# Patient Record
Sex: MALE | Race: WHITE | NOT HISPANIC OR LATINO | Employment: OTHER | URBAN - METROPOLITAN AREA
[De-identification: names, ages, dates, MRNs, and addresses within clinical notes are randomized per-mention and may not be internally consistent; named-entity substitution may affect disease eponyms.]

---

## 2022-05-31 ENCOUNTER — HOSPITAL ENCOUNTER (EMERGENCY)
Facility: HOSPITAL | Age: 67
Discharge: HOME | End: 2022-05-31
Attending: SPECIALIST
Payer: MEDICARE

## 2022-05-31 VITALS
HEART RATE: 78 BPM | OXYGEN SATURATION: 99 % | BODY MASS INDEX: 23.1 KG/M2 | TEMPERATURE: 99 F | RESPIRATION RATE: 20 BRPM | DIASTOLIC BLOOD PRESSURE: 101 MMHG | WEIGHT: 180 LBS | HEIGHT: 74 IN | SYSTOLIC BLOOD PRESSURE: 194 MMHG

## 2022-05-31 DIAGNOSIS — R03.0 ELEVATED BLOOD PRESSURE READING: ICD-10-CM

## 2022-05-31 DIAGNOSIS — B34.9 VIRAL ILLNESS: Primary | ICD-10-CM

## 2022-05-31 LAB
ALBUMIN SERPL-MCNC: 4.4 GM/DL (ref 3.4–4.8)
ALBUMIN/GLOB SERPL: 1.4 RATIO (ref 1.1–2)
ALP SERPL-CCNC: 66 UNIT/L (ref 40–150)
ALT SERPL-CCNC: 48 UNIT/L (ref 0–55)
APPEARANCE UR: CLEAR
AST SERPL-CCNC: 50 UNIT/L (ref 5–34)
BACTERIA #/AREA URNS AUTO: ABNORMAL /HPF
BASOPHILS # BLD AUTO: 0.01 X10(3)/MCL (ref 0–0.2)
BASOPHILS NFR BLD AUTO: 0.2 %
BILIRUB UR QL STRIP.AUTO: NEGATIVE MG/DL
BILIRUBIN DIRECT+TOT PNL SERPL-MCNC: 2.3 MG/DL
BUN SERPL-MCNC: 12.4 MG/DL (ref 8.4–25.7)
CALCIUM SERPL-MCNC: 10.1 MG/DL (ref 8.8–10)
CHLORIDE SERPL-SCNC: 92 MMOL/L (ref 98–107)
CO2 SERPL-SCNC: 27 MMOL/L (ref 23–31)
COLOR UR AUTO: YELLOW
CREAT SERPL-MCNC: 0.92 MG/DL (ref 0.73–1.18)
EOSINOPHIL # BLD AUTO: 0.04 X10(3)/MCL (ref 0–0.9)
EOSINOPHIL NFR BLD AUTO: 0.9 %
ERYTHROCYTE [DISTWIDTH] IN BLOOD BY AUTOMATED COUNT: 14.1 % (ref 11.5–17)
GLOBULIN SER-MCNC: 3.2 GM/DL (ref 2.4–3.5)
GLUCOSE SERPL-MCNC: 118 MG/DL (ref 82–115)
GLUCOSE UR QL STRIP.AUTO: NEGATIVE MG/DL
GRAN CASTS URNS QL MICRO: ABNORMAL /HPF
HCT VFR BLD AUTO: 42.9 % (ref 42–52)
HGB BLD-MCNC: 14.2 GM/DL (ref 14–18)
IMM GRANULOCYTES # BLD AUTO: 0.01 X10(3)/MCL (ref 0–0.02)
IMM GRANULOCYTES NFR BLD AUTO: 0.2 % (ref 0–0.43)
KETONES UR QL STRIP.AUTO: NEGATIVE MG/DL
LEUKOCYTE ESTERASE UR QL STRIP.AUTO: NEGATIVE UNIT/L
LIPASE SERPL-CCNC: 79 U/L
LYMPHOCYTES # BLD AUTO: 0.73 X10(3)/MCL (ref 0.6–4.6)
LYMPHOCYTES NFR BLD AUTO: 16.1 %
MCH RBC QN AUTO: 29.6 PG (ref 27–31)
MCHC RBC AUTO-ENTMCNC: 33.1 MG/DL (ref 33–36)
MCV RBC AUTO: 89.6 FL (ref 80–94)
MONOCYTES # BLD AUTO: 0.47 X10(3)/MCL (ref 0.1–1.3)
MONOCYTES NFR BLD AUTO: 10.4 %
NEUTROPHILS # BLD AUTO: 3.3 X10(3)/MCL (ref 2.1–9.2)
NEUTROPHILS NFR BLD AUTO: 72.2 %
NITRITE UR QL STRIP.AUTO: NEGATIVE
PH UR STRIP.AUTO: 7 [PH]
PLATELET # BLD AUTO: 73 X10(3)/MCL (ref 130–400)
PMV BLD AUTO: 10.2 FL (ref 9.4–12.4)
POTASSIUM SERPL-SCNC: 4.4 MMOL/L (ref 3.5–5.1)
PROT SERPL-MCNC: 7.6 GM/DL (ref 5.8–7.6)
PROT UR QL STRIP.AUTO: 30 MG/DL
RBC # BLD AUTO: 4.79 X10(6)/MCL (ref 4.7–6.1)
RBC #/AREA URNS AUTO: ABNORMAL /HPF
RBC UR QL AUTO: ABNORMAL UNIT/L
SODIUM SERPL-SCNC: 132 MMOL/L (ref 136–145)
SP GR UR STRIP.AUTO: 1.02
SQUAMOUS #/AREA URNS AUTO: ABNORMAL /LPF
TSH SERPL-ACNC: 1.12 UIU/ML (ref 0.35–4.94)
UROBILINOGEN UR STRIP-ACNC: 1 MG/DL
WBC # SPEC AUTO: 4.5 X10(3)/MCL (ref 4.5–11.5)
WBC #/AREA URNS AUTO: ABNORMAL /HPF

## 2022-05-31 PROCEDURE — 63600175 PHARM REV CODE 636 W HCPCS: Performed by: SPECIALIST

## 2022-05-31 PROCEDURE — 83690 ASSAY OF LIPASE: CPT | Performed by: SPECIALIST

## 2022-05-31 PROCEDURE — 99284 EMERGENCY DEPT VISIT MOD MDM: CPT | Mod: 25

## 2022-05-31 PROCEDURE — 96361 HYDRATE IV INFUSION ADD-ON: CPT

## 2022-05-31 PROCEDURE — 85025 COMPLETE CBC W/AUTO DIFF WBC: CPT | Performed by: SPECIALIST

## 2022-05-31 PROCEDURE — 81001 URINALYSIS AUTO W/SCOPE: CPT | Performed by: SPECIALIST

## 2022-05-31 PROCEDURE — 25000003 PHARM REV CODE 250: Performed by: SPECIALIST

## 2022-05-31 PROCEDURE — 84443 ASSAY THYROID STIM HORMONE: CPT | Performed by: SPECIALIST

## 2022-05-31 PROCEDURE — 36415 COLL VENOUS BLD VENIPUNCTURE: CPT | Performed by: SPECIALIST

## 2022-05-31 PROCEDURE — 96374 THER/PROPH/DIAG INJ IV PUSH: CPT

## 2022-05-31 PROCEDURE — 80053 COMPREHEN METABOLIC PANEL: CPT | Performed by: SPECIALIST

## 2022-05-31 RX ORDER — CLONIDINE HYDROCHLORIDE 0.1 MG/1
0.1 TABLET ORAL 2 TIMES DAILY
Qty: 60 TABLET | Refills: 0 | Status: ON HOLD | OUTPATIENT
Start: 2022-05-31 | End: 2022-12-30 | Stop reason: HOSPADM

## 2022-05-31 RX ORDER — ONDANSETRON 2 MG/ML
4 INJECTION INTRAMUSCULAR; INTRAVENOUS
Status: COMPLETED | OUTPATIENT
Start: 2022-05-31 | End: 2022-05-31

## 2022-05-31 RX ORDER — ONDANSETRON 4 MG/1
4 TABLET, FILM COATED ORAL EVERY 6 HOURS
Qty: 12 TABLET | Refills: 0 | Status: ON HOLD | OUTPATIENT
Start: 2022-05-31 | End: 2022-12-30 | Stop reason: HOSPADM

## 2022-05-31 RX ORDER — CLONIDINE HYDROCHLORIDE 0.2 MG/1
0.2 TABLET ORAL
Status: COMPLETED | OUTPATIENT
Start: 2022-05-31 | End: 2022-05-31

## 2022-05-31 RX ADMIN — ONDANSETRON 4 MG: 2 INJECTION INTRAMUSCULAR; INTRAVENOUS at 07:05

## 2022-05-31 RX ADMIN — CLONIDINE HYDROCHLORIDE 0.2 MG: 0.2 TABLET ORAL at 08:05

## 2022-05-31 RX ADMIN — SODIUM CHLORIDE 1000 ML: 9 INJECTION, SOLUTION INTRAVENOUS at 07:05

## 2022-06-01 NOTE — ED PROVIDER NOTES
Encounter Date: 5/31/2022       History     Chief Complaint   Patient presents with    Abdominal Pain     Reports having abd pain with associated weakness since yesterday.reports nausea and one episode of vomiting.     Pt reports he does not see any doctors and does not take any daily meds.     Weakness     Patient reports nausea and an episode of vomiting yesterday, continued nausea today with abdominal cramping and weakness; patient denies any medical problems and has not seen a doctor in many years    The history is provided by the patient.     Review of patient's allergies indicates:  No Known Allergies  History reviewed. No pertinent past medical history.  History reviewed. No pertinent surgical history.  History reviewed. No pertinent family history.     Review of Systems   Constitutional: Negative for fever.   HENT: Negative for sore throat.    Respiratory: Negative for shortness of breath.    Cardiovascular: Negative for chest pain.   Gastrointestinal: Negative for nausea.   Genitourinary: Negative for dysuria.   Musculoskeletal: Negative for back pain.   Skin: Negative for rash.   Neurological: Negative for weakness.   Hematological: Does not bruise/bleed easily.       Physical Exam     Initial Vitals [05/31/22 1927]   BP Pulse Resp Temp SpO2   (!) 216/118 85 20 98.5 °F (36.9 °C) 100 %      MAP       --         Physical Exam    Nursing note and vitals reviewed.  Constitutional: He appears well-developed and well-nourished.   HENT:   Head: Normocephalic and atraumatic.   Eyes: EOM are normal. Pupils are equal, round, and reactive to light.   Neck: Neck supple.   Normal range of motion.  Cardiovascular: Normal rate and regular rhythm.   Murmur (2+ systolic ejection murmur over the left sternal border) heard.  Pulmonary/Chest: Breath sounds normal. No respiratory distress. He has no wheezes.   Abdominal: Abdomen is soft. Bowel sounds are normal. He exhibits no distension. There is abdominal tenderness (  mild, generalized). There is no rebound and no guarding.   Musculoskeletal:         General: Normal range of motion.      Cervical back: Normal range of motion and neck supple.     Neurological: He is alert and oriented to person, place, and time. No cranial nerve deficit or sensory deficit.   Skin: Skin is warm and dry.         ED Course   Procedures  Labs Reviewed   COMPREHENSIVE METABOLIC PANEL - Abnormal; Notable for the following components:       Result Value    Sodium Level 132 (*)     Chloride 92 (*)     Glucose Level 118 (*)     Calcium Level Total 10.1 (*)     Bilirubin Total 2.3 (*)     Aspartate Aminotransferase 50 (*)     All other components within normal limits   LIPASE - Abnormal; Notable for the following components:    Lipase Level 79 (*)     All other components within normal limits   URINALYSIS, REFLEX TO URINE CULTURE - Abnormal; Notable for the following components:    Protein, UA 30  (*)     Blood, UA Trace-Intact (*)     All other components within normal limits   CBC WITH DIFFERENTIAL - Abnormal; Notable for the following components:    Platelet 73 (*)     All other components within normal limits   URINALYSIS, MICROSCOPIC - Abnormal; Notable for the following components:    Granular Casts, UA Few (*)     Squamous Epithelial Cells, UA Few (*)     All other components within normal limits   TSH - Normal   CBC W/ AUTO DIFFERENTIAL    Narrative:     The following orders were created for panel order CBC auto differential.  Procedure                               Abnormality         Status                     ---------                               -----------         ------                     CBC with Differential[[id number]]        Abnormal            Final result                 Please view results for these tests on the individual orders.          Imaging Results    None          Medications   sodium chloride 0.9% bolus 1,000 mL (0 mLs Intravenous Stopped 5/31/22 2055)   ondansetron injection  "4 mg (4 mg Intravenous Given 5/31/22 1952)   cloNIDine tablet 0.2 mg (0.2 mg Oral Given 5/31/22 2041)     Medical Decision Making:   Differential Diagnosis:   Gastroenteritis; Bowel obstruction; Electrolyte abnormality; Dehydration; Gastritis; GERD; PUD; Viral illness        Patient Vitals for the past 24 hrs:   BP Temp Temp src Pulse Resp SpO2 Height Weight   05/31/22 2114 (!) 194/101 98.5 °F (36.9 °C) Oral 78 20 99 % -- --   05/31/22 2041 (!) 221/118 -- -- -- -- -- -- --   05/31/22 2030 (!) 221/118 -- -- -- -- -- -- --   05/31/22 2003 (!) 135/92 98.5 °F (36.9 °C) -- 87 18 98 % -- --   05/31/22 1931 -- -- -- -- -- 100 % -- --   05/31/22 1927 (!) 216/118 98.5 °F (36.9 °C) Oral 85 20 100 % 6' 2" (1.88 m) 81.6 kg (180 lb)     Much improved                 Clinical Impression:   Final diagnoses:  [B34.9] Viral illness (Primary)  [R03.0] Elevated blood pressure reading          ED Disposition Condition    Discharge Stable        ED Prescriptions     Medication Sig Dispense Start Date End Date Auth. Provider    ondansetron (ZOFRAN) 4 MG tablet Take 1 tablet (4 mg total) by mouth every 6 (six) hours. 12 tablet 5/31/2022  Alberto Sampson MD    cloNIDine (CATAPRES) 0.1 MG tablet Take 1 tablet (0.1 mg total) by mouth 2 (two) times daily. 60 tablet 5/31/2022 6/30/2022 Alberto Sampson MD        Follow-up Information     Follow up With Specialties Details Why Contact Info    Primary MD  In 1 week      LOREN Petersen Nurse Practitioner In 1 week  [address] [facility] [id number]  [phone]             Alberto Sampson MD  06/01/22 0059    "

## 2022-12-08 ENCOUNTER — HOSPITAL ENCOUNTER (EMERGENCY)
Facility: HOSPITAL | Age: 67
Discharge: HOME | End: 2022-12-08
Attending: STUDENT IN AN ORGANIZED HEALTH CARE EDUCATION/TRAINING PROGRAM
Payer: MEDICARE

## 2022-12-08 VITALS
TEMPERATURE: 97 F | RESPIRATION RATE: 19 BRPM | BODY MASS INDEX: 23.1 KG/M2 | OXYGEN SATURATION: 99 % | DIASTOLIC BLOOD PRESSURE: 89 MMHG | WEIGHT: 180 LBS | HEART RATE: 83 BPM | HEIGHT: 74 IN | SYSTOLIC BLOOD PRESSURE: 150 MMHG

## 2022-12-08 DIAGNOSIS — R06.02 SHORTNESS OF BREATH: ICD-10-CM

## 2022-12-08 DIAGNOSIS — R06.00 DYSPNEA, UNSPECIFIED TYPE: Primary | ICD-10-CM

## 2022-12-08 DIAGNOSIS — I10 HYPERTENSION, UNSPECIFIED TYPE: ICD-10-CM

## 2022-12-08 LAB
ABS NEUT CALC (OHS): 2.32 X10(3)/MCL (ref 2.1–9.2)
ALBUMIN SERPL-MCNC: 3.8 GM/DL (ref 3.4–4.8)
ALBUMIN/GLOB SERPL: 1.2 RATIO (ref 1.1–2)
ALP SERPL-CCNC: 67 UNIT/L (ref 40–150)
ALT SERPL-CCNC: 35 UNIT/L (ref 0–55)
AMPHET UR QL SCN: NEGATIVE
APPEARANCE UR: CLEAR
AST SERPL-CCNC: 47 UNIT/L (ref 5–34)
BACTERIA #/AREA URNS AUTO: NORMAL /HPF
BARBITURATE SCN PRESENT UR: NEGATIVE
BASOPHILS NFR BLD MANUAL: 0 % (ref 0–2)
BASOPHILS NFR BLD MANUAL: 0 X10(3)/MCL (ref 0–0.2)
BENZODIAZ UR QL SCN: NEGATIVE
BILIRUB UR QL STRIP.AUTO: NEGATIVE MG/DL
BILIRUBIN DIRECT+TOT PNL SERPL-MCNC: 1.8 MG/DL
BNP BLD-MCNC: 822.6 PG/ML
BUN SERPL-MCNC: 15.6 MG/DL (ref 8.4–25.7)
CALCIUM SERPL-MCNC: 9.7 MG/DL (ref 8.8–10)
CANNABINOIDS UR QL SCN: NEGATIVE
CHLORIDE SERPL-SCNC: 97 MMOL/L (ref 98–107)
CO2 SERPL-SCNC: 23 MMOL/L (ref 23–31)
COCAINE UR QL SCN: NEGATIVE
COLOR UR AUTO: YELLOW
CREAT SERPL-MCNC: 0.93 MG/DL (ref 0.73–1.18)
D DIMER PPP IA.FEU-MCNC: 1.66 UG/ML FEU (ref 0–0.5)
EOSINOPHIL NFR BLD MANUAL: 0 % (ref 0–8)
EOSINOPHIL NFR BLD MANUAL: 0 X10(3)/MCL (ref 0–0.9)
ERYTHROCYTE [DISTWIDTH] IN BLOOD BY AUTOMATED COUNT: 13.6 % (ref 11.5–17)
ETHANOL SERPL-MCNC: <10 MG/DL
FLUAV AG UPPER RESP QL IA.RAPID: NOT DETECTED
FLUBV AG UPPER RESP QL IA.RAPID: NOT DETECTED
GFR SERPLBLD CREATININE-BSD FMLA CKD-EPI: >60 MLS/MIN/1.73/M2
GLOBULIN SER-MCNC: 3.1 GM/DL (ref 2.4–3.5)
GLUCOSE SERPL-MCNC: 111 MG/DL (ref 82–115)
GLUCOSE UR QL STRIP.AUTO: NEGATIVE MG/DL
HCT VFR BLD AUTO: 39.8 % (ref 42–52)
HEMATOLOGIST REVIEW: NORMAL
HGB BLD-MCNC: 13.2 GM/DL (ref 14–18)
IMM GRANULOCYTES # BLD AUTO: 0.01 X10(3)/MCL (ref 0–0.04)
IMM GRANULOCYTES NFR BLD AUTO: 0.4 %
INR BLD: 0.96 (ref 0–1.3)
KETONES UR QL STRIP.AUTO: NEGATIVE MG/DL
LEUKOCYTE ESTERASE UR QL STRIP.AUTO: NEGATIVE UNIT/L
LYMPHOCYTES NFR BLD MANUAL: 0.27 X10(3)/MCL
LYMPHOCYTES NFR BLD MANUAL: 10 % (ref 13–40)
MCH RBC QN AUTO: 29.3 PG (ref 27–31)
MCHC RBC AUTO-ENTMCNC: 33.2 MG/DL (ref 33–36)
MCV RBC AUTO: 88.4 FL (ref 80–94)
MONOCYTES NFR BLD MANUAL: 0.11 X10(3)/MCL (ref 0.1–1.3)
MONOCYTES NFR BLD MANUAL: 4 % (ref 2–11)
NEUTROPHILS NFR BLD MANUAL: 83 % (ref 47–80)
NEUTS BAND NFR BLD MANUAL: 3 % (ref 0–11)
NITRITE UR QL STRIP.AUTO: NEGATIVE
OPIATES UR QL SCN: POSITIVE
PCP UR QL: NEGATIVE
PH UR STRIP.AUTO: 5.5 [PH]
PH UR: 5.5 [PH] (ref 3–11)
PLATELET # BLD AUTO: 76 X10(3)/MCL (ref 130–400)
PLATELET # BLD EST: ABNORMAL 10*3/UL
PMV BLD AUTO: 9.7 FL (ref 7.4–10.4)
POC CARDIAC TROPONIN I: 0.02 NG/ML (ref 0–0.08)
POTASSIUM SERPL-SCNC: 4.1 MMOL/L (ref 3.5–5.1)
PROT SERPL-MCNC: 6.9 GM/DL (ref 5.8–7.6)
PROT UR QL STRIP.AUTO: NEGATIVE MG/DL
PROTHROMBIN TIME: <10 SECONDS (ref 12.5–14.5)
RBC # BLD AUTO: 4.5 X10(6)/MCL (ref 4.7–6.1)
RBC #/AREA URNS AUTO: NORMAL /HPF
RBC UR QL AUTO: NEGATIVE UNIT/L
SAMPLE: NORMAL
SARS-COV-2 RNA RESP QL NAA+PROBE: NOT DETECTED
SODIUM SERPL-SCNC: 135 MMOL/L (ref 136–145)
SP GR UR STRIP.AUTO: <=1.005
SPECIFIC GRAVITY, URINE AUTO (.000) (OHS): <=1.005 (ref 1–1.03)
SQUAMOUS #/AREA URNS AUTO: NORMAL /HPF
UROBILINOGEN UR STRIP-ACNC: 1 MG/DL
WBC # SPEC AUTO: 2.7 X10(3)/MCL (ref 4.5–11.5)
WBC #/AREA URNS AUTO: NORMAL /HPF

## 2022-12-08 PROCEDURE — 93005 ELECTROCARDIOGRAM TRACING: CPT

## 2022-12-08 PROCEDURE — 83880 ASSAY OF NATRIURETIC PEPTIDE: CPT | Performed by: STUDENT IN AN ORGANIZED HEALTH CARE EDUCATION/TRAINING PROGRAM

## 2022-12-08 PROCEDURE — 82077 ASSAY SPEC XCP UR&BREATH IA: CPT | Performed by: STUDENT IN AN ORGANIZED HEALTH CARE EDUCATION/TRAINING PROGRAM

## 2022-12-08 PROCEDURE — 25000003 PHARM REV CODE 250: Performed by: STUDENT IN AN ORGANIZED HEALTH CARE EDUCATION/TRAINING PROGRAM

## 2022-12-08 PROCEDURE — 81003 URINALYSIS AUTO W/O SCOPE: CPT | Performed by: STUDENT IN AN ORGANIZED HEALTH CARE EDUCATION/TRAINING PROGRAM

## 2022-12-08 PROCEDURE — 25500020 PHARM REV CODE 255: Performed by: STUDENT IN AN ORGANIZED HEALTH CARE EDUCATION/TRAINING PROGRAM

## 2022-12-08 PROCEDURE — 0240U COVID/FLU A&B PCR: CPT | Performed by: STUDENT IN AN ORGANIZED HEALTH CARE EDUCATION/TRAINING PROGRAM

## 2022-12-08 PROCEDURE — 99285 EMERGENCY DEPT VISIT HI MDM: CPT | Mod: 25

## 2022-12-08 PROCEDURE — 93010 EKG 12-LEAD: ICD-10-PCS | Mod: ,,, | Performed by: INTERNAL MEDICINE

## 2022-12-08 PROCEDURE — 85610 PROTHROMBIN TIME: CPT | Performed by: STUDENT IN AN ORGANIZED HEALTH CARE EDUCATION/TRAINING PROGRAM

## 2022-12-08 PROCEDURE — 80053 COMPREHEN METABOLIC PANEL: CPT | Performed by: STUDENT IN AN ORGANIZED HEALTH CARE EDUCATION/TRAINING PROGRAM

## 2022-12-08 PROCEDURE — 85060 BLOOD SMEAR INTERPRETATION: CPT | Performed by: STUDENT IN AN ORGANIZED HEALTH CARE EDUCATION/TRAINING PROGRAM

## 2022-12-08 PROCEDURE — 80307 DRUG TEST PRSMV CHEM ANLYZR: CPT | Performed by: STUDENT IN AN ORGANIZED HEALTH CARE EDUCATION/TRAINING PROGRAM

## 2022-12-08 PROCEDURE — 85379 FIBRIN DEGRADATION QUANT: CPT | Performed by: STUDENT IN AN ORGANIZED HEALTH CARE EDUCATION/TRAINING PROGRAM

## 2022-12-08 PROCEDURE — 85007 BL SMEAR W/DIFF WBC COUNT: CPT | Performed by: STUDENT IN AN ORGANIZED HEALTH CARE EDUCATION/TRAINING PROGRAM

## 2022-12-08 PROCEDURE — 93010 ELECTROCARDIOGRAM REPORT: CPT | Mod: ,,, | Performed by: INTERNAL MEDICINE

## 2022-12-08 PROCEDURE — 81001 URINALYSIS AUTO W/SCOPE: CPT | Performed by: STUDENT IN AN ORGANIZED HEALTH CARE EDUCATION/TRAINING PROGRAM

## 2022-12-08 PROCEDURE — 85025 COMPLETE CBC W/AUTO DIFF WBC: CPT | Performed by: STUDENT IN AN ORGANIZED HEALTH CARE EDUCATION/TRAINING PROGRAM

## 2022-12-08 RX ORDER — CLONIDINE HYDROCHLORIDE 0.1 MG/1
0.1 TABLET ORAL 2 TIMES DAILY
Qty: 60 TABLET | Refills: 0 | Status: SHIPPED | OUTPATIENT
Start: 2022-12-08 | End: 2023-01-07

## 2022-12-08 RX ORDER — CLONIDINE HYDROCHLORIDE 0.2 MG/1
0.2 TABLET ORAL
Status: COMPLETED | OUTPATIENT
Start: 2022-12-08 | End: 2022-12-08

## 2022-12-08 RX ADMIN — CLONIDINE HYDROCHLORIDE 0.2 MG: 0.2 TABLET ORAL at 07:12

## 2022-12-08 RX ADMIN — IOHEXOL 100 ML: 350 INJECTION, SOLUTION INTRAVENOUS at 08:12

## 2022-12-08 NOTE — ED PROVIDER NOTES
Encounter Date: 12/8/2022       History     Chief Complaint   Patient presents with    Shortness of Breath     Sob for  2day/ off bp med for 2 weeks     67 m presents to Ed with SOB x 2 days worsened this am, out of BP meds x 2 weeks. Denies COPD/asthma, no cp, n/v/diaphoresis/HA or any other symptoms. Denies tobacco, drugs. Chronic etoh user, drinks 2 six packs /day, last drink was last pm.  no cough or other sick symptoms    Review of patient's allergies indicates:  No Known Allergies  No past medical history on file.  No past surgical history on file.  No family history on file.  Social History     Tobacco Use    Smoking status: Never    Smokeless tobacco: Never   Substance Use Topics    Alcohol use: Yes     Alcohol/week: 85.0 standard drinks     Types: 85 Cans of beer per week    Drug use: Yes     Types: Hydrocodone     Review of Systems   Respiratory:  Positive for shortness of breath.    All other systems reviewed and are negative.    Physical Exam     Initial Vitals   BP Pulse Resp Temp SpO2   12/08/22 0712 12/08/22 0711 12/08/22 0712 12/08/22 0712 12/08/22 0712   (!) 193/109 68 20 97.3 °F (36.3 °C) 96 %      MAP       --                Physical Exam    Constitutional: He appears well-developed and well-nourished.   HENT:   Head: Normocephalic.   Eyes: EOM are normal. Pupils are equal, round, and reactive to light.   Neck:   Normal range of motion.  Cardiovascular:  Normal rate, regular rhythm, normal heart sounds, intact distal pulses and normal pulses.           Pulmonary/Chest: Breath sounds normal. No respiratory distress. He has no wheezes. He exhibits no tenderness.   Abdominal: Abdomen is soft. Bowel sounds are normal. There is no abdominal tenderness.   Musculoskeletal:         General: Normal range of motion.      Cervical back: Normal range of motion.     Neurological: He is alert. GCS score is 15. GCS eye subscore is 4. GCS verbal subscore is 5. GCS motor subscore is 6.   Skin: Skin is warm.  Capillary refill takes less than 2 seconds.   Psychiatric: He has a normal mood and affect.       ED Course   Procedures  Labs Reviewed   B-TYPE NATRIURETIC PEPTIDE - Abnormal; Notable for the following components:       Result Value    Natriuretic Peptide 822.6 (*)     All other components within normal limits   COMPREHENSIVE METABOLIC PANEL - Abnormal; Notable for the following components:    Sodium Level 135 (*)     Chloride 97 (*)     Bilirubin Total 1.8 (*)     Aspartate Aminotransferase 47 (*)     All other components within normal limits   DRUG SCREEN, URINE (BEAKER) - Abnormal; Notable for the following components:    Opiates, Urine Positive (*)     All other components within normal limits    Narrative:     Cut off concentrations:    Amphetamines - 1000 ng/ml  Barbiturates - 200 ng/ml  Benzodiazepine - 200 ng/ml  Cannabinoids (THC) - 50 ng/ml  Cocaine - 300 ng/ml  Fentanyl - 1.0 ng/ml  MDMA - 500 ng/ml  Opiates - 300 ng/ml   Phencyclidine (PCP) - 25 ng/ml    Specimen submitted for drug analysis and tested for pH and specific gravity in order to evaluate sample integrity. Suspect tampering if specific gravity is <1.003 and/or pH is not within the range of 4.5 - 8.0  False negatives may result form substances such as bleach added to urine.  False positives may result for the presence of a substance with similar chemical structure to the drug or its metabolite.    This test provides only a PRELIMINARY analytical test result. A more specific alternate chemical method must be used in order to obtain a confirmed analytical result. Gas chromatography/mass spectrometry (GC/MS) is the preferred confirmatory method. Other chemical confirmation methods are available. Clinical consideration and professional judgement should be applied to any drug of abuse test result, particularly when preliminary positive results are used.    Positive results will be confirmed only at the physicians request. Unconfirmed screening  results are to be used only for medical purposes (treatment).        PROTIME-INR - Abnormal; Notable for the following components:    PT <10.0 (*)     All other components within normal limits   D DIMER, QUANTITATIVE - Abnormal; Notable for the following components:    D-Dimer 1.66 (*)     All other components within normal limits   CBC WITH DIFFERENTIAL - Abnormal; Notable for the following components:    WBC 2.7 (*)     RBC 4.50 (*)     Hgb 13.2 (*)     Hct 39.8 (*)     Platelet 76 (*)     All other components within normal limits   MANUAL DIFFERENTIAL - Abnormal; Notable for the following components:    Neut Man 83 (*)     Lymph Man 10 (*)     Abs Lymp 0.27 (*)     Platelet Est Decreased (*)     All other components within normal limits   COVID/FLU A&B PCR - Normal    Narrative:     The Xpert Xpress SARS-CoV-2/FLU/RSV plus is a rapid, multiplexed real-time PCR test intended for the simultaneous qualitative detection and differentiation of SARS-CoV-2, Influenza A, Influenza B, and respiratory syncytial virus (RSV) viral RNA in either nasopharyngeal swab or nasal swab specimens.         ALCOHOL,MEDICAL (ETHANOL) - Normal   URINALYSIS, MICROSCOPIC - Normal   CBC W/ AUTO DIFFERENTIAL    Narrative:     The following orders were created for panel order CBC Auto Differential.  Procedure                               Abnormality         Status                     ---------                               -----------         ------                     CBC with Differential[[id number]]        Abnormal            Final result               Manual Differential[[id number]]          Abnormal            Final result                 Please view results for these tests on the individual orders.   URINALYSIS, REFLEX TO URINE CULTURE   TROPONIN ISTAT   PATH REVIEW OF BLOOD SMEAR     EKG Readings: (Independently Interpreted)   Initial Reading: No STEMI. Heart Rate: 88.   Sinus rhythm w ocasional PVCs  LAE  LVH  Prolonged qt  Nonspecific  st changes   ECG Results              EKG 12-lead (In process)  Result time 12/09/22 06:35:28      In process by Interface, Lab In [facility] (12/09/22 06:35:28)                   Narrative:    Test Reason : R06.02,    Vent. Rate : 088 BPM     Atrial Rate : 088 BPM     P-R Int : 144 ms          QRS Dur : 096 ms      QT Int : 404 ms       P-R-T Axes : 067 076 062 degrees     QTc Int : 488 ms    Sinus rhythm with occasional Premature ventricular complexes  Possible Left atrial enlargement  LVH  Nonspecific ST abnormality  Prolonged QT  Abnormal ECG  No previous ECGs available    Referred By: [email or web address]   SELF           Confirmed By:                                   Imaging Results              CTA Chest Non-Coronary (PE Studies) (Final result)  Result time 12/08/22 09:18:30      Final result by Wyatt Wellington MD (12/08/22 09:18:30)                   Impression:      1. No evidence of pulmonary embolism through the level of the subsegmental pulmonary arteries.  2. Ground-glass nodular opacity in the superior segment of the left lower lobe measuring 17 mm.  While this may represent an infectious or inflammatory process, recommend repeat CT of the chest in 3 months to ensure resolution.  3. Diffuse hepatic steatosis and splenomegaly.      Electronically signed by: Wyatt Wellington MD  Date:    12/08/2022  Time:    09:18               Narrative:    EXAMINATION:  CTA CHEST NON CORONARY (PE STUDIES)    CLINICAL HISTORY:  Pulmonary embolism (PE) suspected, positive D-dimer;    TECHNIQUE:  Axial images of the chest were obtained with IV contrast administration.  Coronal and sagittal reconstructions were provided.  Three dimensional and MIP images were obtained and evaluated.  Total DLP was 646.06 mGy-cm. Dose lowering technique and automated exposure control were utilized for this exam.    COMPARISON:  Chest radiograph 12/08/2022.    FINDINGS:  There is no filling defect within the pulmonary arteries through the level of the  subsegmental pulmonary arteries.  There is no CT evidence of right heart strain.    The airway is widely patent.  There is no mediastinal or hilar lymphadenopathy.  The heart is not enlarged.    There is bilateral lower lobe atelectasis.  There are trace bilateral pleural effusions.  There is a ground-glass opacity in the superior segment of the left lower lobe measuring 17 mm.    The upper abdomen demonstrates diffuse hepatic steatosis and splenomegaly without acute abnormality.  There is no lytic or blastic osseous lesion.                                       X-Ray Chest 1 View (Final result)  Result time 12/08/22 07:14:16      Final result by Yoandy Hernandez MD (12/08/22 07:14:16)                   Impression:      No acute pulmonary process identified.      Electronically signed by: Yoandy Hernandez  Date:    12/08/2022  Time:    07:14               Narrative:    EXAMINATION:  XR CHEST 1 VIEW    CLINICAL HISTORY:  Shortness of breath    TECHNIQUE:  Frontal view(s) of the chest.    COMPARISON:  No relevant comparison studies available at the time of dictation.    FINDINGS:  Normal cardiac silhouette.  The lungs are well-inflated.  No consolidation identified.  No significant pleural effusion or discernible pneumothorax.                                       Medications   cloNIDine tablet 0.2 mg (0.2 mg Oral Given 12/8/22 0741)   iohexoL (OMNIPAQUE 350) injection 100 mL (100 mLs Intravenous Given 12/8/22 0857)                 ED Course as of 12/09/22 0646   Thu Dec 08, 2022   0645 Sob resolved after bp improved  Reinforced need for FU with pcp and medication compliance [MG]   0935 Plt 76 on CBC  Plt 73 on chart marked for merge 5/31/22    CTPE-neg for PE, chronic appearing ground glass opacity in L LL [MG]      ED Course User Index  [MG] Marjorie Couch MD                 Clinical Impression:   Final diagnoses:  [R06.02] Shortness of breath  [R06.00] Dyspnea, unspecified type (Primary)  [I10] Hypertension,  unspecified type        ED Disposition Condition    Discharge Stable          ED Prescriptions       Medication Sig Dispense Start Date End Date Auth. Provider    cloNIDine (CATAPRES) 0.1 MG tablet Take 1 tablet (0.1 mg total) by mouth 2 (two) times daily. 60 tablet 12/8/2022 1/7/2023 Marjorie Couch MD          Follow-up Information       Follow up With Specialties Details Why Contact Info    PCP    call Dr Urbina to establish care (patient states he is familiar with this doctor and would like to see him) also given referral info for local pcp             Marjorie Couch MD  12/09/22 6580

## 2022-12-28 ENCOUNTER — HOSPITAL ENCOUNTER (EMERGENCY)
Facility: HOSPITAL | Age: 67
Discharge: LEFT BEFORE DISCHARGE | End: 2022-12-28
Attending: STUDENT IN AN ORGANIZED HEALTH CARE EDUCATION/TRAINING PROGRAM
Payer: MEDICARE

## 2022-12-28 VITALS
WEIGHT: 180 LBS | HEIGHT: 74 IN | BODY MASS INDEX: 23.1 KG/M2 | DIASTOLIC BLOOD PRESSURE: 91 MMHG | HEART RATE: 81 BPM | OXYGEN SATURATION: 96 % | RESPIRATION RATE: 22 BRPM | TEMPERATURE: 98 F | SYSTOLIC BLOOD PRESSURE: 148 MMHG

## 2022-12-28 DIAGNOSIS — E87.1 HYPONATREMIA: ICD-10-CM

## 2022-12-28 DIAGNOSIS — R06.02 SHORTNESS OF BREATH: ICD-10-CM

## 2022-12-28 DIAGNOSIS — I50.9 CONGESTIVE HEART FAILURE, UNSPECIFIED HF CHRONICITY, UNSPECIFIED HEART FAILURE TYPE: Primary | ICD-10-CM

## 2022-12-28 LAB
ALBUMIN SERPL-MCNC: 3.7 G/DL (ref 3.4–4.8)
ALBUMIN/GLOB SERPL: 1.3 RATIO (ref 1.1–2)
ALP SERPL-CCNC: 66 UNIT/L (ref 40–150)
ALT SERPL-CCNC: 30 UNIT/L (ref 0–55)
APPEARANCE UR: CLEAR
AST SERPL-CCNC: 45 UNIT/L (ref 5–34)
BACTERIA #/AREA URNS AUTO: NORMAL /HPF
BASOPHILS # BLD AUTO: 0.01 X10(3)/MCL (ref 0–0.2)
BASOPHILS NFR BLD AUTO: 0.3 %
BILIRUB UR QL STRIP.AUTO: NEGATIVE MG/DL
BILIRUBIN DIRECT+TOT PNL SERPL-MCNC: 1.4 MG/DL
BNP BLD-MCNC: 821.1 PG/ML
BUN SERPL-MCNC: 11.2 MG/DL (ref 8.4–25.7)
CALCIUM SERPL-MCNC: 8.9 MG/DL (ref 8.8–10)
CHLORIDE SERPL-SCNC: 90 MMOL/L (ref 98–107)
CO2 SERPL-SCNC: 23 MMOL/L (ref 23–31)
COLOR UR AUTO: YELLOW
CREAT SERPL-MCNC: 0.73 MG/DL (ref 0.73–1.18)
EOSINOPHIL # BLD AUTO: 0.1 X10(3)/MCL (ref 0–0.9)
EOSINOPHIL NFR BLD AUTO: 3.2 %
ERYTHROCYTE [DISTWIDTH] IN BLOOD BY AUTOMATED COUNT: 13.7 % (ref 11.6–14.4)
FLUAV AG UPPER RESP QL IA.RAPID: NOT DETECTED
FLUBV AG UPPER RESP QL IA.RAPID: NOT DETECTED
GFR SERPLBLD CREATININE-BSD FMLA CKD-EPI: >60 MLS/MIN/1.73/M2
GLOBULIN SER-MCNC: 2.9 GM/DL (ref 2.4–3.5)
GLUCOSE SERPL-MCNC: 95 MG/DL (ref 82–115)
GLUCOSE UR QL STRIP.AUTO: NEGATIVE MG/DL
HCT VFR BLD AUTO: 36.4 % (ref 42–52)
HGB BLD-MCNC: 12.1 GM/DL (ref 14–18)
IMM GRANULOCYTES # BLD AUTO: 0.01 X10(3)/MCL (ref 0–0.04)
IMM GRANULOCYTES NFR BLD AUTO: 0.3 %
KETONES UR QL STRIP.AUTO: NEGATIVE MG/DL
LEUKOCYTE ESTERASE UR QL STRIP.AUTO: NEGATIVE UNIT/L
LYMPHOCYTES # BLD AUTO: 0.84 X10(3)/MCL (ref 0.6–4.6)
LYMPHOCYTES NFR BLD AUTO: 27.1 %
MCH RBC QN AUTO: 29.3 PG
MCHC RBC AUTO-ENTMCNC: 33.2 MG/DL (ref 33–36)
MCV RBC AUTO: 88.1 FL (ref 80–94)
MONOCYTES # BLD AUTO: 0.29 X10(3)/MCL (ref 0.1–1.3)
MONOCYTES NFR BLD AUTO: 9.4 %
NEUTROPHILS # BLD AUTO: 1.85 X10(3)/MCL (ref 2.1–9.2)
NEUTROPHILS NFR BLD AUTO: 59.7 %
NITRITE UR QL STRIP.AUTO: NEGATIVE
PH UR STRIP.AUTO: 5.5 [PH]
PLATELET # BLD AUTO: 77 X10(3)/MCL (ref 140–371)
PMV BLD AUTO: 10 FL (ref 9.4–12.4)
POC CARDIAC TROPONIN I: 0 NG/ML (ref 0–0.08)
POTASSIUM SERPL-SCNC: 4.3 MMOL/L (ref 3.5–5.1)
PROT SERPL-MCNC: 6.6 GM/DL (ref 5.8–7.6)
PROT UR QL STRIP.AUTO: NEGATIVE MG/DL
RBC # BLD AUTO: 4.13 X10(6)/MCL (ref 4.7–6.1)
RBC #/AREA URNS AUTO: NORMAL /HPF
RBC UR QL AUTO: NEGATIVE UNIT/L
SAMPLE: NORMAL
SARS-COV-2 RNA RESP QL NAA+PROBE: NOT DETECTED
SODIUM SERPL-SCNC: 122 MMOL/L (ref 136–145)
SP GR UR STRIP.AUTO: <=1.005
SQUAMOUS #/AREA URNS AUTO: NORMAL /HPF
UROBILINOGEN UR STRIP-ACNC: 1 MG/DL
WBC # SPEC AUTO: 3.1 X10(3)/MCL (ref 4.5–11.5)
WBC #/AREA URNS AUTO: NORMAL /HPF

## 2022-12-28 PROCEDURE — 83880 ASSAY OF NATRIURETIC PEPTIDE: CPT | Performed by: STUDENT IN AN ORGANIZED HEALTH CARE EDUCATION/TRAINING PROGRAM

## 2022-12-28 PROCEDURE — 93005 ELECTROCARDIOGRAM TRACING: CPT

## 2022-12-28 PROCEDURE — 93010 EKG 12-LEAD: ICD-10-PCS | Mod: ,,, | Performed by: INTERNAL MEDICINE

## 2022-12-28 PROCEDURE — 25000242 PHARM REV CODE 250 ALT 637 W/ HCPCS: Performed by: STUDENT IN AN ORGANIZED HEALTH CARE EDUCATION/TRAINING PROGRAM

## 2022-12-28 PROCEDURE — 80053 COMPREHEN METABOLIC PANEL: CPT | Performed by: STUDENT IN AN ORGANIZED HEALTH CARE EDUCATION/TRAINING PROGRAM

## 2022-12-28 PROCEDURE — 84484 ASSAY OF TROPONIN QUANT: CPT

## 2022-12-28 PROCEDURE — 0240U COVID/FLU A&B PCR: CPT | Performed by: STUDENT IN AN ORGANIZED HEALTH CARE EDUCATION/TRAINING PROGRAM

## 2022-12-28 PROCEDURE — 85025 COMPLETE CBC W/AUTO DIFF WBC: CPT | Performed by: STUDENT IN AN ORGANIZED HEALTH CARE EDUCATION/TRAINING PROGRAM

## 2022-12-28 PROCEDURE — 93010 ELECTROCARDIOGRAM REPORT: CPT | Mod: ,,, | Performed by: INTERNAL MEDICINE

## 2022-12-28 PROCEDURE — 81001 URINALYSIS AUTO W/SCOPE: CPT | Performed by: STUDENT IN AN ORGANIZED HEALTH CARE EDUCATION/TRAINING PROGRAM

## 2022-12-28 PROCEDURE — 99285 EMERGENCY DEPT VISIT HI MDM: CPT | Mod: 25

## 2022-12-28 RX ORDER — NITROGLYCERIN 0.4 MG/1
0.4 TABLET SUBLINGUAL
Status: COMPLETED | OUTPATIENT
Start: 2022-12-28 | End: 2022-12-28

## 2022-12-28 RX ADMIN — NITROGLYCERIN 0.4 MG: 0.4 TABLET SUBLINGUAL at 09:12

## 2022-12-28 NOTE — LETTER
Patient: Perfecto Urbina  YOB: 1955  Date: 12/28/2022 Time: 11:30 PM  Location: [facility]    Leaving the Hospital Against Medical Advice    Chart #:[id number]    This will certify that I, the undersigned,    ______________________________________________________________________    A patient in the above named medical center, having requested discharge and removal from the medical [place] against the advice of my attending physician(s), hereby release [facility], its physicians, officers and employees, severally and individually, from any and all liability of any nature whatsoever for any injury or harm or complication of any kind that may result directly or indirectly, by reason of my terminating my stay as a patient at [facility] and my departure from [facility], and hereby waive any and all rights of action I may now have or later acquire as a result of my voluntary departure from [facility] and the termination of my stay as a patient therein.    This release is made with the full knowledge of the danger that may result from the action which I am taking.      Date:_______________________                         ___________________________                                                                                    Patient/Legal Representative    Witness:        ____________________________                          ___________________________  Nurse                                                                        Physician

## 2022-12-29 ENCOUNTER — HOSPITAL ENCOUNTER (OUTPATIENT)
Facility: HOSPITAL | Age: 67
Discharge: HOME | End: 2022-12-30
Attending: STUDENT IN AN ORGANIZED HEALTH CARE EDUCATION/TRAINING PROGRAM | Admitting: STUDENT IN AN ORGANIZED HEALTH CARE EDUCATION/TRAINING PROGRAM
Payer: MEDICARE

## 2022-12-29 DIAGNOSIS — R07.9 CHEST PAIN: ICD-10-CM

## 2022-12-29 DIAGNOSIS — E87.1 HYPONATREMIA: ICD-10-CM

## 2022-12-29 DIAGNOSIS — I50.9 ACUTE ON CHRONIC CONGESTIVE HEART FAILURE, UNSPECIFIED HEART FAILURE TYPE: Primary | ICD-10-CM

## 2022-12-29 DIAGNOSIS — I50.9 CHF (CONGESTIVE HEART FAILURE): ICD-10-CM

## 2022-12-29 DIAGNOSIS — R06.02 SHORTNESS OF BREATH: ICD-10-CM

## 2022-12-29 PROBLEM — F10.10 ETOH ABUSE: Status: ACTIVE | Noted: 2022-12-29

## 2022-12-29 PROBLEM — I16.0 HYPERTENSIVE URGENCY: Status: ACTIVE | Noted: 2022-12-29

## 2022-12-29 PROBLEM — D69.6 THROMBOCYTOPENIA: Status: ACTIVE | Noted: 2022-12-29

## 2022-12-29 PROBLEM — I48.92 ATRIAL FLUTTER: Status: ACTIVE | Noted: 2022-12-29

## 2022-12-29 LAB
AMPHET UR QL SCN: NEGATIVE
ANION GAP SERPL CALC-SCNC: 14 MEQ/L
BARBITURATE SCN PRESENT UR: NEGATIVE
BASOPHILS # BLD AUTO: 0.01 X10(3)/MCL (ref 0–0.2)
BASOPHILS NFR BLD AUTO: 0.4 %
BENZODIAZ UR QL SCN: NEGATIVE
BUN SERPL-MCNC: 12.3 MG/DL (ref 8.4–25.7)
CALCIUM SERPL-MCNC: 9.1 MG/DL (ref 8.8–10)
CANNABINOIDS UR QL SCN: NEGATIVE
CHLORIDE SERPL-SCNC: 95 MMOL/L (ref 98–107)
CHOLEST SERPL-MCNC: 183 MG/DL
CHOLEST/HDLC SERPL: 2 {RATIO} (ref 0–5)
CO2 SERPL-SCNC: 23 MMOL/L (ref 23–31)
COCAINE UR QL SCN: NEGATIVE
CREAT SERPL-MCNC: 0.79 MG/DL (ref 0.73–1.18)
CREAT/UREA NIT SERPL: 16
EOSINOPHIL # BLD AUTO: 0.08 X10(3)/MCL (ref 0–0.9)
EOSINOPHIL NFR BLD AUTO: 3.1 %
ERYTHROCYTE [DISTWIDTH] IN BLOOD BY AUTOMATED COUNT: 13.9 % (ref 11.6–14.4)
ETHANOL SERPL-MCNC: <10 MG/DL
FOLATE SERPL-MCNC: 8.3 NG/ML (ref 7–31.4)
GFR SERPLBLD CREATININE-BSD FMLA CKD-EPI: >60 MLS/MIN/1.73/M2
GLUCOSE SERPL-MCNC: 92 MG/DL (ref 82–115)
HCT VFR BLD AUTO: 37.4 % (ref 42–52)
HDLC SERPL-MCNC: 102 MG/DL (ref 35–60)
HGB BLD-MCNC: 12.7 GM/DL (ref 14–18)
IMM GRANULOCYTES # BLD AUTO: 0.01 X10(3)/MCL (ref 0–0.04)
IMM GRANULOCYTES NFR BLD AUTO: 0.4 %
LDLC SERPL CALC-MCNC: 70 MG/DL (ref 50–140)
LYMPHOCYTES # BLD AUTO: 0.53 X10(3)/MCL (ref 0.6–4.6)
LYMPHOCYTES NFR BLD AUTO: 20.5 %
MAGNESIUM SERPL-MCNC: 1.6 MG/DL (ref 1.6–2.6)
MCH RBC QN AUTO: 29.8 PG
MCHC RBC AUTO-ENTMCNC: 34 MG/DL (ref 33–36)
MCV RBC AUTO: 87.8 FL (ref 80–94)
MONOCYTES # BLD AUTO: 0.23 X10(3)/MCL (ref 0.1–1.3)
MONOCYTES NFR BLD AUTO: 8.9 %
NEUTROPHILS # BLD AUTO: 1.73 X10(3)/MCL (ref 2.1–9.2)
NEUTROPHILS NFR BLD AUTO: 66.7 %
OPIATES UR QL SCN: POSITIVE
OSMOLALITY SERPL: 281 MOSM/KG (ref 280–300)
OSMOLALITY UR: 152 MOSM/KG (ref 300–1300)
PCP UR QL: NEGATIVE
PH UR: 6 [PH] (ref 3–11)
PLATELET # BLD AUTO: 79 X10(3)/MCL (ref 140–371)
PLATELET # BLD AUTO: 83 X10(3)/MCL (ref 140–371)
PMV BLD AUTO: 10.4 FL (ref 9.4–12.4)
POTASSIUM SERPL-SCNC: 4.1 MMOL/L (ref 3.5–5.1)
RBC # BLD AUTO: 4.26 X10(6)/MCL (ref 4.7–6.1)
SODIUM SERPL-SCNC: 132 MMOL/L (ref 136–145)
SODIUM UR-SCNC: <20 MMOL/L
TRIGL SERPL-MCNC: 55 MG/DL (ref 34–140)
TROPONIN I SERPL-MCNC: 0.03 NG/ML (ref 0–0.04)
TROPONIN I SERPL-MCNC: 0.24 NG/ML (ref 0–0.04)
VIT B12 SERPL-MCNC: 392 PG/ML (ref 213–816)
VLDLC SERPL CALC-MCNC: 11 MG/DL
WBC # SPEC AUTO: 2.6 X10(3)/MCL (ref 4.5–11.5)

## 2022-12-29 PROCEDURE — 82746 ASSAY OF FOLIC ACID SERUM: CPT | Performed by: STUDENT IN AN ORGANIZED HEALTH CARE EDUCATION/TRAINING PROGRAM

## 2022-12-29 PROCEDURE — 63600175 PHARM REV CODE 636 W HCPCS: Performed by: STUDENT IN AN ORGANIZED HEALTH CARE EDUCATION/TRAINING PROGRAM

## 2022-12-29 PROCEDURE — 84300 ASSAY OF URINE SODIUM: CPT | Performed by: STUDENT IN AN ORGANIZED HEALTH CARE EDUCATION/TRAINING PROGRAM

## 2022-12-29 PROCEDURE — 85049 AUTOMATED PLATELET COUNT: CPT | Mod: 91 | Performed by: STUDENT IN AN ORGANIZED HEALTH CARE EDUCATION/TRAINING PROGRAM

## 2022-12-29 PROCEDURE — 96374 THER/PROPH/DIAG INJ IV PUSH: CPT

## 2022-12-29 PROCEDURE — 85025 COMPLETE CBC W/AUTO DIFF WBC: CPT | Performed by: STUDENT IN AN ORGANIZED HEALTH CARE EDUCATION/TRAINING PROGRAM

## 2022-12-29 PROCEDURE — 84484 ASSAY OF TROPONIN QUANT: CPT | Performed by: STUDENT IN AN ORGANIZED HEALTH CARE EDUCATION/TRAINING PROGRAM

## 2022-12-29 PROCEDURE — 25000003 PHARM REV CODE 250: Performed by: STUDENT IN AN ORGANIZED HEALTH CARE EDUCATION/TRAINING PROGRAM

## 2022-12-29 PROCEDURE — 96372 THER/PROPH/DIAG INJ SC/IM: CPT | Mod: 59 | Performed by: STUDENT IN AN ORGANIZED HEALTH CARE EDUCATION/TRAINING PROGRAM

## 2022-12-29 PROCEDURE — 83930 ASSAY OF BLOOD OSMOLALITY: CPT | Performed by: STUDENT IN AN ORGANIZED HEALTH CARE EDUCATION/TRAINING PROGRAM

## 2022-12-29 PROCEDURE — G0378 HOSPITAL OBSERVATION PER HR: HCPCS

## 2022-12-29 PROCEDURE — 36415 COLL VENOUS BLD VENIPUNCTURE: CPT | Performed by: NURSE PRACTITIONER

## 2022-12-29 PROCEDURE — 97161 PT EVAL LOW COMPLEX 20 MIN: CPT

## 2022-12-29 PROCEDURE — 97116 GAIT TRAINING THERAPY: CPT

## 2022-12-29 PROCEDURE — 25000242 PHARM REV CODE 250 ALT 637 W/ HCPCS: Performed by: STUDENT IN AN ORGANIZED HEALTH CARE EDUCATION/TRAINING PROGRAM

## 2022-12-29 PROCEDURE — 96376 TX/PRO/DX INJ SAME DRUG ADON: CPT | Mod: 59

## 2022-12-29 PROCEDURE — 83935 ASSAY OF URINE OSMOLALITY: CPT | Performed by: STUDENT IN AN ORGANIZED HEALTH CARE EDUCATION/TRAINING PROGRAM

## 2022-12-29 PROCEDURE — 36415 COLL VENOUS BLD VENIPUNCTURE: CPT | Performed by: STUDENT IN AN ORGANIZED HEALTH CARE EDUCATION/TRAINING PROGRAM

## 2022-12-29 PROCEDURE — 82607 VITAMIN B-12: CPT | Performed by: STUDENT IN AN ORGANIZED HEALTH CARE EDUCATION/TRAINING PROGRAM

## 2022-12-29 PROCEDURE — 94760 N-INVAS EAR/PLS OXIMETRY 1: CPT

## 2022-12-29 PROCEDURE — 25000003 PHARM REV CODE 250: Performed by: NURSE PRACTITIONER

## 2022-12-29 PROCEDURE — 80048 BASIC METABOLIC PNL TOTAL CA: CPT | Performed by: STUDENT IN AN ORGANIZED HEALTH CARE EDUCATION/TRAINING PROGRAM

## 2022-12-29 PROCEDURE — 99285 EMERGENCY DEPT VISIT HI MDM: CPT | Mod: 25

## 2022-12-29 PROCEDURE — 83735 ASSAY OF MAGNESIUM: CPT | Performed by: NURSE PRACTITIONER

## 2022-12-29 PROCEDURE — 82077 ASSAY SPEC XCP UR&BREATH IA: CPT | Performed by: STUDENT IN AN ORGANIZED HEALTH CARE EDUCATION/TRAINING PROGRAM

## 2022-12-29 PROCEDURE — 80061 LIPID PANEL: CPT | Performed by: NURSE PRACTITIONER

## 2022-12-29 PROCEDURE — 80307 DRUG TEST PRSMV CHEM ANLYZR: CPT | Performed by: NURSE PRACTITIONER

## 2022-12-29 PROCEDURE — 63600175 PHARM REV CODE 636 W HCPCS: Performed by: NURSE PRACTITIONER

## 2022-12-29 PROCEDURE — 96372 THER/PROPH/DIAG INJ SC/IM: CPT | Mod: 59 | Performed by: NURSE PRACTITIONER

## 2022-12-29 RX ORDER — ACETAMINOPHEN 325 MG/1
650 TABLET ORAL EVERY 4 HOURS PRN
Status: DISCONTINUED | OUTPATIENT
Start: 2022-12-29 | End: 2022-12-30 | Stop reason: HOSPADM

## 2022-12-29 RX ORDER — FUROSEMIDE 10 MG/ML
20 INJECTION INTRAMUSCULAR; INTRAVENOUS DAILY
Status: DISCONTINUED | OUTPATIENT
Start: 2022-12-29 | End: 2022-12-29

## 2022-12-29 RX ORDER — INSULIN ASPART 100 [IU]/ML
0-5 INJECTION, SOLUTION INTRAVENOUS; SUBCUTANEOUS
Status: DISCONTINUED | OUTPATIENT
Start: 2022-12-29 | End: 2022-12-29

## 2022-12-29 RX ORDER — POLYETHYLENE GLYCOL 3350 17 G/17G
17 POWDER, FOR SOLUTION ORAL 3 TIMES DAILY PRN
Status: DISCONTINUED | OUTPATIENT
Start: 2022-12-29 | End: 2022-12-30 | Stop reason: HOSPADM

## 2022-12-29 RX ORDER — IBUPROFEN 200 MG
16 TABLET ORAL
Status: DISCONTINUED | OUTPATIENT
Start: 2022-12-29 | End: 2022-12-30 | Stop reason: HOSPADM

## 2022-12-29 RX ORDER — ALPRAZOLAM 0.25 MG/1
0.25 TABLET ORAL 3 TIMES DAILY
Status: DISCONTINUED | OUTPATIENT
Start: 2022-12-29 | End: 2022-12-30 | Stop reason: HOSPADM

## 2022-12-29 RX ORDER — CLONIDINE HYDROCHLORIDE 0.1 MG/1
0.1 TABLET ORAL 2 TIMES DAILY
Status: DISCONTINUED | OUTPATIENT
Start: 2022-12-29 | End: 2022-12-29

## 2022-12-29 RX ORDER — SIMETHICONE 80 MG
1 TABLET,CHEWABLE ORAL 4 TIMES DAILY PRN
Status: DISCONTINUED | OUTPATIENT
Start: 2022-12-29 | End: 2022-12-30 | Stop reason: HOSPADM

## 2022-12-29 RX ORDER — LORAZEPAM 2 MG/ML
2 INJECTION INTRAMUSCULAR EVERY 10 MIN PRN
Status: DISCONTINUED | OUTPATIENT
Start: 2022-12-29 | End: 2022-12-30 | Stop reason: HOSPADM

## 2022-12-29 RX ORDER — ENOXAPARIN SODIUM 100 MG/ML
40 INJECTION SUBCUTANEOUS EVERY 24 HOURS
Status: DISCONTINUED | OUTPATIENT
Start: 2022-12-29 | End: 2022-12-29

## 2022-12-29 RX ORDER — ONDANSETRON 4 MG/1
8 TABLET, ORALLY DISINTEGRATING ORAL EVERY 8 HOURS PRN
Status: DISCONTINUED | OUTPATIENT
Start: 2022-12-29 | End: 2022-12-30 | Stop reason: HOSPADM

## 2022-12-29 RX ORDER — FUROSEMIDE 10 MG/ML
20 INJECTION INTRAMUSCULAR; INTRAVENOUS DAILY
Status: DISCONTINUED | OUTPATIENT
Start: 2022-12-30 | End: 2022-12-29

## 2022-12-29 RX ORDER — MORPHINE SULFATE 4 MG/ML
2 INJECTION, SOLUTION INTRAMUSCULAR; INTRAVENOUS EVERY 6 HOURS PRN
Status: DISCONTINUED | OUTPATIENT
Start: 2022-12-29 | End: 2022-12-30 | Stop reason: HOSPADM

## 2022-12-29 RX ORDER — FUROSEMIDE 10 MG/ML
20 INJECTION INTRAMUSCULAR; INTRAVENOUS
Status: DISCONTINUED | OUTPATIENT
Start: 2022-12-30 | End: 2022-12-30

## 2022-12-29 RX ORDER — HYDRALAZINE HYDROCHLORIDE 20 MG/ML
10 INJECTION INTRAMUSCULAR; INTRAVENOUS EVERY 4 HOURS PRN
Status: DISCONTINUED | OUTPATIENT
Start: 2022-12-29 | End: 2022-12-30 | Stop reason: HOSPADM

## 2022-12-29 RX ORDER — IBUPROFEN 200 MG
24 TABLET ORAL
Status: DISCONTINUED | OUTPATIENT
Start: 2022-12-29 | End: 2022-12-30 | Stop reason: HOSPADM

## 2022-12-29 RX ORDER — METOPROLOL TARTRATE 50 MG/1
50 TABLET ORAL 2 TIMES DAILY
Status: DISCONTINUED | OUTPATIENT
Start: 2022-12-29 | End: 2022-12-30

## 2022-12-29 RX ORDER — VALSARTAN 40 MG/1
40 TABLET ORAL 2 TIMES DAILY
Status: DISCONTINUED | OUTPATIENT
Start: 2022-12-29 | End: 2022-12-30 | Stop reason: HOSPADM

## 2022-12-29 RX ORDER — FUROSEMIDE 10 MG/ML
20 INJECTION INTRAMUSCULAR; INTRAVENOUS
Status: DISCONTINUED | OUTPATIENT
Start: 2022-12-29 | End: 2022-12-29

## 2022-12-29 RX ORDER — FUROSEMIDE 10 MG/ML
20 INJECTION INTRAMUSCULAR; INTRAVENOUS
Status: COMPLETED | OUTPATIENT
Start: 2022-12-29 | End: 2022-12-29

## 2022-12-29 RX ORDER — SODIUM CHLORIDE 0.9 % (FLUSH) 0.9 %
10 SYRINGE (ML) INJECTION
Status: DISCONTINUED | OUTPATIENT
Start: 2022-12-29 | End: 2022-12-30 | Stop reason: HOSPADM

## 2022-12-29 RX ORDER — MAG HYDROX/ALUMINUM HYD/SIMETH 200-200-20
30 SUSPENSION, ORAL (FINAL DOSE FORM) ORAL 4 TIMES DAILY PRN
Status: DISCONTINUED | OUTPATIENT
Start: 2022-12-29 | End: 2022-12-30 | Stop reason: HOSPADM

## 2022-12-29 RX ORDER — ALPRAZOLAM 0.25 MG/1
0.25 TABLET ORAL 3 TIMES DAILY PRN
Status: DISCONTINUED | OUTPATIENT
Start: 2022-12-29 | End: 2022-12-29

## 2022-12-29 RX ORDER — ENOXAPARIN SODIUM 100 MG/ML
80 INJECTION SUBCUTANEOUS
Status: DISCONTINUED | OUTPATIENT
Start: 2022-12-29 | End: 2022-12-30

## 2022-12-29 RX ORDER — FUROSEMIDE 10 MG/ML
20 INJECTION INTRAMUSCULAR; INTRAVENOUS ONCE
Status: COMPLETED | OUTPATIENT
Start: 2022-12-29 | End: 2022-12-29

## 2022-12-29 RX ORDER — GLUCAGON 1 MG
1 KIT INJECTION
Status: DISCONTINUED | OUTPATIENT
Start: 2022-12-29 | End: 2022-12-30 | Stop reason: HOSPADM

## 2022-12-29 RX ORDER — NALOXONE HCL 0.4 MG/ML
0.02 VIAL (ML) INJECTION
Status: DISCONTINUED | OUTPATIENT
Start: 2022-12-29 | End: 2022-12-30 | Stop reason: HOSPADM

## 2022-12-29 RX ORDER — NIFEDIPINE 30 MG/1
30 TABLET, EXTENDED RELEASE ORAL DAILY
Status: DISCONTINUED | OUTPATIENT
Start: 2022-12-29 | End: 2022-12-29

## 2022-12-29 RX ORDER — THIAMINE HCL 100 MG
100 TABLET ORAL DAILY
Status: DISCONTINUED | OUTPATIENT
Start: 2022-12-29 | End: 2022-12-30 | Stop reason: HOSPADM

## 2022-12-29 RX ORDER — SODIUM CHLORIDE 0.9 % (FLUSH) 0.9 %
10 SYRINGE (ML) INJECTION
Status: DISCONTINUED | OUTPATIENT
Start: 2022-12-29 | End: 2022-12-29

## 2022-12-29 RX ORDER — HYDROCODONE BITARTRATE AND ACETAMINOPHEN 5; 325 MG/1; MG/1
1 TABLET ORAL EVERY 6 HOURS PRN
Status: DISCONTINUED | OUTPATIENT
Start: 2022-12-29 | End: 2022-12-29

## 2022-12-29 RX ORDER — ASPIRIN 81 MG/1
81 TABLET ORAL DAILY
Status: DISCONTINUED | OUTPATIENT
Start: 2022-12-29 | End: 2022-12-29

## 2022-12-29 RX ORDER — LEVALBUTEROL INHALATION SOLUTION 1.25 MG/3ML
1.25 SOLUTION RESPIRATORY (INHALATION) EVERY 6 HOURS PRN
Status: DISCONTINUED | OUTPATIENT
Start: 2022-12-29 | End: 2022-12-30 | Stop reason: HOSPADM

## 2022-12-29 RX ORDER — ONDANSETRON 2 MG/ML
4 INJECTION INTRAMUSCULAR; INTRAVENOUS EVERY 8 HOURS PRN
Status: DISCONTINUED | OUTPATIENT
Start: 2022-12-29 | End: 2022-12-30 | Stop reason: HOSPADM

## 2022-12-29 RX ORDER — BISACODYL 10 MG
10 SUPPOSITORY, RECTAL RECTAL DAILY PRN
Status: DISCONTINUED | OUTPATIENT
Start: 2022-12-29 | End: 2022-12-30 | Stop reason: HOSPADM

## 2022-12-29 RX ORDER — NITROGLYCERIN 0.4 MG/1
0.4 TABLET SUBLINGUAL
Status: COMPLETED | OUTPATIENT
Start: 2022-12-29 | End: 2022-12-29

## 2022-12-29 RX ORDER — LABETALOL HCL 20 MG/4 ML
10 SYRINGE (ML) INTRAVENOUS 4 TIMES DAILY PRN
Status: DISCONTINUED | OUTPATIENT
Start: 2022-12-29 | End: 2022-12-30 | Stop reason: HOSPADM

## 2022-12-29 RX ORDER — TALC
9 POWDER (GRAM) TOPICAL NIGHTLY PRN
Status: DISCONTINUED | OUTPATIENT
Start: 2022-12-29 | End: 2022-12-30 | Stop reason: HOSPADM

## 2022-12-29 RX ORDER — METOPROLOL TARTRATE 1 MG/ML
5 INJECTION, SOLUTION INTRAVENOUS EVERY 4 HOURS PRN
Status: DISCONTINUED | OUTPATIENT
Start: 2022-12-29 | End: 2022-12-30 | Stop reason: HOSPADM

## 2022-12-29 RX ORDER — IPRATROPIUM BROMIDE AND ALBUTEROL SULFATE 2.5; .5 MG/3ML; MG/3ML
3 SOLUTION RESPIRATORY (INHALATION) EVERY 6 HOURS PRN
Status: DISCONTINUED | OUTPATIENT
Start: 2022-12-29 | End: 2022-12-29

## 2022-12-29 RX ADMIN — ALPRAZOLAM 0.25 MG: 0.25 TABLET ORAL at 08:12

## 2022-12-29 RX ADMIN — METOPROLOL TARTRATE 50 MG: 50 TABLET, FILM COATED ORAL at 08:12

## 2022-12-29 RX ADMIN — NIFEDIPINE 30 MG: 30 TABLET, FILM COATED, EXTENDED RELEASE ORAL at 09:12

## 2022-12-29 RX ADMIN — IPRATROPIUM BROMIDE AND ALBUTEROL SULFATE 3 ML: .5; 3 SOLUTION RESPIRATORY (INHALATION) at 08:12

## 2022-12-29 RX ADMIN — NITROGLYCERIN 0.4 MG: 0.4 TABLET SUBLINGUAL at 01:12

## 2022-12-29 RX ADMIN — MELATONIN TAB 3 MG 9 MG: 3 TAB at 11:12

## 2022-12-29 RX ADMIN — FUROSEMIDE 20 MG: 10 INJECTION, SOLUTION INTRAMUSCULAR; INTRAVENOUS at 02:12

## 2022-12-29 RX ADMIN — FUROSEMIDE 20 MG: 10 INJECTION, SOLUTION INTRAMUSCULAR; INTRAVENOUS at 01:12

## 2022-12-29 RX ADMIN — ENOXAPARIN SODIUM 80 MG: 80 INJECTION SUBCUTANEOUS at 09:12

## 2022-12-29 RX ADMIN — THIAMINE HCL TAB 100 MG 100 MG: 100 TAB at 09:12

## 2022-12-29 RX ADMIN — LEVALBUTEROL HYDROCHLORIDE 1.25 MG: 1.25 SOLUTION RESPIRATORY (INHALATION) at 02:12

## 2022-12-29 RX ADMIN — ALPRAZOLAM 0.25 MG: 0.25 TABLET ORAL at 02:12

## 2022-12-29 RX ADMIN — VALSARTAN 40 MG: 40 TABLET, FILM COATED ORAL at 08:12

## 2022-12-29 RX ADMIN — ENOXAPARIN SODIUM 80 MG: 80 INJECTION SUBCUTANEOUS at 10:12

## 2022-12-29 RX ADMIN — METOPROLOL TARTRATE 50 MG: 50 TABLET, FILM COATED ORAL at 10:12

## 2022-12-29 RX ADMIN — FUROSEMIDE 20 MG: 10 INJECTION, SOLUTION INTRAMUSCULAR; INTRAVENOUS at 09:12

## 2022-12-29 NOTE — PT/OT/SLP PROGRESS
Physical Therapy Treatment Note           Name: Perfecto Urbina    : 1955 (67 y.o.)  MRN: [id number]           TREATMENT SUMMARY AND RECOMMENDATIONS:    PT Received On: 22  PT Start Time: 1505     PT Stop Time: 1530  PT Total Time (min): 25 min     Subjective Assessment:  x No complaints  Lethargic   x Awake, alert, cooperative  Uncooperative    Agitated  c/o pain    Appropriate  c/o fatigue    Confused  Treated at bedside     Emotionally labile  Treated in gym/dept.    Impulsive  Other:    Flat affect       Therapy Precautions:    Cognitive deficits  Spinal precautions    Collar - hard  Sternal precautions    Collar - soft   TLSO   x Fall risk  LSO    Hip precautions - posterior  Knee immobilizer    Hip precautions - anterior  WBAT    Impaired communication  Partial weightbearing    Oxygen  TTWB    PEG tube  NWB    Visual deficits  Other:    Hearing deficits          Treatment Objectives:     Mobility Training:   Assist level Comments    Bed mobility MOD I    Transfer CGA Stand pivot   Gait CGA 5 x 325 feet including outside   Sit to stand transitions CGA SBA    Sitting balance     Standing balance      Wheelchair mobility     Car transfer     Other:          Therapeutic Exercise:   Exercise Sets Reps Comments                               Additional Comments:  Pt tolerated very well, no SOB demonstrated. Losses of balance still noted. HR was between 100-150 during tx and O2 between 88%-93%. Pt was very excited to get out of his room.     Assessment: Patient tolerated session well.    PT Plan: Continue per POC  Revisions made to plan of care: No    GOALS:   Multidisciplinary Problems       Physical Therapy Goals          Problem: Physical Therapy    Goal Priority Disciplines Outcome Goal Variances Interventions   Physical Therapy Goal     PT, PT/OT Ongoing, Progressing     Description: oals to be met by: Dishcarge     Patient will increase functional independence with mobility by performin.  Gait  x 325 feet with Supervision using No Assistive Device.  Vs RW vs SC                         Skilled PT Minutes Provided: 23   Communication with Treatment Team:     Equipment recommendations:       At end of treatment, patient remained:  x Up in chair     Up in wheelchair in room    In bed    With alarm activated    Bed rails up   x Call bell in reach     Family/friends present    Restraints secured properly    In bathroom with CNA/RN notified    Nurse aware    In gym with therapist/tech    Other:

## 2022-12-29 NOTE — ASSESSMENT & PLAN NOTE
-secondary to 3rd spacing, has resolved with gentle diuresis  -likely contribution of tea and toast diet secondary to ETOH dependence

## 2022-12-29 NOTE — PT/OT/SLP EVAL
Physical Therapy Acute Care Evaluation    Patient Name:  Perfecto Urbina   MRN:  [id number]    History:   Pt is a 68 y/o M who reports with SOB  Past Medical History:   Diagnosis Date    Hypertension        History reviewed. No pertinent surgical history.      Subjective     Chief Complaint: SOB  Patient/Family Comments/goals: Improve function for DC  Pain/Comfort:  Pain Rating 1: 0/10  Pain Rating Post-Intervention 1: 0/10      Living Environment:  Lives with: Friend  Home Environment: SSH, 2 steps to get in.   Previous level of function: I, no AD used.   Equipment used at home: none.  DME owned (not currently used): none.        Objective:     Communicated with Nursing prior to session.  Patient found HOB elevated with telemetry, pulse ox (continuous), bed alarm  upon PT entry to room.    General Precautions: Standard,     Orthopedic Precautions:    Braces:    Respiratory Status: Room air     Vitals Value    Room air  93%    Oxygen (L)     Blood pressure     Heart rate  171       Exams:  Cognitive Exam:  Patient is oriented to Person, Place, Time, and Situation  RLE ROM: WNL  RLE Strength: WNL  LLE ROM: WNL  LLE Strength: WNL    Functional Mobility:  Bed Mobility:     Rolling Left:  modified independence  Rolling Right: modified independence  Supine to Sit: modified independence  Sit to Supine: modified independence  Transfers:     Sit to Stand:  contact guard assistance with no AD  Toilet Transfer: contact guard assistance with  no AD  using  Stand Pivot  Gait: Room ambulation x 20 feet at CGA/MIN A, one loss in balance, very SOB, most impacted by endurance. HR was 171 and O2 sats were 93% on room air.   Balance: Fair, UE support or CGA needed for standing balance.       Additional information: Nursing wants bed alarm on, pt okd to sit EOB for urinal use, pt also educated ok to walk to bathroom with staff, just to call first. Pt understand. MD coming in room at tx end.     Patient left HOB elevated with call button in  [place] and MD present.      Assessment:     Perfecto Urbina is a 67 y.o. male admitted with a medical diagnosis of Atrial flutter.  He presents with the following impairments/functional limitations:  impaired endurance, gait instability .    Rehab Prognosis: Good; patient would benefit from acute skilled PT services to address these deficits and reach maximum level of function.    Recent Surgery: * No surgery found *        Recommendations:     Discharge Recommendations:  home, home health PT (RW vs SC)   Discharge Equipment Recommendations: other (see comments) (RW vs SC)       Plan:     During this hospitalization, patient to be seen 6 x/week to address the identified rehab impairments via gait training, therapeutic activities, therapeutic exercises and progress toward the following goals:    GOALS:   Multidisciplinary Problems       Physical Therapy Goals          Problem: Physical Therapy    Goal Priority Disciplines Outcome Goal Variances Interventions   Physical Therapy Goal     PT, PT/OT Ongoing, Progressing     Description: oals to be met by: Dishcarge     Patient will increase functional independence with mobility by performin. Gait  x 325 feet with Supervision using No Assistive Device.  Vs RW vs SC                         Time Tracking:       PT Start Time: 0900     PT Stop Time: 0920  PT Total Time (min): 20 min     Billable Minutes: Evaluation Low complexity       2022

## 2022-12-29 NOTE — ASSESSMENT & PLAN NOTE
-on admission, patient only with clonidine outpatient  -improved control so far with valsartan and metoprolol

## 2022-12-29 NOTE — ASSESSMENT & PLAN NOTE
-repeat in an EDTA free tube   -likely secondary to patient's ETOH dependence   -pending B12, folate  -patient requiring anticoagulation for atrial flutter, will need to monitor thrombocytopenia

## 2022-12-29 NOTE — H&P
[facility]  [facility] Medicine  History & Physical    Patient Name: Perfecto Urbina  MRN: [id number]  Patient Class: OP- Observation  Admission Date: 12/29/2022  Attending Physician: Thairy G Reyes, DO   Primary Care Provider: Primary Doctor No         Patient information was obtained from patient and ER records.     Subjective:     Principal Problem:Atrial flutter    Chief Complaint:   Chief Complaint   Patient presents with    Shortness of Breath     Patient signed AMA 1hr ago. While in lobby looking for a ride patient stated his SOB came back.         HPI: 67-year-old male with a past medical history of ETOH dependence (two 6 packs of beer daily), poor healthcare follow up, homelessness, elicit Lortab use, essential hypertension who presents with complaint of dyspnea for approximately 1 day.  Patient initially presented to the ER and left AMA during workup.  Returned secondary to continued dyspnea.  Patient EKG shown to have atrial flutter with with heart rate as high as 170.  Patient also volume overloaded in the ED and responded well to 20 Lasix daily.      At baseline patient reports he occasionally lives with a friend otherwise he is homeless.  Reports he is fully independent.      Past Medical History:   Diagnosis Date    Hypertension        History reviewed. No pertinent surgical history.    Review of patient's allergies indicates:  No Known Allergies    Current Facility-Administered Medications on File Prior to Encounter   Medication    [COMPLETED] nitroGLYCERIN SL tablet 0.4 mg     Current Outpatient Medications on File Prior to Encounter   Medication Sig    cloNIDine (CATAPRES) 0.1 MG tablet Take 1 tablet (0.1 mg total) by mouth 2 (two) times daily.    ondansetron (ZOFRAN) 4 MG tablet Take 1 tablet (4 mg total) by mouth every 6 (six) hours.     Family History    None       Tobacco Use    Smoking status: Never     Passive exposure: Never    Smokeless tobacco: Never    Substance and Sexual Activity    Alcohol use: Yes     Alcohol/week: 84.0 standard drinks     Types: 84 Cans of beer per week    Drug use: Yes     Frequency: 3.0 times per week     Types: Hydrocodone    Sexual activity: Not Currently     Review of Systems   Constitutional:  Negative for fatigue and fever.   HENT:  Negative for congestion, ear pain, postnasal drip, sinus pain and sore throat.    Eyes:  Negative for pain, redness and visual disturbance.   Respiratory:  Positive for shortness of breath. Negative for cough and wheezing.    Cardiovascular:  Negative for chest pain, palpitations and leg swelling.   Gastrointestinal:  Negative for abdominal pain, diarrhea, nausea and vomiting.   Endocrine: Negative for cold intolerance and heat intolerance.   Musculoskeletal:  Negative for arthralgias, joint swelling and myalgias.   Skin:  Negative for rash and wound.   Neurological:  Negative for dizziness, weakness, numbness and headaches.   Objective:     Vital Signs (Most Recent):  Temp: 97.8 °F (36.6 °C) (12/29/22 0332)  Pulse: (!) 139 (12/29/22 1059)  Resp: (!) 25 (12/29/22 0819)  BP: (!) 155/96 (12/29/22 1059)  SpO2: 98 % (12/29/22 0819)   Vital Signs (24h Range):  Temp:  [97.4 °F (36.3 °C)-97.8 °F (36.6 °C)] 97.8 °F (36.6 °C)  Pulse:  [] 139  Resp:  [18-25] 25  SpO2:  [94 %-99 %] 98 %  BP: (146-187)/(88-99) 155/96     Weight: 81.2 kg (179 lb 0.2 oz)  Body mass index is 22.98 kg/m².    Physical Exam  Constitutional:       General: He is not in acute distress.     Appearance: Normal appearance. He is underweight.      Comments: Poor personal hygiene   HENT:      Mouth/Throat:      Mouth: Mucous membranes are moist.      Pharynx: Oropharynx is clear. No oropharyngeal exudate or posterior oropharyngeal erythema.   Eyes:      Extraocular Movements: Extraocular movements intact.      Conjunctiva/sclera: Conjunctivae normal.      Pupils: Pupils are equal, round, and reactive to light.   Neck:      Thyroid: No  thyromegaly.   Cardiovascular:      Rate and Rhythm: Tachycardia present. Rhythm irregular.      Heart sounds: Murmur heard.     No friction rub. No gallop.   Pulmonary:      Breath sounds: Normal breath sounds.   Abdominal:      General: Bowel sounds are normal. There is no distension.      Palpations: Abdomen is soft.      Tenderness: There is no abdominal tenderness.   Musculoskeletal:      Right lower leg: Edema present.      Left lower leg: Edema present.   Lymphadenopathy:      Cervical: No cervical adenopathy.   Skin:     General: Skin is warm.      Findings: No rash.   Neurological:      General: No focal deficit present.      Mental Status: He is oriented to person, place, and time.      Cranial Nerves: No cranial nerve deficit.   Psychiatric:         Mood and Affect: Mood is not anxious or depressed. Affect is not inappropriate.         Speech: Speech is rapid and pressured.         Behavior: Behavior is agitated and hyperactive.         CRANIAL NERVES     CN III, IV, VI   Pupils are equal, round, and reactive to light.     Significant Labs: All pertinent labs within the past 24 hours have been reviewed.    Significant Imaging: I have reviewed all pertinent imaging results/findings within the past 24 hours.    Assessment/Plan:     * Atrial flutter  -new diagnosis  -chads Vasc = 2  -rate control with metoprolol  -full-dose Lovenox, in the setting of thrombocytopenia, monitor H&H  -pending echocardiogram    Acute on chronic congestive heart failure  -patient with no prior diagnosis of heart failure   -pending echocardiogram   -responding well to diuretics  -BNP elevated at 800    Thrombocytopenia  -repeat in an EDTA free tube   -likely secondary to patient's ETOH dependence   -pending B12, folate  -patient requiring anticoagulation for atrial flutter, will need to monitor thrombocytopenia    Hypertensive urgency  -on admission, patient only with clonidine outpatient  -improved control so far with valsartan  and metoprolol    ETOH abuse  -thiamine   -pending B12 folate   -patient would benefit from cessation      Hyponatremia  -secondary to 3rd spacing, has resolved with gentle diuresis  -likely contribution of tea and toast diet secondary to ETOH dependence      Admit to observation status under my care  Critical care time 30 minutes  VTE Risk Mitigation (From admission, onward)         Ordered     enoxaparin injection 80 mg  Every 12 hours (non-standard times)         12/29/22 0936     IP VTE HIGH RISK PATIENT  Once         12/29/22 0729     Place sequential compression device  Until discontinued         12/29/22 0136                   Thairy G Reyes, DO  Department of Hospital Medicine   [facility]

## 2022-12-29 NOTE — ED NOTES
Left AMA within last hour. Unable to find a ride home and while waiting felt shortness of breathing worsened. Agrees for re-eval .

## 2022-12-29 NOTE — ED PROVIDER NOTES
Encounter Date: 12/28/2022       History     Chief Complaint   Patient presents with    Shortness of Breath     SOB that began this morning. 99% RA. Arrived via [facility]. Denies COPD.       Patient is a 67-year-old white male past medical history of hypertension who presented to the ER today due to shortness of breath.  Patient states this has been off and on for several years.  Patient states he was seen in the emergency room for some time back was prescribed some antihypertensives.  Patient states that this flare-up started about 1 day ago.  Patient states he became short of breath in the absence of chest pain, nausea, vomiting, diaphoresis.  Patient denies any recent fevers, chills, cough, congestion, sore throat, abdominal pain, diarrhea, constipation.  Patient states his shortness of breath seems to be improving.  Patient states been compliant with his antihypertensives but he is unsure what he was prescribed and currently taking.    Review of patient's allergies indicates:  No Known Allergies  No past medical history on file.  No past surgical history on file.  No family history on file.     Review of Systems   Constitutional:  Negative for chills, fatigue and fever.   HENT:  Negative for congestion, sore throat and trouble swallowing.    Eyes:  Negative for pain and visual disturbance.   Respiratory:  Positive for shortness of breath. Negative for cough, choking, chest tightness, wheezing and stridor.    Cardiovascular:  Negative for chest pain and palpitations.   Gastrointestinal:  Negative for abdominal pain, blood in stool, constipation, diarrhea, nausea and vomiting.   Genitourinary:  Negative for dysuria and hematuria.   Musculoskeletal:  Negative for back pain and myalgias.   Skin:  Negative for rash and wound.   Neurological:  Negative for seizures, syncope and headaches.   Psychiatric/Behavioral:  Negative for confusion. The patient is not nervous/anxious.      Physical Exam     Initial Vitals [12/28/22  2043]   BP Pulse Resp Temp SpO2   (!) 155/93 87 (!) 22 97.7 °F (36.5 °C) 99 %      MAP       --         Physical Exam    Nursing note and vitals reviewed.  Constitutional: He appears well-developed and well-nourished. No distress.   HENT:   Head: Normocephalic and atraumatic.   Eyes: Conjunctivae and EOM are normal. Right eye exhibits no discharge. Left eye exhibits no discharge. No scleral icterus.   Neck: No tracheal deviation present.   Normal range of motion.  Cardiovascular:  Normal rate, regular rhythm and intact distal pulses.     Exam reveals no gallop and no friction rub.       Murmur heard.  There is a diffusely heard grade 3/6 systolic murmur heard best at the left 4th intercostal space midclavicular line in right 2nd IC space parasternal border.   Pulmonary/Chest: Breath sounds normal. No respiratory distress. He has no wheezes. He has no rhonchi. He has no rales.   Abdominal: Abdomen is soft. He exhibits no distension. There is no abdominal tenderness. There is no guarding.   Musculoskeletal:         General: Edema present. No tenderness. Normal range of motion.      Cervical back: Normal range of motion.      Comments: Plus three pitting edema lower extremities equal and bilateral up to the mid tibia.     Neurological: He is alert. He has normal strength. No cranial nerve deficit.   Skin: Skin is warm and dry. No rash and no abscess noted. No erythema. No pallor.   Psychiatric: His behavior is normal. Judgment normal.       ED Course   Procedures  Labs Reviewed   COMPREHENSIVE METABOLIC PANEL - Abnormal; Notable for the following components:       Result Value    Sodium Level 122 (*)     Chloride 90 (*)     Aspartate Aminotransferase 45 (*)     All other components within normal limits   B-TYPE NATRIURETIC PEPTIDE - Abnormal; Notable for the following components:    Natriuretic Peptide 821.1 (*)     All other components within normal limits   CBC WITH DIFFERENTIAL - Abnormal; Notable for the following  components:    WBC 3.1 (*)     RBC 4.13 (*)     Hgb 12.1 (*)     Hct 36.4 (*)     Platelet 77 (*)     Neut # 1.85 (*)     All other components within normal limits   COVID/FLU A&B PCR - Normal    Narrative:     The Xpert Xpress SARS-CoV-2/FLU/RSV plus is a rapid, multiplexed real-time PCR test intended for the simultaneous qualitative detection and differentiation of SARS-CoV-2, Influenza A, Influenza B, and respiratory syncytial virus (RSV) viral RNA in either nasopharyngeal swab or nasal swab specimens.         URINALYSIS, MICROSCOPIC - Normal   URINALYSIS, REFLEX TO URINE CULTURE   CBC W/ AUTO DIFFERENTIAL    Narrative:     The following orders were created for panel order CBC Auto Differential.  Procedure                               Abnormality         Status                     ---------                               -----------         ------                     CBC with Differential[[id number]]        Abnormal            Final result                 Please view results for these tests on the individual orders.   TROPONIN ISTAT   POCT TROPONIN     EKG Readings: (Independently Interpreted)   Initial Reading: No STEMI. Rhythm: Normal Sinus Rhythm. Heart Rate: 85. Ectopy: PVCs. Conduction: Normal. ST Segments: Normal ST Segments. T Waves Elevated: V6, V5, V4, V3, V2, V1, II and I. T Waves Flipped: III.    No previous to compare to     Imaging Results              X-Ray Chest 1 View (Final result)  Result time 12/28/22 21:58:28      Final result by Tho Olivares MD (12/28/22 21:58:28)                   Impression:      Increased density on the right cannot rule out early infiltrate      Electronically signed by: Tho Olivares MD  Date:    12/28/2022  Time:    21:58               Narrative:    EXAMINATION:  XR CHEST 1 VIEW    CLINICAL HISTORY:  sob;    TECHNIQUE:  Single frontal view of the chest was performed.    COMPARISON:  None    FINDINGS:  There faint increased markings in the right middle lobe and I  cannot rule out early infiltrate.  There is elevation the right hemidiaphragm.  Heart size within normal limits.  There is vascular calcification noted.                                       Medications   nitroGLYCERIN SL tablet 0.4 mg (0.4 mg Sublingual Given 12/28/22 2150)     Medical Decision Making:   Initial Assessment:    No acute distress 67-year-old male  Differential Diagnosis:    CHF, PE, ACS, pneumonia, pneumothorax, COVID, flu  Clinical Tests:   Lab Tests: Reviewed and Ordered  The following lab test(s) were unremarkable: CBC, CMP and Troponin  Radiological Study: Ordered and Reviewed  ED Management:   Vital signs stable on arrival hypertensive  Nitroglycerin given   Denies chest pain   Troponin negative   EKG showed some T-wave inversions unable to compared to previous  Lung sounds clear to auscultation bilaterally   Lower extremity edema concerning for CHF   BNP is elevated 800 and there is a murmur that was not previously documented by the previous MD.    Hyponatremia on labs patient states he is an alcoholic which I suspect either cause   I did not give Lasix as I did not want to cause worsening of his hyponatremia and I was planning on water restriction   Advised patient that we admit for further workup with an echocardiogram and further monitoring of his electrolytes   The patient has signed out AMA. Pt was advised of the risks of leaving the hospital before work-up and treatment were completed, including death. The patient verbalized understanding. The patient was advised to return to the ED for any worsening of symptoms and given alternative treatment options prior to leaving. The patient has capacity to make medical decisions.                             Clinical Impression:   Final diagnoses:  [R06.02] Shortness of breath  [I50.9] Congestive heart failure, unspecified HF chronicity, unspecified heart failure type (Primary)  [E87.1] Hyponatremia        ED Disposition Condition    AMA Stable                 Vikash Francois MD  12/28/22 3064

## 2022-12-29 NOTE — SUBJECTIVE & OBJECTIVE
Past Medical History:   Diagnosis Date    Hypertension        History reviewed. No pertinent surgical history.    Review of patient's allergies indicates:  No Known Allergies    Current Facility-Administered Medications on File Prior to Encounter   Medication    [COMPLETED] nitroGLYCERIN SL tablet 0.4 mg     Current Outpatient Medications on File Prior to Encounter   Medication Sig    cloNIDine (CATAPRES) 0.1 MG tablet Take 1 tablet (0.1 mg total) by mouth 2 (two) times daily.    ondansetron (ZOFRAN) 4 MG tablet Take 1 tablet (4 mg total) by mouth every 6 (six) hours.     Family History    None       Tobacco Use    Smoking status: Never     Passive exposure: Never    Smokeless tobacco: Never   Substance and Sexual Activity    Alcohol use: Yes     Alcohol/week: 84.0 standard drinks     Types: 84 Cans of beer per week    Drug use: Yes     Frequency: 3.0 times per week     Types: Hydrocodone    Sexual activity: Not Currently     Review of Systems   Constitutional:  Negative for fatigue and fever.   HENT:  Negative for congestion, ear pain, postnasal drip, sinus pain and sore throat.    Eyes:  Negative for pain, redness and visual disturbance.   Respiratory:  Positive for shortness of breath. Negative for cough and wheezing.    Cardiovascular:  Negative for chest pain, palpitations and leg swelling.   Gastrointestinal:  Negative for abdominal pain, diarrhea, nausea and vomiting.   Endocrine: Negative for cold intolerance and heat intolerance.   Musculoskeletal:  Negative for arthralgias, joint swelling and myalgias.   Skin:  Negative for rash and wound.   Neurological:  Negative for dizziness, weakness, numbness and headaches.   Objective:     Vital Signs (Most Recent):  Temp: 97.8 °F (36.6 °C) (12/29/22 0332)  Pulse: (!) 139 (12/29/22 1059)  Resp: (!) 25 (12/29/22 0819)  BP: (!) 155/96 (12/29/22 1059)  SpO2: 98 % (12/29/22 0819)   Vital Signs (24h Range):  Temp:  [97.4 °F (36.3 °C)-97.8 °F (36.6 °C)] 97.8 °F (36.6  °C)  Pulse:  [] 139  Resp:  [18-25] 25  SpO2:  [94 %-99 %] 98 %  BP: (146-187)/(88-99) 155/96     Weight: 81.2 kg (179 lb 0.2 oz)  Body mass index is 22.98 kg/m².    Physical Exam  Constitutional:       General: He is not in acute distress.     Appearance: Normal appearance. He is underweight.      Comments: Poor personal hygiene   HENT:      Mouth/Throat:      Mouth: Mucous membranes are moist.      Pharynx: Oropharynx is clear. No oropharyngeal exudate or posterior oropharyngeal erythema.   Eyes:      Extraocular Movements: Extraocular movements intact.      Conjunctiva/sclera: Conjunctivae normal.      Pupils: Pupils are equal, round, and reactive to light.   Neck:      Thyroid: No thyromegaly.   Cardiovascular:      Rate and Rhythm: Tachycardia present. Rhythm irregular.      Heart sounds: Murmur heard.     No friction rub. No gallop.   Pulmonary:      Breath sounds: Normal breath sounds.   Abdominal:      General: Bowel sounds are normal. There is no distension.      Palpations: Abdomen is soft.      Tenderness: There is no abdominal tenderness.   Musculoskeletal:      Right lower leg: Edema present.      Left lower leg: Edema present.   Lymphadenopathy:      Cervical: No cervical adenopathy.   Skin:     General: Skin is warm.      Findings: No rash.   Neurological:      General: No focal deficit present.      Mental Status: He is oriented to person, place, and time.      Cranial Nerves: No cranial nerve deficit.   Psychiatric:         Mood and Affect: Mood is not anxious or depressed. Affect is not inappropriate.         Speech: Speech is rapid and pressured.         Behavior: Behavior is agitated and hyperactive.         CRANIAL NERVES     CN III, IV, VI   Pupils are equal, round, and reactive to light.     Significant Labs: All pertinent labs within the past 24 hours have been reviewed.    Significant Imaging: I have reviewed all pertinent imaging results/findings within the past 24 hours.

## 2022-12-29 NOTE — ASSESSMENT & PLAN NOTE
-patient with no prior diagnosis of heart failure   -pending echocardiogram   -responding well to diuretics  -BNP elevated at 800

## 2022-12-29 NOTE — HPI
67-year-old male with a past medical history of ETOH dependence (two 6 packs of beer daily), poor healthcare follow up, homelessness, elicit Lortab use, essential hypertension who presents with complaint of dyspnea for approximately 1 day.  Patient initially presented to the ER and left AMA during workup.  Returned secondary to continued dyspnea.  Patient EKG shown to have atrial flutter with with heart rate as high as 170.  Patient also volume overloaded in the ED and responded well to 20 Lasix daily.      At baseline patient reports he occasionally lives with a friend otherwise he is homeless.  Reports he is fully independent.

## 2022-12-29 NOTE — PROGRESS NOTES
Inpatient Nutrition Evaluation    Admit Date: 12/29/2022   Total duration of encounter: 1 day    Nutrition Recommendation/Prescription     Continue low sodium 1500 mL fluid restriction. 2. NPO after mindnight (12/29/22)    Nutrition Assessment     Chart Review    Reason Seen: continuous nutrition monitoring and length of stay    Malnutrition Screening Tool Results   Have you recently lost weight without trying?: No  Have you been eating poorly because of a decreased appetite?: No   MST Score: 0     Diagnosis:  HTN, Hyponatremia    Relevant Medical History: HTN    Nutrition-Related Medications: furosemide,    Nutrition-Related Labs:   Latest Reference Range & Units 12/29/22 10:29   Cholesterol <=200 mg/dL 183   HDL 35 - 60 mg/dL 102 (H)   LDL Cholesterol External 50.00 - 140.00 mg/dL 70.00   Total Cholesterol/HDL Ratio 0 - 5  2   Triglycerides 34 - 140 mg/dL 55   Very Low Density Lipoprotein  11   (H): Data is abnormally high     Latest Reference Range & Units 12/29/22 06:35   Sodium 136 - 145 mmol/L 132 (L)   Potassium 3.5 - 5.1 mmol/L 4.1   Chloride 98 - 107 mmol/L 95 (L)   CO2 23 - 31 mmol/L 23   Anion Gap mEq/L 14.0   BUN 8.4 - 25.7 mg/dL 12.3   Creatinine 0.73 - 1.18 mg/dL 0.79   BUN/CREAT RATIO  16   eGFR mls/min/1.73/m2 >60   Glucose 82 - 115 mg/dL 92   Calcium 8.8 - 10.0 mg/dL 9.1   (L): Data is abnormally low     Latest Reference Range & Units 12/29/22 06:36   WBC 4.5 - 11.5 x10(3)/mcL 2.6 (L)   RBC 4.70 - 6.10 x10(6)/mcL 4.26 (L)   Hemoglobin 14.0 - 18.0 gm/dL 12.7 (L)   Hematocrit 42.0 - 52.0 % 37.4 (L)   (L): Data is abnormally low    Diet Order: Diet Low Sodium, 2gm Fluid - 1500mL  Diet NPO  Oral Supplement Order: none  Appetite/Oral Intake: poor/0-25% of meals  Factors Affecting Nutritional Intake: decreased appetite and socio-economic  Food/Holiness/Cultural Preferences: vegetable soup  Food Allergies: none reported       Wound(s):       Comments  HPI: 67-year-old male with a past medical history of  "ETOH dependence (two 6 packs of beer daily), poor healthcare follow up, homelessness, elicit Lortab use, essential hypertension who presents with complaint of dyspnea for approximately 1 day.  Patient initially presented to the ER and left AMA during workup.  Returned secondary to continued dyspnea.  Patient EKG shown to have atrial flutter with with heart rate as high as 170.  Patient also volume overloaded in the ED and responded well to 20 Lasix daily.       At baseline patient reports he occasionally lives with a friend otherwise he is homeless.  Reports he is fully independent.  Made rounds today. Pt breakfast untouched. Offered a different selection. Decline at this time. Offered nutrition supplement. Decline at this time. Pt report only consuming 1 meal per day. Agreed to try vegetable soup tonight.   Anthropometrics    Height: 6' 2" (188 cm)    Last Weight: 81.2 kg (179 lb 0.2 oz) (12/29/22 0159) Weight Method: Bed Scale  BMI (Calculated): 23  BMI Classification: normal (BMI 18.5-24.9)        Ideal Body Weight (IBW), Male: 190 lb     % Ideal Body Weight, Male (lb): 94.22 %                          Usual Weight Provided By: unable to obtain usual weight    Wt Readings from Last 3 Encounters:   12/29/22 0159 81.2 kg (179 lb 0.2 oz)   12/28/22 2043 81.6 kg (180 lb)   05/31/22 1927 81.6 kg (180 lb)      Weight Change(s) Since Admission:  Admit Weight: 81.2 kg (179 lb 0.2 oz) (12/29/22 0159)      Patient Education    Not applicable.    Monitoring & Evaluation     Dietitian will monitor food and beverage intake, weight, and weight change.  Nutrition Risk/Follow-Up: low (follow-up in 5-7 days)  Patients assigned 'low nutrition risk' status do not qualify for a full nutritional assessment but will be monitored and re-evaluated in a 5-7 day time period. Please consult if re-evaluation needed sooner.  "

## 2022-12-29 NOTE — ASSESSMENT & PLAN NOTE
-new diagnosis  -chads Vasc = 2  -rate control with metoprolol  -full-dose Lovenox, in the setting of thrombocytopenia, monitor H&H  -pending echocardiogram

## 2022-12-29 NOTE — ED PROVIDER NOTES
Encounter Date: 12/29/2022       History     Chief Complaint   Patient presents with    Shortness of Breath     Patient signed AMA 1hr ago. While in lobby looking for a ride patient stated his SOB came back.      Patient is a 67-year-old white male past medical history of hypertension who presented to the ER today due to shortness of breath.  Pt was seen here earlier and was recd to be admitted for further treatment.  He signed out AMA.  Pt returned stating he changed his mind.  Patient states this has been off and on for several years.  Patient states he was seen in the emergency room for some time back was prescribed some antihypertensives.  Patient states that this flare-up started about 1 day ago.  Patient states he became short of breath in the absence of chest pain, nausea, vomiting, diaphoresis.  Patient denies any recent fevers, chills, cough, congestion, sore throat, abdominal pain, diarrhea, constipation.  Patient states his shortness of breath seems to be improving.  Patient states been compliant with his antihypertensives but he is unsure what he was prescribed and currently taking.    Review of patient's allergies indicates:  No Known Allergies  No past medical history on file.  No past surgical history on file.  No family history on file.     Review of Systems   Constitutional:  Negative for chills, fatigue and fever.   HENT:  Negative for congestion, sore throat and trouble swallowing.    Eyes:  Negative for pain and visual disturbance.   Respiratory:  Positive for shortness of breath. Negative for cough and wheezing.    Cardiovascular:  Negative for chest pain and palpitations.   Gastrointestinal:  Negative for abdominal pain, blood in stool, constipation, diarrhea, nausea and vomiting.   Genitourinary:  Negative for dysuria and hematuria.   Musculoskeletal:  Negative for back pain and myalgias.   Skin:  Negative for rash and wound.   Neurological:  Negative for seizures, syncope and headaches.    Psychiatric/Behavioral:  Negative for confusion. The patient is not nervous/anxious.      Physical Exam     Initial Vitals [12/29/22 0032]   BP Pulse Resp Temp SpO2   (!) 187/99 83 18 -- 99 %      MAP       --         Physical Exam    Nursing note and vitals reviewed.  Constitutional: He appears well-developed and well-nourished. No distress.   HENT:   Head: Normocephalic and atraumatic.   Eyes: Conjunctivae and EOM are normal. Right eye exhibits no discharge. Left eye exhibits no discharge. No scleral icterus.   Neck: No tracheal deviation present.   Normal range of motion.  Cardiovascular:  Normal rate and regular rhythm.     Exam reveals no gallop and no friction rub.       Murmur heard.    Grade 3/6 systolic murmur heard diffusely   Pulmonary/Chest: Breath sounds normal. No respiratory distress. He has no wheezes. He has no rhonchi. He has no rales.   Musculoskeletal:         General: Edema present. No tenderness. Normal range of motion.      Cervical back: Normal range of motion.      Comments: +3 pitting edema BL to mid tibia     Neurological: He is alert.   Skin: Skin is warm and dry. No rash and no abscess noted. No erythema. No pallor.   Psychiatric: His behavior is normal. Judgment normal.       ED Course   Procedures  Labs Reviewed   SODIUM, URINE, RANDOM   OSMOLALITY, SERUM   OSMOLALITY, URINE RANDOM          Imaging Results    None          Medications   furosemide injection 20 mg (has no administration in time range)   nitroGLYCERIN SL tablet 0.4 mg (0.4 mg Sublingual Given 12/29/22 0100)     Medical Decision Making:   Initial Assessment:   Mild resp distress on arrival 68 yo male  Differential Diagnosis:   Hyponatremia, CHF exacerbation  Clinical Tests:   Lab Tests: Ordered and Reviewed  Radiological Study: Ordered and Reviewed  ED Management:   Patient was seen here earlier diagnosed with new onset CHF with a new onset murmur as it was not previously documented in the past  He was also diagnosed  with hyponatremia was  which was determined to either be due to alcohol abuse or CHF exacerbation   He signed out against medical advice but returned  Urine lytes obtained with serum all symptoms to further differentiate the cause of the hyponatremia   Twenty IV Lasix was given   Patient is already urinated 2 L prior to Lasix being giving seems to be diuresing well   Discussed the case with Dr. Reyes  who accepted patient for further workup and treatment                        Clinical Impression:   Final diagnoses:  [I50.9] Acute on chronic congestive heart failure, unspecified heart failure type (Primary)  [E87.1] Hyponatremia        ED Disposition Condition    Observation [facility]                Vikash Francois MD  12/29/22 0102

## 2022-12-29 NOTE — PLAN OF CARE
Problem: Adult Inpatient Plan of Care  Goal: Plan of Care Review  Outcome: Ongoing, Progressing  Flowsheets (Taken 12/29/2022 1537)  Plan of Care Reviewed With: patient  Goal: Patient-Specific Goal (Individualized)  Outcome: Ongoing, Progressing  Goal: Absence of Hospital-Acquired Illness or Injury  Outcome: Ongoing, Progressing  Intervention: Identify and Manage Fall Risk  Flowsheets (Taken 12/29/2022 1537)  Safety Promotion/Fall Prevention:   assistive device/personal item within reach   Fall Risk reviewed with patient/family   bed alarm set   gait belt with ambulation   instructed to call staff for mobility  Intervention: Prevent and Manage VTE (Venous Thromboembolism) Risk  Flowsheets (Taken 12/29/2022 1537)  Activity Management: Ambulated -L4  VTE Prevention/Management:   dorsiflexion/plantar flexion performed   ambulation promoted  Goal: Optimal Comfort and Wellbeing  Outcome: Ongoing, Progressing  Intervention: Provide Person-Centered Care  Flowsheets (Taken 12/29/2022 1537)  Trust Relationship/Rapport:   care explained   emotional support provided   empathic listening provided   thoughts/feelings acknowledged   questions encouraged   questions answered  Goal: Readiness for Transition of Care  Outcome: Ongoing, Progressing     Problem: Fall Injury Risk  Goal: Absence of Fall and Fall-Related Injury  Outcome: Ongoing, Progressing  Intervention: Promote Injury-Free Environment  Flowsheets (Taken 12/29/2022 1537)  Safety Promotion/Fall Prevention:   assistive device/personal item within reach   Fall Risk reviewed with patient/family   bed alarm set   gait belt with ambulation   instructed to call staff for mobility     Problem: Infection  Goal: Absence of Infection Signs and Symptoms  Outcome: Ongoing, Progressing     Problem: Alcohol Withdrawal  Goal: Alcohol Withdrawal Symptom Control  Outcome: Ongoing, Progressing  Intervention: Minimize or Manage Alcohol Withdrawal Symptoms  Flowsheets (Taken 12/29/2022  1537)  Aspiration Precautions:   awake/alert before oral intake   upright posture maintained  Seizure Precautions:   clutter-free environment maintained   emergency equipment at bedside  Sensory Stimulation Regulation:   care clustered   lighting decreased   television on   quiet environment promoted   visual stimulation minimized     Problem: Acute Neurologic Deterioration (Alcohol Withdrawal)  Goal: Optimal Neurologic Function  Outcome: Ongoing, Progressing     Problem: Substance Misuse (Alcohol Withdrawal)  Goal: Readiness for Change Identified  Outcome: Ongoing, Progressing  Intervention: Promote Psychosocial Wellbeing  Flowsheets (Taken 12/29/2022 1534)  Family/Support System Care:   caregiver stress acknowledged   support provided   involvement promoted

## 2022-12-29 NOTE — PLAN OF CARE
[facility] - Medical Surgical Unit  Initial Discharge Assessment       Primary Care Provider: Primary Doctor No    Admission Diagnosis: Hyponatremia [E87.1]  Acute on chronic congestive heart failure, unspecified heart failure type [I50.9]    Admission Date: 12/29/2022  Expected Discharge Date:     Discharge Barriers Identified: None    Payor: MEDICARE / Plan: MEDICARE PART A & B / Product Type: Government /     Extended Emergency Contact Information  Primary Emergency Contact: HUGO OROZCO  Mobile Phone: [phone]  Relation: Friend  Preferred language: English   needed? No    Discharge Plan A: Home with family, Home Health         [facility] [place] - [address] [facility] [facility]  [address] [facility]  [address] [facility]  [place] [address]  Phone: [phone] Fax: [phone]      Initial Assessment (most recent)       Adult Discharge Assessment - 12/29/22 1646          Discharge Assessment    Assessment Type Discharge Planning Assessment     Confirmed/corrected address, phone number and insurance Yes     Confirmed Demographics Correct on Facesheet     Source of Information patient     If unable to respond/provide information was family/caregiver contacted? Yes     Contact Name/Number Hugo Cordobat friend      Does patient/caregiver understand observation status Yes     Communicated MODESTO with patient/caregiver Date not available/Unable to determine     Reason For Admission CHF     People in Home alone     Facility Arrived From: home     Do you expect to return to your current living situation? Yes     Do you have help at home or someone to help you manage your care at home? Yes     Who are your caregiver(s) and their phone number(s)? Hugo Jhaveri friend      Prior to hospitilization cognitive status: Alert/Oriented     Current cognitive status: Alert/Oriented     Walking or Climbing Stairs ambulation difficulty, requires equipment     Mobility  Management walker     Home Accessibility wheelchair accessible     Home Layout Able to live on 1st floor     Equipment Currently Used at Home none     Readmission within 30 days? No     Patient currently being followed by outpatient case management? No     Do you currently have service(s) that help you manage your care at home? No     Do you take prescription medications? Yes     Do you have prescription coverage? Yes     Do you have any problems affording any of your prescribed medications? TBD     Is the patient taking medications as prescribed? yes     Who is going to help you get home at discharge? Wyatt Jhaveri friend      How do you get to doctors appointments? family or friend will provide     Are you on dialysis? No     Do you take coumadin? No     Discharge Plan A Home with family;Home Health     DME Needed Upon Discharge  walker, rolling     Discharge Plan discussed with: Friend     Name(s) and Number(s) Wyatt Jhaveri friend      Discharge Barriers Identified None        Physical Activity    On average, how many days per week do you engage in moderate to strenuous exercise (like a brisk walk)? 0 days     On average, how many minutes do you engage in exercise at this level? 0 min        Financial Resource Strain    How hard is it for you to pay for the very basics like food, housing, medical care, and heating? Not hard at all        Housing Stability    In the last 12 months, was there a time when you were not able to pay the mortgage or rent on time? No     In the last 12 months, how many places have you lived? 1     In the last 12 months, was there a time when you did not have a steady place to sleep or slept in a shelter (including now)? No        Transportation Needs    In the past 12 months, has lack of transportation kept you from medical appointments or from getting medications? No     In the past 12 months, has lack of transportation kept you from meetings, work, or from getting  things needed for daily living? No        Food Insecurity    Within the past 12 months, you worried that your food would run out before you got the money to buy more. Never true     Within the past 12 months, the food you bought just didn't last and you didn't have money to get more. Never true        Stress    Do you feel stress - tense, restless, nervous, or anxious, or unable to sleep at night because your mind is troubled all the time - these days? Only a little        Social Connections    In a typical week, how many times do you talk on the phone with family, friends, or neighbors? More than three times a week     How often do you get together with friends or relatives? More than three times a week     How often do you attend Shinto or Jewish services? 1 to 4 times per year     Do you belong to any clubs or organizations such as Shinto groups, unions, fraternal or athletic groups, or school groups? No     How often do you attend meetings of the clubs or organizations you belong to? Never     Are you , , , , never , or living with a partner? Never         Alcohol Use    Q1: How often do you have a drink containing alcohol? 4 or more times a week     Q2: How many drinks containing alcohol do you have on a typical day when you are drinking? 10 or more     Q3: How often do you have six or more drinks on one occasion? Daily or almost daily

## 2022-12-29 NOTE — CONSULTS
Inpatient consult to Cardiology  Consult performed by: Thairy G Reyes, DO  Consult ordered by: Thairy G Reyes, DO  Reason for consult: Increased heart rate                                     [facility]  Cardiology  Consult Note    Patient Name: Perfecto Urbina  MRN: [id number]  Admission Date: 12/29/2022  Hospital Length of Stay: 0 days  Code Status: Full Code   Attending Provider: Thairy G Reyes, DO   Consulting Provider: Ebony Mccallum RN  Primary Care Physician: Primary Doctor No  Principal Problem:Hyponatremia    Patient information was obtained from patient, past medical records, ER records, and primary team.     Subjective:     Chief Complaint:  SOB    HPI: 67 y.o. male unknown to [facility], presented to ER with complaints of SOB for apporoximately one day. Past medical history includes Hypertension.  He does not see a cardiologist.  He receives his BP medication when he comes to the ER. Patient initially left AMA, but return stating he wanted treatment for his SOB.  He denies chest pain, nausea, vomiting or diaphoresis per ER report.  Also denies fever, chills, cough, congestion, sore throat, abdominal pain, diarrhea or constipation.  He reports compliance with antihypertensive medications, however he is unable to name his antihypertensive medication at this time.  It appears that patient is homeless. ER evaluation showed hyponatremia, grade 3/6 systolic murmur, 3+ pitting edema to lower extremities bilaterally. Patient was given Lasix 20 mg IV in ER with 2L of urine output.  ; Na 122.    PMH: Hypertension, Alcoholism  PSH: No Pertinent Surgical History Noted  FH: Negative  SH: Tobacco- Negative, Alcohol- 12 Beers Daily, Substance Abuse- Hydrocodone Use      Previous Cardiac Diagnostics: None    Review of patient's allergies indicates:  No Known Allergies    Current Facility-Administered Medications on File Prior to Encounter   Medication    [COMPLETED] nitroGLYCERIN SL tablet  0.4 mg     Current Outpatient Medications on File Prior to Encounter   Medication Sig    cloNIDine (CATAPRES) 0.1 MG tablet Take 1 tablet (0.1 mg total) by mouth 2 (two) times daily.    ondansetron (ZOFRAN) 4 MG tablet Take 1 tablet (4 mg total) by mouth every 6 (six) hours.       Review of Systems   Constitutional:  Positive for fatigue.   Respiratory:  Positive for shortness of breath. Negative for chest tightness.    Cardiovascular:  Positive for leg swelling. Negative for chest pain.     Objective:     Vital Signs (Most Recent):  Temp: 97.8 °F (36.6 °C) (12/29/22 0332)  Pulse: 84 (12/29/22 0332)  Resp: 20 (12/29/22 0332)  BP: (!) 181/92 (12/29/22 0332)  SpO2: (!) 94 % (12/29/22 0332)   Vital Signs (24h Range):  Temp:  [97.4 °F (36.3 °C)-97.8 °F (36.6 °C)] 97.8 °F (36.6 °C)  Pulse:  [78-87] 84  Resp:  [18-22] 20  SpO2:  [94 %-99 %] 94 %  BP: (146-187)/(88-99) 181/92     Weight: 81.2 kg (179 lb 0.2 oz)  Body mass index is 22.98 kg/m².    SpO2: (!) 94 %         Intake/Output Summary (Last 24 hours) at 12/29/2022 0758  Last data filed at 12/29/2022 0625  Gross per 24 hour   Intake --   Output 3650 ml   Net -3650 ml       Lines/Drains/Airways       Peripheral Intravenous Line  Duration                  Peripheral IV - Single Lumen 12/29/22 0110 18 G Anterior;Left Forearm <1 day                    Significant Labs:  Recent Results (from the past 72 hour(s))   Urinalysis, Reflex to Urine Culture Urine, Clean Catch    Collection Time: 12/28/22  8:56 PM    Specimen: Urine   Result Value Ref Range    Color, UA Yellow Yellow, Light-Yellow, Dark Yellow, Lauren, Straw    Appearance, UA Clear Clear    Specific Gravity, UA <=1.005     pH, UA 5.5 5.0 - 8.5    Protein, UA Negative Negative mg/dL    Glucose, UA Negative Negative, Normal mg/dL    Ketones, UA Negative Negative mg/dL    Blood, UA Negative Negative unit/L    Bilirubin, UA Negative Negative mg/dL    Urobilinogen, UA 1.0 0.2, 1.0, Normal mg/dL    Nitrites, UA Negative  Negative    Leukocyte Esterase, UA Negative Negative unit/L   COVID/FLU A&B PCR    Collection Time: 12/28/22  8:56 PM   Result Value Ref Range    Influenza A PCR Not Detected Not Detected    Influenza B PCR Not Detected Not Detected    SARS-CoV-2 PCR Not Detected Not Detected   Urinalysis, Microscopic    Collection Time: 12/28/22  8:56 PM   Result Value Ref Range    Bacteria, UA None Seen None Seen, Rare, Occasional /HPF    RBC, UA None Seen None Seen, 0-2, 3-5, 0-5 /HPF    WBC, UA None Seen None Seen, 0-2, 3-5, 0-5 /HPF    Squamous Epithelial Cells, UA None Seen None Seen, Rare, Occasional, Occ /HPF   Comprehensive Metabolic Panel    Collection Time: 12/28/22  9:50 PM   Result Value Ref Range    Sodium Level 122 (L) 136 - 145 mmol/L    Potassium Level 4.3 3.5 - 5.1 mmol/L    Chloride 90 (L) 98 - 107 mmol/L    Carbon Dioxide 23 23 - 31 mmol/L    Glucose Level 95 82 - 115 mg/dL    Blood Urea Nitrogen 11.2 8.4 - 25.7 mg/dL    Creatinine 0.73 0.73 - 1.18 mg/dL    Calcium Level Total 8.9 8.8 - 10.0 mg/dL    Protein Total 6.6 5.8 - 7.6 gm/dL    Albumin Level 3.7 3.4 - 4.8 g/dL    Globulin 2.9 2.4 - 3.5 gm/dL    Albumin/Globulin Ratio 1.3 1.1 - 2.0 ratio    Bilirubin Total 1.4 <=1.5 mg/dL    Alkaline Phosphatase 66 40 - 150 unit/L    Alanine Aminotransferase 30 0 - 55 unit/L    Aspartate Aminotransferase 45 (H) 5 - 34 unit/L    eGFR >60 mls/min/1.73/m2   Brain natriuretic peptide    Collection Time: 12/28/22  9:50 PM   Result Value Ref Range    Natriuretic Peptide 821.1 (H) <=100.0 pg/mL   CBC with Differential    Collection Time: 12/28/22  9:50 PM   Result Value Ref Range    WBC 3.1 (L) 4.5 - 11.5 x10(3)/mcL    RBC 4.13 (L) 4.70 - 6.10 x10(6)/mcL    Hgb 12.1 (L) 14.0 - 18.0 gm/dL    Hct 36.4 (L) 42.0 - 52.0 %    MCV 88.1 80.0 - 94.0 fL    MCH 29.3 pg    MCHC 33.2 33.0 - 36.0 mg/dL    RDW 13.7 11.6 - 14.4 %    Platelet 77 (L) 140 - 371 x10(3)/mcL    MPV 10.0 9.4 - 12.4 fL    Neut % 59.7 %    Lymph % 27.1 %    Mono % 9.4 %     Eos % 3.2 %    Basophil % 0.3 %    Lymph # 0.84 0.6 - 4.6 x10(3)/mcL    Neut # 1.85 (L) 2.1 - 9.2 x10(3)/mcL    Mono # 0.29 0.1 - 1.3 x10(3)/mcL    Eos # 0.10 0 - 0.9 x10(3)/mcL    Baso # 0.01 0 - 0.2 x10(3)/mcL    IG# 0.01 0 - 0.04 x10(3)/mcL    IG% 0.3 %   Troponin ISTAT    Collection Time: 12/28/22  9:51 PM   Result Value Ref Range    POC Cardiac Troponin I 0.00 0.00 - 0.08 ng/mL    Sample unknown    Basic Metabolic Panel    Collection Time: 12/29/22  6:35 AM   Result Value Ref Range    Sodium Level 132 (L) 136 - 145 mmol/L    Potassium Level 4.1 3.5 - 5.1 mmol/L    Chloride 95 (L) 98 - 107 mmol/L    Carbon Dioxide 23 23 - 31 mmol/L    Glucose Level 92 82 - 115 mg/dL    Blood Urea Nitrogen 12.3 8.4 - 25.7 mg/dL    Creatinine 0.79 0.73 - 1.18 mg/dL    BUN/Creatinine Ratio 16     Calcium Level Total 9.1 8.8 - 10.0 mg/dL    Anion Gap 14.0 mEq/L    eGFR >60 mls/min/1.73/m2   CBC with Differential    Collection Time: 12/29/22  6:36 AM   Result Value Ref Range    WBC 2.6 (L) 4.5 - 11.5 x10(3)/mcL    RBC 4.26 (L) 4.70 - 6.10 x10(6)/mcL    Hgb 12.7 (L) 14.0 - 18.0 gm/dL    Hct 37.4 (L) 42.0 - 52.0 %    MCV 87.8 80.0 - 94.0 fL    MCH 29.8 pg    MCHC 34.0 33.0 - 36.0 mg/dL    RDW 13.9 11.6 - 14.4 %    Platelet 79 (L) 140 - 371 x10(3)/mcL    MPV 10.4 9.4 - 12.4 fL    Neut % 66.7 %    Lymph % 20.5 %    Mono % 8.9 %    Eos % 3.1 %    Basophil % 0.4 %    Lymph # 0.53 (L) 0.6 - 4.6 x10(3)/mcL    Neut # 1.73 (L) 2.1 - 9.2 x10(3)/mcL    Mono # 0.23 0.1 - 1.3 x10(3)/mcL    Eos # 0.08 0 - 0.9 x10(3)/mcL    Baso # 0.01 0 - 0.2 x10(3)/mcL    IG# 0.01 0 - 0.04 x10(3)/mcL    IG% 0.4 %       Significant Imaging:  Imaging Results    None       Telemetry: AFL RVR with PVC(S)    EKG:        Physical Exam  Vitals reviewed.   Constitutional:       General: He is not in acute distress.     Appearance: Normal appearance.   HENT:      Head: Normocephalic and atraumatic.      Mouth/Throat:      Mouth: Mucous membranes are moist.      Pharynx:  Oropharynx is clear.   Eyes:      Conjunctiva/sclera: Conjunctivae normal.      Pupils: Pupils are equal, round, and reactive to light.   Cardiovascular:      Rate and Rhythm: Tachycardia present. Rhythm irregular.      Heart sounds: Murmur heard.   Systolic murmur is present with a grade of 3/6.      Comments: AFL RVR with PVC(S)  Pulmonary:      Effort: Tachypnea present.      Breath sounds: Examination of the right-upper field reveals rales. Examination of the left-upper field reveals rales. Examination of the right-lower field reveals rales. Examination of the left-lower field reveals rales. Rales present.      Comments: 100% O2 on Room Air  Abdominal:      General: There is no distension.      Palpations: Abdomen is soft.      Tenderness: There is no abdominal tenderness.   Musculoskeletal:         General: Normal range of motion.      Cervical back: Neck supple.      Right lower leg: 3+ Pitting Edema present.      Left lower leg: 3+ Pitting Edema present.      Comments: Legs are Warm   Skin:     General: Skin is warm and dry.   Neurological:      General: No focal deficit present.      Mental Status: He is alert and oriented to person, place, and time. Mental status is at baseline.   Psychiatric:         Behavior: Behavior normal.       Home Medications:   Current Facility-Administered Medications on File Prior to Encounter   Medication Dose Route Frequency Provider Last Rate Last Admin    [COMPLETED] nitroGLYCERIN SL tablet 0.4 mg  0.4 mg Sublingual ED 1 Time Vikash Francois MD   0.4 mg at 12/28/22 2150     Current Outpatient Medications on File Prior to Encounter   Medication Sig Dispense Refill    cloNIDine (CATAPRES) 0.1 MG tablet Take 1 tablet (0.1 mg total) by mouth 2 (two) times daily. 60 tablet 0    ondansetron (ZOFRAN) 4 MG tablet Take 1 tablet (4 mg total) by mouth every 6 (six) hours. 12 tablet 0       Current Inpatient Medications:    Current Facility-Administered Medications:     acetaminophen  tablet 650 mg, 650 mg, Oral, Q4H PRN, Thairy G Reyes, DO    albuterol-ipratropium 2.5 mg-0.5 mg/3 mL nebulizer solution 3 mL, 3 mL, Nebulization, Q6H PRN, Thairy G Reyes, DO    ALPRAZolam tablet 0.25 mg, 0.25 mg, Oral, TID PRN, Thairy G Reyes, DO    aluminum-magnesium hydroxide-simethicone 200-200-20 mg/5 mL suspension 30 mL, 30 mL, Oral, QID PRN, Thairy G Reyes, DO    bisacodyL suppository 10 mg, 10 mg, Rectal, Daily PRN, Thairy G Reyes, DO    dextrose 50% injection 12.5 g, 12.5 g, Intravenous, PRN, Thairy G Reyes, DO    dextrose 50% injection 25 g, 25 g, Intravenous, PRN, Thairy G Reyes, DO    enoxaparin injection 40 mg, 40 mg, Subcutaneous, Daily, Thairy G Reyes, DO    furosemide injection 20 mg, 20 mg, Intravenous, Daily, Thairy G Reyes, DO    glucagon (human recombinant) injection 1 mg, 1 mg, Intramuscular, PRN, Thairy G Reyes, DO    glucose chewable tablet 16 g, 16 g, Oral, PRN, Thairy G Reyes, DO    glucose chewable tablet 24 g, 24 g, Oral, PRN, Thairy G Reyes, DO    HYDROcodone-acetaminophen 5-325 mg per tablet 1 tablet, 1 tablet, Oral, Q6H PRN, Thairy G Reyes, DO    labetalol 20 mg/4 mL (5 mg/mL) IV syring, 10 mg, Intravenous, QID PRN, Thairy G Reyes, DO    melatonin tablet 9 mg, 9 mg, Oral, Nightly PRN, Thairy G Reyes, DO    morphine injection 2 mg, 2 mg, Intravenous, Q6H PRN, Thairy G Reyes, DO    naloxone 0.4 mg/mL injection 0.02 mg, 0.02 mg, Intravenous, PRN, Thairy G Reyes, DO    NIFEdipine 24 hr tablet 30 mg, 30 mg, Oral, Daily, Thairy G Reyes, DO    ondansetron disintegrating tablet 8 mg, 8 mg, Oral, Q8H PRN, Thairy G Reyes, DO    ondansetron injection 4 mg, 4 mg, Intravenous, Q8H PRN, Thairy G Reyes, DO    polyethylene glycol packet 17 g, 17 g, Oral, TID PRN, Thairy G Reyes, DO    simethicone chewable tablet 80 mg, 1 tablet, Oral, QID PRN, Thairy G Reyes, DO    sodium chloride 0.9% flush 10 mL, 10 mL, Intravenous, PRN, Thairy G Reyes, DO    thiamine tablet 100 mg, 100 mg, Oral, Daily, Alejandra AKINS  Reyes, DO         VTE Risk Mitigation (From admission, onward)           Ordered     enoxaparin injection 40 mg  Daily         12/29/22 0729     IP VTE HIGH RISK PATIENT  Once         12/29/22 0729     Place sequential compression device  Until discontinued         12/29/22 0136                  Assessment:   Acute exacerbation of congestive heart failure (Unspecified with Echo Pending)  --Diuresing well (Net -3,650)  --No outpatient treatment in past  Atrial Flutter with RVR     - [id number]: 3 (HTN/HF/Age)    - On FD Lovenox for Stroke Risk Reduction  HTN (Above Goal)  Hyponatremia  --Improving  Thrombocytopenia    - No Active Bleeding  Alcoholism    Plan:   Start Lasix 20 Mg IVP q12h; Ensure Accurate I/O & Daily Weights.  Initiate Metoprolol Tartrate 50 Mg PO BID for HR Control; May Utilize IV Lopressor PRN.  Start Valsartan 40 Mg PO BID  Check Echocardiogram Re: HF/AFL  Trend Cardiac Enzymes  Initiate FD Lovenox for Stroke Risk Reduction in the Setting of AFL (Monitor Platelet Count Closely)  Check UDS  Recommend Transition from DuoNebs to Xopenex given Tachycardia  Labs in AM (CBC/CMP/Mag Level/Lipid Panel)  Will follow up tomorrow    Ebony Mccallum RN  Cardiology  [facility]  12/29/2022 7:58 AM

## 2022-12-29 NOTE — PLAN OF CARE
Goals to be met by: Dishcarge     Patient will increase functional independence with mobility by performin. Gait  x 325 feet with Supervision using No Assistive Device.  Vs RW vs SC

## 2022-12-30 VITALS
OXYGEN SATURATION: 95 % | TEMPERATURE: 98 F | BODY MASS INDEX: 21.34 KG/M2 | HEART RATE: 86 BPM | SYSTOLIC BLOOD PRESSURE: 154 MMHG | DIASTOLIC BLOOD PRESSURE: 86 MMHG | RESPIRATION RATE: 18 BRPM | WEIGHT: 166.25 LBS | HEIGHT: 74 IN

## 2022-12-30 PROBLEM — E87.8 ELECTROLYTE ABNORMALITY: Status: ACTIVE | Noted: 2022-12-30

## 2022-12-30 PROBLEM — I21.4 NSTEMI (NON-ST ELEVATED MYOCARDIAL INFARCTION): Status: ACTIVE | Noted: 2022-12-30

## 2022-12-30 LAB
ALBUMIN SERPL-MCNC: 3.5 G/DL (ref 3.4–4.8)
ALBUMIN/GLOB SERPL: 1.3 RATIO (ref 1.1–2)
ALP SERPL-CCNC: 55 UNIT/L (ref 40–150)
ALT SERPL-CCNC: 24 UNIT/L (ref 0–55)
AST SERPL-CCNC: 26 UNIT/L (ref 5–34)
BASOPHILS # BLD AUTO: 0.02 X10(3)/MCL (ref 0–0.2)
BASOPHILS NFR BLD AUTO: 0.5 %
BILIRUBIN DIRECT+TOT PNL SERPL-MCNC: 2.4 MG/DL
BSA FOR ECHO PROCEDURE: 2.06 M2
BUN SERPL-MCNC: 17.1 MG/DL (ref 8.4–25.7)
CALCIUM SERPL-MCNC: 8.9 MG/DL (ref 8.8–10)
CHLORIDE SERPL-SCNC: 97 MMOL/L (ref 98–107)
CO2 SERPL-SCNC: 27 MMOL/L (ref 23–31)
CREAT SERPL-MCNC: 1.1 MG/DL (ref 0.73–1.18)
EJECTION FRACTION: 55 %
EOSINOPHIL # BLD AUTO: 0.06 X10(3)/MCL (ref 0–0.9)
EOSINOPHIL NFR BLD AUTO: 1.5 %
ERYTHROCYTE [DISTWIDTH] IN BLOOD BY AUTOMATED COUNT: 14.3 % (ref 11.6–14.4)
GFR SERPLBLD CREATININE-BSD FMLA CKD-EPI: >60 MLS/MIN/1.73/M2
GLOBULIN SER-MCNC: 2.7 GM/DL (ref 2.4–3.5)
GLUCOSE SERPL-MCNC: 117 MG/DL (ref 82–115)
HCT VFR BLD AUTO: 39.2 % (ref 42–52)
HGB BLD-MCNC: 13 GM/DL (ref 14–18)
IMM GRANULOCYTES # BLD AUTO: 0.01 X10(3)/MCL (ref 0–0.04)
IMM GRANULOCYTES NFR BLD AUTO: 0.2 %
LYMPHOCYTES # BLD AUTO: 0.67 X10(3)/MCL (ref 0.6–4.6)
LYMPHOCYTES NFR BLD AUTO: 16.3 %
MAGNESIUM SERPL-MCNC: 1.9 MG/DL (ref 1.6–2.6)
MCH RBC QN AUTO: 29.7 PG
MCHC RBC AUTO-ENTMCNC: 33.2 MG/DL (ref 33–36)
MCV RBC AUTO: 89.7 FL (ref 80–94)
MONOCYTES # BLD AUTO: 0.48 X10(3)/MCL (ref 0.1–1.3)
MONOCYTES NFR BLD AUTO: 11.7 %
NEUTROPHILS # BLD AUTO: 2.88 X10(3)/MCL (ref 2.1–9.2)
NEUTROPHILS NFR BLD AUTO: 69.8 %
PLATELET # BLD AUTO: 77 X10(3)/MCL (ref 140–371)
PMV BLD AUTO: 10.3 FL (ref 9.4–12.4)
POTASSIUM SERPL-SCNC: 3.3 MMOL/L (ref 3.5–5.1)
PROT SERPL-MCNC: 6.2 GM/DL (ref 5.8–7.6)
RA PRESSURE: 3 MMHG
RBC # BLD AUTO: 4.37 X10(6)/MCL (ref 4.7–6.1)
SODIUM SERPL-SCNC: 138 MMOL/L (ref 136–145)
TROPONIN I SERPL-MCNC: 0.1 NG/ML (ref 0–0.04)
WBC # SPEC AUTO: 4.1 X10(3)/MCL (ref 4.5–11.5)

## 2022-12-30 PROCEDURE — 96372 THER/PROPH/DIAG INJ SC/IM: CPT | Performed by: STUDENT IN AN ORGANIZED HEALTH CARE EDUCATION/TRAINING PROGRAM

## 2022-12-30 PROCEDURE — 84484 ASSAY OF TROPONIN QUANT: CPT | Performed by: STUDENT IN AN ORGANIZED HEALTH CARE EDUCATION/TRAINING PROGRAM

## 2022-12-30 PROCEDURE — 25000003 PHARM REV CODE 250: Performed by: STUDENT IN AN ORGANIZED HEALTH CARE EDUCATION/TRAINING PROGRAM

## 2022-12-30 PROCEDURE — 85025 COMPLETE CBC W/AUTO DIFF WBC: CPT | Performed by: STUDENT IN AN ORGANIZED HEALTH CARE EDUCATION/TRAINING PROGRAM

## 2022-12-30 PROCEDURE — 83735 ASSAY OF MAGNESIUM: CPT | Performed by: STUDENT IN AN ORGANIZED HEALTH CARE EDUCATION/TRAINING PROGRAM

## 2022-12-30 PROCEDURE — 96376 TX/PRO/DX INJ SAME DRUG ADON: CPT

## 2022-12-30 PROCEDURE — G0378 HOSPITAL OBSERVATION PER HR: HCPCS

## 2022-12-30 PROCEDURE — 63600175 PHARM REV CODE 636 W HCPCS: Performed by: STUDENT IN AN ORGANIZED HEALTH CARE EDUCATION/TRAINING PROGRAM

## 2022-12-30 PROCEDURE — 25000003 PHARM REV CODE 250: Performed by: NURSE PRACTITIONER

## 2022-12-30 PROCEDURE — 36415 COLL VENOUS BLD VENIPUNCTURE: CPT | Performed by: STUDENT IN AN ORGANIZED HEALTH CARE EDUCATION/TRAINING PROGRAM

## 2022-12-30 PROCEDURE — 80053 COMPREHEN METABOLIC PANEL: CPT | Performed by: STUDENT IN AN ORGANIZED HEALTH CARE EDUCATION/TRAINING PROGRAM

## 2022-12-30 RX ORDER — METOPROLOL SUCCINATE 50 MG/1
100 TABLET, EXTENDED RELEASE ORAL DAILY
Status: DISCONTINUED | OUTPATIENT
Start: 2022-12-30 | End: 2022-12-30 | Stop reason: HOSPADM

## 2022-12-30 RX ORDER — NIFEDIPINE 30 MG/1
30 TABLET, EXTENDED RELEASE ORAL DAILY
Status: DISCONTINUED | OUTPATIENT
Start: 2022-12-30 | End: 2022-12-30 | Stop reason: HOSPADM

## 2022-12-30 RX ORDER — FUROSEMIDE 20 MG/1
20 TABLET ORAL DAILY
Status: DISCONTINUED | OUTPATIENT
Start: 2022-12-30 | End: 2022-12-30 | Stop reason: HOSPADM

## 2022-12-30 RX ORDER — LANOLIN ALCOHOL/MO/W.PET/CERES
100 CREAM (GRAM) TOPICAL DAILY
Qty: 30 TABLET | Refills: 0 | Status: SHIPPED | OUTPATIENT
Start: 2022-12-31 | End: 2023-01-30

## 2022-12-30 RX ORDER — METOPROLOL SUCCINATE 100 MG/1
100 TABLET, EXTENDED RELEASE ORAL DAILY
Qty: 30 TABLET | Refills: 0 | Status: ON HOLD | OUTPATIENT
Start: 2022-12-31 | End: 2023-04-17 | Stop reason: HOSPADM

## 2022-12-30 RX ORDER — POTASSIUM CHLORIDE 20 MEQ/1
40 TABLET, EXTENDED RELEASE ORAL 2 TIMES DAILY
Status: DISCONTINUED | OUTPATIENT
Start: 2022-12-30 | End: 2022-12-30 | Stop reason: HOSPADM

## 2022-12-30 RX ORDER — ASPIRIN 81 MG/1
81 TABLET ORAL DAILY
Status: DISCONTINUED | OUTPATIENT
Start: 2022-12-30 | End: 2022-12-30 | Stop reason: HOSPADM

## 2022-12-30 RX ORDER — VALSARTAN 40 MG/1
40 TABLET ORAL 2 TIMES DAILY
Qty: 60 TABLET | Refills: 0 | Status: ON HOLD | OUTPATIENT
Start: 2022-12-30 | End: 2023-04-17 | Stop reason: HOSPADM

## 2022-12-30 RX ORDER — MAGNESIUM SULFATE 1 G/100ML
1 INJECTION INTRAVENOUS ONCE
Status: DISCONTINUED | OUTPATIENT
Start: 2022-12-30 | End: 2022-12-30 | Stop reason: HOSPADM

## 2022-12-30 RX ORDER — ASPIRIN 81 MG/1
81 TABLET ORAL DAILY
Qty: 30 TABLET | Refills: 0 | Status: ON HOLD | OUTPATIENT
Start: 2022-12-30 | End: 2023-12-26

## 2022-12-30 RX ORDER — NIFEDIPINE 30 MG/1
30 TABLET, EXTENDED RELEASE ORAL DAILY
Qty: 30 TABLET | Refills: 0 | Status: ON HOLD | OUTPATIENT
Start: 2022-12-31 | End: 2023-04-17 | Stop reason: HOSPADM

## 2022-12-30 RX ORDER — FUROSEMIDE 20 MG/1
20 TABLET ORAL DAILY
Qty: 30 TABLET | Refills: 0 | Status: SHIPPED | OUTPATIENT
Start: 2022-12-30 | End: 2023-01-29

## 2022-12-30 RX ADMIN — POTASSIUM CHLORIDE 40 MEQ: 1500 TABLET, EXTENDED RELEASE ORAL at 09:12

## 2022-12-30 RX ADMIN — ENOXAPARIN SODIUM 80 MG: 80 INJECTION SUBCUTANEOUS at 10:12

## 2022-12-30 RX ADMIN — FUROSEMIDE 20 MG: 10 INJECTION, SOLUTION INTRAMUSCULAR; INTRAVENOUS at 09:12

## 2022-12-30 RX ADMIN — METOPROLOL SUCCINATE 100 MG: 50 TABLET, FILM COATED, EXTENDED RELEASE ORAL at 09:12

## 2022-12-30 RX ADMIN — VALSARTAN 40 MG: 40 TABLET, FILM COATED ORAL at 09:12

## 2022-12-30 RX ADMIN — ALPRAZOLAM 0.25 MG: 0.25 TABLET ORAL at 09:12

## 2022-12-30 RX ADMIN — NIFEDIPINE 30 MG: 30 TABLET, FILM COATED, EXTENDED RELEASE ORAL at 09:12

## 2022-12-30 RX ADMIN — THIAMINE HCL TAB 100 MG 100 MG: 100 TAB at 09:12

## 2022-12-30 NOTE — HOSPITAL COURSE
Patient admitted for acute dyspnea. Found to have atrial flutter on admission and hypertensive urgency. Patient responded well to rate control with metoprolol. Valsartan and nifedipine were also added for improved control of hypertensive urgency.  Patient volume overloaded on exam, has had negative fluid balance during his admission therefore will be discharged on Lasix 20 mg daily for maintenance.  Overall has lost approximately 13 lb since admission from diuresis. Patient also with NSTEMI with troponin peaking at 0.23, now down trending.  This is secondary to hypertensive urgency and atrial flutter with rapid ventricular rate.  Patient is asymptomatic EKG on 12/29 showing a flutter at a rate of 127 beats per minute.  Echocardiogram was performed which showed preserved ejection fraction and concentric hypertrophy of the left ventricle.  Patient also with incidental finding of thrombocytopenia secondary to suspected myelosuppression from ETOH abuse thus cardiology recommends discharge on only aspirin to mitigate bleeding risk. Can be re-assessed outpatient if pt no longer EtOH dependent and thrombocytopenia is improved.

## 2022-12-30 NOTE — ASSESSMENT & PLAN NOTE
-on admission, patient only with clonidine outpatient  -improved control so far with valsartan and metoprolol  -added nifedipine for improved control

## 2022-12-30 NOTE — ASSESSMENT & PLAN NOTE
-new diagnosis  -chads Vasc = 2  -rate control with metoprolol  -full-dose Lovenox, in the setting of thrombocytopenia--> DC with aspirin

## 2022-12-30 NOTE — PLAN OF CARE
[facility]  Discharge Final Note    Primary Care Provider: Primary Doctor No    Expected Discharge Date: 12/30/2022    Final Discharge Note (most recent)       Final Note - 12/30/22 1328          Final Note    Assessment Type Final Discharge Note     Anticipated Discharge Disposition Home or Self Care     What phone number can be called within the next 1-3 days to see how you are doing after discharge? [phone]     Hospital Resources/Appts/Education Provided Provided patient/caregiver with written discharge plan information        Post-Acute Status    Patient choice form signed by patient/caregiver List with quality metrics by geographic area provided     Discharge Delays None known at this time                     Important Message from Medicare             Contact Info       LOREN Petersen   Specialty: Nurse Practitioner    [place] [id number] [facility] [address]   Phone: [phone]       Next Steps: Go on 1/10/2023    Instructions: Follow up appointment with Dori PIMENTEL on Tuesday, January 10, 2023 @ 8:00 am.  Spoke to Karen.

## 2022-12-30 NOTE — ASSESSMENT & PLAN NOTE
-HFpEF  -patient with no prior diagnosis of heart failure   -echocardiogram showed preserved EF and LV concentric hypertrophy  -Discharge with lasix 20mg qd  -BNP elevated at 800, weight decreased by 13lbs from diuresis

## 2022-12-30 NOTE — DISCHARGE SUMMARY
[facility]  Hospital Medicine  Discharge Summary      Patient Name: Perfecto Urbina  MRN: [id number]  MANNY: [id number]  Patient Class: OP- Observation  Admission Date: 12/29/2022  Hospital Length of Stay: 0 days  Discharge Date and Time:  12/30/2022 11:04 AM  Attending Physician: Thairy G Reyes, DO   Discharging Provider: Thairy G Reyes, DO  Primary Care Provider: Primary Doctor No    Primary Care Team: Networked reference to record PCT     HPI:   67-year-old male with a past medical history of ETOH dependence (two 6 packs of beer daily), poor healthcare follow up, homelessness, elicit Lortab use, essential hypertension who presents with complaint of dyspnea for approximately 1 day.  Patient initially presented to the ER and left AMA during workup.  Returned secondary to continued dyspnea.  Patient EKG shown to have atrial flutter with with heart rate as high as 170.  Patient also volume overloaded in the ED and responded well to 20 Lasix daily.      At baseline patient reports he occasionally lives with a friend otherwise he is homeless.  Reports he is fully independent.      * No surgery found *      Hospital Course:   Patient admitted for acute dyspnea. Found to have atrial flutter on admission and hypertensive urgency. Patient responded well to rate control with metoprolol. Valsartan and nifedipine were also added for improved control of hypertensive urgency.  Patient volume overloaded on exam, has had negative fluid balance during his admission therefore will be discharged on Lasix 20 mg daily for maintenance.  Overall has lost approximately 13 lb since admission from diuresis. Patient also with NSTEMI with troponin peaking at 0.23, now down trending.  This is secondary to hypertensive urgency and atrial flutter with rapid ventricular rate.  Patient is asymptomatic EKG on 12/29 showing a flutter at a rate of 127 beats per minute.  Echocardiogram was performed which showed preserved  ejection fraction and concentric hypertrophy of the left ventricle.  Patient also with incidental finding of thrombocytopenia secondary to suspected myelosuppression from ETOH abuse thus cardiology recommends discharge on only aspirin to mitigate bleeding risk. Can be re-assessed outpatient if pt no longer EtOH dependent and thrombocytopenia is improved.        Goals of Care Treatment Preferences:  Code Status: Full Code      Consults:   Consults (From admission, onward)        Status Ordering Provider     Inpatient consult to Cardiology  Once        Provider:  Washington Huerta, HONORIO    Completed REYES, THAIRY G          * Atrial flutter  -new diagnosis  -chads Vasc = 2  -rate control with metoprolol  -full-dose Lovenox, in the setting of thrombocytopenia--> DC with aspirin       NSTEMI (non-ST elevated myocardial infarction)  -troponin is at 0.23   -patient has remained asymptomatic with no signs of acute ischemia on EKG   -discharge on aspirin  -Will need follow-up with Cardiology outpatient for Juliane PET    Acute on chronic congestive heart failure  -HFpEF  -patient with no prior diagnosis of heart failure   -echocardiogram showed preserved EF and LV concentric hypertrophy  -Discharge with lasix 20mg qd  -BNP elevated at 800, weight decreased by 13lbs from diuresis     Thrombocytopenia  -repeat in an EDTA free tube showed slight improvement   -likely secondary to patient's ETOH dependence   -B12, folate wnl  -patient requiring anticoagulation for atrial flutter while inpatient but will DC on asa given bleeding risk in setting of EtOH dependence     Hypertensive urgency  -on admission, patient only with clonidine outpatient  -improved control so far with valsartan and metoprolol  -added nifedipine for improved control    ETOH abuse  -thiamine   -patient would benefit from cessation      Hyponatremia  -secondary to 3rd spacing, has resolved with gentle diuresis  -likely contribution of tea and toast diet secondary to  ETOH dependence      Electrolyte abnormality  -hypomagnesemia, hypokalemia   -replete prior to discharge        Final Active Diagnoses:    Diagnosis Date Noted POA    PRINCIPAL PROBLEM:  Atrial flutter [I48.92] 12/29/2022 Yes    NSTEMI (non-ST elevated myocardial infarction) [I21.4] 12/30/2022 Yes    Acute on chronic congestive heart failure [I50.9] 12/29/2022 Yes    Thrombocytopenia [D69.6] 12/29/2022 Yes    Hypertensive urgency [I16.0] 12/29/2022 Yes    ETOH abuse [F10.10] 12/29/2022 Yes    Hyponatremia [E87.1] 12/29/2022 Yes    Electrolyte abnormality [E87.8] 12/30/2022 Yes      Problems Resolved During this Admission:       Discharged Condition: stable    Disposition: Home or Self Care    Follow Up:   Follow-up Information     LOREN Petersen. Go on 1/10/2023.    Specialty: Nurse Practitioner  Why: Follow up appointment with Dori PIMENTEL on Tuesday, January 10, 2023 @ 8:00 am.  Spoke to Karen.  Contact information:  [address] [facility] [address]  [phone]                       Patient Instructions:   No discharge procedures on file.    Significant Diagnostic Studies: Labs:   BMP:   Recent Labs   Lab 12/28/22  2150 12/29/22  0635 12/29/22  1029 12/30/22  0516   * 132*  --  138   K 4.3 4.1  --  3.3*   CO2 23 23  --  27   BUN 11.2 12.3  --  17.1   CREATININE 0.73 0.79  --  1.10   CALCIUM 8.9 9.1  --  8.9   MG  --   --  1.60 1.90       Pending Diagnostic Studies:     None         Medications:  Reconciled Home Medications:      Medication List      START taking these medications    aspirin 81 MG EC tablet  Commonly known as: ECOTRIN  Take 1 tablet (81 mg total) by mouth once daily.     furosemide 20 MG tablet  Commonly known as: LASIX  Take 1 tablet (20 mg total) by mouth once daily.     metoprolol succinate 100 MG 24 hr tablet  Commonly known as: TOPROL-XL  Take 1 tablet (100 mg total) by mouth once daily.  Start taking on: December 31, 2022     NIFEdipine 30  MG (OSM) 24 hr tablet  Commonly known as: PROCARDIA-XL  Take 1 tablet (30 mg total) by mouth once daily.  Start taking on: December 31, 2022     thiamine 100 MG tablet  Take 1 tablet (100 mg total) by mouth once daily.  Start taking on: December 31, 2022     valsartan 40 MG tablet  Commonly known as: DIOVAN  Take 1 tablet (40 mg total) by mouth 2 (two) times daily.        STOP taking these medications    cloNIDine 0.1 MG tablet  Commonly known as: CATAPRES     ondansetron 4 MG tablet  Commonly known as: ZOFRAN            Indwelling Lines/Drains at time of discharge:   Lines/Drains/Airways     None                 Time spent on the discharge of patient: 35 minutes         Thairy G Reyes, DO  Department of Hospital Medicine  [facility]

## 2022-12-30 NOTE — ASSESSMENT & PLAN NOTE
-troponin is at 0.23   -patient has remained asymptomatic with no signs of acute ischemia on EKG   -discharge on aspirin  -Will need follow-up with Cardiology outpatient for Juliane PET

## 2022-12-30 NOTE — PLAN OF CARE
Problem: Fall Injury Risk  Goal: Absence of Fall and Fall-Related Injury  Intervention: Identify and Manage Contributors  Flowsheets (Taken 12/29/2022 2000)  Self-Care Promotion: BADL personal objects within reach  Medication Review/Management: medications reviewed  Intervention: Promote Injury-Free Environment  Flowsheets (Taken 12/29/2022 2015)  Safety Promotion/Fall Prevention:   assistive device/personal item within reach   bed alarm set   side rails raised x 2   nonskid shoes/socks when out of bed   instructed to call staff for mobility

## 2022-12-30 NOTE — PLAN OF CARE
Problem: Adult Inpatient Plan of Care  Goal: Plan of Care Review  Outcome: Met  Goal: Patient-Specific Goal (Individualized)  Outcome: Met  Goal: Absence of Hospital-Acquired Illness or Injury  Outcome: Met  Intervention: Identify and Manage Fall Risk  Flowsheets (Taken 12/30/2022 1138)  Safety Promotion/Fall Prevention:   assistive device/personal item within reach   commode/urinal/bedpan at bedside   bed alarm set   Fall Risk reviewed with patient/family   gait belt with ambulation   nonskid shoes/socks when out of bed   instructed to call staff for mobility  Intervention: Prevent and Manage VTE (Venous Thromboembolism) Risk  Flowsheets (Taken 12/30/2022 1138)  Activity Management:   Ambulated in ferreira - L4   Walk with assistive devise and /or staff member - L3  VTE Prevention/Management: bleeding risk assessed  Goal: Optimal Comfort and Wellbeing  Outcome: Met  Intervention: Provide Person-Centered Care  Flowsheets (Taken 12/30/2022 1138)  Trust Relationship/Rapport:   care explained   emotional support provided   thoughts/feelings acknowledged   reassurance provided     Problem: Fall Injury Risk  Goal: Absence of Fall and Fall-Related Injury  Outcome: Met  Intervention: Identify and Manage Contributors  Flowsheets (Taken 12/30/2022 1138)  Self-Care Promotion:   meal set-up provided   independence encouraged  Intervention: Promote Injury-Free Environment  Flowsheets (Taken 12/30/2022 1138)  Safety Promotion/Fall Prevention:   assistive device/personal item within reach   commode/urinal/bedpan at bedside   bed alarm set   Fall Risk reviewed with patient/family   gait belt with ambulation   nonskid shoes/socks when out of bed   instructed to call staff for mobility     Problem: Alcohol Withdrawal  Goal: Alcohol Withdrawal Symptom Control  Outcome: Met  Intervention: Minimize or Manage Alcohol Withdrawal Symptoms  Flowsheets (Taken 12/30/2022 1138)  Aspiration Precautions:   awake/alert before oral intake    Ostomy Nurse Visit (60 minutes)    Reason for visit:    Evaluation      Assessment:     Effluent: liquid stool      Appliance evaluation: No evidence of undermining of stool however Hollihesive was degrading from 7-10 from weeping skin     Peristomal skin: weeping and denuded 7-10 o'clock     Stoma: Red, viable, oval, irregular shaped. Os empties at skin level at 8 o'clock      Mucocutaneous junction: Intact        Interventions:     Removed old pouch, washed peristomal skin with warm water. Crusted denuded area with stomahesive powder/skin prep x 3. Applied small fluffed pieces of alginate over denuded area. Applied stoma paste to depressed area at 8 o'clock. Cut to fit Hollihesive around stoma, off-centering so that the majority of the product covered the irritated skin. Stoma paste also applied to exposed skin around stoma as stoma is irregularly shaped.  Convex ring cut in half horizontally and then shortened the ring so that it fits size of stoma, applied to wafer. Pouch connected to wafer and placed on patient. Warm compress applied to support adherence. Belt secured. Area of denuded skin has decreased in size but still weeping and irritated.         Recommendations:     Ostomy Products:   Stomahesive powder  Skin Prep  Adapt Cera ring-#[id number]  Aaron skin barrier- #[id number]  Aaron pouch- #[id number]  Ostomy Belt #[id number]  Hollihesive #[id number]   Frequency of change: every other day and prn for leakage.   distractions minimized during oral intake   oral hygiene care promoted  Seizure Precautions:   activity supervised   clutter-free environment maintained   emergency equipment at bedside  Sensory Stimulation Regulation: care clustered

## 2022-12-30 NOTE — PROGRESS NOTES
[facility]  Cardiology  Progress Note    Patient Name: Perfecto Urbina  MRN: [id number]  Admission Date: 12/29/2022  Hospital Length of Stay: 0 days  Code Status: Full Code   Attending Physician: Thairy G Reyes, DO   Primary Care Physician: Primary Doctor No  Expected Discharge Date:   Principal Problem:Atrial flutter    Subjective:   Chief Complaint:  SOB     HPI: 67 y.o. male unknown to [facility], presented to ER with complaints of SOB for apporoximately one day. Past medical history includes Hypertension.  He does not see a cardiologist.  He receives his BP medication when he comes to the ER. Patient initially left AMA, but return stating he wanted treatment for his SOB.  He denies chest pain, nausea, vomiting or diaphoresis per ER report.  Also denies fever, chills, cough, congestion, sore throat, abdominal pain, diarrhea or constipation.  He reports compliance with antihypertensive medications, however he is unable to name his antihypertensive medication at this time.  It appears that patient is homeless. ER evaluation showed hyponatremia, grade 3/6 systolic murmur, 3+ pitting edema to lower extremities bilaterally. Patient was given Lasix 20 mg IV in ER with 2L of urine output.  ; Na 122.    Hospital Course:  12.30.22: NAD Noted. On RA. Lying flat. SR on Tele. BP Above Goal.     PMH: Hypertension, Alcoholism  PSH: No Pertinent Surgical History Noted  FH: Negative  SH: Tobacco- Negative, Alcohol- 12 Beers Daily, Substance Abuse- Hydrocodone Use     Previous Cardiac Diagnostics:  Echocardiogram (12.30.22):  The left ventricle is normal in size with mild concentric hypertrophy and normal systolic function.  Grade I left ventricular diastolic dysfunction.  The estimated ejection fraction is 55%.  Normal right ventricular size with normal right ventricular systolic function.  There is mild aortic valve stenosis.  There is mild mitral stenosis.  Normal central venous pressure (3 mmHg).      Review of Systems   Cardiovascular:  Negative for chest pain and orthopnea.   Respiratory:  Negative for shortness of breath.    All other systems reviewed and are negative.    Objective:     Vital Signs (Most Recent):  Temp: 97.8 °F (36.6 °C) (12/30/22 0405)  Pulse: 84 (12/30/22 0500)  Resp: 18 (12/29/22 2247)  BP: (!) 160/91 (12/30/22 0500)  SpO2: 97 % (12/30/22 0410)   Vital Signs (24h Range):  Temp:  [97.6 °F (36.4 °C)-97.8 °F (36.6 °C)] 97.8 °F (36.6 °C)  Pulse:  [] 84  Resp:  [16-25] 18  SpO2:  [96 %-99 %] 97 %  BP: (148-174)/(78-99) 160/91     Weight: 75.4 kg (166 lb 3.6 oz)  Body mass index is 21.34 kg/m².    SpO2: 97 %         Intake/Output Summary (Last 24 hours) at 12/30/2022 0757  Last data filed at 12/30/2022 0300  Gross per 24 hour   Intake 240 ml   Output 1200 ml   Net -960 ml       Lines/Drains/Airways       Peripheral Intravenous Line  Duration                  Peripheral IV - Single Lumen 12/29/22 0110 18 G Anterior;Left Forearm 1 day                  Significant Labs:   Recent Results (from the past 72 hour(s))   Urinalysis, Reflex to Urine Culture Urine, Clean Catch    Collection Time: 12/28/22  8:56 PM    Specimen: Urine   Result Value Ref Range    Color, UA Yellow Yellow, Light-Yellow, Dark Yellow, Lauren, Straw    Appearance, UA Clear Clear    Specific Gravity, UA <=1.005     pH, UA 5.5 5.0 - 8.5    Protein, UA Negative Negative mg/dL    Glucose, UA Negative Negative, Normal mg/dL    Ketones, UA Negative Negative mg/dL    Blood, UA Negative Negative unit/L    Bilirubin, UA Negative Negative mg/dL    Urobilinogen, UA 1.0 0.2, 1.0, Normal mg/dL    Nitrites, UA Negative Negative    Leukocyte Esterase, UA Negative Negative unit/L   COVID/FLU A&B PCR    Collection Time: 12/28/22  8:56 PM   Result Value Ref Range    Influenza A PCR Not Detected Not Detected    Influenza B PCR Not Detected Not Detected    SARS-CoV-2 PCR Not Detected Not Detected   Urinalysis, Microscopic    Collection Time:  12/28/22  8:56 PM   Result Value Ref Range    Bacteria, UA None Seen None Seen, Rare, Occasional /HPF    RBC, UA None Seen None Seen, 0-2, 3-5, 0-5 /HPF    WBC, UA None Seen None Seen, 0-2, 3-5, 0-5 /HPF    Squamous Epithelial Cells, UA None Seen None Seen, Rare, Occasional, Occ /HPF   Comprehensive Metabolic Panel    Collection Time: 12/28/22  9:50 PM   Result Value Ref Range    Sodium Level 122 (L) 136 - 145 mmol/L    Potassium Level 4.3 3.5 - 5.1 mmol/L    Chloride 90 (L) 98 - 107 mmol/L    Carbon Dioxide 23 23 - 31 mmol/L    Glucose Level 95 82 - 115 mg/dL    Blood Urea Nitrogen 11.2 8.4 - 25.7 mg/dL    Creatinine 0.73 0.73 - 1.18 mg/dL    Calcium Level Total 8.9 8.8 - 10.0 mg/dL    Protein Total 6.6 5.8 - 7.6 gm/dL    Albumin Level 3.7 3.4 - 4.8 g/dL    Globulin 2.9 2.4 - 3.5 gm/dL    Albumin/Globulin Ratio 1.3 1.1 - 2.0 ratio    Bilirubin Total 1.4 <=1.5 mg/dL    Alkaline Phosphatase 66 40 - 150 unit/L    Alanine Aminotransferase 30 0 - 55 unit/L    Aspartate Aminotransferase 45 (H) 5 - 34 unit/L    eGFR >60 mls/min/1.73/m2   Brain natriuretic peptide    Collection Time: 12/28/22  9:50 PM   Result Value Ref Range    Natriuretic Peptide 821.1 (H) <=100.0 pg/mL   CBC with Differential    Collection Time: 12/28/22  9:50 PM   Result Value Ref Range    WBC 3.1 (L) 4.5 - 11.5 x10(3)/mcL    RBC 4.13 (L) 4.70 - 6.10 x10(6)/mcL    Hgb 12.1 (L) 14.0 - 18.0 gm/dL    Hct 36.4 (L) 42.0 - 52.0 %    MCV 88.1 80.0 - 94.0 fL    MCH 29.3 pg    MCHC 33.2 33.0 - 36.0 mg/dL    RDW 13.7 11.6 - 14.4 %    Platelet 77 (L) 140 - 371 x10(3)/mcL    MPV 10.0 9.4 - 12.4 fL    Neut % 59.7 %    Lymph % 27.1 %    Mono % 9.4 %    Eos % 3.2 %    Basophil % 0.3 %    Lymph # 0.84 0.6 - 4.6 x10(3)/mcL    Neut # 1.85 (L) 2.1 - 9.2 x10(3)/mcL    Mono # 0.29 0.1 - 1.3 x10(3)/mcL    Eos # 0.10 0 - 0.9 x10(3)/mcL    Baso # 0.01 0 - 0.2 x10(3)/mcL    IG# 0.01 0 - 0.04 x10(3)/mcL    IG% 0.3 %   Troponin ISTAT    Collection Time: 12/28/22  9:51 PM    Result Value Ref Range    POC Cardiac Troponin I 0.00 0.00 - 0.08 ng/mL    Sample unknown    Osmolality, Serum    Collection Time: 12/29/22 12:55 AM   Result Value Ref Range    Osmolality 281 280 - 300 mOsm/kg   Sodium, Random Urine    Collection Time: 12/29/22  1:03 AM   Result Value Ref Range    Urine Sodium <20.0 mmol/L   Osmolality, Urine    Collection Time: 12/29/22  1:03 AM   Result Value Ref Range    Urine Osmolality 152 (L) 300 - 1,300 mOsm/kg   Drug Screen, Urine    Collection Time: 12/29/22  1:03 AM   Result Value Ref Range    Amphetamines, Urine Negative Negative    Barbituates, Urine Negative Negative    Benzodiazepine, Urine Negative Negative    Cannabinoids, Urine Negative Negative    Cocaine, Urine Negative Negative    Opiates, Urine Positive (A) Negative    Phencyclidine, Urine Negative Negative    pH, Urine 6.0 3.0 - 11.0   Basic Metabolic Panel    Collection Time: 12/29/22  6:35 AM   Result Value Ref Range    Sodium Level 132 (L) 136 - 145 mmol/L    Potassium Level 4.1 3.5 - 5.1 mmol/L    Chloride 95 (L) 98 - 107 mmol/L    Carbon Dioxide 23 23 - 31 mmol/L    Glucose Level 92 82 - 115 mg/dL    Blood Urea Nitrogen 12.3 8.4 - 25.7 mg/dL    Creatinine 0.79 0.73 - 1.18 mg/dL    BUN/Creatinine Ratio 16     Calcium Level Total 9.1 8.8 - 10.0 mg/dL    Anion Gap 14.0 mEq/L    eGFR >60 mls/min/1.73/m2   CBC with Differential    Collection Time: 12/29/22  6:36 AM   Result Value Ref Range    WBC 2.6 (L) 4.5 - 11.5 x10(3)/mcL    RBC 4.26 (L) 4.70 - 6.10 x10(6)/mcL    Hgb 12.7 (L) 14.0 - 18.0 gm/dL    Hct 37.4 (L) 42.0 - 52.0 %    MCV 87.8 80.0 - 94.0 fL    MCH 29.8 pg    MCHC 34.0 33.0 - 36.0 mg/dL    RDW 13.9 11.6 - 14.4 %    Platelet 79 (L) 140 - 371 x10(3)/mcL    MPV 10.4 9.4 - 12.4 fL    Neut % 66.7 %    Lymph % 20.5 %    Mono % 8.9 %    Eos % 3.1 %    Basophil % 0.4 %    Lymph # 0.53 (L) 0.6 - 4.6 x10(3)/mcL    Neut # 1.73 (L) 2.1 - 9.2 x10(3)/mcL    Mono # 0.23 0.1 - 1.3 x10(3)/mcL    Eos # 0.08 0 - 0.9  x10(3)/mcL    Baso # 0.01 0 - 0.2 x10(3)/mcL    IG# 0.01 0 - 0.04 x10(3)/mcL    IG% 0.4 %   Ethanol    Collection Time: 12/29/22 10:29 AM   Result Value Ref Range    Ethanol Level <10.0 <=10.0 mg/dL   Troponin I    Collection Time: 12/29/22 10:29 AM   Result Value Ref Range    Troponin-I 0.030 0.000 - 0.045 ng/mL   Lipid Panel    Collection Time: 12/29/22 10:29 AM   Result Value Ref Range    Cholesterol Total 183 <=200 mg/dL    HDL Cholesterol 102 (H) 35 - 60 mg/dL    Triglyceride 55 34 - 140 mg/dL    Cholesterol/HDL Ratio 2 0 - 5    Very Low Density Lipoprotein 11     LDL Cholesterol 70.00 50.00 - 140.00 mg/dL   Magnesium    Collection Time: 12/29/22 10:29 AM   Result Value Ref Range    Magnesium Level 1.60 1.60 - 2.60 mg/dL   Vitamin B12    Collection Time: 12/29/22 10:29 AM   Result Value Ref Range    Vitamin B12 Level 392 213 - 816 pg/mL   Platelet Count    Collection Time: 12/29/22 10:29 AM   Result Value Ref Range    Platelet 83 (L) 140 - 371 x10(3)/mcL   Folate    Collection Time: 12/29/22 12:14 PM   Result Value Ref Range    Folate Level 8.3 7.0 - 31.4 ng/mL   Troponin I    Collection Time: 12/29/22  5:44 PM   Result Value Ref Range    Troponin-I 0.237 (H) 0.000 - 0.045 ng/mL   Echo    Collection Time: 12/29/22  6:28 PM   Result Value Ref Range    BSA 2.06 m2    Right Atrial Pressure (from IVC) 3 mmHg    EF 55 %   Comprehensive Metabolic Panel    Collection Time: 12/30/22  5:16 AM   Result Value Ref Range    Sodium Level 138 136 - 145 mmol/L    Potassium Level 3.3 (L) 3.5 - 5.1 mmol/L    Chloride 97 (L) 98 - 107 mmol/L    Carbon Dioxide 27 23 - 31 mmol/L    Glucose Level 117 (H) 82 - 115 mg/dL    Blood Urea Nitrogen 17.1 8.4 - 25.7 mg/dL    Creatinine 1.10 0.73 - 1.18 mg/dL    Calcium Level Total 8.9 8.8 - 10.0 mg/dL    Protein Total 6.2 5.8 - 7.6 gm/dL    Albumin Level 3.5 3.4 - 4.8 g/dL    Globulin 2.7 2.4 - 3.5 gm/dL    Albumin/Globulin Ratio 1.3 1.1 - 2.0 ratio    Bilirubin Total 2.4 (H) <=1.5 mg/dL     Alkaline Phosphatase 55 40 - 150 unit/L    Alanine Aminotransferase 24 0 - 55 unit/L    Aspartate Aminotransferase 26 5 - 34 unit/L    eGFR >60 mls/min/1.73/m2   Magnesium    Collection Time: 12/30/22  5:16 AM   Result Value Ref Range    Magnesium Level 1.90 1.60 - 2.60 mg/dL   CBC with Differential    Collection Time: 12/30/22  5:16 AM   Result Value Ref Range    WBC 4.1 (L) 4.5 - 11.5 x10(3)/mcL    RBC 4.37 (L) 4.70 - 6.10 x10(6)/mcL    Hgb 13.0 (L) 14.0 - 18.0 gm/dL    Hct 39.2 (L) 42.0 - 52.0 %    MCV 89.7 80.0 - 94.0 fL    MCH 29.7 pg    MCHC 33.2 33.0 - 36.0 mg/dL    RDW 14.3 11.6 - 14.4 %    Platelet 77 (L) 140 - 371 x10(3)/mcL    MPV 10.3 9.4 - 12.4 fL    Neut % 69.8 %    Lymph % 16.3 %    Mono % 11.7 %    Eos % 1.5 %    Basophil % 0.5 %    Lymph # 0.67 0.6 - 4.6 x10(3)/mcL    Neut # 2.88 2.1 - 9.2 x10(3)/mcL    Mono # 0.48 0.1 - 1.3 x10(3)/mcL    Eos # 0.06 0 - 0.9 x10(3)/mcL    Baso # 0.02 0 - 0.2 x10(3)/mcL    IG# 0.01 0 - 0.04 x10(3)/mcL    IG% 0.2 %     Telemetry:  Sinus Rhythm    Physical Exam  Vitals reviewed.   Constitutional:       Appearance: Normal appearance.   HENT:      Head: Normocephalic.      Mouth/Throat:      Mouth: Mucous membranes are moist.      Pharynx: Oropharynx is clear.   Cardiovascular:      Rate and Rhythm: Normal rate and regular rhythm.      Heart sounds: Murmur heard.      Comments: Sinus Rhythm  Pulmonary:      Effort: Pulmonary effort is normal. No respiratory distress.      Breath sounds: Normal breath sounds.      Comments: On Room Air  Abdominal:      General: There is no distension.      Palpations: Abdomen is soft.      Tenderness: There is no abdominal tenderness.   Musculoskeletal:         General: Normal range of motion.      Cervical back: Neck supple.      Right lower leg: No edema.      Left lower leg: No edema.      Comments: Legs are Warm   Skin:     General: Skin is warm and dry.   Neurological:      General: No focal deficit present.      Mental Status: He is  alert and oriented to person, place, and time. Mental status is at baseline.   Psychiatric:         Mood and Affect: Mood normal.         Behavior: Behavior normal.     Current Inpatient Medications:    Current Facility-Administered Medications:     acetaminophen tablet 650 mg, 650 mg, Oral, Q4H PRN, Thairy G Reyes, DO    ALPRAZolam tablet 0.25 mg, 0.25 mg, Oral, TID, Eliory G Reyes, DO, 0.25 mg at 12/29/22 2026    aluminum-magnesium hydroxide-simethicone 200-200-20 mg/5 mL suspension 30 mL, 30 mL, Oral, QID PRN, Thairy G Reyes, DO    bisacodyL suppository 10 mg, 10 mg, Rectal, Daily PRN, Thairy G Reyes, DO    dextrose 50% injection 12.5 g, 12.5 g, Intravenous, PRN, Thairy G Reyes, DO    dextrose 50% injection 25 g, 25 g, Intravenous, PRN, Eliory G Reyes, DO    enoxaparin injection 80 mg, 80 mg, Subcutaneous, Q12H, Thairy G Reyes, DO, 80 mg at 12/29/22 2122    furosemide injection 20 mg, 20 mg, Intravenous, Q12H, Thairy G Reyes, DO    glucagon (human recombinant) injection 1 mg, 1 mg, Intramuscular, PRN, Thairy G Reyes, DO    glucose chewable tablet 16 g, 16 g, Oral, PRN, Thairy G Reyes, DO    glucose chewable tablet 24 g, 24 g, Oral, PRN, Thairy G Reyes, DO    hydrALAZINE injection 10 mg, 10 mg, Intravenous, Q4H PRN, Thairy G Reyes, DO    labetalol 20 mg/4 mL (5 mg/mL) IV syring, 10 mg, Intravenous, QID PRN, Thairy G Reyes, DO    levalbuterol nebulizer solution 1.25 mg, 1.25 mg, Nebulization, Q6H PRN, Thairy G Reyes, DO, 1.25 mg at 12/29/22 1413    LORazepam injection 2 mg, 2 mg, Intravenous, Q10 Min PRN, Thairy G Reyes, DO    melatonin tablet 9 mg, 9 mg, Oral, Nightly PRN, Thairy G Reyes, DO, 9 mg at 12/29/22 2345    metoprolol injection 5 mg, 5 mg, Intravenous, Q4H PRN, Thairy G Reyes, DO    metoprolol tartrate (LOPRESSOR) tablet 50 mg, 50 mg, Oral, BID, Thairy G Reyes, DO, 50 mg at 12/29/22 2025    morphine injection 2 mg, 2 mg, Intravenous, Q6H PRN, Thairy G Reyes, DO    naloxone 0.4 mg/mL injection 0.02 mg,  0.02 mg, Intravenous, PRN, Thairy G Reyes, DO    NIFEdipine 24 hr tablet 30 mg, 30 mg, Oral, Daily, Thairy G Reyes, DO    ondansetron disintegrating tablet 8 mg, 8 mg, Oral, Q8H PRN, Thairy G Reyes, DO    ondansetron injection 4 mg, 4 mg, Intravenous, Q8H PRN, Thairy G Reyes, DO    polyethylene glycol packet 17 g, 17 g, Oral, TID PRN, Thairy G Reyes, DO    potassium chloride SA CR tablet 40 mEq, 40 mEq, Oral, BID, Thairy G Reyes, DO    simethicone chewable tablet 80 mg, 1 tablet, Oral, QID PRN, Thairy G Reyes, DO    sodium chloride 0.9% flush 10 mL, 10 mL, Intravenous, PRN, Thairy G Reyes, DO    thiamine tablet 100 mg, 100 mg, Oral, Daily, Thairy G Reyes, DO, 100 mg at 12/29/22 0902    valsartan tablet 40 mg, 40 mg, Oral, BID, Thairy G Reyes, DO, 40 mg at 12/29/22 2026    VTE Risk Mitigation (From admission, onward)           Ordered     enoxaparin injection 80 mg  Every 12 hours (non-standard times)         12/29/22 0936     IP VTE HIGH RISK PATIENT  Once         12/29/22 0729     Place sequential compression device  Until discontinued         12/29/22 0136                  Assessment:   Acute Diastolic Heart Failure    - EF 55% with Grade I Diastolic Dysfunction  Atrial Flutter with RVR (New Diagnosis)- Now Sinus Rhythm     - [id number]: 4 (HTN/HF/Age/NSTEMI)    - HAS-BLED 4 (Age/HTN/Alcoholism/Thrombocytopenia)    - On FD Lovenox for Stroke Risk Reduction  Valvular Heart Disease    - AS (Mild), MS (Mild)  HTN (Above Goal)  Thrombocytopenia    - No Active Bleeding  Alcoholism    Plan:   Transition to Toprol  Mg Daily  Continue FD Lovenox while in hospital if PLT Count Remains > 50K; Recommend Discharging on Aspirin 81 Mg Daily. Will defer Oral Anticoagulation (for Stroke Risk Reduction Related to AFL) given elevated bleed/elevated fall risk (Alcoholism/Thrombocytopenia/Concern for Noncompliance).  Optimize Antihypertensives for Goal SBP < 140. Will defer to Primary Team.  Transition to Lasix 20 Mg PO Daily  Tomorrow (Would DC on Maintenance Dose)  Plan outpatient follow up with [facility] in [place]. Needs Juliane PET on outpatient basis Re: Abnormal Troponin/CAD Risk Factors. Will arrange.    LOREN Pruitt  Cardiology  [facility] - [facility]  12/30/2022

## 2022-12-30 NOTE — ASSESSMENT & PLAN NOTE
-repeat in an EDTA free tube showed slight improvement   -likely secondary to patient's ETOH dependence   -B12, folate wnl  -patient requiring anticoagulation for atrial flutter while inpatient but will DC on asa given bleeding risk in setting of EtOH dependence

## 2023-01-03 NOTE — PROGRESS NOTES
SUBJECTIVE:     History of Present Illness      Chief Complaint: Establish Care (Hospital follow up for SOB, atrial flutter, CHF.  SOB has resolved.  No complaints at this time.  Had stress test Friday with Dr Faith.  Saw cardiologist off [facility] caff.)    HPI:  Patient is a 67 y.o. year old white male who presents to clinic establish care as a new patient/hospital follow-up.  Patient was recently seen in emergency room approximately 2 weeks ago Was shown to have Afib.    past medical history ETOH dependence ,poor healthcare follow-up, elicit Lortab use.     Patient reports  last time he has been seen by  Over 40 years ago    Patient states he recently saw a heart doctor/ stress test last Friday and has a follow up with Cardio in 2 days .    Patient denies chest pain, shortness for breath, dizziness or palpitations     Review of Systems:  General: Denies fever, chills, fatigue, myalgias, and change in appetite   Eyes: Denies change in vision, eye redness, eye drainage, eye pain  ENT: Denies ear pain or pressure, rhinorrhea, nasal congestion, sore throat, and trouble swallowing  Resp: Denies wheezing, and shortness of breath   Cardio: Denies chest pain, palpitations, pleuritic pain, and edema   GI: Denies nausea, vomiting, diarrhea, and abdominal pain   : Denies dysuria, hematuria, and discharge   MSK: Denies trauma, joint pain, and trouble ambulating   Neuro: Denies LOC, dizziness, seizure like activity, and focal deficits   Skin: Denies rashes, open lesions and ulcers      Previous History      Review of patient's allergies indicates:  No Known Allergies    Past Medical History:   Diagnosis Date    Atrial flutter     CHF (congestive heart failure)     Hypertension      Current Outpatient Medications   Medication Instructions    aspirin (ECOTRIN) 81 mg, Oral, Daily    furosemide (LASIX) 20 mg, Oral, Daily    hydrOXYzine HCL (ATARAX) 25 mg, Oral, Nightly    metoprolol succinate (TOPROL-XL) 100 mg, Oral, Daily     "NIFEdipine (PROCARDIA-XL) 30 mg, Oral, Daily    thiamine 100 mg, Oral, Daily    valsartan (DIOVAN) 40 mg, Oral, 2 times daily     History reviewed. No pertinent surgical history.  Family History   Problem Relation Age of Onset    Heart attack Mother        Social History     Tobacco Use    Smoking status: Never     Passive exposure: Never    Smokeless tobacco: Never   Substance Use Topics    Alcohol use: Yes     Alcohol/week: 84.0 standard drinks     Types: 84 Cans of beer per week     Comment: alcoholic, daily, beer    Drug use: Yes     Frequency: 3.0 times per week     Types: Hydrocodone        Health Maintenance      Health Maintenance   Topic Date Due    Hepatitis C Screening  Never done    TETANUS VACCINE  Never done    High Dose Statin  Never done    Lipid Panel  12/29/2027       OBJECTIVE:     Physical Exam      Vital Signs Reviewed   BP (!) 152/89   Pulse 88   Resp 18   Ht 6' 2" (1.88 m)   Wt 76.2 kg (168 lb 1.6 oz)   SpO2 99%   BMI 21.58 kg/m²     Physical Exam:  General: Alert,  no acute distress,malodorous   Eyes: Anicteric sclera, without conjunctival injection, normal lids, no purulent drainage, EOMs grossly intact.   Ears: No tragal tenderness. Tympanic membranes intact, pearly grey, without effusion or erythema and with a positive light reflex.   Mouth: Posterior pharynx without erythema. No exudate, ulcerations, or lesion. No tonsillar swelling.   Neck: Supple, full ROM, no rigidity, no cervical adenopathy.   Cardio: Normal rate and rhythm    Resp: Respirations even and unlabored, clear to auscultation bilaterally.   Abd: No ecchymosis or distension. Normal bowel sounds in all 4 quadrants. No tenderness to palpation. No rebound tenderness or guarding. No CVA tenderness.   Skin: No rashes or open lesions noted.   MSK: No swelling. No abrasions or signs of trauma. Ambulating without assistance.   Neuro: Alert,oriented No focal deficits noted. Facial expressions even.   Psych: Cooperative, Normal " affect      Procedures    Procedures     Labs     Results for orders placed or performed during the hospital encounter of 12/29/22   Sodium, Random Urine   Result Value Ref Range    Urine Sodium <20.0 mmol/L   Osmolality, Serum   Result Value Ref Range    Osmolality 281 280 - 300 mOsm/kg   Osmolality, Urine   Result Value Ref Range    Urine Osmolality 152 (L) 300 - 1,300 mOsm/kg   Basic Metabolic Panel   Result Value Ref Range    Sodium Level 132 (L) 136 - 145 mmol/L    Potassium Level 4.1 3.5 - 5.1 mmol/L    Chloride 95 (L) 98 - 107 mmol/L    Carbon Dioxide 23 23 - 31 mmol/L    Glucose Level 92 82 - 115 mg/dL    Blood Urea Nitrogen 12.3 8.4 - 25.7 mg/dL    Creatinine 0.79 0.73 - 1.18 mg/dL    BUN/Creatinine Ratio 16     Calcium Level Total 9.1 8.8 - 10.0 mg/dL    Anion Gap 14.0 mEq/L    eGFR >60 mls/min/1.73/m2   CBC with Differential   Result Value Ref Range    WBC 2.6 (L) 4.5 - 11.5 x10(3)/mcL    RBC 4.26 (L) 4.70 - 6.10 x10(6)/mcL    Hgb 12.7 (L) 14.0 - 18.0 gm/dL    Hct 37.4 (L) 42.0 - 52.0 %    MCV 87.8 80.0 - 94.0 fL    MCH 29.8 pg    MCHC 34.0 33.0 - 36.0 mg/dL    RDW 13.9 11.6 - 14.4 %    Platelet 79 (L) 140 - 371 x10(3)/mcL    MPV 10.4 9.4 - 12.4 fL    Neut % 66.7 %    Lymph % 20.5 %    Mono % 8.9 %    Eos % 3.1 %    Basophil % 0.4 %    Lymph # 0.53 (L) 0.6 - 4.6 x10(3)/mcL    Neut # 1.73 (L) 2.1 - 9.2 x10(3)/mcL    Mono # 0.23 0.1 - 1.3 x10(3)/mcL    Eos # 0.08 0 - 0.9 x10(3)/mcL    Baso # 0.01 0 - 0.2 x10(3)/mcL    IG# 0.01 0 - 0.04 x10(3)/mcL    IG% 0.4 %   Ethanol   Result Value Ref Range    Ethanol Level <10.0 <=10.0 mg/dL   Troponin I   Result Value Ref Range    Troponin-I 0.030 0.000 - 0.045 ng/mL   Lipid Panel   Result Value Ref Range    Cholesterol Total 183 <=200 mg/dL    HDL Cholesterol 102 (H) 35 - 60 mg/dL    Triglyceride 55 34 - 140 mg/dL    Cholesterol/HDL Ratio 2 0 - 5    Very Low Density Lipoprotein 11     LDL Cholesterol 70.00 50.00 - 140.00 mg/dL   Magnesium   Result Value Ref Range     Magnesium Level 1.60 1.60 - 2.60 mg/dL   Drug Screen, Urine   Result Value Ref Range    Amphetamines, Urine Negative Negative    Barbituates, Urine Negative Negative    Benzodiazepine, Urine Negative Negative    Cannabinoids, Urine Negative Negative    Cocaine, Urine Negative Negative    Opiates, Urine Positive (A) Negative    Phencyclidine, Urine Negative Negative    pH, Urine 6.0 3.0 - 11.0   Vitamin B12   Result Value Ref Range    Vitamin B12 Level 392 213 - 816 pg/mL   Folate   Result Value Ref Range    Folate Level 8.3 7.0 - 31.4 ng/mL   Platelet Count   Result Value Ref Range    Platelet 83 (L) 140 - 371 x10(3)/mcL   Troponin I   Result Value Ref Range    Troponin-I 0.237 (H) 0.000 - 0.045 ng/mL   Comprehensive Metabolic Panel   Result Value Ref Range    Sodium Level 138 136 - 145 mmol/L    Potassium Level 3.3 (L) 3.5 - 5.1 mmol/L    Chloride 97 (L) 98 - 107 mmol/L    Carbon Dioxide 27 23 - 31 mmol/L    Glucose Level 117 (H) 82 - 115 mg/dL    Blood Urea Nitrogen 17.1 8.4 - 25.7 mg/dL    Creatinine 1.10 0.73 - 1.18 mg/dL    Calcium Level Total 8.9 8.8 - 10.0 mg/dL    Protein Total 6.2 5.8 - 7.6 gm/dL    Albumin Level 3.5 3.4 - 4.8 g/dL    Globulin 2.7 2.4 - 3.5 gm/dL    Albumin/Globulin Ratio 1.3 1.1 - 2.0 ratio    Bilirubin Total 2.4 (H) <=1.5 mg/dL    Alkaline Phosphatase 55 40 - 150 unit/L    Alanine Aminotransferase 24 0 - 55 unit/L    Aspartate Aminotransferase 26 5 - 34 unit/L    eGFR >60 mls/min/1.73/m2   Magnesium   Result Value Ref Range    Magnesium Level 1.90 1.60 - 2.60 mg/dL   CBC with Differential   Result Value Ref Range    WBC 4.1 (L) 4.5 - 11.5 x10(3)/mcL    RBC 4.37 (L) 4.70 - 6.10 x10(6)/mcL    Hgb 13.0 (L) 14.0 - 18.0 gm/dL    Hct 39.2 (L) 42.0 - 52.0 %    MCV 89.7 80.0 - 94.0 fL    MCH 29.7 pg    MCHC 33.2 33.0 - 36.0 mg/dL    RDW 14.3 11.6 - 14.4 %    Platelet 77 (L) 140 - 371 x10(3)/mcL    MPV 10.3 9.4 - 12.4 fL    Neut % 69.8 %    Lymph % 16.3 %    Mono % 11.7 %    Eos % 1.5 %     Basophil % 0.5 %    Lymph # 0.67 0.6 - 4.6 x10(3)/mcL    Neut # 2.88 2.1 - 9.2 x10(3)/mcL    Mono # 0.48 0.1 - 1.3 x10(3)/mcL    Eos # 0.06 0 - 0.9 x10(3)/mcL    Baso # 0.02 0 - 0.2 x10(3)/mcL    IG# 0.01 0 - 0.04 x10(3)/mcL    IG% 0.2 %   Troponin I   Result Value Ref Range    Troponin-I 0.100 (H) 0.000 - 0.045 ng/mL   Echo   Result Value Ref Range    BSA 2.06 m2    Right Atrial Pressure (from IVC) 3 mmHg    EF 55 %        Assessment       1. Encounter to establish care    2. Atypical atrial flutter    3. Primary insomnia    4. ETOH abuse    5. Acute on chronic congestive heart failure, unspecified heart failure type           Plan     1. Encounter to establish care    2. Atypical atrial flutter  Rate control   Metoprolol   Follow up with cardiolo    3. Primary insomnia  Start hydoxyzine PRN at HS   4. ETOH abuse    5. Acute on chronic congestive heart failure, unspecified heart failure type   Lasix Procardia Diovan     Orders Placed This Encounter    hydrOXYzine HCL (ATARAX) 25 MG tablet      Medication List with Changes/Refills   New Medications    HYDROXYZINE HCL (ATARAX) 25 MG TABLET    Take 1 tablet (25 mg total) by mouth every evening.   Current Medications    ASPIRIN (ECOTRIN) 81 MG EC TABLET    Take 1 tablet (81 mg total) by mouth once daily.    FUROSEMIDE (LASIX) 20 MG TABLET    Take 1 tablet (20 mg total) by mouth once daily.    METOPROLOL SUCCINATE (TOPROL-XL) 100 MG 24 HR TABLET    Take 1 tablet (100 mg total) by mouth once daily.    NIFEDIPINE (PROCARDIA-XL) 30 MG (OSM) 24 HR TABLET    Take 1 tablet (30 mg total) by mouth once daily.    THIAMINE 100 MG TABLET    Take 1 tablet (100 mg total) by mouth once daily.    VALSARTAN (DIOVAN) 40 MG TABLET    Take 1 tablet (40 mg total) by mouth 2 (two) times daily.           Future Appointments   Date Time Provider Department Center   2/13/2023  9:00 AM LOREN Hatfield [facility]     RTC in 1 month

## 2023-01-06 NOTE — PT/OT/SLP DISCHARGE
Physical Therapy Discharge Summary    Name: Perfecto Urbina  MRN: [id number]   Principal Problem: Atrial flutter     Patient Discharged from acute Physical Therapy on 22.  Please refer to prior PT noted date on 22 for functional status.     Assessment:     Pt Dc'd from hospital to home    Objective:     GOALS:   Multidisciplinary Problems       Physical Therapy Goals          Problem: Physical Therapy    Goal Priority Disciplines Outcome Goal Variances Interventions   Physical Therapy Goal     PT, PT/OT Adequate for Care Transition     Description: oals to be met by: Dishcarge     Patient will increase functional independence with mobility by performin. Gait  x 325 feet with Supervision using No Assistive Device.  Vs RW vs SC                         Reasons for Discontinuation of Therapy Services  Satisfactory goal achievement. DC 'd home from hospital    Plan:     Patient Discharged to: Home no PT services needed.      2023

## 2023-01-10 ENCOUNTER — OFFICE VISIT (OUTPATIENT)
Dept: FAMILY MEDICINE | Facility: CLINIC | Age: 68
End: 2023-01-10
Payer: MEDICARE

## 2023-01-10 VITALS
SYSTOLIC BLOOD PRESSURE: 152 MMHG | RESPIRATION RATE: 18 BRPM | OXYGEN SATURATION: 99 % | HEIGHT: 74 IN | WEIGHT: 168.13 LBS | DIASTOLIC BLOOD PRESSURE: 89 MMHG | BODY MASS INDEX: 21.58 KG/M2 | HEART RATE: 88 BPM

## 2023-01-10 DIAGNOSIS — Z76.89 ENCOUNTER TO ESTABLISH CARE: Primary | ICD-10-CM

## 2023-01-10 DIAGNOSIS — I48.4 ATYPICAL ATRIAL FLUTTER: ICD-10-CM

## 2023-01-10 DIAGNOSIS — I50.9 ACUTE ON CHRONIC CONGESTIVE HEART FAILURE, UNSPECIFIED HEART FAILURE TYPE: ICD-10-CM

## 2023-01-10 DIAGNOSIS — F10.10 ETOH ABUSE: ICD-10-CM

## 2023-01-10 DIAGNOSIS — F51.01 PRIMARY INSOMNIA: ICD-10-CM

## 2023-01-10 PROCEDURE — 99203 PR OFFICE/OUTPT VISIT, NEW, LEVL III, 30-44 MIN: ICD-10-PCS | Mod: ,,, | Performed by: NURSE PRACTITIONER

## 2023-01-10 PROCEDURE — 99203 OFFICE O/P NEW LOW 30 MIN: CPT | Mod: ,,, | Performed by: NURSE PRACTITIONER

## 2023-01-10 RX ORDER — HYDROXYZINE HYDROCHLORIDE 25 MG/1
25 TABLET, FILM COATED ORAL NIGHTLY
Qty: 30 TABLET | Refills: 1 | Status: SHIPPED | OUTPATIENT
Start: 2023-01-10 | End: 2023-03-15

## 2023-01-17 DIAGNOSIS — Z00.00 WELL ADULT EXAM: Primary | ICD-10-CM

## 2023-01-18 ENCOUNTER — LAB VISIT (OUTPATIENT)
Dept: LAB | Facility: HOSPITAL | Age: 68
End: 2023-01-18
Attending: NURSE PRACTITIONER
Payer: MEDICARE

## 2023-01-18 DIAGNOSIS — Z00.00 WELL ADULT EXAM: ICD-10-CM

## 2023-01-18 LAB
ALBUMIN SERPL-MCNC: 4.2 G/DL (ref 3.4–4.8)
ALBUMIN/GLOB SERPL: 1.3 RATIO (ref 1.1–2)
ALP SERPL-CCNC: 66 UNIT/L (ref 40–150)
ALT SERPL-CCNC: 19 UNIT/L (ref 0–55)
AST SERPL-CCNC: 19 UNIT/L (ref 5–34)
BASOPHILS # BLD AUTO: 0.02 X10(3)/MCL (ref 0–0.2)
BASOPHILS NFR BLD AUTO: 0.5 %
BILIRUBIN DIRECT+TOT PNL SERPL-MCNC: 1.7 MG/DL
BUN SERPL-MCNC: 27.9 MG/DL (ref 8.4–25.7)
CALCIUM SERPL-MCNC: 9.6 MG/DL (ref 8.8–10)
CHLORIDE SERPL-SCNC: 94 MMOL/L (ref 98–107)
CHOLEST SERPL-MCNC: 189 MG/DL
CHOLEST/HDLC SERPL: 2 {RATIO} (ref 0–5)
CO2 SERPL-SCNC: 29 MMOL/L (ref 23–31)
CREAT SERPL-MCNC: 1.09 MG/DL (ref 0.73–1.18)
CREAT UR-MCNC: 18.2 MG/DL (ref 63–166)
DEPRECATED CALCIDIOL+CALCIFEROL SERPL-MC: 19.2 NG/ML (ref 30–80)
EOSINOPHIL # BLD AUTO: 0.08 X10(3)/MCL (ref 0–0.9)
EOSINOPHIL NFR BLD AUTO: 1.9 %
ERYTHROCYTE [DISTWIDTH] IN BLOOD BY AUTOMATED COUNT: 14 % (ref 11.5–17)
EST. AVERAGE GLUCOSE BLD GHB EST-MCNC: 105.4 MG/DL
GFR SERPLBLD CREATININE-BSD FMLA CKD-EPI: >60 MLS/MIN/1.73/M2
GLOBULIN SER-MCNC: 3.2 GM/DL (ref 2.4–3.5)
GLUCOSE SERPL-MCNC: 126 MG/DL (ref 82–115)
HBA1C MFR BLD: 5.3 %
HCT VFR BLD AUTO: 45.7 % (ref 42–52)
HDLC SERPL-MCNC: 84 MG/DL (ref 35–60)
HGB BLD-MCNC: 14.2 GM/DL (ref 14–18)
IMM GRANULOCYTES # BLD AUTO: 0 X10(3)/MCL (ref 0–0.04)
IMM GRANULOCYTES NFR BLD AUTO: 0 %
LDLC SERPL CALC-MCNC: 95 MG/DL (ref 50–140)
LYMPHOCYTES # BLD AUTO: 1.37 X10(3)/MCL (ref 0.6–4.6)
LYMPHOCYTES NFR BLD AUTO: 32.2 %
MCH RBC QN AUTO: 27.8 PG
MCHC RBC AUTO-ENTMCNC: 31.1 MG/DL (ref 33–36)
MCV RBC AUTO: 89.6 FL (ref 80–94)
MICROALBUMIN UR-MCNC: <5 UG/ML
MICROALBUMIN/CREAT RATIO PNL UR: <27.5 MG/GM CR (ref 0–30)
MONOCYTES # BLD AUTO: 0.39 X10(3)/MCL (ref 0.1–1.3)
MONOCYTES NFR BLD AUTO: 9.2 %
NEUTROPHILS # BLD AUTO: 2.39 X10(3)/MCL (ref 2.1–9.2)
NEUTROPHILS NFR BLD AUTO: 56.2 %
PLATELET # BLD AUTO: 117 X10(3)/MCL (ref 130–400)
PMV BLD AUTO: 10.6 FL (ref 7.4–10.4)
POTASSIUM SERPL-SCNC: 5.5 MMOL/L (ref 3.5–5.1)
PROT SERPL-MCNC: 7.4 GM/DL (ref 5.8–7.6)
PSA SERPL-MCNC: 1.25 NG/ML
RBC # BLD AUTO: 5.1 X10(6)/MCL (ref 4.7–6.1)
SODIUM SERPL-SCNC: 131 MMOL/L (ref 136–145)
TRIGL SERPL-MCNC: 49 MG/DL (ref 34–140)
TSH SERPL-ACNC: 1.18 UIU/ML (ref 0.35–4.94)
VLDLC SERPL CALC-MCNC: 10 MG/DL
WBC # SPEC AUTO: 4.3 X10(3)/MCL (ref 4.5–11.5)

## 2023-01-18 PROCEDURE — 80053 COMPREHEN METABOLIC PANEL: CPT

## 2023-01-18 PROCEDURE — 83036 HEMOGLOBIN GLYCOSYLATED A1C: CPT

## 2023-01-18 PROCEDURE — 82043 UR ALBUMIN QUANTITATIVE: CPT

## 2023-01-18 PROCEDURE — 36415 COLL VENOUS BLD VENIPUNCTURE: CPT

## 2023-01-18 PROCEDURE — 84443 ASSAY THYROID STIM HORMONE: CPT

## 2023-01-18 PROCEDURE — 84153 ASSAY OF PSA TOTAL: CPT

## 2023-01-18 PROCEDURE — 82306 VITAMIN D 25 HYDROXY: CPT

## 2023-01-18 PROCEDURE — 80061 LIPID PANEL: CPT

## 2023-01-18 PROCEDURE — 85025 COMPLETE CBC W/AUTO DIFF WBC: CPT

## 2023-01-26 ENCOUNTER — LAB VISIT (OUTPATIENT)
Dept: LAB | Facility: HOSPITAL | Age: 68
End: 2023-01-26
Attending: NURSE PRACTITIONER
Payer: MEDICARE

## 2023-01-26 DIAGNOSIS — I10 ESSENTIAL HYPERTENSION, MALIGNANT: Primary | ICD-10-CM

## 2023-01-26 LAB
ALBUMIN SERPL-MCNC: 3.9 G/DL (ref 3.4–4.8)
ALBUMIN/GLOB SERPL: 1.2 RATIO (ref 1.1–2)
ALP SERPL-CCNC: 79 UNIT/L (ref 40–150)
ALT SERPL-CCNC: 24 UNIT/L (ref 0–55)
AST SERPL-CCNC: 22 UNIT/L (ref 5–34)
BILIRUBIN DIRECT+TOT PNL SERPL-MCNC: 1.3 MG/DL
BUN SERPL-MCNC: 25.3 MG/DL (ref 8.4–25.7)
CALCIUM SERPL-MCNC: 9.4 MG/DL (ref 8.8–10)
CHLORIDE SERPL-SCNC: 93 MMOL/L (ref 98–107)
CO2 SERPL-SCNC: 29 MMOL/L (ref 23–31)
CREAT SERPL-MCNC: 0.78 MG/DL (ref 0.73–1.18)
GFR SERPLBLD CREATININE-BSD FMLA CKD-EPI: >60 MLS/MIN/1.73/M2
GLOBULIN SER-MCNC: 3.2 GM/DL (ref 2.4–3.5)
GLUCOSE SERPL-MCNC: 125 MG/DL (ref 82–115)
MAGNESIUM SERPL-MCNC: 1.9 MG/DL (ref 1.6–2.6)
POTASSIUM SERPL-SCNC: 5.4 MMOL/L (ref 3.5–5.1)
PROT SERPL-MCNC: 7.1 GM/DL (ref 5.8–7.6)
SODIUM SERPL-SCNC: 130 MMOL/L (ref 136–145)

## 2023-01-26 PROCEDURE — 83735 ASSAY OF MAGNESIUM: CPT

## 2023-01-26 PROCEDURE — 36415 COLL VENOUS BLD VENIPUNCTURE: CPT

## 2023-01-26 PROCEDURE — 80053 COMPREHEN METABOLIC PANEL: CPT

## 2023-02-07 ENCOUNTER — LAB VISIT (OUTPATIENT)
Dept: LAB | Facility: HOSPITAL | Age: 68
End: 2023-02-07
Attending: INTERNAL MEDICINE
Payer: MEDICARE

## 2023-02-07 DIAGNOSIS — E87.5 HYPERPOTASSEMIA: Primary | ICD-10-CM

## 2023-02-07 LAB — POTASSIUM SERPL-SCNC: 5.1 MMOL/L (ref 3.5–5.1)

## 2023-02-07 PROCEDURE — 36415 COLL VENOUS BLD VENIPUNCTURE: CPT

## 2023-02-07 PROCEDURE — 84132 ASSAY OF SERUM POTASSIUM: CPT

## 2023-03-13 ENCOUNTER — LAB VISIT (OUTPATIENT)
Dept: LAB | Facility: HOSPITAL | Age: 68
End: 2023-03-13
Attending: NURSE PRACTITIONER
Payer: MEDICARE

## 2023-03-13 DIAGNOSIS — R94.39 ABNORMAL BALLISTOCARDIOGRAM: Primary | ICD-10-CM

## 2023-03-13 LAB
ANION GAP SERPL CALC-SCNC: 12 MEQ/L
BUN SERPL-MCNC: 20 MG/DL (ref 8.4–25.7)
CALCIUM SERPL-MCNC: 10.1 MG/DL (ref 8.8–10)
CHLORIDE SERPL-SCNC: 94 MMOL/L (ref 98–107)
CO2 SERPL-SCNC: 29 MMOL/L (ref 23–31)
CREAT SERPL-MCNC: 0.9 MG/DL (ref 0.73–1.18)
CREAT/UREA NIT SERPL: 22
ERYTHROCYTE [DISTWIDTH] IN BLOOD BY AUTOMATED COUNT: 14.1 % (ref 11.5–17)
GFR SERPLBLD CREATININE-BSD FMLA CKD-EPI: >60 MLS/MIN/1.73/M2
GLUCOSE SERPL-MCNC: 127 MG/DL (ref 82–115)
HCT VFR BLD AUTO: 40.3 % (ref 42–52)
HGB BLD-MCNC: 13.2 G/DL (ref 14–18)
MCH RBC QN AUTO: 28.3 PG
MCHC RBC AUTO-ENTMCNC: 32.8 G/DL (ref 33–36)
MCV RBC AUTO: 86.5 FL (ref 80–94)
PLATELET # BLD AUTO: 93 X10(3)/MCL (ref 130–400)
PMV BLD AUTO: 9.9 FL (ref 7.4–10.4)
POTASSIUM SERPL-SCNC: 4.5 MMOL/L (ref 3.5–5.1)
RBC # BLD AUTO: 4.66 X10(6)/MCL (ref 4.7–6.1)
SODIUM SERPL-SCNC: 135 MMOL/L (ref 136–145)
WBC # SPEC AUTO: 3.4 X10(3)/MCL (ref 4.5–11.5)

## 2023-03-13 PROCEDURE — 80048 BASIC METABOLIC PNL TOTAL CA: CPT

## 2023-03-13 PROCEDURE — 85027 COMPLETE CBC AUTOMATED: CPT

## 2023-03-13 PROCEDURE — 36415 COLL VENOUS BLD VENIPUNCTURE: CPT

## 2023-03-15 ENCOUNTER — HOSPITAL ENCOUNTER (OUTPATIENT)
Facility: HOSPITAL | Age: 68
Discharge: HOME | End: 2023-03-15
Attending: INTERNAL MEDICINE | Admitting: INTERNAL MEDICINE
Payer: MEDICARE

## 2023-03-15 VITALS
SYSTOLIC BLOOD PRESSURE: 144 MMHG | WEIGHT: 173.31 LBS | DIASTOLIC BLOOD PRESSURE: 75 MMHG | OXYGEN SATURATION: 96 % | RESPIRATION RATE: 15 BRPM | HEART RATE: 74 BPM | TEMPERATURE: 98 F | BODY MASS INDEX: 22.24 KG/M2 | HEIGHT: 74 IN

## 2023-03-15 DIAGNOSIS — I48.92 ATRIAL FLUTTER: ICD-10-CM

## 2023-03-15 DIAGNOSIS — R94.8 ABNORMAL POSITRON EMISSION TOMOGRAPHY (PET) SCAN: ICD-10-CM

## 2023-03-15 PROCEDURE — 99900031 HC PATIENT EDUCATION (STAT)

## 2023-03-15 PROCEDURE — 93041 RHYTHM ECG TRACING: CPT

## 2023-03-15 PROCEDURE — 93010 ELECTROCARDIOGRAM REPORT: CPT | Mod: ,,, | Performed by: INTERNAL MEDICINE

## 2023-03-15 PROCEDURE — 25500020 PHARM REV CODE 255: Performed by: INTERNAL MEDICINE

## 2023-03-15 PROCEDURE — 63600175 PHARM REV CODE 636 W HCPCS: Performed by: INTERNAL MEDICINE

## 2023-03-15 PROCEDURE — 99152 MOD SED SAME PHYS/QHP 5/>YRS: CPT | Performed by: INTERNAL MEDICINE

## 2023-03-15 PROCEDURE — 93010 EKG 12-LEAD: ICD-10-PCS | Mod: ,,, | Performed by: INTERNAL MEDICINE

## 2023-03-15 PROCEDURE — C1769 GUIDE WIRE: HCPCS | Performed by: INTERNAL MEDICINE

## 2023-03-15 PROCEDURE — C1887 CATHETER, GUIDING: HCPCS | Performed by: INTERNAL MEDICINE

## 2023-03-15 PROCEDURE — 93005 ELECTROCARDIOGRAM TRACING: CPT

## 2023-03-15 PROCEDURE — 25000003 PHARM REV CODE 250: Performed by: INTERNAL MEDICINE

## 2023-03-15 PROCEDURE — C1894 INTRO/SHEATH, NON-LASER: HCPCS | Performed by: INTERNAL MEDICINE

## 2023-03-15 PROCEDURE — 93458 L HRT ARTERY/VENTRICLE ANGIO: CPT | Performed by: INTERNAL MEDICINE

## 2023-03-15 PROCEDURE — 27201423 OPTIME MED/SURG SUP & DEVICES STERILE SUPPLY: Performed by: INTERNAL MEDICINE

## 2023-03-15 RX ORDER — VERAPAMIL HYDROCHLORIDE 2.5 MG/ML
INJECTION, SOLUTION INTRAVENOUS
Status: DISCONTINUED | OUTPATIENT
Start: 2023-03-15 | End: 2023-03-15 | Stop reason: HOSPADM

## 2023-03-15 RX ORDER — DIAZEPAM 5 MG/1
10 TABLET ORAL
Status: DISCONTINUED | OUTPATIENT
Start: 2023-03-15 | End: 2023-03-15 | Stop reason: HOSPADM

## 2023-03-15 RX ORDER — SODIUM CHLORIDE 0.9 % (FLUSH) 0.9 %
10 SYRINGE (ML) INJECTION
Status: DISCONTINUED | OUTPATIENT
Start: 2023-03-15 | End: 2023-03-15 | Stop reason: HOSPADM

## 2023-03-15 RX ORDER — SODIUM CHLORIDE 9 MG/ML
INJECTION, SOLUTION INTRAVENOUS ONCE
Status: COMPLETED | OUTPATIENT
Start: 2023-03-15 | End: 2023-03-15

## 2023-03-15 RX ORDER — FENTANYL CITRATE 50 UG/ML
INJECTION, SOLUTION INTRAMUSCULAR; INTRAVENOUS
Status: DISCONTINUED | OUTPATIENT
Start: 2023-03-15 | End: 2023-03-15 | Stop reason: HOSPADM

## 2023-03-15 RX ORDER — NITROGLYCERIN 20 MG/100ML
INJECTION INTRAVENOUS
Status: DISCONTINUED | OUTPATIENT
Start: 2023-03-15 | End: 2023-03-15 | Stop reason: HOSPADM

## 2023-03-15 RX ORDER — FUROSEMIDE 40 MG/1
TABLET ORAL
Status: ON HOLD | COMMUNITY
Start: 2023-02-10 | End: 2023-05-05 | Stop reason: HOSPADM

## 2023-03-15 RX ORDER — LIDOCAINE HYDROCHLORIDE 10 MG/ML
INJECTION, SOLUTION EPIDURAL; INFILTRATION; INTRACAUDAL; PERINEURAL
Status: DISCONTINUED | OUTPATIENT
Start: 2023-03-15 | End: 2023-03-15 | Stop reason: HOSPADM

## 2023-03-15 RX ORDER — DIPHENHYDRAMINE HCL 50 MG
50 CAPSULE ORAL
Status: DISCONTINUED | OUTPATIENT
Start: 2023-03-15 | End: 2023-03-15 | Stop reason: HOSPADM

## 2023-03-15 RX ORDER — HEPARIN SODIUM 1000 [USP'U]/ML
INJECTION, SOLUTION INTRAVENOUS; SUBCUTANEOUS
Status: DISCONTINUED | OUTPATIENT
Start: 2023-03-15 | End: 2023-03-15 | Stop reason: HOSPADM

## 2023-03-15 RX ORDER — MIDAZOLAM HYDROCHLORIDE 1 MG/ML
INJECTION INTRAMUSCULAR; INTRAVENOUS
Status: DISCONTINUED | OUTPATIENT
Start: 2023-03-15 | End: 2023-03-15 | Stop reason: HOSPADM

## 2023-03-15 RX ADMIN — DIAZEPAM 10 MG: 5 TABLET ORAL at 08:03

## 2023-03-15 RX ADMIN — SODIUM CHLORIDE: 9 INJECTION, SOLUTION INTRAVENOUS at 08:03

## 2023-03-15 RX ADMIN — DIPHENHYDRAMINE HCL 50 MG: 25 CAPSULE ORAL at 08:03

## 2023-03-15 NOTE — DISCHARGE INSTRUCTIONS
Remove dressing and arm board in 24 hours.  Do not submerge incision site.  Take shower only after removal.  For bleeding, hold hard pressure for 10 minutes, only check after 10 minutes are up.  If bleeding continues, apply pressure again and call 911.

## 2023-03-15 NOTE — Clinical Note
The catheter was repositioned into the ostium   left main. Hemodynamics were performed.  An angiography was performed of the left coronary arteries. Multiple views were taken. The angiography was performed via power injection.  Catheter removed

## 2023-03-15 NOTE — Clinical Note
The catheter was inserted into the and was inserted over the wire into the left ventricle. Hemodynamics were performed.  An angiography was performed of the LV. The angiography was performed via power injection.  EF 60%

## 2023-03-15 NOTE — DISCHARGE SUMMARY
[facility] - Cath Lab  Discharge Note  Short Stay    Procedure(s) (LRB):  CATHETERIZATION, HEART, LEFT (N/A)      OUTCOME: Patient tolerated treatment/procedure well without complication and is now ready for discharge.    DISPOSITION: Home or Self Care    FINAL DIAGNOSIS:  <principal problem not specified>    FOLLOWUP: In clinic    DISCHARGE INSTRUCTIONS:  No discharge procedures on file.     TIME SPENT ON DISCHARGE: 31 minutes

## 2023-03-15 NOTE — INTERVAL H&P NOTE
Patient name: Mike Urbina Jr.  MRN: [id number]  : 1955  Cath Lab Procedure H&P Update    Pre-Procedure Assessment:    I saw and examined the patient face to face. The patient has been re-evaluated and his condition is unchanged. The reason for admission, procedure and care is still present.  Based on the patients H&P, pre-procedure physical exam, relevant diagnostic studies, NPO status and information obtained from the patient, I determine the patient is an appropriate candidate for the proposed procedure and anesthesia planned. I further certify the anesthesia risks, benefits and options have been explained to the patient to which he agrees as documented on the procedural consent.

## 2023-03-15 NOTE — Clinical Note
The catheter was inserted into the and was inserted over the wire into the ostium   right coronary artery. Hemodynamics were performed.  An angiography was performed of the right coronary arteries. The angiography was performed via power injection.

## 2023-03-22 DIAGNOSIS — I25.10 CORONARY ARTERY DISEASE: Primary | ICD-10-CM

## 2023-03-28 ENCOUNTER — OFFICE VISIT (OUTPATIENT)
Dept: CARDIAC SURGERY | Facility: CLINIC | Age: 68
End: 2023-03-28
Payer: MEDICARE

## 2023-03-28 ENCOUNTER — HOSPITAL ENCOUNTER (OUTPATIENT)
Dept: RADIOLOGY | Facility: HOSPITAL | Age: 68
Discharge: HOME | End: 2023-03-28
Attending: SPECIALIST
Payer: MEDICARE

## 2023-03-28 VITALS
HEIGHT: 74 IN | WEIGHT: 172 LBS | DIASTOLIC BLOOD PRESSURE: 93 MMHG | SYSTOLIC BLOOD PRESSURE: 155 MMHG | BODY MASS INDEX: 22.07 KG/M2 | HEART RATE: 81 BPM

## 2023-03-28 DIAGNOSIS — I25.118 CORONARY ARTERY DISEASE OF NATIVE ARTERY OF NATIVE HEART WITH STABLE ANGINA PECTORIS: Primary | ICD-10-CM

## 2023-03-28 DIAGNOSIS — Z79.01 ANTICOAGULATED: ICD-10-CM

## 2023-03-28 DIAGNOSIS — I25.118 CORONARY ARTERY DISEASE OF NATIVE ARTERY OF NATIVE HEART WITH STABLE ANGINA PECTORIS: ICD-10-CM

## 2023-03-28 PROCEDURE — 71046 X-RAY EXAM CHEST 2 VIEWS: CPT | Mod: TC

## 2023-03-28 PROCEDURE — 99203 OFFICE O/P NEW LOW 30 MIN: CPT | Mod: ,,, | Performed by: SPECIALIST

## 2023-03-28 PROCEDURE — 99203 PR OFFICE/OUTPT VISIT, NEW, LEVL III, 30-44 MIN: ICD-10-PCS | Mod: ,,, | Performed by: SPECIALIST

## 2023-03-28 RX ORDER — ATORVASTATIN CALCIUM 10 MG/1
40 TABLET, FILM COATED ORAL DAILY
COMMUNITY
End: 2023-05-30

## 2023-03-28 RX ORDER — HYDROCODONE BITARTRATE AND ACETAMINOPHEN 500; 5 MG/1; MG/1
TABLET ORAL
Status: CANCELLED | OUTPATIENT
Start: 2023-03-28

## 2023-03-28 RX ORDER — MUPIROCIN 20 MG/G
OINTMENT TOPICAL
Status: CANCELLED | OUTPATIENT
Start: 2023-03-28 | End: 2023-03-28

## 2023-03-28 NOTE — PROGRESS NOTES
[facility]  History & Physical  Cardiothoracic Surgery    SUBJECTIVE:     Chief Complaint/Reason for Visit:   Chief Complaint   Patient presents with    Pre-op Exam     Referral Dr Herrera RE:CABG.  [facility] on 3/15/23:LM 50%; LAD proximal to mid 60-70%; circumflex mid 80- 90%; RCA with proximal .  C/o ocassional SOB, denies CP.        History of Present Illness:  Patient is a 67 y.o. male presents referred by Dr. Herrera for CABG.  The patient has little shortness of breath when he wakes up in the morning.  He subsequently has had an echo and a left heart catheterization.  There is only minimal aortic stenosis on echo and left heart cath reveals a 50-60% left main stenosis and an occluded right and significant disease of the circ system.  He also has a history of atrial flutter previously.  He will clearly benefit from coronary bypass grafting to the LAD, OM, PDA and ligation of his left atrial appendage.  I have discussed the risks, benefits, and alternatives in great detail with the patient and his family.  They would like to proceed.    Review of patient's allergies indicates:  No Known Allergies    Past Medical History:   Diagnosis Date    Atrial flutter     CHF (congestive heart failure)     Hypertension      Past Surgical History:   Procedure Laterality Date    LEFT HEART CATHETERIZATION N/A 3/15/2023    Procedure: CATHETERIZATION, HEART, LEFT;  Surgeon: Sreedhar Herrera MD;  Location: [facility] CATH LAB;  Service: Cardiology;  Laterality: N/A;  [facility] via A     Family History   Problem Relation Age of Onset    Heart attack Mother      Social History     Tobacco Use    Smoking status: Never     Passive exposure: Never    Smokeless tobacco: Never   Substance Use Topics    Alcohol use: Yes     Alcohol/week: 84.0 standard drinks     Types: 84 Cans of beer per week     Comment: alcoholic, daily, beer    Drug use: Yes     Frequency: 3.0 times per week     Types: Hydrocodone        Review of  Systems:  Review of Systems   Constitutional: Negative.   HENT: Negative.     Eyes: Negative.    Cardiovascular: Negative.    Respiratory: Negative.     Endocrine: Negative.    Hematologic/Lymphatic: Negative.    Skin: Negative.    Musculoskeletal: Negative.    Gastrointestinal: Negative.    Genitourinary: Negative.    Neurological: Negative.    Psychiatric/Behavioral: Negative.     Allergic/Immunologic: Negative.      OBJECTIVE:     Vital Signs (Most Recent):  Pulse: 81 (03/28/23 0931)  BP: (!) 155/93 (03/28/23 0931)    Admission: Weight: 78 kg (172 lb) (03/28/23 0931)   Most Recent: Weight: 78 kg (172 lb) (03/28/23 0931)    Physical Exam:  Physical Exam  Vitals reviewed.   Constitutional:       Appearance: Normal appearance.   HENT:      Head: Normocephalic and atraumatic.   Eyes:      Pupils: Pupils are equal, round, and reactive to light.   Cardiovascular:      Rate and Rhythm: Normal rate and regular rhythm.      Pulses: Normal pulses.      Heart sounds: Normal heart sounds.   Pulmonary:      Effort: Pulmonary effort is normal.      Breath sounds: Normal breath sounds.   Abdominal:      Palpations: Abdomen is soft.   Musculoskeletal:         General: Normal range of motion.      Cervical back: Normal range of motion.   Skin:     General: Skin is warm.   Neurological:      General: No focal deficit present.      Mental Status: He is alert and oriented to person, place, and time.   Psychiatric:         Mood and Affect: Mood normal.         Behavior: Behavior normal.       Diagnostic Results:  Echo: Reviewed  Cardiac Cath: Reviewed      ASSESSMENT/PLAN:     1. Coronary artery disease    Left main and multivessel coronary artery disease   Good left ventricular function  Hypertension   History of atrial flutter   Planning CABG to LAD, OM, PDA, ligation left atrial appendage

## 2023-03-28 NOTE — H&P (VIEW-ONLY)
Heart and Vascular Center [facility]  History & Physical  Cardiothoracic Surgery    SUBJECTIVE:     Chief Complaint/Reason for Visit:   Chief Complaint   Patient presents with    Pre-op Exam     Referral Dr Herrera RE:CABG.  [facility] on 3/15/23:LM 50%; LAD proximal to mid 60-70%; circumflex mid 80- 90%; RCA with proximal .  C/o ocassional SOB, denies CP.        History of Present Illness:  Patient is a 67 y.o. male presents referred by Dr. Herrera for CABG.  The patient has little shortness of breath when he wakes up in the morning.  He subsequently has had an echo and a left heart catheterization.  There is only minimal aortic stenosis on echo and left heart cath reveals a 50-60% left main stenosis and an occluded right and significant disease of the circ system.  He also has a history of atrial flutter previously.  He will clearly benefit from coronary bypass grafting to the LAD, OM, PDA and ligation of his left atrial appendage.  I have discussed the risks, benefits, and alternatives in great detail with the patient and his family.  They would like to proceed.    Review of patient's allergies indicates:  No Known Allergies    Past Medical History:   Diagnosis Date    Atrial flutter     CHF (congestive heart failure)     Hypertension      Past Surgical History:   Procedure Laterality Date    LEFT HEART CATHETERIZATION N/A 3/15/2023    Procedure: CATHETERIZATION, HEART, LEFT;  Surgeon: Sreedhar Herrera MD;  Location: [facility] CATH LAB;  Service: Cardiology;  Laterality: N/A;  [facility] via A     Family History   Problem Relation Age of Onset    Heart attack Mother      Social History     Tobacco Use    Smoking status: Never     Passive exposure: Never    Smokeless tobacco: Never   Substance Use Topics    Alcohol use: Yes     Alcohol/week: 84.0 standard drinks     Types: 84 Cans of beer per week     Comment: alcoholic, daily, beer    Drug use: Yes     Frequency: 3.0 times per week     Types: Hydrocodone        Review of  Systems:  Review of Systems   Constitutional: Negative.   HENT: Negative.     Eyes: Negative.    Cardiovascular: Negative.    Respiratory: Negative.     Endocrine: Negative.    Hematologic/Lymphatic: Negative.    Skin: Negative.    Musculoskeletal: Negative.    Gastrointestinal: Negative.    Genitourinary: Negative.    Neurological: Negative.    Psychiatric/Behavioral: Negative.     Allergic/Immunologic: Negative.      OBJECTIVE:     Vital Signs (Most Recent):  Pulse: 81 (03/28/23 0931)  BP: (!) 155/93 (03/28/23 0931)    Admission: Weight: 78 kg (172 lb) (03/28/23 0931)   Most Recent: Weight: 78 kg (172 lb) (03/28/23 0931)    Physical Exam:  Physical Exam  Vitals reviewed.   Constitutional:       Appearance: Normal appearance.   HENT:      Head: Normocephalic and atraumatic.   Eyes:      Pupils: Pupils are equal, round, and reactive to light.   Cardiovascular:      Rate and Rhythm: Normal rate and regular rhythm.      Pulses: Normal pulses.      Heart sounds: Normal heart sounds.   Pulmonary:      Effort: Pulmonary effort is normal.      Breath sounds: Normal breath sounds.   Abdominal:      Palpations: Abdomen is soft.   Musculoskeletal:         General: Normal range of motion.      Cervical back: Normal range of motion.   Skin:     General: Skin is warm.   Neurological:      General: No focal deficit present.      Mental Status: He is alert and oriented to person, place, and time.   Psychiatric:         Mood and Affect: Mood normal.         Behavior: Behavior normal.       Diagnostic Results:  Echo: Reviewed  Cardiac Cath: Reviewed      ASSESSMENT/PLAN:     1. Coronary artery disease    Left main and multivessel coronary artery disease   Good left ventricular function  Hypertension   History of atrial flutter   Planning CABG to LAD, OM, PDA, ligation left atrial appendage

## 2023-03-30 ENCOUNTER — ANESTHESIA EVENT (OUTPATIENT)
Dept: SURGERY | Facility: HOSPITAL | Age: 68
DRG: 236 | End: 2023-03-30
Payer: MEDICARE

## 2023-04-03 ENCOUNTER — HOSPITAL ENCOUNTER (INPATIENT)
Facility: HOSPITAL | Age: 68
LOS: 14 days | Discharge: NURSING, REHAB OR LONG-TERM CARE | DRG: 236 | End: 2023-04-17
Attending: SPECIALIST | Admitting: SPECIALIST
Payer: MEDICARE

## 2023-04-03 ENCOUNTER — ANESTHESIA (OUTPATIENT)
Dept: SURGERY | Facility: HOSPITAL | Age: 68
DRG: 236 | End: 2023-04-03
Payer: MEDICARE

## 2023-04-03 DIAGNOSIS — I25.118 CORONARY ARTERY DISEASE OF NATIVE ARTERY OF NATIVE HEART WITH STABLE ANGINA PECTORIS: ICD-10-CM

## 2023-04-03 DIAGNOSIS — Z79.01 ANTICOAGULATED: ICD-10-CM

## 2023-04-03 DIAGNOSIS — I25.10 CAD (CORONARY ARTERY DISEASE): ICD-10-CM

## 2023-04-03 DIAGNOSIS — I48.92 ATRIAL FLUTTER: ICD-10-CM

## 2023-04-03 DIAGNOSIS — I48.91 A-FIB: ICD-10-CM

## 2023-04-03 DIAGNOSIS — I48.91 ATRIAL FIBRILLATION WITH RAPID VENTRICULAR RESPONSE: ICD-10-CM

## 2023-04-03 DIAGNOSIS — Z00.00 ROUTINE CHECK-UP: ICD-10-CM

## 2023-04-03 DIAGNOSIS — I25.10 CORONARY ARTERY DISEASE: ICD-10-CM

## 2023-04-03 PROBLEM — I21.4 NSTEMI (NON-ST ELEVATED MYOCARDIAL INFARCTION): Status: RESOLVED | Noted: 2022-12-30 | Resolved: 2023-04-03

## 2023-04-03 LAB
ANION GAP SERPL CALC-SCNC: 4 MEQ/L
ANION GAP SERPL CALC-SCNC: 7 MEQ/L
APTT PPP: 38.2 SECONDS (ref 23.2–33.7)
BASOPHILS # BLD AUTO: 0.01 X10(3)/MCL (ref 0–0.2)
BASOPHILS NFR BLD AUTO: 0.3 %
BSA FOR ECHO PROCEDURE: 2.03 M2
BUN SERPL-MCNC: 20.5 MG/DL (ref 8.4–25.7)
BUN SERPL-MCNC: 20.9 MG/DL (ref 8.4–25.7)
CALCIUM SERPL-MCNC: 8.3 MG/DL (ref 8.8–10)
CALCIUM SERPL-MCNC: 9.6 MG/DL (ref 8.8–10)
CHLORIDE SERPL-SCNC: 101 MMOL/L (ref 98–107)
CHLORIDE SERPL-SCNC: 99 MMOL/L (ref 98–107)
CO2 SERPL-SCNC: 21 MMOL/L (ref 23–31)
CO2 SERPL-SCNC: 24 MMOL/L (ref 23–31)
CORRECTED TEMPERATURE (PCO2): 43 MMHG (ref 35–45)
CORRECTED TEMPERATURE (PH): 7.39 (ref 7.35–7.45)
CORRECTED TEMPERATURE (PO2): 59 MMHG (ref 80–100)
CREAT SERPL-MCNC: 0.74 MG/DL (ref 0.73–1.18)
CREAT SERPL-MCNC: 0.77 MG/DL (ref 0.73–1.18)
CREAT/UREA NIT SERPL: 27
CREAT/UREA NIT SERPL: 28
EOSINOPHIL # BLD AUTO: 0.15 X10(3)/MCL (ref 0–0.9)
EOSINOPHIL NFR BLD AUTO: 4 %
ERYTHROCYTE [DISTWIDTH] IN BLOOD BY AUTOMATED COUNT: 14.3 % (ref 11.5–17)
GFR SERPLBLD CREATININE-BSD FMLA CKD-EPI: >60 MLS/MIN/1.73/M2
GFR SERPLBLD CREATININE-BSD FMLA CKD-EPI: >60 MLS/MIN/1.73/M2
GLUCOSE SERPL-MCNC: 106 MG/DL (ref 82–115)
GLUCOSE SERPL-MCNC: 161 MG/DL (ref 82–115)
GROUP & RH: NORMAL
HCO3 UR-SCNC: 26 MMOL/L (ref 22–26)
HCT VFR BLD AUTO: 27 % (ref 42–52)
HCT VFR BLD AUTO: 27.6 % (ref 42–52)
HGB BLD-MCNC: 11.4 G/DL (ref 12–16)
HGB BLD-MCNC: 9.2 G/DL (ref 14–18)
HGB BLD-MCNC: 9.3 G/DL (ref 14–18)
IMM GRANULOCYTES # BLD AUTO: 0.03 X10(3)/MCL (ref 0–0.04)
IMM GRANULOCYTES NFR BLD AUTO: 0.8 %
INDIRECT COOMBS GEL: NORMAL
INR BLD: 1.48 (ref 0–1.3)
LYMPHOCYTES # BLD AUTO: 0.98 X10(3)/MCL (ref 0.6–4.6)
LYMPHOCYTES NFR BLD AUTO: 26 %
MCH RBC QN AUTO: 29.7 PG (ref 27–31)
MCHC RBC AUTO-ENTMCNC: 33.7 G/DL (ref 33–36)
MCV RBC AUTO: 88.2 FL (ref 80–94)
MONOCYTES # BLD AUTO: 0.18 X10(3)/MCL (ref 0.1–1.3)
MONOCYTES NFR BLD AUTO: 4.8 %
NEUTROPHILS # BLD AUTO: 2.42 X10(3)/MCL (ref 2.1–9.2)
NEUTROPHILS NFR BLD AUTO: 64.1 %
NRBC BLD AUTO-RTO: 0 %
PCO2 BLDA: 43 MMHG
PCO2 BLDA: 43 MMHG (ref 35–45)
PH SMN: 7.32 [PH] (ref 7.35–7.45)
PH SMN: 7.39 [PH] (ref 7.35–7.45)
PLATELET # BLD AUTO: 68 X10(3)/MCL (ref 130–400)
PMV BLD AUTO: 10.1 FL (ref 7.4–10.4)
PO2 BLDA: 59 MMHG (ref 80–100)
PO2 BLDA: 74 MMHG
POC BASE DEFICIT: -3.8 MMOL/L
POC BASE DEFICIT: 0.8 MMOL/L (ref -2–2)
POC COHB: 0.5 %
POC HCO3: 22.2 MMOL/L
POC IONIZED CALCIUM: 1.12 MMOL/L (ref 1.12–1.23)
POC IONIZED CALCIUM: 1.26 MMOL/L (ref 1.12–1.23)
POC METHB: 0.4 % (ref 0.4–1.5)
POC O2HB: 87.9 % (ref 94–97)
POC SATURATED O2: 89.9 %
POC SATURATED O2: 93 %
POC TEMPERATURE: 37 C
POC TEMPERATURE: 37 °C
POCT GLUCOSE: 105 MG/DL (ref 70–110)
POCT GLUCOSE: 109 MG/DL (ref 70–110)
POCT GLUCOSE: 117 MG/DL (ref 70–110)
POCT GLUCOSE: 121 MG/DL (ref 70–110)
POCT GLUCOSE: 131 MG/DL (ref 70–110)
POCT GLUCOSE: 136 MG/DL (ref 70–110)
POCT GLUCOSE: 141 MG/DL (ref 70–110)
POCT GLUCOSE: 145 MG/DL (ref 70–110)
POCT GLUCOSE: 147 MG/DL (ref 70–110)
POCT GLUCOSE: 148 MG/DL (ref 70–110)
POCT GLUCOSE: 159 MG/DL (ref 70–110)
POCT GLUCOSE: 172 MG/DL (ref 70–110)
POCT GLUCOSE: 190 MG/DL (ref 70–110)
POCT GLUCOSE: 85 MG/DL (ref 70–110)
POCT GLUCOSE: 95 MG/DL (ref 70–110)
POTASSIUM BLD-SCNC: 4.1 MMOL/L
POTASSIUM BLD-SCNC: 4.3 MMOL/L (ref 3.5–5)
POTASSIUM SERPL-SCNC: 4.2 MMOL/L (ref 3.5–5.1)
POTASSIUM SERPL-SCNC: 4.3 MMOL/L (ref 3.5–5.1)
POTASSIUM SERPL-SCNC: 4.7 MMOL/L (ref 3.5–5.1)
PROTHROMBIN TIME: 17.7 SECONDS (ref 12.5–14.5)
RBC # BLD AUTO: 3.13 X10(6)/MCL (ref 4.7–6.1)
SODIUM BLD-SCNC: 123 MMOL/L (ref 137–145)
SODIUM BLD-SCNC: 127 MMOL/L (ref 137–145)
SODIUM SERPL-SCNC: 127 MMOL/L (ref 136–145)
SODIUM SERPL-SCNC: 129 MMOL/L (ref 136–145)
SPECIMEN OUTDATE: NORMAL
SPECIMEN SOURCE: ABNORMAL
SPECIMEN SOURCE: ABNORMAL
WBC # SPEC AUTO: 3.8 X10(3)/MCL (ref 4.5–11.5)

## 2023-04-03 PROCEDURE — 93312 ECHO TRANSESOPHAGEAL: CPT | Mod: 26,59,, | Performed by: ANESTHESIOLOGY

## 2023-04-03 PROCEDURE — 85610 PROTHROMBIN TIME: CPT | Performed by: SPECIALIST

## 2023-04-03 PROCEDURE — 86900 BLOOD TYPING SEROLOGIC ABO: CPT | Performed by: SPECIALIST

## 2023-04-03 PROCEDURE — 93320 PR DOPPLER ECHO HEART,COMPLETE: ICD-10-PCS | Mod: 26,,, | Performed by: ANESTHESIOLOGY

## 2023-04-03 PROCEDURE — 33533 CABG ARTERIAL SINGLE: CPT | Mod: ,,, | Performed by: SPECIALIST

## 2023-04-03 PROCEDURE — 36556 INSERT NON-TUNNEL CV CATH: CPT | Mod: 59,,, | Performed by: ANESTHESIOLOGY

## 2023-04-03 PROCEDURE — C9248 INJ, CLEVIDIPINE BUTYRATE: HCPCS | Mod: JZ,JG | Performed by: SPECIALIST

## 2023-04-03 PROCEDURE — 85014 HEMATOCRIT: CPT | Performed by: PHYSICIAN ASSISTANT

## 2023-04-03 PROCEDURE — 27200966 HC CLOSED SUCTION SYSTEM

## 2023-04-03 PROCEDURE — 99900035 HC TECH TIME PER 15 MIN (STAT)

## 2023-04-03 PROCEDURE — 33508 ENDOSCOPIC VEIN HARVEST: CPT | Mod: 59,,, | Performed by: SPECIALIST

## 2023-04-03 PROCEDURE — 27201423 OPTIME MED/SURG SUP & DEVICES STERILE SUPPLY: Performed by: SPECIALIST

## 2023-04-03 PROCEDURE — 36620 INSERTION CATHETER ARTERY: CPT

## 2023-04-03 PROCEDURE — 86923 COMPATIBILITY TEST ELECTRIC: CPT | Performed by: SPECIALIST

## 2023-04-03 PROCEDURE — 94002 VENT MGMT INPAT INIT DAY: CPT

## 2023-04-03 PROCEDURE — C1887 CATHETER, GUIDING: HCPCS | Performed by: SPECIALIST

## 2023-04-03 PROCEDURE — 63600175 PHARM REV CODE 636 W HCPCS: Performed by: PHYSICIAN ASSISTANT

## 2023-04-03 PROCEDURE — 93325 PR DOPPLER COLOR FLOW VELOCITY MAP: ICD-10-PCS | Mod: 26,,, | Performed by: ANESTHESIOLOGY

## 2023-04-03 PROCEDURE — 76937 US GUIDE VASCULAR ACCESS: CPT | Mod: 26,,, | Performed by: ANESTHESIOLOGY

## 2023-04-03 PROCEDURE — 93320 DOPPLER ECHO COMPLETE: CPT | Mod: 26,,, | Performed by: ANESTHESIOLOGY

## 2023-04-03 PROCEDURE — 33518 PR CABG, ARTERY-VEIN, TWO: ICD-10-PCS | Mod: AS,,, | Performed by: PHYSICIAN ASSISTANT

## 2023-04-03 PROCEDURE — 94761 N-INVAS EAR/PLS OXIMETRY MLT: CPT

## 2023-04-03 PROCEDURE — 36620 INSERTION CATHETER ARTERY: CPT | Mod: 59,,, | Performed by: ANESTHESIOLOGY

## 2023-04-03 PROCEDURE — 82803 BLOOD GASES ANY COMBINATION: CPT

## 2023-04-03 PROCEDURE — 33518 PR CABG, ARTERY-VEIN, TWO: ICD-10-PCS | Mod: ,,, | Performed by: SPECIALIST

## 2023-04-03 PROCEDURE — 85025 COMPLETE CBC W/AUTO DIFF WBC: CPT | Performed by: SPECIALIST

## 2023-04-03 PROCEDURE — 33533 CABG ARTERIAL SINGLE: CPT | Mod: AS,,, | Performed by: PHYSICIAN ASSISTANT

## 2023-04-03 PROCEDURE — 33518 CABG ARTERY-VEIN TWO: CPT | Mod: ,,, | Performed by: SPECIALIST

## 2023-04-03 PROCEDURE — 63600175 PHARM REV CODE 636 W HCPCS: Performed by: INTERNAL MEDICINE

## 2023-04-03 PROCEDURE — 80048 BASIC METABOLIC PNL TOTAL CA: CPT | Performed by: INTERNAL MEDICINE

## 2023-04-03 PROCEDURE — 25000003 PHARM REV CODE 250

## 2023-04-03 PROCEDURE — 93312 TEE: ICD-10-PCS | Mod: 26,59,, | Performed by: ANESTHESIOLOGY

## 2023-04-03 PROCEDURE — C1894 INTRO/SHEATH, NON-LASER: HCPCS | Performed by: SPECIALIST

## 2023-04-03 PROCEDURE — 80048 BASIC METABOLIC PNL TOTAL CA: CPT | Performed by: SPECIALIST

## 2023-04-03 PROCEDURE — C1751 CATH, INF, PER/CENT/MIDLINE: HCPCS | Performed by: SPECIALIST

## 2023-04-03 PROCEDURE — 27000221 HC OXYGEN, UP TO 24 HOURS

## 2023-04-03 PROCEDURE — 63600175 PHARM REV CODE 636 W HCPCS

## 2023-04-03 PROCEDURE — 33518 CABG ARTERY-VEIN TWO: CPT | Mod: AS,,, | Performed by: PHYSICIAN ASSISTANT

## 2023-04-03 PROCEDURE — 76937 PR  US GUIDE, VASCULAR ACCESS: ICD-10-PCS | Mod: 26,,, | Performed by: ANESTHESIOLOGY

## 2023-04-03 PROCEDURE — 99900017 HC EXTUBATION W/PARAMETERS (STAT)

## 2023-04-03 PROCEDURE — 33533 PR CABG, ARTERIAL, SINGLE: ICD-10-PCS | Mod: ,,, | Performed by: SPECIALIST

## 2023-04-03 PROCEDURE — 85730 THROMBOPLASTIN TIME PARTIAL: CPT | Performed by: SPECIALIST

## 2023-04-03 PROCEDURE — 36000713 HC OR TIME LEV V EA ADD 15 MIN: Performed by: SPECIALIST

## 2023-04-03 PROCEDURE — 86923 COMPATIBILITY TEST ELECTRIC: CPT | Mod: 91 | Performed by: INTERNAL MEDICINE

## 2023-04-03 PROCEDURE — P9045 ALBUMIN (HUMAN), 5%, 250 ML: HCPCS | Mod: JZ,JG | Performed by: PHYSICIAN ASSISTANT

## 2023-04-03 PROCEDURE — 37000008 HC ANESTHESIA 1ST 15 MINUTES: Performed by: SPECIALIST

## 2023-04-03 PROCEDURE — 25000003 PHARM REV CODE 250: Performed by: SPECIALIST

## 2023-04-03 PROCEDURE — 84132 ASSAY OF SERUM POTASSIUM: CPT | Performed by: SPECIALIST

## 2023-04-03 PROCEDURE — 82962 GLUCOSE BLOOD TEST: CPT | Performed by: SPECIALIST

## 2023-04-03 PROCEDURE — 36000712 HC OR TIME LEV V 1ST 15 MIN: Performed by: SPECIALIST

## 2023-04-03 PROCEDURE — 20000000 HC ICU ROOM

## 2023-04-03 PROCEDURE — 36556 CENTRAL LINE: ICD-10-PCS | Mod: 59,,, | Performed by: ANESTHESIOLOGY

## 2023-04-03 PROCEDURE — 88305 TISSUE EXAM BY PATHOLOGIST: CPT | Performed by: SPECIALIST

## 2023-04-03 PROCEDURE — 36620 ARTERIAL: ICD-10-PCS | Mod: 59,,, | Performed by: ANESTHESIOLOGY

## 2023-04-03 PROCEDURE — 37799 UNLISTED PX VASCULAR SURGERY: CPT

## 2023-04-03 PROCEDURE — 37000009 HC ANESTHESIA EA ADD 15 MINS: Performed by: SPECIALIST

## 2023-04-03 PROCEDURE — 33533 PR CABG, ARTERIAL, SINGLE: ICD-10-PCS | Mod: AS,,, | Performed by: PHYSICIAN ASSISTANT

## 2023-04-03 PROCEDURE — 25000003 PHARM REV CODE 250: Performed by: PHYSICIAN ASSISTANT

## 2023-04-03 PROCEDURE — D9220A PRA ANESTHESIA: Mod: CRNA,,,

## 2023-04-03 PROCEDURE — 33508 PR ENDOSCOPY W/VIDEO-ASST VEIN HARVEST,CABG: ICD-10-PCS | Mod: 59,,, | Performed by: SPECIALIST

## 2023-04-03 PROCEDURE — D9220A PRA ANESTHESIA: Mod: ANES,,, | Performed by: ANESTHESIOLOGY

## 2023-04-03 PROCEDURE — 93325 DOPPLER ECHO COLOR FLOW MAPG: CPT | Mod: 26,,, | Performed by: ANESTHESIOLOGY

## 2023-04-03 PROCEDURE — 25000003 PHARM REV CODE 250: Performed by: INTERNAL MEDICINE

## 2023-04-03 PROCEDURE — D9220A PRA ANESTHESIA: ICD-10-PCS | Mod: CRNA,,,

## 2023-04-03 PROCEDURE — D9220A PRA ANESTHESIA: ICD-10-PCS | Mod: ANES,,, | Performed by: ANESTHESIOLOGY

## 2023-04-03 PROCEDURE — 63600175 PHARM REV CODE 636 W HCPCS: Performed by: SPECIALIST

## 2023-04-03 PROCEDURE — S5010 5% DEXTROSE AND 0.45% SALINE: HCPCS | Performed by: PHYSICIAN ASSISTANT

## 2023-04-03 RX ORDER — VASOPRESSIN 20 [USP'U]/ML
INJECTION, SOLUTION INTRAMUSCULAR; SUBCUTANEOUS
Status: DISCONTINUED | OUTPATIENT
Start: 2023-04-03 | End: 2023-04-03

## 2023-04-03 RX ORDER — MAGNESIUM SULFATE HEPTAHYDRATE 40 MG/ML
4 INJECTION, SOLUTION INTRAVENOUS
Status: DISCONTINUED | OUTPATIENT
Start: 2023-04-03 | End: 2023-04-17 | Stop reason: HOSPADM

## 2023-04-03 RX ORDER — 3% SODIUM CHLORIDE 3 G/100ML
INJECTION, SOLUTION INTRAVENOUS
Status: COMPLETED | OUTPATIENT
Start: 2023-04-03 | End: 2023-04-03

## 2023-04-03 RX ORDER — METOCLOPRAMIDE HYDROCHLORIDE 5 MG/ML
5 INJECTION INTRAMUSCULAR; INTRAVENOUS EVERY 6 HOURS PRN
Status: CANCELLED | OUTPATIENT
Start: 2023-04-03

## 2023-04-03 RX ORDER — DOCUSATE SODIUM 100 MG/1
100 CAPSULE, LIQUID FILLED ORAL 2 TIMES DAILY
Status: DISCONTINUED | OUTPATIENT
Start: 2023-04-04 | End: 2023-04-17 | Stop reason: HOSPADM

## 2023-04-03 RX ORDER — ALBUMIN HUMAN 250 G/1000ML
SOLUTION INTRAVENOUS CONTINUOUS PRN
Status: DISCONTINUED | OUTPATIENT
Start: 2023-04-03 | End: 2023-04-03

## 2023-04-03 RX ORDER — PAPAVERINE HYDROCHLORIDE 30 MG/ML
INJECTION INTRAMUSCULAR; INTRAVENOUS
Status: DISCONTINUED | OUTPATIENT
Start: 2023-04-03 | End: 2023-04-03 | Stop reason: HOSPADM

## 2023-04-03 RX ORDER — MEPERIDINE HYDROCHLORIDE 25 MG/ML
25 INJECTION INTRAMUSCULAR; INTRAVENOUS; SUBCUTANEOUS ONCE
Status: DISCONTINUED | OUTPATIENT
Start: 2023-04-03 | End: 2023-04-03

## 2023-04-03 RX ORDER — ALBUMIN HUMAN 50 G/1000ML
12.5 SOLUTION INTRAVENOUS
Status: DISCONTINUED | OUTPATIENT
Start: 2023-04-03 | End: 2023-04-17 | Stop reason: HOSPADM

## 2023-04-03 RX ORDER — HYDROCODONE BITARTRATE AND ACETAMINOPHEN 500; 5 MG/1; MG/1
TABLET ORAL
Status: DISCONTINUED | OUTPATIENT
Start: 2023-04-03 | End: 2023-04-03 | Stop reason: HOSPADM

## 2023-04-03 RX ORDER — MUPIROCIN 20 MG/G
OINTMENT TOPICAL 2 TIMES DAILY
Status: DISPENSED | OUTPATIENT
Start: 2023-04-03 | End: 2023-04-08

## 2023-04-03 RX ORDER — METOPROLOL TARTRATE 25 MG/1
12.5 TABLET ORAL 2 TIMES DAILY
Status: CANCELLED | OUTPATIENT
Start: 2023-04-04

## 2023-04-03 RX ORDER — CALCIUM GLUCONATE 20 MG/ML
1 INJECTION, SOLUTION INTRAVENOUS
Status: DISCONTINUED | OUTPATIENT
Start: 2023-04-03 | End: 2023-04-17 | Stop reason: HOSPADM

## 2023-04-03 RX ORDER — VANCOMYCIN HYDROCHLORIDE 1 G/20ML
INJECTION, POWDER, LYOPHILIZED, FOR SOLUTION INTRAVENOUS
Status: DISCONTINUED | OUTPATIENT
Start: 2023-04-03 | End: 2023-04-03 | Stop reason: HOSPADM

## 2023-04-03 RX ORDER — FOLIC ACID 1 MG/1
1 TABLET ORAL DAILY
Status: CANCELLED | OUTPATIENT
Start: 2023-04-04

## 2023-04-03 RX ORDER — MIDAZOLAM HYDROCHLORIDE 1 MG/ML
INJECTION INTRAMUSCULAR; INTRAVENOUS
Status: DISCONTINUED | OUTPATIENT
Start: 2023-04-03 | End: 2023-04-03

## 2023-04-03 RX ORDER — SODIUM CHLORIDE 9 MG/ML
INJECTION, SOLUTION INTRAVENOUS CONTINUOUS
Status: ACTIVE | OUTPATIENT
Start: 2023-04-03 | End: 2023-04-04

## 2023-04-03 RX ORDER — FAMOTIDINE 10 MG/ML
20 INJECTION INTRAVENOUS 2 TIMES DAILY
Status: DISCONTINUED | OUTPATIENT
Start: 2023-04-03 | End: 2023-04-05

## 2023-04-03 RX ORDER — SUCRALFATE 1 G/1
1 TABLET ORAL
Status: DISCONTINUED | OUTPATIENT
Start: 2023-04-04 | End: 2023-04-17 | Stop reason: HOSPADM

## 2023-04-03 RX ORDER — PHENYLEPHRINE HYDROCHLORIDE 10 MG/ML
INJECTION INTRAVENOUS
Status: DISCONTINUED | OUTPATIENT
Start: 2023-04-03 | End: 2023-04-03

## 2023-04-03 RX ORDER — OXYCODONE HYDROCHLORIDE 10 MG/1
10 TABLET ORAL EVERY 4 HOURS PRN
Status: DISCONTINUED | OUTPATIENT
Start: 2023-04-03 | End: 2023-04-08

## 2023-04-03 RX ORDER — PROPOFOL 10 MG/ML
VIAL (ML) INTRAVENOUS
Status: DISCONTINUED | OUTPATIENT
Start: 2023-04-03 | End: 2023-04-03

## 2023-04-03 RX ORDER — ONDANSETRON 2 MG/ML
4 INJECTION INTRAMUSCULAR; INTRAVENOUS EVERY 4 HOURS PRN
Status: DISCONTINUED | OUTPATIENT
Start: 2023-04-03 | End: 2023-04-17 | Stop reason: HOSPADM

## 2023-04-03 RX ORDER — ALBUMIN HUMAN 50 G/1000ML
SOLUTION INTRAVENOUS CONTINUOUS PRN
Status: DISCONTINUED | OUTPATIENT
Start: 2023-04-03 | End: 2023-04-03

## 2023-04-03 RX ORDER — LIDOCAINE HYDROCHLORIDE 10 MG/ML
1 INJECTION, SOLUTION EPIDURAL; INFILTRATION; INTRACAUDAL; PERINEURAL ONCE
Status: CANCELLED | OUTPATIENT
Start: 2023-04-03 | End: 2023-04-03

## 2023-04-03 RX ORDER — ACETAMINOPHEN 650 MG/20.3ML
650 LIQUID ORAL EVERY 6 HOURS PRN
Status: DISCONTINUED | OUTPATIENT
Start: 2023-04-03 | End: 2023-04-17 | Stop reason: HOSPADM

## 2023-04-03 RX ORDER — ASPIRIN 81 MG/1
81 TABLET ORAL DAILY
Status: DISCONTINUED | OUTPATIENT
Start: 2023-04-04 | End: 2023-04-17 | Stop reason: HOSPADM

## 2023-04-03 RX ORDER — POTASSIUM CHLORIDE 14.9 MG/ML
20 INJECTION INTRAVENOUS
Status: DISCONTINUED | OUTPATIENT
Start: 2023-04-03 | End: 2023-04-17 | Stop reason: HOSPADM

## 2023-04-03 RX ORDER — MAGNESIUM SULFATE HEPTAHYDRATE 40 MG/ML
2 INJECTION, SOLUTION INTRAVENOUS
Status: DISCONTINUED | OUTPATIENT
Start: 2023-04-03 | End: 2023-04-17 | Stop reason: HOSPADM

## 2023-04-03 RX ORDER — MUPIROCIN 20 MG/G
OINTMENT TOPICAL
Status: COMPLETED | OUTPATIENT
Start: 2023-04-03 | End: 2023-04-03

## 2023-04-03 RX ORDER — CALCIUM CHLORIDE INJECTION 100 MG/ML
INJECTION, SOLUTION INTRAVENOUS
Status: DISCONTINUED | OUTPATIENT
Start: 2023-04-03 | End: 2023-04-03 | Stop reason: HOSPADM

## 2023-04-03 RX ORDER — ENOXAPARIN SODIUM 100 MG/ML
40 INJECTION SUBCUTANEOUS EVERY 24 HOURS
Status: DISCONTINUED | OUTPATIENT
Start: 2023-04-04 | End: 2023-04-04

## 2023-04-03 RX ORDER — ROCURONIUM BROMIDE 10 MG/ML
INJECTION, SOLUTION INTRAVENOUS
Status: DISCONTINUED | OUTPATIENT
Start: 2023-04-03 | End: 2023-04-03

## 2023-04-03 RX ORDER — LACTULOSE 10 G/15ML
20 SOLUTION ORAL EVERY 6 HOURS PRN
Status: CANCELLED | OUTPATIENT
Start: 2023-04-03

## 2023-04-03 RX ORDER — DEXMEDETOMIDINE HYDROCHLORIDE 4 UG/ML
0-1.4 INJECTION, SOLUTION INTRAVENOUS CONTINUOUS
Status: DISCONTINUED | OUTPATIENT
Start: 2023-04-03 | End: 2023-04-04

## 2023-04-03 RX ORDER — MORPHINE SULFATE 10 MG/ML
4 INJECTION INTRAMUSCULAR; INTRAVENOUS; SUBCUTANEOUS EVERY 4 HOURS PRN
Status: DISCONTINUED | OUTPATIENT
Start: 2023-04-03 | End: 2023-04-04

## 2023-04-03 RX ORDER — PROTAMINE SULFATE 10 MG/ML
INJECTION, SOLUTION INTRAVENOUS
Status: DISCONTINUED | OUTPATIENT
Start: 2023-04-03 | End: 2023-04-03

## 2023-04-03 RX ORDER — HEPARIN SODIUM 1000 [USP'U]/ML
INJECTION, SOLUTION INTRAVENOUS; SUBCUTANEOUS
Status: DISCONTINUED | OUTPATIENT
Start: 2023-04-03 | End: 2023-04-03 | Stop reason: HOSPADM

## 2023-04-03 RX ORDER — CALCIUM GLUCONATE 20 MG/ML
2 INJECTION, SOLUTION INTRAVENOUS
Status: DISCONTINUED | OUTPATIENT
Start: 2023-04-03 | End: 2023-04-17 | Stop reason: HOSPADM

## 2023-04-03 RX ORDER — CEFAZOLIN SODIUM 2 G/50ML
2 SOLUTION INTRAVENOUS
Status: CANCELLED | OUTPATIENT
Start: 2023-04-03 | End: 2023-04-04

## 2023-04-03 RX ORDER — TRANEXAMIC ACID 100 MG/ML
INJECTION, SOLUTION INTRAVENOUS
Status: DISCONTINUED | OUTPATIENT
Start: 2023-04-03 | End: 2023-04-03

## 2023-04-03 RX ORDER — HYDROCODONE BITARTRATE AND ACETAMINOPHEN 5; 325 MG/1; MG/1
1 TABLET ORAL EVERY 4 HOURS PRN
Status: DISCONTINUED | OUTPATIENT
Start: 2023-04-03 | End: 2023-04-17 | Stop reason: HOSPADM

## 2023-04-03 RX ORDER — CALCIUM GLUCONATE 20 MG/ML
3 INJECTION, SOLUTION INTRAVENOUS
Status: DISCONTINUED | OUTPATIENT
Start: 2023-04-03 | End: 2023-04-17 | Stop reason: HOSPADM

## 2023-04-03 RX ORDER — LOPERAMIDE HYDROCHLORIDE 2 MG/1
2 CAPSULE ORAL CONTINUOUS PRN
Status: CANCELLED | OUTPATIENT
Start: 2023-04-03

## 2023-04-03 RX ORDER — HEPARIN SODIUM 1000 [USP'U]/ML
1000 INJECTION, SOLUTION INTRAVENOUS; SUBCUTANEOUS ONCE
Status: DISCONTINUED | OUTPATIENT
Start: 2023-04-03 | End: 2023-04-03

## 2023-04-03 RX ORDER — POTASSIUM CHLORIDE 29.8 MG/ML
40 INJECTION INTRAVENOUS
Status: DISCONTINUED | OUTPATIENT
Start: 2023-04-03 | End: 2023-04-17 | Stop reason: HOSPADM

## 2023-04-03 RX ORDER — KETOROLAC TROMETHAMINE 30 MG/ML
30 INJECTION, SOLUTION INTRAMUSCULAR; INTRAVENOUS EVERY 6 HOURS PRN
Status: DISPENSED | OUTPATIENT
Start: 2023-04-03 | End: 2023-04-04

## 2023-04-03 RX ORDER — HEPARIN SODIUM 1000 [USP'U]/ML
INJECTION, SOLUTION INTRAVENOUS; SUBCUTANEOUS
Status: DISCONTINUED | OUTPATIENT
Start: 2023-04-03 | End: 2023-04-03

## 2023-04-03 RX ORDER — ACETAMINOPHEN 10 MG/ML
1000 INJECTION, SOLUTION INTRAVENOUS ONCE
Status: COMPLETED | OUTPATIENT
Start: 2023-04-03 | End: 2023-04-03

## 2023-04-03 RX ORDER — FAMOTIDINE 10 MG/ML
20 INJECTION INTRAVENOUS ONCE
Status: CANCELLED | OUTPATIENT
Start: 2023-04-03

## 2023-04-03 RX ORDER — DEXTROSE MONOHYDRATE AND SODIUM CHLORIDE 5; .45 G/100ML; G/100ML
INJECTION, SOLUTION INTRAVENOUS CONTINUOUS
Status: DISCONTINUED | OUTPATIENT
Start: 2023-04-03 | End: 2023-04-04

## 2023-04-03 RX ORDER — FENTANYL CITRATE 50 UG/ML
INJECTION, SOLUTION INTRAMUSCULAR; INTRAVENOUS
Status: DISCONTINUED | OUTPATIENT
Start: 2023-04-03 | End: 2023-04-03

## 2023-04-03 RX ADMIN — MIDAZOLAM HYDROCHLORIDE 2 MG: 1 INJECTION, SOLUTION INTRAMUSCULAR; INTRAVENOUS at 10:04

## 2023-04-03 RX ADMIN — TRANEXAMIC ACID 800 MG: 100 INJECTION, SOLUTION INTRAVENOUS at 07:04

## 2023-04-03 RX ADMIN — DEXMEDETOMIDINE HYDROCHLORIDE 0.7 MCG/KG/HR: 400 INJECTION INTRAVENOUS at 09:04

## 2023-04-03 RX ADMIN — PHENYLEPHRINE HYDROCHLORIDE 100 MCG: 10 INJECTION INTRAVENOUS at 10:04

## 2023-04-03 RX ADMIN — PHENYLEPHRINE HYDROCHLORIDE 100 MCG: 10 INJECTION INTRAVENOUS at 07:04

## 2023-04-03 RX ADMIN — DEXTROSE AND SODIUM CHLORIDE: 5; 450 INJECTION, SOLUTION INTRAVENOUS at 11:04

## 2023-04-03 RX ADMIN — MUPIROCIN: 20 OINTMENT TOPICAL at 05:04

## 2023-04-03 RX ADMIN — SUGAMMADEX 200 MG: 100 INJECTION, SOLUTION INTRAVENOUS at 10:04

## 2023-04-03 RX ADMIN — DEXTROSE AND SODIUM CHLORIDE: 5; 450 INJECTION, SOLUTION INTRAVENOUS at 08:04

## 2023-04-03 RX ADMIN — KETOROLAC TROMETHAMINE 30 MG: 30 INJECTION, SOLUTION INTRAMUSCULAR; INTRAVENOUS at 11:04

## 2023-04-03 RX ADMIN — FAMOTIDINE 20 MG: 10 INJECTION INTRAVENOUS at 08:04

## 2023-04-03 RX ADMIN — SODIUM CHLORIDE: 9 INJECTION, SOLUTION INTRAVENOUS at 12:04

## 2023-04-03 RX ADMIN — ALBUMIN (HUMAN) 12.5 G: 12.5 SOLUTION INTRAVENOUS at 08:04

## 2023-04-03 RX ADMIN — PHENYLEPHRINE HYDROCHLORIDE 50 MCG: 10 INJECTION INTRAVENOUS at 07:04

## 2023-04-03 RX ADMIN — DEXMEDETOMIDINE HYDROCHLORIDE 0.6 MCG/KG/HR: 400 INJECTION INTRAVENOUS at 10:04

## 2023-04-03 RX ADMIN — ROCURONIUM BROMIDE 100 MG: 10 SOLUTION INTRAVENOUS at 07:04

## 2023-04-03 RX ADMIN — SODIUM CHLORIDE: 9 INJECTION, SOLUTION INTRAVENOUS at 06:04

## 2023-04-03 RX ADMIN — CLEVIPIDINE 3 MG/HR: 0.5 EMULSION INTRAVENOUS at 11:04

## 2023-04-03 RX ADMIN — CALCIUM CHLORIDE INJECTION 0.5 G: 100 INJECTION, SOLUTION INTRAVENOUS at 09:04

## 2023-04-03 RX ADMIN — HEPARIN SODIUM 25000 UNITS: 1000 INJECTION, SOLUTION INTRAVENOUS; SUBCUTANEOUS at 08:04

## 2023-04-03 RX ADMIN — MIDAZOLAM HYDROCHLORIDE 2 MG: 1 INJECTION, SOLUTION INTRAMUSCULAR; INTRAVENOUS at 06:04

## 2023-04-03 RX ADMIN — PROPOFOL 150 MG: 10 INJECTION, EMULSION INTRAVENOUS at 07:04

## 2023-04-03 RX ADMIN — PROTAMINE SULFATE 350 MG: 10 INJECTION, SOLUTION INTRAVENOUS at 09:04

## 2023-04-03 RX ADMIN — MIDAZOLAM HYDROCHLORIDE 1 MG: 1 INJECTION, SOLUTION INTRAMUSCULAR; INTRAVENOUS at 07:04

## 2023-04-03 RX ADMIN — MIDAZOLAM HYDROCHLORIDE 1 MG: 1 INJECTION, SOLUTION INTRAMUSCULAR; INTRAVENOUS at 08:04

## 2023-04-03 RX ADMIN — FENTANYL CITRATE 100 MCG: 50 INJECTION, SOLUTION INTRAMUSCULAR; INTRAVENOUS at 10:04

## 2023-04-03 RX ADMIN — PHENYLEPHRINE HYDROCHLORIDE 100 MCG: 10 INJECTION INTRAVENOUS at 08:04

## 2023-04-03 RX ADMIN — EPINEPHRINE 0.02 MCG/KG/MIN: 1 INJECTION INTRAMUSCULAR; INTRAVENOUS; SUBCUTANEOUS at 09:04

## 2023-04-03 RX ADMIN — FENTANYL CITRATE 250 MCG: 50 INJECTION, SOLUTION INTRAMUSCULAR; INTRAVENOUS at 07:04

## 2023-04-03 RX ADMIN — MORPHINE SULFATE 4 MG: 10 INJECTION INTRAVENOUS at 11:04

## 2023-04-03 RX ADMIN — FOLIC ACID: 5 INJECTION, SOLUTION INTRAMUSCULAR; INTRAVENOUS; SUBCUTANEOUS at 08:04

## 2023-04-03 RX ADMIN — SODIUM CHLORIDE 2 UNITS/HR: 9 INJECTION, SOLUTION INTRAVENOUS at 08:04

## 2023-04-03 RX ADMIN — KETOROLAC TROMETHAMINE 30 MG: 30 INJECTION, SOLUTION INTRAMUSCULAR; INTRAVENOUS at 05:04

## 2023-04-03 RX ADMIN — ACETAMINOPHEN 1000 MG: 10 INJECTION, SOLUTION INTRAVENOUS at 11:04

## 2023-04-03 RX ADMIN — VASOPRESSIN 1 UNITS: 20 INJECTION INTRAVENOUS at 08:04

## 2023-04-03 RX ADMIN — TRANEXAMIC ACID 800 MG: 100 INJECTION, SOLUTION INTRAVENOUS at 09:04

## 2023-04-03 RX ADMIN — OXYCODONE HYDROCHLORIDE 10 MG: 10 TABLET ORAL at 08:04

## 2023-04-03 RX ADMIN — FENTANYL CITRATE 100 MCG: 50 INJECTION, SOLUTION INTRAMUSCULAR; INTRAVENOUS at 07:04

## 2023-04-03 RX ADMIN — ACETAMINOPHEN 650 MG: 650 SOLUTION ORAL at 08:04

## 2023-04-03 RX ADMIN — MUPIROCIN: 20 OINTMENT TOPICAL at 08:04

## 2023-04-03 RX ADMIN — ALBUMIN HUMAN: 50 SOLUTION INTRAVENOUS at 07:04

## 2023-04-03 RX ADMIN — DEXTROSE MONOHYDRATE 1.5 G: 50 INJECTION, SOLUTION INTRAVENOUS at 07:04

## 2023-04-03 RX ADMIN — ROCURONIUM BROMIDE 25 MG: 10 SOLUTION INTRAVENOUS at 09:04

## 2023-04-03 RX ADMIN — MIDAZOLAM HYDROCHLORIDE 1 MG: 1 INJECTION, SOLUTION INTRAMUSCULAR; INTRAVENOUS at 09:04

## 2023-04-03 RX ADMIN — ROCURONIUM BROMIDE 25 MG: 10 SOLUTION INTRAVENOUS at 08:04

## 2023-04-03 RX ADMIN — HYDROCODONE BITARTRATE AND ACETAMINOPHEN 1 TABLET: 5; 325 TABLET ORAL at 05:04

## 2023-04-03 RX ADMIN — FENTANYL CITRATE 150 MCG: 50 INJECTION, SOLUTION INTRAMUSCULAR; INTRAVENOUS at 07:04

## 2023-04-03 NOTE — ANESTHESIA PROCEDURE NOTES
Intubation    Date/Time: 4/3/2023 7:10 AM  Performed by: Alberto Shabazz CRNA  Authorized by: Dawit Ni DO     Intubation:     Induction:  Intravenous    Intubated:  Postinduction    Mask Ventilation:  Easy with oral airway    Attempts:  1    Attempted By:  CRNA    Method of Intubation:  Video laryngoscopy    Blade:  Rosas 4    Laryngeal View Grade: Grade I - full view of cords      Difficult Airway Encountered?: No      Complications:  None    Airway Device:  Oral endotracheal tube    Airway Device Size:  8.0    Style/Cuff Inflation:  Cuffed (inflated to minimal occlusive pressure)    Tube secured:  24    Secured at:  The lips    Placement Verified By:  Capnometry    Complicating Factors:  None    Findings Post-Intubation:  Atraumatic/condition of teeth unchanged and BS equal bilateral

## 2023-04-03 NOTE — H&P
[facility] - [facility]  Pulmonary Critical Care Note    Patient Name: Mike Urbina Jr.  MRN: [id number]  Admission Date: 4/3/2023  Hospital Length of Stay: 0 days  Code Status: Full Code  Attending Provider: Graham Aiken MD  Primary Care Provider: LOREN Jerry     Subjective:     HPI: 67 year old male with pmh of HTN, Aflutter, thrombocytopenia presented to ICU after performing CABG for multivessel disease.  Coronary angiogram done previously revealed a 50-60% left main stenosis and an occluded right and significant disease of the circ system. Patient underwent CABG to the LAD, OM, PDA and ligation of his left atrial appendage on 4/3/23 by CT surgery and was transferred to ICU for further care and management.     Hospital Course/Significant events: CABG x 3 on 4/3/23    24 Hour Interval History: N/A      Past Medical History:   Diagnosis Date    Atrial flutter     CHF (congestive heart failure)     Coronary artery disease     Hypertension     SOB (shortness of breath)     at times       Past Surgical History:   Procedure Laterality Date    CATARACT EXTRACTION Bilateral     LEFT HEART CATHETERIZATION N/A 03/15/2023    Procedure: CATHETERIZATION, HEART, LEFT;  Surgeon: Sreedhar Herrera MD;  Location: [facility];  Service: Cardiology;  Laterality: N/A;  LHC via [facility]       Social History     Socioeconomic History    Marital status: Single   Tobacco Use    Smoking status: Never     Passive exposure: Never    Smokeless tobacco: Never   Substance and Sexual Activity    Alcohol use: Yes     Alcohol/week: 84.0 standard drinks     Types: 84 Cans of beer per week     Comment: alcoholic, daily, beer    Drug use: Yes     Frequency: 3.0 times per week     Types: Hydrocodone    Sexual activity: Not Currently   Social History Narrative    ** Merged History Encounter **          Social Determinants of Health     Financial Resource Strain: Low Risk     Difficulty of Paying Living Expenses: Not  hard at all   Food Insecurity: No Food Insecurity    Worried About Running Out of Food in the Last Year: Never true    Ran Out of Food in the Last Year: Never true   Transportation Needs: No Transportation Needs    Lack of Transportation (Medical): No    Lack of Transportation (Non-Medical): No   Physical Activity: Inactive    Days of Exercise per Week: 0 days    Minutes of Exercise per Session: 0 min   Stress: No Stress Concern Present    Feeling of Stress : Only a little   Social Connections: Moderately Isolated    Frequency of Communication with Friends and Family: More than three times a week    Frequency of Social Gatherings with Friends and Family: More than three times a week    Attends Temple Services: 1 to 4 times per year    Active Member of Clubs or Organizations: No    Attends Club or Organization Meetings: Never    Marital Status: Never    Housing Stability: Low Risk     Unable to Pay for Housing in the Last Year: No    Number of Places Lived in the Last Year: 1    Unstable Housing in the Last Year: No           Current Outpatient Medications   Medication Instructions    aspirin (ECOTRIN) 81 mg, Oral, Daily    atorvastatin (LIPITOR) 40 mg, Oral, Daily    furosemide (LASIX) 40 MG tablet TAKE ONE TABLET BY MOUTH DAILY DOSE INCREASED    hydrOXYzine HCL (ATARAX) 25 MG tablet TAKE 1 TABLET BY MOUTH EVERY EVENING    metoprolol succinate (TOPROL-XL) 100 mg, Oral, Daily    NIFEdipine (PROCARDIA-XL) 30 mg, Oral, Daily    valsartan (DIOVAN) 40 mg, Oral, 2 times daily       Current Inpatient Medications   acetaminophen  1,000 mg Intravenous Once    [START ON 4/4/2023] aspirin  81 mg Oral Daily    [START ON 4/4/2023] docusate sodium  100 mg Oral BID    [START ON 4/4/2023] enoxaparin  40 mg Subcutaneous Daily    famotidine (PF)  20 mg Intravenous BID    mupirocin   Nasal BID    [START ON 4/4/2023] sucralfate  1 g Oral QID (AC & HS)       Current Intravenous Infusions   dexmedeTOMIDine (Precedex) infusion  (titrating)      dextrose 5 % and 0.45 % NaCl      EPINEPHrine      insulin regular 1 units/mL infusion orderable (CTS POST-OP)           ROS       Objective:       Intake/Output Summary (Last 24 hours) at 4/3/2023 1113  Last data filed at 4/3/2023 0828  Gross per 24 hour   Intake 0.27 ml   Output 400 ml   Net -399.73 ml         Vital Signs (Most Recent):  Temp: 96.6 °F (35.9 °C) (04/03/23 1100)  Pulse: 79 (04/03/23 1100)  Resp: 15 (04/03/23 1100)  BP: (!) 150/88 (04/03/23 1100)  SpO2: 100 % (04/03/23 1100)  Body mass index is 22.33 kg/m².  Weight: 78.9 kg (173 lb 15.1 oz) Vital Signs (24h Range):  Temp:  [96.6 °F (35.9 °C)-98.6 °F (37 °C)] 96.6 °F (35.9 °C)  Pulse:  [74-84] 79  Resp:  [12-21] 15  SpO2:  [95 %-100 %] 100 %  BP: (112-150)/(64-88) 150/88     Physical Exam    Physical Examination:  General: Intubated   HENT: Normocephalic, atraumatic, moist mucous membranes  Neck: Supple, nontender, no JVD, Right IJ+  Respiratory: Coarse breath sounds present bl , no wheezes, rales, or rhonchi. Normal work of breathing  Cardiovascular: Regular rate and rhythm, no murmur present, rubs, or gallops. No peripheral edema. 2+ radial pulses, Mid chest scar from CABG  Gastrointestinal: Soft, nontender, nondistended abdomen   Musculoskeletal: Moves all extremities purposefully. No gross deformities. No calf tenderness  Integumentary: Warm, dry, intact. No rashes  Neurologic: Alert, not oriented to person and place, not verbalizing commands at this time.  Psychiatric: Appropriate mood and affect             Lines/Drains/Airways       Central Venous Catheter Line  Duration              Introducer Single Lumen 04/03/23 0721 <1 day     Introducer with Double Lumen 04/03/23 0721 <1 day    Introducer 04/03/23 0721 <1 day              Drain  Duration                  Chest Tube 04/03/23 0935 Tube - 1 Left Midaxillary 19 Fr. <1 day         Chest Tube 04/03/23 0935 Tube - 2 Mediastinal 24 Fr. <1 day         Urethral Catheter 04/03/23  0710 Silicone 16 Fr. <1 day              Airway  Duration                  Airway - Non-Surgical 04/03/23 0710 <1 day              Arterial Line  Duration             Arterial Line 04/03/23 0655 Radial <1 day              Peripheral Intravenous Line  Duration                  Peripheral IV - Single Lumen 04/03/23 0540 18 G Anterior;Right Forearm <1 day                    Significant Labs:    Lab Results   Component Value Date    WBC 3.8 (L) 04/03/2023    HGB 9.3 (L) 04/03/2023    HCT 27.6 (L) 04/03/2023    MCV 88.2 04/03/2023    PLT 68 (L) 04/03/2023         BMP  Lab Results   Component Value Date     (L) 04/03/2023    K 4.3 04/03/2023    CHLORIDE 99 04/03/2023    CO2 24 04/03/2023    BUN 20.5 04/03/2023    CREATININE 0.74 04/03/2023    CALCIUM 9.6 04/03/2023    AGAP 4.0 04/03/2023    EGFRNONAA >60 05/31/2022       ABG  Recent Labs   Lab 04/03/23  0710   PH 7.39   PO2 59*   PCO2 43   HCO3 26.0       Mechanical Ventilation Support:  Vent Mode: SIMV (04/03/23 1027)  Ventilator Initiated: Yes (04/03/23 1027)  Set Rate: 20 BPM (04/03/23 1027)  Vt Set: 500 mL (04/03/23 1027)  Pressure Support: 10 cmH20 (04/03/23 1027)  PEEP/CPAP: 5 cmH20 (04/03/23 1027)  Oxygen Concentration (%): 50 (04/03/23 1027)  Peak Airway Pressure: 14 cmH20 (04/03/23 1027)  Total Ve: 8.4 L/m (04/03/23 1027)  F/VT Ratio<105 (RSBI): (!) 51.34 (04/03/23 1027)    Significant Imaging:  I have reviewed the pertinent imaging within the past 24 hours.        Assessment/Plan:     Assessment  Left main and multivessel coronary artery disease s/p CABGx3 to LAD,OM,PDA and ligation left atrial appendage   Hypertension  Hx of Aflutter  Hx of thrombocytopenia   Hyponatremia   Hyperkalemia     Plan  Continue CABG protocol per CT surgery   Continue mechanical ventilator while in ICU; wean as tolerated   Continue chest tube management per CT surgery   Continue Lovenox 40 daily for DVT prophylaxis   Continue Aspirin 81 daily   Cardiology following; Appreciate  their assistance  Monitor platelets given hx of thrombocytopenia   Monitor potassium level and will consider shifting k and  getting another EKG if K level elevates       DVT Prophylaxis:Lovenox 40  GI Prophylaxis: Famotidine,sucralfate  Consults: Cards, CV Surgery  Lines: Art line, Right IJ Cardis  Sedation: None  Diet: NPO  Fluids: None          32 minutes of critical care was time spent personally by me on the following activities: development of treatment plan with patient or surrogate and bedside caregivers, discussions with consultants, evaluation of patient's response to treatment, examination of patient, ordering and performing treatments and interventions, ordering and review of laboratory studies, ordering and review of radiographic studies, pulse oximetry, re-evaluation of patient's condition.  This critical care time did not overlap with that of any other provider or involve time for any procedures.     Traci Saldaña,   Pulmonary Critical Care Medicine  [facility] - [facility]

## 2023-04-03 NOTE — ANESTHESIA PROCEDURE NOTES
Arterial    Diagnosis: coronary artery disease    Patient location during procedure: holding area  Procedure start time: 4/3/2023 6:55 AM  Timeout: 4/3/2023 6:54 AM  Procedure end time: 4/3/2023 6:59 AM    Staffing  Authorizing Provider: Dawit Ni DO  Performing Provider: Alberto Shabazz CRNA    Anesthesiologist was present at the time of the procedure.    Preanesthetic Checklist  Completed: patient identified, IV checked, site marked, risks and benefits discussed, surgical consent, monitors and equipment checked, pre-op evaluation, timeout performed and anesthesia consent givenArterial  Skin Prep: chlorhexidine gluconate  Local Infiltration: lidocaine  Location: radial    Catheter Size: 20 G  Catheter placement by Anatomical landmarks. Heme positive aspiration all ports. Insertion Attempts: 1  Assessment  Dressing: secured with tape and tegaderm  Patient: Tolerated well

## 2023-04-03 NOTE — ANESTHESIA PROCEDURE NOTES
CARO    Diagnosis: coronary artery ddisease  Patient location during procedure: OR  Procedure start time: 4/3/2023 7:20 AM  Timeout: 4/3/2023 7:20 AM  Procedure end time: 4/3/2023 7:30 AM  Exam type: Baseline    Staffing  Performed: anesthesiologist     Anesthesiologist: Dawit Ni DO        Anesthesiologist Present  Yes      Setup & Induction  Probe Insertion: easy  Exam: completeDoppler Echo: 2D, continuous wave Doppler and color flow mapping.  Exam     Left Heart      Left Ventricle: cm, normal (men 4.2-5.9; women 3.8-5.2)  LV Wall Thickness (posterior wall):cm, normal (men 0.6-1.0 cm; women 0.6-0.9 cm)    LVAD  Estimated Ejection Fraction: 45-54% mild  Regional Wall Abnormalities: RWMA present    Regional Wall Abnormalities    Four Chamber ME   Two Chamber ME   Longitudinal ME  TG Transversal MP  Inferior: 2  TG Transversal Basal  Inferior: 2        Right Heart    Intra Atrial Septum  PFO: no shunt by color flow doppler  no IAS aneurysm  no lipomatous hypertrophy  no Atrial Septal Defect (Asd)    Right Ventricle    Aortic Valve:  Stenosis: none.  Morphology: trileaflet    Regurgitation: no aortic valve regurgitation      Mitral Valve:   Morphology:normal  Prolapse: none  Flail: no flail  Jet Description: mild and centrally-directedStenosis: mild (Mean gradient 4mmHg).        Pulmonic Valve:  Morphology:normal  Regurgitation(color flow): mild    Great Vessels  Ascending Aorta Atherosclerosis: 2=mild dz (<3mm)  Aortic Arch Atherosclerosis: 2=mild dz (<3mm)  IABP: no  Descending Aorta Atherosclerosis: 2=mild dz (<3mm)  Aorta    Descending aorta IABP: no    Effusions none    SummaryFindings discussed with surgeon.    Other Findings

## 2023-04-03 NOTE — ANESTHESIA PREPROCEDURE EVALUATION
04/03/2023  Mike Urbina Jr. is a 67 y.o., male.      Pre-op Assessment    I have reviewed the Patient Summary Reports.     I have reviewed the Nursing Notes. I have reviewed the NPO Status.   I have reviewed the Medications.     Review of Systems  Anesthesia Hx:  No problems with previous Anesthesia    Social:  Non-Smoker    Hematology/Oncology:        Hematology Comments: Hx thrombocytopenia    H/H 14.3/41.5 plt 106   Cardiovascular:   Hypertension Denies MI. CAD   Dysrhythmias (a-flutter)   Denies Angina. CHF hyperlipidemia Echo 12/29/22    EF 55%    Mild AS    Mild MS    Cath 3/15/23    EF 60%    LMT 50%    LAD 60-70    CIRC 80-90%       Pulmonary:   Denies COPD.  Denies Asthma. Shortness of breath    Renal/:   Denies Chronic Renal Disease. no renal calculi  Cr 0.98   Hepatic/GI:   Denies GERD. Denies Liver Disease.  Denies Hepatitis.    Neurological:   Denies CVA. Denies Seizures. ETOH use - 6 pack qd  Prior Hx withdrawal   Endocrine:   Denies Diabetes. Denies Hypothyroidism. Denies Hyperthyroidism. K+ 4.7 Denies Obesity / BMI > 30      Physical Exam  General: Well nourished, Cooperative, Alert, Oriented and Anxious    Airway:  Mallampati: I   Mouth Opening: Normal  TM Distance: Normal  Neck ROM: Normal ROM    Dental:        Anesthesia Plan  Type of Anesthesia, risks & benefits discussed:    Anesthesia Type: Gen ETT  Intra-op Monitoring Plan: Standard ASA Monitors, Art Line, Central Line and CARO  Induction:  IV  Airway Plan: Video and Direct  Informed Consent: Informed consent signed with the Patient and all parties understand the risks and agree with anesthesia plan.  All questions answered. Patient consented to blood products? Yes  ASA Score: 4  Day of Surgery Review of History & Physical: H&P Update referred to the surgeon/provider.  Anesthesia Plan Notes: Consider versed for ETOH withdrawal  precautions    Ready For Surgery From Anesthesia Perspective.     .

## 2023-04-03 NOTE — TRANSFER OF CARE
"Anesthesia Transfer of Care Note    Patient: Mike Urbina Jr.    Procedure(s) Performed: Procedure(s) (LRB):  CORONARY ARTERY BYPASS GRAFT (CABG) (N/A)    Patient location: ICU    Anesthesia Type: general    Transport from OR: Transported from OR intubated on 100% O2 by AMBU with adequate controlled ventilation. Upon arrival to PACU/ICU, patient attached to ventilator and auscultated to confirm bilateral breath sounds and adequate TV. Continuous ECG monitoring in transport. Continuous SpO2 monitoring in transport. Continuos invasive BP monitoring in transport    Post pain: adequate analgesia    Post assessment: no apparent anesthetic complications    Post vital signs: stable    Level of consciousness: sedated    Nausea/Vomiting: no nausea/vomiting    Complications: none    Transfer of care protocol was followed      Last vitals:   Visit Vitals  BP (!) 142/83   Pulse 84   Temp 37 °C (98.6 °F) (Oral)   Resp 18   Ht 6' 2" (1.88 m)   Wt 78.9 kg (173 lb 15.1 oz)   SpO2 95%   BMI 22.33 kg/m²     "

## 2023-04-03 NOTE — ANESTHESIA POSTPROCEDURE EVALUATION
Anesthesia Post Evaluation    Patient: Mike Urbina Jr.    Procedure(s) Performed: Procedure(s) (LRB):  CORONARY ARTERY BYPASS GRAFT (CABG) (N/A)    Final Anesthesia Type: general      Patient location during evaluation: ICU  Patient participation: No - Unable to Participate, Intubation  Level of consciousness: sedated  Post-procedure vital signs: reviewed and stable  Pain management: adequate  Airway patency: patent    PONV status at discharge: No PONV  Anesthetic complications: no      Cardiovascular status: blood pressure returned to baseline  Respiratory status: intubated and ventilator  Hydration status: euvolemic  Follow-up needed           Vitals Value Taken Time   /69 04/03/23 1702   Temp 38 °C (100.4 °F) 04/03/23 1629   Pulse 90 04/03/23 1753   Resp 16 04/03/23 1753   SpO2 95 % 04/03/23 1753   Vitals shown include unvalidated device data.      No case tracking events are documented in the log.      Pain/Wanda Score: Pain Rating Prior to Med Admin: 6 (4/3/2023  5:30 PM)  Pain Rating Post Med Admin: 0 (4/3/2023 12:29 PM)

## 2023-04-03 NOTE — NURSING
Nurses Note -- 4 Eyes      4/3/2023   2:11 PM      Skin assessed during: Admit      [x] No Pressure Injuries Present    [x]Prevention Measures Documented      [] Yes- Altered Skin Integrity Present or Discovered   [] LDA Added if Not in Epic (Describe Wound)   [] New Altered Skin Integrity was Present on Admit and Documented in LDA   [] Wound Image Taken    Wound Care Consulted? No    Attending Nurse:  Isa Palmer RN     Second RN/Staff Member:  Taisha Rahman RN

## 2023-04-03 NOTE — OP NOTE
Date of service 04/03/2023   Preop diagnosis:  Coronary artery disease , atrial flutter  Postop diagnosis:  Same  Procedure:  CABG x3; left internal mammary artery to left anterior descending; reverse saphenous vein to the 1st obtuse marginal; reverse saphenous vein to the posterior descending artery; ligation of left atrial appendage with Endo stapler; endoscopic vein harvest of the left greater saphenous vein  Surgeon:  Tushar  Assistant:  Siddhartha  Anesthesia:  General endotracheal with cardiopulmonary bypass  Drains:  Chest tube x2  Procedure in detail  Patient was taken to the operating room consent placed on table in supine position.  General endotracheal anesthesia was induced by the anesthesia department.  Was prepped and draped in usual sterile fashion from the chin to the toes.  Incision made the left knee left greater saphenous vein exposed and harvested from the thigh with an endoscopic vein harvester.  It was gently dilated and noted to be of adequate caliber for bypass and all branches ligated.  These wounds closed with 2-0 Vicryl the skin with a 3-0 subcuticular stitch.  Incision made in the chest from the sternal notch to the xiphoid and carried down sharply to the sternum.  The sternum transected and the left internal mammary artery harvested along with the surrounding soft tissue pedicle.  The pericardium incised and the patient fully heparinized.  Aortic and venous cannulae were placed in the patient placed on bypass and cooled to 32° systemic.  The aorta was crossclamped and cold blood cardioplegia infused in an antegrade through the aortic root.  Left atrial appendage was ligated and excised with an Endo stapler.  The posterior descending artery was dissected and entered.  This was a 1.75 mm vessel.  Reversed saphenous vein was anastomosed in an end-to-side fashion with a running 7 0 Prolene suture.  There was excellent flow through this vessel after completion anastomosis and the vein measured  to reach the aorta and transected.  The 1st obtuse marginal was dissected and entered.  This was a vessel.  A reverse saphenous vein was anastomosed in an end-to-side fashion with a running 7 0 Prolene suture.  There was excellent flow through this vessel after completion of the anastomosis and the vein measured to reach the aorta and transected.  The left anterior descending was dissected and entered and this was a 2 mm vessel.  The left internal mammary artery was anastomosed in an end-to-side fashion with a running 7 0 Prolene suture.  There was brisk flow through the distal distribution of this vessel after completion of the anastomosis and the pedicle sutured to the epicardium with an interrupted 5 0 Prolene suture.  There was crossclamp was removed and the aortic exclusion clamp placed.  Two aortotomies made and enlarged with a 4 mm punch.  Each proximal venous anastomosis performed in an end-to-side fashion with a running 5 0 Prolene suture.  Vein marker placed on the ostium of each vein graft.  The patient spontaneously returned to sinus rhythm and subsequently was easily weaned from cardiopulmonary bypass.  Protamine given to reverse the heparin and the arterial and venous cannulae removed and their pursestring sutures secured.  The wound copiously irrigated with a 3% saline antibiotic solution.  A left pleural tube and a mediastinal tube were placed and brought out through separate stab wounds and secured to the skin.  Platelet concentrated aggregated growth factor was infused throughout the wound and sternum.  The sternum was closed with sternal wires.  The linea alba and sternal fascia closed with a running 1. Vicryl and the subcutaneous tissue with a 2-0 Vicryl and the skin with a 3-0 subcuticular stitch.  Transesophageal echo revealed excellent left ventricular function the patient tolerated this procedure well and left the operating room in stable condition.

## 2023-04-03 NOTE — ANESTHESIA PROCEDURE NOTES
Central Line    Diagnosis: coronary artery disease  Patient location during procedure: done in OR  Timeout: 4/3/2023 7:12 AM  Procedure end time: 4/3/2023 7:19 AM    Staffing  Authorizing Provider: Dawit Ni DO  Performing Provider: Alberto Shabazz CRNA    Staffing  Performed: anesthesiologist   Anesthesiologist: Dawit Ni DO  Resident/CRNA: Alberto Shabazz CRNA  Anesthesiologist was present at the time of the procedure.  Preanesthetic Checklist  Completed: patient identified, IV checked, site marked, risks and benefits discussed, surgical consent, monitors and equipment checked, pre-op evaluation, timeout performed and anesthesia consent given  Indication   Indication: hemodynamic monitoring, vascular access, med administration     Anesthesia   general anesthesia    Central Line   Skin Prep: skin prepped with ChloraPrep, skin prep agent completely dried prior to procedure  Sterile Barriers Followed: Yes    All five maximal barriers used- gloves, gown, cap, mask, and large sterile sheet    hand hygiene performed prior to central venous catheter insertion  Location: right internal jugular.   Catheter type: introducer  Catheter Size: 9 Fr  Inserted Catheter Length: 10 cm  Ultrasound: vascular probe with ultrasound   Vessel Caliber: large, patent, compressibility normal  Needle advanced into vessel with real time Ultrasound guidance.  Guidewire confirmed in vessel.  Image recorded and saved.  sterile gel and probe cover used in ultrasound-guided central venous catheter insertion  Manometry: none  Insertion Attempts: 1   Securement:line sutured, chlorhexidine patch, sterile dressing applied and blood return through all ports    Post-Procedure    Adverse Events:none      Guidewire Guidewire removed intact. Guidewire removed intact, verified with nurse.  Additional Notes  9 Fr + 12ga Double Lumen MAC Cath

## 2023-04-04 LAB
ABO + RH BLD: NORMAL
ABS NEUT (OLG): 1.67 X10(3)/MCL (ref 2.1–9.2)
ANION GAP SERPL CALC-SCNC: 5 MEQ/L
ANISOCYTOSIS BLD QL SMEAR: ABNORMAL
BASOPHILS # BLD AUTO: 0.01 X10(3)/MCL (ref 0–0.2)
BASOPHILS NFR BLD AUTO: 0.2 %
BLD PROD TYP BPU: NORMAL
BLOOD UNIT EXPIRATION DATE: NORMAL
BLOOD UNIT TYPE CODE: 5100
BUN SERPL-MCNC: 22.8 MG/DL (ref 8.4–25.7)
CALCIUM SERPL-MCNC: 7.6 MG/DL (ref 8.8–10)
CHLORIDE SERPL-SCNC: 103 MMOL/L (ref 98–107)
CO2 SERPL-SCNC: 22 MMOL/L (ref 23–31)
CREAT SERPL-MCNC: 0.98 MG/DL (ref 0.73–1.18)
CREAT/UREA NIT SERPL: 23
CROSSMATCH INTERPRETATION: NORMAL
DISPENSE STATUS: NORMAL
EOSINOPHIL # BLD AUTO: 0.01 X10(3)/MCL (ref 0–0.9)
EOSINOPHIL NFR BLD AUTO: 0.2 %
ERYTHROCYTE [DISTWIDTH] IN BLOOD BY AUTOMATED COUNT: 14.8 % (ref 11.5–17)
ERYTHROCYTE [DISTWIDTH] IN BLOOD BY AUTOMATED COUNT: 15.7 % (ref 11.5–17)
GFR SERPLBLD CREATININE-BSD FMLA CKD-EPI: >60 MLS/MIN/1.73/M2
GLUCOSE SERPL-MCNC: 106 MG/DL (ref 82–115)
HCT VFR BLD AUTO: 22.3 % (ref 42–52)
HCT VFR BLD AUTO: 29 % (ref 42–52)
HGB BLD-MCNC: 10 G/DL (ref 14–18)
HGB BLD-MCNC: 7.4 G/DL (ref 14–18)
IMM GRANULOCYTES # BLD AUTO: 0.03 X10(3)/MCL (ref 0–0.04)
IMM GRANULOCYTES NFR BLD AUTO: 0.7 %
INSTRUMENT WBC (OLG): 1.9 X10(3)/MCL
LYMPHOCYTES # BLD AUTO: 0.38 X10(3)/MCL (ref 0.6–4.6)
LYMPHOCYTES NFR BLD AUTO: 8.8 %
LYMPHOCYTES NFR BLD MANUAL: 0.23 X10(3)/MCL
LYMPHOCYTES NFR BLD MANUAL: 12 %
MCH RBC QN AUTO: 29.1 PG (ref 27–31)
MCH RBC QN AUTO: 30.8 PG (ref 27–31)
MCHC RBC AUTO-ENTMCNC: 33.2 G/DL (ref 33–36)
MCHC RBC AUTO-ENTMCNC: 34.5 G/DL (ref 33–36)
MCV RBC AUTO: 87.8 FL (ref 80–94)
MCV RBC AUTO: 89.2 FL (ref 80–94)
MICROCYTES BLD QL SMEAR: ABNORMAL
MONOCYTES # BLD AUTO: 0.37 X10(3)/MCL (ref 0.1–1.3)
MONOCYTES NFR BLD AUTO: 8.5 %
NEUTROPHILS # BLD AUTO: 3.53 X10(3)/MCL (ref 2.1–9.2)
NEUTROPHILS NFR BLD AUTO: 81.6 %
NEUTROPHILS NFR BLD MANUAL: 88 %
NRBC BLD AUTO-RTO: 0 %
NRBC BLD AUTO-RTO: 0 %
PLATELET # BLD AUTO: 39 X10(3)/MCL (ref 130–400)
PLATELET # BLD AUTO: 74 X10(3)/MCL (ref 130–400)
PLATELET # BLD EST: ABNORMAL 10*3/UL
PMV BLD AUTO: 10.5 FL (ref 7.4–10.4)
PMV BLD AUTO: 10.6 FL (ref 7.4–10.4)
POCT GLUCOSE: 102 MG/DL (ref 70–110)
POCT GLUCOSE: 111 MG/DL (ref 70–110)
POCT GLUCOSE: 114 MG/DL (ref 70–110)
POCT GLUCOSE: 129 MG/DL (ref 70–110)
POCT GLUCOSE: 130 MG/DL (ref 70–110)
POCT GLUCOSE: 136 MG/DL (ref 70–110)
POCT GLUCOSE: 143 MG/DL (ref 70–110)
POCT GLUCOSE: 146 MG/DL (ref 70–110)
POCT GLUCOSE: 152 MG/DL (ref 70–110)
POIKILOCYTOSIS BLD QL SMEAR: ABNORMAL
POTASSIUM SERPL-SCNC: 4.6 MMOL/L (ref 3.5–5.1)
PSYCHE PATHOLOGY RESULT: NORMAL
RBC # BLD AUTO: 2.54 X10(6)/MCL (ref 4.7–6.1)
RBC # BLD AUTO: 3.25 X10(6)/MCL (ref 4.7–6.1)
RBC MORPH BLD: ABNORMAL
SODIUM SERPL-SCNC: 130 MMOL/L (ref 136–145)
STOMATOCYTES (OLG): ABNORMAL
TARGETS BLD QL SMEAR: ABNORMAL
UNIT NUMBER: NORMAL
WBC # SPEC AUTO: 1.9 X10(3)/MCL (ref 4.5–11.5)
WBC # SPEC AUTO: 4.3 X10(3)/MCL (ref 4.5–11.5)

## 2023-04-04 PROCEDURE — 63600175 PHARM REV CODE 636 W HCPCS: Performed by: PHYSICIAN ASSISTANT

## 2023-04-04 PROCEDURE — 85027 COMPLETE CBC AUTOMATED: CPT | Performed by: SPECIALIST

## 2023-04-04 PROCEDURE — 99900031 HC PATIENT EDUCATION (STAT)

## 2023-04-04 PROCEDURE — P9016 RBC LEUKOCYTES REDUCED: HCPCS | Performed by: SPECIALIST

## 2023-04-04 PROCEDURE — 25000003 PHARM REV CODE 250: Performed by: PHYSICIAN ASSISTANT

## 2023-04-04 PROCEDURE — 85027 COMPLETE CBC AUTOMATED: CPT | Performed by: PHYSICIAN ASSISTANT

## 2023-04-04 PROCEDURE — 21400001 HC TELEMETRY ROOM

## 2023-04-04 PROCEDURE — 36430 TRANSFUSION BLD/BLD COMPNT: CPT

## 2023-04-04 PROCEDURE — 11000001 HC ACUTE MED/SURG PRIVATE ROOM

## 2023-04-04 PROCEDURE — 63600175 PHARM REV CODE 636 W HCPCS: Performed by: SPECIALIST

## 2023-04-04 PROCEDURE — P9035 PLATELET PHERES LEUKOREDUCED: HCPCS | Performed by: INTERNAL MEDICINE

## 2023-04-04 PROCEDURE — 94761 N-INVAS EAR/PLS OXIMETRY MLT: CPT

## 2023-04-04 PROCEDURE — 99024 POSTOP FOLLOW-UP VISIT: CPT | Mod: POP,,, | Performed by: PHYSICIAN ASSISTANT

## 2023-04-04 PROCEDURE — 25000003 PHARM REV CODE 250: Performed by: NURSE PRACTITIONER

## 2023-04-04 PROCEDURE — 94799 UNLISTED PULMONARY SVC/PX: CPT

## 2023-04-04 PROCEDURE — 80048 BASIC METABOLIC PNL TOTAL CA: CPT | Performed by: PHYSICIAN ASSISTANT

## 2023-04-04 PROCEDURE — P9040 RBC LEUKOREDUCED IRRADIATED: HCPCS | Performed by: INTERNAL MEDICINE

## 2023-04-04 PROCEDURE — S5010 5% DEXTROSE AND 0.45% SALINE: HCPCS | Performed by: PHYSICIAN ASSISTANT

## 2023-04-04 PROCEDURE — 99024 PR POST-OP FOLLOW-UP VISIT: ICD-10-PCS | Mod: POP,,, | Performed by: PHYSICIAN ASSISTANT

## 2023-04-04 RX ORDER — HYDROCODONE BITARTRATE AND ACETAMINOPHEN 500; 5 MG/1; MG/1
TABLET ORAL
Status: DISCONTINUED | OUTPATIENT
Start: 2023-04-04 | End: 2023-04-17 | Stop reason: HOSPADM

## 2023-04-04 RX ORDER — LABETALOL HYDROCHLORIDE 5 MG/ML
20 INJECTION, SOLUTION INTRAVENOUS EVERY 4 HOURS PRN
Status: DISCONTINUED | OUTPATIENT
Start: 2023-04-05 | End: 2023-04-17 | Stop reason: HOSPADM

## 2023-04-04 RX ORDER — IBUPROFEN 200 MG
16 TABLET ORAL
Status: DISCONTINUED | OUTPATIENT
Start: 2023-04-04 | End: 2023-04-14

## 2023-04-04 RX ORDER — KETOROLAC TROMETHAMINE 30 MG/ML
30 INJECTION, SOLUTION INTRAMUSCULAR; INTRAVENOUS EVERY 6 HOURS PRN
Status: DISPENSED | OUTPATIENT
Start: 2023-04-04 | End: 2023-04-07

## 2023-04-04 RX ORDER — LORAZEPAM 1 MG/1
1 TABLET ORAL EVERY 6 HOURS
Status: DISCONTINUED | OUTPATIENT
Start: 2023-04-05 | End: 2023-04-04

## 2023-04-04 RX ORDER — ATORVASTATIN CALCIUM 40 MG/1
40 TABLET, FILM COATED ORAL DAILY
Status: DISCONTINUED | OUTPATIENT
Start: 2023-04-04 | End: 2023-04-17 | Stop reason: HOSPADM

## 2023-04-04 RX ORDER — INSULIN ASPART 100 [IU]/ML
0-5 INJECTION, SOLUTION INTRAVENOUS; SUBCUTANEOUS
Status: DISCONTINUED | OUTPATIENT
Start: 2023-04-04 | End: 2023-04-14

## 2023-04-04 RX ORDER — LORAZEPAM 1 MG/1
1 TABLET ORAL EVERY 6 HOURS
Status: DISPENSED | OUTPATIENT
Start: 2023-04-04 | End: 2023-04-06

## 2023-04-04 RX ORDER — HYDRALAZINE HYDROCHLORIDE 20 MG/ML
20 INJECTION INTRAMUSCULAR; INTRAVENOUS EVERY 6 HOURS PRN
Status: DISCONTINUED | OUTPATIENT
Start: 2023-04-05 | End: 2023-04-17 | Stop reason: HOSPADM

## 2023-04-04 RX ORDER — GLUCAGON 1 MG
1 KIT INJECTION
Status: DISCONTINUED | OUTPATIENT
Start: 2023-04-04 | End: 2023-04-14

## 2023-04-04 RX ORDER — METOPROLOL SUCCINATE 50 MG/1
50 TABLET, EXTENDED RELEASE ORAL DAILY
Status: DISCONTINUED | OUTPATIENT
Start: 2023-04-05 | End: 2023-04-07

## 2023-04-04 RX ORDER — IBUPROFEN 200 MG
24 TABLET ORAL
Status: DISCONTINUED | OUTPATIENT
Start: 2023-04-04 | End: 2023-04-14

## 2023-04-04 RX ADMIN — FAMOTIDINE 20 MG: 10 INJECTION INTRAVENOUS at 08:04

## 2023-04-04 RX ADMIN — OXYCODONE HYDROCHLORIDE 10 MG: 10 TABLET ORAL at 02:04

## 2023-04-04 RX ADMIN — DOCUSATE SODIUM 100 MG: 100 CAPSULE, LIQUID FILLED ORAL at 08:04

## 2023-04-04 RX ADMIN — LORAZEPAM 1 MG: 1 TABLET ORAL at 08:04

## 2023-04-04 RX ADMIN — OXYCODONE HYDROCHLORIDE 10 MG: 10 TABLET ORAL at 12:04

## 2023-04-04 RX ADMIN — SUCRALFATE 1 G: 1 TABLET ORAL at 08:04

## 2023-04-04 RX ADMIN — MUPIROCIN: 20 OINTMENT TOPICAL at 08:04

## 2023-04-04 RX ADMIN — ACETAMINOPHEN 650 MG: 650 SOLUTION ORAL at 02:04

## 2023-04-04 RX ADMIN — SUCRALFATE 1 G: 1 TABLET ORAL at 11:04

## 2023-04-04 RX ADMIN — SUCRALFATE 1 G: 1 TABLET ORAL at 04:04

## 2023-04-04 RX ADMIN — KETOROLAC TROMETHAMINE 30 MG: 30 INJECTION, SOLUTION INTRAMUSCULAR; INTRAVENOUS at 08:04

## 2023-04-04 RX ADMIN — DEXTROSE AND SODIUM CHLORIDE: 5; 450 INJECTION, SOLUTION INTRAVENOUS at 06:04

## 2023-04-04 RX ADMIN — OXYCODONE HYDROCHLORIDE 10 MG: 10 TABLET ORAL at 06:04

## 2023-04-04 RX ADMIN — OXYCODONE HYDROCHLORIDE 10 MG: 10 TABLET ORAL at 07:04

## 2023-04-04 RX ADMIN — MORPHINE SULFATE 4 MG: 10 INJECTION INTRAVENOUS at 09:04

## 2023-04-04 RX ADMIN — OXYCODONE HYDROCHLORIDE 10 MG: 10 TABLET ORAL at 04:04

## 2023-04-04 RX ADMIN — ATORVASTATIN CALCIUM 40 MG: 40 TABLET, FILM COATED ORAL at 10:04

## 2023-04-04 RX ADMIN — ASPIRIN 81 MG: 81 TABLET, COATED ORAL at 08:04

## 2023-04-04 RX ADMIN — SUCRALFATE 1 G: 1 TABLET ORAL at 06:04

## 2023-04-04 RX ADMIN — KETOROLAC TROMETHAMINE 30 MG: 30 INJECTION, SOLUTION INTRAMUSCULAR; INTRAVENOUS at 10:04

## 2023-04-04 RX ADMIN — HYDROCODONE BITARTRATE AND ACETAMINOPHEN 1 TABLET: 5; 325 TABLET ORAL at 09:04

## 2023-04-04 NOTE — PLAN OF CARE
04/04/23 1300   Discharge Assessment   Assessment Type Discharge Planning Assessment   Confirmed/corrected address, phone number and insurance Yes   Confirmed Demographics Correct on Facesheet   Source of Information patient   When was your last doctors appointment?   (Dr. Darlene Willams)   Communicated [place] with patient/caregiver Date not available/Unable to determine   Reason For Admission CABG   People in Home friend(s)   Do you expect to return to your current living situation? Yes   Do you have help at home or someone to help you manage your care at home? No   Prior to hospitilization cognitive status: Alert/Oriented;No Deficits   Current cognitive status: Alert/Oriented;No Deficits   Home Layout Able to live on 1st floor   Equipment Currently Used at Home none   Readmission within 30 days? No   Patient currently being followed by outpatient case management? No   Do you currently have service(s) that help you manage your care at home? No   Do you take prescription medications? Yes   Do you have prescription coverage? Yes   Do you have any problems affording any of your prescribed medications? No   Is the patient taking medications as prescribed? yes   How do you get to doctors appointments? car, drives self;family or friend will provide   Are you on dialysis? No   Do you take coumadin? No   Discharge Plan A Home Health   Discharge Plan B Rehab   Discharge Plan discussed with: Patient   Alcohol Use   Q1: How often do you have a drink containing alcohol? 4 or more ti   Q2: How many drinks containing alcohol do you have on a typical day when you are drinking? 10 or more   Q3: How often do you have six or more drinks on one occasion? Daily

## 2023-04-04 NOTE — CONSULTS
Inpatient consult to Cardiology  Consult performed by: HONORIO Villalpando  Consult ordered by: GISEL Bingham      [facility] - [facility]  Cardiology  Consult Note    Patient Name: Mike Urbina Jr.  MRN: [id number]  Admission Date: 4/3/2023  Hospital Length of Stay: 1 days  Code Status: Full Code   Attending Provider: Graham Aiken MD   Consulting Provider: HONORIO Villalpando  Primary Care Physician: LOREN Jerry  Principal Problem:<principal problem not specified>    Patient information was obtained from patient, past medical records, and ER records.     Subjective:     Chief Complaint: Reason for Consult: Post CABG Medical Management    HPI: Mr. Urbina is a 68 y/o male who is known to [facility], Dr. Herrera. The patient presented to [facility] on 4.3.23 for a Planned CABG. The patient was recently worked up worked up for a NSTEMI and found to have an Abnormal PET Scan. The PT underwent a LHC with Noted MVCAD. He was referred to [facility] and deemed a candidate for CABG and on 4.3.23 he underwent a Sternotomy with Results of: LIMA to LAD, rSVG to OM1, rSVG to PDA and Ligation of SILVANO with Endostapler. The patient tolerated the procedure well and was stabilized in the ER. [facility] has been consulted for Medical Management of the PT.    PMH: ETOH Abuse, VHD (AS), Thrombocytopenia, NSTEMI, HTN, PAF/Flutter  PSH: Angiogram, CABG, Cataract Extraction   Family History: Mother, L, MI  Social History: + ETOH Use (6pk/Beer per Day for > 30 Years); Denies Tobacco and Illicit Drug Use    Previous Cardiac Diagnostics:   C 3.15.23:  Surgical coronary anatomy with distal left main 50%; LAD proximal to mid 60-70%; circumflex mid 80- 90%; RCA with proximal .  The ejection fraction was 60% with EDP of 10 mmHg.  Right radial access.  The estimated blood loss was less than 10 cc.  CT surgical consult for CABG evaluation.    PET 1.6.23:  This is an abnormal perfusion study. Study is consistent with ischemia.    This scan is suggestive of moderate risk for future cardiovascular events.   Small reversible perfusion abnormality of moderate intensity in the apical segment. Medium fixed perfusion abnormality of severe intensity in the inferior region.   The left ventricular cavity is noted to be normal on the stress studies. The stress left ventricular ejection fraction was calculated to be 35% and left ventricular global function is moderately reduced. The rest left ventricular cavity is noted to be normal. The rest left ventricular ejection fraction was calculated to be 40% and rest left ventricular global function is mildly reduced.   When compared to the resting ejection fraction (40%), the stress ejection fraction (35%) has decreased.   The study quality is good.   There was a rise in myocardial blood flow between rest and stress.  Global myocardial blood flow reserve was 1.47.  Myocardial blood flow reserve is globally abnormal, placing the patient at a higher coronary event risk.    ECHO 12.9.22:  The left ventricle is normal in size with mild concentric hypertrophy and normal systolic function.  Grade I left ventricular diastolic dysfunction.  The estimated ejection fraction is 55%.  Normal right ventricular size with normal right ventricular systolic function.  There is mild aortic valve stenosis.  There is mild mitral stenosis.  Normal central venous pressure (3 mmHg).    Review of patient's allergies indicates:  No Known Allergies    No current facility-administered medications on file prior to encounter.     Current Outpatient Medications on File Prior to Encounter   Medication Sig    aspirin (ECOTRIN) 81 MG EC tablet Take 1 tablet (81 mg total) by mouth once daily.    atorvastatin (LIPITOR) 10 MG tablet Take 40 mg by mouth once daily.    furosemide (LASIX) 40 MG tablet TAKE ONE TABLET BY MOUTH DAILY DOSE INCREASED    hydrOXYzine HCL (ATARAX) 25 MG tablet TAKE 1 TABLET BY MOUTH EVERY EVENING    metoprolol  succinate (TOPROL-XL) 100 MG 24 hr tablet Take 1 tablet (100 mg total) by mouth once daily.    NIFEdipine (PROCARDIA-XL) 30 MG (OSM) 24 hr tablet Take 1 tablet (30 mg total) by mouth once daily. (Patient taking differently: Take 60 mg by mouth once daily.)    valsartan (DIOVAN) 40 MG tablet Take 1 tablet (40 mg total) by mouth 2 (two) times daily.     Review of Systems   Constitutional:  Positive for fatigue. Negative for fever.   Respiratory:  Negative for chest tightness and shortness of breath.    Cardiovascular:  Positive for chest pain. Negative for palpitations and leg swelling.        Incisional   All other systems reviewed and are negative.    Objective:     Vital Signs (Most Recent):  Temp: 98 °F (36.7 °C) (04/04/23 0400)  Pulse: 83 (04/04/23 0630)  Resp: 18 (04/04/23 0915)  BP: 122/74 (04/04/23 0630)  SpO2: (!) 94 % (04/04/23 0800)   Vital Signs (24h Range):  Temp:  [96.6 °F (35.9 °C)-100.2 °F (37.9 °C)] 98 °F (36.7 °C)  Pulse:  [] 83  Resp:  [7-25] 18  SpO2:  [88 %-100 %] 94 %  BP: (104-150)/(59-88) 122/74     Weight: 83.3 kg (183 lb 10.3 oz)  Body mass index is 23.58 kg/m².    SpO2: (!) 94 %         Intake/Output Summary (Last 24 hours) at 4/4/2023 0924  Last data filed at 4/4/2023 0600  Gross per 24 hour   Intake 5258.95 ml   Output 2583 ml   Net 2675.95 ml       Lines/Drains/Airways       Central Venous Catheter Line  Duration              Introducer with Double Lumen 04/03/23 0721 1 day              Drain  Duration                  Urethral Catheter 04/03/23 0710 Silicone 16 Fr. 1 day         Chest Tube 04/03/23 0935 Tube - 1 Left Midaxillary 19 Fr. <1 day         Chest Tube 04/03/23 0935 Tube - 2 Mediastinal 24 Fr. <1 day              Arterial Line  Duration             Arterial Line 04/03/23 0655 Radial 1 day              Peripheral Intravenous Line  Duration                  Peripheral IV - Single Lumen 04/03/23 0540 18 G Anterior;Right Forearm 1 day                  Significant Labs:  Recent  Results (from the past 72 hour(s))   Prepare RBC 4 Units; Surgery - stay ahead 2 units    Collection Time: 04/03/23  5:30 AM   Result Value Ref Range    UNIT NUMBER [id number]     UNIT ABO/RH O NEG     DISPENSE STATUS Released     Unit Expiration [id number]     Product Code W3466V71     Unit Blood Type Code 9500     CROSSMATCH INTERPRETATION Compatible     UNIT NUMBER [id number]     UNIT ABO/RH O NEG     DISPENSE STATUS Released     Unit Expiration [id number]     Product Code H8159Q06     Unit Blood Type Code 9500     CROSSMATCH INTERPRETATION Compatible     UNIT NUMBER [id number]     UNIT ABO/RH O NEG     DISPENSE STATUS Selected     Unit Expiration [id number]     Product Code E7859K51     Unit Blood Type Code 9500     CROSSMATCH INTERPRETATION Compatible     UNIT NUMBER [id number]     UNIT ABO/RH O NEG     DISPENSE STATUS Selected     Unit Expiration [id number]     Product Code G5433S94     Unit Blood Type Code 9500     CROSSMATCH INTERPRETATION Compatible    Type & Screen    Collection Time: 04/03/23  5:30 AM   Result Value Ref Range    Group & Rh O NEG     Indirect Rox GEL NEG     Specimen Outdate 04/06/2023 23:59    Potassium    Collection Time: 04/03/23  5:30 AM   Result Value Ref Range    Potassium Level 4.7 3.5 - 5.1 mmol/L   Prepare RBC 2 Units; Physician ordered    Collection Time: 04/03/23  5:30 AM   Result Value Ref Range    UNIT NUMBER [id number]     UNIT ABO/RH O NEG     DISPENSE STATUS Selected     Unit Expiration [id number]     Product Code T4901H43     Unit Blood Type Code 9500     CROSSMATCH INTERPRETATION Compatible     UNIT NUMBER [id number]     UNIT ABO/RH O NEG     DISPENSE STATUS Selected     Unit Expiration [id number]     Product Code Y7846C09     Unit Blood Type Code 9500     CROSSMATCH INTERPRETATION Compatible    Prepare Platelets 1 Dose    Collection Time: 04/03/23  5:30 AM   Result Value Ref Range    UNIT NUMBER [id number]     UNIT ABO/RH O POS      DISPENSE STATUS Selected     Unit Expiration [id number]     Product Code P7899G17     Unit Blood Type Code 5100     CROSSMATCH INTERPRETATION Not required    POCT glucose    Collection Time: 04/03/23  5:49 AM   Result Value Ref Range    POCT Glucose 95 70 - 110 mg/dL   POCT ARTERIAL BLOOD GAS    Collection Time: 04/03/23  7:10 AM   Result Value Ref Range    POC PH 7.39 7.35 - 7.45    POC PCO2 43 35 - 45 mmHg    POC PO2 59 (A) 80 - 100 mmHg    POC HEMOGLOBIN 11.4 (A) 12.0 - 16.0 g/dL    POC SATURATED O2 89.9 %    POC O2Hb 87.9 (A) 94.0 - 97.0 %    POC COHb 0.50 %    POC MetHb 0.40 0.40 - 1.5 %    POC Potassium 4.3 3.5 - 5.0 mmol/l    POC Sodium 123 (A) 137 - 145 mmol/l    POC Ionized Calcium 1.12 1.12 - 1.23 mmol/l    Correct Temperature (PH) 7.39 7.35 - 7.45    Corrected Temperature (pCO2) 43 35 - 45 mmHg    Corrected Temperature (pO2) 59 (A) 80 - 100 mmHg    POC HCO3 26.0 22.0 - 26.0 mmol/l    Base Deficit 0.80 -2.0 - 2.0 mmol/l    POC Temp 37.0 °C    Specimen source Arterial sample    Basic Metabolic Panel    Collection Time: 04/03/23  9:02 AM   Result Value Ref Range    Sodium Level 127 (L) 136 - 145 mmol/L    Potassium Level 4.3 3.5 - 5.1 mmol/L    Chloride 99 98 - 107 mmol/L    Carbon Dioxide 24 23 - 31 mmol/L    Glucose Level 106 82 - 115 mg/dL    Blood Urea Nitrogen 20.5 8.4 - 25.7 mg/dL    Creatinine 0.74 0.73 - 1.18 mg/dL    BUN/Creatinine Ratio 28     Calcium Level Total 9.6 8.8 - 10.0 mg/dL    Anion Gap 4.0 mEq/L    eGFR >60 mls/min/1.73/m2   Protime-INR    Collection Time: 04/03/23  9:02 AM   Result Value Ref Range    PT 17.7 (H) 12.5 - 14.5 seconds    INR 1.48 (H) 0.00 - 1.30   APTT    Collection Time: 04/03/23  9:02 AM   Result Value Ref Range    PTT 38.2 (H) 23.2 - 33.7 seconds   CBC with Differential    Collection Time: 04/03/23  9:02 AM   Result Value Ref Range    WBC 3.8 (L) 4.5 - 11.5 x10(3)/mcL    RBC 3.13 (L) 4.70 - 6.10 x10(6)/mcL    Hgb 9.3 (L) 14.0 - 18.0 g/dL    Hct 27.6 (L) 42.0 - 52.0 %     MCV 88.2 80.0 - 94.0 fL    MCH 29.7 27.0 - 31.0 pg    MCHC 33.7 33.0 - 36.0 g/dL    RDW 14.3 11.5 - 17.0 %    Platelet 68 (L) 130 - 400 x10(3)/mcL    MPV 10.1 7.4 - 10.4 fL    Neut % 64.1 %    Lymph % 26.0 %    Mono % 4.8 %    Eos % 4.0 %    Basophil % 0.3 %    Lymph # 0.98 0.6 - 4.6 x10(3)/mcL    Neut # 2.42 2.1 - 9.2 x10(3)/mcL    Mono # 0.18 0.1 - 1.3 x10(3)/mcL    Eos # 0.15 0 - 0.9 x10(3)/mcL    Baso # 0.01 0 - 0.2 x10(3)/mcL    IG# 0.03 0 - 0.04 x10(3)/mcL    IG% 0.8 %    NRBC% 0.0 %   POCT glucose    Collection Time: 04/03/23 10:36 AM   Result Value Ref Range    POCT Glucose 85 70 - 110 mg/dL   Transesophageal echo (CARO)    Collection Time: 04/03/23 10:52 AM   Result Value Ref Range    BSA 2.03 m2   POCT glucose    Collection Time: 04/03/23 11:05 AM   Result Value Ref Range    POCT Glucose 117 (H) 70 - 110 mg/dL   POCT ARTERIAL BLOOD GAS    Collection Time: 04/03/23 11:36 AM   Result Value Ref Range    POC PH 7.32 (A) 7.35 - 7.45    POC PCO2 43 mmHg    POC PO2 74 (A) mmHg    POC SATURATED O2 93 %    POC Potassium 4.1 mmol/l    POC Sodium 127 (A) 137 - 145 mmol/l    POC Ionized Calcium 1.26 (A) 1.12 - 1.23 mmol/l    POC HCO3 22.2 mmol/l    Base Deficit -3.8 mmol/l    POC Temp 37.0 C    Specimen source Arterial sample    POCT glucose    Collection Time: 04/03/23 12:02 PM   Result Value Ref Range    POCT Glucose 190 (H) 70 - 110 mg/dL   POCT glucose    Collection Time: 04/03/23  1:03 PM   Result Value Ref Range    POCT Glucose 159 (H) 70 - 110 mg/dL   Hemoglobin and Hematocrit    Collection Time: 04/03/23  1:29 PM   Result Value Ref Range    Hgb 9.2 (L) 14.0 - 18.0 g/dL    Hct 27.0 (L) 42.0 - 52.0 %   Basic Metabolic Panel    Collection Time: 04/03/23  1:29 PM   Result Value Ref Range    Sodium Level 129 (L) 136 - 145 mmol/L    Potassium Level 4.2 3.5 - 5.1 mmol/L    Chloride 101 98 - 107 mmol/L    Carbon Dioxide 21 (L) 23 - 31 mmol/L    Glucose Level 161 (H) 82 - 115 mg/dL    Blood Urea Nitrogen 20.9 8.4 -  25.7 mg/dL    Creatinine 0.77 0.73 - 1.18 mg/dL    BUN/Creatinine Ratio 27     Calcium Level Total 8.3 (L) 8.8 - 10.0 mg/dL    Anion Gap 7.0 mEq/L    eGFR >60 mls/min/1.73/m2   POCT glucose    Collection Time: 04/03/23  2:01 PM   Result Value Ref Range    POCT Glucose 152 (H) 70 - 110 mg/dL   POCT glucose    Collection Time: 04/03/23  2:59 PM   Result Value Ref Range    POCT Glucose 147 (H) 70 - 110 mg/dL   POCT glucose    Collection Time: 04/03/23  3:58 PM   Result Value Ref Range    POCT Glucose 131 (H) 70 - 110 mg/dL   POCT glucose    Collection Time: 04/03/23  4:57 PM   Result Value Ref Range    POCT Glucose 121 (H) 70 - 110 mg/dL   POCT glucose    Collection Time: 04/03/23  6:01 PM   Result Value Ref Range    POCT Glucose 105 70 - 110 mg/dL   POCT glucose    Collection Time: 04/03/23  6:55 PM   Result Value Ref Range    POCT Glucose 109 70 - 110 mg/dL   POCT glucose    Collection Time: 04/03/23  8:15 PM   Result Value Ref Range    POCT Glucose 136 (H) 70 - 110 mg/dL   POCT glucose    Collection Time: 04/03/23  9:04 PM   Result Value Ref Range    POCT Glucose 145 (H) 70 - 110 mg/dL   POCT glucose    Collection Time: 04/03/23 10:08 PM   Result Value Ref Range    POCT Glucose 172 (H) 70 - 110 mg/dL   POCT glucose    Collection Time: 04/03/23 11:00 PM   Result Value Ref Range    POCT Glucose 148 (H) 70 - 110 mg/dL   POCT glucose    Collection Time: 04/03/23 11:44 PM   Result Value Ref Range    POCT Glucose 141 (H) 70 - 110 mg/dL   Basic metabolic panel    Collection Time: 04/04/23  1:41 AM   Result Value Ref Range    Sodium Level 130 (L) 136 - 145 mmol/L    Potassium Level 4.6 3.5 - 5.1 mmol/L    Chloride 103 98 - 107 mmol/L    Carbon Dioxide 22 (L) 23 - 31 mmol/L    Glucose Level 106 82 - 115 mg/dL    Blood Urea Nitrogen 22.8 8.4 - 25.7 mg/dL    Creatinine 0.98 0.73 - 1.18 mg/dL    BUN/Creatinine Ratio 23     Calcium Level Total 7.6 (L) 8.8 - 10.0 mg/dL    Anion Gap 5.0 mEq/L    eGFR >60 mls/min/1.73/m2   CBC with  Differential    Collection Time: 04/04/23  1:41 AM   Result Value Ref Range    WBC 1.9 (LL) 4.5 - 11.5 x10(3)/mcL    RBC 2.54 (L) 4.70 - 6.10 x10(6)/mcL    Hgb 7.4 (L) 14.0 - 18.0 g/dL    Hct 22.3 (L) 42.0 - 52.0 %    MCV 87.8 80.0 - 94.0 fL    MCH 29.1 27.0 - 31.0 pg    MCHC 33.2 33.0 - 36.0 g/dL    RDW 14.8 11.5 - 17.0 %    Platelet 39 (LL) 130 - 400 x10(3)/mcL    MPV 10.6 (H) 7.4 - 10.4 fL    NRBC% 0.0 %   Manual Differential    Collection Time: 04/04/23  1:41 AM   Result Value Ref Range    Neut Man 88 %    Lymph Man 12 %    Instr WBC 1.9 x10(3)/mcL    Abs Lymp 0.228 (L) 0.6 - 4.6 x10(3)/mcL    Abs Neut 1.672 (L) 2.1 - 9.2 x10(3)/mcL    RBC Morph Abnormal (A) Normal    Anisocyte 1+ (A) (none)    Poik 1+ (A) (none)    Microcyte 1+ (A) (none)    Target Cell 1+ (A) (none)    Stomatocytes 1+ (A) (none)    Platelet Est Decreased (A) Normal, Adequate   POCT glucose    Collection Time: 04/04/23  1:55 AM   Result Value Ref Range    POCT Glucose 111 (H) 70 - 110 mg/dL   POCT glucose    Collection Time: 04/04/23  4:00 AM   Result Value Ref Range    POCT Glucose 130 (H) 70 - 110 mg/dL   POCT glucose    Collection Time: 04/04/23  6:12 AM   Result Value Ref Range    POCT Glucose 129 (H) 70 - 110 mg/dL   POCT glucose    Collection Time: 04/04/23  7:21 AM   Result Value Ref Range    POCT Glucose 146 (H) 70 - 110 mg/dL   POCT glucose    Collection Time: 04/04/23  8:16 AM   Result Value Ref Range    POCT Glucose 143 (H) 70 - 110 mg/dL     Telemetry: SR    Physical Exam  Constitutional:       Appearance: Normal appearance.   HENT:      Head: Normocephalic.      Mouth/Throat:      Mouth: Mucous membranes are moist.   Eyes:      Extraocular Movements: Extraocular movements intact.   Cardiovascular:      Rate and Rhythm: Normal rate and regular rhythm.      Pulses: Normal pulses.      Heart sounds: Normal heart sounds.   Pulmonary:      Effort: Pulmonary effort is normal.      Breath sounds: Normal breath sounds.   Abdominal:       Palpations: Abdomen is soft.   Skin:     General: Skin is warm and dry.      Comments: Midline Sternotomy Dressing C/D/I. + CTs    Neurological:      General: No focal deficit present.      Mental Status: He is alert and oriented to person, place, and time. Mental status is at baseline.   Psychiatric:         Mood and Affect: Mood normal.         Behavior: Behavior normal.     Home Medications:   No current facility-administered medications on file prior to encounter.     Current Outpatient Medications on File Prior to Encounter   Medication Sig Dispense Refill    aspirin (ECOTRIN) 81 MG EC tablet Take 1 tablet (81 mg total) by mouth once daily. 30 tablet 0    atorvastatin (LIPITOR) 10 MG tablet Take 40 mg by mouth once daily.      furosemide (LASIX) 40 MG tablet TAKE ONE TABLET BY MOUTH DAILY DOSE INCREASED      hydrOXYzine HCL (ATARAX) 25 MG tablet TAKE 1 TABLET BY MOUTH EVERY EVENING 30 tablet 1    metoprolol succinate (TOPROL-XL) 100 MG 24 hr tablet Take 1 tablet (100 mg total) by mouth once daily. 30 tablet 0    NIFEdipine (PROCARDIA-XL) 30 MG (OSM) 24 hr tablet Take 1 tablet (30 mg total) by mouth once daily. (Patient taking differently: Take 60 mg by mouth once daily.) 30 tablet 0    valsartan (DIOVAN) 40 MG tablet Take 1 tablet (40 mg total) by mouth 2 (two) times daily. 60 tablet 0     Current Inpatient Medications:    Current Facility-Administered Medications:     0.9%  NaCl infusion (for blood administration), , Intravenous, Q24H PRN, Graham Aiken MD    0.9%  NaCl infusion (for blood administration), , Intravenous, Q24H PRN, Graham Aiken MD    acetaminophen oral solution 650 mg, 650 mg, Per OG tube, Q6H PRN, GISEL Bingham, 650 mg at 04/04/23 0204    albumin human 5% bottle 12.5 g, 12.5 g, Intravenous, PRN, GISEL Bingham, Stopped at 04/03/23 2100    aspirin EC tablet 81 mg, 81 mg, Oral, Daily, GISEL Bingham, 81 mg at 04/04/23 0810    calcium gluconate 1 g in NS IVPB  (premixed), 1 g, Intravenous, PRN, Nicholas P Jeffrey, PA    calcium gluconate 1 g in NS IVPB (premixed), 2 g, Intravenous, PRN, Nicholas P Langlinais, PA    calcium gluconate 1 g in NS IVPB (premixed), 3 g, Intravenous, PRN, Nicholas P Evelins, PA    dextrose 10% bolus 125 mL 125 mL, 12.5 g, Intravenous, PRN, Nicholas P Langgabyais, PA    dextrose 10% bolus 250 mL 250 mL, 25 g, Intravenous, PRN, Nicholas P Karlaais, PA    dextrose 5 % and 0.45 % NaCl infusion, , Intravenous, Continuous, Nicholas P Evelins, PA, Last Rate: 100 mL/hr at 04/04/23 0605, New Bag at 04/04/23 0605    docusate sodium capsule 100 mg, 100 mg, Oral, BID, Nicholas P Jeffrey, PA, 100 mg at 04/04/23 0810    famotidine (PF) injection 20 mg, 20 mg, Intravenous, BID, Nicholas P GISEL Murphy, 20 mg at 04/04/23 0810    HYDROcodone-acetaminophen 5-325 mg per tablet 1 tablet, 1 tablet, Oral, Q4H PRN, GISEL Bingham, 1 tablet at 04/04/23 0915    insulin regular in 0.9 % NaCl 100 unit/100 mL (1 unit/mL) infusion, 0-52 Units/hr, Intravenous, Continuous, Nicholas Murphy PA, Last Rate: 1 mL/hr at 04/03/23 1800, 1 Units/hr at 04/03/23 1800    ketorolac injection 30 mg, 30 mg, Intravenous, Q6H PRN, Deuce Finley IV, MD, 30 mg at 04/03/23 1730    magnesium sulfate 2g in water 50mL IVPB (premix), 2 g, Intravenous, PRN, Nicholas P Evelins, PA    magnesium sulfate 2g in water 50mL IVPB (premix), 4 g, Intravenous, PRN, Nicholas P Evelins, PA    morphine injection 4 mg, 4 mg, Intravenous, Q4H PRN, GISEL Bingham, 4 mg at 04/03/23 1116    mupirocin 2 % ointment, , Nasal, BID, GISEL Bingham, Given at 04/04/23 0810    ondansetron injection 4 mg, 4 mg, Intravenous, Q4H PRN, GISEL Bingham    oxyCODONE immediate release tablet 10 mg, 10 mg, Oral, Q4H PRN, GISEL Bingham, 10 mg at 04/04/23 0605    potassium chloride 20 mEq in 100 mL IVPB (FOR CENTRAL LINE ADMINISTRATION ONLY), 20 mEq, Intravenous, PRN, GISEL Bingham    potassium chloride 20  mEq in 100 mL IVPB (FOR CENTRAL LINE ADMINISTRATION ONLY), 20 mEq, Intravenous, PRN, Nicholas P Langlinais, PA    potassium chloride 40 mEq in 100 mL IVPB (FOR CENTRAL LINE ADMINISTRATION ONLY), 40 mEq, Intravenous, PRN, Nicholas P Langlinais, PA    sodium phosphate 15 mmol in dextrose 5 % (D5W) 250 mL IVPB, 15 mmol, Intravenous, PRN, Nicholas P Langlinais, PA    sodium phosphate 20.01 mmol in dextrose 5 % (D5W) 250 mL IVPB, 20.01 mmol, Intravenous, PRN, [place] P Langlinais, PA    sodium phosphate 30 mmol in dextrose 5 % (D5W) 250 mL IVPB, 30 mmol, Intravenous, PRN, Nicholas P Langlinais, PA    sucralfate tablet 1 g, 1 g, Oral, QID (AC & HS), Nicholas P Langlinais, PA, 1 g at 04/04/23 0605    VTE Risk Mitigation (From admission, onward)           Ordered     Place sequential compression device  Until discontinued         04/04/23 0324     IP VTE HIGH RISK PATIENT  Once         04/03/23 0951                  Assessment:   MVCAD    - s/p CABG (4.3.23) - LIMA to LAD, rSVG to OM1, rSVG to PDA  PAF/Flutter - Now SR    - CHADsVASc - 3 Points - 3.2% Stroke Risk per Year     - s/p (4.3.23) - Ligation of SILVANO with Endostapler  HTN/HHD without Hx of HF or CKD  Anemia - Acute Blood Loss  Thrombocytopenia - Chronic   VHD (Mild AS, Mild MS)  Hx of NSTEMI  ETOH Abuse  No Hx of GIB    Plan:   See below MDM formulated by me (Phuc Jimenes MD):     Agree with PLT/PRBCs Transfusion   Continue ASA  Start Atorvastatin 40mg PO QDay  Aggressive Mobilization and Q1HR IS  Labs and EKG in AM: CBC, CMP and Mg    Thank you for your consult.     HONORIO Villalpando and Phuc Jimenes MD  Cardiology  [facility] - [facility] ICU  04/04/2023 9:24 AM

## 2023-04-04 NOTE — NURSING
Nurses Note -- 4 Eyes      4/4/2023   6:25 PM      Skin assessed during: Transfer      [x] No Pressure Injuries Present    []Prevention Measures Documented      [] Yes- Altered Skin Integrity Present or Discovered   [] LDA Added if Not in Epic (Describe Wound)   [] New Altered Skin Integrity was Present on Admit and Documented in LDA   [] Wound Image Taken    Wound Care Consulted? No    Attending Nurse:  Ariana Nicole LPN     Second RN/Staff Member:  Amrita Kumari RN

## 2023-04-04 NOTE — PROGRESS NOTES
[facility] - [facility] ICU  Pulmonary Critical Care Note    Patient Name: Mike Urbina Jr.  MRN: [id number]  Admission Date: 4/3/2023  Hospital Length of Stay: 1 days  Code Status: Full Code  Attending Provider: Graham Aiken MD  Primary Care Provider: LOREN Jerry     Subjective:     HPI:   67 year old male with pmh of HTN, Aflutter, thrombocytopenia presented to ICU after performing CABG for multivessel disease.  Coronary angiogram done previously revealed a 50-60% left main stenosis and an occluded right and significant disease of the circ system. Patient underwent CABG to the LAD, OM, PDA and ligation of his left atrial appendage on 4/3/23 by CT surgery and was transferred to ICU for further care and management.     Hospital Course/Significant events:  4/3/2023 - Admitted to ICU s/p CABG    24 Hour Interval History:  No acute events overnight. Patient extubated yesterday and currently tolerating room air. He was also weaned off of most drips, only insulin drip remaining. He states having pain of 4 out of 10 in the center of his chest, it is intermittent and aggravated by cough/deep breaths. Labs this AM: WBC 1.9k, H/H 7.4/22.3, PLT 39k, Na+ 130. I/O: 2.3 L output past 24 hours, net positive 744 mL.     Past Medical History:   Diagnosis Date    Atrial flutter     CHF (congestive heart failure)     Coronary artery disease     Hypertension     SOB (shortness of breath)     at times       Past Surgical History:   Procedure Laterality Date    CATARACT EXTRACTION Bilateral     LEFT HEART CATHETERIZATION N/A 03/15/2023    Procedure: CATHETERIZATION, HEART, LEFT;  Surgeon: Sreedhar Herrera MD;  Location: [facility] CATH LAB;  Service: Cardiology;  Laterality: N/A;  LHC via A       Social History     Socioeconomic History    Marital status: Single   Tobacco Use    Smoking status: Never     Passive exposure: Never    Smokeless tobacco: Never   Substance and Sexual Activity    Alcohol use: Yes      Alcohol/week: 84.0 standard drinks     Types: 84 Cans of beer per week     Comment: alcoholic, daily, beer    Drug use: Yes     Frequency: 3.0 times per week     Types: Hydrocodone    Sexual activity: Not Currently   Social History Narrative    ** Merged History Encounter **          Social Determinants of Health     Financial Resource Strain: Low Risk     Difficulty of Paying Living Expenses: Not hard at all   Food Insecurity: No Food Insecurity    Worried About Running Out of Food in the Last Year: Never true    Ran Out of Food in the Last Year: Never true   Transportation Needs: No Transportation Needs    Lack of Transportation (Medical): No    Lack of Transportation (Non-Medical): No   Physical Activity: Inactive    Days of Exercise per Week: 0 days    Minutes of Exercise per Session: 0 min   Stress: No Stress Concern Present    Feeling of Stress : Only a little   Social Connections: Moderately Isolated    Frequency of Communication with Friends and Family: More than three times a week    Frequency of Social Gatherings with Friends and Family: More than three times a week    Attends Hoahaoism Services: 1 to 4 times per year    Active Member of Clubs or Organizations: No    Attends Club or Organization Meetings: Never    Marital Status: Never    Housing Stability: Low Risk     Unable to Pay for Housing in the Last Year: No    Number of Places Lived in the Last Year: 1    Unstable Housing in the Last Year: No       Current Outpatient Medications   Medication Instructions    aspirin (ECOTRIN) 81 mg, Oral, Daily    atorvastatin (LIPITOR) 40 mg, Oral, Daily    furosemide (LASIX) 40 MG tablet TAKE ONE TABLET BY MOUTH DAILY DOSE INCREASED    hydrOXYzine HCL (ATARAX) 25 MG tablet TAKE 1 TABLET BY MOUTH EVERY EVENING    metoprolol succinate (TOPROL-XL) 100 mg, Oral, Daily    NIFEdipine (PROCARDIA-XL) 30 mg, Oral, Daily    valsartan (DIOVAN) 40 mg, Oral, 2 times daily     Current Inpatient Medications   aspirin   81 mg Oral Daily    docusate sodium  100 mg Oral BID    enoxaparin  40 mg Subcutaneous Daily    famotidine (PF)  20 mg Intravenous BID    mupirocin   Nasal BID    sucralfate  1 g Oral QID (AC & HS)     Current Intravenous Infusions   sodium chloride 0.9% 20 mL/hr at 04/03/23 1200    clevidipine Stopped (04/03/23 1154)    dexmedeTOMIDine (Precedex) infusion (titrating) Stopped (04/03/23 1130)    dextrose 5 % and 0.45 % NaCl 100 mL/hr at 04/03/23 2055    EPINEPHrine Stopped (04/03/23 2030)    insulin regular 1 units/mL infusion orderable (CTS POST-OP) 1 Units/hr (04/03/23 1800)     Review of Systems   Constitutional:  Negative for chills and fever.   Respiratory:  Negative for shortness of breath.    Cardiovascular:  Positive for chest pain. Negative for palpitations.   Neurological:  Positive for weakness.      Objective:     Intake/Output Summary (Last 24 hours) at 4/4/2023 0317  Last data filed at 4/3/2023 2000  Gross per 24 hour   Intake 3092.27 ml   Output 2348 ml   Net 744.27 ml     Vital Signs (Most Recent):  Temp: 99.1 °F (37.3 °C) (04/04/23 0000)  Pulse: 83 (04/04/23 0230)  Resp: 16 (04/04/23 0230)  BP: 115/68 (04/04/23 0230)  SpO2: (!) 93 % (04/04/23 0230)  Body mass index is 22.33 kg/m².  Weight: 78.9 kg (173 lb 15.1 oz) Vital Signs (24h Range):  Temp:  [96.6 °F (35.9 °C)-100.2 °F (37.9 °C)] 99.1 °F (37.3 °C)  Pulse:  [] 83  Resp:  [7-25] 16  SpO2:  [88 %-100 %] 93 %  BP: (104-150)/(59-88) 115/68     Physical Exam  General: Well nourished w/ mild distress  HEENT: NC/AT; PERRL; nasal and oral mucosa moist and clear  Pulm: CTA bilaterally, normal work of breathing on room air  CV: S1, S2 w/o murmurs or gallops; no edema noted  GI: Soft with normal bowel sounds in all quadrants, no masses on palpation  MSK: No obvious deformities  Neuro: AAOx3; motor/sensory function intact  Psych: Cooperative; appropriate mood and affect    Lines/Drains/Airways       Central Venous Catheter Line  Duration               Introducer with Double Lumen 04/03/23 0721 <1 day              Drain  Duration                  Chest Tube 04/03/23 0935 Tube - 1 Left Midaxillary 19 Fr. <1 day         Chest Tube 04/03/23 0935 Tube - 2 Mediastinal 24 Fr. <1 day         Urethral Catheter 04/03/23 0710 Silicone 16 Fr. <1 day              Arterial Line  Duration             Arterial Line 04/03/23 0655 Radial <1 day              Peripheral Intravenous Line  Duration                  Peripheral IV - Single Lumen 04/03/23 0540 18 G Anterior;Right Forearm <1 day                  Significant Labs:  Lab Results   Component Value Date    WBC 1.9 (LL) 04/04/2023    HGB 7.4 (L) 04/04/2023    HCT 22.3 (L) 04/04/2023    MCV 87.8 04/04/2023    PLT 39 (LL) 04/04/2023       BMP  Lab Results   Component Value Date     (L) 04/04/2023    K 4.6 04/04/2023    CHLORIDE 103 04/04/2023    CO2 22 (L) 04/04/2023    BUN 22.8 04/04/2023    CREATININE 0.98 04/04/2023    CALCIUM 7.6 (L) 04/04/2023    AGAP 5.0 04/04/2023    EGFRNONAA >60 05/31/2022     ABG  Recent Labs   Lab 04/03/23  1136   PH 7.32*   PO2 74*   PCO2 43   HCO3 22.2     Mechanical Ventilation Support:  Vent Mode: CPAP / PSV (04/03/23 1130)  Ventilator Initiated: Yes (04/03/23 1027)  Set Rate: 20 BPM (04/03/23 1027)  Vt Set: 500 mL (04/03/23 1027)  Pressure Support: 10 cmH20 (04/03/23 1130)  PEEP/CPAP: 5 cmH20 (04/03/23 1130)  Oxygen Concentration (%): 40 (04/03/23 1130)  Peak Airway Pressure: 17 cmH20 (04/03/23 1045)  Total Ve: 12.9 L/m (04/03/23 1045)  F/VT Ratio<105 (RSBI): (!) 51.23 (04/03/23 1045)    Significant Imaging:  I have reviewed the pertinent imaging within the past 24 hours.    Assessment/Plan:     Assessment  CAD s/p CABG  Thrombocytopenia  Hyponatremia  HX of HTN, A-flutter    Plan  -Continue ICU level of care for post-CABG monitoring; will plan to downgrade patient if CT surgery team agrees  -CT surgery team following, will provide care per their recommendations. Will defer to CT surgery  team in terms of continuiing ASA 81mg daily in the setting of worsening thrombocytopenia  -D/C LMWH d/t worsening thrombocytopenia    DVT Prophylaxis: SCD  GI Prophylaxis: Famotidine, sucralfate     32 minutes of critical care was time spent personally by me on the following activities: development of treatment plan with patient or surrogate and bedside caregivers, discussions with consultants, evaluation of patient's response to treatment, examination of patient, ordering and performing treatments and interventions, ordering and review of laboratory studies, ordering and review of radiographic studies, pulse oximetry, re-evaluation of patient's condition.  This critical care time did not overlap with that of any other provider or involve time for any procedures.     Jewell Bai DO, PGY-II  Pulmonary Critical Care Medicine  [facility] - [address] [place] [address]

## 2023-04-05 LAB
ALBUMIN SERPL-MCNC: 3 G/DL (ref 3.4–4.8)
ALBUMIN/GLOB SERPL: 1.4 RATIO (ref 1.1–2)
ALP SERPL-CCNC: 59 UNIT/L (ref 40–150)
ALT SERPL-CCNC: 17 UNIT/L (ref 0–55)
AST SERPL-CCNC: 27 UNIT/L (ref 5–34)
BASOPHILS # BLD AUTO: 0.01 X10(3)/MCL (ref 0–0.2)
BASOPHILS NFR BLD AUTO: 0.3 %
BILIRUBIN DIRECT+TOT PNL SERPL-MCNC: 1.8 MG/DL
BUN SERPL-MCNC: 28.8 MG/DL (ref 8.4–25.7)
CALCIUM SERPL-MCNC: 8.2 MG/DL (ref 8.8–10)
CHLORIDE SERPL-SCNC: 102 MMOL/L (ref 98–107)
CO2 SERPL-SCNC: 21 MMOL/L (ref 23–31)
CREAT SERPL-MCNC: 1.29 MG/DL (ref 0.73–1.18)
EOSINOPHIL # BLD AUTO: 0.01 X10(3)/MCL (ref 0–0.9)
EOSINOPHIL NFR BLD AUTO: 0.3 %
ERYTHROCYTE [DISTWIDTH] IN BLOOD BY AUTOMATED COUNT: 16.4 % (ref 11.5–17)
GFR SERPLBLD CREATININE-BSD FMLA CKD-EPI: 60 MLS/MIN/1.73/M2
GLOBULIN SER-MCNC: 2.2 GM/DL (ref 2.4–3.5)
GLUCOSE SERPL-MCNC: 136 MG/DL (ref 82–115)
HCT VFR BLD AUTO: 27.9 % (ref 42–52)
HGB BLD-MCNC: 9.5 G/DL (ref 14–18)
IMM GRANULOCYTES # BLD AUTO: 0.04 X10(3)/MCL (ref 0–0.04)
IMM GRANULOCYTES NFR BLD AUTO: 1.1 %
LYMPHOCYTES # BLD AUTO: 0.37 X10(3)/MCL (ref 0.6–4.6)
LYMPHOCYTES NFR BLD AUTO: 10.5 %
MAGNESIUM SERPL-MCNC: 2.1 MG/DL (ref 1.6–2.6)
MCH RBC QN AUTO: 30.4 PG (ref 27–31)
MCHC RBC AUTO-ENTMCNC: 34.1 G/DL (ref 33–36)
MCV RBC AUTO: 89.1 FL (ref 80–94)
MONOCYTES # BLD AUTO: 0.37 X10(3)/MCL (ref 0.1–1.3)
MONOCYTES NFR BLD AUTO: 10.5 %
NEUTROPHILS # BLD AUTO: 2.71 X10(3)/MCL (ref 2.1–9.2)
NEUTROPHILS NFR BLD AUTO: 77.3 %
NRBC BLD AUTO-RTO: 0 %
PLATELET # BLD AUTO: 59 X10(3)/MCL (ref 130–400)
PMV BLD AUTO: 10.9 FL (ref 7.4–10.4)
POTASSIUM SERPL-SCNC: 4.7 MMOL/L (ref 3.5–5.1)
PROT SERPL-MCNC: 5.2 GM/DL (ref 5.8–7.6)
RBC # BLD AUTO: 3.13 X10(6)/MCL (ref 4.7–6.1)
SODIUM SERPL-SCNC: 131 MMOL/L (ref 136–145)
WBC # SPEC AUTO: 3.5 X10(3)/MCL (ref 4.5–11.5)

## 2023-04-05 PROCEDURE — 27000221 HC OXYGEN, UP TO 24 HOURS

## 2023-04-05 PROCEDURE — 94760 N-INVAS EAR/PLS OXIMETRY 1: CPT

## 2023-04-05 PROCEDURE — 80053 COMPREHEN METABOLIC PANEL: CPT | Performed by: NURSE PRACTITIONER

## 2023-04-05 PROCEDURE — 63600175 PHARM REV CODE 636 W HCPCS: Performed by: PHYSICIAN ASSISTANT

## 2023-04-05 PROCEDURE — 25000003 PHARM REV CODE 250: Performed by: NURSE PRACTITIONER

## 2023-04-05 PROCEDURE — 93010 ELECTROCARDIOGRAM REPORT: CPT | Mod: ,,, | Performed by: INTERNAL MEDICINE

## 2023-04-05 PROCEDURE — 97166 OT EVAL MOD COMPLEX 45 MIN: CPT

## 2023-04-05 PROCEDURE — 97110 THERAPEUTIC EXERCISES: CPT

## 2023-04-05 PROCEDURE — 25000003 PHARM REV CODE 250: Performed by: PHYSICIAN ASSISTANT

## 2023-04-05 PROCEDURE — 21400001 HC TELEMETRY ROOM

## 2023-04-05 PROCEDURE — 83735 ASSAY OF MAGNESIUM: CPT | Performed by: NURSE PRACTITIONER

## 2023-04-05 PROCEDURE — 97162 PT EVAL MOD COMPLEX 30 MIN: CPT

## 2023-04-05 PROCEDURE — 93010 EKG 12-LEAD: ICD-10-PCS | Mod: ,,, | Performed by: INTERNAL MEDICINE

## 2023-04-05 PROCEDURE — 93005 ELECTROCARDIOGRAM TRACING: CPT

## 2023-04-05 PROCEDURE — 85025 COMPLETE CBC W/AUTO DIFF WBC: CPT | Performed by: PHYSICIAN ASSISTANT

## 2023-04-05 PROCEDURE — 25000003 PHARM REV CODE 250: Performed by: SPECIALIST

## 2023-04-05 PROCEDURE — 63600175 PHARM REV CODE 636 W HCPCS: Performed by: NURSE PRACTITIONER

## 2023-04-05 RX ORDER — FAMOTIDINE 20 MG/1
20 TABLET, FILM COATED ORAL 2 TIMES DAILY
Status: DISCONTINUED | OUTPATIENT
Start: 2023-04-05 | End: 2023-04-17 | Stop reason: HOSPADM

## 2023-04-05 RX ORDER — VALSARTAN 80 MG/1
160 TABLET ORAL 2 TIMES DAILY
Status: DISCONTINUED | OUTPATIENT
Start: 2023-04-05 | End: 2023-04-17 | Stop reason: HOSPADM

## 2023-04-05 RX ADMIN — VALSARTAN 160 MG: 80 TABLET, FILM COATED ORAL at 09:04

## 2023-04-05 RX ADMIN — SUCRALFATE 1 G: 1 TABLET ORAL at 12:04

## 2023-04-05 RX ADMIN — LORAZEPAM 1 MG: 1 TABLET ORAL at 11:04

## 2023-04-05 RX ADMIN — DOCUSATE SODIUM 100 MG: 100 CAPSULE, LIQUID FILLED ORAL at 08:04

## 2023-04-05 RX ADMIN — SUCRALFATE 1 G: 1 TABLET ORAL at 08:04

## 2023-04-05 RX ADMIN — LORAZEPAM 1 MG: 1 TABLET ORAL at 05:04

## 2023-04-05 RX ADMIN — ATORVASTATIN CALCIUM 40 MG: 40 TABLET, FILM COATED ORAL at 08:04

## 2023-04-05 RX ADMIN — VALSARTAN 160 MG: 80 TABLET, FILM COATED ORAL at 08:04

## 2023-04-05 RX ADMIN — FAMOTIDINE 20 MG: 20 TABLET, FILM COATED ORAL at 08:04

## 2023-04-05 RX ADMIN — HYDRALAZINE HYDROCHLORIDE 20 MG: 20 INJECTION INTRAMUSCULAR; INTRAVENOUS at 02:04

## 2023-04-05 RX ADMIN — OXYCODONE HYDROCHLORIDE 10 MG: 10 TABLET ORAL at 01:04

## 2023-04-05 RX ADMIN — LABETALOL HYDROCHLORIDE 20 MG: 5 INJECTION, SOLUTION INTRAVENOUS at 02:04

## 2023-04-05 RX ADMIN — SUCRALFATE 1 G: 1 TABLET ORAL at 05:04

## 2023-04-05 RX ADMIN — LORAZEPAM 1 MG: 1 TABLET ORAL at 12:04

## 2023-04-05 RX ADMIN — MUPIROCIN: 20 OINTMENT TOPICAL at 09:04

## 2023-04-05 RX ADMIN — ACETAMINOPHEN 650 MG: 650 SOLUTION ORAL at 08:04

## 2023-04-05 RX ADMIN — KETOROLAC TROMETHAMINE 30 MG: 30 INJECTION, SOLUTION INTRAMUSCULAR; INTRAVENOUS at 02:04

## 2023-04-05 RX ADMIN — METOPROLOL SUCCINATE 50 MG: 50 TABLET, EXTENDED RELEASE ORAL at 08:04

## 2023-04-05 RX ADMIN — METOPROLOL SUCCINATE 50 MG: 50 TABLET, EXTENDED RELEASE ORAL at 12:04

## 2023-04-05 RX ADMIN — ASPIRIN 81 MG: 81 TABLET, COATED ORAL at 08:04

## 2023-04-05 RX ADMIN — MUPIROCIN: 20 OINTMENT TOPICAL at 08:04

## 2023-04-05 RX ADMIN — HYDRALAZINE HYDROCHLORIDE 20 MG: 20 INJECTION INTRAMUSCULAR; INTRAVENOUS at 01:04

## 2023-04-05 NOTE — PLAN OF CARE
Problem: Occupational Therapy  Goal: Occupational Therapy Goal  Description: Goals to be met by: 4/19/23     Patient will increase functional independence with ADLs by performing:    UE Dressing with Modified [place].  LE Dressing with Modified [place] and Assistive Devices as needed.  Grooming while standing at sink with Modified [place].  Toileting from toilet with Modified [place] for hygiene and clothing management.   Toilet transfer to toilet with Modified [place].    Outcome: Ongoing, Progressing

## 2023-04-05 NOTE — PT/OT/SLP EVAL
Occupational Therapy   Evaluation    Name: Mike Urbina Jr.  MRN: [id number]  Admitting Diagnosis: Left main and multivessel coronary artery disease s/p CABGx3 to LAD,OM,PDA and ligation left atrial appendage, Hypertension, Hx of Aflutter, Hx of thrombocytopenia, Hyponatremia, Hyperkalemia   Recent Surgery: Procedure(s) (LRB):  CORONARY ARTERY BYPASS GRAFT (CABG) (N/A) 2 Days Post-Op    Recommendations:     Discharge Recommendations: rehabilitation facility  Discharge Equipment Recommendations:  walker, rolling, TTB, dressing AEDs (TBD, pending progress)  Barriers to discharge: Medical dx, decreased caregiver support    Assessment:     Mike Urbina Jr. is a 67 y.o. male with a medical diagnosis of Left main and multivessel coronary artery disease s/p CABGx3 to LAD,OM,PDA and ligation left atrial appendage, Hypertension, Hx of Aflutter, Hx of thrombocytopenia, Hyponatremia, Hyperkalemia. He presents with HTN. Performance deficits affecting function: weakness, impaired endurance, impaired self care skills, impaired functional mobility, gait instability, impaired balance, decreased safety awareness.      Rehab Prognosis: Good; patient would benefit from acute skilled OT services to address these deficits and reach maximum level of function.       Plan:     Patient to be seen 6 x/week to address the above listed problems via self-care/home management, therapeutic activities, therapeutic exercises  Plan of Care Expires: 04/19/23  Plan of Care Reviewed with: patient    Subjective     Chief Complaint: Pt denied any pain.  Patient/Family Comments/goals: Return to PLOF.    Occupational Profile:  Living Environment: Pt lives with friend in [facility] with NO JOAO. Owns tub/shower. However, pt reported that he has been taking baths 2/2 broken shower.  Previous level of function: Independent with ADLs, provided assist with cooking and laundry tasks, and pt was driving.  Equipment Used at Home: None. Pt reported that he  owns a shower chair, but that he wasn't utilizing it at home. Pt also stated that he may own a BSC from family.  Assistance upon Discharge: Pt's friend.     Pain/Comfort:  Pain Rating 1: 0/10    Patients cultural, spiritual, Oriental orthodox conflicts given the current situation: no    Objective:     Communicated with: RN prior to session.  Patient found HOB elevated with telemetry, pulse ox (continuous), oxygen, chest tubes x2 upon OT entry to room.    General Precautions: Standard, fall, sternal pxns  Respiratory Status: Nasal cannula, flow 2 L/min  Vitals:  BP: 171/95 and 172/108. RN was notified, and administered pt's BP medication during eval. Pt's RN also cleared pt to participate in therapy.  DE: 86-88  O2: O2 desaturation to 83% and 80% during eval. However, pt's O2 able to recover to WNL within 1 minute with rest and use of proper breathing technique.     Occupational Performance:    Bed Mobility:    Pt t/f from lying with HOB elevated to seated EOB with mod A provided, requiring assist to lift trunk in order to adhere to pxns.    Functional Mobility/Transfers:  Patient completed Sit <> Stand Transfer with minimum assistance  with  BUEs positioned across chest during sit <> stand and use of RW for balance while standing.   Patient completed Bed > Chair Transfer using Step Transfer technique with contact guard assistance with use of RW for balance    Activities of Daily Living:  Grooming: Pt combed hair with SBA provided while seated EOB.  Lower Body Dressing: Pt doffed/donned B socks with rest breaks and mod A provided while seated EOB. Pt required assist to don B socks during task.    Cognitive/Visual Perceptual:  Cognitive/Psychosocial Skills:     -       Oriented to: Person, Time, and Situation. Pt required education on city.  -       Follows Commands/attention:Follows one-step commands and Follows two-step commands  -       Safety awareness/insight to disability: impaired     Physical Exam:  Upper Extremity  "Range of Motion and Strength:     -       BUEs: WFL within sternal pxns    Treatment & Education:  OT provided education on sternal pxns ("move in the tube") and proper breathing technique to increase pt's safety and independence with ADLs.     Note: RN administered pt's BP medication, and cardiac rehab donned pt's B philipp hose during eval.    Patient left up in chair with all lines intact and call button in reach    GOALS:   Multidisciplinary Problems       Occupational Therapy Goals          Problem: Occupational Therapy    Goal Priority Disciplines Outcome Interventions   Occupational Therapy Goal     OT, PT/OT Ongoing, Progressing    Description: Goals to be met by: 4/19/23     Patient will increase functional independence with ADLs by performing:    UE Dressing with Modified [place].  LE Dressing with Modified [place] and Assistive Devices as needed.  Grooming while standing at sink with Modified [place].  Toileting from toilet with Modified [place] for hygiene and clothing management.   Toilet transfer to toilet with Modified [place].                         History:     Past Medical History:   Diagnosis Date    Atrial flutter     CHF (congestive heart failure)     Coronary artery disease     Hypertension     SOB (shortness of breath)     at times         Past Surgical History:   Procedure Laterality Date    CATARACT EXTRACTION Bilateral     CORONARY ARTERY BYPASS GRAFT (CABG) N/A 4/3/2023    Procedure: CORONARY ARTERY BYPASS GRAFT (CABG);  Surgeon: Deuce Finley IV, MD;  Location: [facility];  Service: Cardiovascular;  Laterality: N/A;  WITH LLAA //  ECHO NOTIFIED    LEFT HEART CATHETERIZATION N/A 03/15/2023    Procedure: CATHETERIZATION, HEART, LEFT;  Surgeon: Sreedhar Herrera MD;  Location: [facility] CATH LAB;  Service: Cardiology;  Laterality: N/A;  LHC via RRA       Time Tracking:     OT Date of Treatment: 4/5/23  OT Start Time: 0939  OT Stop Time: 1022  OT Total Time (min): 43 " min    Billable Minutes:Evaluation Mod Complexity 43 mins    4/5/2023

## 2023-04-05 NOTE — PROGRESS NOTES
"Mike Urbina Jr. is a 67 y.o. male patient.   1. Anticoagulated    2. Coronary artery disease of native artery of native heart with stable angina pectoris    3. Coronary artery disease    4. CAD (coronary artery disease)      Past Medical History:   Diagnosis Date    Atrial flutter     CHF (congestive heart failure)     Coronary artery disease     Hypertension     SOB (shortness of breath)     at times     No past surgical history pertinent negatives on file.  Scheduled Meds:   aspirin  81 mg Oral Daily    atorvastatin  40 mg Oral Daily    docusate sodium  100 mg Oral BID    famotidine  20 mg Oral BID    LORazepam  1 mg Oral Q6H    metoprolol succinate  50 mg Oral Daily    mupirocin   Nasal BID    sucralfate  1 g Oral QID (AC & HS)    valsartan  160 mg Oral BID     Continuous Infusions:  PRN Meds:sodium chloride, sodium chloride, acetaminophen, albumin human 5%, calcium gluconate IVPB, calcium gluconate IVPB, calcium gluconate IVPB, dextrose 10%, dextrose 10%, dextrose 10%, dextrose 10%, glucagon (human recombinant), glucose, glucose, hydrALAZINE, HYDROcodone-acetaminophen, insulin aspart U-100, ketorolac, labetalol, magnesium sulfate IVPB, magnesium sulfate IVPB, ondansetron, oxyCODONE, potassium chloride in water, potassium chloride in water, potassium chloride in water, sodium phosphate IVPB, sodium phosphate IVPB, sodium phosphate IVPB    Review of patient's allergies indicates:  No Known Allergies  There are no hospital problems to display for this patient.    Blood pressure (!) 153/75, pulse 93, temperature 97.4 °F (36.3 °C), temperature source Oral, resp. rate 20, height 6' 2" (1.88 m), weight 83.5 kg (184 lb 1.4 oz), SpO2 99 %.    Subjective:    POD #2  Awake. Alert  Voices no complaints  On ativan q 6 for withdrawal symptoms    Objective:   AFVSS  Heart: RRR  Lungs: respirations nonlabored, clear  Incision: c/d/I  H/h: 9/27  Cr: 1.29  MS CT: 230cc/24hrs  L CT: 270cc/24hrs  Cxr: " stable      Assesment/Plan:    S/p CAB, Ligation SILVANO  - PT/ambulate  - IS  - continue CTs            GISEL Lewis  4/5/2023

## 2023-04-05 NOTE — PLAN OF CARE
FOC obtained for [facility] and referral submitted via [facility].

## 2023-04-05 NOTE — PROGRESS NOTES
Inpatient Nutrition Assessment    Admit Date: 4/3/2023   Total duration of encounter: 2 days     Nutrition Recommendation/Prescription     Continue current diet as tolerated.   Monitor appetite/PO intake and weight.    Communication of Recommendations: reviewed with patient    Nutrition Assessment     Malnutrition Assessment/Nutrition-Focused Physical Exam    Malnutrition in the context of acute illness or injury  Degree of Malnutrition: does not meet criteria  Energy Intake: < 75% of estimated energy requirement for > 7 days  Interpretation of Weight Loss: does not meet criteria  Body Fat:does not meet criteria  Area of Body Fat Loss: does not meet criteria  Muscle Mass Loss: does not meet criteria  Area of Muscle Mass Loss: does not meet criteria  Fluid Accumulation: does not meet criteria  Edema: does not meet criteria   Reduced  Strength: unable to obtain  A minimum of two characteristics is recommended for diagnosis of either severe or non-severe malnutrition.    Chart Review    Reason Seen: malnutrition screening tool (MST)    Malnutrition Screening Tool Results   Have you recently lost weight without trying?: Unsure  Have you been eating poorly because of a decreased appetite?: No   MST Score: 2     Diagnosis:  MVCAD    - s/p CABG (4.3.23) - LIMA to LAD, rSVG to OM1, rSVG to PDA  PAF/Flutter - Now SR    - CHADsVASc - 3 Points - 3.2% Stroke Risk per Year     - s/p (4.3.23) - Ligation of SILVANO with Endostapler  HTN/HHD without Hx of HF or CKD  Anemia - Acute Blood Loss - Improved with Transfusion  Thrombocytopenia - Chronic   VHD (Mild AS, Mild MS)  Hx of NSTEMI  ETOH Abuse  No Hx of GIB    Relevant Medical History:    Atrial flutter      CHF (congestive heart failure)      Coronary artery disease      Hypertension      SOB (shortness of breath)       at times       Nutrition-Related Medications: Aspirin, Atorvastatin, docusate sodium, sucralfate  Calorie Containing IV Medications: no significant kcals from  "medications at this time    Nutrition-Related Labs:  2023: WBC 3.5, H/H 9.5/27.9, Na 131, BUN 28.8, Crea 1.29, Ca 8.2,Alb 3.0, Total Bili 1.8, [id number]    Diet/PN Order: Diet heart healthy  Oral Supplement Order: none  Tube Feeding Order: none  Appetite/Oral Intake: poor/25-50% of meals  Factors Affecting Nutritional Intake: decreased appetite  Food/[facility]/Cultural Preferences: none reported  Food Allergies: none reported    Skin Integrity: drain/device(s), incision  Wound(s):       Comments    2023: Pt reports fair PO intake prior to admit, reports poor appetite/PO intake since admit. Pt denies N/V/D/C and chewing/swallowing difficulties. Pt wears dentures. Pt denies unplanned wt loss. Pt declined ONS. Encouraged adequate PO intake. Will monitor.    Anthropometrics    Height: 6' 2" (188 cm) Height Method: Stated  Last Weight: 83.5 kg (184 lb 1.4 oz) (23 0625) Weight Method: Standard Scale  BMI (Calculated): 23.6  BMI Classification: normal (BMI 18.5-24.9)        Ideal Body Weight (IBW), Male: 190 lb     % Ideal Body Weight, Male (lb): 91.55 %                          Usual Weight Provided By: patient denies unintentional weight loss    Wt Readings from Last 5 Encounters:   23 83.5 kg (184 lb 1.4 oz)   23 78 kg (172 lb)   03/15/23 78.6 kg (173 lb 4.5 oz)   01/10/23 76.2 kg (168 lb 1.6 oz)   22 75.4 kg (166 lb 3.6 oz)     Weight Change(s) Since Admission:  Admit Weight: 78 kg (172 lb) (23 0813)  2023: 83.5 kg    Estimated Needs    Weight Used For Calorie Calculations: 83.5 kg (184 lb 1.4 oz)  Energy Calorie Requirements (kcal): 9080-2864 (25-30 kcal/kg)  Energy Need Method: Kcal/kg  Weight Used For Protein Calculations: 83.5 kg (184 lb 1.4 oz)  Protein Requirements:  (1.0-1.2 g/kg)  Fluid Requirements (mL): 2088 (1 mL/kcal)  Temp (24hrs), Av.2 °F (36.8 °C), Min:97.4 °F (36.3 °C), Max:98.5 °F (36.9 °C)         Enteral Nutrition    Patient not receiving enteral " nutrition at this time.    Parenteral Nutrition    Patient not receiving parenteral nutrition support at this time.    Evaluation of Received Nutrient Intake    Calories: not meeting estimated needs  Protein: not meeting estimated needs    Patient Education    Not applicable.    Nutrition Diagnosis     PES: Inadequate oral intake related to acute illness as evidenced by decreased PO intake since admit. (new)    Interventions/Goals     Intervention(s): general/healthful diet and commercial beverage  Goal: Consume % of meals/snacks by follow-up. (new)    Monitoring & Evaluation     Dietitian will monitor food and beverage intake, weight, electrolyte/renal panel, and glucose/endocrine profile.  Nutrition Risk/Follow-Up: moderate (follow-up in 3-5 days)   Please consult if re-assessment needed sooner.

## 2023-04-05 NOTE — PROGRESS NOTES
"  [facility] - [facility]  Cardiology  Consult Note    Patient Name: Mike Urbina Jr.  MRN: [id number]  Admission Date: 4/3/2023  Hospital Length of Stay: 2 days  Code Status: Full Code   Attending Provider: Deuce Finley IV, MD   Consulting Provider: HONORIO Villalpando  Primary Care Physician: LOREN Jerry  Principal Problem:<principal problem not specified>    Patient information was obtained from patient, past medical records, and ER records.     Subjective:     Chief Complaint: Reason for Consult: Post CABG Medical Management    HPI: Mr. Urbina is a 66 y/o male who is known to [facility], Dr. Herrera. The patient presented to [facility] on 4.3.23 for a Planned CABG. The patient was recently worked up worked up for a NSTEMI and found to have an Abnormal PET Scan. The PT underwent a LHC with Noted MVCAD. He was referred to [facility] and deemed a candidate for CABG and on 4.3.23 he underwent a Sternotomy with Results of: LIMA to LAD, rSVG to OM1, rSVG to PDA and Ligation of SILVANO with Endostapler. The patient tolerated the procedure well and was stabilized in the ER. [facility] has been consulted for Medical Management of the PT.    4.5.23: NAD. Sitting in Bed. Denies SOB and Palps. + CP/Incisional. "I am doing great."     PMH: ETOH Abuse, VHD (AS), Thrombocytopenia, NSTEMI, HTN, PAF/Flutter  PSH: Angiogram, CABG, Cataract Extraction   Family History: Mother, L, MI  Social History: + ETOH Use (6pk/Beer per Day for > 30 Years); Denies Tobacco and Illicit Drug Use    Previous Cardiac Diagnostics:   LHC 3.15.23:  Surgical coronary anatomy with distal left main 50%; LAD proximal to mid 60-70%; circumflex mid 80- 90%; RCA with proximal .  The ejection fraction was 60% with EDP of 10 mmHg.  Right radial access.  The estimated blood loss was less than 10 cc.  CT surgical consult for CABG evaluation.    PET 1.6.23:  This is an abnormal perfusion study. Study is consistent with ischemia.   This scan is suggestive of " moderate risk for future cardiovascular events.   Small reversible perfusion abnormality of moderate intensity in the apical segment. Medium fixed perfusion abnormality of severe intensity in the inferior region.   The left ventricular cavity is noted to be normal on the stress studies. The stress left ventricular ejection fraction was calculated to be 35% and left ventricular global function is moderately reduced. The rest left ventricular cavity is noted to be normal. The rest left ventricular ejection fraction was calculated to be 40% and rest left ventricular global function is mildly reduced.   When compared to the resting ejection fraction (40%), the stress ejection fraction (35%) has decreased.   The study quality is good.   There was a rise in myocardial blood flow between rest and stress.  Global myocardial blood flow reserve was 1.47.  Myocardial blood flow reserve is globally abnormal, placing the patient at a higher coronary event risk.    ECHO 12.9.22:  The left ventricle is normal in size with mild concentric hypertrophy and normal systolic function.  Grade I left ventricular diastolic dysfunction.  The estimated ejection fraction is 55%.  Normal right ventricular size with normal right ventricular systolic function.  There is mild aortic valve stenosis.  There is mild mitral stenosis.  Normal central venous pressure (3 mmHg).    Review of patient's allergies indicates:  No Known Allergies    No current facility-administered medications on file prior to encounter.     Current Outpatient Medications on File Prior to Encounter   Medication Sig    aspirin (ECOTRIN) 81 MG EC tablet Take 1 tablet (81 mg total) by mouth once daily.    atorvastatin (LIPITOR) 10 MG tablet Take 40 mg by mouth once daily.    furosemide (LASIX) 40 MG tablet TAKE ONE TABLET BY MOUTH DAILY DOSE INCREASED    hydrOXYzine HCL (ATARAX) 25 MG tablet TAKE 1 TABLET BY MOUTH EVERY EVENING    metoprolol succinate (TOPROL-XL) 100 MG 24 hr  tablet Take 1 tablet (100 mg total) by mouth once daily.    NIFEdipine (PROCARDIA-XL) 30 MG (OSM) 24 hr tablet Take 1 tablet (30 mg total) by mouth once daily. (Patient taking differently: Take 60 mg by mouth once daily.)    valsartan (DIOVAN) 40 MG tablet Take 1 tablet (40 mg total) by mouth 2 (two) times daily.     Review of Systems   Constitutional:  Positive for fatigue. Negative for fever.   Respiratory:  Negative for chest tightness and shortness of breath.    Cardiovascular:  Positive for chest pain. Negative for palpitations and leg swelling.        Incisional   Gastrointestinal:  Negative for abdominal distention.   All other systems reviewed and are negative.    Objective:     Vital Signs (Most Recent):  Temp: 98 °F (36.7 °C) (04/05/23 0756)  Pulse: 88 (04/05/23 0756)  Resp: (!) 22 (04/05/23 0756)  BP: (!) 179/97 (04/05/23 0756)  SpO2: 98 % (04/05/23 0756)   Vital Signs (24h Range):  Temp:  [97.4 °F (36.3 °C)-99.9 °F (37.7 °C)] 98 °F (36.7 °C)  Pulse:  [3-111] 88  Resp:  [14-27] 22  SpO2:  [92 %-99 %] 98 %  BP: (105-191)/() 179/97     Weight: 83.5 kg (184 lb 1.4 oz)  Body mass index is 23.64 kg/m².    SpO2: 98 %         Intake/Output Summary (Last 24 hours) at 4/5/2023 0921  Last data filed at 4/5/2023 0625  Gross per 24 hour   Intake 1514.83 ml   Output 1045 ml   Net 469.83 ml         Lines/Drains/Airways       Drain  Duration                  Chest Tube 04/03/23 0935 Tube - 1 Left Midaxillary 19 Fr. 1 day         Chest Tube 04/03/23 0935 Tube - 2 Mediastinal 24 Fr. 1 day              Peripheral Intravenous Line  Duration                  Peripheral IV - Single Lumen 04/03/23 0540 18 G Anterior;Right Forearm 2 days                  Significant Labs:  Recent Results (from the past 72 hour(s))   Prepare RBC 4 Units; Surgery - stay ahead 2 units    Collection Time: 04/03/23  5:30 AM   Result Value Ref Range    UNIT NUMBER [id number]     UNIT ABO/RH O NEG     DISPENSE STATUS Released     Unit  Expiration [id number]     Product Code I2327V17     Unit Blood Type Code 9500     CROSSMATCH INTERPRETATION Compatible     UNIT NUMBER [id number]     UNIT ABO/RH O NEG     DISPENSE STATUS Released     Unit Expiration [id number]     Product Code M7760Y94     Unit Blood Type Code 9500     CROSSMATCH INTERPRETATION Compatible     UNIT NUMBER [id number]     UNIT ABO/RH O NEG     DISPENSE STATUS Selected     Unit Expiration [id number]     Product Code O0274U52     Unit Blood Type Code 9500     CROSSMATCH INTERPRETATION Compatible     UNIT NUMBER [id number]     UNIT ABO/RH O NEG     DISPENSE STATUS Transfused     Unit Expiration [id number]     Product Code H6674U46     Unit Blood Type Code 9500     CROSSMATCH INTERPRETATION Compatible    Type & Screen    Collection Time: 04/03/23  5:30 AM   Result Value Ref Range    Group & Rh O NEG     Indirect Rox GEL NEG     Specimen Outdate 04/06/2023 23:59    Potassium    Collection Time: 04/03/23  5:30 AM   Result Value Ref Range    Potassium Level 4.7 3.5 - 5.1 mmol/L   Prepare RBC 2 Units; Physician ordered    Collection Time: 04/03/23  5:30 AM   Result Value Ref Range    UNIT NUMBER [id number]     UNIT ABO/RH O NEG     DISPENSE STATUS Selected     Unit Expiration [id number]     Product Code D5412Z59     Unit Blood Type Code 9500     CROSSMATCH INTERPRETATION Compatible     UNIT NUMBER [id number]     UNIT ABO/RH O NEG     DISPENSE STATUS Transfused     Unit Expiration [id number]     Product Code T3650W62     Unit Blood Type Code 9500     CROSSMATCH INTERPRETATION Compatible    Prepare Platelets 1 Dose    Collection Time: 04/03/23  5:30 AM   Result Value Ref Range    UNIT NUMBER [id number]     UNIT ABO/RH O POS     DISPENSE STATUS Transfused     Unit Expiration [id number]     Product Code O6489Y10     Unit Blood Type Code 5100     CROSSMATCH INTERPRETATION Not required    POCT glucose    Collection Time: 04/03/23  5:49 AM   Result Value Ref Range     POCT Glucose 95 70 - 110 mg/dL   POCT ARTERIAL BLOOD GAS    Collection Time: 04/03/23  7:10 AM   Result Value Ref Range    POC PH 7.39 7.35 - 7.45    POC PCO2 43 35 - 45 mmHg    POC PO2 59 (A) 80 - 100 mmHg    POC HEMOGLOBIN 11.4 (A) 12.0 - 16.0 g/dL    POC SATURATED O2 89.9 %    POC O2Hb 87.9 (A) 94.0 - 97.0 %    POC COHb 0.50 %    POC MetHb 0.40 0.40 - 1.5 %    POC Potassium 4.3 3.5 - 5.0 mmol/l    POC Sodium 123 (A) 137 - 145 mmol/l    POC Ionized Calcium 1.12 1.12 - 1.23 mmol/l    Correct Temperature (PH) 7.39 7.35 - 7.45    Corrected Temperature (pCO2) 43 35 - 45 mmHg    Corrected Temperature (pO2) 59 (A) 80 - 100 mmHg    POC HCO3 26.0 22.0 - 26.0 mmol/l    Base Deficit 0.80 -2.0 - 2.0 mmol/l    POC Temp 37.0 °C    Specimen source Arterial sample    Specimen to Pathology    Collection Time: 04/03/23  8:46 AM   Result Value Ref Range    Pathology Result     Basic Metabolic Panel    Collection Time: 04/03/23  9:02 AM   Result Value Ref Range    Sodium Level 127 (L) 136 - 145 mmol/L    Potassium Level 4.3 3.5 - 5.1 mmol/L    Chloride 99 98 - 107 mmol/L    Carbon Dioxide 24 23 - 31 mmol/L    Glucose Level 106 82 - 115 mg/dL    Blood Urea Nitrogen 20.5 8.4 - 25.7 mg/dL    Creatinine 0.74 0.73 - 1.18 mg/dL    BUN/Creatinine Ratio 28     Calcium Level Total 9.6 8.8 - 10.0 mg/dL    Anion Gap 4.0 mEq/L    eGFR >60 mls/min/1.73/m2   Protime-INR    Collection Time: 04/03/23  9:02 AM   Result Value Ref Range    PT 17.7 (H) 12.5 - 14.5 seconds    INR 1.48 (H) 0.00 - 1.30   APTT    Collection Time: 04/03/23  9:02 AM   Result Value Ref Range    PTT 38.2 (H) 23.2 - 33.7 seconds   CBC with Differential    Collection Time: 04/03/23  9:02 AM   Result Value Ref Range    WBC 3.8 (L) 4.5 - 11.5 x10(3)/mcL    RBC 3.13 (L) 4.70 - 6.10 x10(6)/mcL    Hgb 9.3 (L) 14.0 - 18.0 g/dL    Hct 27.6 (L) 42.0 - 52.0 %    MCV 88.2 80.0 - 94.0 fL    MCH 29.7 27.0 - 31.0 pg    MCHC 33.7 33.0 - 36.0 g/dL    RDW 14.3 11.5 - 17.0 %    Platelet 68  (L) 130 - 400 x10(3)/mcL    MPV 10.1 7.4 - 10.4 fL    Neut % 64.1 %    Lymph % 26.0 %    Mono % 4.8 %    Eos % 4.0 %    Basophil % 0.3 %    Lymph # 0.98 0.6 - 4.6 x10(3)/mcL    Neut # 2.42 2.1 - 9.2 x10(3)/mcL    Mono # 0.18 0.1 - 1.3 x10(3)/mcL    Eos # 0.15 0 - 0.9 x10(3)/mcL    Baso # 0.01 0 - 0.2 x10(3)/mcL    IG# 0.03 0 - 0.04 x10(3)/mcL    IG% 0.8 %    NRBC% 0.0 %   POCT glucose    Collection Time: 04/03/23 10:36 AM   Result Value Ref Range    POCT Glucose 85 70 - 110 mg/dL   Transesophageal echo (CARO)    Collection Time: 04/03/23 10:52 AM   Result Value Ref Range    BSA 2.03 m2   POCT glucose    Collection Time: 04/03/23 11:05 AM   Result Value Ref Range    POCT Glucose 117 (H) 70 - 110 mg/dL   POCT ARTERIAL BLOOD GAS    Collection Time: 04/03/23 11:36 AM   Result Value Ref Range    POC PH 7.32 (A) 7.35 - 7.45    POC PCO2 43 mmHg    POC PO2 74 (A) mmHg    POC SATURATED O2 93 %    POC Potassium 4.1 mmol/l    POC Sodium 127 (A) 137 - 145 mmol/l    POC Ionized Calcium 1.26 (A) 1.12 - 1.23 mmol/l    POC HCO3 22.2 mmol/l    Base Deficit -3.8 mmol/l    POC Temp 37.0 C    Specimen source Arterial sample    POCT glucose    Collection Time: 04/03/23 12:02 PM   Result Value Ref Range    POCT Glucose 190 (H) 70 - 110 mg/dL   POCT glucose    Collection Time: 04/03/23  1:03 PM   Result Value Ref Range    POCT Glucose 159 (H) 70 - 110 mg/dL   Hemoglobin and Hematocrit    Collection Time: 04/03/23  1:29 PM   Result Value Ref Range    Hgb 9.2 (L) 14.0 - 18.0 g/dL    Hct 27.0 (L) 42.0 - 52.0 %   Basic Metabolic Panel    Collection Time: 04/03/23  1:29 PM   Result Value Ref Range    Sodium Level 129 (L) 136 - 145 mmol/L    Potassium Level 4.2 3.5 - 5.1 mmol/L    Chloride 101 98 - 107 mmol/L    Carbon Dioxide 21 (L) 23 - 31 mmol/L    Glucose Level 161 (H) 82 - 115 mg/dL    Blood Urea Nitrogen 20.9 8.4 - 25.7 mg/dL    Creatinine 0.77 0.73 - 1.18 mg/dL    BUN/Creatinine Ratio 27     Calcium Level Total 8.3 (L) 8.8 - 10.0 mg/dL     Anion Gap 7.0 mEq/L    eGFR >60 mls/min/1.73/m2   POCT glucose    Collection Time: 04/03/23  2:01 PM   Result Value Ref Range    POCT Glucose 152 (H) 70 - 110 mg/dL   POCT glucose    Collection Time: 04/03/23  2:59 PM   Result Value Ref Range    POCT Glucose 147 (H) 70 - 110 mg/dL   POCT glucose    Collection Time: 04/03/23  3:58 PM   Result Value Ref Range    POCT Glucose 131 (H) 70 - 110 mg/dL   POCT glucose    Collection Time: 04/03/23  4:57 PM   Result Value Ref Range    POCT Glucose 121 (H) 70 - 110 mg/dL   POCT glucose    Collection Time: 04/03/23  6:01 PM   Result Value Ref Range    POCT Glucose 105 70 - 110 mg/dL   POCT glucose    Collection Time: 04/03/23  6:55 PM   Result Value Ref Range    POCT Glucose 109 70 - 110 mg/dL   POCT glucose    Collection Time: 04/03/23  8:15 PM   Result Value Ref Range    POCT Glucose 136 (H) 70 - 110 mg/dL   POCT glucose    Collection Time: 04/03/23  9:04 PM   Result Value Ref Range    POCT Glucose 145 (H) 70 - 110 mg/dL   POCT glucose    Collection Time: 04/03/23 10:08 PM   Result Value Ref Range    POCT Glucose 172 (H) 70 - 110 mg/dL   POCT glucose    Collection Time: 04/03/23 11:00 PM   Result Value Ref Range    POCT Glucose 148 (H) 70 - 110 mg/dL   POCT glucose    Collection Time: 04/03/23 11:44 PM   Result Value Ref Range    POCT Glucose 141 (H) 70 - 110 mg/dL   Basic metabolic panel    Collection Time: 04/04/23  1:41 AM   Result Value Ref Range    Sodium Level 130 (L) 136 - 145 mmol/L    Potassium Level 4.6 3.5 - 5.1 mmol/L    Chloride 103 98 - 107 mmol/L    Carbon Dioxide 22 (L) 23 - 31 mmol/L    Glucose Level 106 82 - 115 mg/dL    Blood Urea Nitrogen 22.8 8.4 - 25.7 mg/dL    Creatinine 0.98 0.73 - 1.18 mg/dL    BUN/Creatinine Ratio 23     Calcium Level Total 7.6 (L) 8.8 - 10.0 mg/dL    Anion Gap 5.0 mEq/L    eGFR >60 mls/min/1.73/m2   CBC with Differential    Collection Time: 04/04/23  1:41 AM   Result Value Ref Range    WBC 1.9 (LL) 4.5 - 11.5 x10(3)/mcL    RBC 2.54  (L) 4.70 - 6.10 x10(6)/mcL    Hgb 7.4 (L) 14.0 - 18.0 g/dL    Hct 22.3 (L) 42.0 - 52.0 %    MCV 87.8 80.0 - 94.0 fL    MCH 29.1 27.0 - 31.0 pg    MCHC 33.2 33.0 - 36.0 g/dL    RDW 14.8 11.5 - 17.0 %    Platelet 39 (LL) 130 - 400 x10(3)/mcL    MPV 10.6 (H) 7.4 - 10.4 fL    NRBC% 0.0 %   Manual Differential    Collection Time: 04/04/23  1:41 AM   Result Value Ref Range    Neut Man 88 %    Lymph Man 12 %    Instr WBC 1.9 x10(3)/mcL    Abs Lymp 0.228 (L) 0.6 - 4.6 x10(3)/mcL    Abs Neut 1.672 (L) 2.1 - 9.2 x10(3)/mcL    RBC Morph Abnormal (A) Normal    Anisocyte 1+ (A) (none)    Poik 1+ (A) (none)    Microcyte 1+ (A) (none)    Target Cell 1+ (A) (none)    Stomatocytes 1+ (A) (none)    Platelet Est Decreased (A) Normal, Adequate   POCT glucose    Collection Time: 04/04/23  1:55 AM   Result Value Ref Range    POCT Glucose 111 (H) 70 - 110 mg/dL   POCT glucose    Collection Time: 04/04/23  4:00 AM   Result Value Ref Range    POCT Glucose 130 (H) 70 - 110 mg/dL   POCT glucose    Collection Time: 04/04/23  6:12 AM   Result Value Ref Range    POCT Glucose 129 (H) 70 - 110 mg/dL   POCT glucose    Collection Time: 04/04/23  7:21 AM   Result Value Ref Range    POCT Glucose 146 (H) 70 - 110 mg/dL   POCT glucose    Collection Time: 04/04/23  8:16 AM   Result Value Ref Range    POCT Glucose 143 (H) 70 - 110 mg/dL   POCT glucose    Collection Time: 04/04/23  9:22 AM   Result Value Ref Range    POCT Glucose 136 (H) 70 - 110 mg/dL   POCT glucose    Collection Time: 04/04/23 11:54 AM   Result Value Ref Range    POCT Glucose 102 70 - 110 mg/dL   POCT glucose    Collection Time: 04/04/23  3:34 PM   Result Value Ref Range    POCT Glucose 114 (H) 70 - 110 mg/dL   CBC with Differential    Collection Time: 04/04/23  5:23 PM   Result Value Ref Range    WBC 4.3 (L) 4.5 - 11.5 x10(3)/mcL    RBC 3.25 (L) 4.70 - 6.10 x10(6)/mcL    Hgb 10.0 (L) 14.0 - 18.0 g/dL    Hct 29.0 (L) 42.0 - 52.0 %    MCV 89.2 80.0 - 94.0 fL    MCH 30.8 27.0 - 31.0 pg     MCHC 34.5 33.0 - 36.0 g/dL    RDW 15.7 11.5 - 17.0 %    Platelet 74 (L) 130 - 400 x10(3)/mcL    MPV 10.5 (H) 7.4 - 10.4 fL    Neut % 81.6 %    Lymph % 8.8 %    Mono % 8.5 %    Eos % 0.2 %    Basophil % 0.2 %    Lymph # 0.38 (L) 0.6 - 4.6 x10(3)/mcL    Neut # 3.53 2.1 - 9.2 x10(3)/mcL    Mono # 0.37 0.1 - 1.3 x10(3)/mcL    Eos # 0.01 0 - 0.9 x10(3)/mcL    Baso # 0.01 0 - 0.2 x10(3)/mcL    IG# 0.03 0 - 0.04 x10(3)/mcL    IG% 0.7 %    NRBC% 0.0 %   Comprehensive Metabolic Panel    Collection Time: 04/05/23  5:05 AM   Result Value Ref Range    Sodium Level 131 (L) 136 - 145 mmol/L    Potassium Level 4.7 3.5 - 5.1 mmol/L    Chloride 102 98 - 107 mmol/L    Carbon Dioxide 21 (L) 23 - 31 mmol/L    Glucose Level 136 (H) 82 - 115 mg/dL    Blood Urea Nitrogen 28.8 (H) 8.4 - 25.7 mg/dL    Creatinine 1.29 (H) 0.73 - 1.18 mg/dL    Calcium Level Total 8.2 (L) 8.8 - 10.0 mg/dL    Protein Total 5.2 (L) 5.8 - 7.6 gm/dL    Albumin Level 3.0 (L) 3.4 - 4.8 g/dL    Globulin 2.2 (L) 2.4 - 3.5 gm/dL    Albumin/Globulin Ratio 1.4 1.1 - 2.0 ratio    Bilirubin Total 1.8 (H) <=1.5 mg/dL    Alkaline Phosphatase 59 40 - 150 unit/L    Alanine Aminotransferase 17 0 - 55 unit/L    Aspartate Aminotransferase 27 5 - 34 unit/L    eGFR 60 mls/min/1.73/m2   Magnesium    Collection Time: 04/05/23  5:05 AM   Result Value Ref Range    Magnesium Level 2.10 1.60 - 2.60 mg/dL   CBC with Differential    Collection Time: 04/05/23  5:05 AM   Result Value Ref Range    WBC 3.5 (L) 4.5 - 11.5 x10(3)/mcL    RBC 3.13 (L) 4.70 - 6.10 x10(6)/mcL    Hgb 9.5 (L) 14.0 - 18.0 g/dL    Hct 27.9 (L) 42.0 - 52.0 %    MCV 89.1 80.0 - 94.0 fL    MCH 30.4 27.0 - 31.0 pg    MCHC 34.1 33.0 - 36.0 g/dL    RDW 16.4 11.5 - 17.0 %    Platelet 59 (L) 130 - 400 x10(3)/mcL    MPV 10.9 (H) 7.4 - 10.4 fL    Neut % 77.3 %    Lymph % 10.5 %    Mono % 10.5 %    Eos % 0.3 %    Basophil % 0.3 %    Lymph # 0.37 (L) 0.6 - 4.6 x10(3)/mcL    Neut # 2.71 2.1 - 9.2 x10(3)/mcL    Mono # 0.37 0.1 - 1.3  x10(3)/mcL    Eos # 0.01 0 - 0.9 x10(3)/mcL    Baso # 0.01 0 - 0.2 x10(3)/mcL    IG# 0.04 0 - 0.04 x10(3)/mcL    IG% 1.1 %    NRBC% 0.0 %     Telemetry: SR    Physical Exam  Constitutional:       Appearance: Normal appearance.   HENT:      Head: Normocephalic.      Mouth/Throat:      Mouth: Mucous membranes are moist.   Eyes:      Extraocular Movements: Extraocular movements intact.   Cardiovascular:      Rate and Rhythm: Normal rate and regular rhythm.      Pulses: Normal pulses.      Heart sounds: Normal heart sounds. No murmur heard.  Pulmonary:      Effort: Pulmonary effort is normal.      Breath sounds: Normal breath sounds.   Abdominal:      Palpations: Abdomen is soft.   Skin:     General: Skin is warm and dry.      Comments: Midline Sternotomy Dressing C/D/I. + CTs    Neurological:      General: No focal deficit present.      Mental Status: He is alert and oriented to person, place, and time. Mental status is at baseline.   Psychiatric:         Mood and Affect: Mood normal.         Behavior: Behavior normal.     Home Medications:   No current facility-administered medications on file prior to encounter.     Current Outpatient Medications on File Prior to Encounter   Medication Sig Dispense Refill    aspirin (ECOTRIN) 81 MG EC tablet Take 1 tablet (81 mg total) by mouth once daily. 30 tablet 0    atorvastatin (LIPITOR) 10 MG tablet Take 40 mg by mouth once daily.      furosemide (LASIX) 40 MG tablet TAKE ONE TABLET BY MOUTH DAILY DOSE INCREASED      hydrOXYzine HCL (ATARAX) 25 MG tablet TAKE 1 TABLET BY MOUTH EVERY EVENING 30 tablet 1    metoprolol succinate (TOPROL-XL) 100 MG 24 hr tablet Take 1 tablet (100 mg total) by mouth once daily. 30 tablet 0    NIFEdipine (PROCARDIA-XL) 30 MG (OSM) 24 hr tablet Take 1 tablet (30 mg total) by mouth once daily. (Patient taking differently: Take 60 mg by mouth once daily.) 30 tablet 0    valsartan (DIOVAN) 40 MG tablet Take 1 tablet (40 mg total) by mouth 2 (two) times  daily. 60 tablet 0     Current Inpatient Medications:    Current Facility-Administered Medications:     0.9%  NaCl infusion (for blood administration), , Intravenous, Q24H PRN, Graham Aiken MD    0.9%  NaCl infusion (for blood administration), , Intravenous, Q24H PRN, Graham Aiken MD    acetaminophen oral solution 650 mg, 650 mg, Per OG tube, Q6H PRN, GISEL Bingham, 650 mg at 04/04/23 0204    albumin human 5% bottle 12.5 g, 12.5 g, Intravenous, PRN, GISEL Bingham, Stopped at 04/03/23 2100    aspirin EC tablet 81 mg, 81 mg, Oral, Daily, GISEL Bingham, 81 mg at 04/05/23 0826    atorvastatin tablet 40 mg, 40 mg, Oral, Daily, HONORIO Villalpando, 40 mg at 04/05/23 0826    calcium gluconate 1 g in NS IVPB (premixed), 1 g, Intravenous, PRN, Nicholas Murphy, PA    calcium gluconate 1 g in NS IVPB (premixed), 2 g, Intravenous, PRN, Nicholas Murphy, PA    calcium gluconate 1 g in NS IVPB (premixed), 3 g, Intravenous, PRN, Nicholas Murphy, PA    dextrose 10% bolus 125 mL 125 mL, 12.5 g, Intravenous, PRN, Nicholas Murphy, PA    dextrose 10% bolus 125 mL 125 mL, 12.5 g, Intravenous, PRN, Graham Aiken MD    dextrose 10% bolus 250 mL 250 mL, 25 g, Intravenous, PRN, Nicholas Murphy, PA    dextrose 10% bolus 250 mL 250 mL, 25 g, Intravenous, PRN, Graham Aiken MD    docusate sodium capsule 100 mg, 100 mg, Oral, BID, GISEL Bingham, 100 mg at 04/05/23 0826    famotidine tablet 20 mg, 20 mg, Oral, BID, Deuce Finley IV, MD, 20 mg at 04/05/23 0826    glucagon (human recombinant) injection 1 mg, 1 mg, Intramuscular, PRN, Graham Aiken MD    glucose chewable tablet 16 g, 16 g, Oral, PRN, Graham Aiken MD    glucose chewable tablet 24 g, 24 g, Oral, PRN, Graham Aiken MD    hydrALAZINE injection 20 mg, 20 mg, Intravenous, Q6H PRN, Elizabeth Bates NP, 20 mg at 04/05/23 0119    HYDROcodone-acetaminophen 5-325 mg per tablet 1 tablet, 1 tablet, Oral, Q4H PRN,  GISEL Bingham, 1 tablet at 04/04/23 0915    insulin aspart U-100 injection 0-5 Units, 0-5 Units, Subcutaneous, QID (AC + HS) PRN, Graham Aiken MD    ketorolac injection 30 mg, 30 mg, Intravenous, Q6H PRN, GISEL Lazo, 30 mg at 04/05/23 0222    labetaloL injection 20 mg, 20 mg, Intravenous, Q4H PRN, Elizabeth Bates NP, 20 mg at 04/05/23 0225    LORazepam tablet 1 mg, 1 mg, Oral, Q6H, GISEL Lazo, 1 mg at 04/05/23 0519    magnesium sulfate 2g in water 50mL IVPB (premix), 2 g, Intravenous, PRN, GISEL Bingham    magnesium sulfate 2g in water 50mL IVPB (premix), 4 g, Intravenous, PRN, GISEL Bingham    metoprolol succinate (TOPROL-XL) 24 hr tablet 50 mg, 50 mg, Oral, Daily, Elizabeth Bates NP, 50 mg at 04/05/23 0826    mupirocin 2 % ointment, , Nasal, BID, GISEL Bingham, Given at 04/04/23 2028    ondansetron injection 4 mg, 4 mg, Intravenous, Q4H PRN, GISEL Bingham    oxyCODONE immediate release tablet Tab 10 mg, 10 mg, Oral, Q4H PRN, GISEL Bingham, 10 mg at 04/05/23 0119    potassium chloride 20 mEq in 100 mL IVPB (FOR CENTRAL LINE ADMINISTRATION ONLY), 20 mEq, Intravenous, PRN, GISEL Bingham    potassium chloride 20 mEq in 100 mL IVPB (FOR CENTRAL LINE ADMINISTRATION ONLY), 20 mEq, Intravenous, PRN, GISEL Bingham    potassium chloride 40 mEq in 100 mL IVPB (FOR CENTRAL LINE ADMINISTRATION ONLY), 40 mEq, Intravenous, PRN, GISEL Bingham    sodium phosphate 15 mmol in dextrose 5 % (D5W) 250 mL IVPB, 15 mmol, Intravenous, PRN, GISEL Bingham    sodium phosphate 20.01 mmol in dextrose 5 % (D5W) 250 mL IVPB, 20.01 mmol, Intravenous, PRN, GISEL Bingham    sodium phosphate 30 mmol in dextrose 5 % (D5W) 250 mL IVPB, 30 mmol, Intravenous, PRN, GISEL Bingham    sucralfate tablet 1 g, 1 g, Oral, QID (AC & HS), GISEL Bingham, 1 g at 04/05/23 0519    VTE Risk Mitigation (From admission,  onward)           Ordered     Place sequential compression device  Until discontinued         04/04/23 0324     IP VTE HIGH RISK PATIENT  Once         04/03/23 0951                  Assessment:   MVCAD    - s/p CABG (4.3.23) - LIMA to LAD, rSVG to OM1, rSVG to PDA  PAF/Flutter - Now SR    - CHADsVASc - 3 Points - 3.2% Stroke Risk per Year     - s/p (4.3.23) - Ligation of SILVANO with Endostapler  HTN/HHD without Hx of HF or CKD  Anemia - Acute Blood Loss - Improved with Transfusion  Thrombocytopenia - Chronic   VHD (Mild AS, Mild MS)  Hx of NSTEMI  ETOH Abuse  No Hx of GIB    Plan:   See below MDM formulated by me (Phuc Jimenes MD):     Monitor H&H   Continue ASA, BB and Statin  Start Valsartan 160mg PO BID   Aggressive Mobilization and Q1HR IS  Labs and EKG in AM: CBC, CMP and Mg    Washington Huerta, ANP and Phuc Jimenes MD  Cardiology  [facility] - [address] [place] [address] ICU  04/05/2023

## 2023-04-05 NOTE — PT/OT/SLP EVAL
Physical Therapy Evaluation    Patient Name:  Mike Urbina Jr.   MRN:  [id number]    Recommendations:     Discharge Recommendations:  (rehab vs HH pending progress)   Discharge Equipment Recommendations:     Barriers to discharge:  medical status    Assessment:     Mike Urbina Jr. is a 67 y.o. male admitted with a medical diagnosis of s/p CABG. Pt alert, oriented, poor safety awareness and awareness of lines/tubes. Max verbal cueing needed during mobility for safety. Pt may benefit from a rehab stay; will continue assessing.  He presents with the following impairments/functional limitations: weakness, impaired endurance, impaired functional mobility, gait instability, impaired balance.    Rehab Prognosis: Good; patient would benefit from acute skilled PT services to address these deficits and reach maximum level of function.    Recent Surgery: Procedure(s) (LRB):  CORONARY ARTERY BYPASS GRAFT (CABG) (N/A) 2 Days Post-Op    Plan:     During this hospitalization, patient to be seen 5 x/week to address the identified rehab impairments via gait training, therapeutic activities, therapeutic exercises and progress toward the following goals:    Plan of Care Expires:  05/30/23    Subjective     Chief Complaint: none  Patient/Family Comments/goals: return to PLOF  Pain/Comfort:       Patients cultural, spiritual, Spiritism conflicts given the current situation: no    Living Environment:  Pt lives w/ someone. Independent prior to admit.   Upon discharge, patient will have assistance from unsure.    Objective:     Communicated with RN prior to session.  Patient found up in chair  upon PT entry to room.    General Precautions: Standard, sternal, chest tube x2  Respiratory Status: Nasal cannula, flow 2 L/min    Exams:  RLE ROM: WFL  RLE Strength: WFL  LLE ROM: WFL  LLE Strength: WFL    Functional Mobility:  Transfers:     Sit to Stand:  minimum assistance with sternal precautions maintained; RW used once  standing for balance  Gait: Pt ambulated 20' to toilet and 20' back to chair w/ RW, min A for safety; MAX A for verbal cueing. Pt had a few bouts of unsteadiness and leaning RW sideways      Patient left up in chair with all lines intact.    GOALS:   Multidisciplinary Problems       Physical Therapy Goals          Problem: Physical Therapy    Goal Priority Disciplines Outcome Goal Variances Interventions   Physical Therapy Goal     PT, PT/OT Ongoing, Progressing     Description: Goals to be met by: 23     Patient will increase functional independence with mobility by performin. Supine to sit with Stand-by Assistance  2. Sit to supine with Stand-by Assistance  3. Sit to stand transfer with Stand-by Assistance  4. Gait  x 200 feet with Stand-by Assistance using Rolling Walker.                          History:     Past Medical History:   Diagnosis Date    Atrial flutter     CHF (congestive heart failure)     Coronary artery disease     Hypertension     SOB (shortness of breath)     at times       Past Surgical History:   Procedure Laterality Date    CATARACT EXTRACTION Bilateral     CORONARY ARTERY BYPASS GRAFT (CABG) N/A 4/3/2023    Procedure: CORONARY ARTERY BYPASS GRAFT (CABG);  Surgeon: Deuce Finley IV, MD;  Location: [facility];  Service: Cardiovascular;  Laterality: N/A;  WITH LLAA //  ECHO NOTIFIED    LEFT HEART CATHETERIZATION N/A 03/15/2023    Procedure: CATHETERIZATION, HEART, LEFT;  Surgeon: Sreedhar Herrera MD;  Location: [facility] CATH LAB;  Service: Cardiology;  Laterality: N/A;  [facility] via [facility]       Time Tracking:     PT Received On: 23  PT Start Time: 1110     PT Stop Time: 1130  PT Total Time (min): 20 min     Billable Minutes: Evaluation 2023

## 2023-04-05 NOTE — PHYSICIAN QUERY
PT Name: Mike Urbina Jr.  MR #: [id number]     DOCUMENTATION CLARIFICATION     CDS/: Margo Lauren RN             Contact information: [email or web address]    QUERY WITHDRAWN: DOCUMENTED ON CONSULT NOTE DATED 6/4/2023  This form is a permanent document in the medical record.     Query Date: April 5, 2023    By submitting this query, we are merely seeking further clarification of documentation.  Please utilize your independent clinical judgment when addressing the question(s) below.    The Medical Record contains the following   Indicators Supporting Clinical Findings   Location in Medical Record   x Heart Failure documented Past Medical History:  CHF (congestive heart failure)    HTN/HHD without Hx of HF or CKD H&P 4/3       HM    Consult 4/4       Cardiology    BNP     x EF/Echo Cath 3/15/23    EF 60%    LMT 50%    LAD 60-70    CIRC 80-90%   Anesthesia Preprocedure Evaluation        4/3   x Radiology findings 1.Postoperative changes of the chest with lines and tubes as described.  2.Trace left apical pneumothorax with chest tube in place.   CXR 4/3   x Subjective/Objective Respiratory Conditions     The patient has little shortness of breath when he wakes up in the morning.  H&P Interval 4/3    Recent/Current MI      Heart Transplant, LVAD      Edema, JVD  no JVD     Ascites     x Diuretics/Meds Current Outpatient Medications :    furosemide (LASIX) 40 MG tablet TAKE ONE TABLET BY MOUTH DAILY     metoprolol succinate (TOPROL-XL)   100 mg, Oral, Daily    valsartan (DIOVAN)40 mg, Oral, 2 times daily H&P 4/3       HM    Other Treatment      Other       Heart failure is a clinical diagnosis which includes symptomatic fluid retention, elevated intracardiac pressures, and/or the inability of the heart to deliver adequate blood flow.    Heart Failure with reduced Ejection Fraction (HFrEF) or Systolic Heart Failure (loses ability to contract normally, EF is <40%)    Heart Failure with preserved Ejection  Fraction (HFpEF) or Diastolic Heart Failure (stiff ventricles, does not relax properly, EF is >50%)     Heart Failure with Combined Systolic and Diastolic Failure (stiff ventricles, does not relax properly and EF is <50%)    Mid-range or mildly reduced ejection fraction (HFmrEF) is classified as systolic heart failure.  Congestive heart failure with a recovered EF is classified as Diastolic Heart Failure.  Common clues to acute exacerbation:  Rapidly progressive symptoms (w/in 2 weeks of presentation), using IV diuretics, using supplemental O2, pulmonary edema on Xray, new or worsening pleural effusion, +JVD or other signs of volume overload, MI w/in 4 weeks, and/or BNP >500  The clinical guidelines noted are only system guidelines, and do not replace the providers clinical judgment.    Provider, please clarify/specify the Heart Failure diagnosis associated with the above clinical findings.    [   ]  Chronic Diastolic Heart Failure (HFpEF) - preexisting and stable   [   ]  Heart Failure Ruled Out   [   ]  Other (please specify): ___________________________________       Please document in your progress notes daily for the duration of treatment until resolved and include in your discharge summary.    References:  American Heart Association editorial staff. (2017, May). Ejection Fraction Heart Failure Measurement. American Heart Association. https://www.heart.org/en/health-topics/heart-failure/diagnosing-heart-failure/ejection-fraction-heart-failure-measurement#:~:text=Ejection%20fraction%20(EF)%20is%20a,pushed%20out%20with%20each%20heartbeat  REDD Chavez (2020, December 15). Heart failure with preserved ejection fraction: Clinical manifestations and diagnosis. [employer]. https://www.Exinda.com/contents/heart-failure-with-preserved-ejection-fraction-clinical-manifestations-and-diagnosis.  ICD-10-CM/PCS Coding Clinic Third Quarter ICD-10, Effective with discharges: September 8, 2020 [facility] §  Heart failure with mid-range or mildly reduced ejection fraction (2020).  ICD-10-CM/PCS Coding Clinic Third Quarter ICD-10, Effective with discharges: September 8, 2020 [facility] § Heart failure with recovered ejection fraction (2020).  Form No. [id number]

## 2023-04-05 NOTE — PROGRESS NOTES
04/05/23 1415   Pre Exercise Vitals   BP (!) 175/98   Pulse 84   Supplemental O2? No   SpO2 100 %   During Exercise Vitals   Pulse 94   Supplemental O2? No   SpO2 99 %   Distance Walked 250 feet   Post Exercise Vitals   BP (!) 185/96   Pulse 91   Supplemental O2? No   SpO2 98 %   Modality   Modality Walker     Communicated with nurse prior to session; minimal assist to stand; reminders needed to maintain sternal precautions; ambulated 250 feet on room air, O2 sat @99%

## 2023-04-05 NOTE — PLAN OF CARE
Problem: Physical Therapy  Goal: Physical Therapy Goal  Description: Goals to be met by: 23     Patient will increase functional independence with mobility by performin. Supine to sit with Stand-by Assistance  2. Sit to supine with Stand-by Assistance  3. Sit to stand transfer with Stand-by Assistance  4. Gait  x 200 feet with Stand-by Assistance using Rolling Walker.     Outcome: Ongoing, Progressing

## 2023-04-06 LAB
ALBUMIN SERPL-MCNC: 3 G/DL (ref 3.4–4.8)
ALBUMIN/GLOB SERPL: 1.3 RATIO (ref 1.1–2)
ALP SERPL-CCNC: 91 UNIT/L (ref 40–150)
ALT SERPL-CCNC: 23 UNIT/L (ref 0–55)
AST SERPL-CCNC: 32 UNIT/L (ref 5–34)
BASOPHILS # BLD AUTO: 0.02 X10(3)/MCL (ref 0–0.2)
BASOPHILS NFR BLD AUTO: 0.5 %
BILIRUBIN DIRECT+TOT PNL SERPL-MCNC: 2 MG/DL
BUN SERPL-MCNC: 29.4 MG/DL (ref 8.4–25.7)
CALCIUM SERPL-MCNC: 8.2 MG/DL (ref 8.8–10)
CHLORIDE SERPL-SCNC: 104 MMOL/L (ref 98–107)
CO2 SERPL-SCNC: 17 MMOL/L (ref 23–31)
CREAT SERPL-MCNC: 0.99 MG/DL (ref 0.73–1.18)
EOSINOPHIL # BLD AUTO: 0.06 X10(3)/MCL (ref 0–0.9)
EOSINOPHIL NFR BLD AUTO: 1.4 %
ERYTHROCYTE [DISTWIDTH] IN BLOOD BY AUTOMATED COUNT: 15.7 % (ref 11.5–17)
GFR SERPLBLD CREATININE-BSD FMLA CKD-EPI: >60 MLS/MIN/1.73/M2
GLOBULIN SER-MCNC: 2.3 GM/DL (ref 2.4–3.5)
GLUCOSE SERPL-MCNC: 107 MG/DL (ref 82–115)
HCT VFR BLD AUTO: 29.2 % (ref 42–52)
HGB BLD-MCNC: 10 G/DL (ref 14–18)
IMM GRANULOCYTES # BLD AUTO: 0.03 X10(3)/MCL (ref 0–0.04)
IMM GRANULOCYTES NFR BLD AUTO: 0.7 %
LYMPHOCYTES # BLD AUTO: 0.53 X10(3)/MCL (ref 0.6–4.6)
LYMPHOCYTES NFR BLD AUTO: 11.9 %
MAGNESIUM SERPL-MCNC: 2 MG/DL (ref 1.6–2.6)
MCH RBC QN AUTO: 30 PG (ref 27–31)
MCHC RBC AUTO-ENTMCNC: 34.2 G/DL (ref 33–36)
MCV RBC AUTO: 87.7 FL (ref 80–94)
MONOCYTES # BLD AUTO: 0.49 X10(3)/MCL (ref 0.1–1.3)
MONOCYTES NFR BLD AUTO: 11 %
NEUTROPHILS # BLD AUTO: 3.31 X10(3)/MCL (ref 2.1–9.2)
NEUTROPHILS NFR BLD AUTO: 74.5 %
NRBC BLD AUTO-RTO: 0 %
PLATELET # BLD AUTO: 81 X10(3)/MCL (ref 130–400)
PMV BLD AUTO: 10.5 FL (ref 7.4–10.4)
POCT GLUCOSE: 105 MG/DL (ref 70–110)
POTASSIUM SERPL-SCNC: 4.1 MMOL/L (ref 3.5–5.1)
PROT SERPL-MCNC: 5.3 GM/DL (ref 5.8–7.6)
RBC # BLD AUTO: 3.33 X10(6)/MCL (ref 4.7–6.1)
SODIUM SERPL-SCNC: 132 MMOL/L (ref 136–145)
WBC # SPEC AUTO: 4.4 X10(3)/MCL (ref 4.5–11.5)

## 2023-04-06 PROCEDURE — 25000003 PHARM REV CODE 250: Performed by: NURSE PRACTITIONER

## 2023-04-06 PROCEDURE — 93010 EKG 12-LEAD: ICD-10-PCS | Mod: ,,, | Performed by: INTERNAL MEDICINE

## 2023-04-06 PROCEDURE — 63600175 PHARM REV CODE 636 W HCPCS: Performed by: NURSE PRACTITIONER

## 2023-04-06 PROCEDURE — 93010 ELECTROCARDIOGRAM REPORT: CPT | Mod: ,,, | Performed by: INTERNAL MEDICINE

## 2023-04-06 PROCEDURE — 83735 ASSAY OF MAGNESIUM: CPT | Performed by: NURSE PRACTITIONER

## 2023-04-06 PROCEDURE — 21400001 HC TELEMETRY ROOM

## 2023-04-06 PROCEDURE — 93005 ELECTROCARDIOGRAM TRACING: CPT

## 2023-04-06 PROCEDURE — 25000003 PHARM REV CODE 250: Performed by: PHYSICIAN ASSISTANT

## 2023-04-06 PROCEDURE — 80053 COMPREHEN METABOLIC PANEL: CPT | Performed by: NURSE PRACTITIONER

## 2023-04-06 PROCEDURE — 25000003 PHARM REV CODE 250: Performed by: SPECIALIST

## 2023-04-06 PROCEDURE — 85025 COMPLETE CBC W/AUTO DIFF WBC: CPT | Performed by: PHYSICIAN ASSISTANT

## 2023-04-06 PROCEDURE — 63600175 PHARM REV CODE 636 W HCPCS: Performed by: SPECIALIST

## 2023-04-06 PROCEDURE — 97535 SELF CARE MNGMENT TRAINING: CPT | Mod: CO

## 2023-04-06 RX ORDER — CHLORDIAZEPOXIDE HYDROCHLORIDE 25 MG/1
25 CAPSULE, GELATIN COATED ORAL
Status: COMPLETED | OUTPATIENT
Start: 2023-04-10 | End: 2023-04-11

## 2023-04-06 RX ORDER — FOLIC ACID 1 MG/1
1 TABLET ORAL DAILY
Status: DISCONTINUED | OUTPATIENT
Start: 2023-04-07 | End: 2023-04-17 | Stop reason: HOSPADM

## 2023-04-06 RX ORDER — AMLODIPINE BESYLATE 5 MG/1
10 TABLET ORAL DAILY
Status: DISCONTINUED | OUTPATIENT
Start: 2023-04-06 | End: 2023-04-17 | Stop reason: HOSPADM

## 2023-04-06 RX ORDER — HALOPERIDOL 5 MG/ML
2 INJECTION INTRAMUSCULAR EVERY 6 HOURS PRN
Status: DISCONTINUED | OUTPATIENT
Start: 2023-04-06 | End: 2023-04-17 | Stop reason: HOSPADM

## 2023-04-06 RX ORDER — HALOPERIDOL 5 MG/ML
5 INJECTION INTRAMUSCULAR ONCE
Status: DISCONTINUED | OUTPATIENT
Start: 2023-04-06 | End: 2023-04-06

## 2023-04-06 RX ORDER — CHLORDIAZEPOXIDE HYDROCHLORIDE 25 MG/1
50 CAPSULE, GELATIN COATED ORAL
Status: COMPLETED | OUTPATIENT
Start: 2023-04-07 | End: 2023-04-08

## 2023-04-06 RX ORDER — CHLORDIAZEPOXIDE HYDROCHLORIDE 25 MG/1
25 CAPSULE, GELATIN COATED ORAL
Status: DISPENSED | OUTPATIENT
Start: 2023-04-09 | End: 2023-04-10

## 2023-04-06 RX ORDER — CHLORDIAZEPOXIDE HYDROCHLORIDE 25 MG/1
50 CAPSULE, GELATIN COATED ORAL
Status: COMPLETED | OUTPATIENT
Start: 2023-04-06 | End: 2023-04-07

## 2023-04-06 RX ORDER — HALOPERIDOL 5 MG/ML
5 INJECTION INTRAMUSCULAR ONCE
Status: COMPLETED | OUTPATIENT
Start: 2023-04-06 | End: 2023-04-06

## 2023-04-06 RX ORDER — THIAMINE HCL 100 MG
200 TABLET ORAL DAILY
Status: DISCONTINUED | OUTPATIENT
Start: 2023-04-07 | End: 2023-04-17 | Stop reason: HOSPADM

## 2023-04-06 RX ORDER — SODIUM BICARBONATE 650 MG/1
650 TABLET ORAL 3 TIMES DAILY
Status: COMPLETED | OUTPATIENT
Start: 2023-04-07 | End: 2023-04-08

## 2023-04-06 RX ORDER — CHLORDIAZEPOXIDE HYDROCHLORIDE 25 MG/1
25 CAPSULE, GELATIN COATED ORAL
Status: COMPLETED | OUTPATIENT
Start: 2023-04-11 | End: 2023-04-11

## 2023-04-06 RX ORDER — CHLORDIAZEPOXIDE HYDROCHLORIDE 25 MG/1
100 CAPSULE, GELATIN COATED ORAL ONCE
Status: COMPLETED | OUTPATIENT
Start: 2023-04-06 | End: 2023-04-06

## 2023-04-06 RX ORDER — CHLORDIAZEPOXIDE HYDROCHLORIDE 25 MG/1
25 CAPSULE, GELATIN COATED ORAL
Status: COMPLETED | OUTPATIENT
Start: 2023-04-08 | End: 2023-04-09

## 2023-04-06 RX ADMIN — AMLODIPINE BESYLATE 10 MG: 5 TABLET ORAL at 05:04

## 2023-04-06 RX ADMIN — LABETALOL HYDROCHLORIDE 20 MG: 5 INJECTION, SOLUTION INTRAVENOUS at 12:04

## 2023-04-06 RX ADMIN — CHLORDIAZEPOXIDE HYDROCHLORIDE 50 MG: 25 CAPSULE ORAL at 08:04

## 2023-04-06 RX ADMIN — FOLIC ACID: 5 INJECTION, SOLUTION INTRAMUSCULAR; INTRAVENOUS; SUBCUTANEOUS at 01:04

## 2023-04-06 RX ADMIN — FAMOTIDINE 20 MG: 20 TABLET, FILM COATED ORAL at 08:04

## 2023-04-06 RX ADMIN — FAMOTIDINE 20 MG: 20 TABLET, FILM COATED ORAL at 09:04

## 2023-04-06 RX ADMIN — DOCUSATE SODIUM 100 MG: 100 CAPSULE, LIQUID FILLED ORAL at 08:04

## 2023-04-06 RX ADMIN — LORAZEPAM 1 MG: 1 TABLET ORAL at 05:04

## 2023-04-06 RX ADMIN — MUPIROCIN: 20 OINTMENT TOPICAL at 09:04

## 2023-04-06 RX ADMIN — HYDRALAZINE HYDROCHLORIDE 20 MG: 20 INJECTION INTRAMUSCULAR; INTRAVENOUS at 04:04

## 2023-04-06 RX ADMIN — LORAZEPAM 1 MG: 1 TABLET ORAL at 11:04

## 2023-04-06 RX ADMIN — AMIODARONE HYDROCHLORIDE 0.5 MG/MIN: 1.8 INJECTION, SOLUTION INTRAVENOUS at 09:04

## 2023-04-06 RX ADMIN — AMIODARONE HYDROCHLORIDE 150 MG: 1.5 INJECTION, SOLUTION INTRAVENOUS at 05:04

## 2023-04-06 RX ADMIN — MUPIROCIN: 20 OINTMENT TOPICAL at 08:04

## 2023-04-06 RX ADMIN — SUCRALFATE 1 G: 1 TABLET ORAL at 08:04

## 2023-04-06 RX ADMIN — AMIODARONE HYDROCHLORIDE 1 MG/MIN: 1.8 INJECTION, SOLUTION INTRAVENOUS at 05:04

## 2023-04-06 RX ADMIN — OXYCODONE HYDROCHLORIDE 10 MG: 10 TABLET ORAL at 10:04

## 2023-04-06 RX ADMIN — METOPROLOL SUCCINATE 50 MG: 50 TABLET, EXTENDED RELEASE ORAL at 09:04

## 2023-04-06 RX ADMIN — VALSARTAN 160 MG: 80 TABLET, FILM COATED ORAL at 09:04

## 2023-04-06 RX ADMIN — VALSARTAN 160 MG: 80 TABLET, FILM COATED ORAL at 08:04

## 2023-04-06 RX ADMIN — ASPIRIN 81 MG: 81 TABLET, COATED ORAL at 09:04

## 2023-04-06 RX ADMIN — HALOPERIDOL LACTATE 5 MG: 5 INJECTION, SOLUTION INTRAMUSCULAR at 01:04

## 2023-04-06 RX ADMIN — SUCRALFATE 1 G: 1 TABLET ORAL at 05:04

## 2023-04-06 RX ADMIN — CHLORDIAZEPOXIDE HYDROCHLORIDE 100 MG: 25 CAPSULE ORAL at 01:04

## 2023-04-06 RX ADMIN — ATORVASTATIN CALCIUM 40 MG: 40 TABLET, FILM COATED ORAL at 09:04

## 2023-04-06 RX ADMIN — DOCUSATE SODIUM 100 MG: 100 CAPSULE, LIQUID FILLED ORAL at 09:04

## 2023-04-06 RX ADMIN — HYDRALAZINE HYDROCHLORIDE 20 MG: 20 INJECTION INTRAMUSCULAR; INTRAVENOUS at 03:04

## 2023-04-06 NOTE — PT/OT/SLP PROGRESS
Occupational Therapy   Treatment    Name: Mike Urbina Jr.  MRN: [id number]  Admitting Diagnosis:  <principal problem not specified>  3 Days Post-Op    Recommendations:     Discharge Recommendations: rehabilitation facility  Discharge Equipment Recommendations:  walker, rolling  Barriers to discharge:       Assessment:     Mike Urbina Jr. is a 67 y.o. male with a medical diagnosis of <principal problem not specified>.   Performance deficits affecting function are weakness, impaired endurance, impaired self care skills, impaired functional mobility, gait instability, impaired balance, decreased safety awareness.     Rehab Prognosis:  Fair; patient would benefit from acute skilled OT services to address these deficits and reach maximum level of function.       Plan:     Patient to be seen 6 x/week to address the above listed problems via self-care/home management, therapeutic activities, therapeutic exercises  Plan of Care Expires: 04/19/23  Plan of Care Reviewed with: patient    Subjective     Pain/Comfort:       Objective:     Communicated with: RN prior to session.  Patient found HOB elevated with 1:1 in room upon OT entry to room.    General Precautions: Standard, fall, sternal    Orthopedic Precautions:   Braces:       Occupational Performance:     Bed Mobility:    Patient completed Supine to Sit with minimum assistance     Functional Mobility/Transfers:  Patient completed Sit <> Stand Transfer with stand by assistance  with  rolling walker   Functional Mobility: ambulating around room in preparation of ADL task    Activities of Daily Living:  Grooming: minimum assistance standing at sink washing hands      Patient left up in chair with all lines intact, call button in reach, and chair alarm on    GOALS:   Multidisciplinary Problems       Occupational Therapy Goals          Problem: Occupational Therapy    Goal Priority Disciplines Outcome Interventions   Occupational Therapy Goal     OT, PT/OT  Ongoing, Progressing    Description: Goals to be met by: 4/19/23     Patient will increase functional independence with ADLs by performing:    UE Dressing with Modified [place].  LE Dressing with Modified [place] and Assistive Devices as needed.  Grooming while standing at sink with Modified [place].  Toileting from toilet with Modified [place] for hygiene and clothing management.   Toilet transfer to toilet with Modified [place].                         Time Tracking:     OT Date of Treatment: 04/06/23  OT Start Time: 1141  OT Stop Time: 1205  OT Total Time (min): 24 min    Billable Minutes:Self Care/Home Management 2    OT/ROCCO: ROCCO     Number of ROCCO visits since last OT visit: 1    4/6/2023

## 2023-04-06 NOTE — CONSULTS
[facility] Hospital Medicine Consultation Note        Patient Name: Mike Urbina Jr.  MRN: [id number]  Patient Class: IP- Inpatient   Admission Date: 4/3/2023  5:08 AM  Length of Stay: 3  Attending Physician: [unfilled]   Primary Care Provider: LOREN Jerry  Face-to-Face encounter date: 04/06/2023   Consulting Physician: [facility] Medicine - Dr. Metcalf  Reason for Consult: Alcohol withdrawal   Chief Complaint: No chief complaint on file.        Patient information was obtained from patient, patient's family, past medical records.        MD Addendum -  Pt with h/o alcohol abuse and dependency admitted for elective CABG which pt underwent on 4/3/23. Post operatively on 4/5/23 pt developed acute confusional state and agitation suggestive of alcohol withdrawal. Pt reports his last drink was on 4/2/23 day before his surgery. Additionally , noted pt has chronic  thrombocytopenia , leukopenia as well as anemia, coagulopathy with INR 1.48, T.bili 2.0 all suggestive alcoholic liver disease and cirrhosis . U/S liver done today showed cirrhotic changes with alcoholic steatosis. CT head without contrast today showed no acute intra cranial abnormality.     P/E- NAD,  Pt is somnolent on exam  but arouses easily with verbal stimulation and appears oriented to self, place and person. Chest - healing midline sternotomy incision , lungs - decreased breath sounds left lung base with CT in place , Heart- S1/S2, RRR, Abd- soft, BS+, Ext- No E/C/C, Neuro- CN 2-12 intact    A/P-  Acute Alcohol withdrawal with Delirium   Alcoholic liver disease / alcoholic steatosis and cirrhosis   Peripheral cytopenia   Alcohol abuse and dependency   H/O Multivessel CAD, s/p CABG x 3 vessels on 4/3/23  H/O Paroxysmal A.fib/ A flutter with RVR, s/p Ligation of SILVANO with Endostapler during CABG  Chronic diastolic CHF, EF 55%  Essential HTN  Valvular Heart disease - mild AS, mild MS  Mild non AG metabolic acidosis  "  Hyperbilirubinemia       Plan-   Banana bag x 1 dose today  Initiate Librium protocol for DT prophylaxis   CIWA  AR scale   Thiamine , MVI and Folic acid replacement   Check ammonia level  Ativan IV prn seizure, non redirectable agitation and anxiety.   Pt in A.fib with RVR today. Cardiology initiated Amiodarone bolus and infusion . Continue oral BB  Amlodipine added per Cards to   Optimize BP control   Continue GDMT - ASA, Statin , BB, ARB        HISTORY OF PRESENT ILLNESS:   Mike Urbina Jr. is a 67 y.o. male with PMHx of ETOH abuse, VHD-aortic stenosis, thrombocytopenia, CAD status post NSTEMI, HTN, PAFlutter who presented to [facility] on 04/03/2023 for a scheduled CABG.  He previously underwent cardiac workup revealing multivessel CAD.  He underwent CABG x3 on 04/03/2023 by Dr. Finley.  He was admitted to ICU postoperatively.  CIS was consulted on 04/04/2023.  On the evening of 04/05/2023 patient had a mental status changed, he became acutely confused and agitated. Hospital Medicine team was consulted for alcohol withdrawal.  Patient reports he drinks two 12 packs of beer daily for "a long time." He reports that he did try to quit drinking many years ago and experienced DTs.  Last drink was on for 04/02/2023, patient reports he had a six-pack of beer.    Labs on 04/06/2023 notable for WBC 4.4, hemoglobin 10, hematocrit 29.2, platelets 81, sodium 132, CO2 17, BUN 29.4, calcium 8.2, total bili 2.    PAST MEDICAL HISTORY:   ETOH abuse, VHD-aortic stenosis, thrombocytopenia, CAD status post NSTEMI, HTN, PAF    PAST SURGICAL HISTORY:     Past Surgical History:   Procedure Laterality Date    CATARACT EXTRACTION Bilateral     CORONARY ARTERY BYPASS GRAFT (CABG) N/A 4/3/2023    Procedure: CORONARY ARTERY BYPASS GRAFT (CABG);  Surgeon: Deuce Finley IV, MD;  Location: [facility];  Service: Cardiovascular;  Laterality: N/A;  WITH LLAA //  ECHO NOTIFIED    LEFT HEART CATHETERIZATION N/A 03/15/2023    Procedure: " CATHETERIZATION, HEART, LEFT;  Surgeon: Sreedhar Herrera MD;  Location: [facility] CATH LAB;  Service: Cardiology;  Laterality: N/A;  LHC via RRA       ALLERGIES:   Patient has no known allergies.    FAMILY HISTORY:   Mother:  CAD/MI    SOCIAL HISTORY:   Drinks a 6 pack of beer per day for the past 30 years.  Denied tobacco or illicit drug use.    HOME MEDICATIONS:     Prior to Admission medications    Medication Sig Start Date End Date Taking? Authorizing Provider   aspirin (ECOTRIN) 81 MG EC tablet Take 1 tablet (81 mg total) by mouth once daily. 12/30/22 4/3/23 Yes Thairy G Reyes, DO   atorvastatin (LIPITOR) 10 MG tablet Take 40 mg by mouth once daily.   Yes Historical Provider   furosemide (LASIX) 40 MG tablet TAKE ONE TABLET BY MOUTH DAILY DOSE INCREASED 2/10/23  Yes Historical Provider   hydrOXYzine HCL (ATARAX) 25 MG tablet TAKE 1 TABLET BY MOUTH EVERY EVENING 3/15/23  Yes LOREN Hatfield   metoprolol succinate (TOPROL-XL) 100 MG 24 hr tablet Take 1 tablet (100 mg total) by mouth once daily. 12/31/22 4/3/23 Yes Thairy G Reyes, DO   NIFEdipine (PROCARDIA-XL) 30 MG (OSM) 24 hr tablet Take 1 tablet (30 mg total) by mouth once daily.  Patient taking differently: Take 60 mg by mouth once daily. 12/31/22 4/3/23 Yes Thairy G Reyes, DO   valsartan (DIOVAN) 40 MG tablet Take 1 tablet (40 mg total) by mouth 2 (two) times daily. 12/30/22 3/15/23  Thairy G Reyes, DO       INPATIENT LIST OF MEDICATIONS:     Current Facility-Administered Medications:     0.9%  NaCl infusion (for blood administration), , Intravenous, Q24H PRN, Graham Aiken MD    0.9%  NaCl infusion (for blood administration), , Intravenous, Q24H PRN, Graham Aiken MD    acetaminophen oral solution 650 mg, 650 mg, Per OG tube, Q6H PRN, GISEL Bingham, 650 mg at 04/05/23 2014    albumin human 5% bottle 12.5 g, 12.5 g, Intravenous, PRN, GISEL Bingham, Stopped at 04/03/23 2100    aspirin EC tablet 81 mg, 81 mg, Oral, Daily, Nicholas ARENAS  GISEL Murphy, 81 mg at 04/06/23 0930    atorvastatin tablet 40 mg, 40 mg, Oral, Daily, Washington Huerta, HONORIO, 40 mg at 04/06/23 0931    calcium gluconate 1 g in NS IVPB (premixed), 1 g, Intravenous, PRN, Nicholas Murphy, PA    calcium gluconate 1 g in NS IVPB (premixed), 2 g, Intravenous, PRN, Nicholas Murphy, PA    calcium gluconate 1 g in NS IVPB (premixed), 3 g, Intravenous, PRN, Nicholas Murphy, PA    dextrose 10% bolus 125 mL 125 mL, 12.5 g, Intravenous, PRN, GISEL Bingham    dextrose 10% bolus 125 mL 125 mL, 12.5 g, Intravenous, PRN, Graham Aiken MD    dextrose 10% bolus 250 mL 250 mL, 25 g, Intravenous, PRN, Nicholas Murphy, PA    dextrose 10% bolus 250 mL 250 mL, 25 g, Intravenous, PRN, Graham Aiken MD    docusate sodium capsule 100 mg, 100 mg, Oral, BID, GISEL Bingham, 100 mg at 04/06/23 0930    famotidine tablet 20 mg, 20 mg, Oral, BID, Deuce Finley IV, MD, 20 mg at 04/06/23 0931    glucagon (human recombinant) injection 1 mg, 1 mg, Intramuscular, PRN, Graham Aiken MD    glucose chewable tablet 16 g, 16 g, Oral, PRN, Graham Aiken MD    glucose chewable tablet 24 g, 24 g, Oral, PRN, Graham Aiken MD    hydrALAZINE injection 20 mg, 20 mg, Intravenous, Q6H PRN, Elizabeth Bates NP, 20 mg at 04/06/23 0323    HYDROcodone-acetaminophen 5-325 mg per tablet 1 tablet, 1 tablet, Oral, Q4H PRN, GISEL Bingham, 1 tablet at 04/04/23 0915    insulin aspart U-100 injection 0-5 Units, 0-5 Units, Subcutaneous, QID (AC + HS) PRN, Graham Aiken MD    ketorolac injection 30 mg, 30 mg, Intravenous, Q6H PRN, GISEL Lazo, 30 mg at 04/05/23 0222    labetaloL injection 20 mg, 20 mg, Intravenous, Q4H PRN, Elizabeth Bates NP, 20 mg at 04/06/23 0002    LORazepam tablet 1 mg, 1 mg, Oral, Q6H, GISEL Lazo, 1 mg at 04/06/23 1123    magnesium sulfate 2g in water 50mL IVPB (premix), 2 g, Intravenous, PRN, GISEL Bingham    magnesium  sulfate 2g in water 50mL IVPB (premix), 4 g, Intravenous, PRN, GISEL Bingham    metoprolol succinate (TOPROL-XL) 24 hr tablet 50 mg, 50 mg, Oral, Daily, Elizabeth Bates NP, 50 mg at 04/06/23 0930    mupirocin 2 % ointment, , Nasal, BID, GISEL Bingham, Given at 04/06/23 0933    ondansetron injection 4 mg, 4 mg, Intravenous, Q4H PRN, GISEL Bingham    oxyCODONE immediate release tablet Tab 10 mg, 10 mg, Oral, Q4H PRN, GISEL Bingham, 10 mg at 04/05/23 0119    potassium chloride 20 mEq in 100 mL IVPB (FOR CENTRAL LINE ADMINISTRATION ONLY), 20 mEq, Intravenous, PRN, Nicholas P Jeffrey, PA    potassium chloride 20 mEq in 100 mL IVPB (FOR CENTRAL LINE ADMINISTRATION ONLY), 20 mEq, Intravenous, PRN, Nicholas P Jeffrey, PA    potassium chloride 40 mEq in 100 mL IVPB (FOR CENTRAL LINE ADMINISTRATION ONLY), 40 mEq, Intravenous, PRN, Nicholas Murphy, PA    sodium chloride 0.9% 1,000 mL with mvi, (ADULT) no.4 with vit K 3,300 unit- 150 mcg/10 mL 10 mL, thiamine 100 mg, folic acid 1 mg infusion, , Intravenous, Once, Simran Meza, AGACNP-BC    sodium phosphate 15 mmol in dextrose 5 % (D5W) 250 mL IVPB, 15 mmol, Intravenous, PRN, Nicholas Murphy PA    sodium phosphate 20.01 mmol in dextrose 5 % (D5W) 250 mL IVPB, 20.01 mmol, Intravenous, PRN, Nicholas P Jeffrey, PA    sodium phosphate 30 mmol in dextrose 5 % (D5W) 250 mL IVPB, 30 mmol, Intravenous, PRN, Nicholas Murphy PA    sucralfate tablet 1 g, 1 g, Oral, QID (AC & HS), GISEL Bingham, 1 g at 04/05/23 2002    valsartan tablet 160 mg, 160 mg, Oral, BID, Washington Huerta, ANP, 160 mg at 04/06/23 0930      Scheduled Meds:   aspirin  81 mg Oral Daily    atorvastatin  40 mg Oral Daily    docusate sodium  100 mg Oral BID    famotidine  20 mg Oral BID    LORazepam  1 mg Oral Q6H    metoprolol succinate  50 mg Oral Daily    mupirocin   Nasal BID    Banana bag   Intravenous Once    sucralfate  1 g Oral QID (AC & HS)    valsartan  160 mg  Oral BID     Continuous Infusions:  PRN Meds:.sodium chloride, sodium chloride, acetaminophen, albumin human 5%, calcium gluconate IVPB, calcium gluconate IVPB, calcium gluconate IVPB, dextrose 10%, dextrose 10%, dextrose 10%, dextrose 10%, glucagon (human recombinant), glucose, glucose, hydrALAZINE, HYDROcodone-acetaminophen, insulin aspart U-100, ketorolac, labetalol, magnesium sulfate IVPB, magnesium sulfate IVPB, ondansetron, oxyCODONE, potassium chloride in water, potassium chloride in water, potassium chloride in water, sodium phosphate IVPB, sodium phosphate IVPB, sodium phosphate IVPB    REVIEW OF SYSTEMS:   Except as documented, all other systems reviewed and negative     PHYSICAL EXAM:     VITAL SIGNS: 24 HRS MIN & MAX LAST   Temp  Min: 97.4 °F (36.3 °C)  Max: 98.8 °F (37.1 °C) 98 °F (36.7 °C)   BP  Min: 153/85  Max: 197/107 (!) 168/84     Pulse  Min: 87  Max: 99  87   Resp  Min: 20  Max: 20 20   SpO2  Min: 94 %  Max: 99 % 95 %       General appearance:  Disheveled  male in no apparent distress.  HENT: Atraumatic head. Moist mucous membranes of oral cavity.  Eyes: Normal extraocular movements.   Neck: Supple.   Lungs: Clear to auscultation bilaterally.   Heart: Regular rate and rhythm. S1 and S2 present. No pedal edema.  Abdomen: Soft, non-distended, non-tender.  Extremities: No cyanosis, clubbing, or edema.  Skin:  Median sternotomy with chest tube to atrium drain  Neuro: Motor and sensory exams grossly intact.  Tremulous  Psych/mental status: Appropriate mood and affect. Responds appropriately to some  questions.     LABS AND IMAGING:     Recent Labs   Lab 04/04/23  1723 04/05/23  0505 04/06/23  0613   WBC 4.3* 3.5* 4.4*   RBC 3.25* 3.13* 3.33*   HGB 10.0* 9.5* 10.0*   HCT 29.0* 27.9* 29.2*   MCV 89.2 89.1 87.7   MCH 30.8 30.4 30.0   MCHC 34.5 34.1 34.2   RDW 15.7 16.4 15.7   PLT 74* 59* 81*   MPV 10.5* 10.9* 10.5*       Recent Labs   Lab 04/03/23  0710 04/03/23  0902 04/03/23  1136  04/03/23  1329 04/04/23  0141 04/05/23  0505 04/06/23  0613   NA  --    < >  --    < > 130* 131* 132*   K  --    < >  --    < > 4.6 4.7 4.1   CO2  --    < >  --    < > 22* 21* 17*   BUN  --    < >  --    < > 22.8 28.8* 29.4*   CREATININE  --    < >  --    < > 0.98 1.29* 0.99   CALCIUM  --    < >  --    < > 7.6* 8.2* 8.2*   PH 7.39  --  7.32*  --   --   --   --    MG  --   --   --   --   --  2.10 2.00   ALBUMIN  --   --   --   --   --  3.0* 3.0*   ALKPHOS  --   --   --   --   --  59 91   ALT  --   --   --   --   --  17 23   AST  --   --   --   --   --  27 32   BILITOT  --   --   --   --   --  1.8* 2.0*    < > = values in this interval not displayed.        Microbiology Results (last 7 days)       ** No results found for the last 168 hours. **             RADIOLOGY                                                                                                    X-Ray Chest AP Portable  Narrative: EXAMINATION:  Single view chest radiograph.    CLINICAL HISTORY:  Post-op;    TECHNIQUE:  Single view of the chest.    COMPARISON:  Chest radiograph 04/05/2023.    FINDINGS:  Left-sided chest tube and mediastinal drain are unchanged.  There is minimal left lower lobe subsegmental atelectasis.  Aeration in the left lung base has improved.  There is no pneumothorax.  The cardiac silhouette remains enlarged.  Impression: Improving aeration in the left lung base with persistent minimal subsegmental atelectasis.    Electronically signed by: Wyatt Wellington MD  Date:    04/06/2023  Time:    06:51        ASSESSMENT & PLAN:     Acute alcohol withdrawal   ETOH abuse   CAD status post CABG x3 on 04/03/2023  Essential hypertension   Paroxysmal atrial flutter   Valvular heart disease-aortic stenosis  Chronic thrombocytopenia  Normocytic anemia    Plan:   CT head without contrast now   CIWA protocol q.4 hours   IV banana bag x1 now   PRN Haldol for agitation  Librium protocol for alcohol withdrawal    Thank you for this consult. We will  continue to manage this patients care along side you.     VTE Prophylaxis: Already on SCDs      I, Simran Meza, NP have reviewed and discussed the case with Dr. Metcalf.  Please see the following addendum for further assessment and plan from the attending MD.    Simran Meza, [facility] Medicine Team  04/06/2023     ______________________________________________________________________________    MD Addendum:    I, Dr. Frankie Metcalf, assumed care of this patient today.   For the patient encounter, I performed the substantive portion of the visit, I reviewed the NPPA documentation, treatment plan, and medical decision making.  I had face to face time with this patient         Thank you for the consult, will be happy to follow alongside you      All diagnosis and differential diagnosis have been reviewed; assessment and plan has been documented; I have personally reviewed the labs and test results that are presently available; I have reviewed the patients medication list; I have reviewed the consulting providers response and recommendations. I have reviewed or attempted to review medical records based upon their availability.    All of the patient and family questions have been addressed and answered. Patient's is agreeable to the above stated plan. I will continue to monitor closely and make adjustments to medical management as needed.

## 2023-04-06 NOTE — PROGRESS NOTES
Mike Urbina Jr. is a 67 y.o. male patient.   1. Anticoagulated    2. Coronary artery disease of native artery of native heart with stable angina pectoris    3. Coronary artery disease    4. CAD (coronary artery disease)      Past Medical History:   Diagnosis Date    Atrial flutter     CHF (congestive heart failure)     Coronary artery disease     Hypertension     SOB (shortness of breath)     at times     No past surgical history pertinent negatives on file.  Scheduled Meds:   aspirin  81 mg Oral Daily    atorvastatin  40 mg Oral Daily    chlordiazepoxide  100 mg Oral Once    Followed by    chlordiazepoxide  50 mg Oral Q6H    Followed by    [START ON 4/7/2023] chlordiazepoxide  50 mg Oral Q8H    Followed by    [START ON 4/8/2023] chlordiazepoxide  25 mg Oral Q6H    Followed by    [START ON 4/9/2023] chlordiazepoxide  25 mg Oral Q8H    Followed by    [START ON 4/10/2023] chlordiazepoxide  25 mg Oral Q12H    Followed by    [START ON 4/11/2023] chlordiazepoxide  25 mg Oral Q24H    docusate sodium  100 mg Oral BID    famotidine  20 mg Oral BID    LORazepam  1 mg Oral Q6H    metoprolol succinate  50 mg Oral Daily    mupirocin   Nasal BID    sucralfate  1 g Oral QID (AC & HS)    valsartan  160 mg Oral BID     Continuous Infusions:  PRN Meds:sodium chloride, sodium chloride, acetaminophen, albumin human 5%, calcium gluconate IVPB, calcium gluconate IVPB, calcium gluconate IVPB, dextrose 10%, dextrose 10%, dextrose 10%, dextrose 10%, glucagon (human recombinant), glucose, glucose, haloperidol lactate, hydrALAZINE, HYDROcodone-acetaminophen, insulin aspart U-100, ketorolac, labetalol, magnesium sulfate IVPB, magnesium sulfate IVPB, ondansetron, oxyCODONE, potassium chloride in water, potassium chloride in water, potassium chloride in water, sodium phosphate IVPB, sodium phosphate IVPB, sodium phosphate IVPB    Review of patient's allergies indicates:  No Known Allergies  There are no hospital problems to display for  "this patient.    Blood pressure (!) 168/84, pulse 87, temperature 98 °F (36.7 °C), temperature source Oral, resp. rate 20, height 6' 2" (1.88 m), weight 83.5 kg (184 lb 1.4 oz), SpO2 95 %.    Subjective:    POD #3  Awake. Alert  Events noted overnight  Hospitalist consulted for alcohol withdrawal management     Objective:   AFVSS. 95% on RA  Heart: RRR  Lungs: respirations nonlabored, clear  Incision: c/d/I  H/h: 10/29  Cr: 0.99  MS CT: 10cc/24hrs  L CT: 350cc/24hrs  Cxr: stable, Improving aeration in the left lung base with persistent minimal subsegmental atelectasis.      Assesment/Plan:    S/p CAB, Ligation SILVANO  - PT/ambulate  - IS  - d/c MS CT  - continue L CT, watersealed               GISEL Lewis  4/6/2023    "

## 2023-04-06 NOTE — PT/OT/SLP PROGRESS
Physical Therapy      Patient Name:  Mike Urbina Jr.   MRN:  [id number]    Patient not seen today secondary to Testing/imaging (xray/CT/MRI), out of room getting a CT performed on head. Will follow-up 04/07, unless time permits later on today.

## 2023-04-06 NOTE — PROGRESS NOTES
"  [facility] - [facility] ICU  Cardiology  Consult Note    Patient Name: Mike Urbina Jr.  MRN: [id number]  Admission Date: 4/3/2023  Hospital Length of Stay: 3 days  Code Status: Full Code   Attending Provider: Deuce Finley IV, MD   Consulting Provider: HONORIO Villalpando  Primary Care Physician: LOREN Jerry  Principal Problem:<principal problem not specified>    Patient information was obtained from patient, past medical records, and ER records.     Subjective:     Chief Complaint: Reason for Consult: Post CABG Medical Management    HPI: Mr. Urbina is a 68 y/o male who is known to [facility], Dr. Herrera. The patient presented to [facility] on 4.3.23 for a Planned CABG. The patient was recently worked up worked up for a NSTEMI and found to have an Abnormal PET Scan. The PT underwent a LHC with Noted MVCAD. He was referred to [facility] and deemed a candidate for CABG and on 4.3.23 he underwent a Sternotomy with Results of: LIMA to LAD, rSVG to OM1, rSVG to PDA and Ligation of SILVANO with Endostapler. The patient tolerated the procedure well and was stabilized in the ER. [facility] has been consulted for Medical Management of the PT.    4.5.23: NAD. Sitting in Bed. Denies SOB and Palps. + CP/Incisional. "I am doing great."   4.6.23: NAD. PT is a 1:1 - AMS Last PM. Denies SOB and Palps. + CP/Incisional. "I am feeling well."     PMH: ETOH Abuse, VHD (AS), Thrombocytopenia, NSTEMI, HTN, PAF/Flutter  PSH: Angiogram, CABG, Cataract Extraction   Family History: Mother, L, MI  Social History: + ETOH Use (6pk/Beer per Day for > 30 Years); Denies Tobacco and Illicit Drug Use    Previous Cardiac Diagnostics:   LHC 3.15.23:  Surgical coronary anatomy with distal left main 50%; LAD proximal to mid 60-70%; circumflex mid 80- 90%; RCA with proximal .  The ejection fraction was 60% with EDP of 10 mmHg.  Right radial access.  The estimated blood loss was less than 10 cc.  CT surgical consult for CABG evaluation.    PET " 1.6.23:  This is an abnormal perfusion study. Study is consistent with ischemia.   This scan is suggestive of moderate risk for future cardiovascular events.   Small reversible perfusion abnormality of moderate intensity in the apical segment. Medium fixed perfusion abnormality of severe intensity in the inferior region.   The left ventricular cavity is noted to be normal on the stress studies. The stress left ventricular ejection fraction was calculated to be 35% and left ventricular global function is moderately reduced. The rest left ventricular cavity is noted to be normal. The rest left ventricular ejection fraction was calculated to be 40% and rest left ventricular global function is mildly reduced.   When compared to the resting ejection fraction (40%), the stress ejection fraction (35%) has decreased.   The study quality is good.   There was a rise in myocardial blood flow between rest and stress.  Global myocardial blood flow reserve was 1.47.  Myocardial blood flow reserve is globally abnormal, placing the patient at a higher coronary event risk.    ECHO 12.9.22:  The left ventricle is normal in size with mild concentric hypertrophy and normal systolic function.  Grade I left ventricular diastolic dysfunction.  The estimated ejection fraction is 55%.  Normal right ventricular size with normal right ventricular systolic function.  There is mild aortic valve stenosis.  There is mild mitral stenosis.  Normal central venous pressure (3 mmHg).    Review of patient's allergies indicates:  No Known Allergies    No current facility-administered medications on file prior to encounter.     Current Outpatient Medications on File Prior to Encounter   Medication Sig    aspirin (ECOTRIN) 81 MG EC tablet Take 1 tablet (81 mg total) by mouth once daily.    atorvastatin (LIPITOR) 10 MG tablet Take 40 mg by mouth once daily.    furosemide (LASIX) 40 MG tablet TAKE ONE TABLET BY MOUTH DAILY DOSE INCREASED    hydrOXYzine HCL  (ATARAX) 25 MG tablet TAKE 1 TABLET BY MOUTH EVERY EVENING    metoprolol succinate (TOPROL-XL) 100 MG 24 hr tablet Take 1 tablet (100 mg total) by mouth once daily.    NIFEdipine (PROCARDIA-XL) 30 MG (OSM) 24 hr tablet Take 1 tablet (30 mg total) by mouth once daily. (Patient taking differently: Take 60 mg by mouth once daily.)    valsartan (DIOVAN) 40 MG tablet Take 1 tablet (40 mg total) by mouth 2 (two) times daily.     Review of Systems   Constitutional:  Positive for fatigue. Negative for fever.   Respiratory:  Negative for cough, chest tightness and shortness of breath.    Cardiovascular:  Positive for chest pain. Negative for palpitations and leg swelling.        Incisional   Gastrointestinal:  Negative for abdominal distention.   All other systems reviewed and are negative.    Objective:     Vital Signs (Most Recent):  Temp: 98 °F (36.7 °C) (04/06/23 1536)  Pulse: 86 (04/06/23 1536)  Resp: 20 (04/06/23 0800)  BP: (!) 172/92 (04/06/23 1536)  SpO2: (!) 94 % (04/06/23 1536)   Vital Signs (24h Range):  Temp:  [97.4 °F (36.3 °C)-98.8 °F (37.1 °C)] 98 °F (36.7 °C)  Pulse:  [86-99] 86  Resp:  [20] 20  SpO2:  [94 %-99 %] 94 %  BP: (153-197)/() 172/92     Weight: 83.5 kg (184 lb 1.4 oz)  Body mass index is 23.64 kg/m².    SpO2: (!) 94 %         Intake/Output Summary (Last 24 hours) at 4/6/2023 1549  Last data filed at 4/6/2023 1400  Gross per 24 hour   Intake 480 ml   Output 1385 ml   Net -905 ml         Lines/Drains/Airways       Drain  Duration                  Chest Tube 04/03/23 0935 Tube - 1 Left Midaxillary 19 Fr. 3 days              Peripheral Intravenous Line  Duration                  Peripheral IV - Single Lumen 04/03/23 0540 18 G Anterior;Right Forearm 3 days                  Significant Labs:  Recent Results (from the past 72 hour(s))   POCT glucose    Collection Time: 04/03/23  3:58 PM   Result Value Ref Range    POCT Glucose 131 (H) 70 - 110 mg/dL   POCT glucose    Collection Time: 04/03/23  4:57  PM   Result Value Ref Range    POCT Glucose 121 (H) 70 - 110 mg/dL   POCT glucose    Collection Time: 04/03/23  6:01 PM   Result Value Ref Range    POCT Glucose 105 70 - 110 mg/dL   POCT glucose    Collection Time: 04/03/23  6:55 PM   Result Value Ref Range    POCT Glucose 109 70 - 110 mg/dL   POCT glucose    Collection Time: 04/03/23  8:15 PM   Result Value Ref Range    POCT Glucose 136 (H) 70 - 110 mg/dL   POCT glucose    Collection Time: 04/03/23  9:04 PM   Result Value Ref Range    POCT Glucose 145 (H) 70 - 110 mg/dL   POCT glucose    Collection Time: 04/03/23 10:08 PM   Result Value Ref Range    POCT Glucose 172 (H) 70 - 110 mg/dL   POCT glucose    Collection Time: 04/03/23 11:00 PM   Result Value Ref Range    POCT Glucose 148 (H) 70 - 110 mg/dL   POCT glucose    Collection Time: 04/03/23 11:44 PM   Result Value Ref Range    POCT Glucose 141 (H) 70 - 110 mg/dL   Basic metabolic panel    Collection Time: 04/04/23  1:41 AM   Result Value Ref Range    Sodium Level 130 (L) 136 - 145 mmol/L    Potassium Level 4.6 3.5 - 5.1 mmol/L    Chloride 103 98 - 107 mmol/L    Carbon Dioxide 22 (L) 23 - 31 mmol/L    Glucose Level 106 82 - 115 mg/dL    Blood Urea Nitrogen 22.8 8.4 - 25.7 mg/dL    Creatinine 0.98 0.73 - 1.18 mg/dL    BUN/Creatinine Ratio 23     Calcium Level Total 7.6 (L) 8.8 - 10.0 mg/dL    Anion Gap 5.0 mEq/L    eGFR >60 mls/min/1.73/m2   CBC with Differential    Collection Time: 04/04/23  1:41 AM   Result Value Ref Range    WBC 1.9 (LL) 4.5 - 11.5 x10(3)/mcL    RBC 2.54 (L) 4.70 - 6.10 x10(6)/mcL    Hgb 7.4 (L) 14.0 - 18.0 g/dL    Hct 22.3 (L) 42.0 - 52.0 %    MCV 87.8 80.0 - 94.0 fL    MCH 29.1 27.0 - 31.0 pg    MCHC 33.2 33.0 - 36.0 g/dL    RDW 14.8 11.5 - 17.0 %    Platelet 39 (LL) 130 - 400 x10(3)/mcL    MPV 10.6 (H) 7.4 - 10.4 fL    NRBC% 0.0 %   Manual Differential    Collection Time: 04/04/23  1:41 AM   Result Value Ref Range    Neut Man 88 %    Lymph Man 12 %    Instr WBC 1.9 x10(3)/mcL    Abs Lymp  0.228 (L) 0.6 - 4.6 x10(3)/mcL    Abs Neut 1.672 (L) 2.1 - 9.2 x10(3)/mcL    RBC Morph Abnormal (A) Normal    Anisocyte 1+ (A) (none)    Poik 1+ (A) (none)    Microcyte 1+ (A) (none)    Target Cell 1+ (A) (none)    Stomatocytes 1+ (A) (none)    Platelet Est Decreased (A) Normal, Adequate   POCT glucose    Collection Time: 04/04/23  1:55 AM   Result Value Ref Range    POCT Glucose 111 (H) 70 - 110 mg/dL   POCT glucose    Collection Time: 04/04/23  4:00 AM   Result Value Ref Range    POCT Glucose 130 (H) 70 - 110 mg/dL   POCT glucose    Collection Time: 04/04/23  6:12 AM   Result Value Ref Range    POCT Glucose 129 (H) 70 - 110 mg/dL   POCT glucose    Collection Time: 04/04/23  7:21 AM   Result Value Ref Range    POCT Glucose 146 (H) 70 - 110 mg/dL   POCT glucose    Collection Time: 04/04/23  8:16 AM   Result Value Ref Range    POCT Glucose 143 (H) 70 - 110 mg/dL   POCT glucose    Collection Time: 04/04/23  9:22 AM   Result Value Ref Range    POCT Glucose 136 (H) 70 - 110 mg/dL   POCT glucose    Collection Time: 04/04/23 11:54 AM   Result Value Ref Range    POCT Glucose 102 70 - 110 mg/dL   POCT glucose    Collection Time: 04/04/23  3:34 PM   Result Value Ref Range    POCT Glucose 114 (H) 70 - 110 mg/dL   CBC with Differential    Collection Time: 04/04/23  5:23 PM   Result Value Ref Range    WBC 4.3 (L) 4.5 - 11.5 x10(3)/mcL    RBC 3.25 (L) 4.70 - 6.10 x10(6)/mcL    Hgb 10.0 (L) 14.0 - 18.0 g/dL    Hct 29.0 (L) 42.0 - 52.0 %    MCV 89.2 80.0 - 94.0 fL    MCH 30.8 27.0 - 31.0 pg    MCHC 34.5 33.0 - 36.0 g/dL    RDW 15.7 11.5 - 17.0 %    Platelet 74 (L) 130 - 400 x10(3)/mcL    MPV 10.5 (H) 7.4 - 10.4 fL    Neut % 81.6 %    Lymph % 8.8 %    Mono % 8.5 %    Eos % 0.2 %    Basophil % 0.2 %    Lymph # 0.38 (L) 0.6 - 4.6 x10(3)/mcL    Neut # 3.53 2.1 - 9.2 x10(3)/mcL    Mono # 0.37 0.1 - 1.3 x10(3)/mcL    Eos # 0.01 0 - 0.9 x10(3)/mcL    Baso # 0.01 0 - 0.2 x10(3)/mcL    IG# 0.03 0 - 0.04 x10(3)/mcL    IG% 0.7 %    NRBC% 0.0  %   Comprehensive Metabolic Panel    Collection Time: 04/05/23  5:05 AM   Result Value Ref Range    Sodium Level 131 (L) 136 - 145 mmol/L    Potassium Level 4.7 3.5 - 5.1 mmol/L    Chloride 102 98 - 107 mmol/L    Carbon Dioxide 21 (L) 23 - 31 mmol/L    Glucose Level 136 (H) 82 - 115 mg/dL    Blood Urea Nitrogen 28.8 (H) 8.4 - 25.7 mg/dL    Creatinine 1.29 (H) 0.73 - 1.18 mg/dL    Calcium Level Total 8.2 (L) 8.8 - 10.0 mg/dL    Protein Total 5.2 (L) 5.8 - 7.6 gm/dL    Albumin Level 3.0 (L) 3.4 - 4.8 g/dL    Globulin 2.2 (L) 2.4 - 3.5 gm/dL    Albumin/Globulin Ratio 1.4 1.1 - 2.0 ratio    Bilirubin Total 1.8 (H) <=1.5 mg/dL    Alkaline Phosphatase 59 40 - 150 unit/L    Alanine Aminotransferase 17 0 - 55 unit/L    Aspartate Aminotransferase 27 5 - 34 unit/L    eGFR 60 mls/min/1.73/m2   Magnesium    Collection Time: 04/05/23  5:05 AM   Result Value Ref Range    Magnesium Level 2.10 1.60 - 2.60 mg/dL   CBC with Differential    Collection Time: 04/05/23  5:05 AM   Result Value Ref Range    WBC 3.5 (L) 4.5 - 11.5 x10(3)/mcL    RBC 3.13 (L) 4.70 - 6.10 x10(6)/mcL    Hgb 9.5 (L) 14.0 - 18.0 g/dL    Hct 27.9 (L) 42.0 - 52.0 %    MCV 89.1 80.0 - 94.0 fL    MCH 30.4 27.0 - 31.0 pg    MCHC 34.1 33.0 - 36.0 g/dL    RDW 16.4 11.5 - 17.0 %    Platelet 59 (L) 130 - 400 x10(3)/mcL    MPV 10.9 (H) 7.4 - 10.4 fL    Neut % 77.3 %    Lymph % 10.5 %    Mono % 10.5 %    Eos % 0.3 %    Basophil % 0.3 %    Lymph # 0.37 (L) 0.6 - 4.6 x10(3)/mcL    Neut # 2.71 2.1 - 9.2 x10(3)/mcL    Mono # 0.37 0.1 - 1.3 x10(3)/mcL    Eos # 0.01 0 - 0.9 x10(3)/mcL    Baso # 0.01 0 - 0.2 x10(3)/mcL    IG# 0.04 0 - 0.04 x10(3)/mcL    IG% 1.1 %    NRBC% 0.0 %   Comprehensive Metabolic Panel    Collection Time: 04/06/23  6:13 AM   Result Value Ref Range    Sodium Level 132 (L) 136 - 145 mmol/L    Potassium Level 4.1 3.5 - 5.1 mmol/L    Chloride 104 98 - 107 mmol/L    Carbon Dioxide 17 (L) 23 - 31 mmol/L    Glucose Level 107 82 - 115 mg/dL    Blood Urea Nitrogen  29.4 (H) 8.4 - 25.7 mg/dL    Creatinine 0.99 0.73 - 1.18 mg/dL    Calcium Level Total 8.2 (L) 8.8 - 10.0 mg/dL    Protein Total 5.3 (L) 5.8 - 7.6 gm/dL    Albumin Level 3.0 (L) 3.4 - 4.8 g/dL    Globulin 2.3 (L) 2.4 - 3.5 gm/dL    Albumin/Globulin Ratio 1.3 1.1 - 2.0 ratio    Bilirubin Total 2.0 (H) <=1.5 mg/dL    Alkaline Phosphatase 91 40 - 150 unit/L    Alanine Aminotransferase 23 0 - 55 unit/L    Aspartate Aminotransferase 32 5 - 34 unit/L    eGFR >60 mls/min/1.73/m2   Magnesium    Collection Time: 04/06/23  6:13 AM   Result Value Ref Range    Magnesium Level 2.00 1.60 - 2.60 mg/dL   CBC with Differential    Collection Time: 04/06/23  6:13 AM   Result Value Ref Range    WBC 4.4 (L) 4.5 - 11.5 x10(3)/mcL    RBC 3.33 (L) 4.70 - 6.10 x10(6)/mcL    Hgb 10.0 (L) 14.0 - 18.0 g/dL    Hct 29.2 (L) 42.0 - 52.0 %    MCV 87.7 80.0 - 94.0 fL    MCH 30.0 27.0 - 31.0 pg    MCHC 34.2 33.0 - 36.0 g/dL    RDW 15.7 11.5 - 17.0 %    Platelet 81 (L) 130 - 400 x10(3)/mcL    MPV 10.5 (H) 7.4 - 10.4 fL    Neut % 74.5 %    Lymph % 11.9 %    Mono % 11.0 %    Eos % 1.4 %    Basophil % 0.5 %    Lymph # 0.53 (L) 0.6 - 4.6 x10(3)/mcL    Neut # 3.31 2.1 - 9.2 x10(3)/mcL    Mono # 0.49 0.1 - 1.3 x10(3)/mcL    Eos # 0.06 0 - 0.9 x10(3)/mcL    Baso # 0.02 0 - 0.2 x10(3)/mcL    IG# 0.03 0 - 0.04 x10(3)/mcL    IG% 0.7 %    NRBC% 0.0 %     Telemetry: SR    Physical Exam  Constitutional:       Appearance: Normal appearance.      Comments: Confused Conversation at Times; Oriented Currently   HENT:      Head: Normocephalic.      Mouth/Throat:      Mouth: Mucous membranes are moist.   Eyes:      Extraocular Movements: Extraocular movements intact.   Cardiovascular:      Rate and Rhythm: Normal rate and regular rhythm.      Pulses: Normal pulses.      Heart sounds: Normal heart sounds. No murmur heard.  Pulmonary:      Effort: Pulmonary effort is normal.      Breath sounds: Normal breath sounds.   Abdominal:      Palpations: Abdomen is soft.   Skin:      General: Skin is warm and dry.      Comments: Midline Sternotomy Dressing C/D/I. + CTs    Neurological:      General: No focal deficit present.      Mental Status: He is alert and oriented to person, place, and time.     Home Medications:   No current facility-administered medications on file prior to encounter.     Current Outpatient Medications on File Prior to Encounter   Medication Sig Dispense Refill    aspirin (ECOTRIN) 81 MG EC tablet Take 1 tablet (81 mg total) by mouth once daily. 30 tablet 0    atorvastatin (LIPITOR) 10 MG tablet Take 40 mg by mouth once daily.      furosemide (LASIX) 40 MG tablet TAKE ONE TABLET BY MOUTH DAILY DOSE INCREASED      hydrOXYzine HCL (ATARAX) 25 MG tablet TAKE 1 TABLET BY MOUTH EVERY EVENING 30 tablet 1    metoprolol succinate (TOPROL-XL) 100 MG 24 hr tablet Take 1 tablet (100 mg total) by mouth once daily. 30 tablet 0    NIFEdipine (PROCARDIA-XL) 30 MG (OSM) 24 hr tablet Take 1 tablet (30 mg total) by mouth once daily. (Patient taking differently: Take 60 mg by mouth once daily.) 30 tablet 0    valsartan (DIOVAN) 40 MG tablet Take 1 tablet (40 mg total) by mouth 2 (two) times daily. 60 tablet 0     Current Inpatient Medications:    Current Facility-Administered Medications:     0.9%  NaCl infusion (for blood administration), , Intravenous, Q24H PRN, Graham Aiken MD    0.9%  NaCl infusion (for blood administration), , Intravenous, Q24H PRN, Graham Aiken MD    acetaminophen oral solution 650 mg, 650 mg, Per OG tube, Q6H PRN, GISEL Bingham, 650 mg at 04/05/23 2014    albumin human 5% bottle 12.5 g, 12.5 g, Intravenous, PRN, GISEL Bingham, Stopped at 04/03/23 2100    amiodarone 360 mg/200 mL (1.8 mg/mL) infusion, 1 mg/min, Intravenous, Continuous, HONORIO Villalpando    amiodarone 360 mg/200 mL (1.8 mg/mL) infusion, 0.5 mg/min, Intravenous, Continuous, HONORIO Villalpando    amiodarone in dextrose 150 mg/100 mL (1.5 mg/mL) loading dose 150 mg, 150 mg,  Intravenous, Once, HONORIO Villalpando    aspirin EC tablet 81 mg, 81 mg, Oral, Daily, GISEL Bingham, 81 mg at 04/06/23 0930    atorvastatin tablet 40 mg, 40 mg, Oral, Daily, HONORIO Villalpando, 40 mg at 04/06/23 0931    calcium gluconate 1 g in NS IVPB (premixed), 1 g, Intravenous, PRN, GISEL Bingham    calcium gluconate 1 g in NS IVPB (premixed), 2 g, Intravenous, PRN, GISEL Bingham    calcium gluconate 1 g in NS IVPB (premixed), 3 g, Intravenous, PRN, GISEL Bingham    chlordiazepoxide capsule 100 mg, 100 mg, Oral, Once, 100 mg at 04/06/23 1353 **FOLLOWED BY** chlordiazepoxide capsule 50 mg, 50 mg, Oral, Q6H **FOLLOWED BY** [START ON 4/7/2023] chlordiazepoxide capsule 50 mg, 50 mg, Oral, Q8H **FOLLOWED BY** [START ON 4/8/2023] chlordiazepoxide capsule 25 mg, 25 mg, Oral, Q6H **FOLLOWED BY** [START ON 4/9/2023] chlordiazepoxide capsule 25 mg, 25 mg, Oral, Q8H **FOLLOWED BY** [START ON 4/10/2023] chlordiazepoxide capsule 25 mg, 25 mg, Oral, Q12H **FOLLOWED BY** [START ON 4/11/2023] chlordiazepoxide capsule 25 mg, 25 mg, Oral, Q24H, Simran Meza, AGACNP-BC    dextrose 10% bolus 125 mL 125 mL, 12.5 g, Intravenous, PRN, GISEL Bingham    dextrose 10% bolus 125 mL 125 mL, 12.5 g, Intravenous, PRN, Graham Aiken MD    dextrose 10% bolus 250 mL 250 mL, 25 g, Intravenous, PRN, GISEL Bingham    dextrose 10% bolus 250 mL 250 mL, 25 g, Intravenous, PRN, Graham Aiken MD    docusate sodium capsule 100 mg, 100 mg, Oral, BID, GISEL Bingham, 100 mg at 04/06/23 0930    famotidine tablet 20 mg, 20 mg, Oral, BID, Deuce Finley IV, MD, 20 mg at 04/06/23 0931    glucagon (human recombinant) injection 1 mg, 1 mg, Intramuscular, PRN, Graham Aiken MD    glucose chewable tablet 16 g, 16 g, Oral, PRN, Graham Aiken MD    glucose chewable tablet 24 g, 24 g, Oral, PRN, Graham Aiken MD    haloperidol lactate injection 2 mg, 2 mg, Intravenous, Q6H PRN, Simran READ  Susana, AGACNP-BC    hydrALAZINE injection 20 mg, 20 mg, Intravenous, Q6H PRN, Elizabeth Bates NP, 20 mg at 04/06/23 0323    HYDROcodone-acetaminophen 5-325 mg per tablet 1 tablet, 1 tablet, Oral, Q4H PRN, GISEL Bingham, 1 tablet at 04/04/23 0915    insulin aspart U-100 injection 0-5 Units, 0-5 Units, Subcutaneous, QID (AC + HS) PRN, Graham Aiken MD    ketorolac injection 30 mg, 30 mg, Intravenous, Q6H PRN, GISEL Lazo, 30 mg at 04/05/23 0222    labetaloL injection 20 mg, 20 mg, Intravenous, Q4H PRN, Elizabeth Bates NP, 20 mg at 04/06/23 0002    LORazepam tablet 1 mg, 1 mg, Oral, Q6H, GISEL Lazo, 1 mg at 04/06/23 1123    magnesium sulfate 2g in water 50mL IVPB (premix), 2 g, Intravenous, PRN, GISEL Bingham    magnesium sulfate 2g in water 50mL IVPB (premix), 4 g, Intravenous, PRN, GISEL Bingham    metoprolol succinate (TOPROL-XL) 24 hr tablet 50 mg, 50 mg, Oral, Daily, Elizabeth Bates NP, 50 mg at 04/06/23 0930    mupirocin 2 % ointment, , Nasal, BID, GISEL Bingham, Given at 04/06/23 0933    ondansetron injection 4 mg, 4 mg, Intravenous, Q4H PRN, GISEL Bingham    oxyCODONE immediate release tablet Tab 10 mg, 10 mg, Oral, Q4H PRN, GISEL Bingham, 10 mg at 04/05/23 0119    potassium chloride 20 mEq in 100 mL IVPB (FOR CENTRAL LINE ADMINISTRATION ONLY), 20 mEq, Intravenous, PRN, GISEL Bingham    potassium chloride 20 mEq in 100 mL IVPB (FOR CENTRAL LINE ADMINISTRATION ONLY), 20 mEq, Intravenous, PRN, GISEL Bingham    potassium chloride 40 mEq in 100 mL IVPB (FOR CENTRAL LINE ADMINISTRATION ONLY), 40 mEq, Intravenous, PRN, GISEL Bingham    sodium phosphate 15 mmol in dextrose 5 % (D5W) 250 mL IVPB, 15 mmol, Intravenous, PRN, GISEL Bingham    sodium phosphate 20.01 mmol in dextrose 5 % (D5W) 250 mL IVPB, 20.01 mmol, Intravenous, PRN, GISEL Bingham    sodium phosphate 30 mmol in  dextrose 5 % (D5W) 250 mL IVPB, 30 mmol, Intravenous, PRN, GISEL Bingham    sucralfate tablet 1 g, 1 g, Oral, QID (AC & HS), GISEL Bingham, 1 g at 04/05/23 2002    valsartan tablet 160 mg, 160 mg, Oral, BID, HONORIO Villalpando, 160 mg at 04/06/23 0930    VTE Risk Mitigation (From admission, onward)           Ordered     Place sequential compression device  Until discontinued         04/04/23 0324     IP VTE HIGH RISK PATIENT  Once         04/03/23 0951                  Assessment:   MVCAD    - s/p CABG (4.3.23) - LIMA to LAD, rSVG to OM1, rSVG to PDA  PAF/Flutter with RVR    - CHADsVASc - 3 Points - 3.2% Stroke Risk per Year     - s/p (4.3.23) - Ligation of SILVANO with Endostapler  HTN/HHD without Hx of HF or CKD  Encephalopathy - Likely Related to ETOH DTs  Anemia - Acute Blood Loss - Improved with Transfusion  Thrombocytopenia - Chronic   VHD (Mild AS, Mild MS)  Hx of NSTEMI  ETOH Abuse  No Hx of GIB    Plan:  See below MDM formulated by me (Phuc Jimenes MD):      Amiodarone Bolus and Drip per Protocol  Monitor H&H   Continue ASA, BB, ARB and Statin  Start Norvasc 10mg PO Qday  Aggressive Mobilization and Q1HR IS  Consider Hospital Medicine Consult for Management of DTs/ETOH   Labs in AM: CBC, CMP and Mg    HONORIO Villalpando and Phuc Jimenes MD  Cardiology  [facility] - [facility] ICU  04/06/2023

## 2023-04-07 LAB
ABO + RH BLD: NORMAL
ABS NEUT (OLG): 1.82 X10(3)/MCL (ref 2.1–9.2)
ALBUMIN SERPL-MCNC: 2.7 G/DL (ref 3.4–4.8)
ALBUMIN/GLOB SERPL: 1.4 RATIO (ref 1.1–2)
ALP SERPL-CCNC: 79 UNIT/L (ref 40–150)
ALT SERPL-CCNC: 19 UNIT/L (ref 0–55)
AMMONIA PLAS-MSCNC: 35.2 UMOL/L (ref 18–72)
AST SERPL-CCNC: 24 UNIT/L (ref 5–34)
BILIRUBIN DIRECT+TOT PNL SERPL-MCNC: 1.4 MG/DL
BLD PROD TYP BPU: NORMAL
BLOOD UNIT EXPIRATION DATE: NORMAL
BLOOD UNIT TYPE CODE: 9500
BUN SERPL-MCNC: 24.8 MG/DL (ref 8.4–25.7)
CALCIUM SERPL-MCNC: 8.1 MG/DL (ref 8.8–10)
CHLORIDE SERPL-SCNC: 107 MMOL/L (ref 98–107)
CO2 SERPL-SCNC: 18 MMOL/L (ref 23–31)
CREAT SERPL-MCNC: 0.86 MG/DL (ref 0.73–1.18)
CROSSMATCH INTERPRETATION: NORMAL
DISPENSE STATUS: NORMAL
EOSINOPHIL NFR BLD MANUAL: 0.13 X10(3)/MCL (ref 0–0.9)
EOSINOPHIL NFR BLD MANUAL: 5 %
ERYTHROCYTE [DISTWIDTH] IN BLOOD BY AUTOMATED COUNT: 15.4 % (ref 11.5–17)
GFR SERPLBLD CREATININE-BSD FMLA CKD-EPI: >60 MLS/MIN/1.73/M2
GIANT PLATELETS: ABNORMAL
GLOBULIN SER-MCNC: 2 GM/DL (ref 2.4–3.5)
GLUCOSE SERPL-MCNC: 99 MG/DL (ref 82–115)
HCT VFR BLD AUTO: 27.9 % (ref 42–52)
HGB BLD-MCNC: 9.3 G/DL (ref 14–18)
INR BLD: 1.15 (ref 0–1.3)
INSTRUMENT WBC (OLG): 2.6 X10(3)/MCL
LYMPHOCYTES NFR BLD MANUAL: 0.29 X10(3)/MCL
LYMPHOCYTES NFR BLD MANUAL: 11 %
MAGNESIUM SERPL-MCNC: 2 MG/DL (ref 1.6–2.6)
MCH RBC QN AUTO: 29.4 PG (ref 27–31)
MCHC RBC AUTO-ENTMCNC: 33.3 G/DL (ref 33–36)
MCV RBC AUTO: 88.3 FL (ref 80–94)
MONOCYTES NFR BLD MANUAL: 0.36 X10(3)/MCL (ref 0.1–1.3)
MONOCYTES NFR BLD MANUAL: 14 %
NEUTROPHILS NFR BLD MANUAL: 70 %
NRBC BLD AUTO-RTO: 0 %
PHOSPHATE SERPL-MCNC: 4 MG/DL (ref 2.3–4.7)
PLATELET # BLD AUTO: 76 X10(3)/MCL (ref 130–400)
PLATELET # BLD EST: ABNORMAL 10*3/UL
PMV BLD AUTO: 10.3 FL (ref 7.4–10.4)
POTASSIUM SERPL-SCNC: 3.9 MMOL/L (ref 3.5–5.1)
PROT SERPL-MCNC: 4.7 GM/DL (ref 5.8–7.6)
PROTHROMBIN TIME: 14.6 SECONDS (ref 12.5–14.5)
RBC # BLD AUTO: 3.16 X10(6)/MCL (ref 4.7–6.1)
RBC MORPH BLD: ABNORMAL
SODIUM SERPL-SCNC: 136 MMOL/L (ref 136–145)
UNIT NUMBER: NORMAL
WBC # SPEC AUTO: 2.6 X10(3)/MCL (ref 4.5–11.5)

## 2023-04-07 PROCEDURE — 84100 ASSAY OF PHOSPHORUS: CPT | Performed by: INTERNAL MEDICINE

## 2023-04-07 PROCEDURE — 25000003 PHARM REV CODE 250: Performed by: SPECIALIST

## 2023-04-07 PROCEDURE — 25000003 PHARM REV CODE 250: Performed by: NURSE PRACTITIONER

## 2023-04-07 PROCEDURE — 80053 COMPREHEN METABOLIC PANEL: CPT | Performed by: NURSE PRACTITIONER

## 2023-04-07 PROCEDURE — 21400001 HC TELEMETRY ROOM

## 2023-04-07 PROCEDURE — 83735 ASSAY OF MAGNESIUM: CPT | Performed by: NURSE PRACTITIONER

## 2023-04-07 PROCEDURE — 82140 ASSAY OF AMMONIA: CPT | Performed by: INTERNAL MEDICINE

## 2023-04-07 PROCEDURE — 97535 SELF CARE MNGMENT TRAINING: CPT | Mod: CO

## 2023-04-07 PROCEDURE — 97116 GAIT TRAINING THERAPY: CPT | Mod: CQ

## 2023-04-07 PROCEDURE — 94761 N-INVAS EAR/PLS OXIMETRY MLT: CPT

## 2023-04-07 PROCEDURE — 25000003 PHARM REV CODE 250: Performed by: INTERNAL MEDICINE

## 2023-04-07 PROCEDURE — 25000003 PHARM REV CODE 250

## 2023-04-07 PROCEDURE — 85027 COMPLETE CBC AUTOMATED: CPT | Performed by: NURSE PRACTITIONER

## 2023-04-07 PROCEDURE — 63600175 PHARM REV CODE 636 W HCPCS: Performed by: PHYSICIAN ASSISTANT

## 2023-04-07 PROCEDURE — 63600175 PHARM REV CODE 636 W HCPCS: Performed by: INTERNAL MEDICINE

## 2023-04-07 PROCEDURE — 63600175 PHARM REV CODE 636 W HCPCS: Performed by: NURSE PRACTITIONER

## 2023-04-07 PROCEDURE — 85610 PROTHROMBIN TIME: CPT | Performed by: INTERNAL MEDICINE

## 2023-04-07 PROCEDURE — 25000003 PHARM REV CODE 250: Performed by: PHYSICIAN ASSISTANT

## 2023-04-07 PROCEDURE — 85025 COMPLETE CBC W/AUTO DIFF WBC: CPT | Performed by: NURSE PRACTITIONER

## 2023-04-07 RX ORDER — METOPROLOL SUCCINATE 50 MG/1
100 TABLET, EXTENDED RELEASE ORAL DAILY
Status: DISCONTINUED | OUTPATIENT
Start: 2023-04-08 | End: 2023-04-08

## 2023-04-07 RX ORDER — METOPROLOL SUCCINATE 50 MG/1
50 TABLET, EXTENDED RELEASE ORAL ONCE
Status: COMPLETED | OUTPATIENT
Start: 2023-04-07 | End: 2023-04-07

## 2023-04-07 RX ADMIN — OXYCODONE HYDROCHLORIDE 10 MG: 10 TABLET ORAL at 01:04

## 2023-04-07 RX ADMIN — FOLIC ACID 1 MG: 1 TABLET ORAL at 08:04

## 2023-04-07 RX ADMIN — SUCRALFATE 1 G: 1 TABLET ORAL at 08:04

## 2023-04-07 RX ADMIN — METOPROLOL SUCCINATE 50 MG: 50 TABLET, EXTENDED RELEASE ORAL at 08:04

## 2023-04-07 RX ADMIN — VALSARTAN 160 MG: 80 TABLET, FILM COATED ORAL at 08:04

## 2023-04-07 RX ADMIN — CHLORDIAZEPOXIDE HYDROCHLORIDE 50 MG: 25 CAPSULE ORAL at 02:04

## 2023-04-07 RX ADMIN — AMLODIPINE BESYLATE 10 MG: 5 TABLET ORAL at 08:04

## 2023-04-07 RX ADMIN — CHLORDIAZEPOXIDE HYDROCHLORIDE 50 MG: 25 CAPSULE ORAL at 08:04

## 2023-04-07 RX ADMIN — KETOROLAC TROMETHAMINE 30 MG: 30 INJECTION, SOLUTION INTRAMUSCULAR; INTRAVENOUS at 04:04

## 2023-04-07 RX ADMIN — FAMOTIDINE 20 MG: 20 TABLET, FILM COATED ORAL at 08:04

## 2023-04-07 RX ADMIN — OXYCODONE HYDROCHLORIDE 10 MG: 10 TABLET ORAL at 08:04

## 2023-04-07 RX ADMIN — SODIUM BICARBONATE 650 MG TABLET 650 MG: at 03:04

## 2023-04-07 RX ADMIN — CHLORDIAZEPOXIDE HYDROCHLORIDE 50 MG: 25 CAPSULE ORAL at 03:04

## 2023-04-07 RX ADMIN — METOPROLOL SUCCINATE 50 MG: 50 TABLET, EXTENDED RELEASE ORAL at 10:04

## 2023-04-07 RX ADMIN — ASPIRIN 81 MG: 81 TABLET, COATED ORAL at 09:04

## 2023-04-07 RX ADMIN — SUCRALFATE 1 G: 1 TABLET ORAL at 10:04

## 2023-04-07 RX ADMIN — SODIUM BICARBONATE 650 MG TABLET 650 MG: at 08:04

## 2023-04-07 RX ADMIN — LABETALOL HYDROCHLORIDE 20 MG: 5 INJECTION, SOLUTION INTRAVENOUS at 05:04

## 2023-04-07 RX ADMIN — AMIODARONE HYDROCHLORIDE 0.5 MG/MIN: 1.8 INJECTION, SOLUTION INTRAVENOUS at 04:04

## 2023-04-07 RX ADMIN — MUPIROCIN: 20 OINTMENT TOPICAL at 08:04

## 2023-04-07 RX ADMIN — THERA TABS 1 TABLET: TAB at 08:04

## 2023-04-07 RX ADMIN — ATORVASTATIN CALCIUM 40 MG: 40 TABLET, FILM COATED ORAL at 08:04

## 2023-04-07 RX ADMIN — THIAMINE HCL TAB 100 MG 200 MG: 100 TAB at 08:04

## 2023-04-07 RX ADMIN — DOCUSATE SODIUM 100 MG: 100 CAPSULE, LIQUID FILLED ORAL at 08:04

## 2023-04-07 RX ADMIN — LORAZEPAM 2 MG: 2 INJECTION INTRAMUSCULAR; INTRAVENOUS at 12:04

## 2023-04-07 RX ADMIN — SUCRALFATE 1 G: 1 TABLET ORAL at 05:04

## 2023-04-07 RX ADMIN — AMIODARONE HYDROCHLORIDE 0.5 MG/MIN: 1.8 INJECTION, SOLUTION INTRAVENOUS at 03:04

## 2023-04-07 NOTE — PT/OT/SLP PROGRESS
Physical Therapy Treatment    Patient Name:  Mike Urbina Jr.   MRN:  [id number]    Recommendations:     Discharge Recommendations: rehabilitation facility  Discharge Equipment Recommendations: walker, rolling  Barriers to discharge:  medical status; decreased functional independence     Assessment:     Mike Ubrina Jr. is a 67 y.o. male admitted with a medical diagnosis of <principal problem not specified>.  He presents with the following impairments/functional limitations: weakness, impaired endurance, impaired functional mobility, gait instability, impaired balance .    Rehab Prognosis: Good; patient would benefit from acute skilled PT services to address these deficits and reach maximum level of function.    Recent Surgery: Procedure(s) (LRB):  CORONARY ARTERY BYPASS GRAFT (CABG) (N/A) 4 Days Post-Op    Plan:     During this hospitalization, patient to be seen 5 x/week to address the identified rehab impairments via gait training, therapeutic activities, therapeutic exercises and progress toward the following goals:    Plan of Care Expires:  05/30/23    Subjective     Chief Complaint: no complaints  Patient/Family Comments/goals:   Pain/Comfort:  Pain Rating 1: 0/10      Objective:     Communicated with RN prior to session and present during session to administer medication. 1:1 present upon PT arrival.  Patient found HOB elevated with peripheral IV, telemetry, pulse ox (continuous) upon PT entry to room.     General Precautions: Standard, sternal  Orthopedic Precautions:    Braces:    Respiratory Status: Room air  Blood Pressure: 135/88; cleared by RN  Skin Integrity: Visible skin intact      Functional Mobility:  Bed Mobility:     Supine to Sit: minimum assistance  Transfers:     Sit to Stand:  minimum assistance with rolling walker  Gait: Pt amb ~100ft Min A using RW; Lateral leaning throughout gait requiring verbal/tactile cues to maintain midline posture and proper proximity to RW. Demo'd  unsteadiness 2/2 leaning. Slightly impulsive however able to follow cues when repeated.     Education:  Patient and 1:1  provided with verbal education regarding POC/sternal precxns.  Understanding was verbalized.     Patient left up in chair with all lines intact, call button in reach, chair alarm on, and 1:1 present..    GOALS:   Multidisciplinary Problems       Physical Therapy Goals          Problem: Physical Therapy    Goal Priority Disciplines Outcome Goal Variances Interventions   Physical Therapy Goal     PT, PT/OT Ongoing, Progressing     Description: Goals to be met by: 23     Patient will increase functional independence with mobility by performin. Supine to sit with Stand-by Assistance  2. Sit to supine with Stand-by Assistance  3. Sit to stand transfer with Stand-by Assistance  4. Gait  x 200 feet with Stand-by Assistance using Rolling Walker.                          Time Tracking:     PT Received On:    PT Start Time: 1042     PT Stop Time: 1104  PT Total Time (min): 22 min     Billable Minutes: Gait Training 22    Treatment Type: Treatment  PT/PTA: PTA     Number of PTA visits since last PT visit: 2023

## 2023-04-07 NOTE — PROGRESS NOTES
"  [facility]   Cardiology  Progress Note    Patient Name: Mike Urbina Jr.  MRN: [id number]  Admission Date: 4/3/2023  Hospital Length of Stay: 4 days  Code Status: Full Code   Attending Provider: Deuce Finley IV, MD   Consulting Provider: LOREN Chen  Primary Care Physician: LOREN Jerry  Principal Problem:<principal problem not specified>    Patient information was obtained from patient, past medical records, and ER records.     Subjective:     Chief Complaint: Reason for Consult: Post CABG Medical Management    HPI: Mr. Urbina is a 66 y/o male who is known to [facility], Dr. Herrera. The patient presented to [facility] on 4.3.23 for a Planned CABG. The patient was recently worked up worked up for a NSTEMI and found to have an Abnormal PET Scan. The PT underwent a LHC with Noted MVCAD. He was referred to [facility] and deemed a candidate for CABG and on 4.3.23 he underwent a Sternotomy with Results of: LIMA to LAD, rSVG to OM1, rSVG to PDA and Ligation of SILVANO with Endostapler. The patient tolerated the procedure well and was stabilized in the ER. [facility] has been consulted for Medical Management of the PT.    4.5.23: NAD. Sitting in Bed. Denies SOB and Palps. + CP/Incisional. "I am doing great."   4.6.23: NAD. PT is a 1:1 - AMS Last PM. Denies SOB and Palps. + CP/Incisional. "I am feeling well."   4.7.23: NAD. Remains 1:1. Denies SOB or palps. + Incisional CP. SR on tele w/ intermittent Afib RVR. Resting.     PMH: ETOH Abuse, VHD (AS), Thrombocytopenia, NSTEMI, HTN, PAF/Flutter  PSH: Angiogram, CABG, Cataract Extraction   Family History: Mother, L, MI  Social History: + ETOH Use (6pk/Beer per Day for > 30 Years); Denies Tobacco and Illicit Drug Use    Previous Cardiac Diagnostics:   C 3.15.23:  Surgical coronary anatomy with distal left main 50%; LAD proximal to mid 60-70%; circumflex mid 80- 90%; RCA with proximal .  The ejection fraction was 60% with EDP of 10 mmHg.  Right radial " access.  The estimated blood loss was less than 10 cc.  CT surgical consult for CABG evaluation.    PET 1.6.23:  This is an abnormal perfusion study. Study is consistent with ischemia.   This scan is suggestive of moderate risk for future cardiovascular events.   Small reversible perfusion abnormality of moderate intensity in the apical segment. Medium fixed perfusion abnormality of severe intensity in the inferior region.   The left ventricular cavity is noted to be normal on the stress studies. The stress left ventricular ejection fraction was calculated to be 35% and left ventricular global function is moderately reduced. The rest left ventricular cavity is noted to be normal. The rest left ventricular ejection fraction was calculated to be 40% and rest left ventricular global function is mildly reduced.   When compared to the resting ejection fraction (40%), the stress ejection fraction (35%) has decreased.   The study quality is good.   There was a rise in myocardial blood flow between rest and stress.  Global myocardial blood flow reserve was 1.47.  Myocardial blood flow reserve is globally abnormal, placing the patient at a higher coronary event risk.    ECHO 12.9.22:  The left ventricle is normal in size with mild concentric hypertrophy and normal systolic function.  Grade I left ventricular diastolic dysfunction.  The estimated ejection fraction is 55%.  Normal right ventricular size with normal right ventricular systolic function.  There is mild aortic valve stenosis.  There is mild mitral stenosis.  Normal central venous pressure (3 mmHg).    Review of patient's allergies indicates:  No Known Allergies    No current facility-administered medications on file prior to encounter.     Current Outpatient Medications on File Prior to Encounter   Medication Sig    aspirin (ECOTRIN) 81 MG EC tablet Take 1 tablet (81 mg total) by mouth once daily.    atorvastatin (LIPITOR) 10 MG tablet Take 40 mg by mouth once  daily.    furosemide (LASIX) 40 MG tablet TAKE ONE TABLET BY MOUTH DAILY DOSE INCREASED    hydrOXYzine HCL (ATARAX) 25 MG tablet TAKE 1 TABLET BY MOUTH EVERY EVENING    metoprolol succinate (TOPROL-XL) 100 MG 24 hr tablet Take 1 tablet (100 mg total) by mouth once daily.    NIFEdipine (PROCARDIA-XL) 30 MG (OSM) 24 hr tablet Take 1 tablet (30 mg total) by mouth once daily. (Patient taking differently: Take 60 mg by mouth once daily.)    valsartan (DIOVAN) 40 MG tablet Take 1 tablet (40 mg total) by mouth 2 (two) times daily.     Review of Systems   Constitutional:  Positive for fatigue. Negative for fever.   Respiratory:  Negative for cough, chest tightness and shortness of breath.    Cardiovascular:  Positive for chest pain. Negative for palpitations and leg swelling.        Incisional   Gastrointestinal:  Negative for abdominal distention.   All other systems reviewed and are negative.    Objective:     Vital Signs (Most Recent):  Temp: 97.3 °F (36.3 °C) (04/07/23 1100)  Pulse: 99 (04/07/23 1100)  Resp: 20 (04/07/23 1100)  BP: 132/82 (04/07/23 1100)  SpO2: 95 % (04/07/23 0700)   Vital Signs (24h Range):  Temp:  [97.3 °F (36.3 °C)-98.5 °F (36.9 °C)] 97.3 °F (36.3 °C)  Pulse:  [] 99  Resp:  [17-25] 20  SpO2:  [94 %-99 %] 95 %  BP: (110-172)/(64-92) 132/82     Weight: 83.5 kg (184 lb 1.4 oz)  Body mass index is 23.64 kg/m².    SpO2: 95 %         Intake/Output Summary (Last 24 hours) at 4/7/2023 1154  Last data filed at 4/7/2023 0740  Gross per 24 hour   Intake 1097.43 ml   Output 735 ml   Net 362.43 ml         Lines/Drains/Airways       Drain  Duration                  Chest Tube 04/03/23 0935 Tube - 1 Left Midaxillary 19 Fr. 4 days              Peripheral Intravenous Line  Duration                  Peripheral IV - Single Lumen 04/03/23 0540 18 G Anterior;Right Forearm 4 days         Peripheral IV - Single Lumen 04/06/23 1714 20 G Left;Posterior Forearm <1 day                  Significant Labs:  Recent Results  (from the past 72 hour(s))   POCT glucose    Collection Time: 04/04/23 11:54 AM   Result Value Ref Range    POCT Glucose 102 70 - 110 mg/dL   POCT glucose    Collection Time: 04/04/23  3:34 PM   Result Value Ref Range    POCT Glucose 114 (H) 70 - 110 mg/dL   CBC with Differential    Collection Time: 04/04/23  5:23 PM   Result Value Ref Range    WBC 4.3 (L) 4.5 - 11.5 x10(3)/mcL    RBC 3.25 (L) 4.70 - 6.10 x10(6)/mcL    Hgb 10.0 (L) 14.0 - 18.0 g/dL    Hct 29.0 (L) 42.0 - 52.0 %    MCV 89.2 80.0 - 94.0 fL    MCH 30.8 27.0 - 31.0 pg    MCHC 34.5 33.0 - 36.0 g/dL    RDW 15.7 11.5 - 17.0 %    Platelet 74 (L) 130 - 400 x10(3)/mcL    MPV 10.5 (H) 7.4 - 10.4 fL    Neut % 81.6 %    Lymph % 8.8 %    Mono % 8.5 %    Eos % 0.2 %    Basophil % 0.2 %    Lymph # 0.38 (L) 0.6 - 4.6 x10(3)/mcL    Neut # 3.53 2.1 - 9.2 x10(3)/mcL    Mono # 0.37 0.1 - 1.3 x10(3)/mcL    Eos # 0.01 0 - 0.9 x10(3)/mcL    Baso # 0.01 0 - 0.2 x10(3)/mcL    IG# 0.03 0 - 0.04 x10(3)/mcL    IG% 0.7 %    NRBC% 0.0 %   Comprehensive Metabolic Panel    Collection Time: 04/05/23  5:05 AM   Result Value Ref Range    Sodium Level 131 (L) 136 - 145 mmol/L    Potassium Level 4.7 3.5 - 5.1 mmol/L    Chloride 102 98 - 107 mmol/L    Carbon Dioxide 21 (L) 23 - 31 mmol/L    Glucose Level 136 (H) 82 - 115 mg/dL    Blood Urea Nitrogen 28.8 (H) 8.4 - 25.7 mg/dL    Creatinine 1.29 (H) 0.73 - 1.18 mg/dL    Calcium Level Total 8.2 (L) 8.8 - 10.0 mg/dL    Protein Total 5.2 (L) 5.8 - 7.6 gm/dL    Albumin Level 3.0 (L) 3.4 - 4.8 g/dL    Globulin 2.2 (L) 2.4 - 3.5 gm/dL    Albumin/Globulin Ratio 1.4 1.1 - 2.0 ratio    Bilirubin Total 1.8 (H) <=1.5 mg/dL    Alkaline Phosphatase 59 40 - 150 unit/L    Alanine Aminotransferase 17 0 - 55 unit/L    Aspartate Aminotransferase 27 5 - 34 unit/L    eGFR 60 mls/min/1.73/m2   Magnesium    Collection Time: 04/05/23  5:05 AM   Result Value Ref Range    Magnesium Level 2.10 1.60 - 2.60 mg/dL   CBC with Differential    Collection Time: 04/05/23   5:05 AM   Result Value Ref Range    WBC 3.5 (L) 4.5 - 11.5 x10(3)/mcL    RBC 3.13 (L) 4.70 - 6.10 x10(6)/mcL    Hgb 9.5 (L) 14.0 - 18.0 g/dL    Hct 27.9 (L) 42.0 - 52.0 %    MCV 89.1 80.0 - 94.0 fL    MCH 30.4 27.0 - 31.0 pg    MCHC 34.1 33.0 - 36.0 g/dL    RDW 16.4 11.5 - 17.0 %    Platelet 59 (L) 130 - 400 x10(3)/mcL    MPV 10.9 (H) 7.4 - 10.4 fL    Neut % 77.3 %    Lymph % 10.5 %    Mono % 10.5 %    Eos % 0.3 %    Basophil % 0.3 %    Lymph # 0.37 (L) 0.6 - 4.6 x10(3)/mcL    Neut # 2.71 2.1 - 9.2 x10(3)/mcL    Mono # 0.37 0.1 - 1.3 x10(3)/mcL    Eos # 0.01 0 - 0.9 x10(3)/mcL    Baso # 0.01 0 - 0.2 x10(3)/mcL    IG# 0.04 0 - 0.04 x10(3)/mcL    IG% 1.1 %    NRBC% 0.0 %   Comprehensive Metabolic Panel    Collection Time: 04/06/23  6:13 AM   Result Value Ref Range    Sodium Level 132 (L) 136 - 145 mmol/L    Potassium Level 4.1 3.5 - 5.1 mmol/L    Chloride 104 98 - 107 mmol/L    Carbon Dioxide 17 (L) 23 - 31 mmol/L    Glucose Level 107 82 - 115 mg/dL    Blood Urea Nitrogen 29.4 (H) 8.4 - 25.7 mg/dL    Creatinine 0.99 0.73 - 1.18 mg/dL    Calcium Level Total 8.2 (L) 8.8 - 10.0 mg/dL    Protein Total 5.3 (L) 5.8 - 7.6 gm/dL    Albumin Level 3.0 (L) 3.4 - 4.8 g/dL    Globulin 2.3 (L) 2.4 - 3.5 gm/dL    Albumin/Globulin Ratio 1.3 1.1 - 2.0 ratio    Bilirubin Total 2.0 (H) <=1.5 mg/dL    Alkaline Phosphatase 91 40 - 150 unit/L    Alanine Aminotransferase 23 0 - 55 unit/L    Aspartate Aminotransferase 32 5 - 34 unit/L    eGFR >60 mls/min/1.73/m2   Magnesium    Collection Time: 04/06/23  6:13 AM   Result Value Ref Range    Magnesium Level 2.00 1.60 - 2.60 mg/dL   CBC with Differential    Collection Time: 04/06/23  6:13 AM   Result Value Ref Range    WBC 4.4 (L) 4.5 - 11.5 x10(3)/mcL    RBC 3.33 (L) 4.70 - 6.10 x10(6)/mcL    Hgb 10.0 (L) 14.0 - 18.0 g/dL    Hct 29.2 (L) 42.0 - 52.0 %    MCV 87.7 80.0 - 94.0 fL    MCH 30.0 27.0 - 31.0 pg    MCHC 34.2 33.0 - 36.0 g/dL    RDW 15.7 11.5 - 17.0 %    Platelet 81 (L) 130 - 400  x10(3)/mcL    MPV 10.5 (H) 7.4 - 10.4 fL    Neut % 74.5 %    Lymph % 11.9 %    Mono % 11.0 %    Eos % 1.4 %    Basophil % 0.5 %    Lymph # 0.53 (L) 0.6 - 4.6 x10(3)/mcL    Neut # 3.31 2.1 - 9.2 x10(3)/mcL    Mono # 0.49 0.1 - 1.3 x10(3)/mcL    Eos # 0.06 0 - 0.9 x10(3)/mcL    Baso # 0.02 0 - 0.2 x10(3)/mcL    IG# 0.03 0 - 0.04 x10(3)/mcL    IG% 0.7 %    NRBC% 0.0 %   POCT glucose    Collection Time: 04/06/23  8:42 PM   Result Value Ref Range    POCT Glucose 105 70 - 110 mg/dL   Comprehensive Metabolic Panel    Collection Time: 04/07/23  5:02 AM   Result Value Ref Range    Sodium Level 136 136 - 145 mmol/L    Potassium Level 3.9 3.5 - 5.1 mmol/L    Chloride 107 98 - 107 mmol/L    Carbon Dioxide 18 (L) 23 - 31 mmol/L    Glucose Level 99 82 - 115 mg/dL    Blood Urea Nitrogen 24.8 8.4 - 25.7 mg/dL    Creatinine 0.86 0.73 - 1.18 mg/dL    Calcium Level Total 8.1 (L) 8.8 - 10.0 mg/dL    Protein Total 4.7 (L) 5.8 - 7.6 gm/dL    Albumin Level 2.7 (L) 3.4 - 4.8 g/dL    Globulin 2.0 (L) 2.4 - 3.5 gm/dL    Albumin/Globulin Ratio 1.4 1.1 - 2.0 ratio    Bilirubin Total 1.4 <=1.5 mg/dL    Alkaline Phosphatase 79 40 - 150 unit/L    Alanine Aminotransferase 19 0 - 55 unit/L    Aspartate Aminotransferase 24 5 - 34 unit/L    eGFR >60 mls/min/1.73/m2   Magnesium    Collection Time: 04/07/23  5:02 AM   Result Value Ref Range    Magnesium Level 2.00 1.60 - 2.60 mg/dL   Ammonia    Collection Time: 04/07/23  5:02 AM   Result Value Ref Range    Ammonia Level 35.2 18.0 - 72.0 umol/L   Protime-INR    Collection Time: 04/07/23  5:02 AM   Result Value Ref Range    PT 14.6 (H) 12.5 - 14.5 seconds    INR 1.15 0.00 - 1.30   Phosphorus    Collection Time: 04/07/23  5:02 AM   Result Value Ref Range    Phosphorus Level 4.0 2.3 - 4.7 mg/dL   CBC with Differential    Collection Time: 04/07/23  5:02 AM   Result Value Ref Range    WBC 2.6 (L) 4.5 - 11.5 x10(3)/mcL    RBC 3.16 (L) 4.70 - 6.10 x10(6)/mcL    Hgb 9.3 (L) 14.0 - 18.0 g/dL    Hct 27.9 (L) 42.0  - 52.0 %    MCV 88.3 80.0 - 94.0 fL    MCH 29.4 27.0 - 31.0 pg    MCHC 33.3 33.0 - 36.0 g/dL    RDW 15.4 11.5 - 17.0 %    Platelet 76 (L) 130 - 400 x10(3)/mcL    MPV 10.3 7.4 - 10.4 fL    NRBC% 0.0 %   Manual Differential    Collection Time: 04/07/23  5:02 AM   Result Value Ref Range    Neut Man 70 %    Lymph Man 11 %    Monocyte Man 14 %    Eos Man 5 %    Instr WBC 2.6 x10(3)/mcL    Abs Mono 0.364 0.1 - 1.3 x10(3)/mcL    Abs Eos  0.13 0 - 0.9 x10(3)/mcL    Abs Lymp 0.286 (L) 0.6 - 4.6 x10(3)/mcL    Abs Neut 1.82 (L) 2.1 - 9.2 x10(3)/mcL    RBC Morph Abnormal (A) Normal    Platelet Est Decreased (A) Normal, Adequate    Giant Platelets 1+      Telemetry: SR    Physical Exam  Constitutional:       Appearance: Normal appearance.      Comments: Confused Conversation at Times; Oriented Currently   HENT:      Head: Normocephalic.      Mouth/Throat:      Mouth: Mucous membranes are moist.   Eyes:      Extraocular Movements: Extraocular movements intact.   Cardiovascular:      Rate and Rhythm: Normal rate and regular rhythm.      Pulses: Normal pulses.      Heart sounds: Normal heart sounds. No murmur heard.  Pulmonary:      Effort: Pulmonary effort is normal.      Breath sounds: Normal breath sounds.   Abdominal:      Palpations: Abdomen is soft.   Skin:     General: Skin is warm and dry.      Comments: Midline Sternotomy Dressing C/D/I.    Neurological:      General: No focal deficit present.      Mental Status: He is alert and oriented to person, place, and time.     Home Medications:   No current facility-administered medications on file prior to encounter.     Current Outpatient Medications on File Prior to Encounter   Medication Sig Dispense Refill    aspirin (ECOTRIN) 81 MG EC tablet Take 1 tablet (81 mg total) by mouth once daily. 30 tablet 0    atorvastatin (LIPITOR) 10 MG tablet Take 40 mg by mouth once daily.      furosemide (LASIX) 40 MG tablet TAKE ONE TABLET BY MOUTH DAILY DOSE INCREASED      hydrOXYzine HCL  (ATARAX) 25 MG tablet TAKE 1 TABLET BY MOUTH EVERY EVENING 30 tablet 1    metoprolol succinate (TOPROL-XL) 100 MG 24 hr tablet Take 1 tablet (100 mg total) by mouth once daily. 30 tablet 0    NIFEdipine (PROCARDIA-XL) 30 MG (OSM) 24 hr tablet Take 1 tablet (30 mg total) by mouth once daily. (Patient taking differently: Take 60 mg by mouth once daily.) 30 tablet 0    valsartan (DIOVAN) 40 MG tablet Take 1 tablet (40 mg total) by mouth 2 (two) times daily. 60 tablet 0     Current Inpatient Medications:    Current Facility-Administered Medications:     0.9%  NaCl infusion (for blood administration), , Intravenous, Q24H PRN, Graham Aiken MD    0.9%  NaCl infusion (for blood administration), , Intravenous, Q24H PRN, Graham Aiken MD    acetaminophen oral solution 650 mg, 650 mg, Per OG tube, Q6H PRN, GISEL Bingham, 650 mg at 04/05/23 2014    albumin human 5% bottle 12.5 g, 12.5 g, Intravenous, PRN, GISEL Bingham, Stopped at 04/03/23 2100    amiodarone 360 mg/200 mL (1.8 mg/mL) infusion, 0.5 mg/min, Intravenous, Continuous, HONORIO Villalpando, Last Rate: 16.7 mL/hr at 04/07/23 0417, 0.5 mg/min at 04/07/23 0417    amLODIPine tablet 10 mg, 10 mg, Oral, Daily, HONORIO Villalpando, 10 mg at 04/07/23 0846    aspirin EC tablet 81 mg, 81 mg, Oral, Daily, GISEL Bingham, 81 mg at 04/07/23 0900    atorvastatin tablet 40 mg, 40 mg, Oral, Daily, HONORIO Villalpando, 40 mg at 04/07/23 0846    calcium gluconate 1 g in NS IVPB (premixed), 1 g, Intravenous, PRN, GISEL Bingham    calcium gluconate 1 g in NS IVPB (premixed), 2 g, Intravenous, PRN, GISEL Bingham    calcium gluconate 1 g in NS IVPB (premixed), 3 g, Intravenous, PRN, GISEL Bingham    [COMPLETED] chlordiazepoxide capsule 100 mg, 100 mg, Oral, Once, 100 mg at 04/06/23 1353 **FOLLOWED BY** chlordiazepoxide capsule 50 mg, 50 mg, Oral, Q6H, 50 mg at 04/07/23 0845 **FOLLOWED BY** chlordiazepoxide capsule 50 mg, 50 mg, Oral, Q8H  **FOLLOWED BY** [START ON 4/8/2023] chlordiazepoxide capsule 25 mg, 25 mg, Oral, Q6H **FOLLOWED BY** [START ON 4/9/2023] chlordiazepoxide capsule 25 mg, 25 mg, Oral, Q8H **FOLLOWED BY** [START ON 4/10/2023] chlordiazepoxide capsule 25 mg, 25 mg, Oral, Q12H **FOLLOWED BY** [START ON 4/11/2023] chlordiazepoxide capsule 25 mg, 25 mg, Oral, Q24H, LEXX PlummerCNP-BC    dextrose 10% bolus 125 mL 125 mL, 12.5 g, Intravenous, PRN, GISEL Bingham    dextrose 10% bolus 125 mL 125 mL, 12.5 g, Intravenous, PRN, Graham Aiken MD    dextrose 10% bolus 250 mL 250 mL, 25 g, Intravenous, PRN, GISEL Bingham    dextrose 10% bolus 250 mL 250 mL, 25 g, Intravenous, PRN, Graham Aiken MD    docusate sodium capsule 100 mg, 100 mg, Oral, BID, GISEL Bingham, 100 mg at 04/07/23 0846    famotidine tablet 20 mg, 20 mg, Oral, BID, Deuce Finley IV, MD, 20 mg at 04/07/23 0845    folic acid tablet 1 mg, 1 mg, Oral, Daily, Frankie Metcalf MD, 1 mg at 04/07/23 0853    glucagon (human recombinant) injection 1 mg, 1 mg, Intramuscular, PRN, Graham Aiken MD    glucose chewable tablet 16 g, 16 g, Oral, PRN, Graham Aiken MD    glucose chewable tablet 24 g, 24 g, Oral, PRN, Graham Aiken MD    haloperidol lactate injection 2 mg, 2 mg, Intravenous, Q6H PRN, Simran Meza [facility]    hydrALAZINE injection 20 mg, 20 mg, Intravenous, Q6H PRN, Elizabeth Bates NP, 20 mg at 04/06/23 1601    HYDROcodone-acetaminophen 5-325 mg per tablet 1 tablet, 1 tablet, Oral, Q4H PRN, GISEL Bingham, 1 tablet at 04/04/23 0915    insulin aspart U-100 injection 0-5 Units, 0-5 Units, Subcutaneous, QID (AC + HS) PRN, Graham Aiken MD    ketorolac injection 30 mg, 30 mg, Intravenous, Q6H PRN, GISEL Lazo, 30 mg at 04/07/23 0418    labetaloL injection 20 mg, 20 mg, Intravenous, Q4H PRN, Elizabeth Bates NP, 20 mg at 04/07/23 0558    LORazepam (ATIVAN) injection 2 mg, 2 mg, Intravenous,  Q8H PRN, Frankie Metcalf MD, 2 mg at 04/07/23 0033    magnesium sulfate 2g in water 50mL IVPB (premix), 2 g, Intravenous, PRN, GISEL Bingham    magnesium sulfate 2g in water 50mL IVPB (premix), 4 g, Intravenous, PRN, GISEL Bingham    [START ON 4/8/2023] metoprolol succinate (TOPROL-XL) 24 hr tablet 100 mg, 100 mg, Oral, Daily, Phyllis Kevin, LOREN    multivitamin tablet, 1 tablet, Oral, Daily, Frankie Metcalf MD, 1 tablet at 04/07/23 0853    mupirocin 2 % ointment, , Nasal, BID, GISEL Bingham, Given at 04/07/23 0853    ondansetron injection 4 mg, 4 mg, Intravenous, Q4H PRN, GISEL Bingham    oxyCODONE immediate release tablet Tab 10 mg, 10 mg, Oral, Q4H PRN, GISEL Bingham, 10 mg at 04/06/23 2258    potassium chloride 20 mEq in 100 mL IVPB (FOR CENTRAL LINE ADMINISTRATION ONLY), 20 mEq, Intravenous, PRN, GISEL Bingham    potassium chloride 20 mEq in 100 mL IVPB (FOR CENTRAL LINE ADMINISTRATION ONLY), 20 mEq, Intravenous, PRN, GISEL Bingham    potassium chloride 40 mEq in 100 mL IVPB (FOR CENTRAL LINE ADMINISTRATION ONLY), 40 mEq, Intravenous, PRN, GISEL Bingham    sodium bicarbonate tablet 650 mg, 650 mg, Oral, TID, Frankie Metcalf MD, 650 mg at 04/07/23 0854    sodium phosphate 15 mmol in dextrose 5 % (D5W) 250 mL IVPB, 15 mmol, Intravenous, PRN, GISEL Bingham    sodium phosphate 20.01 mmol in dextrose 5 % (D5W) 250 mL IVPB, 20.01 mmol, Intravenous, PRN, GISEL Bingham    sodium phosphate 30 mmol in dextrose 5 % (D5W) 250 mL IVPB, 30 mmol, Intravenous, PRN, GISEL Bingham    sucralfate tablet 1 g, 1 g, Oral, QID (AC & HS), GISEL Bingham, 1 g at 04/07/23 1046    thiamine tablet 200 mg, 200 mg, Oral, Daily, Frankie Metcalf MD, 200 mg at 04/07/23 0853    valsartan tablet 160 mg, 160 mg, Oral, BID, HONORIO Villalpando, 160 mg at 04/07/23 0845    VTE Risk Mitigation (From admission, onward)           Ordered     Place sequential  compression device  Until discontinued         04/04/23 0324     IP VTE HIGH RISK PATIENT  Once         04/03/23 0951                  Assessment:   MVCAD    - s/p CABG (4.3.23) - LIMA to LAD, rSVG to OM1, rSVG to PDA  PAF/Flutter with RVR    - CHADsVASc - 3 Points - 3.2% Stroke Risk per Year     - s/p (4.3.23) - Ligation of SILVANO with Endostapler  HTN/HHD without Hx of HF or CKD  Encephalopathy - Likely Related to ETOH DTs  Anemia - Acute Blood Loss - Improved with Transfusion  Thrombocytopenia - Chronic   VHD (Mild AS, Mild MS)  Hx of NSTEMI  ETOH Abuse  No Hx of GIB    Plan:     **Case discussed with Dr. Jimenes.   Continue amiodarone gtt for now. Increase to metoprolol XL to 100 mg daily.   Monitor H&H   Continue ASA, ARB and Statin  Continue Norvasc 10mg PO Qday  Aggressive Mobilization and Q1HR IS  Labs in AM: CBC, CMP and Mg    LOREN Chen   Cardiology  [facility]   04/07/2023

## 2023-04-07 NOTE — SUBJECTIVE & OBJECTIVE
Interval History:  Slow progress postoperative day 4 from coronary artery bypass grafting.  Doing well without complaints.  Increase activity.  Remove chest tubes.    Review of Systems   Constitutional: Negative. Negative for chills, diaphoresis, fever and night sweats.   HENT:  Negative for hoarse voice, sore throat and stridor.    Eyes: Negative.  Negative for blurred vision and double vision.   Cardiovascular:  Negative for chest pain, claudication, cyanosis, dyspnea on exertion, irregular heartbeat, leg swelling, orthopnea, palpitations, paroxysmal nocturnal dyspnea and syncope.   Respiratory:  Negative for cough, hemoptysis, shortness of breath, sputum production and wheezing.    Endocrine: Negative for polydipsia and polyuria.   Hematologic/Lymphatic: Negative for adenopathy and bleeding problem.   Gastrointestinal:  Negative for abdominal pain, diarrhea, heartburn, hematemesis, hematochezia, nausea and vomiting.   Neurological:  Negative for brief paralysis, disturbances in coordination, dizziness, focal weakness, headaches, numbness and paresthesias.   Medications:  Continuous Infusions:   amiodarone in dextrose 5% 0.5 mg/min (04/07/23 0417)     Scheduled Meds:   amLODIPine  10 mg Oral Daily    aspirin  81 mg Oral Daily    atorvastatin  40 mg Oral Daily    chlordiazepoxide  50 mg Oral Q6H    Followed by    chlordiazepoxide  50 mg Oral Q8H    Followed by    [START ON 4/8/2023] chlordiazepoxide  25 mg Oral Q6H    Followed by    [START ON 4/9/2023] chlordiazepoxide  25 mg Oral Q8H    Followed by    [START ON 4/10/2023] chlordiazepoxide  25 mg Oral Q12H    Followed by    [START ON 4/11/2023] chlordiazepoxide  25 mg Oral Q24H    docusate sodium  100 mg Oral BID    famotidine  20 mg Oral BID    folic acid  1 mg Oral Daily    metoprolol succinate  50 mg Oral Daily    multivitamin  1 tablet Oral Daily    mupirocin   Nasal BID    sodium bicarbonate  650 mg Oral TID    sucralfate  1 g Oral QID (AC & HS)    thiamine   200 mg Oral Daily    valsartan  160 mg Oral BID     PRN Meds:sodium chloride, sodium chloride, acetaminophen, albumin human 5%, calcium gluconate IVPB, calcium gluconate IVPB, calcium gluconate IVPB, dextrose 10%, dextrose 10%, dextrose 10%, dextrose 10%, glucagon (human recombinant), glucose, glucose, haloperidol lactate, hydrALAZINE, HYDROcodone-acetaminophen, insulin aspart U-100, ketorolac, labetalol, lorazepam, magnesium sulfate IVPB, magnesium sulfate IVPB, ondansetron, oxyCODONE, potassium chloride in water, potassium chloride in water, potassium chloride in water, sodium phosphate IVPB, sodium phosphate IVPB, sodium phosphate IVPB     Objective:     Vital Signs (Most Recent):  Temp: 97.7 °F (36.5 °C) (04/07/23 0700)  Pulse: (!) 113 (04/07/23 0700)  Resp: 20 (04/07/23 0700)  BP: 134/85 (04/07/23 0700)  SpO2: 95 % (04/07/23 0700)   Vital Signs (24h Range):  Temp:  [97.4 °F (36.3 °C)-98.5 °F (36.9 °C)] 97.7 °F (36.5 °C)  Pulse:  [] 113  Resp:  [17-25] 20  SpO2:  [94 %-99 %] 95 %  BP: (110-172)/(64-92) 134/85     Weight: 83.5 kg (184 lb 1.4 oz)  Body mass index is 23.64 kg/m².    SpO2: 95 %       Intake/Output - Last 3 Shifts         04/05 0700 04/06 0659 04/06 0700 04/07 0659 04/07 0700 04/08 0659    P.O. 480  120    I.V. (mL/kg)  977.4 (11.7)     Blood       Total Intake(mL/kg) 480 (5.7) 977.4 (11.7) 120 (1.4)    Urine (mL/kg/hr) 700 (0.3) 575 (0.3)     Stool 0      Chest Tube 360 160     Total Output 1060 735     Net -580 +242.4 +120           Stool Occurrence 0 x              Lines/Drains/Airways       Drain  Duration                  Chest Tube 04/03/23 0935 Tube - 1 Left Midaxillary 19 Fr. 3 days              Peripheral Intravenous Line  Duration                  Peripheral IV - Single Lumen 04/03/23 0540 18 G Anterior;Right Forearm 4 days         Peripheral IV - Single Lumen 04/06/23 1714 20 G Left;Posterior Forearm <1 day                    Physical Exam  Vitals and nursing note reviewed.    Constitutional:       General: He is not in acute distress.     Appearance: Normal appearance.   HENT:      Head: Normocephalic and atraumatic.      Nose: Nose normal. No congestion.      Mouth/Throat:      Mouth: Mucous membranes are moist.   Eyes:      General: No scleral icterus.     Extraocular Movements: Extraocular movements intact.      Conjunctiva/sclera: Conjunctivae normal.      Pupils: Pupils are equal, round, and reactive to light.   Neck:      Thyroid: No thyroid mass or thyromegaly.      Vascular: No carotid bruit or JVD.   Cardiovascular:      Rate and Rhythm: Normal rate and regular rhythm.      Pulses: Normal pulses.           Carotid pulses are 2+ on the right side and 2+ on the left side.       Radial pulses are 2+ on the right side and 2+ on the left side.        Femoral pulses are 2+ on the right side and 2+ on the left side.       Dorsalis pedis pulses are 2+ on the right side and 2+ on the left side.        Posterior tibial pulses are 2+ on the right side and 2+ on the left side.      Heart sounds: No murmur heard.    No friction rub. No gallop.   Pulmonary:      Effort: Pulmonary effort is normal. No respiratory distress.      Breath sounds: Normal breath sounds. No stridor. No wheezing, rhonchi or rales.   Chest:      Chest wall: No tenderness.   Abdominal:      General: Abdomen is flat. Bowel sounds are normal. There is no distension.      Palpations: Abdomen is soft. There is no hepatomegaly, splenomegaly, mass or pulsatile mass.      Tenderness: There is no abdominal tenderness. There is no rebound.   Musculoskeletal:         General: No swelling. Normal range of motion.      Cervical back: Normal range of motion. No rigidity or tenderness.      Right lower leg: No edema.      Left lower leg: No edema.   Lymphadenopathy:      Cervical: No cervical adenopathy.      Upper Body:      Right upper body: No supraclavicular or axillary adenopathy.      Left upper body: No supraclavicular or  axillary adenopathy.   Skin:     General: Skin is warm and dry.      Comments: Wounds CDI  Sternum stable   Neurological:      General: No focal deficit present.      Mental Status: He is alert and oriented to person, place, and time. Mental status is at baseline.      Cranial Nerves: No cranial nerve deficit.      Sensory: No sensory deficit.      Motor: No weakness.      Gait: Gait normal.   Psychiatric:         Mood and Affect: Mood normal.       Significant Labs:  BMP:   Recent Labs   Lab 04/07/23  0502      K 3.9   CO2 18*   BUN 24.8   CREATININE 0.86   CALCIUM 8.1*   MG 2.00     CBC:   Recent Labs   Lab 04/07/23  0502   WBC 2.6*   RBC 3.16*   HGB 9.3*   HCT 27.9*   PLT 76*   MCV 88.3   MCH 29.4   MCHC 33.3     All pertinent labs from the last 24 hours have been reviewed.    Significant Diagnostics:  CXR: I have reviewed all pertinent results/findings within the past 24 hours  I have reviewed and interpreted all pertinent imaging results/findings within the past 24 hours.

## 2023-04-07 NOTE — PROGRESS NOTES
[facility] - 6th Floor Medical Telemetry  Cardiothoracic Surgery  Progress Note    Patient Name: Mike Urbina Jr.  MRN: [id number]  Admission Date: 4/3/2023  Hospital Length of Stay: 4 days  Code Status: Full Code   Attending Physician: Deuce Finley IV, MD   Referring Provider: Deuce Finley IV, MD  Principal Problem:<principal problem not specified>            Subjective:     Post-Op Info:  Procedure(s) (LRB):  CORONARY ARTERY BYPASS GRAFT (CABG) (N/A)   4 Days Post-Op     Interval History:  Slow progress postoperative day 4 from coronary artery bypass grafting.  Doing well without complaints.  Increase activity.  Remove chest tubes.    Review of Systems   Constitutional: Negative. Negative for chills, diaphoresis, fever and night sweats.   HENT:  Negative for hoarse voice, sore throat and stridor.    Eyes: Negative.  Negative for blurred vision and double vision.   Cardiovascular:  Negative for chest pain, claudication, cyanosis, dyspnea on exertion, irregular heartbeat, leg swelling, orthopnea, palpitations, paroxysmal nocturnal dyspnea and syncope.   Respiratory:  Negative for cough, hemoptysis, shortness of breath, sputum production and wheezing.    Endocrine: Negative for polydipsia and polyuria.   Hematologic/Lymphatic: Negative for adenopathy and bleeding problem.   Gastrointestinal:  Negative for abdominal pain, diarrhea, heartburn, hematemesis, hematochezia, nausea and vomiting.   Neurological:  Negative for brief paralysis, disturbances in coordination, dizziness, focal weakness, headaches, numbness and paresthesias.   Medications:  Continuous Infusions:   amiodarone in dextrose 5% 0.5 mg/min (04/07/23 0417)     Scheduled Meds:   amLODIPine  10 mg Oral Daily    aspirin  81 mg Oral Daily    atorvastatin  40 mg Oral Daily    chlordiazepoxide  50 mg Oral Q6H    Followed by    chlordiazepoxide  50 mg Oral Q8H    Followed by    [START ON 4/8/2023] chlordiazepoxide  25 mg  Oral Q6H    Followed by    [START ON 4/9/2023] chlordiazepoxide  25 mg Oral Q8H    Followed by    [START ON 4/10/2023] chlordiazepoxide  25 mg Oral Q12H    Followed by    [START ON 4/11/2023] chlordiazepoxide  25 mg Oral Q24H    docusate sodium  100 mg Oral BID    famotidine  20 mg Oral BID    folic acid  1 mg Oral Daily    metoprolol succinate  50 mg Oral Daily    multivitamin  1 tablet Oral Daily    mupirocin   Nasal BID    sodium bicarbonate  650 mg Oral TID    sucralfate  1 g Oral QID (AC & HS)    thiamine  200 mg Oral Daily    valsartan  160 mg Oral BID     PRN Meds:sodium chloride, sodium chloride, acetaminophen, albumin human 5%, calcium gluconate IVPB, calcium gluconate IVPB, calcium gluconate IVPB, dextrose 10%, dextrose 10%, dextrose 10%, dextrose 10%, glucagon (human recombinant), glucose, glucose, haloperidol lactate, hydrALAZINE, HYDROcodone-acetaminophen, insulin aspart U-100, ketorolac, labetalol, lorazepam, magnesium sulfate IVPB, magnesium sulfate IVPB, ondansetron, oxyCODONE, potassium chloride in water, potassium chloride in water, potassium chloride in water, sodium phosphate IVPB, sodium phosphate IVPB, sodium phosphate IVPB     Objective:     Vital Signs (Most Recent):  Temp: 97.7 °F (36.5 °C) (04/07/23 0700)  Pulse: (!) 113 (04/07/23 0700)  Resp: 20 (04/07/23 0700)  BP: 134/85 (04/07/23 0700)  SpO2: 95 % (04/07/23 0700)   Vital Signs (24h Range):  Temp:  [97.4 °F (36.3 °C)-98.5 °F (36.9 °C)] 97.7 °F (36.5 °C)  Pulse:  [] 113  Resp:  [17-25] 20  SpO2:  [94 %-99 %] 95 %  BP: (110-172)/(64-92) 134/85     Weight: 83.5 kg (184 lb 1.4 oz)  Body mass index is 23.64 kg/m².    SpO2: 95 %       Intake/Output - Last 3 Shifts         04/05 0700 04/06 0659 04/06 0700 04/07 0659 04/07 0700  04/08 0659    P.O. 480  120    I.V. (mL/kg)  977.4 (11.7)     Blood       Total Intake(mL/kg) 480 (5.7) 977.4 (11.7) 120 (1.4)    Urine (mL/kg/hr) 700 (0.3) 575 (0.3)     Stool 0      Chest Tube 360  160     Total Output 1060 735     Net -580 +242.4 +120           Stool Occurrence 0 x              Lines/Drains/Airways       Drain  Duration                  Chest Tube 04/03/23 0935 Tube - 1 Left Midaxillary 19 Fr. 3 days              Peripheral Intravenous Line  Duration                  Peripheral IV - Single Lumen 04/03/23 0540 18 G Anterior;Right Forearm 4 days         Peripheral IV - Single Lumen 04/06/23 1714 20 G Left;Posterior Forearm <1 day                    Physical Exam  Vitals and nursing note reviewed.   Constitutional:       General: He is not in acute distress.     Appearance: Normal appearance.   HENT:      Head: Normocephalic and atraumatic.      Nose: Nose normal. No congestion.      Mouth/Throat:      Mouth: Mucous membranes are moist.   Eyes:      General: No scleral icterus.     Extraocular Movements: Extraocular movements intact.      Conjunctiva/sclera: Conjunctivae normal.      Pupils: Pupils are equal, round, and reactive to light.   Neck:      Thyroid: No thyroid mass or thyromegaly.      Vascular: No carotid bruit or JVD.   Cardiovascular:      Rate and Rhythm: Normal rate and regular rhythm.      Pulses: Normal pulses.           Carotid pulses are 2+ on the right side and 2+ on the left side.       Radial pulses are 2+ on the right side and 2+ on the left side.        Femoral pulses are 2+ on the right side and 2+ on the left side.       Dorsalis pedis pulses are 2+ on the right side and 2+ on the left side.        Posterior tibial pulses are 2+ on the right side and 2+ on the left side.      Heart sounds: No murmur heard.    No friction rub. No gallop.   Pulmonary:      Effort: Pulmonary effort is normal. No respiratory distress.      Breath sounds: Normal breath sounds. No stridor. No wheezing, rhonchi or rales.   Chest:      Chest wall: No tenderness.   Abdominal:      General: Abdomen is flat. Bowel sounds are normal. There is no distension.      Palpations: Abdomen is soft. There  is no hepatomegaly, splenomegaly, mass or pulsatile mass.      Tenderness: There is no abdominal tenderness. There is no rebound.   Musculoskeletal:         General: No swelling. Normal range of motion.      Cervical back: Normal range of motion. No rigidity or tenderness.      Right lower leg: No edema.      Left lower leg: No edema.   Lymphadenopathy:      Cervical: No cervical adenopathy.      Upper Body:      Right upper body: No supraclavicular or axillary adenopathy.      Left upper body: No supraclavicular or axillary adenopathy.   Skin:     General: Skin is warm and dry.      Comments: Wounds CDI  Sternum stable   Neurological:      General: No focal deficit present.      Mental Status: He is alert and oriented to person, place, and time. Mental status is at baseline.      Cranial Nerves: No cranial nerve deficit.      Sensory: No sensory deficit.      Motor: No weakness.      Gait: Gait normal.   Psychiatric:         Mood and Affect: Mood normal.       Significant Labs:  BMP:   Recent Labs   Lab 04/07/23  0502      K 3.9   CO2 18*   BUN 24.8   CREATININE 0.86   CALCIUM 8.1*   MG 2.00     CBC:   Recent Labs   Lab 04/07/23  0502   WBC 2.6*   RBC 3.16*   HGB 9.3*   HCT 27.9*   PLT 76*   MCV 88.3   MCH 29.4   MCHC 33.3     All pertinent labs from the last 24 hours have been reviewed.    Significant Diagnostics:  CXR: I have reviewed all pertinent results/findings within the past 24 hours  I have reviewed and interpreted all pertinent imaging results/findings within the past 24 hours.    Assessment/Plan:     No notes have been filed under this hospital service.  Service: Cardiovascular Surgery      Cecil Diaz MD  Cardiothoracic Surgery  [facility] - 6th Floor Medical Telemetry

## 2023-04-07 NOTE — PROGRESS NOTES
"[facility]  Hospital Medicine Progress Note        Chief Complaint: Inpatient Follow-up for CABG    HPI: Mike Urbina Jr. is a 67 y.o. male with PMHx of ETOH abuse, VHD-aortic stenosis, thrombocytopenia, CAD status post NSTEMI, HTN, PAFlutter who presented to [facility] on 04/03/2023 for a scheduled CABG.  He previously underwent cardiac workup revealing multivessel CAD.  He underwent CABG x3 on 04/03/2023 by Dr. Finley.  He was admitted to ICU postoperatively.  CIS was consulted on 04/04/2023.  On the evening of 04/05/2023 patient had a mental status changed, he became acutely confused and agitated. Hospital Medicine team was consulted for alcohol withdrawal.  Patient reports he drinks two 12 packs of beer daily for "a long time." He reports that he did try to quit drinking many years ago and experienced DTs.  Last drink was on for 04/02/2023, patient reports he had a six-pack of beer.     Labs on 04/06/2023 notable for WBC 4.4, hemoglobin 10, hematocrit 29.2, platelets 81, sodium 132, CO2 17, BUN 29.4, calcium 8.2, total bili 2.    Interval Hx:   Patient was seen and examined at bedside, alert and oriented x2-3, very pleasant, not confused, only complaint being tremors, Nursing staff reports he has been scoring on CIWA    Chart was reviewed, HDS, Pancytopenic on labs, with some compensated metabolic acidosis      Objective/physical exam:  General: In no acute distress, afebrile  Chest: Clear to auscultation bilaterally  Heart: RRR, +S1, S2, no appreciable murmur  Abdomen: Soft, nontender, BS +  MSK: Warm, no lower extremity edema, no clubbing or cyanosis  Neurologic: Alert and oriented x4, Cranial nerve II-XII intact, Strength 5/5 in all 4 extremities        VITAL SIGNS: 24 HRS MIN & MAX LAST   Temp  Min: 97.3 °F (36.3 °C)  Max: 98.5 °F (36.9 °C) 97.3 °F (36.3 °C)   BP  Min: 110/65  Max: 172/92 132/82   Pulse  Min: 81  Max: 128  99   Resp  Min: 17  Max: 25 18   SpO2  Min: 94 %  " Max: 99 % 95 %       Recent Labs   Lab 04/05/23  0505 04/06/23  0613 04/07/23  0502   WBC 3.5* 4.4* 2.6*   RBC 3.13* 3.33* 3.16*   HGB 9.5* 10.0* 9.3*   HCT 27.9* 29.2* 27.9*   MCV 89.1 87.7 88.3   MCH 30.4 30.0 29.4   MCHC 34.1 34.2 33.3   RDW 16.4 15.7 15.4   PLT 59* 81* 76*   MPV 10.9* 10.5* 10.3       Recent Labs   Lab 04/03/23  0710 04/03/23  0902 04/03/23  1136 04/03/23  1329 04/05/23  0505 04/06/23  0613 04/07/23  0502   NA  --    < >  --    < > 131* 132* 136   K  --    < >  --    < > 4.7 4.1 3.9   CO2  --    < >  --    < > 21* 17* 18*   BUN  --    < >  --    < > 28.8* 29.4* 24.8   CREATININE  --    < >  --    < > 1.29* 0.99 0.86   CALCIUM  --    < >  --    < > 8.2* 8.2* 8.1*   PH 7.39  --  7.32*  --   --   --   --    MG  --   --   --   --  2.10 2.00 2.00   ALBUMIN  --   --   --   --  3.0* 3.0* 2.7*   ALKPHOS  --   --   --   --  59 91 79   ALT  --   --   --   --  17 23 19   AST  --   --   --   --  27 32 24   BILITOT  --   --   --   --  1.8* 2.0* 1.4    < > = values in this interval not displayed.          Microbiology Results (last 7 days)       ** No results found for the last 168 hours. **             See below for Radiology    Scheduled Med:   amLODIPine  10 mg Oral Daily    aspirin  81 mg Oral Daily    atorvastatin  40 mg Oral Daily    chlordiazepoxide  50 mg Oral Q6H    Followed by    chlordiazepoxide  50 mg Oral Q8H    Followed by    [START ON 4/8/2023] chlordiazepoxide  25 mg Oral Q6H    Followed by    [START ON 4/9/2023] chlordiazepoxide  25 mg Oral Q8H    Followed by    [START ON 4/10/2023] chlordiazepoxide  25 mg Oral Q12H    Followed by    [START ON 4/11/2023] chlordiazepoxide  25 mg Oral Q24H    docusate sodium  100 mg Oral BID    famotidine  20 mg Oral BID    folic acid  1 mg Oral Daily    [START ON 4/8/2023] metoprolol succinate  100 mg Oral Daily    multivitamin  1 tablet Oral Daily    mupirocin   Nasal BID    sodium bicarbonate  650 mg Oral TID    sucralfate  1 g Oral QID (AC & HS)    thiamine   200 mg Oral Daily    valsartan  160 mg Oral BID        Continuous Infusions:   amiodarone in dextrose 5% 0.5 mg/min (04/07/23 0417)        PRN Meds:  sodium chloride, sodium chloride, acetaminophen, albumin human 5%, calcium gluconate IVPB, calcium gluconate IVPB, calcium gluconate IVPB, dextrose 10%, dextrose 10%, dextrose 10%, dextrose 10%, glucagon (human recombinant), glucose, glucose, haloperidol lactate, hydrALAZINE, HYDROcodone-acetaminophen, insulin aspart U-100, ketorolac, labetalol, lorazepam, magnesium sulfate IVPB, magnesium sulfate IVPB, ondansetron, oxyCODONE, potassium chloride in water, potassium chloride in water, potassium chloride in water, sodium phosphate IVPB, sodium phosphate IVPB, sodium phosphate IVPB       Assessment/Plan:  Acute alcohol withdrawal ,Hx of ETOH Use  CAD status post CABG x3 on 04/03/2023  Pancytopenia  Paroxysmal atrial flutter   Valvular heart disease-aortic stenosis  Chronic thrombocytopenia  Normocytic anemia  Essential hypertension        Plan:   CT head without contrast -no acute abnormalities  Banana Bag x1  MVI,Thiamine,Folate  CIWA to continue  Librium for alcohol withdrawal  PRN Haldol for agitation  Monitor Electrolytes  Continue to monitor on Telemetry  Fall Precautions  Seizure precautions  Rest of the management per primary team.  Monitor CBC fir Pancytopenia -may benefit from Hematology consult if the patient continue to worsen       Patient condition:    Fair    Anticipated discharge and Disposition:         All diagnosis and differential diagnosis have been reviewed; assessment and plan has been documented; I have personally reviewed the labs and test results that are presently available; I have reviewed the patients medication list; I have reviewed the consulting providers response and recommendations. I have reviewed or attempted to review medical records based upon their availability    All of the patient's questions have been  addressed and answered.  "Patient's is agreeable to the above stated plan. I will continue to monitor closely and make adjustments to medical management as needed.  _____________________________________________________________________    Nutrition Status:    Radiology:  US Abdomen Limited_Liver  EXAMINATION  US ABDOMEN LIMITED_LIVER    CLINICAL HISTORY  suspected liver cirrhosis;    TECHNIQUE  Multiplanar grayscale sonographic evaluation of the right upper quadrant was performed, with focused Doppler assessment of the main portal vein.    COMPARISON  None available at the time of initial interpretation.    FINDINGS  Exam quality: Limited secondary to regional bowel gas and associated "dirty shadowing" artifact.    Pancreas: Inadequately visualized.    Liver: Size is within normal limits.  Liver surface demonstrates somewhat irregular, nodular contour suggesting cirrhotic changes.  No discrete mass-like abnormality.  Diffusely increased echogenicity of the background liver parenchyma is suggestive of steatosis.  Normal hepatopetal flow is seen within the portal vein.    Bile ducts: No intra-/extrahepatic biliary dilatation suggestive of obstructing pathology.    Gallbladder: Gallbladder wall is somewhat edematous in appearance, measuring up to 6 mm.  Trace pericholecystic fluid is also evident.  There is layering echogenic nonshadowing material suggestive of sludge.  No definite shadowing stone is identified.    Other findings: No suspicious right kidney lesion, shadowing stone, or hydronephrosis.  The visualized aorta and IVC are unremarkable.  No free fluid identified.    IMPRESSION  1. Somewhat limited evaluation secondary to technical factors discussed above, resulting in inadequate visualization of the pancreas.  2. Findings suggestive of early liver cirrhotic and steatosis changes.  3. Gallbladder sludge with nonspecific wall thickening that could be secondary to element of acute or chronic cholecystitis, as well as immediately adjacent " liver or more systemic pathology.    Electronically signed by: Toby Reyes  Date:    04/06/2023  Time:    18:05  CT Head Without Contrast  Narrative: EXAMINATION:  CT HEAD WITHOUT CONTRAST    CLINICAL HISTORY:  Mental status change, unknown cause;    TECHNIQUE:  Axial scans were obtained from skull base to the vertex.    Coronal and sagittal reconstructions obtained from the axial data.    Automatic exposure control was utilized to limit radiation dose.    Contrast: None    Radiation Dose:    Total DLP: 2339 mGy*cm    COMPARISON:  None    FINDINGS:  There is no acute intracranial hemorrhage or edema. The gray-white matter differentiation is preserved.  Scattered hypodensities in the subcortical and periventricular white matter likely represent chronic microvascular ischemic changes.    There is no mass effect or midline shift.  There is diffuse parenchymal volume loss.  The basal cisterns are patent. There is no abnormal extra-axial fluid collection.  Carotid and vertebral artery calcifications are noted.    The calvarium and skull base are intact.  There is trace fluid layering in the right maxillary sinus.  Impression: 1. No acute intracranial abnormality.  2. Chronic microvascular ischemic changes.    Electronically signed by: Lyric Deras  Date:    04/06/2023  Time:    14:38  X-Ray Chest AP Portable  Narrative: EXAMINATION:  Single view chest radiograph.    CLINICAL HISTORY:  Post-op;    TECHNIQUE:  Single view of the chest.    COMPARISON:  Chest radiograph 04/05/2023.    FINDINGS:  Left-sided chest tube and mediastinal drain are unchanged.  There is minimal left lower lobe subsegmental atelectasis.  Aeration in the left lung base has improved.  There is no pneumothorax.  The cardiac silhouette remains enlarged.  Impression: Improving aeration in the left lung base with persistent minimal subsegmental atelectasis.    Electronically signed by: Wyatt Wellington MD  Date:    04/06/2023  Time:    06:51      Jossy  MD Marissa   04/07/2023

## 2023-04-07 NOTE — PT/OT/SLP PROGRESS
Occupational Therapy   Treatment    Name: Mike Urbina Jr.  MRN: [id number]  Admitting Diagnosis:  <principal problem not specified>  4 Days Post-Op    Recommendations:     Discharge Recommendations: rehabilitation facility  Discharge Equipment Recommendations:  walker, rolling  Barriers to discharge:       Assessment:     Mike Urbina Jr. is a 67 y.o. male with a medical diagnosis of <principal problem not specified>.  He presents with confusion this date and unable to maintain sternal pxns requiring max VC given. Performance deficits affecting function are weakness, impaired endurance, impaired self care skills, impaired functional mobility, gait instability, impaired balance, decreased safety awareness.     Rehab Prognosis:  Fair; patient would benefit from acute skilled OT services to address these deficits and reach maximum level of function.       Plan:     Patient to be seen 6 x/week to address the above listed problems via self-care/home management, therapeutic activities, therapeutic exercises  Plan of Care Expires: 04/19/23  Plan of Care Reviewed with: patient    Subjective     Pain/Comfort:  N/A    Objective:     Communicated with: RN prior to session.  Patient found HOB elevated  upon OT entry to room.    General Precautions: Standard, fall, sternal    Orthopedic Precautions:   Braces:    Respiratory Status: Room air       Occupational Performance:     Bed Mobility:    Patient completed Supine to Sit with moderate assistance  Patient completed Sit to Supine with moderate assistance     Functional Mobility/Transfers:  Patient completed Sit <> Stand Transfer with contact guard assistance  with  rolling walker   Patient completed Toilet Transfer Step Transfer technique with moderate assistance with  rolling walker  Functional Mobility: patient performing functional mobility from EOB to BR commode using RW for UE support with balance requiring assistance to maneuver DME throughout environment  as patient would run into walls and doors.      Activities of Daily Living:  Grooming: contact guard assistance standing at sink washing B hands  Toileting: minimum assistance performing pericare sitting on commode following BM    Therapeutic Positioning  Skin integrity: Visible skin intact     The following interventions were performed in an effort to prevent and/or reduce acquired pressure ulcers: skin assessment was performed and all visible skin was assessed during the course of treatment      AMPA 6 Click ADL:      Patient Education:  Patient provided with verbal education regarding sternal pxns.  Understanding was verbalized, however additional teaching warranted.      Patient left HOB elevated with all lines intact, call button in reach, and 1:1 present    GOALS:   Multidisciplinary Problems       Occupational Therapy Goals          Problem: Occupational Therapy    Goal Priority Disciplines Outcome Interventions   Occupational Therapy Goal     OT, PT/OT Ongoing, Progressing    Description: Goals to be met by: 4/19/23     Patient will increase functional independence with ADLs by performing:    UE Dressing with Modified Cole.  LE Dressing with Modified Cole and Assistive Devices as needed.  Grooming while standing at sink with Modified Cole.  Toileting from toilet with Modified Cole for hygiene and clothing management.   Toilet transfer to toilet with Modified Cole.                         Time Tracking:     OT Date of Treatment: 04/07/23  OT Start Time: 1251  OT Stop Time: 1314  OT Total Time (min): 23 min    Billable Minutes:Self Care/Home Management 2    OT/ROCCO: ROCCO     Number of ROCCO visits since last OT visit: 2    4/7/2023

## 2023-04-08 LAB
ANION GAP SERPL CALC-SCNC: 11 MEQ/L
BASOPHILS # BLD AUTO: 0.01 X10(3)/MCL (ref 0–0.2)
BASOPHILS NFR BLD AUTO: 0.3 %
BUN SERPL-MCNC: 25.6 MG/DL (ref 8.4–25.7)
CALCIUM SERPL-MCNC: 8.2 MG/DL (ref 8.8–10)
CHLORIDE SERPL-SCNC: 106 MMOL/L (ref 98–107)
CO2 SERPL-SCNC: 18 MMOL/L (ref 23–31)
CREAT SERPL-MCNC: 1.01 MG/DL (ref 0.73–1.18)
CREAT/UREA NIT SERPL: 25
EOSINOPHIL # BLD AUTO: 0.08 X10(3)/MCL (ref 0–0.9)
EOSINOPHIL NFR BLD AUTO: 2.1 %
ERYTHROCYTE [DISTWIDTH] IN BLOOD BY AUTOMATED COUNT: 15.6 % (ref 11.5–17)
FERRITIN SERPL-MCNC: 366.51 NG/ML (ref 21.81–274.66)
FOLATE SERPL-MCNC: 16.2 NG/ML (ref 7–31.4)
GFR SERPLBLD CREATININE-BSD FMLA CKD-EPI: >60 MLS/MIN/1.73/M2
GLUCOSE SERPL-MCNC: 94 MG/DL (ref 82–115)
HCT VFR BLD AUTO: 29.6 % (ref 42–52)
HGB BLD-MCNC: 9.7 G/DL (ref 14–18)
IMM GRANULOCYTES # BLD AUTO: 0.01 X10(3)/MCL (ref 0–0.04)
IMM GRANULOCYTES NFR BLD AUTO: 0.3 %
IRON SATN MFR SERPL: 13 % (ref 20–50)
IRON SERPL-MCNC: 34 UG/DL (ref 65–175)
LYMPHOCYTES # BLD AUTO: 0.59 X10(3)/MCL (ref 0.6–4.6)
LYMPHOCYTES NFR BLD AUTO: 15.2 %
MAGNESIUM SERPL-MCNC: 2 MG/DL (ref 1.6–2.6)
MCH RBC QN AUTO: 29.7 PG (ref 27–31)
MCHC RBC AUTO-ENTMCNC: 32.8 G/DL (ref 33–36)
MCV RBC AUTO: 90.5 FL (ref 80–94)
MONOCYTES # BLD AUTO: 0.64 X10(3)/MCL (ref 0.1–1.3)
MONOCYTES NFR BLD AUTO: 16.5 %
NEUTROPHILS # BLD AUTO: 2.55 X10(3)/MCL (ref 2.1–9.2)
NEUTROPHILS NFR BLD AUTO: 65.6 %
NRBC BLD AUTO-RTO: 0 %
PHOSPHATE SERPL-MCNC: 4.6 MG/DL (ref 2.3–4.7)
PLATELET # BLD AUTO: 89 X10(3)/MCL (ref 130–400)
PMV BLD AUTO: 10.1 FL (ref 7.4–10.4)
POCT GLUCOSE: 100 MG/DL (ref 70–110)
POCT GLUCOSE: 116 MG/DL (ref 70–110)
POCT GLUCOSE: 141 MG/DL (ref 70–110)
POTASSIUM SERPL-SCNC: 3.7 MMOL/L (ref 3.5–5.1)
RBC # BLD AUTO: 3.27 X10(6)/MCL (ref 4.7–6.1)
SODIUM SERPL-SCNC: 135 MMOL/L (ref 136–145)
TIBC SERPL-MCNC: 220 UG/DL (ref 69–240)
TIBC SERPL-MCNC: 254 UG/DL (ref 250–450)
TRANSFERRIN SERPL-MCNC: 221 MG/DL (ref 163–344)
VIT B12 SERPL-MCNC: 760 PG/ML (ref 213–816)
WBC # SPEC AUTO: 3.9 X10(3)/MCL (ref 4.5–11.5)

## 2023-04-08 PROCEDURE — 84466 ASSAY OF TRANSFERRIN: CPT | Performed by: INTERNAL MEDICINE

## 2023-04-08 PROCEDURE — 25000003 PHARM REV CODE 250: Performed by: PHYSICIAN ASSISTANT

## 2023-04-08 PROCEDURE — 97110 THERAPEUTIC EXERCISES: CPT

## 2023-04-08 PROCEDURE — 25000003 PHARM REV CODE 250: Performed by: INTERNAL MEDICINE

## 2023-04-08 PROCEDURE — 87077 CULTURE AEROBIC IDENTIFY: CPT | Performed by: INTERNAL MEDICINE

## 2023-04-08 PROCEDURE — 94760 N-INVAS EAR/PLS OXIMETRY 1: CPT

## 2023-04-08 PROCEDURE — 25000003 PHARM REV CODE 250: Performed by: NURSE PRACTITIONER

## 2023-04-08 PROCEDURE — 82746 ASSAY OF FOLIC ACID SERUM: CPT | Performed by: INTERNAL MEDICINE

## 2023-04-08 PROCEDURE — 82607 VITAMIN B-12: CPT | Performed by: INTERNAL MEDICINE

## 2023-04-08 PROCEDURE — 84100 ASSAY OF PHOSPHORUS: CPT | Performed by: INTERNAL MEDICINE

## 2023-04-08 PROCEDURE — 82728 ASSAY OF FERRITIN: CPT | Performed by: INTERNAL MEDICINE

## 2023-04-08 PROCEDURE — 83735 ASSAY OF MAGNESIUM: CPT | Performed by: INTERNAL MEDICINE

## 2023-04-08 PROCEDURE — 99024 POSTOP FOLLOW-UP VISIT: CPT | Mod: POP,,, | Performed by: PHYSICIAN ASSISTANT

## 2023-04-08 PROCEDURE — 25000003 PHARM REV CODE 250: Performed by: SPECIALIST

## 2023-04-08 PROCEDURE — 63600175 PHARM REV CODE 636 W HCPCS: Performed by: NURSE PRACTITIONER

## 2023-04-08 PROCEDURE — 99024 PR POST-OP FOLLOW-UP VISIT: ICD-10-PCS | Mod: POP,,, | Performed by: PHYSICIAN ASSISTANT

## 2023-04-08 PROCEDURE — 85025 COMPLETE CBC W/AUTO DIFF WBC: CPT | Performed by: INTERNAL MEDICINE

## 2023-04-08 PROCEDURE — 25000003 PHARM REV CODE 250

## 2023-04-08 PROCEDURE — 83550 IRON BINDING TEST: CPT | Performed by: INTERNAL MEDICINE

## 2023-04-08 PROCEDURE — 21400001 HC TELEMETRY ROOM

## 2023-04-08 PROCEDURE — 80048 BASIC METABOLIC PNL TOTAL CA: CPT | Performed by: INTERNAL MEDICINE

## 2023-04-08 RX ORDER — METOPROLOL SUCCINATE 50 MG/1
100 TABLET, EXTENDED RELEASE ORAL 2 TIMES DAILY
Status: DISCONTINUED | OUTPATIENT
Start: 2023-04-08 | End: 2023-04-08

## 2023-04-08 RX ORDER — FUROSEMIDE 20 MG/1
20 TABLET ORAL 2 TIMES DAILY
Status: DISCONTINUED | OUTPATIENT
Start: 2023-04-08 | End: 2023-04-17 | Stop reason: HOSPADM

## 2023-04-08 RX ORDER — METOPROLOL TARTRATE 50 MG/1
100 TABLET ORAL 2 TIMES DAILY
Status: DISCONTINUED | OUTPATIENT
Start: 2023-04-08 | End: 2023-04-17 | Stop reason: HOSPADM

## 2023-04-08 RX ORDER — POTASSIUM CHLORIDE 20 MEQ/1
20 TABLET, EXTENDED RELEASE ORAL DAILY
Status: DISCONTINUED | OUTPATIENT
Start: 2023-04-08 | End: 2023-04-17 | Stop reason: HOSPADM

## 2023-04-08 RX ADMIN — ATORVASTATIN CALCIUM 40 MG: 40 TABLET, FILM COATED ORAL at 10:04

## 2023-04-08 RX ADMIN — CHLORDIAZEPOXIDE HYDROCHLORIDE 25 MG: 25 CAPSULE ORAL at 08:04

## 2023-04-08 RX ADMIN — CHLORDIAZEPOXIDE HYDROCHLORIDE 50 MG: 25 CAPSULE ORAL at 01:04

## 2023-04-08 RX ADMIN — ASPIRIN 81 MG: 81 TABLET, COATED ORAL at 10:04

## 2023-04-08 RX ADMIN — THERA TABS 1 TABLET: TAB at 10:04

## 2023-04-08 RX ADMIN — AMLODIPINE BESYLATE 10 MG: 5 TABLET ORAL at 10:04

## 2023-04-08 RX ADMIN — FAMOTIDINE 20 MG: 20 TABLET, FILM COATED ORAL at 10:04

## 2023-04-08 RX ADMIN — SUCRALFATE 1 G: 1 TABLET ORAL at 06:04

## 2023-04-08 RX ADMIN — FUROSEMIDE 20 MG: 20 TABLET ORAL at 06:04

## 2023-04-08 RX ADMIN — DOCUSATE SODIUM 100 MG: 100 CAPSULE, LIQUID FILLED ORAL at 10:04

## 2023-04-08 RX ADMIN — AMIODARONE HYDROCHLORIDE 0.5 MG/MIN: 1.8 INJECTION, SOLUTION INTRAVENOUS at 03:04

## 2023-04-08 RX ADMIN — SODIUM BICARBONATE 650 MG TABLET 650 MG: at 10:04

## 2023-04-08 RX ADMIN — FAMOTIDINE 20 MG: 20 TABLET, FILM COATED ORAL at 08:04

## 2023-04-08 RX ADMIN — THIAMINE HCL TAB 100 MG 200 MG: 100 TAB at 10:04

## 2023-04-08 RX ADMIN — CHLORDIAZEPOXIDE HYDROCHLORIDE 50 MG: 25 CAPSULE ORAL at 06:04

## 2023-04-08 RX ADMIN — POTASSIUM CHLORIDE 20 MEQ: 1500 TABLET, EXTENDED RELEASE ORAL at 10:04

## 2023-04-08 RX ADMIN — DOCUSATE SODIUM 100 MG: 100 CAPSULE, LIQUID FILLED ORAL at 08:04

## 2023-04-08 RX ADMIN — SODIUM BICARBONATE 650 MG TABLET 650 MG: at 06:04

## 2023-04-08 RX ADMIN — VALSARTAN 160 MG: 80 TABLET, FILM COATED ORAL at 08:04

## 2023-04-08 RX ADMIN — SODIUM BICARBONATE 650 MG TABLET 650 MG: at 08:04

## 2023-04-08 RX ADMIN — METOPROLOL TARTRATE 100 MG: 50 TABLET, FILM COATED ORAL at 08:04

## 2023-04-08 RX ADMIN — AMIODARONE HYDROCHLORIDE 0.5 MG/MIN: 1.8 INJECTION, SOLUTION INTRAVENOUS at 06:04

## 2023-04-08 RX ADMIN — FOLIC ACID 1 MG: 1 TABLET ORAL at 10:04

## 2023-04-08 RX ADMIN — METOPROLOL SUCCINATE 100 MG: 50 TABLET, EXTENDED RELEASE ORAL at 10:04

## 2023-04-08 RX ADMIN — VALSARTAN 160 MG: 80 TABLET, FILM COATED ORAL at 10:04

## 2023-04-08 RX ADMIN — SUCRALFATE 1 G: 1 TABLET ORAL at 10:04

## 2023-04-08 NOTE — PROGRESS NOTES
"[facility]  Hospital Medicine Progress Note        Chief Complaint: Inpatient Follow-up for CABG    HPI: Mike Urbina Jr. is a 67 y.o. male with PMHx of ETOH abuse, VHD-aortic stenosis, thrombocytopenia, CAD status post NSTEMI, HTN, PAFlutter who presented to [facility] on 04/03/2023 for a scheduled CABG.  He previously underwent cardiac workup revealing multivessel CAD.  He underwent CABG x3 on 04/03/2023 by Dr. Finley.  He was admitted to ICU postoperatively.  CIS was consulted on 04/04/2023.  On the evening of 04/05/2023 patient had a mental status changed, he became acutely confused and agitated. Hospital Medicine team was consulted for alcohol withdrawal.  Patient reports he drinks two 12 packs of beer daily for "a long time." He reports that he did try to quit drinking many years ago and experienced DTs.  Last drink was on for 04/02/2023, patient reports he had a six-pack of beer.       Interval Hx:   Patient was seen and examined at bedside, alert and oriented x2-3, very pleasant, not confused, only complaint being tremors, family at bedside,Nursing staff reports he has been scoring on CIWA and getting Librium    Chart was reviewed, Tachycardic, Pancytopenic on labs, with some  metabolic acidosis      Objective/physical exam:  General: In no acute distress, afebrile  Chest: Clear to auscultation bilaterally  Heart: RRR, +S1, S2, no appreciable murmur  Abdomen: Soft, nontender, BS +  MSK: Warm, no lower extremity edema, no clubbing or cyanosis,tremors+ve  Neurologic: Alert and oriented x4, Cranial nerve II-XII intact, Strength 5/5 in all 4 extremities        VITAL SIGNS: 24 HRS MIN & MAX LAST   Temp  Min: 98.2 °F (36.8 °C)  Max: 99.2 °F (37.3 °C) 98.2 °F (36.8 °C)   BP  Min: 150/80  Max: 168/98 (!) 151/100   Pulse  Min: 80  Max: 125  (!) 120   Resp  Min: 18  Max: 20 20   SpO2  Min: 84 %  Max: 98 % 96 %       Recent Labs   Lab 04/06/23  0613 04/07/23  0502 04/08/23  0419   WBC " 4.4* 2.6* 3.9*   RBC 3.33* 3.16* 3.27*   HGB 10.0* 9.3* 9.7*   HCT 29.2* 27.9* 29.6*   MCV 87.7 88.3 90.5   MCH 30.0 29.4 29.7   MCHC 34.2 33.3 32.8*   RDW 15.7 15.4 15.6   PLT 81* 76* 89*   MPV 10.5* 10.3 10.1       Recent Labs   Lab 04/03/23  0710 04/03/23  0902 04/03/23  1136 04/03/23  1329 04/05/23  0505 04/06/23  0613 04/07/23  0502 04/08/23  0419   NA  --    < >  --    < > 131* 132* 136 135*   K  --    < >  --    < > 4.7 4.1 3.9 3.7   CO2  --    < >  --    < > 21* 17* 18* 18*   BUN  --    < >  --    < > 28.8* 29.4* 24.8 25.6   CREATININE  --    < >  --    < > 1.29* 0.99 0.86 1.01   CALCIUM  --    < >  --    < > 8.2* 8.2* 8.1* 8.2*   PH 7.39  --  7.32*  --   --   --   --   --    MG  --   --   --    < > 2.10 2.00 2.00 2.00   ALBUMIN  --   --   --   --  3.0* 3.0* 2.7*  --    ALKPHOS  --   --   --   --  59 91 79  --    ALT  --   --   --   --  17 23 19  --    AST  --   --   --   --  27 32 24  --    BILITOT  --   --   --   --  1.8* 2.0* 1.4  --     < > = values in this interval not displayed.          Microbiology Results (last 7 days)       ** No results found for the last 168 hours. **             See below for Radiology    Scheduled Med:   amLODIPine  10 mg Oral Daily    aspirin  81 mg Oral Daily    atorvastatin  40 mg Oral Daily    chlordiazepoxide  25 mg Oral Q6H    Followed by    [START ON 4/9/2023] chlordiazepoxide  25 mg Oral Q8H    Followed by    [START ON 4/10/2023] chlordiazepoxide  25 mg Oral Q12H    Followed by    [START ON 4/11/2023] chlordiazepoxide  25 mg Oral Q24H    docusate sodium  100 mg Oral BID    famotidine  20 mg Oral BID    folic acid  1 mg Oral Daily    furosemide  20 mg Oral BID    metoprolol tartrate  100 mg Oral BID    multivitamin  1 tablet Oral Daily    potassium chloride  20 mEq Oral Daily    sodium bicarbonate  650 mg Oral TID    sucralfate  1 g Oral QID (AC & HS)    thiamine  200 mg Oral Daily    valsartan  160 mg Oral BID        Continuous Infusions:   amiodarone in dextrose 5% 0.5  mg/min (04/08/23 0342)        PRN Meds:  sodium chloride, sodium chloride, acetaminophen, albumin human 5%, calcium gluconate IVPB, calcium gluconate IVPB, calcium gluconate IVPB, dextrose 10%, dextrose 10%, dextrose 10%, dextrose 10%, glucagon (human recombinant), glucose, glucose, haloperidol lactate, hydrALAZINE, HYDROcodone-acetaminophen, insulin aspart U-100, labetalol, lorazepam, magnesium sulfate IVPB, magnesium sulfate IVPB, ondansetron, potassium chloride in water, potassium chloride in water, potassium chloride in water, sodium phosphate IVPB, sodium phosphate IVPB, sodium phosphate IVPB       Assessment/Plan:  Encephalopathy  Acute alcohol withdrawal ,Hx of ETOH Use  CAD status post CABG x3 on 04/03/2023  Pancytopenia  Metabolic Acidosis  Paroxysmal atrial flutter   Valvular heart disease-aortic stenosis  Chronic thrombocytopenia  Normocytic anemia  Essential hypertension        Plan:   CT head without contrast -no acute abnormalities.Ammonia-normal,F/u Blood Cultures  Banana Bag x1  MVI,Thiamine,Folate  CIWA to continue  Librium for alcohol withdrawal  PRN Haldol for agitation  Monitor Electrolytes  Continue to monitor on Telemetry  Fall Precautions  Seizure precautions  Rest of the management per primary team.  --Monitor CBC for Pancytopenia -may benefit from Hematology consult if the patient continue to worsen , f/u Anemia w/u  --Monitor BMP,started on Sodium Bicarb tabs      Patient condition:    Fair    Anticipated discharge and Disposition:         All diagnosis and differential diagnosis have been reviewed; assessment and plan has been documented; I have personally reviewed the labs and test results that are presently available; I have reviewed the patients medication list; I have reviewed the consulting providers response and recommendations. I have reviewed or attempted to review medical records based upon their availability    All of the patient's questions have been  addressed and answered.  "Patient's is agreeable to the above stated plan. I will continue to monitor closely and make adjustments to medical management as needed.  _____________________________________________________________________    Nutrition Status:    Radiology:  US Abdomen Limited_Liver  EXAMINATION  US ABDOMEN LIMITED_LIVER    CLINICAL HISTORY  suspected liver cirrhosis;    TECHNIQUE  Multiplanar grayscale sonographic evaluation of the right upper quadrant was performed, with focused Doppler assessment of the main portal vein.    COMPARISON  None available at the time of initial interpretation.    FINDINGS  Exam quality: Limited secondary to regional bowel gas and associated "dirty shadowing" artifact.    Pancreas: Inadequately visualized.    Liver: Size is within normal limits.  Liver surface demonstrates somewhat irregular, nodular contour suggesting cirrhotic changes.  No discrete mass-like abnormality.  Diffusely increased echogenicity of the background liver parenchyma is suggestive of steatosis.  Normal hepatopetal flow is seen within the portal vein.    Bile ducts: No intra-/extrahepatic biliary dilatation suggestive of obstructing pathology.    Gallbladder: Gallbladder wall is somewhat edematous in appearance, measuring up to 6 mm.  Trace pericholecystic fluid is also evident.  There is layering echogenic nonshadowing material suggestive of sludge.  No definite shadowing stone is identified.    Other findings: No suspicious right kidney lesion, shadowing stone, or hydronephrosis.  The visualized aorta and IVC are unremarkable.  No free fluid identified.    IMPRESSION  1. Somewhat limited evaluation secondary to technical factors discussed above, resulting in inadequate visualization of the pancreas.  2. Findings suggestive of early liver cirrhotic and steatosis changes.  3. Gallbladder sludge with nonspecific wall thickening that could be secondary to element of acute or chronic cholecystitis, as well as immediately adjacent " liver or more systemic pathology.    Electronically signed by: Toby Reyes  Date:    04/06/2023  Time:    18:05  CT Head Without Contrast  Narrative: EXAMINATION:  CT HEAD WITHOUT CONTRAST    CLINICAL HISTORY:  Mental status change, unknown cause;    TECHNIQUE:  Axial scans were obtained from skull base to the vertex.    Coronal and sagittal reconstructions obtained from the axial data.    Automatic exposure control was utilized to limit radiation dose.    Contrast: None    Radiation Dose:    Total DLP: 2339 mGy*cm    COMPARISON:  None    FINDINGS:  There is no acute intracranial hemorrhage or edema. The gray-white matter differentiation is preserved.  Scattered hypodensities in the subcortical and periventricular white matter likely represent chronic microvascular ischemic changes.    There is no mass effect or midline shift.  There is diffuse parenchymal volume loss.  The basal cisterns are patent. There is no abnormal extra-axial fluid collection.  Carotid and vertebral artery calcifications are noted.    The calvarium and skull base are intact.  There is trace fluid layering in the right maxillary sinus.  Impression: 1. No acute intracranial abnormality.  2. Chronic microvascular ischemic changes.    Electronically signed by: Lyric Deras  Date:    04/06/2023  Time:    14:38  X-Ray Chest AP Portable  Narrative: EXAMINATION:  Single view chest radiograph.    CLINICAL HISTORY:  Post-op;    TECHNIQUE:  Single view of the chest.    COMPARISON:  Chest radiograph 04/05/2023.    FINDINGS:  Left-sided chest tube and mediastinal drain are unchanged.  There is minimal left lower lobe subsegmental atelectasis.  Aeration in the left lung base has improved.  There is no pneumothorax.  The cardiac silhouette remains enlarged.  Impression: Improving aeration in the left lung base with persistent minimal subsegmental atelectasis.    Electronically signed by: Wyatt Wellington MD  Date:    04/06/2023  Time:    06:51      Jossy  MD Marissa   04/08/2023

## 2023-04-08 NOTE — PROGRESS NOTES
04/08/23 0850   Pre Exercise Vitals   Pulse 113   Supplemental O2? No   SpO2 95 %   During Exercise Vitals   Pulse 138   Supplemental O2? No   SpO2 94 %   Distance Walked 100 feet   Post Exercise Vitals   /88   Pulse 115   Supplemental O2? No   SpO2 97 %   Modality   Modality   (rollator)     Communicated with nurse prior to activity.   1:1 cont.  Pleasant, willing to participate in therapy.  Assisted CNA to change gown/linens (pt spilled coffee).  Min assist from bed to standing, maintaining sternal precautions.  Gait steady with min assist.  Assist to chair post ambulation.  Chair alarm on and CNA continues to be present for 1:1.  Encouraged increased activity and use of IS.

## 2023-04-08 NOTE — PROGRESS NOTES
CT SURGERY PROGRESS NOTE  Mike Urbina   67 y.o.  1955    Patients Procedure: Procedure(s) (LRB):  CORONARY ARTERY BYPASS GRAFT (CABG) (N/A)    Subjective  Interval History: POD 5    ROS    Medication List  Infusions   amiodarone in dextrose 5% 0.5 mg/min (04/08/23 0342)     Scheduled   amLODIPine  10 mg Oral Daily    aspirin  81 mg Oral Daily    atorvastatin  40 mg Oral Daily    chlordiazepoxide  50 mg Oral Q8H    Followed by    chlordiazepoxide  25 mg Oral Q6H    Followed by    [START ON 4/9/2023] chlordiazepoxide  25 mg Oral Q8H    Followed by    [START ON 4/10/2023] chlordiazepoxide  25 mg Oral Q12H    Followed by    [START ON 4/11/2023] chlordiazepoxide  25 mg Oral Q24H    docusate sodium  100 mg Oral BID    famotidine  20 mg Oral BID    folic acid  1 mg Oral Daily    metoprolol succinate  100 mg Oral Daily    multivitamin  1 tablet Oral Daily    mupirocin   Nasal BID    sodium bicarbonate  650 mg Oral TID    sucralfate  1 g Oral QID (AC & HS)    thiamine  200 mg Oral Daily    valsartan  160 mg Oral BID       Objective:  Recent Vitals:  Temp:  [97.3 °F (36.3 °C)-99.2 °F (37.3 °C)] 99.2 °F (37.3 °C)  Pulse:  [] 121  Resp:  [18-20] 19  SpO2:  [84 %-98 %] 97 %  BP: (132-166)/(80-97) 166/97    Physical Exam     I/O last 24 hrs:  Intake/Output - Last 3 Shifts         04/06 0700  04/07 0659 04/07 0700 04/08 0659 04/08 0700 04/09 0659    P.O.  240     I.V. (mL/kg) 977.4 (11.7)      Total Intake(mL/kg) 977.4 (11.7) 240 (2.9)     Urine (mL/kg/hr) 575 (0.3) 600 (0.3)     Stool  0     Chest Tube 160      Total Output 735 600     Net +242.4 -360                    Labs  BMP:   Recent Labs   Lab 04/08/23  0419   *   K 3.7   CO2 18*   BUN 25.6   CREATININE 1.01   CALCIUM 8.2*   MG 2.00     CBC:   Recent Labs   Lab 04/08/23  0419   WBC 3.9*   RBC 3.27*   HGB 9.7*   HCT 29.6*   PLT 89*   MCV 90.5   MCH 29.7   MCHC 32.8*     CMP:   Recent Labs   Lab 04/07/23  0502 04/08/23 0419   CALCIUM 8.1* 8.2*    ALBUMIN 2.7*  --     135*   K 3.9 3.7   CO2 18* 18*   BUN 24.8 25.6   CREATININE 0.86 1.01   ALKPHOS 79  --    ALT 19  --    AST 24  --    BILITOT 1.4  --          Imaging:   CXR: No results found in the last 24 hours.        ASSESSMENT/PLAN:    POD 5   Afib - IV amio  Still confused  DC planning per case mgt    Case and plan of care discussed with MD Wyatt Carl PA-C

## 2023-04-09 LAB
ANION GAP SERPL CALC-SCNC: 10 MEQ/L
BASOPHILS # BLD AUTO: 0.01 X10(3)/MCL (ref 0–0.2)
BASOPHILS NFR BLD AUTO: 0.2 %
BUN SERPL-MCNC: 19.8 MG/DL (ref 8.4–25.7)
CALCIUM SERPL-MCNC: 8.4 MG/DL (ref 8.8–10)
CHLORIDE SERPL-SCNC: 105 MMOL/L (ref 98–107)
CO2 SERPL-SCNC: 19 MMOL/L (ref 23–31)
CREAT SERPL-MCNC: 0.93 MG/DL (ref 0.73–1.18)
CREAT/UREA NIT SERPL: 21
EOSINOPHIL # BLD AUTO: 0.11 X10(3)/MCL (ref 0–0.9)
EOSINOPHIL NFR BLD AUTO: 1.8 %
ERYTHROCYTE [DISTWIDTH] IN BLOOD BY AUTOMATED COUNT: 15.8 % (ref 11.5–17)
GFR SERPLBLD CREATININE-BSD FMLA CKD-EPI: >60 MLS/MIN/1.73/M2
GLUCOSE SERPL-MCNC: 108 MG/DL (ref 82–115)
HCT VFR BLD AUTO: 36.2 % (ref 42–52)
HGB BLD-MCNC: 12 G/DL (ref 14–18)
IMM GRANULOCYTES # BLD AUTO: 0.05 X10(3)/MCL (ref 0–0.04)
IMM GRANULOCYTES NFR BLD AUTO: 0.8 %
LYMPHOCYTES # BLD AUTO: 0.87 X10(3)/MCL (ref 0.6–4.6)
LYMPHOCYTES NFR BLD AUTO: 14 %
MAGNESIUM SERPL-MCNC: 1.9 MG/DL (ref 1.6–2.6)
MCH RBC QN AUTO: 29.7 PG (ref 27–31)
MCHC RBC AUTO-ENTMCNC: 33.1 G/DL (ref 33–36)
MCV RBC AUTO: 89.6 FL (ref 80–94)
MONOCYTES # BLD AUTO: 1.07 X10(3)/MCL (ref 0.1–1.3)
MONOCYTES NFR BLD AUTO: 17.3 %
NEUTROPHILS # BLD AUTO: 4.09 X10(3)/MCL (ref 2.1–9.2)
NEUTROPHILS NFR BLD AUTO: 65.9 %
NRBC BLD AUTO-RTO: 0 %
PHOSPHATE SERPL-MCNC: 4.1 MG/DL (ref 2.3–4.7)
PLATELET # BLD AUTO: 106 X10(3)/MCL (ref 130–400)
PMV BLD AUTO: 10.4 FL (ref 7.4–10.4)
POTASSIUM SERPL-SCNC: 4.3 MMOL/L (ref 3.5–5.1)
RBC # BLD AUTO: 4.04 X10(6)/MCL (ref 4.7–6.1)
SODIUM SERPL-SCNC: 134 MMOL/L (ref 136–145)
WBC # SPEC AUTO: 6.2 X10(3)/MCL (ref 4.5–11.5)

## 2023-04-09 PROCEDURE — 63600175 PHARM REV CODE 636 W HCPCS: Performed by: SPECIALIST

## 2023-04-09 PROCEDURE — 25000003 PHARM REV CODE 250: Performed by: PHYSICIAN ASSISTANT

## 2023-04-09 PROCEDURE — 93010 ELECTROCARDIOGRAM REPORT: CPT | Mod: ,,, | Performed by: INTERNAL MEDICINE

## 2023-04-09 PROCEDURE — 25000003 PHARM REV CODE 250: Performed by: NURSE PRACTITIONER

## 2023-04-09 PROCEDURE — 93010 EKG 12-LEAD: ICD-10-PCS | Mod: ,,, | Performed by: INTERNAL MEDICINE

## 2023-04-09 PROCEDURE — 21400001 HC TELEMETRY ROOM

## 2023-04-09 PROCEDURE — 80048 BASIC METABOLIC PNL TOTAL CA: CPT | Performed by: INTERNAL MEDICINE

## 2023-04-09 PROCEDURE — 63600175 PHARM REV CODE 636 W HCPCS: Performed by: NURSE PRACTITIONER

## 2023-04-09 PROCEDURE — 63600175 PHARM REV CODE 636 W HCPCS: Performed by: INTERNAL MEDICINE

## 2023-04-09 PROCEDURE — 25000003 PHARM REV CODE 250

## 2023-04-09 PROCEDURE — 25000003 PHARM REV CODE 250: Performed by: INTERNAL MEDICINE

## 2023-04-09 PROCEDURE — 25000003 PHARM REV CODE 250: Performed by: SPECIALIST

## 2023-04-09 PROCEDURE — 84100 ASSAY OF PHOSPHORUS: CPT | Performed by: INTERNAL MEDICINE

## 2023-04-09 PROCEDURE — 83735 ASSAY OF MAGNESIUM: CPT | Performed by: INTERNAL MEDICINE

## 2023-04-09 PROCEDURE — 93005 ELECTROCARDIOGRAM TRACING: CPT

## 2023-04-09 PROCEDURE — 85025 COMPLETE CBC W/AUTO DIFF WBC: CPT | Performed by: INTERNAL MEDICINE

## 2023-04-09 RX ORDER — AMIODARONE HYDROCHLORIDE 200 MG/1
400 TABLET ORAL 2 TIMES DAILY
Status: COMPLETED | OUTPATIENT
Start: 2023-04-09 | End: 2023-04-11

## 2023-04-09 RX ORDER — LANOLIN ALCOHOL/MO/W.PET/CERES
1 CREAM (GRAM) TOPICAL DAILY
Status: DISCONTINUED | OUTPATIENT
Start: 2023-04-10 | End: 2023-04-17 | Stop reason: HOSPADM

## 2023-04-09 RX ORDER — AMIODARONE HYDROCHLORIDE 200 MG/1
200 TABLET ORAL DAILY
Status: DISCONTINUED | OUTPATIENT
Start: 2023-04-15 | End: 2023-04-13

## 2023-04-09 RX ORDER — SODIUM BICARBONATE 650 MG/1
650 TABLET ORAL 2 TIMES DAILY
Status: COMPLETED | OUTPATIENT
Start: 2023-04-09 | End: 2023-04-11

## 2023-04-09 RX ORDER — MAGNESIUM SULFATE 1 G/100ML
1 INJECTION INTRAVENOUS ONCE
Status: COMPLETED | OUTPATIENT
Start: 2023-04-09 | End: 2023-04-09

## 2023-04-09 RX ORDER — AMIODARONE HYDROCHLORIDE 200 MG/1
200 TABLET ORAL 2 TIMES DAILY
Status: DISCONTINUED | OUTPATIENT
Start: 2023-04-12 | End: 2023-04-13

## 2023-04-09 RX ADMIN — AMIODARONE HYDROCHLORIDE 400 MG: 200 TABLET ORAL at 10:04

## 2023-04-09 RX ADMIN — AMIODARONE HYDROCHLORIDE 150 MG: 1.5 INJECTION, SOLUTION INTRAVENOUS at 01:04

## 2023-04-09 RX ADMIN — AMIODARONE HYDROCHLORIDE 400 MG: 200 TABLET ORAL at 08:04

## 2023-04-09 RX ADMIN — CHLORDIAZEPOXIDE HYDROCHLORIDE 25 MG: 25 CAPSULE ORAL at 07:04

## 2023-04-09 RX ADMIN — CHLORDIAZEPOXIDE HYDROCHLORIDE 25 MG: 25 CAPSULE ORAL at 08:04

## 2023-04-09 RX ADMIN — DILTIAZEM HYDROCHLORIDE 10 MG/HR: 5 INJECTION INTRAVENOUS at 11:04

## 2023-04-09 RX ADMIN — FUROSEMIDE 20 MG: 20 TABLET ORAL at 05:04

## 2023-04-09 RX ADMIN — SUCRALFATE 1 G: 1 TABLET ORAL at 05:04

## 2023-04-09 RX ADMIN — AMLODIPINE BESYLATE 10 MG: 5 TABLET ORAL at 09:04

## 2023-04-09 RX ADMIN — VANCOMYCIN HYDROCHLORIDE 2000 MG: 500 INJECTION, POWDER, LYOPHILIZED, FOR SOLUTION INTRAVENOUS at 08:04

## 2023-04-09 RX ADMIN — MAGNESIUM SULFATE IN DEXTROSE 1 G: 10 INJECTION, SOLUTION INTRAVENOUS at 05:04

## 2023-04-09 RX ADMIN — FUROSEMIDE 20 MG: 20 TABLET ORAL at 09:04

## 2023-04-09 RX ADMIN — CHLORDIAZEPOXIDE HYDROCHLORIDE 25 MG: 25 CAPSULE ORAL at 03:04

## 2023-04-09 RX ADMIN — VALSARTAN 160 MG: 80 TABLET, FILM COATED ORAL at 09:04

## 2023-04-09 RX ADMIN — SUCRALFATE 1 G: 1 TABLET ORAL at 08:04

## 2023-04-09 RX ADMIN — HYDROCODONE BITARTRATE AND ACETAMINOPHEN 1 TABLET: 5; 325 TABLET ORAL at 11:04

## 2023-04-09 RX ADMIN — THERA TABS 1 TABLET: TAB at 09:04

## 2023-04-09 RX ADMIN — AMIODARONE HYDROCHLORIDE 0.5 MG/MIN: 1.8 INJECTION, SOLUTION INTRAVENOUS at 05:04

## 2023-04-09 RX ADMIN — THIAMINE HCL TAB 100 MG 200 MG: 100 TAB at 09:04

## 2023-04-09 RX ADMIN — DOCUSATE SODIUM 100 MG: 100 CAPSULE, LIQUID FILLED ORAL at 08:04

## 2023-04-09 RX ADMIN — METOPROLOL TARTRATE 100 MG: 50 TABLET, FILM COATED ORAL at 08:04

## 2023-04-09 RX ADMIN — ASPIRIN 81 MG: 81 TABLET, COATED ORAL at 09:04

## 2023-04-09 RX ADMIN — POTASSIUM CHLORIDE 20 MEQ: 1500 TABLET, EXTENDED RELEASE ORAL at 09:04

## 2023-04-09 RX ADMIN — ATORVASTATIN CALCIUM 40 MG: 40 TABLET, FILM COATED ORAL at 09:04

## 2023-04-09 RX ADMIN — METOPROLOL TARTRATE 100 MG: 50 TABLET, FILM COATED ORAL at 07:04

## 2023-04-09 RX ADMIN — DILTIAZEM HYDROCHLORIDE 5 MG/HR: 5 INJECTION INTRAVENOUS at 08:04

## 2023-04-09 RX ADMIN — FAMOTIDINE 20 MG: 20 TABLET, FILM COATED ORAL at 08:04

## 2023-04-09 RX ADMIN — VALSARTAN 160 MG: 80 TABLET, FILM COATED ORAL at 08:04

## 2023-04-09 RX ADMIN — CHLORDIAZEPOXIDE HYDROCHLORIDE 25 MG: 25 CAPSULE ORAL at 01:04

## 2023-04-09 RX ADMIN — SUCRALFATE 1 G: 1 TABLET ORAL at 10:04

## 2023-04-09 RX ADMIN — SODIUM BICARBONATE 650 MG TABLET 650 MG: at 08:04

## 2023-04-09 RX ADMIN — FAMOTIDINE 20 MG: 20 TABLET, FILM COATED ORAL at 09:04

## 2023-04-09 RX ADMIN — FOLIC ACID 1 MG: 1 TABLET ORAL at 09:04

## 2023-04-09 NOTE — PROGRESS NOTES
"[facility]  Hospital Medicine Progress Note        Chief Complaint: Inpatient Follow-up for CABG    HPI: Mike Urbina Jr. is a 67 y.o. male with PMHx of ETOH abuse, VHD-aortic stenosis, thrombocytopenia, CAD status post NSTEMI, HTN, PAFlutter who presented to [facility] on 04/03/2023 for a scheduled CABG.  He previously underwent cardiac workup revealing multivessel CAD.  He underwent CABG x3 on 04/03/2023 by Dr. Finley.  He was admitted to ICU postoperatively.  CIS was consulted on 04/04/2023.  On the evening of 04/05/2023 patient had a mental status changed, he became acutely confused and agitated. Hospital Medicine team was consulted for alcohol withdrawal.  Patient reports he drinks two 12 packs of beer daily for "a long time." He reports that he did try to quit drinking many years ago and experienced DTs.  Last drink was on for 04/02/2023, patient reports he had a six-pack of beer.       Interval Hx:   Patient was seen and examined at bedside, alert and oriented x2-3, very pleasant, not confused, only complaint being tremors which improved, on Librium protocol    Chart was reviewed, Tachycardic, Cardiology adjusting medications, started on cardizem gtt      Objective/physical exam:  General: In no acute distress, afebrile  Chest: Clear to auscultation bilaterally  Heart: RRR, +S1, S2, no appreciable murmur  Abdomen: Soft, nontender, BS +  MSK: Warm, no lower extremity edema, no clubbing or cyanosis,tremors+ve  Neurologic: Alert and oriented x4, Cranial nerve II-XII intact, Strength 5/5 in all 4 extremities        VITAL SIGNS: 24 HRS MIN & MAX LAST   Temp  Min: 97.6 °F (36.4 °C)  Max: 99.1 °F (37.3 °C) 97.6 °F (36.4 °C)   BP  Min: 121/83  Max: 157/97 121/83   Pulse  Min: 109  Max: 127  (!) 125   Resp  Min: 16  Max: 22 20   SpO2  Min: 95 %  Max: 99 % 95 %       Recent Labs   Lab 04/07/23  0502 04/08/23  0419 04/09/23  0459   WBC 2.6* 3.9* 6.2   RBC 3.16* 3.27* 4.04*   HGB 9.3* " 9.7* 12.0*   HCT 27.9* 29.6* 36.2*   MCV 88.3 90.5 89.6   MCH 29.4 29.7 29.7   MCHC 33.3 32.8* 33.1   RDW 15.4 15.6 15.8   PLT 76* 89* 106*   MPV 10.3 10.1 10.4       Recent Labs   Lab 04/03/23  0710 04/03/23  0902 04/03/23  1136 04/03/23  1329 04/05/23  0505 04/06/23  0613 04/07/23  0502 04/08/23  0419 04/09/23  0459   NA  --    < >  --    < > 131* 132* 136 135* 134*   K  --    < >  --    < > 4.7 4.1 3.9 3.7 4.3   CO2  --    < >  --    < > 21* 17* 18* 18* 19*   BUN  --    < >  --    < > 28.8* 29.4* 24.8 25.6 19.8   CREATININE  --    < >  --    < > 1.29* 0.99 0.86 1.01 0.93   CALCIUM  --    < >  --    < > 8.2* 8.2* 8.1* 8.2* 8.4*   PH 7.39  --  7.32*  --   --   --   --   --   --    MG  --   --   --    < > 2.10 2.00 2.00 2.00 1.90   ALBUMIN  --   --   --   --  3.0* 3.0* 2.7*  --   --    ALKPHOS  --   --   --   --  59 91 79  --   --    ALT  --   --   --   --  17 23 19  --   --    AST  --   --   --   --  27 32 24  --   --    BILITOT  --   --   --   --  1.8* 2.0* 1.4  --   --     < > = values in this interval not displayed.          Microbiology Results (last 7 days)       Procedure Component Value Units Date/Time    Blood Culture [[id number]] Collected: 04/08/23 1816    Order Status: Resulted Specimen: Blood from Arm, Left Updated: 04/08/23 1858    Blood Culture [[id number]] Collected: 04/08/23 1819    Order Status: Resulted Specimen: Blood from Arm, Right Updated: 04/08/23 6403             See below for Radiology    Scheduled Med:   [START ON 4/12/2023] amiodarone  200 mg Oral BID    [START ON 4/15/2023] amiodarone  200 mg Oral Daily    amiodarone  400 mg Oral BID    amLODIPine  10 mg Oral Daily    aspirin  81 mg Oral Daily    atorvastatin  40 mg Oral Daily    chlordiazepoxide  25 mg Oral Q8H    Followed by    [START ON 4/10/2023] chlordiazepoxide  25 mg Oral Q12H    Followed by    [START ON 4/11/2023] chlordiazepoxide  25 mg Oral Q24H    docusate sodium  100 mg Oral BID    famotidine  20 mg Oral BID    folic acid  1 mg  Oral Daily    furosemide  20 mg Oral BID    metoprolol tartrate  100 mg Oral BID    multivitamin  1 tablet Oral Daily    potassium chloride  20 mEq Oral Daily    sucralfate  1 g Oral QID (AC & HS)    thiamine  200 mg Oral Daily    valsartan  160 mg Oral BID        Continuous Infusions:   dilTIAZem 10 mg/hr (04/09/23 1100)        PRN Meds:  sodium chloride, sodium chloride, acetaminophen, albumin human 5%, calcium gluconate IVPB, calcium gluconate IVPB, calcium gluconate IVPB, dextrose 10%, dextrose 10%, dextrose 10%, dextrose 10%, glucagon (human recombinant), glucose, glucose, haloperidol lactate, hydrALAZINE, HYDROcodone-acetaminophen, insulin aspart U-100, labetalol, lorazepam, magnesium sulfate IVPB, magnesium sulfate IVPB, ondansetron, potassium chloride in water, potassium chloride in water, potassium chloride in water, sodium phosphate IVPB, sodium phosphate IVPB, sodium phosphate IVPB       Assessment/Plan:  Encephalopathy  Acute alcohol withdrawal ,Hx of ETOH Use  CAD status post CABG x3 on 04/03/2023  Pancytopenia  Metabolic Acidosis  Paroxysmal atrial flutter   Valvular heart disease-aortic stenosis  Chronic thrombocytopenia  Normocytic anemia  Essential hypertension        Plan:   CT head without contrast -no acute abnormalities.Ammonia-normal,Blood Cultures neg  Banana Bag x1  MVI,Thiamine,Folate  CIWA to continue  Librium for alcohol withdrawal  PRN Haldol for agitation  Monitor BMP, Mg, Phos  Continue to monitor on Telemetry  Fall Precautions  Seizure precautions  Sodium bicarb tabs for two days, Monitor BMP  Cardiology adjusting Medications for Aflutter,started on Cardizem gtt,If he continues to be elevated ,Cardiology to plan on CARO cardioversion in morning.   Rest of the management per primary team.  --Monitor CBC for Pancytopenia -may benefit from Hematology consult if the patient continue to worsen , f/u Anemia w/u, started Iron        Patient condition:    Fair    Anticipated discharge and  "Disposition:     Awaiting placement    All diagnosis and differential diagnosis have been reviewed; assessment and plan has been documented; I have personally reviewed the labs and test results that are presently available; I have reviewed the patients medication list; I have reviewed the consulting providers response and recommendations. I have reviewed or attempted to review medical records based upon their availability    All of the patient's questions have been  addressed and answered. Patient's is agreeable to the above stated plan. I will continue to monitor closely and make adjustments to medical management as needed.  _____________________________________________________________________    Nutrition Status:    Radiology:  US Abdomen Limited_Liver  EXAMINATION  US ABDOMEN LIMITED_LIVER    CLINICAL HISTORY  suspected liver cirrhosis;    TECHNIQUE  Multiplanar grayscale sonographic evaluation of the right upper quadrant was performed, with focused Doppler assessment of the main portal vein.    COMPARISON  None available at the time of initial interpretation.    FINDINGS  Exam quality: Limited secondary to regional bowel gas and associated "dirty shadowing" artifact.    Pancreas: Inadequately visualized.    Liver: Size is within normal limits.  Liver surface demonstrates somewhat irregular, nodular contour suggesting cirrhotic changes.  No discrete mass-like abnormality.  Diffusely increased echogenicity of the background liver parenchyma is suggestive of steatosis.  Normal hepatopetal flow is seen within the portal vein.    Bile ducts: No intra-/extrahepatic biliary dilatation suggestive of obstructing pathology.    Gallbladder: Gallbladder wall is somewhat edematous in appearance, measuring up to 6 mm.  Trace pericholecystic fluid is also evident.  There is layering echogenic nonshadowing material suggestive of sludge.  No definite shadowing stone is identified.    Other findings: No suspicious right kidney " lesion, shadowing stone, or hydronephrosis.  The visualized aorta and IVC are unremarkable.  No free fluid identified.    IMPRESSION  1. Somewhat limited evaluation secondary to technical factors discussed above, resulting in inadequate visualization of the pancreas.  2. Findings suggestive of early liver cirrhotic and steatosis changes.  3. Gallbladder sludge with nonspecific wall thickening that could be secondary to element of acute or chronic cholecystitis, as well as immediately adjacent liver or more systemic pathology.    Electronically signed by: Toby Reyes  Date:    04/06/2023  Time:    18:05  CT Head Without Contrast  Narrative: EXAMINATION:  CT HEAD WITHOUT CONTRAST    CLINICAL HISTORY:  Mental status change, unknown cause;    TECHNIQUE:  Axial scans were obtained from skull base to the vertex.    Coronal and sagittal reconstructions obtained from the axial data.    Automatic exposure control was utilized to limit radiation dose.    Contrast: None    Radiation Dose:    Total DLP: 2339 mGy*cm    COMPARISON:  None    FINDINGS:  There is no acute intracranial hemorrhage or edema. The gray-white matter differentiation is preserved.  Scattered hypodensities in the subcortical and periventricular white matter likely represent chronic microvascular ischemic changes.    There is no mass effect or midline shift.  There is diffuse parenchymal volume loss.  The basal cisterns are patent. There is no abnormal extra-axial fluid collection.  Carotid and vertebral artery calcifications are noted.    The calvarium and skull base are intact.  There is trace fluid layering in the right maxillary sinus.  Impression: 1. No acute intracranial abnormality.  2. Chronic microvascular ischemic changes.    Electronically signed by: Lyric Deras  Date:    04/06/2023  Time:    14:38  X-Ray Chest AP Portable  Narrative: EXAMINATION:  Single view chest radiograph.    CLINICAL HISTORY:  Post-op;    TECHNIQUE:  Single view of the  chest.    COMPARISON:  Chest radiograph 04/05/2023.    FINDINGS:  Left-sided chest tube and mediastinal drain are unchanged.  There is minimal left lower lobe subsegmental atelectasis.  Aeration in the left lung base has improved.  There is no pneumothorax.  The cardiac silhouette remains enlarged.  Impression: Improving aeration in the left lung base with persistent minimal subsegmental atelectasis.    Electronically signed by: Wyatt Wellington MD  Date:    04/06/2023  Time:    06:51      Jossy Ann MD   04/09/2023

## 2023-04-09 NOTE — SUBJECTIVE & OBJECTIVE
Interval History:  Postop day 6 from coronary artery bypass grafting.  Slow progress.  Awaiting placement.    Review of Systems   Constitutional: Negative. Negative for chills, diaphoresis, fever and night sweats.   HENT:  Negative for hoarse voice, sore throat and stridor.    Eyes: Negative.  Negative for blurred vision and double vision.   Cardiovascular:  Negative for chest pain, claudication, cyanosis, dyspnea on exertion, irregular heartbeat, leg swelling, orthopnea, palpitations, paroxysmal nocturnal dyspnea and syncope.   Respiratory:  Negative for cough, hemoptysis, shortness of breath, sputum production and wheezing.    Endocrine: Negative for polydipsia and polyuria.   Hematologic/Lymphatic: Negative for adenopathy and bleeding problem.   Gastrointestinal:  Negative for abdominal pain, diarrhea, heartburn, hematemesis, hematochezia, nausea and vomiting.   Neurological:  Negative for brief paralysis, disturbances in coordination, dizziness, focal weakness, headaches, numbness and paresthesias.   Medications:  Continuous Infusions:   amiodarone in dextrose 5% 0.5 mg/min (04/09/23 0526)     Scheduled Meds:   amLODIPine  10 mg Oral Daily    aspirin  81 mg Oral Daily    atorvastatin  40 mg Oral Daily    chlordiazepoxide  25 mg Oral Q6H    Followed by    chlordiazepoxide  25 mg Oral Q8H    Followed by    [START ON 4/10/2023] chlordiazepoxide  25 mg Oral Q12H    Followed by    [START ON 4/11/2023] chlordiazepoxide  25 mg Oral Q24H    docusate sodium  100 mg Oral BID    famotidine  20 mg Oral BID    folic acid  1 mg Oral Daily    furosemide  20 mg Oral BID    metoprolol tartrate  100 mg Oral BID    multivitamin  1 tablet Oral Daily    potassium chloride  20 mEq Oral Daily    sucralfate  1 g Oral QID (AC & HS)    thiamine  200 mg Oral Daily    valsartan  160 mg Oral BID     PRN Meds:sodium chloride, sodium chloride, acetaminophen, albumin human 5%, calcium gluconate IVPB, calcium gluconate IVPB, calcium gluconate  IVPB, dextrose 10%, dextrose 10%, dextrose 10%, dextrose 10%, glucagon (human recombinant), glucose, glucose, haloperidol lactate, hydrALAZINE, HYDROcodone-acetaminophen, insulin aspart U-100, labetalol, lorazepam, magnesium sulfate IVPB, magnesium sulfate IVPB, ondansetron, potassium chloride in water, potassium chloride in water, potassium chloride in water, sodium phosphate IVPB, sodium phosphate IVPB, sodium phosphate IVPB     Objective:     Vital Signs (Most Recent):  Temp: 98.4 °F (36.9 °C) (04/09/23 0754)  Pulse: (!) 125 (04/09/23 0754)  Resp: (!) 22 (04/09/23 0754)  BP: (!) 157/97 (04/09/23 0754)  SpO2: 99 % (04/09/23 0754)   Vital Signs (24h Range):  Temp:  [98 °F (36.7 °C)-98.4 °F (36.9 °C)] 98.4 °F (36.9 °C)  Pulse:  [109-125] 125  Resp:  [16-22] 22  SpO2:  [96 %-99 %] 99 %  BP: (133-168)/() 157/97     Weight: 83.5 kg (184 lb 1.4 oz)  Body mass index is 23.64 kg/m².    SpO2: 99 %       Intake/Output - Last 3 Shifts         04/07 0700  04/08 0659 04/08 0700  04/09 0659 04/09 0700  04/10 0659    P.O. 240 480     I.V. (mL/kg)       Total Intake(mL/kg) 240 (2.9) 480 (5.7)     Urine (mL/kg/hr) 600 (0.3) 1650 (0.8)     Stool 0 0     Chest Tube       Total Output 600 1650     Net -360 -1170            Urine Occurrence  3 x     Stool Occurrence  2 x             Lines/Drains/Airways       Peripheral Intravenous Line  Duration                  Peripheral IV - Single Lumen 04/03/23 0540 18 G Anterior;Right Forearm 6 days                    Physical Exam  Vitals and nursing note reviewed.   Constitutional:       General: He is not in acute distress.     Appearance: Normal appearance.   HENT:      Head: Normocephalic and atraumatic.      Nose: Nose normal. No congestion.      Mouth/Throat:      Mouth: Mucous membranes are moist.   Eyes:      General: No scleral icterus.     Extraocular Movements: Extraocular movements intact.      Conjunctiva/sclera: Conjunctivae normal.      Pupils: Pupils are equal, round, and  reactive to light.   Neck:      Thyroid: No thyroid mass or thyromegaly.      Vascular: No carotid bruit or JVD.   Cardiovascular:      Rate and Rhythm: Normal rate and regular rhythm.      Pulses: Normal pulses.           Carotid pulses are 2+ on the right side and 2+ on the left side.       Radial pulses are 2+ on the right side and 2+ on the left side.        Femoral pulses are 2+ on the right side and 2+ on the left side.       Dorsalis pedis pulses are 2+ on the right side and 2+ on the left side.        Posterior tibial pulses are 2+ on the right side and 2+ on the left side.      Heart sounds: No murmur heard.    No friction rub. No gallop.   Pulmonary:      Effort: Pulmonary effort is normal. No respiratory distress.      Breath sounds: Normal breath sounds. No stridor. No wheezing, rhonchi or rales.   Chest:      Chest wall: No tenderness.   Abdominal:      General: Abdomen is flat. Bowel sounds are normal. There is no distension.      Palpations: Abdomen is soft. There is no hepatomegaly, splenomegaly, mass or pulsatile mass.      Tenderness: There is no abdominal tenderness. There is no rebound.   Musculoskeletal:         General: No swelling. Normal range of motion.      Cervical back: Normal range of motion. No rigidity or tenderness.      Right lower leg: No edema.      Left lower leg: No edema.   Lymphadenopathy:      Cervical: No cervical adenopathy.      Upper Body:      Right upper body: No supraclavicular or axillary adenopathy.      Left upper body: No supraclavicular or axillary adenopathy.   Skin:     General: Skin is warm and dry.   Neurological:      General: No focal deficit present.      Mental Status: He is alert and oriented to person, place, and time. Mental status is at baseline.      Cranial Nerves: No cranial nerve deficit.      Sensory: No sensory deficit.      Motor: No weakness.      Gait: Gait normal.   Psychiatric:         Mood and Affect: Mood normal.       Significant  Labs:  BMP:   Recent Labs   Lab 04/09/23  0459   *   K 4.3   CO2 19*   BUN 19.8   CREATININE 0.93   CALCIUM 8.4*   MG 1.90     CBC:   Recent Labs   Lab 04/09/23  0459   WBC 6.2   RBC 4.04*   HGB 12.0*   HCT 36.2*   *   MCV 89.6   MCH 29.7   MCHC 33.1     All pertinent labs from the last 24 hours have been reviewed.    Significant Diagnostics:  CXR: I have reviewed all pertinent results/findings within the past 24 hours  I have reviewed and interpreted all pertinent imaging results/findings within the past 24 hours.

## 2023-04-09 NOTE — PROGRESS NOTES
[facility] - 6th Floor Medical Telemetry  Cardiothoracic Surgery  Progress Note    Patient Name: Mike Urbina Jr.  MRN: [id number]  Admission Date: 4/3/2023  Hospital Length of Stay: 6 days  Code Status: Full Code   Attending Physician: Deuce Finley IV, MD   Referring Provider: Deuce Finley IV, MD  Principal Problem:<principal problem not specified>            Subjective:     Post-Op Info:  Procedure(s) (LRB):  CORONARY ARTERY BYPASS GRAFT (CABG) (N/A)   6 Days Post-Op     Interval History:  Postop day 6 from coronary artery bypass grafting.  Slow progress.  Awaiting placement.    Review of Systems   Constitutional: Negative. Negative for chills, diaphoresis, fever and night sweats.   HENT:  Negative for hoarse voice, sore throat and stridor.    Eyes: Negative.  Negative for blurred vision and double vision.   Cardiovascular:  Negative for chest pain, claudication, cyanosis, dyspnea on exertion, irregular heartbeat, leg swelling, orthopnea, palpitations, paroxysmal nocturnal dyspnea and syncope.   Respiratory:  Negative for cough, hemoptysis, shortness of breath, sputum production and wheezing.    Endocrine: Negative for polydipsia and polyuria.   Hematologic/Lymphatic: Negative for adenopathy and bleeding problem.   Gastrointestinal:  Negative for abdominal pain, diarrhea, heartburn, hematemesis, hematochezia, nausea and vomiting.   Neurological:  Negative for brief paralysis, disturbances in coordination, dizziness, focal weakness, headaches, numbness and paresthesias.   Medications:  Continuous Infusions:   amiodarone in dextrose 5% 0.5 mg/min (04/09/23 0526)     Scheduled Meds:   amLODIPine  10 mg Oral Daily    aspirin  81 mg Oral Daily    atorvastatin  40 mg Oral Daily    chlordiazepoxide  25 mg Oral Q6H    Followed by    chlordiazepoxide  25 mg Oral Q8H    Followed by    [START ON 4/10/2023] chlordiazepoxide  25 mg Oral Q12H    Followed by    [START ON 4/11/2023]  chlordiazepoxide  25 mg Oral Q24H    docusate sodium  100 mg Oral BID    famotidine  20 mg Oral BID    folic acid  1 mg Oral Daily    furosemide  20 mg Oral BID    metoprolol tartrate  100 mg Oral BID    multivitamin  1 tablet Oral Daily    potassium chloride  20 mEq Oral Daily    sucralfate  1 g Oral QID (AC & HS)    thiamine  200 mg Oral Daily    valsartan  160 mg Oral BID     PRN Meds:sodium chloride, sodium chloride, acetaminophen, albumin human 5%, calcium gluconate IVPB, calcium gluconate IVPB, calcium gluconate IVPB, dextrose 10%, dextrose 10%, dextrose 10%, dextrose 10%, glucagon (human recombinant), glucose, glucose, haloperidol lactate, hydrALAZINE, HYDROcodone-acetaminophen, insulin aspart U-100, labetalol, lorazepam, magnesium sulfate IVPB, magnesium sulfate IVPB, ondansetron, potassium chloride in water, potassium chloride in water, potassium chloride in water, sodium phosphate IVPB, sodium phosphate IVPB, sodium phosphate IVPB     Objective:     Vital Signs (Most Recent):  Temp: 98.4 °F (36.9 °C) (04/09/23 0754)  Pulse: (!) 125 (04/09/23 0754)  Resp: (!) 22 (04/09/23 0754)  BP: (!) 157/97 (04/09/23 0754)  SpO2: 99 % (04/09/23 0754)   Vital Signs (24h Range):  Temp:  [98 °F (36.7 °C)-98.4 °F (36.9 °C)] 98.4 °F (36.9 °C)  Pulse:  [109-125] 125  Resp:  [16-22] 22  SpO2:  [96 %-99 %] 99 %  BP: (133-168)/() 157/97     Weight: 83.5 kg (184 lb 1.4 oz)  Body mass index is 23.64 kg/m².    SpO2: 99 %       Intake/Output - Last 3 Shifts         04/07 0700 04/08 0659 04/08 0700 04/09 0659 04/09 0700  04/10 0659    P.O. 240 480     I.V. (mL/kg)       Total Intake(mL/kg) 240 (2.9) 480 (5.7)     Urine (mL/kg/hr) 600 (0.3) 1650 (0.8)     Stool 0 0     Chest Tube       Total Output 600 1650     Net -360 -1170            Urine Occurrence  3 x     Stool Occurrence  2 x             Lines/Drains/Airways       Peripheral Intravenous Line  Duration                  Peripheral IV - Single Lumen 04/03/23 0540  18 G Anterior;Right Forearm 6 days                    Physical Exam  Vitals and nursing note reviewed.   Constitutional:       General: He is not in acute distress.     Appearance: Normal appearance.   HENT:      Head: Normocephalic and atraumatic.      Nose: Nose normal. No congestion.      Mouth/Throat:      Mouth: Mucous membranes are moist.   Eyes:      General: No scleral icterus.     Extraocular Movements: Extraocular movements intact.      Conjunctiva/sclera: Conjunctivae normal.      Pupils: Pupils are equal, round, and reactive to light.   Neck:      Thyroid: No thyroid mass or thyromegaly.      Vascular: No carotid bruit or JVD.   Cardiovascular:      Rate and Rhythm: Normal rate and regular rhythm.      Pulses: Normal pulses.           Carotid pulses are 2+ on the right side and 2+ on the left side.       Radial pulses are 2+ on the right side and 2+ on the left side.        Femoral pulses are 2+ on the right side and 2+ on the left side.       Dorsalis pedis pulses are 2+ on the right side and 2+ on the left side.        Posterior tibial pulses are 2+ on the right side and 2+ on the left side.      Heart sounds: No murmur heard.    No friction rub. No gallop.   Pulmonary:      Effort: Pulmonary effort is normal. No respiratory distress.      Breath sounds: Normal breath sounds. No stridor. No wheezing, rhonchi or rales.   Chest:      Chest wall: No tenderness.   Abdominal:      General: Abdomen is flat. Bowel sounds are normal. There is no distension.      Palpations: Abdomen is soft. There is no hepatomegaly, splenomegaly, mass or pulsatile mass.      Tenderness: There is no abdominal tenderness. There is no rebound.   Musculoskeletal:         General: No swelling. Normal range of motion.      Cervical back: Normal range of motion. No rigidity or tenderness.      Right lower leg: No edema.      Left lower leg: No edema.   Lymphadenopathy:      Cervical: No cervical adenopathy.      Upper Body:       Right upper body: No supraclavicular or axillary adenopathy.      Left upper body: No supraclavicular or axillary adenopathy.   Skin:     General: Skin is warm and dry.   Neurological:      General: No focal deficit present.      Mental Status: He is alert and oriented to person, place, and time. Mental status is at baseline.      Cranial Nerves: No cranial nerve deficit.      Sensory: No sensory deficit.      Motor: No weakness.      Gait: Gait normal.   Psychiatric:         Mood and Affect: Mood normal.       Significant Labs:  BMP:   Recent Labs   Lab 04/09/23 0459   *   K 4.3   CO2 19*   BUN 19.8   CREATININE 0.93   CALCIUM 8.4*   MG 1.90     CBC:   Recent Labs   Lab 04/09/23 0459   WBC 6.2   RBC 4.04*   HGB 12.0*   HCT 36.2*   *   MCV 89.6   MCH 29.7   MCHC 33.1     All pertinent labs from the last 24 hours have been reviewed.    Significant Diagnostics:  CXR: I have reviewed all pertinent results/findings within the past 24 hours  I have reviewed and interpreted all pertinent imaging results/findings within the past 24 hours.    Assessment/Plan:     No notes have been filed under this hospital service.  Service: Cardiovascular Surgery      Cecil Diaz MD  Cardiothoracic Surgery  [facility] - 6th Floor Medical Telemetry

## 2023-04-09 NOTE — PROGRESS NOTES
"[facility]   Cardiology  Progress Note    Patient Name: Mike Uribna Jr.  MRN: [id number]  Admission Date: 4/3/2023  Hospital Length of Stay: 6 days  Code Status: Full Code   Attending Provider: Deuce Finley IV, MD   Consulting Provider: LOREN Chen  Primary Care Physician: LOREN Jerry  Principal Problem:<principal problem not specified>    Patient information was obtained from patient, past medical records, and ER records.     Subjective:     Chief Complaint: Reason for Consult: Post CABG Medical Management    HPI: Mr. Urbina is a 66 y/o male who is known to [facility], Dr. Herrera. The patient presented to [facility] on 4.3.23 for a Planned CABG. The patient was recently worked up worked up for a NSTEMI and found to have an Abnormal PET Scan. The PT underwent a LHC with Noted MVCAD. He was referred to [facility] and deemed a candidate for CABG and on 4.3.23 he underwent a Sternotomy with Results of: LIMA to LAD, rSVG to OM1, rSVG to PDA and Ligation of SILVANO with Endostapler. The patient tolerated the procedure well and was stabilized in the ER. [facility] has been consulted for Medical Management of the PT.    4.5.23: NAD. Sitting in Bed. Denies SOB and Palps. + CP/Incisional. "I am doing great."   4.6.23: NAD. PT is a 1:1 - AMS Last PM. Denies SOB and Palps. + CP/Incisional. "I am feeling well."   4.7.23: NAD. Remains 1:1. Denies SOB or palps. + Incisional CP. SR on tele w/ intermittent Afib RVR. Resting.   4.8.23: NAD. Sitting up in chair with family at bedside. "I am feeling good." Afib RVR on tele. Remains on amio infusion. Denies CP, SOB, or palps.   4.9.23: NAD. Looks aflutter RVR on tele w/ frequent PVC's. On Amio. Denies CP, SOB, or palps.     PMH: ETOH Abuse, VHD (AS), Thrombocytopenia, NSTEMI, HTN, PAF/Flutter  PSH: Angiogram, CABG, Cataract Extraction   Family History: Mother, L, MI  Social History: + ETOH Use (6pk/Beer per Day for > 30 Years); Denies Tobacco and Illicit Drug " Use    Previous Cardiac Diagnostics:   [facility] 3.15.23:  Surgical coronary anatomy with distal left main 50%; LAD proximal to mid 60-70%; circumflex mid 80- 90%; RCA with proximal .  The ejection fraction was 60% with EDP of 10 mmHg.  Right radial access.  The estimated blood loss was less than 10 cc.  CT surgical consult for CABG evaluation.    PET 1.6.23:  This is an abnormal perfusion study. Study is consistent with ischemia.   This scan is suggestive of moderate risk for future cardiovascular events.   Small reversible perfusion abnormality of moderate intensity in the apical segment. Medium fixed perfusion abnormality of severe intensity in the inferior region.   The left ventricular cavity is noted to be normal on the stress studies. The stress left ventricular ejection fraction was calculated to be 35% and left ventricular global function is moderately reduced. The rest left ventricular cavity is noted to be normal. The rest left ventricular ejection fraction was calculated to be 40% and rest left ventricular global function is mildly reduced.   When compared to the resting ejection fraction (40%), the stress ejection fraction (35%) has decreased.   The study quality is good.   There was a rise in myocardial blood flow between rest and stress.  Global myocardial blood flow reserve was 1.47.  Myocardial blood flow reserve is globally abnormal, placing the patient at a higher coronary event risk.    ECHO 12.9.22:  The left ventricle is normal in size with mild concentric hypertrophy and normal systolic function.  Grade I left ventricular diastolic dysfunction.  The estimated ejection fraction is 55%.  Normal right ventricular size with normal right ventricular systolic function.  There is mild aortic valve stenosis.  There is mild mitral stenosis.  Normal central venous pressure (3 mmHg).    Review of patient's allergies indicates:  No Known Allergies    No current facility-administered medications on file prior  to encounter.     Current Outpatient Medications on File Prior to Encounter   Medication Sig    aspirin (ECOTRIN) 81 MG EC tablet Take 1 tablet (81 mg total) by mouth once daily.    atorvastatin (LIPITOR) 10 MG tablet Take 40 mg by mouth once daily.    furosemide (LASIX) 40 MG tablet TAKE ONE TABLET BY MOUTH DAILY DOSE INCREASED    hydrOXYzine HCL (ATARAX) 25 MG tablet TAKE 1 TABLET BY MOUTH EVERY EVENING    metoprolol succinate (TOPROL-XL) 100 MG 24 hr tablet Take 1 tablet (100 mg total) by mouth once daily.    NIFEdipine (PROCARDIA-XL) 30 MG (OSM) 24 hr tablet Take 1 tablet (30 mg total) by mouth once daily. (Patient taking differently: Take 60 mg by mouth once daily.)    valsartan (DIOVAN) 40 MG tablet Take 1 tablet (40 mg total) by mouth 2 (two) times daily.     Review of Systems   Constitutional:  Negative for fatigue and fever.   Respiratory:  Negative for cough, chest tightness and shortness of breath.    Cardiovascular:  Negative for chest pain, palpitations and leg swelling.   Gastrointestinal:  Negative for abdominal distention.   All other systems reviewed and are negative.    Objective:     Vital Signs (Most Recent):  Temp: 98.4 °F (36.9 °C) (04/09/23 0754)  Pulse: (!) 125 (04/09/23 0754)  Resp: (!) 22 (04/09/23 0754)  BP: (!) 157/97 (04/09/23 0754)  SpO2: 99 % (04/09/23 0754)   Vital Signs (24h Range):  Temp:  [98 °F (36.7 °C)-98.4 °F (36.9 °C)] 98.4 °F (36.9 °C)  Pulse:  [109-125] 125  Resp:  [16-22] 22  SpO2:  [96 %-99 %] 99 %  BP: (133-168)/() 157/97     Weight: 83.5 kg (184 lb 1.4 oz)  Body mass index is 23.64 kg/m².    SpO2: 99 %         Intake/Output Summary (Last 24 hours) at 4/9/2023 0931  Last data filed at 4/9/2023 0667  Gross per 24 hour   Intake 480 ml   Output 1650 ml   Net -1170 ml         Lines/Drains/Airways       Peripheral Intravenous Line  Duration                  Peripheral IV - Single Lumen 04/03/23 0540 18 G Anterior;Right Forearm 6 days                  Significant  Labs:  Recent Results (from the past 72 hour(s))   POCT glucose    Collection Time: 04/06/23  8:42 PM   Result Value Ref Range    POCT Glucose 105 70 - 110 mg/dL   Comprehensive Metabolic Panel    Collection Time: 04/07/23  5:02 AM   Result Value Ref Range    Sodium Level 136 136 - 145 mmol/L    Potassium Level 3.9 3.5 - 5.1 mmol/L    Chloride 107 98 - 107 mmol/L    Carbon Dioxide 18 (L) 23 - 31 mmol/L    Glucose Level 99 82 - 115 mg/dL    Blood Urea Nitrogen 24.8 8.4 - 25.7 mg/dL    Creatinine 0.86 0.73 - 1.18 mg/dL    Calcium Level Total 8.1 (L) 8.8 - 10.0 mg/dL    Protein Total 4.7 (L) 5.8 - 7.6 gm/dL    Albumin Level 2.7 (L) 3.4 - 4.8 g/dL    Globulin 2.0 (L) 2.4 - 3.5 gm/dL    Albumin/Globulin Ratio 1.4 1.1 - 2.0 ratio    Bilirubin Total 1.4 <=1.5 mg/dL    Alkaline Phosphatase 79 40 - 150 unit/L    Alanine Aminotransferase 19 0 - 55 unit/L    Aspartate Aminotransferase 24 5 - 34 unit/L    eGFR >60 mls/min/1.73/m2   Magnesium    Collection Time: 04/07/23  5:02 AM   Result Value Ref Range    Magnesium Level 2.00 1.60 - 2.60 mg/dL   Ammonia    Collection Time: 04/07/23  5:02 AM   Result Value Ref Range    Ammonia Level 35.2 18.0 - 72.0 umol/L   Protime-INR    Collection Time: 04/07/23  5:02 AM   Result Value Ref Range    PT 14.6 (H) 12.5 - 14.5 seconds    INR 1.15 0.00 - 1.30   Phosphorus    Collection Time: 04/07/23  5:02 AM   Result Value Ref Range    Phosphorus Level 4.0 2.3 - 4.7 mg/dL   CBC with Differential    Collection Time: 04/07/23  5:02 AM   Result Value Ref Range    WBC 2.6 (L) 4.5 - 11.5 x10(3)/mcL    RBC 3.16 (L) 4.70 - 6.10 x10(6)/mcL    Hgb 9.3 (L) 14.0 - 18.0 g/dL    Hct 27.9 (L) 42.0 - 52.0 %    MCV 88.3 80.0 - 94.0 fL    MCH 29.4 27.0 - 31.0 pg    MCHC 33.3 33.0 - 36.0 g/dL    RDW 15.4 11.5 - 17.0 %    Platelet 76 (L) 130 - 400 x10(3)/mcL    MPV 10.3 7.4 - 10.4 fL    NRBC% 0.0 %   Manual Differential    Collection Time: 04/07/23  5:02 AM   Result Value Ref Range    Neut Man 70 %    Lymph Man 11  %    Monocyte Man 14 %    Eos Man 5 %    Instr WBC 2.6 x10(3)/mcL    Abs Mono 0.364 0.1 - 1.3 x10(3)/mcL    Abs Eos  0.13 0 - 0.9 x10(3)/mcL    Abs Lymp 0.286 (L) 0.6 - 4.6 x10(3)/mcL    Abs Neut 1.82 (L) 2.1 - 9.2 x10(3)/mcL    RBC Morph Abnormal (A) Normal    Platelet Est Decreased (A) Normal, Adequate    Giant Platelets 1+    POCT glucose    Collection Time: 04/07/23  8:24 PM   Result Value Ref Range    POCT Glucose 116 (H) 70 - 110 mg/dL   Basic Metabolic Panel    Collection Time: 04/08/23  4:19 AM   Result Value Ref Range    Sodium Level 135 (L) 136 - 145 mmol/L    Potassium Level 3.7 3.5 - 5.1 mmol/L    Chloride 106 98 - 107 mmol/L    Carbon Dioxide 18 (L) 23 - 31 mmol/L    Glucose Level 94 82 - 115 mg/dL    Blood Urea Nitrogen 25.6 8.4 - 25.7 mg/dL    Creatinine 1.01 0.73 - 1.18 mg/dL    BUN/Creatinine Ratio 25     Calcium Level Total 8.2 (L) 8.8 - 10.0 mg/dL    Anion Gap 11.0 mEq/L    eGFR >60 mls/min/1.73/m2   Magnesium    Collection Time: 04/08/23  4:19 AM   Result Value Ref Range    Magnesium Level 2.00 1.60 - 2.60 mg/dL   Phosphorus    Collection Time: 04/08/23  4:19 AM   Result Value Ref Range    Phosphorus Level 4.6 2.3 - 4.7 mg/dL   CBC with Differential    Collection Time: 04/08/23  4:19 AM   Result Value Ref Range    WBC 3.9 (L) 4.5 - 11.5 x10(3)/mcL    RBC 3.27 (L) 4.70 - 6.10 x10(6)/mcL    Hgb 9.7 (L) 14.0 - 18.0 g/dL    Hct 29.6 (L) 42.0 - 52.0 %    MCV 90.5 80.0 - 94.0 fL    MCH 29.7 27.0 - 31.0 pg    MCHC 32.8 (L) 33.0 - 36.0 g/dL    RDW 15.6 11.5 - 17.0 %    Platelet 89 (L) 130 - 400 x10(3)/mcL    MPV 10.1 7.4 - 10.4 fL    Neut % 65.6 %    Lymph % 15.2 %    Mono % 16.5 %    Eos % 2.1 %    Basophil % 0.3 %    Lymph # 0.59 (L) 0.6 - 4.6 x10(3)/mcL    Neut # 2.55 2.1 - 9.2 x10(3)/mcL    Mono # 0.64 0.1 - 1.3 x10(3)/mcL    Eos # 0.08 0 - 0.9 x10(3)/mcL    Baso # 0.01 0 - 0.2 x10(3)/mcL    IG# 0.01 0 - 0.04 x10(3)/mcL    IG% 0.3 %    NRBC% 0.0 %   POCT glucose    Collection Time: 04/08/23  6:38 AM    Result Value Ref Range    POCT Glucose 100 70 - 110 mg/dL   Iron and TIBC    Collection Time: 04/08/23  6:16 PM   Result Value Ref Range    Iron Binding Capacity Unsaturated 220 69 - 240 ug/dL    Iron Level 34 (L) 65 - 175 ug/dL    Iron Binding Capacity Total 254 250 - 450 ug/dL    Iron Saturation 13 (L) 20 - 50 %   Ferritin    Collection Time: 04/08/23  6:16 PM   Result Value Ref Range    Ferritin Level 366.51 (H) 21.81 - 274.66 ng/mL   Transferrin    Collection Time: 04/08/23  6:16 PM   Result Value Ref Range    Transferrin 221 163 - 344 mg/dL   Vitamin B12    Collection Time: 04/08/23  6:16 PM   Result Value Ref Range    Vitamin B12 Level 760 213 - 816 pg/mL   Folate    Collection Time: 04/08/23  6:16 PM   Result Value Ref Range    Folate Level 16.2 7.0 - 31.4 ng/mL   POCT glucose    Collection Time: 04/08/23  8:51 PM   Result Value Ref Range    POCT Glucose 141 (H) 70 - 110 mg/dL   Magnesium    Collection Time: 04/09/23  4:59 AM   Result Value Ref Range    Magnesium Level 1.90 1.60 - 2.60 mg/dL   Phosphorus    Collection Time: 04/09/23  4:59 AM   Result Value Ref Range    Phosphorus Level 4.1 2.3 - 4.7 mg/dL   Basic Metabolic Panel    Collection Time: 04/09/23  4:59 AM   Result Value Ref Range    Sodium Level 134 (L) 136 - 145 mmol/L    Potassium Level 4.3 3.5 - 5.1 mmol/L    Chloride 105 98 - 107 mmol/L    Carbon Dioxide 19 (L) 23 - 31 mmol/L    Glucose Level 108 82 - 115 mg/dL    Blood Urea Nitrogen 19.8 8.4 - 25.7 mg/dL    Creatinine 0.93 0.73 - 1.18 mg/dL    BUN/Creatinine Ratio 21     Calcium Level Total 8.4 (L) 8.8 - 10.0 mg/dL    Anion Gap 10.0 mEq/L    eGFR >60 mls/min/1.73/m2   CBC with Differential    Collection Time: 04/09/23  4:59 AM   Result Value Ref Range    WBC 6.2 4.5 - 11.5 x10(3)/mcL    RBC 4.04 (L) 4.70 - 6.10 x10(6)/mcL    Hgb 12.0 (L) 14.0 - 18.0 g/dL    Hct 36.2 (L) 42.0 - 52.0 %    MCV 89.6 80.0 - 94.0 fL    MCH 29.7 27.0 - 31.0 pg    MCHC 33.1 33.0 - 36.0 g/dL    RDW 15.8 11.5 - 17.0 %     Platelet 106 (L) 130 - 400 x10(3)/mcL    MPV 10.4 7.4 - 10.4 fL    Neut % 65.9 %    Lymph % 14.0 %    Mono % 17.3 %    Eos % 1.8 %    Basophil % 0.2 %    Lymph # 0.87 0.6 - 4.6 x10(3)/mcL    Neut # 4.09 2.1 - 9.2 x10(3)/mcL    Mono # 1.07 0.1 - 1.3 x10(3)/mcL    Eos # 0.11 0 - 0.9 x10(3)/mcL    Baso # 0.01 0 - 0.2 x10(3)/mcL    IG# 0.05 (H) 0 - 0.04 x10(3)/mcL    IG% 0.8 %    NRBC% 0.0 %     Telemetry: SR    Physical Exam  Constitutional:       Appearance: Normal appearance.   HENT:      Head: Normocephalic.      Nose: Nose normal.      Mouth/Throat:      Mouth: Mucous membranes are moist.   Eyes:      Extraocular Movements: Extraocular movements intact.   Cardiovascular:      Rate and Rhythm: Normal rate and regular rhythm.      Pulses: Normal pulses.      Heart sounds: Normal heart sounds. No murmur heard.  Pulmonary:      Effort: Pulmonary effort is normal.      Breath sounds: Normal breath sounds.   Abdominal:      Palpations: Abdomen is soft.   Skin:     General: Skin is warm and dry.      Comments: Midline Sternotomy Dressing C/D/I.    Neurological:      General: No focal deficit present.      Mental Status: He is alert and oriented to person, place, and time.   Psychiatric:         Mood and Affect: Mood normal.         Behavior: Behavior normal.     Home Medications:   No current facility-administered medications on file prior to encounter.     Current Outpatient Medications on File Prior to Encounter   Medication Sig Dispense Refill    aspirin (ECOTRIN) 81 MG EC tablet Take 1 tablet (81 mg total) by mouth once daily. 30 tablet 0    atorvastatin (LIPITOR) 10 MG tablet Take 40 mg by mouth once daily.      furosemide (LASIX) 40 MG tablet TAKE ONE TABLET BY MOUTH DAILY DOSE INCREASED      hydrOXYzine HCL (ATARAX) 25 MG tablet TAKE 1 TABLET BY MOUTH EVERY EVENING 30 tablet 1    metoprolol succinate (TOPROL-XL) 100 MG 24 hr tablet Take 1 tablet (100 mg total) by mouth once daily. 30 tablet 0    NIFEdipine  (PROCARDIA-XL) 30 MG (OSM) 24 hr tablet Take 1 tablet (30 mg total) by mouth once daily. (Patient taking differently: Take 60 mg by mouth once daily.) 30 tablet 0    valsartan (DIOVAN) 40 MG tablet Take 1 tablet (40 mg total) by mouth 2 (two) times daily. 60 tablet 0     Current Inpatient Medications:    Current Facility-Administered Medications:     0.9%  NaCl infusion (for blood administration), , Intravenous, Q24H PRN, Graham Aiekn MD    0.9%  NaCl infusion (for blood administration), , Intravenous, Q24H PRN, Graham Aiken MD    acetaminophen oral solution 650 mg, 650 mg, Per OG tube, Q6H PRN, GISEL Bingham, 650 mg at 04/05/23 2014    albumin human 5% bottle 12.5 g, 12.5 g, Intravenous, PRN, GISEL Bingham, Stopped at 04/03/23 2100    amiodarone 360 mg/200 mL (1.8 mg/mL) infusion, 0.5 mg/min, Intravenous, Continuous, HONORIO Villalpando, Last Rate: 16.7 mL/hr at 04/09/23 0526, 0.5 mg/min at 04/09/23 0526    amLODIPine tablet 10 mg, 10 mg, Oral, Daily, HONORIO Villalpando, 10 mg at 04/09/23 0910    aspirin EC tablet 81 mg, 81 mg, Oral, Daily, GISEL Bingham, 81 mg at 04/09/23 0910    atorvastatin tablet 40 mg, 40 mg, Oral, Daily, HONORIO Villalpando, 40 mg at 04/09/23 0909    calcium gluconate 1 g in NS IVPB (premixed), 1 g, Intravenous, PRN, GISEL Bingham    calcium gluconate 1 g in NS IVPB (premixed), 2 g, Intravenous, PRN, GISEL Bingham    calcium gluconate 1 g in NS IVPB (premixed), 3 g, Intravenous, PRN, GISEL Bingham    [COMPLETED] chlordiazepoxide capsule 100 mg, 100 mg, Oral, Once, 100 mg at 04/06/23 1353 **FOLLOWED BY** [COMPLETED] chlordiazepoxide capsule 50 mg, 50 mg, Oral, Q6H, 50 mg at 04/07/23 1519 **FOLLOWED BY** [COMPLETED] chlordiazepoxide capsule 50 mg, 50 mg, Oral, Q8H, 50 mg at 04/08/23 1358 **FOLLOWED BY** chlordiazepoxide capsule 25 mg, 25 mg, Oral, Q6H, 25 mg at 04/09/23 0747 **FOLLOWED BY** chlordiazepoxide capsule 25 mg, 25 mg, Oral, Q8H  **FOLLOWED BY** [START ON 4/10/2023] chlordiazepoxide capsule 25 mg, 25 mg, Oral, Q12H **FOLLOWED BY** [START ON 4/11/2023] chlordiazepoxide capsule 25 mg, 25 mg, Oral, Q24H, RAY Plummer    dextrose 10% bolus 125 mL 125 mL, 12.5 g, Intravenous, PRN, GISEL Bingham    dextrose 10% bolus 125 mL 125 mL, 12.5 g, Intravenous, PRN, Graham Aiken MD    dextrose 10% bolus 250 mL 250 mL, 25 g, Intravenous, PRN, GISEL Bingham    dextrose 10% bolus 250 mL 250 mL, 25 g, Intravenous, PRN, Graham Aiken MD    diltiaZEM 125 mg in dextrose 5 % 125 mL IVPB (ready to mix) (titrating), 0-15 mg/hr, Intravenous, Continuous, LOREN Chen    docusate sodium capsule 100 mg, 100 mg, Oral, BID, GISEL Bingham, 100 mg at 04/08/23 2052    famotidine tablet 20 mg, 20 mg, Oral, BID, Deuce Finley IV, MD, 20 mg at 04/09/23 0910    folic acid tablet 1 mg, 1 mg, Oral, Daily, Frankie Metcalf MD, 1 mg at 04/09/23 0910    furosemide tablet 20 mg, 20 mg, Oral, BID, Wyatt Carl PA-C, 20 mg at 04/09/23 0910    glucagon (human recombinant) injection 1 mg, 1 mg, Intramuscular, PRN, Graham Aiken MD    glucose chewable tablet 16 g, 16 g, Oral, PRN, Graham Aiken MD    glucose chewable tablet 24 g, 24 g, Oral, PRN, Graham Aiken MD    haloperidol lactate injection 2 mg, 2 mg, Intravenous, Q6H PRN, RAY Plummer    hydrALAZINE injection 20 mg, 20 mg, Intravenous, Q6H PRN, Elizabeth Bates NP, 20 mg at 04/06/23 1601    HYDROcodone-acetaminophen 5-325 mg per tablet 1 tablet, 1 tablet, Oral, Q4H PRN, GISEL Bingham, 1 tablet at 04/04/23 0915    insulin aspart U-100 injection 0-5 Units, 0-5 Units, Subcutaneous, QID (AC + HS) PRN, Graham Aiken MD    labetaloL injection 20 mg, 20 mg, Intravenous, Q4H PRN, Elizabeth Bates NP, 20 mg at 04/07/23 0558    LORazepam (ATIVAN) injection 2 mg, 2 mg, Intravenous, Q8H PRN, Frankie Metcalf MD, 2 mg at 04/07/23  0033    magnesium sulfate 2g in water 50mL IVPB (premix), 2 g, Intravenous, PRN, Nicholas Murphy, PA    magnesium sulfate 2g in water 50mL IVPB (premix), 4 g, Intravenous, PRN, Nicholas Murphy, PA    metoprolol tartrate (LOPRESSOR) tablet 100 mg, 100 mg, Oral, BID, Phyllis Kevin, FNP, 100 mg at 04/09/23 0747    multivitamin tablet, 1 tablet, Oral, Daily, Frankie Metcalf MD, 1 tablet at 04/09/23 0910    ondansetron injection 4 mg, 4 mg, Intravenous, Q4H PRN, GISEL Bingham    potassium chloride 20 mEq in 100 mL IVPB (FOR CENTRAL LINE ADMINISTRATION ONLY), 20 mEq, Intravenous, PRN, Nicholas P Jeffrey, PA    potassium chloride 20 mEq in 100 mL IVPB (FOR CENTRAL LINE ADMINISTRATION ONLY), 20 mEq, Intravenous, PRN, Nicholas P Jeffrey, PA    potassium chloride 40 mEq in 100 mL IVPB (FOR CENTRAL LINE ADMINISTRATION ONLY), 40 mEq, Intravenous, PRN, Nicholas P Jeffrey, PA    potassium chloride SA CR tablet 20 mEq, 20 mEq, Oral, Daily, Wyatt Carl PA-C, 20 mEq at 04/09/23 0910    sodium phosphate 15 mmol in dextrose 5 % (D5W) 250 mL IVPB, 15 mmol, Intravenous, PRN, Nicholas Murphy, PA    sodium phosphate 20.01 mmol in dextrose 5 % (D5W) 250 mL IVPB, 20.01 mmol, Intravenous, PRN, Nicholas P Jeffrey, PA    sodium phosphate 30 mmol in dextrose 5 % (D5W) 250 mL IVPB, 30 mmol, Intravenous, PRN, Nicholas Waterss, PA    sucralfate tablet 1 g, 1 g, Oral, QID (AC & HS), GISEL Bingham, 1 g at 04/09/23 0511    thiamine tablet 200 mg, 200 mg, Oral, Daily, Frankie Metcalf MD, 200 mg at 04/09/23 0909    valsartan tablet 160 mg, 160 mg, Oral, BID, HONORIO Villalpando, 160 mg at 04/09/23 0910    VTE Risk Mitigation (From admission, onward)           Ordered     Place sequential compression device  Until discontinued         04/04/23 0324     IP VTE HIGH RISK PATIENT  Once         04/03/23 0951                  Assessment:   See below MDM formulated by me (Phuc Jimenes MD):     MVCAD    - s/p CABG (4.3.23) - LIMA to  LAD, rSVG to OM1, rSVG to PDA  PAF/Flutter with RVR    - CHADsVASc - 3 Points - 3.2% Stroke Risk per Year     - s/p (4.3.23) - Ligation of SILVANO with Endostapler  HTN/HHD without Hx of HF or CKD  Encephalopathy - Likely Related to ETOH DTs  Anemia - Acute Blood Loss - Improved with Transfusion  Thrombocytopenia - Chronic   VHD (Mild AS, Mild MS)  Hx of NSTEMI  ETOH Abuse  No Hx of GIB    Plan:     Pt appears aflutter RVR. Obtain EKG.  Pt with Ligation of SILVANO w/ Endostapler. No plans for OAC. Defer to outpatient.   Transition to oral amio load. Continue metoprolol tartrate 100 mg BID. Start Cardizem gtt and titrate for HR < 100.   We will give  amiodarone bolus today and continue oral amiodarone.  His rate is likely elevated in the setting of alcohol withdrawal.   If he continues to be elevated we will do CARO cardioversion in morning.   Continue ASA, ARB and Statin  Continue Norvasc 10mg PO Qday  Aggressive Mobilization and Q1HR IS  Labs in AM: CBC, CMP and Mg  Will repeat echocardiogram in morning.    LOREN Chen and Phuc Jimenes MD  Cardiology  [facility]   04/09/2023

## 2023-04-09 NOTE — PROGRESS NOTES
Pharmacokinetic Initial Assessment: IV Vancomycin    Assessment/Plan:    Initiate intravenous vancomycin with loading dose of 2000 mg once followed by a maintenance dose of vancomycin 1250mg IV every 12 hours  Desired empiric serum trough concentration is 15 to 20 mcg/mL  Draw vancomycin trough level 60 min prior to fourth dose on 04/11 at approximately 0630  Pharmacy will continue to follow and monitor vancomycin.      Please contact pharmacy at extension [phone] with any questions regarding this assessment.     Thank you for the consult,   Andrade Wen       Patient brief summary:  Mike Urbina Jr. is a 67 y.o. male initiated on antimicrobial therapy with IV Vancomycin for treatment of suspected bacteremia    Drug Allergies:   Review of patient's allergies indicates:  No Known Allergies    Actual Body Weight:   83.5kg    Renal Function:   Estimated Creatinine Clearance: 87.2 mL/min (based on SCr of 0.93 mg/dL).,     Dialysis Method (if applicable):  N/A    CBC (last 72 hours):  Recent Labs   Lab Result Units 04/07/23  0502 04/08/23 0419 04/09/23 0459   WBC x10(3)/mcL 2.6* 3.9* 6.2   Hgb g/dL 9.3* 9.7* 12.0*   Hct % 27.9* 29.6* 36.2*   Platelet x10(3)/mcL 76* 89* 106*   Mono % %  --  16.5 17.3   Monocyte Man % 14  --   --    Eos % %  --  2.1 1.8   Eos Man % 5  --   --    Basophil % %  --  0.3 0.2       Metabolic Panel (last 72 hours):  Recent Labs   Lab Result Units 04/07/23  0502 04/08/23 0419 04/09/23  0459   Sodium Level mmol/L 136 135* 134*   Potassium Level mmol/L 3.9 3.7 4.3   Chloride mmol/L 107 106 105   Carbon Dioxide mmol/L 18* 18* 19*   Glucose Level mg/dL 99 94 108   Blood Urea Nitrogen mg/dL 24.8 25.6 19.8   Creatinine mg/dL 0.86 1.01 0.93   Albumin Level g/dL 2.7*  --   --    Bilirubin Total mg/dL 1.4  --   --    Alkaline Phosphatase unit/L 79  --   --    Aspartate Aminotransferase unit/L 24  --   --    Alanine Aminotransferase unit/L 19  --   --    Magnesium Level mg/dL 2.00 2.00 1.90    Phosphorus Level mg/dL 4.0 4.6 4.1       Drug levels (last 3 results):  No results for input(s): VANCOMYCINRA, VANCORANDOM, VANCOMYCINPE, VANCOPEAK, VANCOMYCINTR, VANCOTROUGH in the last 72 hours.    Microbiologic Results:  Microbiology Results (last 7 days)       Procedure Component Value Units Date/Time    Blood Culture [[id number]]  (Abnormal) Collected: 04/08/23 1816    Order Status: Completed Specimen: Blood from Arm, Left Updated: 04/09/23 174     GRAM STAIN Gram Positive Cocci, probable Staphylococcus      Seen in gram stain of broth only      1 of 2 Anaerobic bottles positive    Blood Culture [[id number]] Collected: 04/08/23 1819    Order Status: Resulted Specimen: Blood from Arm, Right Updated: 04/08/23 3637

## 2023-04-10 LAB
ANION GAP SERPL CALC-SCNC: 11 MEQ/L
BASOPHILS # BLD AUTO: 0.01 X10(3)/MCL (ref 0–0.2)
BASOPHILS NFR BLD AUTO: 0.2 %
BUN SERPL-MCNC: 22.5 MG/DL (ref 8.4–25.7)
CALCIUM SERPL-MCNC: 8.2 MG/DL (ref 8.8–10)
CHLORIDE SERPL-SCNC: 104 MMOL/L (ref 98–107)
CO2 SERPL-SCNC: 21 MMOL/L (ref 23–31)
CREAT SERPL-MCNC: 1.09 MG/DL (ref 0.73–1.18)
CREAT/UREA NIT SERPL: 21
EOSINOPHIL # BLD AUTO: 0.1 X10(3)/MCL (ref 0–0.9)
EOSINOPHIL NFR BLD AUTO: 1.7 %
ERYTHROCYTE [DISTWIDTH] IN BLOOD BY AUTOMATED COUNT: 15.6 % (ref 11.5–17)
GFR SERPLBLD CREATININE-BSD FMLA CKD-EPI: >60 MLS/MIN/1.73/M2
GLUCOSE SERPL-MCNC: 114 MG/DL (ref 82–115)
HCT VFR BLD AUTO: 31.8 % (ref 42–52)
HGB BLD-MCNC: 10.3 G/DL (ref 14–18)
IMM GRANULOCYTES # BLD AUTO: 0.04 X10(3)/MCL (ref 0–0.04)
IMM GRANULOCYTES NFR BLD AUTO: 0.7 %
LYMPHOCYTES # BLD AUTO: 1.13 X10(3)/MCL (ref 0.6–4.6)
LYMPHOCYTES NFR BLD AUTO: 18.7 %
MAGNESIUM SERPL-MCNC: 1.9 MG/DL (ref 1.6–2.6)
MCH RBC QN AUTO: 29 PG (ref 27–31)
MCHC RBC AUTO-ENTMCNC: 32.4 G/DL (ref 33–36)
MCV RBC AUTO: 89.6 FL (ref 80–94)
MONOCYTES # BLD AUTO: 0.94 X10(3)/MCL (ref 0.1–1.3)
MONOCYTES NFR BLD AUTO: 15.6 %
NEUTROPHILS # BLD AUTO: 3.81 X10(3)/MCL (ref 2.1–9.2)
NEUTROPHILS NFR BLD AUTO: 63.1 %
NRBC BLD AUTO-RTO: 0 %
PHOSPHATE SERPL-MCNC: 4.3 MG/DL (ref 2.3–4.7)
PLATELET # BLD AUTO: 143 X10(3)/MCL (ref 130–400)
PMV BLD AUTO: 10.3 FL (ref 7.4–10.4)
POTASSIUM SERPL-SCNC: 3.8 MMOL/L (ref 3.5–5.1)
RBC # BLD AUTO: 3.55 X10(6)/MCL (ref 4.7–6.1)
SODIUM SERPL-SCNC: 136 MMOL/L (ref 136–145)
WBC # SPEC AUTO: 6 X10(3)/MCL (ref 4.5–11.5)

## 2023-04-10 PROCEDURE — 83735 ASSAY OF MAGNESIUM: CPT | Performed by: INTERNAL MEDICINE

## 2023-04-10 PROCEDURE — 97116 GAIT TRAINING THERAPY: CPT | Mod: CQ

## 2023-04-10 PROCEDURE — 25000003 PHARM REV CODE 250: Performed by: PHYSICIAN ASSISTANT

## 2023-04-10 PROCEDURE — 85025 COMPLETE CBC W/AUTO DIFF WBC: CPT | Performed by: INTERNAL MEDICINE

## 2023-04-10 PROCEDURE — 97535 SELF CARE MNGMENT TRAINING: CPT

## 2023-04-10 PROCEDURE — 21400001 HC TELEMETRY ROOM

## 2023-04-10 PROCEDURE — 80048 BASIC METABOLIC PNL TOTAL CA: CPT | Performed by: INTERNAL MEDICINE

## 2023-04-10 PROCEDURE — 25000003 PHARM REV CODE 250: Performed by: INTERNAL MEDICINE

## 2023-04-10 PROCEDURE — 99024 POSTOP FOLLOW-UP VISIT: CPT | Mod: POP,,, | Performed by: PHYSICIAN ASSISTANT

## 2023-04-10 PROCEDURE — 84100 ASSAY OF PHOSPHORUS: CPT | Performed by: INTERNAL MEDICINE

## 2023-04-10 PROCEDURE — 63600175 PHARM REV CODE 636 W HCPCS: Performed by: INTERNAL MEDICINE

## 2023-04-10 PROCEDURE — 97530 THERAPEUTIC ACTIVITIES: CPT | Mod: CQ

## 2023-04-10 PROCEDURE — 92610 EVALUATE SWALLOWING FUNCTION: CPT

## 2023-04-10 PROCEDURE — 25000003 PHARM REV CODE 250: Performed by: NURSE PRACTITIONER

## 2023-04-10 PROCEDURE — 87040 BLOOD CULTURE FOR BACTERIA: CPT | Performed by: INTERNAL MEDICINE

## 2023-04-10 PROCEDURE — 25000003 PHARM REV CODE 250

## 2023-04-10 PROCEDURE — 25000003 PHARM REV CODE 250: Performed by: SPECIALIST

## 2023-04-10 PROCEDURE — 99024 PR POST-OP FOLLOW-UP VISIT: ICD-10-PCS | Mod: POP,,, | Performed by: PHYSICIAN ASSISTANT

## 2023-04-10 PROCEDURE — 63600175 PHARM REV CODE 636 W HCPCS: Performed by: SPECIALIST

## 2023-04-10 RX ORDER — MAGNESIUM SULFATE 1 G/100ML
1 INJECTION INTRAVENOUS ONCE
Status: COMPLETED | OUTPATIENT
Start: 2023-04-10 | End: 2023-04-10

## 2023-04-10 RX ADMIN — THERA TABS 1 TABLET: TAB at 08:04

## 2023-04-10 RX ADMIN — ATORVASTATIN CALCIUM 40 MG: 40 TABLET, FILM COATED ORAL at 08:04

## 2023-04-10 RX ADMIN — ASPIRIN 81 MG: 81 TABLET, COATED ORAL at 08:04

## 2023-04-10 RX ADMIN — AMIODARONE HYDROCHLORIDE 400 MG: 200 TABLET ORAL at 08:04

## 2023-04-10 RX ADMIN — SODIUM BICARBONATE 650 MG TABLET 650 MG: at 08:04

## 2023-04-10 RX ADMIN — VANCOMYCIN HYDROCHLORIDE 1250 MG: 1.25 INJECTION, POWDER, LYOPHILIZED, FOR SOLUTION INTRAVENOUS at 08:04

## 2023-04-10 RX ADMIN — FAMOTIDINE 20 MG: 20 TABLET, FILM COATED ORAL at 08:04

## 2023-04-10 RX ADMIN — VANCOMYCIN HYDROCHLORIDE 1250 MG: 1.25 INJECTION, POWDER, LYOPHILIZED, FOR SOLUTION INTRAVENOUS at 06:04

## 2023-04-10 RX ADMIN — MAGNESIUM SULFATE IN DEXTROSE 1 G: 10 INJECTION, SOLUTION INTRAVENOUS at 03:04

## 2023-04-10 RX ADMIN — FOLIC ACID 1 MG: 1 TABLET ORAL at 08:04

## 2023-04-10 RX ADMIN — CHLORDIAZEPOXIDE HYDROCHLORIDE 25 MG: 25 CAPSULE ORAL at 05:04

## 2023-04-10 RX ADMIN — DILTIAZEM HYDROCHLORIDE 10 MG/HR: 5 INJECTION INTRAVENOUS at 11:04

## 2023-04-10 RX ADMIN — DOCUSATE SODIUM 100 MG: 100 CAPSULE, LIQUID FILLED ORAL at 08:04

## 2023-04-10 RX ADMIN — FUROSEMIDE 20 MG: 20 TABLET ORAL at 08:04

## 2023-04-10 RX ADMIN — SUCRALFATE 1 G: 1 TABLET ORAL at 08:04

## 2023-04-10 RX ADMIN — METOPROLOL TARTRATE 100 MG: 50 TABLET, FILM COATED ORAL at 08:04

## 2023-04-10 RX ADMIN — VALSARTAN 160 MG: 80 TABLET, FILM COATED ORAL at 08:04

## 2023-04-10 RX ADMIN — CHLORDIAZEPOXIDE HYDROCHLORIDE 25 MG: 25 CAPSULE ORAL at 08:04

## 2023-04-10 RX ADMIN — THIAMINE HCL TAB 100 MG 200 MG: 100 TAB at 08:04

## 2023-04-10 RX ADMIN — SUCRALFATE 1 G: 1 TABLET ORAL at 05:04

## 2023-04-10 NOTE — PT/OT/SLP EVAL
Speech Language Pathology Department  Clinical Swallow Evaluation    Patient Name:  Mike Urbina Jr.   MRN:  [id number]    Recommendations:     General recommendations:  Modified Barium Swallow Study  Diet recommendations:  Puree Diet - IDDSI Level 4, Liquid Diet Level: Mildly thick liquids - IDDSI Level 2   Swallow strategies/precautions: small bites/sips, slow rate, and medications crushed in puree  Precautions: Standard, aspiration    History:     Mike Urbina Jr. is a/n 67 y.o. male admitted after undergoing a CABG.    Past Medical History:   Diagnosis Date    Atrial flutter     CHF (congestive heart failure)     Coronary artery disease     Hypertension     SOB (shortness of breath)     at times     Past Surgical History:   Procedure Laterality Date    CATARACT EXTRACTION Bilateral     CORONARY ARTERY BYPASS GRAFT (CABG) N/A 4/3/2023    Procedure: CORONARY ARTERY BYPASS GRAFT (CABG);  Surgeon: Deuce Finley IV, MD;  Location: [facility];  Service: Cardiovascular;  Laterality: N/A;  WITH LLAA //  ECHO NOTIFIED    LEFT HEART CATHETERIZATION N/A 03/15/2023    Procedure: CATHETERIZATION, HEART, LEFT;  Surgeon: Sreedhar Herrera MD;  Location: [facility] CATH LAB;  Service: Cardiology;  Laterality: N/A;  LHC via RRA     Home Diet: Regular and thin liquids  Current Method of Nutrition: PO diet (Easy to Chew)    Patient complaint: nursing reports poor tolerance of PO meds and difficulty with easy to chew solids    Subjective     Patient awake, alert, and cooperative.    Patient goals: to eat/drink     Spiritual/Cultural/Hoahaoism Beliefs/Practices that affect care: no  Pain/Comfort: Pain Rating 1: 0/10    Respiratory Status: room air    Objective:     Oral Musculature Evaluation  Oral Musculature: WFL  Dentition: scattered dentition  Secretion Management: adequate  Mucosal Quality: good  Oral Labial Strength and Mobility: impaired seal  Lingual Strength and Mobility: impaired strength    Consistency Fed By  Oral Symptoms Pharyngeal Symptoms   Thin liquid by cup Self None Wet vocal quality after swallow   Thin liquid by straw SLP None Wet vocal quality after swallow   Puree SLP Slowed oral transit time None   Chewable solid Self Prolonged bolus formation/mastication  Tongue pumping  Decrease rotary chew None   Mildly thick liquid by straw Self None None     Assessment:     Pt presents with signs/sx oropharyngeal dysphagia warranting comprehensive assessment of swallow function to determine safety of PO intake.    Patient Education:     Patient provided with verbal education regarding SLP POC and diet modification.  Understanding was verbalized, however additional teaching warranted.    Plan:     SLP Follow-Up:  Yes   Plan of Care reviewed with:  patient      Time Tracking:     SLP Treatment Date:   04/10/23  Speech Start Time:  1450  Speech Stop Time:  1505     Speech Total Time (min):  15 min    Billable minutes:  Swallow and Oral Function Evaluation, 15 minutes      04/10/2023

## 2023-04-10 NOTE — PROGRESS NOTES
"[facility]   Cardiology  Progress Note    Patient Name: Mike Urbina Jr.  MRN: [id number]  Admission Date: 4/3/2023  Hospital Length of Stay: 7 days  Code Status: Full Code   Attending Provider: Deuce Finley IV, MD   Consulting Provider: LOREN Chen  Primary Care Physician: LOREN Jerry  Principal Problem:<principal problem not specified>    Patient information was obtained from patient, past medical records, and ER records.     Subjective:     Chief Complaint: Reason for Consult: Post CABG Medical Management    HPI: Mr. Urbina is a 68 y/o male who is known to [facility], Dr. Herrera. The patient presented to [facility] on 4.3.23 for a Planned CABG. The patient was recently worked up worked up for a NSTEMI and found to have an Abnormal PET Scan. The PT underwent a LHC with Noted MVCAD. He was referred to [facility] and deemed a candidate for CABG and on 4.3.23 he underwent a Sternotomy with Results of: LIMA to LAD, rSVG to OM1, rSVG to PDA and Ligation of SILVANO with Endostapler. The patient tolerated the procedure well and was stabilized in the ER. [facility] has been consulted for Medical Management of the PT.    4.5.23: NAD. Sitting in Bed. Denies SOB and Palps. + CP/Incisional. "I am doing great."   4.6.23: NAD. PT is a 1:1 - AMS Last PM. Denies SOB and Palps. + CP/Incisional. "I am feeling well."   4.7.23: NAD. Remains 1:1. Denies SOB or palps. + Incisional CP. SR on tele w/ intermittent Afib RVR. Resting.   4.8.23: NAD. Sitting up in chair with family at bedside. "I am feeling good." Afib RVR on tele. Remains on amio infusion. Denies CP, SOB, or palps.   4.9.23: NAD. Looks aflutter RVR on tele w/ frequent PVC's. On Amio. Denies CP, SOB, or palps.   4.10.23: NAD. Remains in aflutter RVR with intermittent bradycardia. Denies CP, SOB, or palps. On 1:1.     PMH: ETOH Abuse, VHD (AS), Thrombocytopenia, NSTEMI, HTN, PAF/Flutter  PSH: Angiogram, CABG, Cataract Extraction   Family History: Mother, L, " MI  Social History: + ETOH Use (6pk/Beer per Day for > 30 Years); Denies Tobacco and Illicit Drug Use    Previous Cardiac Diagnostics:   [facility] 3.15.23:  Surgical coronary anatomy with distal left main 50%; LAD proximal to mid 60-70%; circumflex mid 80- 90%; RCA with proximal .  The ejection fraction was 60% with EDP of 10 mmHg.  Right radial access.  The estimated blood loss was less than 10 cc.  CT surgical consult for CABG evaluation.    PET 1.6.23:  This is an abnormal perfusion study. Study is consistent with ischemia.   This scan is suggestive of moderate risk for future cardiovascular events.   Small reversible perfusion abnormality of moderate intensity in the apical segment. Medium fixed perfusion abnormality of severe intensity in the inferior region.   The left ventricular cavity is noted to be normal on the stress studies. The stress left ventricular ejection fraction was calculated to be 35% and left ventricular global function is moderately reduced. The rest left ventricular cavity is noted to be normal. The rest left ventricular ejection fraction was calculated to be 40% and rest left ventricular global function is mildly reduced.   When compared to the resting ejection fraction (40%), the stress ejection fraction (35%) has decreased.   The study quality is good.   There was a rise in myocardial blood flow between rest and stress.  Global myocardial blood flow reserve was 1.47.  Myocardial blood flow reserve is globally abnormal, placing the patient at a higher coronary event risk.    ECHO 12.9.22:  The left ventricle is normal in size with mild concentric hypertrophy and normal systolic function.  Grade I left ventricular diastolic dysfunction.  The estimated ejection fraction is 55%.  Normal right ventricular size with normal right ventricular systolic function.  There is mild aortic valve stenosis.  There is mild mitral stenosis.  Normal central venous pressure (3 mmHg).    Review of patient's  allergies indicates:  No Known Allergies    No current facility-administered medications on file prior to encounter.     Current Outpatient Medications on File Prior to Encounter   Medication Sig    aspirin (ECOTRIN) 81 MG EC tablet Take 1 tablet (81 mg total) by mouth once daily.    atorvastatin (LIPITOR) 10 MG tablet Take 40 mg by mouth once daily.    furosemide (LASIX) 40 MG tablet TAKE ONE TABLET BY MOUTH DAILY DOSE INCREASED    hydrOXYzine HCL (ATARAX) 25 MG tablet TAKE 1 TABLET BY MOUTH EVERY EVENING    metoprolol succinate (TOPROL-XL) 100 MG 24 hr tablet Take 1 tablet (100 mg total) by mouth once daily.    NIFEdipine (PROCARDIA-XL) 30 MG (OSM) 24 hr tablet Take 1 tablet (30 mg total) by mouth once daily. (Patient taking differently: Take 60 mg by mouth once daily.)    valsartan (DIOVAN) 40 MG tablet Take 1 tablet (40 mg total) by mouth 2 (two) times daily.     Review of Systems   Constitutional:  Negative for fatigue and fever.   Respiratory:  Negative for cough, chest tightness and shortness of breath.    Cardiovascular:  Negative for chest pain, palpitations and leg swelling.   Gastrointestinal:  Negative for abdominal distention.   All other systems reviewed and are negative.    Objective:     Vital Signs (Most Recent):  Temp: 98.3 °F (36.8 °C) (04/10/23 1105)  Pulse: (!) 120 (04/10/23 1105)  Resp: 18 (04/10/23 1105)  BP: 118/80 (04/10/23 1105)  SpO2: 97 % (04/10/23 1105)   Vital Signs (24h Range):  Temp:  [97.6 °F (36.4 °C)-99.1 °F (37.3 °C)] 98.3 °F (36.8 °C)  Pulse:  [] 120  Resp:  [16-20] 18  SpO2:  [95 %-97 %] 97 %  BP: (100-126)/(66-83) 118/80     Weight: 83.5 kg (184 lb 1.4 oz)  Body mass index is 23.64 kg/m².    SpO2: 97 %         Intake/Output Summary (Last 24 hours) at 4/10/2023 1135  Last data filed at 4/10/2023 0929  Gross per 24 hour   Intake 1140 ml   Output 2125 ml   Net -985 ml         Lines/Drains/Airways       Peripheral Intravenous Line  Duration                  Peripheral IV -  Single Lumen 04/03/23 0540 18 G Anterior;Right Forearm 7 days         Peripheral IV - Single Lumen 04/09/23 1307 20 G Anterior;Proximal;Right Forearm <1 day                  Significant Labs:  Recent Results (from the past 72 hour(s))   POCT glucose    Collection Time: 04/07/23  8:24 PM   Result Value Ref Range    POCT Glucose 116 (H) 70 - 110 mg/dL   Basic Metabolic Panel    Collection Time: 04/08/23  4:19 AM   Result Value Ref Range    Sodium Level 135 (L) 136 - 145 mmol/L    Potassium Level 3.7 3.5 - 5.1 mmol/L    Chloride 106 98 - 107 mmol/L    Carbon Dioxide 18 (L) 23 - 31 mmol/L    Glucose Level 94 82 - 115 mg/dL    Blood Urea Nitrogen 25.6 8.4 - 25.7 mg/dL    Creatinine 1.01 0.73 - 1.18 mg/dL    BUN/Creatinine Ratio 25     Calcium Level Total 8.2 (L) 8.8 - 10.0 mg/dL    Anion Gap 11.0 mEq/L    eGFR >60 mls/min/1.73/m2   Magnesium    Collection Time: 04/08/23  4:19 AM   Result Value Ref Range    Magnesium Level 2.00 1.60 - 2.60 mg/dL   Phosphorus    Collection Time: 04/08/23  4:19 AM   Result Value Ref Range    Phosphorus Level 4.6 2.3 - 4.7 mg/dL   CBC with Differential    Collection Time: 04/08/23  4:19 AM   Result Value Ref Range    WBC 3.9 (L) 4.5 - 11.5 x10(3)/mcL    RBC 3.27 (L) 4.70 - 6.10 x10(6)/mcL    Hgb 9.7 (L) 14.0 - 18.0 g/dL    Hct 29.6 (L) 42.0 - 52.0 %    MCV 90.5 80.0 - 94.0 fL    MCH 29.7 27.0 - 31.0 pg    MCHC 32.8 (L) 33.0 - 36.0 g/dL    RDW 15.6 11.5 - 17.0 %    Platelet 89 (L) 130 - 400 x10(3)/mcL    MPV 10.1 7.4 - 10.4 fL    Neut % 65.6 %    Lymph % 15.2 %    Mono % 16.5 %    Eos % 2.1 %    Basophil % 0.3 %    Lymph # 0.59 (L) 0.6 - 4.6 x10(3)/mcL    Neut # 2.55 2.1 - 9.2 x10(3)/mcL    Mono # 0.64 0.1 - 1.3 x10(3)/mcL    Eos # 0.08 0 - 0.9 x10(3)/mcL    Baso # 0.01 0 - 0.2 x10(3)/mcL    IG# 0.01 0 - 0.04 x10(3)/mcL    IG% 0.3 %    NRBC% 0.0 %   POCT glucose    Collection Time: 04/08/23  6:38 AM   Result Value Ref Range    POCT Glucose 100 70 - 110 mg/dL   Blood Culture    Collection  Time: 04/08/23  6:16 PM    Specimen: Arm, Left; Blood   Result Value Ref Range    CULTURE, BLOOD ([facility]) Gram-positive coccus probable staph (A)     GRAM STAIN Gram Positive Cocci, probable Staphylococcus (AA)     GRAM STAIN Seen in gram stain of broth only (AA)     GRAM STAIN 1 of 2 Anaerobic bottles positive (AA)    Iron and TIBC    Collection Time: 04/08/23  6:16 PM   Result Value Ref Range    Iron Binding Capacity Unsaturated 220 69 - 240 ug/dL    Iron Level 34 (L) 65 - 175 ug/dL    Iron Binding Capacity Total 254 250 - 450 ug/dL    Iron Saturation 13 (L) 20 - 50 %   Ferritin    Collection Time: 04/08/23  6:16 PM   Result Value Ref Range    Ferritin Level 366.51 (H) 21.81 - 274.66 ng/mL   Transferrin    Collection Time: 04/08/23  6:16 PM   Result Value Ref Range    Transferrin 221 163 - 344 mg/dL   Vitamin B12    Collection Time: 04/08/23  6:16 PM   Result Value Ref Range    Vitamin B12 Level 760 213 - 816 pg/mL   Folate    Collection Time: 04/08/23  6:16 PM   Result Value Ref Range    Folate Level 16.2 7.0 - 31.4 ng/mL   Blood Culture    Collection Time: 04/08/23  6:19 PM    Specimen: Arm, Right; Blood   Result Value Ref Range    CULTURE, BLOOD ([facility]) No Growth At 24 Hours    POCT glucose    Collection Time: 04/08/23  8:51 PM   Result Value Ref Range    POCT Glucose 141 (H) 70 - 110 mg/dL   Magnesium    Collection Time: 04/09/23  4:59 AM   Result Value Ref Range    Magnesium Level 1.90 1.60 - 2.60 mg/dL   Phosphorus    Collection Time: 04/09/23  4:59 AM   Result Value Ref Range    Phosphorus Level 4.1 2.3 - 4.7 mg/dL   Basic Metabolic Panel    Collection Time: 04/09/23  4:59 AM   Result Value Ref Range    Sodium Level 134 (L) 136 - 145 mmol/L    Potassium Level 4.3 3.5 - 5.1 mmol/L    Chloride 105 98 - 107 mmol/L    Carbon Dioxide 19 (L) 23 - 31 mmol/L    Glucose Level 108 82 - 115 mg/dL    Blood Urea Nitrogen 19.8 8.4 - 25.7 mg/dL    Creatinine 0.93 0.73 - 1.18 mg/dL    BUN/Creatinine Ratio 21     Calcium Level  Total 8.4 (L) 8.8 - 10.0 mg/dL    Anion Gap 10.0 mEq/L    eGFR >60 mls/min/1.73/m2   CBC with Differential    Collection Time: 04/09/23  4:59 AM   Result Value Ref Range    WBC 6.2 4.5 - 11.5 x10(3)/mcL    RBC 4.04 (L) 4.70 - 6.10 x10(6)/mcL    Hgb 12.0 (L) 14.0 - 18.0 g/dL    Hct 36.2 (L) 42.0 - 52.0 %    MCV 89.6 80.0 - 94.0 fL    MCH 29.7 27.0 - 31.0 pg    MCHC 33.1 33.0 - 36.0 g/dL    RDW 15.8 11.5 - 17.0 %    Platelet 106 (L) 130 - 400 x10(3)/mcL    MPV 10.4 7.4 - 10.4 fL    Neut % 65.9 %    Lymph % 14.0 %    Mono % 17.3 %    Eos % 1.8 %    Basophil % 0.2 %    Lymph # 0.87 0.6 - 4.6 x10(3)/mcL    Neut # 4.09 2.1 - 9.2 x10(3)/mcL    Mono # 1.07 0.1 - 1.3 x10(3)/mcL    Eos # 0.11 0 - 0.9 x10(3)/mcL    Baso # 0.01 0 - 0.2 x10(3)/mcL    IG# 0.05 (H) 0 - 0.04 x10(3)/mcL    IG% 0.8 %    NRBC% 0.0 %   Magnesium    Collection Time: 04/10/23  4:54 AM   Result Value Ref Range    Magnesium Level 1.90 1.60 - 2.60 mg/dL   Phosphorus    Collection Time: 04/10/23  4:54 AM   Result Value Ref Range    Phosphorus Level 4.3 2.3 - 4.7 mg/dL   Basic Metabolic Panel    Collection Time: 04/10/23  4:54 AM   Result Value Ref Range    Sodium Level 136 136 - 145 mmol/L    Potassium Level 3.8 3.5 - 5.1 mmol/L    Chloride 104 98 - 107 mmol/L    Carbon Dioxide 21 (L) 23 - 31 mmol/L    Glucose Level 114 82 - 115 mg/dL    Blood Urea Nitrogen 22.5 8.4 - 25.7 mg/dL    Creatinine 1.09 0.73 - 1.18 mg/dL    BUN/Creatinine Ratio 21     Calcium Level Total 8.2 (L) 8.8 - 10.0 mg/dL    Anion Gap 11.0 mEq/L    eGFR >60 mls/min/1.73/m2   CBC with Differential    Collection Time: 04/10/23  4:54 AM   Result Value Ref Range    WBC 6.0 4.5 - 11.5 x10(3)/mcL    RBC 3.55 (L) 4.70 - 6.10 x10(6)/mcL    Hgb 10.3 (L) 14.0 - 18.0 g/dL    Hct 31.8 (L) 42.0 - 52.0 %    MCV 89.6 80.0 - 94.0 fL    MCH 29.0 27.0 - 31.0 pg    MCHC 32.4 (L) 33.0 - 36.0 g/dL    RDW 15.6 11.5 - 17.0 %    Platelet 143 130 - 400 x10(3)/mcL    MPV 10.3 7.4 - 10.4 fL    Neut % 63.1 %    Lymph  % 18.7 %    Mono % 15.6 %    Eos % 1.7 %    Basophil % 0.2 %    Lymph # 1.13 0.6 - 4.6 x10(3)/mcL    Neut # 3.81 2.1 - 9.2 x10(3)/mcL    Mono # 0.94 0.1 - 1.3 x10(3)/mcL    Eos # 0.10 0 - 0.9 x10(3)/mcL    Baso # 0.01 0 - 0.2 x10(3)/mcL    IG# 0.04 0 - 0.04 x10(3)/mcL    IG% 0.7 %    NRBC% 0.0 %     Telemetry: SR    Physical Exam  Constitutional:       Appearance: Normal appearance.   HENT:      Head: Normocephalic.      Nose: Nose normal.      Mouth/Throat:      Mouth: Mucous membranes are moist.   Eyes:      Extraocular Movements: Extraocular movements intact.   Cardiovascular:      Rate and Rhythm: Tachycardia present. Rhythm irregular.      Pulses: Normal pulses.      Heart sounds: Normal heart sounds. No murmur heard.  Pulmonary:      Effort: Pulmonary effort is normal.      Breath sounds: Normal breath sounds.   Abdominal:      Palpations: Abdomen is soft.   Skin:     General: Skin is warm and dry.      Comments: Midline Sternotomy Dressing C/D/I.    Neurological:      General: No focal deficit present.      Mental Status: He is alert and oriented to person, place, and time.   Psychiatric:         Mood and Affect: Mood normal.         Behavior: Behavior normal.     Home Medications:   No current facility-administered medications on file prior to encounter.     Current Outpatient Medications on File Prior to Encounter   Medication Sig Dispense Refill    aspirin (ECOTRIN) 81 MG EC tablet Take 1 tablet (81 mg total) by mouth once daily. 30 tablet 0    atorvastatin (LIPITOR) 10 MG tablet Take 40 mg by mouth once daily.      furosemide (LASIX) 40 MG tablet TAKE ONE TABLET BY MOUTH DAILY DOSE INCREASED      hydrOXYzine HCL (ATARAX) 25 MG tablet TAKE 1 TABLET BY MOUTH EVERY EVENING 30 tablet 1    metoprolol succinate (TOPROL-XL) 100 MG 24 hr tablet Take 1 tablet (100 mg total) by mouth once daily. 30 tablet 0    NIFEdipine (PROCARDIA-XL) 30 MG (OSM) 24 hr tablet Take 1 tablet (30 mg total) by mouth once daily.  (Patient taking differently: Take 60 mg by mouth once daily.) 30 tablet 0    valsartan (DIOVAN) 40 MG tablet Take 1 tablet (40 mg total) by mouth 2 (two) times daily. 60 tablet 0     Current Inpatient Medications:    Current Facility-Administered Medications:     0.9%  NaCl infusion (for blood administration), , Intravenous, Q24H PRN, Graham Aiken MD    0.9%  NaCl infusion (for blood administration), , Intravenous, Q24H PRN, Graham Aiken MD    acetaminophen oral solution 650 mg, 650 mg, Per OG tube, Q6H PRN, GISEL Bingham, 650 mg at 04/05/23 2014    albumin human 5% bottle 12.5 g, 12.5 g, Intravenous, PRN, GISEL Bingham, Stopped at 04/03/23 2100    [START ON 4/12/2023] amiodarone tablet 200 mg, 200 mg, Oral, BID, LOREN Chen    [START ON 4/15/2023] amiodarone tablet 200 mg, 200 mg, Oral, Daily, LOREN Chen    amiodarone tablet 400 mg, 400 mg, Oral, BID, LOREN Chen, 400 mg at 04/10/23 0845    amLODIPine tablet 10 mg, 10 mg, Oral, Daily, HONORIO Villalpando, 10 mg at 04/09/23 0910    aspirin EC tablet 81 mg, 81 mg, Oral, Daily, GISEL Bingham, 81 mg at 04/10/23 0845    atorvastatin tablet 40 mg, 40 mg, Oral, Daily, HONORIO Villalpando, 40 mg at 04/10/23 0845    calcium gluconate 1 g in NS IVPB (premixed), 1 g, Intravenous, PRN, GISEL Bingham    calcium gluconate 1 g in NS IVPB (premixed), 2 g, Intravenous, PRN, GISEL Bingham    calcium gluconate 1 g in NS IVPB (premixed), 3 g, Intravenous, PRN, GISEL Bingham    [COMPLETED] chlordiazepoxide capsule 100 mg, 100 mg, Oral, Once, 100 mg at 04/06/23 1353 **FOLLOWED BY** [COMPLETED] chlordiazepoxide capsule 50 mg, 50 mg, Oral, Q6H, 50 mg at 04/07/23 1519 **FOLLOWED BY** [COMPLETED] chlordiazepoxide capsule 50 mg, 50 mg, Oral, Q8H, 50 mg at 04/08/23 1358 **FOLLOWED BY** [COMPLETED] chlordiazepoxide capsule 25 mg, 25 mg, Oral, Q6H, 25 mg at 04/09/23 1547 **FOLLOWED BY** chlordiazepoxide  capsule 25 mg, 25 mg, Oral, Q8H, 25 mg at 04/10/23 0501 **FOLLOWED BY** chlordiazepoxide capsule 25 mg, 25 mg, Oral, Q12H **FOLLOWED BY** [START ON 4/11/2023] chlordiazepoxide capsule 25 mg, 25 mg, Oral, Q24H, Smiran Meza, AGACNP-BC    dextrose 10% bolus 125 mL 125 mL, 12.5 g, Intravenous, PRN, GISEL Bingham    dextrose 10% bolus 125 mL 125 mL, 12.5 g, Intravenous, PRN, Graham Aiken MD    dextrose 10% bolus 250 mL 250 mL, 25 g, Intravenous, PRN, GISEL Bingham    dextrose 10% bolus 250 mL 250 mL, 25 g, Intravenous, PRN, Graham Aiken MD    diltiaZEM 125 mg in dextrose 5 % 125 mL IVPB (ready to mix) (titrating), 0-15 mg/hr, Intravenous, Continuous, LOREN Chen, Last Rate: 10 mL/hr at 04/10/23 1117, 10 mg/hr at 04/10/23 1117    docusate sodium capsule 100 mg, 100 mg, Oral, BID, GISEL Bingham, 100 mg at 04/09/23 2025    famotidine tablet 20 mg, 20 mg, Oral, BID, Deuce Finley IV, MD, 20 mg at 04/10/23 0845    ferrous sulfate tablet 1 each, 1 tablet, Oral, Daily, Jossy Ann MD    folic acid tablet 1 mg, 1 mg, Oral, Daily, Frankie Metcalf MD, 1 mg at 04/10/23 0845    furosemide tablet 20 mg, 20 mg, Oral, BID, Wyatt Carl PA-C, 20 mg at 04/10/23 0847    glucagon (human recombinant) injection 1 mg, 1 mg, Intramuscular, PRN, Graham Aiken MD    glucose chewable tablet 16 g, 16 g, Oral, PRN, Graham Aiken MD    glucose chewable tablet 24 g, 24 g, Oral, PRN, Graham Aiken MD    haloperidol lactate injection 2 mg, 2 mg, Intravenous, Q6H PRN, Simran Meza, ARMAND-BC    hydrALAZINE injection 20 mg, 20 mg, Intravenous, Q6H PRN, Elizabeth Bates NP, 20 mg at 04/06/23 1601    HYDROcodone-acetaminophen 5-325 mg per tablet 1 tablet, 1 tablet, Oral, Q4H PRN, GISEL Bingham, 1 tablet at 04/09/23 9826    insulin aspart U-100 injection 0-5 Units, 0-5 Units, Subcutaneous, QID (AC + HS) PRN, Graham Aiken MD    labetaloL injection 20 mg, 20 mg,  Intravenous, Q4H PRN, Elizabeth Bates NP, 20 mg at 04/07/23 0558    LORazepam (ATIVAN) injection 2 mg, 2 mg, Intravenous, Q8H PRN, Frankie Metcalf MD, 2 mg at 04/07/23 0033    magnesium sulfate 2g in water 50mL IVPB (premix), 2 g, Intravenous, PRN, Nicholas Murphy, PA    magnesium sulfate 2g in water 50mL IVPB (premix), 4 g, Intravenous, PRN, Nicholas Murphy PA    metoprolol tartrate (LOPRESSOR) tablet 100 mg, 100 mg, Oral, BID, LOREN Chen, 100 mg at 04/10/23 0845    multivitamin tablet, 1 tablet, Oral, Daily, Frankie Metcalf MD, 1 tablet at 04/10/23 0845    ondansetron injection 4 mg, 4 mg, Intravenous, Q4H PRN, GISEL Bingham    potassium chloride 20 mEq in 100 mL IVPB (FOR CENTRAL LINE ADMINISTRATION ONLY), 20 mEq, Intravenous, PRN, Nicholas Murphy, PA    potassium chloride 20 mEq in 100 mL IVPB (FOR CENTRAL LINE ADMINISTRATION ONLY), 20 mEq, Intravenous, PRN, Nicholas P Jeffrey, PA    potassium chloride 40 mEq in 100 mL IVPB (FOR CENTRAL LINE ADMINISTRATION ONLY), 40 mEq, Intravenous, PRN, Nicholas Murphy, PA    potassium chloride SA CR tablet 20 mEq, 20 mEq, Oral, Daily, Wyatt Carl PA-C, 20 mEq at 04/09/23 0910    sodium bicarbonate tablet 650 mg, 650 mg, Oral, BID, Jossy Ann MD, 650 mg at 04/10/23 0845    sodium phosphate 15 mmol in dextrose 5 % (D5W) 250 mL IVPB, 15 mmol, Intravenous, PRN, Nicholas Murphy, PA    sodium phosphate 20.01 mmol in dextrose 5 % (D5W) 250 mL IVPB, 20.01 mmol, Intravenous, PRN, Nicholas Murphy, PA    sodium phosphate 30 mmol in dextrose 5 % (D5W) 250 mL IVPB, 30 mmol, Intravenous, PRN, Nicholas Murphy, PA    sucralfate tablet 1 g, 1 g, Oral, QID (AC & HS), GISEL Bingham, 1 g at 04/10/23 0501    thiamine tablet 200 mg, 200 mg, Oral, Daily, Frankie Metcalf MD, 200 mg at 04/10/23 0845    valsartan tablet 160 mg, 160 mg, Oral, BID, HONORIO Villalpando, 160 mg at 04/09/23 2024    vancomycin - pharmacy to dose, , Intravenous,  pharmacy to manage frequency, Deuce iFnley IV, MD    vancomycin 1.25 g in dextrose 5% 250 mL IVPB (ready to mix), 1,250 mg, Intravenous, Q12H, Deuce Finley IV, MD, Stopped at 04/10/23 0809    VTE Risk Mitigation (From admission, onward)           Ordered     Place sequential compression device  Until discontinued         04/04/23 0324     IP VTE HIGH RISK PATIENT  Once         04/03/23 0951                  Assessment:   MVCAD    - s/p CABG (4.3.23) - LIMA to LAD, rSVG to OM1, rSVG to PDA  PAF/Flutter with RVR    - CHADsVASc - 3 Points - 3.2% Stroke Risk per Year     - s/p (4.3.23) - Ligation of SILVANO with Endostapler  HTN/HHD without Hx of HF or CKD  Encephalopathy - Likely Related to ETOH DTs  Anemia - Acute Blood Loss - Improved with Transfusion  Thrombocytopenia - Chronic   VHD (Mild AS, Mild MS)  Hx of NSTEMI  ETOH Abuse  No Hx of GIB    Plan:     Will plan for CARO/DCCV tomorrow pending the decision of the patient's family. Keep NPO after midnight.   Pt with Ligation of SILVANO w/ Endostapler. No plans for OAC. Defer to outpatient.   Continue oral amio load. Continue metoprolol tartrate 100 mg BID. Continue Cardizem gtt and titrate for HR < 100.   Continue ASA, ARB and Statin  Continue Norvasc 10mg PO Qday  Aggressive Mobilization and Q1HR IS  Labs in AM: CBC, CMP and Mg    LOREN Chen   Cardiology  [facility]   04/10/2023

## 2023-04-10 NOTE — PT/OT/SLP PROGRESS
Physical Therapy Treatment    Patient Name:  Mike Urbina Jr.   MRN:  [id number]    Recommendations:     Discharge Recommendations: rehabilitation facility  Discharge Equipment Recommendations: walker, rolling  Barriers to discharge:  medical status; impaired functional mobility    Assessment:     Mike Urbina Jr. is a 67 y.o. male admitted with a medical diagnosis of <principal problem not specified>.  He presents with the following impairments/functional limitations: weakness, impaired endurance, impaired functional mobility, gait instability, impaired balance.    Rehab Prognosis: Good; patient would benefit from acute skilled PT services to address these deficits and reach maximum level of function.    Recent Surgery: Procedure(s) (LRB):  CORONARY ARTERY BYPASS GRAFT (CABG) (N/A) 7 Days Post-Op    Plan:     During this hospitalization, patient to be seen 5 x/week to address the identified rehab impairments via gait training, therapeutic activities, therapeutic exercises and progress toward the following goals:    Plan of Care Expires:  05/30/23    Subjective     Chief Complaint: c/o swollen L arm 2/2 IV  Patient/Family Comments/goals:   Pain/Comfort:  Pain Rating 1: 0/10      Objective:     Communicated with RN prior to session.  Patient found HOB elevated with peripheral IV, telemetry, pulse ox (continuous), Other (comments) (1:1) and hot pack on L arm upon PT entry to room.     General Precautions: Standard, sternal  Orthopedic Precautions:    Braces:    Respiratory Status: Room air  Blood Pressure: 112/78, 120 HR  Skin Integrity: Bruising of inner thigh on L leg      Functional Mobility:  Bed Mobility:     Supine to Sit: moderate assistance and of 2 persons  Transfers:     Sit to Stand:  moderate assistance with no AD  Gait: Pt amb ~100ft with RW, CGA/Min A at times. Demo'd shortened step length with minor LOB during R turn. Required 3 seated r/b.  throughout.         Education:  Patient  provided with verbal education regarding POC and sternal precautions.  Understanding was verbalized.     Patient left up in chair with all lines intact, call button in reach, chair alarm on, and 1:1 present..    GOALS:   Multidisciplinary Problems       Physical Therapy Goals          Problem: Physical Therapy    Goal Priority Disciplines Outcome Goal Variances Interventions   Physical Therapy Goal     PT, PT/OT Ongoing, Progressing     Description: Goals to be met by: 23     Patient will increase functional independence with mobility by performin. Supine to sit with Stand-by Assistance  2. Sit to supine with Stand-by Assistance  3. Sit to stand transfer with Stand-by Assistance  4. Gait  x 200 feet with Stand-by Assistance using Rolling Walker.                          Time Tracking:     PT Received On:    PT Start Time: 1120     PT Stop Time: 1152  PT Total Time (min): 32 min     Billable Minutes: Gait Training 18 and Therapeutic Activity 14    Treatment Type: Treatment  PT/PTA: PTA     Number of PTA visits since last PT visit: 2     04/10/2023

## 2023-04-10 NOTE — PROGRESS NOTES
Inpatient Nutrition Assessment    Admit Date: 4/3/2023   Total duration of encounter: 7 days     Nutrition Recommendation/Prescription     Readvance diet as tolerated.   Monitor appetite/PO intake, weight, and labs.    Communication of Recommendations: reviewed with patient    Nutrition Assessment     Malnutrition Assessment/Nutrition-Focused Physical Exam    Malnutrition in the context of acute illness or injury  Degree of Malnutrition: does not meet criteria  Energy Intake: < 75% of estimated energy requirement for > 7 days  Interpretation of Weight Loss: does not meet criteria  Body Fat:does not meet criteria  Area of Body Fat Loss: does not meet criteria  Muscle Mass Loss: does not meet criteria  Area of Muscle Mass Loss: does not meet criteria  Fluid Accumulation: does not meet criteria  Edema: does not meet criteria   Reduced  Strength: unable to obtain  A minimum of two characteristics is recommended for diagnosis of either severe or non-severe malnutrition.    Chart Review    Reason Seen: malnutrition screening tool (MST) and follow-up    Malnutrition Screening Tool Results   Have you recently lost weight without trying?: Unsure  Have you been eating poorly because of a decreased appetite?: No   MST Score: 2     Diagnosis:  MVCAD    - s/p CABG (4.3.23) - LIMA to LAD, rSVG to OM1, rSVG to PDA  PAF/Flutter - Now SR    - CHADsVASc - 3 Points - 3.2% Stroke Risk per Year     - s/p (4.3.23) - Ligation of SILVANO with Endostapler  HTN/HHD without Hx of HF or CKD  Anemia - Acute Blood Loss - Improved with Transfusion  Thrombocytopenia - Chronic   VHD (Mild AS, Mild MS)  Hx of NSTEMI  ETOH Abuse  No Hx of GIB    Relevant Medical History:    Atrial flutter      CHF (congestive heart failure)      Coronary artery disease      Hypertension      SOB (shortness of breath)       at times       Nutrition-Related Medications: Aspirin, Atorvastatin, docusate sodium, ferrous sulfate, folic acid, furosemide, multivitamin,  "potassium chloride, sodium bicarbonate, sucralfate, thiamine  Calorie Containing IV Medications: no significant kcals from medications at this time    Nutrition-Related Labs:  4/5/2023: WBC 3.5, H/H 9.5/27.9, Na 131, BUN 28.8, Crea 1.29, Ca 8.2,Alb 3.0, Total Bili 1.8, [id number]  4/10/2023: H/H 10.3/31.8, Ca 8.2, Gluc 114    Diet/PN Order: Diet NPO  Oral Supplement Order: none  Tube Feeding Order: none  Appetite/Oral Intake: NPO/NPO  Factors Affecting Nutritional Intake: decreased appetite  Food/Worship/Cultural Preferences: none reported  Food Allergies: none reported    Skin Integrity: incision  Wound(s):       Comments    4/5/2023: Pt reports fair PO intake prior to admit, reports poor appetite/PO intake since admit. Pt denies N/V/D/C and chewing/swallowing difficulties. Pt wears dentures. Pt denies unplanned wt loss. Pt declined ONS. Encouraged adequate PO intake. Will monitor.    4/10/2023: Pt NPO; nurse reports pt needs ST evaluation. The pt denies N/V. Last BM documented as 4/8/2023. Will monitor.    Anthropometrics    Height: 6' 2" (188 cm) Height Method: Stated  Last Weight: 83.5 kg (184 lb 1.4 oz) (04/05/23 0625) Weight Method: Standard Scale  BMI (Calculated): 23.6  BMI Classification: normal (BMI 18.5-24.9)        Ideal Body Weight (IBW), Male: 190 lb     % Ideal Body Weight, Male (lb): 91.55 %                          Usual Weight Provided By: patient denies unintentional weight loss    Wt Readings from Last 5 Encounters:   04/05/23 83.5 kg (184 lb 1.4 oz)   03/28/23 78 kg (172 lb)   03/15/23 78.6 kg (173 lb 4.5 oz)   01/10/23 76.2 kg (168 lb 1.6 oz)   12/30/22 75.4 kg (166 lb 3.6 oz)     Weight Change(s) Since Admission:  Admit Weight: 78 kg (172 lb) (03/30/23 0813)  4/5/2023: 83.5 kg    Estimated Needs    Weight Used For Calorie Calculations: 83.5 kg (184 lb 1.4 oz)  Energy Calorie Requirements (kcal): 9283-2073 (25-30 kcal/kg)  Energy Need Method: Kcal/kg  Weight Used For Protein Calculations: 83.5 " kg (184 lb 1.4 oz)  Protein Requirements:  (1.0-1.2 g/kg)  Fluid Requirements (mL): 2088 (1 mL/kcal)  Temp (24hrs), Av.2 °F (36.8 °C), Min:97.6 °F (36.4 °C), Max:99.1 °F (37.3 °C)         Enteral Nutrition    Patient not receiving enteral nutrition at this time.    Parenteral Nutrition    Patient not receiving parenteral nutrition support at this time.    Evaluation of Received Nutrient Intake    Calories: not meeting estimated needs  Protein: not meeting estimated needs    Patient Education    Not applicable.    Nutrition Diagnosis     PES: Inadequate oral intake related to acute illness as evidenced by decreased PO intake since admit. (continues)    Interventions/Goals     Intervention(s): general/healthful diet and commercial beverage  Goal: Consume % of meals/snacks by follow-up. (goal progressing)    Monitoring & Evaluation     Dietitian will monitor food and beverage intake, weight, electrolyte/renal panel, and glucose/endocrine profile.  Nutrition Risk/Follow-Up: moderate (follow-up in 3-5 days)   Please consult if re-assessment needed sooner.

## 2023-04-10 NOTE — PROGRESS NOTES
"[facility]  Hospital Medicine Progress Note        Chief Complaint: Inpatient Follow-up for CABG    HPI: Mike Urbina Jr. is a 67 y.o. male with PMHx of ETOH abuse, VHD-aortic stenosis, thrombocytopenia, CAD status post NSTEMI, HTN, PAFlutter who presented to [facility] on 04/03/2023 for a scheduled CABG.  He previously underwent cardiac workup revealing multivessel CAD.  He underwent CABG x3 on 04/03/2023 by Dr. Finley.  He was admitted to ICU postoperatively.  CIS was consulted on 04/04/2023.  On the evening of 04/05/2023 patient had a mental status changed, he became acutely confused and agitated. Hospital Medicine team was consulted for alcohol withdrawal.  Patient reports he drinks two 12 packs of beer daily for "a long time." He reports that he did try to quit drinking many years ago and experienced DTs.  Last drink was on for 04/02/2023, patient reports he had a six-pack of beer.       Interval Hx:   Patient was seen and examined at bedside, alert and oriented x2-3, very pleasant, not confused, only complaint being tremors which improved, on Librium protocol    Chart was reviewed, Tachycardic, Cardiology adjusting medications, started on cardizem gtt,  plan for CARO/DCCV tomorrow ,Labs are not concerning    Was started on Vancomycin for Bacteremia yesterday, with a plan to DC if contaminant,ID was consulted     Objective/physical exam:  General: In no acute distress, afebrile  Chest: Clear to auscultation bilaterally  Heart: RRR, +S1, S2, no appreciable murmur  Abdomen: Soft, nontender, BS +  MSK: Warm, no lower extremity edema, no clubbing or cyanosis,tremors+ve  Neurologic: Alert and oriented x4, Cranial nerve II-XII intact, Strength 5/5 in all 4 extremities        VITAL SIGNS: 24 HRS MIN & MAX LAST   Temp  Min: 97.6 °F (36.4 °C)  Max: 98.5 °F (36.9 °C) 98.3 °F (36.8 °C)   BP  Min: 100/66  Max: 121/83 118/80   Pulse  Min: 58  Max: 125  (!) 120   Resp  Min: 16  Max: 20 18 "   SpO2  Min: 95 %  Max: 97 % 97 %       Recent Labs   Lab 04/08/23  0419 04/09/23  0459 04/10/23  0454   WBC 3.9* 6.2 6.0   RBC 3.27* 4.04* 3.55*   HGB 9.7* 12.0* 10.3*   HCT 29.6* 36.2* 31.8*   MCV 90.5 89.6 89.6   MCH 29.7 29.7 29.0   MCHC 32.8* 33.1 32.4*   RDW 15.6 15.8 15.6   PLT 89* 106* 143   MPV 10.1 10.4 10.3       Recent Labs   Lab 04/05/23  0505 04/06/23  0613 04/07/23  0502 04/08/23  0419 04/09/23  0459 04/10/23  0454   * 132* 136 135* 134* 136   K 4.7 4.1 3.9 3.7 4.3 3.8   CO2 21* 17* 18* 18* 19* 21*   BUN 28.8* 29.4* 24.8 25.6 19.8 22.5   CREATININE 1.29* 0.99 0.86 1.01 0.93 1.09   CALCIUM 8.2* 8.2* 8.1* 8.2* 8.4* 8.2*   MG 2.10 2.00 2.00 2.00 1.90 1.90   ALBUMIN 3.0* 3.0* 2.7*  --   --   --    ALKPHOS 59 91 79  --   --   --    ALT 17 23 19  --   --   --    AST 27 32 24  --   --   --    BILITOT 1.8* 2.0* 1.4  --   --   --           Microbiology Results (last 7 days)       Procedure Component Value Units Date/Time    BCID2 Panel [[id number]] Collected: 04/08/23 1816    Order Status: Canceled Specimen: Blood from Arm, Left Updated: 04/10/23 1151    Blood Culture [[id number]] Collected: 04/10/23 1006    Order Status: Resulted Specimen: Blood from Arm, Left Updated: 04/10/23 1010    Blood Culture [[id number]] Collected: 04/10/23 0921    Order Status: Resulted Specimen: Blood from Arm, Left Updated: 04/10/23 1010    Blood Culture [[id number]]  (Abnormal) Collected: 04/08/23 1816    Order Status: Completed Specimen: Blood from Arm, Left Updated: 04/10/23 0711     CULTURE, BLOOD ([facility]) Gram-positive coccus probable staph     GRAM STAIN Gram Positive Cocci, probable Staphylococcus      Seen in gram stain of broth only      1 of 2 Anaerobic bottles positive    Blood Culture [[id number]]  (Normal) Collected: 04/08/23 1819    Order Status: Completed Specimen: Blood from Arm, Right Updated: 04/09/23 1901     CULTURE, BLOOD ([facility]) No Growth At 24 Hours             See below for Radiology    Scheduled Med:   [START  ON 4/12/2023] amiodarone  200 mg Oral BID    [START ON 4/15/2023] amiodarone  200 mg Oral Daily    amiodarone  400 mg Oral BID    amLODIPine  10 mg Oral Daily    aspirin  81 mg Oral Daily    atorvastatin  40 mg Oral Daily    chlordiazepoxide  25 mg Oral Q8H    Followed by    chlordiazepoxide  25 mg Oral Q12H    Followed by    [START ON 4/11/2023] chlordiazepoxide  25 mg Oral Q24H    docusate sodium  100 mg Oral BID    famotidine  20 mg Oral BID    ferrous sulfate  1 tablet Oral Daily    folic acid  1 mg Oral Daily    furosemide  20 mg Oral BID    metoprolol tartrate  100 mg Oral BID    multivitamin  1 tablet Oral Daily    potassium chloride  20 mEq Oral Daily    sodium bicarbonate  650 mg Oral BID    sucralfate  1 g Oral QID (AC & HS)    thiamine  200 mg Oral Daily    valsartan  160 mg Oral BID    vancomycin (VANCOCIN) IVPB  1,250 mg Intravenous Q12H        Continuous Infusions:   dilTIAZem 10 mg/hr (04/10/23 1117)        PRN Meds:  sodium chloride, sodium chloride, acetaminophen, albumin human 5%, calcium gluconate IVPB, calcium gluconate IVPB, calcium gluconate IVPB, dextrose 10%, dextrose 10%, dextrose 10%, dextrose 10%, glucagon (human recombinant), glucose, glucose, haloperidol lactate, hydrALAZINE, HYDROcodone-acetaminophen, insulin aspart U-100, labetalol, lorazepam, magnesium sulfate IVPB, magnesium sulfate IVPB, ondansetron, potassium chloride in water, potassium chloride in water, potassium chloride in water, sodium phosphate IVPB, sodium phosphate IVPB, sodium phosphate IVPB, vancomycin - pharmacy to dose       Assessment/Plan:  Encephalopathy  Acute alcohol withdrawal ,Hx of ETOH Use  CAD status post CABG x3 on 04/03/2023  Pancytopenia  Metabolic Acidosis  GM +ve bacteremia  Paroxysmal atrial flutter RVR  Valvular heart disease-aortic stenosis  Chronic thrombocytopenia  Normocytic anemia  Essential hypertension        Plan:   CT head without contrast -no acute abnormalities.Ammonia-normal,Blood Cultures  "neg  Banana Bag x1  MVI,Thiamine,Folate  CIWA to continue  Librium for alcohol withdrawal  PRN Haldol for agitation  Monitor BMP, Mg, Phos  Continue to monitor on Telemetry  Fall Precautions  Seizure precautions  -Monitor CBC, was pancytopenic earlier this admission, improving now, started on oral Iron  -Sodium bicarb tabs , Monitor BMP  -f/u Blood cultures,DC Vancomycin if contaminant  Cardiology adjusting Medications for Aflutter,started on Cardizem gtt,If he continues to be elevated ,Cardiology to plan on CARO/DCCV  Rest of the management per primary team.PT following        Patient condition:    Fair    Anticipated discharge and Disposition:     Awaiting placement    All diagnosis and differential diagnosis have been reviewed; assessment and plan has been documented; I have personally reviewed the labs and test results that are presently available; I have reviewed the patients medication list; I have reviewed the consulting providers response and recommendations. I have reviewed or attempted to review medical records based upon their availability    All of the patient's questions have been  addressed and answered. Patient's is agreeable to the above stated plan. I will continue to monitor closely and make adjustments to medical management as needed.  _____________________________________________________________________    Nutrition Status:    Radiology:  US Abdomen Limited_Liver  EXAMINATION  US ABDOMEN LIMITED_LIVER    CLINICAL HISTORY  suspected liver cirrhosis;    TECHNIQUE  Multiplanar grayscale sonographic evaluation of the right upper quadrant was performed, with focused Doppler assessment of the main portal vein.    COMPARISON  None available at the time of initial interpretation.    FINDINGS  Exam quality: Limited secondary to regional bowel gas and associated "dirty shadowing" artifact.    Pancreas: Inadequately visualized.    Liver: Size is within normal limits.  Liver surface demonstrates somewhat " irregular, nodular contour suggesting cirrhotic changes.  No discrete mass-like abnormality.  Diffusely increased echogenicity of the background liver parenchyma is suggestive of steatosis.  Normal hepatopetal flow is seen within the portal vein.    Bile ducts: No intra-/extrahepatic biliary dilatation suggestive of obstructing pathology.    Gallbladder: Gallbladder wall is somewhat edematous in appearance, measuring up to 6 mm.  Trace pericholecystic fluid is also evident.  There is layering echogenic nonshadowing material suggestive of sludge.  No definite shadowing stone is identified.    Other findings: No suspicious right kidney lesion, shadowing stone, or hydronephrosis.  The visualized aorta and IVC are unremarkable.  No free fluid identified.    IMPRESSION  1. Somewhat limited evaluation secondary to technical factors discussed above, resulting in inadequate visualization of the pancreas.  2. Findings suggestive of early liver cirrhotic and steatosis changes.  3. Gallbladder sludge with nonspecific wall thickening that could be secondary to element of acute or chronic cholecystitis, as well as immediately adjacent liver or more systemic pathology.    Electronically signed by: Toby Reyes  Date:    04/06/2023  Time:    18:05  CT Head Without Contrast  Narrative: EXAMINATION:  CT HEAD WITHOUT CONTRAST    CLINICAL HISTORY:  Mental status change, unknown cause;    TECHNIQUE:  Axial scans were obtained from skull base to the vertex.    Coronal and sagittal reconstructions obtained from the axial data.    Automatic exposure control was utilized to limit radiation dose.    Contrast: None    Radiation Dose:    Total DLP: 2339 mGy*cm    COMPARISON:  None    FINDINGS:  There is no acute intracranial hemorrhage or edema. The gray-white matter differentiation is preserved.  Scattered hypodensities in the subcortical and periventricular white matter likely represent chronic microvascular ischemic changes.    There is no  mass effect or midline shift.  There is diffuse parenchymal volume loss.  The basal cisterns are patent. There is no abnormal extra-axial fluid collection.  Carotid and vertebral artery calcifications are noted.    The calvarium and skull base are intact.  There is trace fluid layering in the right maxillary sinus.  Impression: 1. No acute intracranial abnormality.  2. Chronic microvascular ischemic changes.    Electronically signed by: Lyric Deras  Date:    04/06/2023  Time:    14:38  X-Ray Chest AP Portable  Narrative: EXAMINATION:  Single view chest radiograph.    CLINICAL HISTORY:  Post-op;    TECHNIQUE:  Single view of the chest.    COMPARISON:  Chest radiograph 04/05/2023.    FINDINGS:  Left-sided chest tube and mediastinal drain are unchanged.  There is minimal left lower lobe subsegmental atelectasis.  Aeration in the left lung base has improved.  There is no pneumothorax.  The cardiac silhouette remains enlarged.  Impression: Improving aeration in the left lung base with persistent minimal subsegmental atelectasis.    Electronically signed by: Wyatt Wellington MD  Date:    04/06/2023  Time:    06:51      Jossy Ann MD   04/10/2023

## 2023-04-10 NOTE — PT/OT/SLP PROGRESS
Occupational Therapy   Treatment    Name: Mike Urbina Jr.  MRN: [id number]  Admitting Diagnosis: Left main and multivessel coronary artery disease s/p CABGx3 to LAD,OM,PDA and ligation left atrial appendage, Hypertension, Hx of Aflutter, Hx of thrombocytopenia, Hyponatremia, Hyperkalemia   Recent Surgery: Procedure(s) (LRB):  CORONARY ARTERY BYPASS GRAFT (CABG) (N/A) 7 Days Post-Op    Recommendations:     Discharge Recommendations: rehabilitation facility  Discharge Equipment Recommendations: walker, rolling, TTB, dressing AEDs (TBD, pending progress)  Barriers to discharge: Medical dx, decreased caregiver support    Assessment:     Mike Urbina Jr. is a 67 y.o. male with a medical diagnosis of Left main and multivessel coronary artery disease s/p CABGx3 to LAD,OM,PDA and ligation left atrial appendage, Hypertension, Hx of Aflutter, Hx of thrombocytopenia, Hyponatremia, Hyperkalemia. He presents with confusion and c/o pain at IV site. Performance deficits affecting function are weakness, impaired endurance, impaired self care skills, impaired functional mobility, gait instability, impaired balance, decreased safety awareness.     Rehab Prognosis: Good; patient would benefit from acute skilled OT services to address these deficits and reach maximum level of function.       Plan:     Patient to be seen 6 x/week to address the above listed problems via self-care/home management, therapeutic activities, therapeutic exercises  Plan of Care Expires: 04/19/23  Plan of Care Reviewed with: patient    Subjective     Pain/Comfort:  Pt c/o pain at IV site.     Objective:     Communicated with: RN prior to session. Patient found up in chair with telemetry, blood pressure cuff, peripheral IV, chair alarm, 1:1 present upon OT entry to room.    General Precautions: Standard, fall, sternal pxns   Respiratory Status: Room air  Vital Signs:   Blood Pressure: 110/82  HR: 119-123. RN notified.  Sp02: 97-98%    "  Occupational Performance:     Functional Mobility/Transfers:  Patient completed Sit > Stand Transfers from chair (x3 trials) with min A x2 person assist provided (on first trial) and mod A x1 person assist provided (on 2nd and 3rd trials) with BUEs positioned across chest (holding cardiac bear).  Functional Mobility: Pt ambulated <> sink in bathroom with CGA-min A provided, using RW for balance.    Activities of Daily Living:  Grooming: Pt performed oral hygiene task with CGA and verbal and visual cues provided while standing at sink. Pt demonstrated slight R trunk lean while standing at sink, requiring vc to assume midline. Pt then rinsed face with setup A provided and brushed hair with assist provided to brush posterior aspect while seated in chair.    Therapeutic Positioning  Skin integrity: Visible skin intact     Patient Education:  Patient provided with verbal re-education regarding sternal pxns ("move in the tube").  Understanding was verbalized, however additional teaching warranted.    Patient left up in chair with chair alarm, all lines intact, call button in reach, RN notified, and 1:1 present.    GOALS:   Multidisciplinary Problems       Occupational Therapy Goals          Problem: Occupational Therapy    Goal Priority Disciplines Outcome Interventions   Occupational Therapy Goal     OT, PT/OT Ongoing, Progressing    Description: Goals to be met by: 4/19/23     Patient will increase functional independence with ADLs by performing:    UE Dressing with Modified [place].  LE Dressing with Modified [place] and Assistive Devices as needed.  Grooming while standing at sink with Modified [place].  Toileting from toilet with Modified [place] for hygiene and clothing management.   Toilet transfer to toilet with Modified [place].                         Time Tracking:     OT Date of Treatment: 4/10/23  OT Start Time: 1209  OT Stop Time: 1240  OT Total Time (min): 31 min    Billable " Minutes: Self Care/Home Management 31 mins    OT/ROCCO: OT     Number of ROCCO visits since last OT visit: 3    4/10/2023

## 2023-04-11 ENCOUNTER — ANESTHESIA EVENT (OUTPATIENT)
Dept: CARDIOLOGY | Facility: HOSPITAL | Age: 68
DRG: 236 | End: 2023-04-11
Payer: MEDICARE

## 2023-04-11 ENCOUNTER — ANESTHESIA (OUTPATIENT)
Dept: CARDIOLOGY | Facility: HOSPITAL | Age: 68
DRG: 236 | End: 2023-04-11
Payer: MEDICARE

## 2023-04-11 LAB
ANION GAP SERPL CALC-SCNC: 10 MEQ/L
AV MEAN GRADIENT: 24 MMHG
AV PEAK GRADIENT: 43 MMHG
BASOPHILS # BLD AUTO: 0.03 X10(3)/MCL (ref 0–0.2)
BASOPHILS NFR BLD AUTO: 0.5 %
BUN SERPL-MCNC: 24.1 MG/DL (ref 8.4–25.7)
CALCIUM SERPL-MCNC: 8.1 MG/DL (ref 8.8–10)
CHLORIDE SERPL-SCNC: 104 MMOL/L (ref 98–107)
CO2 SERPL-SCNC: 21 MMOL/L (ref 23–31)
CREAT SERPL-MCNC: 1.18 MG/DL (ref 0.73–1.18)
CREAT/UREA NIT SERPL: 20
DOP CALC AO PEAK VEL: 3.28 M/S
DOP CALC AO VTI: 69.3 CM
EOSINOPHIL # BLD AUTO: 0.1 X10(3)/MCL (ref 0–0.9)
EOSINOPHIL NFR BLD AUTO: 1.8 %
ERYTHROCYTE [DISTWIDTH] IN BLOOD BY AUTOMATED COUNT: 15.7 % (ref 11.5–17)
GFR SERPLBLD CREATININE-BSD FMLA CKD-EPI: >60 MLS/MIN/1.73/M2
GLUCOSE SERPL-MCNC: 112 MG/DL (ref 82–115)
HCT VFR BLD AUTO: 31.2 % (ref 42–52)
HGB BLD-MCNC: 10.5 G/DL (ref 14–18)
IMM GRANULOCYTES # BLD AUTO: 0.05 X10(3)/MCL (ref 0–0.04)
IMM GRANULOCYTES NFR BLD AUTO: 0.9 %
LYMPHOCYTES # BLD AUTO: 0.92 X10(3)/MCL (ref 0.6–4.6)
LYMPHOCYTES NFR BLD AUTO: 16.3 %
MAGNESIUM SERPL-MCNC: 1.9 MG/DL (ref 1.6–2.6)
MCH RBC QN AUTO: 30 PG (ref 27–31)
MCHC RBC AUTO-ENTMCNC: 33.7 G/DL (ref 33–36)
MCV RBC AUTO: 89.1 FL (ref 80–94)
MONOCYTES # BLD AUTO: 0.78 X10(3)/MCL (ref 0.1–1.3)
MONOCYTES NFR BLD AUTO: 13.8 %
NEUTROPHILS # BLD AUTO: 3.77 X10(3)/MCL (ref 2.1–9.2)
NEUTROPHILS NFR BLD AUTO: 66.7 %
NRBC BLD AUTO-RTO: 0 %
PHOSPHATE SERPL-MCNC: 3.8 MG/DL (ref 2.3–4.7)
PISA TR MAX VEL: 2.54 M/S
PLATELET # BLD AUTO: 168 X10(3)/MCL (ref 130–400)
PMV BLD AUTO: 10.6 FL (ref 7.4–10.4)
POCT GLUCOSE: 112 MG/DL (ref 70–110)
POTASSIUM SERPL-SCNC: 3.5 MMOL/L (ref 3.5–5.1)
RBC # BLD AUTO: 3.5 X10(6)/MCL (ref 4.7–6.1)
SODIUM SERPL-SCNC: 135 MMOL/L (ref 136–145)
TR MAX PG: 26 MMHG
VANCOMYCIN TROUGH SERPL-MCNC: 15.4 UG/ML (ref 15–20)
WBC # SPEC AUTO: 5.7 X10(3)/MCL (ref 4.5–11.5)

## 2023-04-11 PROCEDURE — 63600175 PHARM REV CODE 636 W HCPCS: Performed by: SPECIALIST

## 2023-04-11 PROCEDURE — A9698 NON-RAD CONTRAST MATERIALNOC: HCPCS | Performed by: INTERNAL MEDICINE

## 2023-04-11 PROCEDURE — 80202 ASSAY OF VANCOMYCIN: CPT | Performed by: SPECIALIST

## 2023-04-11 PROCEDURE — 97530 THERAPEUTIC ACTIVITIES: CPT | Mod: CQ

## 2023-04-11 PROCEDURE — 25000003 PHARM REV CODE 250

## 2023-04-11 PROCEDURE — 97110 THERAPEUTIC EXERCISES: CPT

## 2023-04-11 PROCEDURE — 80048 BASIC METABOLIC PNL TOTAL CA: CPT | Performed by: INTERNAL MEDICINE

## 2023-04-11 PROCEDURE — 25000003 PHARM REV CODE 250: Performed by: PHYSICIAN ASSISTANT

## 2023-04-11 PROCEDURE — 94760 N-INVAS EAR/PLS OXIMETRY 1: CPT

## 2023-04-11 PROCEDURE — 25000003 PHARM REV CODE 250: Performed by: NURSE PRACTITIONER

## 2023-04-11 PROCEDURE — D9220A PRA ANESTHESIA: ICD-10-PCS | Mod: CRNA,,,

## 2023-04-11 PROCEDURE — D9220A PRA ANESTHESIA: ICD-10-PCS | Mod: ANES,,, | Performed by: ANESTHESIOLOGY

## 2023-04-11 PROCEDURE — 25500020 PHARM REV CODE 255: Performed by: INTERNAL MEDICINE

## 2023-04-11 PROCEDURE — 92611 MOTION FLUOROSCOPY/SWALLOW: CPT

## 2023-04-11 PROCEDURE — D9220A PRA ANESTHESIA: Mod: CRNA,,,

## 2023-04-11 PROCEDURE — 63600175 PHARM REV CODE 636 W HCPCS

## 2023-04-11 PROCEDURE — D9220A PRA ANESTHESIA: Mod: ANES,,, | Performed by: ANESTHESIOLOGY

## 2023-04-11 PROCEDURE — 97116 GAIT TRAINING THERAPY: CPT | Mod: CQ

## 2023-04-11 PROCEDURE — 21400001 HC TELEMETRY ROOM

## 2023-04-11 PROCEDURE — 84100 ASSAY OF PHOSPHORUS: CPT | Performed by: INTERNAL MEDICINE

## 2023-04-11 PROCEDURE — 83735 ASSAY OF MAGNESIUM: CPT | Performed by: INTERNAL MEDICINE

## 2023-04-11 PROCEDURE — 99024 PR POST-OP FOLLOW-UP VISIT: ICD-10-PCS | Mod: POP,,, | Performed by: PHYSICIAN ASSISTANT

## 2023-04-11 PROCEDURE — 25000003 PHARM REV CODE 250: Performed by: INTERNAL MEDICINE

## 2023-04-11 PROCEDURE — 25000003 PHARM REV CODE 250: Performed by: SPECIALIST

## 2023-04-11 PROCEDURE — 85025 COMPLETE CBC W/AUTO DIFF WBC: CPT | Performed by: INTERNAL MEDICINE

## 2023-04-11 PROCEDURE — 99024 POSTOP FOLLOW-UP VISIT: CPT | Mod: POP,,, | Performed by: PHYSICIAN ASSISTANT

## 2023-04-11 RX ORDER — MIDAZOLAM HYDROCHLORIDE 1 MG/ML
INJECTION INTRAMUSCULAR; INTRAVENOUS
Status: DISCONTINUED | OUTPATIENT
Start: 2023-04-11 | End: 2023-04-11

## 2023-04-11 RX ORDER — LIDOCAINE HYDROCHLORIDE 10 MG/ML
1 INJECTION, SOLUTION EPIDURAL; INFILTRATION; INTRACAUDAL; PERINEURAL ONCE
Status: CANCELLED | OUTPATIENT
Start: 2023-04-11 | End: 2023-04-11

## 2023-04-11 RX ORDER — PHENYLEPHRINE HCL IN 0.9% NACL 1 MG/10 ML
SYRINGE (ML) INTRAVENOUS
Status: DISCONTINUED | OUTPATIENT
Start: 2023-04-11 | End: 2023-04-11

## 2023-04-11 RX ORDER — PROPOFOL 10 MG/ML
VIAL (ML) INTRAVENOUS
Status: DISCONTINUED | OUTPATIENT
Start: 2023-04-11 | End: 2023-04-11

## 2023-04-11 RX ORDER — LIDOCAINE HYDROCHLORIDE 20 MG/ML
INJECTION, SOLUTION EPIDURAL; INFILTRATION; INTRACAUDAL; PERINEURAL
Status: DISCONTINUED | OUTPATIENT
Start: 2023-04-11 | End: 2023-04-11

## 2023-04-11 RX ADMIN — AMIODARONE HYDROCHLORIDE 400 MG: 200 TABLET ORAL at 11:04

## 2023-04-11 RX ADMIN — HYDROCODONE BITARTRATE AND ACETAMINOPHEN 1 TABLET: 5; 325 TABLET ORAL at 02:04

## 2023-04-11 RX ADMIN — PROPOFOL 80 MG: 10 INJECTION, EMULSION INTRAVENOUS at 09:04

## 2023-04-11 RX ADMIN — FUROSEMIDE 20 MG: 20 TABLET ORAL at 05:04

## 2023-04-11 RX ADMIN — PROPOFOL 20 MG: 10 INJECTION, EMULSION INTRAVENOUS at 10:04

## 2023-04-11 RX ADMIN — SODIUM BICARBONATE 650 MG TABLET 650 MG: at 12:04

## 2023-04-11 RX ADMIN — SUCRALFATE 1 G: 1 TABLET ORAL at 11:04

## 2023-04-11 RX ADMIN — DILTIAZEM HYDROCHLORIDE 5 MG/HR: 5 INJECTION INTRAVENOUS at 02:04

## 2023-04-11 RX ADMIN — VANCOMYCIN HYDROCHLORIDE 1500 MG: 1.5 INJECTION, POWDER, LYOPHILIZED, FOR SOLUTION INTRAVENOUS at 05:04

## 2023-04-11 RX ADMIN — FAMOTIDINE 20 MG: 20 TABLET, FILM COATED ORAL at 11:04

## 2023-04-11 RX ADMIN — SODIUM CHLORIDE, SODIUM GLUCONATE, SODIUM ACETATE, POTASSIUM CHLORIDE AND MAGNESIUM CHLORIDE: 526; 502; 368; 37; 30 INJECTION, SOLUTION INTRAVENOUS at 09:04

## 2023-04-11 RX ADMIN — CHLORDIAZEPOXIDE HYDROCHLORIDE 25 MG: 25 CAPSULE ORAL at 09:04

## 2023-04-11 RX ADMIN — BARIUM SULFATE 10 ML: 0.81 POWDER, FOR SUSPENSION ORAL at 02:04

## 2023-04-11 RX ADMIN — FOLIC ACID 1 MG: 1 TABLET ORAL at 11:04

## 2023-04-11 RX ADMIN — FERROUS SULFATE TAB 325 MG (65 MG ELEMENTAL FE) 1 EACH: 325 (65 FE) TAB at 12:04

## 2023-04-11 RX ADMIN — THIAMINE HCL TAB 100 MG 200 MG: 100 TAB at 11:04

## 2023-04-11 RX ADMIN — VALSARTAN 160 MG: 80 TABLET, FILM COATED ORAL at 09:04

## 2023-04-11 RX ADMIN — FUROSEMIDE 20 MG: 20 TABLET ORAL at 11:04

## 2023-04-11 RX ADMIN — ATORVASTATIN CALCIUM 40 MG: 40 TABLET, FILM COATED ORAL at 11:04

## 2023-04-11 RX ADMIN — VANCOMYCIN HYDROCHLORIDE 1250 MG: 1.25 INJECTION, POWDER, LYOPHILIZED, FOR SOLUTION INTRAVENOUS at 06:04

## 2023-04-11 RX ADMIN — METOPROLOL TARTRATE 100 MG: 50 TABLET, FILM COATED ORAL at 11:04

## 2023-04-11 RX ADMIN — HYDROCODONE BITARTRATE AND ACETAMINOPHEN 1 TABLET: 5; 325 TABLET ORAL at 11:04

## 2023-04-11 RX ADMIN — AMIODARONE HYDROCHLORIDE 400 MG: 200 TABLET ORAL at 09:04

## 2023-04-11 RX ADMIN — MIDAZOLAM 0.5 MG: 1 INJECTION INTRAMUSCULAR; INTRAVENOUS at 09:04

## 2023-04-11 RX ADMIN — ASPIRIN 81 MG: 81 TABLET, COATED ORAL at 11:04

## 2023-04-11 RX ADMIN — CHLORDIAZEPOXIDE HYDROCHLORIDE 25 MG: 25 CAPSULE ORAL at 11:04

## 2023-04-11 RX ADMIN — SUCRALFATE 1 G: 1 TABLET ORAL at 05:04

## 2023-04-11 RX ADMIN — THERA TABS 1 TABLET: TAB at 11:04

## 2023-04-11 RX ADMIN — DOCUSATE SODIUM 100 MG: 100 CAPSULE, LIQUID FILLED ORAL at 09:04

## 2023-04-11 RX ADMIN — LIDOCAINE HYDROCHLORIDE 80 MG: 20 INJECTION, SOLUTION EPIDURAL; INFILTRATION; INTRACAUDAL; PERINEURAL at 09:04

## 2023-04-11 RX ADMIN — POTASSIUM CHLORIDE 20 MEQ: 1500 TABLET, EXTENDED RELEASE ORAL at 11:04

## 2023-04-11 RX ADMIN — Medication 100 MCG: at 10:04

## 2023-04-11 RX ADMIN — SUCRALFATE 1 G: 1 TABLET ORAL at 09:04

## 2023-04-11 RX ADMIN — FAMOTIDINE 20 MG: 20 TABLET, FILM COATED ORAL at 09:04

## 2023-04-11 RX ADMIN — METOPROLOL TARTRATE 100 MG: 50 TABLET, FILM COATED ORAL at 09:04

## 2023-04-11 NOTE — PROGRESS NOTES
"[facility]   Cardiology  Progress Note    Patient Name: Mike Urbina Jr.  MRN: [id number]  Admission Date: 4/3/2023  Hospital Length of Stay: 8 days  Code Status: Full Code   Attending Provider: Deuce Finley IV, MD   Consulting Provider: Bruno Brock [facility]  Primary Care Physician: LOREN Jerry  Principal Problem:<principal problem not specified>    Patient information was obtained from patient, past medical records, and ER records.     Subjective:     Chief Complaint: Reason for Consult: Post CABG Medical Management    HPI: Mr. Urbina is a 66 y/o male who is known to [facility], Dr. Herrera. The patient presented to [facility] on 4.3.23 for a Planned CABG. The patient was recently worked up worked up for a NSTEMI and found to have an Abnormal PET Scan. The PT underwent a LHC with Noted MVCAD. [facility] was referred [facility] and deemed a candidate for CABG and on 4.3.23 he underwent a Sternotomy with Results of: LIMA to LAD, rSVG to OM1, rSVG to PDA and Ligation of SILVANO with Endostapler. The patient tolerated the procedure well and was stabilized in the ER. [facility] has been consulted for Medical Management of the PT.    4.5.23: NAD. Sitting in Bed. Denies SOB and Palps. + CP/Incisional. "I am doing great."   4.6.23: NAD. PT is a 1:1 - AMS Last PM. Denies SOB and Palps. + CP/Incisional. "I am feeling well."   4.7.23: NAD. Remains 1:1. Denies SOB or palps. + Incisional CP. SR on tele w/ intermittent Afib RVR. Resting.   4.8.23: NAD. Sitting up in chair with family at bedside. "I am feeling good." Afib RVR on tele. Remains on amio infusion. Denies CP, SOB, or palps.   4.9.23: NAD. Looks aflutter RVR on tele w/ frequent PVC's. On Amio. Denies CP, SOB, or palps.   4.10.23: NAD. Remains in aflutter RVR with intermittent bradycardia. Denies CP, SOB, or palps. On 1:1.   4.11.23: Aflutter with RVR on tele. Denies CP/SOB/Palps. NPO for CARO/DCCV today.    PMH: ETOH Abuse, VHD (AS), Thrombocytopenia, NSTEMI, HTN, " PAF/Flutter  PSH: Angiogram, CABG, Cataract Extraction   Family History: Mother, L, MI  Social History: + ETOH Use (6pk/Beer per Day for > 30 Years); Denies Tobacco and Illicit Drug Use    Previous Cardiac Diagnostics:   [facility] 3.15.23:  Surgical coronary anatomy with distal left main 50%; LAD proximal to mid 60-70%; circumflex mid 80- 90%; RCA with proximal .  The ejection fraction was 60% with EDP of 10 mmHg.  Right radial access.  The estimated blood loss was less than 10 cc.  CT surgical consult for CABG evaluation.    PET 1.6.23:  This is an abnormal perfusion study. Study is consistent with ischemia.   This scan is suggestive of moderate risk for future cardiovascular events.   Small reversible perfusion abnormality of moderate intensity in the apical segment. Medium fixed perfusion abnormality of severe intensity in the inferior region.   The left ventricular cavity is noted to be normal on the stress studies. The stress left ventricular ejection fraction was calculated to be 35% and left ventricular global function is moderately reduced. The rest left ventricular cavity is noted to be normal. The rest left ventricular ejection fraction was calculated to be 40% and rest left ventricular global function is mildly reduced.   When compared to the resting ejection fraction (40%), the stress ejection fraction (35%) has decreased.   The study quality is good.   There was a rise in myocardial blood flow between rest and stress.  Global myocardial blood flow reserve was 1.47.  Myocardial blood flow reserve is globally abnormal, placing the patient at a higher coronary event risk.    ECHO 12.9.22:  The left ventricle is normal in size with mild concentric hypertrophy and normal systolic function.  Grade I left ventricular diastolic dysfunction.  The estimated ejection fraction is 55%.  Normal right ventricular size with normal right ventricular systolic function.  There is mild aortic valve stenosis.  There is mild  mitral stenosis.  Normal central venous pressure (3 mmHg).    Review of patient's allergies indicates:  No Known Allergies    No current facility-administered medications on file prior to encounter.     Current Outpatient Medications on File Prior to Encounter   Medication Sig    aspirin (ECOTRIN) 81 MG EC tablet Take 1 tablet (81 mg total) by mouth once daily.    atorvastatin (LIPITOR) 10 MG tablet Take 40 mg by mouth once daily.    furosemide (LASIX) 40 MG tablet TAKE ONE TABLET BY MOUTH DAILY DOSE INCREASED    hydrOXYzine HCL (ATARAX) 25 MG tablet TAKE 1 TABLET BY MOUTH EVERY EVENING    metoprolol succinate (TOPROL-XL) 100 MG 24 hr tablet Take 1 tablet (100 mg total) by mouth once daily.    NIFEdipine (PROCARDIA-XL) 30 MG (OSM) 24 hr tablet Take 1 tablet (30 mg total) by mouth once daily. (Patient taking differently: Take 60 mg by mouth once daily.)    valsartan (DIOVAN) 40 MG tablet Take 1 tablet (40 mg total) by mouth 2 (two) times daily.     Review of Systems   Constitutional:  Negative for fatigue and fever.   Respiratory:  Negative for cough, chest tightness and shortness of breath.    Cardiovascular:  Negative for chest pain, palpitations and leg swelling.   Gastrointestinal:  Negative for abdominal distention.   All other systems reviewed and are negative.    Objective:     Vital Signs (Most Recent):  Temp: 97.8 °F (36.6 °C) (04/11/23 0712)  Pulse: (!) 116 (04/11/23 0712)  Resp: 17 (04/11/23 0400)  BP: 114/75 (04/11/23 0712)  SpO2: (!) 94 % (04/11/23 0712)   Vital Signs (24h Range):  Temp:  [97.5 °F (36.4 °C)-98.6 °F (37 °C)] 97.8 °F (36.6 °C)  Pulse:  [] 116  Resp:  [16-20] 17  SpO2:  [94 %-97 %] 94 %  BP: (106-125)/(62-80) 114/75     Weight: 79.9 kg (176 lb 2.4 oz)  Body mass index is 22.62 kg/m².    SpO2: (!) 94 %         Intake/Output Summary (Last 24 hours) at 4/11/2023 0915  Last data filed at 4/11/2023 0906  Gross per 24 hour   Intake 840 ml   Output 1625 ml   Net -785 ml          Lines/Drains/Airways       Peripheral Intravenous Line  Duration                  Peripheral IV - Single Lumen 04/03/23 0540 18 G Anterior;Right Forearm 8 days         Peripheral IV - Single Lumen 04/09/23 1307 20 G Anterior;Proximal;Right Forearm 1 day                  Significant Labs:  Recent Results (from the past 72 hour(s))   Blood Culture    Collection Time: 04/08/23  6:16 PM    Specimen: Arm, Left; Blood   Result Value Ref Range    CULTURE, BLOOD ([facility]) Identification and Susceptibility To Follow     CULTURE, BLOOD ([facility]) COAGULASE NEGATIVE STAPHYLOCOCCI (A)     GRAM STAIN Gram Positive Cocci, probable Staphylococcus (AA)     GRAM STAIN Seen in gram stain of broth only (AA)     GRAM STAIN 1 of 2 Anaerobic bottles positive (AA)    Iron and TIBC    Collection Time: 04/08/23  6:16 PM   Result Value Ref Range    Iron Binding Capacity Unsaturated 220 69 - 240 ug/dL    Iron Level 34 (L) 65 - 175 ug/dL    Iron Binding Capacity Total 254 250 - 450 ug/dL    Iron Saturation 13 (L) 20 - 50 %   Ferritin    Collection Time: 04/08/23  6:16 PM   Result Value Ref Range    Ferritin Level 366.51 (H) 21.81 - 274.66 ng/mL   Transferrin    Collection Time: 04/08/23  6:16 PM   Result Value Ref Range    Transferrin 221 163 - 344 mg/dL   Vitamin B12    Collection Time: 04/08/23  6:16 PM   Result Value Ref Range    Vitamin B12 Level 760 213 - 816 pg/mL   Folate    Collection Time: 04/08/23  6:16 PM   Result Value Ref Range    Folate Level 16.2 7.0 - 31.4 ng/mL   Blood Culture    Collection Time: 04/08/23  6:19 PM    Specimen: Arm, Right; Blood   Result Value Ref Range    CULTURE, BLOOD ([facility]) No Growth At 48 Hours    POCT glucose    Collection Time: 04/08/23  8:51 PM   Result Value Ref Range    POCT Glucose 141 (H) 70 - 110 mg/dL   Magnesium    Collection Time: 04/09/23  4:59 AM   Result Value Ref Range    Magnesium Level 1.90 1.60 - 2.60 mg/dL   Phosphorus    Collection Time: 04/09/23  4:59 AM   Result Value Ref Range     Phosphorus Level 4.1 2.3 - 4.7 mg/dL   Basic Metabolic Panel    Collection Time: 04/09/23  4:59 AM   Result Value Ref Range    Sodium Level 134 (L) 136 - 145 mmol/L    Potassium Level 4.3 3.5 - 5.1 mmol/L    Chloride 105 98 - 107 mmol/L    Carbon Dioxide 19 (L) 23 - 31 mmol/L    Glucose Level 108 82 - 115 mg/dL    Blood Urea Nitrogen 19.8 8.4 - 25.7 mg/dL    Creatinine 0.93 0.73 - 1.18 mg/dL    BUN/Creatinine Ratio 21     Calcium Level Total 8.4 (L) 8.8 - 10.0 mg/dL    Anion Gap 10.0 mEq/L    eGFR >60 mls/min/1.73/m2   CBC with Differential    Collection Time: 04/09/23  4:59 AM   Result Value Ref Range    WBC 6.2 4.5 - 11.5 x10(3)/mcL    RBC 4.04 (L) 4.70 - 6.10 x10(6)/mcL    Hgb 12.0 (L) 14.0 - 18.0 g/dL    Hct 36.2 (L) 42.0 - 52.0 %    MCV 89.6 80.0 - 94.0 fL    MCH 29.7 27.0 - 31.0 pg    MCHC 33.1 33.0 - 36.0 g/dL    RDW 15.8 11.5 - 17.0 %    Platelet 106 (L) 130 - 400 x10(3)/mcL    MPV 10.4 7.4 - 10.4 fL    Neut % 65.9 %    Lymph % 14.0 %    Mono % 17.3 %    Eos % 1.8 %    Basophil % 0.2 %    Lymph # 0.87 0.6 - 4.6 x10(3)/mcL    Neut # 4.09 2.1 - 9.2 x10(3)/mcL    Mono # 1.07 0.1 - 1.3 x10(3)/mcL    Eos # 0.11 0 - 0.9 x10(3)/mcL    Baso # 0.01 0 - 0.2 x10(3)/mcL    IG# 0.05 (H) 0 - 0.04 x10(3)/mcL    IG% 0.8 %    NRBC% 0.0 %   Magnesium    Collection Time: 04/10/23  4:54 AM   Result Value Ref Range    Magnesium Level 1.90 1.60 - 2.60 mg/dL   Phosphorus    Collection Time: 04/10/23  4:54 AM   Result Value Ref Range    Phosphorus Level 4.3 2.3 - 4.7 mg/dL   Basic Metabolic Panel    Collection Time: 04/10/23  4:54 AM   Result Value Ref Range    Sodium Level 136 136 - 145 mmol/L    Potassium Level 3.8 3.5 - 5.1 mmol/L    Chloride 104 98 - 107 mmol/L    Carbon Dioxide 21 (L) 23 - 31 mmol/L    Glucose Level 114 82 - 115 mg/dL    Blood Urea Nitrogen 22.5 8.4 - 25.7 mg/dL    Creatinine 1.09 0.73 - 1.18 mg/dL    BUN/Creatinine Ratio 21     Calcium Level Total 8.2 (L) 8.8 - 10.0 mg/dL    Anion Gap 11.0 mEq/L    eGFR >60  mls/min/1.73/m2   CBC with Differential    Collection Time: 04/10/23  4:54 AM   Result Value Ref Range    WBC 6.0 4.5 - 11.5 x10(3)/mcL    RBC 3.55 (L) 4.70 - 6.10 x10(6)/mcL    Hgb 10.3 (L) 14.0 - 18.0 g/dL    Hct 31.8 (L) 42.0 - 52.0 %    MCV 89.6 80.0 - 94.0 fL    MCH 29.0 27.0 - 31.0 pg    MCHC 32.4 (L) 33.0 - 36.0 g/dL    RDW 15.6 11.5 - 17.0 %    Platelet 143 130 - 400 x10(3)/mcL    MPV 10.3 7.4 - 10.4 fL    Neut % 63.1 %    Lymph % 18.7 %    Mono % 15.6 %    Eos % 1.7 %    Basophil % 0.2 %    Lymph # 1.13 0.6 - 4.6 x10(3)/mcL    Neut # 3.81 2.1 - 9.2 x10(3)/mcL    Mono # 0.94 0.1 - 1.3 x10(3)/mcL    Eos # 0.10 0 - 0.9 x10(3)/mcL    Baso # 0.01 0 - 0.2 x10(3)/mcL    IG# 0.04 0 - 0.04 x10(3)/mcL    IG% 0.7 %    NRBC% 0.0 %   Magnesium    Collection Time: 04/11/23  4:44 AM   Result Value Ref Range    Magnesium Level 1.90 1.60 - 2.60 mg/dL   Phosphorus    Collection Time: 04/11/23  4:44 AM   Result Value Ref Range    Phosphorus Level 3.8 2.3 - 4.7 mg/dL   Basic Metabolic Panel    Collection Time: 04/11/23  4:44 AM   Result Value Ref Range    Sodium Level 135 (L) 136 - 145 mmol/L    Potassium Level 3.5 3.5 - 5.1 mmol/L    Chloride 104 98 - 107 mmol/L    Carbon Dioxide 21 (L) 23 - 31 mmol/L    Glucose Level 112 82 - 115 mg/dL    Blood Urea Nitrogen 24.1 8.4 - 25.7 mg/dL    Creatinine 1.18 0.73 - 1.18 mg/dL    BUN/Creatinine Ratio 20     Calcium Level Total 8.1 (L) 8.8 - 10.0 mg/dL    Anion Gap 10.0 mEq/L    eGFR >60 mls/min/1.73/m2   VANCOMYCIN, TROUGH    Collection Time: 04/11/23  4:44 AM   Result Value Ref Range    Vancomycin Trough 15.4 15.0 - 20.0 ug/ml   CBC with Differential    Collection Time: 04/11/23  4:44 AM   Result Value Ref Range    WBC 5.7 4.5 - 11.5 x10(3)/mcL    RBC 3.50 (L) 4.70 - 6.10 x10(6)/mcL    Hgb 10.5 (L) 14.0 - 18.0 g/dL    Hct 31.2 (L) 42.0 - 52.0 %    MCV 89.1 80.0 - 94.0 fL    MCH 30.0 27.0 - 31.0 pg    MCHC 33.7 33.0 - 36.0 g/dL    RDW 15.7 11.5 - 17.0 %    Platelet 168 130 - 400  x10(3)/mcL    MPV 10.6 (H) 7.4 - 10.4 fL    Neut % 66.7 %    Lymph % 16.3 %    Mono % 13.8 %    Eos % 1.8 %    Basophil % 0.5 %    Lymph # 0.92 0.6 - 4.6 x10(3)/mcL    Neut # 3.77 2.1 - 9.2 x10(3)/mcL    Mono # 0.78 0.1 - 1.3 x10(3)/mcL    Eos # 0.10 0 - 0.9 x10(3)/mcL    Baso # 0.03 0 - 0.2 x10(3)/mcL    IG# 0.05 (H) 0 - 0.04 x10(3)/mcL    IG% 0.9 %    NRBC% 0.0 %     Telemetry: SR    Physical Exam  Constitutional:       Appearance: Normal appearance.   HENT:      Head: Normocephalic.      Nose: Nose normal.      Mouth/Throat:      Mouth: Mucous membranes are moist.   Eyes:      Extraocular Movements: Extraocular movements intact.   Cardiovascular:      Rate and Rhythm: Tachycardia present. Rhythm irregular.      Pulses: Normal pulses.      Heart sounds: Normal heart sounds. No murmur heard.  Pulmonary:      Effort: Pulmonary effort is normal.      Breath sounds: Normal breath sounds.   Abdominal:      Palpations: Abdomen is soft.   Skin:     General: Skin is warm and dry.      Comments: Midline Sternotomy Dressing C/D/I.    Neurological:      General: No focal deficit present.      Mental Status: He is alert and oriented to person, place, and time.   Psychiatric:         Mood and Affect: Mood normal.         Behavior: Behavior normal.     Home Medications:   No current facility-administered medications on file prior to encounter.     Current Outpatient Medications on File Prior to Encounter   Medication Sig Dispense Refill    aspirin (ECOTRIN) 81 MG EC tablet Take 1 tablet (81 mg total) by mouth once daily. 30 tablet 0    atorvastatin (LIPITOR) 10 MG tablet Take 40 mg by mouth once daily.      furosemide (LASIX) 40 MG tablet TAKE ONE TABLET BY MOUTH DAILY DOSE INCREASED      hydrOXYzine HCL (ATARAX) 25 MG tablet TAKE 1 TABLET BY MOUTH EVERY EVENING 30 tablet 1    metoprolol succinate (TOPROL-XL) 100 MG 24 hr tablet Take 1 tablet (100 mg total) by mouth once daily. 30 tablet 0    NIFEdipine (PROCARDIA-XL) 30 MG  (OSM) 24 hr tablet Take 1 tablet (30 mg total) by mouth once daily. (Patient taking differently: Take 60 mg by mouth once daily.) 30 tablet 0    valsartan (DIOVAN) 40 MG tablet Take 1 tablet (40 mg total) by mouth 2 (two) times daily. 60 tablet 0     Current Inpatient Medications:    Current Facility-Administered Medications:     0.9%  NaCl infusion (for blood administration), , Intravenous, Q24H PRN, Graham Aiken MD    0.9%  NaCl infusion (for blood administration), , Intravenous, Q24H PRN, Graham Aiken MD    acetaminophen oral solution 650 mg, 650 mg, Per OG tube, Q6H PRN, GISEL Bingham, 650 mg at 04/05/23 2014    albumin human 5% bottle 12.5 g, 12.5 g, Intravenous, PRN, GISEL Bingham, Stopped at 04/03/23 2100    [START ON 4/12/2023] amiodarone tablet 200 mg, 200 mg, Oral, BID, LOREN Chen    [START ON 4/15/2023] amiodarone tablet 200 mg, 200 mg, Oral, Daily, LOREN Chen    amiodarone tablet 400 mg, 400 mg, Oral, BID, LOREN Chen, 400 mg at 04/10/23 2049    amLODIPine tablet 10 mg, 10 mg, Oral, Daily, HONORIO Villalpando, 10 mg at 04/09/23 0910    aspirin EC tablet 81 mg, 81 mg, Oral, Daily, GISEL Bingham, 81 mg at 04/10/23 0845    atorvastatin tablet 40 mg, 40 mg, Oral, Daily, HONORIO Villalpando, 40 mg at 04/10/23 0845    calcium gluconate 1 g in NS IVPB (premixed), 1 g, Intravenous, PRN, GISEL Bingham    calcium gluconate 1 g in NS IVPB (premixed), 2 g, Intravenous, PRN, GISEL Bingham    calcium gluconate 1 g in NS IVPB (premixed), 3 g, Intravenous, PRN, GISEL Bingham    [COMPLETED] chlordiazepoxide capsule 100 mg, 100 mg, Oral, Once, 100 mg at 04/06/23 1353 **FOLLOWED BY** [COMPLETED] chlordiazepoxide capsule 50 mg, 50 mg, Oral, Q6H, 50 mg at 04/07/23 1519 **FOLLOWED BY** [COMPLETED] chlordiazepoxide capsule 50 mg, 50 mg, Oral, Q8H, 50 mg at 04/08/23 1358 **FOLLOWED BY** [COMPLETED] chlordiazepoxide capsule 25 mg, 25 mg,  Oral, Q6H, 25 mg at 23 1547 **FOLLOWED BY** [] chlordiazepoxide capsule 25 mg, 25 mg, Oral, Q8H, 25 mg at 04/10/23 0501 **FOLLOWED BY** chlordiazepoxide capsule 25 mg, 25 mg, Oral, Q12H, 25 mg at 04/10/23 205 **FOLLOWED BY** chlordiazepoxide capsule 25 mg, 25 mg, Oral, Q24H, Simran Meza, [facility]    dextrose 10% bolus 125 mL 125 mL, 12.5 g, Intravenous, PRN, GISEL Bingham    dextrose 10% bolus 125 mL 125 mL, 12.5 g, Intravenous, PRN, Graham Aiken MD    dextrose 10% bolus 250 mL 250 mL, 25 g, Intravenous, PRN, GISEL Bingham    dextrose 10% bolus 250 mL 250 mL, 25 g, Intravenous, PRN, Graham Aiken MD    diltiaZEM 125 mg in dextrose 5 % 125 mL IVPB (ready to mix) (titrating), 0-15 mg/hr, Intravenous, Continuous, Phyllis Kevin, FNP, Last Rate: 5 mL/hr at 23 0208, 5 mg/hr at 23 020    docusate sodium capsule 100 mg, 100 mg, Oral, BID, GISEL Bingham, 100 mg at 04/10/23 2049    famotidine tablet 20 mg, 20 mg, Oral, BID, Deuce Finley IV, MD, 20 mg at 04/10/23 2049    ferrous sulfate tablet 1 each, 1 tablet, Oral, Daily, Jossy Ann MD    folic acid tablet 1 mg, 1 mg, Oral, Daily, Frankie Metcalf MD, 1 mg at 04/10/23 0845    furosemide tablet 20 mg, 20 mg, Oral, BID, Wyatt Carl PA-C, 20 mg at 04/10/23 2049    glucagon (human recombinant) injection 1 mg, 1 mg, Intramuscular, PRN, Graham Aiken MD    glucose chewable tablet 16 g, 16 g, Oral, PRN, Graham Aiken MD    glucose chewable tablet 24 g, 24 g, Oral, PRN, Graham Aiken MD    haloperidol lactate injection 2 mg, 2 mg, Intravenous, Q6H PRN, Simran Meza, [facility]    hydrALAZINE injection 20 mg, 20 mg, Intravenous, Q6H PRN, Elizabeth Bates NP, 20 mg at 23 1601    HYDROcodone-acetaminophen 5-325 mg per tablet 1 tablet, 1 tablet, Oral, Q4H PRN, GISEL Bingham, 1 tablet at 23 0208    insulin aspart U-100 injection 0-5 Units, 0-5 Units, Subcutaneous,  QID (AC + HS) PRN, Graham Aiken MD    labetaloL injection 20 mg, 20 mg, Intravenous, Q4H PRN, Elizabeth Bates NP, 20 mg at 04/07/23 0558    LORazepam (ATIVAN) injection 2 mg, 2 mg, Intravenous, Q8H PRN, Frankie Metcalf MD, 2 mg at 04/07/23 0033    magnesium sulfate 2g in water 50mL IVPB (premix), 2 g, Intravenous, PRN, Nicholas Murphy, PA    magnesium sulfate 2g in water 50mL IVPB (premix), 4 g, Intravenous, PRN, Nicholas Murphy, PA    metoprolol tartrate (LOPRESSOR) tablet 100 mg, 100 mg, Oral, BID, LOREN Chen, 100 mg at 04/10/23 0845    multivitamin tablet, 1 tablet, Oral, Daily, Frankie Metcalf MD, 1 tablet at 04/10/23 0845    ondansetron injection 4 mg, 4 mg, Intravenous, Q4H PRN, Nicholas Murphy, PA    potassium chloride 20 mEq in 100 mL IVPB (FOR CENTRAL LINE ADMINISTRATION ONLY), 20 mEq, Intravenous, PRN, Nicholas P Jeffrey, PA    potassium chloride 20 mEq in 100 mL IVPB (FOR CENTRAL LINE ADMINISTRATION ONLY), 20 mEq, Intravenous, PRN, Nicholas Murphy, PA    potassium chloride 40 mEq in 100 mL IVPB (FOR CENTRAL LINE ADMINISTRATION ONLY), 40 mEq, Intravenous, PRN, Nicholas Murphy, PA    potassium chloride SA CR tablet 20 mEq, 20 mEq, Oral, Daily, Wyatt Carl PA-C, 20 mEq at 04/09/23 0910    sodium bicarbonate tablet 650 mg, 650 mg, Oral, BID, Jossy Ann MD, 650 mg at 04/10/23 2048    sodium phosphate 15 mmol in dextrose 5 % (D5W) 250 mL IVPB, 15 mmol, Intravenous, PRN, Nicholas Murphy, PA    sodium phosphate 20.01 mmol in dextrose 5 % (D5W) 250 mL IVPB, 20.01 mmol, Intravenous, PRN, Nicholas Murphy, PA    sodium phosphate 30 mmol in dextrose 5 % (D5W) 250 mL IVPB, 30 mmol, Intravenous, PRN, GISEL Bingham    sucralfate tablet 1 g, 1 g, Oral, QID (AC & HS), GISEL Bingham, 1 g at 04/10/23 2048    thiamine tablet 200 mg, 200 mg, Oral, Daily, Frankie Metcalf MD, 200 mg at 04/10/23 0845    valsartan tablet 160 mg, 160 mg, Oral, BID, HONORIO Villalpando,  160 mg at 04/10/23 2049    vancomycin - pharmacy to dose, , Intravenous, pharmacy to manage frequency, Deuce Finley IV, MD    vancomycin 1.5 g in dextrose 5 % 250 mL IVPB (ready to mix), 1,500 mg, Intravenous, Q12H, Deuce Finley IV, MD    VTE Risk Mitigation (From admission, onward)           Ordered     Place sequential compression device  Until discontinued         04/04/23 0324     IP VTE HIGH RISK PATIENT  Once         04/03/23 0951                  Assessment:   MVCAD    - s/p CABG (4.3.23) - LIMA to LAD, rSVG to OM1, rSVG to PDA  PAF/Flutter with RVR    - CHADsVASc - 3 Points - 3.2% Stroke Risk per Year     - s/p (4.3.23) - Ligation of SILVANO with Endostapler  HTN/HHD without Hx of HF or CKD  Encephalopathy - Likely Related to ETOH DTs  Anemia - Acute Blood Loss - Improved with Transfusion  Thrombocytopenia - Chronic   VHD (Mild AS, Mild MS)  Hx of NSTEMI  ETOH Abuse  No Hx of GIB    Plan:     Will plan for CARO/DCCV today   Keep NPO  Pt with Ligation of SILVANO w/ Endostapler. No plans for OAC. Defer to outpatient.   Continue oral amio load. Continue metoprolol tartrate 100 mg BID. Continue Cardizem gtt and titrate for HR < 100.   Continue ASA, ARB and Statin  Continue Norvasc 10mg PO Qday  Aggressive Mobilization and Q1HR IS  Labs in AM: CBC, CMP and Mg    ARMAND Harris-BC   Cardiology  [facility]   04/11/2023     I have seen the patient, reviewed the Nurse Practitioner's note, assessment and plan. I have personally interviewed and examined the patient at bedside and agree with the findings. Medical decision making listed above were done under my guidance.    CARO Guided DCCV completed and was successful  SILVANO surgically ligated, no need for long term OAC.  Continue ASA, Amiodarone and Metoprolol.  CARO showed severe AS. Will need opt referral for TAVR.

## 2023-04-11 NOTE — PROGRESS NOTES
"[facility]  Hospital Medicine Progress Note        Chief Complaint: Inpatient Follow-up for CABG    HPI: Mike Urbina Jr. is a 67 y.o. male with PMHx of ETOH abuse, VHD-aortic stenosis, thrombocytopenia, CAD status post NSTEMI, HTN, PAFlutter who presented to [facility] on 04/03/2023 for a scheduled CABG.  He previously underwent cardiac workup revealing multivessel CAD.  He underwent CABG x3 on 04/03/2023 by Dr. Finley.  He was admitted to ICU postoperatively.  CIS was consulted on 04/04/2023.  On the evening of 04/05/2023 patient had a mental status changed, he became acutely confused and agitated. Hospital Medicine team was consulted for alcohol withdrawal.  Patient reports he drinks two 12 packs of beer daily for "a long time." He reports that he did try to quit drinking many years ago and experienced DTs.  Last drink was on for 04/02/2023, patient reports he had a six-pack of beer.         Interval Hx:   Patient was seen working with PT,family at bedside,alert and oriented x2-3, very pleasant, not confused, only complaint being tremors which improved, on Librium protocol    Chart was reviewed, Cardiology adjusting medications, started on cardizem gtt,  plan for CARO/DCCV today ,Labs are not concerning    Started on Vancomycin for Bacteremia yesterday, with a plan to DC if contaminant on repeat cultures,ID was consulted       Objective/physical exam:  General: In no acute distress, afebrile  Chest: Clear to auscultation bilaterally  Heart: RRR, +S1, S2, no appreciable murmur  Abdomen: Soft, nontender, BS +  MSK: Warm, no lower extremity edema, no clubbing or cyanosis,tremors+ve  Neurologic: Alert and oriented x4, Cranial nerve II-XII intact, Strength 5/5 in all 4 extremities        VITAL SIGNS: 24 HRS MIN & MAX LAST   Temp  Min: 97.2 °F (36.2 °C)  Max: 98.6 °F (37 °C) 97.2 °F (36.2 °C)   BP  Min: 102/64  Max: 132/100 130/71   Pulse  Min: 56  Max: 191  63   Resp  Min: 16  Max: 20 " 17   SpO2  Min: 69 %  Max: 99 % 98 %       Recent Labs   Lab 04/09/23  0459 04/10/23  0454 04/11/23  0444   WBC 6.2 6.0 5.7   RBC 4.04* 3.55* 3.50*   HGB 12.0* 10.3* 10.5*   HCT 36.2* 31.8* 31.2*   MCV 89.6 89.6 89.1   MCH 29.7 29.0 30.0   MCHC 33.1 32.4* 33.7   RDW 15.8 15.6 15.7   * 143 168   MPV 10.4 10.3 10.6*       Recent Labs   Lab 04/05/23  0505 04/06/23  0613 04/07/23  0502 04/08/23  0419 04/09/23  0459 04/10/23  0454 04/11/23  0444   * 132* 136   < > 134* 136 135*   K 4.7 4.1 3.9   < > 4.3 3.8 3.5   CO2 21* 17* 18*   < > 19* 21* 21*   BUN 28.8* 29.4* 24.8   < > 19.8 22.5 24.1   CREATININE 1.29* 0.99 0.86   < > 0.93 1.09 1.18   CALCIUM 8.2* 8.2* 8.1*   < > 8.4* 8.2* 8.1*   MG 2.10 2.00 2.00   < > 1.90 1.90 1.90   ALBUMIN 3.0* 3.0* 2.7*  --   --   --   --    ALKPHOS 59 91 79  --   --   --   --    ALT 17 23 19  --   --   --   --    AST 27 32 24  --   --   --   --    BILITOT 1.8* 2.0* 1.4  --   --   --   --     < > = values in this interval not displayed.          Microbiology Results (last 7 days)       Procedure Component Value Units Date/Time    Blood Culture [[id number]]  (Abnormal) Collected: 04/08/23 1816    Order Status: Completed Specimen: Blood from Arm, Left Updated: 04/11/23 1228     CULTURE, BLOOD ([facility]) Susceptibility To Follow      Staphylococcus epidermidis     GRAM STAIN Gram Positive Cocci, probable Staphylococcus      Seen in gram stain of broth only      1 of 2 Anaerobic bottles positive    Blood Culture [[id number]]  (Normal) Collected: 04/10/23 1006    Order Status: Completed Specimen: Blood from Arm, Left Updated: 04/11/23 1200     CULTURE, BLOOD ([facility]) No Growth At 24 Hours    Blood Culture [[id number]]  (Normal) Collected: 04/10/23 0921    Order Status: Completed Specimen: Blood from Arm, Left Updated: 04/11/23 1200     CULTURE, BLOOD ([facility]) No Growth At 24 Hours    Blood Culture [[id number]]  (Normal) Collected: 04/08/23 1819    Order Status: Completed Specimen: Blood from Arm,  Right Updated: 04/10/23 1901     CULTURE, BLOOD ([facility]) No Growth At 48 Hours    BCID2 Panel [[id number]] Collected: 04/08/23 1816    Order Status: Canceled Specimen: Blood from Arm, Left Updated: 04/10/23 1151             See below for Radiology    Scheduled Med:   [START ON 4/12/2023] amiodarone  200 mg Oral BID    [START ON 4/15/2023] amiodarone  200 mg Oral Daily    amiodarone  400 mg Oral BID    amLODIPine  10 mg Oral Daily    aspirin  81 mg Oral Daily    atorvastatin  40 mg Oral Daily    chlordiazepoxide  25 mg Oral Q24H    docusate sodium  100 mg Oral BID    famotidine  20 mg Oral BID    ferrous sulfate  1 tablet Oral Daily    folic acid  1 mg Oral Daily    furosemide  20 mg Oral BID    metoprolol tartrate  100 mg Oral BID    multivitamin  1 tablet Oral Daily    potassium chloride  20 mEq Oral Daily    sucralfate  1 g Oral QID (AC & HS)    thiamine  200 mg Oral Daily    valsartan  160 mg Oral BID    vancomycin (VANCOCIN) IVPB  1,500 mg Intravenous Q12H        Continuous Infusions:   dilTIAZem 5 mg/hr (04/11/23 0208)        PRN Meds:  sodium chloride, sodium chloride, acetaminophen, albumin human 5%, calcium gluconate IVPB, calcium gluconate IVPB, calcium gluconate IVPB, dextrose 10%, dextrose 10%, dextrose 10%, dextrose 10%, glucagon (human recombinant), glucose, glucose, haloperidol lactate, hydrALAZINE, HYDROcodone-acetaminophen, insulin aspart U-100, labetalol, lorazepam, magnesium sulfate IVPB, magnesium sulfate IVPB, ondansetron, potassium chloride in water, potassium chloride in water, potassium chloride in water, sodium phosphate IVPB, sodium phosphate IVPB, sodium phosphate IVPB, vancomycin - pharmacy to dose       Assessment/Plan:  Encephalopathy  Acute alcohol withdrawal ,Hx of ETOH Use  CAD status post CABG x3 on 04/03/2023  Pancytopenia  Metabolic Acidosis  GM +ve bacteremia, Coag Neg Staph  Paroxysmal atrial flutter RVR  Valvular heart disease-aortic stenosis  Chronic thrombocytopenia  Normocytic  anemia  Essential hypertension        Plan:   CT head without contrast -no acute abnormalities.Ammonia-normal,Blood Cultures neg  Banana Bag x1  MVI,Thiamine,Folate  CIWA to continue  Librium for alcohol withdrawal  PRN Haldol for agitation  Monitor BMP, Mg, Phos  Continue to monitor on Telemetry  Fall Precautions  Seizure precautions  -Monitor CBC, was pancytopenic earlier this admission, improving now, started on oral Iron  -Sodium bicarb tabs , Monitor BMP  -f/u Blood cultures,DC Vancomycin if contaminant  Cardiology adjusting Medications for Aflutter,started on Cardizem gtt,Cardiology to plan on CARO/DCCV today per report  Rest of the management per primary team.PT following        Patient condition:    Fair    Anticipated discharge and Disposition:     Awaiting placement    All diagnosis and differential diagnosis have been reviewed; assessment and plan has been documented; I have personally reviewed the labs and test results that are presently available; I have reviewed the patients medication list; I have reviewed the consulting providers response and recommendations. I have reviewed or attempted to review medical records based upon their availability    All of the patient's questions have been  addressed and answered. Patient's is agreeable to the above stated plan. I will continue to monitor closely and make adjustments to medical management as needed.  _____________________________________________________________________    Nutrition Status:    Radiology:  Fl Modified Barium Swallow Speech  See procedure notes from Speech Pathologist.    This procedure was auto-finalized.  Transesophageal echo (CARO)with possible cardioversion  Procedure: CARO guided Cardioversion           Final impression:     LV systolic function 50-55%.  left ventricular hypertrophy is present.  AV is probably trileaflet with marked AV slcerocalcification resulting in   severe/critical AS, SHARAD 0.5 cm2, Peak AV 3.5 m/sec.  Mild Mitral  stenosis.  Moderate TR.  No intracardiac thrombus is present.  SILVANO is not seen and is probably absent due to prior surgical SILVANO ligation.  Successful cardioversion x 1        Recommendations:     Probably refer the patient to our structural heart program for TAVR.  Continue Amiodarone and Metoprolol and ASA.  No need for OAC as the patient has a surgically ligated SILVANO without any   residual tissue noted on CARO.        Indication: post CABG AFL- symptomatic     Informed consent: obtained, signed copy placed in the chart.     Description of the procedure: After sedation was achieved (please see   anesthesia report for details), the esophagus intubated without   difficulties. Multiple orthogonal views obtained. Patient tolerated   procedure without immediate complications noted.     Findings:  Left atrium (LA): Mildly enlarged left atrium.  Left atrial appendage (SILVANO):SILVANO is not seen and is probably absent due to   prior surgical SILVANO ligation.  Interatrial septum:  NO ASD or PFO noted on 2D or Color Doppler images.   Right atrium (RA): Mildly enlarged right atrium.  Left ventricle (LV): LVEF 50-55%.   Normal LV size.  left ventricular   hypertrophy is present. No Regional wall motion abnormalities noted.  Right ventricle (RV): Partially visualized in this study.  Aortic valve: AV is probably trileaflet with marked AV slcerocalcification   resulting in severe/critical AS, SHARAD 0.5 cm2, Peak AV 3.5 m/sec. No   Vegetation is present.  Mitral valve: Mild thickening of the mitral valve. Mild Mitral stenosis.    No Mitral valve prolapse. Mild Mitral regurgitation. No Vegetation is   present.  Tricuspid valve: Partially visualized, No Tricuspid stenosis. Moderate   Tricuspid regurgitation. No Vegetation is present.  Pulmonic valve: Not well visualized, no hemodynamically significant   pulmonic stenosis, no pulmonic regurgitation noted in this study. No   Vegetation is present.  Thoracic aorta: Scattered atherosclerotic changes  of the descending   thoracic aorta.  Normal Caliber aortic root.   Normal Caliber Proximal   portion of the ascending aorta.   Pericardium: No significant pericardial effusion is present.        Description of the DCCV procedure:   After completion of CARO, no intracardiac thrombus was visualized, so I   decided to proceed with Cardioversion  200 J synchronized cardioversion was completed.  Number of attempts: 1               Jossy Ann MD   04/11/2023

## 2023-04-11 NOTE — PROGRESS NOTES
04/11/23 1430   Pre Exercise Vitals   /73   Pulse 72  (NSR)   Supplemental O2? No   SpO2 96 %   During Exercise Vitals   Pulse 82   Supplemental O2? No   SpO2 98 %   Distance Walked 180 ft   Post Exercise Vitals   /90   Pulse 82  (NSR)   Supplemental O2? No   SpO2 97 %   Modality   Modality   (rollator)     Communicated with nurse prior and post activity.  Min assist sit to stand, maintains sternal precautions. Encouraged increased activity and incentive spirometry

## 2023-04-11 NOTE — ANESTHESIA POSTPROCEDURE EVALUATION
Anesthesia Post Evaluation    Patient: Mike Urbina Jr.    Procedure(s) Performed: * No procedures listed *    Final Anesthesia Type: general      Patient location during evaluation: PACU  Patient participation: Yes- Able to Participate  Level of consciousness: awake and alert  Post-procedure vital signs: reviewed and stable  Pain management: adequate  Airway patency: patent    PONV status at discharge: No PONV  Anesthetic complications: no      Cardiovascular status: blood pressure returned to baseline  Respiratory status: spontaneous ventilation and unassisted  Hydration status: euvolemic  Follow-up needed           Vitals Value Taken Time   /72 04/11/23 1512   Temp 36.3 °C (97.4 °F) 04/11/23 1512   Pulse 70 04/11/23 1512   Resp 18 04/11/23 1512   SpO2 97 % 04/11/23 1512         No case tracking events are documented in the log.      Pain/Wanda Score: Pain Rating Prior to Med Admin: 3 (4/11/2023  2:08 AM)  Pain Rating Post Med Admin: 0 (4/11/2023  3:08 AM)

## 2023-04-11 NOTE — PT/OT/SLP PROGRESS
Physical Therapy Treatment    Patient Name:  Mike Urbina Jr.   MRN:  [id number]    Recommendations:     Discharge Recommendations: rehabilitation facility  Discharge Equipment Recommendations: walker, rolling  Barriers to discharge:  Acuity of Illness    Assessment:     Mike Urbina Jr. is a 67 y.o. male admitted with a medical diagnosis of Encephalopathy, Acute alcohol withdrawal ,Hx of ETOH Use  CAD status post CABG x3 on 04/03/2023.  He presents with the following impairments/functional limitations: weakness, impaired endurance, impaired functional mobility, gait instability, impaired balance.    Rehab Prognosis: Good; patient would benefit from acute skilled PT services to address these deficits and reach maximum level of function.    Recent Surgery: Procedure(s) (LRB):  CORONARY ARTERY BYPASS GRAFT (CABG) (N/A) 8 Days Post-Op    Plan:     During this hospitalization, patient to be seen 5 x/week to address the identified rehab impairments via gait training, therapeutic activities, therapeutic exercises and progress toward the following goals:    Plan of Care Expires:  05/30/23    Subjective     Chief Complaint: None  Patient/Family Comments/goals: None  Pain/Comfort:  0/10      Objective:     Pt found in bed upon entry.     General Precautions: Standard, sternal  Orthopedic Precautions:    Braces:    Respiratory Status: Room air    Skin Integrity: Visible skin intact      Functional Mobility:  Bed Mobility:     Supine to Sit: minimum assistance  Gait: Min A with RW  Pt amb 200ft with step through gait pattern Pt presents with decreased posture and JON step length. VC's for increased posture and increased step lengths.   Transfer Training: Min A with RW  Pt performed stand step t/f from bedside chair to bed.     Therapeutic Activities/Exercises:  BLE: hip flexion, LAQ, hip abd/add x 10 reps.   Pt performed sit-to-stand X 10 reps from bedside chair.     Patient left HOB elevated with call button in  reach and 11 present..    GOALS:   Multidisciplinary Problems       Physical Therapy Goals          Problem: Physical Therapy    Goal Priority Disciplines Outcome Goal Variances Interventions   Physical Therapy Goal     PT, PT/OT Ongoing, Progressing     Description: Goals to be met by: 23     Patient will increase functional independence with mobility by performin. Supine to sit with Stand-by Assistance  2. Sit to supine with Stand-by Assistance  3. Sit to stand transfer with Stand-by Assistance  4. Gait  x 200 feet with Stand-by Assistance using Rolling Walker.                          Time Tracking:     PT Received On: 23  PT Start Time: 833     PT Stop Time: 859  PT Total Time (min): 26 min     Billable Minutes: Gait Training 10 and Therapeutic Activity 16    Treatment Type: Treatment  PT/PTA: PTA     Number of PTA visits since last PT visit: 3     2023

## 2023-04-11 NOTE — PT/OT/SLP PROGRESS
Occupational Therapy      Patient Name:  Mike Urbina    MRN:  [id number]    Patient not seen today secondary to Off the floor for procedure/surgery. Will follow-up as schedule permits.    4/11/2023

## 2023-04-11 NOTE — ANESTHESIA PREPROCEDURE EVALUATION
04/11/2023  Mike Urbina Jr. is a 67 y.o., male.      Pre-op Assessment    I have reviewed the Patient Summary Reports.     I have reviewed the Nursing Notes. I have reviewed the NPO Status.   I have reviewed the Medications.     Review of Systems  Anesthesia Hx:  No problems with previous Anesthesia    Hematology/Oncology:        Hematology Comments: Hx thrombocytopenia     Cardiovascular:   Hypertension CAD  CABG/stent (CABG 4/3/23) Dysrhythmias (a-flutter)  Angina CHF hyperlipidemia    Pulmonary:   Denies COPD.  Denies Asthma. Shortness of breath    Renal/:   Denies Chronic Renal Disease. no renal calculi     Hepatic/GI:   Denies GERD. Denies Liver Disease.  Denies Hepatitis.    Neurological:   Denies CVA. Denies Seizures. Has had ETOH withdrawal in the past    Drinks 12 beers qd  Last drink was 8 days ago   Endocrine:   Denies Diabetes. Denies Hypothyroidism. Denies Hyperthyroidism. Hx low NA+  4/11 Na+ 135   Denies Obesity / BMI > 30      Physical Exam  General: Well nourished, Cooperative, Alert and Oriented    Airway:  Mallampati: I   Mouth Opening: Normal  TM Distance: Normal  Tongue: Normal  Neck ROM: Normal ROM    Dental:  Periodontal disease        Anesthesia Plan  Type of Anesthesia, risks & benefits discussed:    Anesthesia Type: Gen Natural Airway  Intra-op Monitoring Plan: Standard ASA Monitors  Induction:  IV  Informed Consent: Informed consent signed with the Patient and all parties understand the risks and agree with anesthesia plan.  All questions answered.   ASA Score: 4  Day of Surgery Review of History & Physical: H&P Update referred to the surgeon/provider.    Ready For Surgery From Anesthesia Perspective.     .

## 2023-04-11 NOTE — PROGRESS NOTES
Pharmacokinetic Assessment Follow Up: IV Vancomycin    Vancomycin serum concentration assessment(s):    The trough level was drawn correctly and can be used to guide therapy at this time. The measurement is below the desired definitive target range of 15 to 20 mcg/mL.  ( Level was drawn 4 hours too early and the real trough will be lower than 15.4.)  Vancomycin Regimen Plan:    Change regimen to Vancomycin 1500 mg IV every 12 hours with next serum trough concentration measured at 0400 prior to 4th dose on 04/13    Drug levels (last 3 results):  Recent Labs   Lab Result Units 04/11/23  0444   Vancomycin Trough ug/ml 15.4       Pharmacy will continue to follow and monitor vancomycin.    Please contact pharmacy at extension [phone] for questions regarding this assessment.    Thank you for the consult,   Andrade Wen       Patient brief summary:  Mike Urbina Jr. is a 67 y.o. male initiated on antimicrobial therapy with IV Vancomycin for treatment of sepsis    The patient's current regimen is vancomycin 1250mg q12h    Drug Allergies:   Review of patient's allergies indicates:  No Known Allergies    Actual Body Weight:   79.9kg    Renal Function:   Estimated Creatinine Clearance: 66.8 mL/min (based on SCr of 1.18 mg/dL).,     Dialysis Method (if applicable):  N/A    CBC (last 72 hours):  Recent Labs   Lab Result Units 04/09/23  0459 04/10/23  0454 04/11/23  0444   WBC x10(3)/mcL 6.2 6.0 5.7   Hgb g/dL 12.0* 10.3* 10.5*   Hct % 36.2* 31.8* 31.2*   Platelet x10(3)/mcL 106* 143 168   Mono % % 17.3 15.6 13.8   Eos % % 1.8 1.7 1.8   Basophil % % 0.2 0.2 0.5       Metabolic Panel (last 72 hours):  Recent Labs   Lab Result Units 04/09/23  0459 04/10/23  0454 04/11/23 0444   Sodium Level mmol/L 134* 136 135*   Potassium Level mmol/L 4.3 3.8 3.5   Chloride mmol/L 105 104 104   Carbon Dioxide mmol/L 19* 21* 21*   Glucose Level mg/dL 108 114 112   Blood Urea Nitrogen mg/dL 19.8 22.5 24.1   Creatinine mg/dL 0.93 1.09 1.18    Magnesium Level mg/dL 1.90 1.90 1.90   Phosphorus Level mg/dL 4.1 4.3 3.8       Vancomycin Administrations:  vancomycin given in the last 96 hours                     vancomycin 1.25 g in dextrose 5% 250 mL IVPB (ready to mix) (mg) 1,250 mg New Bag 04/11/23 0643     1,250 mg New Bag 04/10/23 2047     1,250 mg New Bag  0639    vancomycin (VANCOCIN) 2,000 mg in dextrose 5 % (D5W) 500 mL IVPB (mg) 2,000 mg New Bag 04/09/23 2024                    Microbiologic Results:  Microbiology Results (last 7 days)       Procedure Component Value Units Date/Time    Blood Culture [[id number]]  (Abnormal) Collected: 04/08/23 1816    Order Status: Completed Specimen: Blood from Arm, Left Updated: 04/11/23 0715     CULTURE, BLOOD ([facility]) Identification and Susceptibility To Follow      COAGULASE NEGATIVE STAPHYLOCOCCI     GRAM STAIN Gram Positive Cocci, probable Staphylococcus      Seen in gram stain of broth only      1 of 2 Anaerobic bottles positive    Blood Culture [[id number]]  (Normal) Collected: 04/08/23 1819    Order Status: Completed Specimen: Blood from Arm, Right Updated: 04/10/23 1901     CULTURE, BLOOD ([facility]) No Growth At 48 Hours    BCID2 Panel [[id number]] Collected: 04/08/23 1816    Order Status: Canceled Specimen: Blood from Arm, Left Updated: 04/10/23 1151    Blood Culture [[id number]] Collected: 04/10/23 1006    Order Status: Resulted Specimen: Blood from Arm, Left Updated: 04/10/23 1010    Blood Culture [[id number]] Collected: 04/10/23 0921    Order Status: Resulted Specimen: Blood from Arm, Left Updated: 04/10/23 1010

## 2023-04-11 NOTE — PROCEDURES
Procedure: CARO guided Cardioversion        Final impression:    LV systolic function 50-55%.  left ventricular hypertrophy is present.  AV is probably trileaflet with marked AV slcerocalcification resulting in severe/critical AS, SHARAD 0.5 cm2, Peak AV 3.5 m/sec.  Mild Mitral stenosis.  Moderate TR.  No intracardiac thrombus is present.  SILVANO is not seen and is probably absent due to prior surgical SILVANO ligation.  Successful cardioversion x 1      Recommendations:    Probably refer the patient to our structural heart program for TAVR.  Continue Amiodarone and Metoprolol and ASA.  No need for OAC as the patient has a surgically ligated SILVANO without any residual tissue noted on CARO.      Indication: post CABG AFL- symptomatic    Informed consent: obtained, signed copy placed in the chart.    Description of the procedure: After sedation was achieved (please see anesthesia report for details), the esophagus intubated without difficulties. Multiple orthogonal views obtained. Patient tolerated procedure without immediate complications noted.    Findings:  Left atrium (LA): Mildly enlarged left atrium.  Left atrial appendage (SILVANO):SILVANO is not seen and is probably absent due to prior surgical SILVANO ligation.  Interatrial septum:  NO ASD or PFO noted on 2D or Color Doppler images.   Right atrium (RA): Mildly enlarged right atrium.  Left ventricle (LV): LVEF 50-55%.   Normal LV size.  left ventricular hypertrophy is present. No Regional wall motion abnormalities noted.  Right ventricle (RV): Partially visualized in this study.  Aortic valve: AV is probably trileaflet with marked AV slcerocalcification resulting in severe/critical AS, SHARAD 0.5 cm2, Peak AV 3.5 m/sec. No Vegetation is present.  Mitral valve: Mild thickening of the mitral valve. Mild Mitral stenosis.  No Mitral valve prolapse. Mild Mitral regurgitation. No Vegetation is present.  Tricuspid valve: Partially visualized, No Tricuspid stenosis. Moderate Tricuspid  regurgitation. No Vegetation is present.  Pulmonic valve: Not well visualized, no hemodynamically significant pulmonic stenosis, no pulmonic regurgitation noted in this study. No Vegetation is present.  Thoracic aorta: Scattered atherosclerotic changes of the descending thoracic aorta.  Normal Caliber aortic root.   Normal Caliber Proximal portion of the ascending aorta.   Pericardium: No significant pericardial effusion is present.      Description of the DCCV procedure:   After completion of CARO, no intracardiac thrombus was visualized, so I decided to proceed with Cardioversion  200 J synchronized cardioversion was completed.  Number of attempts: 1

## 2023-04-11 NOTE — TRANSFER OF CARE
"Anesthesia Transfer of Care Note    Patient: Mike Urbina Jr.    Procedure(s) Performed: * No procedures listed *    Patient location: Cath Lab    Anesthesia Type: general and MAC    Transport from OR: Transported from OR on 2-3 L/min O2 by NC with adequate spontaneous ventilation    Post pain: adequate analgesia    Post assessment: no apparent anesthetic complications    Post vital signs: stable    Level of consciousness: responds to stimulation    Nausea/Vomiting: no nausea/vomiting    Complications: none    Transfer of care protocol was followedComments: Detailed report with handoff to licensed provider complete      Last vitals:   Visit Vitals  BP (!) 132/100   Pulse (!) 116   Temp 36.6 °C (97.8 °F) (Oral)   Resp 17   Ht 6' 2" (1.88 m)   Wt 79.9 kg (176 lb 2.4 oz)   SpO2 (!) 94%   BMI 22.62 kg/m²     "

## 2023-04-12 LAB
BACTERIA BLD CULT: ABNORMAL
BACTERIA BLD CULT: ABNORMAL
GRAM STN SPEC: ABNORMAL
MAGNESIUM SERPL-MCNC: 1.9 MG/DL (ref 1.6–2.6)
PHOSPHATE SERPL-MCNC: 3.5 MG/DL (ref 2.3–4.7)

## 2023-04-12 PROCEDURE — 25000003 PHARM REV CODE 250: Performed by: INTERNAL MEDICINE

## 2023-04-12 PROCEDURE — 63600175 PHARM REV CODE 636 W HCPCS: Performed by: INTERNAL MEDICINE

## 2023-04-12 PROCEDURE — 25000003 PHARM REV CODE 250: Performed by: SPECIALIST

## 2023-04-12 PROCEDURE — 25000003 PHARM REV CODE 250: Performed by: NURSE PRACTITIONER

## 2023-04-12 PROCEDURE — 97535 SELF CARE MNGMENT TRAINING: CPT | Mod: CO

## 2023-04-12 PROCEDURE — 21400001 HC TELEMETRY ROOM

## 2023-04-12 PROCEDURE — 25000003 PHARM REV CODE 250: Performed by: PHYSICIAN ASSISTANT

## 2023-04-12 PROCEDURE — 83735 ASSAY OF MAGNESIUM: CPT | Performed by: INTERNAL MEDICINE

## 2023-04-12 PROCEDURE — 63600175 PHARM REV CODE 636 W HCPCS: Performed by: SPECIALIST

## 2023-04-12 PROCEDURE — 94760 N-INVAS EAR/PLS OXIMETRY 1: CPT

## 2023-04-12 PROCEDURE — 25000003 PHARM REV CODE 250

## 2023-04-12 PROCEDURE — 97110 THERAPEUTIC EXERCISES: CPT

## 2023-04-12 PROCEDURE — 84100 ASSAY OF PHOSPHORUS: CPT | Performed by: INTERNAL MEDICINE

## 2023-04-12 RX ORDER — LORAZEPAM 2 MG/ML
INJECTION INTRAMUSCULAR
Status: DISPENSED
Start: 2023-04-12 | End: 2023-04-13

## 2023-04-12 RX ADMIN — DOCUSATE SODIUM 100 MG: 100 CAPSULE, LIQUID FILLED ORAL at 08:04

## 2023-04-12 RX ADMIN — AMLODIPINE BESYLATE 10 MG: 5 TABLET ORAL at 08:04

## 2023-04-12 RX ADMIN — FAMOTIDINE 20 MG: 20 TABLET, FILM COATED ORAL at 08:04

## 2023-04-12 RX ADMIN — METOPROLOL TARTRATE 100 MG: 50 TABLET, FILM COATED ORAL at 08:04

## 2023-04-12 RX ADMIN — SUCRALFATE 1 G: 1 TABLET ORAL at 05:04

## 2023-04-12 RX ADMIN — SUCRALFATE 1 G: 1 TABLET ORAL at 11:04

## 2023-04-12 RX ADMIN — THIAMINE HCL TAB 100 MG 200 MG: 100 TAB at 08:04

## 2023-04-12 RX ADMIN — FUROSEMIDE 20 MG: 20 TABLET ORAL at 05:04

## 2023-04-12 RX ADMIN — VALSARTAN 160 MG: 80 TABLET, FILM COATED ORAL at 08:04

## 2023-04-12 RX ADMIN — SUCRALFATE 1 G: 1 TABLET ORAL at 08:04

## 2023-04-12 RX ADMIN — FOLIC ACID 1 MG: 1 TABLET ORAL at 08:04

## 2023-04-12 RX ADMIN — FUROSEMIDE 20 MG: 20 TABLET ORAL at 08:04

## 2023-04-12 RX ADMIN — POTASSIUM CHLORIDE 20 MEQ: 1500 TABLET, EXTENDED RELEASE ORAL at 08:04

## 2023-04-12 RX ADMIN — FERROUS SULFATE TAB 325 MG (65 MG ELEMENTAL FE) 1 EACH: 325 (65 FE) TAB at 08:04

## 2023-04-12 RX ADMIN — ATORVASTATIN CALCIUM 40 MG: 40 TABLET, FILM COATED ORAL at 08:04

## 2023-04-12 RX ADMIN — AMIODARONE HYDROCHLORIDE 200 MG: 200 TABLET ORAL at 08:04

## 2023-04-12 RX ADMIN — VANCOMYCIN HYDROCHLORIDE 1500 MG: 1.5 INJECTION, POWDER, LYOPHILIZED, FOR SOLUTION INTRAVENOUS at 05:04

## 2023-04-12 RX ADMIN — ASPIRIN 81 MG: 81 TABLET, COATED ORAL at 08:04

## 2023-04-12 RX ADMIN — LORAZEPAM 2 MG: 2 INJECTION INTRAMUSCULAR; INTRAVENOUS at 11:04

## 2023-04-12 RX ADMIN — THERA TABS 1 TABLET: TAB at 08:04

## 2023-04-12 NOTE — PLAN OF CARE
Problem: Adult Inpatient Plan of Care  Goal: Plan of Care Review  4/12/2023 1039 by Rosalba Antony RN  Outcome: Ongoing, Not Progressing  4/12/2023 1038 by Rosalba Antony RN  Outcome: Ongoing, Progressing  Goal: Patient-Specific Goal (Individualized)  4/12/2023 1039 by Rosalba Antony RN  Outcome: Ongoing, Not Progressing  4/12/2023 1038 by Rosalba Antony RN  Outcome: Ongoing, Progressing  Goal: Absence of Hospital-Acquired Illness or Injury  4/12/2023 1039 by Rosalba Antony RN  Outcome: Ongoing, Not Progressing  4/12/2023 1038 by Rosalba Antony RN  Outcome: Ongoing, Progressing  Goal: Optimal Comfort and Wellbeing  4/12/2023 1039 by Rosalba Antony RN  Outcome: Ongoing, Not Progressing  4/12/2023 1038 by Rosalba Antony RN  Outcome: Ongoing, Progressing  Goal: Readiness for Transition of Care  4/12/2023 1039 by Rosalba Antony RN  Outcome: Ongoing, Not Progressing  4/12/2023 1038 by Rosalba Antony RN  Outcome: Ongoing, Progressing     Problem: Infection  Goal: Absence of Infection Signs and Symptoms  4/12/2023 1039 by Rosalba Antony RN  Outcome: Ongoing, Not Progressing  4/12/2023 1038 by Rosalba Antony RN  Outcome: Ongoing, Progressing     Problem: Skin Injury Risk Increased  Goal: Skin Health and Integrity  4/12/2023 1039 by Rosalba Antony RN  Outcome: Ongoing, Not Progressing  4/12/2023 1038 by Rosalba Antony RN  Outcome: Ongoing, Progressing     Problem: Fall Injury Risk  Goal: Absence of Fall and Fall-Related Injury  4/12/2023 1039 by Rosalba Antony RN  Outcome: Ongoing, Not Progressing  4/12/2023 1038 by Rosalba Antony RN  Outcome: Ongoing, Progressing

## 2023-04-12 NOTE — PROGRESS NOTES
04/12/23 1100   Pre Exercise Vitals   /71   Pulse 68  (SR)   Supplemental O2? No   SpO2 96 %   During Exercise Vitals   Supplemental O2? No   Distance Walked 180 feet   Post Exercise Vitals   /80   Pulse 72   Supplemental O2? No   SpO2 96 %   Modality   Modality   (rollator)     0830 CNA currently assisting pt from bedside commode to chair, pt reports feeling very weak this am.  Helped assist to chair, BP checked 144/75, heart rate 72.  Ambulation defer for now.  1:1 monitoring cont, chair alarm on.      1100 sitting up in chair, ready for ambulation.  Min assist. Gait slow, forward leaning posture using rollator.  Assist back to chair post ambulation.  Chair alarm on, 1:1 cont.  IS done 3000ml.

## 2023-04-12 NOTE — PROGRESS NOTES
"[facility]  Hospital Medicine Progress Note        Chief Complaint: Inpatient Follow-up for CABG    HPI: Mike Urbina Jr. is a 67 y.o. male with PMHx of ETOH abuse, VHD-aortic stenosis, thrombocytopenia, CAD status post NSTEMI, HTN, PAFlutter who presented to [facility] on 04/03/2023 for a scheduled CABG.  He previously underwent cardiac workup revealing multivessel CAD.  He underwent CABG x3 on 04/03/2023 by Dr. Finley.  He was admitted to ICU postoperatively.  CIS was consulted on 04/04/2023.  On the evening of 04/05/2023 patient had a mental status changed, he became acutely confused and agitated. Hospital Medicine team was consulted for alcohol withdrawal.  Patient reports he drinks two 12 packs of beer daily for "a long time." He reports that he did try to quit drinking many years ago and experienced DTs.  Last drink was on for 04/02/2023, patient reports he had a six-pack of beer.     Started on librium protocol.Started on Vancomycin for Bacteremia yesterday, Dced later as it was probably a contaminant.    Interval Hx:   Patient was seen ,comfortably sitting ,family at bedside,alert and oriented x2-3, very pleasant, not confused, only complaint being tremors which improved, not withdrawing per report today    Chart was reviewed, Cardiology adjusting medications,Now SR after DCCV yesterday.         Objective/physical exam:  General: In no acute distress, afebrile  Chest: Clear to auscultation bilaterally  Heart: RRR, +S1, S2, no appreciable murmur  Abdomen: Soft, nontender, BS +  MSK: Warm, no lower extremity edema, no clubbing or cyanosis,tremors+ve  Neurologic: Alert and oriented x4, Cranial nerve II-XII intact, Strength 5/5 in all 4 extremities        VITAL SIGNS: 24 HRS MIN & MAX LAST   Temp  Min: 97.4 °F (36.3 °C)  Max: 97.9 °F (36.6 °C) 97.9 °F (36.6 °C)   BP  Min: 119/67  Max: 138/73 135/77   Pulse  Min: 60  Max: 70  68   Resp  Min: 18  Max: 20 18   SpO2  Min: 96 %  Max: " 98 % 96 %       Recent Labs   Lab 04/09/23  0459 04/10/23  0454 04/11/23  0444   WBC 6.2 6.0 5.7   RBC 4.04* 3.55* 3.50*   HGB 12.0* 10.3* 10.5*   HCT 36.2* 31.8* 31.2*   MCV 89.6 89.6 89.1   MCH 29.7 29.0 30.0   MCHC 33.1 32.4* 33.7   RDW 15.8 15.6 15.7   * 143 168   MPV 10.4 10.3 10.6*       Recent Labs   Lab 04/06/23  0613 04/07/23  0502 04/08/23  0419 04/09/23  0459 04/10/23  0454 04/11/23  0444 04/12/23  0356   * 136   < > 134* 136 135*  --    K 4.1 3.9   < > 4.3 3.8 3.5  --    CO2 17* 18*   < > 19* 21* 21*  --    BUN 29.4* 24.8   < > 19.8 22.5 24.1  --    CREATININE 0.99 0.86   < > 0.93 1.09 1.18  --    CALCIUM 8.2* 8.1*   < > 8.4* 8.2* 8.1*  --    MG 2.00 2.00   < > 1.90 1.90 1.90 1.90   ALBUMIN 3.0* 2.7*  --   --   --   --   --    ALKPHOS 91 79  --   --   --   --   --    ALT 23 19  --   --   --   --   --    AST 32 24  --   --   --   --   --    BILITOT 2.0* 1.4  --   --   --   --   --     < > = values in this interval not displayed.          Microbiology Results (last 7 days)       Procedure Component Value Units Date/Time    Blood Culture [[id number]]  (Normal) Collected: 04/10/23 1006    Order Status: Completed Specimen: Blood from Arm, Left Updated: 04/12/23 1200     CULTURE, BLOOD ([facility]) No Growth At 48 Hours    Blood Culture [[id number]]  (Normal) Collected: 04/10/23 0921    Order Status: Completed Specimen: Blood from Arm, Left Updated: 04/12/23 1200     CULTURE, BLOOD ([facility]) No Growth At 48 Hours    Blood Culture [[id number]]  (Abnormal)  (Susceptibility) Collected: 04/08/23 1816    Order Status: Completed Specimen: Blood from Arm, Left Updated: 04/12/23 0748     CULTURE, BLOOD ([facility]) Susceptibility To Follow      Staphylococcus epidermidis     GRAM STAIN Gram Positive Cocci, probable Staphylococcus      Seen in gram stain of broth only      1 of 2 Anaerobic bottles positive    Blood Culture [[id number]]  (Normal) Collected: 04/08/23 1819    Order Status: Completed Specimen: Blood from Arm, Right  Updated: 04/11/23 1901     CULTURE, BLOOD ([facility]) No Growth At 72 Hours    BCID2 Panel [[id number]] Collected: 04/08/23 1816    Order Status: Canceled Specimen: Blood from Arm, Left Updated: 04/10/23 1151             See below for Radiology    Scheduled Med:   amiodarone  200 mg Oral BID    [START ON 4/15/2023] amiodarone  200 mg Oral Daily    amLODIPine  10 mg Oral Daily    aspirin  81 mg Oral Daily    atorvastatin  40 mg Oral Daily    docusate sodium  100 mg Oral BID    famotidine  20 mg Oral BID    ferrous sulfate  1 tablet Oral Daily    folic acid  1 mg Oral Daily    furosemide  20 mg Oral BID    metoprolol tartrate  100 mg Oral BID    multivitamin  1 tablet Oral Daily    potassium chloride  20 mEq Oral Daily    sucralfate  1 g Oral QID (AC & HS)    thiamine  200 mg Oral Daily    valsartan  160 mg Oral BID        Continuous Infusions:   dilTIAZem 5 mg/hr (04/11/23 0208)        PRN Meds:  sodium chloride, sodium chloride, acetaminophen, albumin human 5%, calcium gluconate IVPB, calcium gluconate IVPB, calcium gluconate IVPB, dextrose 10%, dextrose 10%, dextrose 10%, dextrose 10%, glucagon (human recombinant), glucose, glucose, haloperidol lactate, hydrALAZINE, HYDROcodone-acetaminophen, insulin aspart U-100, labetalol, lorazepam, magnesium sulfate IVPB, magnesium sulfate IVPB, ondansetron, potassium chloride in water, potassium chloride in water, potassium chloride in water, sodium phosphate IVPB, sodium phosphate IVPB, sodium phosphate IVPB       Assessment/Plan:  Encephalopathy  Acute alcohol withdrawal ,Hx of ETOH Use  CAD status post CABG x3 on 04/03/2023  Pancytopenia  Metabolic Acidosis  GM +ve bacteremia, Coag Neg Staph  Paroxysmal atrial flutter RVR  Valvular heart disease-aortic stenosis  Chronic thrombocytopenia  Normocytic anemia  Essential hypertension        Plan:   CT head without contrast -no acute abnormalities.Ammonia-normal,Blood Cultures contamination  Banana Bag x1  given  MVI,Thiamine,Folate  CIWA to continue, not withdrawing per report  Librium for alcohol withdrawal-completed, PRN Haldol for agitation,Ativan PRN  for anxiety  Continue to monitor on Telemetry  Fall Precautions  Seizure precautions  DC Vancomycin as contaminant in blood, no fever/white count elevation noted  Cardiology adjusting Medications.Rest of the management per primary team.        Patient condition:    Fair    Anticipated discharge and Disposition:     Awaiting placement    All diagnosis and differential diagnosis have been reviewed; assessment and plan has been documented; I have personally reviewed the labs and test results that are presently available; I have reviewed the patients medication list; I have reviewed the consulting providers response and recommendations. I have reviewed or attempted to review medical records based upon their availability    All of the patient's questions have been  addressed and answered. Patient's is agreeable to the above stated plan. I will continue to monitor closely and make adjustments to medical management as needed.  _____________________________________________________________________    Nutrition Status:    Radiology:  Fl Modified Barium Swallow Speech  See procedure notes from Speech Pathologist.    This procedure was auto-finalized.  Transesophageal echo (CRAO)with possible cardioversion  Procedure: CARO guided Cardioversion           Final impression:     LV systolic function 50-55%.  left ventricular hypertrophy is present.  AV is probably trileaflet with marked AV slcerocalcification resulting in   severe/critical AS, SHARAD 0.5 cm2, Peak AV 3.5 m/sec.  Mild Mitral stenosis.  Moderate TR.  No intracardiac thrombus is present.  SILVANO is not seen and is probably absent due to prior surgical SILVANO ligation.  Successful cardioversion x 1        Recommendations:     Probably refer the patient to our structural heart program for TAVR.  Continue Amiodarone and Metoprolol  and ASA.  No need for OAC as the patient has a surgically ligated SILVANO without any   residual tissue noted on CARO.        Indication: post CABG AFL- symptomatic     Informed consent: obtained, signed copy placed in the chart.     Description of the procedure: After sedation was achieved (please see   anesthesia report for details), the esophagus intubated without   difficulties. Multiple orthogonal views obtained. Patient tolerated   procedure without immediate complications noted.     Findings:  Left atrium (LA): Mildly enlarged left atrium.  Left atrial appendage (SILVANO):SILVANO is not seen and is probably absent due to   prior surgical SILVANO ligation.  Interatrial septum:  NO ASD or PFO noted on 2D or Color Doppler images.   Right atrium (RA): Mildly enlarged right atrium.  Left ventricle (LV): LVEF 50-55%.   Normal LV size.  left ventricular   hypertrophy is present. No Regional wall motion abnormalities noted.  Right ventricle (RV): Partially visualized in this study.  Aortic valve: AV is probably trileaflet with marked AV slcerocalcification   resulting in severe/critical AS, SHARAD 0.5 cm2, Peak AV 3.5 m/sec. No   Vegetation is present.  Mitral valve: Mild thickening of the mitral valve. Mild Mitral stenosis.    No Mitral valve prolapse. Mild Mitral regurgitation. No Vegetation is   present.  Tricuspid valve: Partially visualized, No Tricuspid stenosis. Moderate   Tricuspid regurgitation. No Vegetation is present.  Pulmonic valve: Not well visualized, no hemodynamically significant   pulmonic stenosis, no pulmonic regurgitation noted in this study. No   Vegetation is present.  Thoracic aorta: Scattered atherosclerotic changes of the descending   thoracic aorta.  Normal Caliber aortic root.   Normal Caliber Proximal   portion of the ascending aorta.   Pericardium: No significant pericardial effusion is present.        Description of the DCCV procedure:   After completion of CARO, no intracardiac thrombus was visualized, so  I   decided to proceed with Cardioversion  200 J synchronized cardioversion was completed.  Number of attempts: 1               Jossy Ann MD   04/12/2023

## 2023-04-12 NOTE — PT/OT/SLP PROGRESS
Occupational Therapy   Treatment    Name: Mike Urbina Jr.  MRN: [id number]  Admitting Diagnosis:  <principal problem not specified>  9 Days Post-Op    Recommendations:     Discharge Recommendations: rehabilitation facility  Discharge Equipment Recommendations:  walker, rolling  Barriers to discharge:       Assessment:     Mike Urbina Jr. is a 67 y.o. male with a medical diagnosis of <principal problem not specified>.  Performance deficits affecting function are weakness, impaired endurance, impaired self care skills, impaired functional mobility, gait instability, impaired balance, decreased safety awareness.     Rehab Prognosis:  Fair; patient would benefit from acute skilled OT services to address these deficits and reach maximum level of function.       Plan:     Patient to be seen 6 x/week to address the above listed problems via self-care/home management, therapeutic activities, therapeutic exercises  Plan of Care Expires: 04/19/23  Plan of Care Reviewed with: patient    Subjective     Pain/Comfort:       Objective:     Communicated with: RN prior to session.  Patient found up in chair with   upon OT entry to room.    General Precautions: Standard, fall, sternal    Orthopedic Precautions:   Braces:    Respiratory Status: Room air     Occupational Performance:     Functional Mobility/Transfers:  Patient completed Sit <> Stand Transfer with minimum assistance  with  no assistive device   Patient completed Toilet Transfer Step Transfer technique with stand by assistance with  rolling walker  Functional Mobility: patient performing mobility with Min A running in to obstacles requiring redirection    Activities of Daily Living:  Grooming: independence sitting in chair brushing hair  Lower Body Dressing: independence sitting in chair donning/doffing socks in forward flex position    Therapeutic Positioning    The following interventions were performed in an effort to prevent and/or reduce acquired  pressure ulcers: geomat was provided for sacral protection while UIC      Patient left up in chair with all lines intact and call button in reach    GOALS:   Multidisciplinary Problems       Occupational Therapy Goals          Problem: Occupational Therapy    Goal Priority Disciplines Outcome Interventions   Occupational Therapy Goal     OT, PT/OT Ongoing, Progressing    Description: Goals to be met by: 4/19/23     Patient will increase functional independence with ADLs by performing:    UE Dressing with Modified Todd.  LE Dressing with Modified Todd and Assistive Devices as needed.  Grooming while standing at sink with Modified Todd.  Toileting from toilet with Modified Todd for hygiene and clothing management.   Toilet transfer to toilet with Modified Todd.                         Time Tracking:     OT Date of Treatment: 04/12/23  OT Start Time: 1004  OT Stop Time: 1027  OT Total Time (min): 23 min    Billable Minutes:Self Care/Home Management 2    OT/ROCCO: ROCCO     Number of ROCCO visits since last OT visit: 4    4/12/2023

## 2023-04-12 NOTE — PROCEDURES
Speech Language Pathology Department  Modified Barium Swallow Study    Patient Name:  Mike Urbina Jr.   MRN:  [id number]    Recommendations:     General recommendations:  SLP follow up with nursing x1 regarding diet toleratce  Repeat MBS study: n/a  Diet recommendations:  Minced & Moist Diet - IDDSI Level 5, Liquid Diet Level: Thin   Swallow strategies/precautions: small bites/sips, slow rate, additional swallow per bite/sip, NO straws, supervision with meals, and medications crushed in puree  General precautions: Standard, aspiration    History/Reason for Referral:     Mike Urbina Jr. is a/n 67 y.o. male admitted after undergoing a CABG.    Past Medical History:   Diagnosis Date    Atrial flutter     CHF (congestive heart failure)     Coronary artery disease     Hypertension     SOB (shortness of breath)     at times     Past Surgical History:   Procedure Laterality Date    CATARACT EXTRACTION Bilateral     CORONARY ARTERY BYPASS GRAFT (CABG) N/A 4/3/2023    Procedure: CORONARY ARTERY BYPASS GRAFT (CABG);  Surgeon: Deuce Finley IV, MD;  Location: [facility] OR;  Service: Cardiovascular;  Laterality: N/A;  WITH LLAA //  ECHO NOTIFIED    LEFT HEART CATHETERIZATION N/A 03/15/2023    Procedure: CATHETERIZATION, HEART, LEFT;  Surgeon: Sreedhar Herrera MD;  Location: [facility] CATH LAB;  Service: Cardiology;  Laterality: N/A;  LHC via RRA     A Modified Barium Swallow Study was completed to assess the efficiency of his/her swallow function, rule out aspiration and make recommendations regarding safe dietary consistencies, effective compensatory strategies, and safe eating environment.     Intubation hx:    Home Diet: Regular and thin liquids  Current Method of Nutrition: NPO    Subjective:     Patient awake, alert, and cooperative.    Spiritual/Cultural/Lutheran Beliefs/Practices that affect care: no  Pain/Comfort: none     Respiratory Status: room air    Fluoroscopic Results:     Oral Musculature  Evaluation:  Oral Musculature: WFL  Dentition: scattered dentition  Secretion Management: adequate  Mucosal Quality: good  Oral Labial Strength and Mobility: impaired seal  Lingual Strength and Mobility: impaired strength    Visualization  Patient was seen in the lateral view    Oral Phase:   Adequate lip closure  Prolonged/disorganized bolus formation  Adequate anterior-posterior transport  Decreased rotary chew  Prolonged mastication  Poor bolus cohesion  Reduced bolus control with liquids    Pharyngeal Phase:   Swallow delay with spill to the valleculae  Reduced base of tongue retraction  Reduced epiglottic deflection  Reduced hyolaryngeal excursion  Reduced airway protection  Moderate vallecular residue with all consistencies  Consistency Laryngeal Penetration Aspiration   Mildly thick liquid by cup None None   Thin liquid by cup None None   Puree None None   Chewable solid None None   Thin liquid by straw During the swallow  Did NOT clear None     Cervical Esophageal Phase:   UES appeared to accommodate all bolus types without stasis or retrograde movement visualized    Compensatory Strategies:  Max cues required for pt to initiate additional swallow to clear pharyngeal residue    Assessment:     Pt exhibited moderate oral (secondary to inadequate dentition) and mild pharyngeal dysphagia resulting in reduced airway protection with laryngeal penetration of thin liquids by straw.  No aspiration visualized during this study.    Oral Phase: Lip closure was adequate with no anterior spillage.  Bolus preparation and mastication were prolonged with decreased rotary chew and poor bolus cohesion.  Bolus transport was timely and efficient.  There was no significant oral residue.  Bolus control was reduced.    Pharyngeal Phase: The swallow was initiated when the bolus entered the valleculae.  The soft palate elevated for adequate closure of the velopharyngeal port.  Tongue base retraction was reduced.  Epiglottic  deflection appeared to be complete.  Hyolaryngeal excursion was reduced.  Airway protection reduced.    Patient Education:     Patient provided with verbal education regarding diet modification and safe swallow strategies.  Understanding was verbalized, however additional teaching warranted.    Plan:     SLP Follow-Up:  Yes    Plan of Care reviewed with:  patient     Time Tracking:     SLP Treatment Date:   04/11/23  Speech Start Time:  1355  Speech Stop Time:  1415     Speech Total Time (min):  20 min    Billable minutes:   Motion Fluoroscopic Evaluation, Video Recording, 20 minutes      04/12/2023

## 2023-04-12 NOTE — PROGRESS NOTES
Inpatient Nutrition Assessment    Admit Date: 4/3/2023   Total duration of encounter: 9 days     Nutrition Recommendation/Prescription     Readvance diet as tolerated.   Add Boost Plus daily (provides 360 kcal and 14 g per container)  Monitor appetite/PO intake, weight, and labs.    Communication of Recommendations: reviewed with patient    Nutrition Assessment     Malnutrition Assessment/Nutrition-Focused Physical Exam    Malnutrition in the context of acute illness or injury  Degree of Malnutrition: does not meet criteria  Energy Intake: < 75% of estimated energy requirement for > 7 days  Interpretation of Weight Loss: does not meet criteria  Body Fat:does not meet criteria  Area of Body Fat Loss: does not meet criteria  Muscle Mass Loss: does not meet criteria  Area of Muscle Mass Loss: does not meet criteria  Fluid Accumulation: does not meet criteria  Edema: does not meet criteria   Reduced  Strength: unable to obtain  A minimum of two characteristics is recommended for diagnosis of either severe or non-severe malnutrition.    Chart Review    Reason Seen: malnutrition screening tool (MST) and follow-up    Malnutrition Screening Tool Results   Have you recently lost weight without trying?: Unsure  Have you been eating poorly because of a decreased appetite?: No   MST Score: 2     Diagnosis:  MVCAD    - s/p CABG (4.3.23) - LIMA to LAD, rSVG to OM1, rSVG to PDA  PAF/Flutter - Now SR    - CHADsVASc - 3 Points - 3.2% Stroke Risk per Year     - s/p (4.3.23) - Ligation of SILVANO with Endostapler  HTN/HHD without Hx of HF or CKD  Anemia - Acute Blood Loss - Improved with Transfusion  Thrombocytopenia - Chronic   VHD (Mild AS, Mild MS)  Hx of NSTEMI  ETOH Abuse  No Hx of GIB    Relevant Medical History:    Atrial flutter      CHF (congestive heart failure)      Coronary artery disease      Hypertension      SOB (shortness of breath)       at times       Nutrition-Related Medications: Aspirin, Atorvastatin, docusate  "sodium, ferrous sulfate, folic acid, furosemide, multivitamin, potassium chloride, sucralfate, thiamine  Calorie Containing IV Medications: no significant kcals from medications at this time    Nutrition-Related Labs:  4/5/2023: WBC 3.5, H/H 9.5/27.9, Na 131, BUN 28.8, Crea 1.29, Ca 8.2,Alb 3.0, Total Bili 1.8, [id number]  4/10/2023: H/H 10.3/31.8, Ca 8.2, Gluc 114  4/11/2023: Gluc 112    Diet/PN Order: Diet Minced & Moist No Straws  Oral Supplement Order: none  Tube Feeding Order: none  Appetite/Oral Intake: fair/50-75% of meals  Factors Affecting Nutritional Intake: decreased appetite  Food/Sabianism/Cultural Preferences: none reported  Food Allergies: none reported    Skin Integrity: incision  Wound(s):       Comments    4/5/2023: Pt reports fair PO intake prior to admit, reports poor appetite/PO intake since admit. Pt denies N/V/D/C and chewing/swallowing difficulties. Pt wears dentures. Pt denies unplanned wt loss. Pt declined ONS. Encouraged adequate PO intake. Will monitor.    4/10/2023: Pt NPO; nurse reports pt needs ST evaluation. The pt denies N/V. Last BM documented as 4/8/2023. Will monitor.    4/12/2023: Pt reports appetite improvement with increased PO intake. Pt denies N/V/D/C. Pt reports last BM this morning. Pt agreeable to ONS. Encouraged increased PO intake. Will monitor.    Anthropometrics    Height: 6' 2" (188 cm) Height Method: Stated  Last Weight: 79.9 kg (176 lb 2.4 oz) (04/11/23 0519) Weight Method: Standard Scale  BMI (Calculated): 22.6  BMI Classification: normal (BMI 18.5-24.9)        Ideal Body Weight (IBW), Male: 190 lb     % Ideal Body Weight, Male (lb): 91.55 %                          Usual Weight Provided By: patient denies unintentional weight loss    Wt Readings from Last 5 Encounters:   04/11/23 79.9 kg (176 lb 2.4 oz)   03/28/23 78 kg (172 lb)   03/15/23 78.6 kg (173 lb 4.5 oz)   01/10/23 76.2 kg (168 lb 1.6 oz)   12/30/22 75.4 kg (166 lb 3.6 oz)     Weight Change(s) Since " Admission:  Admit Weight: 78 kg (172 lb) (23 0813)  2023: 83.5 kg  2023: 79.9 kg    Estimated Needs    Weight Used For Calorie Calculations: 83.5 kg (184 lb 1.4 oz)  Energy Calorie Requirements (kcal): 6617-2113 (25-30 kcal/kg)  Energy Need Method: Kcal/kg  Weight Used For Protein Calculations: 83.5 kg (184 lb 1.4 oz)  Protein Requirements:  (1.0-1.2 g/kg)  Fluid Requirements (mL): 2088 (1 mL/kcal)  Temp (24hrs), Av.6 °F (36.4 °C), Min:97.4 °F (36.3 °C), Max:97.9 °F (36.6 °C)         Enteral Nutrition    Patient not receiving enteral nutrition at this time.    Parenteral Nutrition    Patient not receiving parenteral nutrition support at this time.    Evaluation of Received Nutrient Intake    Calories: not meeting estimated needs  Protein: not meeting estimated needs    Patient Education    Not applicable.    Nutrition Diagnosis     PES: Inadequate oral intake related to acute illness as evidenced by decreased PO intake since admit. (continues)    Interventions/Goals     Intervention(s): general/healthful diet and commercial beverage  Goal: Consume % of meals/snacks by follow-up. (goal progressing)    Monitoring & Evaluation     Dietitian will monitor food and beverage intake, weight, electrolyte/renal panel, and glucose/endocrine profile.  Nutrition Risk/Follow-Up: moderate (follow-up in 3-5 days)   Please consult if re-assessment needed sooner.

## 2023-04-12 NOTE — PT/OT/SLP PROGRESS
Physical Therapy      Patient Name:  Mike Urbina Jr.   MRN:  [id number]    Pt asleep upon entry. 1:1 stated that pt had been up and was finally able to fall asleep. Will check back with pt tomorrow.

## 2023-04-12 NOTE — PROGRESS NOTES
"[facility]   Cardiology  Progress Note    Patient Name: Mike Urbina Jr.  MRN: [id number]  Admission Date: 4/3/2023  Hospital Length of Stay: 9 days  Code Status: Full Code   Attending Provider: Deuce Finley IV, MD   Consulting Provider: Bruno Brock [facility]  Primary Care Physician: LOREN Jerry  Principal Problem:<principal problem not specified>    Patient information was obtained from patient, past medical records, and ER records.     Subjective:     Chief Complaint: Reason for Consult: Post CABG Medical Management    HPI: Mr. Urbina is a 68 y/o male who is known to [facility], Dr. Herrera. The patient presented to [facility] on 4.3.23 for a Planned CABG. The patient was recently worked up worked up for a NSTEMI and found to have an Abnormal PET Scan. The PT underwent a LHC with Noted MVCAD. He was referred [facility] and deemed a candidate for CABG and on 4.3.23 he underwent a Sternotomy with Results of: LIMA to LAD, rSVG to OM1, rSVG to PDA and Ligation of SILVANO with Endostapler. The patient tolerated the procedure well and was stabilized in the ER. [facility] has been consulted for Medical Management of the PT.    4.5.23: NAD. Sitting in Bed. Denies SOB and Palps. + CP/Incisional. "I am doing great."   4.6.23: NAD. PT is a 1:1 - AMS Last PM. Denies SOB and Palps. + CP/Incisional. "I am feeling well."   4.7.23: NAD. Remains 1:1. Denies SOB or palps. + Incisional CP. SR on tele w/ intermittent Afib RVR. Resting.   4.8.23: NAD. Sitting up in chair with family at bedside. "I am feeling good." Afib RVR on tele. Remains on amio infusion. Denies CP, SOB, or palps.   4.9.23: NAD. Looks aflutter RVR on tele w/ frequent PVC's. On Amio. Denies CP, SOB, or palps.   4.10.23: NAD. Remains in aflutter RVR with intermittent bradycardia. Denies CP, SOB, or palps. On 1:1.   4.11.23: Aflutter with RVR on tele. Denies CP/SOB/Palps. NPO for CARO/DCCV today.  4.12.23: Now SR after DCCV. Denies CP/SOB/palps. Awaiting " placement. VSS    PMH: ETOH Abuse, VHD (AS), Thrombocytopenia, NSTEMI, HTN, PAF/Flutter  PSH: Angiogram, CABG, Cataract Extraction   Family History: Mother, L, MI  Social History: + ETOH Use (6pk/Beer per Day for > 30 Years); Denies Tobacco and Illicit Drug Use    Previous Cardiac Diagnostics:   CARO 4.11.23  LV systolic function 50-55%.    LVH is present.    AV is probably trileaflet with marked AV scleral calcification resulting in severe/critical AS, SHARAD 0.5 cm2, peak AV 3.5 m/sec.    Mild mitral stenosis.    Moderate TR   No intracardiac thrombus is present.    SILVANO a is not seen in his probably absent due to prior surgical SILVANO ligation.    Successful cardioversion x1    [facility] 3.15.23:  Surgical coronary anatomy with distal left main 50%; LAD proximal to mid 60-70%; circumflex mid 80- 90%; RCA with proximal .  The ejection fraction was 60% with EDP of 10 mmHg.  Right radial access.  The estimated blood loss was less than 10 cc.  CT surgical consult for CABG evaluation.    PET 1.6.23:  This is an abnormal perfusion study. Study is consistent with ischemia.   This scan is suggestive of moderate risk for future cardiovascular events.   Small reversible perfusion abnormality of moderate intensity in the apical segment. Medium fixed perfusion abnormality of severe intensity in the inferior region.   The left ventricular cavity is noted to be normal on the stress studies. The stress left ventricular ejection fraction was calculated to be 35% and left ventricular global function is moderately reduced. The rest left ventricular cavity is noted to be normal. The rest left ventricular ejection fraction was calculated to be 40% and rest left ventricular global function is mildly reduced.   When compared to the resting ejection fraction (40%), the stress ejection fraction (35%) has decreased.   The study quality is good.   There was a rise in myocardial blood flow between rest and stress.  Global myocardial blood flow  reserve was 1.47.  Myocardial blood flow reserve is globally abnormal, placing the patient at a higher coronary event risk.    ECHO 12.9.22:  The left ventricle is normal in size with mild concentric hypertrophy and normal systolic function.  Grade I left ventricular diastolic dysfunction.  The estimated ejection fraction is 55%.  Normal right ventricular size with normal right ventricular systolic function.  There is mild aortic valve stenosis.  There is mild mitral stenosis.  Normal central venous pressure (3 mmHg).    Review of patient's allergies indicates:  No Known Allergies    No current facility-administered medications on file prior to encounter.     Current Outpatient Medications on File Prior to Encounter   Medication Sig    aspirin (ECOTRIN) 81 MG EC tablet Take 1 tablet (81 mg total) by mouth once daily.    atorvastatin (LIPITOR) 10 MG tablet Take 40 mg by mouth once daily.    furosemide (LASIX) 40 MG tablet TAKE ONE TABLET BY MOUTH DAILY DOSE INCREASED    hydrOXYzine HCL (ATARAX) 25 MG tablet TAKE 1 TABLET BY MOUTH EVERY EVENING    metoprolol succinate (TOPROL-XL) 100 MG 24 hr tablet Take 1 tablet (100 mg total) by mouth once daily.    NIFEdipine (PROCARDIA-XL) 30 MG (OSM) 24 hr tablet Take 1 tablet (30 mg total) by mouth once daily. (Patient taking differently: Take 60 mg by mouth once daily.)    valsartan (DIOVAN) 40 MG tablet Take 1 tablet (40 mg total) by mouth 2 (two) times daily.     Review of Systems   Constitutional:  Negative for fatigue and fever.   Respiratory:  Negative for cough, chest tightness and shortness of breath.    Cardiovascular:  Negative for chest pain, palpitations and leg swelling.   Gastrointestinal:  Negative for abdominal distention.   All other systems reviewed and are negative.    Objective:     Vital Signs (Most Recent):  Temp: 97.5 °F (36.4 °C) (04/12/23 0352)  Pulse: 61 (04/12/23 0352)  Resp: 20 (04/12/23 0004)  BP: 138/73 (04/12/23 0352)  SpO2: 98 % (04/12/23 0352)    Vital Signs (24h Range):  Temp:  [97.2 °F (36.2 °C)-97.7 °F (36.5 °C)] 97.5 °F (36.4 °C)  Pulse:  [] 61  Resp:  [18-20] 20  SpO2:  [69 %-98 %] 98 %  BP: (118-138)/(62-73) 138/73     Weight: 79.9 kg (176 lb 2.4 oz)  Body mass index is 22.62 kg/m².    SpO2: 98 %         Intake/Output Summary (Last 24 hours) at 4/12/2023 1037  Last data filed at 4/12/2023 0842  Gross per 24 hour   Intake 920 ml   Output 1100 ml   Net -180 ml         Lines/Drains/Airways       Peripheral Intravenous Line  Duration                  Peripheral IV - Single Lumen 04/03/23 0540 18 G Anterior;Right Forearm 9 days                  Significant Labs:  Recent Results (from the past 72 hour(s))   Magnesium    Collection Time: 04/10/23  4:54 AM   Result Value Ref Range    Magnesium Level 1.90 1.60 - 2.60 mg/dL   Phosphorus    Collection Time: 04/10/23  4:54 AM   Result Value Ref Range    Phosphorus Level 4.3 2.3 - 4.7 mg/dL   Basic Metabolic Panel    Collection Time: 04/10/23  4:54 AM   Result Value Ref Range    Sodium Level 136 136 - 145 mmol/L    Potassium Level 3.8 3.5 - 5.1 mmol/L    Chloride 104 98 - 107 mmol/L    Carbon Dioxide 21 (L) 23 - 31 mmol/L    Glucose Level 114 82 - 115 mg/dL    Blood Urea Nitrogen 22.5 8.4 - 25.7 mg/dL    Creatinine 1.09 0.73 - 1.18 mg/dL    BUN/Creatinine Ratio 21     Calcium Level Total 8.2 (L) 8.8 - 10.0 mg/dL    Anion Gap 11.0 mEq/L    eGFR >60 mls/min/1.73/m2   CBC with Differential    Collection Time: 04/10/23  4:54 AM   Result Value Ref Range    WBC 6.0 4.5 - 11.5 x10(3)/mcL    RBC 3.55 (L) 4.70 - 6.10 x10(6)/mcL    Hgb 10.3 (L) 14.0 - 18.0 g/dL    Hct 31.8 (L) 42.0 - 52.0 %    MCV 89.6 80.0 - 94.0 fL    MCH 29.0 27.0 - 31.0 pg    MCHC 32.4 (L) 33.0 - 36.0 g/dL    RDW 15.6 11.5 - 17.0 %    Platelet 143 130 - 400 x10(3)/mcL    MPV 10.3 7.4 - 10.4 fL    Neut % 63.1 %    Lymph % 18.7 %    Mono % 15.6 %    Eos % 1.7 %    Basophil % 0.2 %    Lymph # 1.13 0.6 - 4.6 x10(3)/mcL    Neut # 3.81 2.1 - 9.2  x10(3)/mcL    Mono # 0.94 0.1 - 1.3 x10(3)/mcL    Eos # 0.10 0 - 0.9 x10(3)/mcL    Baso # 0.01 0 - 0.2 x10(3)/mcL    IG# 0.04 0 - 0.04 x10(3)/mcL    IG% 0.7 %    NRBC% 0.0 %   Blood Culture    Collection Time: 04/10/23  9:21 AM    Specimen: Arm, Left; Blood   Result Value Ref Range    CULTURE, BLOOD ([facility]) No Growth At 24 Hours    Blood Culture    Collection Time: 04/10/23 10:06 AM    Specimen: Arm, Left; Blood   Result Value Ref Range    CULTURE, BLOOD ([facility]) No Growth At 24 Hours    Magnesium    Collection Time: 04/11/23  4:44 AM   Result Value Ref Range    Magnesium Level 1.90 1.60 - 2.60 mg/dL   Phosphorus    Collection Time: 04/11/23  4:44 AM   Result Value Ref Range    Phosphorus Level 3.8 2.3 - 4.7 mg/dL   Basic Metabolic Panel    Collection Time: 04/11/23  4:44 AM   Result Value Ref Range    Sodium Level 135 (L) 136 - 145 mmol/L    Potassium Level 3.5 3.5 - 5.1 mmol/L    Chloride 104 98 - 107 mmol/L    Carbon Dioxide 21 (L) 23 - 31 mmol/L    Glucose Level 112 82 - 115 mg/dL    Blood Urea Nitrogen 24.1 8.4 - 25.7 mg/dL    Creatinine 1.18 0.73 - 1.18 mg/dL    BUN/Creatinine Ratio 20     Calcium Level Total 8.1 (L) 8.8 - 10.0 mg/dL    Anion Gap 10.0 mEq/L    eGFR >60 mls/min/1.73/m2   VANCOMYCIN, TROUGH    Collection Time: 04/11/23  4:44 AM   Result Value Ref Range    Vancomycin Trough 15.4 15.0 - 20.0 ug/ml   CBC with Differential    Collection Time: 04/11/23  4:44 AM   Result Value Ref Range    WBC 5.7 4.5 - 11.5 x10(3)/mcL    RBC 3.50 (L) 4.70 - 6.10 x10(6)/mcL    Hgb 10.5 (L) 14.0 - 18.0 g/dL    Hct 31.2 (L) 42.0 - 52.0 %    MCV 89.1 80.0 - 94.0 fL    MCH 30.0 27.0 - 31.0 pg    MCHC 33.7 33.0 - 36.0 g/dL    RDW 15.7 11.5 - 17.0 %    Platelet 168 130 - 400 x10(3)/mcL    MPV 10.6 (H) 7.4 - 10.4 fL    Neut % 66.7 %    Lymph % 16.3 %    Mono % 13.8 %    Eos % 1.8 %    Basophil % 0.5 %    Lymph # 0.92 0.6 - 4.6 x10(3)/mcL    Neut # 3.77 2.1 - 9.2 x10(3)/mcL    Mono # 0.78 0.1 - 1.3 x10(3)/mcL    Eos # 0.10 0 - 0.9  x10(3)/mcL    Baso # 0.03 0 - 0.2 x10(3)/mcL    IG# 0.05 (H) 0 - 0.04 x10(3)/mcL    IG% 0.9 %    NRBC% 0.0 %   Transesophageal echo (CARO)with possible cardioversion    Collection Time: 04/11/23 10:12 AM   Result Value Ref Range    AV mean gradient 24 mmHg    Ao peak audi 3.28 m/s    Ao VTI 69.3 cm    AV peak gradient 43 mmHg    TR Max Audi 2.54 m/s    Triscuspid Valve Regurgitation Peak Gradient 26 mmHg   POCT glucose    Collection Time: 04/11/23  3:59 PM   Result Value Ref Range    POCT Glucose 112 (H) 70 - 110 mg/dL   Magnesium    Collection Time: 04/12/23  3:56 AM   Result Value Ref Range    Magnesium Level 1.90 1.60 - 2.60 mg/dL   Phosphorus    Collection Time: 04/12/23  3:56 AM   Result Value Ref Range    Phosphorus Level 3.5 2.3 - 4.7 mg/dL     Telemetry: SR    Physical Exam  Constitutional:       Appearance: Normal appearance.   HENT:      Head: Normocephalic.      Nose: Nose normal.      Mouth/Throat:      Mouth: Mucous membranes are moist.   Eyes:      Extraocular Movements: Extraocular movements intact.   Cardiovascular:      Rate and Rhythm: Tachycardia present. Rhythm irregular.      Pulses: Normal pulses.      Heart sounds: Normal heart sounds. No murmur heard.  Pulmonary:      Effort: Pulmonary effort is normal.      Breath sounds: Normal breath sounds.   Abdominal:      Palpations: Abdomen is soft.   Skin:     General: Skin is warm and dry.      Comments: Midline Sternotomy Dressing C/D/I.    Neurological:      General: No focal deficit present.      Mental Status: He is alert and oriented to person, place, and time.   Psychiatric:         Mood and Affect: Mood normal.         Behavior: Behavior normal.     Home Medications:   No current facility-administered medications on file prior to encounter.     Current Outpatient Medications on File Prior to Encounter   Medication Sig Dispense Refill    aspirin (ECOTRIN) 81 MG EC tablet Take 1 tablet (81 mg total) by mouth once daily. 30 tablet 0    atorvastatin  (LIPITOR) 10 MG tablet Take 40 mg by mouth once daily.      furosemide (LASIX) 40 MG tablet TAKE ONE TABLET BY MOUTH DAILY DOSE INCREASED      hydrOXYzine HCL (ATARAX) 25 MG tablet TAKE 1 TABLET BY MOUTH EVERY EVENING 30 tablet 1    metoprolol succinate (TOPROL-XL) 100 MG 24 hr tablet Take 1 tablet (100 mg total) by mouth once daily. 30 tablet 0    NIFEdipine (PROCARDIA-XL) 30 MG (OSM) 24 hr tablet Take 1 tablet (30 mg total) by mouth once daily. (Patient taking differently: Take 60 mg by mouth once daily.) 30 tablet 0    valsartan (DIOVAN) 40 MG tablet Take 1 tablet (40 mg total) by mouth 2 (two) times daily. 60 tablet 0     Current Inpatient Medications:    Current Facility-Administered Medications:     0.9%  NaCl infusion (for blood administration), , Intravenous, Q24H PRN, Graham Aiken MD    0.9%  NaCl infusion (for blood administration), , Intravenous, Q24H PRN, Graham Aiken MD    acetaminophen oral solution 650 mg, 650 mg, Per OG tube, Q6H PRN, GISEL Bingham, 650 mg at 04/05/23 2014    albumin human 5% bottle 12.5 g, 12.5 g, Intravenous, PRN, GISEL Bingham, Stopped at 04/03/23 2100    amiodarone tablet 200 mg, 200 mg, Oral, BID, LOREN Chen, 200 mg at 04/12/23 0851    [START ON 4/15/2023] amiodarone tablet 200 mg, 200 mg, Oral, Daily, LOREN Chen    amLODIPine tablet 10 mg, 10 mg, Oral, Daily, HONORIO Villalpando, 10 mg at 04/12/23 0851    aspirin EC tablet 81 mg, 81 mg, Oral, Daily, GISEL Bingham, 81 mg at 04/12/23 0851    atorvastatin tablet 40 mg, 40 mg, Oral, Daily, HONORIO Villalpando, 40 mg at 04/12/23 0851    calcium gluconate 1 g in NS IVPB (premixed), 1 g, Intravenous, PRN, GISEL Bingham    calcium gluconate 1 g in NS IVPB (premixed), 2 g, Intravenous, PRN, GISEL Bingham    calcium gluconate 1 g in NS IVPB (premixed), 3 g, Intravenous, PRN, GISEL Bingham    dextrose 10% bolus 125 mL 125 mL, 12.5 g, Intravenous, PRN, Nicholas ARENAS  GISEL Murphy    dextrose 10% bolus 125 mL 125 mL, 12.5 g, Intravenous, PRN, Graham Aiken MD    dextrose 10% bolus 250 mL 250 mL, 25 g, Intravenous, PRN, GISEL Bingham    dextrose 10% bolus 250 mL 250 mL, 25 g, Intravenous, PRN, Graham Aiken MD    diltiaZEM 125 mg in dextrose 5 % 125 mL IVPB (ready to mix) (titrating), 0-15 mg/hr, Intravenous, Continuous, LOREN Chen, Last Rate: 5 mL/hr at 04/11/23 0208, 5 mg/hr at 04/11/23 0208    docusate sodium capsule 100 mg, 100 mg, Oral, BID, GISEL Bingham, 100 mg at 04/12/23 0851    famotidine tablet 20 mg, 20 mg, Oral, BID, Deuce Finley IV, MD, 20 mg at 04/12/23 0851    ferrous sulfate tablet 1 each, 1 tablet, Oral, Daily, Jossy Ann MD, 1 each at 04/12/23 0850    folic acid tablet 1 mg, 1 mg, Oral, Daily, Frankie Metcalf MD, 1 mg at 04/12/23 0851    furosemide tablet 20 mg, 20 mg, Oral, BID, Wyatt Carl PA-C, 20 mg at 04/12/23 0851    glucagon (human recombinant) injection 1 mg, 1 mg, Intramuscular, PRN, Graham Aiken MD    glucose chewable tablet 16 g, 16 g, Oral, PRN, Graham Aiken MD    glucose chewable tablet 24 g, 24 g, Oral, PRN, Graham Aiken MD    haloperidol lactate injection 2 mg, 2 mg, Intravenous, Q6H PRN, Simran Meza AGACNP-BC    hydrALAZINE injection 20 mg, 20 mg, Intravenous, Q6H PRN, Elizabeth Bates NP, 20 mg at 04/06/23 1601    HYDROcodone-acetaminophen 5-325 mg per tablet 1 tablet, 1 tablet, Oral, Q4H PRN, GISEL Bingham, 1 tablet at 04/11/23 2316    insulin aspart U-100 injection 0-5 Units, 0-5 Units, Subcutaneous, QID (AC + HS) PRN, Graham Aiken MD    labetaloL injection 20 mg, 20 mg, Intravenous, Q4H PRN, Elizabeth Bates NP, 20 mg at 04/07/23 0558    LORazepam (ATIVAN) injection 2 mg, 2 mg, Intravenous, Q8H PRN, Frankie Metcalf MD, 2 mg at 04/07/23 0033    magnesium sulfate 2g in water 50mL IVPB (premix), 2 g, Intravenous, PRN, GISEL Bingham     magnesium sulfate 2g in water 50mL IVPB (premix), 4 g, Intravenous, PRN, Nicholas Murphy PA    metoprolol tartrate (LOPRESSOR) tablet 100 mg, 100 mg, Oral, BID, LOREN Chen, 100 mg at 04/12/23 0851    multivitamin tablet, 1 tablet, Oral, Daily, Frankie Metcalf MD, 1 tablet at 04/12/23 0851    ondansetron injection 4 mg, 4 mg, Intravenous, Q4H PRN, Nicholas Murphy PA    potassium chloride 20 mEq in 100 mL IVPB (FOR CENTRAL LINE ADMINISTRATION ONLY), 20 mEq, Intravenous, PRN, Nicholas P Evelins, PA    potassium chloride 20 mEq in 100 mL IVPB (FOR CENTRAL LINE ADMINISTRATION ONLY), 20 mEq, Intravenous, PRN, Nicholas P Jeffrey, PA    potassium chloride 40 mEq in 100 mL IVPB (FOR CENTRAL LINE ADMINISTRATION ONLY), 40 mEq, Intravenous, PRN, Nicholas P Jeffrey, PA    potassium chloride SA CR tablet 20 mEq, 20 mEq, Oral, Daily, Wyatt Carl PA-C, 20 mEq at 04/12/23 0851    sodium phosphate 15 mmol in dextrose 5 % (D5W) 250 mL IVPB, 15 mmol, Intravenous, PRN, Nicholas P Jeffrey, PA    sodium phosphate 20.01 mmol in dextrose 5 % (D5W) 250 mL IVPB, 20.01 mmol, Intravenous, PRN, Nicholas P Evelins, PA    sodium phosphate 30 mmol in dextrose 5 % (D5W) 250 mL IVPB, 30 mmol, Intravenous, PRN, Nicholas Waterss, PA    sucralfate tablet 1 g, 1 g, Oral, QID (AC & HS), Nicholas Murphy PA, 1 g at 04/12/23 0545    thiamine tablet 200 mg, 200 mg, Oral, Daily, Frankie Metcalf MD, 200 mg at 04/12/23 0851    valsartan tablet 160 mg, 160 mg, Oral, BID, HONORIO Villalpando, 160 mg at 04/11/23 2128    VTE Risk Mitigation (From admission, onward)           Ordered     Place sequential compression device  Until discontinued         04/04/23 0324     IP VTE HIGH RISK PATIENT  Once         04/03/23 0951                  Assessment:   MVCAD    - s/p CABG (4.3.23) - LIMA to LAD, rSVG to OM1, rSVG to PDA  PAF/Flutter with RVR s/p DCCV    - CHADsVASc - 3 Points - 3.2% Stroke Risk per Year     - s/p (4.3.23) - Ligation of SILVANO with  Endostapler  VHD/severe AS  HTN/HHD without Hx of HF or CKD  Encephalopathy - Likely Related to ETOH DTs  Anemia - Acute Blood Loss - Improved with Transfusion  Thrombocytopenia - Chronic   VHD (Mild AS, Mild MS)  Hx of NSTEMI  ETOH Abuse  No Hx of GIB    Plan:     Will refer to structural heart clinic for TAVR evaluation  Pt with Ligation of SILVANO w/ Endostapler. No plans for OAC. Defer to outpatient.   Continue oral amio load. Continue metoprolol tartrate 100 mg BID.   Continue ASA, ARB and Statin  Continue Norvasc 10mg PO Qday  Aggressive Mobilization and Q1HR IS  Labs in AM: CBC, CMP and Mg    ARMAND Harris-BC   Cardiology  [facility]   04/12/2023     I have seen the patient, reviewed the Nurse Practitioner's note, assessment and plan. I have personally interviewed and examined the patient at bedside and agree with the findings. Medical decision making listed above were done under my guidance.  Need opt evaluation for TAVR  S/p CARO guided DCCV  No need for OAC due to complete surgical SILVANO ligation

## 2023-04-12 NOTE — PT/OT/SLP DISCHARGE
Speech Language Pathology Department  Diet Tolerance Follow-up    Patient Name:  Mike Urbina Jr.   MRN:  [id number]  Admitting Diagnosis: CABG    Recommendations:     General recommendations:  SLP intervention not indicated  Diet recommendations:  Minced & Moist Diet - IDDSI Level 5, Liquid Diet Level: Thin   Swallow strategies/precautions: small bites/sips, slow rate, additional swallow per bite/sip, NO straws, supervision with meals, and medications crushed in puree  Precautions: Standard, aspiration    Diet tolerance:     Nursing reports no difficulty regarding diet tolerance.    Plan:     SLP Follow-Up:  No      04/12/2023

## 2023-04-13 LAB
BACTERIA BLD CULT: NORMAL
POCT GLUCOSE: 88 MG/DL (ref 70–110)

## 2023-04-13 PROCEDURE — 25000003 PHARM REV CODE 250: Performed by: INTERNAL MEDICINE

## 2023-04-13 PROCEDURE — 21400001 HC TELEMETRY ROOM

## 2023-04-13 PROCEDURE — 25000003 PHARM REV CODE 250: Performed by: PHYSICIAN ASSISTANT

## 2023-04-13 PROCEDURE — 63600175 PHARM REV CODE 636 W HCPCS: Performed by: STUDENT IN AN ORGANIZED HEALTH CARE EDUCATION/TRAINING PROGRAM

## 2023-04-13 PROCEDURE — 63600175 PHARM REV CODE 636 W HCPCS: Performed by: NURSE PRACTITIONER

## 2023-04-13 PROCEDURE — 25000003 PHARM REV CODE 250: Performed by: SPECIALIST

## 2023-04-13 PROCEDURE — 93010 ELECTROCARDIOGRAM REPORT: CPT | Mod: ,,, | Performed by: INTERNAL MEDICINE

## 2023-04-13 PROCEDURE — 97535 SELF CARE MNGMENT TRAINING: CPT | Mod: CO

## 2023-04-13 PROCEDURE — 93010 EKG 12-LEAD: ICD-10-PCS | Mod: ,,, | Performed by: INTERNAL MEDICINE

## 2023-04-13 PROCEDURE — 25000003 PHARM REV CODE 250: Performed by: NURSE PRACTITIONER

## 2023-04-13 PROCEDURE — 97116 GAIT TRAINING THERAPY: CPT | Mod: CQ

## 2023-04-13 PROCEDURE — 25000003 PHARM REV CODE 250

## 2023-04-13 PROCEDURE — 63600175 PHARM REV CODE 636 W HCPCS: Performed by: INTERNAL MEDICINE

## 2023-04-13 PROCEDURE — 93005 ELECTROCARDIOGRAM TRACING: CPT

## 2023-04-13 RX ORDER — LORAZEPAM 2 MG/ML
INJECTION INTRAMUSCULAR
Status: DISPENSED
Start: 2023-04-13 | End: 2023-04-14

## 2023-04-13 RX ORDER — TALC
6 POWDER (GRAM) TOPICAL NIGHTLY PRN
Status: DISCONTINUED | OUTPATIENT
Start: 2023-04-13 | End: 2023-04-17 | Stop reason: HOSPADM

## 2023-04-13 RX ORDER — DIGOXIN 0.25 MG/ML
500 INJECTION INTRAMUSCULAR; INTRAVENOUS ONCE
Status: COMPLETED | OUTPATIENT
Start: 2023-04-13 | End: 2023-04-13

## 2023-04-13 RX ORDER — DIGOXIN 0.25 MG/ML
250 INJECTION INTRAMUSCULAR; INTRAVENOUS EVERY 6 HOURS
Status: COMPLETED | OUTPATIENT
Start: 2023-04-13 | End: 2023-04-14

## 2023-04-13 RX ADMIN — FERROUS SULFATE TAB 325 MG (65 MG ELEMENTAL FE) 1 EACH: 325 (65 FE) TAB at 10:04

## 2023-04-13 RX ADMIN — VALSARTAN 160 MG: 80 TABLET, FILM COATED ORAL at 08:04

## 2023-04-13 RX ADMIN — THIAMINE HCL TAB 100 MG 200 MG: 100 TAB at 10:04

## 2023-04-13 RX ADMIN — FAMOTIDINE 20 MG: 20 TABLET, FILM COATED ORAL at 10:04

## 2023-04-13 RX ADMIN — LORAZEPAM 0.5 MG: 2 INJECTION INTRAMUSCULAR; INTRAVENOUS at 12:04

## 2023-04-13 RX ADMIN — Medication 6 MG: at 09:04

## 2023-04-13 RX ADMIN — FAMOTIDINE 20 MG: 20 TABLET, FILM COATED ORAL at 08:04

## 2023-04-13 RX ADMIN — AMIODARONE HYDROCHLORIDE 0.5 MG/MIN: 1.8 INJECTION, SOLUTION INTRAVENOUS at 03:04

## 2023-04-13 RX ADMIN — FUROSEMIDE 20 MG: 20 TABLET ORAL at 05:04

## 2023-04-13 RX ADMIN — POTASSIUM CHLORIDE 20 MEQ: 1500 TABLET, EXTENDED RELEASE ORAL at 09:04

## 2023-04-13 RX ADMIN — ATORVASTATIN CALCIUM 40 MG: 40 TABLET, FILM COATED ORAL at 10:04

## 2023-04-13 RX ADMIN — VALSARTAN 160 MG: 80 TABLET, FILM COATED ORAL at 10:04

## 2023-04-13 RX ADMIN — AMIODARONE HYDROCHLORIDE 150 MG: 1.5 INJECTION, SOLUTION INTRAVENOUS at 10:04

## 2023-04-13 RX ADMIN — FOLIC ACID 1 MG: 1 TABLET ORAL at 10:04

## 2023-04-13 RX ADMIN — DOCUSATE SODIUM 100 MG: 100 CAPSULE, LIQUID FILLED ORAL at 10:04

## 2023-04-13 RX ADMIN — METOPROLOL TARTRATE 100 MG: 50 TABLET, FILM COATED ORAL at 08:04

## 2023-04-13 RX ADMIN — SUCRALFATE 1 G: 1 TABLET ORAL at 06:04

## 2023-04-13 RX ADMIN — ASPIRIN 81 MG: 81 TABLET, COATED ORAL at 10:04

## 2023-04-13 RX ADMIN — LORAZEPAM 0.5 MG: 2 INJECTION INTRAMUSCULAR; INTRAVENOUS at 08:04

## 2023-04-13 RX ADMIN — FUROSEMIDE 20 MG: 20 TABLET ORAL at 10:04

## 2023-04-13 RX ADMIN — AMLODIPINE BESYLATE 10 MG: 5 TABLET ORAL at 10:04

## 2023-04-13 RX ADMIN — Medication 6 MG: at 02:04

## 2023-04-13 RX ADMIN — AMIODARONE HYDROCHLORIDE 1 MG/MIN: 1.8 INJECTION, SOLUTION INTRAVENOUS at 11:04

## 2023-04-13 RX ADMIN — DOCUSATE SODIUM 100 MG: 100 CAPSULE, LIQUID FILLED ORAL at 08:04

## 2023-04-13 RX ADMIN — DIGOXIN 250 MCG: 0.25 INJECTION INTRAMUSCULAR; INTRAVENOUS at 05:04

## 2023-04-13 RX ADMIN — DIGOXIN 500 MCG: 0.25 INJECTION INTRAMUSCULAR; INTRAVENOUS at 12:04

## 2023-04-13 RX ADMIN — METOPROLOL TARTRATE 100 MG: 50 TABLET, FILM COATED ORAL at 10:04

## 2023-04-13 RX ADMIN — THERA TABS 1 TABLET: TAB at 10:04

## 2023-04-13 RX ADMIN — SUCRALFATE 1 G: 1 TABLET ORAL at 08:04

## 2023-04-13 NOTE — PT/OT/SLP PROGRESS
Occupational Therapy   Treatment    Name: Mike Urbina Jr.  MRN: [id number]  Admitting Diagnosis:  <principal problem not specified>  10 Days Post-Op    Recommendations:     Discharge Recommendations: nursing facility, skilled  Discharge Equipment Recommendations:  walker, rolling  Barriers to discharge:       Assessment:     Mike Urbina Jr. is a 67 y.o. male with a medical diagnosis of <principal problem not specified>.   Performance deficits affecting function are weakness, impaired endurance, impaired self care skills, impaired functional mobility, gait instability, impaired balance, decreased safety awareness.     Rehab Prognosis:  Fair; patient would benefit from acute skilled OT services to address these deficits and reach maximum level of function.       Plan:     Patient to be seen 6 x/week to address the above listed problems via self-care/home management, therapeutic activities, therapeutic exercises  Plan of Care Expires: 04/19/23  Plan of Care Reviewed with: patient    Subjective     Pain/Comfort:       Objective:     Communicated with: RN prior to session.  Patient found HOB elevated with   upon OT entry to room.    General Precautions: Standard, fall, sternal    Orthopedic Precautions:   Braces:    Respiratory Status: Nasal cannula, flow 1 L/min  Vital Signs: HR: 75  Sp02: 95     Occupational Performance:     Bed Mobility:    Patient completed Supine to Sit with moderate assistance  Patient completed Sit to Supine with moderate assistance     Activities of Daily Living:  Upper Body Dressing: minimum assistance donning/doffing pullover shirt    Therapeutic Activities:  Patient sitting EOB for dressing activity requiring Min/Mod A for sitting balance with 3 instances of LOB posteriorly.     Therapeutic Positioning    The following interventions were performed in an effort to prevent and/or reduce acquired pressure ulcers: therapeutic positioning was provided to offload all bony prominences  at the conclusion of session      Patient left HOB elevated with all lines intact and call button in reach    GOALS:   Multidisciplinary Problems       Occupational Therapy Goals          Problem: Occupational Therapy    Goal Priority Disciplines Outcome Interventions   Occupational Therapy Goal     OT, PT/OT Ongoing, Progressing    Description: Goals to be met by: 4/19/23     Patient will increase functional independence with ADLs by performing:    UE Dressing with Modified [place].  LE Dressing with Modified [place] and Assistive Devices as needed.  Grooming while standing at sink with Modified [place].  Toileting from toilet with Modified [place] for hygiene and clothing management.   Toilet transfer to toilet with Modified [place].                         Time Tracking:     OT Date of Treatment: 04/13/23  OT Start Time: 1444  OT Stop Time: 1500  OT Total Time (min): 16 min    Billable Minutes:Self Care/Home Management 1    OT/ROCCO: ROCCO     Number of ROCCO visits since last OT visit: 5    4/13/2023

## 2023-04-13 NOTE — PT/OT/SLP PROGRESS
Physical Therapy Treatment    Patient Name:  Mike Urbina Jr.   MRN:  [id number]    Recommendations:     Discharge Recommendations: rehabilitation facility  Discharge Equipment Recommendations: to be determined by next level of care  Barriers to discharge:  impaired functional mobility     Assessment:     Mike Urbina Jr. is a 67 y.o. male admitted with a medical diagnosis of  Encephalopathy, Acute alcohol withdrawal ,Hx of ETOH Use  CAD status post CABG x3 on 04/03/2023. presents with the following impairments/functional limitations: weakness, impaired endurance, impaired functional mobility, gait instability, impaired balance .   Pt would benefit from further rehab therapy services to increase overall independence level and assist in getting pt closer to PLOF.      Pt with decreased safety awareness and remains a fall risk.    Rehab Prognosis: Good; patient would benefit from acute skilled PT services to address these deficits and reach maximum level of function.    Recent Surgery: Procedure(s) (LRB):  CORONARY ARTERY BYPASS GRAFT (CABG) (N/A) 10 Days Post-Op    Plan:     During this hospitalization, patient to be seen 5 x/week to address the identified rehab impairments via gait training, therapeutic activities, therapeutic exercises and progress toward the following goals:    Plan of Care Expires:  05/30/23    Subjective     Chief Complaint:   Patient/Family Comments/goals:   Pain/Comfort:  Pain Rating 1: 0/10      Objective:     Communicated with NSG prior to session.  Patient found HOB elevated with peripheral IV, telemetry, pulse ox (continuous), oxygen upon PT entry to room.     General Precautions: Standard, sternal  Orthopedic Precautions:    Braces:    Respiratory Status: Nasal cannula, flow 1 L/min, 99-96% throughout tx   Blood Pressure:   Skin Integrity: Visible skin intact      Functional Mobility:  Bed Mobility:     Scooting: contact guard assistance  Supine to Sit: minimum  assistance  Sit to Supine: minimum assistance  Transfers:     Sit to Stand:  minimum assistance with rolling walker  Gait: pt amb 50ft/46ft with RW Jennifer. Pt required with seated rest between trials. Pt unsteady during both trials but had no major LOB.       ~increased time required managing pt lines 2/2 pt having multiple family members in room.     Patient left HOB elevated with all lines intact, call button in reach, and bed alarm on..    GOALS:   Multidisciplinary Problems       Physical Therapy Goals          Problem: Physical Therapy    Goal Priority Disciplines Outcome Goal Variances Interventions   Physical Therapy Goal     PT, PT/OT Ongoing, Progressing     Description: Goals to be met by: 23     Patient will increase functional independence with mobility by performin. Supine to sit with Stand-by Assistance  2. Sit to supine with Stand-by Assistance  3. Sit to stand transfer with Stand-by Assistance  4. Gait  x 200 feet with Stand-by Assistance using Rolling Walker.                          Time Tracking:     PT Received On: 23  PT Start Time: 1412     PT Stop Time: 1442  PT Total Time (min): 30 min     Billable Minutes: Gait Training 30    Treatment Type: Treatment  PT/PTA: PTA     Number of PTA visits since last PT visit: 2023

## 2023-04-13 NOTE — PROGRESS NOTES
"[facility]   Cardiology  Progress Note    Patient Name: Mike Urbina Jr.  MRN: [id number]  Admission Date: 4/3/2023  Hospital Length of Stay: 10 days  Code Status: Full Code   Attending Provider: Deuce Finley IV, MD   Consulting Provider: Bruno Brock [facility]  Primary Care Physician: LOREN Jerry  Principal Problem:<principal problem not specified>    Patient information was obtained from patient, past medical records, and ER records.     Subjective:     Chief Complaint: Reason for Consult: Post CABG Medical Management    HPI: Mr. Urbina is a 66 y/o male who is known to [facility], Dr. Herrera. The patient presented to [facility] on 4.3.23 for a Planned CABG. The patient was recently worked up worked up for a NSTEMI and found to have an Abnormal PET Scan. The PT underwent a LHC with Noted MVCAD. [facility] was referred [facility] and deemed a candidate for CABG and on 4.3.23 he underwent a Sternotomy with Results of: LIMA to LAD, rSVG to OM1, rSVG to PDA and Ligation of SILVANO with Endostapler. The patient tolerated the procedure well and was stabilized in the ER. [facility] has been consulted for Medical Management of the PT.    4.5.23: NAD. Sitting in Bed. Denies SOB and Palps. + CP/Incisional. "I am doing great."   4.6.23: NAD. PT is a 1:1 - AMS Last PM. Denies SOB and Palps. + CP/Incisional. "I am feeling well."   4.7.23: NAD. Remains 1:1. Denies SOB or palps. + Incisional CP. SR on tele w/ intermittent Afib RVR. Resting.   4.8.23: NAD. Sitting up in chair with family at bedside. "I am feeling good." Afib RVR on tele. Remains on amio infusion. Denies CP, SOB, or palps.   4.9.23: NAD. Looks aflutter RVR on tele w/ frequent PVC's. On Amio. Denies CP, SOB, or palps.   4.10.23: NAD. Remains in aflutter RVR with intermittent bradycardia. Denies CP, SOB, or palps. On 1:1.   4.11.23: Aflutter with RVR on tele. Denies CP/SOB/Palps. NPO for CARO/DCCV today.  4.12.23: Now SR after DCCV. Denies CP/SOB/palps. Awaiting " placement. VSS  4.13.23: Back on Aflutter on tele. Denies CP/SOB/palps. Awaiting placement.    PMH: ETOH Abuse, VHD (AS), Thrombocytopenia, NSTEMI, HTN, PAF/Flutter  PSH: Angiogram, CABG, Cataract Extraction   Family History: Mother, L, MI  Social History: + ETOH Use (6pk/Beer per Day for > 30 Years); Denies Tobacco and Illicit Drug Use    Previous Cardiac Diagnostics:   CARO 4.11.23  LV systolic function 50-55%.    LVH is present.    AV is probably trileaflet with marked AV scleral calcification resulting in severe/critical AS, SHARAD 0.5 cm2, peak AV 3.5 m/sec.    Mild mitral stenosis.    Moderate TR   No intracardiac thrombus is present.    SILVANO a is not seen in his probably absent due to prior surgical SILVANO ligation.    Successful cardioversion x1    [facility] 3.15.23:  Surgical coronary anatomy with distal left main 50%; LAD proximal to mid 60-70%; circumflex mid 80- 90%; RCA with proximal .  The ejection fraction was 60% with EDP of 10 mmHg.  Right radial access.  The estimated blood loss was less than 10 cc.  CT surgical consult for CABG evaluation.    PET 1.6.23:  This is an abnormal perfusion study. Study is consistent with ischemia.   This scan is suggestive of moderate risk for future cardiovascular events.   Small reversible perfusion abnormality of moderate intensity in the apical segment. Medium fixed perfusion abnormality of severe intensity in the inferior region.   The left ventricular cavity is noted to be normal on the stress studies. The stress left ventricular ejection fraction was calculated to be 35% and left ventricular global function is moderately reduced. The rest left ventricular cavity is noted to be normal. The rest left ventricular ejection fraction was calculated to be 40% and rest left ventricular global function is mildly reduced.   When compared to the resting ejection fraction (40%), the stress ejection fraction (35%) has decreased.   The study quality is good.   There was a rise in  myocardial blood flow between rest and stress.  Global myocardial blood flow reserve was 1.47.  Myocardial blood flow reserve is globally abnormal, placing the patient at a higher coronary event risk.    ECHO 12.9.22:  The left ventricle is normal in size with mild concentric hypertrophy and normal systolic function.  Grade I left ventricular diastolic dysfunction.  The estimated ejection fraction is 55%.  Normal right ventricular size with normal right ventricular systolic function.  There is mild aortic valve stenosis.  There is mild mitral stenosis.  Normal central venous pressure (3 mmHg).    Review of patient's allergies indicates:  No Known Allergies    No current facility-administered medications on file prior to encounter.     Current Outpatient Medications on File Prior to Encounter   Medication Sig    aspirin (ECOTRIN) 81 MG EC tablet Take 1 tablet (81 mg total) by mouth once daily.    atorvastatin (LIPITOR) 10 MG tablet Take 40 mg by mouth once daily.    furosemide (LASIX) 40 MG tablet TAKE ONE TABLET BY MOUTH DAILY DOSE INCREASED    hydrOXYzine HCL (ATARAX) 25 MG tablet TAKE 1 TABLET BY MOUTH EVERY EVENING    metoprolol succinate (TOPROL-XL) 100 MG 24 hr tablet Take 1 tablet (100 mg total) by mouth once daily.    NIFEdipine (PROCARDIA-XL) 30 MG (OSM) 24 hr tablet Take 1 tablet (30 mg total) by mouth once daily. (Patient taking differently: Take 60 mg by mouth once daily.)    valsartan (DIOVAN) 40 MG tablet Take 1 tablet (40 mg total) by mouth 2 (two) times daily.     Review of Systems   Constitutional:  Negative for fatigue and fever.   Respiratory:  Negative for cough, chest tightness and shortness of breath.    Cardiovascular:  Negative for chest pain, palpitations and leg swelling.   Gastrointestinal:  Negative for abdominal distention.   All other systems reviewed and are negative.    Objective:     Vital Signs (Most Recent):  Temp: 97.9 °F (36.6 °C) (04/13/23 0328)  Pulse: 110 (04/13/23 0400)  Resp:  16 (04/13/23 0328)  BP: 129/80 (04/13/23 0328)  SpO2: 95 % (04/13/23 0328)   Vital Signs (24h Range):  Temp:  [97.4 °F (36.3 °C)-98.2 °F (36.8 °C)] 97.9 °F (36.6 °C)  Pulse:  [] 110  Resp:  [16-20] 16  SpO2:  [90 %-99 %] 95 %  BP: (111-135)/(66-85) 129/80     Weight: 79.6 kg (175 lb 7.8 oz)  Body mass index is 22.53 kg/m².    SpO2: 95 %         Intake/Output Summary (Last 24 hours) at 4/13/2023 0749  Last data filed at 4/12/2023 2339  Gross per 24 hour   Intake 600 ml   Output 1000 ml   Net -400 ml         Lines/Drains/Airways       Peripheral Intravenous Line  Duration                  Peripheral IV - Single Lumen 04/03/23 0540 18 G Anterior;Right Forearm 10 days                  Significant Labs:  Recent Results (from the past 72 hour(s))   Blood Culture    Collection Time: 04/10/23  9:21 AM    Specimen: Arm, Left; Blood   Result Value Ref Range    CULTURE, BLOOD ([facility]) No Growth At 48 Hours    Blood Culture    Collection Time: 04/10/23 10:06 AM    Specimen: Arm, Left; Blood   Result Value Ref Range    CULTURE, BLOOD ([facility]) No Growth At 48 Hours    Magnesium    Collection Time: 04/11/23  4:44 AM   Result Value Ref Range    Magnesium Level 1.90 1.60 - 2.60 mg/dL   Phosphorus    Collection Time: 04/11/23  4:44 AM   Result Value Ref Range    Phosphorus Level 3.8 2.3 - 4.7 mg/dL   Basic Metabolic Panel    Collection Time: 04/11/23  4:44 AM   Result Value Ref Range    Sodium Level 135 (L) 136 - 145 mmol/L    Potassium Level 3.5 3.5 - 5.1 mmol/L    Chloride 104 98 - 107 mmol/L    Carbon Dioxide 21 (L) 23 - 31 mmol/L    Glucose Level 112 82 - 115 mg/dL    Blood Urea Nitrogen 24.1 8.4 - 25.7 mg/dL    Creatinine 1.18 0.73 - 1.18 mg/dL    BUN/Creatinine Ratio 20     Calcium Level Total 8.1 (L) 8.8 - 10.0 mg/dL    Anion Gap 10.0 mEq/L    eGFR >60 mls/min/1.73/m2   VANCOMYCIN, TROUGH    Collection Time: 04/11/23  4:44 AM   Result Value Ref Range    Vancomycin Trough 15.4 15.0 - 20.0 ug/ml   CBC with Differential     Collection Time: 04/11/23  4:44 AM   Result Value Ref Range    WBC 5.7 4.5 - 11.5 x10(3)/mcL    RBC 3.50 (L) 4.70 - 6.10 x10(6)/mcL    Hgb 10.5 (L) 14.0 - 18.0 g/dL    Hct 31.2 (L) 42.0 - 52.0 %    MCV 89.1 80.0 - 94.0 fL    MCH 30.0 27.0 - 31.0 pg    MCHC 33.7 33.0 - 36.0 g/dL    RDW 15.7 11.5 - 17.0 %    Platelet 168 130 - 400 x10(3)/mcL    MPV 10.6 (H) 7.4 - 10.4 fL    Neut % 66.7 %    Lymph % 16.3 %    Mono % 13.8 %    Eos % 1.8 %    Basophil % 0.5 %    Lymph # 0.92 0.6 - 4.6 x10(3)/mcL    Neut # 3.77 2.1 - 9.2 x10(3)/mcL    Mono # 0.78 0.1 - 1.3 x10(3)/mcL    Eos # 0.10 0 - 0.9 x10(3)/mcL    Baso # 0.03 0 - 0.2 x10(3)/mcL    IG# 0.05 (H) 0 - 0.04 x10(3)/mcL    IG% 0.9 %    NRBC% 0.0 %   Transesophageal echo (CARO)with possible cardioversion    Collection Time: 04/11/23 10:12 AM   Result Value Ref Range    AV mean gradient 24 mmHg    Ao peak audi 3.28 m/s    Ao VTI 69.3 cm    AV peak gradient 43 mmHg    TR Max Audi 2.54 m/s    Triscuspid Valve Regurgitation Peak Gradient 26 mmHg   POCT glucose    Collection Time: 04/11/23  3:59 PM   Result Value Ref Range    POCT Glucose 112 (H) 70 - 110 mg/dL   Magnesium    Collection Time: 04/12/23  3:56 AM   Result Value Ref Range    Magnesium Level 1.90 1.60 - 2.60 mg/dL   Phosphorus    Collection Time: 04/12/23  3:56 AM   Result Value Ref Range    Phosphorus Level 3.5 2.3 - 4.7 mg/dL     Telemetry: SR    Physical Exam  Constitutional:       Appearance: Normal appearance.   HENT:      Head: Normocephalic.      Nose: Nose normal.      Mouth/Throat:      Mouth: Mucous membranes are moist.   Eyes:      Extraocular Movements: Extraocular movements intact.   Cardiovascular:      Rate and Rhythm: Tachycardia present. Rhythm irregular.      Pulses: Normal pulses.      Heart sounds: Normal heart sounds. No murmur heard.  Pulmonary:      Effort: Pulmonary effort is normal.      Breath sounds: Normal breath sounds.   Abdominal:      Palpations: Abdomen is soft.   Skin:     General: Skin  is warm and dry.      Comments: Midline Sternotomy Dressing C/D/I.    Neurological:      General: No focal deficit present.      Mental Status: He is alert and oriented to person, place, and time.   Psychiatric:         Mood and Affect: Mood normal.         Behavior: Behavior normal.     Home Medications:   No current facility-administered medications on file prior to encounter.     Current Outpatient Medications on File Prior to Encounter   Medication Sig Dispense Refill    aspirin (ECOTRIN) 81 MG EC tablet Take 1 tablet (81 mg total) by mouth once daily. 30 tablet 0    atorvastatin (LIPITOR) 10 MG tablet Take 40 mg by mouth once daily.      furosemide (LASIX) 40 MG tablet TAKE ONE TABLET BY MOUTH DAILY DOSE INCREASED      hydrOXYzine HCL (ATARAX) 25 MG tablet TAKE 1 TABLET BY MOUTH EVERY EVENING 30 tablet 1    metoprolol succinate (TOPROL-XL) 100 MG 24 hr tablet Take 1 tablet (100 mg total) by mouth once daily. 30 tablet 0    NIFEdipine (PROCARDIA-XL) 30 MG (OSM) 24 hr tablet Take 1 tablet (30 mg total) by mouth once daily. (Patient taking differently: Take 60 mg by mouth once daily.) 30 tablet 0    valsartan (DIOVAN) 40 MG tablet Take 1 tablet (40 mg total) by mouth 2 (two) times daily. 60 tablet 0     Current Inpatient Medications:    Current Facility-Administered Medications:     0.9%  NaCl infusion (for blood administration), , Intravenous, Q24H PRN, Graham Aiken MD    0.9%  NaCl infusion (for blood administration), , Intravenous, Q24H PRN, Graham Aiken MD    acetaminophen oral solution 650 mg, 650 mg, Per OG tube, Q6H PRN, GISEL Bingham, 650 mg at 04/05/23 2014    albumin human 5% bottle 12.5 g, 12.5 g, Intravenous, PRN, GISEL Bingham, Stopped at 04/03/23 2100    amiodarone tablet 200 mg, 200 mg, Oral, BID, LOREN Chen, 200 mg at 04/12/23 2034    [START ON 4/15/2023] amiodarone tablet 200 mg, 200 mg, Oral, Daily, LOREN Chen    amLODIPine tablet 10 mg, 10 mg,  Oral, Daily, HONORIO Villalpando, 10 mg at 04/12/23 0851    aspirin EC tablet 81 mg, 81 mg, Oral, Daily, GISEL Bingham, 81 mg at 04/12/23 0851    atorvastatin tablet 40 mg, 40 mg, Oral, Daily, HONORIO Villalpando, 40 mg at 04/12/23 0851    calcium gluconate 1 g in NS IVPB (premixed), 1 g, Intravenous, PRN, GISEL Bingham    calcium gluconate 1 g in NS IVPB (premixed), 2 g, Intravenous, PRN, GISEL Bingham    calcium gluconate 1 g in NS IVPB (premixed), 3 g, Intravenous, PRN, GISEL Bingham    dextrose 10% bolus 125 mL 125 mL, 12.5 g, Intravenous, PRN, GISEL Bingham    dextrose 10% bolus 125 mL 125 mL, 12.5 g, Intravenous, PRN, Graham Aiken MD    dextrose 10% bolus 250 mL 250 mL, 25 g, Intravenous, PRN, GISEL Bingham    dextrose 10% bolus 250 mL 250 mL, 25 g, Intravenous, PRN, Graham Aiken MD    docusate sodium capsule 100 mg, 100 mg, Oral, BID, GISEL Bingham, 100 mg at 04/12/23 2034    famotidine tablet 20 mg, 20 mg, Oral, BID, Deuce Finley IV, MD, 20 mg at 04/12/23 2034    ferrous sulfate tablet 1 each, 1 tablet, Oral, Daily, Jossy Ann MD, 1 each at 04/12/23 0850    folic acid tablet 1 mg, 1 mg, Oral, Daily, Frankie Metcalf MD, 1 mg at 04/12/23 0851    furosemide tablet 20 mg, 20 mg, Oral, BID, Wyatt Carl PA-C, 20 mg at 04/12/23 1700    glucagon (human recombinant) injection 1 mg, 1 mg, Intramuscular, PRN, Graham Aiken MD    glucose chewable tablet 16 g, 16 g, Oral, PRN, Graham Aiken MD    glucose chewable tablet 24 g, 24 g, Oral, PRN, Graham Aiken MD    haloperidol lactate injection 2 mg, 2 mg, Intravenous, Q6H PRN, Simran Meza [facility]    hydrALAZINE injection 20 mg, 20 mg, Intravenous, Q6H PRN, Elizabeth Bates NP, 20 mg at 04/06/23 1601    HYDROcodone-acetaminophen 5-325 mg per tablet 1 tablet, 1 tablet, Oral, Q4H PRN, GISEL Bingham, 1 tablet at 04/11/23 231    insulin aspart U-100 injection 0-5  Units, 0-5 Units, Subcutaneous, QID (AC + HS) PRN, Graham Aiken MD    labetaloL injection 20 mg, 20 mg, Intravenous, Q4H PRN, Elizabeth Bates NP, 20 mg at 04/07/23 0558    LORazepam (ATIVAN) 2 mg/mL injection, , , ,     LORazepam (ATIVAN) injection 2 mg, 2 mg, Intravenous, Q8H PRN, Frankie Mtecalf MD, 2 mg at 04/12/23 2327    magnesium sulfate 2g in water 50mL IVPB (premix), 2 g, Intravenous, PRN, GISEL Bingham    magnesium sulfate 2g in water 50mL IVPB (premix), 4 g, Intravenous, PRN, GISEL Bingham    melatonin tablet 6 mg, 6 mg, Oral, Nightly PRN, Kalani Whitehead MD, 6 mg at 04/13/23 0208    metoprolol tartrate (LOPRESSOR) tablet 100 mg, 100 mg, Oral, BID, LOREN Chen, 100 mg at 04/12/23 2034    multivitamin tablet, 1 tablet, Oral, Daily, Frankie Metcalf MD, 1 tablet at 04/12/23 0851    ondansetron injection 4 mg, 4 mg, Intravenous, Q4H PRN, GISEL Bingham    potassium chloride 20 mEq in 100 mL IVPB (FOR CENTRAL LINE ADMINISTRATION ONLY), 20 mEq, Intravenous, PRN, GISEL Bingham    potassium chloride 20 mEq in 100 mL IVPB (FOR CENTRAL LINE ADMINISTRATION ONLY), 20 mEq, Intravenous, PRN, GISEL Bingham    potassium chloride 40 mEq in 100 mL IVPB (FOR CENTRAL LINE ADMINISTRATION ONLY), 40 mEq, Intravenous, PRN, GISEL Bingham    potassium chloride SA CR tablet 20 mEq, 20 mEq, Oral, Daily, Wyatt Carl PA-C, 20 mEq at 04/12/23 0851    sodium phosphate 15 mmol in dextrose 5 % (D5W) 250 mL IVPB, 15 mmol, Intravenous, PRN, GISEL Bingham    sodium phosphate 20.01 mmol in dextrose 5 % (D5W) 250 mL IVPB, 20.01 mmol, Intravenous, PRN, GISEL Bingham    sodium phosphate 30 mmol in dextrose 5 % (D5W) 250 mL IVPB, 30 mmol, Intravenous, PRN, GISEL Bingham    sucralfate tablet 1 g, 1 g, Oral, QID (AC & HS), GISEL Bingham, 1 g at 04/13/23 0609    thiamine tablet 200 mg, 200 mg, Oral, Daily, Frankie Metcalf MD, 200 mg at 04/12/23  0851    valsartan tablet 160 mg, 160 mg, Oral, BID, Washington Huerta, ANP, 160 mg at 04/12/23 2034    VTE Risk Mitigation (From admission, onward)           Ordered     Place sequential compression device  Until discontinued         04/04/23 0324     IP VTE HIGH RISK PATIENT  Once         04/03/23 0951                  Assessment:   MVCAD    - s/p CABG (4.3.23) - LIMA to LAD, rSVG to OM1, rSVG to PDA  PAF/Flutter with RVR s/p DCCV, now SR/ST    - CHADsVASc - 3 Points - 3.2% Stroke Risk per Year     - s/p (4.3.23) - Ligation of SILVANO with Endostapler  VHD/severe AS  HTN/HHD without Hx of HF or CKD  Encephalopathy - Likely Related to ETOH DTs  Anemia - Acute Blood Loss - Improved with Transfusion  Thrombocytopenia - Chronic   VHD (Mild AS, Mild MS)  Hx of NSTEMI  ETOH Abuse  No Hx of GIB    Plan:     Amio Bolus and gtt per protocol  IV Dig load  NPO p MN  DCCV in AM if remains in Aflutter.  Will refer to structural heart clinic for TAVR evaluation  Pt with Ligation of SILVANO w/ Endostapler. No plans for OAC. Defer to outpatient.   Continue oral amio load. Continue metoprolol tartrate 100 mg BID.   Continue ASA, ARB and Statin  Continue Norvasc 10mg PO Qday  Aggressive Mobilization and Q1HR IS  F/U with Dr. Herrera in 7-10 days in [place]    Bruno Brock, [facility]   Cardiology  [facility]   04/13/2023     I have seen the patient, reviewed the Nurse Practitioner's note, assessment and plan. I have personally interviewed and examined the patient at bedside and agree with the findings. Medical decision making listed above were done under my guidance.  Recurrent AFL.  Start amio drip, if remains in AFL, will do DDCV only ( No need for CARO).  Patient is s/p SILVANO ligation during CABG.  He will  need outpatient referral for TAVR

## 2023-04-14 ENCOUNTER — ANESTHESIA EVENT (OUTPATIENT)
Dept: CARDIOLOGY | Facility: HOSPITAL | Age: 68
DRG: 236 | End: 2023-04-14
Payer: MEDICARE

## 2023-04-14 ENCOUNTER — ANESTHESIA (OUTPATIENT)
Dept: CARDIOLOGY | Facility: HOSPITAL | Age: 68
DRG: 236 | End: 2023-04-14
Payer: MEDICARE

## 2023-04-14 LAB
ANION GAP SERPL CALC-SCNC: 7 MEQ/L
BASOPHILS # BLD AUTO: 0.03 X10(3)/MCL (ref 0–0.2)
BASOPHILS NFR BLD AUTO: 0.4 %
BUN SERPL-MCNC: 12.8 MG/DL (ref 8.4–25.7)
CALCIUM SERPL-MCNC: 8.1 MG/DL (ref 8.8–10)
CHLORIDE SERPL-SCNC: 108 MMOL/L (ref 98–107)
CO2 SERPL-SCNC: 24 MMOL/L (ref 23–31)
CREAT SERPL-MCNC: 1.04 MG/DL (ref 0.73–1.18)
CREAT/UREA NIT SERPL: 12
EOSINOPHIL # BLD AUTO: 0.11 X10(3)/MCL (ref 0–0.9)
EOSINOPHIL NFR BLD AUTO: 1.4 %
ERYTHROCYTE [DISTWIDTH] IN BLOOD BY AUTOMATED COUNT: 15.7 % (ref 11.5–17)
GFR SERPLBLD CREATININE-BSD FMLA CKD-EPI: >60 MLS/MIN/1.73/M2
GLUCOSE SERPL-MCNC: 103 MG/DL (ref 82–115)
GLUCOSE SERPL-MCNC: 121 MG/DL (ref 70–110)
GLUCOSE SERPL-MCNC: 134 MG/DL (ref 70–110)
GLUCOSE SERPL-MCNC: 95 MG/DL (ref 70–110)
HCO3 UR-SCNC: 24.3 MMOL/L (ref 24–28)
HCO3 UR-SCNC: 24.9 MMOL/L (ref 24–28)
HCO3 UR-SCNC: 25.7 MMOL/L (ref 24–28)
HCT VFR BLD AUTO: 37 % (ref 42–52)
HCT VFR BLD CALC: 23 %PCV (ref 36–54)
HCT VFR BLD CALC: 25 %PCV (ref 36–54)
HCT VFR BLD CALC: 33 %PCV (ref 36–54)
HGB BLD-MCNC: 11 G/DL
HGB BLD-MCNC: 11.9 G/DL (ref 14–18)
HGB BLD-MCNC: 8 G/DL
HGB BLD-MCNC: 9 G/DL
IMM GRANULOCYTES # BLD AUTO: 0.05 X10(3)/MCL (ref 0–0.04)
IMM GRANULOCYTES NFR BLD AUTO: 0.7 %
LYMPHOCYTES # BLD AUTO: 0.87 X10(3)/MCL (ref 0.6–4.6)
LYMPHOCYTES NFR BLD AUTO: 11.4 %
MAGNESIUM SERPL-MCNC: 1.8 MG/DL (ref 1.6–2.6)
MCH RBC QN AUTO: 29 PG (ref 27–31)
MCHC RBC AUTO-ENTMCNC: 32.2 G/DL (ref 33–36)
MCV RBC AUTO: 90.2 FL (ref 80–94)
MONOCYTES # BLD AUTO: 0.76 X10(3)/MCL (ref 0.1–1.3)
MONOCYTES NFR BLD AUTO: 9.9 %
NEUTROPHILS # BLD AUTO: 5.82 X10(3)/MCL (ref 2.1–9.2)
NEUTROPHILS NFR BLD AUTO: 76.2 %
NRBC BLD AUTO-RTO: 0 %
PCO2 BLDA: 44.4 MMHG (ref 35–45)
PCO2 BLDA: 45.3 MMHG (ref 35–45)
PCO2 BLDA: 48.1 MMHG (ref 35–45)
PH SMN: 7.32 [PH] (ref 7.35–7.45)
PH SMN: 7.34 [PH] (ref 7.35–7.45)
PH SMN: 7.37 [PH] (ref 7.35–7.45)
PHOSPHATE SERPL-MCNC: 4.3 MG/DL (ref 2.3–4.7)
PLATELET # BLD AUTO: 282 X10(3)/MCL (ref 130–400)
PMV BLD AUTO: 10.1 FL (ref 7.4–10.4)
PO2 BLDA: 300 MMHG (ref 80–100)
PO2 BLDA: 38 MMHG (ref 40–60)
PO2 BLDA: 58 MMHG (ref 40–60)
POC BE: -1 MMOL/L
POC BE: -2 MMOL/L
POC BE: 0 MMOL/L
POC IONIZED CALCIUM: 1.05 MMOL/L (ref 1.06–1.42)
POC IONIZED CALCIUM: 1.05 MMOL/L (ref 1.06–1.42)
POC IONIZED CALCIUM: 1.21 MMOL/L (ref 1.06–1.42)
POC SATURATED O2: 100 % (ref 95–100)
POC SATURATED O2: 67 % (ref 95–100)
POC SATURATED O2: 88 % (ref 95–100)
POC TCO2: 26 MMOL/L (ref 24–29)
POC TCO2: 26 MMOL/L (ref 24–29)
POC TCO2: 27 MMOL/L (ref 23–27)
POTASSIUM BLD-SCNC: 4.1 MMOL/L (ref 3.5–5.1)
POTASSIUM BLD-SCNC: 4.6 MMOL/L (ref 3.5–5.1)
POTASSIUM BLD-SCNC: 4.9 MMOL/L (ref 3.5–5.1)
POTASSIUM SERPL-SCNC: 4.8 MMOL/L (ref 3.5–5.1)
RBC # BLD AUTO: 4.1 X10(6)/MCL (ref 4.7–6.1)
SAMPLE: ABNORMAL
SODIUM BLD-SCNC: 126 MMOL/L (ref 136–145)
SODIUM BLD-SCNC: 127 MMOL/L (ref 136–145)
SODIUM BLD-SCNC: 128 MMOL/L (ref 136–145)
SODIUM SERPL-SCNC: 139 MMOL/L (ref 136–145)
WBC # SPEC AUTO: 7.6 X10(3)/MCL (ref 4.5–11.5)

## 2023-04-14 PROCEDURE — 63600175 PHARM REV CODE 636 W HCPCS: Performed by: INTERNAL MEDICINE

## 2023-04-14 PROCEDURE — 25000003 PHARM REV CODE 250: Performed by: SPECIALIST

## 2023-04-14 PROCEDURE — D9220A PRA ANESTHESIA: ICD-10-PCS | Mod: ANES,,, | Performed by: ANESTHESIOLOGY

## 2023-04-14 PROCEDURE — 94760 N-INVAS EAR/PLS OXIMETRY 1: CPT

## 2023-04-14 PROCEDURE — 63600175 PHARM REV CODE 636 W HCPCS: Performed by: STUDENT IN AN ORGANIZED HEALTH CARE EDUCATION/TRAINING PROGRAM

## 2023-04-14 PROCEDURE — 21400001 HC TELEMETRY ROOM

## 2023-04-14 PROCEDURE — 80048 BASIC METABOLIC PNL TOTAL CA: CPT | Performed by: STUDENT IN AN ORGANIZED HEALTH CARE EDUCATION/TRAINING PROGRAM

## 2023-04-14 PROCEDURE — 93005 ELECTROCARDIOGRAM TRACING: CPT

## 2023-04-14 PROCEDURE — 84100 ASSAY OF PHOSPHORUS: CPT | Performed by: STUDENT IN AN ORGANIZED HEALTH CARE EDUCATION/TRAINING PROGRAM

## 2023-04-14 PROCEDURE — 27000221 HC OXYGEN, UP TO 24 HOURS

## 2023-04-14 PROCEDURE — 25000003 PHARM REV CODE 250: Performed by: NURSE PRACTITIONER

## 2023-04-14 PROCEDURE — 97168 OT RE-EVAL EST PLAN CARE: CPT

## 2023-04-14 PROCEDURE — 25000003 PHARM REV CODE 250: Performed by: PHYSICIAN ASSISTANT

## 2023-04-14 PROCEDURE — 97110 THERAPEUTIC EXERCISES: CPT

## 2023-04-14 PROCEDURE — 93010 EKG 12-LEAD: ICD-10-PCS | Mod: ,,, | Performed by: INTERNAL MEDICINE

## 2023-04-14 PROCEDURE — 25000003 PHARM REV CODE 250: Performed by: INTERNAL MEDICINE

## 2023-04-14 PROCEDURE — D9220A PRA ANESTHESIA: Mod: CRNA,,,

## 2023-04-14 PROCEDURE — 63600175 PHARM REV CODE 636 W HCPCS

## 2023-04-14 PROCEDURE — D9220A PRA ANESTHESIA: ICD-10-PCS | Mod: CRNA,,,

## 2023-04-14 PROCEDURE — 85025 COMPLETE CBC W/AUTO DIFF WBC: CPT | Performed by: STUDENT IN AN ORGANIZED HEALTH CARE EDUCATION/TRAINING PROGRAM

## 2023-04-14 PROCEDURE — 83735 ASSAY OF MAGNESIUM: CPT | Performed by: STUDENT IN AN ORGANIZED HEALTH CARE EDUCATION/TRAINING PROGRAM

## 2023-04-14 PROCEDURE — 93010 ELECTROCARDIOGRAM REPORT: CPT | Mod: ,,, | Performed by: INTERNAL MEDICINE

## 2023-04-14 PROCEDURE — D9220A PRA ANESTHESIA: Mod: ANES,,, | Performed by: ANESTHESIOLOGY

## 2023-04-14 PROCEDURE — 63600175 PHARM REV CODE 636 W HCPCS: Performed by: NURSE PRACTITIONER

## 2023-04-14 PROCEDURE — 25000003 PHARM REV CODE 250

## 2023-04-14 RX ORDER — PROPOFOL 10 MG/ML
VIAL (ML) INTRAVENOUS
Status: DISCONTINUED | OUTPATIENT
Start: 2023-04-14 | End: 2023-04-14

## 2023-04-14 RX ORDER — AMIODARONE HYDROCHLORIDE 200 MG/1
200 TABLET ORAL 2 TIMES DAILY
Status: DISCONTINUED | OUTPATIENT
Start: 2023-04-17 | End: 2023-04-17 | Stop reason: HOSPADM

## 2023-04-14 RX ORDER — LIDOCAINE HYDROCHLORIDE 20 MG/ML
INJECTION INTRAVENOUS
Status: DISCONTINUED | OUTPATIENT
Start: 2023-04-14 | End: 2023-04-14

## 2023-04-14 RX ORDER — AMIODARONE HYDROCHLORIDE 200 MG/1
200 TABLET ORAL DAILY
Status: DISCONTINUED | OUTPATIENT
Start: 2023-04-20 | End: 2023-04-17 | Stop reason: HOSPADM

## 2023-04-14 RX ORDER — AMIODARONE HYDROCHLORIDE 200 MG/1
400 TABLET ORAL 2 TIMES DAILY
Status: COMPLETED | OUTPATIENT
Start: 2023-04-14 | End: 2023-04-16

## 2023-04-14 RX ADMIN — VALSARTAN 160 MG: 80 TABLET, FILM COATED ORAL at 08:04

## 2023-04-14 RX ADMIN — AMIODARONE HYDROCHLORIDE 400 MG: 200 TABLET ORAL at 01:04

## 2023-04-14 RX ADMIN — THERA TABS 1 TABLET: TAB at 09:04

## 2023-04-14 RX ADMIN — FUROSEMIDE 20 MG: 20 TABLET ORAL at 09:04

## 2023-04-14 RX ADMIN — AMLODIPINE BESYLATE 10 MG: 5 TABLET ORAL at 09:04

## 2023-04-14 RX ADMIN — LIDOCAINE HYDROCHLORIDE 50 MG: 20 INJECTION, SOLUTION INTRAVENOUS at 11:04

## 2023-04-14 RX ADMIN — FAMOTIDINE 20 MG: 20 TABLET, FILM COATED ORAL at 08:04

## 2023-04-14 RX ADMIN — PROPOFOL 60 MG: 10 INJECTION, EMULSION INTRAVENOUS at 11:04

## 2023-04-14 RX ADMIN — FAMOTIDINE 20 MG: 20 TABLET, FILM COATED ORAL at 09:04

## 2023-04-14 RX ADMIN — DIGOXIN 250 MCG: 0.25 INJECTION INTRAMUSCULAR; INTRAVENOUS at 12:04

## 2023-04-14 RX ADMIN — METOPROLOL TARTRATE 100 MG: 50 TABLET, FILM COATED ORAL at 09:04

## 2023-04-14 RX ADMIN — FUROSEMIDE 20 MG: 20 TABLET ORAL at 08:04

## 2023-04-14 RX ADMIN — SUCRALFATE 1 G: 1 TABLET ORAL at 08:04

## 2023-04-14 RX ADMIN — FERROUS SULFATE TAB 325 MG (65 MG ELEMENTAL FE) 1 EACH: 325 (65 FE) TAB at 09:04

## 2023-04-14 RX ADMIN — ATORVASTATIN CALCIUM 40 MG: 40 TABLET, FILM COATED ORAL at 09:04

## 2023-04-14 RX ADMIN — FOLIC ACID 1 MG: 1 TABLET ORAL at 09:04

## 2023-04-14 RX ADMIN — AMIODARONE HYDROCHLORIDE 0.5 MG/MIN: 1.8 INJECTION, SOLUTION INTRAVENOUS at 04:04

## 2023-04-14 RX ADMIN — AMIODARONE HYDROCHLORIDE 400 MG: 200 TABLET ORAL at 08:04

## 2023-04-14 RX ADMIN — LORAZEPAM 2 MG: 2 INJECTION INTRAMUSCULAR; INTRAVENOUS at 08:04

## 2023-04-14 RX ADMIN — POTASSIUM CHLORIDE 20 MEQ: 1500 TABLET, EXTENDED RELEASE ORAL at 09:04

## 2023-04-14 RX ADMIN — LORAZEPAM 0.5 MG: 2 INJECTION INTRAMUSCULAR; INTRAVENOUS at 09:04

## 2023-04-14 RX ADMIN — THIAMINE HCL TAB 100 MG 200 MG: 100 TAB at 09:04

## 2023-04-14 RX ADMIN — HYDROCODONE BITARTRATE AND ACETAMINOPHEN 1 TABLET: 5; 325 TABLET ORAL at 10:04

## 2023-04-14 RX ADMIN — SODIUM CHLORIDE, SODIUM GLUCONATE, SODIUM ACETATE, POTASSIUM CHLORIDE AND MAGNESIUM CHLORIDE: 526; 502; 368; 37; 30 INJECTION, SOLUTION INTRAVENOUS at 11:04

## 2023-04-14 RX ADMIN — METOPROLOL TARTRATE 100 MG: 50 TABLET, FILM COATED ORAL at 08:04

## 2023-04-14 RX ADMIN — ASPIRIN 81 MG: 81 TABLET, COATED ORAL at 09:04

## 2023-04-14 RX ADMIN — Medication 6 MG: at 08:04

## 2023-04-14 RX ADMIN — DOCUSATE SODIUM 100 MG: 100 CAPSULE, LIQUID FILLED ORAL at 08:04

## 2023-04-14 NOTE — PROGRESS NOTES
"Mike Urbina Jr. is a 67 y.o. male patient.   1. Anticoagulated    2. Coronary artery disease of native artery of native heart with stable angina pectoris    3. Coronary artery disease    4. CAD (coronary artery disease)    5. Atrial fibrillation with rapid ventricular response    6. A-fib    7. Atrial flutter    8. Routine check-up      Past Medical History:   Diagnosis Date    Atrial flutter     CHF (congestive heart failure)     Coronary artery disease     Hypertension     SOB (shortness of breath)     at times     No past surgical history pertinent negatives on file.  Scheduled Meds:   amLODIPine  10 mg Oral Daily    aspirin  81 mg Oral Daily    atorvastatin  40 mg Oral Daily    docusate sodium  100 mg Oral BID    famotidine  20 mg Oral BID    ferrous sulfate  1 tablet Oral Daily    folic acid  1 mg Oral Daily    furosemide  20 mg Oral BID    lorazepam  0.5 mg Intravenous BID    metoprolol tartrate  100 mg Oral BID    multivitamin  1 tablet Oral Daily    potassium chloride  20 mEq Oral Daily    sucralfate  1 g Oral QID (AC & HS)    thiamine  200 mg Oral Daily    valsartan  160 mg Oral BID     Continuous Infusions:   amiodarone in dextrose 5% 0.5 mg/min (04/14/23 0416)     PRN Meds:sodium chloride, sodium chloride, acetaminophen, albumin human 5%, calcium gluconate IVPB, calcium gluconate IVPB, calcium gluconate IVPB, dextrose 10%, dextrose 10%, haloperidol lactate, hydrALAZINE, HYDROcodone-acetaminophen, labetalol, lorazepam, magnesium sulfate IVPB, magnesium sulfate IVPB, melatonin, ondansetron, potassium chloride in water, potassium chloride in water, potassium chloride in water, sodium phosphate IVPB, sodium phosphate IVPB, sodium phosphate IVPB    Review of patient's allergies indicates:  No Known Allergies  There are no hospital problems to display for this patient.    Blood pressure 121/76, pulse (!) 111, temperature 97.4 °F (36.3 °C), temperature source Oral, resp. rate 20, height 6' 2" (1.88 m), " weight 78.8 kg (173 lb 11.6 oz), SpO2 99 %.    Subjective:    Awake. Alert  Sitting up in chair  Brother at bedside  Back in aflutter, on amio gtt   Awaiting placement    Objective:   AFVSS  Heart: irregular  Lungs: respirations nonlabored, clear  Incision: c/d/i    Assesment/Plan:    S/p CAB 4/3/23  Aflutter per cardiology  Await placement  Plans per cardiology            GISEL Lewis  4/14/2023

## 2023-04-14 NOTE — TRANSFER OF CARE
"Anesthesia Transfer of Care Note    Patient: Mike Urbina Jr.    Procedure(s) Performed: * No procedures listed *    Patient location: Telemetry/Step Down Unit    Anesthesia Type: MAC    Transport from OR: Transported from OR on room air with adequate spontaneous ventilation    Post pain: adequate analgesia    Post assessment: no apparent anesthetic complications and tolerated procedure well    Post vital signs: stable    Level of consciousness: awake    Nausea/Vomiting: no nausea/vomiting    Complications: none    Transfer of care protocol was followed      Last vitals:   Visit Vitals  /76   Pulse (!) 111   Temp 36.3 °C (97.4 °F) (Oral)   Resp 20   Ht 6' 2" (1.88 m)   Wt 78.8 kg (173 lb 11.6 oz)   SpO2 99%   BMI 22.30 kg/m²     "

## 2023-04-14 NOTE — PT/OT/SLP PROGRESS
Physical Therapy      Patient Name:  Mike Urbina .   MRN:  [id number]    Patient just ambulated with CR and will be going for a cardioversion today as well. Will follow up if pt is appropriate.

## 2023-04-14 NOTE — PROGRESS NOTES
"[facility]  Hospital Medicine Progress Note        Chief Complaint: consult for alcohol withdrawal     HPI:   67 y.o. male with PMHx of ETOH abuse, VHD-aortic stenosis, thrombocytopenia, CAD status post NSTEMI, HTN, PAFlutter who presented to [facility] on 04/03/2023 for a scheduled CABG.  He previously underwent cardiac workup revealing multivessel CAD.  He underwent CABG x3 on 04/03/2023 by Dr. Finley.  He was admitted to ICU postoperatively.  CIS was consulted on 04/04/2023.  On the evening of 04/05/2023 patient had a mental status changed, he became acutely confused and agitated. Hospital Medicine team was consulted for alcohol withdrawal.  Patient reports he drinks two 12 packs of beer daily for "a long time." He reports that he did try to quit drinking many years ago and experienced DTs.  Last drink was on for 04/02/2023, patient reports he had a six-pack of beer.     Started on librium protocol.Started on Vancomycin for Bacteremia yesterday, Dced later as it was probably a contaminant.    Interval Hx:   No overnight events.  No new complaints.    Objective/physical exam:  General: Appears comfortable, no acute distress.  Integumentary: Warm, dry, intact.   Neuro: awake, alert, oriented.     Musculoskeletal: Purposeful movement noted.   Respiratory: No accessory muscle use. Breath sounds are equal.  Cardiovascular: Regular rate. No peripheral edema.    VITAL SIGNS: 24 HRS MIN & MAX LAST   Temp  Min: 97.4 °F (36.3 °C)  Max: 98.2 °F (36.8 °C) 97.4 °F (36.3 °C)   BP  Min: 111/74  Max: 138/88 121/76     Pulse  Min: 90  Max: 116  (!) 111   Resp  Min: 16  Max: 20 20   SpO2  Min: 97 %  Max: 99 % 99 %     Fl Modified Barium Swallow Speech  See procedure notes from Speech Pathologist.    This procedure was auto-finalized.  Transesophageal echo (CARO)with possible cardioversion  Procedure: CARO guided Cardioversion           Final impression:     LV systolic function 50-55%.  left ventricular " hypertrophy is present.  AV is probably trileaflet with marked AV slcerocalcification resulting in   severe/critical AS, SHARAD 0.5 cm2, Peak AV 3.5 m/sec.  Mild Mitral stenosis.  Moderate TR.  No intracardiac thrombus is present.  SILVANO is not seen and is probably absent due to prior surgical SILVANO ligation.  Successful cardioversion x 1        Recommendations:     Probably refer the patient to our structural heart program for TAVR.  Continue Amiodarone and Metoprolol and ASA.  No need for OAC as the patient has a surgically ligated SILVANO without any   residual tissue noted on CARO.        Indication: post CABG AFL- symptomatic     Informed consent: obtained, signed copy placed in the chart.     Description of the procedure: After sedation was achieved (please see   anesthesia report for details), the esophagus intubated without   difficulties. Multiple orthogonal views obtained. Patient tolerated   procedure without immediate complications noted.     Findings:  Left atrium (LA): Mildly enlarged left atrium.  Left atrial appendage (SILVANO):SILVANO is not seen and is probably absent due to   prior surgical SILVANO ligation.  Interatrial septum:  NO ASD or PFO noted on 2D or Color Doppler images.   Right atrium (RA): Mildly enlarged right atrium.  Left ventricle (LV): LVEF 50-55%.   Normal LV size.  left ventricular   hypertrophy is present. No Regional wall motion abnormalities noted.  Right ventricle (RV): Partially visualized in this study.  Aortic valve: AV is probably trileaflet with marked AV slcerocalcification   resulting in severe/critical AS, SHARAD 0.5 cm2, Peak AV 3.5 m/sec. No   Vegetation is present.  Mitral valve: Mild thickening of the mitral valve. Mild Mitral stenosis.    No Mitral valve prolapse. Mild Mitral regurgitation. No Vegetation is   present.  Tricuspid valve: Partially visualized, No Tricuspid stenosis. Moderate   Tricuspid regurgitation. No Vegetation is present.  Pulmonic valve: Not well visualized, no  hemodynamically significant   pulmonic stenosis, no pulmonic regurgitation noted in this study. No   Vegetation is present.  Thoracic aorta: Scattered atherosclerotic changes of the descending   thoracic aorta.  Normal Caliber aortic root.   Normal Caliber Proximal   portion of the ascending aorta.   Pericardium: No significant pericardial effusion is present.        Description of the DCCV procedure:   After completion of CARO, no intracardiac thrombus was visualized, so I   decided to proceed with Cardioversion  200 J synchronized cardioversion was completed.  Number of attempts: 1             Recent Labs   Lab 04/09/23  0459 04/10/23  0454 04/11/23  0444   WBC 6.2 6.0 5.7   RBC 4.04* 3.55* 3.50*   HGB 12.0* 10.3* 10.5*   HCT 36.2* 31.8* 31.2*   MCV 89.6 89.6 89.1   MCH 29.7 29.0 30.0   MCHC 33.1 32.4* 33.7   RDW 15.8 15.6 15.7   * 143 168   MPV 10.4 10.3 10.6*       Recent Labs   Lab 04/09/23  0459 04/10/23  0454 04/11/23  0444 04/12/23  0356   * 136 135*  --    K 4.3 3.8 3.5  --    CO2 19* 21* 21*  --    BUN 19.8 22.5 24.1  --    CREATININE 0.93 1.09 1.18  --    CALCIUM 8.4* 8.2* 8.1*  --    MG 1.90 1.90 1.90 1.90          Microbiology Results (last 7 days)       Procedure Component Value Units Date/Time    Blood Culture [[id number]]  (Normal) Collected: 04/08/23 1819    Order Status: Completed Specimen: Blood from Arm, Right Updated: 04/13/23 1901     CULTURE, BLOOD ([facility]) No Growth at 5 days    Blood Culture [[id number]]  (Normal) Collected: 04/10/23 1006    Order Status: Completed Specimen: Blood from Arm, Left Updated: 04/13/23 1200     CULTURE, BLOOD ([facility]) No Growth At 72 Hours    Blood Culture [[id number]]  (Normal) Collected: 04/10/23 0921    Order Status: Completed Specimen: Blood from Arm, Left Updated: 04/13/23 1200     CULTURE, BLOOD ([facility]) No Growth At 72 Hours    Blood Culture [[id number]]  (Abnormal)  (Susceptibility) Collected: 04/08/23 1816    Order Status: Completed Specimen: Blood from  Arm, Left Updated: 04/12/23 0748     CULTURE, BLOOD ([facility]) Susceptibility To Follow      Staphylococcus epidermidis     GRAM STAIN Gram Positive Cocci, probable Staphylococcus      Seen in gram stain of broth only      1 of 2 Anaerobic bottles positive    BCID2 Panel [[id number]] Collected: 04/08/23 1816    Order Status: Canceled Specimen: Blood from Arm, Left Updated: 04/10/23 1151             See below for Radiology    Scheduled Med:   amLODIPine  10 mg Oral Daily    aspirin  81 mg Oral Daily    atorvastatin  40 mg Oral Daily    docusate sodium  100 mg Oral BID    famotidine  20 mg Oral BID    ferrous sulfate  1 tablet Oral Daily    folic acid  1 mg Oral Daily    furosemide  20 mg Oral BID    lorazepam  0.5 mg Intravenous BID    metoprolol tartrate  100 mg Oral BID    multivitamin  1 tablet Oral Daily    potassium chloride  20 mEq Oral Daily    sucralfate  1 g Oral QID (AC & HS)    thiamine  200 mg Oral Daily    valsartan  160 mg Oral BID        Continuous Infusions:   amiodarone in dextrose 5% 0.5 mg/min (04/14/23 0416)        PRN Meds:  sodium chloride, sodium chloride, acetaminophen, albumin human 5%, calcium gluconate IVPB, calcium gluconate IVPB, calcium gluconate IVPB, dextrose 10%, dextrose 10%, haloperidol lactate, hydrALAZINE, HYDROcodone-acetaminophen, labetalol, lorazepam, magnesium sulfate IVPB, magnesium sulfate IVPB, melatonin, ondansetron, potassium chloride in water, potassium chloride in water, potassium chloride in water, sodium phosphate IVPB, sodium phosphate IVPB, sodium phosphate IVPB     Nutrition Status:      Assessment/Plan:    MVCAD--s/p CABG (4.3.23)  PAF/Flutter with RVR--resolved.   VHD/severe AS  Acute alcohol withdrawal--resolved  Pancytopenia  Metabolic Acidosis  GM +ve bacteremia, Coag Neg Staph  Paroxysmal atrial flutter RVR  Valvular heart disease-aortic stenosis  Chronic thrombocytopenia  Normocytic anemia  Essential hypertension    --------------------------------------------------------------------------  Consulted for ETOH withdrawal--resolved.   Continue thiamine and folic acid   0.5IV ativan given now.   Continue supportive care   Blood cultures remain negative thus far  Continue amiodarone for Afib at discretion of cardiologist.  Continue ASA, ARB and Statin  Continue Norvasc 10mg PO Qday  Pt/ot consulted.  We will continue to monitor and make adjustments to care as needed.    Thank you for the consultation will be available as needed.    Anticipated discharge and Disposition:  F/U with Dr. Herrera in 7-10 days in [place]      All diagnosis and differential diagnosis have been reviewed,  interpreted and communicated appropriately to care team. assessment and plan has been documented; I have personally reviewed the labs and test results that are presently available and pertinent to this hospital course; I have reviewed medical records based upon their availability.    All of the patient's questions have been  addressed and answered. Patient's is agreeable to the above stated plan.   I will continue to monitor closely and make adjustments to medical management as needed.    Gillian Bradley DO   04/14/2023        This note was created with the assistance of Dragon voice recognition software. There may be transcription errors as a result of using this technology however minimal. Effort has been made to assure accuracy of transcription but any obvious errors or omissions should be clarified with the author of the document.

## 2023-04-14 NOTE — PROGRESS NOTES
"[facility] General   Cardiology  Progress Note    Patient Name: Mike Urbina Jr.  MRN: [id number]  Admission Date: 4/3/2023  Hospital Length of Stay: 11 days  Code Status: Full Code   Attending Provider: Deuce Finley IV, MD   Consulting Provider: [facility]  Primary Care Physician: LOREN Jerry  Principal Problem:<principal problem not specified>    Patient information was obtained from patient, past medical records, and ER records.     Subjective:     Chief Complaint: Reason for Consult: Post CABG Medical Management    HPI: Mr. Urbina is a 68 y/o male who is known to [facility], Dr. Herrera. The patient presented to [facility] on 4.3.23 for a Planned CABG. The patient was recently worked up worked up for a NSTEMI and found to have an Abnormal PET Scan. The PT underwent a LHC with Noted MVCAD. [facility] was referred [facility] and deemed a candidate for CABG and on 4.3.23 he underwent a Sternotomy with Results of: LIMA to LAD, rSVG to OM1, rSVG to PDA and Ligation of SILVANO with Endostapler. The patient tolerated the procedure well and was stabilized in the ER. [facility] has been consulted for Medical Management of the PT.    4.5.23: NAD. Sitting in Bed. Denies SOB and Palps. + CP/Incisional. "I am doing great."   4.6.23: NAD. PT is a 1:1 - AMS Last PM. Denies SOB and Palps. + CP/Incisional. "I am feeling well."   4.7.23: NAD. Remains 1:1. Denies SOB or palps. + Incisional CP. SR on tele w/ intermittent Afib RVR. Resting.   4.8.23: NAD. Sitting up in chair with family at bedside. "I am feeling good." Afib RVR on tele. Remains on amio infusion. Denies CP, SOB, or palps.   4.9.23: NAD. Looks aflutter RVR on tele w/ frequent PVC's. On Amio. Denies CP, SOB, or palps.   4.10.23: NAD. Remains in aflutter RVR with intermittent bradycardia. Denies CP, SOB, or palps. On 1:1.   4.11.23: Aflutter with RVR on tele. Denies CP/SOB/Palps. NPO for CARO/DCCV today.  4.12.23: Now SR after DCCV. Denies CP/SOB/palps. Awaiting " placement. VSS  4.13.23: Back on Aflutter on tele. Denies CP/SOB/palps. Awaiting placement.  4.14.23: Remains in flutter with rates around 115. NPO for DCCV today. Denies CP/SOB/Palps.    PMH: ETOH Abuse, VHD (AS), Thrombocytopenia, NSTEMI, HTN, PAF/Flutter  PSH: Angiogram, CABG, Cataract Extraction   Family History: Mother, L, MI  Social History: + ETOH Use (6pk/Beer per Day for > 30 Years); Denies Tobacco and Illicit Drug Use    Previous Cardiac Diagnostics:   CARO 4.11.23  LV systolic function 50-55%.    LVH is present.    AV is probably trileaflet with marked AV scleral calcification resulting in severe/critical AS, SHARAD 0.5 cm2, peak AV 3.5 m/sec.    Mild mitral stenosis.    Moderate TR   No intracardiac thrombus is present.    SILVANO a is not seen in his probably absent due to prior surgical SILVANO ligation.    Successful cardioversion x1    [facility] 3.15.23:  Surgical coronary anatomy with distal left main 50%; LAD proximal to mid 60-70%; circumflex mid 80- 90%; RCA with proximal .  The ejection fraction was 60% with EDP of 10 mmHg.  Right radial access.  The estimated blood loss was less than 10 cc.  CT surgical consult for CABG evaluation.    PET 1.6.23:  This is an abnormal perfusion study. Study is consistent with ischemia.   This scan is suggestive of moderate risk for future cardiovascular events.   Small reversible perfusion abnormality of moderate intensity in the apical segment. Medium fixed perfusion abnormality of severe intensity in the inferior region.   The left ventricular cavity is noted to be normal on the stress studies. The stress left ventricular ejection fraction was calculated to be 35% and left ventricular global function is moderately reduced. The rest left ventricular cavity is noted to be normal. The rest left ventricular ejection fraction was calculated to be 40% and rest left ventricular global function is mildly reduced.   When compared to the resting ejection fraction (40%), the stress  ejection fraction (35%) has decreased.   The study quality is good.   There was a rise in myocardial blood flow between rest and stress.  Global myocardial blood flow reserve was 1.47.  Myocardial blood flow reserve is globally abnormal, placing the patient at a higher coronary event risk.    ECHO 12.9.22:  The left ventricle is normal in size with mild concentric hypertrophy and normal systolic function.  Grade I left ventricular diastolic dysfunction.  The estimated ejection fraction is 55%.  Normal right ventricular size with normal right ventricular systolic function.  There is mild aortic valve stenosis.  There is mild mitral stenosis.  Normal central venous pressure (3 mmHg).    Review of patient's allergies indicates:  No Known Allergies    No current facility-administered medications on file prior to encounter.     Current Outpatient Medications on File Prior to Encounter   Medication Sig    aspirin (ECOTRIN) 81 MG EC tablet Take 1 tablet (81 mg total) by mouth once daily.    atorvastatin (LIPITOR) 10 MG tablet Take 40 mg by mouth once daily.    furosemide (LASIX) 40 MG tablet TAKE ONE TABLET BY MOUTH DAILY DOSE INCREASED    hydrOXYzine HCL (ATARAX) 25 MG tablet TAKE 1 TABLET BY MOUTH EVERY EVENING    metoprolol succinate (TOPROL-XL) 100 MG 24 hr tablet Take 1 tablet (100 mg total) by mouth once daily.    NIFEdipine (PROCARDIA-XL) 30 MG (OSM) 24 hr tablet Take 1 tablet (30 mg total) by mouth once daily. (Patient taking differently: Take 60 mg by mouth once daily.)    valsartan (DIOVAN) 40 MG tablet Take 1 tablet (40 mg total) by mouth 2 (two) times daily.     Review of Systems   Constitutional:  Negative for fatigue and fever.   Respiratory:  Negative for cough, chest tightness and shortness of breath.    Cardiovascular:  Negative for chest pain, palpitations and leg swelling.   Gastrointestinal:  Negative for abdominal distention.   All other systems reviewed and are negative.    Objective:     Vital Signs  (Most Recent):  Temp: 97.4 °F (36.3 °C) (04/14/23 0822)  Pulse: (!) 111 (04/14/23 0936)  Resp: 20 (04/14/23 0300)  BP: 121/76 (04/14/23 0936)  SpO2: 99 % (04/14/23 0822)   Vital Signs (24h Range):  Temp:  [97.4 °F (36.3 °C)-98.2 °F (36.8 °C)] 97.4 °F (36.3 °C)  Pulse:  [] 111  Resp:  [16-20] 20  SpO2:  [97 %-99 %] 99 %  BP: (111-138)/(74-92) 121/76     Weight: 78.8 kg (173 lb 11.6 oz)  Body mass index is 22.3 kg/m².    SpO2: 99 %         Intake/Output Summary (Last 24 hours) at 4/14/2023 1124  Last data filed at 4/14/2023 0520  Gross per 24 hour   Intake 820 ml   Output 840 ml   Net -20 ml         Lines/Drains/Airways       Peripheral Intravenous Line  Duration                  Peripheral IV - Single Lumen 04/13/23 1105 20 G Anterior;Left Forearm 1 day                  Significant Labs:  Recent Results (from the past 72 hour(s))   POCT glucose    Collection Time: 04/11/23  3:59 PM   Result Value Ref Range    POCT Glucose 112 (H) 70 - 110 mg/dL   Magnesium    Collection Time: 04/12/23  3:56 AM   Result Value Ref Range    Magnesium Level 1.90 1.60 - 2.60 mg/dL   Phosphorus    Collection Time: 04/12/23  3:56 AM   Result Value Ref Range    Phosphorus Level 3.5 2.3 - 4.7 mg/dL   POCT glucose    Collection Time: 04/12/23 11:56 AM   Result Value Ref Range    POCT Glucose 88 70 - 110 mg/dL     Telemetry: SR    Physical Exam  Constitutional:       Appearance: Normal appearance.   HENT:      Head: Normocephalic.      Nose: Nose normal.      Mouth/Throat:      Mouth: Mucous membranes are moist.   Eyes:      Extraocular Movements: Extraocular movements intact.   Cardiovascular:      Rate and Rhythm: Tachycardia present. Rhythm irregular.      Pulses: Normal pulses.      Heart sounds: Normal heart sounds. No murmur heard.  Pulmonary:      Effort: Pulmonary effort is normal.      Breath sounds: Normal breath sounds.   Abdominal:      Palpations: Abdomen is soft.   Skin:     General: Skin is warm and dry.      Comments:  Midline Sternotomy Dressing C/D/I.    Neurological:      General: No focal deficit present.      Mental Status: He is alert and oriented to person, place, and time.   Psychiatric:         Mood and Affect: Mood normal.         Behavior: Behavior normal.     Home Medications:   No current facility-administered medications on file prior to encounter.     Current Outpatient Medications on File Prior to Encounter   Medication Sig Dispense Refill    aspirin (ECOTRIN) 81 MG EC tablet Take 1 tablet (81 mg total) by mouth once daily. 30 tablet 0    atorvastatin (LIPITOR) 10 MG tablet Take 40 mg by mouth once daily.      furosemide (LASIX) 40 MG tablet TAKE ONE TABLET BY MOUTH DAILY DOSE INCREASED      hydrOXYzine HCL (ATARAX) 25 MG tablet TAKE 1 TABLET BY MOUTH EVERY EVENING 30 tablet 1    metoprolol succinate (TOPROL-XL) 100 MG 24 hr tablet Take 1 tablet (100 mg total) by mouth once daily. 30 tablet 0    NIFEdipine (PROCARDIA-XL) 30 MG (OSM) 24 hr tablet Take 1 tablet (30 mg total) by mouth once daily. (Patient taking differently: Take 60 mg by mouth once daily.) 30 tablet 0    valsartan (DIOVAN) 40 MG tablet Take 1 tablet (40 mg total) by mouth 2 (two) times daily. 60 tablet 0     Current Inpatient Medications:    Current Facility-Administered Medications:     0.9%  NaCl infusion (for blood administration), , Intravenous, Q24H PRN, Graham Aiken MD    0.9%  NaCl infusion (for blood administration), , Intravenous, Q24H PRN, Graham Aiken MD    acetaminophen oral solution 650 mg, 650 mg, Per OG tube, Q6H PRN, GISEL Bingham, 650 mg at 04/05/23 2014    albumin human 5% bottle 12.5 g, 12.5 g, Intravenous, PRN, GISEL Bingham, Stopped at 04/03/23 2100    amiodarone 360 mg/200 mL (1.8 mg/mL) infusion, 0.5 mg/min, Intravenous, Continuous, Addison Barnhart MD, Last Rate: 16.7 mL/hr at 04/14/23 0416, 0.5 mg/min at 04/14/23 0416    amLODIPine tablet 10 mg, 10 mg, Oral, Daily, HONORIO Villalpando, 10 mg at  04/14/23 0935    aspirin EC tablet 81 mg, 81 mg, Oral, Daily, GISEL Bingham, 81 mg at 04/14/23 0936    atorvastatin tablet 40 mg, 40 mg, Oral, Daily, HONORIO Villalpando, 40 mg at 04/14/23 0936    calcium gluconate 1 g in NS IVPB (premixed), 1 g, Intravenous, PRN, GISEL Bingham    calcium gluconate 1 g in NS IVPB (premixed), 2 g, Intravenous, PRN, GISEL Bingham    calcium gluconate 1 g in NS IVPB (premixed), 3 g, Intravenous, PRN, GISEL Bingham    dextrose 10% bolus 125 mL 125 mL, 12.5 g, Intravenous, PRN, GISEL Bingham    dextrose 10% bolus 250 mL 250 mL, 25 g, Intravenous, PRN, GISEL Bingham    docusate sodium capsule 100 mg, 100 mg, Oral, BID, GISEL Bingham, 100 mg at 04/13/23 2018    famotidine tablet 20 mg, 20 mg, Oral, BID, Deuce Finley IV, MD, 20 mg at 04/14/23 0935    ferrous sulfate tablet 1 each, 1 tablet, Oral, Daily, Jossy Ann MD, 1 each at 04/14/23 0936    folic acid tablet 1 mg, 1 mg, Oral, Daily, Frankie Metcalf MD, 1 mg at 04/14/23 0935    furosemide tablet 20 mg, 20 mg, Oral, BID, Wyatt Carl PA-C, 20 mg at 04/14/23 0936    haloperidol lactate injection 2 mg, 2 mg, Intravenous, Q6H PRN, Simran Meza AGACNP-BC    hydrALAZINE injection 20 mg, 20 mg, Intravenous, Q6H PRN, Elizabeth Bates NP, 20 mg at 04/06/23 1601    HYDROcodone-acetaminophen 5-325 mg per tablet 1 tablet, 1 tablet, Oral, Q4H PRN, GISEL Bingham, 1 tablet at 04/11/23 2316    labetaloL injection 20 mg, 20 mg, Intravenous, Q4H PRN, Elizabeth Huao, NP, 20 mg at 04/07/23 0558    LORazepam (ATIVAN) injection 0.5 mg, 0.5 mg, Intravenous, BID, Gillian Bradley DO, 0.5 mg at 04/14/23 0936    LORazepam (ATIVAN) injection 2 mg, 2 mg, Intravenous, Q8H PRN, Frankie Metcalf MD, 2 mg at 04/12/23 2327    magnesium sulfate 2g in water 50mL IVPB (premix), 2 g, Intravenous, PRN, GISEL Bingham    magnesium sulfate 2g in water 50mL IVPB (premix), 4 g,  Intravenous, PRN, Nicholas Murphy, PA    melatonin tablet 6 mg, 6 mg, Oral, Nightly PRN, Kalani Whitehead MD, 6 mg at 04/13/23 2112    metoprolol tartrate (LOPRESSOR) tablet 100 mg, 100 mg, Oral, BID, LOREN Chen, 100 mg at 04/14/23 0936    multivitamin tablet, 1 tablet, Oral, Daily, Frankie Metcalf MD, 1 tablet at 04/14/23 0935    ondansetron injection 4 mg, 4 mg, Intravenous, Q4H PRN, Nicholas Murphy PA    potassium chloride 20 mEq in 100 mL IVPB (FOR CENTRAL LINE ADMINISTRATION ONLY), 20 mEq, Intravenous, PRN, Nicholas P Evelins, PA    potassium chloride 20 mEq in 100 mL IVPB (FOR CENTRAL LINE ADMINISTRATION ONLY), 20 mEq, Intravenous, PRN, Nicholas P Jeffrey, PA    potassium chloride 40 mEq in 100 mL IVPB (FOR CENTRAL LINE ADMINISTRATION ONLY), 40 mEq, Intravenous, PRN, Nicholas P Jeffrey, PA    potassium chloride SA CR tablet 20 mEq, 20 mEq, Oral, Daily, Wyatt Carl PA-C, 20 mEq at 04/14/23 0935    sodium phosphate 15 mmol in dextrose 5 % (D5W) 250 mL IVPB, 15 mmol, Intravenous, PRN, Nicholas P Jeffrey, PA    sodium phosphate 20.01 mmol in dextrose 5 % (D5W) 250 mL IVPB, 20.01 mmol, Intravenous, PRN, Nicholas P Evelins, PA    sodium phosphate 30 mmol in dextrose 5 % (D5W) 250 mL IVPB, 30 mmol, Intravenous, PRN, Nicholas P Evelins, PA    sucralfate tablet 1 g, 1 g, Oral, QID (AC & HS), Nicholas Murphy PA, 1 g at 04/13/23 2018    thiamine tablet 200 mg, 200 mg, Oral, Daily, Frankie Metcalf MD, 200 mg at 04/14/23 0936    valsartan tablet 160 mg, 160 mg, Oral, BID, HONORIO Villalpando, 160 mg at 04/13/23 2018    VTE Risk Mitigation (From admission, onward)           Ordered     Place sequential compression device  Until discontinued         04/04/23 0324     IP VTE HIGH RISK PATIENT  Once         04/03/23 0951                  Assessment:   MVCAD    - s/p CABG (4.3.23) - LIMA to LAD, rSVG to OM1, rSVG to PDA  PAF/Flutter with RVR s/p DCCV, now SR/ST    - CHADsVASc - 3 Points - 3.2% Stroke Risk per Year      - s/p (4.3.23) - Ligation of SILVANO with Endostapler  VHD/severe AS  HTN/HHD without Hx of HF or CKD  Encephalopathy - Likely Related to ETOH DTs  Anemia - Acute Blood Loss - Improved with Transfusion  Thrombocytopenia - Chronic   VHD (Mild AS, Mild MS)  Hx of NSTEMI  ETOH Abuse  No Hx of GIB    Plan:     Continue Amio gtt per protocol  NPO  DCCV today remains in Aflutter.  Will refer to structural heart clinic for TAVR evaluation  Pt with Ligation of SILVANO w/ Endostapler. No plans for OAC. Defer to outpatient.   Will resume oral amio load after completion of Amio gtt. Continue metoprolol tartrate 100 mg BID.   Continue ASA, ARB and Statin  Continue Norvasc 10mg PO Qday  Aggressive Mobilization and Q1HR IS  F/U with Dr. Herrera in 7-10 days in [place]    [facility]   Cardiology  [facility]   04/14/2023       I have seen the patient, reviewed the Nurse Practitioner's note, assessment and plan. I have personally interviewed and examined the patient at bedside and agree with the findings. Medical decision making listed above were done under my guidance.  Patient remains in atrial flutter with RVR, we will proceed with DC cardioversion.  No need for oral anticoagulation as the patient has complete surgical SILVANO ligation.  Will switch back to oral amiodarone after cardioversion.  Currently the patient is on amiodarone drip

## 2023-04-14 NOTE — PLAN OF CARE
04/14/23 1502   Discharge Reassessment   Assessment Type Discharge Planning Reassessment   Discharge Plan discussed with: Sibling;Patient   Discharge Plan A Skilled Nursing Facility   Discharge Plan B Rehab   Post-Acute Status   Post-Acute Authorization Placement     Discussed rehab vs SNF with patient and sister. List of providers given. FOC obtained for [facility]. Referral sent via [employer].

## 2023-04-14 NOTE — PT/OT/SLP RE-EVAL
Occupational Therapy   Re-evaluation    Name: Mike Urbina Jr.  MRN: [id number]  Admitting Diagnosis: Left main and multivessel coronary artery disease s/p CABGx3 to LAD,OM,PDA and ligation left atrial appendage, Hypertension, Hx of Aflutter, Hx of thrombocytopenia, Hyponatremia, Hyperkalemia   Recent Surgery: Procedure(s) (LRB):  CORONARY ARTERY BYPASS GRAFT (CABG) (N/A) 11 Days Post-Op    Recommendations:     Discharge Recommendations: rehabilitation facility vs nursing facility, skilled (TBD, pending further progress)  Discharge Equipment Recommendations: walker, rolling, TTB  Barriers to discharge: Medical status, decreased caregiver support, placement    Assessment:     Mike Urbina Jr. is a 67 y.o. male with a medical diagnosis of Left main and multivessel coronary artery disease s/p CABGx3 to LAD,OM,PDA and ligation left atrial appendage, Hypertension, Hx of Aflutter, Hx of thrombocytopenia, Hyponatremia, Hyperkalemia.  He presents with confusion and c/o weakness.  Performance deficits affecting function are weakness, impaired endurance, impaired self care skills, impaired functional mobility, gait instability, impaired balance, impaired cognition, decreased safety awareness.      Rehab Prognosis:  Fair; patient would benefit from acute skilled OT services to address these deficits and reach maximum level of function.       Plan:     Patient to be seen 6 x/week to address the above listed problems via self-care/home management, therapeutic activities, therapeutic exercises  Plan of Care Expires: 04/28/23  Plan of Care Reviewed with: patient    Subjective     Chief Complaint: Pt c/o weakness.  Pain/Comfort:  Pain Rating 1: 0/10    Objective:     Communicated with: RN prior to session.  Patient found HOB elevated with: telemetry, pulse ox (continuous), oxygen upon OT entry to room.    General Precautions: Standard, fall, sternal pxns  Respiratory Status: Nasal cannula, flow 1.5 L/min  Vital Signs:    Blood Pressure: 150/84. RN notified.  HR: 71 and 69  Sp02: %      Occupational Performance:    Bed Mobility:    Pt t/f from lying with HOB elevated to seated EOB with mod A and vcs provided to adhere to sternal pxns. Pt required assist to lift trunk during bed mobility task.    Functional Mobility/Transfers:  Patient completed Sit > Stand Transfer from chair with minimum assistance and vcs provided to adhere to sternal pxns. Pt utilized RW while standing for balance.   Patient completed Toilet Transfer with minimum assistance and vcs provided to adhere to sternal pxns. Pt utilized RW while standing for balance.   Functional Mobility: Pt ambulated <> bathroom with CGA and vcs provided, using RW.    Activities of Daily Living:  Toileting: Pt attempted to have BM while seated on toilet during session. However, pt was unable to have BM despite increased time provided.    Cognitive/Visual Perceptual:  Cognitive/Psychosocial Skills:     -       Oriented to: Person, Time, and Situation. Pt appeared very confused upon arrival, shouting for help 2/2 needing to have BM and requiring education on place. However, pt was able to state place later on during session.  -       Follows Commands/attention: Follows one-step commands and Follows two-step commands  -       Safety awareness/insight to disability: impaired     Physical Exam:  Sensation:    -       Intact  Upper Extremity Range of Motion and Strength:     -       BUEs: WFL within sternal pxns    Therapeutic Positioning  Skin integrity: Visible skin intact    Risk for acquired pressure injuries is decreased due to ability to get to BSC/toilet with assist.    The following interventions were performed in an effort to prevent and/or reduce acquired pressure ulcers: geomat in place sacral protection while UIC    OT recommendations for therapeutic positioning throughout hospitalization: avoid use of briefs in bed, limit of 3 layers under patient at all times, and use of  "geomat for sacral protection while UIC     Education:  Patient provided with verbal re-education regarding sternal pxns ("move in the tube").  Understanding was verbalized, however additional teaching warranted.     Patient left up in chair with geomat cushion, all lines intact, call button in reach, chair alarm on, and RN notified.    GOALS:   Multidisciplinary Problems       Occupational Therapy Goals          Problem: Occupational Therapy    Goal Priority Disciplines Outcome Interventions   Occupational Therapy Goal     OT, PT/OT Ongoing, Progressing    Description: Goals to be met by: 4/28/23     Patient will increase functional independence with ADLs by performing:    UE Dressing with Modified [place].  LE Dressing with Modified [place] and Assistive Devices as needed.  Grooming while standing at sink with Modified [place].  Toileting from toilet with Modified [place] for hygiene and clothing management.   Toilet transfer to toilet with Modified [place].                         History:     Past Medical History:   Diagnosis Date    Atrial flutter     CHF (congestive heart failure)     Coronary artery disease     Hypertension     SOB (shortness of breath)     at times         Past Surgical History:   Procedure Laterality Date    CATARACT EXTRACTION Bilateral     CORONARY ARTERY BYPASS GRAFT (CABG) N/A 4/3/2023    Procedure: CORONARY ARTERY BYPASS GRAFT (CABG);  Surgeon: Deuce Finley IV, MD;  Location: [facility] OR;  Service: Cardiovascular;  Laterality: N/A;  WITH LLAA //  ECHO NOTIFIED    LEFT HEART CATHETERIZATION N/A 03/15/2023    Procedure: CATHETERIZATION, HEART, LEFT;  Surgeon: Sreedhar Herrera MD;  Location: [facility] CATH LAB;  Service: Cardiology;  Laterality: N/A;  [facility] via [facility]RA       Time Tracking:     OT Date of Treatment: 4/14/23  OT Start Time: 1602  OT Stop Time: 1635  OT Total Time (min): 33 min    Billable Minutes: Re-eval 33 mins    4/14/2023        "

## 2023-04-14 NOTE — ANESTHESIA PREPROCEDURE EVALUATION
"                                                                                                               04/14/2023  Mike Urbina Jr. is a 67 y.o., male s/p CABG, and now with dysrhythmia for cardioversion.  He is s/p one prior cardioversion on 4/11.  Other PMH noted as listed here, including long ETOH abuse history.    "Chief Complaint: Reason for Consult: Post CABG Medical Management     HPI: Mr. Urbina is a 66 y/o male who is known to [facility], Dr. Herrera. The patient presented to [facility] on 4.3.23 for a Planned CABG. The patient was recently worked up worked up for a NSTEMI and found to have an Abnormal PET Scan. The PT underwent a LHC with Noted MVCAD. He was referred to [facility] and deemed a candidate for CABG and on 4.3.23 he underwent a Sternotomy with Results of: LIMA to LAD, rSVG to OM1, rSVG to PDA and Ligation of SILVANO with Endostapler. The patient tolerated the procedure well and was stabilized in the ER. [facility] has been consulted for Medical Management of the PT.     4.5.23: NAD. Sitting in Bed. Denies SOB and Palps. + CP/Incisional. "I am doing great."   4.6.23: NAD. PT is a 1:1 - AMS Last PM. Denies SOB and Palps. + CP/Incisional. "I am feeling well."   4.7.23: NAD. Remains 1:1. Denies SOB or palps. + Incisional CP. SR on tele w/ intermittent Afib RVR. Resting.   4.8.23: NAD. Sitting up in chair with family at bedside. "I am feeling good." Afib RVR on tele. Remains on amio infusion. Denies CP, SOB, or palps.   4.9.23: NAD. Looks aflutter RVR on tele w/ frequent PVC's. On Amio. Denies CP, SOB, or palps.   4.10.23: NAD. Remains in aflutter RVR with intermittent bradycardia. Denies CP, SOB, or palps. On 1:1.   4.11.23: Aflutter with RVR on tele. Denies CP/SOB/Palps. NPO for CARO/DCCV today.  4.12.23: Now SR after DCCV. Denies CP/SOB/palps. Awaiting placement. VSS  4.13.23: Back on Aflutter on tele. Denies CP/SOB/palps. Awaiting placement.     PMH: ETOH Abuse, VHD (AS), Thrombocytopenia, NSTEMI, HTN, " "PAF/Flutter  PSH: Angiogram, CABG, Cataract Extraction   Family History: Mother, L, [place]  Social History: + ETOH Use (6pk/Beer per Day for > 30 Years); Denies Tobacco and Illicit Drug Use     Previous Cardiac Diagnostics:   CARO 4.11.23  LV systolic function 50-55%.    LVH is present.    AV is probably trileaflet with marked AV scleral calcification resulting in severe/critical AS, SHARAD 0.5 cm2, peak AV 3.5 m/sec.    Mild mitral stenosis.    Moderate TR   No intracardiac thrombus is present.    SILVANO a is not seen in his probably absent due to prior surgical SILVANO ligation.    Successful cardioversion x1"      "Chief Complaint: consult for alcohol withdrawal      HPI:   67 y.o. male with PMHx of ETOH abuse, VHD-aortic stenosis, thrombocytopenia, CAD status post NSTEMI, HTN, PAFlutter who presented to [facility] on 04/03/2023 for a scheduled CABG.  He previously underwent cardiac workup revealing multivessel CAD.  He underwent CABG x3 on 04/03/2023 by Dr. Finley.  He was admitted to ICU postoperatively.  [facility] was consulted on 04/04/2023.  On the evening of 04/05/2023 patient had a mental status changed, he became acutely confused and agitated. Hospital Medicine team was consulted for alcohol withdrawal.  Patient reports he drinks two 12 packs of beer daily for "a long time." He reports that he did try to quit drinking many years ago and experienced DTs.  Last drink was on for 04/02/2023, patient reports he had a six-pack of beer.     Started on librium protocol.Started on Vancomycin for Bacteremia yesterday, Dced later as it was probably a contaminant.     Interval Hx:   Patient tells me he is feeling anxious like he is having difficulty breathing."        Pre-op Assessment    I have reviewed the Patient Summary Reports.     I have reviewed the Nursing Notes. I have reviewed the NPO Status.   I have reviewed the Medications.     Review of Systems  Anesthesia Hx:  No problems with previous Anesthesia    Hematology/Oncology:      "   Hematology Comments: Hx thrombocytopenia     Cardiovascular:   Hypertension CAD  CABG/stent (CABG 4/3/23) Dysrhythmias (a-flutter)  Angina CHF hyperlipidemia    Pulmonary:   Denies COPD.  Denies Asthma. Shortness of breath    Renal/:   Denies Chronic Renal Disease. no renal calculi     Hepatic/GI:   Denies GERD. Denies Liver Disease.  Denies Hepatitis.    Neurological:   Denies CVA. Denies Seizures. Has had ETOH withdrawal in the past    Drinks 12 beers qd  Last drink was 8 days ago   Endocrine:   Denies Diabetes. Denies Hypothyroidism. Denies Hyperthyroidism. Hx low NA+  4/11 Na+ 135   Denies Obesity / BMI > 30      Physical Exam  General: Well nourished, Cooperative, Alert and Oriented    Airway:  Mallampati: I   Mouth Opening: Normal  TM Distance: Normal  Tongue: Normal  Neck ROM: Normal ROM    Dental:  Periodontal disease        Anesthesia Plan  Type of Anesthesia, risks & benefits discussed:    Anesthesia Type: Gen Natural Airway  Intra-op Monitoring Plan: Standard ASA Monitors  Induction:  IV  Informed Consent: Informed consent signed with the Patient and all parties understand the risks and agree with anesthesia plan.  All questions answered.   ASA Score: 4  Day of Surgery Review of History & Physical: H&P Update referred to the surgeon/provider.    Ready For Surgery From Anesthesia Perspective.     .

## 2023-04-14 NOTE — ANESTHESIA POSTPROCEDURE EVALUATION
Anesthesia Post Evaluation    Patient: Mike Urbina JrVan    Procedure(s) Performed: * No procedures listed *    Final Anesthesia Type: general      Patient location during evaluation: PACU  Patient participation: Yes- Able to Participate  Level of consciousness: awake and alert  Post-procedure vital signs: reviewed and stable  Pain management: adequate  Airway patency: patent      Anesthetic complications: no      Cardiovascular status: hemodynamically stable  Respiratory status: unassisted  Hydration status: euvolemic  Follow-up not needed.          Vitals Value Taken Time   /56 04/14/23 1151   Temp 36.7 04/14/23 1238   Pulse 51 04/14/23 1151   Resp 14 04/14/23 1238   SpO2 99 % 04/14/23 1151         No case tracking events are documented in the log.      Pain/Wanda Score: No data recorded

## 2023-04-14 NOTE — PROGRESS NOTES
04/14/23 0900   Pre Exercise Vitals   BP (!) 139/94   Pulse 116   Supplemental O2? Yes   O2 Device nasal cannula   O2 Flow (L/min) 2   SpO2 98 %   During Exercise Vitals   Pulse 115   Supplemental O2? Yes   O2 Device nasal cannula   O2 Flow (L/min) 2   SpO2 100 %   Distance Walked 60 feet   Post Exercise Vitals   /86   Pulse 117   Supplemental O2? Yes   O2 Device nasal cannula   O2 Flow (L/min) 2   SpO2 98 %   Modality   Modality Walker     Communicted with nurse prior to session; minimal assist to stand; verbal cues for sternal precautions; ambulated 60 feet, slow/steady gait, O2 sat @98% on 2L; assisted back to recliner; chair alarm activated;  in progress

## 2023-04-15 LAB
BACTERIA BLD CULT: NORMAL
BACTERIA BLD CULT: NORMAL

## 2023-04-15 PROCEDURE — 25000003 PHARM REV CODE 250: Performed by: INTERNAL MEDICINE

## 2023-04-15 PROCEDURE — 63600175 PHARM REV CODE 636 W HCPCS: Performed by: STUDENT IN AN ORGANIZED HEALTH CARE EDUCATION/TRAINING PROGRAM

## 2023-04-15 PROCEDURE — 25000003 PHARM REV CODE 250: Performed by: PHYSICIAN ASSISTANT

## 2023-04-15 PROCEDURE — 63600175 PHARM REV CODE 636 W HCPCS: Performed by: INTERNAL MEDICINE

## 2023-04-15 PROCEDURE — 21400001 HC TELEMETRY ROOM

## 2023-04-15 PROCEDURE — 25000003 PHARM REV CODE 250: Performed by: NURSE PRACTITIONER

## 2023-04-15 PROCEDURE — 25000003 PHARM REV CODE 250: Performed by: SPECIALIST

## 2023-04-15 PROCEDURE — 92610 EVALUATE SWALLOWING FUNCTION: CPT

## 2023-04-15 PROCEDURE — 97530 THERAPEUTIC ACTIVITIES: CPT | Mod: CO

## 2023-04-15 PROCEDURE — 27000221 HC OXYGEN, UP TO 24 HOURS

## 2023-04-15 PROCEDURE — 25000003 PHARM REV CODE 250

## 2023-04-15 RX ADMIN — THERA TABS 1 TABLET: TAB at 09:04

## 2023-04-15 RX ADMIN — FUROSEMIDE 20 MG: 20 TABLET ORAL at 09:04

## 2023-04-15 RX ADMIN — FOLIC ACID 1 MG: 1 TABLET ORAL at 09:04

## 2023-04-15 RX ADMIN — ASPIRIN 81 MG: 81 TABLET, COATED ORAL at 09:04

## 2023-04-15 RX ADMIN — AMLODIPINE BESYLATE 10 MG: 5 TABLET ORAL at 09:04

## 2023-04-15 RX ADMIN — HYDROCODONE BITARTRATE AND ACETAMINOPHEN 1 TABLET: 5; 325 TABLET ORAL at 02:04

## 2023-04-15 RX ADMIN — FAMOTIDINE 20 MG: 20 TABLET, FILM COATED ORAL at 09:04

## 2023-04-15 RX ADMIN — VALSARTAN 160 MG: 80 TABLET, FILM COATED ORAL at 09:04

## 2023-04-15 RX ADMIN — FERROUS SULFATE TAB 325 MG (65 MG ELEMENTAL FE) 1 EACH: 325 (65 FE) TAB at 09:04

## 2023-04-15 RX ADMIN — POTASSIUM CHLORIDE 20 MEQ: 1500 TABLET, EXTENDED RELEASE ORAL at 09:04

## 2023-04-15 RX ADMIN — SUCRALFATE 1 G: 1 TABLET ORAL at 09:04

## 2023-04-15 RX ADMIN — AMIODARONE HYDROCHLORIDE 400 MG: 200 TABLET ORAL at 09:04

## 2023-04-15 RX ADMIN — LORAZEPAM 2 MG: 2 INJECTION INTRAMUSCULAR; INTRAVENOUS at 05:04

## 2023-04-15 RX ADMIN — LORAZEPAM 0.5 MG: 2 INJECTION INTRAMUSCULAR; INTRAVENOUS at 09:04

## 2023-04-15 RX ADMIN — FUROSEMIDE 20 MG: 20 TABLET ORAL at 05:04

## 2023-04-15 RX ADMIN — DOCUSATE SODIUM 100 MG: 100 CAPSULE, LIQUID FILLED ORAL at 09:04

## 2023-04-15 RX ADMIN — ATORVASTATIN CALCIUM 40 MG: 40 TABLET, FILM COATED ORAL at 09:04

## 2023-04-15 RX ADMIN — THIAMINE HCL TAB 100 MG 200 MG: 100 TAB at 09:04

## 2023-04-15 RX ADMIN — SUCRALFATE 1 G: 1 TABLET ORAL at 05:04

## 2023-04-15 RX ADMIN — METOPROLOL TARTRATE 100 MG: 50 TABLET, FILM COATED ORAL at 09:04

## 2023-04-15 NOTE — PROGRESS NOTES
Mike Urbina Jr. is a 67 y.o. male patient.   1. Anticoagulated    2. Coronary artery disease of native artery of native heart with stable angina pectoris    3. Coronary artery disease    4. CAD (coronary artery disease)    5. Atrial fibrillation with rapid ventricular response    6. A-fib    7. Atrial flutter    8. Routine check-up      Past Medical History:   Diagnosis Date    Atrial flutter     CHF (congestive heart failure)     Coronary artery disease     Hypertension     SOB (shortness of breath)     at times     No past surgical history pertinent negatives on file.  Scheduled Meds:   [START ON 4/17/2023] amiodarone  200 mg Oral BID    [START ON 4/20/2023] amiodarone  200 mg Oral Daily    amiodarone  400 mg Oral BID    amLODIPine  10 mg Oral Daily    aspirin  81 mg Oral Daily    atorvastatin  40 mg Oral Daily    docusate sodium  100 mg Oral BID    famotidine  20 mg Oral BID    ferrous sulfate  1 tablet Oral Daily    folic acid  1 mg Oral Daily    furosemide  20 mg Oral BID    lorazepam  0.5 mg Intravenous BID    metoprolol tartrate  100 mg Oral BID    multivitamin  1 tablet Oral Daily    potassium chloride  20 mEq Oral Daily    sucralfate  1 g Oral QID (AC & HS)    thiamine  200 mg Oral Daily    valsartan  160 mg Oral BID     Continuous Infusions:  PRN Meds:sodium chloride, sodium chloride, acetaminophen, albumin human 5%, calcium gluconate IVPB, calcium gluconate IVPB, calcium gluconate IVPB, dextrose 10%, dextrose 10%, haloperidol lactate, hydrALAZINE, HYDROcodone-acetaminophen, labetalol, lorazepam, magnesium sulfate IVPB, magnesium sulfate IVPB, melatonin, ondansetron, potassium chloride in water, potassium chloride in water, potassium chloride in water, sodium phosphate IVPB, sodium phosphate IVPB, sodium phosphate IVPB    Review of patient's allergies indicates:  No Known Allergies  There are no hospital problems to display for this patient.    Blood pressure (!) 131/59, pulse (!) 51, temperature  "98.1 °F (36.7 °C), temperature source Oral, resp. rate 17, height 6' 2" (1.88 m), weight 77.8 kg (171 lb 8.3 oz), SpO2 100 %.    Subjective:    Awake. Alert     Objective:   AFVSS  Heart: RRR  Lungs: respirations nonlabored, clear  Incision: c/d/i     Assesment/Plan:    S/p CAB 4/3/23  Aflutter per cardiology  Await placement           GISEL Lewis  4/15/2023    "

## 2023-04-15 NOTE — PT/OT/SLP PROGRESS
"Occupational Therapy  Treatment    Mike Urbina Jr.   MRN: [id number]   Admitting Diagnosis: <principal problem not specified>        OT Start Time: 0859  OT Stop Time: 0911  OT Total Time (min): 12 min        OT/ROCCO: ROCCO     Number of ROCCO visits since last OT visit: 1    General Precautions: Standard, fall, sternal  Orthopedic Precautions:  (sternal)  Braces:    Respiratory Status: Room air         Subjective:  Communicated with RN prior to session.    S: "I can't get out." Pt found confused, tangled in bedding.     O: pt assisted to free self from bedding and instructed in using BLE to assist to scoot pt to hob with bed in trendelenberg and mod A for successful task completion. Pt instructed in use of cardiac irwin and re educated on current sternal precautions. Pt offered hygiene assistance but declined. Pt reporting "I am just thirsty. Pt offered items from tray table and therapeutic positioning to enhance safe self feeding. Pt left with all needs in reach and bed alarm armed.    A: pt would benefit from continued skilled OT tx for increased I and safety in self care and adl tasks.     P: cont c current OT POC                Pain/Comfort  Pain Rating 1: 0/10    AM-PAC 6 CLICK ADL   How much help from another person does this patient currently need?   1 = Unable, Total/Dependent Assistance  2 = A lot, Maximum/Moderate Assistance  3 = A little, Minimum/Contact Guard/Supervision  4 = None, Modified [place]/Independent    Putting on and taking off regular lower body clothing? : 2  Bathing (including washing, rinsing, drying)?: 2  Toileting, which includes using toilet, bedpan, or urinal? : 2  Putting on and taking off regular upper body clothing?: 2  Taking care of personal grooming such as brushing teeth?: 3  Eating meals?: 4  Daily Activity Total Score: 15     AM-PAC Raw Score CMS "G-Code Modifier Level of Impairment Assistance   6 % Total / Unable   7 - 8 CM 80 - 100% Maximal Assist   9-13 CL 60 " - 80% Moderate Assist   14 - 19 CK 40 - 60% Moderate Assist   20 - 22 CJ 20 - 40% Minimal Assist   23 CI 1-20% SBA / CGA   24 CH 0% Independent/ Mod I       Patient left HOB elevated with all lines intact, call button in reach, and bed alarm on    ASSESSMENT:  Mike Urbina Jr. is a 67 y.o. male with a medical diagnosis of <principal problem not specified> and presents with .    Rehab identified problem list/impairments:  weakness, impaired endurance, impaired self care skills, impaired functional mobility, gait instability, impaired balance, impaired cognition, decreased safety awareness    Rehab potential is fair.    Activity tolerance: Fair    Discharge recommendations: nursing facility, skilled, rehabilitation facility   Barriers to discharge: Barriers to Discharge: Inaccessible home environment, Decreased caregiver support    Equipment recommendations: walker, rolling    GOALS:   Multidisciplinary Problems       Occupational Therapy Goals          Problem: Occupational Therapy    Goal Priority Disciplines Outcome Interventions   Occupational Therapy Goal     OT, PT/OT Ongoing, Progressing    Description: Goals to be met by: 4/28/23     Patient will increase functional independence with ADLs by performing:    UE Dressing with Modified [place].  LE Dressing with Modified [place] and Assistive Devices as needed.  Grooming while standing at sink with Modified [place].  Toileting from toilet with Modified [place] for hygiene and clothing management.   Toilet transfer to toilet with Modified [place].                         Plan:  Patient to be seen 6 x/week to address the above listed problems via self-care/home management, therapeutic activities, therapeutic exercises  Plan of Care expires: 04/28/23  Plan of Care reviewed with: patient         04/15/2023

## 2023-04-15 NOTE — PROGRESS NOTES
"[facility]   Cardiology  Progress Note    Patient Name: Mike Urbina Jr.  MRN: [id number]  Admission Date: 4/3/2023  Hospital Length of Stay: 12 days  Code Status: Full Code   Attending Provider: Deuce Finley IV, MD   Consulting Provider: LOREN Pruitt  Primary Care Physician: LOREN Jerry  Principal Problem:<principal problem not specified>    Patient information was obtained from patient, past medical records, and ER records.     Subjective:     Chief Complaint: Reason for Consult: Post CABG Medical Management    HPI: Mr. Urbina is a 68 y/o male who is known to [facility], Dr. Herrera. The patient presented to [facility] on 4.3.23 for a Planned CABG. The patient was recently worked up worked up for a NSTEMI and found to have an Abnormal PET Scan. The PT underwent a LHC with Noted MVCAD. He was referred to [facility] and deemed a candidate for CABG and on 4.3.23 he underwent a Sternotomy with Results of: LIMA to LAD, rSVG to OM1, rSVG to PDA and Ligation of SILVANO with Endostapler. The patient tolerated the procedure well and was stabilized in the ER. [facility] has been consulted for Medical Management of the PT.    4.5.23: NAD. Sitting in Bed. Denies SOB and Palps. + CP/Incisional. "I am doing great."   4.6.23: NAD. PT is a 1:1 - AMS Last PM. Denies SOB and Palps. + CP/Incisional. "I am feeling well."   4.7.23: NAD. Remains 1:1. Denies SOB or palps. + Incisional CP. SR on tele w/ intermittent Afib RVR. Resting.   4.8.23: NAD. Sitting up in chair with family at bedside. "I am feeling good." Afib RVR on tele. Remains on amio infusion. Denies CP, SOB, or palps.   4.9.23: NAD. Looks aflutter RVR on tele w/ frequent PVC's. On Amio. Denies CP, SOB, or palps.   4.10.23: NAD. Remains in aflutter RVR with intermittent bradycardia. Denies CP, SOB, or palps. On 1:1.   4.11.23: Aflutter with RVR on tele. Denies CP/SOB/Palps. NPO for CARO/DCCV today.  4.12.23: Now SR after DCCV. Denies CP/SOB/palps. Awaiting placement. " VSS  4.13.23: Back on Aflutter on tele. Denies CP/SOB/palps. Awaiting placement.  4.14.23: Remains in flutter with rates around 115. NPO for DCCV today. Denies CP/SOB/Palps.  4.15.23: NAD Noted. Vitals Stable. SR with PVCS on Tele.    PMH: ETOH Abuse, VHD (AS), Thrombocytopenia, NSTEMI, HTN, PAF/Flutter  PSH: Angiogram, CABG, Cataract Extraction   Family History: Mother, L, MI  Social History: + ETOH Use (6pk/Beer per Day for > 30 Years); Denies Tobacco and Illicit Drug Use    Previous Cardiac Diagnostics:   CARO 4.11.23  LV systolic function 50-55%.    LVH is present.    AV is probably trileaflet with marked AV scleral calcification resulting in severe/critical AS, SHARAD 0.5 cm2, peak AV 3.5 m/sec.    Mild mitral stenosis.    Moderate TR   No intracardiac thrombus is present.    SILVANO a is not seen in his probably absent due to prior surgical SILVANO ligation.    Successful cardioversion x1    [facility] 3.15.23:  Surgical coronary anatomy with distal left main 50%; LAD proximal to mid 60-70%; circumflex mid 80- 90%; RCA with proximal .  The ejection fraction was 60% with EDP of 10 mmHg.  Right radial access.  The estimated blood loss was less than 10 cc.  CT surgical consult for CABG evaluation.    PET 1.6.23:  This is an abnormal perfusion study. Study is consistent with ischemia.   This scan is suggestive of moderate risk for future cardiovascular events.   Small reversible perfusion abnormality of moderate intensity in the apical segment. Medium fixed perfusion abnormality of severe intensity in the inferior region.   The left ventricular cavity is noted to be normal on the stress studies. The stress left ventricular ejection fraction was calculated to be 35% and left ventricular global function is moderately reduced. The rest left ventricular cavity is noted to be normal. The rest left ventricular ejection fraction was calculated to be 40% and rest left ventricular global function is mildly reduced.   When compared to the  resting ejection fraction (40%), the stress ejection fraction (35%) has decreased.   The study quality is good.   There was a rise in myocardial blood flow between rest and stress.  Global myocardial blood flow reserve was 1.47.  Myocardial blood flow reserve is globally abnormal, placing the patient at a higher coronary event risk.    ECHO 12.9.22:  The left ventricle is normal in size with mild concentric hypertrophy and normal systolic function.  Grade I left ventricular diastolic dysfunction.  The estimated ejection fraction is 55%.  Normal right ventricular size with normal right ventricular systolic function.  There is mild aortic valve stenosis.  There is mild mitral stenosis.  Normal central venous pressure (3 mmHg).    Review of patient's allergies indicates:  No Known Allergies    No current facility-administered medications on file prior to encounter.     Current Outpatient Medications on File Prior to Encounter   Medication Sig    aspirin (ECOTRIN) 81 MG EC tablet Take 1 tablet (81 mg total) by mouth once daily.    atorvastatin (LIPITOR) 10 MG tablet Take 40 mg by mouth once daily.    furosemide (LASIX) 40 MG tablet TAKE ONE TABLET BY MOUTH DAILY DOSE INCREASED    hydrOXYzine HCL (ATARAX) 25 MG tablet TAKE 1 TABLET BY MOUTH EVERY EVENING    metoprolol succinate (TOPROL-XL) 100 MG 24 hr tablet Take 1 tablet (100 mg total) by mouth once daily.    NIFEdipine (PROCARDIA-XL) 30 MG (OSM) 24 hr tablet Take 1 tablet (30 mg total) by mouth once daily. (Patient taking differently: Take 60 mg by mouth once daily.)    valsartan (DIOVAN) 40 MG tablet Take 1 tablet (40 mg total) by mouth 2 (two) times daily.     Review of Systems   Respiratory:  Negative for chest tightness and shortness of breath.    Cardiovascular:  Negative for chest pain.     Objective:     Vital Signs (Most Recent):  Temp: 97.4 °F (36.3 °C) (04/15/23 1109)  Pulse: (!) 53 (04/15/23 1109)  Resp: 17 (04/15/23 1109)  BP: 138/72 (04/15/23 1109)  SpO2:  100 % (04/15/23 1109)   Vital Signs (24h Range):  Temp:  [97.4 °F (36.3 °C)-98.3 °F (36.8 °C)] 97.4 °F (36.3 °C)  Pulse:  [51-73] 53  Resp:  [16-20] 17  SpO2:  [99 %-100 %] 100 %  BP: (101-138)/(56-81) 138/72     Weight: 77.8 kg (171 lb 8.3 oz)  Body mass index is 22.02 kg/m².    SpO2: 100 %         Intake/Output Summary (Last 24 hours) at 4/15/2023 1138  Last data filed at 4/14/2023 2200  Gross per 24 hour   Intake 800 ml   Output 500 ml   Net 300 ml         Lines/Drains/Airways       Peripheral Intravenous Line  Duration                  Peripheral IV - Single Lumen 04/13/23 1105 20 G Anterior;Left Forearm 2 days                  Significant Labs:  Recent Results (from the past 72 hour(s))   POCT glucose    Collection Time: 04/12/23 11:56 AM   Result Value Ref Range    POCT Glucose 88 70 - 110 mg/dL   Basic Metabolic Panel    Collection Time: 04/14/23 12:54 PM   Result Value Ref Range    Sodium Level 139 136 - 145 mmol/L    Potassium Level 4.8 3.5 - 5.1 mmol/L    Chloride 108 (H) 98 - 107 mmol/L    Carbon Dioxide 24 23 - 31 mmol/L    Glucose Level 103 82 - 115 mg/dL    Blood Urea Nitrogen 12.8 8.4 - 25.7 mg/dL    Creatinine 1.04 0.73 - 1.18 mg/dL    BUN/Creatinine Ratio 12     Calcium Level Total 8.1 (L) 8.8 - 10.0 mg/dL    Anion Gap 7.0 mEq/L    eGFR >60 mls/min/1.73/m2   Magnesium    Collection Time: 04/14/23 12:54 PM   Result Value Ref Range    Magnesium Level 1.80 1.60 - 2.60 mg/dL   Phosphorus    Collection Time: 04/14/23 12:54 PM   Result Value Ref Range    Phosphorus Level 4.3 2.3 - 4.7 mg/dL   CBC with Differential    Collection Time: 04/14/23  3:41 PM   Result Value Ref Range    WBC 7.6 4.5 - 11.5 x10(3)/mcL    RBC 4.10 (L) 4.70 - 6.10 x10(6)/mcL    Hgb 11.9 (L) 14.0 - 18.0 g/dL    Hct 37.0 (L) 42.0 - 52.0 %    MCV 90.2 80.0 - 94.0 fL    MCH 29.0 27.0 - 31.0 pg    MCHC 32.2 (L) 33.0 - 36.0 g/dL    RDW 15.7 11.5 - 17.0 %    Platelet 282 130 - 400 x10(3)/mcL    MPV 10.1 7.4 - 10.4 fL    Neut % 76.2 %     Lymph % 11.4 %    Mono % 9.9 %    Eos % 1.4 %    Basophil % 0.4 %    Lymph # 0.87 0.6 - 4.6 x10(3)/mcL    Neut # 5.82 2.1 - 9.2 x10(3)/mcL    Mono # 0.76 0.1 - 1.3 x10(3)/mcL    Eos # 0.11 0 - 0.9 x10(3)/mcL    Baso # 0.03 0 - 0.2 x10(3)/mcL    IG# 0.05 (H) 0 - 0.04 x10(3)/mcL    IG% 0.7 %    NRBC% 0.0 %     Telemetry: SR with PVCS    Physical Exam  Constitutional:       Appearance: Normal appearance.   HENT:      Head: Normocephalic.      Nose: Nose normal.      Mouth/Throat:      Mouth: Mucous membranes are moist.   Eyes:      Extraocular Movements: Extraocular movements intact.   Cardiovascular:      Rate and Rhythm: Normal rate and regular rhythm.      Pulses: Normal pulses.      Heart sounds: Murmur heard.      Comments: SR with PVCS  Pulmonary:      Effort: Pulmonary effort is normal.      Breath sounds: Normal breath sounds.   Abdominal:      Palpations: Abdomen is soft.   Skin:     General: Skin is warm and dry.      Comments: Mid Line Incision is Stable.   Neurological:      General: No focal deficit present.      Mental Status: He is alert and oriented to person, place, and time.   Psychiatric:         Mood and Affect: Mood normal.         Behavior: Behavior normal.     Home Medications:   No current facility-administered medications on file prior to encounter.     Current Outpatient Medications on File Prior to Encounter   Medication Sig Dispense Refill    aspirin (ECOTRIN) 81 MG EC tablet Take 1 tablet (81 mg total) by mouth once daily. 30 tablet 0    atorvastatin (LIPITOR) 10 MG tablet Take 40 mg by mouth once daily.      furosemide (LASIX) 40 MG tablet TAKE ONE TABLET BY MOUTH DAILY DOSE INCREASED      hydrOXYzine HCL (ATARAX) 25 MG tablet TAKE 1 TABLET BY MOUTH EVERY EVENING 30 tablet 1    metoprolol succinate (TOPROL-XL) 100 MG 24 hr tablet Take 1 tablet (100 mg total) by mouth once daily. 30 tablet 0    NIFEdipine (PROCARDIA-XL) 30 MG (OSM) 24 hr tablet Take 1 tablet (30 mg total) by mouth once  daily. (Patient taking differently: Take 60 mg by mouth once daily.) 30 tablet 0    valsartan (DIOVAN) 40 MG tablet Take 1 tablet (40 mg total) by mouth 2 (two) times daily. 60 tablet 0     Current Inpatient Medications:    Current Facility-Administered Medications:     0.9%  NaCl infusion (for blood administration), , Intravenous, Q24H PRN, Graahm Aiken MD    0.9%  NaCl infusion (for blood administration), , Intravenous, Q24H PRN, Graham Aiken MD    acetaminophen oral solution 650 mg, 650 mg, Per OG tube, Q6H PRN, GISEL Bingham, 650 mg at 04/05/23 2014    albumin human 5% bottle 12.5 g, 12.5 g, Intravenous, PRN, GISEL Bingham, Stopped at 04/03/23 2100    [START ON 4/17/2023] amiodarone tablet 200 mg, 200 mg, Oral, BID, RAY Harris    [START ON 4/20/2023] amiodarone tablet 200 mg, 200 mg, Oral, Daily, RAY Harris    amiodarone tablet 400 mg, 400 mg, Oral, BID, RAY Harris, 400 mg at 04/15/23 0952    amLODIPine tablet 10 mg, 10 mg, Oral, Daily, HONORIO Villalpando, 10 mg at 04/15/23 0952    aspirin EC tablet 81 mg, 81 mg, Oral, Daily, GISEL Bingham, 81 mg at 04/15/23 0953    atorvastatin tablet 40 mg, 40 mg, Oral, Daily, HONORIO Villalpando, 40 mg at 04/15/23 0952    calcium gluconate 1 g in NS IVPB (premixed), 1 g, Intravenous, PRN, GISEL Bingham    calcium gluconate 1 g in NS IVPB (premixed), 2 g, Intravenous, PRN, GISEL Bingham    calcium gluconate 1 g in NS IVPB (premixed), 3 g, Intravenous, PRN, GISEL Bingham    dextrose 10% bolus 125 mL 125 mL, 12.5 g, Intravenous, PRN, GISEL Bingham    dextrose 10% bolus 250 mL 250 mL, 25 g, Intravenous, PRN, GISEL Bingham    docusate sodium capsule 100 mg, 100 mg, Oral, BID, GISEL Bingham, 100 mg at 04/14/23 2054    famotidine tablet 20 mg, 20 mg, Oral, BID, Deuce Finley IV, MD, 20 mg at 04/15/23 0953    ferrous sulfate tablet 1 each, 1 tablet,  Oral, Daily, Jossy Ann MD, 1 each at 04/15/23 0952    folic acid tablet 1 mg, 1 mg, Oral, Daily, Frankie Metcalf MD, 1 mg at 04/15/23 0953    furosemide tablet 20 mg, 20 mg, Oral, BID, GISEL Baer-ASHISH, 20 mg at 04/15/23 0953    haloperidol lactate injection 2 mg, 2 mg, Intravenous, Q6H PRN, Simran Meza [facility]    hydrALAZINE injection 20 mg, 20 mg, Intravenous, Q6H PRN, Elizabeth Bates NP, 20 mg at 04/06/23 1601    HYDROcodone-acetaminophen 5-325 mg per tablet 1 tablet, 1 tablet, Oral, Q4H PRN, GISEL Bingham, 1 tablet at 04/15/23 0252    labetaloL injection 20 mg, 20 mg, Intravenous, Q4H PRN, Elizabeth Bates NP, 20 mg at 04/07/23 0558    LORazepam (ATIVAN) injection 0.5 mg, 0.5 mg, Intravenous, BID, Gillian Bradley, DO, 0.5 mg at 04/14/23 0936    LORazepam (ATIVAN) injection 2 mg, 2 mg, Intravenous, Q8H PRN, Frankie Metcalf MD, 2 mg at 04/15/23 0514    magnesium sulfate 2g in water 50mL IVPB (premix), 2 g, Intravenous, PRN, GISEL Bingham    magnesium sulfate 2g in water 50mL IVPB (premix), 4 g, Intravenous, PRN, GISEL Bingham    melatonin tablet 6 mg, 6 mg, Oral, Nightly PRN, Kalani Whitehead MD, 6 mg at 04/14/23 2054    metoprolol tartrate (LOPRESSOR) tablet 100 mg, 100 mg, Oral, BID, LOREN Chen, 100 mg at 04/14/23 2054    multivitamin tablet, 1 tablet, Oral, Daily, Frankie Metcalf MD, 1 tablet at 04/15/23 0953    ondansetron injection 4 mg, 4 mg, Intravenous, Q4H PRN, GISEL Bingham    potassium chloride 20 mEq in 100 mL IVPB (FOR CENTRAL LINE ADMINISTRATION ONLY), 20 mEq, Intravenous, PRN, Nicholas Murphy, PA    potassium chloride 20 mEq in 100 mL IVPB (FOR CENTRAL LINE ADMINISTRATION ONLY), 20 mEq, Intravenous, PRN, Nicholas Murphy, PA    potassium chloride 40 mEq in 100 mL IVPB (FOR CENTRAL LINE ADMINISTRATION ONLY), 40 mEq, Intravenous, PRN, Nicholas Murphy, PA    potassium chloride SA CR tablet 20 mEq, 20 mEq, Oral, Daily, Wyatt  ASHISH Carl PA-C, 20 mEq at 04/15/23 0952    sodium phosphate 15 mmol in dextrose 5 % (D5W) 250 mL IVPB, 15 mmol, Intravenous, PRN, GISEL Bingham    sodium phosphate 20.01 mmol in dextrose 5 % (D5W) 250 mL IVPB, 20.01 mmol, Intravenous, PRN, GISEL Bingham    sodium phosphate 30 mmol in dextrose 5 % (D5W) 250 mL IVPB, 30 mmol, Intravenous, PRN, GISEL Bingham    sucralfate tablet 1 g, 1 g, Oral, QID (AC & HS), GISEL Bingham, 1 g at 04/15/23 0527    thiamine tablet 200 mg, 200 mg, Oral, Daily, Frankie Metcalf MD, 200 mg at 04/15/23 0952    valsartan tablet 160 mg, 160 mg, Oral, BID, HONORIO Villalpando, 160 mg at 04/14/23 2054    VTE Risk Mitigation (From admission, onward)           Ordered     Place sequential compression device  Until discontinued         04/04/23 0324     IP VTE HIGH RISK PATIENT  Once         04/03/23 0951                  Assessment:   MVCAD    - s/p CABG (4.3.23) - LIMA to LAD, rSVG to OM1, rSVG to PDA  PAF/Flutter with RVR s/p DCCV, now SR    - CHADsVASc - 3 Points - 3.2% Stroke Risk per Year     - s/p (4.3.23) - Ligation of SILVANO with Endostapler  VHD/severe AS    AV is probably trileaflet with marked AV scleral calcification resulting in severe/critical AS, SHARAD 0.5 cm2, peak AV 3.5 m/sec (4.11.23)  HTN/HHD without Hx of HF or CKD- BP Mostly Controlled   Encephalopathy - Likely Related to ETOH Dts- Some Improvement/Now Lethargic  Anemia - Acute Blood Loss - Improved with Transfusion  Thrombocytopenia - Chronic   VHD (Mild AS, Mild MS)  Hx of NSTEMI  ETOH Abuse  No Hx of GIB    Plan:     Continue Oral Tapered Amiodarone  Continue Current Cardiac Medications  Pt with Ligation of SILVANO w/ Endostapler. No plans for OAC. Defer to outpatient.   Will need TAVR Evaluation on Outpatient Basis  Advance Mobilization when safe and as Able    Rein T DIVINA GuillenP   Cardiology  [facility]   04/15/2023     I have seen the patient, reviewed the Nurse Practitioner's note,  assessment and plan. I have personally interviewed and examined the patient at bedside and agree with the findings. Medical decision making listed above were done under my guidance.  Patient was somnolent/lethargic this morning.  Telemetry showed normal sinus rhythm with intermittent PVCs  Continue Oral Tapered Amiodarone  Does not need oral anticoagulation due to prior surgical SILVANO ligation  Will need outpatient referral for TAVR Evaluation.

## 2023-04-15 NOTE — PT/OT/SLP EVAL
Speech Language Pathology Department  Clinical Swallow Evaluation    Patient Name:  Mike Urbina Jr.   MRN:  [id number]    Recommendations:     General recommendations:  dysphagia therapy  Diet recommendations:  Minced & Moist Diet - IDDSI Level 5, Liquid Diet Level: Mildly thick liquids - IDDSI Level 2   Swallow strategies/precautions: small bites/sips, slow rate, additional swallow per bite/sip, NO straws, supervision with meals, and medications crushed in puree  Precautions: Standard, aspiration    History:     Mike Urbina Jr. is a/n 67 y.o. male admitted for CABG.    Past Medical History:   Diagnosis Date    Atrial flutter     CHF (congestive heart failure)     Coronary artery disease     Hypertension     SOB (shortness of breath)     at times     Past Surgical History:   Procedure Laterality Date    CATARACT EXTRACTION Bilateral     CORONARY ARTERY BYPASS GRAFT (CABG) N/A 4/3/2023    Procedure: CORONARY ARTERY BYPASS GRAFT (CABG);  Surgeon: Deuce Finley IV, MD;  Location: [facility] OR;  Service: Cardiovascular;  Laterality: N/A;  WITH LLAA //  ECHO NOTIFIED    LEFT HEART CATHETERIZATION N/A 03/15/2023    Procedure: CATHETERIZATION, HEART, LEFT;  Surgeon: Sreedhar Herrera MD;  Location: [facility] CATH LAB;  Service: Cardiology;  Laterality: N/A;  LHC via RRA       Pt known to this department from this admission with MBS completed on 4/11/23 with diet recommendations of minced an moist solids with thin liquids.  SLP notified by both nursing and CNA that pt had multiple coughing episodes with thin liquids.    Current Method of Nutrition: NPO    Patient complaint: to eat/drink    Subjective     Patient awake and alert.    Patient goals: to eat/drink     Spiritual/Cultural/Advent Beliefs/Practices that affect care: no  Pain/Comfort:        Objective:     Oral Musculature Evaluation  Oral Musculature: WFL  Dentition: scattered dentition  Secretion Management: adequate  Mucosal Quality: good  Oral Labial  Strength and Mobility: impaired seal  Lingual Strength and Mobility: impaired strength    Consistency Fed By Oral Symptoms Pharyngeal Symptoms   Thin liquid by cup Self None Wet vocal quality after swallow  Throat clear after swallow   Puree Self None None   Mildly thick liquid by cup Self None None   Chewable solid Self Prolonged mastication negatively impacted by lack of dentition None     Assessment:     Pt with signs/sx of aspiration noted with thin liquids.  Rec: continue minced and moist solids, downgrade to mildly thick liquids.  Skilled SLP services warranted to tx dysphagia.    Goals:     Multidisciplinary Problems       SLP Goals          Problem: SLP    Goal Priority Disciplines Outcome   SLP Goal     SLP Ongoing, Progressing   Description: LTG: Pt will tolerate least restrictive PO diet with no clinical signs/sx aspiration    STGs:  1.  Pt will complete laryngeal strengthening exercises and aleksandra with minimal cues.  2.  Pt will tolerate thermal stimulation to the anterior faucial pillars with 100% effortful swallows and delay less than 2 seconds.                       Patient Education:     Patient provided with verbal education regarding recommendations.  Understanding was verbalized, however additional teaching warranted.    Plan:     SLP Follow-Up:  Yes   Patient to be seen:  5 x/week   Plan of Care expires:  05/05/23  Plan of Care reviewed with:  patient      Time Tracking:     SLP Treatment Date:   04/15/23  Speech Start Time:  0910  Speech Stop Time:0925   Speech Total Time (min):  15 min    Billable minutes:  Swallow and Oral Function Evaluation, 15 min      04/15/2023

## 2023-04-15 NOTE — PLAN OF CARE
Problem: SLP  Goal: SLP Goal  Description: LTG: Pt will tolerate least restrictive PO diet with no clinical signs/sx aspiration    STGs:  1.  Pt will complete laryngeal strengthening exercises and aleksandra with minimal cues.  2.  Pt will tolerate thermal stimulation to the anterior faucial pillars with 100% effortful swallows and delay less than 2 seconds.    Outcome: Ongoing, Progressing       POC initiated and goals created.  Clari

## 2023-04-16 LAB
ALBUMIN SERPL-MCNC: 2.5 G/DL (ref 3.4–4.8)
ALBUMIN/GLOB SERPL: 0.9 RATIO (ref 1.1–2)
ALP SERPL-CCNC: 90 UNIT/L (ref 40–150)
ALT SERPL-CCNC: 15 UNIT/L (ref 0–55)
AST SERPL-CCNC: 16 UNIT/L (ref 5–34)
BILIRUBIN DIRECT+TOT PNL SERPL-MCNC: 1.1 MG/DL
BUN SERPL-MCNC: 13.5 MG/DL (ref 8.4–25.7)
CALCIUM SERPL-MCNC: 8.3 MG/DL (ref 8.8–10)
CHLORIDE SERPL-SCNC: 105 MMOL/L (ref 98–107)
CO2 SERPL-SCNC: 23 MMOL/L (ref 23–31)
CREAT SERPL-MCNC: 0.97 MG/DL (ref 0.73–1.18)
GFR SERPLBLD CREATININE-BSD FMLA CKD-EPI: >60 MLS/MIN/1.73/M2
GLOBULIN SER-MCNC: 2.8 GM/DL (ref 2.4–3.5)
GLUCOSE SERPL-MCNC: 96 MG/DL (ref 82–115)
MAGNESIUM SERPL-MCNC: 1.8 MG/DL (ref 1.6–2.6)
POTASSIUM SERPL-SCNC: 3.5 MMOL/L (ref 3.5–5.1)
PROT SERPL-MCNC: 5.3 GM/DL (ref 5.8–7.6)
SODIUM SERPL-SCNC: 138 MMOL/L (ref 136–145)

## 2023-04-16 PROCEDURE — 25000003 PHARM REV CODE 250: Performed by: INTERNAL MEDICINE

## 2023-04-16 PROCEDURE — 97110 THERAPEUTIC EXERCISES: CPT

## 2023-04-16 PROCEDURE — 93005 ELECTROCARDIOGRAM TRACING: CPT

## 2023-04-16 PROCEDURE — 63600175 PHARM REV CODE 636 W HCPCS: Performed by: INTERNAL MEDICINE

## 2023-04-16 PROCEDURE — 63600175 PHARM REV CODE 636 W HCPCS: Performed by: STUDENT IN AN ORGANIZED HEALTH CARE EDUCATION/TRAINING PROGRAM

## 2023-04-16 PROCEDURE — 25000003 PHARM REV CODE 250: Performed by: NURSE PRACTITIONER

## 2023-04-16 PROCEDURE — 25000003 PHARM REV CODE 250

## 2023-04-16 PROCEDURE — 25000003 PHARM REV CODE 250: Performed by: PHYSICIAN ASSISTANT

## 2023-04-16 PROCEDURE — 94760 N-INVAS EAR/PLS OXIMETRY 1: CPT

## 2023-04-16 PROCEDURE — 27000221 HC OXYGEN, UP TO 24 HOURS

## 2023-04-16 PROCEDURE — 93010 EKG 12-LEAD: ICD-10-PCS | Mod: ,,, | Performed by: INTERNAL MEDICINE

## 2023-04-16 PROCEDURE — 80053 COMPREHEN METABOLIC PANEL: CPT | Performed by: NURSE PRACTITIONER

## 2023-04-16 PROCEDURE — 83735 ASSAY OF MAGNESIUM: CPT | Performed by: NURSE PRACTITIONER

## 2023-04-16 PROCEDURE — 21400001 HC TELEMETRY ROOM

## 2023-04-16 PROCEDURE — 93010 ELECTROCARDIOGRAM REPORT: CPT | Mod: ,,, | Performed by: INTERNAL MEDICINE

## 2023-04-16 PROCEDURE — 25000003 PHARM REV CODE 250: Performed by: SPECIALIST

## 2023-04-16 PROCEDURE — 63600175 PHARM REV CODE 636 W HCPCS: Performed by: NURSE PRACTITIONER

## 2023-04-16 RX ORDER — MAGNESIUM SULFATE HEPTAHYDRATE 40 MG/ML
2 INJECTION, SOLUTION INTRAVENOUS ONCE
Status: COMPLETED | OUTPATIENT
Start: 2023-04-16 | End: 2023-04-16

## 2023-04-16 RX ADMIN — LORAZEPAM 0.5 MG: 2 INJECTION INTRAMUSCULAR; INTRAVENOUS at 08:04

## 2023-04-16 RX ADMIN — MAGNESIUM SULFATE HEPTAHYDRATE 2 G: 40 INJECTION, SOLUTION INTRAVENOUS at 02:04

## 2023-04-16 RX ADMIN — VALSARTAN 160 MG: 80 TABLET, FILM COATED ORAL at 08:04

## 2023-04-16 RX ADMIN — THIAMINE HCL TAB 100 MG 200 MG: 100 TAB at 08:04

## 2023-04-16 RX ADMIN — THERA TABS 1 TABLET: TAB at 08:04

## 2023-04-16 RX ADMIN — DOCUSATE SODIUM 100 MG: 100 CAPSULE, LIQUID FILLED ORAL at 08:04

## 2023-04-16 RX ADMIN — AMIODARONE HYDROCHLORIDE 400 MG: 200 TABLET ORAL at 08:04

## 2023-04-16 RX ADMIN — LORAZEPAM 2 MG: 2 INJECTION INTRAMUSCULAR; INTRAVENOUS at 04:04

## 2023-04-16 RX ADMIN — METOPROLOL TARTRATE 100 MG: 50 TABLET, FILM COATED ORAL at 08:04

## 2023-04-16 RX ADMIN — FERROUS SULFATE TAB 325 MG (65 MG ELEMENTAL FE) 1 EACH: 325 (65 FE) TAB at 08:04

## 2023-04-16 RX ADMIN — ATORVASTATIN CALCIUM 40 MG: 40 TABLET, FILM COATED ORAL at 08:04

## 2023-04-16 RX ADMIN — SUCRALFATE 1 G: 1 TABLET ORAL at 02:04

## 2023-04-16 RX ADMIN — FUROSEMIDE 20 MG: 20 TABLET ORAL at 08:04

## 2023-04-16 RX ADMIN — ASPIRIN 81 MG: 81 TABLET, COATED ORAL at 08:04

## 2023-04-16 RX ADMIN — FOLIC ACID 1 MG: 1 TABLET ORAL at 08:04

## 2023-04-16 RX ADMIN — FAMOTIDINE 20 MG: 20 TABLET, FILM COATED ORAL at 08:04

## 2023-04-16 RX ADMIN — AMLODIPINE BESYLATE 10 MG: 5 TABLET ORAL at 08:04

## 2023-04-16 RX ADMIN — Medication 6 MG: at 08:04

## 2023-04-16 RX ADMIN — Medication 6 MG: at 03:04

## 2023-04-16 RX ADMIN — SUCRALFATE 1 G: 1 TABLET ORAL at 08:04

## 2023-04-16 RX ADMIN — POTASSIUM CHLORIDE 20 MEQ: 1500 TABLET, EXTENDED RELEASE ORAL at 08:04

## 2023-04-16 NOTE — PROGRESS NOTES
Mike Urbina Jr. is a 67 y.o. male patient.   1. Anticoagulated    2. Coronary artery disease of native artery of native heart with stable angina pectoris    3. Coronary artery disease    4. CAD (coronary artery disease)    5. Atrial fibrillation with rapid ventricular response    6. A-fib    7. Atrial flutter    8. Routine check-up      Past Medical History:   Diagnosis Date    Atrial flutter     CHF (congestive heart failure)     Coronary artery disease     Hypertension     SOB (shortness of breath)     at times     No past surgical history pertinent negatives on file.  Scheduled Meds:   [START ON 4/17/2023] amiodarone  200 mg Oral BID    [START ON 4/20/2023] amiodarone  200 mg Oral Daily    amiodarone  400 mg Oral BID    amLODIPine  10 mg Oral Daily    aspirin  81 mg Oral Daily    atorvastatin  40 mg Oral Daily    docusate sodium  100 mg Oral BID    famotidine  20 mg Oral BID    ferrous sulfate  1 tablet Oral Daily    folic acid  1 mg Oral Daily    furosemide  20 mg Oral BID    lorazepam  0.5 mg Intravenous BID    magnesium sulfate IVPB  2 g Intravenous Once    metoprolol tartrate  100 mg Oral BID    multivitamin  1 tablet Oral Daily    potassium chloride  20 mEq Oral Daily    sucralfate  1 g Oral QID (AC & HS)    thiamine  200 mg Oral Daily    valsartan  160 mg Oral BID     Continuous Infusions:  PRN Meds:sodium chloride, sodium chloride, acetaminophen, albumin human 5%, calcium gluconate IVPB, calcium gluconate IVPB, calcium gluconate IVPB, dextrose 10%, dextrose 10%, haloperidol lactate, hydrALAZINE, HYDROcodone-acetaminophen, labetalol, lorazepam, magnesium sulfate IVPB, magnesium sulfate IVPB, melatonin, ondansetron, potassium chloride in water, potassium chloride in water, potassium chloride in water, sodium phosphate IVPB, sodium phosphate IVPB, sodium phosphate IVPB    Review of patient's allergies indicates:  No Known Allergies  There are no hospital problems to display for this patient.    Blood  "pressure (!) 154/84, pulse 95, temperature 98 °F (36.7 °C), temperature source Axillary, resp. rate 17, height 6' 2" (1.88 m), weight 77.8 kg (171 lb 8.3 oz), SpO2 100 %.    Subjective:    Resting comfortably     Objective:   AFVSS  Heart: RRR  Lungs: respirations nonlabored, clear  Incision: c/d/i     Assesment/Plan:    S/p CAB 4/3/23  Aflutter per cardiology  Await placement         GISEL Lewis  4/16/2023    "

## 2023-04-16 NOTE — PLAN OF CARE
Problem: Adult Inpatient Plan of Care  Goal: Plan of Care Review  Outcome: Ongoing, Progressing  Goal: Optimal Comfort and Wellbeing  Outcome: Ongoing, Progressing  Goal: Readiness for Transition of Care  Outcome: Ongoing, Progressing     Problem: Infection  Goal: Absence of Infection Signs and Symptoms  Outcome: Ongoing, Progressing     Problem: Skin Injury Risk Increased  Goal: Skin Health and Integrity  Outcome: Ongoing, Progressing     Problem: Fall Injury Risk  Goal: Absence of Fall and Fall-Related Injury  Outcome: Ongoing, Progressing

## 2023-04-16 NOTE — PROGRESS NOTES
"[facility]   Cardiology  Progress Note    Patient Name: Mike Urbina Jr.  MRN: [id number]  Admission Date: 4/3/2023  Hospital Length of Stay: 13 days  Code Status: Full Code   Attending Provider: Deuce Finley IV, MD   Consulting Provider: LOREN Pruitt  Primary Care Physician: LOREN Jerry  Principal Problem:<principal problem not specified>    Patient information was obtained from patient, past medical records, and ER records.     Subjective:   Chief Complaint: Reason for Consult: Post CABG Medical Management    HPI: Mr. Urbina is a 68 y/o male who is known to [facility], Dr. Herrera. The patient presented to [facility] on 4.3.23 for a Planned CABG. The patient was recently worked up worked up for a NSTEMI and found to have an Abnormal PET Scan. The PT underwent a LHC with Noted MVCAD. He was referred to [facility] and deemed a candidate for CABG and on 4.3.23 he underwent a Sternotomy with Results of: LIMA to LAD, rSVG to OM1, rSVG to PDA and Ligation of SILVANO with Endostapler. The patient tolerated the procedure well and was stabilized in the ER. [facility] has been consulted for Medical Management of the PT.    Hospital Course:  4.5.23: NAD. Sitting in Bed. Denies SOB and Palps. + CP/Incisional. "I am doing great."   4.6.23: NAD. PT is a 1:1 - AMS Last PM. Denies SOB and Palps. + CP/Incisional. "I am feeling well."   4.7.23: NAD. Remains 1:1. Denies SOB or palps. + Incisional CP. SR on tele w/ intermittent Afib RVR. Resting.   4.8.23: NAD. Sitting up in chair with family at bedside. "I am feeling good." Afib RVR on tele. Remains on amio infusion. Denies CP, SOB, or palps.   4.9.23: NAD. Looks aflutter RVR on tele w/ frequent PVC's. On Amio. Denies CP, SOB, or palps.   4.10.23: NAD. Remains in aflutter RVR with intermittent bradycardia. Denies CP, SOB, or palps. On 1:1.   4.11.23: Aflutter with RVR on tele. Denies CP/SOB/Palps. NPO for CARO/DCCV today.  4.12.23: Now SR after DCCV. Denies CP/SOB/palps. " Awaiting placement. VSS  4.13.23: Back on Aflutter on tele. Denies CP/SOB/palps. Awaiting placement.  4.14.23: Remains in flutter with rates around 115. NPO for DCCV today. Denies CP/SOB/Palps.  4.15.23: NAD Noted. Vitals Stable. SR with PVCS on Tele.  4.16.23: NAD Noted. Intermittent AFL RVR.     PMH: ETOH Abuse, VHD (AS), Thrombocytopenia, NSTEMI, HTN, PAF/Flutter  PSH: Angiogram, CABG, Cataract Extraction   Family History: Mother, L, MI  Social History: + ETOH Use (6pk/Beer per Day for > 30 Years); Denies Tobacco and Illicit Drug Use    Previous Cardiac Diagnostics:   CARO 4.11.23  LV systolic function 50-55%.    LVH is present.    AV is probably trileaflet with marked AV scleral calcification resulting in severe/critical AS, SHARAD 0.5 cm2, peak AV 3.5 m/sec.    Mild mitral stenosis.    Moderate TR   No intracardiac thrombus is present.    SILVANO a is not seen in his probably absent due to prior surgical SILVANO ligation.    Successful cardioversion x1    [facility] 3.15.23:  Surgical coronary anatomy with distal left main 50%; LAD proximal to mid 60-70%; circumflex mid 80- 90%; RCA with proximal .  The ejection fraction was 60% with EDP of 10 mmHg.  Right radial access.  The estimated blood loss was less than 10 cc.  CT surgical consult for CABG evaluation.    PET 1.6.23:  This is an abnormal perfusion study. Study is consistent with ischemia.   This scan is suggestive of moderate risk for future cardiovascular events.   Small reversible perfusion abnormality of moderate intensity in the apical segment. Medium fixed perfusion abnormality of severe intensity in the inferior region.   The left ventricular cavity is noted to be normal on the stress studies. The stress left ventricular ejection fraction was calculated to be 35% and left ventricular global function is moderately reduced. The rest left ventricular cavity is noted to be normal. The rest left ventricular ejection fraction was calculated to be 40% and rest left  ventricular global function is mildly reduced.   When compared to the resting ejection fraction (40%), the stress ejection fraction (35%) has decreased.   The study quality is good.   There was a rise in myocardial blood flow between rest and stress.  Global myocardial blood flow reserve was 1.47.  Myocardial blood flow reserve is globally abnormal, placing the patient at a higher coronary event risk.    ECHO 12.9.22:  The left ventricle is normal in size with mild concentric hypertrophy and normal systolic function.  Grade I left ventricular diastolic dysfunction.  The estimated ejection fraction is 55%.  Normal right ventricular size with normal right ventricular systolic function.  There is mild aortic valve stenosis.  There is mild mitral stenosis.  Normal central venous pressure (3 mmHg).    Review of patient's allergies indicates:  No Known Allergies    No current facility-administered medications on file prior to encounter.     Current Outpatient Medications on File Prior to Encounter   Medication Sig    aspirin (ECOTRIN) 81 MG EC tablet Take 1 tablet (81 mg total) by mouth once daily.    atorvastatin (LIPITOR) 10 MG tablet Take 40 mg by mouth once daily.    furosemide (LASIX) 40 MG tablet TAKE ONE TABLET BY MOUTH DAILY DOSE INCREASED    hydrOXYzine HCL (ATARAX) 25 MG tablet TAKE 1 TABLET BY MOUTH EVERY EVENING    metoprolol succinate (TOPROL-XL) 100 MG 24 hr tablet Take 1 tablet (100 mg total) by mouth once daily.    NIFEdipine (PROCARDIA-XL) 30 MG (OSM) 24 hr tablet Take 1 tablet (30 mg total) by mouth once daily. (Patient taking differently: Take 60 mg by mouth once daily.)    valsartan (DIOVAN) 40 MG tablet Take 1 tablet (40 mg total) by mouth 2 (two) times daily.     Review of Systems   Respiratory:  Negative for chest tightness and shortness of breath.    Cardiovascular:  Negative for chest pain.     Objective:     Vital Signs (Most Recent):  Temp: 98 °F (36.7 °C) (04/16/23 0711)  Pulse: 95 (04/16/23  0711)  Resp: 17 (04/16/23 0711)  BP: (!) 154/84 (04/16/23 0711)  SpO2: 100 % (04/16/23 0711)   Vital Signs (24h Range):  Temp:  [97.4 °F (36.3 °C)-98.3 °F (36.8 °C)] 98 °F (36.7 °C)  Pulse:  [] 95  Resp:  [17-18] 17  SpO2:  [98 %-100 %] 100 %  BP: (131-155)/(54-91) 154/84     Weight:  (bed is not accurate. Nurse asked not to stand patient. MP.)  Body mass index is 22.02 kg/m².    SpO2: 100 %         Intake/Output Summary (Last 24 hours) at 4/16/2023 1106  Last data filed at 4/16/2023 0712  Gross per 24 hour   Intake 840 ml   Output 1375 ml   Net -535 ml         Lines/Drains/Airways       Peripheral Intravenous Line  Duration                  Peripheral IV - Single Lumen 04/13/23 1105 20 G Anterior;Left Forearm 3 days                  Significant Labs:  Recent Results (from the past 72 hour(s))   Basic Metabolic Panel    Collection Time: 04/14/23 12:54 PM   Result Value Ref Range    Sodium Level 139 136 - 145 mmol/L    Potassium Level 4.8 3.5 - 5.1 mmol/L    Chloride 108 (H) 98 - 107 mmol/L    Carbon Dioxide 24 23 - 31 mmol/L    Glucose Level 103 82 - 115 mg/dL    Blood Urea Nitrogen 12.8 8.4 - 25.7 mg/dL    Creatinine 1.04 0.73 - 1.18 mg/dL    BUN/Creatinine Ratio 12     Calcium Level Total 8.1 (L) 8.8 - 10.0 mg/dL    Anion Gap 7.0 mEq/L    eGFR >60 mls/min/1.73/m2   Magnesium    Collection Time: 04/14/23 12:54 PM   Result Value Ref Range    Magnesium Level 1.80 1.60 - 2.60 mg/dL   Phosphorus    Collection Time: 04/14/23 12:54 PM   Result Value Ref Range    Phosphorus Level 4.3 2.3 - 4.7 mg/dL   CBC with Differential    Collection Time: 04/14/23  3:41 PM   Result Value Ref Range    WBC 7.6 4.5 - 11.5 x10(3)/mcL    RBC 4.10 (L) 4.70 - 6.10 x10(6)/mcL    Hgb 11.9 (L) 14.0 - 18.0 g/dL    Hct 37.0 (L) 42.0 - 52.0 %    MCV 90.2 80.0 - 94.0 fL    MCH 29.0 27.0 - 31.0 pg    MCHC 32.2 (L) 33.0 - 36.0 g/dL    RDW 15.7 11.5 - 17.0 %    Platelet 282 130 - 400 x10(3)/mcL    MPV 10.1 7.4 - 10.4 fL    Neut % 76.2 %    Lymph  % 11.4 %    Mono % 9.9 %    Eos % 1.4 %    Basophil % 0.4 %    Lymph # 0.87 0.6 - 4.6 x10(3)/mcL    Neut # 5.82 2.1 - 9.2 x10(3)/mcL    Mono # 0.76 0.1 - 1.3 x10(3)/mcL    Eos # 0.11 0 - 0.9 x10(3)/mcL    Baso # 0.03 0 - 0.2 x10(3)/mcL    IG# 0.05 (H) 0 - 0.04 x10(3)/mcL    IG% 0.7 %    NRBC% 0.0 %   Comprehensive Metabolic Panel    Collection Time: 04/16/23  4:28 AM   Result Value Ref Range    Sodium Level 138 136 - 145 mmol/L    Potassium Level 3.5 3.5 - 5.1 mmol/L    Chloride 105 98 - 107 mmol/L    Carbon Dioxide 23 23 - 31 mmol/L    Glucose Level 96 82 - 115 mg/dL    Blood Urea Nitrogen 13.5 8.4 - 25.7 mg/dL    Creatinine 0.97 0.73 - 1.18 mg/dL    Calcium Level Total 8.3 (L) 8.8 - 10.0 mg/dL    Protein Total 5.3 (L) 5.8 - 7.6 gm/dL    Albumin Level 2.5 (L) 3.4 - 4.8 g/dL    Globulin 2.8 2.4 - 3.5 gm/dL    Albumin/Globulin Ratio 0.9 (L) 1.1 - 2.0 ratio    Bilirubin Total 1.1 <=1.5 mg/dL    Alkaline Phosphatase 90 40 - 150 unit/L    Alanine Aminotransferase 15 0 - 55 unit/L    Aspartate Aminotransferase 16 5 - 34 unit/L    eGFR >60 mls/min/1.73/m2   Magnesium    Collection Time: 04/16/23  4:28 AM   Result Value Ref Range    Magnesium Level 1.80 1.60 - 2.60 mg/dL     Telemetry: SR with PVCS    EKG:      Physical Exam  Constitutional:       Appearance: Normal appearance.   HENT:      Head: Normocephalic.      Nose: Nose normal.      Mouth/Throat:      Mouth: Mucous membranes are moist.   Eyes:      Extraocular Movements: Extraocular movements intact.   Cardiovascular:      Rate and Rhythm: Normal rate and regular rhythm.      Pulses: Normal pulses.      Heart sounds: Murmur heard.      Comments: SR with PVCS  Pulmonary:      Effort: Pulmonary effort is normal.      Breath sounds: Normal breath sounds.   Abdominal:      Palpations: Abdomen is soft.   Skin:     General: Skin is warm and dry.      Comments: Mid Line Incision is Stable.   Neurological:      General: No focal deficit present.      Mental Status: He is  alert and oriented to person, place, and time.   Psychiatric:         Mood and Affect: Mood normal.         Behavior: Behavior normal.     Home Medications:   No current facility-administered medications on file prior to encounter.     Current Outpatient Medications on File Prior to Encounter   Medication Sig Dispense Refill    aspirin (ECOTRIN) 81 MG EC tablet Take 1 tablet (81 mg total) by mouth once daily. 30 tablet 0    atorvastatin (LIPITOR) 10 MG tablet Take 40 mg by mouth once daily.      furosemide (LASIX) 40 MG tablet TAKE ONE TABLET BY MOUTH DAILY DOSE INCREASED      hydrOXYzine HCL (ATARAX) 25 MG tablet TAKE 1 TABLET BY MOUTH EVERY EVENING 30 tablet 1    metoprolol succinate (TOPROL-XL) 100 MG 24 hr tablet Take 1 tablet (100 mg total) by mouth once daily. 30 tablet 0    NIFEdipine (PROCARDIA-XL) 30 MG (OSM) 24 hr tablet Take 1 tablet (30 mg total) by mouth once daily. (Patient taking differently: Take 60 mg by mouth once daily.) 30 tablet 0    valsartan (DIOVAN) 40 MG tablet Take 1 tablet (40 mg total) by mouth 2 (two) times daily. 60 tablet 0     Current Inpatient Medications:    Current Facility-Administered Medications:     0.9%  NaCl infusion (for blood administration), , Intravenous, Q24H PRN, Graham Aiken MD    0.9%  NaCl infusion (for blood administration), , Intravenous, Q24H PRN, Graham Aiken MD    acetaminophen oral solution 650 mg, 650 mg, Per OG tube, Q6H PRN, GISEL Bingham, 650 mg at 04/05/23 2014    albumin human 5% bottle 12.5 g, 12.5 g, Intravenous, PRN, GISEL Bingham, Stopped at 04/03/23 2100    [START ON 4/17/2023] amiodarone tablet 200 mg, 200 mg, Oral, BID, RAY Harris    [START ON 4/20/2023] amiodarone tablet 200 mg, 200 mg, Oral, Daily, RAY Harris    amiodarone tablet 400 mg, 400 mg, Oral, BID, RAY Harris, 400 mg at 04/16/23 0847    amLODIPine tablet 10 mg, 10 mg, Oral, Daily, HONORIO Villalpando, 10 mg at  04/16/23 0848    aspirin EC tablet 81 mg, 81 mg, Oral, Daily, GISEL Bingham, 81 mg at 04/16/23 0848    atorvastatin tablet 40 mg, 40 mg, Oral, Daily, HONORIO Villalpando, 40 mg at 04/16/23 0848    calcium gluconate 1 g in NS IVPB (premixed), 1 g, Intravenous, PRN, Nicholas Murphy, PA    calcium gluconate 1 g in NS IVPB (premixed), 2 g, Intravenous, PRN, Nicholas Murphy, PA    calcium gluconate 1 g in NS IVPB (premixed), 3 g, Intravenous, PRN, GISEL Bingham    dextrose 10% bolus 125 mL 125 mL, 12.5 g, Intravenous, PRN, GISEL Bingham    dextrose 10% bolus 250 mL 250 mL, 25 g, Intravenous, PRN, Nicholas Murphy, PA    docusate sodium capsule 100 mg, 100 mg, Oral, BID, GISEL Bingham, 100 mg at 04/16/23 0847    famotidine tablet 20 mg, 20 mg, Oral, BID, Deuce Finley IV, MD, 20 mg at 04/16/23 0847    ferrous sulfate tablet 1 each, 1 tablet, Oral, Daily, Jossy Ann MD, 1 each at 04/16/23 0848    folic acid tablet 1 mg, 1 mg, Oral, Daily, Frankie Metcalf MD, 1 mg at 04/16/23 0848    furosemide tablet 20 mg, 20 mg, Oral, BID, Wyatt Carl PA-C, 20 mg at 04/16/23 0847    haloperidol lactate injection 2 mg, 2 mg, Intravenous, Q6H PRN, Simran Meza AGACNP-BC    hydrALAZINE injection 20 mg, 20 mg, Intravenous, Q6H PRN, Elizabeth Bates NP, 20 mg at 04/06/23 1601    HYDROcodone-acetaminophen 5-325 mg per tablet 1 tablet, 1 tablet, Oral, Q4H PRN, GISEL Bingham, 1 tablet at 04/15/23 0252    labetaloL injection 20 mg, 20 mg, Intravenous, Q4H PRN, Elizabeth Bates NP, 20 mg at 04/07/23 0558    LORazepam (ATIVAN) injection 0.5 mg, 0.5 mg, Intravenous, BID, Gillian Bradley DO, 0.5 mg at 04/16/23 0849    LORazepam (ATIVAN) injection 2 mg, 2 mg, Intravenous, Q8H PRN, Frankie Metcalf MD, 2 mg at 04/16/23 0452    magnesium sulfate 2g in water 50mL IVPB (premix), 2 g, Intravenous, PRN, GISEL Bingham    magnesium sulfate 2g in water 50mL IVPB (premix), 4 g,  Intravenous, PRN, Nicholas Murphy, PA    melatonin tablet 6 mg, 6 mg, Oral, Nightly PRN, Kalani Whitehead MD, 6 mg at 04/16/23 0322    metoprolol tartrate (LOPRESSOR) tablet 100 mg, 100 mg, Oral, BID, Phyllis Kevin, LOREN, 100 mg at 04/16/23 0847    multivitamin tablet, 1 tablet, Oral, Daily, Frankie Metcalf MD, 1 tablet at 04/16/23 0847    ondansetron injection 4 mg, 4 mg, Intravenous, Q4H PRN, Nicholas Murphy, PA    potassium chloride 20 mEq in 100 mL IVPB (FOR CENTRAL LINE ADMINISTRATION ONLY), 20 mEq, Intravenous, PRN, Nicholas P Evelins, PA    potassium chloride 20 mEq in 100 mL IVPB (FOR CENTRAL LINE ADMINISTRATION ONLY), 20 mEq, Intravenous, PRN, Nicholas P Jeffrey, PA    potassium chloride 40 mEq in 100 mL IVPB (FOR CENTRAL LINE ADMINISTRATION ONLY), 40 mEq, Intravenous, PRN, Nicholas P Evelins, PA    potassium chloride SA CR tablet 20 mEq, 20 mEq, Oral, Daily, Wyatt Carl PA-C, 20 mEq at 04/16/23 0848    sodium phosphate 15 mmol in dextrose 5 % (D5W) 250 mL IVPB, 15 mmol, Intravenous, PRN, Nicholas P Evelins, PA    sodium phosphate 20.01 mmol in dextrose 5 % (D5W) 250 mL IVPB, 20.01 mmol, Intravenous, PRN, Nicholas P Evelins, PA    sodium phosphate 30 mmol in dextrose 5 % (D5W) 250 mL IVPB, 30 mmol, Intravenous, PRN, Nicholas P Evelins, PA    sucralfate tablet 1 g, 1 g, Oral, QID (AC & HS), Nicholas Murphy, PA, 1 g at 04/15/23 2108    thiamine tablet 200 mg, 200 mg, Oral, Daily, Frankie Metcalf MD, 200 mg at 04/16/23 0847    valsartan tablet 160 mg, 160 mg, Oral, BID, HONORIO Villalpando, 160 mg at 04/16/23 0847    VTE Risk Mitigation (From admission, onward)           Ordered     Place sequential compression device  Until discontinued         04/04/23 0324     IP VTE HIGH RISK PATIENT  Once         04/03/23 4529                  Assessment:   MVCAD    - s/p CABG (4.3.23) - LIMA to LAD, rSVG to OM1, rSVG to PDA  PAF/Flutter with RVR s/p DCCV, intermittent AFL RVR (Now Sinus Rhythm in the 90's)    -  CHADsVASc - 3 Points - 3.2% Stroke Risk per Year     - s/p (4.3.23) - Ligation of SILVANO with Endostapler  VHD/severe AS    AV is probably trileaflet with marked AV scleral calcification resulting in severe/critical AS, SHARAD 0.5 cm2, peak AV 3.5 m/sec (4.11.23)  HTN/HHD without Hx of HF or CKD- BP Mostly Controlled   Encephalopathy - Likely Related to ETOH Dts- Some Improvement/Now Lethargic  Anemia - Acute Blood Loss - Improved with Transfusion  Thrombocytopenia - Chronic   VHD (Mild AS, Mild MS)  Hx of NSTEMI  ETOH Abuse  No Hx of GIB    Plan:     Continue Oral Tapered Amiodarone  Continue Current Cardiac Medications  Replete Mag  Pt with Ligation of SILVANO w/ Endostapler. No plans for OAC. Defer to outpatient.   Will need TAVR Evaluation on Outpatient Basis  Advance Mobilization when safe and as Able    LOREN Pruitt   Cardiology  [facility]   04/16/2023       I have seen the patient, reviewed the Nurse Practitioner's note, assessment and plan. I have personally interviewed and examined the patient at bedside and agree with the findings. Medical decision making listed above were done under my guidance.  Patient is off/on AFL, currently in NSR/sinus tachycardia  Will need outpatient TAVR  Continue oral amiodarone with tapering dose.

## 2023-04-16 NOTE — PROGRESS NOTES
04/16/23 1300   Pre Exercise Vitals   /90   Pulse 101   Supplemental O2? Yes   O2 Device nasal cannula   O2 Flow (L/min) 2   SpO2 96 %   During Exercise Vitals   Pulse 110   Supplemental O2? Yes   O2 Device nasal cannula   O2 Flow (L/min) 2   SpO2 96 %   Distance Walked 150 feet   Post Exercise Vitals   /90   Pulse 109   Supplemental O2? Yes   O2 Device nasal cannula   O2 Flow (L/min) 2   SpO2 98 %   Modality   Modality Walker;Gait Belt     Minimal assist to stand using gait belt for safety; verbal cues for sternal precautions; ambulated 150 feet, O2 sat @96% on 2L; Pt left sitting up in bedside recliner with chair alarm activated; family @bedside;  monitoring

## 2023-04-16 NOTE — PT/OT/SLP PROGRESS
F/u with MARTY Lemon; she reported pt is tolerating minced/moist diet and mildly-thick liquids with no issue.  SLP to cont POC tomorrow.

## 2023-04-17 ENCOUNTER — HOSPITAL ENCOUNTER (INPATIENT)
Facility: HOSPITAL | Age: 68
LOS: 18 days | Discharge: HOME HEALTH | DRG: 949 | End: 2023-05-05
Attending: FAMILY MEDICINE | Admitting: FAMILY MEDICINE
Payer: MEDICARE

## 2023-04-17 VITALS
HEART RATE: 116 BPM | DIASTOLIC BLOOD PRESSURE: 84 MMHG | WEIGHT: 171.5 LBS | BODY MASS INDEX: 22.01 KG/M2 | RESPIRATION RATE: 18 BRPM | SYSTOLIC BLOOD PRESSURE: 127 MMHG | OXYGEN SATURATION: 100 % | TEMPERATURE: 97 F | HEIGHT: 74 IN

## 2023-04-17 DIAGNOSIS — I25.118 CORONARY ARTERY DISEASE OF NATIVE ARTERY OF NATIVE HEART WITH STABLE ANGINA PECTORIS: Primary | ICD-10-CM

## 2023-04-17 DIAGNOSIS — I48.4 ATYPICAL ATRIAL FLUTTER: ICD-10-CM

## 2023-04-17 DIAGNOSIS — R07.9 CHEST PAIN: ICD-10-CM

## 2023-04-17 DIAGNOSIS — D69.6 THROMBOCYTOPENIA: ICD-10-CM

## 2023-04-17 DIAGNOSIS — I50.9 ACUTE ON CHRONIC CONGESTIVE HEART FAILURE, UNSPECIFIED HEART FAILURE TYPE: ICD-10-CM

## 2023-04-17 DIAGNOSIS — M17.0 PRIMARY OSTEOARTHRITIS OF BOTH KNEES: ICD-10-CM

## 2023-04-17 LAB
APPEARANCE UR: CLEAR
BACTERIA #/AREA URNS AUTO: ABNORMAL /HPF
BILIRUB UR QL STRIP.AUTO: NEGATIVE MG/DL
COLOR UR AUTO: YELLOW
GLUCOSE UR QL STRIP.AUTO: NEGATIVE MG/DL
KETONES UR QL STRIP.AUTO: NEGATIVE MG/DL
LEUKOCYTE ESTERASE UR QL STRIP.AUTO: NEGATIVE UNIT/L
NITRITE UR QL STRIP.AUTO: NEGATIVE
PH UR STRIP.AUTO: 6 [PH]
PROT UR QL STRIP.AUTO: NEGATIVE MG/DL
RBC #/AREA URNS AUTO: ABNORMAL /HPF
RBC UR QL AUTO: NEGATIVE UNIT/L
SP GR UR STRIP.AUTO: 1.02
SQUAMOUS #/AREA URNS AUTO: ABNORMAL /HPF
UROBILINOGEN UR STRIP-ACNC: 4 MG/DL
WBC #/AREA URNS AUTO: ABNORMAL /HPF
YEAST URNS QL MICRO: ABNORMAL /HPF

## 2023-04-17 PROCEDURE — 11000001 HC ACUTE MED/SURG PRIVATE ROOM

## 2023-04-17 PROCEDURE — 63600175 PHARM REV CODE 636 W HCPCS: Performed by: INTERNAL MEDICINE

## 2023-04-17 PROCEDURE — 99024 POSTOP FOLLOW-UP VISIT: CPT | Mod: POP,,, | Performed by: PHYSICIAN ASSISTANT

## 2023-04-17 PROCEDURE — 25000003 PHARM REV CODE 250: Performed by: FAMILY MEDICINE

## 2023-04-17 PROCEDURE — 81001 URINALYSIS AUTO W/SCOPE: CPT | Performed by: FAMILY MEDICINE

## 2023-04-17 PROCEDURE — 25000003 PHARM REV CODE 250: Performed by: PHYSICIAN ASSISTANT

## 2023-04-17 PROCEDURE — 97535 SELF CARE MNGMENT TRAINING: CPT

## 2023-04-17 PROCEDURE — 25000003 PHARM REV CODE 250: Performed by: SPECIALIST

## 2023-04-17 PROCEDURE — 25000003 PHARM REV CODE 250: Performed by: NURSE PRACTITIONER

## 2023-04-17 PROCEDURE — 63600175 PHARM REV CODE 636 W HCPCS: Performed by: STUDENT IN AN ORGANIZED HEALTH CARE EDUCATION/TRAINING PROGRAM

## 2023-04-17 PROCEDURE — 25000003 PHARM REV CODE 250: Performed by: INTERNAL MEDICINE

## 2023-04-17 PROCEDURE — 99024 PR POST-OP FOLLOW-UP VISIT: ICD-10-PCS | Mod: POP,,, | Performed by: PHYSICIAN ASSISTANT

## 2023-04-17 PROCEDURE — 11000004 HC SNF PRIVATE

## 2023-04-17 PROCEDURE — 63600175 PHARM REV CODE 636 W HCPCS: Performed by: FAMILY MEDICINE

## 2023-04-17 PROCEDURE — 25000003 PHARM REV CODE 250

## 2023-04-17 RX ORDER — METOPROLOL TARTRATE 100 MG/1
100 TABLET ORAL 2 TIMES DAILY
Qty: 60 TABLET | Refills: 11 | Status: ON HOLD | OUTPATIENT
Start: 2023-04-17 | End: 2023-05-05 | Stop reason: HOSPADM

## 2023-04-17 RX ORDER — LANOLIN ALCOHOL/MO/W.PET/CERES
200 CREAM (GRAM) TOPICAL DAILY
Qty: 30 TABLET | Refills: 0 | Status: SHIPPED | OUTPATIENT
Start: 2023-04-18 | End: 2023-05-15

## 2023-04-17 RX ORDER — VALSARTAN 160 MG/1
160 TABLET ORAL 2 TIMES DAILY
Qty: 180 TABLET | Refills: 3 | Status: SHIPPED | OUTPATIENT
Start: 2023-04-17 | End: 2023-05-30 | Stop reason: SDUPTHER

## 2023-04-17 RX ORDER — LANOLIN ALCOHOL/MO/W.PET/CERES
1 CREAM (GRAM) TOPICAL DAILY
Status: DISCONTINUED | OUTPATIENT
Start: 2023-04-18 | End: 2023-05-05 | Stop reason: HOSPADM

## 2023-04-17 RX ORDER — FUROSEMIDE 40 MG/1
40 TABLET ORAL DAILY
Status: DISCONTINUED | OUTPATIENT
Start: 2023-04-18 | End: 2023-05-05 | Stop reason: HOSPADM

## 2023-04-17 RX ORDER — LORAZEPAM 2 MG/ML
0.5 INJECTION INTRAMUSCULAR EVERY 12 HOURS PRN
Status: DISCONTINUED | OUTPATIENT
Start: 2023-04-17 | End: 2023-04-18

## 2023-04-17 RX ORDER — HYDROCODONE BITARTRATE AND ACETAMINOPHEN 5; 325 MG/1; MG/1
1 TABLET ORAL EVERY 6 HOURS PRN
Status: DISCONTINUED | OUTPATIENT
Start: 2023-04-17 | End: 2023-05-05 | Stop reason: HOSPADM

## 2023-04-17 RX ORDER — HYDROXYZINE HYDROCHLORIDE 25 MG/1
25 TABLET, FILM COATED ORAL NIGHTLY
Status: DISCONTINUED | OUTPATIENT
Start: 2023-04-17 | End: 2023-05-05 | Stop reason: HOSPADM

## 2023-04-17 RX ORDER — ATORVASTATIN CALCIUM 40 MG/1
40 TABLET, FILM COATED ORAL DAILY
Status: DISCONTINUED | OUTPATIENT
Start: 2023-04-18 | End: 2023-05-05 | Stop reason: HOSPADM

## 2023-04-17 RX ORDER — ASPIRIN 81 MG/1
81 TABLET ORAL DAILY
Status: DISCONTINUED | OUTPATIENT
Start: 2023-04-18 | End: 2023-05-05 | Stop reason: HOSPADM

## 2023-04-17 RX ORDER — AMIODARONE HYDROCHLORIDE 200 MG/1
200 TABLET ORAL 2 TIMES DAILY
Status: COMPLETED | OUTPATIENT
Start: 2023-04-17 | End: 2023-04-19

## 2023-04-17 RX ORDER — AMIODARONE HYDROCHLORIDE 200 MG/1
200 TABLET ORAL 2 TIMES DAILY
Qty: 60 TABLET | Refills: 11 | Status: ON HOLD | OUTPATIENT
Start: 2023-04-17 | End: 2023-05-05 | Stop reason: HOSPADM

## 2023-04-17 RX ORDER — VALSARTAN 160 MG/1
160 TABLET ORAL 2 TIMES DAILY
Status: DISCONTINUED | OUTPATIENT
Start: 2023-04-17 | End: 2023-05-05 | Stop reason: HOSPADM

## 2023-04-17 RX ORDER — AMLODIPINE BESYLATE 10 MG/1
10 TABLET ORAL DAILY
Qty: 30 TABLET | Refills: 11 | Status: SHIPPED | OUTPATIENT
Start: 2023-04-18 | End: 2023-06-12 | Stop reason: ALTCHOICE

## 2023-04-17 RX ORDER — FERROUS SULFATE 325(65) MG
325 TABLET, DELAYED RELEASE (ENTERIC COATED) ORAL DAILY
Qty: 39 TABLET | Refills: 0 | Status: SHIPPED | OUTPATIENT
Start: 2023-04-17 | End: 2023-05-15

## 2023-04-17 RX ORDER — HYDROCODONE BITARTRATE AND ACETAMINOPHEN 5; 325 MG/1; MG/1
1 TABLET ORAL EVERY 6 HOURS PRN
Qty: 28 TABLET | Refills: 0 | Status: SHIPPED | OUTPATIENT
Start: 2023-04-17 | End: 2023-05-15

## 2023-04-17 RX ORDER — AMIODARONE HYDROCHLORIDE 200 MG/1
200 TABLET ORAL DAILY
Qty: 30 TABLET | Refills: 11 | Status: SHIPPED | OUTPATIENT
Start: 2023-04-20 | End: 2023-05-30 | Stop reason: SDUPTHER

## 2023-04-17 RX ORDER — AMLODIPINE BESYLATE 5 MG/1
10 TABLET ORAL DAILY
Status: DISCONTINUED | OUTPATIENT
Start: 2023-04-19 | End: 2023-04-19

## 2023-04-17 RX ORDER — METOPROLOL TARTRATE 50 MG/1
100 TABLET ORAL 2 TIMES DAILY
Status: COMPLETED | OUTPATIENT
Start: 2023-04-17 | End: 2023-04-23

## 2023-04-17 RX ADMIN — AMLODIPINE BESYLATE 10 MG: 5 TABLET ORAL at 10:04

## 2023-04-17 RX ADMIN — THERA TABS 1 TABLET: TAB at 09:04

## 2023-04-17 RX ADMIN — SUCRALFATE 1 G: 1 TABLET ORAL at 10:04

## 2023-04-17 RX ADMIN — LORAZEPAM 0.5 MG: 2 INJECTION INTRAMUSCULAR; INTRAVENOUS at 08:04

## 2023-04-17 RX ADMIN — METOPROLOL TARTRATE 100 MG: 50 TABLET, FILM COATED ORAL at 08:04

## 2023-04-17 RX ADMIN — HYDROXYZINE HYDROCHLORIDE 25 MG: 25 TABLET ORAL at 08:04

## 2023-04-17 RX ADMIN — VALSARTAN 160 MG: 160 TABLET, FILM COATED ORAL at 08:04

## 2023-04-17 RX ADMIN — AMIODARONE HYDROCHLORIDE 200 MG: 200 TABLET ORAL at 10:04

## 2023-04-17 RX ADMIN — METOPROLOL TARTRATE 100 MG: 50 TABLET, FILM COATED ORAL at 10:04

## 2023-04-17 RX ADMIN — LORAZEPAM 2 MG: 2 INJECTION INTRAMUSCULAR; INTRAVENOUS at 12:04

## 2023-04-17 RX ADMIN — FUROSEMIDE 20 MG: 20 TABLET ORAL at 09:04

## 2023-04-17 RX ADMIN — FOLIC ACID 1 MG: 1 TABLET ORAL at 10:04

## 2023-04-17 RX ADMIN — AMIODARONE HYDROCHLORIDE 200 MG: 200 TABLET ORAL at 08:04

## 2023-04-17 RX ADMIN — ASPIRIN 81 MG: 81 TABLET, COATED ORAL at 10:04

## 2023-04-17 RX ADMIN — THIAMINE HCL TAB 100 MG 200 MG: 100 TAB at 10:04

## 2023-04-17 RX ADMIN — VALSARTAN 160 MG: 80 TABLET, FILM COATED ORAL at 10:04

## 2023-04-17 RX ADMIN — FERROUS SULFATE TAB 325 MG (65 MG ELEMENTAL FE) 1 EACH: 325 (65 FE) TAB at 10:04

## 2023-04-17 RX ADMIN — LORAZEPAM 0.5 MG: 2 INJECTION INTRAMUSCULAR; INTRAVENOUS at 10:04

## 2023-04-17 RX ADMIN — DOCUSATE SODIUM 100 MG: 100 CAPSULE, LIQUID FILLED ORAL at 10:04

## 2023-04-17 RX ADMIN — POTASSIUM CHLORIDE 20 MEQ: 1500 TABLET, EXTENDED RELEASE ORAL at 10:04

## 2023-04-17 RX ADMIN — FAMOTIDINE 20 MG: 20 TABLET, FILM COATED ORAL at 10:04

## 2023-04-17 RX ADMIN — ATORVASTATIN CALCIUM 40 MG: 40 TABLET, FILM COATED ORAL at 10:04

## 2023-04-17 NOTE — PROGRESS NOTES
"[facility] General   Cardiology  Progress Note    Patient Name: Mike Urbina Jr.  MRN: [id number]  Admission Date: 4/3/2023  Hospital Length of Stay: 14 days  Code Status: Full Code   Attending Provider: Deuce Finley IV, MD   Consulting Provider: Bruno Brock [facility]  Primary Care Physician: LOREN Jerry  Principal Problem:<principal problem not specified>    Patient information was obtained from patient, past medical records, and ER records.     Subjective:   Chief Complaint: Reason for Consult: Post CABG Medical Management    HPI: Mr. Urbina is a 68 y/o male who is known to [facility], Dr. Herrera. The patient presented to [facility] on 4.3.23 for a Planned CABG. The patient was recently worked up worked up for a NSTEMI and found to have an Abnormal PET Scan. The PT underwent a LHC with Noted MVCAD. [facility] was [facility] and deemed a candidate for CABG and on 4.3.23 he underwent a Sternotomy with Results of: LIMA to LAD, rSVG to OM1, rSVG to PDA and Ligation of SILVANO with Endostapler. The patient tolerated the procedure well and was stabilized in the ER. [facility] has been consulted for Medical Management of the PT.    Hospital Course:  4.5.23: NAD. Sitting in Bed. Denies SOB and Palps. + CP/Incisional. "I am doing great."   4.6.23: NAD. PT is a 1:1 - AMS Last PM. Denies SOB and Palps. + CP/Incisional. "I am feeling well."   4.7.23: NAD. Remains 1:1. Denies SOB or palps. + Incisional CP. SR on tele w/ intermittent Afib RVR. Resting.   4.8.23: NAD. Sitting up in chair with family at bedside. "I am feeling good." Afib RVR on tele. Remains on amio infusion. Denies CP, SOB, or palps.   4.9.23: NAD. Looks aflutter RVR on tele w/ frequent PVC's. On Amio. Denies CP, SOB, or palps.   4.10.23: NAD. Remains in aflutter RVR with intermittent bradycardia. Denies CP, SOB, or palps. On 1:1.   4.11.23: Aflutter with RVR on tele. Denies CP/SOB/Palps. NPO for CARO/DCCV today.  4.12.23: Now SR after DCCV. Denies " CP/SOB/palps. Awaiting placement. VSS  4.13.23: Back on Aflutter on tele. Denies CP/SOB/palps. Awaiting placement.  4.14.23: Remains in flutter with rates around 115. NPO for DCCV today. Denies CP/SOB/Palps.  4.15.23: NAD Noted. Vitals Stable. SR with PVCS on Tele.  4.16.23: NAD Noted. Intermittent AFL RVR.   4.17.23: Aflutter with PVCs on tele. VSS. Denies CP/SOB/Palps.    PMH: ETOH Abuse, VHD (AS), Thrombocytopenia, NSTEMI, HTN, PAF/Flutter  PSH: Angiogram, CABG, Cataract Extraction   Family History: Mother, L, MI  Social History: + ETOH Use (6pk/Beer per Day for > 30 Years); Denies Tobacco and Illicit Drug Use    Previous Cardiac Diagnostics:   CARO 4.11.23  LV systolic function 50-55%.    LVH is present.    AV is probably trileaflet with marked AV scleral calcification resulting in severe/critical AS, SHARAD 0.5 cm2, peak AV 3.5 m/sec.    Mild mitral stenosis.    Moderate TR   No intracardiac thrombus is present.    SILVANO a is not seen in his probably absent due to prior surgical SILVANO ligation.    Successful cardioversion x1    [facility] 3.15.23:  Surgical coronary anatomy with distal left main 50%; LAD proximal to mid 60-70%; circumflex mid 80- 90%; RCA with proximal .  The ejection fraction was 60% with EDP of 10 mmHg.  Right radial access.  The estimated blood loss was less than 10 cc.  CT surgical consult for CABG evaluation.    PET 1.6.23:  This is an abnormal perfusion study. Study is consistent with ischemia.   This scan is suggestive of moderate risk for future cardiovascular events.   Small reversible perfusion abnormality of moderate intensity in the apical segment. Medium fixed perfusion abnormality of severe intensity in the inferior region.   The left ventricular cavity is noted to be normal on the stress studies. The stress left ventricular ejection fraction was calculated to be 35% and left ventricular global function is moderately reduced. The rest left ventricular cavity is noted to be normal. The rest  left ventricular ejection fraction was calculated to be 40% and rest left ventricular global function is mildly reduced.   When compared to the resting ejection fraction (40%), the stress ejection fraction (35%) has decreased.   The study quality is good.   There was a rise in myocardial blood flow between rest and stress.  Global myocardial blood flow reserve was 1.47.  Myocardial blood flow reserve is globally abnormal, placing the patient at a higher coronary event risk.    ECHO 12.9.22:  The left ventricle is normal in size with mild concentric hypertrophy and normal systolic function.  Grade I left ventricular diastolic dysfunction.  The estimated ejection fraction is 55%.  Normal right ventricular size with normal right ventricular systolic function.  There is mild aortic valve stenosis.  There is mild mitral stenosis.  Normal central venous pressure (3 mmHg).    Review of patient's allergies indicates:  No Known Allergies    No current facility-administered medications on file prior to encounter.     Current Outpatient Medications on File Prior to Encounter   Medication Sig    aspirin (ECOTRIN) 81 MG EC tablet Take 1 tablet (81 mg total) by mouth once daily.    atorvastatin (LIPITOR) 10 MG tablet Take 40 mg by mouth once daily.    furosemide (LASIX) 40 MG tablet TAKE ONE TABLET BY MOUTH DAILY DOSE INCREASED    hydrOXYzine HCL (ATARAX) 25 MG tablet TAKE 1 TABLET BY MOUTH EVERY EVENING    metoprolol succinate (TOPROL-XL) 100 MG 24 hr tablet Take 1 tablet (100 mg total) by mouth once daily.    NIFEdipine (PROCARDIA-XL) 30 MG (OSM) 24 hr tablet Take 1 tablet (30 mg total) by mouth once daily. (Patient taking differently: Take 60 mg by mouth once daily.)    valsartan (DIOVAN) 40 MG tablet Take 1 tablet (40 mg total) by mouth 2 (two) times daily.     Review of Systems   Respiratory:  Negative for chest tightness and shortness of breath.    Cardiovascular:  Negative for chest pain.     Objective:     Vital Signs  (Most Recent):  Temp: 97.2 °F (36.2 °C) (04/17/23 0815)  Pulse: (!) 111 (04/17/23 0815)  Resp: 18 (04/17/23 0301)  BP: (!) 149/96 (04/17/23 0815)  SpO2: 98 % (04/17/23 0815)   Vital Signs (24h Range):  Temp:  [96.4 °F (35.8 °C)-97.8 °F (36.6 °C)] 97.2 °F (36.2 °C)  Pulse:  [105-111] 111  Resp:  [17-22] 18  SpO2:  [95 %-100 %] 98 %  BP: (112-156)/(69-96) 149/96     Weight:  (bed is not accurate. Nurse asked not to stand patient. MP.)  Body mass index is 22.02 kg/m².    SpO2: 98 %         Intake/Output Summary (Last 24 hours) at 4/17/2023 0942  Last data filed at 4/17/2023 0607  Gross per 24 hour   Intake 840 ml   Output 1625 ml   Net -785 ml         Lines/Drains/Airways       Peripheral Intravenous Line  Duration                  Peripheral IV - Single Lumen 04/13/23 1105 20 G Anterior;Left Forearm 3 days                  Significant Labs:  Recent Results (from the past 72 hour(s))   Basic Metabolic Panel    Collection Time: 04/14/23 12:54 PM   Result Value Ref Range    Sodium Level 139 136 - 145 mmol/L    Potassium Level 4.8 3.5 - 5.1 mmol/L    Chloride 108 (H) 98 - 107 mmol/L    Carbon Dioxide 24 23 - 31 mmol/L    Glucose Level 103 82 - 115 mg/dL    Blood Urea Nitrogen 12.8 8.4 - 25.7 mg/dL    Creatinine 1.04 0.73 - 1.18 mg/dL    BUN/Creatinine Ratio 12     Calcium Level Total 8.1 (L) 8.8 - 10.0 mg/dL    Anion Gap 7.0 mEq/L    eGFR >60 mls/min/1.73/m2   Magnesium    Collection Time: 04/14/23 12:54 PM   Result Value Ref Range    Magnesium Level 1.80 1.60 - 2.60 mg/dL   Phosphorus    Collection Time: 04/14/23 12:54 PM   Result Value Ref Range    Phosphorus Level 4.3 2.3 - 4.7 mg/dL   CBC with Differential    Collection Time: 04/14/23  3:41 PM   Result Value Ref Range    WBC 7.6 4.5 - 11.5 x10(3)/mcL    RBC 4.10 (L) 4.70 - 6.10 x10(6)/mcL    Hgb 11.9 (L) 14.0 - 18.0 g/dL    Hct 37.0 (L) 42.0 - 52.0 %    MCV 90.2 80.0 - 94.0 fL    MCH 29.0 27.0 - 31.0 pg    MCHC 32.2 (L) 33.0 - 36.0 g/dL    RDW 15.7 11.5 - 17.0 %     Platelet 282 130 - 400 x10(3)/mcL    MPV 10.1 7.4 - 10.4 fL    Neut % 76.2 %    Lymph % 11.4 %    Mono % 9.9 %    Eos % 1.4 %    Basophil % 0.4 %    Lymph # 0.87 0.6 - 4.6 x10(3)/mcL    Neut # 5.82 2.1 - 9.2 x10(3)/mcL    Mono # 0.76 0.1 - 1.3 x10(3)/mcL    Eos # 0.11 0 - 0.9 x10(3)/mcL    Baso # 0.03 0 - 0.2 x10(3)/mcL    IG# 0.05 (H) 0 - 0.04 x10(3)/mcL    IG% 0.7 %    NRBC% 0.0 %   Comprehensive Metabolic Panel    Collection Time: 04/16/23  4:28 AM   Result Value Ref Range    Sodium Level 138 136 - 145 mmol/L    Potassium Level 3.5 3.5 - 5.1 mmol/L    Chloride 105 98 - 107 mmol/L    Carbon Dioxide 23 23 - 31 mmol/L    Glucose Level 96 82 - 115 mg/dL    Blood Urea Nitrogen 13.5 8.4 - 25.7 mg/dL    Creatinine 0.97 0.73 - 1.18 mg/dL    Calcium Level Total 8.3 (L) 8.8 - 10.0 mg/dL    Protein Total 5.3 (L) 5.8 - 7.6 gm/dL    Albumin Level 2.5 (L) 3.4 - 4.8 g/dL    Globulin 2.8 2.4 - 3.5 gm/dL    Albumin/Globulin Ratio 0.9 (L) 1.1 - 2.0 ratio    Bilirubin Total 1.1 <=1.5 mg/dL    Alkaline Phosphatase 90 40 - 150 unit/L    Alanine Aminotransferase 15 0 - 55 unit/L    Aspartate Aminotransferase 16 5 - 34 unit/L    eGFR >60 mls/min/1.73/m2   Magnesium    Collection Time: 04/16/23  4:28 AM   Result Value Ref Range    Magnesium Level 1.80 1.60 - 2.60 mg/dL     Telemetry: SR with PVCS    EKG:      Physical Exam  Constitutional:       Appearance: Normal appearance.   HENT:      Head: Normocephalic.      Nose: Nose normal.      Mouth/Throat:      Mouth: Mucous membranes are moist.   Eyes:      Extraocular Movements: Extraocular movements intact.   Cardiovascular:      Rate and Rhythm: Normal rate and regular rhythm.      Pulses: Normal pulses.      Heart sounds: Murmur heard.      Comments: SR with PVCS  Pulmonary:      Effort: Pulmonary effort is normal.      Breath sounds: Normal breath sounds.   Abdominal:      Palpations: Abdomen is soft.   Skin:     General: Skin is warm and dry.      Comments: Mid Line Incision is  Stable.   Neurological:      General: No focal deficit present.      Mental Status: He is alert and oriented to person, place, and time.   Psychiatric:         Mood and Affect: Mood normal.         Behavior: Behavior normal.     Home Medications:   No current facility-administered medications on file prior to encounter.     Current Outpatient Medications on File Prior to Encounter   Medication Sig Dispense Refill    aspirin (ECOTRIN) 81 MG EC tablet Take 1 tablet (81 mg total) by mouth once daily. 30 tablet 0    atorvastatin (LIPITOR) 10 MG tablet Take 40 mg by mouth once daily.      furosemide (LASIX) 40 MG tablet TAKE ONE TABLET BY MOUTH DAILY DOSE INCREASED      hydrOXYzine HCL (ATARAX) 25 MG tablet TAKE 1 TABLET BY MOUTH EVERY EVENING 30 tablet 1    metoprolol succinate (TOPROL-XL) 100 MG 24 hr tablet Take 1 tablet (100 mg total) by mouth once daily. 30 tablet 0    NIFEdipine (PROCARDIA-XL) 30 MG (OSM) 24 hr tablet Take 1 tablet (30 mg total) by mouth once daily. (Patient taking differently: Take 60 mg by mouth once daily.) 30 tablet 0    valsartan (DIOVAN) 40 MG tablet Take 1 tablet (40 mg total) by mouth 2 (two) times daily. 60 tablet 0     Current Inpatient Medications:    Current Facility-Administered Medications:     0.9%  NaCl infusion (for blood administration), , Intravenous, Q24H PRN, Graham Aiken MD    0.9%  NaCl infusion (for blood administration), , Intravenous, Q24H PRN, Graham Aiken MD    acetaminophen oral solution 650 mg, 650 mg, Per OG tube, Q6H PRN, GISEL Bingham, 650 mg at 04/05/23 2014    albumin human 5% bottle 12.5 g, 12.5 g, Intravenous, PRN, GISEL Bingham, Stopped at 04/03/23 2100    amiodarone tablet 200 mg, 200 mg, Oral, BID, RAY Harris    [START ON 4/20/2023] amiodarone tablet 200 mg, 200 mg, Oral, Daily, RAY Harris    amLODIPine tablet 10 mg, 10 mg, Oral, Daily, [place] Huerta, HONORIO, 10 mg at 04/16/23 0848    aspirin EC tablet  81 mg, 81 mg, Oral, Daily, GISEL Bingham, 81 mg at 04/16/23 0848    atorvastatin tablet 40 mg, 40 mg, Oral, Daily, HONORIO Villalpando, 40 mg at 04/16/23 0848    calcium gluconate 1 g in NS IVPB (premixed), 1 g, Intravenous, PRN, Nicholas Murphy, PA    calcium gluconate 1 g in NS IVPB (premixed), 2 g, Intravenous, PRN, GISEL Bingham    calcium gluconate 1 g in NS IVPB (premixed), 3 g, Intravenous, PRN, Nicholas Murphy, PA    dextrose 10% bolus 125 mL 125 mL, 12.5 g, Intravenous, PRN, GISEL Bingham    dextrose 10% bolus 250 mL 250 mL, 25 g, Intravenous, PRN, Nicholas Murphy, PA    docusate sodium capsule 100 mg, 100 mg, Oral, BID, GISEL Bingham, 100 mg at 04/16/23 2042    famotidine tablet 20 mg, 20 mg, Oral, BID, Deuce Finley IV, MD, 20 mg at 04/16/23 2042    ferrous sulfate tablet 1 each, 1 tablet, Oral, Daily, Jossy Ann MD, 1 each at 04/16/23 0848    folic acid tablet 1 mg, 1 mg, Oral, Daily, Frankie Metcalf MD, 1 mg at 04/16/23 0848    furosemide tablet 20 mg, 20 mg, Oral, BID, Wyatt Carl PA-C, 20 mg at 04/16/23 2043    haloperidol lactate injection 2 mg, 2 mg, Intravenous, Q6H PRN, Simran Meza AGACNP-BC    hydrALAZINE injection 20 mg, 20 mg, Intravenous, Q6H PRN, Elizabeth Bates NP, 20 mg at 04/06/23 1601    HYDROcodone-acetaminophen 5-325 mg per tablet 1 tablet, 1 tablet, Oral, Q4H PRN, GISEL Bingham, 1 tablet at 04/15/23 0252    labetaloL injection 20 mg, 20 mg, Intravenous, Q4H PRN, Elizabeth Bates NP, 20 mg at 04/07/23 0558    LORazepam (ATIVAN) injection 0.5 mg, 0.5 mg, Intravenous, BID, Gillian Bradley DO, 0.5 mg at 04/16/23 0849    LORazepam (ATIVAN) injection 2 mg, 2 mg, Intravenous, Q8H PRN, Frankie Metcalf MD, 2 mg at 04/17/23 0010    magnesium sulfate 2g in water 50mL IVPB (premix), 2 g, Intravenous, PRN, GISEL Bingham    magnesium sulfate 2g in water 50mL IVPB (premix), 4 g, Intravenous, PRN, Nicholas ARENAS  GISEL Murphy    melatonin tablet 6 mg, 6 mg, Oral, Nightly PRN, Kalani Whitehead MD, 6 mg at 04/16/23 2042    metoprolol tartrate (LOPRESSOR) tablet 100 mg, 100 mg, Oral, BID, LOREN Chen, 100 mg at 04/16/23 2042    multivitamin tablet, 1 tablet, Oral, Daily, Frankie Metcalf MD, 1 tablet at 04/16/23 0847    ondansetron injection 4 mg, 4 mg, Intravenous, Q4H PRN, GISEL Bingham    potassium chloride 20 mEq in 100 mL IVPB (FOR CENTRAL LINE ADMINISTRATION ONLY), 20 mEq, Intravenous, PRN, Nicholas Murphy, PA    potassium chloride 20 mEq in 100 mL IVPB (FOR CENTRAL LINE ADMINISTRATION ONLY), 20 mEq, Intravenous, PRN, Nicholas Murphy, PA    potassium chloride 40 mEq in 100 mL IVPB (FOR CENTRAL LINE ADMINISTRATION ONLY), 40 mEq, Intravenous, PRN, Nicholas Murphy, PA    potassium chloride SA CR tablet 20 mEq, 20 mEq, Oral, Daily, Wyatt Carl PA-C, 20 mEq at 04/16/23 0848    sodium phosphate 15 mmol in dextrose 5 % (D5W) 250 mL IVPB, 15 mmol, Intravenous, PRN, Nicholas Murphy PA    sodium phosphate 20.01 mmol in dextrose 5 % (D5W) 250 mL IVPB, 20.01 mmol, Intravenous, PRN, Nicholas Murphy, PA    sodium phosphate 30 mmol in dextrose 5 % (D5W) 250 mL IVPB, 30 mmol, Intravenous, PRN, Nicholas Murphy PA    sucralfate tablet 1 g, 1 g, Oral, QID (AC & HS), GISEL Bingham, 1 g at 04/16/23 2042    thiamine tablet 200 mg, 200 mg, Oral, Daily, Frankie Metcalf MD, 200 mg at 04/16/23 0847    valsartan tablet 160 mg, 160 mg, Oral, BID, HONORIO Villalpando, 160 mg at 04/16/23 2042    VTE Risk Mitigation (From admission, onward)           Ordered     Place sequential compression device  Until discontinued         04/04/23 0324     IP VTE HIGH RISK PATIENT  Once         04/03/23 0951                  Assessment:   MVCAD    - s/p CABG (4.3.23) - LIMA to LAD, rSVG to OM1, rSVG to PDA  PAF/Flutter with RVR s/p DCCV, intermittent AFL RVR (Now Sinus Rhythm in the 90's)    - CHADsVASc - 3 Points - 3.2%  Stroke Risk per Year     - s/p (4.3.23) - Ligation of SILVANO with Endostapler  VHD/severe AS    AV is probably trileaflet with marked AV scleral calcification resulting in severe/critical AS, SHARAD 0.5 cm2, peak AV 3.5 m/sec (4.11.23)  HTN/HHD without Hx of HF or CKD- BP Mostly Controlled   Encephalopathy - Likely Related to ETOH Dts- Some Improvement/Now Lethargic  Anemia - Acute Blood Loss - Improved with Transfusion  Thrombocytopenia - Chronic   VHD (Mild AS, Mild MS)  Hx of NSTEMI  ETOH Abuse  No Hx of GIB    Plan:     Continue Oral Tapered Amiodarone  Start Digoxin PO 0.125mcg daily  Continue Current Cardiac Medications  Will consider inpatient TAVR  Pt with Ligation of SILVANO w/ Endostapler. No plans for OAC. Defer to outpatient.   Will need TAVR Evaluation on Outpatient Basis  Advance Mobilization when safe and as Able    Case discussed with Dr. Pinon (will have discussion on patient in TAVR meeting [facility])    Bruno Brock, [facility]-BC   Cardiology  [facility]   04/17/2023       I have seen the patient, reviewed the Nurse Practitioner's note, assessment and plan. I have personally interviewed and examined the patient at bedside and agree with the findings. Medical decision making listed above were done under my guidance.

## 2023-04-17 NOTE — PLAN OF CARE
Problem: Adult Inpatient Plan of Care  Goal: Plan of Care Review  Outcome: Ongoing, Progressing  Flowsheets (Taken 4/17/2023 1754)  Plan of Care Reviewed With: patient  Goal: Patient-Specific Goal (Individualized)  Outcome: Ongoing, Progressing  Flowsheets (Taken 4/17/2023 1754)  Anxieties, Fears or Concerns: Hospitalization  Individualized Care Needs: Cardiac monitoring, PT/OT/ST  Goal: Absence of Hospital-Acquired Illness or Injury  Outcome: Ongoing, Progressing  Intervention: Identify and Manage Fall Risk  Flowsheets (Taken 4/17/2023 1754)  Safety Promotion/Fall Prevention:   assistive device/personal item within reach   bed alarm set   lighting adjusted   medications reviewed   muscle strengthening facilitated   nonskid shoes/socks when out of bed   room near unit station  Intervention: Prevent Skin Injury  Flowsheets (Taken 4/17/2023 1754)  Body Position: position changed independently  Skin Protection:   adhesive use limited   incontinence pads utilized   transparent dressing maintained   skin-to-skin areas padded   tubing/devices free from skin contact  Intervention: Prevent and Manage VTE (Venous Thromboembolism) Risk  Flowsheets (Taken 4/17/2023 1754)  Activity Management: Walk with assistive devise and /or staff member - L3  VTE Prevention/Management:   bleeding risk assessed   bleeding precations maintained   ambulation promoted   fluids promoted  Range of Motion: ROM (range of motion) performed  Intervention: Prevent Infection  Flowsheets (Taken 4/17/2023 1754)  Infection Prevention:   cohorting utilized   equipment surfaces disinfected   hand hygiene promoted   rest/sleep promoted  Goal: Optimal Comfort and Wellbeing  Outcome: Ongoing, Progressing  Intervention: Monitor Pain and Promote Comfort  Flowsheets (Taken 4/17/2023 1754)  Pain Management Interventions:   diversional activity provided   pillow support provided   position adjusted   pain management plan reviewed with patient/caregiver    relaxation techniques promoted   quiet environment facilitated   prescribed exercises encouraged  Intervention: Provide Person-Centered Care  Flowsheets (Taken 4/17/2023 1754)  Trust Relationship/Rapport:   care explained   choices provided   empathic listening provided   thoughts/feelings acknowledged   questions encouraged     Problem: Fall Injury Risk  Goal: Absence of Fall and Fall-Related Injury  Outcome: Ongoing, Progressing  Intervention: Identify and Manage Contributors  Flowsheets (Taken 4/17/2023 1754)  Self-Care Promotion:   independence encouraged   BADL personal objects within reach   BADL personal routines maintained  Medication Review/Management: medications reviewed  Intervention: Promote Injury-Free Environment  Flowsheets (Taken 4/17/2023 1754)  Safety Promotion/Fall Prevention:   assistive device/personal item within reach   bed alarm set   lighting adjusted   medications reviewed   muscle strengthening facilitated   nonskid shoes/socks when out of bed   room near unit station

## 2023-04-17 NOTE — PLAN OF CARE
04/17/23 1444   Final Note   Assessment Type Final Discharge Note   Anticipated Discharge Disposition [facility] Resources/Appts/Education Provided Post-Acute resouces added to AVS   Post-Acute Status   Post-Acute Authorization Placement     Transfer to [facility] today. Nurse to call report to Joyce [phone]. [facility] will transport at 1600.

## 2023-04-17 NOTE — PROGRESS NOTES
Mike Urbina Jr. is a 67 y.o. male patient.   1. Anticoagulated    2. Coronary artery disease of native artery of native heart with stable angina pectoris    3. Coronary artery disease    4. CAD (coronary artery disease)    5. Atrial fibrillation with rapid ventricular response    6. A-fib    7. Atrial flutter    8. Routine check-up      Past Medical History:   Diagnosis Date    Atrial flutter     CHF (congestive heart failure)     Coronary artery disease     Hypertension     SOB (shortness of breath)     at times     No past surgical history pertinent negatives on file.  Scheduled Meds:   amiodarone  200 mg Oral BID    [START ON 4/20/2023] amiodarone  200 mg Oral Daily    amLODIPine  10 mg Oral Daily    aspirin  81 mg Oral Daily    atorvastatin  40 mg Oral Daily    docusate sodium  100 mg Oral BID    famotidine  20 mg Oral BID    ferrous sulfate  1 tablet Oral Daily    folic acid  1 mg Oral Daily    furosemide  20 mg Oral BID    lorazepam  0.5 mg Intravenous BID    metoprolol tartrate  100 mg Oral BID    multivitamin  1 tablet Oral Daily    potassium chloride  20 mEq Oral Daily    sucralfate  1 g Oral QID (AC & HS)    thiamine  200 mg Oral Daily    valsartan  160 mg Oral BID     Continuous Infusions:  PRN Meds:sodium chloride, sodium chloride, acetaminophen, albumin human 5%, calcium gluconate IVPB, calcium gluconate IVPB, calcium gluconate IVPB, dextrose 10%, dextrose 10%, haloperidol lactate, hydrALAZINE, HYDROcodone-acetaminophen, labetalol, lorazepam, magnesium sulfate IVPB, magnesium sulfate IVPB, melatonin, ondansetron, potassium chloride in water, potassium chloride in water, potassium chloride in water, sodium phosphate IVPB, sodium phosphate IVPB, sodium phosphate IVPB    Review of patient's allergies indicates:  No Known Allergies  There are no hospital problems to display for this patient.    Blood pressure (!) 149/96, pulse (!) 111, temperature 97.2 °F (36.2 °C), temperature source Oral, resp.  "rate 18, height 6' 2" (1.88 m), weight 77.8 kg (171 lb 8.3 oz), SpO2 98 %.    Subjective:  Awake. Alert.  Continues to work with PT/OT     Objective:   AFVSS  Heart: RRR  Lungs: respirations nonlabored, clear  Incision: c/d/i     Assesment/Plan:    S/p CAB 4/3/23  Aflutter per cardiology  Await placement    GISEL Lewis  4/17/2023    "

## 2023-04-17 NOTE — DISCHARGE SUMMARY
[facility] 6th Floor Medical Telemetry  Cardiothoracic Surgery  Discharge Summary      Patient Name: Mike Urbina Jr.  MRN: [id number]  Admission Date: 4/3/2023  Hospital Length of Stay: 14 days  Discharge Date and Time:  04/17/2023 2:12 PM  Attending Physician: Deuce Finley IV, MD   Discharging Provider: Wyatt Carl PA-C  Primary Care Provider: LOREN Jerry    HPI:   No notes on file    Procedure(s) (LRB):  CORONARY ARTERY BYPASS GRAFT (CABG) (N/A)      Indwelling Lines/Drains at time of discharge:   Lines/Drains/Airways     None               Hospital Course: No notes on file    Goals of Care Treatment Preferences:  Code Status: Full Code      Consults (From admission, onward)        Status Ordering Provider     Inpatient consult to Social Work/Case Management  Once        Provider:  (Not yet assigned)    ONEIDA Cooney     Inpatient consult to Hospital Medicine  Once        Provider:  BRANDIE Haider JENNIFER M     Inpatient consult to Cardiology  Once        Provider:  Sreedhar Herrera MD    Completed ONEIDA ZHANG          Significant Diagnostic Studies:     Pending Diagnostic Studies:     Procedure Component Value Units Date/Time    Occult Blood, Stool, Diagnostic (1-3) [[id number]]     Order Status: Sent Lab Status: No result     Specimen: Stool     Narrative:      The following orders were created for panel order Occult Blood, Stool, Diagnostic (1-3).  Procedure                               Abnormality         Status                     ---------                               -----------         ------                     Occult blood x 3, stool[[id number]]                                                       Please view results for these tests on the individual orders.    Occult blood x 3, stool [[id number]]     Order Status: Sent Lab Status: No result     Specimen: Stool           No new Assessment & Plan notes have been  filed under this hospital service since the last note was generated.  Service: Cardiothoracic Surgery    There are no hospital problems to display for this patient.     Discharged Condition: fair    Disposition: Skilled Nursing Facility    Follow Up:   Follow-up Information     [facility] Follow up.    Specialties: Home Health Services, Hospice and Palliative Medicine, Physical Therapy  Contact information:  [address]  [address]  [facility] [id number] [phone]             Deuce Finley Iv, MD Follow up.    Specialties: Cardiothoracic Surgery, Cardiology  Why: Nurse to schedule follow-up appointment when discharged from SNF  Contact information:  [address]  [address]  [facility] [address] [phone]             Sreedhar Herrera MD Follow up.    Specialties: Cardiovascular Disease, Cardiology  Why: Nurse to schedule follow-up appointment when discharged from SNF  Contact information:  [address]  [facility] [address] [phone]                       Patient Instructions:   No discharge procedures on file.  Medications:  Reconciled Home Medications:      Medication List      START taking these medications    * amiodarone 200 MG Tab  Commonly known as: PACERONE  Take 1 tablet (200 mg total) by mouth 2 (two) times daily.     * amiodarone 200 MG Tab  Commonly known as: PACERONE  Take 1 tablet (200 mg total) by mouth once daily.  Start taking on: April 20, 2023     amLODIPine 10 MG tablet  Commonly known as: NORVASC  Take 1 tablet (10 mg total) by mouth once daily.  Start taking on: April 18, 2023     ferrous sulfate 325 (65 FE) MG EC tablet  Take 1 tablet (325 mg total) by mouth once daily.     HYDROcodone-acetaminophen 5-325 mg per tablet  Commonly known as: NORCO  Take 1 tablet by mouth every 6 (six) hours as needed for Pain.     metoprolol tartrate 100 MG tablet  Commonly known as: LOPRESSOR  Take 1 tablet (100 mg total) by mouth 2 (two) times daily.     thiamine 100 MG  tablet  Take 2 tablets (200 mg total) by mouth once daily.  Start taking on: April 18, 2023         * This list has 2 medication(s) that are the same as other medications prescribed for you. Read the directions carefully, and ask your doctor or other care provider to review them with you.            CHANGE how you take these medications    valsartan 160 MG tablet  Commonly known as: DIOVAN  Take 1 tablet (160 mg total) by mouth 2 (two) times daily.  What changed:   · medication strength  · how much to take        CONTINUE taking these medications    aspirin 81 MG EC tablet  Commonly known as: ECOTRIN  Take 1 tablet (81 mg total) by mouth once daily.     atorvastatin 10 MG tablet  Commonly known as: LIPITOR  Take 40 mg by mouth once daily.     furosemide 40 MG tablet  Commonly known as: LASIX  TAKE ONE TABLET BY MOUTH DAILY DOSE INCREASED     hydrOXYzine HCL 25 MG tablet  Commonly known as: ATARAX  TAKE 1 TABLET BY MOUTH EVERY EVENING        STOP taking these medications    metoprolol succinate 100 MG 24 hr tablet  Commonly known as: TOPROL-XL     NIFEdipine 30 MG (OSM) 24 hr tablet  Commonly known as: PROCARDIA-XL          Time spent on the discharge of patient: 30 minutes    Wyatt Carl PA-C  Cardiothoracic Surgery  [facility] - 6th Floor Medical Telemetry

## 2023-04-17 NOTE — PROGRESS NOTES
Inpatient Nutrition Assessment    Admit Date: 4/3/2023   Total duration of encounter: 14 days     Nutrition Recommendation/Prescription     Readvance diet as tolerated.   Add Boost pudding BID (provides 230 kcal and 7 g per container)  Implement preferences (provide milk on each tray)  Monitor appetite/PO intake, weight, and labs.    Communication of Recommendations: reviewed with patient    Nutrition Assessment     Malnutrition Assessment/Nutrition-Focused Physical Exam    Malnutrition in the context of acute illness or injury  Degree of Malnutrition: does not meet criteria  Energy Intake: < 75% of estimated energy requirement for > 7 days  Interpretation of Weight Loss: does not meet criteria  Body Fat:does not meet criteria  Area of Body Fat Loss: does not meet criteria  Muscle Mass Loss: does not meet criteria  Area of Muscle Mass Loss: does not meet criteria  Fluid Accumulation: does not meet criteria  Edema: does not meet criteria   Reduced  Strength: unable to obtain  A minimum of two characteristics is recommended for diagnosis of either severe or non-severe malnutrition.    Chart Review    Reason Seen: malnutrition screening tool (MST) and follow-up    Malnutrition Screening Tool Results   Have you recently lost weight without trying?: Unsure  Have you been eating poorly because of a decreased appetite?: No   MST Score: 2     Diagnosis:  MVCAD    - s/p CABG (4.3.23) - LIMA to LAD, rSVG to OM1, rSVG to PDA  PAF/Flutter - Now SR    - CHADsVASc - 3 Points - 3.2% Stroke Risk per Year     - s/p (4.3.23) - Ligation of SILVANO with Endostapler  HTN/HHD without Hx of HF or CKD  Anemia - Acute Blood Loss - Improved with Transfusion  Thrombocytopenia - Chronic   VHD (Mild AS, Mild MS)  Hx of NSTEMI  ETOH Abuse  No Hx of GIB    Relevant Medical History:    Atrial flutter      CHF (congestive heart failure)      Coronary artery disease      Hypertension      SOB (shortness of breath)       at times  "      Nutrition-Related Medications: Aspirin, Atorvastatin, docusate sodium, ferrous sulfate, folic acid, furosemide, multivitamin, potassium chloride, sucralfate, thiamine  Calorie Containing IV Medications: no significant kcals from medications at this time    Nutrition-Related Labs:  4/5/2023: WBC 3.5, H/H 9.5/27.9, Na 131, BUN 28.8, Crea 1.29, Ca 8.2,Alb 3.0, Total Bili 1.8, Iacp125  4/10/2023: H/H 10.3/31.8, Ca 8.2, Gluc 114  4/11/2023: Gluc 112  4/16/2023: Ca 8.3, Alb 2.5, Gluc 96    Diet/PN Order: Diet Minced & Moist (IDDSI Level 5) No Straws; Mildly Thick Liquids (IDDSI Level 2)  Oral Supplement Order: Boost Plus  Tube Feeding Order: none  Appetite/Oral Intake: fair/50-75% of meals  Factors Affecting Nutritional Intake: decreased appetite  Food/Jain/Cultural Preferences: none reported  Food Allergies: none reported    Skin Integrity: incision  Wound(s):       Comments    4/5/2023: Pt reports fair PO intake prior to admit, reports poor appetite/PO intake since admit. Pt denies N/V/D/C and chewing/swallowing difficulties. Pt wears dentures. Pt denies unplanned wt loss. Pt declined ONS. Encouraged adequate PO intake. Will monitor.    4/10/2023: Pt NPO; nurse reports pt needs ST evaluation. The pt denies N/V. Last BM documented as 4/8/2023. Will monitor.    4/12/2023: Pt reports appetite improvement with increased PO intake. Pt denies N/V/D/C. Pt reports last BM this morning. Pt agreeable to ONS. Encouraged increased PO intake. Will monitor.    4/17/2023: Pt reports fair appetite/PO intake. Pt denies N/V. Last BM documented as 4/13/2023. Pt reports wanting to d/c Boost, agreeable to Boost Pudding. Pt requested milk on trays, will implement. Encouraged adequate PO intake. Will monitor.    Anthropometrics    Height: 6' 2" (188 cm) Height Method: Stated  Last Weight:  (bed is not accurate. Nurse asked not to stand patient. MP.) (04/16/23 9052) Weight Method: Standard Scale  BMI (Calculated): 22  BMI " Classification: normal (BMI 18.5-24.9)        Ideal Body Weight (IBW), Male: 190 lb     % Ideal Body Weight, Male (lb): 91.55 %                          Usual Weight Provided By: patient denies unintentional weight loss    Wt Readings from Last 5 Encounters:   04/15/23 77.8 kg (171 lb 8.3 oz)   23 78 kg (172 lb)   03/15/23 78.6 kg (173 lb 4.5 oz)   01/10/23 76.2 kg (168 lb 1.6 oz)   22 75.4 kg (166 lb 3.6 oz)     Weight Change(s) Since Admission:  Admit Weight: 78 kg (172 lb) (23 0813)  2023: 83.5 kg  2023: 79.9 kg  4/15/2023: 77.8 kg    Estimated Needs    Weight Used For Calorie Calculations: 83.5 kg (184 lb 1.4 oz)  Energy Calorie Requirements (kcal): 0184-8911 (25-30 kcal/kg)  Energy Need Method: Kcal/kg  Weight Used For Protein Calculations: 83.5 kg (184 lb 1.4 oz)  Protein Requirements:  (1.0-1.2 g/kg)  Fluid Requirements (mL): 2088 (1 mL/kcal)  Temp (24hrs), Av.4 °F (36.3 °C), Min:96.4 °F (35.8 °C), Max:97.8 °F (36.6 °C)         Enteral Nutrition    Patient not receiving enteral nutrition at this time.    Parenteral Nutrition    Patient not receiving parenteral nutrition support at this time.    Evaluation of Received Nutrient Intake    Calories: not meeting estimated needs  Protein: not meeting estimated needs    Patient Education    Not applicable.    Nutrition Diagnosis     PES: Inadequate oral intake related to acute illness as evidenced by decreased PO intake since admit. (continues)    Interventions/Goals     Intervention(s): general/healthful diet and commercial beverage  Goal: Consume % of meals/snacks by follow-up. (goal not met)    Monitoring & Evaluation     Dietitian will monitor food and beverage intake, weight, electrolyte/renal panel, and glucose/endocrine profile.  Nutrition Risk/Follow-Up: moderate (follow-up in 3-5 days)   Please consult if re-assessment needed sooner.

## 2023-04-17 NOTE — PT/OT/SLP PROGRESS
Occupational Therapy   Treatment    Name: Mike Urbina Jr.  MRN: [id number]  Admitting Diagnosis: Left main and multivessel coronary artery disease s/p CABGx3 to LAD,OM,PDA and ligation left atrial appendage, Hypertension, Hx of Aflutter, Hx of thrombocytopenia, Hyponatremia, Hyperkalemia   Recent Surgery: Procedure(s) (LRB):  CORONARY ARTERY BYPASS GRAFT (CABG) (N/A 14 Days Post-Op    Recommendations:     Discharge Recommendations: Transfer to [facility] Bed today per CM note  Discharge Equipment Recommendations: walker, rolling, TTB  Barriers to discharge: none    Assessment:     Mike Urbina Jr. is a 67 y.o. male with a medical diagnosis of Coronary artery disease.  He presents with c/o weakness. Performance deficits affecting function are weakness, impaired endurance, impaired self care skills, impaired functional mobility, gait instability, impaired balance, impaired cognition, decreased safety awareness.     Rehab Prognosis:  Fair; patient would benefit from acute skilled OT services to address these deficits and reach maximum level of function.       Plan:     Patient to be seen 6 x/week to address the above listed problems via self-care/home management, therapeutic activities, therapeutic exercises  Plan of Care Expires: 04/28/23  Plan of Care Reviewed with: patient    Subjective     Pain/Comfort:  Pain Rating 1: 0/10    Pt denied any pain but c/o weakness during session.    Objective:     Communicated with: RN prior to session. Patient found up in chair with geomat, chair alarm on, and NC donned but no O2 (RN notified) upon OT entry to room.    General Precautions: Standard, fall, sternal pxns  Respiratory Status: Room air  Vital Signs:   Blood Pressure: 143/91. RN notified.  HR: 112-114. RN notified.  Sp02: 95-97%     Occupational Performance:     Functional Mobility/Transfers:  Patient completed Sit <> Stand Transfers (x5 consecutive trials) from chair with min-mod A and vcs provided,  "using RW for balance while standing.  Patient completed chair > w/c transfer using step transfer technique with contact guard assistance and vcs provided, using rolling walker for balance.    Activities of Daily Living:  Grooming: Pt performed oral hygiene task with SBA and vcs provided while seated in chair.    Therapeutic Positioning  The following interventions were performed in an effort to prevent and/or reduce acquired pressure ulcers: geomat utilized in chair for sacral protection.    Patient Education:  Patient provided with verbal re-education regarding sternal pxns ("move in the tube"). Understanding was verbalized, however additional teaching warranted.     Patient left  seated in w/c  with transport staff present and RN notified.    GOALS:   Multidisciplinary Problems       Occupational Therapy Goals          Problem: Occupational Therapy    Goal Priority Disciplines Outcome Interventions   Occupational Therapy Goal     OT, PT/OT Ongoing, Progressing    Description: Goals to be met by: 4/28/23     Patient will increase functional independence with ADLs by performing:    UE Dressing with Modified [place].  LE Dressing with Modified [place] and Assistive Devices as needed.  Grooming while standing at sink with Modified [place].  Toileting from toilet with Modified [place] for hygiene and clothing management.   Toilet transfer to toilet with Modified [place].                         Time Tracking:     OT Date of Treatment: 4/17/23  OT Start Time: 1515  OT Stop Time: 1536  OT Total Time (min): 21 min    Billable Minutes: Self Care/Home Management 21 mins    OT/ROCCO: OT     Number of ROCCO visits since last OT visit: 2    4/17/2023      "

## 2023-04-18 LAB
ANION GAP SERPL CALC-SCNC: 9 MEQ/L
BASOPHILS # BLD AUTO: 0.04 X10(3)/MCL (ref 0–0.2)
BASOPHILS NFR BLD AUTO: 0.6 %
BUN SERPL-MCNC: 15.3 MG/DL (ref 8.4–25.7)
CALCIUM SERPL-MCNC: 8.7 MG/DL (ref 8.8–10)
CHLORIDE SERPL-SCNC: 103 MMOL/L (ref 98–107)
CO2 SERPL-SCNC: 26 MMOL/L (ref 23–31)
CREAT SERPL-MCNC: 1.04 MG/DL (ref 0.73–1.18)
CREAT/UREA NIT SERPL: 15
EOSINOPHIL # BLD AUTO: 0.14 X10(3)/MCL (ref 0–0.9)
EOSINOPHIL NFR BLD AUTO: 2 %
ERYTHROCYTE [DISTWIDTH] IN BLOOD BY AUTOMATED COUNT: 15.1 % (ref 11.5–17)
GFR SERPLBLD CREATININE-BSD FMLA CKD-EPI: >60 MLS/MIN/1.73/M2
GLUCOSE SERPL-MCNC: 105 MG/DL (ref 82–115)
HCT VFR BLD AUTO: 38 % (ref 42–52)
HGB BLD-MCNC: 11.9 G/DL (ref 14–18)
IMM GRANULOCYTES # BLD AUTO: 0.02 X10(3)/MCL (ref 0–0.04)
IMM GRANULOCYTES NFR BLD AUTO: 0.3 %
LYMPHOCYTES # BLD AUTO: 1.27 X10(3)/MCL (ref 0.6–4.6)
LYMPHOCYTES NFR BLD AUTO: 18.1 %
MCH RBC QN AUTO: 28.5 PG (ref 27–31)
MCHC RBC AUTO-ENTMCNC: 31.3 G/DL (ref 33–36)
MCV RBC AUTO: 91.1 FL (ref 80–94)
MONOCYTES # BLD AUTO: 0.76 X10(3)/MCL (ref 0.1–1.3)
MONOCYTES NFR BLD AUTO: 10.8 %
NEUTROPHILS # BLD AUTO: 4.78 X10(3)/MCL (ref 2.1–9.2)
NEUTROPHILS NFR BLD AUTO: 68.2 %
PLATELET # BLD AUTO: 339 X10(3)/MCL (ref 130–400)
PMV BLD AUTO: 10 FL (ref 7.4–10.4)
POTASSIUM SERPL-SCNC: 3.6 MMOL/L (ref 3.5–5.1)
RBC # BLD AUTO: 4.17 X10(6)/MCL (ref 4.7–6.1)
SODIUM SERPL-SCNC: 138 MMOL/L (ref 136–145)
WBC # SPEC AUTO: 7 X10(3)/MCL (ref 4.5–11.5)

## 2023-04-18 PROCEDURE — 63600175 PHARM REV CODE 636 W HCPCS: Performed by: STUDENT IN AN ORGANIZED HEALTH CARE EDUCATION/TRAINING PROGRAM

## 2023-04-18 PROCEDURE — 25000003 PHARM REV CODE 250: Performed by: STUDENT IN AN ORGANIZED HEALTH CARE EDUCATION/TRAINING PROGRAM

## 2023-04-18 PROCEDURE — 97530 THERAPEUTIC ACTIVITIES: CPT

## 2023-04-18 PROCEDURE — 92522 EVALUATE SPEECH PRODUCTION: CPT

## 2023-04-18 PROCEDURE — 94760 N-INVAS EAR/PLS OXIMETRY 1: CPT

## 2023-04-18 PROCEDURE — 92610 EVALUATE SWALLOWING FUNCTION: CPT

## 2023-04-18 PROCEDURE — 97110 THERAPEUTIC EXERCISES: CPT

## 2023-04-18 PROCEDURE — 25000003 PHARM REV CODE 250: Performed by: FAMILY MEDICINE

## 2023-04-18 PROCEDURE — 80048 BASIC METABOLIC PNL TOTAL CA: CPT | Performed by: STUDENT IN AN ORGANIZED HEALTH CARE EDUCATION/TRAINING PROGRAM

## 2023-04-18 PROCEDURE — 99203 OFFICE O/P NEW LOW 30 MIN: CPT

## 2023-04-18 PROCEDURE — 97166 OT EVAL MOD COMPLEX 45 MIN: CPT

## 2023-04-18 PROCEDURE — 94799 UNLISTED PULMONARY SVC/PX: CPT

## 2023-04-18 PROCEDURE — 11000004 HC SNF PRIVATE

## 2023-04-18 PROCEDURE — 85025 COMPLETE CBC W/AUTO DIFF WBC: CPT | Performed by: STUDENT IN AN ORGANIZED HEALTH CARE EDUCATION/TRAINING PROGRAM

## 2023-04-18 PROCEDURE — 97161 PT EVAL LOW COMPLEX 20 MIN: CPT

## 2023-04-18 RX ORDER — MORPHINE SULFATE 4 MG/ML
2 INJECTION, SOLUTION INTRAMUSCULAR; INTRAVENOUS EVERY 6 HOURS PRN
Status: DISCONTINUED | OUTPATIENT
Start: 2023-04-18 | End: 2023-05-05 | Stop reason: HOSPADM

## 2023-04-18 RX ORDER — POLYETHYLENE GLYCOL 3350 17 G/17G
17 POWDER, FOR SOLUTION ORAL 3 TIMES DAILY PRN
Status: DISCONTINUED | OUTPATIENT
Start: 2023-04-18 | End: 2023-05-05 | Stop reason: HOSPADM

## 2023-04-18 RX ORDER — GLUCAGON 1 MG
1 KIT INJECTION
Status: DISCONTINUED | OUTPATIENT
Start: 2023-04-18 | End: 2023-05-05 | Stop reason: HOSPADM

## 2023-04-18 RX ORDER — SIMETHICONE 80 MG
1 TABLET,CHEWABLE ORAL 4 TIMES DAILY PRN
Status: DISCONTINUED | OUTPATIENT
Start: 2023-04-18 | End: 2023-05-05 | Stop reason: HOSPADM

## 2023-04-18 RX ORDER — SODIUM CHLORIDE 0.9 % (FLUSH) 0.9 %
10 SYRINGE (ML) INJECTION
Status: DISCONTINUED | OUTPATIENT
Start: 2023-04-18 | End: 2023-05-05 | Stop reason: HOSPADM

## 2023-04-18 RX ORDER — THIAMINE HCL 100 MG
200 TABLET ORAL DAILY
Status: DISCONTINUED | OUTPATIENT
Start: 2023-04-18 | End: 2023-05-05 | Stop reason: HOSPADM

## 2023-04-18 RX ORDER — ONDANSETRON 4 MG/1
8 TABLET, ORALLY DISINTEGRATING ORAL EVERY 8 HOURS PRN
Status: DISCONTINUED | OUTPATIENT
Start: 2023-04-18 | End: 2023-05-05 | Stop reason: HOSPADM

## 2023-04-18 RX ORDER — IPRATROPIUM BROMIDE AND ALBUTEROL SULFATE 2.5; .5 MG/3ML; MG/3ML
3 SOLUTION RESPIRATORY (INHALATION) EVERY 6 HOURS PRN
Status: DISCONTINUED | OUTPATIENT
Start: 2023-04-18 | End: 2023-05-05 | Stop reason: HOSPADM

## 2023-04-18 RX ORDER — AMIODARONE HYDROCHLORIDE 200 MG/1
200 TABLET ORAL DAILY
Status: DISCONTINUED | OUTPATIENT
Start: 2023-04-20 | End: 2023-05-05 | Stop reason: HOSPADM

## 2023-04-18 RX ORDER — MUPIROCIN 20 MG/G
OINTMENT TOPICAL 2 TIMES DAILY
Status: DISCONTINUED | OUTPATIENT
Start: 2023-04-18 | End: 2023-04-21

## 2023-04-18 RX ORDER — IBUPROFEN 200 MG
24 TABLET ORAL
Status: DISCONTINUED | OUTPATIENT
Start: 2023-04-18 | End: 2023-05-05 | Stop reason: HOSPADM

## 2023-04-18 RX ORDER — NALOXONE HCL 0.4 MG/ML
0.02 VIAL (ML) INJECTION
Status: DISCONTINUED | OUTPATIENT
Start: 2023-04-18 | End: 2023-05-05 | Stop reason: HOSPADM

## 2023-04-18 RX ORDER — MAG HYDROX/ALUMINUM HYD/SIMETH 200-200-20
30 SUSPENSION, ORAL (FINAL DOSE FORM) ORAL 4 TIMES DAILY PRN
Status: DISCONTINUED | OUTPATIENT
Start: 2023-04-18 | End: 2023-05-05 | Stop reason: HOSPADM

## 2023-04-18 RX ORDER — ACETAMINOPHEN 325 MG/1
650 TABLET ORAL EVERY 4 HOURS PRN
Status: DISCONTINUED | OUTPATIENT
Start: 2023-04-18 | End: 2023-05-05 | Stop reason: HOSPADM

## 2023-04-18 RX ORDER — TALC
9 POWDER (GRAM) TOPICAL NIGHTLY PRN
Status: DISCONTINUED | OUTPATIENT
Start: 2023-04-18 | End: 2023-05-05 | Stop reason: HOSPADM

## 2023-04-18 RX ORDER — DIGOXIN 125 MCG
0.12 TABLET ORAL DAILY
Status: DISCONTINUED | OUTPATIENT
Start: 2023-04-18 | End: 2023-04-21

## 2023-04-18 RX ORDER — IBUPROFEN 200 MG
16 TABLET ORAL
Status: DISCONTINUED | OUTPATIENT
Start: 2023-04-18 | End: 2023-05-05 | Stop reason: HOSPADM

## 2023-04-18 RX ORDER — BISACODYL 10 MG
10 SUPPOSITORY, RECTAL RECTAL DAILY PRN
Status: DISCONTINUED | OUTPATIENT
Start: 2023-04-18 | End: 2023-05-05 | Stop reason: HOSPADM

## 2023-04-18 RX ORDER — ENOXAPARIN SODIUM 100 MG/ML
40 INJECTION SUBCUTANEOUS EVERY 24 HOURS
Status: DISCONTINUED | OUTPATIENT
Start: 2023-04-18 | End: 2023-04-30

## 2023-04-18 RX ORDER — ONDANSETRON 2 MG/ML
4 INJECTION INTRAMUSCULAR; INTRAVENOUS EVERY 8 HOURS PRN
Status: DISCONTINUED | OUTPATIENT
Start: 2023-04-18 | End: 2023-05-05 | Stop reason: HOSPADM

## 2023-04-18 RX ADMIN — ATORVASTATIN CALCIUM 40 MG: 40 TABLET, FILM COATED ORAL at 08:04

## 2023-04-18 RX ADMIN — FUROSEMIDE 40 MG: 40 TABLET ORAL at 08:04

## 2023-04-18 RX ADMIN — HYDROXYZINE HYDROCHLORIDE 25 MG: 25 TABLET ORAL at 09:04

## 2023-04-18 RX ADMIN — AMIODARONE HYDROCHLORIDE 200 MG: 200 TABLET ORAL at 08:04

## 2023-04-18 RX ADMIN — FERROUS SULFATE TAB 325 MG (65 MG ELEMENTAL FE) 1 EACH: 325 (65 FE) TAB at 08:04

## 2023-04-18 RX ADMIN — METOPROLOL TARTRATE 100 MG: 50 TABLET, FILM COATED ORAL at 09:04

## 2023-04-18 RX ADMIN — MUPIROCIN: 20 OINTMENT TOPICAL at 09:04

## 2023-04-18 RX ADMIN — ENOXAPARIN SODIUM 40 MG: 40 INJECTION SUBCUTANEOUS at 04:04

## 2023-04-18 RX ADMIN — VALSARTAN 160 MG: 160 TABLET, FILM COATED ORAL at 08:04

## 2023-04-18 RX ADMIN — VALSARTAN 160 MG: 160 TABLET, FILM COATED ORAL at 09:04

## 2023-04-18 RX ADMIN — ASPIRIN 81 MG: 81 TABLET, COATED ORAL at 08:04

## 2023-04-18 RX ADMIN — DIGOXIN 0.12 MG: 125 TABLET ORAL at 03:04

## 2023-04-18 RX ADMIN — MUPIROCIN: 20 OINTMENT TOPICAL at 12:04

## 2023-04-18 RX ADMIN — MELATONIN TAB 3 MG 9 MG: 3 TAB at 11:04

## 2023-04-18 RX ADMIN — THIAMINE HCL TAB 100 MG 200 MG: 100 TAB at 12:04

## 2023-04-18 RX ADMIN — AMIODARONE HYDROCHLORIDE 200 MG: 200 TABLET ORAL at 09:04

## 2023-04-18 RX ADMIN — TRAZODONE HYDROCHLORIDE 25 MG: 50 TABLET ORAL at 09:04

## 2023-04-18 RX ADMIN — METOPROLOL TARTRATE 100 MG: 50 TABLET, FILM COATED ORAL at 08:04

## 2023-04-18 NOTE — PLAN OF CARE
[facility] - Medical Surgical Unit  Initial Discharge Assessment       Primary Care Provider: LOREN Jerry    Admission Diagnosis: Acute on chronic congestive heart failure, unspecified heart failure type [I50.9]    Admission Date: 4/17/2023  Expected Discharge Date:     Discharge Barriers Identified: None    Payor: MEDICARE / Plan: MEDICARE PART A & B / Product Type: Government /     Extended Emergency Contact Information  Primary Emergency Contact: Ashleigh Bailey  Mobile Phone: [phone]  Relation: Sister   needed? No  Secondary Emergency Contact: Mya Loomis  Mobile Phone: [phone]  Relation: Friend    Discharge Plan A: Home with family, Home Health         [employer] [place] [facility] [place]  [address]  [facility]  [address] [facility]  [place] [address]  Phone: [phone] Fax: [phone]      Initial Assessment (most recent)       Adult Discharge Assessment - 04/18/23 1831          Discharge Assessment    Assessment Type Discharge Planning Assessment     Confirmed/corrected address, phone number and insurance Yes     Confirmed Demographics Correct on Facesheet     Source of Information family     If unable to respond/provide information was family/caregiver contacted? Yes     Contact Name/Number Ewelina Bailey sister      Communicated MODESTO with patient/caregiver Date not available/Unable to determine     Reason For Admission weakness     People in Home alone     Facility Arrived From: [facility]     Prior to hospitilization cognitive status: Alert/Oriented     Current cognitive status: Alert/Oriented     Walking or Climbing Stairs ambulation difficulty, requires equipment     Mobility Management walker     Home Accessibility wheelchair accessible     Home Layout Able to live on 1st floor     Equipment Currently Used at Home none     Readmission within 30 days? No     Patient currently being followed by outpatient case  management? No     Do you currently have service(s) that help you manage your care at home? No     Do you take prescription medications? Yes     Do you have prescription coverage? Yes     Do you have any problems affording any of your prescribed medications? TBD     Is the patient taking medications as prescribed? yes     Who is going to help you get home at discharge? Ewelina Bailey sister      How do you get to doctors appointments? family or friend will provide     Are you on dialysis? No     Do you take coumadin? No     Discharge Plan A Home with family;Home Health     DME Needed Upon Discharge  walker, standard     Discharge Plan discussed with: Sibling     Name(s) and Number(s) Ewelina Bailey sister      Discharge Barriers Identified None        Physical Activity    On average, how many days per week do you engage in moderate to strenuous exercise (like a brisk walk)? 0 days     On average, how many minutes do you engage in exercise at this level? 0 min        Financial Resource Strain    How hard is it for you to pay for the very basics like food, housing, medical care, and heating? Not hard at all        Housing Stability    In the last 12 months, was there a time when you were not able to pay the mortgage or rent on time? No     In the last 12 months, how many places have you lived? 1     In the last 12 months, was there a time when you did not have a steady place to sleep or slept in a shelter (including now)? No        Transportation Needs    In the past 12 months, has lack of transportation kept you from medical appointments or from getting medications? No     In the past 12 months, has lack of transportation kept you from meetings, work, or from getting things needed for daily living? No        Food Insecurity    Within the past 12 months, you worried that your food would run out before you got the money to buy more. Never true     Within the past 12 months, the food you bought  just didn't last and you didn't have money to get more. Never true        Stress    Do you feel stress - tense, restless, nervous, or anxious, or unable to sleep at night because your mind is troubled all the time - these days? Only a little        Social Connections    In a typical week, how many times do you talk on the phone with family, friends, or neighbors? More than three times a week     How often do you get together with friends or relatives? More than three times a week     How often do you attend Jain or Christian services? More than 4 times per year     Do you belong to any clubs or organizations such as Jain groups, unions, fraternal or athletic groups, or school groups? No     How often do you attend meetings of the clubs or organizations you belong to? 1 to 4 times per year     Are you , , , , never , or living with a partner? Never         Alcohol Use    Q1: How often do you have a drink containing alcohol? Never     Q2: How many drinks containing alcohol do you have on a typical day when you are drinking? Patient does not drink     Q3: How often do you have six or more drinks on one occasion? Never        OTHER    Name(s) of People in Home Ewelina Bailey sister

## 2023-04-18 NOTE — PROGRESS NOTES
[facility] - Medical Surgical Unit  Wound Care    Patient Name:  Mike Urbina Jr.   MRN:  [id number]  Date: 4/18/2023  Diagnosis: <principal problem not specified>    History:     Past Medical History:   Diagnosis Date    Atrial flutter     CHF (congestive heart failure)     Coronary artery disease     Hypertension     SOB (shortness of breath)     at times       Social History     Socioeconomic History    Marital status: Single   Tobacco Use    Smoking status: Never     Passive exposure: Never    Smokeless tobacco: Never   Substance and Sexual Activity    Alcohol use: Yes     Alcohol/week: 84.0 standard drinks     Types: 84 Cans of beer per week     Comment: alcoholic, daily, beer    Drug use: Yes     Frequency: 3.0 times per week     Types: Hydrocodone    Sexual activity: Not Currently   Social History Narrative    ** Merged History Encounter **          Social Determinants of Health     Financial Resource Strain: Low Risk     Difficulty of Paying Living Expenses: Not hard at all   Food Insecurity: No Food Insecurity    Worried About Running Out of Food in the Last Year: Never true    Ran Out of Food in the Last Year: Never true   Transportation Needs: No Transportation Needs    Lack of Transportation (Medical): No    Lack of Transportation (Non-Medical): No   Physical Activity: Inactive    Days of Exercise per Week: 0 days    Minutes of Exercise per Session: 0 min   Stress: No Stress Concern Present    Feeling of Stress : Only a little   Social Connections: Moderately Isolated    Frequency of Communication with Friends and Family: More than three times a week    Frequency of Social Gatherings with Friends and Family: More than three times a week    Attends Rastafarian Services: 1 to 4 times per year    Active Member of Clubs or Organizations: No    Attends Club or Organization Meetings: Never    Marital Status: Never    Housing Stability: Low Risk     Unable to Pay for Housing in the Last Year:  No    Number of Places Lived in the Last Year: 1    Unstable Housing in the Last Year: No       Precautions:     Allergies as of 04/17/2023    (No Known Allergies)       WO Assessment Details/Treatment     Pt is s/p CABG x3; left internal mammary artery to left anterior descending; reverse saphenous vein to the 1st obtuse marginal; reverse saphenous vein to the posterior descending artery; ligation of left atrial appendage with Endo stapler; endoscopic vein harvest of the left greater saphenous vein. Operation was 4/3/23 with Dr Finley. Consult received, assessment performed. Incision sites to midline sternum and L medial lower leg are approximated and epithelialized. Periwound of LLE with edema and ecchymosis. No drainage noted from either site. No dressings needed to sites at this time. Hypopigmentation and blanchable erythema noted to sacrum/buttocks/groin folds. No open wounds noted. Recommend applying Zguard paste to areas BID/PRN incontinence care for protection. Pt education on importance of sternal precautions and pressure prevention measures during hospitalization. Pt verbalized understanding. No further wound care needs at this time. Orders in place.      04/18/23 1050   WOCN Assessment   WOCN Total Time (mins) 40   Visit Date 04/18/23   Visit Time 1015   Consult Type New   WOCN Speciality Wound   Wound surgical   Intervention assessed;chart review;orders   Teaching on-going   Skin Interventions   Dehiscence Prevention/Management wound/incision splinting encouraged   Device Skin Pressure Protection positioning supports utilized;absorbent pad utilized/changed   Pressure Reduction Devices positioning supports utilized   Pressure Reduction Techniques frequent weight shift encouraged   Skin Protection adhesive use limited;incontinence pads utilized   Positioning   Body Position position changed independently   Head of Bed (HOB) Positioning HOB at 30-45 degrees   Pressure Injury Prevention    Check Moisture  Management Pad Done   Sacral Foam Dressing Patient incontinent, barrier cream applied        Incision/Site 04/03/23 1030 Sternal midline midline;vertical   Date First Assessed/Time First Assessed: 04/03/23 1030   Present Prior to Hospital Arrival?: No  Location: Sternal  Orientation: midline  Incision Type: midline;vertical   Wound Image     Dressing Appearance Open to air   Drainage Amount None   Appearance Closed/resurfaced   Periwound Area Dry   Wound Edges Approximated        Incision/Site 04/03/23 1030 Left Leg other (see comments)   Date First Assessed/Time First Assessed: 04/03/23 1030   Side: Left  Location: Leg  Incision Type: (c) other (see comments)   Wound Image     Dressing Appearance Open to air   Drainage Amount None   Appearance Closed/resurfaced   Periwound Area Ecchymotic;Edematous   Wound Edges Approximated           04/18/2023

## 2023-04-18 NOTE — PT/OT/SLP EVAL
Physical Therapy Swing Evaluation and Treatment    Patient Name: Mike Urbina Jr.   MRN: [id number]  Recent Surgery: CABG     Recommendations:     Discharge Recommendations: home, home with home health, home health OT, home health PT   Discharge Equipment Recommendations: walker, rolling   Barriers to discharge: Increased level of assist and Decreased caregiver support    Assessment:     Mike Urbina Jr. is a 67 y.o. male admitted with a medical diagnosis of CABG. He presents with the following impairments/functional limitations: weakness, impaired endurance, decreased safety awareness, impaired functional mobility, impaired cardiopulmonary response to activity, orthopedic precautions.     Rehab Prognosis: Good; patient would benefit from acute PT services to address these deficits and reach maximum level of function.    Plan:     During this hospitalization, patient to be seen 6 x/week to address the above listed problems via gait training, therapeutic activities, therapeutic exercises    Plan of Care Expires: 05/16/23      Subjective     Chief Complaint: Not able to move without using B UE  Patient Comments/Goals: Be able to go home at MOD I levels.   Pain/Comfort:  Pain Rating 1: 0/10  Pain Rating Post-Intervention 1: 0/10    Social History:  Living Environment: Patient lives with their roommate(s) in a single story home with number of outside stair(s): 3 with rails on the L  Prior Level of Function: Prior to admission, patient was independent  Equipment Used at Home: none   DME owned (not currently used): none  Assistance Upon Discharge: unknown    Objective:     Communicated with nursing and OT prior to session. Patient found HOB elevated with   upon PT entry to room.    General Precautions: Standard, sternal   Orthopedic Precautions:     Braces:      Respiratory Status: Room air    BP: 148/95  HR: 119-157 bpm during eval  O2 sats: 98%    Exams:  Cognition: Patient is oriented to Person, Pt thinks  it is 1965 and that he is either at a football field or snf.   RLE ROM: WNL  RLE Strength: WNL  LLE ROM: WNL  LLE Strength: WNL  Not compliant with sternal precautions.     Functional Mobility:  Gait belt applied - No  Bed Mobility  Rolling Left: contact guard assistance  Rolling Right: contact guard assistance  Supine to Sit: contact guard assistance for trunk management  Transfers  Sit to Stand: minimum assistance with rolling walker  Bed to Chair: minimum assistance with rolling walker using Stand Pivot  Gait  Patient ambulated 100 with rolling walker and contact guard assistance. Patient demonstrates occasional unsteady gait. .  Balance  Sitting: supervision  Standing: contact guard assistance    Therapeutic Activities and Exercises:   Patient educated on post-op sternal precautions, including no lifting > 5 lbs, pulling or pushing with BUEs  Patient educated about importance of OOB mobility and remaining up in chair most of day      AM-PAC 6 CLICK MOBILITY  Total Score:16    Patient left up in chair with all lines intact, call button in reach, RN notified, and chair alarm on.    GOALS:   Multidisciplinary Problems       Physical Therapy Goals          Problem: Physical Therapy    Goal Priority Disciplines Outcome Goal Variances Interventions   Physical Therapy Goal     PT, PT/OT Ongoing, Progressing     Description: Goals to be met by: Jossuecarcorby     Patient will increase functional independence with mobility by performin. Supine to sit with Modified [place] maintaining sternal precautions  2. Sit to stand transfer with Modified [place] maintaining sternal precautions  3. Gait x 325 feet with Modified [place] using Rolling Walker.   4. Ascend/descend 3 stair with left Handrails Modified [place] using No Assistive Device.                          History:     Past Medical History:   Diagnosis Date    Atrial flutter     CHF (congestive heart failure)     Coronary artery disease      Hypertension     SOB (shortness of breath)     at times       Past Surgical History:   Procedure Laterality Date    CATARACT EXTRACTION Bilateral     CORONARY ARTERY BYPASS GRAFT (CABG) N/A 4/3/2023    Procedure: CORONARY ARTERY BYPASS GRAFT (CABG);  Surgeon: Deuce Finley IV, MD;  Location: [facility];  Service: Cardiovascular;  Laterality: N/A;  WITH LLAA //  ECHO NOTIFIED    LEFT HEART CATHETERIZATION N/A 03/15/2023    Procedure: CATHETERIZATION, HEART, LEFT;  Surgeon: Sreedhar Herrera MD;  Location: [facility] CATH LAB;  Service: Cardiology;  Laterality: N/A;  [facility] via [facility]       Time Tracking:     PT Received On: 04/18/23  PT Start Time: 1107  PT Stop Time: 1147  PT Total Time (min): 40 min     Billable Minutes: Evaluation low complexity     4/18/2023

## 2023-04-18 NOTE — PT/OT/SLP PROGRESS
Occupational Therapy  Treatment    Name: Mike Urbina Jr.    : 1955 (67 y.o.)  MRN: [id number]           TREATMENT SUMMARY AND RECOMMENDATIONS:      OT Date of Treatment: 23  OT Start Time: 207  OT Stop Time: 230  OT Total Time (min): 23 min      Subjective Assessment:  X No complaints X Lethargic    Awake, alert, cooperative  Impulsive    Uncooperative   Flat affect    Agitated  c/o pain    Appropriate X c/o fatigue   X Confused  Treated at bedside     Emotionally labile X Treated in gym/dept.      Other:        Therapy Precautions:    Cognitive deficits  Spinal precautions    Collar - hard X Sternal precautions    Collar - soft   TLSO    Fall risk  LSO    Hip precautions - posterior  Knee immobilizer    Hip precautions - anterior  WBAT    Impaired communication  Partial weightbearing    Oxygen  TTWB    PEG tube  NWB    Visual deficits      Hearing deficits   Other:        Treatment Objectives:     Mobility Training:    Mobility task Assist level Comments    Bed mobility Max A Supine>EOB   Transfer     Sit to stands transitions     Functional mobility CGA Pt ambulated ~150ft c RW. Standing r/b x2 required. Cues for upright posture.   Sitting balance     Standing balance      Other:        ADL Training:    ADL Assist level Comments    Feeding     Grooming/hygiene     Bathing     Upper body dressing     Lower body dressing     Toileting     Toilet transfer     Adaptive equipment training     Other:           Therapeutic Exercise:   Exercise Sets Reps Comments   UBE 1 4' forwards                         Assessment: Patient tolerated session fair. Pt lethargic throughout session. Pt would continue to benefit from skilled OT services to improve pt's safety and independence with daily occupations.      OT Plan: Continue OT POC.  Revisions made to plan of care: No    GOALS:   Multidisciplinary Problems       Occupational Therapy Goals          Problem: Occupational Therapy    Goal Priority  Disciplines Outcome Interventions   Occupational Therapy Goal     OT, PT/OT Ongoing, Progressing    Description: Goals to be met by: 5/5/2023     Patient will increase functional independence with ADLs by performing:    UE Dressing with Modified [place].  LE Dressing with Modified [place].  Toileting from toilet with Supervision for hygiene and clothing management.   Toilet transfer to toilet with Supervision.                         Skilled OT Minutes Provided: 23  Communication with Treatment Team:     Equipment recommendations:       At end of treatment, patient remained:   Up in chair     Up in wheelchair in room    In bed    With alarm activated    Bed rails up    Call bell in reach     Family/friends present    Restraints secured properly    In bathroom with CNA/RN notified   X In gym with PT/PTA/tech    Nurse aware    Other:

## 2023-04-18 NOTE — PLAN OF CARE
Problem: Adult Inpatient Plan of Care  Goal: Plan of Care Review  Outcome: Ongoing, Progressing  Flowsheets (Taken 4/18/2023 0113)  Plan of Care Reviewed With: patient  Goal: Patient-Specific Goal (Individualized)  Outcome: Ongoing, Progressing  Flowsheets (Taken 4/18/2023 0113)  Anxieties, Fears or Concerns: Hospitalization  Individualized Care Needs: Cardiac monitoring, PT/OT/ST, fall prevention  Goal: Absence of Hospital-Acquired Illness or Injury  Outcome: Ongoing, Progressing  Goal: Optimal Comfort and Wellbeing  Outcome: Ongoing, Progressing  Goal: Readiness for Transition of Care  Outcome: Ongoing, Progressing  Intervention: Mutually Develop Transition Plan  Flowsheets (Taken 4/18/2023 0113)  Equipment Currently Used at Home: none  Communicated [place] with patient/caregiver: Date not available/Unable to determine  Do you expect to return to your current living situation?: Yes  Do you have help at home or someone to help you manage your care at home?: No  Readmission within 30 days?: No  Do you currently have service(s) that help you manage your care at home?: No     Problem: Fall Injury Risk  Goal: Absence of Fall and Fall-Related Injury  Outcome: Ongoing, Progressing  Intervention: Identify and Manage Contributors  Flowsheets (Taken 4/18/2023 0113)  Self-Care Promotion:   independence encouraged   BADL personal objects within reach   BADL personal routines maintained   safe use of adaptive equipment encouraged  Medication Review/Management:   medications reviewed   high-risk medications identified

## 2023-04-18 NOTE — PLAN OF CARE
Weekly Staffing Report      Date Admitted: 4/17/2023 :   Staffing Date: 4/18/2023     Patient Active Problem List   Diagnosis    Hypertensive urgency    Acute on chronic congestive heart failure    Hyponatremia    ETOH abuse    Atrial flutter    Thrombocytopenia    Electrolyte abnormality    Coronary artery disease          Team Members Present:       Nursing Present     Yes [x]    No []    Physical Therapy Present    Yes [x]    No []    Occupational Therapy Present     Yes [x]    No []    Speech Therapy Present    Yes []    No []    NA [x]    Dietary Present    Yes [x]    No []        Physician Present   [] Berlin Duncan    [x] Thairy Reyes    [] LUCIA Leiva    [] REDD Roy     [] CECI Du      Family Present    [] Adult Children    [] Spouse    [] POA    [] Friend/ Caregiver    [x] Other       Interdisciplinary Meeting Notes:  Sister Ewelina on phone. PT_ Min assist 100ft with RW. CGA /min assist for all other activities. OT-Min assist for most activities. ST- working on swallowing and safety. On modified diet. Will work on speech as well. Appetite- good. Medically- discussed that patient will need more care so he will be here at least another week. May discharge with his brother for at least a week or 2 when ready due to mental status. Not safe to be home alone at this time. All questions answered at this time.                 Please see Documented PT/OT/ST, Dietary, Nursing, and Physician notes for detailed treatment information.

## 2023-04-18 NOTE — PLAN OF CARE
Problem: Physical Therapy  Goal: Physical Therapy Goal  Description: Goals to be met by: Dishcarge     Patient will increase functional independence with mobility by performin. Supine to sit with Modified [place] maintaining sternal precautions  2. Sit to stand transfer with Modified [place] maintaining sternal precautions  3. Gait x 325 feet with Modified [place] using Rolling Walker.   4. Ascend/descend 3 stair with left Handrails Modified [place] using No Assistive Device.     Outcome: Ongoing, Progressing

## 2023-04-18 NOTE — PROGRESS NOTES
Inpatient Nutrition Evaluation    Admit Date: 4/17/2023   Total duration of encounter: 1 day    Nutrition Recommendation/Prescription     Continue heart healthy dysphagia minced and moist mildly thick liquids, no straws    Nutrition Assessment     Chart Review    Reason Seen: continuous nutrition monitoring and length of stay    Malnutrition Screening Tool Results   Have you recently lost weight without trying?: Yes: 14-23 lbs  Have you been eating poorly because of a decreased appetite?: No   MST Score: 2     Diagnosis:  Hypertensive urgency 12/29/2022      Acute on chronic congestive heart failure 12/29/2022      Hyponatremia 12/29/2022      ETOH abuse 12/29/2022      Atrial flutter 12/29/2022      Thrombocytopenia 12/29/2022      Electrolyte abnormality 12/30/2022      Coronary artery disease        Relevant Medical History:   Atrial flutter  Date Unknown  CHF (congestive heart failure)  Date Unknown  Coronary artery disease  Date Unknown  Hypertension  Date Unknown  SOB (shortness of breath)   Nutrition-Related Medications: atorvastatin, ferrous sulfate, furosemide, polyethylene glycol,thiamine    Nutrition-Related Labs:   Latest Reference Range & Units 04/18/23 11:27   Sodium 136 - 145 mmol/L 138   Potassium 3.5 - 5.1 mmol/L 3.6   Chloride 98 - 107 mmol/L 103   CO2 23 - 31 mmol/L 26   Anion Gap mEq/L 9.0   BUN 8.4 - 25.7 mg/dL 15.3   Creatinine 0.73 - 1.18 mg/dL 1.04   BUN/CREAT RATIO  15   eGFR mls/min/1.73/m2 >60   Glucose 82 - 115 mg/dL 105   Calcium 8.8 - 10.0 mg/dL 8.7 (L)   (L): Data is abnormally low    Diet Order: Diet heart healthy No Straws, Dysphagia Minced & Moist; Mildly Thick Liquids (IDDSI Level 2)  Oral Supplement Order: none  Appetite/Oral Intake: good/% of meals  Factors Affecting Nutritional Intake: chewing difficulty and difficulty/impaired swallowing  Food/[employer]/Cultural Preferences:  coffee  Food Allergies: none reported    Skin Integrity: incision (Sternal and left  "leg)  Wound(s):       Comments    Pt intake is good, 100%. Reviewed intake in chart. Pt sleeping during rounds. Will try again. Nursing stated patient likes coffee. Requested caffeine free due to heart. Will provide decaf. SLP recs minced and moist mildly thick liquids no straws consistency.     Anthropometrics    Height: 6' 2" (188 cm)    Last Weight: 76 kg (167 lb 8.8 oz) (04/17/23 1500) Weight Method: Bed Scale  BMI (Calculated): 21.5  BMI Classification: normal (BMI 18.5-24.9)        Ideal Body Weight (IBW), Male: 190 lb     % Ideal Body Weight, Male (lb): 88.18 %                          Usual Weight Provided By: unable to obtain usual weight    Wt Readings from Last 5 Encounters:   04/17/23 76 kg (167 lb 8.8 oz)   04/15/23 77.8 kg (171 lb 8.3 oz)   03/28/23 78 kg (172 lb)   03/15/23 78.6 kg (173 lb 4.5 oz)   01/10/23 76.2 kg (168 lb 1.6 oz)     Weight Change(s) Since Admission:  Admit Weight: 76 kg (167 lb 8.8 oz) (04/17/23 1500)      Patient Education    Not applicable.    Monitoring & Evaluation     Dietitian will monitor food and beverage intake, weight, weight change, electrolyte/renal panel, glucose/endocrine profile, and gastrointestinal profile.  Nutrition Risk/Follow-Up: low (follow-up in 5-7 days)  Patients assigned 'low nutrition risk' status do not qualify for a full nutritional assessment but will be monitored and re-evaluated in a 5-7 day time period. Please consult if re-evaluation needed sooner.   "

## 2023-04-18 NOTE — PLAN OF CARE
Problem: Adult Inpatient Plan of Care  Goal: Plan of Care Review  Outcome: Ongoing, Progressing  Flowsheets (Taken 4/18/2023 1443)  Plan of Care Reviewed With: patient  Goal: Patient-Specific Goal (Individualized)  Outcome: Ongoing, Progressing  Flowsheets (Taken 4/18/2023 1443)  Anxieties, Fears or Concerns: Hospitalization  Individualized Care Needs: Cardiac monitoring, PT/OT/ST, fall prevention  Goal: Absence of Hospital-Acquired Illness or Injury  Outcome: Ongoing, Progressing  Intervention: Identify and Manage Fall Risk  Flowsheets (Taken 4/18/2023 1443)  Safety Promotion/Fall Prevention:   assistive device/personal item within reach   bed alarm set   in recliner, wheels locked   chair alarm set   nonskid shoes/socks when out of bed   room near unit station   instructed to call staff for mobility  Intervention: Prevent Skin Injury  Flowsheets (Taken 4/18/2023 1443)  Body Position: position changed independently  Skin Protection:   adhesive use limited   incontinence pads utilized  Intervention: Prevent and Manage VTE (Venous Thromboembolism) Risk  Flowsheets (Taken 4/18/2023 1443)  Activity Management: Walk with assistive devise and /or staff member - L3  VTE Prevention/Management:   bleeding risk assessed   bleeding precations maintained   ROM (passive) performed  Range of Motion: active ROM (range of motion) encouraged  Intervention: Prevent Infection  Flowsheets (Taken 4/18/2023 1443)  Infection Prevention:   hand hygiene promoted   rest/sleep promoted   single patient room provided  Goal: Optimal Comfort and Wellbeing  Outcome: Ongoing, Progressing  Goal: Readiness for Transition of Care  Outcome: Ongoing, Progressing     Problem: Fall Injury Risk  Goal: Absence of Fall and Fall-Related Injury  Outcome: Ongoing, Progressing     Problem: Infection  Goal: Absence of Infection Signs and Symptoms  Description: Goals to be met by: 5/5/2023     Patient will increase functional independence with ADLs by  performing:    UE Dressing with Modified [place].  LE Dressing with Modified [place].  Toileting from toilet with Supervision for hygiene and clothing management.   Toilet transfer to toilet with Supervision.    Outcome: Ongoing, Progressing

## 2023-04-18 NOTE — PLAN OF CARE
Problem: SLP  Goal: SLP Goal  Description: LTG: Pt will tolerate LRD w/o overt s/s of aspiration.    STG:  Pt will complete laryngeal strengthening exercises and aleksandra with minimal cues.  Pt will consume trials of thin liquid x10 w/o overt s/s of aspiration.  Pt will consume trials of soft and bite sized w/ good oral clearance and w/o overt s/s of aspiration.  Outcome: Ongoing, Progressing

## 2023-04-18 NOTE — H&P
[facility] MEDICINE   H&P ADMISSION NOTE      Patient Name: Mike Urbina Jr.  MRN: [id number]  Patient Class: IP- Swing   Admission Date: 4/17/2023   Admitting Physician:  Service   Attending Physician: Thairy G Reyes, DO  Primary Care Provider: LOREN Jerry  Face-to-Face encounter date: 04/18/2023        CHIEF COMPLAINT   CAD status post CABG    HISTORY OF PRESENTING ILLNESS   67-year-old male with a past medical history of ETOH abuse, CAD status post CABG 04/03/2023 with Dr. Finley, thrombocytopenia, aortic stenosis, hypertension, atrial flutter/fibrillation who presented to our facility on 4/17 for rehabilitation after CABG.    Seen and evaluated at bedside today with his roommate also in the room.  Patient denies any acute complaints other than inability to sleep overnight.    PAST MEDICAL HISTORY     Past Medical History:   Diagnosis Date    Atrial flutter     CHF (congestive heart failure)     Coronary artery disease     Hypertension     SOB (shortness of breath)     at times       PAST SURGICAL HISTORY     Past Surgical History:   Procedure Laterality Date    CATARACT EXTRACTION Bilateral     CORONARY ARTERY BYPASS GRAFT (CABG) N/A 4/3/2023    Procedure: CORONARY ARTERY BYPASS GRAFT (CABG);  Surgeon: Deuce Finley IV, MD;  Location: [facility] OR;  Service: Cardiovascular;  Laterality: N/A;  WITH LLAA //  ECHO NOTIFIED    LEFT HEART CATHETERIZATION N/A 03/15/2023    Procedure: CATHETERIZATION, HEART, LEFT;  Surgeon: Sreedhar Herrera MD;  Location: [facility] CATH LAB;  Service: Cardiology;  Laterality: N/A;  [facility] via RRA       FAMILY HISTORY   Reviewed and noncontributory to this case    SOCIAL HISTORY     Social History     Socioeconomic History    Marital status: Single   Tobacco Use    Smoking status: Never     Passive exposure: Never    Smokeless tobacco: Never   Substance and Sexual Activity    Alcohol use: Yes     Alcohol/week: 84.0 standard drinks     Types: 84 Cans of beer per week     Comment:  alcoholic, daily, beer    Drug use: Yes     Frequency: 3.0 times per week     Types: Hydrocodone    Sexual activity: Not Currently   Social History Narrative    ** Merged History Encounter **          Social Determinants of Health     Financial Resource Strain: Low Risk     Difficulty of Paying Living Expenses: Not hard at all   Food Insecurity: No Food Insecurity    Worried About Running Out of Food in the Last Year: Never true    Ran Out of Food in the Last Year: Never true   Transportation Needs: No Transportation Needs    Lack of Transportation (Medical): No    Lack of Transportation (Non-Medical): No   Physical Activity: Inactive    Days of Exercise per Week: 0 days    Minutes of Exercise per Session: 0 min   Stress: No Stress Concern Present    Feeling of Stress : Only a little   Social Connections: Moderately Isolated    Frequency of Communication with Friends and Family: More than three times a week    Frequency of Social Gatherings with Friends and Family: More than three times a week    Attends Yazidism Services: 1 to 4 times per year    Active Member of Clubs or Organizations: No    Attends Club or Organization Meetings: Never    Marital Status: Never    Housing Stability: Low Risk     Unable to Pay for Housing in the Last Year: No    Number of Places Lived in the Last Year: 1    Unstable Housing in the Last Year: No       HOME MEDICATIONS     Prior to Admission medications    Medication Sig Start Date End Date Taking? Authorizing Provider   amiodarone (PACERONE) 200 MG Tab Take 1 tablet (200 mg total) by mouth 2 (two) times daily. 4/17/23 4/16/24 Yes Wyatt Carl PA-C   amLODIPine (NORVASC) 10 MG tablet Take 1 tablet (10 mg total) by mouth once daily. 4/18/23 4/17/24 Yes Wyatt Carl PA-C   atorvastatin (LIPITOR) 10 MG tablet Take 40 mg by mouth once daily.   Yes Historical Provider   ferrous sulfate 325 (65 FE) MG EC tablet Take 1 tablet (325 mg total) by mouth once daily. 4/17/23  Yes  Wyatt Carl PA-C   furosemide (LASIX) 40 MG tablet TAKE ONE TABLET BY MOUTH DAILY DOSE INCREASED 2/10/23  Yes Historical Provider   metoprolol tartrate (LOPRESSOR) 100 MG tablet Take 1 tablet (100 mg total) by mouth 2 (two) times daily. 4/17/23 4/16/24 Yes Wyatt Carl PA-C   thiamine 100 MG tablet Take 2 tablets (200 mg total) by mouth once daily. 4/18/23  Yes Wyatt Carl PA-C   valsartan (DIOVAN) 160 MG tablet Take 1 tablet (160 mg total) by mouth 2 (two) times daily. 4/17/23 4/16/24 Yes Wyatt Carl PA-C   amiodarone (PACERONE) 200 MG Tab Take 1 tablet (200 mg total) by mouth once daily. 4/20/23 4/19/24  Wyatt Carl PA-C   aspirin (ECOTRIN) 81 MG EC tablet Take 1 tablet (81 mg total) by mouth once daily. 12/30/22 4/3/23  Thairy G Reyes, DO   HYDROcodone-acetaminophen (NORCO) 5-325 mg per tablet Take 1 tablet by mouth every 6 (six) hours as needed for Pain. 4/17/23   Wyatt Carl PA-C   hydrOXYzine HCL (ATARAX) 25 MG tablet TAKE 1 TABLET BY MOUTH EVERY EVENING 3/15/23   LOREN Hatfield       ALLERGIES   Patient has no known allergies.  REVIEW OF SYSTEMS   Except as documented above, all other systems reviewed and negative    PHYSICAL EXAM     Vitals:    04/18/23 1347   BP:    Pulse: (!) 117   Resp: 18   Temp:       General:  In no acute distress, resting comfortably, poor personal hygiene  Head and neck:  Atraumatic, normocephalic, moist mucous membranes, supple neck  Chest:  Clear to auscultation bilaterally  Heart:  S1, S2, grade 2 diastolic murmur Abdomen:  Soft, nontender, BS +  MSK:  Warm, no lower extremity edema, no clubbing or cyanosis  Neuro:  Alert and oriented to person and place, not to time. moving all extremities with good strength  Integumentary:  Well-healing sternotomy scar  Psychiatry:  Anxious  ASSESSMENT AND PLAN   Multivessel CAD  - s/p CABG (4.3.23) - LIMA to LAD, rSVG to OM1, rSVG to PDA with Dr. Finley  -aspirin, atorvastatin, furosemide,  metoprolol  -PT/OT    PAF/Flutter with RVR s/p DCCV  -CHADsVASc - 3   -2 more days of amiodarone taper, then continue with 200 daily starting on the 20th   -digoxin started 4/18 patient remains with intermittent AF/flutter  - no AC s/p (4.3.23) - Ligation of SILVANO     Severe AS  -TAVR Evaluation on Outpatient Basis    HTN  -Controlled   -valsartan    Toxic/Metabolic Encephalopathy   -CT head on 04/06 showed chronic microvascular ischemic changes  -patient is alert and oriented to person and place however reports present at [place] is our current president  -continue with thiamine  -B12 unremarkable, pending folate    Thrombocytopenia   Anemia  -Chronic due to myelosuppression from ETOH abuse   -required postop blood transfusion  -recovered at this time    ETOH Abuse  -reports two 6 packs daily  -p.r.n. t.i.d. Xanax      DVT prophylaxis:  Lovenox  __________________________________________________________________  LABS/MICRO/MEDS/DIAGNOSTICS       LABS  Recent Labs     04/16/23  0428 04/18/23  1127    138   K 3.5 3.6   CHLORIDE 105 103   CO2 23 26   BUN 13.5 15.3   CREATININE 0.97 1.04   GLUCOSE 96 105   CALCIUM 8.3* 8.7*   ALKPHOS 90  --    AST 16  --    ALT 15  --    ALBUMIN 2.5*  --      Recent Labs     04/18/23  1127   WBC 7.0   RBC 4.17*   HCT 38.0*   MCV 91.1          MICROBIOLOGY  Microbiology Results (last 7 days)       ** No results found for the last 168 hours. **            MEDICATIONS   amiodarone  200 mg Oral BID    [START ON 4/20/2023] amiodarone  200 mg Oral Daily    [START ON 4/19/2023] amLODIPine  10 mg Oral Daily    aspirin  81 mg Oral Daily    atorvastatin  40 mg Oral Daily    digoxin  0.125 mg Oral Daily    enoxaparin  40 mg Subcutaneous Daily    ferrous sulfate  1 tablet Oral Daily    furosemide  40 mg Oral Daily    hydrOXYzine HCL  25 mg Oral QHS    metoprolol tartrate  100 mg Oral BID    mupirocin   Nasal BID    thiamine  200 mg Oral Daily    traZODone  25 mg Oral QHS    valsartan   160 mg Oral BID      INFUSIONS      DIAGNOSTIC TESTS  No orders to display          Patient information was obtained from patient, patient's family, past medical records and ER records.   All diagnosis and differential diagnosis have been reviewed; assessment and plan has been documented. I have personally reviewed the labs and test results that are presently available; I have reviewed the patients medication list. I have reviewed the consulting providers response and recommendations. I have reviewed or attempted to review medical records based upon their availability.  All of the patient's questions have been addressed and answered. Patient's is agreeable to the above stated plan. I will continue to monitor closely and make adjustments to medical management as needed.  This note was created using [employer] voice recognition software that occasionally misinterpreted phrases or words.  Please contact me if any questions may rise regarding documentation to clarify verbiage.        Thairy G Reyes, DO   04/18/2023   Internal Medicine

## 2023-04-18 NOTE — PLAN OF CARE
Problem: Occupational Therapy  Goal: Occupational Therapy Goal  Description: Goals to be met by: 5/5/2023     Patient will increase functional independence with ADLs by performing:    UE Dressing with Modified [place].  LE Dressing with Modified [place].  Toileting from toilet with Supervision for hygiene and clothing management.   Toilet transfer to toilet with Supervision.    4/18/2023 1251 by Gen Bell OT  Outcome: Ongoing, Progressing

## 2023-04-18 NOTE — PT/OT/SLP PROGRESS
Physical Therapy Treatment Note           Name: Mike Urbina Jr.    : 1955 (67 y.o.)  MRN: [id number]           TREATMENT SUMMARY AND RECOMMENDATIONS:    PT Received On: 23  PT Start Time: 1432     PT Stop Time: 1503  PT Total Time (min): 31 min     Subjective Assessment:  x No complaints  Lethargic   x Awake, alert, cooperative  Uncooperative    Agitated  c/o pain    Appropriate  c/o fatigue    Confused  Treated at bedside     Emotionally labile x Treated in gym/dept.    Impulsive  Other:    Flat affect       Therapy Precautions:    Cognitive deficits  Spinal precautions    Collar - hard x Sternal precautions    Collar - soft   TLSO    Fall risk  LSO    Hip precautions - posterior  Knee immobilizer    Hip precautions - anterior  WBAT    Impaired communication  Partial weightbearing    Oxygen  TTWB    PEG tube  NWB    Visual deficits  Other:    Hearing deficits          Treatment Objectives:     Mobility Training:   Assist level Comments    Bed mobility     Transfer SBA/CGA RW>bed   Gait CGA 50' on firm solid ground w/ RW behind    Sit to stand transitions CGA WC>RW; pt hugged pillow during transition    Sitting balance     Standing balance      Wheelchair mobility     Car transfer     Other:          Therapeutic Exercise:   Exercise Sets Reps Comments   BLE stationary bike 1 10' 5' forward, 5' backward   Seated BLE ex 1 10  Marches, LAQ, SL abd/add, knee flexion w/ ytb   Seated ankle pumps 2 20               Additional Comments:  Pt has sternal precautions for CABG sx    Assessment: Patient tolerated session.    PT Plan: Continue w/ POC  Revisions made to plan of care: No    GOALS:   Multidisciplinary Problems       Physical Therapy Goals          Problem: Physical Therapy    Goal Priority Disciplines Outcome Goal Variances Interventions   Physical Therapy Goal     PT, PT/OT Ongoing, Progressing     Description: Goals to be met by: [place]     Patient will increase functional  independence with mobility by performin. Supine to sit with Modified [place] maintaining sternal precautions  2. Sit to stand transfer with Modified [place] maintaining sternal precautions  3. Gait x 325 feet with Modified [place] using Rolling Walker.   4. Ascend/descend 3 stair with left Handrails Modified [place] using No Assistive Device.                          Skilled PT Minutes Provided: 31   Communication with Treatment Team:     Equipment recommendations:       At end of treatment, patient remained:   Up in chair     Up in wheelchair in room   x In bed   x With alarm activated   x Bed rails up   x Call bell in reach    x Family/friends present    Restraints secured properly    In bathroom with CNA/RN notified    Nurse aware    In gym with therapist/tech    Other:

## 2023-04-18 NOTE — PT/OT/SLP EVAL
Occupational Therapy   [facility]    Name: Mike Urbina Jr.  MRN: [id number]  Admitting Diagnosis:  <principal problem not specified>      History:   Mike Urbina Jr. is a 67 y.o. male with a medical diagnosis of <principal problem not specified>.    Past Medical History:   Diagnosis Date    Atrial flutter     CHF (congestive heart failure)     Coronary artery disease     Hypertension     SOB (shortness of breath)     at times         Past Surgical History:   Procedure Laterality Date    CATARACT EXTRACTION Bilateral     CORONARY ARTERY BYPASS GRAFT (CABG) N/A 4/3/2023    Procedure: CORONARY ARTERY BYPASS GRAFT (CABG);  Surgeon: Deuce Finley IV, MD;  Location: [facility] OR;  Service: Cardiovascular;  Laterality: N/A;  WITH LLAA //  ECHO NOTIFIED    LEFT HEART CATHETERIZATION N/A 03/15/2023    Procedure: CATHETERIZATION, HEART, LEFT;  Surgeon: Sreedhar Herrera MD;  Location: [facility] CATH LAB;  Service: Cardiology;  Laterality: N/A;  [facility] via RRA       Subjective     Chief Complaint: weakness  Patient/Family Comments/goals: return to PLOF    Occupational Profile:  Lives with: friend  Home Environment: [facility], 3 steps to enter L handrail; tub/shower combo  Previous level of function: Independent, driving  Equipment Used at Home:  none      Pain/Comfort:  Pain Rating 1: 0/10    Blood Pressure:     Objective:     Communicated with: NSG and physical therapy prior to session.  Patient found HOB elevated with bed alarm, peripheral IV, telemetry upon OT entry to room.    General Precautions: Standard, sternal   Orthopedic Precautions:    Braces:    Respiratory Status: Room air    Occupational Performance:    Mobility  Assist level Comments    Bed mobility SPV V/cs to maintain sternal pxns   Transfer minimal assist    Functional mobility contact guard With RW   Sit to stand transitions minimal assist 2/2 balance   Other:          Activities of Daily Living Assist level Comments    Feeding independent     Grooming/hygiene set up    Bathing minimal assist    Upper body dressing minimal assist    Lower body dressing SBA    Toileting minimal assist    Toilet transfer minimal assist    Adaptive equipment training     Other:       Physical Exam:  Upper Extremity Range of Motion:     -       Right Upper Extremity: WFL  -       Left Upper Extremity: WFL  Upper Extremity Strength:    -       Right Upper Extremity: NT 2/2 sternal pxns  -       Left Upper Extremity: NT 2/2 sternal pxns    Cognitive/Visual Perceptual:  Cognitive/Psychosocial Skills:     -       Oriented to: Person   -Brief Interview for Mental Status (BIMS): 0/15  -Pt reported date as August 1965 and stated he was in a football field then later stated he was in a [employer].       Treatment & Education:  Pt and pt's sister educated on role of OT, OT goals, and OT POC. Pt and pt's sister verbalized understanding.    Patient left up in chair with all lines intact, call button in reach, chair alarm on, and CNA and pt's sister present    Assessment:     Mike Urbina Jr. is a 67 y.o. male with a medical diagnosis of CABG x3. Performance deficits affecting function: impaired self care skills, impaired balance, impaired cognition, decreased safety awareness, weakness.      Rehab Prognosis: Fair; patient would benefit from acute skilled OT services to address these deficits and reach maximum level of function.       Plan:     Patient to be seen  (BID) to address the above listed problems via self-care/home management, therapeutic activities, therapeutic exercises  Plan of Care Reviewed with: patient      GOALS:   Multidisciplinary Problems       Occupational Therapy Goals          Problem: Occupational Therapy    Goal Priority Disciplines Outcome Interventions   Occupational Therapy Goal     OT, PT/OT Ongoing, Progressing    Description: Goals to be met by: 5/5/2023     Patient will increase functional independence with ADLs by performing:    UE Dressing with  Modified [place].  LE Dressing with Modified [place].  Toileting from toilet with Supervision for hygiene and clothing management.   Toilet transfer to toilet with Supervision.                           Recommendations:     Discharge Recommendations: home with home health  Discharge Equipment Recommendations:  none      Time Tracking:     OT Date of Treatment: 04/18/23  OT Start Time: 1107  OT Stop Time: 1147  OT Total Time (min): 40 min    Billable Minutes:Evaluation 40    4/18/2023

## 2023-04-18 NOTE — PT/OT/SLP EVAL
Speech Language Pathology Evaluation  Bedside Swallow and Motor Speech    Patient Name:  Mike Urbina Jr.   MRN:  [id number]  Admitting Diagnosis: <principal problem not specified>    Recommendations:                 General Recommendations:  Dysphagia therapy, Speech/language therapy, and Cognitive-linguistic evaluation  Diet recommendations:  Minced & Moist Diet - IDDSI Level 5, Mildly thick/Nectar thick liquids - IDDSI Level 2   Aspiration Precautions: 1 bite/sip at a time, Alternating bites/sips, HOB to 90 degrees, Meds crushed in puree, No straws, Small bites/sips, and Standard aspiration precautions   General Precautions: Standard, aspiration, nectar thick  Communication strategies:  go to room if call light pushed    History:     Past Medical History:   Diagnosis Date    Atrial flutter     CHF (congestive heart failure)     Coronary artery disease     Hypertension     SOB (shortness of breath)     at times       Past Surgical History:   Procedure Laterality Date    CATARACT EXTRACTION Bilateral     CORONARY ARTERY BYPASS GRAFT (CABG) N/A 4/3/2023    Procedure: CORONARY ARTERY BYPASS GRAFT (CABG);  Surgeon: Deuce Finley IV, MD;  Location[facility];  Service: Cardiovascular;  Laterality: N/A;  WITH LLAA //  ECHO NOTIFIED    LEFT HEART CATHETERIZATION N/A 03/15/2023    Procedure: CATHETERIZATION, HEART, LEFT;  Surgeon: Sreedhar Herrera MD;  Location: [facility] CATH LAB;  Service: Cardiology;  Laterality: N/A;  [facility] via R[facility]       Social History: Patient lives with friend.    Prior Intubation HX:      Modified Barium Swallow: 4/11/23 at [facility] w/ recommendation of minced and moist and thin liquids. Though staff reported coughing w/ thin liquids, therefore SLP was re-consulted and pt downgraded to mildly thick liquids.    Chest X-Rays:     Prior diet: minced and moist/mildly thick liquids.    Occupation/hobbies/homemaking:     Subjective     Pt in bed upon SLP arrival. Pt pleasant. Pt w/ dysarthric speech at  the conversation level.  Patient goals:      Pain/Comfort:       Respiratory Status: Room air    Objective:     Oral Musculature Evaluation  Dentition: other (see comments) (a few scattered teeth present on bottom. edentulous top.)  Secretion Management: adequate  Mucosal Quality: adequate  Oral Labial Strength and Mobility: impaired coordination    Bedside Swallow Eval:   Consistencies Assessed:  Thin liquids via single and sequential cup sips  Nectar thick liquids via single cup sips  Puree pudding  Solids sarah cracker      Oral Phase:   Excess chewing  Prolonged mastication  Oral residue  Slow oral transit time    Pharyngeal Phase:   throat clearing and cough after the swallow w/ sequential cup sips of thin and on 1/3 trials of single cup sips.    Compensatory Strategies  None    Motor Speech Eval:  Pt produced DDK rates /p/ /t/ and /k/. Accurate precision w/ /p/ and /t/ w/ decreased precision noted w/ rapid productions of /k/.  Pt repeated words of increasing complexity w/ 90% speech intelligibility.  Pt w/ decreased speech intelligibility at the conversation level to ~80% to unfamiliar listener.    Pt reports a noticeable change in speech s/p recent CABG sx.    Treatment: skilled SLP services are warranted to address oropharyngeal dysphagia and dysarthria.    Assessment:     Mike Urbina Jr. is a 67 y.o. male with an SLP diagnosis of Dysphagia and Dysarthria. He presents with s/s of aspiration and requires a diet modification a this time to ensure safety w/ PO. Pt also presents w/ dysarthric speech which affects overall speech intelligibility at the conversation level. Skilled SLP services are warranted to address above stated deficits.    Goals:   Multidisciplinary Problems       SLP Goals          Problem: SLP    Goal Priority Disciplines Outcome   SLP Goal     SLP Ongoing, Progressing   Description: LTG: Pt will tolerate LRD w/o overt s/s of aspiration.    STG:  Pt will complete laryngeal  strengthening exercises and aleksandra with minimal cues.  Pt will consume trials of thin liquid x10 w/o overt s/s of aspiration.  Pt will consume trials of soft and bite sized w/ good oral clearance and w/o overt s/s of aspiration.                       Plan:     Patient to be seen:  5 x/week   Plan of Care expires:  05/05/23  Plan of Care reviewed with:  patient   SLP Follow-Up:  Yes       Discharge recommendations:      Barriers to Discharge:  Level of Skilled Assistance Needed see Pt/Ot notes    Time Tracking:     SLP Treatment Date:      Speech Start Time:  1030  Speech Stop Time:  1100     Speech Total Time (min):  30 min    Billable Minutes: Eval Swallow and Oral Function 20, Motor Speech 10    Camila Pearce M.S., CCC-SLP   04/18/2023

## 2023-04-19 PROCEDURE — 63600175 PHARM REV CODE 636 W HCPCS: Performed by: STUDENT IN AN ORGANIZED HEALTH CARE EDUCATION/TRAINING PROGRAM

## 2023-04-19 PROCEDURE — 97535 SELF CARE MNGMENT TRAINING: CPT | Mod: CQ

## 2023-04-19 PROCEDURE — 97110 THERAPEUTIC EXERCISES: CPT

## 2023-04-19 PROCEDURE — 11000004 HC SNF PRIVATE

## 2023-04-19 PROCEDURE — 97116 GAIT TRAINING THERAPY: CPT

## 2023-04-19 PROCEDURE — 97535 SELF CARE MNGMENT TRAINING: CPT

## 2023-04-19 PROCEDURE — 92523 SPEECH SOUND LANG COMPREHEN: CPT

## 2023-04-19 PROCEDURE — 97530 THERAPEUTIC ACTIVITIES: CPT

## 2023-04-19 PROCEDURE — 25000003 PHARM REV CODE 250: Performed by: STUDENT IN AN ORGANIZED HEALTH CARE EDUCATION/TRAINING PROGRAM

## 2023-04-19 RX ORDER — NIFEDIPINE 30 MG/1
30 TABLET, EXTENDED RELEASE ORAL DAILY
Status: DISCONTINUED | OUTPATIENT
Start: 2023-04-19 | End: 2023-04-20

## 2023-04-19 RX ADMIN — DIGOXIN 0.12 MG: 125 TABLET ORAL at 08:04

## 2023-04-19 RX ADMIN — ALPRAZOLAM 0.25 MG: 0.25 TABLET ORAL at 12:04

## 2023-04-19 RX ADMIN — FUROSEMIDE 40 MG: 40 TABLET ORAL at 08:04

## 2023-04-19 RX ADMIN — ALPRAZOLAM 0.25 MG: 0.25 TABLET ORAL at 08:04

## 2023-04-19 RX ADMIN — MUPIROCIN: 20 OINTMENT TOPICAL at 08:04

## 2023-04-19 RX ADMIN — ATORVASTATIN CALCIUM 40 MG: 40 TABLET, FILM COATED ORAL at 08:04

## 2023-04-19 RX ADMIN — METOPROLOL TARTRATE 100 MG: 50 TABLET, FILM COATED ORAL at 08:04

## 2023-04-19 RX ADMIN — TRAZODONE HYDROCHLORIDE 25 MG: 50 TABLET ORAL at 08:04

## 2023-04-19 RX ADMIN — THIAMINE HCL TAB 100 MG 200 MG: 100 TAB at 08:04

## 2023-04-19 RX ADMIN — VALSARTAN 160 MG: 160 TABLET, FILM COATED ORAL at 08:04

## 2023-04-19 RX ADMIN — AMIODARONE HYDROCHLORIDE 200 MG: 200 TABLET ORAL at 08:04

## 2023-04-19 RX ADMIN — HYDROXYZINE HYDROCHLORIDE 25 MG: 25 TABLET ORAL at 08:04

## 2023-04-19 RX ADMIN — NIFEDIPINE 30 MG: 30 TABLET, FILM COATED, EXTENDED RELEASE ORAL at 11:04

## 2023-04-19 RX ADMIN — AMLODIPINE BESYLATE 10 MG: 5 TABLET ORAL at 08:04

## 2023-04-19 RX ADMIN — ALPRAZOLAM 0.25 MG: 0.25 TABLET ORAL at 01:04

## 2023-04-19 RX ADMIN — ASPIRIN 81 MG: 81 TABLET, COATED ORAL at 08:04

## 2023-04-19 RX ADMIN — FERROUS SULFATE TAB 325 MG (65 MG ELEMENTAL FE) 1 EACH: 325 (65 FE) TAB at 08:04

## 2023-04-19 RX ADMIN — ENOXAPARIN SODIUM 40 MG: 40 INJECTION SUBCUTANEOUS at 05:04

## 2023-04-19 NOTE — PATIENT CARE CONFERENCE
Name: Mike Urbina Jr.    : 1955 (67 y.o.)  MRN: [id number]                Interdisciplinary Team Conference     Case conference held with patient/family and care team to discuss progress, plan of care, barriers to be addressed for safe return home, equipment recommendations, and discharge planning. Communicated therapy progress with MD, RN, therapy clinicians and case management. All questions/concerns answered.

## 2023-04-19 NOTE — PLAN OF CARE
Problem: Adult Inpatient Plan of Care  Goal: Plan of Care Review  Outcome: Ongoing, Progressing  Flowsheets (Taken 4/18/2023 2342)  Plan of Care Reviewed With: patient  Goal: Patient-Specific Goal (Individualized)  Outcome: Ongoing, Progressing  Flowsheets (Taken 4/18/2023 2342)  Anxieties, Fears or Concerns: Hospitalization  Individualized Care Needs: Cardiac monitoring, PT/OT/ST, fall prevention  Goal: Absence of Hospital-Acquired Illness or Injury  Outcome: Ongoing, Progressing  Goal: Optimal Comfort and Wellbeing  Outcome: Ongoing, Progressing  Goal: Readiness for Transition of Care  Outcome: Ongoing, Progressing  Intervention: Mutually Develop Transition Plan  Flowsheets (Taken 4/18/2023 2342)  Communicated [place] with patient/caregiver: Date not available/Unable to determine  Do you expect to return to your current living situation?: Yes  Do you have help at home or someone to help you manage your care at home?: No  Readmission within 30 days?: No  Do you currently have service(s) that help you manage your care at home?: No     Problem: Fall Injury Risk  Goal: Absence of Fall and Fall-Related Injury  Outcome: Ongoing, Progressing  Intervention: Identify and Manage Contributors  Flowsheets (Taken 4/18/2023 2342)  Self-Care Promotion:   independence encouraged   BADL personal objects within reach  Medication Review/Management: medications reviewed     Problem: Infection  Goal: Absence of Infection Signs and Symptoms  Description: Goals to be met by: 5/5/2023     Patient will increase functional independence with ADLs by performing:    UE Dressing with Modified [place].  LE Dressing with Modified [place].  Toileting from toilet with Supervision for hygiene and clothing management.   Toilet transfer to toilet with Supervision.    Outcome: Ongoing, Progressing

## 2023-04-19 NOTE — PT/OT/SLP PROGRESS
Physical Therapy Treatment Note           Name: Mike Urbina Jr.    : 1955 (67 y.o.)  MRN: [id number]           TREATMENT SUMMARY AND RECOMMENDATIONS:    PT Received On: 23  PT Start Time: 1100     PT Stop Time: 1359  PT Total Time (min): 179 min     Subjective Assessment:  x No complaints  Lethargic   x Awake, alert, cooperative  Uncooperative    Agitated  c/o pain    Appropriate  c/o fatigue    Confused x Treated at bedside     Emotionally labile  Treated in gym/dept.    Impulsive  Other:    Flat affect       Therapy Precautions:    Cognitive deficits  Spinal precautions    Collar - hard x Sternal precautions    Collar - soft   TLSO    Fall risk  LSO    Hip precautions - posterior  Knee immobilizer    Hip precautions - anterior  WBAT    Impaired communication  Partial weightbearing    Oxygen  TTWB    PEG tube  NWB    Visual deficits  Other:    Hearing deficits          Treatment Objectives:     Mobility Training:   Assist level Comments    Bed mobility MIN A Supine<>sitting EOB, MOD/MAX A to scoot up in supine   Transfer MIN A Toilet transfers   Gait CGA 2 x 150ft; pt took two breaks but remained standing    Sit to stand transitions MIN A WC>RW; pt hugged pillow during transition    Sitting balance     Standing balance      Wheelchair mobility     Car transfer     Other:          Therapeutic Exercise:   Exercise Sets Reps Comments                               Additional Comments:  Pt has sternal precautions for CABG sx. PT assisted pt back to bed before lunch due to poor tolerance in chair. PT got pt back up after lunch to use the bathroom and ambulate outside. Bedding was changed due to urine accidents/difficulty with the urinal. OT to come in after for shower so PT was left in the chair. CNA in room as well.     Assessment: Patient tolerated session.VC/TC for pt to keep RW close while ambulating. VC for pt to perform sternal precautions during transfers     PT Plan: Continue w/  POC  Revisions made to plan of care: No    GOALS:   Multidisciplinary Problems       Physical Therapy Goals          Problem: Physical Therapy    Goal Priority Disciplines Outcome Goal Variances Interventions   Physical Therapy Goal     PT, PT/OT Ongoing, Progressing     Description: Goals to be met by: Dishcarge     Patient will increase functional independence with mobility by performin. Supine to sit with Modified [place] maintaining sternal precautions  2. Sit to stand transfer with Modified [place] maintaining sternal precautions  3. Gait x 325 feet with Modified [place] using Rolling Walker.   4. Ascend/descend 3 stair with left Handrails Modified [place] using No Assistive Device.                          Skilled PT Minutes Provided: x 15 min 3165-3613 AM and x 25 min 2280-9808 PM  Communication with Treatment Team: yes CNA regarding trying to encourage OOB tolerance.      Equipment recommendations:       At end of treatment, patient remained:  x Up in chair     Up in wheelchair in room    In bed   x With alarm activated    Bed rails up   x Call bell in reach     Family/friends present    Restraints secured properly    In bathroom with CNA/RN notified    Nurse aware    In gym with therapist/tech    Other:

## 2023-04-19 NOTE — PLAN OF CARE
Problem: SLP  Goal: SLP Goal  Description: LTG: Pt will tolerate LRD w/o overt s/s of aspiration.    STG:  Pt will complete laryngeal strengthening exercises and aleksandra with minimal cues.  Pt will consume trials of thin liquid x10 w/o overt s/s of aspiration.  Pt will consume trials of soft and bite sized w/ good oral clearance and w/o overt s/s of aspiration.  Pt will utilize memory strategies to recall sternal precautions following a 3 minute filled delay.  Pt will provide solutions to problems/situations w/ 90% acc Jennifer.  Outcome: Ongoing, Progressing

## 2023-04-19 NOTE — PT/OT/SLP PROGRESS
Name: Mike Urbina Jr.    : 1955 (67 y.o.)  MRN: [id number]            Interdisciplinary Team Conference     Case conference held with patient/family and care team to discuss progress, plan of care, barriers to be addressed for safe return home, equipment recommendations, and discharge planning. Communicated therapy progress with MD, RN, therapy clinicians and case management. All questions/concerns answered.

## 2023-04-19 NOTE — PT/OT/SLP PROGRESS
Occupational Therapy  Treatment    Name: Mike Urbina Jr.    : 1955 (67 y.o.)  MRN: [id number]           TREATMENT SUMMARY AND RECOMMENDATIONS:      OT Date of Treatment: 23  OT Start Time: 932  OT Stop Time: 1003  OT Total Time (min): 31 min      Subjective Assessment:   No complaints  Lethargic   X Awake, alert, cooperative  Impulsive    Uncooperative   Flat affect    Agitated  c/o pain    Appropriate  c/o fatigue    Confused  Treated at bedside     Emotionally labile X Treated in gym/dept.      Other:        Therapy Precautions:    Cognitive deficits  Spinal precautions    Collar - hard X Sternal precautions    Collar - soft   TLSO   X Fall risk  LSO    Hip precautions - posterior  Knee immobilizer    Hip precautions - anterior  WBAT    Impaired communication  Partial weightbearing    Oxygen  TTWB    PEG tube  NWB    Visual deficits      Hearing deficits   Other:        Treatment Objectives:     Mobility Training:    Mobility task Assist level Comments    Bed mobility     Transfer     Sit to stands transitions     Functional mobility CGA Pt ambulated ~400ft c RW. Min length standing r/bs x3. V/cs for upright posture and maintain appropriate distance to RW.    Sitting balance     Standing balance      Other:            Therapeutic Exercise:   Exercise Sets Reps Comments   UBE 1 5' Forwards and backwards                         Additional Comments:  Pt's brother present upon returning to room.    Assessment: Patient tolerated session well. Pt improved orientation this session. Pt still unable to recall the day of the week. Pt would continue to benefit from skilled OT services to improve pt's safety and independence with daily occupations.      OT Plan: Continue OT POC.  Revisions made to plan of care: No    GOALS:   Multidisciplinary Problems       Occupational Therapy Goals          Problem: Occupational Therapy    Goal Priority Disciplines Outcome Interventions   Occupational Therapy Goal      OT, PT/OT Ongoing, Progressing    Description: Goals to be met by: 5/5/2023     Patient will increase functional independence with ADLs by performing:    UE Dressing with Modified [place].  LE Dressing with Modified [place].  Toileting from toilet with Supervision for hygiene and clothing management.   Toilet transfer to toilet with Supervision.                         Skilled OT Minutes Provided: 31  Communication with Treatment Team:     Equipment recommendations:       At end of treatment, patient remained:  X Up in chair     Up in wheelchair in room    In bed   X With alarm activated    Bed rails up   X Call bell in reach    X Family/friends present    Restraints secured properly    In bathroom with CNA/RN notified    In gym with PT/PTA/tech    Nurse aware    Other:

## 2023-04-19 NOTE — PT/OT/SLP PROGRESS
Physical Therapy Treatment Note           Name: Mike Urbina Jr.    : 1955 (67 y.o.)  MRN: [id number]           TREATMENT SUMMARY AND RECOMMENDATIONS:    PT Received On: 23  PT Start Time: 905     PT Stop Time: 932  PT Total Time (min): 27 min     Subjective Assessment:  x No complaints  Lethargic   x Awake, alert, cooperative  Uncooperative    Agitated  c/o pain    Appropriate  c/o fatigue    Confused  Treated at bedside     Emotionally labile x Treated in gym/dept.    Impulsive  Other:    Flat affect       Therapy Precautions:    Cognitive deficits  Spinal precautions    Collar - hard x Sternal precautions    Collar - soft   TLSO    Fall risk  LSO    Hip precautions - posterior  Knee immobilizer    Hip precautions - anterior  WBAT    Impaired communication  Partial weightbearing    Oxygen  TTWB    PEG tube  NWB    Visual deficits  Other:    Hearing deficits          Treatment Objectives:     Mobility Training:   Assist level Comments    Bed mobility SBA Supine>sitting EOB   Transfer SBA/CGA RW>chair   Gait CGA X 200 ft; pt took two breaks but remained standing    Sit to stand transitions CGA WC>RW; pt hugged pillow during transition    Sitting balance     Standing balance      Wheelchair mobility     Car transfer     Other:          Therapeutic Exercise:   Exercise Sets Reps Comments   Seated BLE ex 1 10 , LAQs, ankle pumps                          Additional Comments:  Pt has sternal precautions for CABG sx    Assessment: Patient tolerated session.VC/TC for pt to keep RW close while ambulating. Pt lets RW lead him too far. VC for pt to perform sternal precautions during transfers     PT Plan: Continue w/ POC  Revisions made to plan of care: No    GOALS:   Multidisciplinary Problems       Physical Therapy Goals          Problem: Physical Therapy    Goal Priority Disciplines Outcome Goal Variances Interventions   Physical Therapy Goal     PT, PT/OT Ongoing, Progressing      Description: Goals to be met by: Dishcarge     Patient will increase functional independence with mobility by performin. Supine to sit with Modified [place] maintaining sternal precautions  2. Sit to stand transfer with Modified [place] maintaining sternal precautions  3. Gait x 325 feet with Modified [place] using Rolling Walker.   4. Ascend/descend 3 stair with left Handrails Modified [place] using No Assistive Device.                          Skilled PT Minutes Provided: 27   Communication with Treatment Team:     Equipment recommendations:       At end of treatment, patient remained:   Up in chair     Up in wheelchair in room    In bed    With alarm activated    Bed rails up    Call bell in reach     Family/friends present    Restraints secured properly    In bathroom with CNA/RN notified    Nurse aware   x In gym with therapist/tech    Other:

## 2023-04-19 NOTE — PT/OT/SLP EVAL
Speech Language Pathology Evaluation  Cognitive Communication    Patient Name:  Mike Urbina Jr.   MRN:  [id number]  Admitting Diagnosis: <principal problem not specified>    Recommendations:     Recommendations:                General Recommendations:  Dysphagia therapy and Cognitive-linguistic therapy  Diet recommendations:  Minced & Moist Diet - IDDSI Level 5, Mildly thick/Nectar thick liquids - IDDSI Level 2   Aspiration Precautions: HOB to 90 degrees, Meds crushed in puree, and Standard aspiration precautions   General Precautions: Standard, fall  Communication strategies:  go to room if call light pushed    History:     Past Medical History:   Diagnosis Date    Atrial flutter     CHF (congestive heart failure)     Coronary artery disease     Hypertension     SOB (shortness of breath)     at times       Past Surgical History:   Procedure Laterality Date    CATARACT EXTRACTION Bilateral     CORONARY ARTERY BYPASS GRAFT (CABG) N/A 4/3/2023    Procedure: CORONARY ARTERY BYPASS GRAFT (CABG);  Surgeon: Deuce Finley IV, MD;  Location: [facility];  Service: Cardiovascular;  Laterality: N/A;  WITH LLAA //  ECHO NOTIFIED    LEFT HEART CATHETERIZATION N/A 03/15/2023    Procedure: CATHETERIZATION, HEART, LEFT;  Surgeon: Sreedhar Herrera MD;  Location: [facility] CATH LAB;  Service: Cardiology;  Laterality: N/A;  LHC via RRA       Social History: Patient lives with friend.      Prior diet: minced and moist/mildly thick liquids.    Occupation/hobbies/homemaking: pt and pt's friends report pt was independent prior to recent hospitalization.      Subjective     Pt in [facility] w/ brother, Robi, present. Pt pleasant and in good spirits.  Pt had 2 friends/neighbors arrive during eval.    Patient goals:      Pain/Comfort:       Respiratory Status: Room air    Objective:   Cognitive Status:    Orientation Oriented x4  Memory Immediate Recall WFL, Delayedpt recalled 1/3 words following a 3 minute filled delay, increasing ot 2/3  given semantic cue, and long term recall 100% acc  Problem Solving Sequencing impaired, Solutions 75% acc, and Compare/contrast 100% acc  Safety awareness impaired       Receptive Language:   Comprehension:      Questions Complex yes/no 100% acc  Commands  two step basic commands 100% acc      Expressive Language:  Verbal:    WH Qs: 100% acc      Motor Speech:  Intelligibility 90% to unfamiliar listener. Pt exhibits caj[place] accent which contributes to decreased speech intelligibility.    Brief Interview for Mental Status (BIMS) administered to which pt scored 9/15.    Treatment: skilled SLP services are warranted at this time to address cognitive-linguistic deficits, as well as dysphagia.    Assessment:   Mike Urbina Jr. is a 67 y.o. male with an SLP diagnosis of Dysphagia and Cognitive-Linguistic Impairment.  He presents with impaired delayed recall, safety awareness and problem solving. Pt also present w/ dysphagia which warrants a diet modification at this time to ensure safety w/ PO.    Goals:   Multidisciplinary Problems       SLP Goals          Problem: SLP    Goal Priority Disciplines Outcome   SLP Goal     SLP Ongoing, Progressing   Description: LTG: Pt will tolerate LRD w/o overt s/s of aspiration.    STG:  Pt will complete laryngeal strengthening exercises and aleksandra with minimal cues.  Pt will consume trials of thin liquid x10 w/o overt s/s of aspiration.  Pt will consume trials of soft and bite sized w/ good oral clearance and w/o overt s/s of aspiration.  Pt will utilize memory strategies to recall sternal precautions following a 3 minute filled delay.  Pt will provide solutions to problems/situations w/ 90% acc Jennifer.                       Plan:   Patient to be seen:  5 x/week   Plan of Care expires:  04/28/23  Plan of Care reviewed with:  patient, friend, other (see comments) (brother)   SLP Follow-Up:  Yes       Discharge recommendations:      Barriers to Discharge:  Level of Skilled Assistance  Needed see Pt/Ot notes and Safety Awareness impaired    Time Tracking:   SLP Treatment Date:      Speech Start Time:  1005  Speech Stop Time:  1030     Speech Total Time (min):  25 min    Billable Minutes: Eval cog 25     Camila Pearce M.S., CCC-SLP   04/19/2023

## 2023-04-19 NOTE — PT/OT/SLP PROGRESS
Name: Mike Urbina Jr.    : 1955 (67 y.o.)  MRN: [id number]            Interdisciplinary Team Conference     Case conference held with patient/family and care team to discuss progress, plan of care, barriers to be addressed for safe return home, equipment recommendations, and discharge planning. Communicated therapy progress with MD, RN, therapy clinicians and case management. All questions/concerns answered.

## 2023-04-19 NOTE — PROGRESS NOTES
"Inpatient Nutrition Evaluation    Admit Date: 4/17/2023   Total duration of encounter: 2 days    Nutrition Recommendation/Prescription     Continue heart healthy no straws minced and moist mildly thick liquids diet.     Nutrition Assessment     Chart Review    Reason Seen: continuous nutrition monitoring, length of stay, and follow-up    Malnutrition Screening Tool Results   Have you recently lost weight without trying?: Yes: 14-23 lbs  Have you been eating poorly because of a decreased appetite?: No   MST Score: 2     Diagnosis:  Hypertensive urgency 12/29/2022      Acute on chronic congestive heart failure 12/29/2022      Hyponatremia 12/29/2022      ETOH abuse 12/29/2022      Atrial flutter 12/29/2022      Thrombocytopenia 12/29/2022      Electrolyte abnormality 12/30/2022      Coronary artery disease        Relevant Medical History: Atrial flutter  Date Unknown  CHF (congestive heart failure)  Date Unknown  Coronary artery disease  Date Unknown  Hypertension  Date Unknown  SOB (shortness of breath)     Nutrition-Related Medications: atorvastatin, digoxin, ferrous sulfate, furosemide, thiamine    Nutrition-Related Labs:      Diet Order: Diet heart healthy No Straws, Dysphagia Minced & Moist; Mildly Thick Liquids (IDDSI Level 2)  Oral Supplement Order: none  Appetite/Oral Intake: fair/50-75% of meals  Factors Affecting Nutritional Intake: chewing difficulty and difficulty/impaired swallowing  Food/Tenriism/Cultural Preferences: coffee  Food Allergies: none reported    Skin Integrity: incision (sternal and left leg)  Wound(s):       Comments    Pt intake 50-75% of meals. Pt with therapy. Will try again later. Will monitor.     Anthropometrics    Height: 6' 2" (188 cm)    Last Weight: 76 kg (167 lb 8.8 oz) (04/17/23 1500) Weight Method: Bed Scale  BMI (Calculated): 21.5  BMI Classification: normal (BMI 18.5-24.9)        Ideal Body Weight (IBW), Male: 190 lb     % Ideal Body Weight, Male (lb): 88.18 %               "            Usual Weight Provided By: unable to obtain usual weight    Wt Readings from Last 5 Encounters:   04/17/23 76 kg (167 lb 8.8 oz)   04/15/23 77.8 kg (171 lb 8.3 oz)   03/28/23 78 kg (172 lb)   03/15/23 78.6 kg (173 lb 4.5 oz)   01/10/23 76.2 kg (168 lb 1.6 oz)     Weight Change(s) Since Admission:  Admit Weight: 76 kg (167 lb 8.8 oz) (04/17/23 1500)      Patient Education    Not applicable.    Monitoring & Evaluation     Dietitian will monitor food and beverage intake, weight, weight change, electrolyte/renal panel, glucose/endocrine profile, and gastrointestinal profile.  Nutrition Risk/Follow-Up: low (follow-up in 5-7 days)  Patients assigned 'low nutrition risk' status do not qualify for a full nutritional assessment but will be monitored and re-evaluated in a 5-7 day time period. Please consult if re-evaluation needed sooner.

## 2023-04-19 NOTE — PLAN OF CARE
Problem: Adult Inpatient Plan of Care  Goal: Plan of Care Review  Outcome: Ongoing, Progressing  Flowsheets (Taken 4/19/2023 1103)  Plan of Care Reviewed With: patient  Goal: Patient-Specific Goal (Individualized)  Outcome: Ongoing, Progressing  Flowsheets (Taken 4/19/2023 1103)  Anxieties, Fears or Concerns: Hospitalization  Individualized Care Needs: Cardiac monitoring, PT/OT/ST, Fall prevention  Goal: Absence of Hospital-Acquired Illness or Injury  Outcome: Ongoing, Progressing  Intervention: Identify and Manage Fall Risk  Flowsheets (Taken 4/19/2023 1103)  Safety Promotion/Fall Prevention:   assistive device/personal item within reach   bed alarm set   chair alarm set   gait belt with ambulation   in recliner, wheels locked   nonskid shoes/socks when out of bed   instructed to call staff for mobility  Intervention: Prevent Skin Injury  Flowsheets (Taken 4/19/2023 1103)  Body Position: position maintained  Skin Protection:   tubing/devices free from skin contact   adhesive use limited  Intervention: Prevent and Manage VTE (Venous Thromboembolism) Risk  Flowsheets (Taken 4/19/2023 1103)  Activity Management: Rolling - L1  VTE Prevention/Management:   bleeding risk assessed   bleeding precations maintained  Range of Motion: active ROM (range of motion) encouraged  Intervention: Prevent Infection  Flowsheets (Taken 4/19/2023 1103)  Infection Prevention:   hand hygiene promoted   rest/sleep promoted   single patient room provided  Goal: Optimal Comfort and Wellbeing  Outcome: Ongoing, Progressing  Intervention: Monitor Pain and Promote Comfort  Flowsheets (Taken 4/19/2023 1103)  Pain Management Interventions:   quiet environment facilitated   position adjusted  Intervention: Provide Person-Centered Care  Flowsheets (Taken 4/19/2023 1103)  Trust Relationship/Rapport:   care explained   choices provided   emotional support provided   empathic listening provided   questions answered   questions encouraged    reassurance provided   thoughts/feelings acknowledged  Goal: Readiness for Transition of Care  Outcome: Ongoing, Progressing     Problem: Fall Injury Risk  Goal: Absence of Fall and Fall-Related Injury  Outcome: Ongoing, Progressing     Problem: Infection  Goal: Absence of Infection Signs and Symptoms  Description: Goals to be met by: 5/5/2023     Patient will increase functional independence with ADLs by performing:    UE Dressing with Modified [place].  LE Dressing with Modified [place].  Toileting from toilet with Supervision for hygiene and clothing management.   Toilet transfer to toilet with Supervision.    Outcome: Ongoing, Progressing

## 2023-04-19 NOTE — PROGRESS NOTES
HOSPITAL MEDICINE  PROGRESS NOTE        CHIEF COMPLAINT   Hospital follow up    HOSPITAL COURSE   67-year-old male with a past medical history of ETOH abuse, CAD status post CABG 04/03/2023 with Dr. Finley, thrombocytopenia, aortic stenosis, hypertension, atrial flutter/fibrillation who presented to our facility on 4/17 for rehabilitation after CABG.  At baseline lives with a roommate however sister reports discharge may be to her house or his brother's house, depending on level of assistance needed.      Today  Multi-disciplinary meeting was held with the patient and his sister via telephone.  Patient's cognition is concerning at this point in time.  Maybe requiring one-to-one feed and is really only oriented to self.  Physical therapy reports he is ambulating 100 ft Min assist with a rolling walker.  However has poor safety awareness of sternal precautions.  He is Min assistance for ADLs.  OBJECTIVE/PHYSICAL EXAM     VITAL SIGNS (MOST RECENT):  Temp: 97.5 °F (36.4 °C) (04/19/23 1100)  Pulse: 69 (04/19/23 1100)  Resp: 16 (04/19/23 0701)  BP: 111/77 (04/19/23 1100)  SpO2: 95 % (04/19/23 1100) VITAL SIGNS (24 HOUR RANGE):  Temp:  [96.4 °F (35.8 °C)-98.4 °F (36.9 °C)] 97.5 °F (36.4 °C)  Pulse:  [] 69  Resp:  [16-18] 16  SpO2:  [95 %-99 %] 95 %  BP: (111-157)/() 111/77   GENERAL: In no acute distress, poor personal hygiene  HEENT:  PERRLA  CHEST: Clear to auscultation bilaterally  HEART: S1, S2, no appreciable murmur  ABDOMEN: Soft, nontender, BS +  MSK: Warm, no lower extremity edema, no clubbing or cyanosis  NEUROLOGIC: Alert and oriented to person, moving all extremities with good strength, dysarthric, difficult to understand speech pattern  INTEGUMENTARY:  Warm, dry  PSYCHIATRY:  Flat affect    ASSESSMENT/PLAN   Multivessel CAD  - s/p CABG (4.3.23) - LIMA to LAD, rSVG to OM1, rSVG to PDA with Dr. Finley  -aspirin, atorvastatin, furosemide, metoprolol  -PT/OT     PAF/Flutter with RVR s/p  DCCV  -CHADsVASc - 3   -2 more days of amiodarone taper, then continue with 200 daily starting on the 20th   -digoxin started 4/18 patient remains with intermittent AF/flutter  - no AC s/p (4.3.23) - Ligation of SILVANO      Severe AS  -TAVR Evaluation on Outpatient Basis     HTN  -valsartan, nifedipine added for improved control on 04/19/2023     Toxic/Metabolic Encephalopathy   -CT head on 04/06 showed chronic microvascular ischemic changes  -patient is alert and oriented to person and place however reports present at [place] is our current president  -continue with thiamine  -B12 unremarkable, pending folate     Thrombocytopenia   Anemia  -Chronic due to myelosuppression from ETOH abuse   -required postop blood transfusion  -recovered at this time     ETOH Abuse  -reports two 6 packs daily  -p.r.n. t.i.d. Xanax        DVT prophylaxis:  Lovenox  Anticipated discharge and disposition:  Home either with his roommate or a sibling __________________________________________________________________________    LABS/MICRO/MEDS/DIAGNOSTICS       LABS  Recent Labs     04/18/23  1127      K 3.6   CHLORIDE 103   CO2 26   BUN 15.3   CREATININE 1.04   GLUCOSE 105   CALCIUM 8.7*     Recent Labs     04/18/23  1127   WBC 7.0   RBC 4.17*   HCT 38.0*   MCV 91.1          MICROBIOLOGY  Microbiology Results (last 7 days)       ** No results found for the last 168 hours. **               MEDICATIONS   amiodarone  200 mg Oral BID    [START ON 4/20/2023] amiodarone  200 mg Oral Daily    aspirin  81 mg Oral Daily    atorvastatin  40 mg Oral Daily    digoxin  0.125 mg Oral Daily    enoxaparin  40 mg Subcutaneous Daily    ferrous sulfate  1 tablet Oral Daily    furosemide  40 mg Oral Daily    hydrOXYzine HCL  25 mg Oral QHS    metoprolol tartrate  100 mg Oral BID    mupirocin   Nasal BID    NIFEdipine  30 mg Oral Daily    thiamine  200 mg Oral Daily    traZODone  25 mg Oral QHS    valsartan  160 mg Oral BID         INFUSIONS          DIAGNOSTIC TESTS  No orders to display        EF   Date Value Ref Range Status   12/29/2022 55 % Final              Case related differential diagnoses have been reviewed; assessment and plan has been documented. I have personally reviewed the labs and test results that are currently available; I have reviewed the patients medication list. I have reviewed the consulting providers recommendations. I have reviewed or attempted to review medical records based upon their availability.  All of the patient's and/or family's questions have been addressed and answered to the best of my ability.  I will continue to monitor closely and make adjustments to medical management as needed.  This document was created using [employer] Fluency Direct.  Transcription errors may have been made.  Please contact me if any questions may rise regarding documentation to clarify transcription.        Thairy G Reyes,    04/19/2023   Internal Medicine

## 2023-04-19 NOTE — PT/OT/SLP PROGRESS
Occupational Therapy  Treatment    Name: Mike Urbina Jr.    : 1955 (67 y.o.)  MRN: [id number]           TREATMENT SUMMARY AND RECOMMENDATIONS:      OT Date of Treatment: 23  OT Start Time: 1430  OT Stop Time: 1500  OT Total Time (min): 30 min      Subjective Assessment:   No complaints  Lethargic   X Awake, alert, cooperative  Impulsive    Uncooperative   Flat affect    Agitated  c/o pain    Appropriate  c/o fatigue    Confused X Treated at bedside     Emotionally labile  Treated in gym/dept.      Other:        Therapy Precautions:    Cognitive deficits  Spinal precautions    Collar - hard X Sternal precautions    Collar - soft   TLSO   X Fall risk  LSO    Hip precautions - posterior  Knee immobilizer    Hip precautions - anterior  WBAT    Impaired communication  Partial weightbearing    Oxygen  TTWB    PEG tube  NWB    Visual deficits      Hearing deficits   Other:        Treatment Objectives:    ADL Training:    ADL Assist level Comments    Feeding     Grooming/hygiene     Bathing Min A 2/2 deficits in balance, initiation, safety, and cognition. Pt required assistance for reaching posteriorly and reaching BLE.    Upper body dressing     Lower body dressing Max A To thread BLE 2/2 balance   Toileting     Toilet transfer     Adaptive equipment training     Other:Shower t/f Min A Pt ambulated short distances recliner<>shower c RW.          Assessment: Patient tolerated session well. Pt continues to demonstrate deficits in balance, initiation, cognition, and safety awareness impacting occupational performance. Pt would continue to benefit from skilled OT services to improve pt's safety and independence with daily occupations.      OT Plan: Continue OT POC.   Revisions made to plan of care: No    GOALS:   Multidisciplinary Problems       Occupational Therapy Goals          Problem: Occupational Therapy    Goal Priority Disciplines Outcome Interventions   Occupational Therapy Goal     OT, PT/OT  Ongoing, Progressing    Description: Goals to be met by: 5/5/2023     Patient will increase functional independence with ADLs by performing:    UE Dressing with Modified [place].  LE Dressing with Modified [place].  Toileting from toilet with Supervision for hygiene and clothing management.   Toilet transfer to toilet with Supervision.                         Skilled OT Minutes Provided: 30  Communication with Treatment Team:     Equipment recommendations:       At end of treatment, patient remained:  X Up in chair     Up in wheelchair in room    In bed   X With alarm activated    Bed rails up   X Call bell in reach     Family/friends present    Restraints secured properly    In bathroom with CNA/RN notified    In gym with PT/PTA/tech    Nurse aware    Other:

## 2023-04-20 LAB — POCT GLUCOSE: 105 MG/DL (ref 70–110)

## 2023-04-20 PROCEDURE — 11000004 HC SNF PRIVATE

## 2023-04-20 PROCEDURE — 97530 THERAPEUTIC ACTIVITIES: CPT

## 2023-04-20 PROCEDURE — 97116 GAIT TRAINING THERAPY: CPT

## 2023-04-20 PROCEDURE — 97110 THERAPEUTIC EXERCISES: CPT

## 2023-04-20 PROCEDURE — 97535 SELF CARE MNGMENT TRAINING: CPT

## 2023-04-20 PROCEDURE — 25000003 PHARM REV CODE 250: Performed by: STUDENT IN AN ORGANIZED HEALTH CARE EDUCATION/TRAINING PROGRAM

## 2023-04-20 PROCEDURE — 94760 N-INVAS EAR/PLS OXIMETRY 1: CPT

## 2023-04-20 PROCEDURE — 63600175 PHARM REV CODE 636 W HCPCS: Performed by: STUDENT IN AN ORGANIZED HEALTH CARE EDUCATION/TRAINING PROGRAM

## 2023-04-20 PROCEDURE — 92507 TX SP LANG VOICE COMM INDIV: CPT

## 2023-04-20 RX ORDER — NIFEDIPINE 30 MG/1
60 TABLET, EXTENDED RELEASE ORAL DAILY
Status: DISCONTINUED | OUTPATIENT
Start: 2023-04-21 | End: 2023-05-05 | Stop reason: HOSPADM

## 2023-04-20 RX ADMIN — ATORVASTATIN CALCIUM 40 MG: 40 TABLET, FILM COATED ORAL at 09:04

## 2023-04-20 RX ADMIN — FERROUS SULFATE TAB 325 MG (65 MG ELEMENTAL FE) 1 EACH: 325 (65 FE) TAB at 08:04

## 2023-04-20 RX ADMIN — BISACODYL 10 MG: 10 SUPPOSITORY RECTAL at 05:04

## 2023-04-20 RX ADMIN — MUPIROCIN: 20 OINTMENT TOPICAL at 08:04

## 2023-04-20 RX ADMIN — TRAZODONE HYDROCHLORIDE 25 MG: 50 TABLET ORAL at 08:04

## 2023-04-20 RX ADMIN — ENOXAPARIN SODIUM 40 MG: 40 INJECTION SUBCUTANEOUS at 04:04

## 2023-04-20 RX ADMIN — AMIODARONE HYDROCHLORIDE 200 MG: 200 TABLET ORAL at 08:04

## 2023-04-20 RX ADMIN — HYDROXYZINE HYDROCHLORIDE 25 MG: 25 TABLET ORAL at 08:04

## 2023-04-20 RX ADMIN — THIAMINE HCL TAB 100 MG 200 MG: 100 TAB at 08:04

## 2023-04-20 RX ADMIN — NIFEDIPINE 30 MG: 30 TABLET, FILM COATED, EXTENDED RELEASE ORAL at 08:04

## 2023-04-20 RX ADMIN — MUPIROCIN: 20 OINTMENT TOPICAL at 09:04

## 2023-04-20 RX ADMIN — VALSARTAN 160 MG: 160 TABLET, FILM COATED ORAL at 08:04

## 2023-04-20 RX ADMIN — FUROSEMIDE 40 MG: 40 TABLET ORAL at 09:04

## 2023-04-20 RX ADMIN — METOPROLOL TARTRATE 100 MG: 50 TABLET, FILM COATED ORAL at 08:04

## 2023-04-20 RX ADMIN — ALPRAZOLAM 0.25 MG: 0.25 TABLET ORAL at 08:04

## 2023-04-20 RX ADMIN — DIGOXIN 0.12 MG: 125 TABLET ORAL at 08:04

## 2023-04-20 RX ADMIN — ASPIRIN 81 MG: 81 TABLET, COATED ORAL at 08:04

## 2023-04-20 NOTE — PROGRESS NOTES
HOSPITAL MEDICINE  PROGRESS NOTE        CHIEF COMPLAINT   Hospital follow up    HOSPITAL COURSE   67-year-old male with a past medical history of ETOH abuse, CAD status post CABG 04/03/2023 with Dr. Finley, thrombocytopenia, aortic stenosis, hypertension, atrial flutter/fibrillation who presented to our facility on 4/17 for rehabilitation after CABG.  At baseline lives with a roommate however sister reports discharge may be to her house or his brother's house, depending on level of assistance needed.  Patient's cognition was concerning on 04/19/2023 and an MRI was obtained that showed no evidence of acute intracranial findings.  He does have chronic periventricular white matter changes and global intracranial atrophy.  Blood pressure remains poorly controlled, nifedipine was added and subsequently increased.    Today  Ambulating 150 ft x 2 with contact guard assistance.  He is Min assistance for transfers and max assistance for ADLs.  OBJECTIVE/PHYSICAL EXAM     VITAL SIGNS (MOST RECENT):  Temp: 97.6 °F (36.4 °C) (04/20/23 1134)  Pulse: 106 (04/20/23 0745)  Resp: 19 (04/20/23 1134)  BP: (!) 144/82 (04/20/23 1134)  SpO2: 96 % (04/20/23 1134) VITAL SIGNS (24 HOUR RANGE):  Temp:  [97.5 °F (36.4 °C)-98.4 °F (36.9 °C)] 97.6 °F (36.4 °C)  Pulse:  [] 106  Resp:  [18-20] 19  SpO2:  [93 %-96 %] 96 %  BP: (109-152)/(68-91) 144/82   GENERAL: In no acute distress, poor personal hygiene  HEENT:  PERRLA  CHEST: Clear to auscultation bilaterally  HEART: S1, S2, no appreciable murmur  ABDOMEN: Soft, nontender, BS +  MSK: Warm, no lower extremity edema, no clubbing or cyanosis  NEUROLOGIC: Alert and oriented to person, moving all extremities with good strength, dysarthric, difficult to understand speech pattern  INTEGUMENTARY:  Warm, dry, well healing sternotomy scar  PSYCHIATRY:  Flat affect    ASSESSMENT/PLAN   Multivessel CAD  - s/p CABG (4.3.23) - LIMA to LAD, rSVG to OM1, rSVG to PDA with Dr. Finley  -aspirin,  atorvastatin, furosemide, metoprolol  -PT/OT     PAF/Flutter with RVR s/p DCCV  -CHADsVASc - 3   -2 more days of amiodarone taper, then continue with 200 daily starting on the 20th   -digoxin started 4/18 patient remains with intermittent AF/flutter  - no AC s/p (4.3.23) - Ligation of SILVANO      Severe AS  -TAVR Evaluation on Outpatient Basis     HTN  -valsartan, nifedipine added for improved control on 04/19/2023     Toxic/Metabolic Encephalopathy   -CT head on 04/06 showed chronic microvascular ischemic changes  -MRI from 04/20/2023 showed no acute abnormal signal changes, chronic microvascular disease  -continue with thiamine  -B12 and folate unremarkable     Thrombocytopenia   Anemia  -Chronic due to myelosuppression from ETOH abuse   -required postop blood transfusion  -recovered at this time     ETOH Abuse  -reports two 6 packs daily  -p.r.n. t.i.d. Xanax        DVT prophylaxis:  Lovenox  Anticipated discharge and disposition:  Home either with his roommate or a sibling __________________________________________________________________________    LABS/MICRO/MEDS/DIAGNOSTICS       LABS  Recent Labs     04/18/23  1127      K 3.6   CHLORIDE 103   CO2 26   BUN 15.3   CREATININE 1.04   GLUCOSE 105   CALCIUM 8.7*     Recent Labs     04/18/23  1127   WBC 7.0   RBC 4.17*   HCT 38.0*   MCV 91.1          MICROBIOLOGY  Microbiology Results (last 7 days)       ** No results found for the last 168 hours. **               MEDICATIONS   amiodarone  200 mg Oral Daily    aspirin  81 mg Oral Daily    atorvastatin  40 mg Oral Daily    digoxin  0.125 mg Oral Daily    enoxaparin  40 mg Subcutaneous Daily    ferrous sulfate  1 tablet Oral Daily    furosemide  40 mg Oral Daily    hydrOXYzine HCL  25 mg Oral QHS    metoprolol tartrate  100 mg Oral BID    mupirocin   Nasal BID    [START ON 4/21/2023] NIFEdipine  60 mg Oral Daily    thiamine  200 mg Oral Daily    traZODone  25 mg Oral QHS    valsartan  160 mg Oral BID          INFUSIONS         DIAGNOSTIC TESTS  MRI Brain Limited Without Contrast   Final Result           EF   Date Value Ref Range Status   12/29/2022 55 % Final              Case related differential diagnoses have been reviewed; assessment and plan has been documented. I have personally reviewed the labs and test results that are currently available; I have reviewed the patients medication list. I have reviewed the consulting providers recommendations. I have reviewed or attempted to review medical records based upon their availability.  All of the patient's and/or family's questions have been addressed and answered to the best of my ability.  I will continue to monitor closely and make adjustments to medical management as needed.  This document was created using [email or web address] Fluency Direct.  Transcription errors may have been made.  Please contact me if any questions may rise regarding documentation to clarify transcription.        Thairy G Reyes, DO   04/20/2023   Internal Medicine

## 2023-04-20 NOTE — PLAN OF CARE
Problem: Adult Inpatient Plan of Care  Goal: Plan of Care Review  4/20/2023 1419 by Nisa Camejo RN  Outcome: Ongoing, Progressing  Flowsheets (Taken 4/20/2023 1419)  Plan of Care Reviewed With: patient  4/20/2023 1418 by Nisa Camejo RN  Outcome: Ongoing, Progressing  Goal: Patient-Specific Goal (Individualized)  4/20/2023 1419 by Nisa Camejo RN  Outcome: Ongoing, Progressing  Flowsheets (Taken 4/20/2023 1419)  Anxieties, Fears or Concerns: Hospitalization  Individualized Care Needs: Cardiac monitoring, PT/OT/ST, Fall prevention  4/20/2023 1418 by Nisa Camejo RN  Outcome: Ongoing, Progressing  Goal: Absence of Hospital-Acquired Illness or Injury  4/20/2023 1419 by Nisa Camejo RN  Outcome: Ongoing, Progressing  4/20/2023 1418 by Nisa Camejo RN  Outcome: Ongoing, Progressing  Intervention: Identify and Manage Fall Risk  Flowsheets (Taken 4/20/2023 1419)  Safety Promotion/Fall Prevention:   assistive device/personal item within reach   bed alarm set   chair alarm set   in recliner, wheels locked   medications reviewed   nonskid shoes/socks when out of bed   room near unit station   /camera at bedside   instructed to call staff for mobility  Intervention: Prevent Skin Injury  Flowsheets (Taken 4/20/2023 1419)  Body Position:   position changed independently   position maintained  Skin Protection:   tubing/devices free from skin contact   adhesive use limited   incontinence pads utilized  Goal: Optimal Comfort and Wellbeing  Outcome: Ongoing, Progressing  Goal: Readiness for Transition of Care  Outcome: Ongoing, Progressing     Problem: Fall Injury Risk  Goal: Absence of Fall and Fall-Related Injury  Outcome: Ongoing, Progressing     Problem: Infection  Goal: Absence of Infection Signs and Symptoms  Description: Goals to be met by: 5/5/2023     Patient will increase functional independence with ADLs by performing:    UE Dressing with Modified [place].  LE Dressing with Modified  [place].  Toileting from toilet with Supervision for hygiene and clothing management.   Toilet transfer to toilet with Supervision.    Outcome: Ongoing, Progressing     Problem: Activity Intolerance (Cardiovascular Surgery)  Goal: Improved Activity Tolerance  Outcome: Ongoing, Progressing  Intervention: Optimize Tolerance for Activity  Flowsheets (Taken 4/20/2023 1419)  Self-Care Promotion:   independence encouraged   BADL personal objects within reach  Environmental Support:   rest periods encouraged   distractions minimized   comfort object encouraged   calm environment promoted

## 2023-04-20 NOTE — PROGRESS NOTES
"Inpatient Nutrition Evaluation    Admit Date: 4/17/2023   Total duration of encounter: 3 days    Nutrition Recommendation/Prescription     Continue  heart healthy no straws mince and moist mildly thick liquids diet  Nutrition Assessment     Chart Review    Reason Seen: continuous nutrition monitoring, length of stay, and follow-up    Malnutrition Screening Tool Results   Have you recently lost weight without trying?: Yes: 14-23 lbs  Have you been eating poorly because of a decreased appetite?: No   MST Score: 2     Diagnosis:     Noted   Hypertensive urgency 12/29/2022      Acute on chronic congestive heart failure 12/29/2022      Hyponatremia 12/29/2022      ETOH abuse 12/29/2022      Atrial flutter 12/29/2022      Thrombocytopenia 12/29/2022      Electrolyte abnormality 12/30/2022      Coronary artery disease        Relevant Medical History: Atrial flutter  Date Unknown  CHF (congestive heart failure)  Date Unknown  Coronary artery disease  Date Unknown  Hypertension  Date Unknown  SOB (shortness of breath)     Nutrition-Related Medications: atorvastatin, digoxin, ferrous sulfate, furosemide,     Nutrition-Related Labs:      Diet Order: Diet heart healthy No Straws, Dysphagia Minced & Moist; Mildly Thick Liquids (IDDSI Level 2)  Oral Supplement Order: none  Appetite/Oral Intake: good/% of meals  Factors Affecting Nutritional Intake: chewing difficulty and difficulty/impaired swallowing  Food/[place]/Cultural Preferences: none reported  Food Allergies: none reported    Skin Integrity: incision (sternal and left leg)  Wound(s):       Comments    Made rounds. Pt reports intake is good. Pt states not particular with foods. Pt did requested bread and crackers. Discussed with patient on modified diet. Pt stated that is blank blank (pt cursed).Will monitor.     Anthropometrics    Height: 6' 2" (188 cm)    Last Weight: 76 kg (167 lb 8.8 oz) (04/17/23 1500) Weight Method: Bed Scale  BMI (Calculated): 21.5  BMI " Classification: normal (BMI 18.5-24.9)        Ideal Body Weight (IBW), Male: 190 lb     % Ideal Body Weight, Male (lb): 88.18 %                          Usual Weight Provided By: not applicable    Wt Readings from Last 5 Encounters:   04/17/23 76 kg (167 lb 8.8 oz)   04/15/23 77.8 kg (171 lb 8.3 oz)   03/28/23 78 kg (172 lb)   03/15/23 78.6 kg (173 lb 4.5 oz)   01/10/23 76.2 kg (168 lb 1.6 oz)     Weight Change(s) Since Admission:  Admit Weight: 76 kg (167 lb 8.8 oz) (04/17/23 1500)      Patient Education    Not applicable.    Monitoring & Evaluation     Dietitian will monitor food and beverage intake, weight, weight change, electrolyte/renal panel, glucose/endocrine profile, and gastrointestinal profile.  Nutrition Risk/Follow-Up: low (follow-up in 5-7 days)  Patients assigned 'low nutrition risk' status do not qualify for a full nutritional assessment but will be monitored and re-evaluated in a 5-7 day time period. Please consult if re-evaluation needed sooner.

## 2023-04-20 NOTE — PT/OT/SLP PROGRESS
Speech Language Pathology Treatment    Patient Name:  Mike Urbina Jr.   MRN:  [id number]  Admitting Diagnosis: <principal problem not specified>    Recommendations:                 General Recommendations:  Dysphagia therapy and Cognitive-linguistic therapy  Diet recommendations:  Minced & Moist Diet - IDDSI Level 5, Liquid Diet Level: Mildly thick/Nectar thick liquids - IDDSI Level 2   Aspiration Precautions: HOB to 90 degrees, Small bites/sips, and Standard aspiration precautions   General Precautions: Standard, fall  Communication strategies:  go to room if call light pushed    Subjective     Pt in gerichair upon SLP arrival. Pt attempting to answer phone via TV remote upon SLP arrival.    Patient goals:      Pain/Comfort:       Respiratory Status: Room air    Objective:     Has the patient been evaluated by SLP for swallowing?      Keep patient NPO?     Current Respiratory Status:        SLP reviewed sternal precautions and provided pt w/ visual aid. Then, following a 2 minute filled delay, pt recalled 3 sternal precautions maxA.  Pt oriented to place I'ly.  SLP provided household situations and pt provided solutions w/ 100% acc I'ly.  Pt able to demonstrate correct usage of call light upon completion of session to inform nursing of assistance to use the bathroom given SBA.    Overall good session.  Cont SLP POC.    Assessment:     Mike Urbina Jr. is a 67 y.o. male with an SLP diagnosis of Dysphagia and Cognitive-Linguistic Impairment.  He presents with need for a modified diet at this time to ensure safety w/ PO. Pt also w/ decreased safety awareness and delayed recall. Skilled SLP services are warranted at this time to address above stated deficits.    Goals:   Multidisciplinary Problems       SLP Goals          Problem: SLP    Goal Priority Disciplines Outcome   SLP Goal     SLP Ongoing, Progressing   Description: LTG: Pt will tolerate LRD w/o overt s/s of aspiration.    STG:  Pt will complete  laryngeal strengthening exercises and aleksandra with minimal cues.  Pt will consume trials of thin liquid x10 w/o overt s/s of aspiration.  Pt will consume trials of soft and bite sized w/ good oral clearance and w/o overt s/s of aspiration.  Pt will utilize memory strategies to recall sternal precautions following a 3 minute filled delay.  Pt will provide solutions to problems/situations w/ 90% acc Jennifer.                       Plan:     Patient to be seen:  5 x/week   Plan of Care expires:  04/28/23  Plan of Care reviewed with:  patient, friend, other (see comments) (brother)   SLP Follow-Up:  Yes       Discharge recommendations:      Barriers to Discharge:  Level of Skilled Assistance Needed see Pt/Ot notes and Safety Awareness impaired    Time Tracking:     SLP Treatment Date:  4/20/23  Speech Start Time:  10:10  Speech Stop Time:   10:40    Speech Total Time (min):  30 min    Billable Minutes: Speech Therapy Individual 30 cognition    Camila Pearce M.S., CCC-SLP   04/20/2023

## 2023-04-20 NOTE — PT/OT/SLP PROGRESS
"Name: Mike Urbina .    : 1955 (67 y.o.)  MRN: [id number]           Patient Unavailable      Patient unable to be seen at this time secondary to: Pt refused tx this session stating he is "too tired" despite OT's continued encouragement and education on benefits. OT plans to f/u schedule permitting to continue OT POC.          "

## 2023-04-20 NOTE — PT/OT/SLP PROGRESS
Occupational Therapy  Treatment    Name: Mike Urbina Jr.    : 1955 (67 y.o.)  MRN: [id number]           TREATMENT SUMMARY AND RECOMMENDATIONS:      OT Date of Treatment: 23  OT Start Time: 1410  OT Stop Time: 1440  OT Total Time (min): 30 min      Subjective Assessment:   No complaints  Lethargic    Awake, alert, cooperative  Impulsive    Uncooperative   Flat affect   X Agitated  c/o pain    Appropriate  c/o fatigue   X Confused X Treated at bedside     Emotionally labile X Treated in gym/dept.      Other:        Therapy Precautions:    Cognitive deficits  Spinal precautions    Collar - hard X Sternal precautions    Collar - soft   TLSO   X Fall risk  LSO    Hip precautions - posterior  Knee immobilizer    Hip precautions - anterior  WBAT    Impaired communication  Partial weightbearing    Oxygen  TTWB    PEG tube  NWB    Visual deficits      Hearing deficits   Other:        Treatment Objectives:       ADL Training:    ADL Assist level Comments    Feeding     Grooming/hygiene     Bathing     Upper body dressing     Lower body dressing     Toileting CGA For clothing management   Toilet transfer Min A Recliner>toilet   Adaptive equipment training     Other:         Therapeutic Exercise:   Exercise Sets Reps Comments   UB strengthening 2 10 With 3# dowel, pt perf chest press, straight arm raises, bicep curls, forward rows, backwards rows. V/cs for proper technique and positioning. Max cues for encouragement.                         Assessment: Patient tolerated session fair. Pt agitated throughout session requiring distraction an encouragement to participate in tx this session. Pt continues to demonstrate deficits in cognition, safety awareness, impulsivity, fxnl ax tolerance, fxnl endurance, balance, and fxnl mobility impacting p's ability to perform self care tasks safely and independently. Pt would continue to benefit from skilled OT services to improve pt's safety and independence with daily  occupations.      OT Plan: Continue OT POC.  Revisions made to plan of care: No    GOALS:   Multidisciplinary Problems       Occupational Therapy Goals          Problem: Occupational Therapy    Goal Priority Disciplines Outcome Interventions   Occupational Therapy Goal     OT, PT/OT Ongoing, Progressing    Description: Goals to be met by: 5/5/2023     Patient will increase functional independence with ADLs by performing:    UE Dressing with Modified [place].  LE Dressing with Modified [place].  Toileting from toilet with Supervision for hygiene and clothing management.   Toilet transfer to toilet with Supervision.                         Skilled OT Minutes Provided: 30  Communication with Treatment Team:     Equipment recommendations:       At end of treatment, patient remained:  X Up in chair     Up in wheelchair in room    In bed   X With alarm activated    Bed rails up   X Call bell in reach     Family/friends present    Restraints secured properly    In bathroom with CNA/RN notified    In gym with PT/PTA/tech   X Nurse aware    Other:

## 2023-04-20 NOTE — PT/OT/SLP PROGRESS
Physical Therapy Treatment Note           Name: Mike Urbina Jr.    : 1955 (67 y.o.)  MRN: [id number]           TREATMENT SUMMARY AND RECOMMENDATIONS:    PT Received On: 23  PT Start Time: 934     PT Stop Time: 1006  PT Total Time (min): 32 min     Subjective Assessment:  x No complaints  Lethargic   x Awake, alert, cooperative  Uncooperative    Agitated  c/o pain    Appropriate  c/o fatigue    Confused  Treated at bedside     Emotionally labile x Treated in gym/dept.    Impulsive  Other:    Flat affect       Therapy Precautions:    Cognitive deficits  Spinal precautions    Collar - hard x Sternal precautions    Collar - soft   TLSO    Fall risk  LSO    Hip precautions - posterior  Knee immobilizer    Hip precautions - anterior  WBAT    Impaired communication  Partial weightbearing    Oxygen  TTWB    PEG tube  NWB    Visual deficits  Other:    Hearing deficits          Treatment Objectives:     Mobility Training:   Assist level Comments    Bed mobility SBA Reclined bed>EOB   Transfer CGA EOB>RW>Chair   Gait CGA Pt ambulated ~400' w/ RW on firm solid ground, followed by WC; pt took 3 breaks taken equal distances apart, pt stayed standing throughout ambulation    Sit to stand transitions Min A X1 EOB>RW   Sitting balance     Standing balance      Wheelchair mobility     Car transfer     Other:          Therapeutic Exercise:   Exercise Sets Reps Comments   Seated Therex BLE  1 10 W/ 2#; marching, LAQs   Seated therex BLE 1 20 Calf raises, toe raises                    Additional Comments:  Pt has sternal precautions for CABG sx. Confusion still noted, but improving.     Assessment: Patient tolerated session.VC for pt to transfer w/ sternal precautions. Pt ambulated better today, able to control walker during ambulation w/o the need of the therapist     PT Plan: Continue w/ POC  Revisions made to plan of care: No    GOALS:   Multidisciplinary Problems       Physical Therapy Goals           Problem: Physical Therapy    Goal Priority Disciplines Outcome Goal Variances Interventions   Physical Therapy Goal     PT, PT/OT Ongoing, Progressing     Description: Goals to be met by: [place]     Patient will increase functional independence with mobility by performin. Supine to sit with Modified [place] maintaining sternal precautions  2. Sit to stand transfer with Modified [place] maintaining sternal precautions  3. Gait x 325 feet with Modified [place] using Rolling Walker.   4. Ascend/descend 3 stair with left Handrails Modified [place] using No Assistive Device.                        Skilled minutes: 32 mins    Equipment recommendations:       At end of treatment, patient remained:  x Up in chair     Up in wheelchair in room    In bed   x With alarm activated    Bed rails up   x Call bell in reach     Family/friends present    Restraints secured properly    In bathroom with CNA/RN notified    Nurse aware    In gym with therapist/tech    Other:

## 2023-04-20 NOTE — PT/OT/SLP PROGRESS
Occupational Therapy  Treatment    Name: Mike Urbina Jr.    : 1955 (67 y.o.)  MRN: [id number]           TREATMENT SUMMARY AND RECOMMENDATIONS:      OT Date of Treatment: 23  OT Start Time: 1040  OT Stop Time: 1121  OT Total Time (min): 41 min      Subjective Assessment:   No complaints  Lethargic   X Awake, alert, cooperative  Impulsive    Uncooperative   Flat affect    Agitated  c/o pain    Appropriate  c/o fatigue    Confused  Treated at bedside     Emotionally labile X Treated in gym/dept.      Other:        Therapy Precautions:    Cognitive deficits  Spinal precautions    Collar - hard X Sternal precautions    Collar - soft   TLSO   X Fall risk  LSO    Hip precautions - posterior  Knee immobilizer    Hip precautions - anterior  WBAT    Impaired communication  Partial weightbearing    Oxygen  TTWB    PEG tube  NWB    Visual deficits      Hearing deficits   Other:        Treatment Objectives:     Mobility Training:    Mobility task Assist level Comments    Bed mobility Min A EOB>supine   Transfer     Sit to stands transitions Mod A Posterior lean    Functional mobility CGA Pt ambulated ~400ft c RW. Min length standing r/b x2.   Sitting balance     Standing balance      Other:          Therapeutic Exercise:   Exercise Sets Reps Comments   UBE 1 5' Forwards                          Assessment: Patient tolerated session fair. Pt refused to put on clothing brought from home this session. Pt required Mod A for sit<>stand d/t posterior lean. Pt would continue to benefit from skilled OT services to improve pt's safety and independence with daily occupations.      OT Plan: Continue OT POC.  Revisions made to plan of care: No    GOALS:   Multidisciplinary Problems       Occupational Therapy Goals          Problem: Occupational Therapy    Goal Priority Disciplines Outcome Interventions   Occupational Therapy Goal     OT, PT/OT Ongoing, Progressing    Description: Goals to be met by: 2023     Patient  will increase functional independence with ADLs by performing:    UE Dressing with Modified [place].  LE Dressing with Modified [place].  Toileting from toilet with Supervision for hygiene and clothing management.   Toilet transfer to toilet with Supervision.                         Skilled OT Minutes Provided: 41  Communication with Treatment Team:     Equipment recommendations:       At end of treatment, patient remained:   Up in chair     Up in wheelchair in room   X In bed   X With alarm activated   X Bed rails up   X Call bell in reach     Family/friends present    Restraints secured properly    In bathroom with CNA/RN notified    In gym with PT/PTA/tech    Nurse aware    Other:

## 2023-04-20 NOTE — PLAN OF CARE
Problem: Adult Inpatient Plan of Care  Goal: Plan of Care Review  4/20/2023 0212 by Alka Shahid LPN  Outcome: Ongoing, Progressing  Flowsheets (Taken 4/20/2023 0212)  Plan of Care Reviewed With: patient  4/20/2023 0211 by Alka Shahid LPN  Outcome: Ongoing, Progressing  Goal: Patient-Specific Goal (Individualized)  4/20/2023 0212 by Alka Shahid LPN  Outcome: Ongoing, Progressing  Flowsheets (Taken 4/20/2023 0212)  Anxieties, Fears or Concerns: Hospitalization  Individualized Care Needs: Cardiac monitoring, PT/OT/ST, Fall prevention  4/20/2023 0211 by Alka Shahid LPN  Outcome: Ongoing, Progressing  Goal: Absence of Hospital-Acquired Illness or Injury  4/20/2023 0212 by Alka Shahid LPN  Outcome: Ongoing, Progressing  4/20/2023 0211 by Alka Shahid LPN  Outcome: Ongoing, Progressing  Goal: Optimal Comfort and Wellbeing  4/20/2023 0212 by Alka Shahid LPN  Outcome: Ongoing, Progressing  4/20/2023 0211 by Alka Shahid LPN  Outcome: Ongoing, Progressing  Goal: Readiness for Transition of Care  4/20/2023 0212 by Alka Shahid LPN  Outcome: Ongoing, Progressing  4/20/2023 0211 by Alka Shahid LPN  Outcome: Ongoing, Progressing  Intervention: Mutually Develop Transition Plan  Flowsheets (Taken 4/20/2023 0212)  Equipment Currently Used at Home: none  Communicated [place] with patient/caregiver: Date not available/Unable to determine  Do you expect to return to your current living situation?: Yes  Do you have help at home or someone to help you manage your care at home?: No  Readmission within 30 days?: No  Do you currently have service(s) that help you manage your care at home?: No     Problem: Fall Injury Risk  Goal: Absence of Fall and Fall-Related Injury  4/20/2023 0212 by Alka Shahid LPN  Outcome: Ongoing, Progressing  4/20/2023 0211 by Alka Shahid LPN  Outcome: Ongoing, Progressing  Intervention: Identify and Manage Contributors  Flowsheets (Taken 4/20/2023 0212)  Self-Care Promotion:   independence  encouraged   BADL personal objects within reach  Medication Review/Management: medications reviewed     Problem: Infection  Goal: Absence of Infection Signs and Symptoms  Description: Goals to be met by: 5/5/2023     Patient will increase functional independence with ADLs by performing:    UE Dressing with Modified [place].  LE Dressing with Modified [place].  Toileting from toilet with Supervision for hygiene and clothing management.   Toilet transfer to toilet with Supervision.    4/20/2023 0212 by Alka Shahid LPN  Outcome: Ongoing, Progressing  4/20/2023 0211 by Alka Shahid LPN  Outcome: Ongoing, Progressing  Intervention: Prevent or Manage Infection  Flowsheets (Taken 4/20/2023 0212)  Fever Reduction/Comfort Measures:   lightweight bedding   lightweight clothing  Infection Management: aseptic technique maintained  Isolation Precautions:   protective   precautions maintained

## 2023-04-20 NOTE — PT/OT/SLP PROGRESS
Physical Therapy Treatment Note           Name: Mike Urbina Jr.    : 1955 (67 y.o.)  MRN: [id number]           TREATMENT SUMMARY AND RECOMMENDATIONS:    PT Received On: 23  PT Start Time: 1332     PT Stop Time: 1410  PT Total Time (min): 38 min     Subjective Assessment:   No complaints  Lethargic   x Awake, alert, cooperative  Uncooperative    Agitated  c/o pain    Appropriate x c/o fatigue    Confused x Treated at bedside     Emotionally labile  Treated in gym/dept.    Impulsive x Other: pt stated that he had a general feeling of un-wellness w/ no specific symptoms     Flat affect       Therapy Precautions:    Cognitive deficits  Spinal precautions    Collar - hard x Sternal precautions    Collar - soft   TLSO    Fall risk  LSO    Hip precautions - posterior  Knee immobilizer    Hip precautions - anterior  WBAT    Impaired communication  Partial weightbearing    Oxygen  TTWB    PEG tube  NWB    Visual deficits  Other:    Hearing deficits          Treatment Objectives:     Mobility Training:   Assist level Comments    Bed mobility Mod A x1 Supine>EOB; therapist had to assist w/ upper body   Transfer CGA RW>chair, stand pivot    Gait CGA ~X110', ~X 110'   Sit to stand transitions [place] x 2 EOB>RW   Sitting balance     Standing balance      Wheelchair mobility     Car transfer     Other:          Therapeutic Exercise:   Exercise Sets Reps Comments                               Additional Comments:  Pt has sternal precautions for CABG sx. Confusion still noted, but improving. Pt stated he had a general feeling of un-wellness upon entry into his room. 2 losses of balance to the L during sitting pt EOB so PT performed multiple test (BP, SPO2, HR, -  nursing checked blood glucose), informed nurse of findings. She came check on pt and stated everything was WNL. Pt insisted on going for walk.      Assessment: Patient tolerated session.VC for pt to transfer w/ sternal precautions. VC/TC for  patient not to walk into things during ambulation. Multiple times therapist had to adjust walker so pt would not run into walls    PT Plan: Continue w/ POC  Revisions made to plan of care: No    GOALS:   Multidisciplinary Problems       Physical Therapy Goals          Problem: Physical Therapy    Goal Priority Disciplines Outcome Goal Variances Interventions   Physical Therapy Goal     PT, PT/OT Ongoing, Progressing     Description: Goals to be met by: [place]     Patient will increase functional independence with mobility by performin. Supine to sit with Modified [place] maintaining sternal precautions  2. Sit to stand transfer with Modified [place] maintaining sternal precautions  3. Gait x 325 feet with Modified [place] using Rolling Walker.   4. Ascend/descend 3 stair with left Handrails Modified [place] using No Assistive Device.                        Skilled minutes: 38 mins    Equipment recommendations:       At end of treatment, patient remained:  x Up in chair     Up in wheelchair in room    In bed   x With alarm activated    Bed rails up   x Call bell in reach     Family/friends present    Restraints secured properly    In bathroom with CNA/RN notified    Nurse aware    In gym with therapist/tech   x Other: in room w/ OT

## 2023-04-21 PROCEDURE — 25000003 PHARM REV CODE 250: Performed by: STUDENT IN AN ORGANIZED HEALTH CARE EDUCATION/TRAINING PROGRAM

## 2023-04-21 PROCEDURE — 97110 THERAPEUTIC EXERCISES: CPT

## 2023-04-21 PROCEDURE — 97530 THERAPEUTIC ACTIVITIES: CPT

## 2023-04-21 PROCEDURE — A4216 STERILE WATER/SALINE, 10 ML: HCPCS | Performed by: STUDENT IN AN ORGANIZED HEALTH CARE EDUCATION/TRAINING PROGRAM

## 2023-04-21 PROCEDURE — 94760 N-INVAS EAR/PLS OXIMETRY 1: CPT

## 2023-04-21 PROCEDURE — 63600175 PHARM REV CODE 636 W HCPCS: Performed by: STUDENT IN AN ORGANIZED HEALTH CARE EDUCATION/TRAINING PROGRAM

## 2023-04-21 PROCEDURE — 97116 GAIT TRAINING THERAPY: CPT | Mod: CQ

## 2023-04-21 PROCEDURE — 11000004 HC SNF PRIVATE

## 2023-04-21 RX ORDER — DIGOXIN 250 MCG
0.25 TABLET ORAL DAILY
Status: DISCONTINUED | OUTPATIENT
Start: 2023-04-22 | End: 2023-05-02

## 2023-04-21 RX ADMIN — NIFEDIPINE 60 MG: 30 TABLET, FILM COATED, EXTENDED RELEASE ORAL at 09:04

## 2023-04-21 RX ADMIN — DIGOXIN 0.12 MG: 125 TABLET ORAL at 09:04

## 2023-04-21 RX ADMIN — ASPIRIN 81 MG: 81 TABLET, COATED ORAL at 08:04

## 2023-04-21 RX ADMIN — TRAZODONE HYDROCHLORIDE 25 MG: 50 TABLET ORAL at 08:04

## 2023-04-21 RX ADMIN — FERROUS SULFATE TAB 325 MG (65 MG ELEMENTAL FE) 1 EACH: 325 (65 FE) TAB at 08:04

## 2023-04-21 RX ADMIN — METOPROLOL TARTRATE 100 MG: 50 TABLET, FILM COATED ORAL at 08:04

## 2023-04-21 RX ADMIN — HYDROXYZINE HYDROCHLORIDE 25 MG: 25 TABLET ORAL at 08:04

## 2023-04-21 RX ADMIN — AMIODARONE HYDROCHLORIDE 200 MG: 200 TABLET ORAL at 08:04

## 2023-04-21 RX ADMIN — VALSARTAN 160 MG: 160 TABLET, FILM COATED ORAL at 09:04

## 2023-04-21 RX ADMIN — THIAMINE HCL TAB 100 MG 200 MG: 100 TAB at 08:04

## 2023-04-21 RX ADMIN — ENOXAPARIN SODIUM 40 MG: 40 INJECTION SUBCUTANEOUS at 04:04

## 2023-04-21 RX ADMIN — ATORVASTATIN CALCIUM 40 MG: 40 TABLET, FILM COATED ORAL at 08:04

## 2023-04-21 RX ADMIN — FUROSEMIDE 40 MG: 40 TABLET ORAL at 08:04

## 2023-04-21 RX ADMIN — ALPRAZOLAM 0.25 MG: 0.25 TABLET ORAL at 04:04

## 2023-04-21 RX ADMIN — VALSARTAN 160 MG: 160 TABLET, FILM COATED ORAL at 08:04

## 2023-04-21 RX ADMIN — Medication 10 ML: at 08:04

## 2023-04-21 NOTE — PROGRESS NOTES
HOSPITAL MEDICINE  PROGRESS NOTE        CHIEF COMPLAINT   Hospital follow up    HOSPITAL COURSE   67-year-old male with a past medical history of ETOH abuse, CAD status post CABG 04/03/2023 with Dr. Finley, thrombocytopenia, aortic stenosis, hypertension, atrial flutter/fibrillation who presented to our facility on 4/17 for rehabilitation after CABG.  At baseline lives with a roommate however sister reports discharge may be to her house or his brother's house, depending on level of assistance needed.  Patient's cognition was concerning on 04/19/2023 and an MRI was obtained that showed no evidence of acute intracranial findings.  He does have chronic periventricular white matter changes and global intracranial atrophy.  Blood pressure remains poorly controlled, nifedipine was added and subsequently increased.  Patient has remained consistently tachycardic therefore his digoxin was increased on 04/22/2023.    Today  Ambulating 150 ft x 2 with contact guard assistance.  He denies any acute complaints today.    OBJECTIVE/PHYSICAL EXAM     VITAL SIGNS (MOST RECENT):  Temp: 98.3 °F (36.8 °C) (04/21/23 1154)  Pulse: 78 (04/21/23 1154)  Resp: 18 (04/21/23 0810)  BP: (!) 118/59 (04/21/23 1154)  SpO2: (!) 94 % (04/21/23 1154) VITAL SIGNS (24 HOUR RANGE):  Temp:  [97.4 °F (36.3 °C)-98.3 °F (36.8 °C)] 98.3 °F (36.8 °C)  Pulse:  [] 78  Resp:  [18] 18  SpO2:  [92 %-97 %] 94 %  BP: (113-158)/(59-96) 118/59   GENERAL: In no acute distress, poor personal hygiene  HEENT:  PERRLA  CHEST: Clear to auscultation bilaterally  HEART: S1, S2, no appreciable murmur  ABDOMEN: Soft, nontender, BS +  MSK: Warm, no lower extremity edema, no clubbing or cyanosis  NEUROLOGIC: Alert and oriented to person, moving all extremities with good strength, dysarthric, difficult to understand speech pattern  INTEGUMENTARY:  Warm, dry, well healing sternotomy scar  PSYCHIATRY:  Flat affect    ASSESSMENT/PLAN   Multivessel CAD  - s/p CABG (4.3.23) -  LIMA to LAD, rSVG to OM1, rSVG to PDA with Dr. Fniley  -aspirin, atorvastatin, furosemide, metoprolol  -PT/OT     PAF/Flutter with RVR s/p DCCV  -CHADsVASc - 3   -2 more days of amiodarone taper, then continue with 200 daily starting on the 20th   -digoxin started 4/18 patient remains with intermittent AF/flutter, dose increased on 04/22  - no AC s/p (4.3.23) - Ligation of SILVANO      Severe AS  -TAVR Evaluation on Outpatient Basis     HTN  -valsartan, nifedipine added for improved control on 04/19/2023     Toxic/Metabolic Encephalopathy   -CT head on 04/06 showed chronic microvascular ischemic changes  -MRI from 04/20/2023 showed no acute abnormal signal changes, chronic microvascular disease  -continue with thiamine  -B12 and folate unremarkable     Thrombocytopenia   Anemia  -Chronic due to myelosuppression from ETOH abuse   -required postop blood transfusion  -recovered at this time     ETOH Abuse  -reports two 6 packs daily  -p.r.n. t.i.d. Xanax        DVT prophylaxis:  Lovenox  Anticipated discharge and disposition:  Home either with his roommate or a sibling __________________________________________________________________________    LABS/MICRO/MEDS/DIAGNOSTICS       LABS  No results for input(s): NA, K, CHLORIDE, CO2, BUN, CREATININE, GLUCOSE, CALCIUM, ALKPHOS, AST, ALT, ALBUMIN in the last 72 hours.    No results for input(s): WBC, RBC, HCT, MCV, PLT in the last 72 hours.    Invalid input(s):       MICROBIOLOGY  Microbiology Results (last 7 days)       ** No results found for the last 168 hours. **               MEDICATIONS   amiodarone  200 mg Oral Daily    aspirin  81 mg Oral Daily    atorvastatin  40 mg Oral Daily    [START ON 4/22/2023] digoxin  0.25 mg Oral Daily    enoxaparin  40 mg Subcutaneous Daily    ferrous sulfate  1 tablet Oral Daily    furosemide  40 mg Oral Daily    hydrOXYzine HCL  25 mg Oral QHS    metoprolol tartrate  100 mg Oral BID    NIFEdipine  60 mg Oral Daily    thiamine  200 mg  Oral Daily    traZODone  25 mg Oral QHS    valsartan  160 mg Oral BID         INFUSIONS         DIAGNOSTIC TESTS  MRI Brain Limited Without Contrast   Final Result           EF   Date Value Ref Range Status   12/29/2022 55 % Final              Case related differential diagnoses have been reviewed; assessment and plan has been documented. I have personally reviewed the labs and test results that are currently available; I have reviewed the patients medication list. I have reviewed the consulting providers recommendations. I have reviewed or attempted to review medical records based upon their availability.  All of the patient's and/or family's questions have been addressed and answered to the best of my ability.  I will continue to monitor closely and make adjustments to medical management as needed.  This document was created using M*Modal Fluency Direct.  Transcription errors may have been made.  Please contact me if any questions may rise regarding documentation to clarify transcription.        Thairy G Reyes,    04/21/2023   Internal Medicine

## 2023-04-21 NOTE — PT/OT/SLP PROGRESS
Name: Mike Urbina     : 1955 (67 y.o.)  MRN: [id number]           Patient Unavailable      Patient unable to be seen at this time secondary to: pt asleep upon arrival- will attempt later today

## 2023-04-21 NOTE — PLAN OF CARE
Problem: Adult Inpatient Plan of Care  Goal: Plan of Care Review  Outcome: Ongoing, Progressing  Flowsheets (Taken 4/21/2023 0554)  Plan of Care Reviewed With: patient  Goal: Patient-Specific Goal (Individualized)  Outcome: Ongoing, Progressing  Flowsheets (Taken 4/21/2023 0554)  Anxieties, Fears or Concerns: not at this time  Individualized Care Needs: remind pt to use bear as needed, PT?OT?ST,  cardiac monitoring, Fall prevention  Goal: Absence of Hospital-Acquired Illness or Injury  Outcome: Ongoing, Progressing  Intervention: Prevent and Manage VTE (Venous Thromboembolism) Risk  Flowsheets (Taken 4/20/2023 2000)  VTE Prevention/Management:   bleeding precations maintained   bleeding risk assessed   dorsiflexion/plantar flexion performed   fluids promoted   ROM (active) performed  Intervention: Prevent Infection  Flowsheets (Taken 4/20/2023 2000)  Infection Prevention:   hand hygiene promoted   personal protective equipment utilized   rest/sleep promoted   single patient room provided  Goal: Optimal Comfort and Wellbeing  Outcome: Ongoing, Progressing  Intervention: Monitor Pain and Promote Comfort  Flowsheets (Taken 4/20/2023 2000)  Pain Management Interventions:   care clustered   diversional activity provided   pain management plan reviewed with patient/caregiver   pillow support provided   position adjusted   quiet environment facilitated   relaxation techniques promoted  Intervention: Provide Person-Centered Care  Flowsheets (Taken 4/20/2023 2000)  Trust Relationship/Rapport:   care explained   choices provided   emotional support provided   empathic listening provided   questions encouraged   thoughts/feelings acknowledged   questions answered     Problem: Infection  Goal: Absence of Infection Signs and Symptoms  Description: Goals to be met by: 5/5/2023     Patient will increase functional independence with ADLs by performing:    UE Dressing with Modified [place].  LE Dressing with Modified  [place].  Toileting from toilet with Supervision for hygiene and clothing management.   Toilet transfer to toilet with Supervision.    Outcome: Ongoing, Progressing  Intervention: Prevent or Manage Infection  Flowsheets (Taken 4/20/2023 2000)  Infection Management: aseptic technique maintained  Isolation Precautions: protective

## 2023-04-21 NOTE — PT/OT/SLP PROGRESS
Physical Therapy Treatment Note           Name: Mike Urbina Jr.    : 1955 (67 y.o.)  MRN: [id number]           TREATMENT SUMMARY AND RECOMMENDATIONS:    PT Received On: 23  PT Start Time: 1400     PT Stop Time: 1415  PT Total Time (min): 15 min     Subjective Assessment:   No complaints  Lethargic    Awake, alert, cooperative  Uncooperative    Agitated  c/o pain    Appropriate x c/o fatigue    Confused  Treated at bedside     Emotionally labile  Treated in gym/dept.    Impulsive  Other:    Flat affect       Therapy Precautions:    Cognitive deficits  Spinal precautions    Collar - hard  Sternal precautions    Collar - soft   TLSO   x Fall risk  LSO    Hip precautions - posterior  Knee immobilizer    Hip precautions - anterior  WBAT    Impaired communication  Partial weightbearing    Oxygen  TTWB    PEG tube  NWB    Visual deficits  Other:    Hearing deficits          Treatment Objectives:     Mobility Training:   Assist level Comments    Bed mobility     Transfer     Gait CGA Amb on firm surface with RW 25 ft    Sit to stand transitions     Sitting balance     Standing balance      Wheelchair mobility     Car transfer     Other:          Therapeutic Exercise:   Exercise Sets Reps Comments                               Additional Comments:  Limited this PM due to fatigue    Assessment: Patient tolerated session fair.    PT Plan: continue  Revisions made to plan of care: No    GOALS:   Multidisciplinary Problems       Physical Therapy Goals          Problem: Physical Therapy    Goal Priority Disciplines Outcome Goal Variances Interventions   Physical Therapy Goal     PT, PT/OT Ongoing, Progressing     Description: Goals to be met by: [place]     Patient will increase functional independence with mobility by performin. Supine to sit with Modified [place] maintaining sternal precautions  2. Sit to stand transfer with Modified [place] maintaining sternal precautions  3.  Gait x 325 feet with Modified [place] using Rolling Walker.   4. Ascend/descend 3 stair with left Handrails Modified [place] using No Assistive Device.                          Skilled PT Minutes Provided: 15   Communication with Treatment Team:     Equipment recommendations:       At end of treatment, patient remained:  x Up in chair     Up in wheelchair in room    In bed   x With alarm activated    Bed rails up   x Call bell in reach     Family/friends present    Restraints secured properly    In bathroom with CNA/RN notified    Nurse aware    In gym with therapist/tech    Other:

## 2023-04-21 NOTE — PLAN OF CARE
[facility]  Discharge Reassessment    Primary Care Provider: LOREN Jerry    Expected Discharge Date:     Reassessment (most recent)       Discharge Reassessment - 04/21/23 1522          Discharge Reassessment    Assessment Type Discharge Planning Reassessment     Did the patient's condition or plan change since previous assessment? No     Discharge Plan discussed with: Adult children     Name(s) and Number(s) Ashleigh Bailey daughter [phone]     Communicated [place] with patient/caregiver Date not available/Unable to determine     Discharge Plan A Home with family;Home Health     DME Needed Upon Discharge  walker, standard     Discharge Barriers Identified None        Post-Acute Status    Post-Acute Authorization Home Health     Home Health Status Pending medical clearance/testing     Hospital Resources/Appts/Education Provided Provided patient/caregiver with written discharge plan information     Discharge Delays None known at this time

## 2023-04-21 NOTE — PT/OT/SLP PROGRESS
Name: Mike Urbina .    : 1955 (67 y.o.)  MRN: [id number]           Patient Unavailable      Patient unable to be seen at this time secondary to: Pt sleeping at this time. OT plans to f/u at later time to continue OT POC.

## 2023-04-21 NOTE — PT/OT/SLP PROGRESS
Occupational Therapy  Treatment    Name: Mike Urbina Jr.    : 1955 (67 y.o.)  MRN: [id number]           TREATMENT SUMMARY AND RECOMMENDATIONS:      OT Date of Treatment: 23  OT Start Time: 1308  OT Stop Time: 1331  OT Total Time (min): 23 min      Subjective Assessment:   No complaints  Lethargic   X Awake, alert, cooperative  Impulsive    Uncooperative   Flat affect    Agitated  c/o pain    Appropriate  c/o fatigue    Confused X Treated at bedside     Emotionally labile X Treated in gym/dept.      Other:        Therapy Precautions:    Cognitive deficits  Spinal precautions    Collar - hard X Sternal precautions    Collar - soft   TLSO   X Fall risk  LSO    Hip precautions - posterior  Knee immobilizer    Hip precautions - anterior  WBAT    Impaired communication  Partial weightbearing    Oxygen  TTWB    PEG tube  NWB    Visual deficits      Hearing deficits   Other:        Treatment Objectives:     Mobility Training:    Mobility task Assist level Comments    Bed mobility SBA Supine>EOB   Transfer     Sit to stands transitions     Functional mobility CGA Pt ambulated c RW ~80ft before requiring seated r/b.   Sitting balance     Standing balance      Other:          Therapeutic Exercise:   Exercise Sets Reps Comments   UBE 1 3' Forward; pt self terminated ax d/t fatigue                         Additional Comments:  Pt with increased lethargy this date. Pt resistant to tx this session requiring max a for encouragement.     Assessment: Patient tolerated session fair. Pt would continue to benefit from skilled OT services to improve pt's safety and independence with daily occupations.    OT Plan: Continue OT POC  Revisions made to plan of care: No    GOALS:   Multidisciplinary Problems       Occupational Therapy Goals          Problem: Occupational Therapy    Goal Priority Disciplines Outcome Interventions   Occupational Therapy Goal     OT, PT/OT Ongoing, Progressing    Description: Goals to be met  by: 5/5/2023     Patient will increase functional independence with ADLs by performing:    UE Dressing with Modified [place].  LE Dressing with Modified [place].  Toileting from toilet with Supervision for hygiene and clothing management.   Toilet transfer to toilet with Supervision.                         Skilled OT Minutes Provided: 23  Communication with Treatment Team:     Equipment recommendations:       At end of treatment, patient remained:   Up in chair     Up in wheelchair in room    In bed    With alarm activated    Bed rails up    Call bell in reach     Family/friends present    Restraints secured properly    In bathroom with CNA/RN notified   X In gym with PT/PTA/tech    Nurse aware    Other:

## 2023-04-21 NOTE — PT/OT/SLP PROGRESS
Name: Mike Urbina Jr.    : 1955 (67 y.o.)  MRN: [id number]           Patient Unavailable      Patient unable to be seen at this time secondary to: pt asleep in bed upon SLP arrival. Pt awoke easily to verbal stimuli. Pt requesting to rest at this time.

## 2023-04-22 LAB
ANION GAP SERPL CALC-SCNC: 12 MEQ/L
BASOPHILS # BLD AUTO: 0.01 X10(3)/MCL (ref 0–0.2)
BASOPHILS NFR BLD AUTO: 0.2 %
BUN SERPL-MCNC: 16.9 MG/DL (ref 8.4–25.7)
CALCIUM SERPL-MCNC: 9.2 MG/DL (ref 8.8–10)
CHLORIDE SERPL-SCNC: 105 MMOL/L (ref 98–107)
CO2 SERPL-SCNC: 27 MMOL/L (ref 23–31)
CREAT SERPL-MCNC: 1 MG/DL (ref 0.73–1.18)
CREAT/UREA NIT SERPL: 17
EOSINOPHIL # BLD AUTO: 0.09 X10(3)/MCL (ref 0–0.9)
EOSINOPHIL NFR BLD AUTO: 1.8 %
ERYTHROCYTE [DISTWIDTH] IN BLOOD BY AUTOMATED COUNT: 14.7 % (ref 11.5–17)
FOLATE SERPL-MCNC: 13 NG/ML (ref 7–31.4)
GFR SERPLBLD CREATININE-BSD FMLA CKD-EPI: >60 MLS/MIN/1.73/M2
GLUCOSE SERPL-MCNC: 99 MG/DL (ref 82–115)
HCT VFR BLD AUTO: 37.5 % (ref 42–52)
HGB BLD-MCNC: 11.5 G/DL (ref 14–18)
IMM GRANULOCYTES # BLD AUTO: 0.01 X10(3)/MCL (ref 0–0.04)
IMM GRANULOCYTES NFR BLD AUTO: 0.2 %
LYMPHOCYTES # BLD AUTO: 1.29 X10(3)/MCL (ref 0.6–4.6)
LYMPHOCYTES NFR BLD AUTO: 25.8 %
MAGNESIUM SERPL-MCNC: 1.8 MG/DL (ref 1.6–2.6)
MCH RBC QN AUTO: 27.6 PG (ref 27–31)
MCHC RBC AUTO-ENTMCNC: 30.7 G/DL (ref 33–36)
MCV RBC AUTO: 89.9 FL (ref 80–94)
MONOCYTES # BLD AUTO: 0.51 X10(3)/MCL (ref 0.1–1.3)
MONOCYTES NFR BLD AUTO: 10.2 %
NEUTROPHILS # BLD AUTO: 3.09 X10(3)/MCL (ref 2.1–9.2)
NEUTROPHILS NFR BLD AUTO: 61.8 %
PLATELET # BLD AUTO: 256 X10(3)/MCL (ref 130–400)
PMV BLD AUTO: 10.1 FL (ref 7.4–10.4)
POTASSIUM SERPL-SCNC: 3.5 MMOL/L (ref 3.5–5.1)
RBC # BLD AUTO: 4.17 X10(6)/MCL (ref 4.7–6.1)
SODIUM SERPL-SCNC: 144 MMOL/L (ref 136–145)
WBC # SPEC AUTO: 5 X10(3)/MCL (ref 4.5–11.5)

## 2023-04-22 PROCEDURE — 83735 ASSAY OF MAGNESIUM: CPT | Performed by: STUDENT IN AN ORGANIZED HEALTH CARE EDUCATION/TRAINING PROGRAM

## 2023-04-22 PROCEDURE — 94760 N-INVAS EAR/PLS OXIMETRY 1: CPT

## 2023-04-22 PROCEDURE — 85025 COMPLETE CBC W/AUTO DIFF WBC: CPT | Performed by: STUDENT IN AN ORGANIZED HEALTH CARE EDUCATION/TRAINING PROGRAM

## 2023-04-22 PROCEDURE — 80048 BASIC METABOLIC PNL TOTAL CA: CPT | Performed by: STUDENT IN AN ORGANIZED HEALTH CARE EDUCATION/TRAINING PROGRAM

## 2023-04-22 PROCEDURE — 63600175 PHARM REV CODE 636 W HCPCS: Performed by: STUDENT IN AN ORGANIZED HEALTH CARE EDUCATION/TRAINING PROGRAM

## 2023-04-22 PROCEDURE — 25000003 PHARM REV CODE 250: Performed by: STUDENT IN AN ORGANIZED HEALTH CARE EDUCATION/TRAINING PROGRAM

## 2023-04-22 PROCEDURE — 82746 ASSAY OF FOLIC ACID SERUM: CPT | Performed by: STUDENT IN AN ORGANIZED HEALTH CARE EDUCATION/TRAINING PROGRAM

## 2023-04-22 PROCEDURE — 11000004 HC SNF PRIVATE

## 2023-04-22 RX ORDER — LANOLIN ALCOHOL/MO/W.PET/CERES
400 CREAM (GRAM) TOPICAL DAILY
Status: DISCONTINUED | OUTPATIENT
Start: 2023-04-22 | End: 2023-05-05 | Stop reason: HOSPADM

## 2023-04-22 RX ORDER — MAGNESIUM SULFATE HEPTAHYDRATE 40 MG/ML
2 INJECTION, SOLUTION INTRAVENOUS ONCE
Status: COMPLETED | OUTPATIENT
Start: 2023-04-22 | End: 2023-04-22

## 2023-04-22 RX ADMIN — ALPRAZOLAM 0.25 MG: 0.25 TABLET ORAL at 01:04

## 2023-04-22 RX ADMIN — ALPRAZOLAM 0.25 MG: 0.25 TABLET ORAL at 05:04

## 2023-04-22 RX ADMIN — TRAZODONE HYDROCHLORIDE 25 MG: 50 TABLET ORAL at 08:04

## 2023-04-22 RX ADMIN — METOPROLOL TARTRATE 100 MG: 50 TABLET, FILM COATED ORAL at 08:04

## 2023-04-22 RX ADMIN — HYDROXYZINE HYDROCHLORIDE 25 MG: 25 TABLET ORAL at 08:04

## 2023-04-22 RX ADMIN — ATORVASTATIN CALCIUM 40 MG: 40 TABLET, FILM COATED ORAL at 08:04

## 2023-04-22 RX ADMIN — MAGNESIUM SULFATE HEPTAHYDRATE 2 G: 40 INJECTION, SOLUTION INTRAVENOUS at 08:04

## 2023-04-22 RX ADMIN — AMIODARONE HYDROCHLORIDE 200 MG: 200 TABLET ORAL at 08:04

## 2023-04-22 RX ADMIN — FERROUS SULFATE TAB 325 MG (65 MG ELEMENTAL FE) 1 EACH: 325 (65 FE) TAB at 08:04

## 2023-04-22 RX ADMIN — Medication 400 MG: at 10:04

## 2023-04-22 RX ADMIN — VALSARTAN 160 MG: 160 TABLET, FILM COATED ORAL at 08:04

## 2023-04-22 RX ADMIN — THIAMINE HCL TAB 100 MG 200 MG: 100 TAB at 08:04

## 2023-04-22 RX ADMIN — FUROSEMIDE 40 MG: 40 TABLET ORAL at 08:04

## 2023-04-22 RX ADMIN — ENOXAPARIN SODIUM 40 MG: 40 INJECTION SUBCUTANEOUS at 04:04

## 2023-04-22 RX ADMIN — ALPRAZOLAM 0.25 MG: 0.25 TABLET ORAL at 11:04

## 2023-04-22 RX ADMIN — ASPIRIN 81 MG: 81 TABLET, COATED ORAL at 08:04

## 2023-04-22 RX ADMIN — DIGOXIN 0.25 MG: 250 TABLET ORAL at 08:04

## 2023-04-22 RX ADMIN — NIFEDIPINE 60 MG: 30 TABLET, FILM COATED, EXTENDED RELEASE ORAL at 08:04

## 2023-04-22 NOTE — PLAN OF CARE
Problem: Adult Inpatient Plan of Care  Goal: Plan of Care Review  Outcome: Ongoing, Not Progressing  Flowsheets (Taken 4/22/2023 1422)  Plan of Care Reviewed With:   patient   sibling  Goal: Patient-Specific Goal (Individualized)  Outcome: Ongoing, Not Progressing  Flowsheets (Taken 4/22/2023 1422)  Anxieties, Fears or Concerns: anxiety reduction, get better to go home  Individualized Care Needs: fall prevention, anxiety reduction, ambulate with assistance  Goal: Absence of Hospital-Acquired Illness or Injury  Outcome: Ongoing, Not Progressing  Intervention: Identify and Manage Fall Risk  Flowsheets (Taken 4/22/2023 1422)  Safety Promotion/Fall Prevention:   assistive device/personal item within reach   bed alarm set   Fall Risk reviewed with patient/family   Fall Risk signage in place   high risk medications identified   medications reviewed   nonskid shoes/socks when out of bed   pulse ox   side rails raised x 3   Supervised toileting - stay within arms reach   supervised activity   toileting scheduled   instructed to call staff for mobility  Intervention: Prevent Skin Injury  Flowsheets (Taken 4/22/2023 1422)  Body Position:   turned   right  Skin Protection:   incontinence pads utilized   skin sealant/moisture barrier applied  Intervention: Prevent and Manage VTE (Venous Thromboembolism) Risk  Flowsheets (Taken 4/22/2023 1422)  Activity Management: Ambulated in room - L4  VTE Prevention/Management:   ambulation promoted   bleeding precations maintained   bleeding risk assessed   dorsiflexion/plantar flexion performed   fluids promoted   ROM (active) performed   ROM (passive) performed  Range of Motion:   active ROM (range of motion) encouraged   ROM (range of motion) performed  Intervention: Prevent Infection  Flowsheets (Taken 4/22/2023 1422)  Infection Prevention:   environmental surveillance performed   hand hygiene promoted   rest/sleep promoted   equipment surfaces disinfected   single patient room  provided  Goal: Optimal Comfort and Wellbeing  Outcome: Ongoing, Not Progressing  Intervention: Monitor Pain and Promote Comfort  Flowsheets (Taken 4/22/2023 1422)  Pain Management Interventions:   breathing exercises utilized   care clustered   diversional activity provided   medication offered   pain management plan reviewed with patient/caregiver   pillow support provided   position adjusted   quiet environment facilitated   relaxation techniques promoted  Intervention: Provide Person-Centered Care  Flowsheets (Taken 4/22/2023 1422)  Trust Relationship/Rapport:   care explained   choices provided   emotional support provided   empathic listening provided   questions answered   questions encouraged   reassurance provided   thoughts/feelings acknowledged  Goal: Readiness for Transition of Care  Outcome: Ongoing, Not Progressing  Intervention: Mutually Develop Transition Plan  Flowsheets (Taken 4/22/2023 1422)  Do you expect to return to your current living situation?: Yes  Do you have help at home or someone to help you manage your care at home?: No  Readmission within 30 days?: No  Do you currently have service(s) that help you manage your care at home?: No     Problem: Fall Injury Risk  Goal: Absence of Fall and Fall-Related Injury  Outcome: Ongoing, Not Progressing  Intervention: Identify and Manage Contributors  Flowsheets (Taken 4/22/2023 1422)  Self-Care Promotion:   BADL personal objects within reach   BADL personal routines maintained   meal set-up provided   safe use of adaptive equipment encouraged  Medication Review/Management:   medications reviewed   high-risk medications identified  Intervention: Promote Injury-Free Environment  Flowsheets (Taken 4/22/2023 1422)  Safety Promotion/Fall Prevention:   assistive device/personal item within reach   bed alarm set   Fall Risk reviewed with patient/family   Fall Risk signage in place   high risk medications identified   medications reviewed   nonskid  shoes/socks when out of bed   pulse ox   side rails raised x 3   Supervised toileting - stay within arms reach   supervised activity   toileting scheduled   instructed to call staff for mobility     Problem: Infection  Goal: Absence of Infection Signs and Symptoms  Description: Goals to be met by: 5/5/2023     Patient will increase functional independence with ADLs by performing:    UE Dressing with Modified [place].  LE Dressing with Modified [place].  Toileting from toilet with Supervision for hygiene and clothing management.   Toilet transfer to toilet with Supervision.    Outcome: Ongoing, Not Progressing  Intervention: Prevent or Manage Infection  Flowsheets (Taken 4/22/2023 1422)  Fever Reduction/Comfort Measures: fluid intake increased  Infection Management: aseptic technique maintained  Isolation Precautions:   precautions maintained   protective     Problem: Activity Intolerance (Cardiovascular Surgery)  Goal: Improved Activity Tolerance  Outcome: Ongoing, Not Progressing  Intervention: Optimize Tolerance for Activity  Flowsheets (Taken 4/22/2023 1422)  Self-Care Promotion:   BADL personal objects within reach   BADL personal routines maintained   meal set-up provided   safe use of adaptive equipment encouraged  Environmental Support:   calm environment promoted   environmental consistency promoted   personal routine supported   rest periods encouraged

## 2023-04-22 NOTE — PROGRESS NOTES
HOSPITAL MEDICINE  PROGRESS NOTE        CHIEF COMPLAINT   Hospital follow up    HOSPITAL COURSE   67-year-old male with a past medical history of ETOH abuse, CAD status post CABG 04/03/2023 with Dr. Finley, thrombocytopenia, aortic stenosis, hypertension, atrial flutter/fibrillation who presented to our facility on 4/17 for rehabilitation after CABG.  At baseline lives with a roommate however sister reports discharge may be to her house or his brother's house, depending on level of assistance needed.  Patient's cognition was concerning on 04/19/2023 and an MRI was obtained that showed no evidence of acute intracranial findings.  He does have chronic periventricular white matter changes and global intracranial atrophy.  Blood pressure remains poorly controlled, nifedipine was added and subsequently increased.  Patient has remained consistently tachycardic therefore his digoxin was increased on 04/22/2023.  He required replacement of magnesium on 04/22/2023.    Today  Ambulating 150 ft x 2 with contact guard assistance.  He denies any acute complaints today.    OBJECTIVE/PHYSICAL EXAM     VITAL SIGNS (MOST RECENT):  Temp: 97.3 °F (36.3 °C) (04/22/23 0748)  Pulse: 109 (04/22/23 0748)  Resp: (!) 27 (04/22/23 0323)  BP: (!) 141/92 (04/22/23 0748)  SpO2: 97 % (04/22/23 0748) VITAL SIGNS (24 HOUR RANGE):  Temp:  [97.3 °F (36.3 °C)-98.3 °F (36.8 °C)] 97.3 °F (36.3 °C)  Pulse:  [] 109  Resp:  [18-27] 27  SpO2:  [92 %-97 %] 97 %  BP: (118-141)/(59-92) 141/92   GENERAL: In no acute distress, poor personal hygiene  HEENT:  PERRLA  CHEST: Clear to auscultation bilaterally  HEART: S1, S2, no appreciable murmur  ABDOMEN: Soft, nontender, BS +  MSK: Warm, no lower extremity edema, no clubbing or cyanosis  NEUROLOGIC: Alert and oriented to person, moving all extremities with good strength, dysarthric, difficult to understand speech pattern  INTEGUMENTARY:  Warm, dry, well healing sternotomy scar  PSYCHIATRY:  Flat  Pt called and left a voicemail stating there was a mistake on the prescription for his quick pen insulin. He said the pharmacy was only giving him one pen instead of the normal 5.    Please advise    Thanks    affect    ASSESSMENT/PLAN   Multivessel CAD  - s/p CABG (4.3.23) - LIMA to LAD, rSVG to OM1, rSVG to PDA with Dr. Finley  -aspirin, atorvastatin, furosemide, metoprolol  -PT/OT     PAF/Flutter with RVR s/p DCCV  -CHADsVASc - 3   -2 more days of amiodarone taper, then continue with 200 daily starting on the 20th   -digoxin started 4/18 patient remains with intermittent AF/flutter, dose increased on 04/22  - no AC s/p (4.3.23) - Ligation of SILVANO      Severe AS  -TAVR Evaluation on Outpatient Basis     HTN  -valsartan, nifedipine added for improved control on 04/19/2023     Toxic/Metabolic Encephalopathy   -CT head on 04/06 showed chronic microvascular ischemic changes  -MRI from 04/20/2023 showed no acute abnormal signal changes, chronic microvascular disease  -continue with thiamine  -B12 and folate unremarkable     Thrombocytopenia   Anemia  -Chronic due to myelosuppression from ETOH abuse   -required postop blood transfusion  -recovered at this time     ETOH Abuse  -reports two 6 packs daily  -p.r.n. t.i.d. Xanax        DVT prophylaxis:  Lovenox  Anticipated discharge and disposition:  Home either with his roommate or a sibling __________________________________________________________________________    LABS/MICRO/MEDS/DIAGNOSTICS       LABS  Recent Labs     04/22/23  0550      K 3.5   CHLORIDE 105   CO2 27   BUN 16.9   CREATININE 1.00   GLUCOSE 99   CALCIUM 9.2       Recent Labs     04/22/23  0550   WBC 5.0   RBC 4.17*   HCT 37.5*   MCV 89.9            MICROBIOLOGY  Microbiology Results (last 7 days)       ** No results found for the last 168 hours. **               MEDICATIONS   amiodarone  200 mg Oral Daily    aspirin  81 mg Oral Daily    atorvastatin  40 mg Oral Daily    digoxin  0.25 mg Oral Daily    enoxaparin  40 mg Subcutaneous Daily    ferrous sulfate  1 tablet Oral Daily    furosemide  40 mg Oral Daily    hydrOXYzine HCL  25 mg Oral QHS    magnesium oxide  400 mg Oral Daily    magnesium  sulfate IVPB  2 g Intravenous Once    metoprolol tartrate  100 mg Oral BID    NIFEdipine  60 mg Oral Daily    thiamine  200 mg Oral Daily    traZODone  25 mg Oral QHS    valsartan  160 mg Oral BID         INFUSIONS         DIAGNOSTIC TESTS  MRI Brain Limited Without Contrast   Final Result           EF   Date Value Ref Range Status   12/29/2022 55 % Final              Case related differential diagnoses have been reviewed; assessment and plan has been documented. I have personally reviewed the labs and test results that are currently available; I have reviewed the patients medication list. I have reviewed the consulting providers recommendations. I have reviewed or attempted to review medical records based upon their availability.  All of the patient's and/or family's questions have been addressed and answered to the best of my ability.  I will continue to monitor closely and make adjustments to medical management as needed.  This document was created using M*Modal Fluency Direct.  Transcription errors may have been made.  Please contact me if any questions may rise regarding documentation to clarify transcription.        Thairy G Reyes,    04/22/2023   Internal Medicine

## 2023-04-23 PROCEDURE — 25000003 PHARM REV CODE 250: Performed by: STUDENT IN AN ORGANIZED HEALTH CARE EDUCATION/TRAINING PROGRAM

## 2023-04-23 PROCEDURE — 94760 N-INVAS EAR/PLS OXIMETRY 1: CPT

## 2023-04-23 PROCEDURE — 63600175 PHARM REV CODE 636 W HCPCS: Performed by: STUDENT IN AN ORGANIZED HEALTH CARE EDUCATION/TRAINING PROGRAM

## 2023-04-23 PROCEDURE — 11000004 HC SNF PRIVATE

## 2023-04-23 RX ORDER — METOPROLOL SUCCINATE 50 MG/1
100 TABLET, EXTENDED RELEASE ORAL DAILY
Status: DISCONTINUED | OUTPATIENT
Start: 2023-04-24 | End: 2023-05-02

## 2023-04-23 RX ADMIN — METOPROLOL TARTRATE 100 MG: 50 TABLET, FILM COATED ORAL at 08:04

## 2023-04-23 RX ADMIN — ENOXAPARIN SODIUM 40 MG: 40 INJECTION SUBCUTANEOUS at 04:04

## 2023-04-23 RX ADMIN — VALSARTAN 160 MG: 160 TABLET, FILM COATED ORAL at 08:04

## 2023-04-23 RX ADMIN — ALPRAZOLAM 0.25 MG: 0.25 TABLET ORAL at 09:04

## 2023-04-23 RX ADMIN — AMIODARONE HYDROCHLORIDE 200 MG: 200 TABLET ORAL at 08:04

## 2023-04-23 RX ADMIN — TRAZODONE HYDROCHLORIDE 25 MG: 50 TABLET ORAL at 08:04

## 2023-04-23 RX ADMIN — NIFEDIPINE 60 MG: 30 TABLET, FILM COATED, EXTENDED RELEASE ORAL at 08:04

## 2023-04-23 RX ADMIN — ATORVASTATIN CALCIUM 40 MG: 40 TABLET, FILM COATED ORAL at 08:04

## 2023-04-23 RX ADMIN — ASPIRIN 81 MG: 81 TABLET, COATED ORAL at 08:04

## 2023-04-23 RX ADMIN — FERROUS SULFATE TAB 325 MG (65 MG ELEMENTAL FE) 1 EACH: 325 (65 FE) TAB at 08:04

## 2023-04-23 RX ADMIN — FUROSEMIDE 40 MG: 40 TABLET ORAL at 08:04

## 2023-04-23 RX ADMIN — HYDROXYZINE HYDROCHLORIDE 25 MG: 25 TABLET ORAL at 08:04

## 2023-04-23 RX ADMIN — DIGOXIN 0.25 MG: 250 TABLET ORAL at 08:04

## 2023-04-23 RX ADMIN — Medication 400 MG: at 08:04

## 2023-04-23 RX ADMIN — THIAMINE HCL TAB 100 MG 200 MG: 100 TAB at 08:04

## 2023-04-23 NOTE — PLAN OF CARE
Problem: Adult Inpatient Plan of Care  Goal: Plan of Care Review  Outcome: Ongoing, Progressing  Flowsheets (Taken 4/22/2023 2005)  Plan of Care Reviewed With: patient     Problem: Adult Inpatient Plan of Care  Goal: Patient-Specific Goal (Individualized)  Outcome: Ongoing, Progressing  Flowsheets (Taken 4/22/2023 2005)  Anxieties, Fears or Concerns: Getting better to go home  Individualized Care Needs:   Encourage resp exercises   Enhanced safety measures, prevent falling   Decrease anxiety     Problem: Adult Inpatient Plan of Care  Goal: Absence of Hospital-Acquired Illness or Injury  Intervention: Identify and Manage Fall Risk  Flowsheets (Taken 4/22/2023 2005)  Safety Promotion/Fall Prevention:   bed alarm set   assistive device/personal item within reach   Fall Risk reviewed with patient/family   Fall Risk signage in place   gait belt with ambulation   high risk medications identified   medications reviewed   nonskid shoes/socks when out of bed   room near unit station   /camera at bedside   instructed to call staff for mobility     Problem: Adult Inpatient Plan of Care  Goal: Absence of Hospital-Acquired Illness or Injury  Intervention: Prevent and Manage VTE (Venous Thromboembolism) Risk  Flowsheets (Taken 4/23/2023 0057)  Activity Management: Ambulated to bathroom - L4  VTE Prevention/Management: (On Lovenox)   bleeding risk assessed   bleeding precations maintained   ambulation promoted   fluids promoted   dorsiflexion/plantar flexion performed   ROM (active) performed     Problem: Adult Inpatient Plan of Care  Goal: Absence of Hospital-Acquired Illness or Injury  Intervention: Prevent Infection  Flowsheets (Taken 4/22/2023 2005)  Infection Prevention:   hand hygiene promoted   equipment surfaces disinfected   rest/sleep promoted     Problem: Adult Inpatient Plan of Care  Goal: Optimal Comfort and Wellbeing  Intervention: Monitor Pain and Promote Comfort  Flowsheets (Taken 4/22/2023  2005)  Pain Management Interventions:   care clustered   pain management plan reviewed with patient/caregiver   quiet environment facilitated   relaxation techniques promoted     Problem: Adult Inpatient Plan of Care  Goal: Optimal Comfort and Wellbeing  Intervention: Provide Person-Centered Care  Flowsheets (Taken 4/23/2023 0057)  Trust Relationship/Rapport:   care explained   choices provided   emotional support provided   empathic listening provided   questions answered   questions encouraged   reassurance provided   thoughts/feelings acknowledged     Problem: Infection  Goal: Absence of Infection Signs and Symptoms  Description: Goals to be met by: 5/5/2023     Patient will increase functional independence with ADLs by performing:    UE Dressing with Modified [place].  LE Dressing with Modified [place].  Toileting from toilet with Supervision for hygiene and clothing management.   Toilet transfer to toilet with Supervision.    Intervention: Prevent or Manage Infection  Flowsheets (Taken 4/22/2023 2005)  Fever Reduction/Comfort Measures:   lightweight bedding   lightweight clothing  Infection Management: aseptic technique maintained     Problem: Activity Intolerance (Cardiovascular Surgery)  Goal: Improved Activity Tolerance  Intervention: Optimize Tolerance for Activity  Flowsheets (Taken 4/22/2023 2005)  Environmental Support:   calm environment promoted   rest periods encouraged

## 2023-04-23 NOTE — PROGRESS NOTES
HOSPITAL MEDICINE  PROGRESS NOTE        CHIEF COMPLAINT   Hospital follow up    HOSPITAL COURSE   67-year-old male with a past medical history of ETOH abuse, CAD status post CABG 04/03/2023 with Dr. Finley, thrombocytopenia, aortic stenosis, hypertension, atrial flutter/fibrillation who presented to our facility on 4/17 for rehabilitation after CABG.  At baseline lives with a roommate however sister reports discharge may be to her house or his brother's house, depending on level of assistance needed.  Patient's cognition was concerning on 04/19/2023 and an MRI was obtained that showed no evidence of acute intracranial findings.  He does have chronic periventricular white matter changes and global intracranial atrophy.  Blood pressure remains poorly controlled, nifedipine was added and subsequently increased.  Patient has remained consistently tachycardic therefore his digoxin was increased on 04/22/2023.  He required replacement of magnesium on 04/22/2023.    Today  Continues to exhibit left lower extremity pain at vessel graft site however bruising and tenderness is improving.  OBJECTIVE/PHYSICAL EXAM     VITAL SIGNS (MOST RECENT):  Temp: 97.4 °F (36.3 °C) (04/23/23 0735)  Pulse: 72 (04/23/23 0735)  Resp: (!) 22 (04/23/23 0318)  BP: 138/83 (04/23/23 0735)  SpO2: 97 % (04/23/23 0735) VITAL SIGNS (24 HOUR RANGE):  Temp:  [97.4 °F (36.3 °C)-98.2 °F (36.8 °C)] 97.4 °F (36.3 °C)  Pulse:  [] 72  Resp:  [18-22] 22  SpO2:  [95 %-98 %] 97 %  BP: (122-148)/(68-83) 138/83   GENERAL: In no acute distress, poor personal hygiene  HEENT:  PERRLA  CHEST: Clear to auscultation bilaterally  HEART: S1, S2, no appreciable murmur  ABDOMEN: Soft, nontender, BS +  MSK: Warm, no lower extremity edema, no clubbing or cyanosis  NEUROLOGIC: Alert and oriented to person, moving all extremities with good strength  INTEGUMENTARY: well healing sternotomy scar, left lower extremity graft site well healing, visible bruising  PSYCHIATRY:   Flat affect    ASSESSMENT/PLAN   Multivessel CAD  - s/p CABG (4.3.23) - LIMA to LAD, rSVG to OM1, rSVG to PDA with Dr. Finley  -aspirin, atorvastatin, furosemide, metoprolol  -PT/OT     PAF/Flutter with RVR s/p DCCV  -CHADsVASc - 3   -completed amiodarone taper, continue with 200 daily starting on the 20th   -digoxin started 4/18 patient remains with intermittent AF/flutter, dose increased on 04/22  -metoprolol tartrate changed to succinate on 04/23  - no AC s/p (4.3.23) - Ligation of SILVANO      Severe AS  -TAVR Evaluation on Outpatient Basis     HTN  -valsartan, nifedipine added for improved control on 04/19/2023     Toxic/Metabolic Encephalopathy   -CT head on 04/06 showed chronic microvascular ischemic changes  -MRI from 04/20/2023 showed no acute abnormal signal changes, chronic microvascular disease  -continue with thiamine  -B12 and folate unremarkable     Thrombocytopenia   Anemia  -Chronic due to myelosuppression from ETOH abuse   -required postop blood transfusion  -recovered at this time     ETOH Abuse  -reports two 6 packs daily  -p.r.n. t.i.d. Xanax        DVT prophylaxis:  Lovenox  Anticipated discharge and disposition:  Home either with his roommate or a sibling __________________________________________________________________________    LABS/MICRO/MEDS/DIAGNOSTICS       LABS  Recent Labs     04/22/23  0550      K 3.5   CHLORIDE 105   CO2 27   BUN 16.9   CREATININE 1.00   GLUCOSE 99   CALCIUM 9.2       Recent Labs     04/22/23  0550   WBC 5.0   RBC 4.17*   HCT 37.5*   MCV 89.9            MICROBIOLOGY  Microbiology Results (last 7 days)       ** No results found for the last 168 hours. **               MEDICATIONS   amiodarone  200 mg Oral Daily    aspirin  81 mg Oral Daily    atorvastatin  40 mg Oral Daily    digoxin  0.25 mg Oral Daily    enoxaparin  40 mg Subcutaneous Daily    ferrous sulfate  1 tablet Oral Daily    furosemide  40 mg Oral Daily    hydrOXYzine HCL  25 mg Oral QHS     magnesium oxide  400 mg Oral Daily    metoprolol tartrate  100 mg Oral BID    NIFEdipine  60 mg Oral Daily    thiamine  200 mg Oral Daily    traZODone  25 mg Oral QHS    valsartan  160 mg Oral BID         INFUSIONS         DIAGNOSTIC TESTS  MRI Brain Limited Without Contrast   Final Result           EF   Date Value Ref Range Status   12/29/2022 55 % Final              Case related differential diagnoses have been reviewed; assessment and plan has been documented. I have personally reviewed the labs and test results that are currently available; I have reviewed the patients medication list. I have reviewed the consulting providers recommendations. I have reviewed or attempted to review medical records based upon their availability.  All of the patient's and/or family's questions have been addressed and answered to the best of my ability.  I will continue to monitor closely and make adjustments to medical management as needed.  This document was created using M*Modal Fluency Direct.  Transcription errors may have been made.  Please contact me if any questions may rise regarding documentation to clarify transcription.        Thairy G Reyes,    04/23/2023   Internal Medicine

## 2023-04-24 PROCEDURE — 11000004 HC SNF PRIVATE

## 2023-04-24 PROCEDURE — 97535 SELF CARE MNGMENT TRAINING: CPT

## 2023-04-24 PROCEDURE — 25000003 PHARM REV CODE 250: Performed by: STUDENT IN AN ORGANIZED HEALTH CARE EDUCATION/TRAINING PROGRAM

## 2023-04-24 PROCEDURE — 63600175 PHARM REV CODE 636 W HCPCS: Performed by: STUDENT IN AN ORGANIZED HEALTH CARE EDUCATION/TRAINING PROGRAM

## 2023-04-24 PROCEDURE — 97110 THERAPEUTIC EXERCISES: CPT

## 2023-04-24 PROCEDURE — 97110 THERAPEUTIC EXERCISES: CPT | Mod: CQ

## 2023-04-24 PROCEDURE — 97530 THERAPEUTIC ACTIVITIES: CPT

## 2023-04-24 PROCEDURE — 92526 ORAL FUNCTION THERAPY: CPT

## 2023-04-24 PROCEDURE — 97116 GAIT TRAINING THERAPY: CPT | Mod: CQ

## 2023-04-24 PROCEDURE — 94760 N-INVAS EAR/PLS OXIMETRY 1: CPT

## 2023-04-24 PROCEDURE — 97530 THERAPEUTIC ACTIVITIES: CPT | Mod: CQ

## 2023-04-24 RX ADMIN — ATORVASTATIN CALCIUM 40 MG: 40 TABLET, FILM COATED ORAL at 08:04

## 2023-04-24 RX ADMIN — MELATONIN TAB 3 MG 9 MG: 3 TAB at 08:04

## 2023-04-24 RX ADMIN — FERROUS SULFATE TAB 325 MG (65 MG ELEMENTAL FE) 1 EACH: 325 (65 FE) TAB at 08:04

## 2023-04-24 RX ADMIN — FUROSEMIDE 40 MG: 40 TABLET ORAL at 08:04

## 2023-04-24 RX ADMIN — NIFEDIPINE 60 MG: 30 TABLET, FILM COATED, EXTENDED RELEASE ORAL at 08:04

## 2023-04-24 RX ADMIN — THIAMINE HCL TAB 100 MG 200 MG: 100 TAB at 08:04

## 2023-04-24 RX ADMIN — AMIODARONE HYDROCHLORIDE 200 MG: 200 TABLET ORAL at 08:04

## 2023-04-24 RX ADMIN — VALSARTAN 160 MG: 160 TABLET, FILM COATED ORAL at 08:04

## 2023-04-24 RX ADMIN — TRAZODONE HYDROCHLORIDE 25 MG: 50 TABLET ORAL at 08:04

## 2023-04-24 RX ADMIN — ASPIRIN 81 MG: 81 TABLET, COATED ORAL at 08:04

## 2023-04-24 RX ADMIN — HYDROXYZINE HYDROCHLORIDE 25 MG: 25 TABLET ORAL at 08:04

## 2023-04-24 RX ADMIN — METOPROLOL SUCCINATE 100 MG: 50 TABLET, FILM COATED, EXTENDED RELEASE ORAL at 08:04

## 2023-04-24 RX ADMIN — ENOXAPARIN SODIUM 40 MG: 40 INJECTION SUBCUTANEOUS at 04:04

## 2023-04-24 RX ADMIN — ALPRAZOLAM 0.25 MG: 0.25 TABLET ORAL at 08:04

## 2023-04-24 RX ADMIN — Medication 400 MG: at 08:04

## 2023-04-24 RX ADMIN — DIGOXIN 0.25 MG: 250 TABLET ORAL at 08:04

## 2023-04-24 NOTE — PT/OT/SLP PROGRESS
Physical Therapy Treatment Note           Name: Mike Urbina Jr.    : 1955 (67 y.o.)  MRN: [id number]           TREATMENT SUMMARY AND RECOMMENDATIONS:    PT Received On: 23  PT Start Time: 1000     PT Stop Time: 1025  PT Total Time (min): 25 min     Subjective Assessment:  x No complaints  Lethargic    Awake, alert, cooperative  Uncooperative    Agitated  c/o pain    Appropriate  c/o fatigue    Confused  Treated at bedside     Emotionally labile  Treated in gym/dept.    Impulsive  Other:    Flat affect       Therapy Precautions:    Cognitive deficits  Spinal precautions    Collar - hard  Sternal precautions    Collar - soft   TLSO    Fall risk  LSO    Hip precautions - posterior  Knee immobilizer    Hip precautions - anterior  WBAT    Impaired communication  Partial weightbearing    Oxygen  TTWB    PEG tube  NWB    Visual deficits  Other:    Hearing deficits          Treatment Objectives:     Mobility Training:   Assist level Comments    Bed mobility     Transfer     Gait CGA Amb on firm surface with  ft with standing rest break   Sit to stand transitions CGA Sit-stand 5 reps    Sitting balance     Standing balance      Wheelchair mobility     Car transfer     Other:          Therapeutic Exercise:   Exercise Sets Reps Comments   UBE 10 min  UBE with B LE use    B LE exer sitting  20 reps  Ankle pumps, TKE, abd/add, knee lifts                    Additional Comments:  Sternal precautions     Assessment: Patient tolerated session well.    PT Plan: continue  Revisions made to plan of care: No    GOALS:   Multidisciplinary Problems       Physical Therapy Goals          Problem: Physical Therapy    Goal Priority Disciplines Outcome Goal Variances Interventions   Physical Therapy Goal     PT, PT/OT Ongoing, Progressing     Description: Goals to be met by: [place]     Patient will increase functional independence with mobility by performin. Supine to sit with Modified [place]  maintaining sternal precautions  2. Sit to stand transfer with Modified [place] maintaining sternal precautions  3. Gait x 325 feet with Modified [place] using Rolling Walker.   4. Ascend/descend 3 stair with left Handrails Modified [place] using No Assistive Device.                          Skilled PT Minutes Provided: 25   Communication with Treatment Team:     Equipment recommendations:       At end of treatment, patient remained:  x Up in chair     Up in wheelchair in room    In bed   x With alarm activated    Bed rails up   x Call bell in reach     Family/friends present    Restraints secured properly    In bathroom with CNA/RN notified    Nurse aware    In gym with therapist/tech    Other:

## 2023-04-24 NOTE — PLAN OF CARE
Problem: Adult Inpatient Plan of Care  Goal: Plan of Care Review  Outcome: Ongoing, Progressing  Flowsheets (Taken 4/23/2023 1930)  Plan of Care Reviewed With: patient     Problem: Adult Inpatient Plan of Care  Goal: Patient-Specific Goal (Individualized)  Outcome: Ongoing, Progressing  Flowsheets (Taken 4/23/2023 1930)  Anxieties, Fears or Concerns: Doing good in PT and not getting too tired  Individualized Care Needs:   Encourage and perform respiratory exercises   Enhanced safety measures   Prevent falling. Decrease anxiety.     Problem: Adult Inpatient Plan of Care  Goal: Absence of Hospital-Acquired Illness or Injury  Intervention: Identify and Manage Fall Risk  Flowsheets (Taken 4/23/2023 1930)  Safety Promotion/Fall Prevention:   bed alarm set   assistive device/personal item within reach   Fall Risk reviewed with patient/family   Fall Risk signage in place   lighting adjusted   medications reviewed   room near unit station   nonskid shoes/socks when out of bed   /camera at bedside   instructed to call staff for mobility     Problem: Adult Inpatient Plan of Care  Goal: Absence of Hospital-Acquired Illness or Injury  Intervention: Prevent Infection  Flowsheets (Taken 4/23/2023 1930)  Infection Prevention:   equipment surfaces disinfected   hand hygiene promoted   rest/sleep promoted     Problem: Adult Inpatient Plan of Care  Goal: Optimal Comfort and Wellbeing  Intervention: Monitor Pain and Promote Comfort  Flowsheets (Taken 4/23/2023 1930)  Pain Management Interventions:   care clustered   pain management plan reviewed with patient/caregiver   quiet environment facilitated   relaxation techniques promoted   medication offered     Problem: Adult Inpatient Plan of Care  Goal: Optimal Comfort and Wellbeing  Intervention: Provide Person-Centered Care  Flowsheets (Taken 4/23/2023 1930)  Trust Relationship/Rapport:   care explained   choices provided   emotional support provided   empathic  listening provided   questions answered   questions encouraged   reassurance provided   thoughts/feelings acknowledged     Problem: Infection  Goal: Absence of Infection Signs and Symptoms  Description: Goals to be met by: 5/5/2023     Patient will increase functional independence with ADLs by performing:    UE Dressing with Modified [place].  LE Dressing with Modified [place].  Toileting from toilet with Supervision for hygiene and clothing management.   Toilet transfer to toilet with Supervision.    Intervention: Prevent or Manage Infection  Flowsheets (Taken 4/23/2023 1930)  Fever Reduction/Comfort Measures:   lightweight bedding   lightweight clothing  Infection Management: aseptic technique maintained     Problem: Activity Intolerance (Cardiovascular Surgery)  Goal: Improved Activity Tolerance  Intervention: Optimize Tolerance for Activity  Flowsheets (Taken 4/23/2023 1930)  Self-Care Promotion: independence encouraged  Environmental Support:   calm environment promoted   rest periods encouraged

## 2023-04-24 NOTE — PLAN OF CARE
[facility]  Discharge Reassessment    Primary Care Provider: LOREN Jerry    Expected Discharge Date:     Reassessment (most recent)       Discharge Reassessment - 04/24/23 0741          Discharge Reassessment    Assessment Type Discharge Planning Reassessment     Did the patient's condition or plan change since previous assessment? No     Discharge Plan discussed with: Sibling     Name(s) and Number(s) Ashleigh singleton [phone]     Communicated MODESTO with patient/caregiver Date not available/Unable to determine     Discharge Plan A Home Health;Home with family     DME Needed Upon Discharge  walker, standard     Discharge Barriers Identified None        Post-Acute Status    Post-Acute Authorization Home Health     Home Health Status Pending medical clearance/testing     Hospital Resources/Appts/Education Provided Provided patient/caregiver with written discharge plan information     Discharge Delays None known at this time

## 2023-04-24 NOTE — PT/OT/SLP PROGRESS
Occupational Therapy  Treatment    Name: Mike Urbina Jr.    : 1955 (67 y.o.)  MRN: [id number]           TREATMENT SUMMARY AND RECOMMENDATIONS:      OT Date of Treatment: 23  OT Start Time: 1337  OT Stop Time: 1400  OT Total Time (min): 23 min      Subjective Assessment:   No complaints  Lethargic   X Awake, alert, cooperative  Impulsive    Uncooperative   Flat affect    Agitated  c/o pain    Appropriate  c/o fatigue    Confused X Treated at bedside     Emotionally labile X Treated in gym/dept.      Other:        Therapy Precautions:    Cognitive deficits  Spinal precautions    Collar - hard X Sternal precautions    Collar - soft   TLSO   X Fall risk  LSO    Hip precautions - posterior  Knee immobilizer    Hip precautions - anterior  WBAT    Impaired communication  Partial weightbearing    Oxygen  TTWB    PEG tube  NWB    Visual deficits      Hearing deficits   Other:        Treatment Objectives:     Mobility Training:    Mobility task Assist level Comments    Bed mobility Min A Supine>EOB 2/2 sternal pxns. Max v/cs to prevent pulling c bedrail   Transfer     Sit to stands transitions     Functional mobility CGA Pt ambulated ~400ft c RW. Standing r/b x3.    Sitting balance     Standing balance      Other:        ADL Training:    ADL Assist level Comments    Feeding     Grooming/hygiene     Bathing     Upper body dressing     Lower body dressing     Toileting Min A To manage pants up/down   Toilet transfer Min A For sit<>stand; pt ambulated short distances from bed>toilet c RW   Adaptive equipment training     Other:         Assessment: Patient tolerated session well. Pt continues to require encouragement and skilled educated related to therapy tx to increase participation. Pt would continue to benefit from skilled OT services to improve pt's safety and independence with daily occupations.    OT Plan: Continue OT POC.  Revisions made to plan of care: No    GOALS:   Multidisciplinary Problems        Occupational Therapy Goals          Problem: Occupational Therapy    Goal Priority Disciplines Outcome Interventions   Occupational Therapy Goal     OT, PT/OT Ongoing, Progressing    Description: Goals to be met by: 5/5/2023     Patient will increase functional independence with ADLs by performing:    UE Dressing with Modified [place].  LE Dressing with Modified [place].  Toileting from toilet with Supervision for hygiene and clothing management.   Toilet transfer to toilet with Supervision.                         Skilled OT Minutes Provided: 23  Communication with Treatment Team:     Equipment recommendations:       At end of treatment, patient remained:   Up in chair     Up in wheelchair in room    In bed    With alarm activated    Bed rails up    Call bell in reach     Family/friends present    Restraints secured properly    In bathroom with CNA/RN notified   X In gym with PT/PTA/tech    Nurse aware    Other:

## 2023-04-24 NOTE — PT/OT/SLP PROGRESS
Speech Language Pathology Treatment    Patient Name:  Mike Urbina Jr.   MRN:  [id number]  Admitting Diagnosis: <principal problem not specified>    Recommendations:                 General Recommendations:  Dysphagia therapy and Cognitive-linguistic therapy  Diet recommendations:  Minced & Moist Diet - IDDSI Level 5, Liquid Diet Level: Mildly thick/Nectar thick liquids - IDDSI Level 2   Aspiration Precautions: HOB to 90 degrees, Small bites/sips, and Standard aspiration precautions   General Precautions: Standard, fall  Communication strategies:  go to room if call light pushed    Subjective     Pt in bed upon SLP arrival. Pt pleasant and in good spirits. Pt c/o of buttock pain- nurse notified.    Patient goals:      Pain/Comfort:       Respiratory Status: Room air    Objective:     Has the patient been evaluated by SLP for swallowing?      Keep patient NPO?     Current Respiratory Status:        Pt consumed trials of thin liquid via single and sequential cup sips. Pt tolerated thin liquids via single cp sip x8 and sequential cup sip x2. No overt s/sx of aspiration. SLP updated pt on discontinuing mildly thick liquids and pt very excited. Communication board updated and nurse notified.   Pt also consumed trials of soft cookie. Good oral clearance and no overt s/sx of aspiration w/ soft and bite sized consistency. Plan for soft and bite sized trial tray tomorrow.    Overall good session.  Cont SLP POC.    Assessment:     Mike Urbina Jr. is a 67 y.o. male with an SLP diagnosis of Dysphagia and Cognitive-Linguistic Impairment.  He presents with need for a modified diet at this time to ensure safety w/ PO. Pt also w/ decreased safety awareness and delayed recall. Skilled SLP services are warranted at this time to address above stated deficits.    Goals:   Multidisciplinary Problems       SLP Goals          Problem: SLP    Goal Priority Disciplines Outcome   SLP Goal     SLP Ongoing, Progressing    Description: LTG: Pt will tolerate LRD w/o overt s/s of aspiration.    STG:  Pt will complete laryngeal strengthening exercises and aleksandra with minimal cues.  Pt will consume trials of thin liquid x10 w/o overt s/s of aspiration.  Pt will consume trials of soft and bite sized w/ good oral clearance and w/o overt s/s of aspiration.  Pt will utilize memory strategies to recall sternal precautions following a 3 minute filled delay.  Pt will provide solutions to problems/situations w/ 90% acc Jennifer.                       Plan:     Patient to be seen:  5 x/week   Plan of Care expires:  04/28/23  Plan of Care reviewed with:  patient, friend, other (see comments) (brother)   SLP Follow-Up:  Yes       Discharge recommendations:      Barriers to Discharge:  Level of Skilled Assistance Needed see Pt/Ot notes and Safety Awareness impaired    Time Tracking:     SLP Treatment Date:  4/24/23  Speech Start Time: 9:05  Speech Stop Time:  9:30    Speech Total Time (min):  25 min    Billable Minutes: Treatment Swallowing Dysfunction 25    Camila Pearce M.S., CCC-SLP   04/24/2023

## 2023-04-24 NOTE — PROGRESS NOTES
"Inpatient Nutrition Evaluation    Admit Date: 4/17/2023   Total duration of encounter: 7 days    Nutrition Recommendation/Prescription     Continue heart healthy no straws dysphagia mince and moist     Nutrition Assessment     Chart Review    Reason Seen: continuous nutrition monitoring, length of stay, and follow-up    Malnutrition Screening Tool Results   Have you recently lost weight without trying?: Yes: 14-23 lbs  Have you been eating poorly because of a decreased appetite?: No   MST Score: 2     Diagnosis:  Hypertensive urgency 12/29/2022      Acute on chronic congestive heart failure 12/29/2022      Hyponatremia 12/29/2022      ETOH abuse 12/29/2022      Atrial flutter 12/29/2022      Thrombocytopenia 12/29/2022      Electrolyte abnormality 12/30/2022      Coronary artery disease        Relevant Medical History: Atrial flutter  Date Unknown  CHF (congestive heart failure)  Date Unknown  Coronary artery disease  Date Unknown  Hypertension  Date Unknown  SOB (shortness of breath)     Nutrition-Related Medications: atorvastatin, digoxin, ferrous sulfate, furosemide, magnesium oxide, polyethylene glycol    Nutrition-Related Labs:      Diet Order: Diet heart healthy No Straws, Dysphagia Minced & Moist  Oral Supplement Order: none  Appetite/Oral Intake: good/% of meals  Factors Affecting Nutritional Intake: chewing difficulty and difficulty/impaired swallowing  Food/Anabaptist/Cultural Preferences: none reported  Food Allergies: none reported    Skin Integrity: bruised (ecchymotic), incision  Wound(s):       Comments    Pt intake is good. Diet upgraded to heart healthy no straw dysphagia minced and moist. Pt is not on thickened liquids anymore.     Anthropometrics    Height: 6' 2" (188 cm)    Last Weight: 74.5 kg (164 lb 3.9 oz) (04/24/23 0500) Weight Method: Bed Scale  BMI (Calculated): 21.1  BMI Classification: normal (BMI 18.5-24.9)        Ideal Body Weight (IBW), Male: 190 lb     % Ideal Body Weight, Male " (lb): 88.18 %                          Usual Weight Provided By: not applicable    Wt Readings from Last 5 Encounters:   04/24/23 74.5 kg (164 lb 3.9 oz)   04/15/23 77.8 kg (171 lb 8.3 oz)   03/28/23 78 kg (172 lb)   03/15/23 78.6 kg (173 lb 4.5 oz)   01/10/23 76.2 kg (168 lb 1.6 oz)     Weight Change(s) Since Admission:  Admit Weight: 76 kg (167 lb 8.8 oz) (04/17/23 1500)  -1.5 kg wt loss. Pt on furosemide.      Patient Education    Not applicable.    Monitoring & Evaluation     Dietitian will monitor food and beverage intake, weight, weight change, electrolyte/renal panel, glucose/endocrine profile, and gastrointestinal profile.  Nutrition Risk/Follow-Up: low (follow-up in 5-7 days)  Patients assigned 'low nutrition risk' status do not qualify for a full nutritional assessment but will be monitored and re-evaluated in a 5-7 day time period. Please consult if re-evaluation needed sooner.

## 2023-04-24 NOTE — PLAN OF CARE
Problem: SLP  Goal: SLP Goal  Description: LTG: Pt will tolerate LRD w/o overt s/s of aspiration.    STG:  Pt will complete laryngeal strengthening exercises and aleksandra with minimal cues.  Pt will consume trials of thin liquid x10 w/o overt s/s of aspiration. Goal met  Pt will consume trials of soft and bite sized w/ good oral clearance and w/o overt s/s of aspiration.  Pt will utilize memory strategies to recall sternal precautions following a 3 minute filled delay.  Pt will provide solutions to problems/situations w/ 90% acc Jennifer.  Outcome: Ongoing, Progressing

## 2023-04-24 NOTE — PLAN OF CARE
Problem: Adult Inpatient Plan of Care  Goal: Plan of Care Review  Outcome: Ongoing, Progressing  Flowsheets (Taken 4/24/2023 1321)  Plan of Care Reviewed With:   patient   sibling  Goal: Absence of Hospital-Acquired Illness or Injury  Outcome: Ongoing, Progressing  Intervention: Identify and Manage Fall Risk  Flowsheets (Taken 4/24/2023 1321)  Safety Promotion/Fall Prevention:   assistive device/personal item within reach   chair alarm set   in recliner, wheels locked   high risk medications identified   nonskid shoes/socks when out of bed   /camera at bedside   room near unit station  Intervention: Prevent Skin Injury  Flowsheets (Taken 4/24/2023 1321)  Body Position: position changed independently  Skin Protection: incontinence pads utilized  Intervention: Prevent and Manage VTE (Venous Thromboembolism) Risk  Flowsheets (Taken 4/24/2023 1321)  Activity Management: Ambulated in ferreira - L4  VTE Prevention/Management:   ambulation promoted   bleeding precations maintained   bleeding risk assessed   fluids promoted  Range of Motion: active ROM (range of motion) encouraged  Intervention: Prevent Infection  Flowsheets (Taken 4/24/2023 1321)  Infection Prevention:   cohorting utilized   personal protective equipment utilized   environmental surveillance performed   rest/sleep promoted   equipment surfaces disinfected   single patient room provided   hand hygiene promoted   visitors restricted/screened     Problem: Fall Injury Risk  Goal: Absence of Fall and Fall-Related Injury  Outcome: Ongoing, Progressing  Intervention: Identify and Manage Contributors  Flowsheets (Taken 4/24/2023 1321)  Self-Care Promotion:   independence encouraged   safe use of adaptive equipment encouraged   BADL personal objects within reach   BADL personal routines maintained   meal set-up provided  Medication Review/Management: medications reviewed  Intervention: Promote Injury-Free Environment  Flowsheets (Taken 4/24/2023  0481)  Safety Promotion/Fall Prevention:   assistive device/personal item within reach   chair alarm set   in recliner, wheels locked   high risk medications identified   nonskid shoes/socks when out of bed   /camera at bedside   room near unit station

## 2023-04-24 NOTE — PROGRESS NOTES
HOSPITAL MEDICINE  PROGRESS NOTE        CHIEF COMPLAINT   Hospital follow up    HOSPITAL COURSE   67-year-old male with a past medical history of ETOH abuse, CAD status post CABG 04/03/2023 with Dr. Finley, thrombocytopenia, aortic stenosis, hypertension, atrial flutter/fibrillation who presented to our facility on 4/17 for rehabilitation after CABG.  At baseline lives with a roommate however sister reports discharge may be to her house or his brother's house, depending on level of assistance needed.  Patient's cognition was concerning on 04/19/2023 and an MRI was obtained that showed no evidence of acute intracranial findings.  He does have chronic periventricular white matter changes and global intracranial atrophy.  Blood pressure remains poorly controlled, nifedipine was added and subsequently increased.  Patient has remained consistently tachycardic therefore his digoxin was increased on 04/22/2023.  He required replacement of magnesium on 04/22/2023.    Today  Therapy reports he remains contact guard assistance for ambulation, poor adherence to sternal precautions despite max encouragement and education.  OBJECTIVE/PHYSICAL EXAM     VITAL SIGNS (MOST RECENT):  Temp: 98.1 °F (36.7 °C) (04/24/23 0318)  Pulse: 92 (04/24/23 0717)  Resp: (!) 28 (04/24/23 0318)  BP: (!) 140/69 (04/24/23 0717)  SpO2: (!) 90 % (04/24/23 0717) VITAL SIGNS (24 HOUR RANGE):  Temp:  [97.4 °F (36.3 °C)-98.1 °F (36.7 °C)] 98.1 °F (36.7 °C)  Pulse:  [] 92  Resp:  [20-30] 28  SpO2:  [90 %-98 %] 90 %  BP: (120-152)/(65-77) 140/69   GENERAL: In no acute distress, poor personal hygiene  HEENT:  PERRLA  CHEST: Clear to auscultation bilaterally  HEART: S1, S2, no appreciable murmur  ABDOMEN: Soft, nontender, BS +  MSK: Warm, no lower extremity edema, no clubbing or cyanosis  NEUROLOGIC: Alert and oriented to person, moving all extremities with good strength  INTEGUMENTARY: well healing sternotomy scar, left lower extremity graft site well  healing, visible bruising  PSYCHIATRY:  Flat affect    ASSESSMENT/PLAN   Multivessel CAD  - s/p CABG (4.3.23) - LIMA to LAD, rSVG to OM1, rSVG to PDA with Dr. Finley  -aspirin, atorvastatin, furosemide, metoprolol  -PT/OT     PAF/Flutter with RVR s/p DCCV  -CHADsVASc - 3   -completed amiodarone taper, continue with 200 daily starting on the 20th   -digoxin started 4/18 patient remains with intermittent AF/flutter, dose increased on 04/22  -metoprolol tartrate changed to succinate on 04/23  - no AC s/p (4.3.23) - Ligation of SILVANO      Severe AS  -TAVR Evaluation on Outpatient Basis     HTN  -valsartan, nifedipine added for improved control on 04/19/2023     Toxic/Metabolic Encephalopathy   -CT head on 04/06 showed chronic microvascular ischemic changes  -MRI from 04/20/2023 showed no acute abnormal signal changes, chronic microvascular disease  -continue with thiamine  -B12 and folate unremarkable     Thrombocytopenia   Anemia  -Chronic due to myelosuppression from ETOH abuse   -required postop blood transfusion  -recovered at this time     ETOH Abuse  -reports two 6 packs daily  -p.r.n. t.i.d. Xanax        DVT prophylaxis:  Lovenox  Anticipated discharge and disposition:  Home either with his roommate or a sibling __________________________________________________________________________    LABS/MICRO/MEDS/DIAGNOSTICS       LABS  Recent Labs     04/22/23  0550      K 3.5   CHLORIDE 105   CO2 27   BUN 16.9   CREATININE 1.00   GLUCOSE 99   CALCIUM 9.2       Recent Labs     04/22/23  0550   WBC 5.0   RBC 4.17*   HCT 37.5*   MCV 89.9            MICROBIOLOGY  Microbiology Results (last 7 days)       ** No results found for the last 168 hours. **               MEDICATIONS   amiodarone  200 mg Oral Daily    aspirin  81 mg Oral Daily    atorvastatin  40 mg Oral Daily    digoxin  0.25 mg Oral Daily    enoxaparin  40 mg Subcutaneous Daily    ferrous sulfate  1 tablet Oral Daily    furosemide  40 mg Oral Daily     hydrOXYzine HCL  25 mg Oral QHS    magnesium oxide  400 mg Oral Daily    metoprolol succinate  100 mg Oral Daily    NIFEdipine  60 mg Oral Daily    thiamine  200 mg Oral Daily    traZODone  25 mg Oral QHS    valsartan  160 mg Oral BID         INFUSIONS         DIAGNOSTIC TESTS  MRI Brain Limited Without Contrast   Final Result           EF   Date Value Ref Range Status   12/29/2022 55 % Final              Case related differential diagnoses have been reviewed; assessment and plan has been documented. I have personally reviewed the labs and test results that are currently available; I have reviewed the patients medication list. I have reviewed the consulting providers recommendations. I have reviewed or attempted to review medical records based upon their availability.  All of the patient's and/or family's questions have been addressed and answered to the best of my ability.  I will continue to monitor closely and make adjustments to medical management as needed.  This document was created using M*Modal Fluency Direct.  Transcription errors may have been made.  Please contact me if any questions may rise regarding documentation to clarify transcription.        Thairy G Reyes,    04/24/2023   Internal Medicine

## 2023-04-24 NOTE — PT/OT/SLP PROGRESS
Occupational Therapy  Treatment    Name: Mike Urbina Jr.    : 1955 (67 y.o.)  MRN: [id number]           TREATMENT SUMMARY AND RECOMMENDATIONS:      OT Date of Treatment: 23  OT Start Time: 935  OT Stop Time:   OT Total Time (min): 35 min      Subjective Assessment:   No complaints  Lethargic   X Awake, alert, cooperative  Impulsive    Uncooperative   Flat affect    Agitated X c/o pain    Appropriate  c/o fatigue    Confused X Treated at bedside     Emotionally labile X Treated in gym/dept.      Other:        Therapy Precautions:    Cognitive deficits  Spinal precautions    Collar - hard X Sternal precautions    Collar - soft   TLSO   X Fall risk  LSO    Hip precautions - posterior  Knee immobilizer    Hip precautions - anterior  WBAT    Impaired communication  Partial weightbearing    Oxygen  TTWB    PEG tube  NWB    Visual deficits      Hearing deficits   Other:        Treatment Objectives:     Mobility Training:    Mobility task Assist level Comments    Bed mobility SBA V/cs to prevent pulling c UEs   Transfer     Sit to stands transitions     Functional mobility CGA Pt ambulated ~400ft c RW. Standing r/bs x3   Sitting balance     Standing balance      Other:        ADL Training:    ADL Assist level Comments    Feeding     Grooming/hygiene     Bathing     Upper body dressing Min A To don wrap around coat   Lower body dressing Min A Min A for threading BLE when donning pants; pt able to don/doff socks c Setup A   Toileting     Toilet transfer     Adaptive equipment training     Other:           Therapeutic Exercise:   Exercise Sets Reps Comments   UBE 1 5' Forward                         Additional Comments:  Pt continues to report LLE pain at graft site. SLP informing OT of diet upgrade to thin liquids.    Assessment: Patient tolerated session well. Pt with increased participation this session. Pt continues to require assistance for self care tasks 2/2 deficits in balance, cognition,  motivation, pain, and sternal precautions. Pt would continue to benefit from skilled OT services to improve pt's safety and independence with daily occupations.    OT Plan: Continue OT POC.  Revisions made to plan of care: No    GOALS:   Multidisciplinary Problems       Occupational Therapy Goals          Problem: Occupational Therapy    Goal Priority Disciplines Outcome Interventions   Occupational Therapy Goal     OT, PT/OT Ongoing, Progressing    Description: Goals to be met by: 5/5/2023     Patient will increase functional independence with ADLs by performing:    UE Dressing with Modified [place].  LE Dressing with Modified [place].  Toileting from toilet with Supervision for hygiene and clothing management.   Toilet transfer to toilet with Supervision.                         Skilled OT Minutes Provided: 35  Communication with Treatment Team:     Equipment recommendations:       At end of treatment, patient remained:   Up in chair     Up in wheelchair in room    In bed    With alarm activated    Bed rails up    Call bell in reach     Family/friends present    Restraints secured properly    In bathroom with CNA/RN notified   X In gym with PT/PTA/tech    Nurse aware    Other:

## 2023-04-25 PROCEDURE — 25000003 PHARM REV CODE 250: Performed by: STUDENT IN AN ORGANIZED HEALTH CARE EDUCATION/TRAINING PROGRAM

## 2023-04-25 PROCEDURE — 97530 THERAPEUTIC ACTIVITIES: CPT

## 2023-04-25 PROCEDURE — 92526 ORAL FUNCTION THERAPY: CPT

## 2023-04-25 PROCEDURE — 63600175 PHARM REV CODE 636 W HCPCS: Performed by: STUDENT IN AN ORGANIZED HEALTH CARE EDUCATION/TRAINING PROGRAM

## 2023-04-25 PROCEDURE — 97110 THERAPEUTIC EXERCISES: CPT

## 2023-04-25 PROCEDURE — 97535 SELF CARE MNGMENT TRAINING: CPT

## 2023-04-25 PROCEDURE — 11000004 HC SNF PRIVATE

## 2023-04-25 PROCEDURE — 97116 GAIT TRAINING THERAPY: CPT

## 2023-04-25 PROCEDURE — 94760 N-INVAS EAR/PLS OXIMETRY 1: CPT

## 2023-04-25 RX ORDER — TRAZODONE HYDROCHLORIDE 50 MG/1
50 TABLET ORAL NIGHTLY
Status: DISCONTINUED | OUTPATIENT
Start: 2023-04-25 | End: 2023-05-05 | Stop reason: HOSPADM

## 2023-04-25 RX ADMIN — VALSARTAN 160 MG: 160 TABLET, FILM COATED ORAL at 08:04

## 2023-04-25 RX ADMIN — Medication 400 MG: at 08:04

## 2023-04-25 RX ADMIN — FERROUS SULFATE TAB 325 MG (65 MG ELEMENTAL FE) 1 EACH: 325 (65 FE) TAB at 08:04

## 2023-04-25 RX ADMIN — HYDROCODONE BITARTRATE AND ACETAMINOPHEN 1 TABLET: 5; 325 TABLET ORAL at 06:04

## 2023-04-25 RX ADMIN — ASPIRIN 81 MG: 81 TABLET, COATED ORAL at 08:04

## 2023-04-25 RX ADMIN — TRAZODONE HYDROCHLORIDE 50 MG: 50 TABLET ORAL at 08:04

## 2023-04-25 RX ADMIN — DIGOXIN 0.25 MG: 250 TABLET ORAL at 08:04

## 2023-04-25 RX ADMIN — FUROSEMIDE 40 MG: 40 TABLET ORAL at 08:04

## 2023-04-25 RX ADMIN — AMIODARONE HYDROCHLORIDE 200 MG: 200 TABLET ORAL at 08:04

## 2023-04-25 RX ADMIN — THIAMINE HCL TAB 100 MG 200 MG: 100 TAB at 08:04

## 2023-04-25 RX ADMIN — ATORVASTATIN CALCIUM 40 MG: 40 TABLET, FILM COATED ORAL at 08:04

## 2023-04-25 RX ADMIN — NIFEDIPINE 60 MG: 30 TABLET, FILM COATED, EXTENDED RELEASE ORAL at 08:04

## 2023-04-25 RX ADMIN — HYDROXYZINE HYDROCHLORIDE 25 MG: 25 TABLET ORAL at 08:04

## 2023-04-25 RX ADMIN — METOPROLOL SUCCINATE 100 MG: 50 TABLET, FILM COATED, EXTENDED RELEASE ORAL at 08:04

## 2023-04-25 RX ADMIN — ENOXAPARIN SODIUM 40 MG: 40 INJECTION SUBCUTANEOUS at 05:04

## 2023-04-25 RX ADMIN — MELATONIN TAB 3 MG 9 MG: 3 TAB at 08:04

## 2023-04-25 NOTE — PLAN OF CARE
Problem: SLP  Goal: SLP Goal  Description: LTG: Pt will tolerate LRD w/o overt s/s of aspiration.    STG:  Pt will complete laryngeal strengthening exercises and aleksandra with minimal cues.  Pt will consume trials of thin liquid x10 w/o overt s/s of aspiration. Goal met  Pt will consume trials of soft and bite sized w/ good oral clearance and w/o overt s/s of aspiration. Goal met  Pt will utilize memory strategies to recall sternal precautions following a 3 minute filled delay.  Pt will provide solutions to problems/situations w/ 90% acc Jennifer.  Outcome: Ongoing, Progressing

## 2023-04-25 NOTE — PT/OT/SLP PROGRESS
Physical Therapy Treatment Note           Name: Mike Urbina Jr.    : 1955 (67 y.o.)  MRN: [id number]           TREATMENT SUMMARY AND RECOMMENDATIONS:    PT Received On: 23  PT Start Time: 925     PT Stop Time: 955  PT Total Time (min): 30 min     Subjective Assessment:   No complaints  Lethargic    Awake, alert, cooperative  Uncooperative    Agitated  c/o pain    Appropriate x c/o fatigue    Confused  Treated at bedside     Emotionally labile x Treated in gym/dept.    Impulsive  Other:    Flat affect       Therapy Precautions:    Cognitive deficits  Spinal precautions    Collar - hard x Sternal precautions    Collar - soft   TLSO    Fall risk  LSO    Hip precautions - posterior  Knee immobilizer    Hip precautions - anterior  WBAT    Impaired communication  Partial weightbearing    Oxygen  TTWB    PEG tube  NWB    Visual deficits  Other:    Hearing deficits          Treatment Objectives:     Mobility Training:   Assist level Comments    Bed mobility MIN A Supine>sit   Transfer     Gait CGA Amb on firm surface with  ft 2 standing rest breaks   Sit to stand transitions CGA Sit-stand 5 reps    Sitting balance     Standing balance      Wheelchair mobility     Car transfer     Other:          Therapeutic Exercise:   Exercise Sets Reps Comments   PROM to B LE in long sitting  30 sec hold x 2 HS and DF   AROM B LE in short sitting  10 No wt, hip flexion, knee ext, hip abd/add and heel raises.                    Additional Comments:  Pt reports fatigue and not being able to do much. Frequent rest breaks needed.  Progressed as able.     Assessment: Patient tolerated session fair.    PT Plan: Continue  Revisions made to plan of care: No    GOALS:   Multidisciplinary Problems       Physical Therapy Goals          Problem: Physical Therapy    Goal Priority Disciplines Outcome Goal Variances Interventions   Physical Therapy Goal     PT, PT/OT Ongoing, Progressing     Description: Goals to be  met by: [place]     Patient will increase functional independence with mobility by performin. Supine to sit with Modified [place] maintaining sternal precautions  2. Sit to stand transfer with Modified [place] maintaining sternal precautions  3. Gait x 325 feet with Modified [place] using Rolling Walker.   4. Ascend/descend 3 stair with left Handrails Modified [place] using No Assistive Device.                          Skilled PT Minutes Provided: 25   Communication with Treatment Team:     Equipment recommendations:       At end of treatment, patient remained:  x Up in chair     Up in wheelchair in room    In bed    With alarm activated    Bed rails up    Call bell in reach     Family/friends present    Restraints secured properly    In bathroom with CNA/RN notified    Nurse aware   x In gym with therapist/tech    Other:

## 2023-04-25 NOTE — PT/OT/SLP PROGRESS
Occupational Therapy  Treatment    Name: Mike Urbina Jr.    : 1955 (67 y.o.)  MRN: [id number]           TREATMENT SUMMARY AND RECOMMENDATIONS:      OT Date of Treatment: 23  OT Start Time: 1000  OT Stop Time: 1037  OT Total Time (min): 37 min      Subjective Assessment:   No complaints  Lethargic   X Awake, alert, cooperative  Impulsive    Uncooperative   Flat affect    Agitated  c/o pain    Appropriate  c/o fatigue    Confused  Treated at bedside     Emotionally labile X Treated in gym/dept.      Other:        Therapy Precautions:    Cognitive deficits  Spinal precautions    Collar - hard X Sternal precautions    Collar - soft   TLSO   X Fall risk  LSO    Hip precautions - posterior  Knee immobilizer    Hip precautions - anterior  WBAT    Impaired communication  Partial weightbearing    Oxygen  TTWB    PEG tube  NWB    Visual deficits      Hearing deficits   Other:        Treatment Objectives:     Mobility Training:    Mobility task Assist level Comments    Bed mobility     Transfer     Sit to stands transitions     Functional mobility CGA Pt ambulated ~400ft c RW. Mod v/cs for upright posture; however pt unable to maintain. Standing r/bs x3. Slow heather   Sitting balance     Standing balance      Other:        ADL Training:    ADL Assist level Comments    Feeding Setup Pt able to open containers and effectively pour liquids    Grooming/hygiene     Bathing     Upper body dressing     Lower body dressing     Toileting     Toilet transfer     Adaptive equipment training     Other:           Therapeutic Exercise:   Exercise Sets Reps Comments   UBE 1 5' Forwards and backwards                           Assessment: Patient tolerated session fair. Pt with increased fatigue and decreased motivation for tx this session. Pt would continue to benefit from skilled OT services to improve pt's safety and independence with daily occupations.    OT Plan: Continue OT POC.  Revisions made to plan of care:  No    GOALS:   Multidisciplinary Problems       Occupational Therapy Goals          Problem: Occupational Therapy    Goal Priority Disciplines Outcome Interventions   Occupational Therapy Goal     OT, PT/OT Ongoing, Progressing    Description: Goals to be met by: 5/5/2023     Patient will increase functional independence with ADLs by performing:    UE Dressing with Modified [place].  LE Dressing with Modified [place].  Toileting from toilet with Supervision for hygiene and clothing management.   Toilet transfer to toilet with Supervision.                         Skilled OT Minutes Provided: 37  Communication with Treatment Team:     Equipment recommendations:       At end of treatment, patient remained:  X Up in chair     Up in wheelchair in room    In bed   X With alarm activated    Bed rails up   X Call bell in reach     Family/friends present    Restraints secured properly    In bathroom with CNA/RN notified    In gym with PT/PTA/tech    Nurse aware    Other:

## 2023-04-25 NOTE — PLAN OF CARE
Problem: Adult Inpatient Plan of Care  Goal: Plan of Care Review  Outcome: Ongoing, Progressing  Flowsheets (Taken 4/24/2023 1927)  Plan of Care Reviewed With: patient  Goal: Patient-Specific Goal (Individualized)  Outcome: Ongoing, Progressing  Flowsheets (Taken 4/24/2023 1927)  Anxieties, Fears or Concerns: none stated  Individualized Care Needs: assistance with ambulation until end of shift 7a  Goal: Absence of Hospital-Acquired Illness or Injury  Outcome: Ongoing, Progressing  Intervention: Identify and Manage Fall Risk  Flowsheets (Taken 4/24/2023 1927)  Safety Promotion/Fall Prevention:   assistive device/personal item within reach   bed alarm set   Fall Risk reviewed with patient/family   side rails raised x 3  Intervention: Prevent Skin Injury  Flowsheets (Taken 4/24/2023 1927)  Body Position: position changed independently  Skin Protection: incontinence pads utilized  Intervention: Prevent and Manage VTE (Venous Thromboembolism) Risk  Flowsheets (Taken 4/24/2023 1927)  Activity Management: Ambulated in ferreira - L4  VTE Prevention/Management:   ambulation promoted   bleeding precations maintained   bleeding risk assessed  Range of Motion: active ROM (range of motion) encouraged  Intervention: Prevent Infection  Flowsheets (Taken 4/24/2023 1927)  Infection Prevention:   personal protective equipment utilized   environmental surveillance performed   rest/sleep promoted   single patient room provided   equipment surfaces disinfected   hand hygiene promoted

## 2023-04-25 NOTE — PT/OT/SLP PROGRESS
Physical Therapy Treatment Note           Name: Mike Urbina Jr.    : 1955 (67 y.o.)  MRN: [id number]           TREATMENT SUMMARY AND RECOMMENDATIONS:    PT Received On: 23  PT Start Time: 1345     PT Stop Time: 1408  PT Total Time (min): 23 min     Subjective Assessment:   No complaints  Lethargic    Awake, alert, cooperative  Uncooperative    Agitated x c/o pain    Appropriate x c/o fatigue    Confused  Treated at bedside     Emotionally labile  Treated in gym/dept.    Impulsive x Other: tx outside    Flat affect       Therapy Precautions:    Cognitive deficits  Spinal precautions    Collar - hard x Sternal precautions    Collar - soft   TLSO   x Fall risk  LSO    Hip precautions - posterior  Knee immobilizer    Hip precautions - anterior  WBAT    Impaired communication  Partial weightbearing    Oxygen  TTWB    PEG tube  NWB    Visual deficits  Other:    Hearing deficits          Treatment Objectives:     Mobility Training:   Assist level Comments    Bed mobility     Transfer MIN A Stand pivot outside, issues with RW negotiation on cement    Gait CGA 2 x 150 feet   Sit to stand transitions CGA From recliner, WC and outdoor bench   Sitting balance     Standing balance      Wheelchair mobility     Car transfer     Other:          Therapeutic Exercise:   Exercise Sets Reps Comments   Seated AROM B LE  10 March and hip abd/add                         Additional Comments:  Pt agreed to go outside for ambulation today. Still very fatigued    Assessment: Patient tolerated session fair.    PT Plan: continue per POC  Revisions made to plan of care: No    GOALS:   Multidisciplinary Problems       Physical Therapy Goals          Problem: Physical Therapy    Goal Priority Disciplines Outcome Goal Variances Interventions   Physical Therapy Goal     PT, PT/OT Ongoing, Progressing     Description: Goals to be met by: [place]     Patient will increase functional independence with mobility by  performin. Supine to sit with Modified [place] maintaining sternal precautions  2. Sit to stand transfer with Modified [place] maintaining sternal precautions  3. Gait x 325 feet with Modified [place] using Rolling Walker.   4. Ascend/descend 3 stair with left Handrails Modified [place] using No Assistive Device.                          Skilled PT Minutes Provided: 23   Communication with Treatment Team:     Equipment recommendations:       At end of treatment, patient remained:  x Up in chair     Up in wheelchair in room    In bed   x With alarm activated    Bed rails up   x Call bell in reach     Family/friends present    Restraints secured properly    In bathroom with CNA/RN notified   x Nurse aware    In gym with therapist/tech    Other:

## 2023-04-25 NOTE — PT/OT/SLP PROGRESS
"Occupational Therapy  Treatment    Name: Mike Urbina Jr.    : 1955 (67 y.o.)  MRN: [id number]           TREATMENT SUMMARY AND RECOMMENDATIONS:      OT Date of Treatment: 23  OT Start Time: 1305  OT Stop Time: 1320  OT Total Time (min): 15 min      Subjective Assessment:   No complaints  Lethargic   X Awake, alert, cooperative  Impulsive    Uncooperative   Flat affect    Agitated  c/o pain    Appropriate  c/o fatigue    Confused X Treated at bedside     Emotionally labile  Treated in gym/dept.      Other:        Therapy Precautions:    Cognitive deficits  Spinal precautions    Collar - hard X Sternal precautions    Collar - soft   TLSO   X Fall risk  LSO    Hip precautions - posterior  Knee immobilizer    Hip precautions - anterior  WBAT    Impaired communication  Partial weightbearing    Oxygen  TTWB    PEG tube  NWB    Visual deficits      Hearing deficits   Other:        Treatment Objectives:     Mobility Training:    Mobility task Assist level Comments    Bed mobility Min A Supine>EOB 2/2 sternal pxns   Transfer     Sit to stands transitions     Functional mobility     Sitting balance     Standing balance      Other:        ADL Training:    ADL Assist level Comments    Feeding Setup Pt able to open containers    Grooming/hygiene     Bathing     Upper body dressing     Lower body dressing     Toileting SBA For managing brief up/down   Toilet transfer Min A Pt ambulated short distances c RW EOB>toilet>recliner   Adaptive equipment training     Other:         Additional Comments:  Upon entry, pt requested urinal. Once OT informed pt urinal was on bed rail next to pt he stated, "I told yall something was wrong with me." Pt requesting sleeping medications at end of session. OT informing pt it was only 1:10PM. OT informed NSG at end of session.    Assessment: Patient tolerated session fair. Pt refused to go to gym this PM despite continued education; however was willing to get to toilet and " recliner. Pt would continue to benefit from skilled OT services to improve pt's safety and independence with daily occupations.    OT Plan: Continue OT POC.  Revisions made to plan of care: No    GOALS:   Multidisciplinary Problems       Occupational Therapy Goals          Problem: Occupational Therapy    Goal Priority Disciplines Outcome Interventions   Occupational Therapy Goal     OT, PT/OT Ongoing, Progressing    Description: Goals to be met by: 5/5/2023     Patient will increase functional independence with ADLs by performing:    UE Dressing with Modified [place].  LE Dressing with Modified [place].  Toileting from toilet with Supervision for hygiene and clothing management.   Toilet transfer to toilet with Supervision.                         Skilled OT Minutes Provided: 15  Communication with Treatment Team:     Equipment recommendations:       At end of treatment, patient remained:  X Up in chair     Up in wheelchair in room    In bed   X With alarm activated    Bed rails up   X Call bell in reach     Family/friends present    Restraints secured properly    In bathroom with CNA/RN notified    In gym with PT/PTA/tech   X Nurse aware    Other:

## 2023-04-25 NOTE — PROGRESS NOTES
HOSPITAL MEDICINE  PROGRESS NOTE        CHIEF COMPLAINT   Hospital follow up    HOSPITAL COURSE   67-year-old male with a past medical history of ETOH abuse, CAD status post CABG 04/03/2023 with Dr. Finley, thrombocytopenia, aortic stenosis, hypertension, atrial flutter/fibrillation who presented to our facility on 4/17 for rehabilitation after CABG.  At baseline lives with a roommate however sister reports discharge may be to her house or his brother's house, depending on level of assistance needed.  Patient's cognition was concerning on 04/19/2023 and an MRI was obtained that showed no evidence of acute intracranial findings.  He does have chronic periventricular white matter changes and global intracranial atrophy.  Blood pressure remains poorly controlled, nifedipine was added and subsequently increased.  Patient has remained consistently tachycardic therefore his digoxin was increased on 04/22/2023.  He required replacement of magnesium on 04/22/2023.    Today  Pain of left lower extremity is improving.  He is complaining of insomnia therefore trazodone dose was increased on 04/25.  OBJECTIVE/PHYSICAL EXAM     VITAL SIGNS (MOST RECENT):  Temp: 98 °F (36.7 °C) (04/25/23 0734)  Pulse: (!) 117 (04/25/23 0835)  Resp: 18 (04/25/23 0730)  BP: 122/79 (04/25/23 0835)  SpO2: 98 % (04/25/23 0734) VITAL SIGNS (24 HOUR RANGE):  Temp:  [97.4 °F (36.3 °C)-98.1 °F (36.7 °C)] 98 °F (36.7 °C)  Pulse:  [101-120] 117  Resp:  [18-28] 18  SpO2:  [94 %-99 %] 98 %  BP: (120-131)/(79-93) 122/79   GENERAL: In no acute distress, poor personal hygiene  HEENT:  PERRLA  CHEST: Clear to auscultation bilaterally  HEART: S1, S2, no appreciable murmur  ABDOMEN: Soft, nontender, BS +  MSK: Warm, no lower extremity edema, no clubbing or cyanosis  NEUROLOGIC: Alert and oriented to person, moving all extremities with good strength  INTEGUMENTARY: well healing sternotomy scar, left lower extremity graft site well healing, visible  bruising  PSYCHIATRY:  Flat affect    ASSESSMENT/PLAN   Multivessel CAD  - s/p CABG (4.3.23) - LIMA to LAD, rSVG to OM1, rSVG to PDA with Dr. Finley  -aspirin, atorvastatin, furosemide, metoprolol  -PT/OT     PAF/Flutter with RVR s/p DCC  -CHADsVASc - 3   -completed amiodarone taper, continue with 200 daily starting on the 20th   -digoxin started 4/18 patient remains with intermittent AF/flutter, dose increased on 04/22  -metoprolol tartrate changed to succinate on 04/23, titrating   - no AC s/p (4.3.23) - Ligation of SILVANO      Severe AS  -TAVR Evaluation on Outpatient Basis     HTN  -valsartan, nifedipine added for improved control on 04/19/2023     Toxic/Metabolic Encephalopathy   -CT head on 04/06 showed chronic microvascular ischemic changes  -MRI from 04/20/2023 showed no acute abnormal signal changes, chronic microvascular disease  -continue with thiamine  -B12 and folate unremarkable     Thrombocytopenia   Anemia  -Chronic due to myelosuppression from ETOH abuse   -required postop blood transfusion  -recovered at this time     ETOH Abuse  -reports two 6 packs daily  -p.r.n. t.i.d. Xanax        DVT prophylaxis:  Lovenox  Anticipated discharge and disposition:  Home either with his roommate or a sibling __________________________________________________________________________    LABS/MICRO/MEDS/DIAGNOSTICS       LABS  No results for input(s): NA, K, CHLORIDE, CO2, BUN, CREATININE, GLUCOSE, CALCIUM, ALKPHOS, AST, ALT, ALBUMIN in the last 72 hours.      No results for input(s): WBC, RBC, HCT, MCV, PLT in the last 72 hours.    Invalid input(s):         MICROBIOLOGY  Microbiology Results (last 7 days)       ** No results found for the last 168 hours. **               MEDICATIONS   amiodarone  200 mg Oral Daily    aspirin  81 mg Oral Daily    atorvastatin  40 mg Oral Daily    digoxin  0.25 mg Oral Daily    enoxaparin  40 mg Subcutaneous Daily    ferrous sulfate  1 tablet Oral Daily    furosemide  40 mg Oral  Daily    hydrOXYzine HCL  25 mg Oral QHS    magnesium oxide  400 mg Oral Daily    metoprolol succinate  100 mg Oral Daily    NIFEdipine  60 mg Oral Daily    thiamine  200 mg Oral Daily    traZODone  50 mg Oral QHS    valsartan  160 mg Oral BID         INFUSIONS         DIAGNOSTIC TESTS  MRI Brain Limited Without Contrast   Final Result           EF   Date Value Ref Range Status   12/29/2022 55 % Final              Case related differential diagnoses have been reviewed; assessment and plan has been documented. I have personally reviewed the labs and test results that are currently available; I have reviewed the patients medication list. I have reviewed the consulting providers recommendations. I have reviewed or attempted to review medical records based upon their availability.  All of the patient's and/or family's questions have been addressed and answered to the best of my ability.  I will continue to monitor closely and make adjustments to medical management as needed.  This document was created using M*Modal Fluency Direct.  Transcription errors may have been made.  Please contact me if any questions may rise regarding documentation to clarify transcription.        Thairy G Reyes,    04/25/2023   Internal Medicine

## 2023-04-25 NOTE — PT/OT/SLP PROGRESS
Speech Language Pathology Treatment    Patient Name:  Mike Urbina Jr.   MRN:  [id number]  Admitting Diagnosis: <principal problem not specified>    Recommendations:                 General Recommendations:  Dysphagia therapy and Cognitive-linguistic therapy  Diet recommendations:  Soft & Bite Sized Diet - IDDSI Level 6, Liquid Diet Level: Thin liquids - IDDSI Level 0   Aspiration Precautions: HOB to 90 degrees, Small bites/sips, and Standard aspiration precautions   General Precautions: Standard, fall, sternal  Communication strategies:  go to room if call light pushed    Subjective     Pt asleep in bed upon SLP arrival. Pt easily awoke to verbal stimuli.    Patient goals:      Pain/Comfort:       Respiratory Status: Room air    Objective:     Has the patient been evaluated by SLP for swallowing?   Yes  Keep patient NPO?     Current Respiratory Status:        Pt consumed a soft and bite sized lunch tray. Pt consumed lima beans, mashed potatoes/gravy and chopped meatloaf. Good toleration of solids and liquids via cup. Good oral clearance and no overt s/s of aspiration across meal. Pt appropriate to upgrade at this time. Communication board updated and diet order changed.    SLP to f/u and continue seeing pt for cognition.     Overall good session.  Cont SLP POC.    Assessment:     Mike Urbina Jr. is a 67 y.o. male with an SLP diagnosis of Dysphagia and Cognitive-Linguistic Impairment.  He presents with need for a modified diet at this time to ensure safety w/ PO. Pt also w/ decreased safety awareness and delayed recall. Skilled SLP services are warranted at this time to address above stated deficits.    Goals:   Multidisciplinary Problems       SLP Goals          Problem: SLP    Goal Priority Disciplines Outcome   SLP Goal     SLP Ongoing, Progressing   Description: LTG: Pt will tolerate LRD w/o overt s/s of aspiration.    STG:  Pt will complete laryngeal strengthening exercises and aleksandra with  minimal cues.  Pt will consume trials of thin liquid x10 w/o overt s/s of aspiration. Goal met  Pt will consume trials of soft and bite sized w/ good oral clearance and w/o overt s/s of aspiration. Goal met  Pt will utilize memory strategies to recall sternal precautions following a 3 minute filled delay.  Pt will provide solutions to problems/situations w/ 90% acc Jennifer.                       Plan:     Patient to be seen:  5 x/week   Plan of Care expires:  04/28/23  Plan of Care reviewed with:  patient   SLP Follow-Up:  Yes       Discharge recommendations:      Barriers to Discharge:  Level of Skilled Assistance Needed see Pt/Ot notes and Safety Awareness impaired    Time Tracking:     SLP Treatment Date:  4/25/23  Speech Start Time: 11:30  Speech Stop Time:  11:50   Speech Total Time (min):  20 min    Billable Minutes: Treatment Swallowing Dysfunction 20    Camila Pearce M.S., CCC-SLP   04/25/2023

## 2023-04-25 NOTE — PLAN OF CARE
Problem: Adult Inpatient Plan of Care  Goal: Plan of Care Review  Outcome: Ongoing, Progressing  Flowsheets (Taken 4/25/2023 1003)  Plan of Care Reviewed With: patient  Goal: Patient-Specific Goal (Individualized)  Outcome: Ongoing, Progressing  Flowsheets (Taken 4/25/2023 1003)  Anxieties, Fears or Concerns: None  Individualized Care Needs: Assistance with ambulation until end of shift 7p  Goal: Absence of Hospital-Acquired Illness or Injury  Outcome: Ongoing, Progressing  Intervention: Identify and Manage Fall Risk  Flowsheets (Taken 4/25/2023 1003)  Safety Promotion/Fall Prevention:   assistive device/personal item within reach   bed alarm set   chair alarm set   gait belt with ambulation   in recliner, wheels locked   medications reviewed   nonskid shoes/socks when out of bed   room near unit station   /camera at bedside   instructed to call staff for mobility  Intervention: Prevent Skin Injury  Flowsheets (Taken 4/25/2023 1003)  Body Position: position changed independently  Skin Protection: incontinence pads utilized  Intervention: Prevent and Manage VTE (Venous Thromboembolism) Risk  Flowsheets (Taken 4/25/2023 1003)  Activity Management:   Up in chair - L3   Walk with assistive devise and /or staff member - L3  VTE Prevention/Management:   ambulation promoted   bleeding precations maintained  Range of Motion: active ROM (range of motion) encouraged  Intervention: Prevent Infection  Flowsheets (Taken 4/25/2023 1003)  Infection Prevention:   hand hygiene promoted   rest/sleep promoted   single patient room provided   personal protective equipment utilized  Goal: Optimal Comfort and Wellbeing  Outcome: Ongoing, Progressing  Intervention: Monitor Pain and Promote Comfort  Flowsheets (Taken 4/25/2023 1003)  Pain Management Interventions:   care clustered   quiet environment facilitated   pillow support provided   position adjusted  Intervention: Provide Person-Centered Care  Flowsheets (Taken  4/25/2023 1003)  Trust Relationship/Rapport:   care explained   choices provided   emotional support provided   empathic listening provided   questions answered   questions encouraged   reassurance provided   thoughts/feelings acknowledged  Goal: Readiness for Transition of Care  Outcome: Ongoing, Progressing     Problem: Fall Injury Risk  Goal: Absence of Fall and Fall-Related Injury  Outcome: Ongoing, Progressing  Intervention: Identify and Manage Contributors  Flowsheets (Taken 4/25/2023 1003)  Self-Care Promotion:   independence encouraged   BADL personal objects within reach  Medication Review/Management: medications reviewed  Intervention: Promote Injury-Free Environment  Flowsheets (Taken 4/25/2023 1003)  Safety Promotion/Fall Prevention:   assistive device/personal item within reach   bed alarm set   chair alarm set   gait belt with ambulation   in recliner, wheels locked   medications reviewed   nonskid shoes/socks when out of bed   room near unit station   /camera at bedside   instructed to call staff for mobility     Problem: Infection  Goal: Absence of Infection Signs and Symptoms  Description: Goals to be met by: 5/5/2023     Patient will increase functional independence with ADLs by performing:    UE Dressing with Modified [place].  LE Dressing with Modified [place].  Toileting from toilet with Supervision for hygiene and clothing management.   Toilet transfer to toilet with Supervision.    Outcome: Ongoing, Progressing  Intervention: Prevent or Manage Infection  Flowsheets (Taken 4/25/2023 1003)  Fever Reduction/Comfort Measures:   lightweight bedding   lightweight clothing  Infection Management: aseptic technique maintained  Isolation Precautions:   protective   precautions maintained     Problem: Activity Intolerance (Cardiovascular Surgery)  Goal: Improved Activity Tolerance  Outcome: Ongoing, Progressing  Intervention: Optimize Tolerance for Activity  Flowsheets (Taken  4/25/2023 1003)  Self-Care Promotion:   independence encouraged   BADL personal objects within reach  Environmental Support:   calm environment promoted   comfort object encouraged   distractions minimized   environmental consistency promoted   rest periods encouraged

## 2023-04-25 NOTE — PLAN OF CARE
Weekly Staffing Report      Date Admitted: 4/17/2023 :   Staffing Date: 4/25/2023     Patient Active Problem List   Diagnosis    Hypertensive urgency    Acute on chronic congestive heart failure    Hyponatremia    ETOH abuse    Atrial flutter    Thrombocytopenia    Electrolyte abnormality    Coronary artery disease          Team Members Present:       Nursing Present     Yes [x]    No []    Physical Therapy Present    Yes [x]    No []    Occupational Therapy Present     Yes [x]    No []    Speech Therapy Present    Yes [x]    No []    NA []    Dietary Present    Yes [x]    No []        Physician Present   [] Belrin Duncan    [x] Thairy Reyes    [] LUCIA Leiva    [] REDD Roy     [] CECI Du      Family Present    [] Adult Children    [] Spouse    [] POA    [] Friend/ Caregiver    [] Other       Interdisciplinary Meeting Notes:  Unable to contact sister. PT- Doing great walking 300ft with RW. Progressing. OT- Doing well. Progressing. St- upgraded diet. Progressing well. Appetite good. Medically- no acute issues at this time. Patient lives alone and roommate cannot assist. He can still benefit for continued therapy to get more independent. Plan to discharge home with home health. All questions answered at this time.                 Please see Documented PT/OT/ST, Dietary, Nursing, and Physician notes for detailed treatment information.

## 2023-04-26 PROCEDURE — 97530 THERAPEUTIC ACTIVITIES: CPT

## 2023-04-26 PROCEDURE — 94760 N-INVAS EAR/PLS OXIMETRY 1: CPT

## 2023-04-26 PROCEDURE — 11000004 HC SNF PRIVATE

## 2023-04-26 PROCEDURE — 25000003 PHARM REV CODE 250: Performed by: STUDENT IN AN ORGANIZED HEALTH CARE EDUCATION/TRAINING PROGRAM

## 2023-04-26 PROCEDURE — 97110 THERAPEUTIC EXERCISES: CPT

## 2023-04-26 PROCEDURE — 97116 GAIT TRAINING THERAPY: CPT | Mod: CQ

## 2023-04-26 PROCEDURE — 97535 SELF CARE MNGMENT TRAINING: CPT

## 2023-04-26 PROCEDURE — 92507 TX SP LANG VOICE COMM INDIV: CPT

## 2023-04-26 PROCEDURE — 63600175 PHARM REV CODE 636 W HCPCS: Performed by: STUDENT IN AN ORGANIZED HEALTH CARE EDUCATION/TRAINING PROGRAM

## 2023-04-26 PROCEDURE — 97110 THERAPEUTIC EXERCISES: CPT | Mod: CQ

## 2023-04-26 RX ADMIN — FUROSEMIDE 40 MG: 40 TABLET ORAL at 08:04

## 2023-04-26 RX ADMIN — VALSARTAN 160 MG: 160 TABLET, FILM COATED ORAL at 08:04

## 2023-04-26 RX ADMIN — ATORVASTATIN CALCIUM 40 MG: 40 TABLET, FILM COATED ORAL at 08:04

## 2023-04-26 RX ADMIN — ASPIRIN 81 MG: 81 TABLET, COATED ORAL at 08:04

## 2023-04-26 RX ADMIN — AMIODARONE HYDROCHLORIDE 200 MG: 200 TABLET ORAL at 08:04

## 2023-04-26 RX ADMIN — THIAMINE HCL TAB 100 MG 200 MG: 100 TAB at 08:04

## 2023-04-26 RX ADMIN — METOPROLOL SUCCINATE 100 MG: 50 TABLET, FILM COATED, EXTENDED RELEASE ORAL at 08:04

## 2023-04-26 RX ADMIN — ENOXAPARIN SODIUM 40 MG: 40 INJECTION SUBCUTANEOUS at 04:04

## 2023-04-26 RX ADMIN — FERROUS SULFATE TAB 325 MG (65 MG ELEMENTAL FE) 1 EACH: 325 (65 FE) TAB at 08:04

## 2023-04-26 RX ADMIN — HYDROXYZINE HYDROCHLORIDE 25 MG: 25 TABLET ORAL at 08:04

## 2023-04-26 RX ADMIN — DIGOXIN 0.25 MG: 250 TABLET ORAL at 08:04

## 2023-04-26 RX ADMIN — NIFEDIPINE 60 MG: 30 TABLET, FILM COATED, EXTENDED RELEASE ORAL at 08:04

## 2023-04-26 RX ADMIN — TRAZODONE HYDROCHLORIDE 50 MG: 50 TABLET ORAL at 08:04

## 2023-04-26 RX ADMIN — Medication 400 MG: at 08:04

## 2023-04-26 NOTE — PLAN OF CARE
Problem: Occupational Therapy  Goal: Occupational Therapy Goal  Description: Goals to be met by: 5/5/2023     Patient will increase functional independence with ADLs by performing:    UE Dressing with Modified [place].- Min A  LE Dressing with Modified [place].-Mod A  Toileting from toilet with Supervision for hygiene and clothing management.-SBA  Toilet transfer to toilet with Supervision.-SBA    Outcome: Ongoing, Progressing

## 2023-04-26 NOTE — PT/OT/SLP PROGRESS
Speech Language Pathology Treatment    Patient Name:  Mike Urbina Jr.   MRN:  [id number]  Admitting Diagnosis: <principal problem not specified>    Recommendations:                 General Recommendations:  Dysphagia therapy and Cognitive-linguistic therapy  Diet recommendations:  Soft & Bite Sized Diet - IDDSI Level 6, Liquid Diet Level: Thin liquids - IDDSI Level 0   Aspiration Precautions: HOB to 90 degrees, Small bites/sips, and Standard aspiration precautions   General Precautions: Standard, fall, sternal  Communication strategies:  go to room if call light pushed    Subjective     Pt in gerichair upon SLP arrival. Pt pleasant and in good spirits.    Patient goals:      Pain/Comfort:       Respiratory Status: Room air    Objective:     Has the patient been evaluated by SLP for swallowing?   Yes  Keep patient NPO?     Current Respiratory Status:        Pt reports good toleration of soft and bite sized diet since upgrade on previous date. Pt denies coughign or choking.  Pt recalled OT's name given BC.  SLP presented sternal precautions verbally and w/ visual stim provided. Then following a 2 minute filled delay, pt recalled 2/3 I'ly. Sternal precautions reviewed. Then following a 5 minute filled delay, pt recalled 2/3 I'ly, increasing to 3/3 modA. Pt demonstrating improved recall, safety awareness and thought organization.      Overall good session.  Cont SLP POC.    Assessment:     Mike Urbina Jr. is a 67 y.o. male with an SLP diagnosis of Dysphagia and Cognitive-Linguistic Impairment.  He presents with need for a modified diet at this time to ensure safety w/ PO. Pt also w/ decreased safety awareness and delayed recall. Skilled SLP services are warranted at this time to address above stated deficits.    Goals:   Multidisciplinary Problems       SLP Goals          Problem: SLP    Goal Priority Disciplines Outcome   SLP Goal     SLP Ongoing, Progressing   Description: LTG: Pt will tolerate LRD w/o  overt s/s of aspiration.    STG:  Pt will complete laryngeal strengthening exercises and aleksandra with minimal cues.  Pt will consume trials of thin liquid x10 w/o overt s/s of aspiration. Goal met  Pt will consume trials of soft and bite sized w/ good oral clearance and w/o overt s/s of aspiration. Goal met  Pt will utilize memory strategies to recall sternal precautions following a 3 minute filled delay.  Pt will provide solutions to problems/situations w/ 90% acc Jennifer.                       Plan:     Patient to be seen:  5 x/week   Plan of Care expires:  04/28/23  Plan of Care reviewed with:  patient   SLP Follow-Up:  Yes       Discharge recommendations:      Barriers to Discharge:  Level of Skilled Assistance Needed see Pt/Ot notes and Safety Awareness impaired    Time Tracking:     SLP Treatment Date:  4/26/23  Speech Start Time: 10:44  Speech Stop Time:  11:04   Speech Total Time (min):  20 min    Billable Minutes: Speech Therapy Individual 20 cognition    Camila Pearce M.S., CCC-SLP   04/26/2023

## 2023-04-26 NOTE — PATIENT CARE CONFERENCE
Name: Mike Urbina Jr.    : 1955 (67 y.o.)  MRN: [id number]            Interdisciplinary Team Conference     Case conference held with patient/family and care team to discuss progress, plan of care, barriers to be addressed for safe return home, equipment recommendations, and discharge planning. Communicated therapy progress with MD, RN, therapy clinicians and case management. All questions/concerns answered.

## 2023-04-26 NOTE — PROGRESS NOTES
Name: Mike Urbina Jr.    : 1955 (67 y.o.)  MRN: [id number]            Interdisciplinary Team Conference     Case conference held with patient/family and care team to discuss progress, plan of care, barriers to be addressed for safe return home, equipment recommendations, and discharge planning. Communicated therapy progress with MD, RN, therapy clinicians and case management. All questions/concerns answered.

## 2023-04-26 NOTE — PT/OT/SLP PROGRESS
Physical Therapy Treatment Note           Name: Mike Urbina Jr.    : 1955 (67 y.o.)  MRN: [id number]           TREATMENT SUMMARY AND RECOMMENDATIONS:    PT Received On: 23  PT Start Time: 1300     PT Stop Time: 1325  PT Total Time (min): 25 min     Subjective Assessment:  x No complaints  Lethargic    Awake, alert, cooperative  Uncooperative    Agitated  c/o pain    Appropriate  c/o fatigue    Confused  Treated at bedside     Emotionally labile  Treated in gym/dept.    Impulsive  Other:    Flat affect       Therapy Precautions:    Cognitive deficits  Spinal precautions    Collar - hard  Sternal precautions    Collar - soft   TLSO    Fall risk  LSO    Hip precautions - posterior  Knee immobilizer    Hip precautions - anterior  WBAT    Impaired communication  Partial weightbearing    Oxygen  TTWB    PEG tube  NWB    Visual deficits  Other:    Hearing deficits          Treatment Objectives:     Mobility Training:   Assist level Comments    Bed mobility     Transfer     Gait SBA Amb on firm surface with  ft    Sit to stand transitions CGA Sit-stand 5 reps with no UE use   Sitting balance     Standing balance      Wheelchair mobility     Car transfer     Other:          Therapeutic Exercise:   Exercise Sets Reps Comments   B LE exer sitting  20 reps   Ankle pumps, TKE, abd/add, knee lifts                          Additional Comments:  Same over all status     Assessment: Patient tolerated session well.    PT Plan: continue  Revisions made to plan of care: No    GOALS:   Multidisciplinary Problems       Physical Therapy Goals          Problem: Physical Therapy    Goal Priority Disciplines Outcome Goal Variances Interventions   Physical Therapy Goal     PT, PT/OT Ongoing, Progressing     Description: Goals to be met by: [place]     Patient will increase functional independence with mobility by performin. Supine to sit with Modified [place] maintaining sternal  precautions  2. Sit to stand transfer with Modified [place] maintaining sternal precautions  3. Gait x 325 feet with Modified [place] using Rolling Walker.   4. Ascend/descend 3 stair with left Handrails Modified [place] using No Assistive Device.                          Skilled PT Minutes Provided: 25   Communication with Treatment Team:     Equipment recommendations:       At end of treatment, patient remained:   Up in chair     Up in wheelchair in room    In bed    With alarm activated    Bed rails up    Call bell in reach     Family/friends present    Restraints secured properly    In bathroom with CNA/RN notified    Nurse aware   x In gym with therapist/tech    Other:

## 2023-04-26 NOTE — PROGRESS NOTES
HOSPITAL MEDICINE  PROGRESS NOTE        CHIEF COMPLAINT   Hospital follow up    HOSPITAL COURSE   67-year-old male with a past medical history of ETOH abuse, CAD status post CABG 04/03/2023 with Dr. Finley, thrombocytopenia, aortic stenosis, hypertension, atrial flutter/fibrillation who presented to our facility on 4/17 for rehabilitation after CABG.  At baseline lives with a roommate however sister reports discharge may be to her house or his brother's house, depending on level of assistance needed.  Patient's cognition was concerning on 04/19/2023 and an MRI was obtained that showed no evidence of acute intracranial findings.  He does have chronic periventricular white matter changes and global intracranial atrophy.  Blood pressure remains poorly controlled, nifedipine was added and subsequently increased.  Patient has remained consistently tachycardic therefore his digoxin was increased on 04/22/2023.  He required replacement of magnesium on 04/22/2023.    Today  Therapy reports he is ambulating with CGA and is supervision/Jennifer for ADLs, would benefit from continued rehab as he lives alone.  OBJECTIVE/PHYSICAL EXAM     VITAL SIGNS (MOST RECENT):  Temp: 97.8 °F (36.6 °C) (04/26/23 1200)  Pulse: 62 (04/26/23 1200)  Resp: 18 (04/26/23 1200)  BP: (!) 106/59 (04/26/23 1200)  SpO2: 98 % (04/26/23 1200) VITAL SIGNS (24 HOUR RANGE):  Temp:  [97.5 °F (36.4 °C)-97.8 °F (36.6 °C)] 97.8 °F (36.6 °C)  Pulse:  [] 62  Resp:  [16-18] 18  SpO2:  [93 %-98 %] 98 %  BP: (106-142)/(59-88) 106/59   GENERAL: In no acute distress, poor personal hygiene  HEENT:  PERRLA  CHEST: Clear to auscultation bilaterally  HEART: S1, S2, no appreciable murmur  ABDOMEN: Soft, nontender, BS +  MSK: Warm, no lower extremity edema, no clubbing or cyanosis  NEUROLOGIC: Alert and oriented to person, moving all extremities with good strength  INTEGUMENTARY: well healing sternotomy scar, left lower extremity graft site well healing, visible  bruising  PSYCHIATRY:  Flat affect    ASSESSMENT/PLAN   Multivessel CAD  - s/p CABG (4.3.23) - LIMA to LAD, rSVG to OM1, rSVG to PDA with Dr. Finley  -aspirin, atorvastatin, furosemide, metoprolol  -PT/OT     PAF/Flutter with RVR s/p DCC  -CHADsVASc - 3   -completed amiodarone taper, continue with 200 daily starting on the 20th   -digoxin started 4/18 patient remains with intermittent AF/flutter, dose increased on 04/22  -metoprolol tartrate changed to succinate on 04/23, titrating   - no AC s/p (4.3.23) - Ligation of SILVANO      Severe AS  -TAVR Evaluation on Outpatient Basis     HTN  -valsartan, nifedipine added for improved control on 04/19/2023     Toxic/Metabolic Encephalopathy   -CT head on 04/06 showed chronic microvascular ischemic changes  -MRI from 04/20/2023 showed no acute abnormal signal changes, chronic microvascular disease  -continue with thiamine  -B12 and folate unremarkable     Thrombocytopenia   Anemia  -Chronic due to myelosuppression from ETOH abuse   -required postop blood transfusion  -recovered at this time     ETOH Abuse  -reports two 6 packs daily  -p.r.n. t.i.d. Xanax decreased to daily PRN        DVT prophylaxis:  Lovenox  Anticipated discharge and disposition:  Home either with his roommate or a sibling __________________________________________________________________________    LABS/MICRO/MEDS/DIAGNOSTICS       LABS  No results for input(s): NA, K, CHLORIDE, CO2, BUN, CREATININE, GLUCOSE, CALCIUM, ALKPHOS, AST, ALT, ALBUMIN in the last 72 hours.      No results for input(s): WBC, RBC, HCT, MCV, PLT in the last 72 hours.    Invalid input(s):         MICROBIOLOGY  Microbiology Results (last 7 days)       ** No results found for the last 168 hours. **               MEDICATIONS   amiodarone  200 mg Oral Daily    aspirin  81 mg Oral Daily    atorvastatin  40 mg Oral Daily    digoxin  0.25 mg Oral Daily    enoxaparin  40 mg Subcutaneous Daily    ferrous sulfate  1 tablet Oral Daily     furosemide  40 mg Oral Daily    hydrOXYzine HCL  25 mg Oral QHS    magnesium oxide  400 mg Oral Daily    metoprolol succinate  100 mg Oral Daily    NIFEdipine  60 mg Oral Daily    thiamine  200 mg Oral Daily    traZODone  50 mg Oral QHS    valsartan  160 mg Oral BID         INFUSIONS         DIAGNOSTIC TESTS  MRI Brain Limited Without Contrast   Final Result           EF   Date Value Ref Range Status   12/29/2022 55 % Final              Case related differential diagnoses have been reviewed; assessment and plan has been documented. I have personally reviewed the labs and test results that are currently available; I have reviewed the patients medication list. I have reviewed the consulting providers recommendations. I have reviewed or attempted to review medical records based upon their availability.  All of the patient's and/or family's questions have been addressed and answered to the best of my ability.  I will continue to monitor closely and make adjustments to medical management as needed.  This document was created using [employer]*Modal Fluency Direct.  Transcription errors may have been made.  Please contact me if any questions may rise regarding documentation to clarify transcription.        Thairy G Reyes,    04/26/2023   Internal Medicine

## 2023-04-26 NOTE — PLAN OF CARE
Problem: Adult Inpatient Plan of Care  Goal: Plan of Care Review  Outcome: Ongoing, Progressing  Flowsheets (Taken 4/26/2023 0800)  Plan of Care Reviewed With: patient  Goal: Patient-Specific Goal (Individualized)  Outcome: Ongoing, Progressing  Flowsheets (Taken 4/26/2023 0800)  Anxieties, Fears or Concerns: none  Individualized Care Needs: assitance with ambulation  Goal: Absence of Hospital-Acquired Illness or Injury  Outcome: Ongoing, Progressing  Intervention: Identify and Manage Fall Risk  Flowsheets (Taken 4/26/2023 0800)  Safety Promotion/Fall Prevention:   assistive device/personal item within reach   bed alarm set   Fall Risk reviewed with patient/family   side rails raised x 3  Intervention: Prevent Skin Injury  Flowsheets (Taken 4/26/2023 0800)  Body Position: position changed independently  Skin Protection: incontinence pads utilized  Intervention: Prevent and Manage VTE (Venous Thromboembolism) Risk  Flowsheets (Taken 4/26/2023 0800)  Activity Management: Up in chair - L3  VTE Prevention/Management: ambulation promoted  Range of Motion: active ROM (range of motion) encouraged  Intervention: Prevent Infection  Flowsheets (Taken 4/26/2023 0800)  Infection Prevention:   hand hygiene promoted   rest/sleep promoted   single patient room provided   personal protective equipment utilized  Goal: Optimal Comfort and Wellbeing  Outcome: Ongoing, Progressing  Intervention: Monitor Pain and Promote Comfort  Flowsheets (Taken 4/26/2023 0800)  Pain Management Interventions:   care clustered   quiet environment facilitated   pillow support provided   position adjusted  Intervention: Provide Person-Centered Care  Flowsheets (Taken 4/26/2023 0800)  Trust Relationship/Rapport: care explained  Goal: Readiness for Transition of Care  Outcome: Ongoing, Progressing     Problem: Fall Injury Risk  Goal: Absence of Fall and Fall-Related Injury  Outcome: Ongoing, Progressing  Intervention: Identify and Manage  Contributors  Flowsheets (Taken 4/26/2023 0800)  Self-Care Promotion:   independence encouraged   BADL personal objects within reach  Medication Review/Management: medications reviewed  Intervention: Promote Injury-Free Environment  Flowsheets (Taken 4/26/2023 0800)  Safety Promotion/Fall Prevention:   assistive device/personal item within reach   bed alarm set   Fall Risk reviewed with patient/family   side rails raised x 3     Problem: Infection  Goal: Absence of Infection Signs and Symptoms  Description: Goals to be met by: 5/5/2023     Patient will increase functional independence with ADLs by performing:    UE Dressing with Modified [place].  LE Dressing with Modified [place].  Toileting from toilet with Supervision for hygiene and clothing management.   Toilet transfer to toilet with Supervision.    Outcome: Ongoing, Progressing  Intervention: Prevent or Manage Infection  Flowsheets (Taken 4/26/2023 0800)  Fever Reduction/Comfort Measures:   lightweight bedding   lightweight clothing  Isolation Precautions:   protective   precautions maintained     Problem: Activity Intolerance (Cardiovascular Surgery)  Goal: Improved Activity Tolerance  Outcome: Ongoing, Progressing  Intervention: Optimize Tolerance for Activity  Flowsheets (Taken 4/26/2023 0800)  Self-Care Promotion:   independence encouraged   BADL personal objects within reach  Environmental Support:   calm environment promoted   comfort object encouraged   environmental consistency promoted   rest periods encouraged

## 2023-04-26 NOTE — PT/OT/SLP PROGRESS
Name: Mike Urbina Jr.    : 1955 (67 y.o.)  MRN: [id number]            Interdisciplinary Team Conference     Case conference held with patient/family and care team to discuss progress, plan of care, barriers to be addressed for safe return home, equipment recommendations, and discharge planning. Communicated therapy progress with MD, RN, therapy clinicians and case management. All questions/concerns answered.

## 2023-04-26 NOTE — PT/OT/SLP PROGRESS
Physical Therapy Treatment Note           Name: Mike Urbina Jr.    : 1955 (67 y.o.)  MRN: [id number]           TREATMENT SUMMARY AND RECOMMENDATIONS:    PT Received On: 23  PT Start Time: 1000     PT Stop Time: 1025  PT Total Time (min): 25 min     Subjective Assessment:  x No complaints  Lethargic    Awake, alert, cooperative  Uncooperative    Agitated  c/o pain    Appropriate  c/o fatigue    Confused  Treated at bedside     Emotionally labile  Treated in gym/dept.    Impulsive  Other:    Flat affect       Therapy Precautions:    Cognitive deficits  Spinal precautions    Collar - hard  Sternal precautions    Collar - soft   TLSO    Fall risk  LSO    Hip precautions - posterior  Knee immobilizer    Hip precautions - anterior  WBAT    Impaired communication  Partial weightbearing    Oxygen  TTWB    PEG tube  NWB    Visual deficits  Other:    Hearing deficits          Treatment Objectives:     Mobility Training:   Assist level Comments    Bed mobility     Transfer     Gait SBA Amb on firm surface with  ft    Sit to stand transitions CGA Sit-stand 5 reps with no UE use   Sitting balance     Standing balance      Wheelchair mobility     Car transfer     Other:          Therapeutic Exercise:   Exercise Sets Reps Comments   B LE exer standing  10 reps B UE supported Abd/add, knee lifts    B LE exer sitting  2# B ankle wts 20 reps Ankle pumps, TKE, abd/add, knee lifts    UBE  10 min  UBE with B LE use             Additional Comments:  No issues noted    Assessment: Patient tolerated session well.    PT Plan: continue  Revisions made to plan of care: No    GOALS:   Multidisciplinary Problems       Physical Therapy Goals          Problem: Physical Therapy    Goal Priority Disciplines Outcome Goal Variances Interventions   Physical Therapy Goal     PT, PT/OT Ongoing, Progressing     Description: Goals to be met by: Dishcarge     Patient will increase functional independence with mobility by  performin. Supine to sit with Modified [place] maintaining sternal precautions  2. Sit to stand transfer with Modified [place] maintaining sternal precautions  3. Gait x 325 feet with Modified [place] using Rolling Walker.   4. Ascend/descend 3 stair with left Handrails Modified [place] using No Assistive Device.                          Skilled PT Minutes Provided: 25   Communication with Treatment Team:     Equipment recommendations:       At end of treatment, patient remained:  x Up in chair     Up in wheelchair in room    In bed   x With alarm activated    Bed rails up   x Call bell in reach     Family/friends present    Restraints secured properly    In bathroom with CNA/RN notified    Nurse aware    In gym with therapist/tech    Other:

## 2023-04-26 NOTE — PT/OT/SLP PROGRESS
Occupational Therapy  Treatment    Name: Mike Urbina Jr.    : 1955 (67 y.o.)  MRN: [id number]           TREATMENT SUMMARY AND RECOMMENDATIONS:      OT Date of Treatment: 23  OT Start Time: 1330  OT Stop Time: 1353  OT Total Time (min): 23 min      Subjective Assessment:   No complaints  Lethargic   X Awake, alert, cooperative  Impulsive    Uncooperative   Flat affect    Agitated  c/o pain    Appropriate  c/o fatigue    Confused  Treated at bedside     Emotionally labile X Treated in gym/dept.      Other:        Therapy Precautions:    Cognitive deficits  Spinal precautions    Collar - hard X Sternal precautions    Collar - soft   TLSO   X Fall risk  LSO    Hip precautions - posterior  Knee immobilizer    Hip precautions - anterior  WBAT    Impaired communication  Partial weightbearing    Oxygen  TTWB    PEG tube  NWB    Visual deficits      Hearing deficits   Other:        Treatment Objectives:     Mobility Training:    Mobility task Assist level Comments    Bed mobility     Transfer     Sit to stands transitions Min A    Functional mobility SBA Pt ambulated ~400ft c RW. Standing r/bs x2.    Sitting balance     Standing balance      Other:            Therapeutic Exercise:   Exercise Sets Reps Comments   UB strengthening 2 10 With 3# dowel, pt perf chest press, straight arm raises, bicep curls, forward rows, backwards rows                           Additional Comments:  Max v/cs to recall AM session.     Assessment: Patient tolerated session well. Pt continued to require max encouragement for participation. Pt would continue to benefit from skilled OT services to improve pt's safety and independence with daily occupations.      OT Plan: Continue OT POC.  Revisions made to plan of care: Yes, updated goals    GOALS:   Multidisciplinary Problems       Occupational Therapy Goals          Problem: Occupational Therapy    Goal Priority Disciplines Outcome Interventions   Occupational Therapy Goal      OT, PT/OT Ongoing, Progressing    Description: Goals to be met by: 5/5/2023     Patient will increase functional independence with ADLs by performing:    UE Dressing with Modified [place].- Min A  LE Dressing with Modified [place].-Mod A  Toileting from toilet with Supervision for hygiene and clothing management.-SBA  Toilet transfer to toilet with Supervision.-SBA                         Skilled OT Minutes Provided: 23  Communication with Treatment Team:     Equipment recommendations:       At end of treatment, patient remained:  X Up in chair     Up in wheelchair in room    In bed   X With alarm activated    Bed rails up   X Call bell in reach     Family/friends present    Restraints secured properly    In bathroom with CNA/RN notified    In gym with PT/PTA/tech    Nurse aware    Other:

## 2023-04-27 LAB
ANION GAP SERPL CALC-SCNC: 9 MEQ/L
BASOPHILS # BLD AUTO: 0.01 X10(3)/MCL (ref 0–0.2)
BASOPHILS NFR BLD AUTO: 0.3 %
BUN SERPL-MCNC: 16 MG/DL (ref 8.4–25.7)
CALCIUM SERPL-MCNC: 8.9 MG/DL (ref 8.8–10)
CHLORIDE SERPL-SCNC: 107 MMOL/L (ref 98–107)
CO2 SERPL-SCNC: 25 MMOL/L (ref 23–31)
CREAT SERPL-MCNC: 0.99 MG/DL (ref 0.73–1.18)
CREAT/UREA NIT SERPL: 16
EOSINOPHIL # BLD AUTO: 0.08 X10(3)/MCL (ref 0–0.9)
EOSINOPHIL NFR BLD AUTO: 2.5 %
ERYTHROCYTE [DISTWIDTH] IN BLOOD BY AUTOMATED COUNT: 14.8 % (ref 11.5–17)
GFR SERPLBLD CREATININE-BSD FMLA CKD-EPI: >60 MLS/MIN/1.73/M2
GLUCOSE SERPL-MCNC: 84 MG/DL (ref 82–115)
HCT VFR BLD AUTO: 30.9 % (ref 42–52)
HGB BLD-MCNC: 9.4 G/DL (ref 14–18)
IMM GRANULOCYTES # BLD AUTO: 0 X10(3)/MCL (ref 0–0.04)
IMM GRANULOCYTES NFR BLD AUTO: 0 %
LYMPHOCYTES # BLD AUTO: 0.82 X10(3)/MCL (ref 0.6–4.6)
LYMPHOCYTES NFR BLD AUTO: 26 %
MCH RBC QN AUTO: 27.5 PG (ref 27–31)
MCHC RBC AUTO-ENTMCNC: 30.4 G/DL (ref 33–36)
MCV RBC AUTO: 90.4 FL (ref 80–94)
MONOCYTES # BLD AUTO: 0.35 X10(3)/MCL (ref 0.1–1.3)
MONOCYTES NFR BLD AUTO: 11.1 %
NEUTROPHILS # BLD AUTO: 1.89 X10(3)/MCL (ref 2.1–9.2)
NEUTROPHILS NFR BLD AUTO: 60.1 %
PLATELET # BLD AUTO: 125 X10(3)/MCL (ref 130–400)
PMV BLD AUTO: 11 FL (ref 7.4–10.4)
POTASSIUM SERPL-SCNC: 3.6 MMOL/L (ref 3.5–5.1)
RBC # BLD AUTO: 3.42 X10(6)/MCL (ref 4.7–6.1)
SODIUM SERPL-SCNC: 141 MMOL/L (ref 136–145)
WBC # SPEC AUTO: 3.2 X10(3)/MCL (ref 4.5–11.5)

## 2023-04-27 PROCEDURE — 25000003 PHARM REV CODE 250: Performed by: STUDENT IN AN ORGANIZED HEALTH CARE EDUCATION/TRAINING PROGRAM

## 2023-04-27 PROCEDURE — 85025 COMPLETE CBC W/AUTO DIFF WBC: CPT | Performed by: STUDENT IN AN ORGANIZED HEALTH CARE EDUCATION/TRAINING PROGRAM

## 2023-04-27 PROCEDURE — 94760 N-INVAS EAR/PLS OXIMETRY 1: CPT

## 2023-04-27 PROCEDURE — 97535 SELF CARE MNGMENT TRAINING: CPT

## 2023-04-27 PROCEDURE — 11000004 HC SNF PRIVATE

## 2023-04-27 PROCEDURE — 80048 BASIC METABOLIC PNL TOTAL CA: CPT | Performed by: STUDENT IN AN ORGANIZED HEALTH CARE EDUCATION/TRAINING PROGRAM

## 2023-04-27 PROCEDURE — 63600175 PHARM REV CODE 636 W HCPCS: Performed by: STUDENT IN AN ORGANIZED HEALTH CARE EDUCATION/TRAINING PROGRAM

## 2023-04-27 PROCEDURE — 92526 ORAL FUNCTION THERAPY: CPT

## 2023-04-27 PROCEDURE — 97530 THERAPEUTIC ACTIVITIES: CPT

## 2023-04-27 PROCEDURE — 97129 THER IVNTJ 1ST 15 MIN: CPT

## 2023-04-27 PROCEDURE — 97116 GAIT TRAINING THERAPY: CPT | Mod: CQ

## 2023-04-27 PROCEDURE — 97110 THERAPEUTIC EXERCISES: CPT | Mod: CQ

## 2023-04-27 RX ADMIN — ATORVASTATIN CALCIUM 40 MG: 40 TABLET, FILM COATED ORAL at 09:04

## 2023-04-27 RX ADMIN — DIGOXIN 0.25 MG: 250 TABLET ORAL at 09:04

## 2023-04-27 RX ADMIN — AMIODARONE HYDROCHLORIDE 200 MG: 200 TABLET ORAL at 09:04

## 2023-04-27 RX ADMIN — FERROUS SULFATE TAB 325 MG (65 MG ELEMENTAL FE) 1 EACH: 325 (65 FE) TAB at 09:04

## 2023-04-27 RX ADMIN — HYDROXYZINE HYDROCHLORIDE 25 MG: 25 TABLET ORAL at 10:04

## 2023-04-27 RX ADMIN — FUROSEMIDE 40 MG: 40 TABLET ORAL at 09:04

## 2023-04-27 RX ADMIN — TRAZODONE HYDROCHLORIDE 50 MG: 50 TABLET ORAL at 10:04

## 2023-04-27 RX ADMIN — ASPIRIN 81 MG: 81 TABLET, COATED ORAL at 09:04

## 2023-04-27 RX ADMIN — Medication 400 MG: at 09:04

## 2023-04-27 RX ADMIN — ENOXAPARIN SODIUM 40 MG: 40 INJECTION SUBCUTANEOUS at 04:04

## 2023-04-27 RX ADMIN — HYDROCODONE BITARTRATE AND ACETAMINOPHEN 1 TABLET: 5; 325 TABLET ORAL at 09:04

## 2023-04-27 RX ADMIN — METOPROLOL SUCCINATE 100 MG: 50 TABLET, FILM COATED, EXTENDED RELEASE ORAL at 09:04

## 2023-04-27 RX ADMIN — MELATONIN TAB 3 MG 9 MG: 3 TAB at 10:04

## 2023-04-27 RX ADMIN — VALSARTAN 160 MG: 160 TABLET, FILM COATED ORAL at 09:04

## 2023-04-27 RX ADMIN — NIFEDIPINE 60 MG: 30 TABLET, FILM COATED, EXTENDED RELEASE ORAL at 09:04

## 2023-04-27 RX ADMIN — VALSARTAN 160 MG: 160 TABLET, FILM COATED ORAL at 10:04

## 2023-04-27 RX ADMIN — THIAMINE HCL TAB 100 MG 200 MG: 100 TAB at 09:04

## 2023-04-27 NOTE — PROGRESS NOTES
HOSPITAL MEDICINE  PROGRESS NOTE    CHIEF COMPLAINT   Hospital follow up    HOSPITAL COURSE   67-year-old male with a past medical history of ETOH abuse, CAD status post CABG 04/03/2023 with Dr. Finley, thrombocytopenia, aortic stenosis, hypertension, atrial flutter/fibrillation who presented to our facility on 4/17 for rehabilitation after CABG.  At baseline lives with a roommate however sister reports discharge may be to her house or his brother's house, depending on level of assistance needed.  Patient's cognition was concerning on 04/19/2023 and an MRI was obtained that showed no evidence of acute intracranial findings.  He does have chronic periventricular white matter changes and global intracranial atrophy.  Blood pressure remains poorly controlled, nifedipine was added and subsequently increased.  Patient has remained consistently tachycardic therefore his digoxin was increased on 04/22/2023.  He required replacement of magnesium on 04/22/2023.    Today  Therapy reports he is ambulating 20ft with CGA and is supervision/Jennifer for ADLs, would benefit from continued rehab as he lives alone.  OBJECTIVE/PHYSICAL EXAM     VITAL SIGNS (MOST RECENT):  Temp: 97.9 °F (36.6 °C) (04/27/23 1513)  Pulse: 62 (04/27/23 1513)  Resp: 18 (04/27/23 1000)  BP: (!) 152/77 (04/27/23 1513)  SpO2: (!) 94 % (04/27/23 1513) VITAL SIGNS (24 HOUR RANGE):  Temp:  [97.4 °F (36.3 °C)-97.9 °F (36.6 °C)] 97.9 °F (36.6 °C)  Pulse:  [60-71] 62  Resp:  [16-19] 18  SpO2:  [94 %-98 %] 94 %  BP: (106-163)/(59-82) 152/77   GENERAL: In no acute distress, poor personal hygiene  HEENT:  PERRLA  CHEST: Clear to auscultation bilaterally  HEART: S1, S2, no appreciable murmur  ABDOMEN: Soft, nontender, BS +  MSK: Warm, no lower extremity edema, no clubbing or cyanosis  NEUROLOGIC: Alert and oriented to person, moving all extremities with good strength  INTEGUMENTARY: well healing sternotomy scar, left lower extremity graft site well healing, visible  bruising  PSYCHIATRY:  Flat affect    ASSESSMENT/PLAN   Multivessel CAD  - s/p CABG (4.3.23) - LIMA to LAD, rSVG to OM1, rSVG to PDA with Dr. Finley  -aspirin, atorvastatin, furosemide, metoprolol  -PT/OT     PAF/Flutter with RVR s/p DCCV  -CHADsVASc - 3   -completed amiodarone taper, continue with 200 daily starting on the 20th   -digoxin started 4/18 patient remains with intermittent AF/flutter, dose increased on 04/22  -metoprolol tartrate changed to succinate on 04/23, titrating   - no AC s/p (4.3.23) - Ligation of SILVANO      Severe AS  -TAVR Evaluation on Outpatient Basis     HTN  -valsartan, nifedipine added for improved control on 04/19/2023     Toxic/Metabolic Encephalopathy   -CT head on 04/06 showed chronic microvascular ischemic changes  -MRI from 04/20/2023 showed no acute abnormal signal changes, chronic microvascular disease  -continue with thiamine  -B12 and folate unremarkable     Thrombocytopenia   Anemia  -Chronic due to myelosuppression from ETOH abuse   -required postop blood transfusion  -recovered at this time     ETOH Abuse  -reports two 6 packs daily  -p.r.n. t.i.d. Xanax decreased to daily PRN        DVT prophylaxis:  Lovenox  Anticipated discharge and disposition:  Home either with his roommate or a sibling __________________________________________________________________________    LABS/MICRO/MEDS/DIAGNOSTICS       LABS  Recent Labs     04/27/23  0601      K 3.6   CHLORIDE 107   CO2 25   BUN 16.0   CREATININE 0.99   GLUCOSE 84   CALCIUM 8.9         Recent Labs     04/27/23  0601   WBC 3.2*   RBC 3.42*   HCT 30.9*   MCV 90.4   *           MICROBIOLOGY  Microbiology Results (last 7 days)       ** No results found for the last 168 hours. **               MEDICATIONS   amiodarone  200 mg Oral Daily    aspirin  81 mg Oral Daily    atorvastatin  40 mg Oral Daily    digoxin  0.25 mg Oral Daily    enoxaparin  40 mg Subcutaneous Daily    ferrous sulfate  1 tablet Oral Daily     furosemide  40 mg Oral Daily    hydrOXYzine HCL  25 mg Oral QHS    magnesium oxide  400 mg Oral Daily    metoprolol succinate  100 mg Oral Daily    NIFEdipine  60 mg Oral Daily    thiamine  200 mg Oral Daily    traZODone  50 mg Oral QHS    valsartan  160 mg Oral BID         INFUSIONS         DIAGNOSTIC TESTS  MRI Brain Limited Without Contrast   Final Result           EF   Date Value Ref Range Status   12/29/2022 55 % Final              Case related differential diagnoses have been reviewed; assessment and plan has been documented. I have personally reviewed the labs and test results that are currently available; I have reviewed the patients medication list. I have reviewed the consulting providers recommendations. I have reviewed or attempted to review medical records based upon their availability.  All of the patient's and/or family's questions have been addressed and answered to the best of my ability.  I will continue to monitor closely and make adjustments to medical management as needed.  This document was created using [employer]*Modal Fluency Direct.  Transcription errors may have been made.  Please contact me if any questions may rise regarding documentation to clarify transcription.        Thairy G Reyes,    04/27/2023   Internal Medicine

## 2023-04-27 NOTE — PT/OT/SLP PROGRESS
Physical Therapy Treatment Note           Name: Mike Urbina Jr.    : 1955 (67 y.o.)  MRN: [id number]           TREATMENT SUMMARY AND RECOMMENDATIONS:    PT Received On: 23  PT Start Time: 1330     PT Stop Time: 1345  PT Total Time (min): 15 min     Subjective Assessment:   No complaints  Lethargic    Awake, alert, cooperative  Uncooperative    Agitated x c/o pain    Appropriate  c/o fatigue    Confused  Treated at bedside     Emotionally labile  Treated in gym/dept.    Impulsive  Other:    Flat affect       Therapy Precautions:    Cognitive deficits  Spinal precautions    Collar - hard  Sternal precautions    Collar - soft   TLSO    Fall risk  LSO    Hip precautions - posterior  Knee immobilizer    Hip precautions - anterior  WBAT    Impaired communication  Partial weightbearing    Oxygen  TTWB    PEG tube  NWB    Visual deficits  Other:    Hearing deficits          Treatment Objectives:     Mobility Training:   Assist level Comments    Bed mobility     Transfer     Gait CGA Amb on firm surface with  ft    Sit to stand transitions     Sitting balance     Standing balance      Wheelchair mobility     Car transfer     Other:          Therapeutic Exercise:   Exercise Sets Reps Comments                               Additional Comments:  L LE pain reported    Assessment: Patient tolerated session fair.    PT Plan: continue  Revisions made to plan of care: No    GOALS:   Multidisciplinary Problems       Physical Therapy Goals          Problem: Physical Therapy    Goal Priority Disciplines Outcome Goal Variances Interventions   Physical Therapy Goal     PT, PT/OT Ongoing, Progressing     Description: Goals to be met by: Dishcarge     Patient will increase functional independence with mobility by performin. Supine to sit with Modified [place] maintaining sternal precautions  2. Sit to stand transfer with Modified [place] maintaining sternal precautions  3. Gait x 325  feet with Modified [place] using Rolling Walker.   4. Ascend/descend 3 stair with left Handrails Modified [place] using No Assistive Device.                          Skilled PT Minutes Provided: 15   Communication with Treatment Team:     Equipment recommendations:       At end of treatment, patient remained:  x Up in chair     Up in wheelchair in room    In bed   x With alarm activated    Bed rails up   x Call bell in reach     Family/friends present    Restraints secured properly    In bathroom with CNA/RN notified    Nurse aware    In gym with therapist/tech    Other:

## 2023-04-27 NOTE — PLAN OF CARE
[facility] - Medical Surgical Unit  Discharge Reassessment    Primary Care Provider: LOREN Jerry    Expected Discharge Date:     Reassessment (most recent)       Discharge Reassessment - 04/27/23 1810          Discharge Reassessment    Assessment Type Discharge Planning Reassessment     Did the patient's condition or plan change since previous assessment? No     Discharge Plan discussed with: Sibling     Name(s) and Number(s) Ashleigh singleton      Communicated [place] with patient/caregiver Date not available/Unable to determine     Discharge Plan A Home with family;Home Health     DME Needed Upon Discharge  walker, standard     Discharge Barriers Identified None     Why the patient remains in the hospital Requires continued medical care        Post-Acute Status    Post-Acute Authorization Home Health     Home Health Status Pending medical clearance/testing     Hospital Resources/Appts/Education Provided Provided patient/caregiver with written discharge plan information     Patient choice form signed by patient/caregiver List with quality metrics by geographic area provided     Discharge Delays None known at this time

## 2023-04-27 NOTE — PT/OT/SLP PROGRESS
"Occupational Therapy  Treatment    Name: Mike Urbina Jr.    : 1955 (67 y.o.)  MRN: [id number]           TREATMENT SUMMARY AND RECOMMENDATIONS:      OT Date of Treatment: 23  OT Start Time: 900  OT Stop Time: 923  OT Total Time (min): 23 min      Subjective Assessment:   No complaints  Lethargic   X Awake, alert, cooperative  Impulsive    Uncooperative   Flat affect    Agitated  c/o pain    Appropriate  c/o fatigue    Confused X Treated at bedside     Emotionally labile X Treated in gym/dept.      Other:        Therapy Precautions:    Cognitive deficits  Spinal precautions    Collar - hard X Sternal precautions    Collar - soft   TLSO   X Fall risk  LSO    Hip precautions - posterior  Knee immobilizer    Hip precautions - anterior  WBAT    Impaired communication  Partial weightbearing    Oxygen  TTWB    PEG tube  NWB    Visual deficits      Hearing deficits   Other:        Treatment Objectives:     Mobility Training:    Mobility task Assist level Comments    Bed mobility     Transfer     Sit to stands transitions     Functional mobility SBA Pt ambulated ~400ft c RW. Min length standing r/bs x2.    Sitting balance     Standing balance      Other:        ADL Training:    ADL Assist level Comments    Feeding     Grooming/hygiene Setup To comb hair standing at sink c RW   Bathing     Upper body dressing     Lower body dressing SBA To don pants   Toileting     Toilet transfer     Adaptive equipment training     Other:           Additional Comments:  Pt stated, "I'm not doing those same exercises I did yesterday. I just want to walk."     Assessment: Patient tolerated session well. Pt demonstrated improvements in LB dressing this session requiring SBA for balance during sit<>stand to manage pants over hips. Pt continues to demonstrate deficits in fxnl cognition, balance, initiation, and safety awareness. Pt would continue to benefit from skilled OT services to improve pt's safety and independence " with daily occupations.    OT Plan: Continue OT POC.  Revisions made to plan of care: No    GOALS:   Multidisciplinary Problems       Occupational Therapy Goals          Problem: Occupational Therapy    Goal Priority Disciplines Outcome Interventions   Occupational Therapy Goal     OT, PT/OT Ongoing, Progressing    Description: Goals to be met by: 5/5/2023     Patient will increase functional independence with ADLs by performing:    UE Dressing with Modified [place].- Min A  LE Dressing with Modified [place].-Mod A  Toileting from toilet with Supervision for hygiene and clothing management.-SBA  Toilet transfer to toilet with Supervision.-SBA                         Skilled OT Minutes Provided: 23  Communication with Treatment Team:     Equipment recommendations:       At end of treatment, patient remained:   Up in chair     Up in wheelchair in room    In bed    With alarm activated    Bed rails up    Call bell in reach     Family/friends present    Restraints secured properly    In bathroom with CNA/RN notified   X In gym with PT/PTA/tech    Nurse aware    Other:

## 2023-04-27 NOTE — PT/OT/SLP PROGRESS
Speech Language Pathology Treatment    Patient Name:  Mike Urbina Jr.   MRN:  [id number]  Admitting Diagnosis: <principal problem not specified>    Recommendations:                 General Recommendations:  Dysphagia therapy and Cognitive-linguistic therapy  Diet recommendations:  Regular Diet - IDDSI Level 7, Liquid Diet Level: Thin liquids - IDDSI Level 0   Aspiration Precautions: HOB to 90 degrees, Small bites/sips, and Standard aspiration precautions   General Precautions: Standard, fall, sternal  Communication strategies:  go to room if call light pushed    Subjective     Pt in gerichair upon SLP arrival. Pt pleasant and in good spirits.    Patient goals:      Pain/Comfort:       Respiratory Status: Room air    Objective:     Has the patient been evaluated by SLP for swallowing?   Yes  Keep patient NPO?     Current Respiratory Status:        Pt consumed trials of regular consistency. Good oral clearance and no overt s/s of aspiration noted. Pt appropriate for diet upgrade to regular. SLP also informed RN to administer meds whole for next med pass.  Pt recalled OT's name given BC.   Pt I'ly recalled 3/3 sternal precautions.  Pt recalled details from OT and PT this AM given min-modA.    Overall good session.  Cont SLP POC.    Assessment:     Mike Urbina Jr. is a 67 y.o. male with an SLP diagnosis of Dysphagia and Cognitive-Linguistic Impairment.  He presents with need for a modified diet at this time to ensure safety w/ PO. Pt also w/ decreased safety awareness and delayed recall. Skilled SLP services are warranted at this time to address above stated deficits.    Goals:   Multidisciplinary Problems       SLP Goals          Problem: SLP    Goal Priority Disciplines Outcome   SLP Goal     SLP Ongoing, Progressing   Description: LTG: Pt will tolerate LRD w/o overt s/s of aspiration.  Pt will improve cognitive linguistic skills to ensure safe discharge home.    STG:  Pt will complete laryngeal  strengthening exercises and aleksandra with minimal cues. Discontinue  Pt will consume trials of thin liquid x10 w/o overt s/s of aspiration. Goal met  Pt will consume trials of soft and bite sized w/ good oral clearance and w/o overt s/s of aspiration. Goal met  Pt will utilize memory strategies to recall sternal precautions following a 3 minute filled delay.  Pt will provide solutions to problems/situations w/ 90% acc Jennifer.  Pt will complete trials of regular w/ good oral clearance and w/o overt s/s of aspiration. Goal met                       Plan:     Patient to be seen:  5 x/week   Plan of Care expires:  04/28/23  Plan of Care reviewed with:  patient   SLP Follow-Up:  Yes       Discharge recommendations:      Barriers to Discharge:  Level of Skilled Assistance Needed see Pt/Ot notes and Safety Awareness impaired    Time Tracking:     SLP Treatment Date:  4/27/23  Speech Start Time: 10:05  Speech Stop Time:  10:35   Speech Total Time (min):  30 min    Billable Minutes: Speech Therapy Individual 20 cognition/10 swallowing    CECI ChowdaryS., CCC-SLP   04/27/2023

## 2023-04-27 NOTE — PT/OT/SLP PROGRESS
Physical Therapy Treatment Note           Name: Mike Urbina Jr.    : 1955 (67 y.o.)  MRN: [id number]           TREATMENT SUMMARY AND RECOMMENDATIONS:    PT Received On: 23  PT Start Time: 930     PT Stop Time: 955  PT Total Time (min): 25 min     Subjective Assessment:  x No complaints  Lethargic    Awake, alert, cooperative  Uncooperative    Agitated  c/o pain    Appropriate  c/o fatigue    Confused  Treated at bedside     Emotionally labile x Treated in gym/dept.    Impulsive  Other:    Flat affect       Therapy Precautions:    Cognitive deficits  Spinal precautions    Collar - hard  Sternal precautions    Collar - soft   TLSO    Fall risk  LSO    Hip precautions - posterior  Knee immobilizer    Hip precautions - anterior  WBAT    Impaired communication  Partial weightbearing    Oxygen  TTWB    PEG tube  NWB    Visual deficits  Other:    Hearing deficits          Treatment Objectives:     Mobility Training:   Assist level Comments    Bed mobility     Transfer     Gait SBA Amb on firm surface with  ft    Sit to stand transitions     Sitting balance     Standing balance      Wheelchair mobility     Car transfer     Other:          Therapeutic Exercise:   Exercise Sets Reps Comments   UBE level2 10 min UBE with B LE use                         Additional Comments:  No issues     Assessment: Patient tolerated session well.    PT Plan: continue  Revisions made to plan of care: No    GOALS:   Multidisciplinary Problems       Physical Therapy Goals          Problem: Physical Therapy    Goal Priority Disciplines Outcome Goal Variances Interventions   Physical Therapy Goal     PT, PT/OT Ongoing, Progressing     Description: Goals to be met by: Dishcarge     Patient will increase functional independence with mobility by performin. Supine to sit with Modified [place] maintaining sternal precautions  2. Sit to stand transfer with Modified [place] maintaining sternal  precautions  3. Gait x 325 feet with Modified [place] using Rolling Walker.   4. Ascend/descend 3 stair with left Handrails Modified [place] using No Assistive Device.                          Skilled PT Minutes Provided: 25   Communication with Treatment Team:     Equipment recommendations:       At end of treatment, patient remained:  x Up in chair     Up in wheelchair in room    In bed   x With alarm activated    Bed rails up   x Call bell in reach     Family/friends present    Restraints secured properly    In bathroom with CNA/RN notified    Nurse aware    In gym with therapist/tech    Other:

## 2023-04-27 NOTE — PLAN OF CARE
Problem: SLP  Goal: SLP Goal  Description: LTG: Pt will tolerate LRD w/o overt s/s of aspiration.  Pt will improve cognitive linguistic skills to ensure safe discharge home.    STG:  Pt will complete laryngeal strengthening exercises and aleksandra with minimal cues. Discontinue  Pt will consume trials of thin liquid x10 w/o overt s/s of aspiration. Goal met  Pt will consume trials of soft and bite sized w/ good oral clearance and w/o overt s/s of aspiration. Goal met  Pt will utilize memory strategies to recall sternal precautions following a 3 minute filled delay.  Pt will provide solutions to problems/situations w/ 90% acc [place].  Pt will complete trials of regular w/ good oral clearance and w/o overt s/s of aspiration. Goal met  4/27/2023 1115 by Camila Pearce CCC-SLP  Outcome: Ongoing, Progressing

## 2023-04-27 NOTE — PT/OT/SLP PROGRESS
Occupational Therapy  Treatment    Name: Mike Urbina Jr.    : 1955 (67 y.o.)  MRN: [id number]           TREATMENT SUMMARY AND RECOMMENDATIONS:      OT Date of Treatment: 23  OT Start Time: 1303  OT Stop Time: 1326  OT Total Time (min): 23 min      Subjective Assessment:   No complaints  Lethargic   X Awake, alert, cooperative  Impulsive    Uncooperative   Flat affect    Agitated  c/o pain    Appropriate  c/o fatigue    Confused X Treated at bedside     Emotionally labile X Treated in gym/dept.      Other:        Therapy Precautions:    Cognitive deficits  Spinal precautions    Collar - hard X Sternal precautions    Collar - soft   TLSO   X Fall risk  LSO    Hip precautions - posterior  Knee immobilizer    Hip precautions - anterior  WBAT    Impaired communication  Partial weightbearing    Oxygen  TTWB    PEG tube  NWB    Visual deficits      Hearing deficits   Other:        Treatment Objectives:     Mobility Training:    Mobility task Assist level Comments    Bed mobility Min A Supine>EOB   Transfer     Sit to stands transitions     Functional mobility SBA Pt ambulated ~400ft c RW in indoor and outdoor environment. Seated r/b x2. Standing r/b x2.   Sitting balance     Standing balance      Other:        ADL Training:    ADL Assist level Comments    Feeding     Grooming/hygiene     Bathing     Upper body dressing     Lower body dressing SBA Pt donned and tied B shoes seated EOB. SBA for balance when reaching ground level.   Toileting     Toilet transfer     Adaptive equipment training     Other:           Additional Comments:  Pt continues to be noncompliant with sternal precautions despite OTs continued education.    Assessment: Patient tolerated session well. Pt continues to report LLE pain at incision site. Pt continues to demonstrate deficits in fxnl cognition, safety awareness, and balance impacting pt's occupational performance. Pt would continue to benefit from skilled OT services to  improve pt's safety and independence with daily occupations.      OT Plan: Continue OT POC.  Revisions made to plan of care: No    GOALS:   Multidisciplinary Problems       Occupational Therapy Goals          Problem: Occupational Therapy    Goal Priority Disciplines Outcome Interventions   Occupational Therapy Goal     OT, PT/OT Ongoing, Progressing    Description: Goals to be met by: 5/5/2023     Patient will increase functional independence with ADLs by performing:    UE Dressing with Modified [place].- Min A  LE Dressing with Modified [place].-Mod A  Toileting from toilet with Supervision for hygiene and clothing management.-SBA  Toilet transfer to toilet with Supervision.-SBA                         Skilled OT Minutes Provided: 23  Communication with Treatment Team:     Equipment recommendations:       At end of treatment, patient remained:   Up in chair     Up in wheelchair in room    In bed    With alarm activated    Bed rails up    Call bell in reach     Family/friends present    Restraints secured properly    In bathroom with CNA/RN notified    In gym with PT/PTA/tech    Nurse aware   X Other: Pt handoff to PTA

## 2023-04-27 NOTE — PLAN OF CARE
Problem: Adult Inpatient Plan of Care  Goal: Plan of Care Review  Outcome: Ongoing, Progressing  Flowsheets (Taken 4/27/2023 1403)  Plan of Care Reviewed With: patient  Goal: Patient-Specific Goal (Individualized)  Outcome: Ongoing, Progressing  Flowsheets (Taken 4/27/2023 1403)  Anxieties, Fears or Concerns: None  Individualized Care Needs: Assistance with ambulation  Goal: Absence of Hospital-Acquired Illness or Injury  Outcome: Ongoing, Progressing  Intervention: Identify and Manage Fall Risk  Flowsheets (Taken 4/27/2023 1403)  Safety Promotion/Fall Prevention:   assistive device/personal item within reach   bed alarm set   chair alarm set   in recliner, wheels locked   medications reviewed   nonskid shoes/socks when out of bed   room near unit station   /camera at bedside   instructed to call staff for mobility  Intervention: Prevent Skin Injury  Flowsheets (Taken 4/27/2023 1403)  Body Position: position changed independently  Skin Protection: incontinence pads utilized  Intervention: Prevent and Manage VTE (Venous Thromboembolism) Risk  Flowsheets (Taken 4/27/2023 1403)  Activity Management: Up in chair - L3  VTE Prevention/Management:   ambulation promoted   bleeding precations maintained   bleeding risk assessed  Range of Motion: active ROM (range of motion) encouraged  Intervention: Prevent Infection  Flowsheets (Taken 4/27/2023 1403)  Infection Prevention:   hand hygiene promoted   rest/sleep promoted   single patient room provided  Goal: Optimal Comfort and Wellbeing  Outcome: Ongoing, Progressing  Intervention: Monitor Pain and Promote Comfort  Flowsheets (Taken 4/27/2023 1403)  Pain Management Interventions:   quiet environment facilitated   relaxation techniques promoted   medication offered  Intervention: Provide Person-Centered Care  Flowsheets (Taken 4/27/2023 1403)  Trust Relationship/Rapport:   care explained   choices provided   emotional support provided   empathic listening  provided   questions answered   questions encouraged   reassurance provided   thoughts/feelings acknowledged  Goal: Readiness for Transition of Care  Outcome: Ongoing, Progressing     Problem: Fall Injury Risk  Goal: Absence of Fall and Fall-Related Injury  Outcome: Ongoing, Progressing  Intervention: Identify and Manage Contributors  Flowsheets (Taken 4/27/2023 1403)  Self-Care Promotion:   independence encouraged   BADL personal objects within reach  Medication Review/Management: medications reviewed  Intervention: Promote Injury-Free Environment  Flowsheets (Taken 4/27/2023 1403)  Safety Promotion/Fall Prevention:   assistive device/personal item within reach   bed alarm set   chair alarm set   in recliner, wheels locked   medications reviewed   nonskid shoes/socks when out of bed   room near unit station   /camera at bedside   instructed to call staff for mobility     Problem: Infection  Goal: Absence of Infection Signs and Symptoms  Description: Goals to be met by: 5/5/2023     Patient will increase functional independence with ADLs by performing:    UE Dressing with Modified [place].  LE Dressing with Modified [place].  Toileting from toilet with Supervision for hygiene and clothing management.   Toilet transfer to toilet with Supervision.    Outcome: Ongoing, Progressing  Intervention: Prevent or Manage Infection  Flowsheets (Taken 4/27/2023 1403)  Fever Reduction/Comfort Measures:   lightweight bedding   lightweight clothing  Infection Management: aseptic technique maintained  Isolation Precautions:   protective   precautions maintained     Problem: Activity Intolerance (Cardiovascular Surgery)  Goal: Improved Activity Tolerance  Outcome: Ongoing, Progressing  Intervention: Optimize Tolerance for Activity  Flowsheets (Taken 4/27/2023 1403)  Self-Care Promotion:   independence encouraged   BADL personal objects within reach  Environmental Support:   rest periods encouraged   calm  environment promoted   distractions minimized

## 2023-04-28 PROCEDURE — 97116 GAIT TRAINING THERAPY: CPT | Mod: CQ

## 2023-04-28 PROCEDURE — 25000003 PHARM REV CODE 250: Performed by: STUDENT IN AN ORGANIZED HEALTH CARE EDUCATION/TRAINING PROGRAM

## 2023-04-28 PROCEDURE — 97110 THERAPEUTIC EXERCISES: CPT | Mod: CQ

## 2023-04-28 PROCEDURE — 94760 N-INVAS EAR/PLS OXIMETRY 1: CPT

## 2023-04-28 PROCEDURE — 97130 THER IVNTJ EA ADDL 15 MIN: CPT

## 2023-04-28 PROCEDURE — 63600175 PHARM REV CODE 636 W HCPCS: Performed by: STUDENT IN AN ORGANIZED HEALTH CARE EDUCATION/TRAINING PROGRAM

## 2023-04-28 PROCEDURE — 97110 THERAPEUTIC EXERCISES: CPT

## 2023-04-28 PROCEDURE — 97129 THER IVNTJ 1ST 15 MIN: CPT

## 2023-04-28 PROCEDURE — 97530 THERAPEUTIC ACTIVITIES: CPT | Mod: CQ

## 2023-04-28 PROCEDURE — 94761 N-INVAS EAR/PLS OXIMETRY MLT: CPT

## 2023-04-28 PROCEDURE — 94799 UNLISTED PULMONARY SVC/PX: CPT

## 2023-04-28 PROCEDURE — 97530 THERAPEUTIC ACTIVITIES: CPT

## 2023-04-28 PROCEDURE — 11000004 HC SNF PRIVATE

## 2023-04-28 PROCEDURE — 99900035 HC TECH TIME PER 15 MIN (STAT)

## 2023-04-28 RX ADMIN — NIFEDIPINE 60 MG: 30 TABLET, FILM COATED, EXTENDED RELEASE ORAL at 10:04

## 2023-04-28 RX ADMIN — ATORVASTATIN CALCIUM 40 MG: 40 TABLET, FILM COATED ORAL at 10:04

## 2023-04-28 RX ADMIN — HYDROCODONE BITARTRATE AND ACETAMINOPHEN 1 TABLET: 5; 325 TABLET ORAL at 10:04

## 2023-04-28 RX ADMIN — VALSARTAN 160 MG: 160 TABLET, FILM COATED ORAL at 08:04

## 2023-04-28 RX ADMIN — HYDROCODONE BITARTRATE AND ACETAMINOPHEN 1 TABLET: 5; 325 TABLET ORAL at 04:04

## 2023-04-28 RX ADMIN — Medication 400 MG: at 10:04

## 2023-04-28 RX ADMIN — FUROSEMIDE 40 MG: 40 TABLET ORAL at 10:04

## 2023-04-28 RX ADMIN — TRAZODONE HYDROCHLORIDE 50 MG: 50 TABLET ORAL at 08:04

## 2023-04-28 RX ADMIN — HYDROXYZINE HYDROCHLORIDE 25 MG: 25 TABLET ORAL at 08:04

## 2023-04-28 RX ADMIN — ASPIRIN 81 MG: 81 TABLET, COATED ORAL at 10:04

## 2023-04-28 RX ADMIN — THIAMINE HCL TAB 100 MG 200 MG: 100 TAB at 10:04

## 2023-04-28 RX ADMIN — AMIODARONE HYDROCHLORIDE 200 MG: 200 TABLET ORAL at 10:04

## 2023-04-28 RX ADMIN — ENOXAPARIN SODIUM 40 MG: 40 INJECTION SUBCUTANEOUS at 04:04

## 2023-04-28 RX ADMIN — VALSARTAN 160 MG: 160 TABLET, FILM COATED ORAL at 10:04

## 2023-04-28 RX ADMIN — FERROUS SULFATE TAB 325 MG (65 MG ELEMENTAL FE) 1 EACH: 325 (65 FE) TAB at 10:04

## 2023-04-28 RX ADMIN — METOPROLOL SUCCINATE 100 MG: 50 TABLET, FILM COATED, EXTENDED RELEASE ORAL at 10:04

## 2023-04-28 RX ADMIN — DIGOXIN 0.25 MG: 250 TABLET ORAL at 10:04

## 2023-04-28 NOTE — PT/OT/SLP PROGRESS
Physical Therapy Treatment Note           Name: Mike Urbina Jr.    : 1955 (67 y.o.)  MRN: [id number]           TREATMENT SUMMARY AND RECOMMENDATIONS:    PT Received On:    PT Start Time: 907     PT Stop Time: 932  PT Total Time (min): 25 min     Subjective Assessment:  x No complaints  Lethargic   x Awake, alert, cooperative  Uncooperative    Agitated  c/o pain    Appropriate  c/o fatigue    Confused  Treated at bedside     Emotionally labile x Treated in gym/dept.    Impulsive  Other:    Flat affect       Therapy Precautions:    Cognitive deficits  Spinal precautions    Collar - hard x Sternal precautions    Collar - soft   TLSO    Fall risk  LSO    Hip precautions - posterior  Knee immobilizer    Hip precautions - anterior  WBAT    Impaired communication  Partial weightbearing    Oxygen  TTWB    PEG tube  NWB    Visual deficits  Other:    Hearing deficits          Treatment Objectives:     Mobility Training:   Assist level Comments    Bed mobility     Transfer     Gait CGA Amb on firm surface with RW 350ft   Sit to stand transitions     Sitting balance     Standing balance      Wheelchair mobility     Car transfer     Other:          Therapeutic Exercise:   Exercise Sets Reps Comments   LE cycling   5'F/5'B                         Additional Comments:      Assessment: Patient tolerated session well.    PT Plan: continue  Revisions made to plan of care: No    GOALS:   Multidisciplinary Problems       Physical Therapy Goals          Problem: Physical Therapy    Goal Priority Disciplines Outcome Goal Variances Interventions   Physical Therapy Goal     PT, PT/OT Ongoing, Progressing     Description: Goals to be met by: Dishcarge     Patient will increase functional independence with mobility by performin. Supine to sit with Modified [place] maintaining sternal precautions  2. Sit to stand transfer with Modified [place] maintaining sternal precautions  3. Gait x 325 feet with  Modified [place] using Rolling Walker.   4. Ascend/descend 3 stair with left Handrails Modified [place] using No Assistive Device.                          Skilled PT Minutes Provided: 25    Communication with Treatment Team:     Equipment recommendations:       At end of treatment, patient remained:   Up in chair     Up in wheelchair in room    In bed    With alarm activated    Bed rails up    Call bell in reach     Family/friends present    Restraints secured properly    In bathroom with CNA/RN notified    Nurse aware   x In gym with therapist/tech    Other:

## 2023-04-28 NOTE — PLAN OF CARE
Problem: Adult Inpatient Plan of Care  Goal: Optimal Comfort and Wellbeing  Outcome: Ongoing, Progressing  Intervention: Monitor Pain and Promote Comfort  Flowsheets (Taken 4/27/2023 2310)  Pain Management Interventions:   care clustered   quiet environment facilitated  Intervention: Provide Person-Centered Care  Flowsheets (Taken 4/27/2023 2310)  Trust Relationship/Rapport:   care explained   choices provided   thoughts/feelings acknowledged     Problem: Fall Injury Risk  Goal: Absence of Fall and Fall-Related Injury  Outcome: Ongoing, Progressing  Intervention: Identify and Manage Contributors  Flowsheets (Taken 4/27/2023 2310)  Self-Care Promotion:   independence encouraged   BADL personal objects within reach   BADL personal routines maintained  Medication Review/Management: medications reviewed  Intervention: Promote Injury-Free Environment  Flowsheets (Taken 4/27/2023 2310)  Safety Promotion/Fall Prevention:   assistive device/personal item within reach   bed alarm set   medications reviewed   nonskid shoes/socks when out of bed   /camera at bedside

## 2023-04-28 NOTE — PLAN OF CARE
Problem: Adult Inpatient Plan of Care  Goal: Plan of Care Review  Outcome: Ongoing, Progressing  Flowsheets (Taken 4/28/2023 1331)  Plan of Care Reviewed With: patient  Goal: Patient-Specific Goal (Individualized)  Outcome: Ongoing, Progressing  Flowsheets (Taken 4/28/2023 1331)  Anxieties, Fears or Concerns: None  Individualized Care Needs: Assistance with ambulation  Goal: Absence of Hospital-Acquired Illness or Injury  Outcome: Ongoing, Progressing  Intervention: Identify and Manage Fall Risk  Flowsheets (Taken 4/28/2023 1331)  Safety Promotion/Fall Prevention:   assistive device/personal item within reach   bed alarm set   chair alarm set   gait belt with ambulation   in recliner, wheels locked   medications reviewed   nonskid shoes/socks when out of bed   room near unit station   instructed to call staff for mobility  Intervention: Prevent Skin Injury  Flowsheets (Taken 4/28/2023 1331)  Body Position: position maintained  Skin Protection: incontinence pads utilized  Intervention: Prevent and Manage VTE (Venous Thromboembolism) Risk  Flowsheets (Taken 4/28/2023 1331)  Activity Management:   Ambulated to bathroom - L4   Walk with assistive devise and /or staff member - L3  VTE Prevention/Management:   bleeding risk assessed   bleeding precations maintained  Range of Motion: active ROM (range of motion) encouraged  Intervention: Prevent Infection  Flowsheets (Taken 4/28/2023 1331)  Infection Prevention:   hand hygiene promoted   rest/sleep promoted   single patient room provided  Goal: Optimal Comfort and Wellbeing  Outcome: Ongoing, Progressing  Intervention: Monitor Pain and Promote Comfort  Flowsheets (Taken 4/28/2023 1331)  Pain Management Interventions: quiet environment facilitated  Intervention: Provide Person-Centered Care  Flowsheets (Taken 4/28/2023 1331)  Trust Relationship/Rapport:   care explained   choices provided   emotional support provided   empathic listening provided   questions answered    questions encouraged   reassurance provided   thoughts/feelings acknowledged  Goal: Readiness for Transition of Care  Outcome: Ongoing, Progressing     Problem: Fall Injury Risk  Goal: Absence of Fall and Fall-Related Injury  Outcome: Ongoing, Progressing     Problem: Infection  Goal: Absence of Infection Signs and Symptoms  Description: Goals to be met by: 5/5/2023     Patient will increase functional independence with ADLs by performing:    UE Dressing with Modified [place].  LE Dressing with Modified [place].  Toileting from toilet with Supervision for hygiene and clothing management.   Toilet transfer to toilet with Supervision.    Outcome: Ongoing, Progressing     Problem: Activity Intolerance (Cardiovascular Surgery)  Goal: Improved Activity Tolerance  Outcome: Ongoing, Progressing  Intervention: Optimize Tolerance for Activity  Flowsheets (Taken 4/28/2023 1331)  Self-Care Promotion:   independence encouraged   BADL personal objects within reach  Environmental Support:   calm environment promoted   distractions minimized   rest periods encouraged   environmental consistency promoted     Problem: Skin Injury Risk Increased  Goal: Skin Health and Integrity  Outcome: Ongoing, Progressing

## 2023-04-28 NOTE — PT/OT/SLP PROGRESS
Occupational Therapy  Treatment    Name: Mike Urbina Jr.    : 1955 (67 y.o.)  MRN: [id number]           TREATMENT SUMMARY AND RECOMMENDATIONS:      OT Date of Treatment: 23  OT Start Time: 935  OT Stop Time: 1000  OT Total Time (min): 25 min      Subjective Assessment:   No complaints  Lethargic   X Awake, alert, cooperative  Impulsive    Uncooperative   Flat affect    Agitated  c/o pain    Appropriate  c/o fatigue    Confused  Treated at bedside     Emotionally labile X Treated in gym/dept.      Other:        Therapy Precautions:    Cognitive deficits  Spinal precautions    Collar - hard X Sternal precautions    Collar - soft   TLSO    Fall risk  LSO    Hip precautions - posterior  Knee immobilizer    Hip precautions - anterior  WBAT    Impaired communication  Partial weightbearing    Oxygen  TTWB    PEG tube  NWB    Visual deficits      Hearing deficits   Other:        Treatment Objectives:     Mobility Training:    Mobility task Assist level Comments    Bed mobility     Transfer     Sit to stands transitions     Functional mobility SBA  Using Rolling Walker, Patient ambulated ~475ft with no rest break needed .   Sitting balance     Standing balance      Other:          Therapeutic Exercise:   Exercise Sets Reps Comments   2# dowel  2 10 Bicep curls, straight arm raises to shoulder level, chest press, froward/backward rows.    Bilateral lower extremities strengthening  2 10 Ankle pumps, abduction, quad sets, and marches                    Additional Comments:  Patient reported wanting to completed bilateral lower extremities exercises follow bilateral upper extremities strengthening.     Assessment: Patient tolerated session well.    OT Plan: Continue with current plan of care   Revisions made to plan of care: No    GOALS:   Multidisciplinary Problems       Occupational Therapy Goals          Problem: Occupational Therapy    Goal Priority Disciplines Outcome Interventions   Occupational  Therapy Goal     OT, PT/OT Ongoing, Progressing    Description: Goals to be met by: 5/5/2023     Patient will increase functional independence with ADLs by performing:    UE Dressing with Modified [place].- Min A  LE Dressing with Modified [place].-Mod A  Toileting from toilet with Supervision for hygiene and clothing management.-SBA  Toilet transfer to toilet with Supervision.-SBA                         Skilled OT Minutes Provided: 25  Communication with Treatment Team:     Equipment recommendations:       At end of treatment, patient remained:  X Up in chair     Up in wheelchair in room    In bed   X With alarm activated    Bed rails up   X Call bell in reach     Family/friends present    Restraints secured properly    In bathroom with CNA/RN notified    In gym with PT/PTA/tech   X Nurse aware    Other:

## 2023-04-28 NOTE — NURSING
"Pt reported he woke and was SOB, no chest pain reported, vitals were as follows: /73, O2 94%, Hr 70, RR12. After vitals and talking with patient, pt reported, "I feel better"  Pt asleep, no problems reported,   0430, pt reported no issues with SOB, only the one incident. "I feel fine"  "

## 2023-04-28 NOTE — ADDENDUM NOTE
Addendum  created 04/28/23 0956 by Dawit Ni DO    Clinical Note Signed, Intraprocedure Blocks edited, SmartForm saved

## 2023-04-28 NOTE — PT/OT/SLP PROGRESS
Physical Therapy Treatment Note           Name: Mike Urbina Jr.    : 1955 (67 y.o.)  MRN: [id number]           TREATMENT SUMMARY AND RECOMMENDATIONS:    PT Received On:    PT Start Time: 1315     PT Stop Time: 1341  PT Total Time (min): 26 min     Subjective Assessment:  x No complaints  Lethargic   x Awake, alert, cooperative  Uncooperative    Agitated  c/o pain    Appropriate  c/o fatigue    Confused  Treated at bedside     Emotionally labile x Treated in gym/dept.    Impulsive  Other:    Flat affect       Therapy Precautions:    Cognitive deficits  Spinal precautions    Collar - hard x Sternal precautions    Collar - soft   TLSO    Fall risk  LSO    Hip precautions - posterior  Knee immobilizer    Hip precautions - anterior  WBAT    Impaired communication  Partial weightbearing    Oxygen  TTWB    PEG tube  NWB    Visual deficits  Other:    Hearing deficits          Treatment Objectives:     Mobility Training:   Assist level Comments    Bed mobility     Transfer     Gait CGA Amb on firm surface with RW 350ft + 125ft   Sit to stand transitions CGA From wc level   Sitting balance     Standing balance  CGA Tossing objects with forward stepping using BUE and RW near for support; BLE cone taps with cues for coordinating color and LE   Wheelchair mobility     Car transfer     Other:          Therapeutic Exercise:   Exercise Sets Reps Comments   Anterior step ups with 4 inch step 2 10 Performed in standing with RW                         Additional Comments:      Assessment: Patient tolerated session well. Focus on dynamic standing activities.     PT Plan: continue  Revisions made to plan of care: No    GOALS:   Multidisciplinary Problems       Physical Therapy Goals          Problem: Physical Therapy    Goal Priority Disciplines Outcome Goal Variances Interventions   Physical Therapy Goal     PT, PT/OT Ongoing, Progressing     Description: Goals to be met by: [place]     Patient will increase  functional independence with mobility by performin. Supine to sit with Modified [place] maintaining sternal precautions  2. Sit to stand transfer with Modified [place] maintaining sternal precautions  3. Gait x 325 feet with Modified [place] using Rolling Walker.   4. Ascend/descend 3 stair with left Handrails Modified [place] using No Assistive Device.                          Skilled PT Minutes Provided: 26    Communication with Treatment Team:     Equipment recommendations:       At end of treatment, patient remained:  x Up in chair     Up in wheelchair in room    In bed   x With alarm activated    Bed rails up   x Call bell in reach     Family/friends present    Restraints secured properly    In bathroom with CNA/RN notified    Nurse aware    In gym with therapist/tech    Other:

## 2023-04-28 NOTE — PT/OT/SLP PROGRESS
Speech Language Pathology Treatment    Patient Name:  Mike Urbina Jr.   MRN:  [id number]  Admitting Diagnosis: <principal problem not specified>    Recommendations:                 General Recommendations:  Dysphagia therapy and Cognitive-linguistic therapy  Diet recommendations:  Regular Diet - IDDSI Level 7, Liquid Diet Level: Thin liquids - IDDSI Level 0   Aspiration Precautions: HOB to 90 degrees, Small bites/sips, and Standard aspiration precautions   General Precautions: Standard, fall, sternal  Communication strategies:  go to room if call light pushed    Subjective     Pt in gerichair upon SLP arrival. Pt pleasant and in good spirits.    Patient goals:      Pain/Comfort:       Respiratory Status: Room air    Objective:     Has the patient been evaluated by SLP for swallowing?   Yes  Keep patient NPO?     Current Respiratory Status:        Pt recalled details from OT and PT this AM given Jennifer.  Pt I'ly recalled sternal precautions.  SLP presented common household situations and pt provided solutions w/ 100% acc I'ly.  Improvement noted w/ recall and problem solving. Pt report no issues since upgrading to regular diet. Pt also reports that meds whole are going well.      SLP communicated to CM regarding dc planning and training w/ pt's roommate. Pt reported that his roommate will not come for training and that his roommate is retired and is home most of the day.    Overall good session.  Cont SLP POC.    Assessment:     Mike Urbina Jr. is a 67 y.o. male with an SLP diagnosis of Cognitive-Linguistic Impairment.  He presents w/ decreased safety awareness and delayed recall. Skilled SLP services are warranted at this time to address above stated deficits.    Goals:   Multidisciplinary Problems       SLP Goals          Problem: SLP    Goal Priority Disciplines Outcome   SLP Goal     SLP Ongoing, Progressing   Description: LTG: Pt will tolerate LRD w/o overt s/s of aspiration.  Pt will improve  cognitive linguistic skills to ensure safe discharge home.    STG:  Pt will complete laryngeal strengthening exercises and aleksandra with minimal cues. Discontinue  Pt will consume trials of thin liquid x10 w/o overt s/s of aspiration. Goal met  Pt will consume trials of soft and bite sized w/ good oral clearance and w/o overt s/s of aspiration. Goal met  Pt will utilize memory strategies to recall sternal precautions following a 3 minute filled delay.  Pt will provide solutions to problems/situations w/ 90% acc Jennifer.  Pt will complete trials of regular w/ good oral clearance and w/o overt s/s of aspiration. Goal met                       Plan:     Patient to be seen:  5 x/week   Plan of Care expires:  04/28/23  Plan of Care reviewed with:  patient   SLP Follow-Up:  Yes       Discharge recommendations:      Barriers to Discharge:  Level of Skilled Assistance Needed see Pt/Ot notes and Safety Awareness impaired    Time Tracking:     SLP Treatment Date:  4/28/23  Speech Start Time: 10:20  Speech Stop Time:  10:45   Speech Total Time (min):  25 min    Billable Minutes: Speech Therapy Individual 25 cognition      Camila Pearce M.S., CCC-SLP   04/28/2023

## 2023-04-29 PROCEDURE — 94760 N-INVAS EAR/PLS OXIMETRY 1: CPT

## 2023-04-29 PROCEDURE — 97530 THERAPEUTIC ACTIVITIES: CPT

## 2023-04-29 PROCEDURE — 11000004 HC SNF PRIVATE

## 2023-04-29 PROCEDURE — 25000003 PHARM REV CODE 250: Performed by: STUDENT IN AN ORGANIZED HEALTH CARE EDUCATION/TRAINING PROGRAM

## 2023-04-29 PROCEDURE — 97535 SELF CARE MNGMENT TRAINING: CPT

## 2023-04-29 PROCEDURE — 97110 THERAPEUTIC EXERCISES: CPT

## 2023-04-29 PROCEDURE — 94761 N-INVAS EAR/PLS OXIMETRY MLT: CPT

## 2023-04-29 PROCEDURE — 63600175 PHARM REV CODE 636 W HCPCS: Performed by: STUDENT IN AN ORGANIZED HEALTH CARE EDUCATION/TRAINING PROGRAM

## 2023-04-29 PROCEDURE — 99900035 HC TECH TIME PER 15 MIN (STAT)

## 2023-04-29 RX ADMIN — METOPROLOL SUCCINATE 100 MG: 50 TABLET, FILM COATED, EXTENDED RELEASE ORAL at 09:04

## 2023-04-29 RX ADMIN — FERROUS SULFATE TAB 325 MG (65 MG ELEMENTAL FE) 1 EACH: 325 (65 FE) TAB at 09:04

## 2023-04-29 RX ADMIN — HYDROXYZINE HYDROCHLORIDE 25 MG: 25 TABLET ORAL at 08:04

## 2023-04-29 RX ADMIN — ASPIRIN 81 MG: 81 TABLET, COATED ORAL at 09:04

## 2023-04-29 RX ADMIN — ENOXAPARIN SODIUM 40 MG: 40 INJECTION SUBCUTANEOUS at 05:04

## 2023-04-29 RX ADMIN — Medication 400 MG: at 09:04

## 2023-04-29 RX ADMIN — NIFEDIPINE 60 MG: 30 TABLET, FILM COATED, EXTENDED RELEASE ORAL at 09:04

## 2023-04-29 RX ADMIN — FUROSEMIDE 40 MG: 40 TABLET ORAL at 09:04

## 2023-04-29 RX ADMIN — VALSARTAN 160 MG: 160 TABLET, FILM COATED ORAL at 09:04

## 2023-04-29 RX ADMIN — THIAMINE HCL TAB 100 MG 200 MG: 100 TAB at 09:04

## 2023-04-29 RX ADMIN — ATORVASTATIN CALCIUM 40 MG: 40 TABLET, FILM COATED ORAL at 09:04

## 2023-04-29 RX ADMIN — HYDROCODONE BITARTRATE AND ACETAMINOPHEN 1 TABLET: 5; 325 TABLET ORAL at 08:04

## 2023-04-29 RX ADMIN — HYDROCODONE BITARTRATE AND ACETAMINOPHEN 1 TABLET: 5; 325 TABLET ORAL at 09:04

## 2023-04-29 RX ADMIN — POLYETHYLENE GLYCOL 3350 17 G: 17 POWDER, FOR SOLUTION ORAL at 09:04

## 2023-04-29 RX ADMIN — VALSARTAN 160 MG: 160 TABLET, FILM COATED ORAL at 08:04

## 2023-04-29 RX ADMIN — AMIODARONE HYDROCHLORIDE 200 MG: 200 TABLET ORAL at 09:04

## 2023-04-29 RX ADMIN — TRAZODONE HYDROCHLORIDE 50 MG: 50 TABLET ORAL at 08:04

## 2023-04-29 RX ADMIN — DIGOXIN 0.25 MG: 250 TABLET ORAL at 09:04

## 2023-04-29 NOTE — NURSING
Nurses Note -- 4 Eyes      4/29/2023   12:51 PM      Skin assessed during: Daily Assessment      [] No Altered Skin Integrity Present    []Prevention Measures Documented      [x] Yes- Altered Skin Integrity Present or Discovered   [x] LDA Added if Not in Epic (Describe Wound)   [] New Altered Skin Integrity was Present on Admit and Documented in LDA   [x] Wound Image Taken    Wound Care Consulted? Yes    Attending Nurse:  Nisa Camejo RN     Second RN/Staff Member:  Moon Cabral

## 2023-04-29 NOTE — PT/OT/SLP PROGRESS
Occupational Therapy  Treatment    Name: Mike Urbina Jr.    : 1955 (67 y.o.)  MRN: [id number]           TREATMENT SUMMARY AND RECOMMENDATIONS:      OT Date of Treatment: 23  OT Start Time: 1107  OT Stop Time: 1151  OT Total Time (min): 44 min      Subjective Assessment:   No complaints  Lethargic   X Awake, alert, cooperative  Impulsive    Uncooperative   Flat affect    Agitated  c/o pain    Appropriate  c/o fatigue    Confused X Treated at bedside     Emotionally labile  Treated in gym/dept.      Other:        Therapy Precautions:    Cognitive deficits  Spinal precautions    Collar - hard X Sternal precautions    Collar - soft   TLSO   X Fall risk  LSO    Hip precautions - posterior  Knee immobilizer    Hip precautions - anterior  WBAT    Impaired communication  Partial weightbearing    Oxygen  TTWB    PEG tube  NWB    Visual deficits      Hearing deficits   Other:        Treatment Objectives:     Mobility Training:    Mobility task Assist level Comments    Bed mobility Min A Supine>EOB   Transfer     Sit to stands transitions     Functional mobility SBA Pt ambulated ~300ft c RW.    Sitting balance     Standing balance      Other:        ADL Training:    ADL Assist level Comments    Feeding     Grooming/hygiene SBA Pt performed hand hygiene and combing hair standing at sink   Bathing     Upper body dressing     Lower body dressing     Toileting SBA    Toilet transfer Min A For sit<>stand   Adaptive equipment training     Other:           Therapeutic Exercise:   Exercise Sets Reps Comments   UBE 2 5' Forwards and backwards   UB strengthening 2 10 Shoulder press, chest press, straight arm raises, bicep curls                   Assessment: Patient tolerated session well. Pt demonstrated improvements in participation this session. Pt continues to be noncompliant c sternal pxns despite Ot's continued education. Pt would continue to benefit from skilled OT services to improve pt's safety and  independence with daily occupations.      OT Plan: Continue OT POC.  Revisions made to plan of care: No    GOALS:   Multidisciplinary Problems       Occupational Therapy Goals          Problem: Occupational Therapy    Goal Priority Disciplines Outcome Interventions   Occupational Therapy Goal     OT, PT/OT Ongoing, Progressing    Description: Goals to be met by: 5/5/2023     Patient will increase functional independence with ADLs by performing:    UE Dressing with Modified [place].- Min A  LE Dressing with Modified [place].-Mod A  Toileting from toilet with Supervision for hygiene and clothing management.-SBA  Toilet transfer to toilet with Supervision.-SBA                         Skilled OT Minutes Provided: 44   Communication with Treatment Team:     Equipment recommendations:       At end of treatment, patient remained:  X Up in chair     Up in wheelchair in room    In bed   X With alarm activated    Bed rails up   X Call bell in reach     Family/friends present    Restraints secured properly    In bathroom with CNA/RN notified    In gym with PT/PTA/tech   X Nurse aware    Other:

## 2023-04-29 NOTE — PROGRESS NOTES
[facility] Medicine Progress Note        Chief Complaint: Inpatient Follow-up for CAD/CABG, weakness    HPI:   67-year-old male with a past medical history of ETOH abuse, CAD status post CABG 04/03/2023 with Dr. Finley, thrombocytopenia, aortic stenosis, hypertension, atrial flutter/fibrillation who presented to our facility on 4/17 for rehabilitation after CABG.  At baseline lives with a roommate however sister reports discharge may be to her house or his brother's house, depending on level of assistance needed.  Patient's cognition was concerning on 04/19/2023 and an MRI was obtained that showed no evidence of acute intracranial findings.  He does have chronic periventricular white matter changes and global intracranial atrophy.  Blood pressure remains poorly controlled, nifedipine was added and subsequently increased.  Patient has remained consistently tachycardic therefore his digoxin was increased on 04/22/2023.  He required replacement of magnesium on 04/22/2023.    Interval Hx:     4/28/23.  Patient doing well with PT, gets very tired but denies SOB.    Objective/physical exam:  General: In no acute distress, afebrile  Chest: Clear to auscultation bilaterally  Heart: RRR, +S1, S2, JAY 3/6 RSB.  Abdomen: Soft, nontender, BS +  MSK: Warm, no lower extremity edema, no clubbing or cyanosis  Neurologic: Alert and oriented x4, Cranial nerve II-XII intact, Strength 4/5 in all 4 extremities    VITAL SIGNS: 24 HRS MIN & MAX LAST   Temp  Min: 97.5 °F (36.4 °C)  Max: 98.1 °F (36.7 °C) 98 °F (36.7 °C)   BP  Min: 115/61  Max: 152/69 115/61   Pulse  Min: 54  Max: 61  (!) 55     Resp  Min: 16  Max: 20 18   SpO2  Min: 94 %  Max: 98 % 98 %       Recent Labs   Lab 04/22/23  0550 04/27/23  0601   WBC 5.0 3.2*   RBC 4.17* 3.42*   HGB 11.5* 9.4*   HCT 37.5* 30.9*   MCV 89.9 90.4   MCH 27.6 27.5   MCHC 30.7* 30.4*   RDW 14.7 14.8    125*   MPV 10.1 11.0*       Recent Labs   Lab  04/22/23  0550 04/27/23  0601    141   K 3.5 3.6   CO2 27 25   BUN 16.9 16.0   CREATININE 1.00 0.99   CALCIUM 9.2 8.9   MG 1.80  --           Microbiology Results (last 7 days)       ** No results found for the last 168 hours. **             See below for Radiology    Scheduled Med:   amiodarone  200 mg Oral Daily    aspirin  81 mg Oral Daily    atorvastatin  40 mg Oral Daily    digoxin  0.25 mg Oral Daily    enoxaparin  40 mg Subcutaneous Daily    ferrous sulfate  1 tablet Oral Daily    furosemide  40 mg Oral Daily    hydrOXYzine HCL  25 mg Oral QHS    magnesium oxide  400 mg Oral Daily    metoprolol succinate  100 mg Oral Daily    NIFEdipine  60 mg Oral Daily    thiamine  200 mg Oral Daily    traZODone  50 mg Oral QHS    valsartan  160 mg Oral BID        Continuous Infusions:       PRN Meds:  acetaminophen, albuterol-ipratropium, ALPRAZolam, aluminum-magnesium hydroxide-simethicone, bisacodyL, dextrose 50%, dextrose 50%, glucagon (human recombinant), glucose, glucose, HYDROcodone-acetaminophen, melatonin, morphine, naloxone, ondansetron, ondansetron, polyethylene glycol, simethicone, sodium chloride 0.9%       Assessment/Plan:  Multivessel CAD    - s/p CABG (4.3.23) - LIMA to LAD, rSVG to OM1, rSVG to PDA with Dr. Finley  -aspirin, atorvastatin, furosemide, metoprolol  -PT/OT         PAF/Flutter with RVR s/p DCC    -CHADsVASc - 3   -completed amiodarone taper, continue with 200 daily starting on the 20th   -digoxin started 4/18 patient remains with intermittent AF/flutter, dose increased on 04/22  -metoprolol tartrate changed to succinate on 04/23, titrating   - no AC s/p (4.3.23) - Ligation of SILVANO   -ASA           Severe AS    -TAVR Evaluation on Outpatient Basis     HTN    -valsartan, nifedipine added for improved control on 04/19/2023     Toxic/Metabolic Encephalopathy     -CT head on 04/06 showed chronic microvascular ischemic changes  -MRI from 04/20/2023 showed no acute abnormal signal changes,  chronic microvascular disease  -continue with thiamine  -B12, TSH  and folate unremarkable  -Underlying cognitive disorder.     Thrombocytopenia   Anemia of Ch disease , blood loss and hypersplenism from ETOH abuse   -required postop blood transfusion  -recovered at this time         ETOH Abuse    -reports two 6 packs daily  -p.r.n. t.i.d. Xanax decreased to daily PRN  -Thiamine,folate.    VTE prophylaxis: lovenox, monitor platelets    Patient condition:  Stable/  Anticipated discharge and Disposition:         All diagnosis and differential diagnosis have been reviewed; assessment and plan has been documented; I have personally reviewed the labs and test results that are presently available; I have reviewed the patients medication list; I have reviewed the consulting providers response and recommendations. I have reviewed or attempted to review medical records based upon their availability    All of the patient's questions have been  addressed and answered. Patient's is agreeable to the above stated plan. I will continue to monitor closely and make adjustments to medical management as needed.  _____________________________________________________________________    Nutrition Status:    Radiology:  MRI Brain Limited Without Contrast  EXAMINATION  MRI BRAIN LIMITED WITHOUT CONTRAST    CLINICAL HISTORY  Neuro deficit, acute, stroke suspected;WEAKNESS;    TECHNIQUE  Limited multisequence MR images were obtained without the intravenous administration of gadolinium-based contrast media.    COMPARISON  None available at the time of initial interpretation.    FINDINGS  Exam quality: Severely limited secondary to patient inability to complete the full exam protocol.    No restricted diffusion or other suggestion of acute ischemic insult.    No convincing acute or otherwise abnormal signal changes. No focal mass, mass effect, or midline shift. Differentiation of the gray-white border is grossly preserved.  Scattered  periventricular white matter FLAIR hyperintensities are typical for chronic microvascular disease.  There is proportional enlargement of the sulcal spaces and ventricles, consistent with global intracranial atrophy.    No abnormal extra-axial fluid. The basal cisterns are preserved.  No hydrocephalus.    IMPRESSION  1. Limited exam with no evidence of acute intracranial abnormality.  2. Chronic periventricular white matter changes and global intracranial atrophy.    Electronically signed by: Toby Reyes  Date:    04/20/2023  Time:    09:17      Silvia Pearce MD   04/28/2023

## 2023-04-29 NOTE — PLAN OF CARE
Problem: Adult Inpatient Plan of Care  Goal: Plan of Care Review  Outcome: Ongoing, Progressing  Flowsheets (Taken 4/29/2023 0821)  Plan of Care Reviewed With: patient  Goal: Patient-Specific Goal (Individualized)  Outcome: Ongoing, Progressing  Flowsheets (Taken 4/29/2023 0821)  Anxieties, Fears or Concerns: None  Individualized Care Needs:   Safety, prevent injuries   Respiratory exercises  Goal: Absence of Hospital-Acquired Illness or Injury  Outcome: Ongoing, Progressing  Intervention: Identify and Manage Fall Risk  Flowsheets (Taken 4/29/2023 0821)  Safety Promotion/Fall Prevention:   assistive device/personal item within reach   bed alarm set   chair alarm set   gait belt with ambulation   in recliner, wheels locked   medications reviewed   nonskid shoes/socks when out of bed   room near unit station   /camera at bedside   instructed to call staff for mobility  Intervention: Prevent Skin Injury  Flowsheets (Taken 4/29/2023 0821)  Body Position: position changed independently  Skin Protection: tubing/devices free from skin contact  Intervention: Prevent and Manage VTE (Venous Thromboembolism) Risk  Flowsheets (Taken 4/29/2023 0821)  Activity Management:   Ambulated to bathroom - L4   Walk with assistive devise and /or staff member - L3  VTE Prevention/Management:   bleeding risk assessed   bleeding precations maintained   fluids promoted  Range of Motion: active ROM (range of motion) encouraged  Intervention: Prevent Infection  Flowsheets (Taken 4/29/2023 0821)  Infection Prevention:   equipment surfaces disinfected   hand hygiene promoted   rest/sleep promoted   single patient room provided  Goal: Optimal Comfort and Wellbeing  Outcome: Ongoing, Progressing  Intervention: Monitor Pain and Promote Comfort  Flowsheets (Taken 4/29/2023 0821)  Pain Management Interventions:   pain management plan reviewed with patient/caregiver   quiet environment facilitated   care clustered  Intervention:  Provide Person-Centered Care  Flowsheets (Taken 4/29/2023 0821)  Trust Relationship/Rapport:   care explained   choices provided   emotional support provided   empathic listening provided   questions answered   questions encouraged   reassurance provided   thoughts/feelings acknowledged  Goal: Readiness for Transition of Care  Outcome: Ongoing, Progressing     Problem: Fall Injury Risk  Goal: Absence of Fall and Fall-Related Injury  Outcome: Ongoing, Progressing     Problem: Infection  Goal: Absence of Infection Signs and Symptoms  Description: Goals to be met by: 5/5/2023     Patient will increase functional independence with ADLs by performing:    UE Dressing with Modified [place].  LE Dressing with Modified [place].  Toileting from toilet with Supervision for hygiene and clothing management.   Toilet transfer to toilet with Supervision.    Outcome: Ongoing, Progressing     Problem: Activity Intolerance (Cardiovascular Surgery)  Goal: Improved Activity Tolerance  Outcome: Ongoing, Progressing  Intervention: Optimize Tolerance for Activity  Flowsheets (Taken 4/29/2023 0821)  Environmental Support:   calm environment promoted   caregiver consistency promoted   comfort object encouraged   distractions minimized   environmental consistency promoted   rest periods encouraged     Problem: Skin Injury Risk Increased  Goal: Skin Health and Integrity  Outcome: Ongoing, Progressing

## 2023-04-29 NOTE — PROGRESS NOTES
[facility] Medicine Progress Note        Chief Complaint: Inpatient Follow-up for CAD/CABG, weakness    HPI:   67-year-old male with a past medical history of ETOH abuse, CAD status post CABG 04/03/2023 with Dr. Finley, thrombocytopenia, aortic stenosis, hypertension, atrial flutter/fibrillation who presented to our facility on 4/17 for rehabilitation after CABG.  At baseline lives with a roommate however sister reports discharge may be to her house or his brother's house, depending on level of assistance needed.  Patient's cognition was concerning on 04/19/2023 and an MRI was obtained that showed no evidence of acute intracranial findings.  He does have chronic periventricular white matter changes and global intracranial atrophy.  Blood pressure remains poorly controlled, nifedipine was added and subsequently increased.  Patient has remained consistently tachycardic therefore his digoxin was increased on 04/22/2023.  He required replacement of magnesium on 04/22/2023.  Interval Hx:   4/28/23.    Patient doing well with PT, gets very tired but denies SOB.    4/29/23.  No new events,Telemetry NSR.    Objective/physical exam:  General: In no acute distress, afebrile    Chest: Clear to auscultation bilaterally    Heart: RRR, +S1, S2, JAY 3/6 RSB.    Abdomen: Soft, nontender, BS +    MSK: Warm, no lower extremity edema, no clubbing or cyanosis    Neurologic: Alert and oriented x4, Cranial nerve II-XII intact, Strength 4/5 in all 4 extremities                  VITAL SIGNS: 24 HRS MIN & MAX LAST   Temp  Min: 97.4 °F (36.3 °C)  Max: 98.7 °F (37.1 °C) 97.8 °F (36.6 °C)   BP  Min: 112/68  Max: 159/72 (!) 157/73     Pulse  Min: 53  Max: 64  64   Resp  Min: 16  Max: 19 17   SpO2  Min: 92 %  Max: 98 % 98 %       Recent Labs   Lab 04/27/23  0601   WBC 3.2*   RBC 3.42*   HGB 9.4*   HCT 30.9*   MCV 90.4   MCH 27.5   MCHC 30.4*   RDW 14.8   *   MPV 11.0*       Recent Labs   Lab  04/27/23  0601      K 3.6   CO2 25   BUN 16.0   CREATININE 0.99   CALCIUM 8.9          Microbiology Results (last 7 days)       ** No results found for the last 168 hours. **             See below for Radiology    Scheduled Med:   amiodarone  200 mg Oral Daily    aspirin  81 mg Oral Daily    atorvastatin  40 mg Oral Daily    digoxin  0.25 mg Oral Daily    enoxaparin  40 mg Subcutaneous Daily    ferrous sulfate  1 tablet Oral Daily    furosemide  40 mg Oral Daily    hydrOXYzine HCL  25 mg Oral QHS    magnesium oxide  400 mg Oral Daily    metoprolol succinate  100 mg Oral Daily    NIFEdipine  60 mg Oral Daily    thiamine  200 mg Oral Daily    traZODone  50 mg Oral QHS    valsartan  160 mg Oral BID        Continuous Infusions:       PRN Meds:  acetaminophen, albuterol-ipratropium, ALPRAZolam, aluminum-magnesium hydroxide-simethicone, bisacodyL, dextrose 50%, dextrose 50%, glucagon (human recombinant), glucose, glucose, HYDROcodone-acetaminophen, melatonin, morphine, naloxone, ondansetron, ondansetron, polyethylene glycol, simethicone, sodium chloride 0.9%       Assessment/Plan:  Multivessel CAD    - s/p CABG (4.3.23) - LIMA to LAD, rSVG to OM1, rSVG to PDA with Dr. Finley  -aspirin, atorvastatin, furosemide, metoprolol  -PT/OT       PAF/Flutter with RVR s/p DCC    -CHADsVASc - 3   -completed amiodarone taper, continue with 200 daily starting on the 20th   -digoxin started 4/18 patient remains with intermittent AF/flutter, dose increased on 04/22  -metoprolol tartrate changed to succinate on 04/23, titrating   - no AC s/p (4.3.23) - Ligation of SILVANO   -ASA       Severe AS  -TAVR Evaluation on Outpatient Basis     HTN  -valsartan, nifedipine added for improved control on 04/19/2023     Toxic/Metabolic Encephalopathy     -CT head on 04/06 showed chronic microvascular ischemic changes  -MRI from 04/20/2023 showed no acute abnormal signal changes, chronic microvascular disease  -continue with thiamine  -B12, TSH  and  folate unremarkable  -Underlying cognitive disorder.      Thrombocytopenia   Anemia of Ch disease , blood loss and hypersplenism from ETOH abuse     -required postop blood transfusion  -@ baseline.Check CBC.    ETOH Abuse    -reports two 6 packs daily    -p.r.n. t.i.d. Xanax decreased to daily PRN    -Thiamine,folate.    VTE prophylaxis:  Lovenox, monitor platelets.    Patient condition:  Stable/    Anticipated discharge and Disposition:   Home when Rehab Goals achieved.      All diagnosis and differential diagnosis have been reviewed; assessment and plan has been documented; I have personally reviewed the labs and test results that are presently available; I have reviewed the patients medication list; I have reviewed the consulting providers response and recommendations. I have reviewed or attempted to review medical records based upon their availability    All of the patient's questions have been  addressed and answered. Patient's is agreeable to the above stated plan. I will continue to monitor closely and make adjustments to medical management as needed.  _____________________________________________________________________    Nutrition Status:    Radiology:  MRI Brain Limited Without Contrast  EXAMINATION  MRI BRAIN LIMITED WITHOUT CONTRAST    CLINICAL HISTORY  Neuro deficit, acute, stroke suspected;WEAKNESS;    TECHNIQUE  Limited multisequence MR images were obtained without the intravenous administration of gadolinium-based contrast media.    COMPARISON  None available at the time of initial interpretation.    FINDINGS  Exam quality: Severely limited secondary to patient inability to complete the full exam protocol.    No restricted diffusion or other suggestion of acute ischemic insult.    No convincing acute or otherwise abnormal signal changes. No focal mass, mass effect, or midline shift. Differentiation of the gray-white border is grossly preserved.  Scattered periventricular white matter FLAIR  hyperintensities are typical for chronic microvascular disease.  There is proportional enlargement of the sulcal spaces and ventricles, consistent with global intracranial atrophy.    No abnormal extra-axial fluid. The basal cisterns are preserved.  No hydrocephalus.    IMPRESSION  1. Limited exam with no evidence of acute intracranial abnormality.  2. Chronic periventricular white matter changes and global intracranial atrophy.    Electronically signed by: Toby Reyes  Date:    04/20/2023  Time:    09:17      Silvia Pearce MD   04/29/2023

## 2023-04-29 NOTE — PLAN OF CARE
Problem: Adult Inpatient Plan of Care  Goal: Plan of Care Review  Outcome: Ongoing, Progressing  Flowsheets (Taken 4/28/2023 2040)  Plan of Care Reviewed With: patient     Problem: Adult Inpatient Plan of Care  Goal: Patient-Specific Goal (Individualized)  Outcome: Ongoing, Progressing  Flowsheets (Taken 4/28/2023 2040)  Anxieties, Fears or Concerns: None  Individualized Care Needs:   Safety, prevent injuries   Respiratory exercises.     Problem: Adult Inpatient Plan of Care  Goal: Absence of Hospital-Acquired Illness or Injury  Intervention: Identify and Manage Fall Risk  Flowsheets (Taken 4/28/2023 2040)  Safety Promotion/Fall Prevention:   bed alarm set   assistive device/personal item within reach   high risk medications identified   lighting adjusted   medications reviewed   /camera at bedside   instructed to call staff for mobility   nonskid shoes/socks when out of bed     Problem: Adult Inpatient Plan of Care  Goal: Absence of Hospital-Acquired Illness or Injury  Intervention: Prevent Infection  Flowsheets (Taken 4/28/2023 2040)  Infection Prevention:   equipment surfaces disinfected   hand hygiene promoted   rest/sleep promoted     Problem: Adult Inpatient Plan of Care  Goal: Optimal Comfort and Wellbeing  Intervention: Monitor Pain and Promote Comfort  Flowsheets (Taken 4/28/2023 2040)  Pain Management Interventions:   pain management plan reviewed with patient/caregiver   medication offered   quiet environment facilitated   relaxation techniques promoted   care clustered     Problem: Adult Inpatient Plan of Care  Goal: Optimal Comfort and Wellbeing  Intervention: Provide Person-Centered Care  Flowsheets (Taken 4/28/2023 2040)  Trust Relationship/Rapport:   care explained   choices provided   emotional support provided   empathic listening provided   questions answered   questions encouraged   reassurance provided   thoughts/feelings acknowledged     Problem: Infection  Goal: Absence of  Infection Signs and Symptoms  Description: Goals to be met by: 5/5/2023     Patient will increase functional independence with ADLs by performing:    UE Dressing with Modified [place].  LE Dressing with Modified [place].  Toileting from toilet with Supervision for hygiene and clothing management.   Toilet transfer to toilet with Supervision.    Intervention: Prevent or Manage Infection  Flowsheets (Taken 4/28/2023 2040)  Fever Reduction/Comfort Measures:   lightweight bedding   lightweight clothing  Infection Management: aseptic technique maintained

## 2023-04-30 LAB
BASOPHILS # BLD AUTO: 0.01 X10(3)/MCL (ref 0–0.2)
BASOPHILS NFR BLD AUTO: 0.3 %
EOSINOPHIL # BLD AUTO: 0.09 X10(3)/MCL (ref 0–0.9)
EOSINOPHIL NFR BLD AUTO: 3 %
ERYTHROCYTE [DISTWIDTH] IN BLOOD BY AUTOMATED COUNT: 14.8 % (ref 11.5–17)
HCT VFR BLD AUTO: 30.4 % (ref 42–52)
HGB BLD-MCNC: 9.4 G/DL (ref 14–18)
IMM GRANULOCYTES # BLD AUTO: 0.01 X10(3)/MCL (ref 0–0.04)
IMM GRANULOCYTES NFR BLD AUTO: 0.3 %
LYMPHOCYTES # BLD AUTO: 0.8 X10(3)/MCL (ref 0.6–4.6)
LYMPHOCYTES NFR BLD AUTO: 26.6 %
MCH RBC QN AUTO: 27.9 PG (ref 27–31)
MCHC RBC AUTO-ENTMCNC: 30.9 G/DL (ref 33–36)
MCV RBC AUTO: 90.2 FL (ref 80–94)
MONOCYTES # BLD AUTO: 0.36 X10(3)/MCL (ref 0.1–1.3)
MONOCYTES NFR BLD AUTO: 12 %
NEUTROPHILS # BLD AUTO: 1.74 X10(3)/MCL (ref 2.1–9.2)
NEUTROPHILS NFR BLD AUTO: 57.8 %
PLATELET # BLD AUTO: 117 X10(3)/MCL (ref 130–400)
PMV BLD AUTO: 10.8 FL (ref 7.4–10.4)
RBC # BLD AUTO: 3.37 X10(6)/MCL (ref 4.7–6.1)
WBC # SPEC AUTO: 3 X10(3)/MCL (ref 4.5–11.5)

## 2023-04-30 PROCEDURE — 86022 PLATELET ANTIBODIES: CPT | Mod: 90 | Performed by: HOSPITALIST

## 2023-04-30 PROCEDURE — 25000003 PHARM REV CODE 250: Performed by: STUDENT IN AN ORGANIZED HEALTH CARE EDUCATION/TRAINING PROGRAM

## 2023-04-30 PROCEDURE — 94761 N-INVAS EAR/PLS OXIMETRY MLT: CPT

## 2023-04-30 PROCEDURE — 94760 N-INVAS EAR/PLS OXIMETRY 1: CPT

## 2023-04-30 PROCEDURE — 99900035 HC TECH TIME PER 15 MIN (STAT)

## 2023-04-30 PROCEDURE — 85025 COMPLETE CBC W/AUTO DIFF WBC: CPT | Performed by: HOSPITALIST

## 2023-04-30 PROCEDURE — 11000004 HC SNF PRIVATE

## 2023-04-30 RX ADMIN — Medication 400 MG: at 08:04

## 2023-04-30 RX ADMIN — ASPIRIN 81 MG: 81 TABLET, COATED ORAL at 08:04

## 2023-04-30 RX ADMIN — VALSARTAN 160 MG: 160 TABLET, FILM COATED ORAL at 08:04

## 2023-04-30 RX ADMIN — MELATONIN TAB 3 MG 9 MG: 3 TAB at 11:04

## 2023-04-30 RX ADMIN — FERROUS SULFATE TAB 325 MG (65 MG ELEMENTAL FE) 1 EACH: 325 (65 FE) TAB at 08:04

## 2023-04-30 RX ADMIN — HYDROCODONE BITARTRATE AND ACETAMINOPHEN 1 TABLET: 5; 325 TABLET ORAL at 08:04

## 2023-04-30 RX ADMIN — NIFEDIPINE 60 MG: 30 TABLET, FILM COATED, EXTENDED RELEASE ORAL at 08:04

## 2023-04-30 RX ADMIN — FUROSEMIDE 40 MG: 40 TABLET ORAL at 08:04

## 2023-04-30 RX ADMIN — HYDROCODONE BITARTRATE AND ACETAMINOPHEN 1 TABLET: 5; 325 TABLET ORAL at 03:04

## 2023-04-30 RX ADMIN — ATORVASTATIN CALCIUM 40 MG: 40 TABLET, FILM COATED ORAL at 08:04

## 2023-04-30 RX ADMIN — POLYETHYLENE GLYCOL 3350 17 G: 17 POWDER, FOR SOLUTION ORAL at 08:04

## 2023-04-30 RX ADMIN — METOPROLOL SUCCINATE 100 MG: 50 TABLET, FILM COATED, EXTENDED RELEASE ORAL at 08:04

## 2023-04-30 RX ADMIN — DIGOXIN 0.25 MG: 250 TABLET ORAL at 08:04

## 2023-04-30 RX ADMIN — ACETAMINOPHEN 650 MG: 325 TABLET, FILM COATED ORAL at 11:04

## 2023-04-30 RX ADMIN — THIAMINE HCL TAB 100 MG 200 MG: 100 TAB at 08:04

## 2023-04-30 RX ADMIN — TRAZODONE HYDROCHLORIDE 50 MG: 50 TABLET ORAL at 08:04

## 2023-04-30 RX ADMIN — AMIODARONE HYDROCHLORIDE 200 MG: 200 TABLET ORAL at 08:04

## 2023-04-30 RX ADMIN — HYDROXYZINE HYDROCHLORIDE 25 MG: 25 TABLET ORAL at 08:04

## 2023-04-30 NOTE — PLAN OF CARE
Problem: Adult Inpatient Plan of Care  Goal: Plan of Care Review  Outcome: Ongoing, Progressing  Flowsheets (Taken 4/30/2023 1800)  Plan of Care Reviewed With: patient  Goal: Patient-Specific Goal (Individualized)  Outcome: Ongoing, Progressing  Flowsheets (Taken 4/30/2023 1800)  Anxieties, Fears or Concerns: None  Individualized Care Needs: Pt will be pain free by 4/30/23 at 1900  Goal: Absence of Hospital-Acquired Illness or Injury  Outcome: Ongoing, Progressing  Intervention: Identify and Manage Fall Risk  Flowsheets (Taken 4/30/2023 1800)  Safety Promotion/Fall Prevention:   assistive device/personal item within reach   chair alarm set  Intervention: Prevent Skin Injury  Flowsheets (Taken 4/30/2023 1800)  Body Position: position changed independently  Skin Protection: tubing/devices free from skin contact  Intervention: Prevent and Manage VTE (Venous Thromboembolism) Risk  Flowsheets (Taken 4/30/2023 1800)  Activity Management: Up in chair - L3  VTE Prevention/Management:   bleeding risk assessed   bleeding precations maintained   fluids promoted  Range of Motion: active ROM (range of motion) encouraged  Intervention: Prevent Infection  Flowsheets (Taken 4/30/2023 1800)  Infection Prevention:   hand hygiene promoted   rest/sleep promoted   single patient room provided  Goal: Optimal Comfort and Wellbeing  Outcome: Ongoing, Progressing  Intervention: Monitor Pain and Promote Comfort  Flowsheets (Taken 4/30/2023 1800)  Pain Management Interventions:   care clustered   medication offered   quiet environment facilitated   pillow support provided   position adjusted  Intervention: Provide Person-Centered Care  Flowsheets (Taken 4/30/2023 1800)  Trust Relationship/Rapport:   care explained   choices provided   emotional support provided   empathic listening provided   questions answered   questions encouraged   reassurance provided   thoughts/feelings acknowledged  Goal: Readiness for Transition of Care  Outcome:  Ongoing, Progressing     Problem: Fall Injury Risk  Goal: Absence of Fall and Fall-Related Injury  Outcome: Ongoing, Progressing     Problem: Infection  Goal: Absence of Infection Signs and Symptoms  Description: Goals to be met by: 5/5/2023     Patient will increase functional independence with ADLs by performing:    UE Dressing with Modified [place].  LE Dressing with Modified [place].  Toileting from toilet with Supervision for hygiene and clothing management.   Toilet transfer to toilet with Supervision.    Outcome: Ongoing, Progressing     Problem: Activity Intolerance (Cardiovascular Surgery)  Goal: Improved Activity Tolerance  Outcome: Ongoing, Progressing     Problem: Skin Injury Risk Increased  Goal: Skin Health and Integrity  Outcome: Ongoing, Progressing     Problem: Impaired Wound Healing  Goal: Optimal Wound Healing  Outcome: Ongoing, Progressing

## 2023-04-30 NOTE — PLAN OF CARE
Problem: Adult Inpatient Plan of Care  Goal: Plan of Care Review  Outcome: Ongoing, Progressing  Flowsheets (Taken 4/29/2023 2123)  Plan of Care Reviewed With: patient  Goal: Patient-Specific Goal (Individualized)  Outcome: Ongoing, Progressing  Flowsheets (Taken 4/29/2023 2123)  Individualized Care Needs: pt will be pain free by 4/30/23 at 0700     Problem: Fall Injury Risk  Goal: Absence of Fall and Fall-Related Injury  Outcome: Ongoing, Progressing  Intervention: Identify and Manage Contributors  Flowsheets (Taken 4/29/2023 2123)  Self-Care Promotion: independence encouraged  Medication Review/Management: medications reviewed     Problem: Infection  Goal: Absence of Infection Signs and Symptoms  Description: Goals to be met by: 5/5/2023     Patient will increase functional independence with ADLs by performing:    UE Dressing with Modified [place].  LE Dressing with Modified [place].  Toileting from toilet with Supervision for hygiene and clothing management.   Toilet transfer to toilet with Supervision.    Outcome: Ongoing, Progressing  Intervention: Prevent or Manage Infection  Flowsheets (Taken 4/29/2023 2123)  Infection Management: aseptic technique maintained     Problem: Impaired Wound Healing  Goal: Optimal Wound Healing  Outcome: Ongoing, Progressing  Intervention: Promote Wound Healing  Flowsheets (Taken 4/29/2023 2123)  Sleep/Rest Enhancement: awakenings minimized

## 2023-04-30 NOTE — PROGRESS NOTES
[facility] Medicine Progress Note        Chief Complaint: Inpatient Follow-up for CAD/CABG, weakness, main complaint is Left thigh pain where he had graft  retrieval, he has small echymosis on area no thrill or cellulitic changes.    HPI:   67-year-old male with a past medical history of ETOH abuse, CAD status post CABG 04/03/2023 with Dr. Finley, thrombocytopenia, aortic stenosis, hypertension, atrial flutter/fibrillation who presented to our facility on 4/17 for rehabilitation after CABG.  At baseline lives with a roommate however sister reports discharge may be to her house or his brother's house, depending on level of assistance needed.  Patient's cognition was concerning on 04/19/2023 and an MRI was obtained that showed no evidence of acute intracranial findings.  He does have chronic periventricular white matter changes and global intracranial atrophy.  Blood pressure remains poorly controlled, nifedipine was added and subsequently increased.  Patient has remained consistently tachycardic therefore his digoxin was increased on 04/22/2023.  He required replacement of magnesium on 04/22/2023.  Interval Hx:   4/28/23.  Patient doing well with PT, gets very tired but denies SOB.  4/29/23.  No new events,Telemetry NSR.    4/30/23.  Patient doing well, cognitive deficit, mentioned regarding TAVR in the future, he does not remember any provider talking to him @ it, patient will need a [facility] for future medical decisions, he is very forgetful and has signs of moderate dementia.    Objective/physical exam:    General: In no acute distress, afebrile  Chest: Clear to auscultation bilaterally  Heart: RRR, +S1, S2, JAY 3/6 RSB.  Abdomen: Soft, nontender, BS +  MSK: Warm, no lower extremity edema, no clubbing or cyanosis  Neurologic: Alert and oriented x4, Cranial nerve II-XII intact, Strength 4/5 in all 4 extremities, very forgetful.    VITAL SIGNS: 24 HRS MIN & MAX LAST   Temp  Min:  97.7 °F (36.5 °C)  Max: 98.4 °F (36.9 °C) 98.4 °F (36.9 °C)   BP  Min: 105/55  Max: 157/73 (!) 154/85     Pulse  Min: 54  Max: 64  (!) 56     Resp  Min: 16  Max: 18 16   SpO2  Min: 91 %  Max: 98 % 98 %       Recent Labs   Lab 04/27/23  0601 04/30/23  0641   WBC 3.2* 3.0*   RBC 3.42* 3.37*   HGB 9.4* 9.4*   HCT 30.9* 30.4*   MCV 90.4 90.2   MCH 27.5 27.9   MCHC 30.4* 30.9*   RDW 14.8 14.8   * 117*   MPV 11.0* 10.8*       Recent Labs   Lab 04/27/23  0601      K 3.6   CO2 25   BUN 16.0   CREATININE 0.99   CALCIUM 8.9          Microbiology Results (last 7 days)       ** No results found for the last 168 hours. **             See below for Radiology    Scheduled Med:   amiodarone  200 mg Oral Daily    aspirin  81 mg Oral Daily    atorvastatin  40 mg Oral Daily    digoxin  0.25 mg Oral Daily    enoxaparin  40 mg Subcutaneous Daily    ferrous sulfate  1 tablet Oral Daily    furosemide  40 mg Oral Daily    hydrOXYzine HCL  25 mg Oral QHS    magnesium oxide  400 mg Oral Daily    metoprolol succinate  100 mg Oral Daily    NIFEdipine  60 mg Oral Daily    thiamine  200 mg Oral Daily    traZODone  50 mg Oral QHS    valsartan  160 mg Oral BID        Continuous Infusions:       PRN Meds:  acetaminophen, albuterol-ipratropium, ALPRAZolam, aluminum-magnesium hydroxide-simethicone, bisacodyL, dextrose 50%, dextrose 50%, glucagon (human recombinant), glucose, glucose, HYDROcodone-acetaminophen, melatonin, morphine, naloxone, ondansetron, ondansetron, polyethylene glycol, simethicone, sodium chloride 0.9%       Assessment/Plan:  Multivessel CAD    - s/p CABG (4.3.23) - LIMA to LAD, rSVG to OM1, rSVG to PDA with Dr. Finley  -aspirin, atorvastatin, furosemide, metoprolol  -PT/OT     PAF/Flutter with RVR s/p DCCV     -CHADsVASc - 3   -completed amiodarone taper, continue with 200 daily starting on the 20th   -digoxin started 4/18 patient remains with intermittent AF/flutter, dose increased on 04/22  -metoprolol tartrate  changed to succinate on 04/23, titrating   - no AC s/p (4.3.23) - Ligation of SILVANO   -ASA     Severe AS    -TAVR Evaluation on Outpatient Basis      HTN  -valsartan, nifedipine added for improved control on 04/19/2023      Toxic/Metabolic Encephalopathy      -CT head on 04/06 showed chronic microvascular ischemic changes    -MRI from 04/20/2023 showed no acute abnormal signal changes, chronic microvascular disease    -continue with thiamine    -B12, TSH  and folate unremarkable    -Underlying cognitive disorder.   Alzheimer's, possible alcoholic dementia.    Thrombocytopenia   -worsened while on Lovenox  -DC [facility]  -Check HIT    Anemia of Ch disease , blood loss and hypersplenism from ETOH abuse   -required postop blood transfusion    -@ baseline.Repeat CBC stable Hb @ 9.4.     ETOH Abuse    -reports two 6 packs daily  -p.r.n. t.i.d. Xanax decreased to daily PRN  -Thiamine,folate.    VTE prophylaxis: DC Lovenox, patient platelets dropped to > 50 %, SCD in place.    Patient condition:  Stable/    Anticipated discharge and Disposition:   Home when Rehab Goals achieved.Might need LT placement d/t cognitive issues.      All diagnosis and differential diagnosis have been reviewed; assessment and plan has been documented; I have personally reviewed the labs and test results that are presently available; I have reviewed the patients medication list; I have reviewed the consulting providers response and recommendations. I have reviewed or attempted to review medical records based upon their availability    All of the patient's questions have been  addressed and answered. Patient's is agreeable to the above stated plan. I will continue to monitor closely and make adjustments to medical management as needed.  _____________________________________________________________________    Nutrition Status:    Radiology:  MRI Brain Limited Without Contrast  EXAMINATION  MRI BRAIN LIMITED WITHOUT CONTRAST    CLINICAL HISTORY  Neuro  deficit, acute, stroke suspected;WEAKNESS;    TECHNIQUE  Limited multisequence MR images were obtained without the intravenous administration of gadolinium-based contrast media.    COMPARISON  None available at the time of initial interpretation.    FINDINGS  Exam quality: Severely limited secondary to patient inability to complete the full exam protocol.    No restricted diffusion or other suggestion of acute ischemic insult.    No convincing acute or otherwise abnormal signal changes. No focal mass, mass effect, or midline shift. Differentiation of the gray-white border is grossly preserved.  Scattered periventricular white matter FLAIR hyperintensities are typical for chronic microvascular disease.  There is proportional enlargement of the sulcal spaces and ventricles, consistent with global intracranial atrophy.    No abnormal extra-axial fluid. The basal cisterns are preserved.  No hydrocephalus.    IMPRESSION  1. Limited exam with no evidence of acute intracranial abnormality.  2. Chronic periventricular white matter changes and global intracranial atrophy.    Electronically signed by: Toby Reyes  Date:    04/20/2023  Time:    09:17      Silvia Pearce MD   04/30/2023

## 2023-04-30 NOTE — PLAN OF CARE
[facility]  Discharge Reassessment    Primary Care Provider: LOREN Jerry    Expected Discharge Date:     Reassessment (most recent)       Discharge Reassessment - 04/30/23 1441          Discharge Reassessment    Assessment Type Discharge Planning Reassessment     Did the patient's condition or plan change since previous assessment? No     Discharge Plan discussed with: Sibling     Name(s) and Number(s) Ashleigh singleton      Communicated [place] with patient/caregiver Date not available/Unable to determine     Discharge Plan A Home with family;Home Health     DME Needed Upon Discharge  walker, standard     Discharge Barriers Identified None     Why the patient remains in the hospital Requires continued medical care        Post-Acute Status    Post-Acute Authorization Home Health     Home Health Status Pending medical clearance/testing     Hospital Resources/Appts/Education Provided Provided patient/caregiver with written discharge plan information     Discharge Delays None known at this time

## 2023-05-01 PROCEDURE — 97530 THERAPEUTIC ACTIVITIES: CPT | Mod: CQ

## 2023-05-01 PROCEDURE — 11000004 HC SNF PRIVATE

## 2023-05-01 PROCEDURE — 97130 THER IVNTJ EA ADDL 15 MIN: CPT

## 2023-05-01 PROCEDURE — 97110 THERAPEUTIC EXERCISES: CPT

## 2023-05-01 PROCEDURE — 25000003 PHARM REV CODE 250: Performed by: HOSPITALIST

## 2023-05-01 PROCEDURE — 94760 N-INVAS EAR/PLS OXIMETRY 1: CPT

## 2023-05-01 PROCEDURE — 97530 THERAPEUTIC ACTIVITIES: CPT

## 2023-05-01 PROCEDURE — 25000003 PHARM REV CODE 250: Performed by: STUDENT IN AN ORGANIZED HEALTH CARE EDUCATION/TRAINING PROGRAM

## 2023-05-01 PROCEDURE — 97116 GAIT TRAINING THERAPY: CPT | Mod: CQ

## 2023-05-01 PROCEDURE — 97110 THERAPEUTIC EXERCISES: CPT | Mod: CQ

## 2023-05-01 PROCEDURE — 97129 THER IVNTJ 1ST 15 MIN: CPT

## 2023-05-01 PROCEDURE — 99900035 HC TECH TIME PER 15 MIN (STAT)

## 2023-05-01 RX ORDER — LIDOCAINE 50 MG/G
1 PATCH TOPICAL
Status: DISCONTINUED | OUTPATIENT
Start: 2023-05-01 | End: 2023-05-05 | Stop reason: HOSPADM

## 2023-05-01 RX ADMIN — ALPRAZOLAM 0.25 MG: 0.25 TABLET ORAL at 09:05

## 2023-05-01 RX ADMIN — HYDROCODONE BITARTRATE AND ACETAMINOPHEN 1 TABLET: 5; 325 TABLET ORAL at 03:05

## 2023-05-01 RX ADMIN — ATORVASTATIN CALCIUM 40 MG: 40 TABLET, FILM COATED ORAL at 09:05

## 2023-05-01 RX ADMIN — METOPROLOL SUCCINATE 100 MG: 50 TABLET, FILM COATED, EXTENDED RELEASE ORAL at 09:05

## 2023-05-01 RX ADMIN — THIAMINE HCL TAB 100 MG 200 MG: 100 TAB at 09:05

## 2023-05-01 RX ADMIN — DIGOXIN 0.25 MG: 250 TABLET ORAL at 09:05

## 2023-05-01 RX ADMIN — HYDROXYZINE HYDROCHLORIDE 25 MG: 25 TABLET ORAL at 09:05

## 2023-05-01 RX ADMIN — LIDOCAINE 1 PATCH: 50 PATCH CUTANEOUS at 11:05

## 2023-05-01 RX ADMIN — TRAZODONE HYDROCHLORIDE 50 MG: 50 TABLET ORAL at 09:05

## 2023-05-01 RX ADMIN — VALSARTAN 160 MG: 160 TABLET, FILM COATED ORAL at 09:05

## 2023-05-01 RX ADMIN — AMIODARONE HYDROCHLORIDE 200 MG: 200 TABLET ORAL at 09:05

## 2023-05-01 RX ADMIN — MELATONIN TAB 3 MG 9 MG: 3 TAB at 09:05

## 2023-05-01 RX ADMIN — ASPIRIN 81 MG: 81 TABLET, COATED ORAL at 09:05

## 2023-05-01 RX ADMIN — NIFEDIPINE 60 MG: 30 TABLET, FILM COATED, EXTENDED RELEASE ORAL at 11:05

## 2023-05-01 RX ADMIN — FUROSEMIDE 40 MG: 40 TABLET ORAL at 09:05

## 2023-05-01 RX ADMIN — FERROUS SULFATE TAB 325 MG (65 MG ELEMENTAL FE) 1 EACH: 325 (65 FE) TAB at 09:05

## 2023-05-01 RX ADMIN — Medication 400 MG: at 09:05

## 2023-05-01 NOTE — PT/OT/SLP PROGRESS
Speech Language Pathology Treatment    Patient Name:  Mike Urbina Jr.   MRN:  [id number]  Admitting Diagnosis: <principal problem not specified>    Recommendations:                 General Recommendations:  Dysphagia therapy and Cognitive-linguistic therapy  Diet recommendations:  Regular Diet - IDDSI Level 7, Liquid Diet Level: Thin liquids - IDDSI Level 0   Aspiration Precautions: HOB to 90 degrees, Small bites/sips, and Standard aspiration precautions   General Precautions: Standard, fall, sternal  Communication strategies:  go to room if call light pushed    Subjective     Pt in bed upon SLP arrival. Pt pleasant and in good spirits.    Patient goals:      Pain/Comfort:       Respiratory Status: Room air    Objective:     Has the patient been evaluated by SLP for swallowing?   Yes  Keep patient NPO?     Current Respiratory Status:        Pt recalled sternal precautions Rei.  Pt participated in structured conversation regarding discharge planning. Pt able to demonstrate insight into physical deficits and the need to utilize RW at this time. Pt reports he has not spoken to his siblings about discharging to their house from this facility. SLP reviewed internal memory strategies w/ pt and encouraged pt to utilized in order to improve STM recall of functional information.       Overall good session.  Cont SLP POC.    Assessment:     Mike Urbina Jr. is a 67 y.o. male with an SLP diagnosis of Cognitive-Linguistic Impairment.  He presents w/ decreased safety awareness and delayed recall. Skilled SLP services are warranted at this time to address above stated deficits.    Goals:   Multidisciplinary Problems       SLP Goals          Problem: SLP    Goal Priority Disciplines Outcome   SLP Goal     SLP Ongoing, Progressing   Description: LTG: Pt will tolerate LRD w/o overt s/s of aspiration.  Pt will improve cognitive linguistic skills to ensure safe discharge home.    STG:  Pt will complete laryngeal  strengthening exercises and aleksandra with minimal cues. Discontinue  Pt will consume trials of thin liquid x10 w/o overt s/s of aspiration. Goal met  Pt will consume trials of soft and bite sized w/ good oral clearance and w/o overt s/s of aspiration. Goal met  Pt will utilize memory strategies to recall sternal precautions following a 3 minute filled delay.  Pt will provide solutions to problems/situations w/ 90% acc Jennifer.  Pt will complete trials of regular w/ good oral clearance and w/o overt s/s of aspiration. Goal met                       Plan:     Patient to be seen:  5 x/week   Plan of Care expires:  5/5/23  Plan of Care reviewed with:  patient   SLP Follow-Up:  Yes       Discharge recommendations:      Barriers to Discharge:  Level of Skilled Assistance Needed see Pt/Ot notes and Safety Awareness impaired    Time Tracking:     SLP Treatment Date:  5/1/23  Speech Start Time: 9:28  Speech Stop Time: 9:52   Speech Total Time (min):  24 min    Billable Minutes: Speech Therapy Individual 24 cognition      CECI ChowdarySVan, CCC-SLP   05/01/2023

## 2023-05-01 NOTE — PLAN OF CARE
Problem: Adult Inpatient Plan of Care  Goal: Plan of Care Review  Outcome: Ongoing, Progressing  Flowsheets (Taken 5/1/2023 1430)  Plan of Care Reviewed With: patient  Goal: Patient-Specific Goal (Individualized)  Outcome: Ongoing, Progressing  Flowsheets (Taken 5/1/2023 1430)  Anxieties, Fears or Concerns: none voiced  Individualized Care Needs: Continue saftey awareness measures.  Address pain in left knee during day shift  Goal: Absence of Hospital-Acquired Illness or Injury  Outcome: Ongoing, Progressing  Intervention: Identify and Manage Fall Risk  Flowsheets (Taken 5/1/2023 1430)  Safety Promotion/Fall Prevention:   assistive device/personal item within reach   chair alarm set   bed alarm set   nonskid shoes/socks when out of bed   Fall Risk reviewed with patient/family   gait belt with ambulation   instructed to call staff for mobility   supervised activity  Intervention: Prevent Skin Injury  Flowsheets (Taken 5/1/2023 1430)  Body Position: position changed independently  Skin Protection:   adhesive use limited   tubing/devices free from skin contact   pouching devices used   incontinence pads utilized   silicone foam dressing in place   skin sealant/moisture barrier applied  Intervention: Prevent and Manage VTE (Venous Thromboembolism) Risk  Flowsheets (Taken 5/1/2023 1430)  Activity Management:   Ambulated to bathroom - L4   Ambulated -L4   Walk with assistive devise and /or staff member - L3   Up in chair - L3  VTE Prevention/Management:   bleeding risk assessed   ambulation promoted  Range of Motion: active ROM (range of motion) encouraged  Intervention: Prevent Infection  Flowsheets (Taken 5/1/2023 1430)  Infection Prevention:   cohorting utilized   environmental surveillance performed   equipment surfaces disinfected   hand hygiene promoted   rest/sleep promoted  Goal: Optimal Comfort and Wellbeing  Outcome: Ongoing, Progressing  Intervention: Monitor Pain and Promote Comfort  Flowsheets (Taken  5/1/2023 1430)  Pain Management Interventions:   diversional activity provided   quiet environment facilitated   position adjusted   care clustered   breathing exercises utilized  Intervention: Provide Person-Centered Care  Flowsheets (Taken 5/1/2023 1430)  Trust Relationship/Rapport:   care explained   choices provided   emotional support provided   empathic listening provided   questions answered   questions encouraged   reassurance provided   thoughts/feelings acknowledged     Problem: Fall Injury Risk  Goal: Absence of Fall and Fall-Related Injury  Outcome: Ongoing, Progressing  Intervention: Identify and Manage Contributors  Flowsheets (Taken 5/1/2023 1430)  Self-Care Promotion:   independence encouraged   BADL personal objects within reach   safe use of adaptive equipment encouraged  Medication Review/Management: medications reviewed  Intervention: Promote Injury-Free Environment  Flowsheets (Taken 5/1/2023 1430)  Safety Promotion/Fall Prevention:   assistive device/personal item within reach   chair alarm set   bed alarm set   nonskid shoes/socks when out of bed   Fall Risk reviewed with patient/family   gait belt with ambulation   instructed to call staff for mobility   supervised activity     Problem: Activity Intolerance (Cardiovascular Surgery)  Goal: Improved Activity Tolerance  Outcome: Ongoing, Progressing  Intervention: Optimize Tolerance for Activity  Flowsheets (Taken 5/1/2023 1430)  Self-Care Promotion:   independence encouraged   BADL personal objects within reach   safe use of adaptive equipment encouraged  Environmental Support:   rest periods encouraged   calm environment promoted   personal routine supported   comfort object encouraged   environmental consistency promoted     Problem: Impaired Wound Healing  Goal: Optimal Wound Healing  Outcome: Ongoing, Progressing  Intervention: Promote Wound Healing  Flowsheets (Taken 5/1/2023 1430)  Sleep/Rest Enhancement:   regular sleep/rest pattern  promoted   noise level reduced   natural light exposure provided  Activity Management:   Ambulated to bathroom - L4   Ambulated -L4   Walk with assistive devise and /or staff member - L3   Up in chair - L3  Pain Management Interventions:   diversional activity provided   quiet environment facilitated   position adjusted   care clustered   breathing exercises utilized

## 2023-05-01 NOTE — PT/OT/SLP PROGRESS
Physical Therapy Treatment Note           Name: Mike Urbina Jr.    : 1955 (67 y.o.)  MRN: [id number]           TREATMENT SUMMARY AND RECOMMENDATIONS:    PT Received On: 23  PT Start Time: 1000     PT Stop Time: 1025  PT Total Time (min): 25 min     Subjective Assessment:  x No complaints  Lethargic    Awake, alert, cooperative  Uncooperative    Agitated  c/o pain    Appropriate  c/o fatigue    Confused  Treated at bedside     Emotionally labile  Treated in gym/dept.    Impulsive  Other:    Flat affect       Therapy Precautions:    Cognitive deficits  Spinal precautions    Collar - hard x Sternal precautions    Collar - soft   TLSO    Fall risk  LSO    Hip precautions - posterior  Knee immobilizer    Hip precautions - anterior  WBAT    Impaired communication  Partial weightbearing    Oxygen  TTWB    PEG tube  NWB    Visual deficits  Other:    Hearing deficits          Treatment Objectives:     Mobility Training:   Assist level Comments    Bed mobility     Transfer     Gait CGA Amb on firm surface with  ft    Sit to stand transitions     Sitting balance     Standing balance      Wheelchair mobility     Car transfer     Other:          Therapeutic Exercise:   Exercise Sets Reps Comments   UBE 10 min Level 2  UBE with B LE use   B LE exer sitting  20 reps  Ankle pumps, TKE, abd/add, knee lifts                    Additional Comments:  Sternal precautions     Assessment: Patient tolerated session well.    PT Plan: continue  Revisions made to plan of care: No    GOALS:   Multidisciplinary Problems       Physical Therapy Goals          Problem: Physical Therapy    Goal Priority Disciplines Outcome Goal Variances Interventions   Physical Therapy Goal     PT, PT/OT Ongoing, Progressing     Description: Goals to be met by: D[place]     Patient will increase functional independence with mobility by performin. Supine to sit with Modified [place] maintaining sternal precautions  2.  Sit to stand transfer with Modified [place] maintaining sternal precautions  3. Gait x 325 feet with Modified [place] using Rolling Walker.   4. Ascend/descend 3 stair with left Handrails Modified [place] using No Assistive Device.                          Skilled PT Minutes Provided: 25   Communication with Treatment Team:     Equipment recommendations:       At end of treatment, patient remained:   Up in chair     Up in wheelchair in room    In bed    With alarm activated    Bed rails up    Call bell in reach     Family/friends present    Restraints secured properly    In bathroom with CNA/RN notified    Nurse aware   x In gym with therapist/tech    Other:

## 2023-05-01 NOTE — PT/OT/SLP PROGRESS
Occupational Therapy  Treatment    Name: Mike Urbina Jr.    : 1955 (67 y.o.)  MRN: [id number]           TREATMENT SUMMARY AND RECOMMENDATIONS:      OT Date of Treatment: 23  OT Start Time: 1034  OT Stop Time: 1112  OT Total Time (min): 38 min      Subjective Assessment:   No complaints  Lethargic   x Awake, alert, cooperative  Impulsive    Uncooperative   Flat affect    Agitated  c/o pain    Appropriate  c/o fatigue    Confused x Treated at bedside     Emotionally labile x Treated in gym/dept.      Other:        Therapy Precautions:    Cognitive deficits  Spinal precautions    Collar - hard x Sternal precautions    Collar - soft   TLSO    Fall risk  LSO    Hip precautions - posterior  Knee immobilizer    Hip precautions - anterior  WBAT    Impaired communication  Partial weightbearing    Oxygen  TTWB    PEG tube  NWB    Visual deficits      Hearing deficits   Other:        Treatment Objectives:     Mobility Training:    Mobility task Assist level Comments    Bed mobility     Transfer SBA Stand/pivot t/f with RW to BSChair   Sit to stands transitions SBA Pt still needs cues to adhere to sternal precautions with sit to stands   Functional mobility SBA X400 feet and then short bouts to vending machine and around room with RW    Sitting balance     Standing balance  SBA With 2# weights on B wrists, pt performed reach to low stool to grasp resistive clothespins and then place onto behzad at shoulder level. Performed with BUEs; able to tolerate standing over 10 min without fatigue.    Other:        ADL Training:    ADL Assist level Comments    Feeding     Grooming/hygiene     Bathing     Upper body dressing     Lower body dressing SBA Pt able to doff/elana B socks   Toileting Mod (I)  Pt stood at toilet to urinate    Toilet transfer     Adaptive equipment training     Other: IADL ax SBA Pt used RW to ambulate to closet to retrieve money and then walked to vending machine to get a drink and chips. Pt  performed all steps incorporating money management into task.         Therapeutic Exercise:   Exercise Sets Reps Comments   3# dowel 1 20 chest press, straight arm raises, bicep curls, forward rows, backwards rows                         Additional Comments:      Assessment: Patient tolerated session well. Pt continues to required cues for pushing on chair with sit to stands however progress with activity tolerance and endurance improving. Pt able to tolerate standing over 10 min before sitting.     OT Plan: Continue POC  Revisions made to plan of care: No    GOALS:   Multidisciplinary Problems       Occupational Therapy Goals          Problem: Occupational Therapy    Goal Priority Disciplines Outcome Interventions   Occupational Therapy Goal     OT, PT/OT Ongoing, Progressing    Description: Goals to be met by: 5/5/2023     Patient will increase functional independence with ADLs by performing:    UE Dressing with Modified [place].- Min A  LE Dressing with Modified [place].-Mod A  Toileting from toilet with Supervision for hygiene and clothing management.-SBA  Toilet transfer to toilet with Supervision.-SBA                         Skilled OT Minutes Provided: 38  Communication with Treatment Team:     Equipment recommendations:       At end of treatment, patient remained:  x Up in chair     Up in wheelchair in room    In bed   x With alarm activated    Bed rails up   x Call bell in reach     Family/friends present    Restraints secured properly    In bathroom with CNA/RN notified    In gym with PT/PTA/tech    Nurse aware    Other:

## 2023-05-01 NOTE — PROGRESS NOTES
Wound care consult obtained for coccyx tear. Pink partial thickness tear noted in the gluteal cleft.   Silicone foam dressing in place.   Peeled back, assessed site.  Cleansed with NS, applied small piece of hydrocolloid over tear in cleft.  Re-secured silicone foam dressing.  Recommend applying both every 3 days and prn soilage/dislodgement.  Urinary and fecal incontinence noted.  Urinal at bedside.  Reinforced importance of moisture management for skin protection, repositioning frequently for pressure prevention measures.  Pt remains continued, will need frequent reminders.  Sternal surgical incisions remain healed.        05/01/23 0906   WOCN Assessment   WOCN Total Time (mins) 20   Visit Date 05/01/23   Consult Type New   WOCN Speciality Wound   Wound skin tear   Number of Wounds 1   Continence Type Urinary;Fecal   Intervention assessed;chart review;orders   Teaching on-going   Skin Interventions   Device Skin Pressure Protection adhesive use limited;pressure points protected;positioning supports utilized   Pressure Reduction Devices foam padding utilized;positioning supports utilized   Pressure Reduction Techniques frequent weight shift encouraged;pressure points protected   Skin Protection adhesive use limited;hydrocolloids used;silicone foam dressing in place   Positioning   Body Position side-lying   Head of Bed (HOB) Positioning HOB at 20 degrees   Positioning/Transfer Devices pillows   Pressure Injury Prevention    Check Moisture Management Pad Done   Sacral Foam Dressing Peel back sacral foam dressing, assess skin and reapply   Check Medical Devices Done        Altered Skin Integrity 04/29/23 medial Coccyx Skin Tear Partial thickness tissue loss. Shallow open ulcer with a red or pink wound bed, without slough. Intact or Open/Ruptured Serum-filled blister.   Date First Assessed: 04/29/23   Altered Skin Integrity Present on Admission - Did Patient arrive to the hospital with altered skin?: suspected  hospital acquired  Orientation: medial  Location: Coccyx  Primary Wound Type: Skin Tear  Description of Alte...   Wound Image    Description of Altered Skin Integrity Partial thickness tissue loss. Shallow open ulcer with a red or pink wound bed, without slough. Intact or Open/Ruptured Serum-filled blister.   Dressing Appearance Clean;Intact;Dry   Drainage Amount None   Appearance Pink;Not granulating   Tissue loss description Partial thickness   Red (%), Wound Tissue Color 100 %   Periwound Area Dry;Excoriated   Wound Edges Defined   Wound Length (cm) 0.7 cm   Wound Width (cm) 0.1 cm   Wound Depth (cm) 0.1 cm   Wound Volume (cm^3) 0.007 cm^3   Wound Surface Area (cm^2) 0.07 cm^2   Care Cleansed with:;Sterile normal saline   Dressing Applied;Hydrocolloid;Silicone;Foam   Dressing Change Due 05/04/23     Orders placed

## 2023-05-01 NOTE — PLAN OF CARE
Problem: SLP  Goal: SLP Goal  Description: LTG: Pt will tolerate LRD w/o overt s/s of aspiration.  Pt will improve cognitive linguistic skills to ensure safe discharge home.    STG:  Pt will complete laryngeal strengthening exercises and aleksandra with minimal cues. Discontinue  Pt will consume trials of thin liquid x10 w/o overt s/s of aspiration. Goal met  Pt will consume trials of soft and bite sized w/ good oral clearance and w/o overt s/s of aspiration. Goal met  Pt will utilize memory strategies to recall sternal precautions following a 3 minute filled delay.   Pt will provide solutions to problems/situations w/ 90% acc Jennifer. Goal met  Pt will complete trials of regular w/ good oral clearance and w/o overt s/s of aspiration. Goal met  Outcome: Ongoing, Progressing

## 2023-05-01 NOTE — PT/OT/SLP PROGRESS
Occupational Therapy  Treatment    Name: Mike Urbina Jr.    : 1955 (67 y.o.)  MRN: [id number]           TREATMENT SUMMARY AND RECOMMENDATIONS:      OT Date of Treatment: 23  OT Start Time: 1400  OT Stop Time: 1430  OT Total Time (min): 30 min      Subjective Assessment:   No complaints  Lethargic   x Awake, alert, cooperative  Impulsive    Uncooperative   Flat affect    Agitated  c/o pain    Appropriate  c/o fatigue    Confused  Treated at bedside     Emotionally labile  Treated in gym/dept.     x Other: session took place outside per Pt request       Therapy Precautions:    Cognitive deficits  Spinal precautions    Collar - hard  Sternal precautions    Collar - soft   TLSO    Fall risk  LSO    Hip precautions - posterior  Knee immobilizer    Hip precautions - anterior  WBAT    Impaired communication  Partial weightbearing    Oxygen  TTWB    PEG tube  NWB    Visual deficits      Hearing deficits   Other:        Treatment Objectives:     Mobility Training:    Mobility task Assist level Comments    Bed mobility Min A Supine>EOB; cues and assist needed to adhere to sternal precautions   Transfer SBA Stand/pivot t/f with RW   Sit to stands transitions     Functional mobility SBA X400 feet with RW   Sitting balance     Standing balance      Other:          Therapeutic Exercise:   Exercise Sets Reps Comments   2# dumbbell 2 10 Chest press, bicep curls, straight arm raises, shoulder abd/add                         Additional Comments:  OT assisted to shave face outside with clippers - no mirror for Pt to see therefore OT performed at this time.     Assessment: Patient tolerated session well.    OT Plan: Continue POC  Revisions made to plan of care: No    GOALS:   Multidisciplinary Problems       Occupational Therapy Goals          Problem: Occupational Therapy    Goal Priority Disciplines Outcome Interventions   Occupational Therapy Goal     OT, PT/OT Ongoing, Progressing    Description: Goals to be  met by: 5/5/2023     Patient will increase functional independence with ADLs by performing:    UE Dressing with Modified [place].- Min A  LE Dressing with Modified [place].-Mod A  Toileting from toilet with Supervision for hygiene and clothing management.-SBA  Toilet transfer to toilet with Supervision.-SBA                         Skilled OT Minutes Provided: 30  Communication with Treatment Team:     Equipment recommendations:       At end of treatment, patient remained:   Up in chair     Up in wheelchair in room    In bed    With alarm activated    Bed rails up    Call bell in reach     Family/friends present    Restraints secured properly    In bathroom with CNA/RN notified    In gym with PT/PTA/tech    Nurse aware   x Other: outside with PTA

## 2023-05-01 NOTE — CONSULTS
Inpatient Nutrition Evaluation    Admit Date: 4/17/2023   Total duration of encounter: 14 days    Nutrition Recommendation/Prescription     Continue heart healthy diet.     Nutrition Assessment     Chart Review    Reason Seen: continuous nutrition monitoring, length of stay, and follow-up    Malnutrition Screening Tool Results   Have you recently lost weight without trying?: Yes: 14-23 lbs  Have you been eating poorly because of a decreased appetite?: No   MST Score: 2     Diagnosis:  Hypertensive urgency 12/29/2022      Acute on chronic congestive heart failure 12/29/2022      Hyponatremia 12/29/2022      ETOH abuse 12/29/2022      Atrial flutter 12/29/2022      Thrombocytopenia 12/29/2022      Electrolyte abnormality 12/30/2022      Coronary artery disease        Relevant Medical History: Atrial flutter  Date Unknown  CHF (congestive heart failure)  Date Unknown  Coronary artery disease  Date Unknown  Hypertension  Date Unknown  SOB (shortness of breath)   ETOH abuse     Nutrition-Related Medications: atorvastatin, digoxin, ferrous sulfate, furosemide, magnesium oxide, thiamine    Nutrition-Related Labs:   Latest Reference Range & Units 04/30/23 06:41   WBC 4.5 - 11.5 x10(3)/mcL 3.0 (L)   RBC 4.70 - 6.10 x10(6)/mcL 3.37 (L)   Hemoglobin 14.0 - 18.0 g/dL 9.4 (L)   Hematocrit 42.0 - 52.0 % 30.4 (L)   MCV 80.0 - 94.0 fL 90.2   MCH 27.0 - 31.0 pg 27.9   MCHC 33.0 - 36.0 g/dL 30.9 (L)   RDW 11.5 - 17.0 % 14.8   Platelets 130 - 400 x10(3)/mcL 117 (L)   MPV 7.4 - 10.4 fL 10.8 (H)   Neut % % 57.8   LYMPH % % 26.6   Mono % % 12.0   Eosinophil % % 3.0   Basophil % % 0.3   Immature Granulocytes % 0.3   Neut # 2.1 - 9.2 x10(3)/mcL 1.74 (L)   Lymph # 0.6 - 4.6 x10(3)/mcL 0.80   Mono # 0.1 - 1.3 x10(3)/mcL 0.36   Eos # 0 - 0.9 x10(3)/mcL 0.09   Baso # 0 - 0.2 x10(3)/mcL 0.01   Immature Grans (Abs) 0 - 0.04 x10(3)/mcL 0.01   (L): Data is abnormally low  (H): Data is abnormally high      Diet Order: Diet heart healthy  Oral  "Supplement Order: none  Appetite/Oral Intake: good/%  Factors Affecting Nutritional Intake: chewing difficulty and difficulty/impaired swallowing  Food/Mandaeism/Cultural Preferences: none reported  Food Allergies: none reported    Skin Integrity: incision  Wound(s):      Altered Skin Integrity 04/29/23 medial Coccyx Skin Tear Partial thickness tissue loss. Shallow open ulcer with a red or pink wound bed, without slough. Intact or Open/Ruptured Serum-filled blister.-Tissue loss description: Partial thickness     Comments    Pt reports intake is good. Discussed food preferences. Marked menus. Pt was ready to eat lunch. Pt's diet consistency change to regular consistency. Pt on heart healthy diet. No c/o of at this time. Pt has skin tea to coccyx, wound care nurse stated. Will monitor closely. Wt stable.     Anthropometrics    Height: 6' 2" (188 cm)    Last Weight: 77.1 kg (169 lb 15.6 oz) (05/01/23 0337) Weight Method: Bed Scale  BMI (Calculated): 21.8  BMI Classification: normal (BMI 18.5-24.9)        Ideal Body Weight (IBW), Male: 190 lb     % Ideal Body Weight, Male (lb): 88.18 %                          Usual Weight Provided By: not applicable    Wt Readings from Last 5 Encounters:   05/01/23 77.1 kg (169 lb 15.6 oz)   04/15/23 77.8 kg (171 lb 8.3 oz)   03/28/23 78 kg (172 lb)   03/15/23 78.6 kg (173 lb 4.5 oz)   01/10/23 76.2 kg (168 lb 1.6 oz)     Weight Change(s) Since Admission:  Admit Weight: 76 kg (167 lb 8.8 oz) (04/17/23 1500)  Wt stable.     Patient Education    Not applicable.    Monitoring & Evaluation     Dietitian will monitor food and beverage intake, weight, weight change, electrolyte/renal panel, glucose/endocrine profile, and gastrointestinal profile.  Nutrition Risk/Follow-Up: low (follow-up in 5-7 days)  Patients assigned 'low nutrition risk' status do not qualify for a full nutritional assessment but will be monitored and re-evaluated in a 5-7 day time period. Please consult if " re-evaluation needed sooner.

## 2023-05-01 NOTE — PROGRESS NOTES
[facility] Hospital Medicine Progress Note        Chief Complaint: Inpatient Follow-up for CAD/CABG, weakness, main complaint is Left thigh pain where he had graft  retrieval, he has small echymosis on area no thrill or cellulitic changes.ain only with exercise.    HPI: 67-year-old male with a past medical history of ETOH abuse, CAD status post CABG 04/03/2023 with Dr. Finley, thrombocytopenia, aortic stenosis, hypertension, atrial flutter/fibrillation who presented to our facility on 4/17 for rehabilitation after CABG.  At baseline lives with a roommate however sister reports discharge may be to her house or his brother's house, depending on level of assistance needed.  Patient's cognition was concerning on 04/19/2023 and an MRI was obtained that showed no evidence of acute intracranial findings.  He does have chronic periventricular white matter changes and global intracranial atrophy.  Blood pressure remains poorly controlled, nifedipine was added and subsequently increased.  Patient has remained consistently tachycardic therefore his digoxin was increased on 04/22/2023.  He required replacement of magnesium on 04/22/2023    Interval Hx:   4/28/23.  Patient doing well with PT, gets very tired but denies SOB.    4/29/23.  No new events,Telemetry NSR.     4/30/23.  Patient doing well, cognitive deficit, mentioned regarding TAVR in the future, he does not remember any provider talking to him @ it, patient will need a [facility] for future medical decisions, he is very forgetful and has signs of moderate dementia.    5/1/23.  Patient doing well, forgetful but able to answer questions appropriately , pain on left inner thigh adding Lidoderm patch.    Objective/physical exam:  General: In no acute distress, afebrile    Chest: Clear to auscultation bilaterally    Heart: RRR, +S1, S2, JAY 3/6 RSB.    Abdomen: Soft, nontender, BS +    MSK: Warm, no lower extremity edema, no clubbing or  cyanosis    Neurologic: Alert and oriented x4, Cranial nerve II-XII intact, Strength 4/5 in all 4 extremities, very forgetful.      VITAL SIGNS: 24 HRS MIN & MAX LAST   Temp  Min: 97.7 °F (36.5 °C)  Max: 98.7 °F (37.1 °C) 97.7 °F (36.5 °C)   BP  Min: 118/63  Max: 140/72 (!) 140/72     Pulse  Min: 56  Max: 99  99   Resp  Min: 16  Max: 18 18   SpO2  Min: 93 %  Max: 97 % (!) 93 %         Recent Labs   Lab 04/27/23  0601 04/30/23  0641   WBC 3.2* 3.0*   RBC 3.42* 3.37*   HGB 9.4* 9.4*   HCT 30.9* 30.4*   MCV 90.4 90.2   MCH 27.5 27.9   MCHC 30.4* 30.9*   RDW 14.8 14.8   * 117*   MPV 11.0* 10.8*       Recent Labs   Lab 04/27/23  0601      K 3.6   CO2 25   BUN 16.0   CREATININE 0.99   CALCIUM 8.9          Microbiology Results (last 7 days)       ** No results found for the last 168 hours. **             See below for Radiology    Scheduled Med:   amiodarone  200 mg Oral Daily    aspirin  81 mg Oral Daily    atorvastatin  40 mg Oral Daily    digoxin  0.25 mg Oral Daily    ferrous sulfate  1 tablet Oral Daily    furosemide  40 mg Oral Daily    hydrOXYzine HCL  25 mg Oral QHS    LIDOcaine  1 patch Transdermal Q24H    magnesium oxide  400 mg Oral Daily    metoprolol succinate  100 mg Oral Daily    NIFEdipine  60 mg Oral Daily    thiamine  200 mg Oral Daily    traZODone  50 mg Oral QHS    valsartan  160 mg Oral BID        Continuous Infusions:       PRN Meds:  acetaminophen, albuterol-ipratropium, ALPRAZolam, aluminum-magnesium hydroxide-simethicone, bisacodyL, dextrose 50%, dextrose 50%, glucagon (human recombinant), glucose, glucose, HYDROcodone-acetaminophen, melatonin, morphine, naloxone, ondansetron, ondansetron, polyethylene glycol, simethicone, sodium chloride 0.9%       Assessment/Plan:  Multivessel CAD     - s/p CABG (4.3.23) - LIMA to LAD, rSVG to OM1, rSVG to PDA with Dr. Finley  -aspirin, atorvastatin, furosemide, metoprolol  -PT/OT      PAF/Flutter with RVR s/p DCCV      -CHADsVASc - 3   -completed  amiodarone taper, continue with 200 daily starting on the 20th   -digoxin started 4/18 patient remains with intermittent AF/flutter, dose increased on 04/22  -metoprolol tartrate changed to succinate on 04/23, titrating   - no AC s/p (4.3.23) - Ligation of SILVANO   -ASA      Severe AS    -TAVR Evaluation on Outpatient Basis       HTN    -valsartan, nifedipine added for improved control on 04/19/2023       Toxic/Metabolic Encephalopathy       -CT head on 04/06 showed chronic microvascular ischemic changes  -MRI from 04/20/2023 showed no acute abnormal signal changes, chronic microvascular disease  -continue with thiamine  -B12, TSH  and folate unremarkable  -Underlying cognitive disorder.   Alzheimer's, possible alcoholic dementia.     Thrombocytopenia   -worsened while on Lovenox  -DC [facility]  -Check HIT         Anemia of Ch disease , blood loss and hypersplenism from ETOH abuse     -required postop blood transfusion  -@ baseline.Repeat CBC stable Hb @ 9.4.        ETOH Abuse     -reports two 6 packs daily  -p.r.n. t.i.d. Xanax decreased to daily PRN  -Thiamine,folate.    VTE prophylaxis: SCD    Patient condition:  Stable    Anticipated discharge and Disposition:   Home when Rehab Goals achieved.Might need LT placement d/t cognitive issues.           All diagnosis and differential diagnosis have been reviewed; assessment and plan has been documented; I have personally reviewed the labs and test results that are presently available; I have reviewed the patients medication list; I have reviewed the consulting providers response and recommendations. I have reviewed or attempted to review medical records based upon their availability    All of the patient's questions have been  addressed and answered. Patient's is agreeable to the above stated plan. I will continue to monitor closely and make adjustments to medical management as needed.  _____________________________________________________________________    Nutrition  Status:    Radiology:  MRI Brain Limited Without Contrast  EXAMINATION  MRI BRAIN LIMITED WITHOUT CONTRAST    CLINICAL HISTORY  Neuro deficit, acute, stroke suspected;WEAKNESS;    TECHNIQUE  Limited multisequence MR images were obtained without the intravenous administration of gadolinium-based contrast media.    COMPARISON  None available at the time of initial interpretation.    FINDINGS  Exam quality: Severely limited secondary to patient inability to complete the full exam protocol.    No restricted diffusion or other suggestion of acute ischemic insult.    No convincing acute or otherwise abnormal signal changes. No focal mass, mass effect, or midline shift. Differentiation of the gray-white border is grossly preserved.  Scattered periventricular white matter FLAIR hyperintensities are typical for chronic microvascular disease.  There is proportional enlargement of the sulcal spaces and ventricles, consistent with global intracranial atrophy.    No abnormal extra-axial fluid. The basal cisterns are preserved.  No hydrocephalus.    IMPRESSION  1. Limited exam with no evidence of acute intracranial abnormality.  2. Chronic periventricular white matter changes and global intracranial atrophy.    Electronically signed by: Toby Reyes  Date:    04/20/2023  Time:    09:17      Silvia Pearce MD   05/01/2023

## 2023-05-01 NOTE — PLAN OF CARE
Problem: Adult Inpatient Plan of Care  Goal: Plan of Care Review  Outcome: Ongoing, Progressing  Flowsheets (Taken 4/30/2023 2005)  Plan of Care Reviewed With: patient  Goal: Patient-Specific Goal (Individualized)  Outcome: Ongoing, Progressing  Flowsheets (Taken 4/30/2023 2005)  Anxieties, Fears or Concerns: Getting a good night's rest  Individualized Care Needs:   Enhanced safety measures to keep from falling   Pain control for better mobility.  Goal: Absence of Hospital-Acquired Illness or Injury  Outcome: Ongoing, Progressing  Intervention: Identify and Manage Fall Risk  Flowsheets (Taken 4/30/2023 2005)  Safety Promotion/Fall Prevention:   bed alarm set   assistive device/personal item within reach   Fall Risk reviewed with patient/family   Fall Risk signage in place   gait belt with ambulation   high risk medications identified   lighting adjusted   medications reviewed   nonskid shoes/socks when out of bed   room near unit station   instructed to call staff for mobility   /camera at bedside  Intervention: Prevent and Manage VTE (Venous Thromboembolism) Risk  Flowsheets (Taken 4/30/2023 2005)  Activity Management:   Walk with assistive devise and /or staff member - L3   Ambulated to bathroom - L4  VTE Prevention/Management:   ambulation promoted   remove, assess skin, and reapply sequential compression device   dorsiflexion/plantar flexion performed   fluids promoted   ROM (active) performed  Range of Motion: active ROM (range of motion) encouraged  Intervention: Prevent Infection  Flowsheets (Taken 4/30/2023 2005)  Infection Prevention:   hand hygiene promoted   equipment surfaces disinfected   rest/sleep promoted  Goal: Optimal Comfort and Wellbeing  Outcome: Ongoing, Progressing  Intervention: Monitor Pain and Promote Comfort  Flowsheets (Taken 4/30/2023 2005)  Pain Management Interventions:   pain management plan reviewed with patient/caregiver   care clustered   medication offered    quiet environment facilitated   relaxation techniques promoted  Intervention: Provide Person-Centered Care  Flowsheets (Taken 4/30/2023 2005)  Trust Relationship/Rapport:   care explained   choices provided   emotional support provided   empathic listening provided   questions answered   questions encouraged   reassurance provided   thoughts/feelings acknowledged  Goal: Readiness for Transition of Care  Outcome: Ongoing, Progressing     Problem: Fall Injury Risk  Goal: Absence of Fall and Fall-Related Injury  Outcome: Ongoing, Progressing     Problem: Infection  Goal: Absence of Infection Signs and Symptoms  Description: Goals to be met by: 5/5/2023     Patient will increase functional independence with ADLs by performing:    UE Dressing with Modified [place].  LE Dressing with Modified [place].  Toileting from toilet with Supervision for hygiene and clothing management.   Toilet transfer to toilet with Supervision.    Outcome: Ongoing, Progressing  Intervention: Prevent or Manage Infection  Flowsheets (Taken 4/30/2023 2005)  Fever Reduction/Comfort Measures:   lightweight bedding   lightweight clothing  Infection Management: aseptic technique maintained     Problem: Activity Intolerance (Cardiovascular Surgery)  Goal: Improved Activity Tolerance  Outcome: Ongoing, Progressing  Intervention: Optimize Tolerance for Activity  Flowsheets (Taken 4/30/2023 2005)  Self-Care Promotion: independence encouraged  Environmental Support:   calm environment promoted   rest periods encouraged     Problem: Skin Injury Risk Increased  Goal: Skin Health and Integrity  Outcome: Ongoing, Progressing     Problem: Impaired Wound Healing  Goal: Optimal Wound Healing  Outcome: Ongoing, Progressing

## 2023-05-02 PROCEDURE — 99900031 HC PATIENT EDUCATION (STAT)

## 2023-05-02 PROCEDURE — 93010 ELECTROCARDIOGRAM REPORT: CPT | Mod: ,,, | Performed by: INTERNAL MEDICINE

## 2023-05-02 PROCEDURE — 25000003 PHARM REV CODE 250: Performed by: HOSPITALIST

## 2023-05-02 PROCEDURE — 94760 N-INVAS EAR/PLS OXIMETRY 1: CPT

## 2023-05-02 PROCEDURE — 97110 THERAPEUTIC EXERCISES: CPT

## 2023-05-02 PROCEDURE — 11000004 HC SNF PRIVATE

## 2023-05-02 PROCEDURE — 25000003 PHARM REV CODE 250: Performed by: STUDENT IN AN ORGANIZED HEALTH CARE EDUCATION/TRAINING PROGRAM

## 2023-05-02 PROCEDURE — 97130 THER IVNTJ EA ADDL 15 MIN: CPT

## 2023-05-02 PROCEDURE — 97129 THER IVNTJ 1ST 15 MIN: CPT

## 2023-05-02 PROCEDURE — 93010 EKG 12-LEAD: ICD-10-PCS | Mod: ,,, | Performed by: INTERNAL MEDICINE

## 2023-05-02 PROCEDURE — 97530 THERAPEUTIC ACTIVITIES: CPT

## 2023-05-02 PROCEDURE — 93041 RHYTHM ECG TRACING: CPT

## 2023-05-02 PROCEDURE — 97116 GAIT TRAINING THERAPY: CPT

## 2023-05-02 PROCEDURE — 93005 ELECTROCARDIOGRAM TRACING: CPT

## 2023-05-02 RX ORDER — METOPROLOL SUCCINATE 50 MG/1
50 TABLET, EXTENDED RELEASE ORAL DAILY
Status: DISCONTINUED | OUTPATIENT
Start: 2023-05-03 | End: 2023-05-05 | Stop reason: HOSPADM

## 2023-05-02 RX ADMIN — TRAZODONE HYDROCHLORIDE 50 MG: 50 TABLET ORAL at 08:05

## 2023-05-02 RX ADMIN — Medication 400 MG: at 10:05

## 2023-05-02 RX ADMIN — HYDROCODONE BITARTRATE AND ACETAMINOPHEN 1 TABLET: 5; 325 TABLET ORAL at 03:05

## 2023-05-02 RX ADMIN — LIDOCAINE 1 PATCH: 50 PATCH CUTANEOUS at 12:05

## 2023-05-02 RX ADMIN — MELATONIN TAB 3 MG 9 MG: 3 TAB at 11:05

## 2023-05-02 RX ADMIN — ACETAMINOPHEN 650 MG: 325 TABLET, FILM COATED ORAL at 01:05

## 2023-05-02 RX ADMIN — VALSARTAN 160 MG: 160 TABLET, FILM COATED ORAL at 10:05

## 2023-05-02 RX ADMIN — VALSARTAN 160 MG: 160 TABLET, FILM COATED ORAL at 08:05

## 2023-05-02 RX ADMIN — FUROSEMIDE 40 MG: 40 TABLET ORAL at 10:05

## 2023-05-02 RX ADMIN — AMIODARONE HYDROCHLORIDE 200 MG: 200 TABLET ORAL at 10:05

## 2023-05-02 RX ADMIN — FERROUS SULFATE TAB 325 MG (65 MG ELEMENTAL FE) 1 EACH: 325 (65 FE) TAB at 10:05

## 2023-05-02 RX ADMIN — ASPIRIN 81 MG: 81 TABLET, COATED ORAL at 10:05

## 2023-05-02 RX ADMIN — ATORVASTATIN CALCIUM 40 MG: 40 TABLET, FILM COATED ORAL at 10:05

## 2023-05-02 RX ADMIN — THIAMINE HCL TAB 100 MG 200 MG: 100 TAB at 10:05

## 2023-05-02 RX ADMIN — HYDROXYZINE HYDROCHLORIDE 25 MG: 25 TABLET ORAL at 08:05

## 2023-05-02 NOTE — PT/OT/SLP PROGRESS
Speech Language Pathology Treatment    Patient Name:  Mike Urbina Jr.   MRN:  [id number]  Admitting Diagnosis: <principal problem not specified>    Recommendations:                 General Recommendations:  Cognitive-linguistic therapy  Diet recommendations:  Regular Diet - IDDSI Level 7, Liquid Diet Level: Thin liquids - IDDSI Level 0   Aspiration Precautions: HOB to 90 degrees   General Precautions: Standard, fall, sternal  Communication strategies:  go to room if call light pushed    Subjective     Pt in gerichair upon SLP arrival. Pt pleasant and in good spirits.    Patient goals:      Pain/Comfort:       Respiratory Status: Room air    Objective:     Has the patient been evaluated by SLP for swallowing?   Yes  Keep patient NPO?     Current Respiratory Status:        Pt recalled 3 details from OT and PT this AM I'ly.  Mental manip word order Fo3 completed w/ 80% acc I'ly, increasing to 90% acc Jennifer.  BIMS administered to which pt scored 12/15.      Overall good session.  Cont SLP POC.    Assessment:     Mike Urbina Jr. is a 67 y.o. male with an SLP diagnosis of Cognitive-Linguistic Impairment.  He presents w/ decreased safety awareness and delayed recall. Skilled SLP services are warranted at this time to address above stated deficits.    Goals:   Multidisciplinary Problems       SLP Goals          Problem: SLP    Goal Priority Disciplines Outcome   SLP Goal     SLP Ongoing, Progressing   Description: LTG: Pt will tolerate LRD w/o overt s/s of aspiration.  Pt will improve cognitive linguistic skills to ensure safe discharge home.    STG:  Pt will complete laryngeal strengthening exercises and aleksandra with minimal cues. Discontinue  Pt will consume trials of thin liquid x10 w/o overt s/s of aspiration. Goal met  Pt will consume trials of soft and bite sized w/ good oral clearance and w/o overt s/s of aspiration. Goal met  Pt will utilize memory strategies to recall sternal precautions following a 3  minute filled delay.   Pt will provide solutions to problems/situations w/ 90% acc Jennifer. Goal met  Pt will complete trials of regular w/ good oral clearance and w/o overt s/s of aspiration. Goal met                       Plan:     Patient to be seen:  5 x/week   Plan of Care expires:  5/5/23  Plan of Care reviewed with:  patient   SLP Follow-Up:  Yes       Discharge recommendations:      Barriers to Discharge:  Level of Skilled Assistance Needed see Pt/Ot notes and Safety Awareness impaired    Time Tracking:     SLP Treatment Date:  5/2/23  Speech Start Time: 10:05  Speech Stop Time: 10:30  Speech Total Time (min):  25 min    Billable Minutes: Speech Therapy Individual 25 cognition      CECI ChowdaryS., CCC-SLP   05/02/2023

## 2023-05-02 NOTE — PT/OT/SLP PROGRESS
Physical Therapy Treatment Note           Name: Mike Urbina Jr.    : 1955 (67 y.o.)  MRN: [id number]           TREATMENT SUMMARY AND RECOMMENDATIONS:    PT Received On: 23  PT Start Time: 900     PT Stop Time: 927  PT Total Time (min): 27 min     Subjective Assessment:   No complaints  Lethargic   x Awake, alert, cooperative  Uncooperative    Agitated  c/o pain    Appropriate  c/o fatigue    Confused  Treated at bedside     Emotionally labile x Treated in gym/dept.    Impulsive  Other:    Flat affect       Therapy Precautions:    Cognitive deficits  Spinal precautions    Collar - hard x Sternal precautions    Collar - soft   TLSO    Fall risk  LSO    Hip precautions - posterior  Knee immobilizer    Hip precautions - anterior  WBAT    Impaired communication  Partial weightbearing    Oxygen  TTWB    PEG tube  NWB    Visual deficits  Other:    Hearing deficits          Treatment Objectives:     Mobility Training:   Assist level Comments    Bed mobility     Transfer SBA/SPV Stand pivot with B UE support   Gait CGA Without AD including outdoor and ramp negation.    Sit to stand transitions SPV    Sitting balance     Standing balance  CGA Various stepping patterns and directional changes without AD for improved balance reactions    Wheelchair mobility     Car transfer     Other:  Stair negotation SBA X 8 steps using B rails and step through patterns       Therapeutic Exercise:   Exercise Sets Reps Comments                               Additional Comments:  Pt with good tolerance and improved balance allowing for trials without AD. PT to continue progressing balance work. PT feels pt would be safe to return home with room mate.     Assessment: Patient tolerated session well.    PT Plan: Continue per POC  Revisions made to plan of care: No    GOALS:   Multidisciplinary Problems       Physical Therapy Goals          Problem: Physical Therapy    Goal Priority Disciplines Outcome Goal Variances  Interventions   Physical Therapy Goal     PT, PT/OT Ongoing, Progressing     Description: Goals to be met by: Dishcarge     Patient will increase functional independence with mobility by performin. Supine to sit with Modified [place] maintaining sternal precautions  2. Sit to stand transfer with Modified [place] maintaining sternal precautions  3. Gait x 325 feet with Modified [place] using Rolling Walker.   4. Ascend/descend 3 stair with left Handrails Modified [place] using No Assistive Device.                          Skilled PT Minutes Provided: 25   Communication with Treatment Team:     Equipment recommendations:       At end of treatment, patient remained:  x Up in chair     Up in wheelchair in room    In bed    With alarm activated    Bed rails up    Call bell in reach     Family/friends present    Restraints secured properly    In bathroom with CNA/RN notified    Nurse aware   x In gym with therapist/tech    Other:

## 2023-05-02 NOTE — PT/OT/SLP PROGRESS
Occupational Therapy  Treatment    Name: Mike Urbina Jr.    : 1955 (67 y.o.)  MRN: [id number]           TREATMENT SUMMARY AND RECOMMENDATIONS:      OT Date of Treatment: 23  OT Start Time: 1443  OT Stop Time: 1506  OT Total Time (min): 23 min      Subjective Assessment:   No complaints  Lethargic   X Awake, alert, cooperative  Impulsive    Uncooperative   Flat affect    Agitated  c/o pain    Appropriate  c/o fatigue    Confused X Treated at bedside     Emotionally labile X Treated in gym/dept.      Other:        Therapy Precautions:    Cognitive deficits  Spinal precautions    Collar - hard X Sternal precautions    Collar - soft   TLSO   X Fall risk  LSO    Hip precautions - posterior  Knee immobilizer    Hip precautions - anterior  WBAT    Impaired communication  Partial weightbearing    Oxygen  TTWB    PEG tube  NWB    Visual deficits      Hearing deficits   Other:        Treatment Objectives:     Mobility Training:    Mobility task Assist level Comments    Bed mobility     Transfer     Sit to stands transitions     Functional mobility SBA Pt ambulated ~400ft.    Sitting balance     Standing balance      Other:        ADL Training:    ADL Assist level Comments    Feeding     Grooming/hygiene     Bathing     Upper body dressing     Lower body dressing     Toileting SPV (+) void   Toilet transfer SPV Standing>toilet   Adaptive equipment training     Other:           Therapeutic Exercise:   Exercise Sets Reps Comments   UBE 2 5' Forwards and backwards                         Assessment: Patient tolerated session well. Pt demonstrates improvements in alertness, pain, and fxnl ax tolerance. Pt continuous deficits in fxnl ax tolerance, fxnl endurance, and balance impacting safety with self care tasks. Pt would continue to benefit from skilled OT services to improve pt's safety and independence with daily occupations.    OT Plan: Continue OT POC.  Revisions made to plan of care: No    GOALS:    Multidisciplinary Problems       Occupational Therapy Goals          Problem: Occupational Therapy    Goal Priority Disciplines Outcome Interventions   Occupational Therapy Goal     OT, PT/OT Ongoing, Progressing    Description: Goals to be met by: 5/5/2023     Patient will increase functional independence with ADLs by performing:    UE Dressing with Modified [place].- Min A  LE Dressing with Modified [place].-Mod A  Toileting from toilet with Supervision for hygiene and clothing management.-SBA  Toilet transfer to toilet with Supervision.-SBA                         Skilled OT Minutes Provided: 23  Communication with Treatment Team:     Equipment recommendations:       At end of treatment, patient remained:  X Up in chair     Up in wheelchair in room    In bed   X With alarm activated    Bed rails up   X Call bell in reach    X Family/friends present    Restraints secured properly    In bathroom with CNA/RN notified    In gym with PT/PTA/tech    Nurse aware    Other:

## 2023-05-02 NOTE — PLAN OF CARE
Problem: Adult Inpatient Plan of Care  Goal: Plan of Care Review  Outcome: Ongoing, Progressing  Goal: Patient-Specific Goal (Individualized)  Outcome: Ongoing, Progressing  Flowsheets (Taken 5/1/2023 2000)  Individualized Care Needs: Continue safety awarenes measures. Encourage sleep and relaxation using  therapeutic techniques  Goal: Absence of Hospital-Acquired Illness or Injury  Outcome: Ongoing, Progressing  Intervention: Prevent Skin Injury  Flowsheets (Taken 5/1/2023 2000)  Skin Protection:   protective footwear used   incontinence pads utilized   skin sealant/moisture barrier applied  Intervention: Prevent and Manage VTE (Venous Thromboembolism) Risk  Flowsheets (Taken 5/1/2023 2000)  VTE Prevention/Management:   bleeding risk factor(s) identified, provider notified   bleeding precations maintained  Range of Motion: ROM (range of motion) performed  Intervention: Prevent Infection  Flowsheets (Taken 5/1/2023 2000)  Infection Prevention: single patient room provided     Problem: Fall Injury Risk  Goal: Absence of Fall and Fall-Related Injury  Outcome: Ongoing, Progressing  Intervention: Identify and Manage Contributors  Flowsheets (Taken 5/1/2023 2000)  Self-Care Promotion: BADL personal objects within reach

## 2023-05-02 NOTE — PT/OT/SLP PROGRESS
Physical Therapy Treatment Note           Name: Mike Urbina Jr.    : 1955 (67 y.o.)  MRN: [id number]           TREATMENT SUMMARY AND RECOMMENDATIONS:    PT Received On: 23  PT Start Time: 1430     PT Stop Time: 1455  PT Total Time (min): 25 min     Subjective Assessment:  x No complaints  Lethargic    Awake, alert, cooperative  Uncooperative    Agitated  c/o pain    Appropriate  c/o fatigue    Confused  Treated at bedside     Emotionally labile  Treated in gym/dept.    Impulsive  Other:    Flat affect       Therapy Precautions:    Cognitive deficits  Spinal precautions    Collar - hard  Sternal precautions    Collar - soft   TLSO    Fall risk  LSO    Hip precautions - posterior  Knee immobilizer    Hip precautions - anterior  WBAT    Impaired communication  Partial weightbearing    Oxygen  TTWB    PEG tube  NWB    Visual deficits  Other:    Hearing deficits          Treatment Objectives:     Mobility Training:   Assist level Comments    Bed mobility     Transfer     Gait SBA Amb on firm surface with  ft    Sit to stand transitions CGA/Jennifer Sit-stand 5 reps x 2 with no UE use   Sitting balance     Standing balance      Wheelchair mobility     Car transfer     Other:          Therapeutic Exercise:   Exercise Sets Reps Comments   B LE exer standing  10 reps B UE supported Abd/add, knee lifts, mini squats                         Additional Comments:  Sternal precautions    Assessment: Patient tolerated session well.    PT Plan: continue  Revisions made to plan of care: No    GOALS:   Multidisciplinary Problems       Physical Therapy Goals          Problem: Physical Therapy    Goal Priority Disciplines Outcome Goal Variances Interventions   Physical Therapy Goal     PT, PT/OT Ongoing, Progressing     Description: Goals to be met by: Dishcarge     Patient will increase functional independence with mobility by performin. Supine to sit with Modified [place] maintaining sternal  precautions  2. Sit to stand transfer with Modified [place] maintaining sternal precautions  3. Gait x 325 feet with Modified [place] using Rolling Walker.   4. Ascend/descend 3 stair with left Handrails Modified [place] using No Assistive Device.                          Skilled PT Minutes Provided: 25   Communication with Treatment Team:     Equipment recommendations:       At end of treatment, patient remained:  x Up in chair     Up in wheelchair in room    In bed   x With alarm activated    Bed rails up   x Call bell in reach     Family/friends present    Restraints secured properly    In bathroom with CNA/RN notified    Nurse aware    In gym with therapist/tech    Other:

## 2023-05-02 NOTE — PT/OT/SLP PROGRESS
Occupational Therapy  Treatment    Name: Mike Urbina Jr.    : 1955 (67 y.o.)  MRN: [id number]           TREATMENT SUMMARY AND RECOMMENDATIONS:      OT Date of Treatment: 23  OT Start Time: 930  OT Stop Time: 1000  OT Total Time (min): 30 min      Subjective Assessment:   No complaints  Lethargic   X Awake, alert, cooperative  Impulsive    Uncooperative   Flat affect    Agitated  c/o pain    Appropriate  c/o fatigue    Confused  Treated at bedside     Emotionally labile X Treated in gym/dept.      Other:        Therapy Precautions:    Cognitive deficits  Spinal precautions    Collar - hard X Sternal precautions    Collar - soft   TLSO   X Fall risk  LSO    Hip precautions - posterior  Knee immobilizer    Hip precautions - anterior  WBAT    Impaired communication  Partial weightbearing    Oxygen  TTWB    PEG tube  NWB    Visual deficits      Hearing deficits   Other:        Treatment Objectives:     Mobility Training:    Mobility task Assist level Comments    Bed mobility     Transfer     Sit to stands transitions     Functional mobility SBA Pt ambulated ~400ft c RW.    Sitting balance     Standing balance      Other:        ADL Training:    ADL Assist level Comments    Feeding     Grooming/hygiene     Bathing     Upper body dressing     Lower body dressing     Toileting     Toilet transfer     Adaptive equipment training     Other:           Therapeutic Exercise:   Exercise Sets Reps Comments   UBE 2 5' Forwards and backwards   UB strengthening 2 15 With 2# dowel, pt perf shoulder press, chest press, straight arm raises, bicep curls, forward rows, backwards rows                   Assessment: Patient tolerated session well. Pt demonstrates significant improvements in participation, fxnl endurance, and fxnl ax tolerance. Pt would continue to benefit from skilled OT services to improve pt's safety and independence with daily occupations.      OT Plan: Continue OT POC.  Revisions made to plan of  care: No    GOALS:   Multidisciplinary Problems       Occupational Therapy Goals          Problem: Occupational Therapy    Goal Priority Disciplines Outcome Interventions   Occupational Therapy Goal     OT, PT/OT Ongoing, Progressing    Description: Goals to be met by: 5/5/2023     Patient will increase functional independence with ADLs by performing:    UE Dressing with Modified [place].- Min A  LE Dressing with Modified [place].-Mod A  Toileting from toilet with Supervision for hygiene and clothing management.-SBA  Toilet transfer to toilet with Supervision.-SBA                         Skilled OT Minutes Provided: 30  Communication with Treatment Team:     Equipment recommendations:       At end of treatment, patient remained:  X Up in chair     Up in wheelchair in room    In bed   X With alarm activated    Bed rails up   X Call bell in reach     Family/friends present    Restraints secured properly    In bathroom with CNA/RN notified    In gym with PT/PTA/tech   X Nurse aware    Other:

## 2023-05-02 NOTE — PROGRESS NOTES
[facility]  Hospital Medicine Progress Note        Chief Complaint: Inpatient Follow-up for CAD/CABG, weakness, main complaint is Left thigh pain where he had graft  retrieval, he has small echymosis on area , HR has been low, asymptomatic.    HPI: 67-year-old male with a past medical history of ETOH abuse, CAD status post CABG 04/03/2023 with Dr. Finley, thrombocytopenia, aortic stenosis, hypertension, atrial flutter/fibrillation who presented to our facility on 4/17 for rehabilitation after CABG.  At baseline lives with a roommate however sister reports discharge may be to her house or his brother's house, depending on level of assistance needed.  Patient's cognition was concerning on 04/19/2023 and an MRI was obtained that showed no evidence of acute intracranial findings.  He does have chronic periventricular white matter changes and global intracranial atrophy.  Blood pressure remains poorly controlled, nifedipine was added and subsequently increased.  Patient has remained consistently tachycardic therefore his digoxin was increased on 04/22/2023.  He required replacement of magnesium on 04/22/2023    Interval Hx:   4/28/23.  Patient doing well with PT, gets very tired but denies SOB.  4/29/23.  No new events,Telemetry NSR.   4/30/23.    Patient doing well, cognitive deficit, mentioned regarding TAVR in the future, he does not remember any provider talking to him @ it, patient will need a [facility] for future medical decisions, he is very forgetful and has signs of moderate dementia.  5/1/23.    Patient doing well, forgetful but able to answer questions appropriately , pain on left inner thigh adding Lidoderm patch.    5/2/23.  Doing better with PT, more cooperative with PT.HR < 60 some < 55, will stop Dig, holding parameters on BB. And decrease dose to half from 100-50 mg, cont amio, obtain EKG.      Objective/physical exam:  General: In no acute distress, afebrile  Chest: Clear to  auscultation bilaterally  Heart: RRR, +S1, S2, JAY 3/6 RSB.  Abdomen: Soft, nontender, BS +  MSK: Warm, no lower extremity edema, no clubbing or cyanosis  Neurologic: Alert and oriented x4, Cranial nerve II-XII intact, Strength 4/5 in all 4 extremities, very forgetful.                VITAL SIGNS: 24 HRS MIN & MAX LAST   Temp  Min: 97.5 °F (36.4 °C)  Max: 98.7 °F (37.1 °C) 97.6 °F (36.4 °C)   BP  Min: 106/62  Max: 140/65 136/67   Pulse  Min: 52  Max: 62  (!) 52     Resp  Min: 16  Max: 19 18   SpO2  Min: 92 %  Max: 100 % 98 %       Recent Labs   Lab 04/27/23  0601 04/30/23  0641   WBC 3.2* 3.0*   RBC 3.42* 3.37*   HGB 9.4* 9.4*   HCT 30.9* 30.4*   MCV 90.4 90.2   MCH 27.5 27.9   MCHC 30.4* 30.9*   RDW 14.8 14.8   * 117*   MPV 11.0* 10.8*       Recent Labs   Lab 04/27/23  0601      K 3.6   CO2 25   BUN 16.0   CREATININE 0.99   CALCIUM 8.9          Microbiology Results (last 7 days)       ** No results found for the last 168 hours. **             See below for Radiology    Scheduled Med:   amiodarone  200 mg Oral Daily    aspirin  81 mg Oral Daily    atorvastatin  40 mg Oral Daily    digoxin  0.25 mg Oral Daily    ferrous sulfate  1 tablet Oral Daily    furosemide  40 mg Oral Daily    hydrOXYzine HCL  25 mg Oral QHS    LIDOcaine  1 patch Transdermal Q24H    magnesium oxide  400 mg Oral Daily    metoprolol succinate  100 mg Oral Daily    NIFEdipine  60 mg Oral Daily    thiamine  200 mg Oral Daily    traZODone  50 mg Oral QHS    valsartan  160 mg Oral BID        Continuous Infusions:       PRN Meds:  acetaminophen, albuterol-ipratropium, ALPRAZolam, aluminum-magnesium hydroxide-simethicone, bisacodyL, dextrose 50%, dextrose 50%, glucagon (human recombinant), glucose, glucose, HYDROcodone-acetaminophen, melatonin, morphine, naloxone, ondansetron, ondansetron, polyethylene glycol, simethicone, sodium chloride 0.9%       Assessment/Plan:  Multivessel CAD     - s/p CABG (4.3.23) - LIMA to LAD, rSVG to OM1, rSVG  to PDA with Dr. Finley  -aspirin, atorvastatin, furosemide, metoprolol  -PT/OT       PAF/Flutter with RVR s/p DCCV       -Patient Bradycardic  -EKG  -CHADsVASc - 3   -completed amiodarone taper, continue with 200 daily starting on the 20th -DC DIgoxin  -metoprolol tartrate decrease dose to 50 mg.  -Amio 200 mg QD  - no AC s/p (4.3.23) - Ligation of SILVANO   -ASA           Severe AS    -TAVR Evaluation on Outpatient Basis        HTN  -valsartan,  -nifedipine   -Toprol XL 50 mg QD       Toxic/Metabolic Encephalopathy        -CT head on 04/06 showed chronic microvascular ischemic changes  -MRI from 04/20/2023 showed no acute abnormal signal changes, chronic microvascular disease  -continue with thiamine  -B12, TSH  and folate unremarkable  -Underlying cognitive disorder.   Alzheimer's, possible alcoholic dementia.          Thrombocytopenia   -worsened while on Lovenox  -DC [facility]  -Check HIT       Anemia of Ch disease , blood loss and hypersplenism from ETOH abuse   -required postop blood transfusion  -@ baseline.Repeat CBC stable Hb @ 9.4.        ETOH Abuse      -reports two 6 packs daily  -p.r.n. t.i.d. Xanax decreased to daily PRN  -Thiamine,folate.    VTE prophylaxis: SCD    Patient condition: /Fair/    Anticipated discharge and Disposition:   Home when rehab goals achieved.    Discussed with patient and nursing staff, adjustments discussed.    All diagnosis and differential diagnosis have been reviewed; assessment and plan has been documented; I have personally reviewed the labs and test results that are presently available; I have reviewed the patients medication list; I have reviewed the consulting providers response and recommendations. I have reviewed or attempted to review medical records based upon their availability    All of the patient's questions have been  addressed and answered. Patient's is agreeable to the above stated plan. I will continue to monitor closely and make adjustments to medical management  as needed.  _____________________________________________________________________    Nutrition Status:    Radiology:  MRI Brain Limited Without Contrast  EXAMINATION  MRI BRAIN LIMITED WITHOUT CONTRAST    CLINICAL HISTORY  Neuro deficit, acute, stroke suspected;WEAKNESS;    TECHNIQUE  Limited multisequence MR images were obtained without the intravenous administration of gadolinium-based contrast media.    COMPARISON  None available at the time of initial interpretation.    FINDINGS  Exam quality: Severely limited secondary to patient inability to complete the full exam protocol.    No restricted diffusion or other suggestion of acute ischemic insult.    No convincing acute or otherwise abnormal signal changes. No focal mass, mass effect, or midline shift. Differentiation of the gray-white border is grossly preserved.  Scattered periventricular white matter FLAIR hyperintensities are typical for chronic microvascular disease.  There is proportional enlargement of the sulcal spaces and ventricles, consistent with global intracranial atrophy.    No abnormal extra-axial fluid. The basal cisterns are preserved.  No hydrocephalus.    IMPRESSION  1. Limited exam with no evidence of acute intracranial abnormality.  2. Chronic periventricular white matter changes and global intracranial atrophy.    Electronically signed by: Toby Reyes  Date:    04/20/2023  Time:    09:17      Silvia Pearce MD   05/02/2023

## 2023-05-02 NOTE — PT/OT/SLP PROGRESS
Physical Therapy Treatment Note           Name: Mike Urbina Jr.    : 1955 (67 y.o.)  MRN: [id number]           TREATMENT SUMMARY AND RECOMMENDATIONS:    PT Received On: 23  PT Start Time: 1411     PT Stop Time: 1444  PT Total Time (min): 33 min     Subjective Assessment:   No complaints  Lethargic   x Awake, alert, cooperative  Uncooperative    Agitated  c/o pain    Appropriate  c/o fatigue    Confused  Treated at bedside     Emotionally labile x Treated in gym/dept.    Impulsive  Other:    Flat affect       Therapy Precautions:    Cognitive deficits  Spinal precautions    Collar - hard x Sternal precautions    Collar - soft   TLSO    Fall risk  LSO    Hip precautions - posterior  Knee immobilizer    Hip precautions - anterior  WBAT    Impaired communication  Partial weightbearing    Oxygen  TTWB    PEG tube  NWB    Visual deficits  Other:    Hearing deficits          Treatment Objectives:     Mobility Training:   Assist level Comments    Bed mobility SBA Supine>Sit   Transfer SBA Stand pivot without AD   Gait CGA Without AD 2 x 335 feet   Sit to stand transitions SPV From EOB   Sitting balance     Standing balance  CGA Functional reaching in standing   Wheelchair mobility     Car transfer       Therapeutic Exercise:   Exercise Sets Reps Comments   LE Bike in sitting  5'                          Additional Comments:  PT completed one walking lap when pt was stopped to have an EKG. PT waited for completion of EKG then completed session. Pt progressing well without AD.   Assessment: Patient tolerated session well.    PT Plan: Continue per POC  Revisions made to plan of care: No    GOALS:   Multidisciplinary Problems       Physical Therapy Goals          Problem: Physical Therapy    Goal Priority Disciplines Outcome Goal Variances Interventions   Physical Therapy Goal     PT, PT/OT Ongoing, Progressing     Description: Goals to be met by: Dishcarge     Patient will increase functional  independence with mobility by performin. Supine to sit with Modified [place] maintaining sternal precautions  2. Sit to stand transfer with Modified [place] maintaining sternal precautions  3. Gait x 325 feet with Modified [place] using Rolling Walker.   4. Ascend/descend 3 stair with left Handrails Modified [place] using No Assistive Device.                          Skilled PT Minutes Provided: 30   Communication with Treatment Team:     Equipment recommendations:       At end of treatment, patient remained:  x Up in chair     Up in wheelchair in room    In bed    With alarm activated    Bed rails up    Call bell in reach     Family/friends present    Restraints secured properly    In bathroom with CNA/RN notified    Nurse aware   x In gym with therapist/tech    Other:

## 2023-05-03 PROBLEM — I35.0 SEVERE AORTIC STENOSIS: Status: ACTIVE | Noted: 2023-05-03

## 2023-05-03 PROBLEM — I48.91 ATRIAL FIBRILLATION WITH RVR: Status: ACTIVE | Noted: 2023-05-03

## 2023-05-03 PROBLEM — G93.41 ENCEPHALOPATHY, METABOLIC: Status: ACTIVE | Noted: 2023-05-03

## 2023-05-03 PROBLEM — F10.11 HISTORY OF ALCOHOL ABUSE: Status: ACTIVE | Noted: 2022-12-29

## 2023-05-03 PROBLEM — E83.42 HYPOMAGNESEMIA: Status: ACTIVE | Noted: 2022-12-30

## 2023-05-03 PROBLEM — I50.32 CHRONIC DIASTOLIC CONGESTIVE HEART FAILURE: Status: ACTIVE | Noted: 2022-12-29

## 2023-05-03 PROBLEM — Z02.9 DISCHARGE PLANNING ISSUES: Status: ACTIVE | Noted: 2023-05-03

## 2023-05-03 PROBLEM — D62 ACUTE BLOOD LOSS ANEMIA: Status: ACTIVE | Noted: 2023-05-03

## 2023-05-03 PROBLEM — Z75.8 DISCHARGE PLANNING ISSUES: Status: ACTIVE | Noted: 2023-05-03

## 2023-05-03 PROBLEM — E78.5 HYPERLIPIDEMIA: Status: ACTIVE | Noted: 2023-05-03

## 2023-05-03 PROCEDURE — 97116 GAIT TRAINING THERAPY: CPT | Mod: CQ

## 2023-05-03 PROCEDURE — 94761 N-INVAS EAR/PLS OXIMETRY MLT: CPT

## 2023-05-03 PROCEDURE — 11000004 HC SNF PRIVATE

## 2023-05-03 PROCEDURE — 25000003 PHARM REV CODE 250: Performed by: STUDENT IN AN ORGANIZED HEALTH CARE EDUCATION/TRAINING PROGRAM

## 2023-05-03 PROCEDURE — 97129 THER IVNTJ 1ST 15 MIN: CPT

## 2023-05-03 PROCEDURE — 97110 THERAPEUTIC EXERCISES: CPT

## 2023-05-03 PROCEDURE — 97535 SELF CARE MNGMENT TRAINING: CPT

## 2023-05-03 PROCEDURE — 99900035 HC TECH TIME PER 15 MIN (STAT)

## 2023-05-03 PROCEDURE — 97530 THERAPEUTIC ACTIVITIES: CPT

## 2023-05-03 PROCEDURE — 97530 THERAPEUTIC ACTIVITIES: CPT | Mod: CQ

## 2023-05-03 PROCEDURE — 25000003 PHARM REV CODE 250: Performed by: HOSPITALIST

## 2023-05-03 PROCEDURE — 97110 THERAPEUTIC EXERCISES: CPT | Mod: CQ

## 2023-05-03 RX ADMIN — NIFEDIPINE 60 MG: 30 TABLET, FILM COATED, EXTENDED RELEASE ORAL at 09:05

## 2023-05-03 RX ADMIN — AMIODARONE HYDROCHLORIDE 200 MG: 200 TABLET ORAL at 09:05

## 2023-05-03 RX ADMIN — ATORVASTATIN CALCIUM 40 MG: 40 TABLET, FILM COATED ORAL at 09:05

## 2023-05-03 RX ADMIN — FERROUS SULFATE TAB 325 MG (65 MG ELEMENTAL FE) 1 EACH: 325 (65 FE) TAB at 09:05

## 2023-05-03 RX ADMIN — FUROSEMIDE 40 MG: 40 TABLET ORAL at 09:05

## 2023-05-03 RX ADMIN — HYDROCODONE BITARTRATE AND ACETAMINOPHEN 1 TABLET: 5; 325 TABLET ORAL at 03:05

## 2023-05-03 RX ADMIN — THIAMINE HCL TAB 100 MG 200 MG: 100 TAB at 09:05

## 2023-05-03 RX ADMIN — LIDOCAINE 1 PATCH: 50 PATCH CUTANEOUS at 12:05

## 2023-05-03 RX ADMIN — TRAZODONE HYDROCHLORIDE 50 MG: 50 TABLET ORAL at 08:05

## 2023-05-03 RX ADMIN — HYDROCODONE BITARTRATE AND ACETAMINOPHEN 1 TABLET: 5; 325 TABLET ORAL at 08:05

## 2023-05-03 RX ADMIN — Medication 400 MG: at 09:05

## 2023-05-03 RX ADMIN — VALSARTAN 160 MG: 160 TABLET, FILM COATED ORAL at 09:05

## 2023-05-03 RX ADMIN — HYDROXYZINE HYDROCHLORIDE 25 MG: 25 TABLET ORAL at 08:05

## 2023-05-03 RX ADMIN — METOPROLOL SUCCINATE 50 MG: 50 TABLET, FILM COATED, EXTENDED RELEASE ORAL at 09:05

## 2023-05-03 RX ADMIN — ASPIRIN 81 MG: 81 TABLET, COATED ORAL at 09:05

## 2023-05-03 NOTE — PLAN OF CARE
Problem: Adult Inpatient Plan of Care  Goal: Plan of Care Review  Outcome: Ongoing, Progressing  Flowsheets (Taken 5/3/2023 1649)  Plan of Care Reviewed With:   patient   sibling  Goal: Absence of Hospital-Acquired Illness or Injury  Outcome: Ongoing, Progressing  Intervention: Identify and Manage Fall Risk  Flowsheets (Taken 5/3/2023 1649)  Safety Promotion/Fall Prevention:   assistive device/personal item within reach   chair alarm set   in recliner, wheels locked   instructed to call staff for mobility   gait belt with ambulation  Intervention: Prevent Skin Injury  Flowsheets (Taken 5/3/2023 1649)  Body Position: position changed independently  Skin Protection:   adhesive use limited   tubing/devices free from skin contact   incontinence pads utilized  Intervention: Prevent and Manage VTE (Venous Thromboembolism) Risk  Flowsheets (Taken 5/3/2023 1649)  Activity Management:   Ambulated to bathroom - L4   Walk with assistive devise and /or staff member - L3   Up in chair - L3  VTE Prevention/Management:   ambulation promoted   bleeding risk assessed   ROM (active) performed  Range of Motion: active ROM (range of motion) encouraged  Intervention: Prevent Infection  Flowsheets (Taken 5/3/2023 1649)  Infection Prevention:   cohorting utilized   environmental surveillance performed   equipment surfaces disinfected   rest/sleep promoted   hand hygiene promoted  Goal: Optimal Comfort and Wellbeing  Outcome: Ongoing, Progressing  Intervention: Monitor Pain and Promote Comfort  Flowsheets (Taken 5/3/2023 1649)  Pain Management Interventions:   quiet environment facilitated   premedicated for activity   pillow support provided  Intervention: Provide Person-Centered Care  Flowsheets (Taken 5/3/2023 1649)  Trust Relationship/Rapport:   questions answered   care explained   choices provided   emotional support provided   empathic listening provided   thoughts/feelings acknowledged   reassurance provided   questions  encouraged  Goal: Readiness for Transition of Care  Outcome: Ongoing, Progressing     Problem: Fall Injury Risk  Goal: Absence of Fall and Fall-Related Injury  Outcome: Ongoing, Progressing  Intervention: Identify and Manage Contributors  Flowsheets (Taken 5/3/2023 1649)  Self-Care Promotion:   independence encouraged   BADL personal objects within reach  Medication Review/Management: medications reviewed  Intervention: Promote Injury-Free Environment  Flowsheets (Taken 5/3/2023 1649)  Safety Promotion/Fall Prevention:   assistive device/personal item within reach   chair alarm set   in recliner, wheels locked   instructed to call staff for mobility   gait belt with ambulation     Problem: Infection  Goal: Absence of Infection Signs and Symptoms  Description: Goals to be met by: 5/5/2023     Patient will increase functional independence with ADLs by performing:    UE Dressing with Modified [place].  LE Dressing with Modified [place].  Toileting from toilet with Supervision for hygiene and clothing management.   Toilet transfer to toilet with Supervision.    Outcome: Ongoing, Progressing     Problem: Skin Injury Risk Increased  Goal: Skin Health and Integrity  Outcome: Ongoing, Progressing     Problem: Pain Acute  Goal: Acceptable Pain Control and Functional Ability  Outcome: Ongoing, Progressing  Intervention: Develop Pain Management Plan  Flowsheets (Taken 5/3/2023 1649)  Pain Management Interventions:   quiet environment facilitated   premedicated for activity   pillow support provided  Intervention: Prevent or Manage Pain  Flowsheets (Taken 5/3/2023 1649)  Sleep/Rest Enhancement:   regular sleep/rest pattern promoted   noise level reduced  Bowel Elimination Promotion:   adequate fluid intake promoted   ambulation promoted  Medication Review/Management: medications reviewed

## 2023-05-03 NOTE — PT/OT/SLP PROGRESS
Physical Therapy Treatment Note           Name: Mike Urbina Jr.    : 1955 (67 y.o.)  MRN: [id number]           TREATMENT SUMMARY AND RECOMMENDATIONS:    PT Received On: 23  PT Start Time: 900     PT Stop Time: 925  PT Total Time (min): 25 min     Subjective Assessment:  x No complaints  Lethargic    Awake, alert, cooperative  Uncooperative    Agitated  c/o pain    Appropriate  c/o fatigue    Confused  Treated at bedside     Emotionally labile x Treated in gym/dept.    Impulsive  Other:    Flat affect       Therapy Precautions:    Cognitive deficits  Spinal precautions    Collar - hard  Sternal precautions    Collar - soft   TLSO    Fall risk  LSO    Hip precautions - posterior  Knee immobilizer    Hip precautions - anterior  WBAT    Impaired communication  Partial weightbearing    Oxygen  TTWB    PEG tube  NWB    Visual deficits  Other:    Hearing deficits          Treatment Objectives:     Mobility Training:   Assist level Comments    Bed mobility     Transfer     Gait CGA Amb on firm surface with no  ft    Sit to stand transitions SBA Sit-stand with no UE use   Sitting balance     Standing balance  CGA Side stepping L and R with no UE supported 20 ft x 2   Wheelchair mobility     Car transfer     Other:          Therapeutic Exercise:   Exercise Sets Reps Comments   B LE exer sitting  20 reps  Ankle pumps, TKE, abd/add, knee lifts                          Additional Comments:  Ready for DC    Assessment: Patient tolerated session well.    PT Plan: continue  Revisions made to plan of care: No    GOALS:   Multidisciplinary Problems       Physical Therapy Goals          Problem: Physical Therapy    Goal Priority Disciplines Outcome Goal Variances Interventions   Physical Therapy Goal     PT, PT/OT Ongoing, Progressing     Description: Goals to be met by: Dishcarge     Patient will increase functional independence with mobility by performin. Supine to sit with Modified  [place] maintaining sternal precautions  2. Sit to stand transfer with Modified [place] maintaining sternal precautions  3. Gait x 325 feet with Modified [place] using Rolling Walker.   4. Ascend/descend 3 stair with left Handrails Modified [place] using No Assistive Device.                          Skilled PT Minutes Provided: 25   Communication with Treatment Team:     Equipment recommendations:       At end of treatment, patient remained:   Up in chair     Up in wheelchair in room    In bed    With alarm activated    Bed rails up    Call bell in reach     Family/friends present    Restraints secured properly    In bathroom with CNA/RN notified    Nurse aware   x In gym with therapist/tech    Other:

## 2023-05-03 NOTE — PATIENT CARE CONFERENCE
Name: Mike Urbina Jr.    : 1955 (67 y.o.)  MRN: [id number]            Interdisciplinary Team Conference     Case conference held with patient/family and care team to discuss progress, plan of care, barriers to be addressed for safe return home, equipment recommendations, and discharge planning. Communicated therapy progress with MD, RN, therapy clinicians and case management. All questions/concerns answered.

## 2023-05-03 NOTE — PLAN OF CARE
[facility]  Discharge Reassessment    Primary Care Provider: LOREN Jerry    Expected Discharge Date:     Reassessment (most recent)       Discharge Reassessment - 05/03/23 1312          Discharge Reassessment    Assessment Type Discharge Planning Reassessment     Did the patient's condition or plan change since previous assessment? No     Discharge Plan discussed with: Sibling     Name(s) and Number(s) Ashleigh singleton [phone]     Communicated [place] with patient/caregiver Date not available/Unable to determine     Discharge Plan A Home with family;Home Health     DME Needed Upon Discharge  walker, standard     Discharge Barriers Identified None        Post-Acute Status    Post-Acute Authorization Home Health     Home Health Status Pending medical clearance/testing     Discharge Delays None known at this time

## 2023-05-03 NOTE — HPI
Mr. Urbina is a 67 year old  male with a history of CAD, chronic diastolic CHF, paroxysmal atrial fibrillation/flutter, alcohol abuse, and HTN who was originally admitted to [facility] on 4/3/2023 for elective CABG after he was noted to have 50-60% left main stenosis and an occluded right and significant disease of the circulatory system on [facility] from 3/15/2023.  He underwent CABG with LIMA to LAD, rSVG to OM1, rSVG to PDA and ligation of SILVANO with endostapler by Dr. Deuce Finley. Postoperatively he developed a worsening anemia and thrombocytopenia and he was transfused with 2 units packed RBCs and 1 unit of platelets on 4/4/2023. He had mental status changes on 4/5/2023 likely due to alcohol withdrawal and CT of the head from 4/6/2023 showed no acute intracranial abnormality. He had episodes of atrial fibrillation with RVR requiring an amiodarone infusion and he was scheduled for CARO with DCCV on 4/11/2023. He was found to have moderate oral and mild pharyngeal dysphagia with no aspiration visualized on MBS from 4/11/2023. He remained in atrial flutter with RVR and he underwent DCCV again on 4/14/2023 with restoration of normal sinus rhythm. He was transitioned to an amiodarone taper by cardiology and he was started on digoxin 0.125 mg PO daily on 4/17/2023. He was transferred to [facility] swing bed on 4/17/2023 for PT/OT and management of his multiple medical comorbidities.

## 2023-05-03 NOTE — HOSPITAL COURSE
He was admitted to [facility] swing bed on 4/17/2023 for rehab s/p hospitalization for CABG, postoperative blood loss anemia requiring blood transfusion, thrombocytopenia requiring platelet transfusion, hyponatremia, and metabolic encephalopathy. His blood pressure remained elevated and nifedipine 30 mg PO daily was added on 4/19/2023.He had changes in his cognition on 4/19/2023 and MRI of the brain from 4/20/2023 showed no acute intracranial abnormality. His nifedipine was increased to 60 mg PO daily on 4/21/2023 for better blood pressure control. His digoxin dose was increased to 0.25 mg PO daily on 4/22/2023 due to consistent tachycardia and he was started on magnesium oxide 400 mg PO daily for hypomagnesemia. He was initiated on a lidocaine patch on 5/1/2023 due to left inner thigh pain. His digoxin was discontinued on 5/2/2023 due to episodes of bradycardia and metoprolol succinate was decreased from 100 mg to 50 mg PO daily.His heart rates stabilized and remained in the upper 50s and low 60s. He had no other complications throughout his hospital course and he was discharged home with home health and a walker in stable condition.

## 2023-05-03 NOTE — PROGRESS NOTES
Name: Mike Urbina Jr.    : 1955 (67 y.o.)  MRN: [id number]        [facility] Recommendations:    Rolling walker: Patient would benefit from a rolling walker upon discharge to increase safety, decrease fall risk, and improve mobility/transfers. Patient is currently unable to independently and functionally walk for household distances of >100 feet without use of rolling walker. Patient would benefit from a rolling walker within his/her home environment to increase safety with transfers and gait and decrease risk of falls and subsequent injury. Patient is unsafe to ambulate without walker at this time.

## 2023-05-03 NOTE — PT/OT/SLP PROGRESS
Occupational Therapy  Treatment    Name: Mike Urbina Jr.    : 1955 (67 y.o.)  MRN: [id number]           TREATMENT SUMMARY AND RECOMMENDATIONS:      OT Date of Treatment: 23  OT Start Time: 942  OT Stop Time: 1005  OT Total Time (min): 23 min      Subjective Assessment:   No complaints  Lethargic   X Awake, alert, cooperative  Impulsive    Uncooperative   Flat affect    Agitated  c/o pain    Appropriate  c/o fatigue    Confused  Treated at bedside     Emotionally labile X Treated in gym/dept.      Other:        Therapy Precautions:    Cognitive deficits  Spinal precautions    Collar - hard X Sternal precautions    Collar - soft   TLSO   X Fall risk  LSO    Hip precautions - posterior  Knee immobilizer    Hip precautions - anterior  WBAT    Impaired communication  Partial weightbearing    Oxygen  TTWB    PEG tube  NWB    Visual deficits      Hearing deficits   Other:        Treatment Objectives:     Mobility Training:    Mobility task Assist level Comments    Bed mobility     Transfer     Sit to stands transitions     Functional mobility SBA Pt ambulated ~400ft while performing categorical naming task to improve divided attention. Pt required Mod-Max A for recall and thought formulation.    Sitting balance     Standing balance  GOOD Pt perf fxnl reaching to ground level on stable and dynamic surfaces. Min A for balance as challenge increased. Ax incorporating half turns, quarter turns, and full turns.    Other:          Assessment: Patient tolerated session well. Pt demonstrates significant improvements in balance this session. Pt would continue to benefit from skilled OT services to improve pt's safety and independence with daily occupations.      OT Plan: Continue OT POC.   Revisions made to plan of care: No    GOALS:   Multidisciplinary Problems       Occupational Therapy Goals          Problem: Occupational Therapy    Goal Priority Disciplines Outcome Interventions   Occupational Therapy Goal      OT, PT/OT Ongoing, Progressing    Description: Goals to be met by: 5/5/2023     Patient will increase functional independence with ADLs by performing:    UE Dressing with Modified [place].- Min A  LE Dressing with Modified [place].-Mod A  Toileting from toilet with Supervision for hygiene and clothing management.-SBA  Toilet transfer to toilet with Supervision.-SBA                         Skilled OT Minutes Provided: 23  Communication with Treatment Team:     Equipment recommendations:       At end of treatment, patient remained:  X Up in chair     Up in wheelchair in room    In bed   X With alarm activated    Bed rails up   X Call bell in reach     Family/friends present    Restraints secured properly    In bathroom with CNA/RN notified    In gym with PT/PTA/tech    Nurse aware    Other:

## 2023-05-03 NOTE — PLAN OF CARE
Weekly Staffing Report      Date Admitted: 4/17/2023 :   Staffing Date: 5/3/2023     Patient Active Problem List   Diagnosis    Hypertensive urgency    Chronic diastolic congestive heart failure    Hyponatremia    History of alcohol abuse    Atrial flutter    Thrombocytopenia    Hypomagnesemia    CAD s/p CABG     Atrial fibrillation with RVR    Postoperative blood loss anemia    Hyperlipidemia    Disposition    Toxic/metabolic encephalopathy    Severe aortic stenosis          Team Members Present:       Nursing Present     Yes [x]    No []    Physical Therapy Present    Yes [x]    No []    Occupational Therapy Present     Yes [x]    No []    Speech Therapy Present    Yes []    No []    NA []    Dietary Present    Yes [x]    No []        Physician Present   [] Berlin Duncan    [] Thairy Reyes    [] LUCIA Leiva    [x] REDD Roy     [] CECI Du      Family Present    [] Adult Children    [] Spouse    [] POA    [] Friend/ Caregiver    [x] Other       Interdisciplinary Meeting Notes:  Brother present. PT-Doing great independent for all activities. OT- independent for all activities. ST- good to discharge. Appetite good. Medically- BP stable HR -50s stopped beta blocker and adjusted medications. Plan to discharge to brother's home in [place] with home health on Friday.                 Please see Documented PT/OT/ST, Dietary, Nursing, and Physician notes for detailed treatment information.

## 2023-05-03 NOTE — ASSESSMENT & PLAN NOTE
Continue thiamine.  He was placed on the librium protocol in the ICU at [facility] for alcohol withdrawal.

## 2023-05-03 NOTE — PT/OT/SLP PROGRESS
Occupational Therapy  Treatment    Name: Mike Urbina Jr.    : 1955 (67 y.o.)  MRN: [id number]           TREATMENT SUMMARY AND RECOMMENDATIONS:      OT Date of Treatment: 23  OT Start Time: 1335  OT Stop Time: 1400  OT Total Time (min): 25 min      Subjective Assessment:   No complaints  Lethargic   X Awake, alert, cooperative  Impulsive    Uncooperative   Flat affect    Agitated  c/o pain    Appropriate  c/o fatigue    Confused  Treated at bedside     Emotionally labile X Treated in gym/dept.      Other:        Therapy Precautions:    Cognitive deficits  Spinal precautions    Collar - hard X Sternal precautions    Collar - soft   TLSO   X Fall risk  LSO    Hip precautions - posterior  Knee immobilizer    Hip precautions - anterior  WBAT    Impaired communication  Partial weightbearing    Oxygen  TTWB    PEG tube  NWB    Visual deficits      Hearing deficits   Other:        Treatment Objectives:     Mobility Training:    Mobility task Assist level Comments    Bed mobility     Transfer     Sit to stands transitions     Functional mobility SBA To improve fxnl endurance and fxnl ax tolerance. Pt ambulated ~1000ft while engaging in conversational level of distaction. Pt with no c/o pain or SOB.   Sitting balance     Standing balance      Other:        ADL Training:    ADL Assist level Comments    Feeding     Grooming/hygiene     Bathing     Upper body dressing     Lower body dressing     Toileting SPV (+) void   Toilet transfer SPV    Adaptive equipment training     Other:           Additional Comments:  Pt appropriate to be seen QD for OT.    Assessment: Patient tolerated session well. Pt demonstrates improvements in fxnl endurance, balance, and fxnl ax tolerance. Pt would continue to benefit from skilled OT services to improve pt's safety and independence with daily occupations.      OT Plan: Continue OT POC.  Revisions made to plan of care: No    GOALS:   Multidisciplinary Problems        Occupational Therapy Goals          Problem: Occupational Therapy    Goal Priority Disciplines Outcome Interventions   Occupational Therapy Goal     OT, PT/OT Ongoing, Progressing    Description: Goals to be met by: 5/5/2023     Patient will increase functional independence with ADLs by performing:    UE Dressing with Modified [place].- Min A  LE Dressing with Modified [place].-Mod A  Toileting from toilet with Supervision for hygiene and clothing management.-SBA  Toilet transfer to toilet with Supervision.-SBA                         Skilled OT Minutes Provided: 25  Communication with Treatment Team:     Equipment recommendations:       At end of treatment, patient remained:  X Up in chair     Up in wheelchair in room    In bed   X With alarm activated    Bed rails up   X Call bell in reach     Family/friends present    Restraints secured properly    In bathroom with CNA/RN notified    In gym with PT/PTA/tech    Nurse aware    Other:

## 2023-05-03 NOTE — PROGRESS NOTES
[facility] Medicine  Telehospitalist Progress Note    Patient Name: Mike Urbina Jr.  MRN: [id number]  Patient Class: IP- Swing   Admission Date: 4/17/2023  Length of Stay: 16 days  Attending Physician: Tom Roy MD  Primary Care Provider: LOREN Jerry      Subjective:     Principal Problem:S/P CABG (coronary artery bypass graft)      HPI:  Mr. Urbina is a 67 year old  male with a history of CAD, chronic diastolic CHF, paroxysmal atrial fibrillation/flutter, alcohol abuse, and HTN who was originally admitted to [facility] on 4/3/2023 for elective CABG after he was noted to have 50-60% left main stenosis and an occluded right and significant disease of the circulatory system on [facility] from 3/15/2023.  He underwent CABG with LIMA to LAD, rSVG to OM1, rSVG to PDA and ligation of SILVANO with endostapler by Dr. Deuce Finley. Postoperatively he developed a worsening anemia and thrombocytopenia and he was transfused with 2 units packed RBCs and 1 unit of platelets on 4/4/2023. He had mental status changes on 4/5/2023 likely due to alcohol withdrawal and CT of the head from 4/6/2023 showed no acute intracranial abnormality. He had episodes of atrial fibrillation with RVR requiring an amiodarone infusion and he was scheduled for CARO with DCCV on 4/11/2023. He was found to have moderate oral and mild pharyngeal dysphagia with no aspiration visualized on MBS from 4/11/2023. He remained in atrial flutter with RVR and he underwent DCCV again on 4/14/2023 with restoration of normal sinus rhythm. He was transitioned to an amiodarone taper by cardiology and he was started on digoxin 0.125 mg PO daily on 4/17/2023. He was transferred to [facility] bed on 4/17/2023 for PT/OT and management of his multiple medical comorbidities.       Overview/Hospital Course:  He was admitted to [facility] swing bed on 4/17/2023 for rehab s/p hospitalization for  CABG, postoperative blood loss anemia requiring blood transfusion, thrombocytopenia requiring platelet transfusion, hyponatremia, and metabolic encephalopathy. His blood pressure remained elevated and nifedipine 30 mg PO daily was added on 4/19/2023.He had changes in his cognition on 4/19/2023 and MRI of the brain from 4/20/2023 showed no acute intracranial abnormality. His nifedipine was increased to 60 mg PO daily on 4/21/2023 for better blood pressure control. His digoxin dose was increased to 0.25 mg PO daily on 4/22/2023 due to consistent tachycardia and he was started on magnesium oxide 400 mg PO daily for hypomagnesemia. He was initiated on a lidocaine patch on 5/1/2023 due to left inner thigh pain. His digoxin was discontinued on 5/2/2023 due to episodes of bradycardia and metoprolol succinate was decreased from 100 mg to 50 mg PO daily.      Interval History: He ambulated 335 feet x 2 with contact guard assistance yesterday and he required contact guard assistance with standing balance, supervision with sit to stand transitions, and standby assistance with bed mobility and transfers. His digoxin was stopped yesterday due to bradycardia and his metoprolol succinate was decreased from 100 mg to 50 mg PO daily.      Review of Systems   All other systems reviewed and are negative.  Objective:     Vital Signs (Most Recent):  Temp: 97.4 °F (36.3 °C) (05/03/23 0710)  Pulse: (!) 57 (05/03/23 0710)  Resp: 18 (05/03/23 0328)  BP: (!) 150/68 (05/03/23 0710)  SpO2: 98 % (05/03/23 0710)   Vital Signs (24h Range):  Temp:  [97.4 °F (36.3 °C)-98.6 °F (37 °C)] 97.4 °F (36.3 °C)  Pulse:  [52-62] 57  Resp:  [18] 18  SpO2:  [95 %-100 %] 98 %  BP: (109-157)/(53-78) 150/68     Weight: 77.1 kg (169 lb 15.6 oz)  Body mass index is 21.82 kg/m².    Intake/Output Summary (Last 24 hours) at 5/3/2023 0888  Last data filed at 5/2/2023 2322  Gross per 24 hour   Intake 1440 ml   Output 2200 ml   Net -760 ml      Physical Exam  General -  Elderly  male in no acute distress.  HEENT - NCAT. No scleral icterus. Oropharynx clear. Mucous membranes moist.  Neck - No lymphadenopathy, thyromegaly, or JVD.  CVS - Bradycardic but regular rhythm. No murmurs, rubs, or gallops.  Resp - Lungs are clear to auscultation bilaterally. No rales, wheeze, or rhonchi.   GI - Soft, nontender, nondistended, normoactive bowel sounds present.   Extremities - No clubbing, cyanosis, or edema.   Neuro - CN II through XII are grossly intact. No focal neurological deficits. Alert and oriented times four.   Psych - Normal affect.  Skin - Coccyx tear. Pink partial thickness tear noted in the gluteal cleft.    Significant Labs: All pertinent labs within the past 24 hours have been reviewed.    4/30/2023:  CBC: WBC count 3.0, hemoglobin 9.4, hematocrit 30.4, platelet count 117.    4/27/2023:  CBC: WBC count 3.2, hemoglobin 9.4, hematocrit 30.9, platelet count 125.  BMP: Sodium 141, potassium 3.6, chloride 107, CO2 25, BUN 16, creatinine 0.99, glucose 84, calcium 8.9.    Significant Imaging: I have reviewed all pertinent imaging results/findings within the past 24 hours.    MRI brain 4/20/2023:  1. Limited exam with no evidence of acute intracranial abnormality.  2. Chronic periventricular white matter changes and global intracranial atrophy.    Abdominal ultrasound 4/6/2023:  1. Somewhat limited evaluation secondary to technical factors discussed above, resulting in inadequate visualization of the pancreas.  2. Findings suggestive of early liver cirrhotic and steatosis changes.  3. Gallbladder sludge with nonspecific wall thickening that could be secondary to element of acute or chronic cholecystitis, as well as immediately adjacent liver or more systemic pathology.     CT head 4/6/2023:  1. No acute intracranial abnormality.  2. Chronic microvascular ischemic changes.    CXR 4/6/2023: Improving aeration in the left lung base with persistent minimal subsegmental  atelectasis.    CXR 4/3/2023:  1. Postoperative changes of the chest with lines and tubes as described.  2. Trace left apical pneumothorax with chest tube in place.      Assessment/Plan:      * CAD s/p CABG   Continue baby aspirin.  He is status post CABG on 4/03/2023 with LIMA to LAD, rSVG to OM1, rSVG to PDA and ligation of SILVANO with endostapler by Dr. Deuce Finley.    Atrial fibrillation with RVR  Resolved.  Continue amiodarone, beta-blocker, and calcium channel blocker.  Digoxin was discontinued on 5/02/2023 due to bradycardia.  His status post DCCV on 4/11/2023 and 4/14/2023.    Hypertensive urgency  Improved.  Continue nifedipine, valsartan, and metoprolol succinate which was decreased on 5/02/2023 due to bradycardia.    Thrombocytopenia  His platelet count has improved status post platelet transfusion of 4/04/2023.  Abdominal ultrasound from 4/06/2023 showed findings suggestive of early liver cirrhotic and steatosis changes.    Postoperative blood loss anemia  His hemoglobin and hematocrit have stabilized status post blood transfusion 2 units packed RBCs on 4/04/2023.    Toxic/metabolic encephalopathy  Improved.  MRI of the brain from 4/20/2023 showed no acute intracranial abnormality.    Hyponatremia  Improved.    Hypomagnesemia  Continue magnesium oxide.    Severe aortic stenosis  He will be worked up for TAVR as an outpatient.    History of alcohol abuse  Continue thiamine.  He was placed on the librium protocol in the ICU at [facility] for alcohol withdrawal.    Hyperlipidemia  Continue atorvastatin.    Chronic diastolic congestive heart failure  Continue furosemide.  Transthoracic echocardiogram from 12/29/2022 showed normal left ventricular systolic function with an EF of 55% and grade 1 left ventricular diastolic dysfunction.    Disposition  Anticipate discharge home with home health on 5/05/2023.      VTE Risk Mitigation (From admission, onward)         Ordered     IP VTE HIGH RISK PATIENT  Once          04/18/23 1100     Place sequential compression device  Until discontinued         04/18/23 1100                Discharge Planning   MODESTO:      Code Status: Full Code   Is the patient medically ready for discharge?:     Reason for patient still in hospital (select all that apply): PT / OT recommendations and Pending disposition  Discharge Plan A: Home with family, Home Health   Discharge Delays: None known at this time      This service was provided via telemedicine.    Type of Software: Audio/Visual.    Originating Site:  [facility].    Distance Site: [place], [place].    His exam was performed with the assistance of Moon Cabral RN.      Tom Roy MD  Department of Hospital Medicine   [facility]

## 2023-05-03 NOTE — PLAN OF CARE
Problem: Adult Inpatient Plan of Care  Goal: Plan of Care Review  Outcome: Ongoing, Progressing  Goal: Patient-Specific Goal (Individualized)  Outcome: Ongoing, Progressing  Flowsheets (Taken 5/2/2023 0326)  Anxieties, Fears or Concerns: none expressed at this time  Individualized Care Needs: Safety precautions and adequate sleep  Goal: Absence of Hospital-Acquired Illness or Injury  Outcome: Ongoing, Progressing  Goal: Optimal Comfort and Wellbeing  Outcome: Ongoing, Progressing  Goal: Readiness for Transition of Care  Outcome: Ongoing, Progressing     Problem: Fall Injury Risk  Goal: Absence of Fall and Fall-Related Injury  Outcome: Ongoing, Progressing     Problem: Infection  Goal: Absence of Infection Signs and Symptoms  Description: Goals to be met by: 5/5/2023     Patient will increase functional independence with ADLs by performing:    UE Dressing with Modified [place].  LE Dressing with Modified [place].  Toileting from toilet with Supervision for hygiene and clothing management.   Toilet transfer to toilet with Supervision.    Outcome: Ongoing, Progressing     Problem: Activity Intolerance (Cardiovascular Surgery)  Goal: Improved Activity Tolerance  Outcome: Ongoing, Progressing     Problem: Skin Injury Risk Increased  Goal: Skin Health and Integrity  Outcome: Ongoing, Progressing     Problem: Impaired Wound Healing  Goal: Optimal Wound Healing  Outcome: Ongoing, Progressing

## 2023-05-03 NOTE — ASSESSMENT & PLAN NOTE
Improved.  Continue nifedipine, valsartan, and metoprolol succinate which was decreased on 5/02/2023 due to bradycardia.

## 2023-05-03 NOTE — PT/OT/SLP PROGRESS
Speech Language Pathology Treatment    Patient Name:  Mike Urbina Jr.   MRN:  [id number]  Admitting Diagnosis: <principal problem not specified>    Recommendations:                 General Recommendations:  Cognitive-linguistic therapy  Diet recommendations:  Regular Diet - IDDSI Level 7, Liquid Diet Level: Thin liquids - IDDSI Level 0   Aspiration Precautions: HOB to 90 degrees   General Precautions: Standard, fall, sternal  Communication strategies:  go to room if call light pushed    Subjective     Pt in gerichair upon SLP arrival. Pt pleasant and in good spirits. Pt's friend present.    Patient goals:      Pain/Comfort:       Respiratory Status: Room air    Objective:     Has the patient been evaluated by SLP for swallowing?   Yes  Keep patient NPO?     Current Respiratory Status:        Pt I'ly recalled sternal precautions.  Pt I'ly recalled tentative dc date from staffing this AM.  Pt recalled and described directions to get to his house and pharmacy I'ly.  Pt's friend denies any memory deficits noted w/ functional recall tasks observed this date.    Overall good session.  Cont SLP POC.    Assessment:     Mike Urbina Jr. is a 67 y.o. male with an SLP diagnosis of Cognitive-Linguistic Impairment.  He presents w/ decreased safety awareness and delayed recall. Skilled SLP services are warranted at this time to address above stated deficits.    Goals:   Multidisciplinary Problems       SLP Goals          Problem: SLP    Goal Priority Disciplines Outcome   SLP Goal     SLP Ongoing, Progressing   Description: LTG: Pt will tolerate LRD w/o overt s/s of aspiration.  Pt will improve cognitive linguistic skills to ensure safe discharge home.    STG:  Pt will complete laryngeal strengthening exercises and aleksandra with minimal cues. Discontinue  Pt will consume trials of thin liquid x10 w/o overt s/s of aspiration. Goal met  Pt will consume trials of soft and bite sized w/ good oral clearance and w/o overt  s/s of aspiration. Goal met  Pt will utilize memory strategies to recall sternal precautions following a 3 minute filled delay.   Pt will provide solutions to problems/situations w/ 90% acc Jennifer. Goal met  Pt will complete trials of regular w/ good oral clearance and w/o overt s/s of aspiration. Goal met                       Plan:     Patient to be seen:  5 x/week   Plan of Care expires:  5/5/23  Plan of Care reviewed with:  patient   SLP Follow-Up:  Yes       Discharge recommendations:      Barriers to Discharge:  Level of Skilled Assistance Needed see Pt/Ot notes and Safety Awareness impaired    Time Tracking:     SLP Treatment Date:  5/3/23  Speech Start Time: 10:30  Speech Stop Time: 10:50  Speech Total Time (min):  20 min    Billable Minutes: Speech Therapy Individual 20 cognition      Camila Pearce M.S., CCC-SLP   05/03/2023

## 2023-05-03 NOTE — ASSESSMENT & PLAN NOTE
Resolved.  Continue amiodarone, beta-blocker, and calcium channel blocker.  Digoxin was discontinued on 5/02/2023 due to bradycardia.  His status post DCCV on 4/11/2023 and 4/14/2023.

## 2023-05-03 NOTE — PT/OT/SLP PROGRESS
Physical Therapy Treatment Note           Name: Mike Urbina Jr.    : 1955 (67 y.o.)  MRN: [id number]           TREATMENT SUMMARY AND RECOMMENDATIONS:    PT Received On: 23  PT Start Time: 1300     PT Stop Time: 1325  PT Total Time (min): 25 min     Subjective Assessment:  x No complaints  Lethargic    Awake, alert, cooperative  Uncooperative    Agitated  c/o pain    Appropriate  c/o fatigue    Confused  Treated at bedside     Emotionally labile  Treated in gym/dept.    Impulsive  Other:    Flat affect       Therapy Precautions:    Cognitive deficits  Spinal precautions    Collar - hard x Sternal precautions    Collar - soft   TLSO    Fall risk  LSO    Hip precautions - posterior  Knee immobilizer    Hip precautions - anterior  WBAT    Impaired communication  Partial weightbearing    Oxygen  TTWB    PEG tube  NWB    Visual deficits  Other:    Hearing deficits          Treatment Objectives:     Mobility Training:   Assist level Comments    Bed mobility     Transfer     Gait SBA Amb on firm surface with no  ft    Sit to stand transitions SBA Sit-stand 5 reps x 2 with no UE use   Sitting balance     Standing balance      Wheelchair mobility     Car transfer     Other:          Therapeutic Exercise:   Exercise Sets Reps Comments                               Additional Comments:  Sternal precautions     Assessment: Patient tolerated session well.    PT Plan: continue  Revisions made to plan of care: No    GOALS:   Multidisciplinary Problems       Physical Therapy Goals          Problem: Physical Therapy    Goal Priority Disciplines Outcome Goal Variances Interventions   Physical Therapy Goal     PT, PT/OT Ongoing, Progressing     Description: Goals to be met by: [place]     Patient will increase functional independence with mobility by performin. Supine to sit with Modified [place] maintaining sternal precautions  2. Sit to stand transfer with Modified [place]  maintaining sternal precautions  3. Gait x 325 feet with Modified [place] using Rolling Walker.   4. Ascend/descend 3 stair with left Handrails Modified [place] using No Assistive Device.                          Skilled PT Minutes Provided: 25   Communication with Treatment Team:     Equipment recommendations:       At end of treatment, patient remained:   Up in chair     Up in wheelchair in room    In bed    With alarm activated    Bed rails up    Call bell in reach     Family/friends present    Restraints secured properly    In bathroom with CNA/RN notified    Nurse aware   x In gym with therapist/tech    Other:

## 2023-05-03 NOTE — ASSESSMENT & PLAN NOTE
Continue furosemide.  Transthoracic echocardiogram from 12/29/2022 showed normal left ventricular systolic function with an EF of 55% and grade 1 left ventricular diastolic dysfunction.

## 2023-05-03 NOTE — ASSESSMENT & PLAN NOTE
His platelet count has improved status post platelet transfusion of 4/04/2023.  Abdominal ultrasound from 4/06/2023 showed findings suggestive of early liver cirrhotic and steatosis changes.

## 2023-05-03 NOTE — PATIENT CARE CONFERENCE
Name: Mike Urbina Jr.    : 1955 (67 y.o.)  MRN: [id number]            Interdisciplinary Team Conference     Case conference held with patient/family and care team to discuss progress, plan of care, barriers to be addressed for safe return home, equipment recommendations, and discharge planning. Communicated therapy progress with MD, RN, therapy clinicians and case management. All questions/concerns answered.

## 2023-05-03 NOTE — PATIENT CARE CONFERENCE
Name: Mike Urbina Jr.    : 1955 (67 y.o.)  MRN: [id number]            Interdisciplinary Team Conference     Case conference held with patient/family and care team to discuss progress, plan of care, barriers to be addressed for safe return home, equipment recommendations, and discharge planning. Communicated therapy progress with MD, RN, therapy clinicians and case management. All questions/concerns answered.

## 2023-05-03 NOTE — ASSESSMENT & PLAN NOTE
His hemoglobin and hematocrit have stabilized status post blood transfusion 2 units packed RBCs on 4/04/2023.

## 2023-05-03 NOTE — SUBJECTIVE & OBJECTIVE
Interval History: He ambulated 335 feet x 2 with contact guard assistance yesterday and he required contact guard assistance with standing balance, supervision with sit to stand transitions, and standby assistance with bed mobility and transfers. His digoxin was stopped yesterday due to bradycardia and his metoprolol succinate was decreased from 100 mg to 50 mg PO daily.      Review of Systems   All other systems reviewed and are negative.  Objective:     Vital Signs (Most Recent):  Temp: 97.4 °F (36.3 °C) (05/03/23 0710)  Pulse: (!) 57 (05/03/23 0710)  Resp: 18 (05/03/23 0328)  BP: (!) 150/68 (05/03/23 0710)  SpO2: 98 % (05/03/23 0710)   Vital Signs (24h Range):  Temp:  [97.4 °F (36.3 °C)-98.6 °F (37 °C)] 97.4 °F (36.3 °C)  Pulse:  [52-62] 57  Resp:  [18] 18  SpO2:  [95 %-100 %] 98 %  BP: (109-157)/(53-78) 150/68     Weight: 77.1 kg (169 lb 15.6 oz)  Body mass index is 21.82 kg/m².    Intake/Output Summary (Last 24 hours) at 5/3/2023 0845  Last data filed at 5/2/2023 2322  Gross per 24 hour   Intake 1440 ml   Output 2200 ml   Net -760 ml      Physical Exam  General - Elderly  male in no acute distress.  HEENT - NCAT. No scleral icterus. Oropharynx clear. Mucous membranes moist.  Neck - No lymphadenopathy, thyromegaly, or JVD.  CVS - Bradycardic but regular rhythm. No murmurs, rubs, or gallops.  Resp - Lungs are clear to auscultation bilaterally. No rales, wheeze, or rhonchi.   GI - Soft, nontender, nondistended, normoactive bowel sounds present.   Extremities - No clubbing, cyanosis, or edema.   Neuro - CN II through XII are grossly intact. No focal neurological deficits. Alert and oriented times four.   Psych - Normal affect.  Skin - Coccyx tear. Pink partial thickness tear noted in the gluteal cleft.    Significant Labs: All pertinent labs within the past 24 hours have been reviewed.    4/30/2023:  CBC: WBC count 3.0, hemoglobin 9.4, hematocrit 30.4, platelet count 117.    4/27/2023:  CBC: WBC count 3.2,  hemoglobin 9.4, hematocrit 30.9, platelet count 125.  BMP: Sodium 141, potassium 3.6, chloride 107, CO2 25, BUN 16, creatinine 0.99, glucose 84, calcium 8.9.    Significant Imaging: I have reviewed all pertinent imaging results/findings within the past 24 hours.    MRI brain 4/20/2023:  1. Limited exam with no evidence of acute intracranial abnormality.  2. Chronic periventricular white matter changes and global intracranial atrophy.    Abdominal ultrasound 4/6/2023:  1. Somewhat limited evaluation secondary to technical factors discussed above, resulting in inadequate visualization of the pancreas.  2. Findings suggestive of early liver cirrhotic and steatosis changes.  3. Gallbladder sludge with nonspecific wall thickening that could be secondary to element of acute or chronic cholecystitis, as well as immediately adjacent liver or more systemic pathology.     CT head 4/6/2023:  1. No acute intracranial abnormality.  2. Chronic microvascular ischemic changes.    CXR 4/6/2023: Improving aeration in the left lung base with persistent minimal subsegmental atelectasis.    CXR 4/3/2023:  1. Postoperative changes of the chest with lines and tubes as described.  2. Trace left apical pneumothorax with chest tube in place.

## 2023-05-03 NOTE — ASSESSMENT & PLAN NOTE
Continue baby aspirin.  He is status post CABG on 4/03/2023 with LIMA to LAD, rSVG to OM1, rSVG to PDA and ligation of SILVANO with endostapler by Dr. Deuce Finley.

## 2023-05-04 LAB
ABS NEUT CALC (OHS): 1.41 X10(3)/MCL (ref 2.1–9.2)
ALBUMIN SERPL-MCNC: 3.4 G/DL (ref 3.4–4.8)
ALBUMIN/GLOB SERPL: 1.4 RATIO (ref 1.1–2)
ALP SERPL-CCNC: 84 UNIT/L (ref 40–150)
ALT SERPL-CCNC: 15 UNIT/L (ref 0–55)
ANISOCYTOSIS BLD QL SMEAR: ABNORMAL
AST SERPL-CCNC: 15 UNIT/L (ref 5–34)
BILIRUBIN DIRECT+TOT PNL SERPL-MCNC: 1 MG/DL
BUN SERPL-MCNC: 15.8 MG/DL (ref 8.4–25.7)
CALCIUM SERPL-MCNC: 8.8 MG/DL (ref 8.8–10)
CHLORIDE SERPL-SCNC: 104 MMOL/L (ref 98–107)
CO2 SERPL-SCNC: 31 MMOL/L (ref 23–31)
CREAT SERPL-MCNC: 1.07 MG/DL (ref 0.73–1.18)
EOSINOPHIL NFR BLD MANUAL: 0.06 X10(3)/MCL (ref 0–0.9)
EOSINOPHIL NFR BLD MANUAL: 2 % (ref 0–8)
ERYTHROCYTE [DISTWIDTH] IN BLOOD BY AUTOMATED COUNT: 14.9 % (ref 11.5–17)
GFR SERPLBLD CREATININE-BSD FMLA CKD-EPI: >60 MLS/MIN/1.73/M2
GLOBULIN SER-MCNC: 2.5 GM/DL (ref 2.4–3.5)
GLUCOSE SERPL-MCNC: 87 MG/DL (ref 82–115)
HCT VFR BLD AUTO: 31.1 % (ref 42–52)
HGB BLD-MCNC: 9.6 G/DL (ref 14–18)
HYPOCHROMIA BLD QL SMEAR: ABNORMAL
LYMPHOCYTES NFR BLD MANUAL: 1.09 X10(3)/MCL
LYMPHOCYTES NFR BLD MANUAL: 38 % (ref 13–40)
MAGNESIUM SERPL-MCNC: 1.8 MG/DL (ref 1.6–2.6)
MCH RBC QN AUTO: 27.6 PG (ref 27–31)
MCHC RBC AUTO-ENTMCNC: 30.9 G/DL (ref 33–36)
MCV RBC AUTO: 89.4 FL (ref 80–94)
MONOCYTES NFR BLD MANUAL: 0.32 X10(3)/MCL (ref 0.1–1.3)
MONOCYTES NFR BLD MANUAL: 11 % (ref 2–11)
NEUTROPHILS NFR BLD MANUAL: 49 % (ref 47–80)
PF4 HEPARIN CMPLX IGG SER-IMP: NORMAL
PF4 HEPARIN CMPLX IGG SERPL IA: 0.14 OD
PF4 HEPARIN CMPLX IGG SERPL QL IA: NEGATIVE
PLATELET # BLD AUTO: 103 X10(3)/MCL (ref 130–400)
PLATELET # BLD EST: ABNORMAL 10*3/UL
PMV BLD AUTO: 10.5 FL (ref 7.4–10.4)
POTASSIUM SERPL-SCNC: 3.8 MMOL/L (ref 3.5–5.1)
PROT SERPL-MCNC: 5.9 GM/DL (ref 5.8–7.6)
RBC # BLD AUTO: 3.48 X10(6)/MCL (ref 4.7–6.1)
RBC MORPH BLD: ABNORMAL
SODIUM SERPL-SCNC: 143 MMOL/L (ref 136–145)
WBC # SPEC AUTO: 2.88 X10(3)/MCL (ref 4.5–11.5)

## 2023-05-04 PROCEDURE — 25000003 PHARM REV CODE 250: Performed by: STUDENT IN AN ORGANIZED HEALTH CARE EDUCATION/TRAINING PROGRAM

## 2023-05-04 PROCEDURE — 83735 ASSAY OF MAGNESIUM: CPT | Performed by: INTERNAL MEDICINE

## 2023-05-04 PROCEDURE — 99900035 HC TECH TIME PER 15 MIN (STAT)

## 2023-05-04 PROCEDURE — 97110 THERAPEUTIC EXERCISES: CPT

## 2023-05-04 PROCEDURE — 25000003 PHARM REV CODE 250: Performed by: HOSPITALIST

## 2023-05-04 PROCEDURE — 85027 COMPLETE CBC AUTOMATED: CPT | Performed by: INTERNAL MEDICINE

## 2023-05-04 PROCEDURE — 94761 N-INVAS EAR/PLS OXIMETRY MLT: CPT

## 2023-05-04 PROCEDURE — 97530 THERAPEUTIC ACTIVITIES: CPT

## 2023-05-04 PROCEDURE — 80053 COMPREHEN METABOLIC PANEL: CPT | Performed by: INTERNAL MEDICINE

## 2023-05-04 PROCEDURE — 85025 COMPLETE CBC W/AUTO DIFF WBC: CPT | Performed by: INTERNAL MEDICINE

## 2023-05-04 PROCEDURE — 94760 N-INVAS EAR/PLS OXIMETRY 1: CPT

## 2023-05-04 PROCEDURE — 11000004 HC SNF PRIVATE

## 2023-05-04 RX ADMIN — FUROSEMIDE 40 MG: 40 TABLET ORAL at 10:05

## 2023-05-04 RX ADMIN — LIDOCAINE 1 PATCH: 50 PATCH CUTANEOUS at 10:05

## 2023-05-04 RX ADMIN — THIAMINE HCL TAB 100 MG 200 MG: 100 TAB at 10:05

## 2023-05-04 RX ADMIN — MELATONIN TAB 3 MG 9 MG: 3 TAB at 12:05

## 2023-05-04 RX ADMIN — Medication 400 MG: at 10:05

## 2023-05-04 RX ADMIN — METOPROLOL SUCCINATE 50 MG: 50 TABLET, FILM COATED, EXTENDED RELEASE ORAL at 10:05

## 2023-05-04 RX ADMIN — ATORVASTATIN CALCIUM 40 MG: 40 TABLET, FILM COATED ORAL at 10:05

## 2023-05-04 RX ADMIN — MELATONIN TAB 3 MG 9 MG: 3 TAB at 09:05

## 2023-05-04 RX ADMIN — HYDROXYZINE HYDROCHLORIDE 25 MG: 25 TABLET ORAL at 09:05

## 2023-05-04 RX ADMIN — AMIODARONE HYDROCHLORIDE 200 MG: 200 TABLET ORAL at 10:05

## 2023-05-04 RX ADMIN — VALSARTAN 160 MG: 160 TABLET, FILM COATED ORAL at 10:05

## 2023-05-04 RX ADMIN — ALPRAZOLAM 0.25 MG: 0.25 TABLET ORAL at 11:05

## 2023-05-04 RX ADMIN — NIFEDIPINE 60 MG: 30 TABLET, FILM COATED, EXTENDED RELEASE ORAL at 10:05

## 2023-05-04 RX ADMIN — ASPIRIN 81 MG: 81 TABLET, COATED ORAL at 10:05

## 2023-05-04 RX ADMIN — TRAZODONE HYDROCHLORIDE 50 MG: 50 TABLET ORAL at 09:05

## 2023-05-04 RX ADMIN — FERROUS SULFATE TAB 325 MG (65 MG ELEMENTAL FE) 1 EACH: 325 (65 FE) TAB at 10:05

## 2023-05-04 RX ADMIN — VALSARTAN 160 MG: 160 TABLET, FILM COATED ORAL at 09:05

## 2023-05-04 NOTE — NURSING
Nurses Note -- 4 Eyes      5/3/2023   9:30 PM      Skin assessed during: Daily Assessment      [] No Altered Skin Integrity Present    []Prevention Measures Documented      [x] Yes- Altered Skin Integrity Present or Discovered   [] LDA Added if Not in Epic (Describe Wound)   [x] New Altered Skin Integrity was Present on Admit and Documented in LDA   [x] Wound Image Taken    Wound Care Consulted? No    Attending Nurse:  Alka Shahid LPN     Second RN/Staff Member:  BRIJESH Smith

## 2023-05-04 NOTE — PROGRESS NOTES
[facility] Medicine  Telehospitalist Progress Note    Patient Name: Mike Urbina Jr.  MRN: [id number]  Patient Class: IP- Swing   Admission Date: 4/17/2023  Length of Stay: 17 days  Attending Physician: Tom Roy MD  Primary Care Provider: LOREN Jerry      Subjective:     Principal Problem:S/P CABG (coronary artery bypass graft)      HPI:  Mr. Urbina is a 67 year old  male with a history of CAD, chronic diastolic CHF, paroxysmal atrial fibrillation/flutter, alcohol abuse, and HTN who was originally admitted to [facility] on 4/3/2023 for elective CABG after he was noted to have 50-60% left main stenosis and an occluded right and significant disease of the circulatory system on [facility] from 3/15/2023.  He underwent CABG with LIMA to LAD, rSVG to OM1, rSVG to PDA and ligation of SILVANO with endostapler by Dr. Deuce Finley. Postoperatively he developed a worsening anemia and thrombocytopenia and he was transfused with 2 units packed RBCs and 1 unit of platelets on 4/4/2023. He had mental status changes on 4/5/2023 likely due to alcohol withdrawal and CT of the head from 4/6/2023 showed no acute intracranial abnormality. He had episodes of atrial fibrillation with RVR requiring an amiodarone infusion and he was scheduled for CARO with DCCV on 4/11/2023. He was found to have moderate oral and mild pharyngeal dysphagia with no aspiration visualized on MBS from 4/11/2023. He remained in atrial flutter with RVR and he underwent DCCV again on 4/14/2023 with restoration of normal sinus rhythm. He was transitioned to an amiodarone taper by cardiology and he was started on digoxin 0.125 mg PO daily on 4/17/2023. He was transferred to [facility] bed on 4/17/2023 for PT/OT and management of his multiple medical comorbidities.       Overview/Hospital Course:  He was admitted to [facility] swing bed on 4/17/2023 for rehab s/p hospitalization for  CABG, postoperative blood loss anemia requiring blood transfusion, thrombocytopenia requiring platelet transfusion, hyponatremia, and metabolic encephalopathy. His blood pressure remained elevated and nifedipine 30 mg PO daily was added on 4/19/2023.He had changes in his cognition on 4/19/2023 and MRI of the brain from 4/20/2023 showed no acute intracranial abnormality. His nifedipine was increased to 60 mg PO daily on 4/21/2023 for better blood pressure control. His digoxin dose was increased to 0.25 mg PO daily on 4/22/2023 due to consistent tachycardia and he was started on magnesium oxide 400 mg PO daily for hypomagnesemia. He was initiated on a lidocaine patch on 5/1/2023 due to left inner thigh pain. His digoxin was discontinued on 5/2/2023 due to episodes of bradycardia and metoprolol succinate was decreased from 100 mg to 50 mg PO daily.      Interval History: His heart rates have remained in the upper 50s and low 60s overnight.  His digoxin was discontinued 2 days ago and his metoprolol succinate was decreased from 100 mg to 50 mg by mouth daily.  He receives trazodone 50 mg PO QHS and he only slept 2 hours last night. He ambulated 400 feet with standby assistance yesterday and he required standby assistance with sit to stand transitions.      Review of Systems   All other systems reviewed and are negative.  Objective:     Vital Signs (Most Recent):  Temp: 97.5 °F (36.4 °C) (05/04/23 0733)  Pulse: 61 (05/04/23 0733)  Resp: 18 (05/04/23 0533)  BP: (!) 147/69 (05/04/23 0733)  SpO2: 100 % (05/04/23 0733)   Vital Signs (24h Range):  Temp:  [97.4 °F (36.3 °C)-98.2 °F (36.8 °C)] 97.5 °F (36.4 °C)  Pulse:  [] 61  Resp:  [18] 18  SpO2:  [95 %-100 %] 100 %  BP: (115-158)/(53-80) 147/69     Weight: 77.1 kg (169 lb 15.6 oz)  Body mass index is 21.82 kg/m².    Intake/Output Summary (Last 24 hours) at 5/4/2023 0812  Last data filed at 5/3/2023 1849  Gross per 24 hour   Intake 840 ml   Output --   Net 840 ml          Physical Exam  General - Elderly  male in no acute distress.  HEENT - NCAT. No scleral icterus. Oropharynx clear. Mucous membranes moist.  Neck - No lymphadenopathy, thyromegaly, or JVD.  CVS - Bradycardic but regular rhythm. No murmurs, rubs, or gallops.  Resp - Lungs are clear to auscultation bilaterally. No rales, wheeze, or rhonchi.   GI - Soft, nontender, nondistended, normoactive bowel sounds present.   Extremities - No clubbing, cyanosis, or edema.   Neuro - CN II through XII are grossly intact. No focal neurological deficits. Alert and oriented times four.   Psych - Normal affect.  Skin - Coccyx tear. Pink partial thickness tear noted in the gluteal cleft. Anterior chest surgical site healed. Left leg surgical site healed.     Significant Labs: All pertinent labs within the past 24 hours have been reviewed.     5/4/2023:  CBC: WBC count 2.88, hemoglobin 9.6, hematocrit 31.1, platelet count 103.  CMP: Sodium 143, potassium 3.8, chloride 104, CO2 31, BUN 15.8, creatinine 1.07, glucose 87, calcium 8.8, magnesium 1.8, alkaline phosphatase 84, total protein 5.9, albumin 3.4, total bilirubin 1.0, AST 15, ALT 15.    4/30/2023:  CBC: WBC count 3.0, hemoglobin 9.4, hematocrit 30.4, platelet count 117.     4/27/2023:  CBC: WBC count 3.2, hemoglobin 9.4, hematocrit 30.9, platelet count 125.  BMP: Sodium 141, potassium 3.6, chloride 107, CO2 25, BUN 16, creatinine 0.99, glucose 84, calcium 8.9.     Significant Imaging: I have reviewed all pertinent imaging results/findings within the past 24 hours.     MRI brain 4/20/2023:  1. Limited exam with no evidence of acute intracranial abnormality.  2. Chronic periventricular white matter changes and global intracranial atrophy.     Abdominal ultrasound 4/6/2023:  1. Somewhat limited evaluation secondary to technical factors discussed above, resulting in inadequate visualization of the pancreas.  2. Findings suggestive of early liver cirrhotic and steatosis  changes.  3. Gallbladder sludge with nonspecific wall thickening that could be secondary to element of acute or chronic cholecystitis, as well as immediately adjacent liver or more systemic pathology.     CT head 4/6/2023:  1. No acute intracranial abnormality.  2. Chronic microvascular ischemic changes.     CXR 4/6/2023: Improving aeration in the left lung base with persistent minimal subsegmental atelectasis.    CXR 4/3/2023:  1. Postoperative changes of the chest with lines and tubes as described.  2. Trace left apical pneumothorax with chest tube in place.      Assessment/Plan:      * CAD s/p CABG   Continue baby aspirin.  He is status post CABG on 4/03/2023 with LIMA to LAD, rSVG to OM1, rSVG to PDA and ligation of SILVANO with endostapler by Dr. Deuce Finley.    Atrial fibrillation with RVR  Resolved.  Continue amiodarone, beta-blocker, and calcium channel blocker.  Digoxin was discontinued on 5/02/2023 due to bradycardia.  His status post DCCV on 4/11/2023 and 4/14/2023.    Hypertensive urgency  Improved.  Continue nifedipine, valsartan, and metoprolol succinate which was decreased on 5/02/2023 due to bradycardia.    Thrombocytopenia  His platelet count has improved status post platelet transfusion of 4/04/2023.  Abdominal ultrasound from 4/06/2023 showed findings suggestive of early liver cirrhotic and steatosis changes.    Postoperative blood loss anemia  His hemoglobin and hematocrit have stabilized status post blood transfusion 2 units packed RBCs on 4/04/2023.    Toxic/metabolic encephalopathy  Improved.  MRI of the brain from 4/20/2023 showed no acute intracranial abnormality.    Hyponatremia  Improved.    Hypomagnesemia  Continue magnesium oxide.    Severe aortic stenosis  He will be worked up for TAVR as an outpatient.    History of alcohol abuse  Continue thiamine.  He was placed on the librium protocol in the ICU at [facility] for alcohol withdrawal.    Hyperlipidemia  Continue atorvastatin.    Chronic  diastolic congestive heart failure  Continue furosemide.  Transthoracic echocardiogram from 12/29/2022 showed normal left ventricular systolic function with an EF of 55% and grade 1 left ventricular diastolic dysfunction.    Disposition  Anticipate discharge home with home health on 5/05/2023.        VTE Risk Mitigation (From admission, onward)         Ordered     IP VTE HIGH RISK PATIENT  Once         04/18/23 1100     Place sequential compression device  Until discontinued         04/18/23 1100                Discharge Planning   MODESTO:      Code Status: Full Code   Is the patient medically ready for discharge?:     Reason for patient still in hospital (select all that apply): PT / OT recommendations and Pending disposition  Discharge Plan A: Home with family, Home Health   Discharge Delays: None known at this time      This service was provided via telemedicine.    Type of Software: Audio/Visual.    Originating Site:  [facility].    Distance Site: [place], [place].    His exam was performed with the assistance of Nisa Camejo RN.       Tom Roy MD  Department of Hospital Medicine   [facility]

## 2023-05-04 NOTE — PT/OT/SLP PROGRESS
Name: Mike Urbina .    : 1955 (67 y.o.)  MRN: [id number]           Patient Unavailable      Patient unable to be seen at this time secondary to: Pt refused stating he did not sleep last night. OT plans to f/u this afternoon schedule permitting to continue OT POC.

## 2023-05-04 NOTE — PT/OT/SLP PROGRESS
Name: Mike Urbina Jr.    : 1955 (67 y.o.)  MRN: [id number]           Patient Unavailable      Patient unable to be seen at this time secondary to: Pt refusing therapy this AM due to stating he did not sleep last night. Plan to discharge home tomorrow w/ brother.

## 2023-05-04 NOTE — SUBJECTIVE & OBJECTIVE
Interval History: His heart rates have remained in the upper 50s and low 60s overnight.  His digoxin was discontinued 2 days ago and his metoprolol succinate was decreased from 100 mg to 50 mg by mouth daily.  He receives trazodone 50 mg PO QHS and he only slept 2 hours last night. He ambulated 400 feet with standby assistance yesterday and he required standby assistance with sit to stand transitions.      Review of Systems   All other systems reviewed and are negative.  Objective:     Vital Signs (Most Recent):  Temp: 97.5 °F (36.4 °C) (05/04/23 0733)  Pulse: 61 (05/04/23 0733)  Resp: 18 (05/04/23 0533)  BP: (!) 147/69 (05/04/23 0733)  SpO2: 100 % (05/04/23 0733)   Vital Signs (24h Range):  Temp:  [97.4 °F (36.3 °C)-98.2 °F (36.8 °C)] 97.5 °F (36.4 °C)  Pulse:  [] 61  Resp:  [18] 18  SpO2:  [95 %-100 %] 100 %  BP: (115-158)/(53-80) 147/69     Weight: 77.1 kg (169 lb 15.6 oz)  Body mass index is 21.82 kg/m².    Intake/Output Summary (Last 24 hours) at 5/4/2023 0812  Last data filed at 5/3/2023 1849  Gross per 24 hour   Intake 840 ml   Output --   Net 840 ml         Physical Exam  General - Elderly  male in no acute distress.  HEENT - NCAT. No scleral icterus. Oropharynx clear. Mucous membranes moist.  Neck - No lymphadenopathy, thyromegaly, or JVD.  CVS - Bradycardic but regular rhythm. No murmurs, rubs, or gallops.  Resp - Lungs are clear to auscultation bilaterally. No rales, wheeze, or rhonchi.   GI - Soft, nontender, nondistended, normoactive bowel sounds present.   Extremities - No clubbing, cyanosis, or edema.   Neuro - CN II through XII are grossly intact. No focal neurological deficits. Alert and oriented times four.   Psych - Normal affect.  Skin - Coccyx tear. Pink partial thickness tear noted in the gluteal cleft. Anterior chest surgical site healed. Left leg surgical site healed.     Significant Labs: All pertinent labs within the past 24 hours have been reviewed.     5/4/2023:  CBC: WBC  count 2.88, hemoglobin 9.6, hematocrit 31.1, platelet count 103.  CMP: Sodium 143, potassium 3.8, chloride 104, CO2 31, BUN 15.8, creatinine 1.07, glucose 87, calcium 8.8, magnesium 1.8, alkaline phosphatase 84, total protein 5.9, albumin 3.4, total bilirubin 1.0, AST 15, ALT 15.    4/30/2023:  CBC: WBC count 3.0, hemoglobin 9.4, hematocrit 30.4, platelet count 117.     4/27/2023:  CBC: WBC count 3.2, hemoglobin 9.4, hematocrit 30.9, platelet count 125.  BMP: Sodium 141, potassium 3.6, chloride 107, CO2 25, BUN 16, creatinine 0.99, glucose 84, calcium 8.9.     Significant Imaging: I have reviewed all pertinent imaging results/findings within the past 24 hours.     MRI brain 4/20/2023:  1. Limited exam with no evidence of acute intracranial abnormality.  2. Chronic periventricular white matter changes and global intracranial atrophy.     Abdominal ultrasound 4/6/2023:  1. Somewhat limited evaluation secondary to technical factors discussed above, resulting in inadequate visualization of the pancreas.  2. Findings suggestive of early liver cirrhotic and steatosis changes.  3. Gallbladder sludge with nonspecific wall thickening that could be secondary to element of acute or chronic cholecystitis, as well as immediately adjacent liver or more systemic pathology.     CT head 4/6/2023:  1. No acute intracranial abnormality.  2. Chronic microvascular ischemic changes.     CXR 4/6/2023: Improving aeration in the left lung base with persistent minimal subsegmental atelectasis.    CXR 4/3/2023:  1. Postoperative changes of the chest with lines and tubes as described.  2. Trace left apical pneumothorax with chest tube in place.

## 2023-05-04 NOTE — PT/OT/SLP PROGRESS
Name: Mike Brushnasir Paredes    : 1955 (67 y.o.)  MRN: [id number]           Patient Unavailable      Patient unable to be seen at this time secondary to: pt refused all day due to fatigue and not sleeping last night

## 2023-05-04 NOTE — PLAN OF CARE
Problem: Adult Inpatient Plan of Care  Goal: Plan of Care Review  Outcome: Ongoing, Progressing  Flowsheets (Taken 5/4/2023 1602)  Plan of Care Reviewed With: patient  Goal: Patient-Specific Goal (Individualized)  Outcome: Ongoing, Progressing  Flowsheets (Taken 5/4/2023 1602)  Anxieties, Fears or Concerns: Being able to fall asleep and stay asleep  Individualized Care Needs: Adequate sleep, safety precautions  Goal: Absence of Hospital-Acquired Illness or Injury  Outcome: Ongoing, Progressing  Intervention: Identify and Manage Fall Risk  Flowsheets (Taken 5/4/2023 1602)  Safety Promotion/Fall Prevention:   assistive device/personal item within reach   bed alarm set   chair alarm set   in recliner, wheels locked   nonskid shoes/socks when out of bed   room near unit station   instructed to call staff for mobility  Intervention: Prevent Skin Injury  Flowsheets (Taken 5/4/2023 1602)  Body Position: sitting up in bed  Skin Protection:   adhesive use limited   incontinence pads utilized   tubing/devices free from skin contact  Intervention: Prevent and Manage VTE (Venous Thromboembolism) Risk  Flowsheets (Taken 5/4/2023 1602)  Activity Management: Ambulated to bathroom - L4  VTE Prevention/Management:   ambulation promoted   bleeding risk assessed   bleeding precations maintained  Range of Motion: active ROM (range of motion) encouraged  Goal: Optimal Comfort and Wellbeing  Outcome: Ongoing, Progressing  Goal: Readiness for Transition of Care  Outcome: Ongoing, Progressing     Problem: Fall Injury Risk  Goal: Absence of Fall and Fall-Related Injury  Outcome: Ongoing, Progressing     Problem: Infection  Goal: Absence of Infection Signs and Symptoms  Description: Goals to be met by: 5/5/2023     Patient will increase functional independence with ADLs by performing:    UE Dressing with Modified [place].  LE Dressing with Modified [place].  Toileting from toilet with Supervision for hygiene and clothing  management.   Toilet transfer to toilet with Supervision.    Outcome: Ongoing, Progressing     Problem: Skin Injury Risk Increased  Goal: Skin Health and Integrity  Outcome: Ongoing, Progressing     Problem: Impaired Wound Healing  Goal: Optimal Wound Healing  Outcome: Ongoing, Progressing     Problem: Pain Acute  Goal: Acceptable Pain Control and Functional Ability  Outcome: Ongoing, Progressing

## 2023-05-04 NOTE — PT/OT/SLP PROGRESS
Occupational Therapy  Treatment    Name: Mike Urbina Jr.    : 1955 (67 y.o.)  MRN: [id number]           TREATMENT SUMMARY AND RECOMMENDATIONS:      OT Date of Treatment: 23  OT Start Time: 1400  OT Stop Time: 1430  OT Total Time (min): 30 min      Subjective Assessment:   No complaints  Lethargic   x Awake, alert, cooperative  Impulsive    Uncooperative   Flat affect    Agitated  c/o pain    Appropriate  c/o fatigue    Confused  Treated at bedside     Emotionally labile x Treated in gym/dept.      Other:        Therapy Precautions:    Cognitive deficits  Spinal precautions    Collar - hard  Sternal precautions    Collar - soft   TLSO    Fall risk  LSO    Hip precautions - posterior  Knee immobilizer    Hip precautions - anterior  WBAT    Impaired communication  Partial weightbearing    Oxygen  TTWB    PEG tube  NWB    Visual deficits      Hearing deficits   Other:        Treatment Objectives:     Mobility Training:    Mobility task Assist level Comments    Bed mobility SBA Supine>EOB   Transfer SBA Stand/pivot t/f without device to BSchair   Sit to stands transitions     Functional mobility SBA/CGA X600 feet without device   Sitting balance     Standing balance      Other:        ADL Training:    ADL Assist level Comments    Feeding     Grooming/hygiene Mod (I)  Standing at sink to wash hands   Bathing     Upper body dressing     Lower body dressing     Toileting Mod (I)  Standing at toilet to void    Toilet transfer     Adaptive equipment training     Other:           Therapeutic Exercise:   Exercise Sets Reps Comments   BLE bike 1 5 min Level 2   4# dowel 1 15 chest press, straight arm raises, bicep curls                   Additional Comments:      Assessment: Patient tolerated session well.    OT Plan: Plan to DC home tomorrow with    Revisions made to plan of care: No    GOALS:   Multidisciplinary Problems       Occupational Therapy Goals          Problem: Occupational Therapy    Goal  Priority Disciplines Outcome Interventions   Occupational Therapy Goal     OT, PT/OT Ongoing, Progressing    Description: Goals to be met by: 5/5/2023     Patient will increase functional independence with ADLs by performing:    UE Dressing with Modified [place].- Min A  LE Dressing with Modified [place].-Mod A  Toileting from toilet with Supervision for hygiene and clothing management.-SBA  Toilet transfer to toilet with Supervision.-SBA                         Skilled OT Minutes Provided: 30  Communication with Treatment Team:     Equipment recommendations:       At end of treatment, patient remained:  x Up in chair     Up in wheelchair in room    In bed   x With alarm activated    Bed rails up   x Call bell in reach     Family/friends present    Restraints secured properly    In bathroom with CNA/RN notified    In gym with PT/PTA/tech    Nurse aware    Other:

## 2023-05-04 NOTE — PLAN OF CARE
Problem: Adult Inpatient Plan of Care  Goal: Plan of Care Review  5/4/2023 0420 by Alka Shahid LPN  Outcome: Ongoing, Progressing  5/4/2023 0418 by Alka Shahid LPN  Outcome: Ongoing, Progressing  Goal: Patient-Specific Goal (Individualized)  5/4/2023 0420 by Alka Shahid LPN  Outcome: Ongoing, Progressing  Flowsheets (Taken 5/4/2023 0420)  Anxieties, Fears or Concerns: Being able to fall asleep and stay asleep  Individualized Care Needs: Adequate sleep, safety precautions  5/4/2023 0418 by Alka Shahid LPN  Outcome: Ongoing, Progressing  Goal: Absence of Hospital-Acquired Illness or Injury  5/4/2023 0420 by Alka Shahid LPN  Outcome: Ongoing, Progressing  5/4/2023 0418 by Alka Shahid LPN  Outcome: Ongoing, Progressing  Goal: Optimal Comfort and Wellbeing  5/4/2023 0420 by Alka Shahid LPN  Outcome: Ongoing, Progressing  5/4/2023 0418 by Alka Shahid LPN  Outcome: Ongoing, Progressing  Goal: Readiness for Transition of Care  5/4/2023 0420 by Alka Shahid LPN  Outcome: Ongoing, Progressing  5/4/2023 0418 by Alka Shahid LPN  Outcome: Ongoing, Progressing     Problem: Fall Injury Risk  Goal: Absence of Fall and Fall-Related Injury  5/4/2023 0420 by Alka Shahid LPN  Outcome: Ongoing, Progressing  5/4/2023 0418 by Alka Shahid LPN  Outcome: Ongoing, Progressing     Problem: Infection  Goal: Absence of Infection Signs and Symptoms  Description: Goals to be met by: 5/5/2023     Patient will increase functional independence with ADLs by performing:    UE Dressing with Modified [place].  LE Dressing with Modified [place].  Toileting from toilet with Supervision for hygiene and clothing management.   Toilet transfer to toilet with Supervision.    5/4/2023 0420 by Alka Shahid LPN  Outcome: Ongoing, Progressing  5/4/2023 0418 by Alka Shahid LPN  Outcome: Ongoing, Progressing     Problem: Skin Injury Risk Increased  Goal: Skin Health and Integrity  5/4/2023 0420 by Alka Shahid LPN  Outcome:  Ongoing, Progressing  5/4/2023 0418 by Alka Shahid LPN  Outcome: Ongoing, Progressing     Problem: Impaired Wound Healing  Goal: Optimal Wound Healing  5/4/2023 0420 by Alka Shahid LPN  Outcome: Ongoing, Progressing  5/4/2023 0418 by Alka Shahid LPN  Outcome: Ongoing, Progressing     Problem: Pain Acute  Goal: Acceptable Pain Control and Functional Ability  5/4/2023 0420 by Alka Shahid LPN  Outcome: Ongoing, Progressing  5/4/2023 0418 by Alka Shahid LPN  Outcome: Ongoing, Progressing

## 2023-05-04 NOTE — PT/OT/SLP PROGRESS
Name: Mike Urbina Jr.    : 1955 (67 y.o.)  MRN: [id number]           Patient Unavailable      Patient unable to be seen at this time secondary to: Pt refusing tx this PM stating he still has not slept yet. Pt plans to d/c home with brother tomorrow. OT plans to f/u schedule permitting.

## 2023-05-05 VITALS
RESPIRATION RATE: 18 BRPM | HEART RATE: 65 BPM | BODY MASS INDEX: 21.82 KG/M2 | TEMPERATURE: 98 F | HEIGHT: 74 IN | DIASTOLIC BLOOD PRESSURE: 67 MMHG | WEIGHT: 170 LBS | OXYGEN SATURATION: 97 % | SYSTOLIC BLOOD PRESSURE: 136 MMHG

## 2023-05-05 PROCEDURE — 25000003 PHARM REV CODE 250: Performed by: STUDENT IN AN ORGANIZED HEALTH CARE EDUCATION/TRAINING PROGRAM

## 2023-05-05 PROCEDURE — 25000003 PHARM REV CODE 250: Performed by: HOSPITALIST

## 2023-05-05 PROCEDURE — 94760 N-INVAS EAR/PLS OXIMETRY 1: CPT

## 2023-05-05 RX ORDER — METOPROLOL SUCCINATE 50 MG/1
50 TABLET, EXTENDED RELEASE ORAL DAILY
Qty: 30 TABLET | Refills: 0 | Status: SHIPPED | OUTPATIENT
Start: 2023-05-05 | End: 2023-05-30 | Stop reason: SDUPTHER

## 2023-05-05 RX ORDER — FUROSEMIDE 40 MG/1
40 TABLET ORAL DAILY
Qty: 30 TABLET | Refills: 0 | Status: SHIPPED | OUTPATIENT
Start: 2023-05-05 | End: 2023-05-30 | Stop reason: SDUPTHER

## 2023-05-05 RX ORDER — LANOLIN ALCOHOL/MO/W.PET/CERES
400 CREAM (GRAM) TOPICAL DAILY
Qty: 30 TABLET | Refills: 0 | Status: SHIPPED | OUTPATIENT
Start: 2023-05-05 | End: 2023-06-04

## 2023-05-05 RX ORDER — TRAZODONE HYDROCHLORIDE 50 MG/1
50 TABLET ORAL NIGHTLY
Qty: 30 TABLET | Refills: 0 | Status: ON HOLD | OUTPATIENT
Start: 2023-05-05 | End: 2023-06-13 | Stop reason: HOSPADM

## 2023-05-05 RX ADMIN — Medication 400 MG: at 09:05

## 2023-05-05 RX ADMIN — THIAMINE HCL TAB 100 MG 200 MG: 100 TAB at 09:05

## 2023-05-05 RX ADMIN — AMIODARONE HYDROCHLORIDE 200 MG: 200 TABLET ORAL at 09:05

## 2023-05-05 RX ADMIN — ATORVASTATIN CALCIUM 40 MG: 40 TABLET, FILM COATED ORAL at 09:05

## 2023-05-05 RX ADMIN — FERROUS SULFATE TAB 325 MG (65 MG ELEMENTAL FE) 1 EACH: 325 (65 FE) TAB at 09:05

## 2023-05-05 RX ADMIN — NIFEDIPINE 60 MG: 30 TABLET, FILM COATED, EXTENDED RELEASE ORAL at 09:05

## 2023-05-05 RX ADMIN — ALPRAZOLAM 0.25 MG: 0.25 TABLET ORAL at 02:05

## 2023-05-05 RX ADMIN — FUROSEMIDE 40 MG: 40 TABLET ORAL at 09:05

## 2023-05-05 RX ADMIN — ASPIRIN 81 MG: 81 TABLET, COATED ORAL at 09:05

## 2023-05-05 RX ADMIN — METOPROLOL SUCCINATE 50 MG: 50 TABLET, FILM COATED, EXTENDED RELEASE ORAL at 09:05

## 2023-05-05 RX ADMIN — VALSARTAN 160 MG: 160 TABLET, FILM COATED ORAL at 09:05

## 2023-05-05 NOTE — PLAN OF CARE
Problem: Adult Inpatient Plan of Care  Goal: Optimal Comfort and Wellbeing  Outcome: Ongoing, Progressing  Intervention: Monitor Pain and Promote Comfort  Flowsheets (Taken 5/5/2023 0009)  Pain Management Interventions:   care clustered   medication offered   quiet environment facilitated  Intervention: Provide Person-Centered Care  Flowsheets (Taken 5/5/2023 0009)  Trust Relationship/Rapport:   care explained   choices provided   empathic listening provided   questions answered   thoughts/feelings acknowledged     Problem: Fall Injury Risk  Goal: Absence of Fall and Fall-Related Injury  Outcome: Ongoing, Progressing  Intervention: Identify and Manage Contributors  Flowsheets (Taken 5/5/2023 0009)  Self-Care Promotion:   independence encouraged   BADL personal objects within reach   BADL personal routines maintained  Medication Review/Management: medications reviewed  Intervention: Promote Injury-Free Environment  Flowsheets (Taken 5/5/2023 0009)  Safety Promotion/Fall Prevention:   assistive device/personal item within reach   bed alarm set   medications reviewed   nonskid shoes/socks when out of bed

## 2023-05-05 NOTE — PLAN OF CARE
Problem: SLP  Goal: SLP Goal  Description: LTG: Pt will tolerate LRD w/o overt s/s of aspiration. Goal met  Pt will improve cognitive linguistic skills to ensure safe discharge home. Goal met    STG:  Pt will complete laryngeal strengthening exercises and aleksandra with minimal cues. Discontinue  Pt will consume trials of thin liquid x10 w/o overt s/s of aspiration. Goal met  Pt will consume trials of soft and bite sized w/ good oral clearance and w/o overt s/s of aspiration. Goal met  Pt will utilize memory strategies to recall sternal precautions following a 3 minute filled delay. Goal met  Pt will provide solutions to problems/situations w/ 90% acc Jennifer. Goal met  Pt will complete trials of regular w/ good oral clearance and w/o overt s/s of aspiration. Goal met  Outcome: Met

## 2023-05-05 NOTE — DISCHARGE INSTRUCTIONS
Please follow all discharge instructions as given. KEEP ALL FOLLOW UP APPOINTMENTS!        If you experience any worsening or NEW signs or symptoms please call your primary care provider or head to your nearest emergency department.           THANK YOU FOR CHOOSING [facility].  If you have any questions please call [phone].

## 2023-05-05 NOTE — PLAN OF CARE
Problem: Adult Inpatient Plan of Care  Goal: Plan of Care Review  5/5/2023 1259 by Nisa Camejo RN  Outcome: Ongoing, Progressing  Flowsheets (Taken 5/5/2023 1259)  Plan of Care Reviewed With: patient  5/5/2023 0800 by Nisa Camejo RN  Outcome: Ongoing, Progressing  Flowsheets (Taken 5/5/2023 0800)  Plan of Care Reviewed With: patient  Goal: Patient-Specific Goal (Individualized)  5/5/2023 1259 by Nisa Camejo RN  Outcome: Ongoing, Progressing  Flowsheets (Taken 5/5/2023 1259)  Anxieties, Fears or Concerns: Getting home  Individualized Care Needs: Adequate sleep, safety precautions  5/5/2023 0800 by Nisa Camejo RN  Outcome: Ongoing, Progressing  Flowsheets (Taken 5/5/2023 0800)  Anxieties, Fears or Concerns: Bewing able to fall asleep and stay asleep  Individualized Care Needs: Adequate sleep, safety precautions  Goal: Absence of Hospital-Acquired Illness or Injury  5/5/2023 1259 by Nisa Camejo RN  Outcome: Ongoing, Progressing  5/5/2023 0800 by Nisa Camejo RN  Outcome: Ongoing, Progressing  Intervention: Identify and Manage Fall Risk  5/5/2023 1259 by Nisa Camejo RN  Flowsheets (Taken 5/5/2023 1259)  Safety Promotion/Fall Prevention:   assistive device/personal item within reach   bed alarm set   chair alarm set   in recliner, wheels locked   nonskid shoes/socks when out of bed   instructed to call staff for mobility  5/5/2023 0800 by Nisa Camejo RN  Flowsheets (Taken 5/5/2023 0800)  Safety Promotion/Fall Prevention:   assistive device/personal item within reach   bed alarm set   chair alarm set   in recliner, wheels locked   nonskid shoes/socks when out of bed   instructed to call staff for mobility  Intervention: Prevent Skin Injury  5/5/2023 1259 by Nisa Camejo RN  Flowsheets (Taken 5/5/2023 1259)  Body Position: position changed independently  Skin Protection: incontinence pads utilized  5/5/2023 0800 by Nisa Camejo RN  Flowsheets (Taken 5/5/2023 0800)  Body Position:   position  changed independently   supine  Skin Protection: incontinence pads utilized  Intervention: Prevent and Manage VTE (Venous Thromboembolism) Risk  5/5/2023 1259 by Nisa Camejo RN  Flowsheets (Taken 5/5/2023 1259)  Activity Management: Up in chair - L3  VTE Prevention/Management: ambulation promoted  Range of Motion: active ROM (range of motion) encouraged  5/5/2023 0800 by MARTY Bartonheets (Taken 5/5/2023 0800)  Activity Management:   Up in chair - L3   Walk with assistive devise and /or staff member - L3  VTE Prevention/Management: ambulation promoted  Range of Motion: active ROM (range of motion) encouraged  Intervention: Prevent Infection  5/5/2023 1259 by MARTY Bartonheets (Taken 5/5/2023 1259)  Infection Prevention:   hand hygiene promoted   rest/sleep promoted   single patient room provided  5/5/2023 0800 by MARTY Bartonheets (Taken 5/5/2023 0800)  Infection Prevention:   cohorting utilized   hand hygiene promoted   rest/sleep promoted   single patient room provided  Goal: Optimal Comfort and Wellbeing  5/5/2023 1259 by Nisa Camejo RN  Outcome: Ongoing, Progressing  5/5/2023 0800 by Nisa Camejo RN  Outcome: Ongoing, Progressing  Intervention: Monitor Pain and Promote Comfort  5/5/2023 1259 by MARTY Barton (Taken 5/5/2023 1259)  Pain Management Interventions:   care clustered   medication offered   quiet environment facilitated  5/5/2023 0800 by MARTY Barton (Taken 5/5/2023 0800)  Pain Management Interventions:   care clustered   quiet environment facilitated  Intervention: Provide Person-Centered Care  5/5/2023 1259 by MARTY Barton (Taken 5/5/2023 1259)  Trust Relationship/Rapport:   care explained   choices provided   emotional support provided   empathic listening provided   questions answered   questions encouraged   reassurance provided   thoughts/feelings acknowledged  5/5/2023 0800 by Nisa Camejo  RN  Flowsheets (Taken 5/5/2023 0800)  Trust Relationship/Rapport:   care explained   choices provided   emotional support provided   empathic listening provided   questions answered   questions encouraged   reassurance provided   thoughts/feelings acknowledged  Goal: Readiness for Transition of Care  5/5/2023 1259 by Nisa Camejo RN  Outcome: Ongoing, Progressing  5/5/2023 0800 by Nisa Camejo RN  Outcome: Ongoing, Progressing  Intervention: Mutually Develop Transition Plan  Flowsheets (Taken 5/5/2023 0800)  Equipment Currently Used at Home: none  Transportation Anticipated: family or friend will provide  Who are your caregiver(s) and their phone number(s)?: Tuan [phone]  Communicated [place] with patient/caregiver: Yes  Do you expect to return to your current living situation?: Yes  Do you have help at home or someone to help you manage your care at home?: Yes  Readmission within 30 days?: No  Do you currently have service(s) that help you manage your care at home?: No     Problem: Fall Injury Risk  Goal: Absence of Fall and Fall-Related Injury  5/5/2023 1259 by Nisa Camejo RN  Outcome: Ongoing, Progressing  5/5/2023 0800 by Nisa Camejo RN  Outcome: Ongoing, Progressing  Intervention: Identify and Manage Contributors  Flowsheets (Taken 5/5/2023 0800)  Self-Care Promotion:   independence encouraged   BADL personal objects within reach  Medication Review/Management: medications reviewed     Problem: Infection  Goal: Absence of Infection Signs and Symptoms  Description: Goals to be met by: 5/5/2023     Patient will increase functional independence with ADLs by performing:    UE Dressing with Modified [place].  LE Dressing with Modified [place].  Toileting from toilet with Supervision for hygiene and clothing management.   Toilet transfer to toilet with Supervision.    5/5/2023 1259 by Nisa Camejo RN  Outcome: Ongoing, Progressing  5/5/2023 0800 by Nisa Camejo RN  Outcome: Ongoing,  Progressing     Problem: Skin Injury Risk Increased  Goal: Skin Health and Integrity  5/5/2023 1259 by Nisa Camejo RN  Outcome: Ongoing, Progressing  5/5/2023 0800 by Nisa Camejo RN  Outcome: Ongoing, Progressing     Problem: Impaired Wound Healing  Goal: Optimal Wound Healing  5/5/2023 1259 by Nisa Camejo RN  Outcome: Ongoing, Progressing  5/5/2023 0800 by Nisa Camejo RN  Outcome: Ongoing, Progressing     Problem: Pain Acute  Goal: Acceptable Pain Control and Functional Ability  5/5/2023 1259 by Nisa Camejo RN  Outcome: Ongoing, Progressing  5/5/2023 0800 by Nisa Camejo RN  Outcome: Ongoing, Progressing

## 2023-05-05 NOTE — PT/OT/SLP DISCHARGE
Occupational Therapy Discharge Summary    Mike Urbina Jr.  MRN: [id number]   Principal Problem: S/P CABG (coronary artery bypass graft)      Patient Discharged from acute Occupational Therapy on 5/5/2023.      Assessment:      Pt has met all established goals.    Objective:     GOALS:   Multidisciplinary Problems       Occupational Therapy Goals       Not on file              Multidisciplinary Problems (Resolved)          Problem: Occupational Therapy    Goal Priority Disciplines Outcome Interventions   Occupational Therapy Goal   (Resolved)     OT, PT/OT Met    Description: Goals to be met by: 5/5/2023     Patient will increase functional independence with ADLs by performing:    UE Dressing with Modified [place].- MET  LE Dressing with Modified [place].-MET  Toileting from toilet with Supervision for hygiene and clothing management.-MET  Toilet transfer to toilet with Supervision.-MET                         Reasons for Discontinuation of Therapy Services  Satisfactory goal achievement.      Plan:     Patient Discharged to: Home with Home Health Service    5/5/2023

## 2023-05-05 NOTE — PT/OT/SLP DISCHARGE
Speech Language Pathology Discharge Summary    Mike Urbina Jr.  MRN: [id number]   S/P CABG (coronary artery bypass graft)     Date of Last Treatment Session: 5/3/23    Past Medical History:   Diagnosis Date    Atrial flutter     CHF (congestive heart failure)     Coronary artery disease     Hypertension     SOB (shortness of breath)     at times       Status at initiation of therapy: confusion, poor safety awareness. Pt admitted on modified diet of minced and moist/mildly thick liquids.    Treatment Area(s):  Cognition and Swallow    Goals:   Multidisciplinary Problems       SLP Goals       Not on file              Multidisciplinary Problems (Resolved)          Problem: SLP    Goal Priority Disciplines Outcome   SLP Goal   (Resolved)     SLP Met   Description: LTG: Pt will tolerate LRD w/o overt s/s of aspiration. Goal met  Pt will improve cognitive linguistic skills to ensure safe discharge home. Goal met    STG:  Pt will complete laryngeal strengthening exercises and aleksandra with minimal cues. Discontinue  Pt will consume trials of thin liquid x10 w/o overt s/s of aspiration. Goal met  Pt will consume trials of soft and bite sized w/ good oral clearance and w/o overt s/s of aspiration. Goal met  Pt will utilize memory strategies to recall sternal precautions following a 3 minute filled delay. Goal met  Pt will provide solutions to problems/situations w/ 90% acc Jennifer. Goal met  Pt will complete trials of regular w/ good oral clearance and w/o overt s/s of aspiration. Goal met                       Participation in Treatment (at discharge):  Cooperative    Functional Status at time of Discharge:    Cognition: Patient functioning at SPV/Katerine level for cognition. Pt w/ some intermittent delayed recall deficits. It appears pt is functioning at baseline cognitively. PLOF pt was living w/ a roommate, though plan is to discharge w/ his brother at this time.    Communication: Patient demonstrates no communication  deficits.    Language: Patient demonstrates no language deficits.    Motor Speech: Patient demonstrates no motor speech deficits.    Swallow: Patient demonstrates no dysphagia.                     Clinical Bedside assessment was completed on 4/19/23                       Instrumental assessment was not completed at this facility as it was completed at [facility] prior to admit here.                                Swallowing Guidelines: Patient tolerating  thin liquids and regular diet                     Patient is discharged to Home w/ brother

## 2023-05-05 NOTE — PLAN OF CARE
Problem: Occupational Therapy  Goal: Occupational Therapy Goal  Description: Goals to be met by: 5/5/2023     Patient will increase functional independence with ADLs by performing:    UE Dressing with Modified [place].- MET  LE Dressing with Modified [place].-MET  Toileting from toilet with Supervision for hygiene and clothing management.-MET  Toilet transfer to toilet with Supervision.-MET    Outcome: Met

## 2023-05-05 NOTE — DISCHARGE SUMMARY
[facility] Medicine  Telehospitalist Discharge Summary      Patient Name: Mike Urbina Jr.  MRN: [id number]  [facility]: [id number]  Patient Class: IP- Swing  Admission Date: 4/17/2023  Hospital Length of Stay: 18 days  Discharge Date and Time:  05/05/2023 11:52 AM  Attending Physician: Tom Roy MD   Discharging Provider: Tom Roy MD  Primary Care Provider: LOREN Jerry    Primary Care Team: Networked reference to record PCT     HPI:   Mr. Urbina is a 67 year old  male with a history of CAD, chronic diastolic CHF, paroxysmal atrial fibrillation/flutter, alcohol abuse, and HTN who was originally admitted to [facility] on 4/3/2023 for elective CABG after he was noted to have 50-60% left main stenosis and an occluded right and significant disease of the circulatory system on [facility] from 3/15/2023.  He underwent CABG with LIMA to LAD, rSVG to OM1, rSVG to PDA and ligation of SILVANO with endostapler by Dr. Deuce Finley. Postoperatively he developed a worsening anemia and thrombocytopenia and he was transfused with 2 units packed RBCs and 1 unit of platelets on 4/4/2023. He had mental status changes on 4/5/2023 likely due to alcohol withdrawal and CT of the head from 4/6/2023 showed no acute intracranial abnormality. He had episodes of atrial fibrillation with RVR requiring an amiodarone infusion and he was scheduled for CARO with DCCV on 4/11/2023. He was found to have moderate oral and mild pharyngeal dysphagia with no aspiration visualized on MBS from 4/11/2023. He remained in atrial flutter with RVR and he underwent DCCV again on 4/14/2023 with restoration of normal sinus rhythm. He was transitioned to an amiodarone taper by cardiology and he was started on digoxin 0.125 mg PO daily on 4/17/2023. He was transferred to [facility] swing bed on 4/17/2023 for PT/OT and management of his multiple medical comorbidities.       Hospital Course:   He was  admitted to [facility] swing bed on 4/17/2023 for rehab s/p hospitalization for CABG, postoperative blood loss anemia requiring blood transfusion, thrombocytopenia requiring platelet transfusion, hyponatremia, and metabolic encephalopathy. His blood pressure remained elevated and nifedipine 30 mg PO daily was added on 4/19/2023.He had changes in his cognition on 4/19/2023 and MRI of the brain from 4/20/2023 showed no acute intracranial abnormality. His nifedipine was increased to 60 mg PO daily on 4/21/2023 for better blood pressure control. His digoxin dose was increased to 0.25 mg PO daily on 4/22/2023 due to consistent tachycardia and he was started on magnesium oxide 400 mg PO daily for hypomagnesemia. He was initiated on a lidocaine patch on 5/1/2023 due to left inner thigh pain. His digoxin was discontinued on 5/2/2023 due to episodes of bradycardia and metoprolol succinate was decreased from 100 mg to 50 mg PO daily.His heart rates stabilized and remained in the upper 50s and low 60s. He had no other complications throughout his hospital course and he was discharged home with home health and a walker in stable condition.       Goals of Care Treatment Preferences:  Code Status: Full Code      Consults:   Consults (From admission, onward)        Status Ordering Provider     Inpatient consult to Registered Dietitian/Nutritionist  Once        Provider:  (Not yet assigned)    Completed DARLENE NUNES          Final Active Diagnoses:    Diagnosis Date Noted POA    PRINCIPAL PROBLEM:  CAD s/p CABG  [Z95.1]  Not Applicable    Atrial fibrillation with RVR [I48.91] 05/03/2023 Unknown    Hypertensive urgency [I16.0] 12/29/2022 Yes    Thrombocytopenia [D69.6] 12/29/2022 Yes    Postoperative blood loss anemia [D62] 05/03/2023 Unknown    Toxic/metabolic encephalopathy [G93.41] 05/03/2023 Unknown    Hyponatremia [E87.1] 12/29/2022 Yes    Hypomagnesemia [E83.42] 12/30/2022 Yes    Severe aortic  "stenosis [I35.0] 05/03/2023 Unknown    History of alcohol abuse [F10.11] 12/29/2022 Yes    Hyperlipidemia [E78.5] 05/03/2023 Unknown    Chronic diastolic congestive heart failure [I50.32] 12/29/2022 Yes      Problems Resolved During this Admission:       Discharged Condition: good    Disposition: [facility]-Health Care [facility]    Follow Up:   Follow-up Information     LOREN Jerry. Go on 5/15/2023.    Specialty: Family Medicine  Why: Follow up appointment with LOREN Jerry on Monday, May 15, 2023 @ 10:45 am.  Spoke to Karen.  Contact information:  [address]  [facility] [address]  [phone]             [employer] Follow up.    Why: walker to be delivered before DC  Contact information:  [address]  [facility] [address]  [phone]           [facility] Follow up.    Specialty: Home Health Services  Contact information:  [address] [facility]  [address]  [facility] [address]  [phone]             [facility] Follow up.    Why: Application for meals on wheels submitted. Phone number for brother, Robi, provided. They will contact him with further directions.  Contact information:  Phone:[phone]           Sreedhar Herrera MD. Go on 5/8/2023.    Specialties: Cardiovascular Disease, Cardiology  Why: Follow up appointmenrt with Sreedhar Herrera MD on Monday, May 8, 2023 @ 3:20 pm.  Spoke to Merry.  Contact information:  [address] [facility] [address]  [phone]                       Patient Instructions:      WALKER FOR HOME USE     Order Specific Question Answer Comments   Type of Walker: Adult (5'4"-6'6")    With wheels? Yes    Height: 6' 2" (1.88 m)    Weight: 77.1 kg (169 lb 15.6 oz)    Length of need (1-99 months): 99    Does patient have medical equipment at home? none    Please check all that apply: Patient's condition impairs ambulation.    Please check " all that apply: Patient needs help to get in and out of chair.    Please check all that apply: Walker will be used for gait training.    Please check all that apply: Patient is unable to safely ambulate without equipment.    Please check all that apply: Altered sensory perception.      Ambulatory referral/consult to Home Health   Standing Status: Future   Referral Priority: Routine Referral Type: Home Health   Referral Reason: Specialty Services Required   Requested Specialty: Home Health Services   Number of Visits Requested: 1     Diet Cardiac     Notify your health care provider if you experience any of the following:  severe uncontrolled pain     Activity as tolerated       Significant Diagnostic Studies: Labs:   CMP   Recent Labs   Lab 05/04/23  0358      K 3.8   CO2 31   BUN 15.8   CREATININE 1.07   CALCIUM 8.8   ALBUMIN 3.4   BILITOT 1.0   ALKPHOS 84   AST 15   ALT 15    and CBC   Recent Labs   Lab 05/04/23 0358   WBC 2.88*   HGB 9.6*   HCT 31.1*   *       Pending Diagnostic Studies:     None         Medications:  Reconciled Home Medications:      Medication List      START taking these medications    magnesium oxide 400 mg (241.3 mg magnesium) tablet  Commonly known as: MAG-OX  Take 1 tablet (400 mg total) by mouth once daily.     metoprolol succinate 50 MG 24 hr tablet  Commonly known as: TOPROL-XL  Take 1 tablet (50 mg total) by mouth once daily.     traZODone 50 MG tablet  Commonly known as: DESYREL  Take 1 tablet (50 mg total) by mouth every evening.        CHANGE how you take these medications    amiodarone 200 MG Tab  Commonly known as: PACERONE  Take 1 tablet (200 mg total) by mouth once daily.  What changed: Another medication with the same name was removed. Continue taking this medication, and follow the directions you see here.     furosemide 40 MG tablet  Commonly known as: LASIX  Take 1 tablet (40 mg total) by mouth once daily.  What changed: See the new instructions.         CONTINUE taking these medications    amLODIPine 10 MG tablet  Commonly known as: NORVASC  Take 1 tablet (10 mg total) by mouth once daily.     aspirin 81 MG EC tablet  Commonly known as: ECOTRIN  Take 1 tablet (81 mg total) by mouth once daily.     atorvastatin 10 MG tablet  Commonly known as: LIPITOR  Take 40 mg by mouth once daily.     ferrous sulfate 325 (65 FE) MG EC tablet  Take 1 tablet (325 mg total) by mouth once daily.     HYDROcodone-acetaminophen 5-325 mg per tablet  Commonly known as: NORCO  Take 1 tablet by mouth every 6 (six) hours as needed for Pain.     hydrOXYzine HCL 25 MG tablet  Commonly known as: ATARAX  TAKE 1 TABLET BY MOUTH EVERY EVENING     thiamine 100 MG tablet  Take 2 tablets (200 mg total) by mouth once daily.     valsartan 160 MG tablet  Commonly known as: DIOVAN  Take 1 tablet (160 mg total) by mouth 2 (two) times daily.        STOP taking these medications    metoprolol tartrate 100 MG tablet  Commonly known as: LOPRESSOR            Indwelling Lines/Drains at time of discharge:   Lines/Drains/Airways     None                 Time spent on the discharge of patient: 35 minutes    This service was provided via telemedicine.    Type of Software: Audio/Visual.    Originating Site:  [facility].    Distance Site: [place], [place].    His exam was performed with the assistance of Nisa Camejo RN.    Tom Roy MD  Department of Hospital Medicine  [facility]

## 2023-05-05 NOTE — PLAN OF CARE
Problem: Adult Inpatient Plan of Care  Goal: Plan of Care Review  Outcome: Ongoing, Progressing  Flowsheets (Taken 5/5/2023 0800)  Plan of Care Reviewed With: patient  Goal: Patient-Specific Goal (Individualized)  Outcome: Ongoing, Progressing  Flowsheets (Taken 5/5/2023 0800)  Anxieties, Fears or Concerns: Bewing able to fall asleep and stay asleep  Individualized Care Needs: Adequate sleep, safety precautions  Goal: Absence of Hospital-Acquired Illness or Injury  Outcome: Ongoing, Progressing  Intervention: Identify and Manage Fall Risk  Flowsheets (Taken 5/5/2023 0800)  Safety Promotion/Fall Prevention:   assistive device/personal item within reach   bed alarm set   chair alarm set   in recliner, wheels locked   nonskid shoes/socks when out of bed   instructed to call staff for mobility  Intervention: Prevent Skin Injury  Flowsheets (Taken 5/5/2023 0800)  Body Position:   position changed independently   supine  Skin Protection: incontinence pads utilized  Intervention: Prevent and Manage VTE (Venous Thromboembolism) Risk  Flowsheets (Taken 5/5/2023 0800)  Activity Management:   Up in chair - L3   Walk with assistive devise and /or staff member - L3  VTE Prevention/Management: ambulation promoted  Range of Motion: active ROM (range of motion) encouraged  Intervention: Prevent Infection  Flowsheets (Taken 5/5/2023 0800)  Infection Prevention:   cohorting utilized   hand hygiene promoted   rest/sleep promoted   single patient room provided  Goal: Optimal Comfort and Wellbeing  Outcome: Ongoing, Progressing  Intervention: Monitor Pain and Promote Comfort  Flowsheets (Taken 5/5/2023 0800)  Pain Management Interventions:   care clustered   quiet environment facilitated  Intervention: Provide Person-Centered Care  Flowsheets (Taken 5/5/2023 0800)  Trust Relationship/Rapport:   care explained   choices provided   emotional support provided   empathic listening provided   questions answered   questions encouraged    reassurance provided   thoughts/feelings acknowledged  Goal: Readiness for Transition of Care  Outcome: Ongoing, Progressing  Intervention: Mutually Develop Transition Plan  Flowsheets (Taken 5/5/2023 0800)  Equipment Currently Used at Home: none  Transportation Anticipated: family or friend will provide  Who are your caregiver(s) and their phone number(s)?: Tuan [phone]  Communicated [place]ST[place] with patient/caregiver: Yes  Do you expect to return to your current living situation?: Yes  Do you have help at home or someone to help you manage your care at home?: Yes  Readmission within 30 days?: No  Do you currently have service(s) that help you manage your care at home?: No     Problem: Fall Injury Risk  Goal: Absence of Fall and Fall-Related Injury  Outcome: Ongoing, Progressing  Intervention: Identify and Manage Contributors  Flowsheets (Taken 5/5/2023 0800)  Self-Care Promotion:   independence encouraged   BADL personal objects within reach  Medication Review/Management: medications reviewed     Problem: Infection  Goal: Absence of Infection Signs and Symptoms  Description: Goals to be met by: 5/5/2023     Patient will increase functional independence with ADLs by performing:    UE Dressing with Modified [place].  LE Dressing with Modified [place].  Toileting from toilet with Supervision for hygiene and clothing management.   Toilet transfer to toilet with Supervision.    Outcome: Ongoing, Progressing     Problem: Skin Injury Risk Increased  Goal: Skin Health and Integrity  Outcome: Ongoing, Progressing     Problem: Impaired Wound Healing  Goal: Optimal Wound Healing  Outcome: Ongoing, Progressing     Problem: Pain Acute  Goal: Acceptable Pain Control and Functional Ability  Outcome: Ongoing, Progressing

## 2023-05-05 NOTE — NURSING
Discharge instructions given to patient including medications. Patient verbalized understanding. Patient wheeled to private vehicle by hospital staff. No needs or concerns expressed by the patient.

## 2023-05-08 ENCOUNTER — PATIENT OUTREACH (OUTPATIENT)
Dept: ADMINISTRATIVE | Facility: CLINIC | Age: 68
End: 2023-05-08
Payer: MEDICARE

## 2023-05-08 NOTE — PROGRESS NOTES
C3 nurse attempted to contact Mike Urbina Jr.  for a TCC post hospital discharge follow up call. No answer. Left voicemail with callback information. The patient has a scheduled [facility] appointment with LOREN Jerry  on 05/15/2023 @ 1045 am.   Appointment with Dr. Herrera 05/08/2023 @ 320 pm.

## 2023-05-09 NOTE — PROGRESS NOTES
C3 nurse attempted to contact Mike Urbina Jr.  for a TCC post hospital discharge follow up call. No answer. Left voicemail with callback information. The patient has a scheduled [facility] appointment with LOREN Jerry  on 05/15/2023 @ 1045 am.   Appointment with Dr. Herrera 05/08/2023 @ 320 pm.

## 2023-05-10 NOTE — PROGRESS NOTES
C3 nurse attempted to contact Mike Urbina Jr.  for a TCC post hospital discharge follow up call. No answer. Left voicemail with callback information. Called emergency contact number for sister Ashleigh. No answer. Left voicemail with call back number. The patient has a scheduled [facility] appointment with LOREN Jerry  on 05/15/2023 @ 1045 am.   Appointment with Dr. Herrera 05/08/2023 @ 320 pm.

## 2023-05-11 ENCOUNTER — PATIENT OUTREACH (OUTPATIENT)
Dept: ADMINISTRATIVE | Facility: HOSPITAL | Age: 68
End: 2023-05-11
Payer: MEDICARE

## 2023-05-11 NOTE — PROGRESS NOTES
Population Health Outreach.    No record of colorectal screening found, called patient/no answer.    Note placed in appointment note section for PCP (Appointment 05/15/23).

## 2023-05-15 ENCOUNTER — OFFICE VISIT (OUTPATIENT)
Dept: FAMILY MEDICINE | Facility: CLINIC | Age: 68
End: 2023-05-15
Payer: MEDICARE

## 2023-05-15 VITALS
HEART RATE: 60 BPM | OXYGEN SATURATION: 96 % | RESPIRATION RATE: 20 BRPM | SYSTOLIC BLOOD PRESSURE: 120 MMHG | HEIGHT: 74 IN | DIASTOLIC BLOOD PRESSURE: 60 MMHG | WEIGHT: 166.5 LBS | BODY MASS INDEX: 21.37 KG/M2

## 2023-05-15 DIAGNOSIS — I50.9 ACUTE ON CHRONIC CONGESTIVE HEART FAILURE, UNSPECIFIED HEART FAILURE TYPE: ICD-10-CM

## 2023-05-15 DIAGNOSIS — Z95.1 S/P CABG (CORONARY ARTERY BYPASS GRAFT): ICD-10-CM

## 2023-05-15 DIAGNOSIS — Z09 HOSPITAL DISCHARGE FOLLOW-UP: Primary | ICD-10-CM

## 2023-05-15 DIAGNOSIS — G47.00 INSOMNIA, UNSPECIFIED TYPE: ICD-10-CM

## 2023-05-15 DIAGNOSIS — I48.4 ATYPICAL ATRIAL FLUTTER: ICD-10-CM

## 2023-05-15 PROCEDURE — 99214 PR OFFICE/OUTPT VISIT, EST, LEVL IV, 30-39 MIN: ICD-10-PCS | Mod: ,,, | Performed by: NURSE PRACTITIONER

## 2023-05-15 PROCEDURE — 99214 OFFICE O/P EST MOD 30 MIN: CPT | Mod: ,,, | Performed by: NURSE PRACTITIONER

## 2023-05-15 RX ORDER — HYDROXYZINE HYDROCHLORIDE 10 MG/1
10 TABLET, FILM COATED ORAL NIGHTLY
COMMUNITY
Start: 2023-05-05 | End: 2023-06-12 | Stop reason: ALTCHOICE

## 2023-05-15 RX ORDER — QUETIAPINE FUMARATE 50 MG/1
25 TABLET, FILM COATED ORAL NIGHTLY
Qty: 30 TABLET | Refills: 1 | Status: SHIPPED | OUTPATIENT
Start: 2023-05-15 | End: 2023-05-26 | Stop reason: SDUPTHER

## 2023-05-15 NOTE — PROGRESS NOTES
SUBJECTIVE:     History of Present Illness      Chief Complaint: Follow-up (Hosp f/u)    HPI:  Patient is a 67 y.o. year old male who presents to clinic for hospital follow-up.  Patient has medical history AFib, CHF, hypertension, coronary disease.  He recently had open heart surgery.  Patient is a poor historian.  States that he will be living with his brother in [place] for few weeks.  Currently has home health, he is unsure the company name at this time.  Patient is currently requesting something to help him sleep at night.    Review of Systems:    Review of Systems    12 point review of systems conducted, negative except as stated in the history of present illness. See HPI for details.     Previous History      Review of patient's allergies indicates:  No Known Allergies    Past Medical History:   Diagnosis Date    Atrial flutter     CHF (congestive heart failure)     Coronary artery disease     Hypertension     SOB (shortness of breath)     at times     Current Outpatient Medications   Medication Instructions    amiodarone (PACERONE) 200 mg, Oral, Daily    amLODIPine (NORVASC) 10 mg, Oral, Daily    aspirin (ECOTRIN) 81 mg, Oral, Daily    atorvastatin (LIPITOR) 10 MG tablet Oral, Daily    furosemide (LASIX) 40 mg, Oral, Daily    hydrOXYzine HCL (ATARAX) 10 mg, Oral, Nightly    magnesium oxide (MAG-OX) 400 mg, Oral, Daily    metoprolol succinate (TOPROL-XL) 50 mg, Oral, Daily    QUEtiapine (SEROQUEL) 25 mg, Oral, Nightly    traZODone (DESYREL) 50 mg, Oral, Nightly    valsartan (DIOVAN) 160 mg, Oral, 2 times daily     Past Surgical History:   Procedure Laterality Date    CATARACT EXTRACTION Bilateral     CORONARY ARTERY BYPASS GRAFT (CABG) N/A 4/3/2023    Procedure: CORONARY ARTERY BYPASS GRAFT (CABG);  Surgeon: Deuce Finley IV, MD;  Location: [facility];  Service: Cardiovascular;  Laterality: N/A;  WITH LLAA //  ECHO NOTIFIED    LEFT HEART CATHETERIZATION N/A 03/15/2023    Procedure: CATHETERIZATION,  "HEART, LEFT;  Surgeon: Sreedhar Herrera MD;  Location: [facility] CATH LAB;  Service: Cardiology;  Laterality: N/A;  LHC via RRA     Family History   Problem Relation Age of Onset    Heart attack Mother        Social History     Tobacco Use    Smoking status: Never     Passive exposure: Never    Smokeless tobacco: Never   Substance Use Topics    Alcohol use: Yes     Alcohol/week: 84.0 standard drinks     Types: 84 Cans of beer per week     Comment: alcoholic, daily, beer    Drug use: Yes     Frequency: 3.0 times per week     Types: Hydrocodone        Health Maintenance      Health Maintenance   Topic Date Due    Hepatitis C Screening  Never done    TETANUS VACCINE  Never done    High Dose Statin  04/17/2024    Lipid Panel  01/18/2028       OBJECTIVE:     Physical Exam      Vital Signs Reviewed   Visit Vitals  /60 (BP Location: Left arm)   Pulse 60   Resp 20   Ht 6' 2" (1.88 m)   Wt 75.5 kg (166 lb 8 oz)   SpO2 96%   BMI 21.38 kg/m²       Physical Exam    Physical Exam:  General: Alert, well nourished, no acute distress, non-toxic appearing.   Eyes: Anicteric sclera, without conjunctival injection, normal lids, no purulent drainage, EOMs grossly intact.   Ears: No tragal tenderness. Tympanic membranes intact, pearly grey, without effusion or erythema and with a positive light reflex.   Mouth: Posterior pharynx without erythema. No exudate, ulcerations, or lesion. No tonsillar swelling.   Neck: Supple, full ROM, no rigidity, no cervical adenopathy.   Cardio: Normal rate and rhythm    Resp: Respirations even and unlabored, clear to auscultation bilaterally.   Abd: No ecchymosis or distension. Normal bowel sounds in all 4 quadrants. No tenderness to palpation. No rebound tenderness or guarding. No CVA tenderness.   Skin: No rashes or open lesions noted.   MSK: No swelling. No abrasions or signs of trauma. Ambulating without assistance.   Neuro: Alert,oriented No focal deficits noted. Facial expressions even.   Psych: " Cooperative, Normal affect      Procedures    Procedures     Labs     Results for orders placed or performed during the hospital encounter of 04/17/23   Urinalysis, Reflex to Urine Culture    Specimen: Urine   Result Value Ref Range    Color, UA Yellow Yellow, Light-Yellow, Dark Yellow, Lauren, Straw    Appearance, UA Clear Clear    Specific Gravity, UA 1.020     pH, UA 6.0 5.0 - 8.5    Protein, UA Negative Negative mg/dL    Glucose, UA Negative Negative, Normal mg/dL    Ketones, UA Negative Negative mg/dL    Blood, UA Negative Negative unit/L    Bilirubin, UA Negative Negative mg/dL    Urobilinogen, UA 4.0 (A) 0.2, 1.0, Normal mg/dL    Nitrites, UA Negative Negative    Leukocyte Esterase, UA Negative Negative unit/L   Urinalysis, Microscopic   Result Value Ref Range    Bacteria, UA None Seen None Seen, Rare, Occasional /HPF    Yeast, UA Occasional (A) None Seen /HPF    RBC, UA 0-2 None Seen, 0-2, 3-5, 0-5 /HPF    WBC, UA 3-5 None Seen, 0-2, 3-5, 0-5 /HPF    Squamous Epithelial Cells, UA Few (A) None Seen, Rare, Occasional, Occ /HPF   Basic Metabolic Panel   Result Value Ref Range    Sodium Level 138 136 - 145 mmol/L    Potassium Level 3.6 3.5 - 5.1 mmol/L    Chloride 103 98 - 107 mmol/L    Carbon Dioxide 26 23 - 31 mmol/L    Glucose Level 105 82 - 115 mg/dL    Blood Urea Nitrogen 15.3 8.4 - 25.7 mg/dL    Creatinine 1.04 0.73 - 1.18 mg/dL    BUN/Creatinine Ratio 15     Calcium Level Total 8.7 (L) 8.8 - 10.0 mg/dL    Anion Gap 9.0 mEq/L    eGFR >60 mls/min/1.73/m2   CBC with Differential   Result Value Ref Range    WBC 7.0 4.5 - 11.5 x10(3)/mcL    RBC 4.17 (L) 4.70 - 6.10 x10(6)/mcL    Hgb 11.9 (L) 14.0 - 18.0 g/dL    Hct 38.0 (L) 42.0 - 52.0 %    MCV 91.1 80.0 - 94.0 fL    MCH 28.5 27.0 - 31.0 pg    MCHC 31.3 (L) 33.0 - 36.0 g/dL    RDW 15.1 11.5 - 17.0 %    Platelet 339 130 - 400 x10(3)/mcL    MPV 10.0 7.4 - 10.4 fL    Neut % 68.2 %    Lymph % 18.1 %    Mono % 10.8 %    Eos % 2.0 %    Basophil % 0.6 %    Lymph #  1.27 0.6 - 4.6 x10(3)/mcL    Neut # 4.78 2.1 - 9.2 x10(3)/mcL    Mono # 0.76 0.1 - 1.3 x10(3)/mcL    Eos # 0.14 0 - 0.9 x10(3)/mcL    Baso # 0.04 0 - 0.2 x10(3)/mcL    IG# 0.02 0 - 0.04 x10(3)/mcL    IG% 0.3 %   Folate   Result Value Ref Range    Folate Level 13.0 7.0 - 31.4 ng/mL   Basic Metabolic Panel   Result Value Ref Range    Sodium Level 144 136 - 145 mmol/L    Potassium Level 3.5 3.5 - 5.1 mmol/L    Chloride 105 98 - 107 mmol/L    Carbon Dioxide 27 23 - 31 mmol/L    Glucose Level 99 82 - 115 mg/dL    Blood Urea Nitrogen 16.9 8.4 - 25.7 mg/dL    Creatinine 1.00 0.73 - 1.18 mg/dL    BUN/Creatinine Ratio 17     Calcium Level Total 9.2 8.8 - 10.0 mg/dL    Anion Gap 12.0 mEq/L    eGFR >60 mls/min/1.73/m2   Magnesium   Result Value Ref Range    Magnesium Level 1.80 1.60 - 2.60 mg/dL   CBC with Differential   Result Value Ref Range    WBC 5.0 4.5 - 11.5 x10(3)/mcL    RBC 4.17 (L) 4.70 - 6.10 x10(6)/mcL    Hgb 11.5 (L) 14.0 - 18.0 g/dL    Hct 37.5 (L) 42.0 - 52.0 %    MCV 89.9 80.0 - 94.0 fL    MCH 27.6 27.0 - 31.0 pg    MCHC 30.7 (L) 33.0 - 36.0 g/dL    RDW 14.7 11.5 - 17.0 %    Platelet 256 130 - 400 x10(3)/mcL    MPV 10.1 7.4 - 10.4 fL    Neut % 61.8 %    Lymph % 25.8 %    Mono % 10.2 %    Eos % 1.8 %    Basophil % 0.2 %    Lymph # 1.29 0.6 - 4.6 x10(3)/mcL    Neut # 3.09 2.1 - 9.2 x10(3)/mcL    Mono # 0.51 0.1 - 1.3 x10(3)/mcL    Eos # 0.09 0 - 0.9 x10(3)/mcL    Baso # 0.01 0 - 0.2 x10(3)/mcL    IG# 0.01 0 - 0.04 x10(3)/mcL    IG% 0.2 %   Basic Metabolic Panel   Result Value Ref Range    Sodium Level 141 136 - 145 mmol/L    Potassium Level 3.6 3.5 - 5.1 mmol/L    Chloride 107 98 - 107 mmol/L    Carbon Dioxide 25 23 - 31 mmol/L    Glucose Level 84 82 - 115 mg/dL    Blood Urea Nitrogen 16.0 8.4 - 25.7 mg/dL    Creatinine 0.99 0.73 - 1.18 mg/dL    BUN/Creatinine Ratio 16     Calcium Level Total 8.9 8.8 - 10.0 mg/dL    Anion Gap 9.0 mEq/L    eGFR >60 mls/min/1.73/m2   CBC with Differential   Result Value Ref Range     WBC 3.2 (L) 4.5 - 11.5 x10(3)/mcL    RBC 3.42 (L) 4.70 - 6.10 x10(6)/mcL    Hgb 9.4 (L) 14.0 - 18.0 g/dL    Hct 30.9 (L) 42.0 - 52.0 %    MCV 90.4 80.0 - 94.0 fL    MCH 27.5 27.0 - 31.0 pg    MCHC 30.4 (L) 33.0 - 36.0 g/dL    RDW 14.8 11.5 - 17.0 %    Platelet 125 (L) 130 - 400 x10(3)/mcL    MPV 11.0 (H) 7.4 - 10.4 fL    Neut % 60.1 %    Lymph % 26.0 %    Mono % 11.1 %    Eos % 2.5 %    Basophil % 0.3 %    Lymph # 0.82 0.6 - 4.6 x10(3)/mcL    Neut # 1.89 (L) 2.1 - 9.2 x10(3)/mcL    Mono # 0.35 0.1 - 1.3 x10(3)/mcL    Eos # 0.08 0 - 0.9 x10(3)/mcL    Baso # 0.01 0 - 0.2 x10(3)/mcL    IG# 0.00 0 - 0.04 x10(3)/mcL    IG% 0.0 %   CBC with Differential   Result Value Ref Range    WBC 3.0 (L) 4.5 - 11.5 x10(3)/mcL    RBC 3.37 (L) 4.70 - 6.10 x10(6)/mcL    Hgb 9.4 (L) 14.0 - 18.0 g/dL    Hct 30.4 (L) 42.0 - 52.0 %    MCV 90.2 80.0 - 94.0 fL    MCH 27.9 27.0 - 31.0 pg    MCHC 30.9 (L) 33.0 - 36.0 g/dL    RDW 14.8 11.5 - 17.0 %    Platelet 117 (L) 130 - 400 x10(3)/mcL    MPV 10.8 (H) 7.4 - 10.4 fL    Neut % 57.8 %    Lymph % 26.6 %    Mono % 12.0 %    Eos % 3.0 %    Basophil % 0.3 %    Lymph # 0.80 0.6 - 4.6 x10(3)/mcL    Neut # 1.74 (L) 2.1 - 9.2 x10(3)/mcL    Mono # 0.36 0.1 - 1.3 x10(3)/mcL    Eos # 0.09 0 - 0.9 x10(3)/mcL    Baso # 0.01 0 - 0.2 x10(3)/mcL    IG# 0.01 0 - 0.04 x10(3)/mcL    IG% 0.3 %   Heparin-PF4 IgG Antibody (HIT)   Result Value Ref Range    HIT HARRY 0.135 <0.400 OD    HIT Interpretation Negative Negative    HIT Comment SEE COMMENTS    Comprehensive Metabolic Panel   Result Value Ref Range    Sodium Level 143 136 - 145 mmol/L    Potassium Level 3.8 3.5 - 5.1 mmol/L    Chloride 104 98 - 107 mmol/L    Carbon Dioxide 31 23 - 31 mmol/L    Glucose Level 87 82 - 115 mg/dL    Blood Urea Nitrogen 15.8 8.4 - 25.7 mg/dL    Creatinine 1.07 0.73 - 1.18 mg/dL    Calcium Level Total 8.8 8.8 - 10.0 mg/dL    Protein Total 5.9 5.8 - 7.6 gm/dL    Albumin Level 3.4 3.4 - 4.8 g/dL    Globulin 2.5 2.4 - 3.5 gm/dL     Albumin/Globulin Ratio 1.4 1.1 - 2.0 ratio    Bilirubin Total 1.0 <=1.5 mg/dL    Alkaline Phosphatase 84 40 - 150 unit/L    Alanine Aminotransferase 15 0 - 55 unit/L    Aspartate Aminotransferase 15 5 - 34 unit/L    eGFR >60 mls/min/1.73/m2   Magnesium   Result Value Ref Range    Magnesium Level 1.80 1.60 - 2.60 mg/dL   CBC with Differential   Result Value Ref Range    WBC 2.88 (L) 4.50 - 11.50 x10(3)/mcL    RBC 3.48 (L) 4.70 - 6.10 x10(6)/mcL    Hgb 9.6 (L) 14.0 - 18.0 g/dL    Hct 31.1 (L) 42.0 - 52.0 %    MCV 89.4 80.0 - 94.0 fL    MCH 27.6 27.0 - 31.0 pg    MCHC 30.9 (L) 33.0 - 36.0 g/dL    RDW 14.9 11.5 - 17.0 %    Platelet 103 (L) 130 - 400 x10(3)/mcL    MPV 10.5 (H) 7.4 - 10.4 fL   Manual Differential   Result Value Ref Range    Neut Man 49 47 - 80 %    Lymph Man 38 13 - 40 %    Monocyte Man 11 2 - 11 %    Eos Man 2 0 - 8 %    Abs Neut calc 1.4112 (L) 2.1 - 9.2 x10(3)/mcL    Abs Mono 0.3168 0.1 - 1.3 x10(3)/mcL    Abs Lymp 1.0944 0.6 - 4.6 x10(3)/mcL    Abs Eos  0.0576 0 - 0.9 x10(3)/mcL    RBC Morph Abnormal (A) Normal    Anisocyte 1+ (A) (none)    Hypochrom 1+ (A) (none)    Platelet Est Decreased (A) Normal, Adequate   POCT glucose   Result Value Ref Range    POCT Glucose 105 70 - 110 mg/dL       Chemistry:  Lab Results   Component Value Date     05/04/2023    K 3.8 05/04/2023    CHLORIDE 104 05/04/2023    BUN 15.8 05/04/2023    CREATININE 1.07 05/04/2023    EGFRNORACEVR >60 05/04/2023    GLUCOSE 87 05/04/2023    CALCIUM 8.8 05/04/2023    ALKPHOS 84 05/04/2023    LABPROT 5.9 05/04/2023    ALBUMIN 3.4 05/04/2023    AST 15 05/04/2023    ALT 15 05/04/2023    MG 1.80 05/04/2023    PHOS 4.3 04/14/2023    BOMETPPO05WN 19.2 (L) 01/18/2023    TSH 1.180 01/18/2023    PSA 1.25 01/18/2023        Lab Results   Component Value Date    HGBA1C 5.3 01/18/2023        Hematology:  Lab Results   Component Value Date    WBC 2.88 (L) 05/04/2023    HGB 9.6 (L) 05/04/2023    HCT 31.1 (L) 05/04/2023     (L) 05/04/2023        Lipid Panel:  Lab Results   Component Value Date    CHOL 189 01/18/2023    HDL 84 (H) 01/18/2023    LDL 95.00 01/18/2023    TRIG 49 01/18/2023    TOTALCHOLEST 2 01/18/2023        Urine:  Lab Results   Component Value Date    COLORUA Yellow 04/17/2023    APPEARANCEUA Clear 04/17/2023    SGUA 1.020 04/17/2023    PHUA 6.0 04/17/2023    PROTEINUA Negative 04/17/2023    GLUCOSEUA Negative 04/17/2023    KETONESUA Negative 04/17/2023    BLOODUA Negative 04/17/2023    NITRITESUA Negative 04/17/2023    LEUKOCYTESUR Negative 04/17/2023    RBCUA 0-2 04/17/2023    WBCUA 3-5 04/17/2023    BACTERIA None Seen 04/17/2023    CREATRANDUR 18.2 (L) 01/18/2023         Assessment       1. Hospital discharge follow-up    2. Atypical atrial flutter    3. Acute on chronic congestive heart failure, unspecified heart failure type    4. S/P CABG (coronary artery bypass graft)    5. Insomnia, unspecified type     5. Insomnia      Plan       1. Hospital discharge follow-up    2. Atypical atrial flutter  Amiodarone rate control  3. Acute on chronic congestive heart failure, unspecified heart failure type  Per Cardiology  4. S/P CABG (coronary artery bypass graft)  Per Cardiology patient has a follow up appointment in 2 months    Orders Placed This Encounter    QUEtiapine (SEROQUEL) 50 MG tablet     5. Insomnia   Stop trazodone start Seroquel  Medication List with Changes/Refills   New Medications    QUETIAPINE (SEROQUEL) 50 MG TABLET    Take 0.5 tablets (25 mg total) by mouth nightly.   Current Medications    AMIODARONE (PACERONE) 200 MG TAB    Take 1 tablet (200 mg total) by mouth once daily.    AMLODIPINE (NORVASC) 10 MG TABLET    Take 1 tablet (10 mg total) by mouth once daily.    ASPIRIN (ECOTRIN) 81 MG EC TABLET    Take 1 tablet (81 mg total) by mouth once daily.    ATORVASTATIN (LIPITOR) 10 MG TABLET    Take by mouth once daily.    FUROSEMIDE (LASIX) 40 MG TABLET    Take 1 tablet (40 mg total) by mouth once daily.    HYDROXYZINE HCL  (ATARAX) 10 MG TAB    Take 10 mg by mouth every evening.    MAGNESIUM OXIDE (MAG-OX) 400 MG (241.3 MG MAGNESIUM) TABLET    Take 1 tablet (400 mg total) by mouth once daily.    METOPROLOL SUCCINATE (TOPROL-XL) 50 MG 24 HR TABLET    Take 1 tablet (50 mg total) by mouth once daily.    TRAZODONE (DESYREL) 50 MG TABLET    Take 1 tablet (50 mg total) by mouth every evening.    VALSARTAN (DIOVAN) 160 MG TABLET    Take 1 tablet (160 mg total) by mouth 2 (two) times daily.   Discontinued Medications    FERROUS SULFATE 325 (65 FE) MG EC TABLET    Take 1 tablet (325 mg total) by mouth once daily.    HYDROCODONE-ACETAMINOPHEN (NORCO) 5-325 MG PER TABLET    Take 1 tablet by mouth every 6 (six) hours as needed for Pain.    HYDROXYZINE HCL (ATARAX) 25 MG TABLET    TAKE 1 TABLET BY MOUTH EVERY EVENING    THIAMINE 100 MG TABLET    Take 2 tablets (200 mg total) by mouth once daily.       No follow-ups on file.     Future Appointments   Date Time Provider Department Center   8/8/2023 10:30 AM LOREN Hatfield [facility]

## 2023-05-18 ENCOUNTER — TELEPHONE (OUTPATIENT)
Dept: FAMILY MEDICINE | Facility: CLINIC | Age: 68
End: 2023-05-18
Payer: MEDICARE

## 2023-05-18 NOTE — TELEPHONE ENCOUNTER
Called pt's phone and a family member answered stating that pt was in [place] at his brothers home. Called brothers phone, no answer. Called [facility] pharmacy @[phone], spoke with pharmacy staff, writer attempting to call in refills on the meds listed below. Informed that pt does not have a profile in their system. Will f/u.       ----- Message from LOREN Hatfield sent at 5/18/2023  4:18 PM CDT -----  Regarding: RE: Needs Refills per Home Health Nurse  Please call in  both meds  to pharm- and inform home health pt can take 50mg of Seroquel   ----- Message -----  From: Karen Roman  Sent: 5/17/2023   3:59 PM CDT  To: LOREN Hatfield, Sara Fermin LPN  Subject: Needs Refills per Home Health Nurse              Efren-Home Health Nurse [phone]  Needs Refills called in to [facility] [phone] ([address])    Amlodipine (he is out of meds)  Seroquel    Note: he was advised to take Seroquel 1/2 only, but it is not scored and he cannot break it in half, so he is taking a whole pill and it is working fine but he will run out sooner...

## 2023-05-19 ENCOUNTER — TELEPHONE (OUTPATIENT)
Dept: FAMILY MEDICINE | Facility: CLINIC | Age: 68
End: 2023-05-19
Payer: MEDICARE

## 2023-05-19 NOTE — TELEPHONE ENCOUNTER
----- Message from Karen Roman sent at 5/17/2023  3:56 PM CDT -----  Regarding: Needs Refills per Home Health Nurse  Efren-Home Health Nurse [phone]  Needs Refills called in to [facility] [phone] ([address])    Amlodipine (he is out of meds)  Seroquel    Note: he was advised to take Seroquel 1/2 only, but it is not scored and he cannot break it in half, so he is taking a whole pill and it is working fine but he will run out sooner...

## 2023-05-20 NOTE — PLAN OF CARE
[facility]  Discharge Final Note    Primary Care Provider: LOREN Jerry    Expected Discharge Date: 5/5/2023    Final Discharge Note (most recent)       Final Note - 05/20/23 1844          Final Note    Assessment Type Final Discharge Note     Anticipated Discharge Disposition Home-Health Care [facility]     What phone number can be called within the next 1-3 days to see how you are doing after discharge? [phone]     Hospital Resources/Appts/Education Provided Provided patient/caregiver with written discharge plan information        Post-Acute Status    Post-Acute Authorization Home Health     Home Health Status Set-up Complete/Auth obtained     Discharge Delays None known at this time                     Important Message from Medicare             Contact Info       LOREN Jerry   Specialty: Family Medicine   Relationship: PCP - [place] [id number] [facility] [address]   Phone: [phone]       Next Steps: Go on 5/15/2023    Instructions: Follow up appointment with LOREN Jerry on Monday, May 15, 2023 @ 10:45 am.  Spoke to [facility]    [address]  [address]  [facility] [address]   Phone: [phone]       Next Steps: Follow up    Instructions: walker to be delivered before DC    [facility]   Specialty: [facility] Services    [address]  [address]  [place] [address]   Phone: [phone]       Next Steps: Follow up    [facility]    Phone:[phone]       Next Steps: Follow up    Instructions: Application for meals on wheels submitted. Phone number for brotherRobi, provided. They will contact him with further directions.    Sreedhar Herrera MD   Specialty: Cardiovascular Disease, Cardiology    [address]  [facility] [address]   Phone: [phone]       Next Steps: Go on 5/8/2023     Instructions: Follow up appointmenrt with Sreedhar Herrera MD on Monday, May 8, 2023 @ 3:20 pm.  Spoke to Merry.

## 2023-05-26 ENCOUNTER — TELEPHONE (OUTPATIENT)
Dept: FAMILY MEDICINE | Facility: CLINIC | Age: 68
End: 2023-05-26
Payer: MEDICARE

## 2023-05-26 DIAGNOSIS — G47.00 INSOMNIA, UNSPECIFIED TYPE: ICD-10-CM

## 2023-05-26 RX ORDER — QUETIAPINE FUMARATE 100 MG/1
100 TABLET, FILM COATED ORAL NIGHTLY
Qty: 30 TABLET | Refills: 1 | Status: SHIPPED | OUTPATIENT
Start: 2023-05-26 | End: 2023-05-30 | Stop reason: SDUPTHER

## 2023-05-26 NOTE — TELEPHONE ENCOUNTER
Called pt's brother Robi to notify him that PCP has increased Seroquel to 100mg at bedtime. Robi verbalized understanding and gave his thanks.

## 2023-05-26 NOTE — TELEPHONE ENCOUNTER
Medication called in for refill at pharmacy.    ----- Message from Tamika Zimmerman sent at 5/26/2023 11:13 AM CDT -----  Regarding: Refill  hydrOXYzine HCL (ATARAX) 10 MG Tab - Refill  [facility] [address] [facility] [place] [address]    
Parents

## 2023-05-26 NOTE — TELEPHONE ENCOUNTER
Called pharmacy to refill hydroxyzine. Pharmacy reports that a new order is needed due to this medication not being on file. Will f/u.     ----- Message from Tamika Zimmerman sent at 5/26/2023 11:13 AM CDT -----  Regarding: Refill  hydrOXYzine HCL (ATARAX) 10 MG Tab - Refill  [facility] [address] [facility] [facility] [place] [address]

## 2023-05-26 NOTE — TELEPHONE ENCOUNTER
Omit previous note signed by this writer.     ----- Message from Tamika Zimmerman sent at 5/26/2023 11:13 AM CDT -----  Regarding: Refill  hydrOXYzine HCL (ATARAX) 10 MG Tab - Refill  [facility] [address]

## 2023-05-26 NOTE — TELEPHONE ENCOUNTER
----- Message from Tamika Zimmerman sent at 5/26/2023 11:13 AM CDT -----  Regarding: Refill  hydrOXYzine HCL (ATARAX) 10 MG Tab - Refill  [facility] [address]

## 2023-05-29 NOTE — PT/OT/SLP DISCHARGE
Physical Therapy Discharge Summary    Name: Mike Urbina Jr.  MRN: [id number]   Principal Problem: S/P CABG (coronary artery bypass graft)     Patient Discharged from acute Physical Therapy on .  Please refer to prior PT noted date on  for functional status.     Assessment:     Patient appropriate for care in another setting.    Objective:     GOALS:   Multidisciplinary Problems       Physical Therapy Goals          Problem: Physical Therapy    Goal Priority Disciplines Outcome Goal Variances Interventions   Physical Therapy Goal     PT, PT/OT Adequate for Care Transition     Description: Goals to be met by: Dishcarge     Patient will increase functional independence with mobility by performin. Supine to sit with Modified [place] maintaining sternal precautions  2. Sit to stand transfer with Modified [place] maintaining sternal precautions  3. Gait x 325 feet with Modified [place] using Rolling Walker.   4. Ascend/descend 3 stair with left Handrails Modified [place] using No Assistive Device.                          Reasons for Discontinuation of Therapy Services  Transfer to alternate level of care.      Plan:     Patient Discharged to: Home with Home Health Service.      2023

## 2023-05-30 ENCOUNTER — OFFICE VISIT (OUTPATIENT)
Dept: CARDIAC SURGERY | Facility: CLINIC | Age: 68
End: 2023-05-30
Payer: MEDICARE

## 2023-05-30 VITALS
WEIGHT: 163.38 LBS | HEART RATE: 72 BPM | OXYGEN SATURATION: 99 % | DIASTOLIC BLOOD PRESSURE: 60 MMHG | HEIGHT: 74 IN | SYSTOLIC BLOOD PRESSURE: 107 MMHG | BODY MASS INDEX: 20.97 KG/M2

## 2023-05-30 DIAGNOSIS — G47.00 INSOMNIA, UNSPECIFIED TYPE: ICD-10-CM

## 2023-05-30 DIAGNOSIS — Z95.1 STATUS POST CORONARY ARTERY BYPASS GRAFTING: Primary | ICD-10-CM

## 2023-05-30 PROCEDURE — 99024 POSTOP FOLLOW-UP VISIT: CPT | Mod: ,,, | Performed by: SPECIALIST

## 2023-05-30 PROCEDURE — 99024 PR POST-OP FOLLOW-UP VISIT: ICD-10-PCS | Mod: ,,, | Performed by: SPECIALIST

## 2023-05-30 RX ORDER — METOPROLOL SUCCINATE 50 MG/1
50 TABLET, EXTENDED RELEASE ORAL DAILY
Qty: 30 TABLET | Refills: 0 | Status: SHIPPED | OUTPATIENT
Start: 2023-05-30 | End: 2023-06-27 | Stop reason: SDUPTHER

## 2023-05-30 RX ORDER — VALSARTAN 160 MG/1
160 TABLET ORAL 2 TIMES DAILY
Qty: 180 TABLET | Refills: 3 | Status: SHIPPED | OUTPATIENT
Start: 2023-05-30 | End: 2023-06-27 | Stop reason: SDUPTHER

## 2023-05-30 RX ORDER — ATORVASTATIN CALCIUM 10 MG/1
40 TABLET, FILM COATED ORAL DAILY
Qty: 30 TABLET | Refills: 0 | Status: SHIPPED | OUTPATIENT
Start: 2023-05-30 | End: 2023-06-27

## 2023-05-30 RX ORDER — LANOLIN ALCOHOL/MO/W.PET/CERES
100 CREAM (GRAM) TOPICAL DAILY
COMMUNITY
End: 2023-11-07 | Stop reason: SDUPTHER

## 2023-05-30 RX ORDER — QUETIAPINE FUMARATE 100 MG/1
200 TABLET, FILM COATED ORAL NIGHTLY
Qty: 30 TABLET | Refills: 1 | Status: SHIPPED | OUTPATIENT
Start: 2023-05-30 | End: 2023-06-27 | Stop reason: SDUPTHER

## 2023-05-30 RX ORDER — FUROSEMIDE 40 MG/1
40 TABLET ORAL DAILY
Qty: 30 TABLET | Refills: 0 | Status: SHIPPED | OUTPATIENT
Start: 2023-05-30 | End: 2023-06-28

## 2023-05-30 RX ORDER — AMIODARONE HYDROCHLORIDE 200 MG/1
200 TABLET ORAL DAILY
Qty: 30 TABLET | Refills: 1 | Status: SHIPPED | OUTPATIENT
Start: 2023-05-30 | End: 2023-06-27 | Stop reason: SDUPTHER

## 2023-05-30 NOTE — PROGRESS NOTES
Patient returns almost 2 months out from CABG x3 and ligation of his left atrial appendage.  Overall he is feeling great.  He no longer has the shortness of breath that he had preoperatively.    His lungs are clear   Heart has a regular rate and rhythm with an aortic flow murmur   Extremities have no swelling   His incisions have healed nicely   The sternum is stable   The patient is doing very well   Follow up long-term with cardiology  Return to clinic p.r.n.   The patient is a 72y Female complaining of hypertension.

## 2023-06-02 NOTE — PLAN OF CARE
Problem: Adult Inpatient Plan of Care  Goal: Plan of Care Review  Outcome: Ongoing, Progressing  Flowsheets (Taken 4/23/2023 1203)  Plan of Care Reviewed With: patient  Goal: Absence of Hospital-Acquired Illness or Injury  Outcome: Ongoing, Progressing  Intervention: Identify and Manage Fall Risk  Flowsheets (Taken 4/23/2023 1203)  Safety Promotion/Fall Prevention:   assistive device/personal item within reach   instructed to call staff for mobility   nonskid shoes/socks when out of bed   /camera at bedside   high risk medications identified   in recliner, wheels locked   lighting adjusted   medications reviewed   gait belt with ambulation   chair alarm set   room near unit station  Intervention: Prevent Skin Injury  Flowsheets (Taken 4/23/2023 1203)  Body Position: position changed independently  Skin Protection:   incontinence pads utilized   protective footwear used  Intervention: Prevent and Manage VTE (Venous Thromboembolism) Risk  Flowsheets (Taken 4/23/2023 1203)  Activity Management: Ambulated to bathroom - L4  VTE Prevention/Management:   bleeding precations maintained   bleeding risk assessed   ambulation promoted   fluids promoted  Range of Motion: active ROM (range of motion) encouraged  Intervention: Prevent Infection  Flowsheets (Taken 4/23/2023 1203)  Infection Prevention:   cohorting utilized   environmental surveillance performed   rest/sleep promoted   equipment surfaces disinfected   single patient room provided   hand hygiene promoted   personal protective equipment utilized     Problem: Fall Injury Risk  Goal: Absence of Fall and Fall-Related Injury  Outcome: Ongoing, Progressing  Intervention: Identify and Manage Contributors  Flowsheets (Taken 4/23/2023 1203)  Self-Care Promotion:   independence encouraged   BADL personal objects within reach   safe use of adaptive equipment encouraged   meal set-up provided   BADL personal routines maintained  Medication Review/Management:    Writer reaching out to patient to see if he has his cpap machine and if he can bring it in with him to his appointment on Tuesday. If patient did not receive cpap please call back to get rescheduled.    medications reviewed   high-risk medications identified  Intervention: Promote Injury-Free Environment  Flowsheets (Taken 4/23/2023 1203)  Safety Promotion/Fall Prevention:   assistive device/personal item within reach   instructed to call staff for mobility   nonskid shoes/socks when out of bed   /camera at bedside   high risk medications identified   in recliner, wheels locked   lighting adjusted   medications reviewed   gait belt with ambulation   chair alarm set   room near unit station

## 2023-06-12 ENCOUNTER — HOSPITAL ENCOUNTER (OUTPATIENT)
Facility: HOSPITAL | Age: 68
Discharge: HOME | End: 2023-06-13
Attending: EMERGENCY MEDICINE | Admitting: STUDENT IN AN ORGANIZED HEALTH CARE EDUCATION/TRAINING PROGRAM
Payer: MEDICARE

## 2023-06-12 DIAGNOSIS — R06.02 SOB (SHORTNESS OF BREATH): ICD-10-CM

## 2023-06-12 DIAGNOSIS — R07.9 CHEST PAIN: ICD-10-CM

## 2023-06-12 DIAGNOSIS — E87.1 HYPONATREMIA: Primary | ICD-10-CM

## 2023-06-12 LAB
ALBUMIN SERPL-MCNC: 3 G/DL (ref 3.4–4.8)
ALBUMIN/GLOB SERPL: 0.8 RATIO (ref 1.1–2)
ALP SERPL-CCNC: 122 UNIT/L (ref 40–150)
ALT SERPL-CCNC: 14 UNIT/L (ref 0–55)
ANION GAP SERPL CALC-SCNC: 12 MEQ/L
APPEARANCE UR: ABNORMAL
AST SERPL-CCNC: 18 UNIT/L (ref 5–34)
BACTERIA #/AREA URNS AUTO: ABNORMAL /HPF
BASOPHILS # BLD AUTO: 0.01 X10(3)/MCL
BASOPHILS NFR BLD AUTO: 0.2 %
BILIRUB UR QL STRIP.AUTO: NEGATIVE MG/DL
BILIRUBIN DIRECT+TOT PNL SERPL-MCNC: 0.9 MG/DL
BNP BLD-MCNC: 1072.7 PG/ML
BUN SERPL-MCNC: 36.4 MG/DL (ref 8.4–25.7)
BUN SERPL-MCNC: 37.5 MG/DL (ref 8.4–25.7)
CALCIUM SERPL-MCNC: 8.7 MG/DL (ref 8.8–10)
CALCIUM SERPL-MCNC: 8.9 MG/DL (ref 8.8–10)
CHLORIDE SERPL-SCNC: 88 MMOL/L (ref 98–107)
CHLORIDE SERPL-SCNC: 90 MMOL/L (ref 98–107)
CO2 SERPL-SCNC: 21 MMOL/L (ref 23–31)
CO2 SERPL-SCNC: 24 MMOL/L (ref 23–31)
COLOR UR: YELLOW
CREAT SERPL-MCNC: 1.5 MG/DL (ref 0.73–1.18)
CREAT SERPL-MCNC: 1.71 MG/DL (ref 0.73–1.18)
CREAT/UREA NIT SERPL: 25
EOSINOPHIL # BLD AUTO: 0.07 X10(3)/MCL (ref 0–0.9)
EOSINOPHIL NFR BLD AUTO: 1.2 %
ERYTHROCYTE [DISTWIDTH] IN BLOOD BY AUTOMATED COUNT: 15.3 % (ref 11.5–17)
ETHANOL SERPL-MCNC: <10 MG/DL
GFR SERPLBLD CREATININE-BSD FMLA CKD-EPI: 43 MLS/MIN/1.73/M2
GFR SERPLBLD CREATININE-BSD FMLA CKD-EPI: 50 MLS/MIN/1.73/M2
GLOBULIN SER-MCNC: 3.8 GM/DL (ref 2.4–3.5)
GLUCOSE SERPL-MCNC: 117 MG/DL (ref 82–115)
GLUCOSE SERPL-MCNC: 118 MG/DL (ref 82–115)
GLUCOSE UR QL STRIP.AUTO: NEGATIVE MG/DL
HCT VFR BLD AUTO: 31.4 % (ref 42–52)
HGB BLD-MCNC: 9.9 G/DL (ref 14–18)
IMM GRANULOCYTES # BLD AUTO: 0.02 X10(3)/MCL (ref 0–0.04)
IMM GRANULOCYTES NFR BLD AUTO: 0.3 %
KETONES UR QL STRIP.AUTO: ABNORMAL MG/DL
LEUKOCYTE ESTERASE UR QL STRIP.AUTO: ABNORMAL UNIT/L
LYMPHOCYTES # BLD AUTO: 0.75 X10(3)/MCL (ref 0.6–4.6)
LYMPHOCYTES NFR BLD AUTO: 12.4 %
MCH RBC QN AUTO: 27.3 PG (ref 27–31)
MCHC RBC AUTO-ENTMCNC: 31.5 G/DL (ref 33–36)
MCV RBC AUTO: 86.7 FL (ref 80–94)
MONOCYTES # BLD AUTO: 0.47 X10(3)/MCL (ref 0.1–1.3)
MONOCYTES NFR BLD AUTO: 7.8 %
NEUTROPHILS # BLD AUTO: 4.74 X10(3)/MCL (ref 2.1–9.2)
NEUTROPHILS NFR BLD AUTO: 78.1 %
NITRITE UR QL STRIP.AUTO: NEGATIVE
PH UR STRIP.AUTO: 5.5 [PH]
PLATELET # BLD AUTO: 167 X10(3)/MCL (ref 130–400)
PMV BLD AUTO: 9.3 FL (ref 7.4–10.4)
POTASSIUM SERPL-SCNC: 5.1 MMOL/L (ref 3.5–5.1)
POTASSIUM SERPL-SCNC: 5.8 MMOL/L (ref 3.5–5.1)
PROT SERPL-MCNC: 6.8 GM/DL (ref 5.8–7.6)
PROT UR QL STRIP.AUTO: 30 MG/DL
RBC # BLD AUTO: 3.62 X10(6)/MCL (ref 4.7–6.1)
RBC #/AREA URNS AUTO: ABNORMAL /HPF
RBC UR QL AUTO: ABNORMAL UNIT/L
SODIUM SERPL-SCNC: 122 MMOL/L (ref 136–145)
SODIUM SERPL-SCNC: 123 MMOL/L (ref 136–145)
SP GR UR STRIP.AUTO: 1.01
SQUAMOUS #/AREA URNS AUTO: ABNORMAL /HPF
TSH SERPL-ACNC: 3.31 UIU/ML (ref 0.35–4.94)
UROBILINOGEN UR STRIP-ACNC: 0.2 MG/DL
WBC # SPEC AUTO: 6.06 X10(3)/MCL (ref 4.5–11.5)
WBC #/AREA URNS AUTO: ABNORMAL /HPF

## 2023-06-12 PROCEDURE — G0378 HOSPITAL OBSERVATION PER HR: HCPCS

## 2023-06-12 PROCEDURE — 84443 ASSAY THYROID STIM HORMONE: CPT | Performed by: EMERGENCY MEDICINE

## 2023-06-12 PROCEDURE — 85025 COMPLETE CBC W/AUTO DIFF WBC: CPT | Performed by: EMERGENCY MEDICINE

## 2023-06-12 PROCEDURE — 63600175 PHARM REV CODE 636 W HCPCS: Performed by: STUDENT IN AN ORGANIZED HEALTH CARE EDUCATION/TRAINING PROGRAM

## 2023-06-12 PROCEDURE — 99900031 HC PATIENT EDUCATION (STAT)

## 2023-06-12 PROCEDURE — 83880 ASSAY OF NATRIURETIC PEPTIDE: CPT | Performed by: EMERGENCY MEDICINE

## 2023-06-12 PROCEDURE — 93005 ELECTROCARDIOGRAM TRACING: CPT

## 2023-06-12 PROCEDURE — 81001 URINALYSIS AUTO W/SCOPE: CPT | Mod: 59 | Performed by: STUDENT IN AN ORGANIZED HEALTH CARE EDUCATION/TRAINING PROGRAM

## 2023-06-12 PROCEDURE — 87088 URINE BACTERIA CULTURE: CPT | Performed by: STUDENT IN AN ORGANIZED HEALTH CARE EDUCATION/TRAINING PROGRAM

## 2023-06-12 PROCEDURE — 25000003 PHARM REV CODE 250: Performed by: EMERGENCY MEDICINE

## 2023-06-12 PROCEDURE — 94799 UNLISTED PULMONARY SVC/PX: CPT

## 2023-06-12 PROCEDURE — 93010 ELECTROCARDIOGRAM REPORT: CPT | Mod: ,,, | Performed by: INTERNAL MEDICINE

## 2023-06-12 PROCEDURE — 80053 COMPREHEN METABOLIC PANEL: CPT | Performed by: EMERGENCY MEDICINE

## 2023-06-12 PROCEDURE — 99900035 HC TECH TIME PER 15 MIN (STAT)

## 2023-06-12 PROCEDURE — 97166 OT EVAL MOD COMPLEX 45 MIN: CPT

## 2023-06-12 PROCEDURE — 99285 EMERGENCY DEPT VISIT HI MDM: CPT | Mod: 25

## 2023-06-12 PROCEDURE — 25000003 PHARM REV CODE 250: Performed by: STUDENT IN AN ORGANIZED HEALTH CARE EDUCATION/TRAINING PROGRAM

## 2023-06-12 PROCEDURE — 82077 ASSAY SPEC XCP UR&BREATH IA: CPT | Performed by: STUDENT IN AN ORGANIZED HEALTH CARE EDUCATION/TRAINING PROGRAM

## 2023-06-12 PROCEDURE — 97161 PT EVAL LOW COMPLEX 20 MIN: CPT

## 2023-06-12 PROCEDURE — 96372 THER/PROPH/DIAG INJ SC/IM: CPT | Performed by: STUDENT IN AN ORGANIZED HEALTH CARE EDUCATION/TRAINING PROGRAM

## 2023-06-12 PROCEDURE — 96361 HYDRATE IV INFUSION ADD-ON: CPT

## 2023-06-12 PROCEDURE — 94760 N-INVAS EAR/PLS OXIMETRY 1: CPT

## 2023-06-12 PROCEDURE — 93010 EKG 12-LEAD: ICD-10-PCS | Mod: ,,, | Performed by: INTERNAL MEDICINE

## 2023-06-12 PROCEDURE — 87077 CULTURE AEROBIC IDENTIFY: CPT | Performed by: STUDENT IN AN ORGANIZED HEALTH CARE EDUCATION/TRAINING PROGRAM

## 2023-06-12 RX ORDER — IBUPROFEN 200 MG
24 TABLET ORAL
Status: DISCONTINUED | OUTPATIENT
Start: 2023-06-12 | End: 2023-06-13 | Stop reason: HOSPADM

## 2023-06-12 RX ORDER — ACETAMINOPHEN 325 MG/1
650 TABLET ORAL EVERY 4 HOURS PRN
Status: DISCONTINUED | OUTPATIENT
Start: 2023-06-12 | End: 2023-06-13 | Stop reason: HOSPADM

## 2023-06-12 RX ORDER — THIAMINE HCL 100 MG
100 TABLET ORAL DAILY
Status: DISCONTINUED | OUTPATIENT
Start: 2023-06-12 | End: 2023-06-13 | Stop reason: HOSPADM

## 2023-06-12 RX ORDER — ENOXAPARIN SODIUM 100 MG/ML
40 INJECTION SUBCUTANEOUS EVERY 24 HOURS
Status: DISCONTINUED | OUTPATIENT
Start: 2023-06-12 | End: 2023-06-13 | Stop reason: HOSPADM

## 2023-06-12 RX ORDER — ASPIRIN 81 MG/1
81 TABLET ORAL DAILY
Status: DISCONTINUED | OUTPATIENT
Start: 2023-06-12 | End: 2023-06-13 | Stop reason: HOSPADM

## 2023-06-12 RX ORDER — NALOXONE HCL 0.4 MG/ML
0.02 VIAL (ML) INJECTION
Status: DISCONTINUED | OUTPATIENT
Start: 2023-06-12 | End: 2023-06-13 | Stop reason: HOSPADM

## 2023-06-12 RX ORDER — GLUCAGON 1 MG
1 KIT INJECTION
Status: DISCONTINUED | OUTPATIENT
Start: 2023-06-12 | End: 2023-06-13 | Stop reason: HOSPADM

## 2023-06-12 RX ORDER — BISACODYL 10 MG
10 SUPPOSITORY, RECTAL RECTAL DAILY PRN
Status: DISCONTINUED | OUTPATIENT
Start: 2023-06-12 | End: 2023-06-13 | Stop reason: HOSPADM

## 2023-06-12 RX ORDER — VALSARTAN 160 MG/1
160 TABLET ORAL 2 TIMES DAILY
Status: DISCONTINUED | OUTPATIENT
Start: 2023-06-12 | End: 2023-06-13 | Stop reason: HOSPADM

## 2023-06-12 RX ORDER — HYDROCODONE BITARTRATE AND ACETAMINOPHEN 5; 325 MG/1; MG/1
1 TABLET ORAL EVERY 6 HOURS PRN
Status: DISCONTINUED | OUTPATIENT
Start: 2023-06-12 | End: 2023-06-13 | Stop reason: HOSPADM

## 2023-06-12 RX ORDER — FUROSEMIDE 40 MG/1
40 TABLET ORAL DAILY
Status: DISCONTINUED | OUTPATIENT
Start: 2023-06-12 | End: 2023-06-12

## 2023-06-12 RX ORDER — ONDANSETRON 2 MG/ML
4 INJECTION INTRAMUSCULAR; INTRAVENOUS EVERY 8 HOURS PRN
Status: DISCONTINUED | OUTPATIENT
Start: 2023-06-12 | End: 2023-06-13 | Stop reason: HOSPADM

## 2023-06-12 RX ORDER — SODIUM CHLORIDE 9 MG/ML
INJECTION, SOLUTION INTRAVENOUS CONTINUOUS
Status: DISCONTINUED | OUTPATIENT
Start: 2023-06-12 | End: 2023-06-13 | Stop reason: HOSPADM

## 2023-06-12 RX ORDER — ONDANSETRON 4 MG/1
8 TABLET, ORALLY DISINTEGRATING ORAL EVERY 8 HOURS PRN
Status: DISCONTINUED | OUTPATIENT
Start: 2023-06-12 | End: 2023-06-13 | Stop reason: HOSPADM

## 2023-06-12 RX ORDER — SODIUM CHLORIDE 9 MG/ML
1000 INJECTION, SOLUTION INTRAVENOUS
Status: COMPLETED | OUTPATIENT
Start: 2023-06-12 | End: 2023-06-12

## 2023-06-12 RX ORDER — TRAZODONE HYDROCHLORIDE 50 MG/1
50 TABLET ORAL NIGHTLY
Status: DISCONTINUED | OUTPATIENT
Start: 2023-06-12 | End: 2023-06-13 | Stop reason: HOSPADM

## 2023-06-12 RX ORDER — METOPROLOL SUCCINATE 50 MG/1
50 TABLET, EXTENDED RELEASE ORAL DAILY
Status: DISCONTINUED | OUTPATIENT
Start: 2023-06-12 | End: 2023-06-13 | Stop reason: HOSPADM

## 2023-06-12 RX ORDER — IPRATROPIUM BROMIDE AND ALBUTEROL SULFATE 2.5; .5 MG/3ML; MG/3ML
3 SOLUTION RESPIRATORY (INHALATION) EVERY 6 HOURS PRN
Status: DISCONTINUED | OUTPATIENT
Start: 2023-06-12 | End: 2023-06-13 | Stop reason: HOSPADM

## 2023-06-12 RX ORDER — QUETIAPINE FUMARATE 100 MG/1
200 TABLET, FILM COATED ORAL NIGHTLY
Status: DISCONTINUED | OUTPATIENT
Start: 2023-06-12 | End: 2023-06-13

## 2023-06-12 RX ORDER — TALC
9 POWDER (GRAM) TOPICAL NIGHTLY PRN
Status: DISCONTINUED | OUTPATIENT
Start: 2023-06-12 | End: 2023-06-13 | Stop reason: HOSPADM

## 2023-06-12 RX ORDER — MAG HYDROX/ALUMINUM HYD/SIMETH 200-200-20
30 SUSPENSION, ORAL (FINAL DOSE FORM) ORAL 4 TIMES DAILY PRN
Status: DISCONTINUED | OUTPATIENT
Start: 2023-06-12 | End: 2023-06-13 | Stop reason: HOSPADM

## 2023-06-12 RX ORDER — SODIUM CHLORIDE 0.9 % (FLUSH) 0.9 %
10 SYRINGE (ML) INJECTION
Status: DISCONTINUED | OUTPATIENT
Start: 2023-06-12 | End: 2023-06-13 | Stop reason: HOSPADM

## 2023-06-12 RX ORDER — ATORVASTATIN CALCIUM 40 MG/1
40 TABLET, FILM COATED ORAL DAILY
Status: DISCONTINUED | OUTPATIENT
Start: 2023-06-12 | End: 2023-06-13 | Stop reason: HOSPADM

## 2023-06-12 RX ORDER — POLYETHYLENE GLYCOL 3350 17 G/17G
17 POWDER, FOR SOLUTION ORAL 3 TIMES DAILY PRN
Status: DISCONTINUED | OUTPATIENT
Start: 2023-06-12 | End: 2023-06-13 | Stop reason: HOSPADM

## 2023-06-12 RX ORDER — SIMETHICONE 80 MG
1 TABLET,CHEWABLE ORAL 4 TIMES DAILY PRN
Status: DISCONTINUED | OUTPATIENT
Start: 2023-06-12 | End: 2023-06-13 | Stop reason: HOSPADM

## 2023-06-12 RX ORDER — IBUPROFEN 200 MG
16 TABLET ORAL
Status: DISCONTINUED | OUTPATIENT
Start: 2023-06-12 | End: 2023-06-13 | Stop reason: HOSPADM

## 2023-06-12 RX ORDER — MORPHINE SULFATE 4 MG/ML
2 INJECTION, SOLUTION INTRAMUSCULAR; INTRAVENOUS EVERY 6 HOURS PRN
Status: DISCONTINUED | OUTPATIENT
Start: 2023-06-12 | End: 2023-06-13 | Stop reason: HOSPADM

## 2023-06-12 RX ORDER — AMIODARONE HYDROCHLORIDE 200 MG/1
200 TABLET ORAL DAILY
Status: DISCONTINUED | OUTPATIENT
Start: 2023-06-12 | End: 2023-06-13 | Stop reason: HOSPADM

## 2023-06-12 RX ORDER — ALPRAZOLAM 0.25 MG/1
0.25 TABLET ORAL 2 TIMES DAILY PRN
Status: DISCONTINUED | OUTPATIENT
Start: 2023-06-12 | End: 2023-06-13

## 2023-06-12 RX ADMIN — SODIUM CHLORIDE 1 G: 1 TABLET ORAL at 08:06

## 2023-06-12 RX ADMIN — ALPRAZOLAM 0.25 MG: 0.25 TABLET ORAL at 08:06

## 2023-06-12 RX ADMIN — SODIUM CHLORIDE 1000 ML: 9 INJECTION, SOLUTION INTRAVENOUS at 10:06

## 2023-06-12 RX ADMIN — ENOXAPARIN SODIUM 40 MG: 40 INJECTION SUBCUTANEOUS at 05:06

## 2023-06-12 RX ADMIN — ASPIRIN 81 MG: 81 TABLET, COATED ORAL at 01:06

## 2023-06-12 RX ADMIN — METOPROLOL SUCCINATE 50 MG: 50 TABLET, FILM COATED, EXTENDED RELEASE ORAL at 01:06

## 2023-06-12 RX ADMIN — SODIUM CHLORIDE: 9 INJECTION, SOLUTION INTRAVENOUS at 01:06

## 2023-06-12 RX ADMIN — THIAMINE HCL TAB 100 MG 100 MG: 100 TAB at 01:06

## 2023-06-12 RX ADMIN — TRAZODONE HYDROCHLORIDE 50 MG: 50 TABLET ORAL at 08:06

## 2023-06-12 RX ADMIN — AMIODARONE HYDROCHLORIDE 200 MG: 200 TABLET ORAL at 01:06

## 2023-06-12 RX ADMIN — QUETIAPINE FUMARATE 200 MG: 100 TABLET ORAL at 08:06

## 2023-06-12 RX ADMIN — VALSARTAN 160 MG: 160 TABLET, FILM COATED ORAL at 01:06

## 2023-06-12 RX ADMIN — SODIUM CHLORIDE: 9 INJECTION, SOLUTION INTRAVENOUS at 11:06

## 2023-06-12 RX ADMIN — FUROSEMIDE 40 MG: 40 TABLET ORAL at 01:06

## 2023-06-12 RX ADMIN — SODIUM ZIRCONIUM CYCLOSILICATE 10 G: 10 POWDER, FOR SUSPENSION ORAL at 05:06

## 2023-06-12 RX ADMIN — ATORVASTATIN CALCIUM 40 MG: 40 TABLET, FILM COATED ORAL at 01:06

## 2023-06-12 RX ADMIN — VALSARTAN 160 MG: 160 TABLET, FILM COATED ORAL at 08:06

## 2023-06-12 NOTE — NURSING
Nurses Note -- 4 Eyes      6/12/2023   1:54 PM      Skin assessed during: Admit      [x] No Altered Skin Integrity Present    []Prevention Measures Documented      [] Yes- Altered Skin Integrity Present or Discovered   [] LDA Added if Not in Epic (Describe Wound)   [] New Altered Skin Integrity was Present on Admit and Documented in LDA   [] Wound Image Taken    Wound Care Consulted? No    Attending Nurse:  Anuradha Aiken LPN     Second RN/Staff Member:  Amberly Dominguez RN

## 2023-06-12 NOTE — ED PROVIDER NOTES
Encounter Date: 6/12/2023       History     Chief Complaint   Patient presents with    Shortness of Breath     Sob weak no energy for couple days     This 67-year-old man presents to the emergency room with complaints of weakness and dizziness after he eats his breakfast and takes his vitamins in the morning.  This morning he had some mild shortness of breath.  He states this has been going on for approximately a week.  He remains dizzy even in bed here in the emergency room.  He has a history of coronary artery disease and had a CABG approximately 3 months ago.  Echocardiogram at the time showed moderate to severe aortic stenosis with an ejection fraction of 50-55%.     Review of patient's allergies indicates:  No Known Allergies  Past Medical History:   Diagnosis Date    Atrial flutter     CHF (congestive heart failure)     Coronary artery disease     Hypertension     SOB (shortness of breath)     at times     Past Surgical History:   Procedure Laterality Date    CATARACT EXTRACTION Bilateral     CORONARY ARTERY BYPASS GRAFT (CABG) N/A 4/3/2023    Procedure: CORONARY ARTERY BYPASS GRAFT (CABG);  Surgeon: Deuce Finley IV, MD;  Location: [facility];  Service: Cardiovascular;  Laterality: N/A;  WITH LLAA //  ECHO NOTIFIED    LEFT HEART CATHETERIZATION N/A 03/15/2023    Procedure: CATHETERIZATION, HEART, LEFT;  Surgeon: Sreedhar Herrera MD;  Location: [facility] CATH LAB;  Service: Cardiology;  Laterality: N/A;  [facility] via [facility]     Family History   Problem Relation Age of Onset    Heart attack Mother      Social History     Tobacco Use    Smoking status: Never     Passive exposure: Never    Smokeless tobacco: Never   Substance Use Topics    Alcohol use: Yes     Alcohol/week: 84.0 standard drinks     Types: 84 Cans of beer per week     Comment: alcoholic, daily, beer    Drug use: Yes     Frequency: 3.0 times per week     Types: Hydrocodone     Review of Systems   Constitutional:  Negative for fever.   HENT:  Negative for sore  throat.    Respiratory:  Positive for shortness of breath.    Cardiovascular:  Negative for chest pain.   Gastrointestinal:  Negative for nausea.   Genitourinary:  Negative for dysuria.   Musculoskeletal:  Negative for back pain.   Skin:  Negative for rash.   Neurological:  Positive for dizziness and weakness.   Hematological:  Does not bruise/bleed easily.     Physical Exam     Initial Vitals [06/12/23 0839]   BP Pulse Resp Temp SpO2   (!) 170/101 97 (!) 24 97.3 °F (36.3 °C) 96 %      MAP       --         Physical Exam    Constitutional: He appears well-developed and well-nourished.   HENT:   Head: Normocephalic and atraumatic.   Mouth/Throat: Mucous membranes are normal.   Eyes: EOM are normal. Pupils are equal, round, and reactive to light.   Neck: Neck supple.   Normal range of motion.  Cardiovascular:  Normal rate, regular rhythm and intact distal pulses.           Murmur heard.  Systolic murmur is present with a grade of 2/6.  Pulmonary/Chest: Breath sounds normal.   Abdominal: Abdomen is soft. Bowel sounds are normal.   Musculoskeletal:         General: Normal range of motion.      Cervical back: Normal range of motion and neck supple.     Neurological: He is alert and oriented to person, place, and time. He has normal strength.   Skin: Skin is warm and dry. Capillary refill takes less than 2 seconds.   Psychiatric: He has a normal mood and affect. His behavior is normal. Judgment and thought content normal.       ED Course   Procedures  Labs Reviewed   COMPREHENSIVE METABOLIC PANEL - Abnormal; Notable for the following components:       Result Value    Sodium Level 122 (*)     Chloride 88 (*)     Glucose Level 117 (*)     Blood Urea Nitrogen 36.4 (*)     Creatinine 1.71 (*)     Albumin Level 3.0 (*)     Globulin 3.8 (*)     Albumin/Globulin Ratio 0.8 (*)     All other components within normal limits   CBC WITH DIFFERENTIAL - Abnormal; Notable for the following components:    RBC 3.62 (*)     Hgb 9.9 (*)      Hct 31.4 (*)     MCHC 31.5 (*)     All other components within normal limits   B-TYPE NATRIURETIC PEPTIDE - Abnormal; Notable for the following components:    Natriuretic Peptide 1,072.7 (*)     All other components within normal limits   TSH - Normal   CBC W/ AUTO DIFFERENTIAL    Narrative:     The following orders were created for panel order CBC auto differential.  Procedure                               Abnormality         Status                     ---------                               -----------         ------                     CBC with Differential[[id number]]        Abnormal            Final result                 Please view results for these tests on the individual orders.     EKG Readings: (Independently Interpreted)   Heart Rate: 97. Axis: Normal. Other Impression: Nonspecific intraventricular conduction delay, Nonspecific ST and T wave abnormality   6/12/23 0841     Imaging Results              X-Ray Chest PA And Lateral (Final result)  Result time 06/12/23 09:57:58      Final result by Yoandy Hernandez MD (06/12/23 09:57:58)                   Impression:      Mild pleuroparenchymal opacity in the lower left lung, scarring versus atelectasis and small pleural effusion.      Electronically signed by: Yoandy Hernandez  Date:    06/12/2023  Time:    09:57               Narrative:    EXAMINATION:  XR CHEST PA AND LATERAL    CLINICAL HISTORY:  Shortness of breath    TECHNIQUE:  PA and lateral radiographs of the chest    COMPARISON:  Radiography 04/06/2023    FINDINGS:  Normal cardiac silhouette. The lungs are well-inflated. Very mild left lower lung atelectasis or scarring.  Small left pleural effusion versus pleural scarring.  No pneumothorax.                                       Medications   0.9%  NaCl infusion (has no administration in time range)                              Clinical Impression:   Final diagnoses:  [R06.02] SOB (shortness of breath)  [E87.1] Hyponatremia (Primary)        ED  Disposition Condition    Observation Stable                Errol Crandall MD  06/12/23 2266

## 2023-06-12 NOTE — PT/OT/SLP EVAL
Occupational Therapy   Evaluation    Name: Mike Urbina Jr.  MRN: [id number]  Admitting Diagnosis:  Hyponatremia      History:   Mike Urbina Jr. is a 67 y.o. male with a medical diagnosis of Hyponatremia.    Past Medical History:   Diagnosis Date    Atrial flutter     CHF (congestive heart failure)     Coronary artery disease     Hypertension     SOB (shortness of breath)     at times         Past Surgical History:   Procedure Laterality Date    CATARACT EXTRACTION Bilateral     CORONARY ARTERY BYPASS GRAFT (CABG) N/A 4/3/2023    Procedure: CORONARY ARTERY BYPASS GRAFT (CABG);  Surgeon: Deuce Finley IV, MD;  Location: [facility] OR;  Service: Cardiovascular;  Laterality: N/A;  WITH LLAA //  ECHO NOTIFIED    LEFT HEART CATHETERIZATION N/A 03/15/2023    Procedure: CATHETERIZATION, HEART, LEFT;  Surgeon: Sreedhar Herrera MD;  Location: [facility] CATH LAB;  Service: Cardiology;  Laterality: N/A;  [facility] via [facility]       Subjective     Chief Complaint: weakness  Patient/Family Comments/goals: return home    Occupational Profile:  Lives with: roommate  Home Environment: [facility], 2 steps to enter (handrail on L)  Previous level of function: Ind-Mod I  Equipment Used at Home:  cane, straight      Pain/Comfort:  Pain Rating 1: 0/10    Blood Pressure:     Objective:     Communicated with: NSG prior to session.  Patient found up in chair with peripheral IV upon OT entry to room.    General Precautions: Standard,     Orthopedic Precautions:    Braces:    Respiratory Status: Room air    Occupational Performance:    Mobility  Assist level Comments    Bed mobility Mod I    Transfer Mod I    Functional mobility Mod I    Sit to stand transitions Ind    Other:          Activities of Daily Living Assist level Comments    Feeding Ind    Grooming/hygiene Ind    Bathing Mod I    Upper body dressing Ind    Lower body dressing Ind    Toileting Ind    Toilet transfer Ind    Adaptive equipment training     Other:       Physical Exam:  Upper  Extremity Range of Motion:     -       Right Upper Extremity: WFL  -       Left Upper Extremity: WFL  Upper Extremity Strength:    -       Right Upper Extremity: WFL  -       Left Upper Extremity: WFL    Cognitive/Visual Perceptual:  Cognitive/Psychosocial Skills:     -       Oriented to: Person, Place, Time, and Situation         Patient left up in chair with all lines intact and PT present    Assessment:     Mike Urbina Jr. is a 67 y.o. male with a medical diagnosis of Hyponatremia. Pt is known to this service from previous admission. Pt is performing all self care tasks at baseline fxnl performance. OT services are not warranted at this time.      Plan:     Patient does not warrant OT services at this time due to: Pt performing all ADL tasks Ind-Mod I. Pt is performing all self care tasks at baseline fxnl performance.   Plan of Care Reviewed with: patient      Recommendations:     Discharge Recommendations: home  Discharge Equipment Recommendations:  none      Time Tracking:     OT Date of Treatment: 06/12/23  OT Start Time: 1320  OT Stop Time: 1340  OT Total Time (min): 20 min    Billable Minutes:Evaluation 20 6/12/2023

## 2023-06-12 NOTE — PLAN OF CARE
Problem: Adult Inpatient Plan of Care  Goal: Plan of Care Review  Flowsheets (Taken 6/12/2023 1621)  Plan of Care Reviewed With: patient  Goal: Absence of Hospital-Acquired Illness or Injury  Intervention: Identify and Manage Fall Risk  Flowsheets (Taken 6/12/2023 1621)  Safety Promotion/Fall Prevention:   assistive device/personal item within reach   bed alarm set   commode/urinal/bedpan at bedside   Fall Risk reviewed with patient/family   lighting adjusted   medications reviewed   muscle strengthening facilitated   nonskid shoes/socks when out of bed   side rails raised x 3   instructed to call staff for mobility   room near unit station  Intervention: Prevent Skin Injury  Flowsheets (Taken 6/12/2023 1621)  Body Position:   position changed independently   sitting up in bed   supine   log-rolled  Skin Protection:   adhesive use limited   incontinence pads utilized   transparent dressing maintained   tubing/devices free from skin contact  Intervention: Prevent and Manage VTE (Venous Thromboembolism) Risk  Flowsheets (Taken 6/12/2023 1621)  Activity Management:   Rolling - L1   Sitting at edge of bed - L2   Standing - L3   Ambulated in room - L4  VTE Prevention/Management:   ambulation promoted   fluids promoted   bleeding precations maintained   bleeding risk assessed  Range of Motion: active ROM (range of motion) encouraged  Intervention: Prevent Infection  Flowsheets (Taken 6/12/2023 1621)  Infection Prevention:   cohorting utilized   environmental surveillance performed   equipment surfaces disinfected   rest/sleep promoted   single patient room provided   hand hygiene promoted  Goal: Optimal Comfort and Wellbeing  Intervention: Monitor Pain and Promote Comfort  Flowsheets (Taken 6/12/2023 1621)  Pain Management Interventions:   care clustered   pillow support provided   position adjusted  Intervention: Provide Person-Centered Care  Flowsheets (Taken 6/12/2023 1621)  Trust Relationship/Rapport:   care  explained   questions encouraged   questions answered   choices provided     Problem: Impaired Wound Healing  Goal: Optimal Wound Healing  Intervention: Promote Wound Healing  Flowsheets (Taken 6/12/2023 1621)  Oral Nutrition Promotion:   calorie-dense foods provided   calorie-dense liquids provided   physical activity promoted   rest periods promoted  Activity Management:   Rolling - L1   Sitting at edge of bed - L2   Standing - L3   Ambulated in room - L4  Pain Management Interventions:   care clustered   pillow support provided   position adjusted     Problem: Infection  Goal: Absence of Infection Signs and Symptoms  Intervention: Prevent or Manage Infection  Flowsheets (Taken 6/12/2023 1621)  Infection Management: aseptic technique maintained     Problem: Skin Injury Risk Increased  Goal: Skin Health and Integrity  Intervention: Optimize Skin Protection  Flowsheets (Taken 6/12/2023 1621)  Pressure Reduction Techniques:   frequent weight shift encouraged   weight shift assistance provided  Skin Protection:   adhesive use limited   incontinence pads utilized   transparent dressing maintained   tubing/devices free from skin contact  Head of Bed (HOB) Positioning:   HOB at 20-30 degrees   HOB at 30-45 degrees  Intervention: Promote and Optimize Oral Intake  Flowsheets (Taken 6/12/2023 1621)  Oral Nutrition Promotion:   calorie-dense foods provided   calorie-dense liquids provided   physical activity promoted   rest periods promoted     Problem: Fluid Volume Excess  Goal: Fluid Balance  Intervention: Monitor and Manage Hypervolemia  Flowsheets (Taken 6/12/2023 1621)  Skin Protection:   adhesive use limited   incontinence pads utilized   transparent dressing maintained   tubing/devices free from skin contact  Fluid/Electrolyte Management: fluids provided     Problem: Electrolyte Imbalance  Goal: Electrolyte Balance  Intervention: Monitor and Manage Electrolyte Imbalance  Flowsheets (Taken 6/12/2023  1621)  Fluid/Electrolyte Management: fluids provided

## 2023-06-12 NOTE — ED NOTES
To bed 5 for c/o dizziness x 2 months ever since his open heart surgery, denies sob, cp. C/o right leg pain after he walks for a while. A/ox 4 nad.

## 2023-06-12 NOTE — H&P
[facility] MEDICINE   H&P ADMISSION NOTE    Patient Name: Mike Urbina Jr.  MRN: [id number]  Patient Class: OP- Observation   Admission Date: 6/12/2023   Admitting Physician: NACHO Service   Attending Physician: Thairy G Reyes, DO  Primary Care Provider: LOREN Jerry  Face-to-Face encounter date: 06/12/2023      CHIEF COMPLAINT     Chief Complaint   Patient presents with    Shortness of Breath     Sob weak no energy for couple days     HISTORY OF PRESENTING ILLNESS   67-year-old male with a past medical history of ETOH abuse, CAD status post CABG 04/03/2023 with Dr. Finley, thrombocytopenia, aortic stenosis, hypertension, atrial flutter/fibrillation who presents to our facility with complaint of weakness and drowsiness that started 1 week ago. Denies any associated illness, nausea, vomiting, diarrhea, cough. Has had recent outpatient follow up with Darlene Willams and with Dr. Finley, reportedly doing well. In the ED sodium found to be 122 and he was admitted for management of hyponatremia in the setting of EtOH abuse. Last drink last night, consumes a 6 pack daily.  PAST MEDICAL HISTORY     Past Medical History:   Diagnosis Date    Atrial flutter     CHF (congestive heart failure)     Coronary artery disease     Hypertension     SOB (shortness of breath)     at times       PAST SURGICAL HISTORY     Past Surgical History:   Procedure Laterality Date    CATARACT EXTRACTION Bilateral     CORONARY ARTERY BYPASS GRAFT (CABG) N/A 4/3/2023    Procedure: CORONARY ARTERY BYPASS GRAFT (CABG);  Surgeon: Deuce Finley IV, MD;  Location: [facility];  Service: Cardiovascular;  Laterality: N/A;  WITH LLAA //  ECHO NOTIFIED    LEFT HEART CATHETERIZATION N/A 03/15/2023    Procedure: CATHETERIZATION, HEART, LEFT;  Surgeon: Sreedhar Herrera MD;  Location: [facility] CATH LAB;  Service: Cardiology;  Laterality: N/A;  LHC via A       FAMILY HISTORY   Reviewed and noncontributory to this  case    SOCIAL HISTORY     Social History     Socioeconomic History    Marital status: Single   Tobacco Use    Smoking status: Never     Passive exposure: Never    Smokeless tobacco: Never   Substance and Sexual Activity    Alcohol use: Yes     Alcohol/week: 84.0 standard drinks     Types: 84 Cans of beer per week     Comment: alcoholic, daily, beer    Drug use: Yes     Frequency: 3.0 times per week     Types: Hydrocodone    Sexual activity: Not Currently   Social History Narrative    ** Merged History Encounter **          Social Determinants of Health     Financial Resource Strain: Low Risk     Difficulty of Paying Living Expenses: Not hard at all   Food Insecurity: No Food Insecurity    Worried About Running Out of Food in the Last Year: Never true    Ran Out of Food in the Last Year: Never true   Transportation Needs: No Transportation Needs    Lack of Transportation (Medical): No    Lack of Transportation (Non-Medical): No   Physical Activity: Inactive    Days of Exercise per Week: 0 days    Minutes of Exercise per Session: 0 min   Stress: No Stress Concern Present    Feeling of Stress : Only a little   Social Connections: Moderately Integrated    Frequency of Communication with Friends and Family: More than three times a week    Frequency of Social Gatherings with Friends and Family: More than three times a week    Attends [employer] Services: More than 4 times per year    Active Member of Clubs or Organizations: No    Attends Club or Organization Meetings: 1 to 4 times per year    Marital Status: Never    Housing Stability: Low Risk     Unable to Pay for Housing in the Last Year: No    Number of Places Lived in the Last Year: 1    Unstable Housing in the Last Year: No       HOME MEDICATIONS     Prior to Admission medications    Medication Sig Start Date End Date Taking? Authorizing Provider   amiodarone (PACERONE) 200 MG Tab Take 1 tablet (200 mg total) by mouth once daily. 5/30/23 5/29/24 Yes Cheramie  W Rami, FNP   atorvastatin (LIPITOR) 10 MG tablet Take 4 tablets (40 mg total) by mouth once daily. 5/30/23  Yes Cheramie W Rami, FNP   furosemide (LASIX) 40 MG tablet Take 1 tablet (40 mg total) by mouth once daily. 5/30/23 6/29/23 Yes Cheramie W Rami, FNP   metoprolol succinate (TOPROL-XL) 50 MG 24 hr tablet Take 1 tablet (50 mg total) by mouth once daily. 5/30/23 6/29/23 Yes Cheramie W Rami, FNP   QUEtiapine (SEROQUEL) 100 MG Tab Take 2 tablets (200 mg total) by mouth nightly. 5/30/23 5/29/24 Yes Cheramie W Rami, FNP   valsartan (DIOVAN) 160 MG tablet Take 1 tablet (160 mg total) by mouth 2 (two) times daily. 5/30/23 5/29/24 Yes Cheramie W Rami, FNP   aspirin (ECOTRIN) 81 MG EC tablet Take 1 tablet (81 mg total) by mouth once daily. 12/30/22 5/30/23  Thairy G Reyes,    thiamine 100 MG tablet Take 100 mg by mouth once daily.    Historical Provider   traZODone (DESYREL) 50 MG tablet Take 1 tablet (50 mg total) by mouth every evening.  Patient not taking: Reported on 5/30/2023 5/5/23 6/4/23  Tom Roy MD   amLODIPine (NORVASC) 10 MG tablet Take 1 tablet (10 mg total) by mouth once daily.  Patient not taking: Reported on 5/30/2023 4/18/23 6/12/23  Wyatt Carl PA-C   hydrOXYzine HCL (ATARAX) 10 MG Tab Take 10 mg by mouth every evening. 5/5/23 6/12/23  Historical Provider       ALLERGIES   Patient has no known allergies.  REVIEW OF SYSTEMS   Except as documented above, all other systems reviewed and negative    PHYSICAL EXAM     Vitals:    06/12/23 1320   BP: (!) 161/81   Pulse: 85   Resp:    Temp:       General:  In no acute distress, resting comfortably  Head and neck:  Atraumatic, normocephalic, moist mucous membranes, supple neck  Chest:  Clear to auscultation bilaterally  Heart:  S1, S2, Grade IV/IV holosystolic murmur  Abdomen:  Soft, nontender, BS +  MSK:  Warm, trace pitting edema, no clubbing or cyanosis  Neuro:  Alert and oriented x4, moving all extremities with good strength  Integumentary:   No obvious skin rash  Psychiatry:  Appropriate mood and affect    ASSESSMENT AND PLAN   Hypotonic Hyponatremia  LOVELY  -2/2 EtOH abuse  - baseline Cr approx 0.9-1.0  -Gentle IVF as pt's BNP is elevated; salt tabs  -repeat BMP this afternoon    ETOH Abuse  -reports 6 packs daily  -p.r.n. Xanax, pt within 48 hr period since last drink  -thiamine    Multivessel CAD  - s/p CABG (4.3.23) - LIMA to LAD, rSVG to OM1, rSVG to PDA with Dr. Finley  -reports recent f/u with Dr. Finley  -aspirin, atorvastatin, metoprolol  -hold furosemide for now in setting of LOVELY, appears dry   -PT/OT     PAF/Flutter with RVR s/p DCCV  -CHADsVASc - 3   -amio, metoprolol  -no AC s/p (4.3.23) - Ligation of SILVANO      Severe AS  -TAVR Evaluation on Outpatient Basis     HTN  -valsartan    Anemia  -Chronic due to myelosuppression from ETOH abuse         DVT prophylaxis:  Lovenox  Admit to observation status under my care  __________________________________________________________________  LABS/MICRO/MEDS/DIAGNOSTICS       LABS  Recent Labs     06/12/23  0928   *   K 5.1   CHLORIDE 88*   CO2 24   BUN 36.4*   CREATININE 1.71*   GLUCOSE 117*   CALCIUM 8.9   ALKPHOS 122   AST 18   ALT 14   ALBUMIN 3.0*     Recent Labs     06/12/23  0928   WBC 6.06   RBC 3.62*   HCT 31.4*   MCV 86.7          MICROBIOLOGY  Microbiology Results (last 7 days)       ** No results found for the last 168 hours. **            MEDICATIONS   amiodarone  200 mg Oral Daily    aspirin  81 mg Oral Daily    atorvastatin  40 mg Oral Daily    enoxparin  40 mg Subcutaneous Daily    furosemide  40 mg Oral Daily    metoprolol succinate  50 mg Oral Daily    QUEtiapine  200 mg Oral Nightly    sodium chloride  1 g Oral BID    thiamine  100 mg Oral Daily    traZODone  50 mg Oral QHS    valsartan  160 mg Oral BID      INFUSIONS   sodium chloride 0.9% 75 mL/hr at 06/12/23 1320       DIAGNOSTIC TESTS  X-Ray Chest PA And Lateral   Final Result      Mild pleuroparenchymal opacity in the  lower left lung, scarring versus atelectasis and small pleural effusion.         Electronically signed by: Yoandy Hernandez   Date:    06/12/2023   Time:    09:57             Patient information was obtained from patient, patient's family, past medical records and ER records.   All diagnosis and differential diagnosis have been reviewed; assessment and plan has been documented. I have personally reviewed the labs and test results that are presently available; I have reviewed the patients medication list. I have reviewed the consulting providers response and recommendations. I have reviewed or attempted to review medical records based upon their availability.  All of the patient's questions have been addressed and answered. Patient's is agreeable to the above stated plan. I will continue to monitor closely and make adjustments to medical management as needed.  This note was created using [employer] voice recognition software that occasionally misinterpreted phrases or words.  Please contact me if any questions may rise regarding documentation to clarify verbiage.        Thairy G Reyes,    Internal Medicine  Department of Hospital Medicine [facility]

## 2023-06-13 VITALS
SYSTOLIC BLOOD PRESSURE: 96 MMHG | RESPIRATION RATE: 18 BRPM | TEMPERATURE: 98 F | WEIGHT: 171.31 LBS | HEIGHT: 74 IN | BODY MASS INDEX: 21.98 KG/M2 | OXYGEN SATURATION: 96 % | DIASTOLIC BLOOD PRESSURE: 62 MMHG | HEART RATE: 75 BPM

## 2023-06-13 LAB
ANION GAP SERPL CALC-SCNC: 10 MEQ/L
ANION GAP SERPL CALC-SCNC: 11 MEQ/L
BUN SERPL-MCNC: 36.3 MG/DL (ref 8.4–25.7)
BUN SERPL-MCNC: 36.7 MG/DL (ref 8.4–25.7)
CALCIUM SERPL-MCNC: 8.5 MG/DL (ref 8.8–10)
CALCIUM SERPL-MCNC: 8.6 MG/DL (ref 8.8–10)
CHLORIDE SERPL-SCNC: 94 MMOL/L (ref 98–107)
CHLORIDE SERPL-SCNC: 96 MMOL/L (ref 98–107)
CO2 SERPL-SCNC: 22 MMOL/L (ref 23–31)
CO2 SERPL-SCNC: 24 MMOL/L (ref 23–31)
CREAT SERPL-MCNC: 1.29 MG/DL (ref 0.73–1.18)
CREAT SERPL-MCNC: 1.61 MG/DL (ref 0.73–1.18)
CREAT/UREA NIT SERPL: 23
CREAT/UREA NIT SERPL: 28
GFR SERPLBLD CREATININE-BSD FMLA CKD-EPI: 46 MLS/MIN/1.73/M2
GFR SERPLBLD CREATININE-BSD FMLA CKD-EPI: 60 MLS/MIN/1.73/M2
GLUCOSE SERPL-MCNC: 136 MG/DL (ref 82–115)
GLUCOSE SERPL-MCNC: 93 MG/DL (ref 82–115)
POTASSIUM SERPL-SCNC: 4.1 MMOL/L (ref 3.5–5.1)
POTASSIUM SERPL-SCNC: 4.9 MMOL/L (ref 3.5–5.1)
SODIUM SERPL-SCNC: 128 MMOL/L (ref 136–145)
SODIUM SERPL-SCNC: 129 MMOL/L (ref 136–145)

## 2023-06-13 PROCEDURE — 94760 N-INVAS EAR/PLS OXIMETRY 1: CPT

## 2023-06-13 PROCEDURE — 80048 BASIC METABOLIC PNL TOTAL CA: CPT | Mod: 91 | Performed by: STUDENT IN AN ORGANIZED HEALTH CARE EDUCATION/TRAINING PROGRAM

## 2023-06-13 PROCEDURE — 25000003 PHARM REV CODE 250: Performed by: EMERGENCY MEDICINE

## 2023-06-13 PROCEDURE — 97530 THERAPEUTIC ACTIVITIES: CPT

## 2023-06-13 PROCEDURE — 25000003 PHARM REV CODE 250: Performed by: STUDENT IN AN ORGANIZED HEALTH CARE EDUCATION/TRAINING PROGRAM

## 2023-06-13 PROCEDURE — 96361 HYDRATE IV INFUSION ADD-ON: CPT

## 2023-06-13 PROCEDURE — G0378 HOSPITAL OBSERVATION PER HR: HCPCS

## 2023-06-13 PROCEDURE — 63600175 PHARM REV CODE 636 W HCPCS: Performed by: STUDENT IN AN ORGANIZED HEALTH CARE EDUCATION/TRAINING PROGRAM

## 2023-06-13 PROCEDURE — 80048 BASIC METABOLIC PNL TOTAL CA: CPT | Performed by: EMERGENCY MEDICINE

## 2023-06-13 PROCEDURE — 97116 GAIT TRAINING THERAPY: CPT

## 2023-06-13 PROCEDURE — 96365 THER/PROPH/DIAG IV INF INIT: CPT

## 2023-06-13 RX ORDER — NITROFURANTOIN 25; 75 MG/1; MG/1
100 CAPSULE ORAL 2 TIMES DAILY
Qty: 14 CAPSULE | Refills: 0 | Status: SHIPPED | OUTPATIENT
Start: 2023-06-13 | End: 2023-06-13 | Stop reason: HOSPADM

## 2023-06-13 RX ORDER — CEFDINIR 300 MG/1
300 CAPSULE ORAL 2 TIMES DAILY
Qty: 10 CAPSULE | Refills: 0 | Status: SHIPPED | OUTPATIENT
Start: 2023-06-13 | End: 2023-06-18

## 2023-06-13 RX ADMIN — ASPIRIN 81 MG: 81 TABLET, COATED ORAL at 09:06

## 2023-06-13 RX ADMIN — SODIUM CHLORIDE 1 G: 1 TABLET ORAL at 09:06

## 2023-06-13 RX ADMIN — CEFTRIAXONE SODIUM 1 G: 1 INJECTION, POWDER, FOR SOLUTION INTRAMUSCULAR; INTRAVENOUS at 07:06

## 2023-06-13 RX ADMIN — ATORVASTATIN CALCIUM 40 MG: 40 TABLET, FILM COATED ORAL at 09:06

## 2023-06-13 RX ADMIN — THIAMINE HCL TAB 100 MG 100 MG: 100 TAB at 09:06

## 2023-06-13 RX ADMIN — SODIUM CHLORIDE: 9 INJECTION, SOLUTION INTRAVENOUS at 11:06

## 2023-06-13 NOTE — PT/OT/SLP PROGRESS
Physical Therapy Treatment Note           Name: Mike Urbina Jr.    : 1955 (67 y.o.)  MRN: [id number]           TREATMENT SUMMARY AND RECOMMENDATIONS:    PT Received On: 23  PT Start Time: 1128     PT Stop Time: 1155  PT Total Time (min): 27 min     Subjective Assessment:   No complaints  Lethargic   x Awake, alert, cooperative  Uncooperative    Agitated  c/o pain    Appropriate x c/o fatigue    Confused x Treated at bedside     Emotionally labile  Treated in gym/dept.    Impulsive  Other:    Flat affect       Therapy Precautions:    Cognitive deficits  Spinal precautions    Collar - hard  Sternal precautions    Collar - soft   TLSO    Fall risk  LSO    Hip precautions - posterior  Knee immobilizer    Hip precautions - anterior  WBAT    Impaired communication  Partial weightbearing    Oxygen  TTWB    PEG tube  NWB    Visual deficits  Other:    Hearing deficits          Treatment Objectives:     Mobility Training:   Assist level Comments    Bed mobility MOD I    Transfer CGA Without AD, stand pivot   Gait SBA RW x 175 feet with reports of dizziness, mild unsteadiness demonstrated.    Sit to stand transitions SBA    Sitting balance     Standing balance  CGA Without AD, standing bathroom and hand washing tasks completed.    Wheelchair mobility     Car transfer     Other:          Therapeutic Exercise:   Exercise Sets Reps Comments                               Additional Comments:  Pt needed bathroom at tx start. Pt assisted OOB to bathroom then got dressed. Pt was more unsteady today. RW needed for safety. Pt educated on use of RW at home and not cane. Pt was dizzy during ambulation, BP was 99/62. CNA reports it was 70/40 charli earlier so this was better. Pt returned back to bed due to symptoms at tx end. Family in room.     Assessment: Patient tolerated session Fair.    PT Plan: Continue per POC  Revisions made to plan of care: No    GOALS:   Multidisciplinary Problems       Physical  Therapy Goals          Problem: Physical Therapy    Goal Priority Disciplines Outcome Goal Variances Interventions   Physical Therapy Goal     PT, PT/OT Ongoing, Progressing     Description: Goals to be met by: Discharge     Patient will increase functional independence with mobility by performin. Gait  x 1000 feet with Supervision and x 50 feet at MOD I using Single-point Cane or RW.   2. Pt to complete at least 8 sit to stands in 30 seconds.                          Skilled PT Minutes Provided: 23   Communication with Treatment Team:     Equipment recommendations:       At end of treatment, patient remained:   Up in chair     Up in wheelchair in room   x In bed   x With alarm activated   x Bed rails up   x Call bell in reach    x Family/friends present    Restraints secured properly    In bathroom with CNA/RN notified   x Nurse aware    In gym with therapist/tech    Other:

## 2023-06-13 NOTE — PLAN OF CARE
[facility]  Discharge Final Note    Primary Care Provider: LOREN Jerry    Expected Discharge Date:     Final Discharge Note (most recent)       Final Note - 06/13/23 1024          Final Note    Assessment Type Final Discharge Note     Anticipated Discharge Disposition Home or Self Care     What phone number can be called within the next 1-3 days to see how you are doing after discharge? [phone]     Hospital Resources/Appts/Education Provided Provided patient/caregiver with written discharge plan information        Post-Acute Status    Discharge Delays None known at this time                     Important Message from Medicare             Contact Info       LOREN Petersen   Specialty: Family Medicine    [address] [address]   Phone: [phone]       Next Steps: Follow up on 6/27/2023    Instructions: 6/27/23 @ 8:45 a.mVan Spoke to Estrella.  Patient had an appointment with Darlene Willams.

## 2023-06-13 NOTE — DISCHARGE INSTRUCTIONS
Please follow all discharge instructions as given. KEEP ALL FOLLOW UP APPOINTMENTS!           If you experience any worsening or NEW signs or symptoms please call your primary care provider or head to your nearest emergency department.               THANK YOU FOR CHOOSING [facility].  If you have any questions please call [phone].

## 2023-06-13 NOTE — NURSING
Gave patient and Wyatt Jhaveri discharge instructions. Pt agreed to keep FU appt with Darlene Willams on 6/27. Per Gala (), wasn't able to get appt within 7 days of DC. Pt and friend understood instructions after 30 minutes. Sat down with pt in room to discuss each home medication because pt did not understood home and new medications. Taught pt to take BP with home machine before taking Valsartan and Metoprolol and to hold medication for <100/<60. Took out IV. Pt tolerated well. Pt wheeled via RE2 to secured friend vehicle. Pt now off unit.

## 2023-06-13 NOTE — PT/OT/SLP EVAL
"Physical Therapy Ob Evaluation and Treatment    Patient Name: Mike Urbina Jr.   MRN: [id number]  Recent Surgery: * No surgery found *      Recommendations:     Discharge Recommendations: home, home health PT   Discharge Equipment Recommendations: none   Barriers to discharge: Ongoing medical treatment    Assessment:     Mike Urbina Jr. is a 67 y.o. male admitted with a medical diagnosis of Hyponatremia. He presents with the following impairments/functional limitations: impaired endurance, gait instability.     Rehab Prognosis: Good; patient would benefit from acute PT services to address these deficits and reach maximum level of function.    Plan:     During this hospitalization, patient to be seen 6 x/week (QD) to address the above listed problems via gait training, therapeutic activities, therapeutic exercises    Plan of Care Expires: 06/20/23    Subjective     Chief Complaint: "insides feel off" "I have to hold onto furniture at home"  Patient Comments/Goals: Go home using a cane again  Pain/Comfort:    No pain    Social History:  Living Environment: Patient lives with their roommate(s) in a single story home with number of outside stair(s): 2 , with L rail  Prior Level of Function: Prior to admission, patient was modified independent  Equipment Used at Home: walker, rolling, cane, straight  DME owned (not currently used): none  Assistance Upon Discharge: roommate(s)    Objective:     Communicated with OT and nursing prior to session. Patient found up in chair with   upon PT entry to room.    General Precautions: Standard,     Orthopedic Precautions:     Braces:      Respiratory Status: Room air    Exams:  Cognition: Patient is oriented to Person, Place, Time, Situation  RLE ROM: WNL  RLE Strength: WNL  LLE ROM: WNL  LLE Strength: WNL      Functional Mobility:  Gait belt applied - Yes  Bed Mobility  Rolling Left: modified independence  Rolling Right: modified independence  Supine to Sit: modified " independence for trunk management  Sit to Supine: modified independence for trunk management  Transfers  Sit to Stand: supervision with rolling walker  Bed to Chair: supervision with rolling walker using Stand Pivot  Gait  Patient ambulated 175 ft with rolling walker and stand by assistance. Patient demonstrates  decreased endurance and need for UE support . . .  Balance  Sitting: Normal  Standing: SBA    *30 second to stand: pt unable to complete due to fatigue having to stop at 25 seconds - 6 reps completed.       Therapeutic Activities and Exercises:   Patient educated on role of acute care PT and PT POC, safety while in hospital including calling nurse for mobility, and call light usage  PT to see pt QD for endurance and balance work.     AM-PAC 6 CLICK MOBILITY  Total Score:     Patient left up in chair with all lines intact and call button in reach.    GOALS:   Multidisciplinary Problems       Physical Therapy Goals          Problem: Physical Therapy    Goal Priority Disciplines Outcome Goal Variances Interventions   Physical Therapy Goal     PT, PT/OT Ongoing, Progressing     Description: Goals to be met by: Discharge     Patient will increase functional independence with mobility by performin. Gait  x 1000 feet with Supervision and x 50 feet at MOD I using Single-point Cane or RW.   2. Pt to complete at least 8 sit to stands in 30 seconds.                          History:     Past Medical History:   Diagnosis Date    Atrial flutter     CHF (congestive heart failure)     Coronary artery disease     Hypertension     SOB (shortness of breath)     at times       Past Surgical History:   Procedure Laterality Date    CATARACT EXTRACTION Bilateral     CORONARY ARTERY BYPASS GRAFT (CABG) N/A 4/3/2023    Procedure: CORONARY ARTERY BYPASS GRAFT (CABG);  Surgeon: Deuce Finley IV, MD;  Location: [facility];  Service: Cardiovascular;  Laterality: N/A;  WITH LLAA //  ECHO NOTIFIED    LEFT HEART CATHETERIZATION  N/A 03/15/2023    Procedure: CATHETERIZATION, HEART, LEFT;  Surgeon: Sreedhar Herrera MD;  Location: [facility] CATH LAB;  Service: Cardiology;  Laterality: N/A;  LHC via RRA       Time Tracking:     PT Received On: 06/12/23  PT Start Time: 1530  PT Stop Time: 1550  PT Total Time (min): 20 min     Billable Minutes: Evaluation low complexity     6/13/2023

## 2023-06-13 NOTE — PLAN OF CARE
Problem: Physical Therapy  Goal: Physical Therapy Goal  Description: Goals to be met by: Discharge     Patient will increase functional independence with mobility by performin. Gait  x 1000 feet with Supervision and x 50 feet at MOD I using Single-point Cane or RW.   2. Pt to complete at least 8 sit to stands in 30 seconds.     Outcome: Ongoing, Progressing

## 2023-06-13 NOTE — PLAN OF CARE
[facility] Unit  Initial Discharge Assessment       Primary Care Provider: LOREN Jerry    Admission Diagnosis: Hyponatremia [E87.1]  SOB (shortness of breath) [R06.02]    Admission Date: 6/12/2023  Expected Discharge Date:     Transition of Care Barriers: None    Payor: MEDICARE / Plan: MEDICARE PART A & B / Product Type: Government /     Extended Emergency Contact Information  Primary Emergency Contact: Ashleigh Bailey  Mobile Phone: [phone]  Relation: Sister   needed? No  Secondary Emergency Contact: Mya Loomis  Mobile Phone: [phone]  Relation: Friend    Discharge Plan A: Home with family         [address] [facility] [facility] [address] [facility]  [address] [facility] [facility] [address]  Phone: [phone] Fax: [phone]      Initial Assessment (most recent)       Adult Discharge Assessment - 06/13/23 1022          Discharge Assessment    Assessment Type Discharge Planning Assessment     Confirmed/corrected address, phone number and insurance Yes     Confirmed Demographics Correct on Facesheet     Source of Information patient     If unable to respond/provide information was family/caregiver contacted? Yes     Contact Name/Number Ashleigh singleton [phone]     Does patient/caregiver understand observation status Yes     Communicated MODESTO with patient/caregiver Date not available/Unable to determine     Reason For Admission LOVELY     People in Home alone     Facility Arrived From: home     Do you expect to return to your current living situation? Yes     Do you have help at home or someone to help you manage your care at home? Yes     Who are your caregiver(s) and their phone number(s)? Ashleigh singleton [phone]     Prior to hospitilization cognitive status: Alert/Oriented     Current cognitive status: Alert/Oriented     Home Accessibility wheelchair accessible     Home Layout Able to live on 1st floor      Equipment Currently Used at Home none     Readmission within 30 days? No     Patient currently being followed by outpatient case management? No     Do you currently have service(s) that help you manage your care at home? No     Do you take prescription medications? Yes     Do you have prescription coverage? Yes     Do you have any problems affording any of your prescribed medications? TBD     Is the patient taking medications as prescribed? yes     Who is going to help you get home at discharge? Ashleigh Bailey sister [phone]     How do you get to doctors appointments? family or friend will provide     Are you on dialysis? No     Do you take coumadin? No     Discharge Plan A Home with family     DME Needed Upon Discharge  none     Discharge Plan discussed with: Sibling     Name(s) and Number(s) Ashleigh Bailey sister [phone]     Transition of Care Barriers None        Physical Activity    On average, how many days per week do you engage in moderate to strenuous exercise (like a brisk walk)? 0 days     On average, how many minutes do you engage in exercise at this level? 0 min        Financial Resource Strain    How hard is it for you to pay for the very basics like food, housing, medical care, and heating? Not hard at all        Housing Stability    In the last 12 months, was there a time when you were not able to pay the mortgage or rent on time? No     In the last 12 months, how many places have you lived? 1     In the last 12 months, was there a time when you did not have a steady place to sleep or slept in a shelter (including now)? No        Transportation Needs    In the past 12 months, has lack of transportation kept you from medical appointments or from getting medications? No     In the past 12 months, has lack of transportation kept you from meetings, work, or from getting things needed for daily living? No        Food Insecurity    Within the past 12 months, you worried that your food would run  out before you got the money to buy more. Never true     Within the past 12 months, the food you bought just didn't last and you didn't have money to get more. Never true        Stress    Do you feel stress - tense, restless, nervous, or anxious, or unable to sleep at night because your mind is troubled all the time - these days? Only a little        Social Connections    In a typical week, how many times do you talk on the phone with family, friends, or neighbors? More than three times a week     How often do you get together with friends or relatives? More than three times a week     How often do you attend Baptist or Faith services? More than 4 times per year     Do you belong to any clubs or organizations such as Baptist groups, unions, fraternal or athletic groups, or school groups? No     How often do you attend meetings of the clubs or organizations you belong to? 1 to 4 times per year     Are you , , , , never , or living with a partner? Never         Alcohol Use    Q1: How often do you have a drink containing alcohol? 4 or more times a week     Q2: How many drinks containing alcohol do you have on a typical day when you are drinking? 10 or more     Q3: How often do you have six or more drinks on one occasion? Daily or almost daily

## 2023-06-13 NOTE — DISCHARGE SUMMARY
[facility]  [facility] [place] MEDICINE - DISCHARGE SUMMARY    Patient Name: Mike Urbina Jr.  MRN: [id number]  Admission Date: 6/12/2023  Hospital Length of Stay: 0 days  Discharge Date and Time: 06/13/2023  Discharge Provider: Thairy G Reyes  Primary Care Provider: Darlene Willams, [facility]    HOSPITAL COURSE   67-year-old male with a past medical history of ETOH abuse, CAD status post CABG 04/03/2023 with Dr. Finley, thrombocytopenia, aortic stenosis, hypertension, atrial flutter/fibrillation who presents to our facility with complaint of weakness and drowsiness that started 1 week ago. Denies any associated illness, nausea, vomiting, diarrhea, cough. Has had recent outpatient follow up with Darlene Willams and with Dr. Finley, reportedly doing well. In the ED sodium found to be 122 and he was admitted for management of hyponatremia in the setting of EtOH abuse. Last drink last night, consumes a 6 pack daily.    Na has steadily increased as has his Cr. Pt advised of EtOH cessation but will discharge with salt tabs for 14 days and will need continued outpatient follow up for hyponatremia. Urine culture growing GNRs. Not yet finalized. Received 1 dose of ceftriaxone here. Will DC with 7 days of Cefdinir and he will need to follow with PCP by end of the week to follow culture for sensitivity. Otherwise, pt at baseline functional level per PT eval. He did have episode of hypotension today but MAP>65 the entire time. BP medications were held today. He is stable for DC.  PHYSICAL EXAM     Most Recent Vital Signs:  Temp: 97.7 °F (36.5 °C) (06/13/23 1125)  Pulse: 75 (06/13/23 1200)  Resp: 18 (06/13/23 0844)  BP: 96/62 (06/13/23 1200)  SpO2: 96 % (06/13/23 0844)   GENERAL: In no acute distress, afebrile  HEENT:PERRLA  CHEST: Clear to auscultation bilaterally  HEART: S1, S2, Grade IV murmur  ABDOMEN: Soft, nontender, BS +  MSK: Warm, Trade edema, no clubbing or cyanosis  NEUROLOGIC: Alert and oriented  x4, moving all extremities with good strength   INTEGUMENTARY: warm, dry  PSYCHIATRY:  Appropriate affect    DISCHARGE DIAGNOSIS   Hypotonic Hyponatremia  LOVELY  -2/2 EtOH abuse  - baseline Cr approx 0.9-1.0  -Gentle IVF as pt's BNP is elevated; salt tabs  -repeat BMP prior to DC     ETOH Abuse  -reports 6 packs daily  -p.r.n. Xanax, pt within 48 hr period since last drink  -thiamine     Multivessel CAD  -s/p CABG (4.3.23) - LIMA to LAD, rSVG to OM1, rSVG to PDA with Dr. Finley  -reports recent f/u with Dr. Finley  -aspirin, atorvastatin, metoprolol  -hold furosemide for now in setting of LOVELY, appears dry   -PT/OT     PAF/Flutter with RVR s/p DCCV  -CHADsVASc - 3   -amio, metoprolol  -no AC s/p (4.3.23) - Ligation of SILVANO      Severe AS  -TAVR Evaluation on Outpatient Basis     HTN  -valsartan     Anemia  -Chronic due to myelosuppression from ETOH abuse   _____________________________________________________________________________      DISCHARGE MED REC     Current Discharge Medication List        START taking these medications    Details   nitrofurantoin, macrocrystal-monohydrate, (MACROBID) 100 MG capsule Take 1 capsule (100 mg total) by mouth 2 (two) times daily. for 7 days  Qty: 14 capsule, Refills: 0      sodium chloride 1 gram tablet Take 1 tablet (1 g total) by mouth once daily. for 14 days  Qty: 14 tablet, Refills: 0           CONTINUE these medications which have NOT CHANGED    Details   amiodarone (PACERONE) 200 MG Tab Take 1 tablet (200 mg total) by mouth once daily.  Qty: 30 tablet, Refills: 1      atorvastatin (LIPITOR) 10 MG tablet Take 4 tablets (40 mg total) by mouth once daily.  Qty: 30 tablet, Refills: 0      furosemide (LASIX) 40 MG tablet Take 1 tablet (40 mg total) by mouth once daily.  Qty: 30 tablet, Refills: 0      metoprolol succinate (TOPROL-XL) 50 MG 24 hr tablet Take 1 tablet (50 mg total) by mouth once daily.  Qty: 30 tablet, Refills: 0    Comments: .      QUEtiapine (SEROQUEL) 100 MG  Tab Take 2 tablets (200 mg total) by mouth nightly.  Qty: 30 tablet, Refills: 1    Associated Diagnoses: Insomnia, unspecified type      valsartan (DIOVAN) 160 MG tablet Take 1 tablet (160 mg total) by mouth 2 (two) times daily.  Qty: 180 tablet, Refills: 3    Comments: .      aspirin (ECOTRIN) 81 MG EC tablet Take 1 tablet (81 mg total) by mouth once daily.  Qty: 30 tablet, Refills: 0      thiamine 100 MG tablet Take 100 mg by mouth once daily.           STOP taking these medications       amLODIPine (NORVASC) 10 MG tablet Comments:   Reason for Stopping:         hydrOXYzine HCL (ATARAX) 10 MG Tab Comments:   Reason for Stopping:         traZODone (DESYREL) 50 MG tablet Comments:   Reason for Stopping:              CONSULTS     FOLLOW UP      Follow-up Information       LOREN Petersen Follow up on 6/27/2023.    Specialty: Family Medicine  Why: 6/27/23 @ 8:45 a.m. Spoke to Esterlla.  Patient had an appointment with Darlene Willams.  Contact information:  [address] [facility] [address]  [phone]                           DISCHARGE INSTRUCTIONS     Explained in detail to the patient about the discharge plan, medications, and follow-up visits. Pt understands and agrees with the treatment plan.  Discharged Condition: stable  Diet as tolerated  Activities as tolerated  Discharge to: home      TIME SPENT ON DISCHARGE   35 minutes        Thairy G Reyes, MD  Internal Medicine  Department of Hospital Medicine [facility]      This document was created using electronic dictation services.  Please excuse any errors that may have been made.  Contact me if any questions regarding documentation to clarify verbiage.

## 2023-06-13 NOTE — PLAN OF CARE
Problem: Adult Inpatient Plan of Care  Goal: Plan of Care Review  Outcome: Ongoing, Progressing  Flowsheets (Taken 6/13/2023 1023)  Plan of Care Reviewed With: patient  Goal: Patient-Specific Goal (Individualized)  Outcome: Ongoing, Progressing  Flowsheets (Taken 6/13/2023 1023)  Anxieties, Fears or Concerns: None verbalized, pt drowsy this am  Individualized Care Needs: IV re-hydration, Monitor Na+, Monitor orientation and BP  Goal: Absence of Hospital-Acquired Illness or Injury  Outcome: Ongoing, Progressing  Intervention: Identify and Manage Fall Risk  Flowsheets (Taken 6/13/2023 1023)  Safety Promotion/Fall Prevention:   assistive device/personal item within reach   bed alarm set   diversional activities provided   lighting adjusted   medications reviewed   muscle strengthening facilitated   nonskid shoes/socks when out of bed   instructed to call staff for mobility  Intervention: Prevent Skin Injury  Flowsheets (Taken 6/13/2023 1023)  Body Position: 30 degrees  Skin Protection:   adhesive use limited   incontinence pads utilized   skin sealant/moisture barrier applied   skin-to-device areas padded   skin-to-skin areas padded   transparent dressing maintained  Intervention: Prevent and Manage VTE (Venous Thromboembolism) Risk  Flowsheets (Taken 6/13/2023 1023)  Activity Management: Walk with assistive devise and /or staff member - L3  VTE Prevention/Management:   bleeding risk assessed   bleeding precations maintained   ambulation promoted   fluids promoted   intravenous hydration   ROM (active) performed  Range of Motion: ROM (range of motion) performed  Intervention: Prevent Infection  Flowsheets (Taken 6/13/2023 1023)  Infection Prevention:   cohorting utilized   hand hygiene promoted   single patient room provided  Goal: Optimal Comfort and Wellbeing  Outcome: Ongoing, Progressing  Intervention: Monitor Pain and Promote Comfort  Flowsheets (Taken 6/13/2023 1023)  Pain Management Interventions:    diversional activity provided   medication offered   pillow support provided   position adjusted   relaxation techniques promoted   quiet environment facilitated  Intervention: Provide Person-Centered Care  Flowsheets (Taken 6/13/2023 1023)  Trust Relationship/Rapport:   care explained   choices provided   empathic listening provided   questions answered   thoughts/feelings acknowledged     Problem: Impaired Wound Healing  Goal: Optimal Wound Healing  Outcome: Ongoing, Progressing  Intervention: Promote Wound Healing  Flowsheets (Taken 6/13/2023 1023)  Oral Nutrition Promotion:   calorie-dense foods provided   calorie-dense liquids provided   safe use of adaptive equipment encouraged  Sleep/Rest Enhancement:   awakenings minimized   consistent schedule promoted   relaxation techniques promoted  Activity Management: Walk with assistive devise and /or staff member - L3  Pain Management Interventions:   diversional activity provided   medication offered   pillow support provided   position adjusted   relaxation techniques promoted   quiet environment facilitated     Problem: Infection  Goal: Absence of Infection Signs and Symptoms  Outcome: Ongoing, Progressing  Intervention: Prevent or Manage Infection  Flowsheets (Taken 6/13/2023 1023)  Fever Reduction/Comfort Measures:   lightweight bedding   lightweight clothing  Infection Management: aseptic technique maintained  Isolation Precautions:   protective   precautions maintained     Problem: Skin Injury Risk Increased  Goal: Skin Health and Integrity  Outcome: Ongoing, Progressing  Intervention: Optimize Skin Protection  Flowsheets (Taken 6/13/2023 1023)  Pressure Reduction Techniques:   frequent weight shift encouraged   heels elevated off bed   weight shift assistance provided  Pressure Reduction Devices: positioning supports utilized  Skin Protection:   adhesive use limited   incontinence pads utilized   skin sealant/moisture barrier applied   skin-to-device areas  padded   skin-to-skin areas padded   transparent dressing maintained  Head of Bed (HOB) Positioning: HOB at 30-45 degrees  Intervention: Promote and Optimize Oral Intake  Flowsheets (Taken 6/13/2023 1023)  Oral Nutrition Promotion:   calorie-dense foods provided   calorie-dense liquids provided   safe use of adaptive equipment encouraged     Problem: Fluid Volume Excess  Goal: Fluid Balance  Outcome: Ongoing, Progressing  Intervention: Monitor and Manage Hypervolemia  Flowsheets (Taken 6/13/2023 1023)  Skin Protection:   adhesive use limited   incontinence pads utilized   skin sealant/moisture barrier applied   skin-to-device areas padded   skin-to-skin areas padded   transparent dressing maintained  Fluid/Electrolyte Management: fluids provided     Problem: Electrolyte Imbalance  Goal: Electrolyte Balance  Outcome: Ongoing, Progressing  Intervention: Monitor and Manage Electrolyte Imbalance  Flowsheets (Taken 6/13/2023 1023)  Fluid/Electrolyte Management: fluids provided     Problem: Fall Injury Risk  Goal: Absence of Fall and Fall-Related Injury  Outcome: Ongoing, Progressing  Intervention: Identify and Manage Contributors  Flowsheets (Taken 6/13/2023 1023)  Self-Care Promotion:   independence encouraged   BADL personal objects within reach   BADL personal routines maintained   safe use of adaptive equipment encouraged  Medication Review/Management: medications reviewed  Intervention: Promote Injury-Free Environment  Flowsheets (Taken 6/13/2023 1023)  Safety Promotion/Fall Prevention:   assistive device/personal item within reach   bed alarm set   diversional activities provided   lighting adjusted   medications reviewed   muscle strengthening facilitated   nonskid shoes/socks when out of bed   instructed to call staff for mobility

## 2023-06-13 NOTE — PLAN OF CARE
Problem: Adult Inpatient Plan of Care  Goal: Plan of Care Review  Outcome: Ongoing, Progressing  Goal: Patient-Specific Goal (Individualized)  6/13/2023 0013 by Anastashia Gene, LPN  Flowsheets (Taken 6/13/2023 0013)  Anxieties, Fears or Concerns: anxious about detoxing  Individualized Care Needs: IV hydration  6/13/2023 0012 by Shonda Mills LPN  Outcome: Ongoing, Progressing  Goal: Absence of Hospital-Acquired Illness or Injury  Outcome: Ongoing, Progressing  Intervention: Identify and Manage Fall Risk  Flowsheets (Taken 6/13/2023 0013)  Safety Promotion/Fall Prevention:   assistive device/personal item within reach   bed alarm set   nonskid shoes/socks when out of bed   side rails raised x 2  Intervention: Prevent Skin Injury  Flowsheets (Taken 6/13/2023 0013)  Body Position: supine  Skin Protection:   adhesive use limited   incontinence pads utilized   tubing/devices free from skin contact  Goal: Optimal Comfort and Wellbeing  Outcome: Ongoing, Progressing  Goal: Readiness for Transition of Care  Outcome: Ongoing, Progressing     Problem: Impaired Wound Healing  Goal: Optimal Wound Healing  Outcome: Ongoing, Progressing     Problem: Infection  Goal: Absence of Infection Signs and Symptoms  Outcome: Ongoing, Progressing  Intervention: Prevent or Manage Infection  Flowsheets (Taken 6/13/2023 0012)  Infection Management: aseptic technique maintained  Isolation Precautions: protective     Problem: Skin Injury Risk Increased  Goal: Skin Health and Integrity  Outcome: Ongoing, Progressing  Intervention: Optimize Skin Protection  6/13/2023 0013 by Shonda Mills LPN  Flowsevelin (Taken 6/13/2023 0013)  Pressure Reduction Techniques: frequent weight shift encouraged  Skin Protection:   adhesive use limited   incontinence pads utilized   tubing/devices free from skin contact  Head of Bed (HOB) Positioning: HOB at 20-30 degrees  6/13/2023 0012 by Shonda Mills LPN  Flowsevelin (Taken 6/13/2023  0012)  Pressure Reduction Techniques: frequent weight shift encouraged  Intervention: Promote and Optimize Oral Intake  Flowsheets (Taken 6/13/2023 0012)  Oral Nutrition Promotion: safe use of adaptive equipment encouraged     Problem: Fluid Volume Excess  Goal: Fluid Balance  Outcome: Ongoing, Progressing  Intervention: Monitor and Manage Hypervolemia  6/13/2023 0013 by Shonda Mills LPN  Flowsheets (Taken 6/13/2023 0013)  Skin Protection:   adhesive use limited   incontinence pads utilized   tubing/devices free from skin contact  Fluid/Electrolyte Management: fluids provided  6/13/2023 0012 by Shonda Mills LPN  Flowsheets (Taken 6/13/2023 0012)  Fluid/Electrolyte Management: fluids provided     Problem: Electrolyte Imbalance  Goal: Electrolyte Balance  Outcome: Ongoing, Progressing  Intervention: Monitor and Manage Electrolyte Imbalance  6/13/2023 0013 by Shonda Mills LPN  Flowsheets (Taken 6/13/2023 0013)  Fluid/Electrolyte Management: fluids provided  6/13/2023 0012 by Shonda Mills LPN  Flowsheets (Taken 6/13/2023 0012)  Fluid/Electrolyte Management: fluids provided

## 2023-06-14 LAB — BACTERIA UR CULT: ABNORMAL

## 2023-06-27 ENCOUNTER — OFFICE VISIT (OUTPATIENT)
Dept: FAMILY MEDICINE | Facility: CLINIC | Age: 68
End: 2023-06-27
Payer: MEDICARE

## 2023-06-27 VITALS
OXYGEN SATURATION: 97 % | WEIGHT: 176.38 LBS | TEMPERATURE: 98 F | DIASTOLIC BLOOD PRESSURE: 62 MMHG | BODY MASS INDEX: 22.63 KG/M2 | HEART RATE: 61 BPM | SYSTOLIC BLOOD PRESSURE: 100 MMHG | HEIGHT: 74 IN | RESPIRATION RATE: 18 BRPM

## 2023-06-27 DIAGNOSIS — G47.00 INSOMNIA, UNSPECIFIED TYPE: ICD-10-CM

## 2023-06-27 DIAGNOSIS — I50.32 CHRONIC DIASTOLIC CONGESTIVE HEART FAILURE: Primary | ICD-10-CM

## 2023-06-27 DIAGNOSIS — E87.1 HYPONATREMIA: ICD-10-CM

## 2023-06-27 DIAGNOSIS — F10.10 ETOH ABUSE: ICD-10-CM

## 2023-06-27 DIAGNOSIS — I48.91 ATRIAL FIBRILLATION WITH RVR: ICD-10-CM

## 2023-06-27 PROCEDURE — 99214 OFFICE O/P EST MOD 30 MIN: CPT | Mod: ,,, | Performed by: NURSE PRACTITIONER

## 2023-06-27 PROCEDURE — 99214 PR OFFICE/OUTPT VISIT, EST, LEVL IV, 30-39 MIN: ICD-10-PCS | Mod: ,,, | Performed by: NURSE PRACTITIONER

## 2023-06-27 RX ORDER — AMIODARONE HYDROCHLORIDE 200 MG/1
200 TABLET ORAL DAILY
Qty: 30 TABLET | Refills: 3 | Status: SHIPPED | OUTPATIENT
Start: 2023-06-27 | End: 2023-06-27

## 2023-06-27 RX ORDER — VALSARTAN 160 MG/1
160 TABLET ORAL 2 TIMES DAILY
Qty: 180 TABLET | Refills: 3 | Status: SHIPPED | OUTPATIENT
Start: 2023-06-27 | End: 2023-06-27

## 2023-06-27 RX ORDER — ATORVASTATIN CALCIUM 40 MG/1
40 TABLET, FILM COATED ORAL
COMMUNITY
Start: 2023-06-21 | End: 2023-06-27 | Stop reason: SDUPTHER

## 2023-06-27 RX ORDER — METOPROLOL SUCCINATE 50 MG/1
50 TABLET, EXTENDED RELEASE ORAL DAILY
Qty: 30 TABLET | Refills: 3 | Status: SHIPPED | OUTPATIENT
Start: 2023-06-27 | End: 2023-11-01

## 2023-06-27 RX ORDER — QUETIAPINE FUMARATE 100 MG/1
200 TABLET, FILM COATED ORAL NIGHTLY
Qty: 60 TABLET | Refills: 3 | Status: SHIPPED | OUTPATIENT
Start: 2023-06-27 | End: 2023-10-27

## 2023-06-27 RX ORDER — AMIODARONE HYDROCHLORIDE 200 MG/1
200 TABLET ORAL DAILY
Qty: 30 TABLET | Refills: 3 | Status: ON HOLD | OUTPATIENT
Start: 2023-06-27 | End: 2023-12-26

## 2023-06-27 RX ORDER — QUETIAPINE FUMARATE 100 MG/1
200 TABLET, FILM COATED ORAL NIGHTLY
Qty: 60 TABLET | Refills: 3 | Status: SHIPPED | OUTPATIENT
Start: 2023-06-27 | End: 2023-06-27

## 2023-06-27 RX ORDER — METOPROLOL SUCCINATE 50 MG/1
50 TABLET, EXTENDED RELEASE ORAL DAILY
Qty: 30 TABLET | Refills: 3 | Status: SHIPPED | OUTPATIENT
Start: 2023-06-27 | End: 2023-06-27

## 2023-06-27 RX ORDER — VALSARTAN 160 MG/1
160 TABLET ORAL 2 TIMES DAILY
Qty: 180 TABLET | Refills: 3 | Status: ON HOLD | OUTPATIENT
Start: 2023-06-27 | End: 2023-12-26 | Stop reason: HOSPADM

## 2023-06-27 RX ORDER — ATORVASTATIN CALCIUM 40 MG/1
40 TABLET, FILM COATED ORAL NIGHTLY
Qty: 30 TABLET | Refills: 2 | Status: ON HOLD | OUTPATIENT
Start: 2023-06-27 | End: 2023-12-26

## 2023-06-27 RX ORDER — ATORVASTATIN CALCIUM 40 MG/1
40 TABLET, FILM COATED ORAL NIGHTLY
Qty: 30 TABLET | Refills: 2 | Status: SHIPPED | OUTPATIENT
Start: 2023-06-27 | End: 2023-06-27

## 2023-06-27 NOTE — PROGRESS NOTES
SUBJECTIVE:     History of Present Illness      Chief Complaint: Follow-up (Improved sleep )    HPI:  Patient is a 67 y.o. year old male who presents to clinic for  hospital FOLLOW UP .   67-year-old male with a past medical history of ETOH abuse, CAD status post CABG 04/03/2023 with Dr. Finley, thrombocytopenia, aortic stenosis, hypertension, atrial flutter/fibrillation .   PT recently seen 2 weeks ago in ED with complaint of weakness and drowsiness that started 1 week ago. In the ED sodium found to be 122 and he was admitted for management of hyponatremia in the setting of EtOH abuse. Pt consumes a 6 pack daily.    Review of Systems:    Review of Systems    12 point review of systems conducted, negative except as stated in the history of present illness. See HPI for details.     Previous History      Review of patient's allergies indicates:  No Known Allergies    Past Medical History:   Diagnosis Date    Atrial flutter     CHF (congestive heart failure)     Coronary artery disease     Hypertension     Myocardial infarction     SOB (shortness of breath)     at times     Current Outpatient Medications   Medication Instructions    amiodarone (PACERONE) 200 mg, Oral, Daily    aspirin (ECOTRIN) 81 mg, Oral, Daily    atorvastatin (LIPITOR) 40 mg, Oral, Nightly    furosemide (LASIX) 40 mg, Oral, Daily    metoprolol succinate (TOPROL-XL) 50 mg, Oral, Daily    QUEtiapine (SEROQUEL) 200 mg, Oral, Nightly    sodium chloride 1 g, Oral, Daily    thiamine 100 mg, Oral, Daily    valsartan (DIOVAN) 160 mg, Oral, 2 times daily     Past Surgical History:   Procedure Laterality Date    CATARACT EXTRACTION Bilateral     CORONARY ARTERY BYPASS GRAFT (CABG) N/A 4/3/2023    Procedure: CORONARY ARTERY BYPASS GRAFT (CABG);  Surgeon: Deuce Finley IV, MD;  Location: [facility];  Service: Cardiovascular;  Laterality: N/A;  WITH LLAA //  ECHO NOTIFIED    LEFT HEART CATHETERIZATION N/A 03/15/2023    Procedure: CATHETERIZATION, HEART, LEFT;  " Surgeon: Sreedhar Herrera MD;  Location: [facility] CATH LAB;  Service: Cardiology;  Laterality: N/A;  [facility] via [facility]     Family History   Problem Relation Age of Onset    Heart attack Mother        Social History     Tobacco Use    Smoking status: Never     Passive exposure: Never    Smokeless tobacco: Never   Substance Use Topics    Alcohol use: Yes     Alcohol/week: 84.0 standard drinks     Types: 84 Cans of beer per week     Comment: alcoholic, daily, beer    Drug use: Yes     Frequency: 3.0 times per week     Types: Hydrocodone        Health Maintenance      Health Maintenance   Topic Date Due    Hepatitis C Screening  Never done    TETANUS VACCINE  Never done    High Dose Statin  06/27/2024    Lipid Panel  01/18/2028       OBJECTIVE:     Physical Exam      Vital Signs Reviewed   Visit Vitals  /62 (BP Location: Left arm)   Pulse 61   Temp 98.1 °F (36.7 °C)   Resp 18   Ht 6' 2" (1.88 m)   Wt 80 kg (176 lb 6.4 oz)   SpO2 97%   BMI 22.65 kg/m²       Physical Exam    Physical Exam:  General: Alert, well nourished, no acute distress, non-toxic appearing.   Eyes: Anicteric sclera, without conjunctival injection, normal lids, no purulent drainage, EOMs grossly intact.   Ears: No tragal tenderness. Tympanic membranes intact, pearly grey, without effusion or erythema and with a positive light reflex.   Mouth: Posterior pharynx without erythema. No exudate, ulcerations, or lesion. No tonsillar swelling.   Neck: Supple, full ROM, no rigidity, no cervical adenopathy.   Cardio: Normal rate and rhythm    Resp: Respirations even and unlabored, clear to auscultation bilaterally.   Abd: No ecchymosis or distension. Normal bowel sounds in all 4 quadrants. No tenderness to palpation. No rebound tenderness or guarding. No CVA tenderness.   Skin: No rashes or open lesions noted.   MSK: No swelling. No abrasions or signs of trauma. Ambulating without assistance.   Neuro: Alert,oriented No focal deficits noted. Facial expressions " even.   Psych: Cooperative, Normal affect        Labs     Results for orders placed or performed during the hospital encounter of 06/12/23   Urine culture    Specimen: Urine   Result Value Ref Range    Urine Culture >/= 100,000 colonies/ml Escherichia coli (A)        Susceptibility    Escherichia coli -  (no method available)     Amox/K Clav <=2 Sensitive      Ampicillin <=2 Sensitive      Cefepime <=0.12 Sensitive      Ceftriaxone <=0.25 Sensitive      Cefuroxime 4 Sensitive      Ciprofloxacin <=0.25 Sensitive      Gentamicin <=1 Sensitive      Levofloxacin <=0.12 Sensitive      Meropenem <=0.25 Sensitive      Nitrofurantoin <=16 Sensitive      Piperacillin/Tazobactam <=4 Sensitive      Trimethoprim/Sulfamethoxazole <=20 Sensitive      Tetracycline <=1 Sensitive      Tobramycin <=1 Sensitive    Comprehensive metabolic panel   Result Value Ref Range    Sodium Level 122 (L) 136 - 145 mmol/L    Potassium Level 5.1 3.5 - 5.1 mmol/L    Chloride 88 (L) 98 - 107 mmol/L    Carbon Dioxide 24 23 - 31 mmol/L    Glucose Level 117 (H) 82 - 115 mg/dL    Blood Urea Nitrogen 36.4 (H) 8.4 - 25.7 mg/dL    Creatinine 1.71 (H) 0.73 - 1.18 mg/dL    Calcium Level Total 8.9 8.8 - 10.0 mg/dL    Protein Total 6.8 5.8 - 7.6 gm/dL    Albumin Level 3.0 (L) 3.4 - 4.8 g/dL    Globulin 3.8 (H) 2.4 - 3.5 gm/dL    Albumin/Globulin Ratio 0.8 (L) 1.1 - 2.0 ratio    Bilirubin Total 0.9 <=1.5 mg/dL    Alkaline Phosphatase 122 40 - 150 unit/L    Alanine Aminotransferase 14 0 - 55 unit/L    Aspartate Aminotransferase 18 5 - 34 unit/L    eGFR 43 mls/min/1.73/m2   TSH   Result Value Ref Range    Thyroid Stimulating Hormone 3.312 0.350 - 4.940 uIU/mL   CBC with Differential   Result Value Ref Range    WBC 6.06 4.50 - 11.50 x10(3)/mcL    RBC 3.62 (L) 4.70 - 6.10 x10(6)/mcL    Hgb 9.9 (L) 14.0 - 18.0 g/dL    Hct 31.4 (L) 42.0 - 52.0 %    MCV 86.7 80.0 - 94.0 fL    MCH 27.3 27.0 - 31.0 pg    MCHC 31.5 (L) 33.0 - 36.0 g/dL    RDW 15.3 11.5 - 17.0 %    Platelet  167 130 - 400 x10(3)/mcL    MPV 9.3 7.4 - 10.4 fL    Neut % 78.1 %    Lymph % 12.4 %    Mono % 7.8 %    Eos % 1.2 %    Basophil % 0.2 %    Lymph # 0.75 0.6 - 4.6 x10(3)/mcL    Neut # 4.74 2.1 - 9.2 x10(3)/mcL    Mono # 0.47 0.1 - 1.3 x10(3)/mcL    Eos # 0.07 0 - 0.9 x10(3)/mcL    Baso # 0.01 <=0.2 x10(3)/mcL    IG# 0.02 0 - 0.04 x10(3)/mcL    IG% 0.3 %   Brain natriuretic peptide   Result Value Ref Range    Natriuretic Peptide 1,072.7 (H) <=100.0 pg/mL   Ethanol   Result Value Ref Range    Ethanol Level <10.0 <=10.0 mg/dL   Urinalysis, Reflex to Urine Culture    Specimen: Urine   Result Value Ref Range    Color, UA Yellow Yellow, Light-Yellow, Dark Yellow, Lauren, Straw    Appearance, UA Slightly Cloudy (A) Clear    Specific Gravity, UA 1.015     pH, UA 5.5 5.0 - 8.5    Protein, UA 30 (A) Negative mg/dL    Glucose, UA Negative Negative, Normal mg/dL    Ketones, UA Trace (A) Negative mg/dL    Blood, UA Small (A) Negative unit/L    Bilirubin, UA Negative Negative mg/dL    Urobilinogen, UA 0.2 0.2, 1.0, Normal mg/dL    Nitrites, UA Negative Negative    Leukocyte Esterase, UA Large (A) Negative unit/L   Basic Metabolic Panel   Result Value Ref Range    Sodium Level 123 (L) 136 - 145 mmol/L    Potassium Level 5.8 (H) 3.5 - 5.1 mmol/L    Chloride 90 (L) 98 - 107 mmol/L    Carbon Dioxide 21 (L) 23 - 31 mmol/L    Glucose Level 118 (H) 82 - 115 mg/dL    Blood Urea Nitrogen 37.5 (H) 8.4 - 25.7 mg/dL    Creatinine 1.50 (H) 0.73 - 1.18 mg/dL    BUN/Creatinine Ratio 25     Calcium Level Total 8.7 (L) 8.8 - 10.0 mg/dL    Anion Gap 12.0 mEq/L    eGFR 50 mls/min/1.73/m2   Urinalysis, Microscopic   Result Value Ref Range    Bacteria, UA Moderate (A) None Seen, Rare, Occasional /HPF    RBC, UA None Seen None Seen, 0-2, 3-5, 0-5 /HPF    WBC, UA 11-20 (A) None Seen, 0-2, 3-5, 0-5 /HPF    Squamous Epithelial Cells, UA None Seen None Seen, Rare, Occasional, Occ /HPF   Basic metabolic panel   Result Value Ref Range    Sodium Level 128 (L)  136 - 145 mmol/L    Potassium Level 4.9 3.5 - 5.1 mmol/L    Chloride 94 (L) 98 - 107 mmol/L    Carbon Dioxide 24 23 - 31 mmol/L    Glucose Level 93 82 - 115 mg/dL    Blood Urea Nitrogen 36.3 (H) 8.4 - 25.7 mg/dL    Creatinine 1.29 (H) 0.73 - 1.18 mg/dL    BUN/Creatinine Ratio 28     Calcium Level Total 8.5 (L) 8.8 - 10.0 mg/dL    Anion Gap 10.0 mEq/L    eGFR 60 mls/min/1.73/m2   Basic Metabolic Panel   Result Value Ref Range    Sodium Level 129 (L) 136 - 145 mmol/L    Potassium Level 4.1 3.5 - 5.1 mmol/L    Chloride 96 (L) 98 - 107 mmol/L    Carbon Dioxide 22 (L) 23 - 31 mmol/L    Glucose Level 136 (H) 82 - 115 mg/dL    Blood Urea Nitrogen 36.7 (H) 8.4 - 25.7 mg/dL    Creatinine 1.61 (H) 0.73 - 1.18 mg/dL    BUN/Creatinine Ratio 23     Calcium Level Total 8.6 (L) 8.8 - 10.0 mg/dL    Anion Gap 11.0 mEq/L    eGFR 46 mls/min/1.73/m2       Chemistry:  Lab Results   Component Value Date     (L) 06/13/2023    K 4.1 06/13/2023    CHLORIDE 96 (L) 06/13/2023    BUN 36.7 (H) 06/13/2023    CREATININE 1.61 (H) 06/13/2023    EGFRNORACEVR 46 06/13/2023    GLUCOSE 136 (H) 06/13/2023    CALCIUM 8.6 (L) 06/13/2023    ALKPHOS 122 06/12/2023    LABPROT 6.8 06/12/2023    ALBUMIN 3.0 (L) 06/12/2023    AST 18 06/12/2023    ALT 14 06/12/2023    MG 1.80 05/04/2023    PHOS 4.3 04/14/2023    IUNPQVMI26JW 19.2 (L) 01/18/2023    TSH 3.312 06/12/2023    PSA 1.25 01/18/2023        Lab Results   Component Value Date    HGBA1C 5.3 01/18/2023        Hematology:  Lab Results   Component Value Date    WBC 6.06 06/12/2023    HGB 9.9 (L) 06/12/2023    HCT 31.4 (L) 06/12/2023     06/12/2023       Lipid Panel:  Lab Results   Component Value Date    CHOL 189 01/18/2023    HDL 84 (H) 01/18/2023    LDL 95.00 01/18/2023    TRIG 49 01/18/2023    TOTALCHOLEST 2 01/18/2023        Urine:  Lab Results   Component Value Date    COLORUA Yellow 06/12/2023    APPEARANCEUA Slightly Cloudy (A) 06/12/2023    SGUA 1.015 06/12/2023    PHUA 5.5 06/12/2023     PROTEINUA 30 (A) 06/12/2023    GLUCOSEUA Negative 06/12/2023    KETONESUA Trace (A) 06/12/2023    BLOODUA Small (A) 06/12/2023    NITRITESUA Negative 06/12/2023    LEUKOCYTESUR Large (A) 06/12/2023    RBCUA None Seen 06/12/2023    WBCUA 11-20 (A) 06/12/2023    BACTERIA Moderate (A) 06/12/2023    CREATRANDUR 18.2 (L) 01/18/2023         Assessment            ICD-10-CM ICD-9-CM   1. Chronic diastolic congestive heart failure  I50.32 428.32     428.0   2. Insomnia, unspecified type  G47.00 780.52   3. Atrial fibrillation with RVR  I48.91 427.31   4. ETOH abuse  F10.10 305.00   5. Hyponatremia  E87.1 276.1       Plan       1. Insomnia, unspecified type  - QUEtiapine (SEROQUEL) 100 MG Tab; Take 2 tablets (200 mg total) by mouth nightly.  Dispense: 60 tablet; Refill: 3    2. Chronic diastolic congestive heart failure  Stable patient currently on amiodarone  and Toprol keep routine appointment with Cardiology  3. Atrial fibrillation with RVR  See above   4. ETOH abuse    5. Hyponatremia  - Comprehensive Metabolic Panel; Future    Orders Placed This Encounter    Comprehensive Metabolic Panel    QUEtiapine (SEROQUEL) 100 MG Tab    amiodarone (PACERONE) 200 MG Tab    metoprolol succinate (TOPROL-XL) 50 MG 24 hr tablet    valsartan (DIOVAN) 160 MG tablet    atorvastatin (LIPITOR) 40 MG tablet      Medication List with Changes/Refills   Current Medications    ASPIRIN (ECOTRIN) 81 MG EC TABLET    Take 1 tablet (81 mg total) by mouth once daily.    FUROSEMIDE (LASIX) 40 MG TABLET    Take 1 tablet (40 mg total) by mouth once daily.    SODIUM CHLORIDE 1 GRAM TABLET    Take 1 tablet (1 g total) by mouth once daily. for 14 days    THIAMINE 100 MG TABLET    Take 100 mg by mouth once daily.   Changed and/or Refilled Medications    Modified Medication Previous Medication    AMIODARONE (PACERONE) 200 MG TAB amiodarone (PACERONE) 200 MG Tab       Take 1 tablet (200 mg total) by mouth once daily.    Take 1 tablet (200 mg total) by mouth  once daily.    ATORVASTATIN (LIPITOR) 40 MG TABLET atorvastatin (LIPITOR) 40 MG tablet       Take 1 tablet (40 mg total) by mouth every evening.    Take 40 mg by mouth.    METOPROLOL SUCCINATE (TOPROL-XL) 50 MG 24 HR TABLET metoprolol succinate (TOPROL-XL) 50 MG 24 hr tablet       Take 1 tablet (50 mg total) by mouth once daily.    Take 1 tablet (50 mg total) by mouth once daily.    QUETIAPINE (SEROQUEL) 100 MG TAB QUEtiapine (SEROQUEL) 100 MG Tab       Take 2 tablets (200 mg total) by mouth nightly.    Take 2 tablets (200 mg total) by mouth nightly.    VALSARTAN (DIOVAN) 160 MG TABLET valsartan (DIOVAN) 160 MG tablet       Take 1 tablet (160 mg total) by mouth 2 (two) times daily.    Take 1 tablet (160 mg total) by mouth 2 (two) times daily.   Discontinued Medications    ATORVASTATIN (LIPITOR) 10 MG TABLET    Take 4 tablets (40 mg total) by mouth once daily.       Follow up for Routine appointment as scheduled.   Follow up for Routine appointment as scheduled. In addition to their scheduled follow up, the patient has also been instructed to follow up on as needed basis.   Future Appointments   Date Time Provider Department Center   8/8/2023 10:30 AM [facility]

## 2023-06-28 ENCOUNTER — HOSPITAL ENCOUNTER (EMERGENCY)
Facility: HOSPITAL | Age: 68
Discharge: HOME | End: 2023-06-28
Attending: STUDENT IN AN ORGANIZED HEALTH CARE EDUCATION/TRAINING PROGRAM
Payer: MEDICARE

## 2023-06-28 VITALS
OXYGEN SATURATION: 94 % | BODY MASS INDEX: 23.1 KG/M2 | DIASTOLIC BLOOD PRESSURE: 61 MMHG | HEART RATE: 73 BPM | RESPIRATION RATE: 18 BRPM | TEMPERATURE: 99 F | SYSTOLIC BLOOD PRESSURE: 118 MMHG | WEIGHT: 180 LBS | HEIGHT: 74 IN

## 2023-06-28 DIAGNOSIS — I50.9 ACUTE ON CHRONIC CONGESTIVE HEART FAILURE, UNSPECIFIED HEART FAILURE TYPE: Primary | ICD-10-CM

## 2023-06-28 DIAGNOSIS — R06.02 SOB (SHORTNESS OF BREATH): ICD-10-CM

## 2023-06-28 LAB
ALBUMIN SERPL-MCNC: 3.7 G/DL (ref 3.4–4.8)
ALBUMIN/GLOB SERPL: 1.2 RATIO (ref 1.1–2)
ALP SERPL-CCNC: 87 UNIT/L (ref 40–150)
ALT SERPL-CCNC: 12 UNIT/L (ref 0–55)
AST SERPL-CCNC: 17 UNIT/L (ref 5–34)
BASOPHILS # BLD AUTO: 0.01 X10(3)/MCL
BASOPHILS NFR BLD AUTO: 0.1 %
BILIRUBIN DIRECT+TOT PNL SERPL-MCNC: 1.8 MG/DL
BNP BLD-MCNC: 1434.9 PG/ML
BUN SERPL-MCNC: 28.7 MG/DL (ref 8.4–25.7)
CALCIUM SERPL-MCNC: 8.9 MG/DL (ref 8.8–10)
CHLORIDE SERPL-SCNC: 97 MMOL/L (ref 98–107)
CO2 SERPL-SCNC: 26 MMOL/L (ref 23–31)
CREAT SERPL-MCNC: 1.2 MG/DL (ref 0.73–1.18)
EOSINOPHIL # BLD AUTO: 0.02 X10(3)/MCL (ref 0–0.9)
EOSINOPHIL NFR BLD AUTO: 0.3 %
ERYTHROCYTE [DISTWIDTH] IN BLOOD BY AUTOMATED COUNT: 16.8 % (ref 11.5–17)
GFR SERPLBLD CREATININE-BSD FMLA CKD-EPI: >60 MLS/MIN/1.73/M2
GLOBULIN SER-MCNC: 3 GM/DL (ref 2.4–3.5)
GLUCOSE SERPL-MCNC: 136 MG/DL (ref 82–115)
HCT VFR BLD AUTO: 32.3 % (ref 42–52)
HGB BLD-MCNC: 10.1 G/DL (ref 14–18)
IMM GRANULOCYTES # BLD AUTO: 0.01 X10(3)/MCL (ref 0–0.04)
IMM GRANULOCYTES NFR BLD AUTO: 0.1 %
LYMPHOCYTES # BLD AUTO: 0.31 X10(3)/MCL (ref 0.6–4.6)
LYMPHOCYTES NFR BLD AUTO: 4.5 %
MCH RBC QN AUTO: 27.2 PG (ref 27–31)
MCHC RBC AUTO-ENTMCNC: 31.3 G/DL (ref 33–36)
MCV RBC AUTO: 86.8 FL (ref 80–94)
MONOCYTES # BLD AUTO: 0.35 X10(3)/MCL (ref 0.1–1.3)
MONOCYTES NFR BLD AUTO: 5.1 %
NEUTROPHILS # BLD AUTO: 6.12 X10(3)/MCL (ref 2.1–9.2)
NEUTROPHILS NFR BLD AUTO: 89.9 %
PLATELET # BLD AUTO: 105 X10(3)/MCL (ref 130–400)
PMV BLD AUTO: 10.1 FL (ref 7.4–10.4)
POC CARDIAC TROPONIN I: 0.02 NG/ML (ref 0–0.08)
POTASSIUM SERPL-SCNC: 4.5 MMOL/L (ref 3.5–5.1)
PROT SERPL-MCNC: 6.7 GM/DL (ref 5.8–7.6)
RBC # BLD AUTO: 3.72 X10(6)/MCL (ref 4.7–6.1)
SAMPLE: NORMAL
SODIUM SERPL-SCNC: 133 MMOL/L (ref 136–145)
WBC # SPEC AUTO: 6.82 X10(3)/MCL (ref 4.5–11.5)

## 2023-06-28 PROCEDURE — 99285 EMERGENCY DEPT VISIT HI MDM: CPT | Mod: 25

## 2023-06-28 PROCEDURE — 63600175 PHARM REV CODE 636 W HCPCS: Performed by: STUDENT IN AN ORGANIZED HEALTH CARE EDUCATION/TRAINING PROGRAM

## 2023-06-28 PROCEDURE — 80053 COMPREHEN METABOLIC PANEL: CPT | Performed by: STUDENT IN AN ORGANIZED HEALTH CARE EDUCATION/TRAINING PROGRAM

## 2023-06-28 PROCEDURE — 85025 COMPLETE CBC W/AUTO DIFF WBC: CPT | Performed by: STUDENT IN AN ORGANIZED HEALTH CARE EDUCATION/TRAINING PROGRAM

## 2023-06-28 PROCEDURE — 96374 THER/PROPH/DIAG INJ IV PUSH: CPT

## 2023-06-28 PROCEDURE — 83880 ASSAY OF NATRIURETIC PEPTIDE: CPT | Performed by: STUDENT IN AN ORGANIZED HEALTH CARE EDUCATION/TRAINING PROGRAM

## 2023-06-28 PROCEDURE — 84484 ASSAY OF TROPONIN QUANT: CPT

## 2023-06-28 PROCEDURE — 93005 ELECTROCARDIOGRAM TRACING: CPT

## 2023-06-28 RX ORDER — FUROSEMIDE 10 MG/ML
40 INJECTION INTRAMUSCULAR; INTRAVENOUS
Status: COMPLETED | OUTPATIENT
Start: 2023-06-28 | End: 2023-06-28

## 2023-06-28 RX ORDER — FUROSEMIDE 20 MG/1
20 TABLET ORAL DAILY
Qty: 4 TABLET | Refills: 0 | Status: SHIPPED | OUTPATIENT
Start: 2023-06-28 | End: 2023-11-07 | Stop reason: SDUPTHER

## 2023-06-28 RX ADMIN — FUROSEMIDE 40 MG: 10 INJECTION, SOLUTION INTRAMUSCULAR; INTRAVENOUS at 01:06

## 2023-06-28 NOTE — ED PROVIDER NOTES
"Encounter Date: 6/28/2023       History     Chief Complaint   Patient presents with    Shortness of Breath     Patient reports " I can't breathe" been feeling weak for months feels tired when wakes up in the morning hx CABG X 3 three months ago . EKG done in triage     Patient is a 67-year-old white male past medical history of coronary artery disease status post three-vessel CABG in 03/2023, CHF, hypertension who presented to the ER today due to shortness of breath.  Patient states he has been short of breath for several years but states that after the CABG he had improvement.  Patient states his shortness of breath seems to be exertional and it was not accompanied by nausea, vomiting, diaphoresis or chest pain.  Patient states that over the last several days he has been feeling more short of breath.  Patient denies any worsening orthopnea.  Patient denies any notable lower extremity swelling although he does state he has been treated for this multiple times in the past.  Patient denies any fever, chills, cough, congestion, abdominal pain, diarrhea constipation.    Review of patient's allergies indicates:  No Known Allergies  Past Medical History:   Diagnosis Date    Atrial flutter     CHF (congestive heart failure)     Coronary artery disease     Hypertension     Myocardial infarction     SOB (shortness of breath)     at times     Past Surgical History:   Procedure Laterality Date    CATARACT EXTRACTION Bilateral     CORONARY ARTERY BYPASS GRAFT (CABG) N/A 4/3/2023    Procedure: CORONARY ARTERY BYPASS GRAFT (CABG);  Surgeon: Deuce Finley IV, MD;  Location: [facility];  Service: Cardiovascular;  Laterality: N/A;  WITH LLAA //  ECHO NOTIFIED    LEFT HEART CATHETERIZATION N/A 03/15/2023    Procedure: CATHETERIZATION, HEART, LEFT;  Surgeon: Sreedhar Hrerera MD;  Location: [facility] CATH LAB;  Service: Cardiology;  Laterality: N/A;  [facility] via [facility]     Family History   Problem Relation Age of Onset    Heart attack Mother  "     Social History     Tobacco Use    Smoking status: Never     Passive exposure: Never    Smokeless tobacco: Never   Substance Use Topics    Alcohol use: Yes     Alcohol/week: 84.0 standard drinks     Types: 84 Cans of beer per week     Comment: alcoholic, daily, beer    Drug use: Yes     Frequency: 3.0 times per week     Types: Hydrocodone     Review of Systems   Constitutional:  Negative for chills, fatigue and fever.   HENT:  Negative for congestion, sore throat and trouble swallowing.    Eyes:  Negative for pain and visual disturbance.   Respiratory:  Positive for shortness of breath. Negative for cough and wheezing.    Cardiovascular:  Negative for chest pain and palpitations.   Gastrointestinal:  Negative for abdominal pain, blood in stool, constipation, diarrhea, nausea and vomiting.   Genitourinary:  Negative for dysuria and hematuria.   Musculoskeletal:  Negative for back pain and myalgias.   Skin:  Negative for rash and wound.   Neurological:  Negative for seizures, syncope and headaches.   Psychiatric/Behavioral:  Negative for confusion. The patient is not nervous/anxious.      Physical Exam     Initial Vitals [06/28/23 1150]   BP Pulse Resp Temp SpO2   128/63 88 18 99.3 °F (37.4 °C) 95 %      MAP       --         Physical Exam    Nursing note and vitals reviewed.  Constitutional: He appears well-developed and well-nourished. No distress.   HENT:   Head: Normocephalic and atraumatic.   Eyes: Conjunctivae and EOM are normal. Right eye exhibits no discharge. Left eye exhibits no discharge. No scleral icterus.   Neck: No tracheal deviation present.   Normal range of motion.  Cardiovascular:  Normal rate and regular rhythm.     Exam reveals no gallop and no friction rub.       Murmur heard.  Grade 4/6 systolic murmur heard best at the right 2nd IC space parasternal border.   Pulmonary/Chest: Breath sounds normal. No respiratory distress. He has no wheezes. He has no rhonchi. He has no rales.   Abdominal:  Abdomen is soft. He exhibits no distension. There is no abdominal tenderness. There is no rebound and no guarding.   Musculoskeletal:         General: Edema present. Normal range of motion.      Cervical back: Normal range of motion.     Neurological: He is alert.   Skin: Skin is warm and dry. No rash and no abscess noted. No erythema. No pallor.   Psychiatric: His behavior is normal. Judgment normal.       ED Course   Procedures  Labs Reviewed   COMPREHENSIVE METABOLIC PANEL - Abnormal; Notable for the following components:       Result Value    Sodium Level 133 (*)     Chloride 97 (*)     Glucose Level 136 (*)     Blood Urea Nitrogen 28.7 (*)     Creatinine 1.20 (*)     Bilirubin Total 1.8 (*)     All other components within normal limits   CBC WITH DIFFERENTIAL - Abnormal; Notable for the following components:    RBC 3.72 (*)     Hgb 10.1 (*)     Hct 32.3 (*)     MCHC 31.3 (*)     Platelet 105 (*)     Lymph # 0.31 (*)     All other components within normal limits   B-TYPE NATRIURETIC PEPTIDE - Abnormal; Notable for the following components:    Natriuretic Peptide 1,434.9 (*)     All other components within normal limits   CBC W/ AUTO DIFFERENTIAL    Narrative:     The following orders were created for panel order CBC auto differential.  Procedure                               Abnormality         Status                     ---------                               -----------         ------                     CBC with Differential[[id number]]        Abnormal            Final result                 Please view results for these tests on the individual orders.   TROPONIN ISTAT   POCT TROPONIN     EKG Readings: (Independently Interpreted)   Initial Reading: No STEMI. Rhythm: Normal Sinus Rhythm. Heart Rate: 80. Ectopy: No Ectopy. Conduction: Normal. ST Segments: Normal ST Segments. T Waves: Normal.     Imaging Results              X-Ray Chest PA And Lateral (Final result)  Result time 06/28/23 12:14:47      Final  "result by Jose Dunham MD (06/28/23 12:14:47)                   Impression:      As above.      Electronically signed by: Jose Dunham  Date:    06/28/2023  Time:    12:14               Narrative:    EXAMINATION:  XR CHEST PA AND LATERAL    CLINICAL HISTORY:  Shortness of breath    TECHNIQUE:  Two views of the chest    COMPARISON:  06/12/2023    FINDINGS:  Postsurgical changes of median sternotomy remain with subsegmental atelectatic changes of the bilateral bases and small right greater than left pleural effusions.                                       Medications   furosemide injection 40 mg (40 mg Intravenous Given 6/28/23 5774)     Medical Decision Making:   Initial Assessment:   Overall well-appearing 67-year-old male SpO2 98% on room air no respiratory distress  Differential Diagnosis:   ACS, PE, pneumonia, pneumothorax, CHF  Clinical Tests:   Lab Tests: Ordered and Reviewed  The following lab test(s) were unremarkable: CBC, CMP, Troponin and BNP  Radiological Study: Ordered and Reviewed  Medical Tests: Ordered and Reviewed  ED Management:  Vital signs stable patient appears to be in no respiratory distress   SpO2 98% on room air   Lung sounds are largely clear to auscultation bilaterally   Denies orthopnea or any other symptoms   Mild lower extremity swelling patient unsure if it has been present in the past   Impressive murmur on exam it seems to have been documented on last admission as well   Patient states that he is compliant with medications but his medication bag does not have Lasix on it   Patient continues to drink beer daily and states that he puts "salt on the rim"   Suspect patient is likely overloaded  Forty IV Lasix he is given patient diurese greater than 1 L and states symptoms largely improved   Patient seems to respond well to Lasix   Labs largely reassuring   Chest x-ray noncontributory   BNP is elevated from his baseline   Troponin negative   Discussed with patient avoiding salt " going forward and beer   Also advised him to restrict water intake to less than 2 L per day and prescribed him a lower dose of his Lasix that was previously prescribed for the next 4 days   Return precautions discussed and follow up with PCP is recommended                        Clinical Impression:   Final diagnoses:  [R06.02] SOB (shortness of breath)  [I50.9] Acute on chronic congestive heart failure, unspecified heart failure type (Primary)        ED Disposition Condition    Discharge Stable          ED Prescriptions       Medication Sig Dispense Start Date End Date Auth. Provider    furosemide (LASIX) 20 MG tablet Take 1 tablet (20 mg total) by mouth once daily. for 4 days 4 tablet 6/28/2023 7/2/2023 Vikash Francois MD          Follow-up Information       Follow up With Specialties Details Why Contact Info    [facility] - Emergency Dept Emergency Medicine  If symptoms worsen [address] [facility] [address] [phone]    [facility] Schedule an appointment as soon as possible for a visit   [address] [facility] [id number] [phone]               Vikash Francois MD  06/28/23 8035

## 2023-07-21 ENCOUNTER — HOSPITAL ENCOUNTER (EMERGENCY)
Facility: HOSPITAL | Age: 68
Discharge: HOME | End: 2023-07-21
Attending: STUDENT IN AN ORGANIZED HEALTH CARE EDUCATION/TRAINING PROGRAM
Payer: MEDICARE

## 2023-07-21 VITALS
HEART RATE: 65 BPM | TEMPERATURE: 98 F | WEIGHT: 170 LBS | DIASTOLIC BLOOD PRESSURE: 66 MMHG | SYSTOLIC BLOOD PRESSURE: 126 MMHG | RESPIRATION RATE: 13 BRPM | HEIGHT: 74 IN | BODY MASS INDEX: 21.82 KG/M2 | OXYGEN SATURATION: 99 %

## 2023-07-21 DIAGNOSIS — R06.02 SHORTNESS OF BREATH: ICD-10-CM

## 2023-07-21 DIAGNOSIS — R06.02 SOB (SHORTNESS OF BREATH): Primary | ICD-10-CM

## 2023-07-21 DIAGNOSIS — I35.0 AORTIC VALVE STENOSIS, ETIOLOGY OF CARDIAC VALVE DISEASE UNSPECIFIED: ICD-10-CM

## 2023-07-21 LAB
ALBUMIN SERPL-MCNC: 3.6 G/DL (ref 3.4–4.8)
ALBUMIN/GLOB SERPL: 1.1 RATIO (ref 1.1–2)
ALP SERPL-CCNC: 91 UNIT/L (ref 40–150)
ALT SERPL-CCNC: 20 UNIT/L (ref 0–55)
AST SERPL-CCNC: 27 UNIT/L (ref 5–34)
BASOPHILS # BLD AUTO: 0.01 X10(3)/MCL
BASOPHILS NFR BLD AUTO: 0.2 %
BILIRUBIN DIRECT+TOT PNL SERPL-MCNC: 1.1 MG/DL
BNP BLD-MCNC: 1347.2 PG/ML
BUN SERPL-MCNC: 21 MG/DL (ref 8.4–25.7)
CALCIUM SERPL-MCNC: 9.1 MG/DL (ref 8.8–10)
CHLORIDE SERPL-SCNC: 95 MMOL/L (ref 98–107)
CO2 SERPL-SCNC: 21 MMOL/L (ref 23–31)
CREAT SERPL-MCNC: 0.86 MG/DL (ref 0.73–1.18)
EOSINOPHIL # BLD AUTO: 0.19 X10(3)/MCL (ref 0–0.9)
EOSINOPHIL NFR BLD AUTO: 3.6 %
ERYTHROCYTE [DISTWIDTH] IN BLOOD BY AUTOMATED COUNT: 17.1 % (ref 11.5–17)
FIO2: 27
FLUAV AG UPPER RESP QL IA.RAPID: NOT DETECTED
FLUBV AG UPPER RESP QL IA.RAPID: NOT DETECTED
GFR SERPLBLD CREATININE-BSD FMLA CKD-EPI: >60 MLS/MIN/1.73/M2
GLOBULIN SER-MCNC: 3.3 GM/DL (ref 2.4–3.5)
GLUCOSE SERPL-MCNC: 122 MG/DL (ref 82–115)
HCO3 UR-SCNC: 20.3 MMOL/L (ref 24–28)
HCT VFR BLD AUTO: 31.9 % (ref 42–52)
HGB BLD-MCNC: 10 G/DL (ref 14–18)
IMM GRANULOCYTES # BLD AUTO: 0.01 X10(3)/MCL (ref 0–0.04)
IMM GRANULOCYTES NFR BLD AUTO: 0.2 %
LYMPHOCYTES # BLD AUTO: 1.44 X10(3)/MCL (ref 0.6–4.6)
LYMPHOCYTES NFR BLD AUTO: 27 %
MCH RBC QN AUTO: 26.9 PG (ref 27–31)
MCHC RBC AUTO-ENTMCNC: 31.3 G/DL (ref 33–36)
MCV RBC AUTO: 85.8 FL (ref 80–94)
MONOCYTES # BLD AUTO: 0.39 X10(3)/MCL (ref 0.1–1.3)
MONOCYTES NFR BLD AUTO: 7.3 %
NEUTROPHILS # BLD AUTO: 3.3 X10(3)/MCL (ref 2.1–9.2)
NEUTROPHILS NFR BLD AUTO: 61.7 %
PCO2 BLDA: 21.9 MMHG (ref 35–45)
PH SMN: 7.57 [PH] (ref 7.35–7.45)
PLATELET # BLD AUTO: 114 X10(3)/MCL (ref 130–400)
PMV BLD AUTO: 10.9 FL (ref 7.4–10.4)
PO2 BLDA: 67 MMHG (ref 80–100)
POC BE: -2 MMOL/L
POC CARDIAC TROPONIN I: 0 NG/ML (ref 0–0.08)
POC CARDIAC TROPONIN I: 0 NG/ML (ref 0–0.08)
POC SATURATED O2: 96 % (ref 95–100)
POC TCO2: 21 MMOL/L (ref 23–27)
POTASSIUM SERPL-SCNC: 4.2 MMOL/L (ref 3.5–5.1)
PROT SERPL-MCNC: 6.9 GM/DL (ref 5.8–7.6)
RBC # BLD AUTO: 3.72 X10(6)/MCL (ref 4.7–6.1)
SAMPLE: ABNORMAL
SAMPLE: NORMAL
SAMPLE: NORMAL
SARS-COV-2 RNA RESP QL NAA+PROBE: NOT DETECTED
SODIUM SERPL-SCNC: 131 MMOL/L (ref 136–145)
WBC # SPEC AUTO: 5.34 X10(3)/MCL (ref 4.5–11.5)

## 2023-07-21 PROCEDURE — 99285 EMERGENCY DEPT VISIT HI MDM: CPT | Mod: 25

## 2023-07-21 PROCEDURE — 82803 BLOOD GASES ANY COMBINATION: CPT

## 2023-07-21 PROCEDURE — 93005 ELECTROCARDIOGRAM TRACING: CPT

## 2023-07-21 PROCEDURE — 36600 WITHDRAWAL OF ARTERIAL BLOOD: CPT

## 2023-07-21 PROCEDURE — 96374 THER/PROPH/DIAG INJ IV PUSH: CPT

## 2023-07-21 PROCEDURE — 0240U COVID/FLU A&B PCR: CPT | Performed by: STUDENT IN AN ORGANIZED HEALTH CARE EDUCATION/TRAINING PROGRAM

## 2023-07-21 PROCEDURE — 27000221 HC OXYGEN, UP TO 24 HOURS

## 2023-07-21 PROCEDURE — 63600175 PHARM REV CODE 636 W HCPCS: Performed by: STUDENT IN AN ORGANIZED HEALTH CARE EDUCATION/TRAINING PROGRAM

## 2023-07-21 PROCEDURE — 80053 COMPREHEN METABOLIC PANEL: CPT | Performed by: STUDENT IN AN ORGANIZED HEALTH CARE EDUCATION/TRAINING PROGRAM

## 2023-07-21 PROCEDURE — 85025 COMPLETE CBC W/AUTO DIFF WBC: CPT | Performed by: STUDENT IN AN ORGANIZED HEALTH CARE EDUCATION/TRAINING PROGRAM

## 2023-07-21 PROCEDURE — 83880 ASSAY OF NATRIURETIC PEPTIDE: CPT | Performed by: STUDENT IN AN ORGANIZED HEALTH CARE EDUCATION/TRAINING PROGRAM

## 2023-07-21 PROCEDURE — 93010 EKG 12-LEAD: ICD-10-PCS | Mod: ,,, | Performed by: INTERNAL MEDICINE

## 2023-07-21 PROCEDURE — 93010 ELECTROCARDIOGRAM REPORT: CPT | Mod: ,,, | Performed by: INTERNAL MEDICINE

## 2023-07-21 RX ORDER — FUROSEMIDE 10 MG/ML
40 INJECTION INTRAMUSCULAR; INTRAVENOUS
Status: COMPLETED | OUTPATIENT
Start: 2023-07-21 | End: 2023-07-21

## 2023-07-21 RX ADMIN — FUROSEMIDE 40 MG: 10 INJECTION, SOLUTION INTRAMUSCULAR; INTRAVENOUS at 03:07

## 2023-07-21 NOTE — ED PROVIDER NOTES
Encounter Date: 7/21/2023       History     Chief Complaint   Patient presents with    Shortness of Breath     SOB constant for 3 days. Denies chest pain.      Patient is a 67-year-old white gentleman with a past medical history of CAD status post three-vessel CABG in March of 2023, CHF with a preserved ejection fraction, severe aortic stenosis who presented to ER today due to a chronic history of shortness of breath.  Patient states it is not associated with nausea, vomiting, diaphoresis or chest pain.  Patient states he has had no notable lower extremity edema or worsening orthopnea.  Patient states he has not reduced fluid intake and does not avoid salt in his diet.  He denies any fevers, chills, cough, congestion, chest pain abdominal pain, diarrhea, constipation.  Patient states his shortness of breath is not associated with exertion.    Review of patient's allergies indicates:  No Known Allergies  Past Medical History:   Diagnosis Date    Atrial flutter     CHF (congestive heart failure)     Coronary artery disease     Hypertension     Myocardial infarction     SOB (shortness of breath)     at times     Past Surgical History:   Procedure Laterality Date    CATARACT EXTRACTION Bilateral     CORONARY ARTERY BYPASS GRAFT (CABG) N/A 4/3/2023    Procedure: CORONARY ARTERY BYPASS GRAFT (CABG);  Surgeon: Deuce Finley IV, MD;  Location: [facility] OR;  Service: Cardiovascular;  Laterality: N/A;  WITH LLAA //  ECHO NOTIFIED    LEFT HEART CATHETERIZATION N/A 03/15/2023    Procedure: CATHETERIZATION, HEART, LEFT;  Surgeon: Sreedhar Herrera MD;  Location: [facility] CATH LAB;  Service: Cardiology;  Laterality: N/A;  LHC via RRA     Family History   Problem Relation Age of Onset    Heart attack Mother      Social History     Tobacco Use    Smoking status: Never     Passive exposure: Never    Smokeless tobacco: Never   Substance Use Topics    Alcohol use: Yes     Alcohol/week: 84.0 standard drinks     Types: 84 Cans of beer per  week     Comment: alcoholic, daily, beer    Drug use: Yes     Frequency: 3.0 times per week     Types: Hydrocodone     Review of Systems   Constitutional:  Negative for chills, fatigue and fever.   HENT:  Negative for congestion, sore throat and trouble swallowing.    Eyes:  Negative for pain and visual disturbance.   Respiratory:  Positive for shortness of breath. Negative for cough and wheezing.    Cardiovascular:  Negative for chest pain and palpitations.   Gastrointestinal:  Negative for abdominal pain, blood in stool, constipation, diarrhea, nausea and vomiting.   Genitourinary:  Negative for dysuria and hematuria.   Musculoskeletal:  Negative for back pain and myalgias.   Skin:  Negative for rash and wound.   Neurological:  Negative for seizures, syncope and headaches.   Psychiatric/Behavioral:  Negative for confusion. The patient is not nervous/anxious.      Physical Exam     Initial Vitals [07/21/23 0041]   BP Pulse Resp Temp SpO2   (!) 142/72 70 18 97.8 °F (36.6 °C) 100 %      MAP       --         Physical Exam    Nursing note and vitals reviewed.  Constitutional: He appears well-developed and well-nourished. No distress.   HENT:   Head: Normocephalic and atraumatic.   Eyes: Conjunctivae and EOM are normal. Right eye exhibits no discharge. Left eye exhibits no discharge. No scleral icterus.   Neck: No tracheal deviation present.   Normal range of motion.  Cardiovascular:  Normal rate and regular rhythm.     Exam reveals no gallop and no friction rub.       Murmur heard.  3/6 systolic murmur heard best at the 2nd IC space right parasternal border.  No carotid bruits auscultated.   Pulmonary/Chest: Breath sounds normal. No respiratory distress. He has no wheezes. He has no rhonchi. He has no rales.   Musculoskeletal:         General: No edema. Normal range of motion.      Cervical back: Normal range of motion.     Neurological: He is alert.   Skin: Skin is warm and dry. No rash and no abscess noted. No  erythema. No pallor.   Psychiatric: His behavior is normal. Judgment normal.       ED Course   Procedures  Labs Reviewed   COMPREHENSIVE METABOLIC PANEL - Abnormal; Notable for the following components:       Result Value    Sodium Level 131 (*)     Chloride 95 (*)     Carbon Dioxide 21 (*)     Glucose Level 122 (*)     All other components within normal limits   B-TYPE NATRIURETIC PEPTIDE - Abnormal; Notable for the following components:    Natriuretic Peptide 1,347.2 (*)     All other components within normal limits   CBC WITH DIFFERENTIAL - Abnormal; Notable for the following components:    RBC 3.72 (*)     Hgb 10.0 (*)     Hct 31.9 (*)     MCH 26.9 (*)     MCHC 31.3 (*)     RDW 17.1 (*)     Platelet 114 (*)     MPV 10.9 (*)     All other components within normal limits   ISTAT PROCEDURE - Abnormal; Notable for the following components:    POC PH 7.574 (*)     POC PCO2 21.9 (*)     POC PO2 67 (*)     POC HCO3 20.3 (*)     POC TCO2 21 (*)     All other components within normal limits   COVID/FLU A&B PCR - Normal    Narrative:     The Xpert Xpress SARS-CoV-2/FLU/RSV plus is a rapid, multiplexed real-time PCR test intended for the simultaneous qualitative detection and differentiation of SARS-CoV-2, Influenza A, Influenza B, and respiratory syncytial virus (RSV) viral RNA in either nasopharyngeal swab or nasal swab specimens.         CBC W/ AUTO DIFFERENTIAL    Narrative:     The following orders were created for panel order CBC Auto Differential.  Procedure                               Abnormality         Status                     ---------                               -----------         ------                     CBC with Differential[[id number]]        Abnormal            Final result                 Please view results for these tests on the individual orders.   TROPONIN ISTAT   TROPONIN ISTAT   POCT TROPONIN   POCT TROPONIN     EKG Readings: (Independently Interpreted)   Initial Reading: No STEMI. Rhythm:  Normal Sinus Rhythm. Heart Rate: 67. Ectopy: No Ectopy. Conduction: Normal. ST Segments: Normal ST Segments. T Waves: Normal. Axis: Normal.   ECG Results              EKG 12-lead (In process)  Result time 07/21/23 02:12:26      In process by Interface, Lab In [facility] (07/21/23 02:12:26)                   Narrative:    Test Reason : R06.02,    Vent. Rate : 067 BPM     Atrial Rate : 067 BPM     P-R Int : 128 ms          QRS Dur : 090 ms      QT Int : 456 ms       P-R-T Axes : 004 058 101 degrees     QTc Int : 481 ms    Normal sinus rhythm  Possible Left atrial enlargement  Nonspecific ST and T wave abnormality  Abnormal ECG  When compared with ECG of 28-JUN-2023 12:00,  Nonspecific T wave abnormality, improved in Inferior leads    Referred By: [email or web address]   SELF           Confirmed By:                                   Imaging Results              X-Ray Chest AP Portable (In process)                      Medications   furosemide injection 40 mg (40 mg Intravenous Given 7/21/23 0303)     Medical Decision Making:   Initial Assessment:   Overall well-appearing 67-year-old male no acute distress  Differential Diagnosis:   Valve dysfunction, CHF exacerbation, volume overload, ACS, PE, pneumonia, COVID, flu, viral URI  Clinical Tests:   Lab Tests: Ordered and Reviewed  Radiological Study: Ordered and Reviewed  Medical Tests: Ordered and Reviewed  ED Management:  VSS  SPO2 100 on RA  EKG shows no ischemic changes  Troponin negative and chest x-ray unremarkable   Significant aortic stenosis seen on recent echo from 04/23 and I suspect this is likely patient's cause of symptoms   Patient takes Lasix daily now and appears to be euvolemic on exam; on additional 40 IV Lasix was given here   Basic labs unremarkable  Repeat troponin pending given patient is extremely symptomatic despite his normal vital signs  No evidence of tachycardia or tachypnea  [facility] consulted after pt is adamant that something needs to be done due to his  symptoms  [facility] consulted and recd pt to have TAVR as OP.  Appt scheduled for this week  Pt amenable to this plan  Resp alkalosis noted but after discussion with RT appears pt became extremely anxious during the draw and was noted to be tachypneic which I velieve lowered his CO2   Pt tachypnea resolved after completion   I monitored pt for addition hour and pt states to return of SOB or tachypnea.  Number to [facility] also given to patient  All questions answered in layman terms  Strong ED return precautions discussed  Follow up with cardiology on discharge.                        Clinical Impression:   Final diagnoses:  [R06.02] SOB (shortness of breath) (Primary)  [R06.02] Shortness of breath  [I35.0] Aortic valve stenosis, etiology of cardiac valve disease unspecified        ED Disposition Condition    Discharge Stable          ED Prescriptions    None       Follow-up Information       Follow up With Specialties Details Why Contact Info    [facility] - Emergency Dept Emergency Medicine  If symptoms worsen [address] [facility] [address] [phone]    Darlene Willams, [facility] Family Medicine Schedule an appointment as soon as possible for a visit   [address]  [facility], [facility] [address] [phone]               Vikash Francois MD  07/21/23 6694

## 2023-07-21 NOTE — CONSULTS
[facility] - Emergency Dept  Cardiology  Consult Note    Patient Name: Mike Urbina Jr.  MRN: [id number]  Admission Date: 7/21/2023  Hospital Length of Stay: 0 days  Code Status: Prior   Attending Provider:  Dr. Vikash Francois  Consulting Provider: Alize Deras NP  Primary Care Physician: LOREN Jerry  Principal Problem:<principal problem not specified>    Patient information was obtained from patient, past medical records, and ER records.     Inpatient consult to Telemedicine-Card  Consult performed by: Alize Deras NP  Consult ordered by: Vikash Francois MD      Subjective:     Chief Complaint:  SOB     HPI: 68 yo male known to [facility], sees Dr. Herrera with history of CAD/CABG, VHD, severe AS, PAF, HTN, thrombocytopenia, EtOH abuse who presents with complaints of shortness of breath, denies chest pain, N/V, palpitations, presyncope, or TIA type symptoms.  The patient has been having shortness of breath on and off and he denies orthopnea and PND. Work-up in the ER reveals negative troponin x2 elevated BNP but appears euvolemic on exam, EKG sinus rhythm no ischemic changes.  [facility] has been consulted by Dr. Francois for symptomatic aortic stenosis.    Past Medical History:   Diagnosis Date    Atrial flutter     CHF (congestive heart failure)     Coronary artery disease     Hypertension     Myocardial infarction     SOB (shortness of breath)     at times       Past Surgical History:   Procedure Laterality Date    CATARACT EXTRACTION Bilateral     CORONARY ARTERY BYPASS GRAFT (CABG) N/A 4/3/2023    Procedure: CORONARY ARTERY BYPASS GRAFT (CABG);  Surgeon: Deuce Finley IV, MD;  Location: [facility];  Service: Cardiovascular;  Laterality: N/A;  WITH LLAA //  ECHO NOTIFIED    LEFT HEART CATHETERIZATION N/A 03/15/2023    Procedure: CATHETERIZATION, HEART, LEFT;  Surgeon: Sreedhar Herrera MD;  Location: [facility] CATH LAB;  Service: Cardiology;  Laterality: N/A;  LHC via RRA     4/11/2023 CARO LV  systolic function 50 to 55%, AV is probably trileaflet with marked AV sclerotic calcification resulting in severe critical AS, SHARAD 0.5 cm², peak AV 3.5 m/s, mild MS, moderate TR    Review of patient's allergies indicates:  No Known Allergies    No current facility-administered medications on file prior to encounter.     Current Outpatient Medications on File Prior to Encounter   Medication Sig    amiodarone (PACERONE) 200 MG Tab Take 1 tablet (200 mg total) by mouth once daily.    aspirin (ECOTRIN) 81 MG EC tablet Take 1 tablet (81 mg total) by mouth once daily.    atorvastatin (LIPITOR) 40 MG tablet Take 1 tablet (40 mg total) by mouth every evening.    furosemide (LASIX) 20 MG tablet Take 1 tablet (20 mg total) by mouth once daily. for 4 days    metoprolol succinate (TOPROL-XL) 50 MG 24 hr tablet Take 1 tablet (50 mg total) by mouth once daily.    QUEtiapine (SEROQUEL) 100 MG Tab Take 2 tablets (200 mg total) by mouth nightly.    thiamine 100 MG tablet Take 100 mg by mouth once daily.    valsartan (DIOVAN) 160 MG tablet Take 1 tablet (160 mg total) by mouth 2 (two) times daily.     Family History       Problem Relation (Age of Onset)    Heart attack Mother          Tobacco Use    Smoking status: Never     Passive exposure: Never    Smokeless tobacco: Never   Substance and Sexual Activity    Alcohol use: Yes     Alcohol/week: 84.0 standard drinks     Types: 84 Cans of beer per week     Comment: alcoholic, daily, beer    Drug use: Yes     Frequency: 3.0 times per week     Types: Hydrocodone    Sexual activity: Not Currently     Review of Systems   Constitutional: Negative.   HENT: Negative.     Eyes: Negative.    Cardiovascular: Negative.    Respiratory:  Positive for shortness of breath.    Endocrine: Negative.    Hematologic/Lymphatic: Negative.    Skin: Negative.    Musculoskeletal: Negative.    Gastrointestinal: Negative.    Genitourinary: Negative.    Neurological: Negative.    Psychiatric/Behavioral: Negative.      Allergic/Immunologic: Negative.    Objective:     Vital Signs (Most Recent):  Temp: 97.8 °F (36.6 °C) (07/21/23 0041)  Pulse: 68 (07/21/23 0235)  Resp: (!) 21 (07/21/23 0235)  BP: (!) 165/93 (07/21/23 0235)  SpO2: 99 % (07/21/23 0328) Vital Signs (24h Range):  Temp:  [97.8 °F (36.6 °C)] 97.8 °F (36.6 °C)  Pulse:  [68-77] 68  Resp:  [18-21] 21  SpO2:  [99 %-100 %] 99 %  BP: (142-165)/(72-93) 165/93     Weight: 77.1 kg (170 lb)  Body mass index is 21.83 kg/m².    SpO2: 99 %         Intake/Output Summary (Last 24 hours) at 7/21/2023 0344  Last data filed at 7/21/2023 0314  Gross per 24 hour   Intake --   Output 450 ml   Net -450 ml       Lines/Drains/Airways       Peripheral Intravenous Line  Duration                  Peripheral IV - Single Lumen 07/21/23 0050 20 G Anterior;Distal;Right Upper Arm <1 day                        Significant Labs:   Recent Lab Results  (Last 5 results in the past 24 hours)        07/21/23  0324   07/21/23  0301   07/21/23  0053   07/21/23  0050   07/21/23  0047        Influenza A, Molecular     Not Detected           Influenza B, Molecular     Not Detected           Albumin/Globulin Ratio       1.1         Albumin       3.6         Alkaline Phosphatase       91         ALT       20         AST       27         Baso #       0.01         Basophil %       0.2         BILIRUBIN TOTAL       1.1         BNP       1,347.2         BUN       21.0         Calcium       9.1         Chloride       95         CO2       21         Creatinine       0.86         eGFR       >60         Eos #       0.19         Eosinophil %       3.6         FiO2 27               Globulin, Total       3.3         Glucose       122         Hematocrit       31.9         Hemoglobin       10.0         Immature Grans (Abs)       0.01         Immature Granulocytes       0.2         Lymph #       1.44         LYMPH %       27.0         MCH       26.9         MCHC       31.3         MCV       85.8         Mono #       0.39          Mono %       7.3         MPV       10.9         Neut #       3.30         Neut %       61.7         Platelets       114         POC BE -2               POC HCO3 20.3               POC PCO2 21.9               POC PH 7.574               POC PO2 67               POC SATURATED O2 96               POC TCO2 21               POC Cardiac Troponin I   0.00       0.00       Potassium       4.2         PROTEIN TOTAL       6.9         RBC       3.72         RDW       17.1         Sample ARTERIAL   unknown  Comment: A single negative troponin is insufficient to rule out myocardial infarction.  The use of a serial sampling protocol is recommended practice. Correlate results with reference intervals established for methodology used. Point of care and core laboratory   troponin results are not interchangeable.         unknown  Comment: A single negative troponin is insufficient to rule out myocardial infarction.  The use of a serial sampling protocol is recommended practice. Correlate results with reference intervals established for methodology used. Point of care and core laboratory   troponin results are not interchangeable.         SARS-CoV2 (COVID-19) Qualitative PCR     Not Detected           Sodium       131         WBC       5.34                                Significant Imaging: EKG: SR and X-Ray: CXR: X-Ray Chest 1 View (CXR): No results found for this visit on 07/21/23.    Physical Exam  Vitals reviewed.   Constitutional:       Appearance: Normal appearance. He is normal weight.   HENT:      Head: Normocephalic and atraumatic.      Nose: Nose normal.   Eyes:      Extraocular Movements: Extraocular movements intact.   Cardiovascular:      Rate and Rhythm: Normal rate and regular rhythm.      Heart sounds: Murmur (2-3/6 JAY heard best at RSB 2nd ICS) heard.   Pulmonary:      Effort: Pulmonary effort is normal.      Breath sounds: Normal breath sounds.   Abdominal:      Palpations: Abdomen is soft.   Neurological:       General: No focal deficit present.      Mental Status: He is alert and oriented to person, place, and time.         Current Inpatient Medications:  No current facility-administered medications for this encounter.    Current Outpatient Medications:     amiodarone (PACERONE) 200 MG Tab, Take 1 tablet (200 mg total) by mouth once daily., Disp: 30 tablet, Rfl: 3    aspirin (ECOTRIN) 81 MG EC tablet, Take 1 tablet (81 mg total) by mouth once daily., Disp: 30 tablet, Rfl: 0    atorvastatin (LIPITOR) 40 MG tablet, Take 1 tablet (40 mg total) by mouth every evening., Disp: 30 tablet, Rfl: 2    furosemide (LASIX) 20 MG tablet, Take 1 tablet (20 mg total) by mouth once daily. for 4 days, Disp: 4 tablet, Rfl: 0    metoprolol succinate (TOPROL-XL) 50 MG 24 hr tablet, Take 1 tablet (50 mg total) by mouth once daily., Disp: 30 tablet, Rfl: 3    QUEtiapine (SEROQUEL) 100 MG Tab, Take 2 tablets (200 mg total) by mouth nightly., Disp: 60 tablet, Rfl: 3    thiamine 100 MG tablet, Take 100 mg by mouth once daily., Disp: , Rfl:     valsartan (DIOVAN) 160 MG tablet, Take 1 tablet (160 mg total) by mouth 2 (two) times daily., Disp: 180 tablet, Rfl: 3      VTE Risk Mitigation (From admission, onward)      None          Assessment :   1.  Shortness of breath  2.  VHD severe AS  3.  CAD/CABG  4. PAF currently normal sinus rhythm s/p CARO/DCCV 4/11/23  5.  Hypertension  6.  Thrombocytopenia  7.  EtOH abuse    Plan:     The patient presents with intermittent episodes of shortness of breath with no orthopnea or PND and no chest pain.  MI was ruled out with 2 negative troponins.  BNP is elevated however he is euvolemic on exam.  Recent history of CARO which revealed severe AAS with an SHARAD of 0.5 cm² and plans were made for outpatient TAVR work-up.  Work-up in the emergency room is unremarkable.  We will make an appointment for patient to be seen within the next week with Dr. Herrera to proceed with outpatient TAVR work-up.  Patient is no longer  on amiodarone, continue aspirin, continue with Lipitor, continue metoprolol, and continue Diovan.Thank you for allowing me to participate in this patient's care.  Call with any questions.    Alize Deras NP  Cardiology  [facility] - Emergency Dept  07/21/2023 3:44 AM

## 2023-07-21 NOTE — ED NOTES
Pt advised that he does not know which number would be appropriate for the Cardiology office to call about setting up an appointment. Pt is advised that nurse will provide patient with number to cardiology office ([phone]) and the patient can call and give a good contact number when the patient can obtain one. Pt agrees.

## 2023-08-08 ENCOUNTER — OFFICE VISIT (OUTPATIENT)
Dept: FAMILY MEDICINE | Facility: CLINIC | Age: 68
End: 2023-08-08
Payer: MEDICARE

## 2023-08-08 VITALS
HEIGHT: 74 IN | WEIGHT: 172.31 LBS | RESPIRATION RATE: 20 BRPM | HEART RATE: 61 BPM | SYSTOLIC BLOOD PRESSURE: 122 MMHG | DIASTOLIC BLOOD PRESSURE: 60 MMHG | BODY MASS INDEX: 22.11 KG/M2 | OXYGEN SATURATION: 100 %

## 2023-08-08 DIAGNOSIS — I48.91 ATRIAL FIBRILLATION WITH RVR: Primary | ICD-10-CM

## 2023-08-08 DIAGNOSIS — I50.32 CHRONIC DIASTOLIC CONGESTIVE HEART FAILURE: ICD-10-CM

## 2023-08-08 DIAGNOSIS — Z95.1 S/P CABG (CORONARY ARTERY BYPASS GRAFT): ICD-10-CM

## 2023-08-08 PROCEDURE — 99213 PR OFFICE/OUTPT VISIT, EST, LEVL III, 20-29 MIN: ICD-10-PCS | Mod: ,,, | Performed by: NURSE PRACTITIONER

## 2023-08-08 PROCEDURE — 99213 OFFICE O/P EST LOW 20 MIN: CPT | Mod: ,,, | Performed by: NURSE PRACTITIONER

## 2023-08-08 RX ORDER — SODIUM BICARBONATE 650 MG/1
650 TABLET ORAL DAILY
Status: ON HOLD | COMMUNITY
End: 2023-12-26 | Stop reason: HOSPADM

## 2023-08-08 NOTE — PROGRESS NOTES
SUBJECTIVE:     History of Present Illness      Chief Complaint: Follow-up    HPI:  Patient is a 67 y.o. year old male who presents to clinic for routine follow-up.  Patient states that he has been seen in emergency room few times for increased shortness a breath.  Does have a follow-up with Cardiology this afternoon for recd pt to have TAVR as OP.    CAD status post three-vessel CABG in March of 2023, CHF with a preserved ejection fraction, severe aortic stenosis       Review of Systems:    Review of Systems    12 point review of systems conducted, negative except as stated in the history of present illness. See HPI for details.     Previous History      Review of patient's allergies indicates:  No Known Allergies    Past Medical History:   Diagnosis Date    Atrial flutter     CHF (congestive heart failure)     Coronary artery disease     Hypertension     Myocardial infarction     SOB (shortness of breath)     at times     Current Outpatient Medications   Medication Instructions    amiodarone (PACERONE) 200 mg, Oral, Daily    aspirin (ECOTRIN) 81 mg, Oral, Daily    atorvastatin (LIPITOR) 40 mg, Oral, Nightly    furosemide (LASIX) 20 mg, Oral, Daily    metoprolol succinate (TOPROL-XL) 50 mg, Oral, Daily    QUEtiapine (SEROQUEL) 200 mg, Oral, Nightly    sodium bicarbonate 650 mg, Oral, Daily    thiamine 100 mg, Oral, Daily    valsartan (DIOVAN) 160 mg, Oral, 2 times daily     Past Surgical History:   Procedure Laterality Date    CATARACT EXTRACTION Bilateral     CORONARY ARTERY BYPASS GRAFT (CABG) N/A 4/3/2023    Procedure: CORONARY ARTERY BYPASS GRAFT (CABG);  Surgeon: Deuce Finley IV, MD;  Location: [facility];  Service: Cardiovascular;  Laterality: N/A;  WITH LLAA //  ECHO NOTIFIED    LEFT HEART CATHETERIZATION N/A 03/15/2023    Procedure: CATHETERIZATION, HEART, LEFT;  Surgeon: Sreedhar Herrera MD;  Location: [facility] CATH LAB;  Service: Cardiology;  Laterality: N/A;  [facility] via [facility]     Family History   Problem  "Relation Age of Onset    Heart attack Mother        Social History     Tobacco Use    Smoking status: Never     Passive exposure: Never    Smokeless tobacco: Never   Substance Use Topics    Alcohol use: Yes     Alcohol/week: 84.0 standard drinks of alcohol     Types: 84 Cans of beer per week     Comment: alcoholic, daily, beer    Drug use: Yes     Frequency: 3.0 times per week     Types: Hydrocodone        Health Maintenance      Health Maintenance   Topic Date Due    Hepatitis C Screening  Never done    TETANUS VACCINE  Never done    High Dose Statin  06/27/2024    Lipid Panel  01/18/2028       OBJECTIVE:     Physical Exam      Vital Signs Reviewed   Visit Vitals  /60 (BP Location: Right arm)   Pulse 61   Resp 20   Ht 6' 2" (1.88 m)   Wt 78.2 kg (172 lb 4.8 oz)   SpO2 100%   BMI 22.12 kg/m²       Physical Exam    Physical Exam:  General: Alert, well nourished, no acute distress, non-toxic appearing.   Eyes: Anicteric sclera, without conjunctival injection, normal lids, no purulent drainage, EOMs grossly intact.   Ears: No tragal tenderness. Tympanic membranes intact, pearly grey, without effusion or erythema and with a positive light reflex.   Mouth: Posterior pharynx without erythema. No exudate, ulcerations, or lesion. No tonsillar swelling.   Neck: Supple, full ROM, no rigidity, no cervical adenopathy.   Cardio: Normal rate and rhythm    Resp: Respirations even and unlabored, clear to auscultation bilaterally.   Abd: No ecchymosis or distension. Normal bowel sounds in all 4 quadrants. No tenderness to palpation. No rebound tenderness or guarding. No CVA tenderness.   Skin: No rashes or open lesions noted.   MSK: No swelling. No abrasions or signs of trauma. Ambulating without assistance.   Neuro: Alert,oriented No focal deficits noted. Facial expressions even.   Psych: Cooperative, Normal affect      Procedures    Procedures     Labs     Results for orders placed or performed during the hospital encounter " of 07/21/23   Comprehensive Metabolic Panel   Result Value Ref Range    Sodium Level 131 (L) 136 - 145 mmol/L    Potassium Level 4.2 3.5 - 5.1 mmol/L    Chloride 95 (L) 98 - 107 mmol/L    Carbon Dioxide 21 (L) 23 - 31 mmol/L    Glucose Level 122 (H) 82 - 115 mg/dL    Blood Urea Nitrogen 21.0 8.4 - 25.7 mg/dL    Creatinine 0.86 0.73 - 1.18 mg/dL    Calcium Level Total 9.1 8.8 - 10.0 mg/dL    Protein Total 6.9 5.8 - 7.6 gm/dL    Albumin Level 3.6 3.4 - 4.8 g/dL    Globulin 3.3 2.4 - 3.5 gm/dL    Albumin/Globulin Ratio 1.1 1.1 - 2.0 ratio    Bilirubin Total 1.1 <=1.5 mg/dL    Alkaline Phosphatase 91 40 - 150 unit/L    Alanine Aminotransferase 20 0 - 55 unit/L    Aspartate Aminotransferase 27 5 - 34 unit/L    eGFR >60 mls/min/1.73/m2   COVID/FLU A&B PCR   Result Value Ref Range    Influenza A PCR Not Detected Not Detected    Influenza B PCR Not Detected Not Detected    SARS-CoV-2 PCR Not Detected Not Detected, Negative, Invalid   Brain natriuretic peptide   Result Value Ref Range    Natriuretic Peptide 1,347.2 (H) <=100.0 pg/mL   CBC with Differential   Result Value Ref Range    WBC 5.34 4.50 - 11.50 x10(3)/mcL    RBC 3.72 (L) 4.70 - 6.10 x10(6)/mcL    Hgb 10.0 (L) 14.0 - 18.0 g/dL    Hct 31.9 (L) 42.0 - 52.0 %    MCV 85.8 80.0 - 94.0 fL    MCH 26.9 (L) 27.0 - 31.0 pg    MCHC 31.3 (L) 33.0 - 36.0 g/dL    RDW 17.1 (H) 11.5 - 17.0 %    Platelet 114 (L) 130 - 400 x10(3)/mcL    MPV 10.9 (H) 7.4 - 10.4 fL    Neut % 61.7 %    Lymph % 27.0 %    Mono % 7.3 %    Eos % 3.6 %    Basophil % 0.2 %    Lymph # 1.44 0.6 - 4.6 x10(3)/mcL    Neut # 3.30 2.1 - 9.2 x10(3)/mcL    Mono # 0.39 0.1 - 1.3 x10(3)/mcL    Eos # 0.19 0 - 0.9 x10(3)/mcL    Baso # 0.01 <=0.2 x10(3)/mcL    IG# 0.01 0 - 0.04 x10(3)/mcL    IG% 0.2 %   Troponin ISTAT   Result Value Ref Range    POC Cardiac Troponin I 0.00 0.00 - 0.08 ng/mL    Sample unknown    Troponin ISTAT   Result Value Ref Range    POC Cardiac Troponin I 0.00 0.00 - 0.08 ng/mL    Sample unknown     ISTAT PROCEDURE   Result Value Ref Range    POC PH 7.574 (HH) 7.35 - 7.45    POC PCO2 21.9 (LL) 35 - 45 mmHg    POC PO2 67 (L) 80 - 100 mmHg    POC HCO3 20.3 (L) 24 - 28 mmol/L    POC BE -2 -2 to 2 mmol/L    POC SATURATED O2 96 95 - 100 %    POC TCO2 21 (L) 23 - 27 mmol/L    Sample ARTERIAL     FiO2 27        Chemistry:  Lab Results   Component Value Date     (L) 07/21/2023    K 4.2 07/21/2023    CHLORIDE 95 (L) 07/21/2023    BUN 21.0 07/21/2023    CREATININE 0.86 07/21/2023    EGFRNORACEVR >60 07/21/2023    GLUCOSE 122 (H) 07/21/2023    CALCIUM 9.1 07/21/2023    ALKPHOS 91 07/21/2023    LABPROT 6.9 07/21/2023    ALBUMIN 3.6 07/21/2023    AST 27 07/21/2023    ALT 20 07/21/2023    MG 1.80 05/04/2023    PHOS 4.3 04/14/2023    [id number] 19.2 (L) 01/18/2023    TSH 3.312 06/12/2023    PSA 1.25 01/18/2023        Lab Results   Component Value Date    HGBA1C 5.3 01/18/2023        Hematology:  Lab Results   Component Value Date    WBC 5.34 07/21/2023    HGB 10.0 (L) 07/21/2023    HCT 31.9 (L) 07/21/2023     (L) 07/21/2023       Lipid Panel:  Lab Results   Component Value Date    CHOL 189 01/18/2023    HDL 84 (H) 01/18/2023    LDL 95.00 01/18/2023    TRIG 49 01/18/2023    TOTALCHOLEST 2 01/18/2023           Assessment            ICD-10-CM ICD-9-CM   1. Atrial fibrillation with RVR  I48.91 427.31   2. Chronic diastolic congestive heart failure  I50.32 428.32     428.0   3. S/P CABG (coronary artery bypass graft)  Z95.1 V45.81       Plan       1. Atrial fibrillation with RVR  Stable rate controlled continue medication amiodarone as prescribed  2. Chronic diastolic congestive heart failure  Improved patient has a follow-up appointment with [facility] this afternoon possible TAVR to be scheduled  3. S/P CABG (coronary artery bypass graft)        Medication List with Changes/Refills   Current Medications    AMIODARONE (PACERONE) 200 MG TAB    Take 1 tablet (200 mg total) by mouth once daily.    ASPIRIN (ECOTRIN) 81 MG  EC TABLET    Take 1 tablet (81 mg total) by mouth once daily.    ATORVASTATIN (LIPITOR) 40 MG TABLET    Take 1 tablet (40 mg total) by mouth every evening.    FUROSEMIDE (LASIX) 20 MG TABLET    Take 1 tablet (20 mg total) by mouth once daily. for 4 days    METOPROLOL SUCCINATE (TOPROL-XL) 50 MG 24 HR TABLET    Take 1 tablet (50 mg total) by mouth once daily.    QUETIAPINE (SEROQUEL) 100 MG TAB    Take 2 tablets (200 mg total) by mouth nightly.    SODIUM BICARBONATE 650 MG TABLET    Take 650 mg by mouth once daily.    THIAMINE 100 MG TABLET    Take 100 mg by mouth once daily.    VALSARTAN (DIOVAN) 160 MG TABLET    Take 1 tablet (160 mg total) by mouth 2 (two) times daily.       No follow-ups on file.   No follow-ups on file. In addition to their scheduled follow up, the patient has also been instructed to follow up on as needed basis.   Future Appointments   Date Time Provider Department Center   10/10/2023  9:00 AM Darlene Willams, [facility]            details…

## 2023-08-24 ENCOUNTER — HOSPITAL ENCOUNTER (EMERGENCY)
Facility: HOSPITAL | Age: 68
Discharge: HOME | End: 2023-08-25
Attending: FAMILY MEDICINE
Payer: MEDICARE

## 2023-08-24 DIAGNOSIS — R42 DIZZY: ICD-10-CM

## 2023-08-24 DIAGNOSIS — R07.9 CHEST PAIN: ICD-10-CM

## 2023-08-24 DIAGNOSIS — E86.0 DEHYDRATION: Primary | ICD-10-CM

## 2023-08-24 LAB
ALBUMIN SERPL-MCNC: 3.9 G/DL (ref 3.4–4.8)
ALBUMIN/GLOB SERPL: 1.1 RATIO (ref 1.1–2)
ALP SERPL-CCNC: 104 UNIT/L (ref 40–150)
ALT SERPL-CCNC: 25 UNIT/L (ref 0–55)
AMPHET UR QL SCN: NEGATIVE
APPEARANCE UR: CLEAR
AST SERPL-CCNC: 41 UNIT/L (ref 5–34)
BACTERIA #/AREA URNS AUTO: NORMAL /HPF
BARBITURATE SCN PRESENT UR: NEGATIVE
BASOPHILS # BLD AUTO: 0.03 X10(3)/MCL
BASOPHILS NFR BLD AUTO: 0.7 %
BENZODIAZ UR QL SCN: NEGATIVE
BILIRUB SERPL-MCNC: 1.5 MG/DL
BILIRUB UR QL STRIP.AUTO: NEGATIVE
BNP BLD-MCNC: 1098.9 PG/ML
BUN SERPL-MCNC: 28.8 MG/DL (ref 8.4–25.7)
CALCIUM SERPL-MCNC: 9.7 MG/DL (ref 8.8–10)
CANNABINOIDS UR QL SCN: NEGATIVE
CHLORIDE SERPL-SCNC: 92 MMOL/L (ref 98–107)
CO2 SERPL-SCNC: 22 MMOL/L (ref 23–31)
COCAINE UR QL SCN: NEGATIVE
COLOR UR: YELLOW
CREAT SERPL-MCNC: 1.04 MG/DL (ref 0.73–1.18)
EOSINOPHIL # BLD AUTO: 0.13 X10(3)/MCL (ref 0–0.9)
EOSINOPHIL NFR BLD AUTO: 2.9 %
ERYTHROCYTE [DISTWIDTH] IN BLOOD BY AUTOMATED COUNT: 17 % (ref 11.5–17)
ETHANOL SERPL-MCNC: 61 MG/DL
FLUAV AG UPPER RESP QL IA.RAPID: NOT DETECTED
FLUBV AG UPPER RESP QL IA.RAPID: NOT DETECTED
GFR SERPLBLD CREATININE-BSD FMLA CKD-EPI: >60 MLS/MIN/1.73/M2
GLOBULIN SER-MCNC: 3.6 GM/DL (ref 2.4–3.5)
GLUCOSE SERPL-MCNC: 104 MG/DL (ref 82–115)
GLUCOSE UR QL STRIP.AUTO: NEGATIVE
HCT VFR BLD AUTO: 33.5 % (ref 42–52)
HGB BLD-MCNC: 10.8 G/DL (ref 14–18)
IMM GRANULOCYTES # BLD AUTO: 0.01 X10(3)/MCL (ref 0–0.04)
IMM GRANULOCYTES NFR BLD AUTO: 0.2 %
KETONES UR QL STRIP.AUTO: NEGATIVE
LEUKOCYTE ESTERASE UR QL STRIP.AUTO: NEGATIVE
LYMPHOCYTES # BLD AUTO: 0.75 X10(3)/MCL (ref 0.6–4.6)
LYMPHOCYTES NFR BLD AUTO: 16.7 %
MCH RBC QN AUTO: 27.3 PG (ref 27–31)
MCHC RBC AUTO-ENTMCNC: 32.2 G/DL (ref 33–36)
MCV RBC AUTO: 84.8 FL (ref 80–94)
MONOCYTES # BLD AUTO: 0.45 X10(3)/MCL (ref 0.1–1.3)
MONOCYTES NFR BLD AUTO: 10 %
NEUTROPHILS # BLD AUTO: 3.11 X10(3)/MCL (ref 2.1–9.2)
NEUTROPHILS NFR BLD AUTO: 69.5 %
NITRITE UR QL STRIP.AUTO: NEGATIVE
OPIATES UR QL SCN: POSITIVE
PCP UR QL: NEGATIVE
PH UR STRIP.AUTO: 7 [PH]
PH UR: 7 [PH] (ref 3–11)
PLATELET # BLD AUTO: 106 X10(3)/MCL (ref 130–400)
PMV BLD AUTO: 9.2 FL (ref 7.4–10.4)
POTASSIUM SERPL-SCNC: 4.3 MMOL/L (ref 3.5–5.1)
PROT SERPL-MCNC: 7.5 GM/DL (ref 5.8–7.6)
PROT UR QL STRIP.AUTO: NEGATIVE
RBC # BLD AUTO: 3.95 X10(6)/MCL (ref 4.7–6.1)
RBC #/AREA URNS AUTO: NORMAL /HPF
RBC UR QL AUTO: NEGATIVE
SARS-COV-2 RNA RESP QL NAA+PROBE: NOT DETECTED
SODIUM SERPL-SCNC: 128 MMOL/L (ref 136–145)
SP GR UR STRIP.AUTO: 1.01
SQUAMOUS #/AREA URNS AUTO: NORMAL /HPF
TROPONIN I SERPL-MCNC: 0.02 NG/ML (ref 0–0.04)
UROBILINOGEN UR STRIP-ACNC: 2
WBC # SPEC AUTO: 4.48 X10(3)/MCL (ref 4.5–11.5)
WBC #/AREA URNS AUTO: NORMAL /HPF

## 2023-08-24 PROCEDURE — 84484 ASSAY OF TROPONIN QUANT: CPT | Performed by: FAMILY MEDICINE

## 2023-08-24 PROCEDURE — 81001 URINALYSIS AUTO W/SCOPE: CPT | Performed by: FAMILY MEDICINE

## 2023-08-24 PROCEDURE — 25000003 PHARM REV CODE 250: Performed by: FAMILY MEDICINE

## 2023-08-24 PROCEDURE — 96360 HYDRATION IV INFUSION INIT: CPT

## 2023-08-24 PROCEDURE — 85025 COMPLETE CBC W/AUTO DIFF WBC: CPT | Performed by: FAMILY MEDICINE

## 2023-08-24 PROCEDURE — 0240U COVID/FLU A&B PCR: CPT | Performed by: FAMILY MEDICINE

## 2023-08-24 PROCEDURE — 99285 EMERGENCY DEPT VISIT HI MDM: CPT | Mod: 25

## 2023-08-24 PROCEDURE — 96361 HYDRATE IV INFUSION ADD-ON: CPT

## 2023-08-24 PROCEDURE — 80053 COMPREHEN METABOLIC PANEL: CPT | Performed by: FAMILY MEDICINE

## 2023-08-24 PROCEDURE — 80307 DRUG TEST PRSMV CHEM ANLYZR: CPT | Performed by: FAMILY MEDICINE

## 2023-08-24 PROCEDURE — 83880 ASSAY OF NATRIURETIC PEPTIDE: CPT | Performed by: FAMILY MEDICINE

## 2023-08-24 PROCEDURE — 82077 ASSAY SPEC XCP UR&BREATH IA: CPT | Performed by: FAMILY MEDICINE

## 2023-08-24 RX ADMIN — CLONIDINE HYDROCHLORIDE 0.3 MG: 0.2 TABLET ORAL at 11:08

## 2023-08-24 RX ADMIN — SODIUM CHLORIDE 500 ML: 9 INJECTION, SOLUTION INTRAVENOUS at 09:08

## 2023-08-24 RX ADMIN — SODIUM CHLORIDE 500 ML: 9 INJECTION, SOLUTION INTRAVENOUS at 11:08

## 2023-08-25 ENCOUNTER — HOSPITAL ENCOUNTER (EMERGENCY)
Facility: HOSPITAL | Age: 68
Discharge: HOME | End: 2023-08-25
Attending: EMERGENCY MEDICINE
Payer: MEDICARE

## 2023-08-25 VITALS
HEIGHT: 74 IN | TEMPERATURE: 97 F | WEIGHT: 175 LBS | DIASTOLIC BLOOD PRESSURE: 78 MMHG | OXYGEN SATURATION: 97 % | HEART RATE: 64 BPM | SYSTOLIC BLOOD PRESSURE: 169 MMHG | BODY MASS INDEX: 22.46 KG/M2 | RESPIRATION RATE: 18 BRPM

## 2023-08-25 VITALS
TEMPERATURE: 98 F | OXYGEN SATURATION: 96 % | HEART RATE: 102 BPM | BODY MASS INDEX: 22.46 KG/M2 | DIASTOLIC BLOOD PRESSURE: 90 MMHG | HEIGHT: 74 IN | WEIGHT: 175 LBS | SYSTOLIC BLOOD PRESSURE: 177 MMHG | RESPIRATION RATE: 25 BRPM

## 2023-08-25 VITALS
TEMPERATURE: 98 F | SYSTOLIC BLOOD PRESSURE: 125 MMHG | DIASTOLIC BLOOD PRESSURE: 64 MMHG | RESPIRATION RATE: 14 BRPM | HEIGHT: 74 IN | WEIGHT: 175 LBS | HEART RATE: 59 BPM | BODY MASS INDEX: 22.46 KG/M2 | OXYGEN SATURATION: 100 %

## 2023-08-25 DIAGNOSIS — E87.3 ACUTE RESPIRATORY ALKALOSIS: Primary | ICD-10-CM

## 2023-08-25 DIAGNOSIS — F41.9 ANXIETY: ICD-10-CM

## 2023-08-25 DIAGNOSIS — F10.20 ALCOHOLISM: ICD-10-CM

## 2023-08-25 DIAGNOSIS — R42 DIZZINESS: Primary | ICD-10-CM

## 2023-08-25 LAB
ALBUMIN SERPL-MCNC: 4 G/DL (ref 3.4–4.8)
ALBUMIN/GLOB SERPL: 1.1 RATIO (ref 1.1–2)
ALP SERPL-CCNC: 107 UNIT/L (ref 40–150)
ALT SERPL-CCNC: 24 UNIT/L (ref 0–55)
AST SERPL-CCNC: 37 UNIT/L (ref 5–34)
BASOPHILS # BLD AUTO: 0.02 X10(3)/MCL
BASOPHILS NFR BLD AUTO: 0.4 %
BILIRUB SERPL-MCNC: 2.4 MG/DL
BUN SERPL-MCNC: 33.3 MG/DL (ref 8.4–25.7)
CALCIUM SERPL-MCNC: 9.7 MG/DL (ref 8.8–10)
CHLORIDE SERPL-SCNC: 93 MMOL/L (ref 98–107)
CO2 SERPL-SCNC: 22 MMOL/L (ref 23–31)
CREAT SERPL-MCNC: 1.25 MG/DL (ref 0.73–1.18)
D DIMER PPP IA.FEU-MCNC: 1.42 UG/ML FEU (ref 0–0.5)
EOSINOPHIL # BLD AUTO: 0.06 X10(3)/MCL (ref 0–0.9)
EOSINOPHIL NFR BLD AUTO: 1.3 %
ERYTHROCYTE [DISTWIDTH] IN BLOOD BY AUTOMATED COUNT: 17.1 % (ref 11.5–17)
FIO2: 21
FIO2: 21
GFR SERPLBLD CREATININE-BSD FMLA CKD-EPI: >60 MLS/MIN/1.73/M2
GLOBULIN SER-MCNC: 3.8 GM/DL (ref 2.4–3.5)
GLUCOSE SERPL-MCNC: 104 MG/DL (ref 82–115)
HCO3 UR-SCNC: 16.9 MMOL/L (ref 24–28)
HCO3 UR-SCNC: 23.8 MMOL/L (ref 24–28)
HCT VFR BLD AUTO: 36.9 % (ref 42–52)
HGB BLD-MCNC: 12 G/DL (ref 14–18)
IMM GRANULOCYTES # BLD AUTO: 0 X10(3)/MCL (ref 0–0.04)
IMM GRANULOCYTES NFR BLD AUTO: 0 %
LYMPHOCYTES # BLD AUTO: 0.64 X10(3)/MCL (ref 0.6–4.6)
LYMPHOCYTES NFR BLD AUTO: 13.6 %
MCH RBC QN AUTO: 28 PG (ref 27–31)
MCHC RBC AUTO-ENTMCNC: 32.5 G/DL (ref 33–36)
MCV RBC AUTO: 86 FL (ref 80–94)
MONOCYTES # BLD AUTO: 0.56 X10(3)/MCL (ref 0.1–1.3)
MONOCYTES NFR BLD AUTO: 11.9 %
NEUTROPHILS # BLD AUTO: 3.44 X10(3)/MCL (ref 2.1–9.2)
NEUTROPHILS NFR BLD AUTO: 72.8 %
PCO2 BLDA: 12.7 MMHG (ref 35–45)
PCO2 BLDA: 35.7 MMHG (ref 35–45)
PH SMN: 7.43 [PH] (ref 7.35–7.45)
PH SMN: 7.73 [PH] (ref 7.35–7.45)
PLATELET # BLD AUTO: 106 X10(3)/MCL (ref 130–400)
PMV BLD AUTO: 9.9 FL (ref 7.4–10.4)
PO2 BLDA: 117 MMHG (ref 80–100)
PO2 BLDA: 88 MMHG (ref 80–100)
POC BE: -3 MMOL/L
POC BE: 0 MMOL/L
POC SATURATED O2: 100 % (ref 95–100)
POC SATURATED O2: 97 % (ref 95–100)
POC TCO2: 17 MMOL/L (ref 23–27)
POC TCO2: 25 MMOL/L (ref 23–27)
POTASSIUM SERPL-SCNC: 3.9 MMOL/L (ref 3.5–5.1)
PROT SERPL-MCNC: 7.8 GM/DL (ref 5.8–7.6)
RBC # BLD AUTO: 4.29 X10(6)/MCL (ref 4.7–6.1)
SAMPLE: ABNORMAL
SAMPLE: ABNORMAL
SODIUM SERPL-SCNC: 131 MMOL/L (ref 136–145)
WBC # SPEC AUTO: 4.72 X10(3)/MCL (ref 4.5–11.5)

## 2023-08-25 PROCEDURE — 99283 EMERGENCY DEPT VISIT LOW MDM: CPT | Mod: 25

## 2023-08-25 PROCEDURE — 85025 COMPLETE CBC W/AUTO DIFF WBC: CPT | Performed by: EMERGENCY MEDICINE

## 2023-08-25 PROCEDURE — 93005 ELECTROCARDIOGRAM TRACING: CPT

## 2023-08-25 PROCEDURE — 85379 FIBRIN DEGRADATION QUANT: CPT | Performed by: EMERGENCY MEDICINE

## 2023-08-25 PROCEDURE — 99284 EMERGENCY DEPT VISIT MOD MDM: CPT | Mod: 25,27

## 2023-08-25 PROCEDURE — 82803 BLOOD GASES ANY COMBINATION: CPT

## 2023-08-25 PROCEDURE — 96374 THER/PROPH/DIAG INJ IV PUSH: CPT

## 2023-08-25 PROCEDURE — 63600175 PHARM REV CODE 636 W HCPCS: Performed by: EMERGENCY MEDICINE

## 2023-08-25 PROCEDURE — 80053 COMPREHEN METABOLIC PANEL: CPT | Performed by: EMERGENCY MEDICINE

## 2023-08-25 PROCEDURE — 36600 WITHDRAWAL OF ARTERIAL BLOOD: CPT

## 2023-08-25 PROCEDURE — 25000003 PHARM REV CODE 250: Performed by: EMERGENCY MEDICINE

## 2023-08-25 RX ORDER — MECLIZINE HYDROCHLORIDE 25 MG/1
25 TABLET ORAL
Status: COMPLETED | OUTPATIENT
Start: 2023-08-25 | End: 2023-08-25

## 2023-08-25 RX ORDER — ONDANSETRON 4 MG/1
4 TABLET, ORALLY DISINTEGRATING ORAL
Status: COMPLETED | OUTPATIENT
Start: 2023-08-25 | End: 2023-08-25

## 2023-08-25 RX ORDER — HYDROXYZINE PAMOATE 50 MG/1
50 CAPSULE ORAL EVERY 8 HOURS PRN
Qty: 30 CAPSULE | Refills: 1 | Status: SHIPPED | OUTPATIENT
Start: 2023-08-25 | End: 2023-10-26 | Stop reason: SDUPTHER

## 2023-08-25 RX ORDER — LORAZEPAM 2 MG/ML
1 INJECTION INTRAMUSCULAR
Status: COMPLETED | OUTPATIENT
Start: 2023-08-25 | End: 2023-08-25

## 2023-08-25 RX ADMIN — MECLIZINE HYDROCHLORIDE 25 MG: 25 TABLET ORAL at 07:08

## 2023-08-25 RX ADMIN — LORAZEPAM 1 MG: 2 INJECTION INTRAMUSCULAR; INTRAVENOUS at 11:08

## 2023-08-25 RX ADMIN — ONDANSETRON 4 MG: 4 TABLET, ORALLY DISINTEGRATING ORAL at 07:08

## 2023-08-25 NOTE — ED NOTES
Report obtained from Sandra FERGUSON. Pt noted to be breathing heavily at times and feeling anxious. Ativan given as ordered. Comfort measures given to pt.

## 2023-08-25 NOTE — ED NOTES
Patient states he is still dizzy no other complaints Dr George notified also states he does not have a ride till the am

## 2023-08-25 NOTE — ED PROVIDER NOTES
Encounter Date: 8/24/2023       History     Chief Complaint   Patient presents with    Dizziness    Shortness of Breath     Pt c/o dizziness and SOB all day. Pt has no history of respiratory issues, anxiety, or neurological issues. Pt has 20g iv to left ac started by LYN LOERA. Cabg 2 months ago.      Shortness of breath   67-year-old male patient with recent heart surgery open heart surgery in March of 2023 comes in with shortness of breath states that it started after drinking alcohol patient has no nausea no vomiting no history of COPD but does have a history of occasional dyspnea patient has no other complaints at this time chronic history of CHF atrial flutter and paroxysmal dyspnea patient is his own history source        Review of patient's allergies indicates:  No Known Allergies  Past Medical History:   Diagnosis Date    Atrial flutter     CHF (congestive heart failure)     Coronary artery disease     Hypertension     Myocardial infarction     SOB (shortness of breath)     at times     Past Surgical History:   Procedure Laterality Date    CATARACT EXTRACTION Bilateral     CORONARY ARTERY BYPASS GRAFT (CABG) N/A 4/3/2023    Procedure: CORONARY ARTERY BYPASS GRAFT (CABG);  Surgeon: Deuce Finley IV, MD;  Location: [facility];  Service: Cardiovascular;  Laterality: N/A;  WITH LLAA //  ECHO NOTIFIED    LEFT HEART CATHETERIZATION N/A 03/15/2023    Procedure: CATHETERIZATION, HEART, LEFT;  Surgeon: Sreedhar Herrera MD;  Location: [facility] CATH LAB;  Service: Cardiology;  Laterality: N/A;  [facility] via [facility]     Family History   Problem Relation Age of Onset    Heart attack Mother      Social History     Tobacco Use    Smoking status: Never     Passive exposure: Never    Smokeless tobacco: Never   Substance Use Topics    Alcohol use: Yes     Alcohol/week: 84.0 standard drinks of alcohol     Types: 84 Cans of beer per week     Comment: alcoholic, daily, beer    Drug use: Yes     Frequency: 3.0 times per week     Types:  Hydrocodone     Review of Systems   Constitutional:  Negative for fever.   HENT:  Negative for sore throat.    Respiratory:  Positive for shortness of breath.    Cardiovascular:  Negative for chest pain.   Gastrointestinal:  Negative for nausea.   Genitourinary:  Negative for dysuria.   Musculoskeletal:  Negative for back pain.   Skin:  Negative for rash.   Neurological:  Negative for weakness.   Hematological:  Does not bruise/bleed easily.       Physical Exam     Initial Vitals [08/24/23 2059]   BP Pulse Resp Temp SpO2   (!) 175/78 65 20 97.9 °F (36.6 °C) 100 %      MAP       --         Physical Exam    Nursing note and vitals reviewed.  Constitutional: He appears well-developed and well-nourished. He is not diaphoretic. No distress.   HENT:   Head: Normocephalic and atraumatic.   Right Ear: External ear normal.   Left Ear: External ear normal.   Nose: Nose normal.   Mouth/Throat: Oropharynx is clear and moist. No oropharyngeal exudate.   Eyes: Conjunctivae and EOM are normal. Pupils are equal, round, and reactive to light. Right eye exhibits no discharge. Left eye exhibits no discharge.   Neck: Neck supple. No thyromegaly present. No tracheal deviation present. No JVD present.   Normal range of motion.  Cardiovascular:  Normal rate, regular rhythm, normal heart sounds and intact distal pulses.     Exam reveals no gallop and no friction rub.       No murmur heard.  Pulmonary/Chest: No stridor. No respiratory distress. He has no wheezes. He has no rhonchi. He has rales. He exhibits no tenderness.   Abdominal: Abdomen is soft. Bowel sounds are normal. He exhibits no distension. There is no abdominal tenderness. There is no rebound and no guarding.   Musculoskeletal:         General: No tenderness or edema. Normal range of motion.      Cervical back: Normal range of motion and neck supple.     Lymphadenopathy:     He has no cervical adenopathy.   Neurological: He is alert and oriented to person, place, and time. He  has normal strength and normal reflexes. No cranial nerve deficit or sensory deficit. GCS score is 15. GCS eye subscore is 4. GCS verbal subscore is 5. GCS motor subscore is 6.   Skin: Skin is warm and dry. No rash and no abscess noted. No erythema.   Psychiatric: He has a normal mood and affect. His behavior is normal. Judgment and thought content normal.         ED Course   Procedures  Labs Reviewed   COMPREHENSIVE METABOLIC PANEL - Abnormal; Notable for the following components:       Result Value    Sodium Level 128 (*)     Chloride 92 (*)     Carbon Dioxide 22 (*)     Blood Urea Nitrogen 28.8 (*)     Globulin 3.6 (*)     Aspartate Aminotransferase 41 (*)     All other components within normal limits   URINALYSIS, REFLEX TO URINE CULTURE - Abnormal; Notable for the following components:    Urobilinogen, UA 2.0 (*)     All other components within normal limits   B-TYPE NATRIURETIC PEPTIDE - Abnormal; Notable for the following components:    Natriuretic Peptide 1,098.9 (*)     All other components within normal limits   CBC WITH DIFFERENTIAL - Abnormal; Notable for the following components:    WBC 4.48 (*)     RBC 3.95 (*)     Hgb 10.8 (*)     Hct 33.5 (*)     MCHC 32.2 (*)     Platelet 106 (*)     All other components within normal limits   DRUG SCREEN, URINE (BEAKER) - Abnormal; Notable for the following components:    Opiates, Urine Positive (*)     All other components within normal limits    Narrative:     Cut off concentrations:    Amphetamines - 1000 ng/ml  Barbiturates - 200 ng/ml  Benzodiazepine - 200 ng/ml  Cannabinoids (THC) - 50 ng/ml  Cocaine - 300 ng/ml  Fentanyl - 1.0 ng/ml  MDMA - 500 ng/ml  Opiates - 300 ng/ml   Phencyclidine (PCP) - 25 ng/ml    Specimen submitted for drug analysis and tested for pH and specific gravity in order to evaluate sample integrity. Suspect tampering if specific gravity is <1.003 and/or pH is not within the range of 4.5 - 8.0  False negatives may result form substances  such as bleach added to urine.  False positives may result for the presence of a substance with similar chemical structure to the drug or its metabolite.    This test provides only a PRELIMINARY analytical test result. A more specific alternate chemical method must be used in order to obtain a confirmed analytical result. Gas chromatography/mass spectrometry (GC/MS) is the preferred confirmatory method. Other chemical confirmation methods are available. Clinical consideration and professional judgement should be applied to any drug of abuse test result, particularly when preliminary positive results are used.    Positive results will be confirmed only at the physicians request. Unconfirmed screening results are to be used only for medical purposes (treatment).        ALCOHOL,MEDICAL (ETHANOL) - Abnormal; Notable for the following components:    Ethanol Level 61.0 (*)     All other components within normal limits   COVID/FLU A&B PCR - Normal    Narrative:     The Xpert Xpress SARS-CoV-2/FLU/RSV plus is a rapid, multiplexed real-time PCR test intended for the simultaneous qualitative detection and differentiation of SARS-CoV-2, Influenza A, Influenza B, and respiratory syncytial virus (RSV) viral RNA in either nasopharyngeal swab or nasal swab specimens.         TROPONIN I - Normal   URINALYSIS, MICROSCOPIC - Normal   CBC W/ AUTO DIFFERENTIAL    Narrative:     The following orders were created for panel order CBC auto differential.  Procedure                               Abnormality         Status                     ---------                               -----------         ------                     CBC with Differential[[id number]]        Abnormal            Final result                 Please view results for these tests on the individual orders.          Imaging Results              X-Ray Chest AP Portable (Final result)  Result time 08/24/23 21:36:40      Final result by Wyatt Wellington MD (08/24/23 21:36:40)                    Impression:      No acute cardiopulmonary abnormality.      Electronically signed by: Wyatt Wellington MD  Date:    08/24/2023  Time:    21:36               Narrative:    EXAMINATION:  Single view chest radiograph.    CLINICAL HISTORY:  Chest Pain;    TECHNIQUE:  Single view of the chest.    COMPARISON:  Chest radiograph 07/21/2023.    FINDINGS:  The lungs are clear without consolidation or effusion.  There is no pneumothorax.  The cardiac silhouette is normal in size.  There is no acute osseous abnormality.                                       Medications   sodium chloride 0.9% bolus 500 mL 500 mL (has no administration in time range)   cloNIDine tablet 0.3 mg (has no administration in time range)   sodium chloride 0.9% bolus 500 mL 500 mL (500 mLs Intravenous New Bag 8/24/23 8430)     Medical Decision Making  COVID flu pneumonia bronchitis    Amount and/or Complexity of Data Reviewed  Labs: ordered.  Radiology: ordered.                               Clinical Impression:   Final diagnoses:  [R42] Dizzy  [R07.9] Chest pain  [E86.0] Dehydration (Primary)        ED Disposition Condition    Discharge Stable          ED Prescriptions    None       Follow-up Information       Follow up With Specialties Details Why Contact Info    Darlene Willams, FNP Family Medicine   [facility] [facility] [address]  [phone]               Shay George MD  08/24/23 1893

## 2023-08-25 NOTE — ED PROVIDER NOTES
Encounter Date: 8/25/2023       History     Chief Complaint   Patient presents with    medical clearance     Pt seen in ER last night and has been waiting in [facility] for a ride -decided to check in again and be seen --states still not feeling well     This 67-year-old man with history of CAD/CABG, VHD, severe AS, PAF, HTN, thrombocytopenia, EtOH abuse presents with complaints of dizziness which she states occurs every morning when gets up.  He arrived in the emergency room 9:00 p.m. last night.  He was seen evaluated and discharged but spent the night sitting in the lobby because he had no ride home.  He had been drinking.  He states that now he will be able to reach his friend to get a ride home.  He has not taken his morning medication.       Review of patient's allergies indicates:  No Known Allergies  Past Medical History:   Diagnosis Date    Atrial flutter     CHF (congestive heart failure)     Coronary artery disease     Hypertension     Myocardial infarction     SOB (shortness of breath)     at times     Past Surgical History:   Procedure Laterality Date    CATARACT EXTRACTION Bilateral     CORONARY ARTERY BYPASS GRAFT (CABG) N/A 4/3/2023    Procedure: CORONARY ARTERY BYPASS GRAFT (CABG);  Surgeon: Deuce Finley IV, MD;  Location: [facility] OR;  Service: Cardiovascular;  Laterality: N/A;  WITH LLAA //  ECHO NOTIFIED    LEFT HEART CATHETERIZATION N/A 03/15/2023    Procedure: CATHETERIZATION, HEART, LEFT;  Surgeon: Sreedhar Herrera MD;  Location: [facility] CATH LAB;  Service: Cardiology;  Laterality: N/A;  [facility] via [facility]     Family History   Problem Relation Age of Onset    Heart attack Mother      Social History     Tobacco Use    Smoking status: Never     Passive exposure: Never    Smokeless tobacco: Never   Substance Use Topics    Alcohol use: Yes     Alcohol/week: 84.0 standard drinks of alcohol     Types: 84 Cans of beer per week     Comment: alcoholic, daily, beer    Drug use: Yes     Frequency: 3.0 times per week      Types: Hydrocodone     Review of Systems   Constitutional:  Negative for fever.   HENT:  Negative for sore throat.    Respiratory:  Negative for shortness of breath (chronic).    Cardiovascular:  Negative for chest pain.   Gastrointestinal:  Negative for nausea.   Genitourinary:  Negative for dysuria.   Musculoskeletal:  Negative for back pain.   Skin:  Negative for rash.   Neurological:  Positive for dizziness. Negative for weakness.   Hematological:  Does not bruise/bleed easily.       Physical Exam     Initial Vitals [08/25/23 0701]   BP Pulse Resp Temp SpO2   (!) 172/82 68 18 97.1 °F (36.2 °C) 100 %      MAP       --         Physical Exam    Nursing note and vitals reviewed.  Constitutional: He appears well-developed and well-nourished.   HENT:   Head: Normocephalic and atraumatic.   Mouth/Throat: Mucous membranes are normal.   Eyes: EOM are normal. Pupils are equal, round, and reactive to light.   Neck: Neck supple.   Normal range of motion.  Cardiovascular:  Normal rate, regular rhythm, normal heart sounds and intact distal pulses.           Pulmonary/Chest: Breath sounds normal.   Abdominal: Abdomen is soft. Bowel sounds are normal.   Musculoskeletal:         General: Normal range of motion.      Cervical back: Normal range of motion and neck supple.     Neurological: He is alert and oriented to person, place, and time. He has normal strength.   Skin: Skin is warm and dry. Capillary refill takes less than 2 seconds.   Psychiatric: He has a normal mood and affect. His behavior is normal. Judgment and thought content normal.         ED Course   Procedures  Labs Reviewed - No data to display       Imaging Results    None          Medications   meclizine tablet 25 mg (25 mg Oral Given 8/25/23 1050)   ondansetron disintegrating tablet 4 mg (4 mg Oral Given 8/25/23 3295)     Medical Decision Making                             Clinical Impression:   Final diagnoses:  [R42] Dizziness (Primary)        ED  Disposition Condition    Discharge Stable          ED Prescriptions    None       Follow-up Information       Follow up With Specialties Details Why Contact Info    Darlene Willams, FNP Family Medicine Schedule an appointment as soon as possible for a visit   [address] [facility] [facility] [address] [phone]               Errol Crandall MD  08/25/23 7849

## 2023-08-25 NOTE — ED PROVIDER NOTES
Encounter Date: 8/25/2023       History     Chief Complaint   Patient presents with    Dizziness     Pt c/o dizziness and SOB --pt seen twice earlier today for same complaint --states its not better      This 67-year-old man presents to the emergency room for the 3rd time in past 16 hours with complaints of dizziness and shortness of breath.  He is clearly anxious and shaking, tremulous and hyperventilating.  He is a chronic alcoholic.  He was seen at 9:00 p.m. last evening and discharged spent the night in the waiting room signed in again at 6:00 a.m. and seen by myself and discharged and then returned via ambulance.  Blood alcohol last night was 60.       Review of patient's allergies indicates:  No Known Allergies  Past Medical History:   Diagnosis Date    Atrial flutter     CHF (congestive heart failure)     Coronary artery disease     Hypertension     Myocardial infarction     SOB (shortness of breath)     at times     Past Surgical History:   Procedure Laterality Date    CATARACT EXTRACTION Bilateral     CORONARY ARTERY BYPASS GRAFT (CABG) N/A 4/3/2023    Procedure: CORONARY ARTERY BYPASS GRAFT (CABG);  Surgeon: Deuce Finley IV, MD;  Location: [facility];  Service: Cardiovascular;  Laterality: N/A;  WITH LLAA //  ECHO NOTIFIED    LEFT HEART CATHETERIZATION N/A 03/15/2023    Procedure: CATHETERIZATION, HEART, LEFT;  Surgeon: Sreedhar Herrera MD;  Location: [facility] CATH LAB;  Service: Cardiology;  Laterality: N/A;  [facility] via [facility]     Family History   Problem Relation Age of Onset    Heart attack Mother      Social History     Tobacco Use    Smoking status: Never     Passive exposure: Never    Smokeless tobacco: Never   Substance Use Topics    Alcohol use: Yes     Alcohol/week: 84.0 standard drinks of alcohol     Types: 84 Cans of beer per week     Comment: alcoholic, daily, beer    Drug use: Yes     Frequency: 3.0 times per week     Types: Hydrocodone     Review of Systems   Constitutional:  Negative for fever.    HENT:  Negative for sore throat.    Respiratory:  Positive for shortness of breath.    Cardiovascular:  Negative for chest pain.   Gastrointestinal:  Negative for nausea.   Genitourinary:  Negative for dysuria.   Musculoskeletal:  Negative for back pain.   Skin:  Negative for rash.   Neurological:  Positive for dizziness. Negative for weakness.   Hematological:  Does not bruise/bleed easily.   Psychiatric/Behavioral:  The patient is nervous/anxious.        Physical Exam     Initial Vitals [08/25/23 1003]   BP Pulse Resp Temp SpO2   (!) 154/73 69 (!) 28 98.1 °F (36.7 °C) 100 %      MAP       --         Physical Exam    Constitutional: He appears well-developed and well-nourished.   HENT:   Head: Normocephalic and atraumatic.   Mouth/Throat: Mucous membranes are normal.   Eyes: EOM are normal. Pupils are equal, round, and reactive to light.   Neck: Neck supple.   Normal range of motion.  Cardiovascular:  Normal rate, regular rhythm, normal heart sounds and intact distal pulses.           Pulmonary/Chest: Breath sounds normal. Tachypnea noted.   Abdominal: Abdomen is soft. Bowel sounds are normal.   Musculoskeletal:         General: Normal range of motion.      Cervical back: Normal range of motion and neck supple.     Neurological: He is alert and oriented to person, place, and time. He has normal strength.   Skin: Skin is warm and dry. Capillary refill takes less than 2 seconds.   Psychiatric: His behavior is normal. Judgment and thought content normal. His mood appears anxious.         ED Course   Procedures  Labs Reviewed   COMPREHENSIVE METABOLIC PANEL - Abnormal; Notable for the following components:       Result Value    Sodium Level 131 (*)     Chloride 93 (*)     Carbon Dioxide 22 (*)     Blood Urea Nitrogen 33.3 (*)     Creatinine 1.25 (*)     Protein Total 7.8 (*)     Globulin 3.8 (*)     Bilirubin Total 2.4 (*)     Aspartate Aminotransferase 37 (*)     All other components within normal limits   D DIMER,  QUANTITATIVE - Abnormal; Notable for the following components:    D-Dimer 1.42 (*)     All other components within normal limits   CBC WITH DIFFERENTIAL - Abnormal; Notable for the following components:    RBC 4.29 (*)     Hgb 12.0 (*)     Hct 36.9 (*)     MCHC 32.5 (*)     RDW 17.1 (*)     Platelet 106 (*)     All other components within normal limits   ISTAT PROCEDURE - Abnormal; Notable for the following components:    POC PH 7.730 (*)     POC PCO2 12.7 (*)     POC PO2 117 (*)     POC HCO3 16.9 (*)     POC TCO2 17 (*)     All other components within normal limits   ISTAT PROCEDURE - Abnormal; Notable for the following components:    POC HCO3 23.8 (*)     All other components within normal limits   CBC W/ AUTO DIFFERENTIAL    Narrative:     The following orders were created for panel order CBC auto differential.  Procedure                               Abnormality         Status                     ---------                               -----------         ------                     CBC with Differential[[id number]]        Abnormal            Final result                 Please view results for these tests on the individual orders.          Imaging Results    None          Medications   LORazepam injection 1 mg (1 mg Intravenous Given 8/25/23 1104)     Medical Decision Making  The patient was clearly extremely anxious and hyperventilated himself into with fairly severe acute respiratory alkalosis.  However after 1 mg of Ativan and 2 hours of sleep his pH has returned to 7.432 his pCO2 is up to 35.7 his bicarb is up 23.8 and he is resting without distress.  Unfortunately this man has a heavy drinker and also takes opiates and I am not very comfortable giving him Ativan at home so will try to give him Vistaril.     Amount and/or Complexity of Data Reviewed  Labs: ordered.    Risk  Prescription drug management.                               Clinical Impression:   Final diagnoses:  [E87.3] Acute respiratory alkalosis  (Primary)  [F10.20] Alcoholism  [F41.9] Anxiety        ED Disposition Condition    Discharge Stable          ED Prescriptions    None       Follow-up Information       Follow up With Specialties Details Why Contact Info    Darlene Willams, FNP Family Medicine Schedule an appointment as soon as possible for a visit   [address] [facility] [address]  [phone]               Errol Crandall MD  08/25/23 3979

## 2023-10-12 ENCOUNTER — HOSPITAL ENCOUNTER (EMERGENCY)
Facility: HOSPITAL | Age: 68
Discharge: HOME | End: 2023-10-12
Attending: FAMILY MEDICINE
Payer: MEDICARE

## 2023-10-12 VITALS
TEMPERATURE: 98 F | BODY MASS INDEX: 21.82 KG/M2 | HEART RATE: 78 BPM | WEIGHT: 170 LBS | RESPIRATION RATE: 16 BRPM | HEIGHT: 74 IN | OXYGEN SATURATION: 99 % | SYSTOLIC BLOOD PRESSURE: 176 MMHG | DIASTOLIC BLOOD PRESSURE: 93 MMHG

## 2023-10-12 DIAGNOSIS — L03.90 CELLULITIS, UNSPECIFIED CELLULITIS SITE: Primary | ICD-10-CM

## 2023-10-12 PROCEDURE — 63600175 PHARM REV CODE 636 W HCPCS: Performed by: FAMILY MEDICINE

## 2023-10-12 PROCEDURE — 96372 THER/PROPH/DIAG INJ SC/IM: CPT | Performed by: FAMILY MEDICINE

## 2023-10-12 PROCEDURE — 99284 EMERGENCY DEPT VISIT MOD MDM: CPT

## 2023-10-12 RX ORDER — CEFTRIAXONE 1 G/1
1 INJECTION, POWDER, FOR SOLUTION INTRAMUSCULAR; INTRAVENOUS
Status: COMPLETED | OUTPATIENT
Start: 2023-10-12 | End: 2023-10-12

## 2023-10-12 RX ORDER — KETOROLAC TROMETHAMINE 10 MG/1
10 TABLET, FILM COATED ORAL EVERY 6 HOURS
Qty: 12 TABLET | Refills: 0 | Status: SHIPPED | OUTPATIENT
Start: 2023-10-12 | End: 2023-10-15

## 2023-10-12 RX ORDER — AMOXICILLIN AND CLAVULANATE POTASSIUM 875; 125 MG/1; MG/1
1 TABLET, FILM COATED ORAL 2 TIMES DAILY
Qty: 14 TABLET | Refills: 0 | Status: SHIPPED | OUTPATIENT
Start: 2023-10-12 | End: 2023-10-19

## 2023-10-12 RX ADMIN — CEFTRIAXONE 1 G: 1 INJECTION, POWDER, FOR SOLUTION INTRAMUSCULAR; INTRAVENOUS at 08:10

## 2023-10-12 NOTE — ED PROVIDER NOTES
Encounter Date: 10/12/2023       History     Chief Complaint   Patient presents with    Finger Injury     Pt presents with redness/swelling to left 3rd digit; pt states he was bit by crab Saturday; denies fevers/drainage/DM;      Finger infection   Third digit of the left hand with signs of soft tissue infection from bacteria after patient was bitten by a cramp patient otherwise has no other complaints no other injuries patient is source of history patient has no chronic conditions contributing to today's episode        Review of patient's allergies indicates:  No Known Allergies  Past Medical History:   Diagnosis Date    Atrial flutter     CHF (congestive heart failure)     Coronary artery disease     Hypertension     Myocardial infarction     SOB (shortness of breath)     at times     Past Surgical History:   Procedure Laterality Date    CATARACT EXTRACTION Bilateral     CORONARY ARTERY BYPASS GRAFT (CABG) N/A 4/3/2023    Procedure: CORONARY ARTERY BYPASS GRAFT (CABG);  Surgeon: Deuce Finley IV, MD;  Location: [facility] OR;  Service: Cardiovascular;  Laterality: N/A;  WITH LLAA //  ECHO NOTIFIED    LEFT HEART CATHETERIZATION N/A 03/15/2023    Procedure: CATHETERIZATION, HEART, LEFT;  Surgeon: Sreedhar Herrera MD;  Location: [facility] CATH LAB;  Service: Cardiology;  Laterality: N/A;  [facility] via [facility]     Family History   Problem Relation Age of Onset    Heart attack Mother      Social History     Tobacco Use    Smoking status: Never     Passive exposure: Never    Smokeless tobacco: Never   Substance Use Topics    Alcohol use: Yes     Alcohol/week: 84.0 standard drinks of alcohol     Types: 84 Cans of beer per week     Comment: alcoholic, daily, beer    Drug use: Yes     Frequency: 3.0 times per week     Types: Hydrocodone     Review of Systems   Constitutional:  Negative for fever.   HENT:  Negative for sore throat.    Respiratory:  Negative for shortness of breath.    Cardiovascular:  Negative for chest pain.    Gastrointestinal:  Negative for nausea.   Genitourinary:  Negative for dysuria.   Musculoskeletal:  Negative for back pain.   Skin:  Positive for pallor, rash and wound.   Neurological:  Negative for weakness.   Hematological:  Does not bruise/bleed easily.   All other systems reviewed and are negative.      Physical Exam     Initial Vitals [10/12/23 0827]   BP Pulse Resp Temp SpO2   (!) 176/93 78 16 98.4 °F (36.9 °C) 99 %      MAP       --         Physical Exam    Nursing note and vitals reviewed.  Constitutional: He appears well-developed and well-nourished. He is not diaphoretic. No distress.   HENT:   Head: Normocephalic and atraumatic.   Right Ear: External ear normal.   Left Ear: External ear normal.   Nose: Nose normal.   Mouth/Throat: Oropharynx is clear and moist. No oropharyngeal exudate.   Eyes: Conjunctivae and EOM are normal. Pupils are equal, round, and reactive to light. Right eye exhibits no discharge. Left eye exhibits no discharge.   Neck: Neck supple. No thyromegaly present. No tracheal deviation present. No JVD present.   Normal range of motion.  Cardiovascular:  Normal rate, regular rhythm, normal heart sounds and intact distal pulses.     Exam reveals no gallop and no friction rub.       No murmur heard.  Pulmonary/Chest: Breath sounds normal. No stridor. No respiratory distress. He has no wheezes. He has no rhonchi. He has no rales. He exhibits no tenderness.   Abdominal: Abdomen is soft. Bowel sounds are normal. He exhibits no distension. There is no abdominal tenderness. There is no rebound and no guarding.   Musculoskeletal:         General: No tenderness or edema. Normal range of motion.      Cervical back: Normal range of motion and neck supple.     Lymphadenopathy:     He has no cervical adenopathy.   Neurological: He is alert and oriented to person, place, and time. He has normal strength and normal reflexes. No cranial nerve deficit or sensory deficit. GCS score is 15. GCS eye  subscore is 4. GCS verbal subscore is 5. GCS motor subscore is 6.   Skin: Skin is warm and dry. No rash and no abscess noted. There is erythema.   Psychiatric: He has a normal mood and affect. His behavior is normal. Judgment and thought content normal.         ED Course   Procedures  Labs Reviewed - No data to display       Imaging Results    None          Medications   cefTRIAXone injection 1 g (has no administration in time range)     Medical Decision Making  Infection injury fracture contusion    Risk  Prescription drug management.                               Clinical Impression:   Final diagnoses:  [L03.90] Cellulitis, unspecified cellulitis site (Primary)        ED Disposition Condition    Discharge Stable          ED Prescriptions       Medication Sig Dispense Start Date End Date Auth. Provider    amoxicillin-clavulanate 875-125mg (AUGMENTIN) 875-125 mg per tablet Take 1 tablet by mouth 2 (two) times daily. for 7 days 14 tablet 10/12/2023 10/19/2023 Shay George MD    ketorolac (TORADOL) 10 mg tablet Take 1 tablet (10 mg total) by mouth every 6 (six) hours. for 3 days 12 tablet 10/12/2023 10/15/2023 Shay George MD          Follow-up Information       Follow up With Specialties Details Why Contact Info    Darlene Willams, [facility]   [address]  [facility] [address]  [phone]               Shay George MD  10/12/23 5690

## 2023-10-26 ENCOUNTER — TELEPHONE (OUTPATIENT)
Dept: FAMILY MEDICINE | Facility: CLINIC | Age: 68
End: 2023-10-26
Payer: MEDICARE

## 2023-10-27 DIAGNOSIS — G47.00 INSOMNIA, UNSPECIFIED TYPE: ICD-10-CM

## 2023-10-27 RX ORDER — HYDROXYZINE PAMOATE 50 MG/1
50 CAPSULE ORAL EVERY 8 HOURS PRN
Qty: 30 CAPSULE | Refills: 1 | Status: SHIPPED | OUTPATIENT
Start: 2023-10-27 | End: 2023-11-07 | Stop reason: SDUPTHER

## 2023-10-27 RX ORDER — QUETIAPINE FUMARATE 100 MG/1
200 TABLET, FILM COATED ORAL NIGHTLY
Qty: 60 TABLET | Refills: 3 | Status: SHIPPED | OUTPATIENT
Start: 2023-10-27 | End: 2023-11-07 | Stop reason: SDUPTHER

## 2023-10-31 DIAGNOSIS — I16.0 HYPERTENSIVE URGENCY: Primary | ICD-10-CM

## 2023-11-01 RX ORDER — METOPROLOL SUCCINATE 50 MG/1
50 TABLET, EXTENDED RELEASE ORAL
Qty: 30 TABLET | Refills: 0 | Status: ON HOLD | OUTPATIENT
Start: 2023-11-01 | End: 2023-12-26 | Stop reason: HOSPADM

## 2023-11-07 ENCOUNTER — OFFICE VISIT (OUTPATIENT)
Dept: FAMILY MEDICINE | Facility: CLINIC | Age: 68
End: 2023-11-07
Payer: MEDICARE

## 2023-11-07 VITALS
TEMPERATURE: 98 F | HEART RATE: 81 BPM | SYSTOLIC BLOOD PRESSURE: 132 MMHG | BODY MASS INDEX: 23.88 KG/M2 | DIASTOLIC BLOOD PRESSURE: 79 MMHG | OXYGEN SATURATION: 99 % | WEIGHT: 176.31 LBS | RESPIRATION RATE: 18 BRPM | HEIGHT: 72 IN

## 2023-11-07 DIAGNOSIS — I48.91 ATRIAL FIBRILLATION WITH RVR: ICD-10-CM

## 2023-11-07 DIAGNOSIS — G47.00 INSOMNIA, UNSPECIFIED TYPE: ICD-10-CM

## 2023-11-07 DIAGNOSIS — Z00.00 ENCOUNTER FOR MEDICARE ANNUAL WELLNESS EXAM: Primary | ICD-10-CM

## 2023-11-07 DIAGNOSIS — Z12.11 SCREENING FOR MALIGNANT NEOPLASM OF COLON: ICD-10-CM

## 2023-11-07 DIAGNOSIS — K59.00 CONSTIPATION, UNSPECIFIED CONSTIPATION TYPE: ICD-10-CM

## 2023-11-07 PROCEDURE — G0439 PR MEDICARE ANNUAL WELLNESS SUBSEQUENT VISIT: ICD-10-PCS | Mod: ,,, | Performed by: NURSE PRACTITIONER

## 2023-11-07 PROCEDURE — G0439 PPPS, SUBSEQ VISIT: HCPCS | Mod: ,,, | Performed by: NURSE PRACTITIONER

## 2023-11-07 RX ORDER — FUROSEMIDE 20 MG/1
20 TABLET ORAL DAILY
Qty: 4 TABLET | Refills: 0 | Status: ON HOLD | OUTPATIENT
Start: 2023-11-07 | End: 2023-12-26 | Stop reason: HOSPADM

## 2023-11-07 RX ORDER — LANOLIN ALCOHOL/MO/W.PET/CERES
100 CREAM (GRAM) TOPICAL DAILY
Status: ON HOLD | COMMUNITY
End: 2023-12-26 | Stop reason: HOSPADM

## 2023-11-07 RX ORDER — QUETIAPINE FUMARATE 100 MG/1
200 TABLET, FILM COATED ORAL NIGHTLY
Qty: 60 TABLET | Refills: 3 | Status: ON HOLD | OUTPATIENT
Start: 2023-11-07 | End: 2023-12-26

## 2023-11-07 RX ORDER — HYDROXYZINE PAMOATE 50 MG/1
50 CAPSULE ORAL EVERY 8 HOURS PRN
Qty: 30 CAPSULE | Refills: 1 | Status: ON HOLD | OUTPATIENT
Start: 2023-11-07 | End: 2023-12-26 | Stop reason: HOSPADM

## 2023-11-07 NOTE — PROGRESS NOTES
Patient ID: [id number]     Chief Complaint: Follow-up (Medication refills.  No complaints at this time.)      HPI:     Mike Urbina Jr. is a 68 y.o. male here today for a Medicare Wellness.       Opioid Screening: Patient medication list reviewed, patient is not taking prescription opioids. Patient is not using additional opioids than prescribed. Patient is at low risk of substance abuse based on this opioid use history.       ----------------------------  Atrial flutter  CHF (congestive heart failure)  Coronary artery disease  Hypertension  Myocardial infarction  SOB (shortness of breath)      Comment:  at times     Past Surgical History:   Procedure Laterality Date    CATARACT EXTRACTION Bilateral     CORONARY ARTERY BYPASS GRAFT (CABG) N/A 4/3/2023    Procedure: CORONARY ARTERY BYPASS GRAFT (CABG);  Surgeon: Deuce Finley IV, MD;  Location: [facility];  Service: Cardiovascular;  Laterality: N/A;  WITH LLAA //  ECHO NOTIFIED    LEFT HEART CATHETERIZATION N/A 03/15/2023    Procedure: CATHETERIZATION, HEART, LEFT;  Surgeon: Sreedhar Herrera MD;  Location: [facility] CATH LAB;  Service: Cardiology;  Laterality: N/A;  [facility] via [facility]       Review of patient's allergies indicates:  No Known Allergies    Outpatient Medications Marked as Taking for the 11/7/23 encounter (Office Visit) with Darlene Willams FNP   Medication Sig Dispense Refill    amiodarone (PACERONE) 200 MG Tab Take 1 tablet (200 mg total) by mouth once daily. 30 tablet 3    aspirin (ECOTRIN) 81 MG EC tablet Take 1 tablet (81 mg total) by mouth once daily. 30 tablet 0    atorvastatin (LIPITOR) 40 MG tablet Take 1 tablet (40 mg total) by mouth every evening. 30 tablet 2    metoprolol succinate (TOPROL-XL) 50 MG 24 hr tablet TAKE 1 TABLET BY MOUTH ONCE DAILY 30 tablet 0    sodium bicarbonate 650 MG tablet Take 650 mg by mouth once daily.      thiamine (VITAMIN B-1) 100 MG tablet Take 100 mg by mouth once daily.      valsartan (DIOVAN) 160 MG tablet Take 1  tablet (160 mg total) by mouth 2 (two) times daily. 180 tablet 3    [DISCONTINUED] hydrOXYzine pamoate (VISTARIL) 50 MG Cap Take 1 capsule (50 mg total) by mouth every 8 (eight) hours as needed (as needed for anxiety). 30 capsule 1    [DISCONTINUED] QUEtiapine (SEROQUEL) 100 MG Tab TAKE 2 TABLETS BY MOUTH NIGHTLY 60 tablet 3       Social History     Socioeconomic History    Marital status: Single   Tobacco Use    Smoking status: Never     Passive exposure: Never    Smokeless tobacco: Never   Substance and Sexual Activity    Alcohol use: Yes     Alcohol/week: 84.0 standard drinks of alcohol     Types: 84 Cans of beer per week     Comment: alcoholic, daily, beer    Drug use: Yes     Frequency: 3.0 times per week     Types: Hydrocodone    Sexual activity: Not Currently   Social History Narrative    ** Merged History Encounter **          Social Determinants of Health     Financial Resource Strain: Low Risk  (6/13/2023)    Overall Financial Resource Strain (CARDIA)     Difficulty of Paying Living Expenses: Not hard at all   Food Insecurity: No Food Insecurity (6/13/2023)    Hunger Vital Sign     Worried About Running Out of Food in the Last Year: Never true     Ran Out of Food in the Last Year: Never true   Transportation Needs: No Transportation Needs (6/13/2023)    PRAPARE - Transportation     Lack of Transportation (Medical): No     Lack of Transportation (Non-Medical): No   Physical Activity: Inactive (6/13/2023)    Exercise Vital Sign     Days of Exercise per Week: 0 days     Minutes of Exercise per Session: 0 min   Stress: No Stress Concern Present (6/13/2023)    [employer] [place] [employer] - Occupational Stress Questionnaire     Feeling of Stress : Only a little   Social Connections: Moderately Integrated (6/13/2023)    Social Connection and Isolation Panel [NHANES]     Frequency of Communication with Friends and Family: More than three times a week     Frequency of Social Gatherings with Friends and  Family: More than three times a week     Attends [employer]ism Services: More than 4 times per year     Active Member of Clubs or Organizations: No     Attends Club or Organization Meetings: 1 to 4 times per year     Marital Status: Never    Housing Stability: Low Risk  (6/13/2023)    Housing Stability Vital Sign     Unable to Pay for Housing in the Last Year: No     Number of Places Lived in the Last Year: 1     Unstable Housing in the Last Year: No        Family History   Problem Relation Age of Onset    Heart attack Mother         Patient Care Team:  Darlene Willams FNP as PCP - General (Family Medicine)  No, Primary Doctor  Sahil, Rosalba, LPN as Care Coordinator       Subjective:     ROS      Patient Reported Health Risk Assessment  What [age]  Are you male or female?: Male  During the past four weeks, how much have you been bothered by emotional problems such as feeling anxious, depressed, irritable, sad, or downhearted and blue?: Not at all  During the past five weeks, has your physical and/or emotional health limited your social activities with family, friends, neighbors, or groups?: Not at all  During the past four weeks, how much bodily pain have you generally had?: No pain  During the past four weeks, was someone available to help if you needed and wanted help?: Yes, quite a bit  During the past four weeks, what was the hardest physical activity you could do for at least two minutes?: Very light  Can you get to places out of walking distance without help?  (For example, can you travel alone on buses or taxis, or drive your own car?): Yes  Can you go shopping for groceries or clothes without someone's help?: Yes  Can you prepare your own meals?: Yes  Can you do your own housework without help?: Yes  Because of any health problems, do you need the help of another person with your personal care needs such as eating, bathing, dressing, or getting around the house?: No  Can you handle  your own money without help?: Yes  During the past four weeks, how would you rate your health in general?: Good  How have things been going for you during the past four weeks?: Pretty well  Are you having difficulties driving your car?: No  Do you always fasten your seat belt when you are in a car?: Yes, usually  How often in the past four weeks have you been bothered by falling or dizzy when standing up?: Never  How often in the past four weeks have you been bothered by sexual problems?: Never  How often in the past four weeks have you been bothered by trouble eating well?: Never  How often in the past four weeks have you been bothered by teeth or denture problems?: Never  How often in the past four weeks have you been bothered with problems using the telephone?: Never  How often in the past four weeks have you been bothered by tiredness or fatigue?: Never  Have you fallen two or more times in the past year?: No  Are you afraid of falling?: No  Are you a smoker?: No  During the past four weeks, how many drinks of wine, beer, or other alcoholic beverages did you have?: 6-9 drinks per week  Do you exercise for about 20 minutes three or more days a week?: No, I usually do not exercise this much  Have you been given any information to help you with hazards in your house that might hurt you?: Yes  Have you been given any information to help you with keeping track of your medications?: Yes  How often do you have trouble taking medicines the way you've been told to take them?: I always take them as prescribed  How confident are you that you can control and manage most of your health problems?: Somewhat confident  What is your race? (Check all that apply.):     Objective:     /79 (BP Location: Left arm, Patient Position: Sitting)   Pulse 81   Temp 98.1 °F (36.7 °C) (Oral)   Resp 18   Ht 6' (1.829 m)   Wt 80 kg (176 lb 4.8 oz)   SpO2 99%   BMI 23.91 kg/m²     Physical Exam  Constitutional:        Appearance: Normal appearance.   Cardiovascular:      Rate and Rhythm: Normal rate and regular rhythm.   Pulmonary:      Effort: Pulmonary effort is normal.   Abdominal:      General: Bowel sounds are normal.      Palpations: Abdomen is soft.   Skin:     General: Skin is warm and dry.   Neurological:      Mental Status: He is alert and oriented to person, place, and time.                No data to display                  11/7/2023     8:45 AM 8/8/2023    10:30 AM 6/27/2023     8:45 AM 5/30/2023    11:00 AM 5/15/2023    10:45 AM 3/28/2023     9:10 AM 1/10/2023     8:00 AM   Fall Risk Assessment - Outpatient   Mobility Status Ambulatory Ambulatory Ambulatory Ambulatory Ambulatory Ambulatory Ambulatory   Number of falls 0 0 0 0 0 0 0   Identified as fall risk False False False False False False False           Depression Screening  Over the past two weeks, has the patient felt down, depressed, or hopeless?: No  Over the past two weeks, has the patient felt little interest or pleasure in doing things?: No  Functional Ability/Safety Screening  Was the patient's timed Up & Go test unsteady or longer than 30 seconds?: No  Does the patient need help with phone, transportation, shopping, preparing meals, housework, laundry, meds, or managing money?: No  Does the patient's home have rugs in the hallway, lack grab bars in the bathroom, lack handrails on the stairs or have poor lighting?: No  Have you noticed any hearing difficulties?: No  Cognitive Function (Assessed through direct observation with due consideration of information obtained by way of patient reports and/or concerns raised by family, friends, caretakers, or others)    Does the patient repeat questions/statements in the same day?: No  Does the patient have trouble remembering the date, year, and time?: No  Does the patient have difficulty managing finances?: No  Does the patient have a decreased sense of direction?: No  Assessment/Plan:       ICD-10-CM ICD-9-CM    1. Encounter for Medicare annual wellness exam  Z00.00 V70.0   2. Screening for malignant neoplasm of colon  Z12.11 V76.51   3. Atrial fibrillation with RVR  I48.91 427.31   4. Constipation, unspecified constipation type  K59.00 564.00   5. Insomnia, unspecified type  G47.00 780.52     1. Encounter for Medicare annual wellness exam    2. Screening for malignant neoplasm of colon  -     Ambulatory referral/consult to General Surgery; Future; Expected date: 11/14/2023    3. Atrial fibrillation with RVR  Rate control   4. Constipation, unspecified constipation type  linzxess  5. Insomnia, unspecified type  -     QUEtiapine (SEROQUEL) 100 MG Tab; Take 2 tablets (200 mg total) by mouth every evening.  Dispense: 60 tablet; Refill: 3    Other orders  -     linaCLOtide (LINZESS) 72 mcg Cap capsule; Take 1 capsule (72 mcg total) by mouth before breakfast.  Dispense: 30 each; Refill: 1  -     furosemide (LASIX) 20 MG tablet; Take 1 tablet (20 mg total) by mouth once daily. for 4 days  Dispense: 4 tablet; Refill: 0  -     hydrOXYzine pamoate (VISTARIL) 50 MG Cap; Take 1 capsule (50 mg total) by mouth every 8 (eight) hours as needed (as needed for anxiety).  Dispense: 30 capsule; Refill: 1           Medicare Annual Wellness and Personalized Prevention Plan:   Fall Risk + Home Safety + Hearing Impairment + Depression Screen + Opioid and Substance Abuse Screening + Cognitive Impairment Screen + Health Risk Assessment all reviewed.     Health Maintenance Topics with due status: Not Due       Topic Last Completion Date    Lipid Panel 01/18/2023    High Dose Statin 11/07/2023      The patient's Health Maintenance was reviewed and the following appears to be due at this time:   Health Maintenance Due   Topic Date Due    Hepatitis C Screening  Never done    COVID-19 Vaccine (1) Never done    TETANUS VACCINE  Never done    Colorectal Cancer Screening  Never done    Shingles Vaccine (1 of 2) Never done    RSV Vaccine (Age 60+) (1 -  1-dose 60+ series) Never done    Pneumococcal Vaccines (Age 65+) (1 - PCV) Never done    Influenza Vaccine (1) Never done       Advance Care Planning   I attest to discussing Advance Care Planning with patient and/or family member.  Education was provided including the importance of the Health Care Power of , Advance Directives, and/or [place] documentation.  The patient expressed understanding to the importance of this information and discussion.       Future Appointments   Date Time Provider Department Center   2/7/2024 10:00 AM Darlene Willams, DEEPA [facility]       Follow up in about 3 months (around 2/7/2024) for 3 month. In addition to their scheduled follow up, the patient has also been instructed to follow up on as needed basis.

## 2023-11-08 ENCOUNTER — TELEPHONE (OUTPATIENT)
Dept: FAMILY MEDICINE | Facility: CLINIC | Age: 68
End: 2023-11-08

## 2023-11-19 ENCOUNTER — HOSPITAL ENCOUNTER (EMERGENCY)
Facility: HOSPITAL | Age: 68
Discharge: HOME | End: 2023-11-19
Attending: FAMILY MEDICINE
Payer: MEDICARE

## 2023-11-19 VITALS
HEART RATE: 77 BPM | OXYGEN SATURATION: 92 % | BODY MASS INDEX: 23.1 KG/M2 | SYSTOLIC BLOOD PRESSURE: 165 MMHG | HEIGHT: 74 IN | RESPIRATION RATE: 22 BRPM | WEIGHT: 180 LBS | DIASTOLIC BLOOD PRESSURE: 93 MMHG | TEMPERATURE: 99 F

## 2023-11-19 DIAGNOSIS — J10.1 INFLUENZA A: Primary | ICD-10-CM

## 2023-11-19 DIAGNOSIS — R06.02 SHORTNESS OF BREATH: ICD-10-CM

## 2023-11-19 DIAGNOSIS — R07.9 CHEST PAIN: ICD-10-CM

## 2023-11-19 LAB
ALBUMIN SERPL-MCNC: 3.9 G/DL (ref 3.4–4.8)
ALBUMIN/GLOB SERPL: 1.1 RATIO (ref 1.1–2)
ALP SERPL-CCNC: 59 UNIT/L (ref 40–150)
ALT SERPL-CCNC: 18 UNIT/L (ref 0–55)
APPEARANCE UR: CLEAR
AST SERPL-CCNC: 41 UNIT/L (ref 5–34)
BACTERIA #/AREA URNS AUTO: NORMAL /HPF
BASOPHILS # BLD AUTO: 0.01 X10(3)/MCL
BASOPHILS NFR BLD AUTO: 0.2 %
BILIRUB SERPL-MCNC: 1.6 MG/DL
BILIRUB UR QL STRIP.AUTO: NEGATIVE
BNP BLD-MCNC: 2241.2 PG/ML
BUN SERPL-MCNC: 19.1 MG/DL (ref 8.4–25.7)
CALCIUM SERPL-MCNC: 8.6 MG/DL (ref 8.8–10)
CHLORIDE SERPL-SCNC: 93 MMOL/L (ref 98–107)
CO2 SERPL-SCNC: 23 MMOL/L (ref 23–31)
COLOR UR AUTO: YELLOW
CREAT SERPL-MCNC: 1.16 MG/DL (ref 0.73–1.18)
EOSINOPHIL # BLD AUTO: 0 X10(3)/MCL (ref 0–0.9)
EOSINOPHIL NFR BLD AUTO: 0 %
ERYTHROCYTE [DISTWIDTH] IN BLOOD BY AUTOMATED COUNT: 17 % (ref 11.5–17)
FLUAV AG UPPER RESP QL IA.RAPID: DETECTED
FLUBV AG UPPER RESP QL IA.RAPID: NOT DETECTED
GFR SERPLBLD CREATININE-BSD FMLA CKD-EPI: >60 MLS/MIN/1.73/M2
GLOBULIN SER-MCNC: 3.7 GM/DL (ref 2.4–3.5)
GLUCOSE SERPL-MCNC: 101 MG/DL (ref 82–115)
GLUCOSE UR QL STRIP.AUTO: NEGATIVE
HCT VFR BLD AUTO: 31.6 % (ref 42–52)
HGB BLD-MCNC: 10.2 G/DL (ref 14–18)
IMM GRANULOCYTES # BLD AUTO: 0.01 X10(3)/MCL (ref 0–0.04)
IMM GRANULOCYTES NFR BLD AUTO: 0.2 %
KETONES UR QL STRIP.AUTO: NEGATIVE
LEUKOCYTE ESTERASE UR QL STRIP.AUTO: NEGATIVE
LYMPHOCYTES # BLD AUTO: 0.43 X10(3)/MCL (ref 0.6–4.6)
LYMPHOCYTES NFR BLD AUTO: 7.3 %
MCH RBC QN AUTO: 29.1 PG (ref 27–31)
MCHC RBC AUTO-ENTMCNC: 32.3 G/DL (ref 33–36)
MCV RBC AUTO: 90.3 FL (ref 80–94)
MONOCYTES # BLD AUTO: 0.39 X10(3)/MCL (ref 0.1–1.3)
MONOCYTES NFR BLD AUTO: 6.6 %
NEUTROPHILS # BLD AUTO: 5.06 X10(3)/MCL (ref 2.1–9.2)
NEUTROPHILS NFR BLD AUTO: 85.7 %
NITRITE UR QL STRIP.AUTO: NEGATIVE
PH UR STRIP.AUTO: 5.5 [PH]
PLATELET # BLD AUTO: 82 X10(3)/MCL (ref 130–400)
PMV BLD AUTO: 10.3 FL (ref 7.4–10.4)
POC CARDIAC TROPONIN I: 0.04 NG/ML (ref 0–0.08)
POTASSIUM SERPL-SCNC: 4 MMOL/L (ref 3.5–5.1)
PROT SERPL-MCNC: 7.6 GM/DL (ref 5.8–7.6)
PROT UR QL STRIP.AUTO: 30
RBC # BLD AUTO: 3.5 X10(6)/MCL (ref 4.7–6.1)
RBC #/AREA URNS AUTO: NORMAL /HPF
RBC UR QL AUTO: ABNORMAL
SAMPLE: NORMAL
SARS-COV-2 RNA RESP QL NAA+PROBE: NOT DETECTED
SODIUM SERPL-SCNC: 128 MMOL/L (ref 136–145)
SP GR UR STRIP.AUTO: 1.02 (ref 1–1.03)
SQUAMOUS #/AREA URNS AUTO: NORMAL /HPF
UROBILINOGEN UR STRIP-ACNC: 0.2
WBC # SPEC AUTO: 5.9 X10(3)/MCL (ref 4.5–11.5)
WBC #/AREA URNS AUTO: NORMAL /HPF

## 2023-11-19 PROCEDURE — 99285 EMERGENCY DEPT VISIT HI MDM: CPT | Mod: 25

## 2023-11-19 PROCEDURE — 80053 COMPREHEN METABOLIC PANEL: CPT | Performed by: FAMILY MEDICINE

## 2023-11-19 PROCEDURE — 0240U COVID/FLU A&B PCR: CPT | Performed by: FAMILY MEDICINE

## 2023-11-19 PROCEDURE — 96374 THER/PROPH/DIAG INJ IV PUSH: CPT

## 2023-11-19 PROCEDURE — 63600175 PHARM REV CODE 636 W HCPCS: Performed by: FAMILY MEDICINE

## 2023-11-19 PROCEDURE — 25000242 PHARM REV CODE 250 ALT 637 W/ HCPCS: Performed by: FAMILY MEDICINE

## 2023-11-19 PROCEDURE — 84484 ASSAY OF TROPONIN QUANT: CPT

## 2023-11-19 PROCEDURE — 85025 COMPLETE CBC W/AUTO DIFF WBC: CPT | Performed by: FAMILY MEDICINE

## 2023-11-19 PROCEDURE — 93010 ELECTROCARDIOGRAM REPORT: CPT | Mod: ,,, | Performed by: INTERNAL MEDICINE

## 2023-11-19 PROCEDURE — 81001 URINALYSIS AUTO W/SCOPE: CPT | Performed by: FAMILY MEDICINE

## 2023-11-19 PROCEDURE — 96375 TX/PRO/DX INJ NEW DRUG ADDON: CPT

## 2023-11-19 PROCEDURE — 94640 AIRWAY INHALATION TREATMENT: CPT

## 2023-11-19 PROCEDURE — 83880 ASSAY OF NATRIURETIC PEPTIDE: CPT | Performed by: FAMILY MEDICINE

## 2023-11-19 PROCEDURE — 93010 EKG 12-LEAD: ICD-10-PCS | Mod: ,,, | Performed by: INTERNAL MEDICINE

## 2023-11-19 PROCEDURE — 93005 ELECTROCARDIOGRAM TRACING: CPT

## 2023-11-19 RX ORDER — OSELTAMIVIR PHOSPHATE 75 MG/1
75 CAPSULE ORAL 2 TIMES DAILY
Qty: 10 CAPSULE | Refills: 0 | Status: SHIPPED | OUTPATIENT
Start: 2023-11-19 | End: 2023-11-24

## 2023-11-19 RX ORDER — FUROSEMIDE 10 MG/ML
40 INJECTION INTRAMUSCULAR; INTRAVENOUS
Status: COMPLETED | OUTPATIENT
Start: 2023-11-19 | End: 2023-11-19

## 2023-11-19 RX ORDER — DEXAMETHASONE SODIUM PHOSPHATE 4 MG/ML
8 INJECTION, SOLUTION INTRA-ARTICULAR; INTRALESIONAL; INTRAMUSCULAR; INTRAVENOUS; SOFT TISSUE
Status: DISCONTINUED | OUTPATIENT
Start: 2023-11-19 | End: 2023-11-19

## 2023-11-19 RX ORDER — FUROSEMIDE 10 MG/ML
80 INJECTION INTRAMUSCULAR; INTRAVENOUS
Status: DISCONTINUED | OUTPATIENT
Start: 2023-11-19 | End: 2023-11-19

## 2023-11-19 RX ORDER — IPRATROPIUM BROMIDE AND ALBUTEROL SULFATE 2.5; .5 MG/3ML; MG/3ML
3 SOLUTION RESPIRATORY (INHALATION)
Status: COMPLETED | OUTPATIENT
Start: 2023-11-19 | End: 2023-11-19

## 2023-11-19 RX ORDER — DEXAMETHASONE SODIUM PHOSPHATE 4 MG/ML
8 INJECTION, SOLUTION INTRA-ARTICULAR; INTRALESIONAL; INTRAMUSCULAR; INTRAVENOUS; SOFT TISSUE
Status: COMPLETED | OUTPATIENT
Start: 2023-11-19 | End: 2023-11-19

## 2023-11-19 RX ADMIN — IPRATROPIUM BROMIDE AND ALBUTEROL SULFATE 3 ML: .5; 3 SOLUTION RESPIRATORY (INHALATION) at 08:11

## 2023-11-19 RX ADMIN — DEXAMETHASONE SODIUM PHOSPHATE 8 MG: 4 INJECTION, SOLUTION INTRA-ARTICULAR; INTRALESIONAL; INTRAMUSCULAR; INTRAVENOUS; SOFT TISSUE at 08:11

## 2023-11-19 RX ADMIN — FUROSEMIDE 40 MG: 10 INJECTION, SOLUTION INTRAMUSCULAR; INTRAVENOUS at 08:11

## 2023-11-20 NOTE — ED PROVIDER NOTES
Encounter Date: 11/19/2023       History     Chief Complaint   Patient presents with    Cough    Shortness of Breath     Patient c/o coughing and shortness of breath for the last 2 days. He also reports that it feels like something is stuck in his throat     Cough  68-year-old with cough congestion states that he has been coughing for the last 2 days patient otherwise feels like something is stuck in his throat patient has a chronic history of congestive heart failure possibly contributing to today's episode patient is his own history source        Review of patient's allergies indicates:  No Known Allergies  Past Medical History:   Diagnosis Date    Atrial flutter     CHF (congestive heart failure)     Coronary artery disease     Hypertension     Myocardial infarction     SOB (shortness of breath)     at times     Past Surgical History:   Procedure Laterality Date    CATARACT EXTRACTION Bilateral     CORONARY ARTERY BYPASS GRAFT (CABG) N/A 4/3/2023    Procedure: CORONARY ARTERY BYPASS GRAFT (CABG);  Surgeon: Deuce Finley IV, MD;  Location: [facility];  Service: Cardiovascular;  Laterality: N/A;  WITH LLAA //  ECHO NOTIFIED    LEFT HEART CATHETERIZATION N/A 03/15/2023    Procedure: CATHETERIZATION, HEART, LEFT;  Surgeon: Sreedhar Herrera MD;  Location: [facility] CATH LAB;  Service: Cardiology;  Laterality: N/A;  [facility] via [facility]     Family History   Problem Relation Age of Onset    Heart attack Mother      Social History     Tobacco Use    Smoking status: Never     Passive exposure: Never    Smokeless tobacco: Never   Substance Use Topics    Alcohol use: Yes     Alcohol/week: 84.0 standard drinks of alcohol     Types: 84 Cans of beer per week     Comment: alcoholic, daily, beer    Drug use: Yes     Frequency: 3.0 times per week     Types: Hydrocodone     Review of Systems   Constitutional:  Negative for fever.   HENT:  Positive for sore throat.    Respiratory:  Positive for shortness of breath.    Cardiovascular:  Negative  for chest pain.   Gastrointestinal:  Negative for nausea.   Genitourinary:  Negative for dysuria.   Musculoskeletal:  Negative for back pain.   Skin:  Negative for rash.   Neurological:  Negative for weakness.   Hematological:  Does not bruise/bleed easily.   All other systems reviewed and are negative.      Physical Exam     Initial Vitals [11/19/23 2010]   BP Pulse Resp Temp SpO2   (!) 177/92 81 18 99.2 °F (37.3 °C) 95 %      MAP       --         Physical Exam    Nursing note and vitals reviewed.  Constitutional: He appears well-developed and well-nourished. He is not diaphoretic. No distress.   HENT:   Head: Normocephalic and atraumatic.   Right Ear: External ear normal.   Left Ear: External ear normal.   Nose: Nose normal.   Mouth/Throat: Oropharynx is clear and moist. No oropharyngeal exudate.   Eyes: Conjunctivae and EOM are normal. Pupils are equal, round, and reactive to light. Right eye exhibits no discharge. Left eye exhibits no discharge.   Neck: Neck supple. No thyromegaly present. No tracheal deviation present. No JVD present.   Normal range of motion.  Cardiovascular:  Normal rate, regular rhythm, normal heart sounds and intact distal pulses.     Exam reveals no gallop and no friction rub.       No murmur heard.  Pulmonary/Chest: No stridor. No respiratory distress. He has wheezes. He has no rhonchi. He has no rales. He exhibits no tenderness.   Abdominal: Abdomen is soft. Bowel sounds are normal. He exhibits no distension. There is no abdominal tenderness. There is no rebound and no guarding.   Musculoskeletal:         General: No tenderness or edema. Normal range of motion.      Cervical back: Normal range of motion and neck supple.     Lymphadenopathy:     He has no cervical adenopathy.   Neurological: He is alert and oriented to person, place, and time. He has normal strength and normal reflexes. No cranial nerve deficit or sensory deficit. GCS score is 15. GCS eye subscore is 4. GCS verbal  subscore is 5. GCS motor subscore is 6.   Skin: Skin is warm and dry. No rash and no abscess noted. No erythema.   Psychiatric: He has a normal mood and affect. His behavior is normal. Judgment and thought content normal.         ED Course   Procedures  Labs Reviewed   COVID/FLU A&B PCR - Abnormal; Notable for the following components:       Result Value    Influenza A PCR Detected (*)     All other components within normal limits    Narrative:     The Xpert Xpress SARS-CoV-2/FLU/RSV plus is a rapid, multiplexed real-time PCR test intended for the simultaneous qualitative detection and differentiation of SARS-CoV-2, Influenza A, Influenza B, and respiratory syncytial virus (RSV) viral RNA in either nasopharyngeal swab or nasal swab specimens.         COMPREHENSIVE METABOLIC PANEL - Abnormal; Notable for the following components:    Sodium Level 128 (*)     Chloride 93 (*)     Calcium Level Total 8.6 (*)     Globulin 3.7 (*)     Bilirubin Total 1.6 (*)     Aspartate Aminotransferase 41 (*)     All other components within normal limits   B-TYPE NATRIURETIC PEPTIDE - Abnormal; Notable for the following components:    Natriuretic Peptide 2,241.2 (*)     All other components within normal limits   URINALYSIS, REFLEX TO URINE CULTURE - Abnormal; Notable for the following components:    Protein, UA 30 (*)     Blood, UA Trace-Lysed (*)     All other components within normal limits   CBC WITH DIFFERENTIAL - Abnormal; Notable for the following components:    RBC 3.50 (*)     Hgb 10.2 (*)     Hct 31.6 (*)     MCHC 32.3 (*)     Platelet 82 (*)     Lymph # 0.43 (*)     All other components within normal limits   URINALYSIS, MICROSCOPIC - Normal   CBC W/ AUTO DIFFERENTIAL    Narrative:     The following orders were created for panel order CBC auto differential.  Procedure                               Abnormality         Status                     ---------                               -----------         ------                      CBC with Differential[[id number]]       Abnormal            Final result                 Please view results for these tests on the individual orders.   TROPONIN ISTAT   POCT TROPONIN          Imaging Results              CT Soft Tissue Neck WO Contrast (Preliminary result)  Result time 11/19/23 21:52:55      Preliminary result by Kane Rose MD (11/19/23 21:52:55)                   Narrative:    START OF REPORT:  Technique: CT of the soft tissues of the neck was performed without intravenous contrast.    Comparison: None.    Clinical history: Patient c/o coughing and shortness of breath for the last 2 days. He also reports that it feels like something is stuck in his throat.    Findings:  Sinuses: The visualized paranasal sinuses are clear.  Nasopharynx: Unremarkable.  Oropharynx: Unremarkable.  Larynx: Unremarkable.  Trachea: Unremarkable.  Esophagus: Unremarkable.    Bony structures:  Mineralization of the Cervical Spine Bony Structures: Normal.  Curvature: Normal cervical lordosis.  Fractures: None.    Degenerative changes: Multilevel degenerative changes are seen.    Miscellaneous:  Orthopedic Hardware: None.  Calcification(s): None.      Impression:  1. No pharyngeal or airway mass or obstruction identified. Details and other findings as described above.                                         X-Ray Chest AP Portable (In process)                      Medications   albuterol-ipratropium 2.5 mg-0.5 mg/3 mL nebulizer solution 3 mL (3 mLs Nebulization Given 11/19/23 2059)   furosemide injection 40 mg (40 mg Intravenous Given 11/19/23 2050)   dexAMETHasone injection 8 mg (8 mg Intravenous Given 11/19/23 2045)     Medical Decision Making  COVID flu bronchitis pneumonia congestive heart failure exacerbation    Amount and/or Complexity of Data Reviewed  Labs: ordered.  Radiology: ordered.    Risk  Prescription drug management.                                   Clinical Impression:  Final diagnoses:  [R06.02]  Shortness of breath  [R07.9] Chest pain  [J10.1] Influenza A (Primary)          ED Disposition Condition    Discharge Stable          ED Prescriptions       Medication Sig Dispense Start Date End Date Auth. Provider    oseltamivir (TAMIFLU) 75 MG capsule Take 1 capsule (75 mg total) by mouth 2 (two) times daily. for 5 days 10 capsule 11/19/2023 11/24/2023 Shay George MD          Follow-up Information       Follow up With Specialties Details Why Contact Info    Darlene Willams, [facility]   [address] [place] [id number]  [phone]               Shay George MD  11/19/23 [id number]

## 2023-11-22 ENCOUNTER — TELEPHONE (OUTPATIENT)
Dept: FAMILY MEDICINE | Facility: CLINIC | Age: 68
End: 2023-11-22

## 2023-11-22 NOTE — TELEPHONE ENCOUNTER
11/22/23 called patient to let him know as a reminder of apt 12/5/23 @ 9:30 with dr agustin office for colon scope

## 2023-11-24 ENCOUNTER — HOSPITAL ENCOUNTER (INPATIENT)
Facility: HOSPITAL | Age: 68
LOS: 11 days | Discharge: NURSING, REHAB OR LONG-TERM CARE | DRG: 871 | End: 2023-12-05
Attending: STUDENT IN AN ORGANIZED HEALTH CARE EDUCATION/TRAINING PROGRAM | Admitting: INTERNAL MEDICINE
Payer: MEDICARE

## 2023-11-24 ENCOUNTER — HOSPITAL ENCOUNTER (EMERGENCY)
Facility: HOSPITAL | Age: 68
Discharge: ANOTHER HOSPITAL | End: 2023-11-24
Attending: EMERGENCY MEDICINE
Payer: MEDICARE

## 2023-11-24 VITALS
DIASTOLIC BLOOD PRESSURE: 74 MMHG | OXYGEN SATURATION: 97 % | WEIGHT: 175 LBS | RESPIRATION RATE: 39 BRPM | SYSTOLIC BLOOD PRESSURE: 111 MMHG | HEART RATE: 125 BPM | BODY MASS INDEX: 22.47 KG/M2 | TEMPERATURE: 98 F

## 2023-11-24 DIAGNOSIS — R09.02 HYPOXIA: ICD-10-CM

## 2023-11-24 DIAGNOSIS — R06.02 SOB (SHORTNESS OF BREATH): ICD-10-CM

## 2023-11-24 DIAGNOSIS — R79.89 TROPONIN LEVEL ELEVATED: ICD-10-CM

## 2023-11-24 DIAGNOSIS — R00.0 TACHYCARDIA: ICD-10-CM

## 2023-11-24 DIAGNOSIS — I21.4 NSTEMI (NON-ST ELEVATED MYOCARDIAL INFARCTION): ICD-10-CM

## 2023-11-24 DIAGNOSIS — I49.9 CARDIAC RHYTHM DISORDER OR DISTURBANCE OR CHANGE: ICD-10-CM

## 2023-11-24 DIAGNOSIS — I48.91 ATRIAL FIBRILLATION WITH RVR: ICD-10-CM

## 2023-11-24 DIAGNOSIS — I50.9 HEART FAILURE: ICD-10-CM

## 2023-11-24 DIAGNOSIS — A41.9 SEPSIS, DUE TO UNSPECIFIED ORGANISM, UNSPECIFIED WHETHER ACUTE ORGAN DYSFUNCTION PRESENT: ICD-10-CM

## 2023-11-24 DIAGNOSIS — I48.91 A-FIB: ICD-10-CM

## 2023-11-24 DIAGNOSIS — J18.9 PNEUMONIA DUE TO INFECTIOUS ORGANISM, UNSPECIFIED LATERALITY, UNSPECIFIED PART OF LUNG: ICD-10-CM

## 2023-11-24 DIAGNOSIS — E87.20 LACTIC ACIDOSIS: ICD-10-CM

## 2023-11-24 DIAGNOSIS — R00.2 PALPITATIONS: ICD-10-CM

## 2023-11-24 DIAGNOSIS — R78.81 BACTEREMIA: ICD-10-CM

## 2023-11-24 DIAGNOSIS — I48.91 ATRIAL FIBRILLATION WITH RVR: Primary | ICD-10-CM

## 2023-11-24 DIAGNOSIS — N17.9 AKI (ACUTE KIDNEY INJURY): ICD-10-CM

## 2023-11-24 DIAGNOSIS — R82.71 BACTERIA IN URINE: ICD-10-CM

## 2023-11-24 DIAGNOSIS — J18.9 COMMUNITY ACQUIRED PNEUMONIA OF RIGHT UPPER LOBE OF LUNG: Primary | ICD-10-CM

## 2023-11-24 LAB
ABS NEUT (OLG): 8.47 X10(3)/MCL (ref 2.1–9.2)
ABS NEUT CALC (OHS): 11.76 X10(3)/MCL (ref 2.1–9.2)
ALBUMIN SERPL-MCNC: 2.7 G/DL (ref 3.4–4.8)
ALBUMIN SERPL-MCNC: 2.8 G/DL (ref 3.4–4.8)
ALBUMIN/GLOB SERPL: 0.7 RATIO (ref 1.1–2)
ALBUMIN/GLOB SERPL: 0.9 RATIO (ref 1.1–2)
ALP SERPL-CCNC: 48 UNIT/L (ref 40–150)
ALP SERPL-CCNC: 56 UNIT/L (ref 40–150)
ALT SERPL-CCNC: 27 UNIT/L (ref 0–55)
ALT SERPL-CCNC: 29 UNIT/L (ref 0–55)
ANISOCYTOSIS BLD QL SMEAR: ABNORMAL
ANISOCYTOSIS BLD QL SMEAR: ABNORMAL
AST SERPL-CCNC: 53 UNIT/L (ref 5–34)
AST SERPL-CCNC: 56 UNIT/L (ref 5–34)
B PERT.PT PRMT NPH QL NAA+NON-PROBE: NOT DETECTED
BILIRUB SERPL-MCNC: 2.6 MG/DL
BILIRUB SERPL-MCNC: 2.9 MG/DL
BNP BLD-MCNC: 3053.1 PG/ML
BUN SERPL-MCNC: 36.3 MG/DL (ref 8.4–25.7)
BUN SERPL-MCNC: 39.6 MG/DL (ref 8.4–25.7)
BURR CELLS (OLG): ABNORMAL
BURR CELLS (OLG): ABNORMAL
C PNEUM DNA NPH QL NAA+NON-PROBE: NOT DETECTED
CALCIUM SERPL-MCNC: 7.5 MG/DL (ref 8.8–10)
CALCIUM SERPL-MCNC: 8.3 MG/DL (ref 8.8–10)
CHLORIDE SERPL-SCNC: 102 MMOL/L (ref 98–107)
CHLORIDE SERPL-SCNC: 99 MMOL/L (ref 98–107)
CO2 SERPL-SCNC: 15 MMOL/L (ref 23–31)
CO2 SERPL-SCNC: 17 MMOL/L (ref 23–31)
CREAT SERPL-MCNC: 1.31 MG/DL (ref 0.73–1.18)
CREAT SERPL-MCNC: 1.49 MG/DL (ref 0.73–1.18)
ELLIPTOCYTOSIS (OHS): ABNORMAL
ERYTHROCYTE [DISTWIDTH] IN BLOOD BY AUTOMATED COUNT: 17 % (ref 11.5–17)
ERYTHROCYTE [DISTWIDTH] IN BLOOD BY AUTOMATED COUNT: 17.4 % (ref 11.5–17)
GFR SERPLBLD CREATININE-BSD FMLA CKD-EPI: 50 MLS/MIN/1.73/M2
GFR SERPLBLD CREATININE-BSD FMLA CKD-EPI: 59 MLS/MIN/1.73/M2
GLOBULIN SER-MCNC: 2.9 GM/DL (ref 2.4–3.5)
GLOBULIN SER-MCNC: 3.9 GM/DL (ref 2.4–3.5)
GLUCOSE SERPL-MCNC: 179 MG/DL (ref 82–115)
GLUCOSE SERPL-MCNC: 199 MG/DL (ref 82–115)
HADV DNA NPH QL NAA+NON-PROBE: NOT DETECTED
HCO3 UR-SCNC: 16.4 MMOL/L (ref 24–28)
HCOV 229E RNA NPH QL NAA+NON-PROBE: NOT DETECTED
HCOV HKU1 RNA NPH QL NAA+NON-PROBE: NOT DETECTED
HCOV NL63 RNA NPH QL NAA+NON-PROBE: NOT DETECTED
HCOV OC43 RNA NPH QL NAA+NON-PROBE: NOT DETECTED
HCT VFR BLD AUTO: 36.2 % (ref 42–52)
HCT VFR BLD AUTO: 40 % (ref 42–52)
HGB BLD-MCNC: 12.2 G/DL (ref 14–18)
HGB BLD-MCNC: 12.5 G/DL (ref 14–18)
HMPV RNA NPH QL NAA+NON-PROBE: NOT DETECTED
HPIV1 RNA NPH QL NAA+NON-PROBE: NOT DETECTED
HPIV2 RNA NPH QL NAA+NON-PROBE: NOT DETECTED
HPIV3 RNA NPH QL NAA+NON-PROBE: NOT DETECTED
HPIV4 RNA NPH QL NAA+NON-PROBE: NOT DETECTED
INSTRUMENT WBC (OLG): 9.31 X10(3)/MCL
LACTATE SERPL-SCNC: 3.3 MMOL/L (ref 0.5–2.2)
LACTATE SERPL-SCNC: 6 MMOL/L (ref 0.5–2.2)
LYMPHOCYTES NFR BLD MANUAL: 0.19 X10(3)/MCL
LYMPHOCYTES NFR BLD MANUAL: 1.09 X10(3)/MCL
LYMPHOCYTES NFR BLD MANUAL: 2 %
LYMPHOCYTES NFR BLD MANUAL: 8 % (ref 13–40)
M PNEUMO DNA NPH QL NAA+NON-PROBE: NOT DETECTED
MACROCYTES BLD QL SMEAR: ABNORMAL
MCH RBC QN AUTO: 28.3 PG (ref 27–31)
MCH RBC QN AUTO: 29.2 PG (ref 27–31)
MCHC RBC AUTO-ENTMCNC: 31.3 G/DL (ref 33–36)
MCHC RBC AUTO-ENTMCNC: 33.7 G/DL (ref 33–36)
MCV RBC AUTO: 86.6 FL (ref 80–94)
MCV RBC AUTO: 90.7 FL (ref 80–94)
METAMYELOCYTES NFR BLD MANUAL: 1 %
METAMYELOCYTES NFR BLD MANUAL: 4 %
MONOCYTES NFR BLD MANUAL: 0.37 X10(3)/MCL (ref 0.1–1.3)
MONOCYTES NFR BLD MANUAL: 0.68 X10(3)/MCL (ref 0.1–1.3)
MONOCYTES NFR BLD MANUAL: 4 %
MONOCYTES NFR BLD MANUAL: 5 % (ref 2–11)
MRSA PCR SCRN (OHS): DETECTED
NEUTROPHILS NFR BLD MANUAL: 56 % (ref 47–80)
NEUTROPHILS NFR BLD MANUAL: 91 %
NEUTS BAND NFR BLD MANUAL: 30 % (ref 0–11)
NRBC BLD AUTO-RTO: 0 %
PCO2 BLDA: 27.3 MMHG (ref 35–45)
PH SMN: 7.39 [PH] (ref 7.35–7.45)
PLATELET # BLD AUTO: 124 X10(3)/MCL (ref 130–400)
PLATELET # BLD AUTO: 154 X10(3)/MCL (ref 130–400)
PLATELET # BLD EST: ADEQUATE 10*3/UL
PLATELET # BLD EST: NORMAL 10*3/UL
PMV BLD AUTO: 11.3 FL (ref 7.4–10.4)
PMV BLD AUTO: 11.4 FL (ref 7.4–10.4)
PO2 BLDA: 71 MMHG (ref 80–100)
POC BE: -9 MMOL/L
POC CARDIAC TROPONIN I: 0.12 NG/ML (ref 0–0.08)
POC SATURATED O2: 94 % (ref 95–100)
POC TCO2: 17 MMOL/L (ref 23–27)
POIKILOCYTOSIS BLD QL SMEAR: ABNORMAL
POTASSIUM SERPL-SCNC: 3.7 MMOL/L (ref 3.5–5.1)
POTASSIUM SERPL-SCNC: 4 MMOL/L (ref 3.5–5.1)
PROT SERPL-MCNC: 5.6 GM/DL (ref 5.8–7.6)
PROT SERPL-MCNC: 6.7 GM/DL (ref 5.8–7.6)
RBC # BLD AUTO: 4.18 X10(6)/MCL (ref 4.7–6.1)
RBC # BLD AUTO: 4.41 X10(6)/MCL (ref 4.7–6.1)
RBC MORPH BLD: ABNORMAL
RBC MORPH BLD: ABNORMAL
RSV RNA NPH QL NAA+NON-PROBE: NOT DETECTED
RV+EV RNA NPH QL NAA+NON-PROBE: NOT DETECTED
SAMPLE: ABNORMAL
SAMPLE: ABNORMAL
SODIUM SERPL-SCNC: 131 MMOL/L (ref 136–145)
SODIUM SERPL-SCNC: 135 MMOL/L (ref 136–145)
WBC # SPEC AUTO: 13.68 X10(3)/MCL (ref 4.5–11.5)
WBC # SPEC AUTO: 9.47 X10(3)/MCL (ref 4.5–11.5)

## 2023-11-24 PROCEDURE — 84484 ASSAY OF TROPONIN QUANT: CPT

## 2023-11-24 PROCEDURE — 85027 COMPLETE CBC AUTOMATED: CPT | Performed by: EMERGENCY MEDICINE

## 2023-11-24 PROCEDURE — 25500020 PHARM REV CODE 255: Performed by: STUDENT IN AN ORGANIZED HEALTH CARE EDUCATION/TRAINING PROGRAM

## 2023-11-24 PROCEDURE — 84100 ASSAY OF PHOSPHORUS: CPT

## 2023-11-24 PROCEDURE — 96367 TX/PROPH/DG ADDL SEQ IV INF: CPT

## 2023-11-24 PROCEDURE — 99285 EMERGENCY DEPT VISIT HI MDM: CPT | Mod: 25

## 2023-11-24 PROCEDURE — 85027 COMPLETE CBC AUTOMATED: CPT | Performed by: STUDENT IN AN ORGANIZED HEALTH CARE EDUCATION/TRAINING PROGRAM

## 2023-11-24 PROCEDURE — 87154 CUL TYP ID BLD PTHGN 6+ TRGT: CPT | Performed by: EMERGENCY MEDICINE

## 2023-11-24 PROCEDURE — 25000003 PHARM REV CODE 250

## 2023-11-24 PROCEDURE — 63600175 PHARM REV CODE 636 W HCPCS: Performed by: EMERGENCY MEDICINE

## 2023-11-24 PROCEDURE — 93005 ELECTROCARDIOGRAM TRACING: CPT

## 2023-11-24 PROCEDURE — 27000221 HC OXYGEN, UP TO 24 HOURS

## 2023-11-24 PROCEDURE — 25000003 PHARM REV CODE 250: Performed by: EMERGENCY MEDICINE

## 2023-11-24 PROCEDURE — 83605 ASSAY OF LACTIC ACID: CPT | Performed by: STUDENT IN AN ORGANIZED HEALTH CARE EDUCATION/TRAINING PROGRAM

## 2023-11-24 PROCEDURE — 85007 BL SMEAR W/DIFF WBC COUNT: CPT | Performed by: STUDENT IN AN ORGANIZED HEALTH CARE EDUCATION/TRAINING PROGRAM

## 2023-11-24 PROCEDURE — 96374 THER/PROPH/DIAG INJ IV PUSH: CPT | Mod: 59

## 2023-11-24 PROCEDURE — 87641 MR-STAPH DNA AMP PROBE: CPT

## 2023-11-24 PROCEDURE — 83605 ASSAY OF LACTIC ACID: CPT | Performed by: EMERGENCY MEDICINE

## 2023-11-24 PROCEDURE — 83735 ASSAY OF MAGNESIUM: CPT

## 2023-11-24 PROCEDURE — 96365 THER/PROPH/DIAG IV INF INIT: CPT | Mod: 59

## 2023-11-24 PROCEDURE — 63600175 PHARM REV CODE 636 W HCPCS: Performed by: STUDENT IN AN ORGANIZED HEALTH CARE EDUCATION/TRAINING PROGRAM

## 2023-11-24 PROCEDURE — 94640 AIRWAY INHALATION TREATMENT: CPT

## 2023-11-24 PROCEDURE — 83880 ASSAY OF NATRIURETIC PEPTIDE: CPT | Performed by: EMERGENCY MEDICINE

## 2023-11-24 PROCEDURE — 87633 RESP VIRUS 12-25 TARGETS: CPT

## 2023-11-24 PROCEDURE — 93010 ELECTROCARDIOGRAM REPORT: CPT | Mod: 76,,, | Performed by: INTERNAL MEDICINE

## 2023-11-24 PROCEDURE — 36600 WITHDRAWAL OF ARTERIAL BLOOD: CPT

## 2023-11-24 PROCEDURE — 82077 ASSAY SPEC XCP UR&BREATH IA: CPT | Performed by: STUDENT IN AN ORGANIZED HEALTH CARE EDUCATION/TRAINING PROGRAM

## 2023-11-24 PROCEDURE — 80053 COMPREHEN METABOLIC PANEL: CPT | Performed by: STUDENT IN AN ORGANIZED HEALTH CARE EDUCATION/TRAINING PROGRAM

## 2023-11-24 PROCEDURE — 25000242 PHARM REV CODE 250 ALT 637 W/ HCPCS: Performed by: EMERGENCY MEDICINE

## 2023-11-24 PROCEDURE — 96365 THER/PROPH/DIAG IV INF INIT: CPT

## 2023-11-24 PROCEDURE — 87040 BLOOD CULTURE FOR BACTERIA: CPT | Performed by: EMERGENCY MEDICINE

## 2023-11-24 PROCEDURE — 93010 EKG 12-LEAD: ICD-10-PCS | Mod: 76,,, | Performed by: INTERNAL MEDICINE

## 2023-11-24 PROCEDURE — 63600175 PHARM REV CODE 636 W HCPCS

## 2023-11-24 PROCEDURE — 87070 CULTURE OTHR SPECIMN AEROBIC: CPT

## 2023-11-24 PROCEDURE — 80053 COMPREHEN METABOLIC PANEL: CPT | Performed by: EMERGENCY MEDICINE

## 2023-11-24 PROCEDURE — 96375 TX/PRO/DX INJ NEW DRUG ADDON: CPT

## 2023-11-24 PROCEDURE — 99291 CRITICAL CARE FIRST HOUR: CPT

## 2023-11-24 PROCEDURE — 99900035 HC TECH TIME PER 15 MIN (STAT)

## 2023-11-24 PROCEDURE — 25000003 PHARM REV CODE 250: Performed by: STUDENT IN AN ORGANIZED HEALTH CARE EDUCATION/TRAINING PROGRAM

## 2023-11-24 PROCEDURE — 96366 THER/PROPH/DIAG IV INF ADDON: CPT

## 2023-11-24 PROCEDURE — 20000000 HC ICU ROOM

## 2023-11-24 RX ORDER — ASPIRIN 81 MG/1
81 TABLET ORAL DAILY
Status: DISCONTINUED | OUTPATIENT
Start: 2023-11-25 | End: 2023-12-05

## 2023-11-24 RX ORDER — ENOXAPARIN SODIUM 100 MG/ML
70 INJECTION SUBCUTANEOUS
Status: DISCONTINUED | OUTPATIENT
Start: 2023-11-24 | End: 2023-12-04

## 2023-11-24 RX ORDER — LANOLIN ALCOHOL/MO/W.PET/CERES
800 CREAM (GRAM) TOPICAL
Status: DISCONTINUED | OUTPATIENT
Start: 2023-11-24 | End: 2023-12-05 | Stop reason: HOSPADM

## 2023-11-24 RX ORDER — ATORVASTATIN CALCIUM 40 MG/1
40 TABLET, FILM COATED ORAL NIGHTLY
Status: DISCONTINUED | OUTPATIENT
Start: 2023-11-24 | End: 2023-11-29

## 2023-11-24 RX ORDER — SODIUM,POTASSIUM PHOSPHATES 280-250MG
2 POWDER IN PACKET (EA) ORAL
Status: DISCONTINUED | OUTPATIENT
Start: 2023-11-24 | End: 2023-12-05 | Stop reason: HOSPADM

## 2023-11-24 RX ORDER — DOXYCYCLINE HYCLATE 100 MG
100 TABLET ORAL EVERY 12 HOURS
Status: DISCONTINUED | OUTPATIENT
Start: 2023-11-24 | End: 2023-11-27

## 2023-11-24 RX ORDER — AMIODARONE HYDROCHLORIDE 200 MG/1
200 TABLET ORAL DAILY
Status: DISCONTINUED | OUTPATIENT
Start: 2023-11-25 | End: 2023-12-05

## 2023-11-24 RX ORDER — ADENOSINE 3 MG/ML
12 INJECTION INTRAVENOUS
Status: COMPLETED | OUTPATIENT
Start: 2023-11-24 | End: 2023-11-24

## 2023-11-24 RX ORDER — SODIUM CHLORIDE 9 MG/ML
1000 INJECTION, SOLUTION INTRAVENOUS
Status: DISCONTINUED | OUTPATIENT
Start: 2023-11-24 | End: 2023-11-24 | Stop reason: HOSPADM

## 2023-11-24 RX ORDER — METOPROLOL SUCCINATE 50 MG/1
50 TABLET, EXTENDED RELEASE ORAL DAILY
Status: DISCONTINUED | OUTPATIENT
Start: 2023-11-25 | End: 2023-11-26

## 2023-11-24 RX ORDER — IPRATROPIUM BROMIDE AND ALBUTEROL SULFATE 2.5; .5 MG/3ML; MG/3ML
3 SOLUTION RESPIRATORY (INHALATION)
Status: COMPLETED | OUTPATIENT
Start: 2023-11-24 | End: 2023-11-24

## 2023-11-24 RX ORDER — ADENOSINE 3 MG/ML
INJECTION, SOLUTION INTRAVENOUS
Status: COMPLETED
Start: 2023-11-24 | End: 2023-11-24

## 2023-11-24 RX ORDER — ADENOSINE 3 MG/ML
6 INJECTION INTRAVENOUS
Status: COMPLETED | OUTPATIENT
Start: 2023-11-24 | End: 2023-11-24

## 2023-11-24 RX ORDER — DILTIAZEM HYDROCHLORIDE 5 MG/ML
20 INJECTION INTRAVENOUS
Status: COMPLETED | OUTPATIENT
Start: 2023-11-24 | End: 2023-11-24

## 2023-11-24 RX ORDER — ONDANSETRON 2 MG/ML
4 INJECTION INTRAMUSCULAR; INTRAVENOUS EVERY 8 HOURS PRN
Status: DISCONTINUED | OUTPATIENT
Start: 2023-11-24 | End: 2023-12-05 | Stop reason: HOSPADM

## 2023-11-24 RX ORDER — METOPROLOL TARTRATE 1 MG/ML
5 INJECTION, SOLUTION INTRAVENOUS
Status: COMPLETED | OUTPATIENT
Start: 2023-11-24 | End: 2023-11-24

## 2023-11-24 RX ORDER — QUETIAPINE FUMARATE 100 MG/1
200 TABLET, FILM COATED ORAL NIGHTLY
Status: DISCONTINUED | OUTPATIENT
Start: 2023-11-24 | End: 2023-12-05

## 2023-11-24 RX ORDER — SODIUM CHLORIDE 0.9 % (FLUSH) 0.9 %
10 SYRINGE (ML) INJECTION
Status: DISCONTINUED | OUTPATIENT
Start: 2023-11-24 | End: 2023-12-05 | Stop reason: HOSPADM

## 2023-11-24 RX ORDER — METOPROLOL TARTRATE 1 MG/ML
INJECTION, SOLUTION INTRAVENOUS
Status: COMPLETED
Start: 2023-11-24 | End: 2023-11-24

## 2023-11-24 RX ORDER — METOPROLOL TARTRATE 1 MG/ML
5 INJECTION, SOLUTION INTRAVENOUS EVERY 5 MIN PRN
Status: DISCONTINUED | OUTPATIENT
Start: 2023-11-25 | End: 2023-12-05 | Stop reason: HOSPADM

## 2023-11-24 RX ADMIN — METOPROLOL TARTRATE 5 MG: 1 INJECTION, SOLUTION INTRAVENOUS at 11:11

## 2023-11-24 RX ADMIN — SODIUM CHLORIDE, POTASSIUM CHLORIDE, SODIUM LACTATE AND CALCIUM CHLORIDE 1000 ML: 600; 310; 30; 20 INJECTION, SOLUTION INTRAVENOUS at 07:11

## 2023-11-24 RX ADMIN — DOXYCYCLINE HYCLATE 100 MG: 100 TABLET, COATED ORAL at 10:11

## 2023-11-24 RX ADMIN — DILTIAZEM HYDROCHLORIDE 5 MG/HR: 5 INJECTION INTRAVENOUS at 02:11

## 2023-11-24 RX ADMIN — DILTIAZEM HYDROCHLORIDE 15 MG/HR: 5 INJECTION INTRAVENOUS at 06:11

## 2023-11-24 RX ADMIN — METOPROLOL TARTRATE 5 MG: 1 INJECTION, SOLUTION INTRAVENOUS at 01:11

## 2023-11-24 RX ADMIN — IPRATROPIUM BROMIDE AND ALBUTEROL SULFATE 3 ML: .5; 3 SOLUTION RESPIRATORY (INHALATION) at 03:11

## 2023-11-24 RX ADMIN — QUETIAPINE 200 MG: 100 TABLET ORAL at 10:11

## 2023-11-24 RX ADMIN — CEFTRIAXONE SODIUM 1 G: 1 INJECTION, POWDER, FOR SOLUTION INTRAMUSCULAR; INTRAVENOUS at 03:11

## 2023-11-24 RX ADMIN — DILTIAZEM HYDROCHLORIDE 20 MG: 5 INJECTION INTRAVENOUS at 02:11

## 2023-11-24 RX ADMIN — ADENOSINE 6 MG: 3 INJECTION INTRAVENOUS at 01:11

## 2023-11-24 RX ADMIN — ENOXAPARIN SODIUM 70 MG: 80 INJECTION SUBCUTANEOUS at 10:11

## 2023-11-24 RX ADMIN — ADENOSINE 12 MG: 3 INJECTION INTRAVENOUS at 01:11

## 2023-11-24 RX ADMIN — VANCOMYCIN HYDROCHLORIDE 1500 MG: 1.5 INJECTION, POWDER, LYOPHILIZED, FOR SOLUTION INTRAVENOUS at 07:11

## 2023-11-24 RX ADMIN — ATORVASTATIN CALCIUM 40 MG: 40 TABLET, FILM COATED ORAL at 10:11

## 2023-11-24 RX ADMIN — IOPAMIDOL 100 ML: 755 INJECTION, SOLUTION INTRAVENOUS at 06:11

## 2023-11-24 RX ADMIN — PIPERACILLIN SODIUM AND TAZOBACTAM SODIUM 4.5 G: 4; .5 INJECTION, POWDER, FOR SOLUTION INTRAVENOUS at 06:11

## 2023-11-24 NOTE — Clinical Note
Diagnosis: Atrial fibrillation with RVR [[id number]]   Future Attending Provider: JOSELO DELEON JR. [[id number]]   Admitting Provider:: JOSELO DELEON JR. [[id number]]   Admit to which facility:: [facility] [[id number]]   Reason for IP Medical Treatment  (Clinical interventions that can only be accomplished in the IP setting? ) :: cardizem drip, pna and AF w/ RVR treatment   I certify that Inpatient services for greater than or equal to 2 midnights are medically necessary:: Yes   Plans for Post-Acute care--if anticipated (pick the single best option):: A. No post acute care anticipated at this time

## 2023-11-24 NOTE — ED PROVIDER NOTES
"Encounter Date: 11/24/2023       History     Chief Complaint   Patient presents with    Shortness of Breath     Pt presents per EMS with c/o SOB and weakness; dx with flu last week, started "medicine" and felt worse since; pt arrives with audible crackles, tachypnic, and HR in 200's; MD notified and at bedside; pt received albuterol and duoneb treatment, and 125mg Solumedrol by EMS      This 68-year-old man is brought in per EMS with shortness of breath and weakness.  He was diagnosed with influenza 5 days ago but got worse.  He reports that he has not been eating or drinking had has been staying in bed.  On arrival he has audible crackles and his heart rate is in the 190s.  He would already received an albuterol a DuoNeb and 125 of Solu-Medrol per EMS.       Review of patient's allergies indicates:  No Known Allergies  Past Medical History:   Diagnosis Date    Atrial flutter     CHF (congestive heart failure)     Coronary artery disease     Hypertension     Myocardial infarction     SOB (shortness of breath)     at times     Past Surgical History:   Procedure Laterality Date    CATARACT EXTRACTION Bilateral     CORONARY ARTERY BYPASS GRAFT (CABG) N/A 4/3/2023    Procedure: CORONARY ARTERY BYPASS GRAFT (CABG);  Surgeon: Deuce Finley IV, MD;  Location: [facility] OR;  Service: Cardiovascular;  Laterality: N/A;  WITH LLAA //  ECHO NOTIFIED    LEFT HEART CATHETERIZATION N/A 03/15/2023    Procedure: CATHETERIZATION, HEART, LEFT;  Surgeon: Sreedhar Herrera MD;  Location: [facility] CATH LAB;  Service: Cardiology;  Laterality: N/A;  LHC via RRA     Family History   Problem Relation Age of Onset    Heart attack Mother      Social History     Tobacco Use    Smoking status: Never     Passive exposure: Never    Smokeless tobacco: Never   Substance Use Topics    Alcohol use: Yes     Alcohol/week: 84.0 standard drinks of alcohol     Types: 84 Cans of beer per week     Comment: alcoholic, daily, beer    Drug use: Yes     Frequency: " 3.0 times per week     Types: Hydrocodone     Review of Systems   Constitutional:  Positive for appetite change. Negative for fever.   HENT:  Negative for sore throat.    Respiratory:  Positive for cough and shortness of breath.    Cardiovascular:  Negative for chest pain.   Gastrointestinal:  Negative for nausea.   Genitourinary:  Negative for dysuria.   Musculoskeletal:  Negative for back pain.   Skin:  Negative for rash.   Neurological:  Positive for weakness.   Hematological:  Does not bruise/bleed easily.       Physical Exam     Initial Vitals   BP Pulse Resp Temp SpO2   11/24/23 1352 11/24/23 1355 11/24/23 1355 11/24/23 1355 11/24/23 1355   (!) 150/93 (!) 116 (!) 38 97.7 °F (36.5 °C) (!) 92 %      MAP       --                Physical Exam    ED Course   Procedures  Labs Reviewed   COMPREHENSIVE METABOLIC PANEL - Abnormal; Notable for the following components:       Result Value    Sodium Level 135 (*)     Carbon Dioxide 17 (*)     Glucose Level 179 (*)     Blood Urea Nitrogen 36.3 (*)     Creatinine 1.49 (*)     Calcium Level Total 8.3 (*)     Albumin Level 2.8 (*)     Globulin 3.9 (*)     Albumin/Globulin Ratio 0.7 (*)     Bilirubin Total 2.9 (*)     Aspartate Aminotransferase 56 (*)     All other components within normal limits   B-TYPE NATRIURETIC PEPTIDE - Abnormal; Notable for the following components:    Natriuretic Peptide 3,053.1 (*)     All other components within normal limits   CBC WITH DIFFERENTIAL - Abnormal; Notable for the following components:    WBC 13.68 (*)     RBC 4.41 (*)     Hgb 12.5 (*)     Hct 40.0 (*)     MCHC 31.3 (*)     MPV 11.4 (*)     All other components within normal limits   MANUAL DIFFERENTIAL - Abnormal; Notable for the following components:    Bands % 30 (*)     Lymphs % 8 (*)     Neutrophils Abs Calc 11.7648 (*)     RBC Morph Abnormal (*)     Anisocytosis 2+ (*)     Channing Cells 2+ (*)     All other components within normal limits   TROPONIN ISTAT - Abnormal; Notable for the  following components:    POC Cardiac Troponin I 0.12 (*)     All other components within normal limits   LACTIC ACID, PLASMA - Abnormal; Notable for the following components:    Lactic Acid Level 6.0 (*)     All other components within normal limits   ISTAT PROCEDURE - Abnormal; Notable for the following components:    POC PCO2 27.3 (*)     POC PO2 71 (*)     POC HCO3 16.4 (*)     POC BE -9 (*)     POC TCO2 17 (*)     All other components within normal limits   BLOOD CULTURE [facility]   BLOOD CULTURE [facility]   CBC W/ AUTO DIFFERENTIAL    Narrative:     The following orders were created for panel order CBC auto differential.  Procedure                               Abnormality         Status                     ---------                               -----------         ------                     CBC with Differential[[id number]]       Abnormal            Final result               Manual Differential[[id number]]         Abnormal            Final result                 Please view results for these tests on the individual orders.   LACTIC ACID, PLASMA   POCT TROPONIN     EKG Readings: (Independently Interpreted)   Rhythm: Supraventricular Tachycardia (SVT). Heart Rate: 172. Ectopy: No Ectopy. Conduction: Normal. Axis: Normal. Other Impression: Possible inferolateral subendocardial injury   11/24/23 1342       Imaging Results              X-Ray Chest 1 View (Final result)  Result time 11/24/23 14:34:36      Final result by Lyric Deras MD (11/24/23 14:34:36)                   Impression:      Right upper lobe consolidation concerning for pneumonia.      Electronically signed by: Lyric Deras  Date:    11/24/2023  Time:    14:34               Narrative:    EXAMINATION:  XR CHEST 1 VIEW    CLINICAL HISTORY:  Shortness of breath    TECHNIQUE:  AP chest    COMPARISON:  Chest x-ray dated 11/19/2023    FINDINGS:  The heart is normal size.  There is a consolidative opacity over the right upper lobe..  There is no pleural  effusion or definite visible pneumothorax.                                       Medications   diltiaZEM (CARDIZEM) 125 mg in dextrose 5 % (D5W) 125 mL infusion (10 mg/hr Intravenous Verify Only 11/24/23 1454)   0.9%  NaCl infusion (0 mLs Intravenous Hold 11/24/23 1615)   adenosine injection 12 mg (12 mg Intravenous Given 11/24/23 1348)   adenosine injection 6 mg (6 mg Intravenous Given 11/24/23 1345)   metoprolol injection 5 mg (5 mg Intravenous Given 11/24/23 1352)   diltiaZEM injection 20 mg (20 mg Intravenous Given 11/24/23 1445)   cefTRIAXone (ROCEPHIN) 1 g in dextrose 5 % in water (D5W) 100 mL IVPB (MB+) (0 g Intravenous Stopped 11/24/23 1554)   albuterol-ipratropium 2.5 mg-0.5 mg/3 mL nebulizer solution 3 mL (3 mLs Nebulization Given 11/24/23 1515)     Medical Decision Making  This 68-year-old man with influenza presents to the emergency room tachycardic tachypneic in what appears to be supraventricular tachycardia.  Attempts to cardioverted 6 mg and then 12 mg were unsuccessful.  After 5 mg of Lopressor IV the patient is slowed and it is apparent that he is in atrial fibrillation with RVR. Rate began to increase again and so Cardizem with a drip was begun. Chest xray reveals RUL infiltrate. Patient will need transfer due to Cardizem drip.    Amount and/or Complexity of Data Reviewed  Labs: ordered. Decision-making details documented in ED Course.  Radiology: ordered. Decision-making details documented in ED Course.    Risk  Prescription drug management.  Decision regarding hospitalization.                                   Clinical Impression:  Final diagnoses:  [R06.02] SOB (shortness of breath)  [R00.2] Palpitations  [J18.9] Community acquired pneumonia of right upper lobe of lung (Primary)  [I48.91] Atrial fibrillation with RVR          ED Disposition Condition    Transfer to Another Facility Errol Caba MD  11/24/23 9280

## 2023-11-25 LAB
ABS NEUT (OLG): 9.42 X10(3)/MCL (ref 2.1–9.2)
ACINETOBACTER CALCOACETICUS-BAUMANNII COMPLEX (OHS): NOT DETECTED
ALBUMIN SERPL-MCNC: 2.3 G/DL (ref 3.4–4.8)
ALBUMIN/GLOB SERPL: 0.9 RATIO (ref 1.1–2)
ALP SERPL-CCNC: 44 UNIT/L (ref 40–150)
ALT SERPL-CCNC: 23 UNIT/L (ref 0–55)
AST SERPL-CCNC: 34 UNIT/L (ref 5–34)
AV INDEX (PROSTH): 0.29
AV MEAN GRADIENT: 35 MMHG
AV PEAK GRADIENT: 50 MMHG
AV VALVE AREA BY VELOCITY RATIO: 0.84 CM²
AV VALVE AREA: 0.9 CM²
AV VELOCITY RATIO: 0.27
BACTERIA SPEC CULT: NORMAL
BACTEROIDES FRAGILIS (OHS): NOT DETECTED
BASE EXCESS BLD CALC-SCNC: -0.4 MMOL/L
BILIRUB SERPL-MCNC: 2 MG/DL
BLOOD GAS SAMPLE TYPE (OHS): ABNORMAL
BSA FOR ECHO PROCEDURE: 1.92 M2
BUN SERPL-MCNC: 37.4 MG/DL (ref 8.4–25.7)
BURR CELLS (OLG): ABNORMAL
C AURIS DNA BLD POS QL NAA+NON-PROBE: NOT DETECTED
C GATTII+NEOFOR DNA CSF QL NAA+NON-PROBE: NOT DETECTED
CA-I BLD-SCNC: 0.99 MMOL/L (ref 1.12–1.23)
CALCIUM SERPL-MCNC: 7.2 MG/DL (ref 8.8–10)
CANDIDA ALBICANS (OHS): NOT DETECTED
CANDIDA GLABRATA (OHS): NOT DETECTED
CANDIDA KRUSEI (OHS): NOT DETECTED
CANDIDA PARAPSILOSIS (OHS): NOT DETECTED
CANDIDA TROPICALIS (OHS): NOT DETECTED
CHLORIDE SERPL-SCNC: 101 MMOL/L (ref 98–107)
CO2 BLDA-SCNC: 22.7 MMOL/L
CO2 SERPL-SCNC: 18 MMOL/L (ref 23–31)
CREAT SERPL-MCNC: 1.18 MG/DL (ref 0.73–1.18)
CTX-M (OHS): ABNORMAL
CV ECHO LV RWT: 0.59 CM
DOP CALC AO PEAK VEL: 3.52 M/S
DOP CALC AO VTI: 60.6 CM
DOP CALC LVOT AREA: 3.1 CM2
DOP CALC LVOT DIAMETER: 2 CM
DOP CALC LVOT PEAK VEL: 0.94 M/S
DOP CALC LVOT STROKE VOLUME: 54.32 CM3
DOP CALC MV VTI: 27.7 CM
DOP CALCLVOT PEAK VEL VTI: 17.3 CM
DRAWN BY BLOOD GAS (OHS): ABNORMAL
E/A RATIO: 1.03
E/E' RATIO: 24.75 M/S
ECHO LV POSTERIOR WALL: 1.2 CM (ref 0.6–1.1)
EJECTION FRACTION: 55 %
ENTEROBACTER CLOACAE COMPLEX (OHS): NOT DETECTED
ENTEROBACTERALES (OHS): NOT DETECTED
ENTEROCOCCUS FAECALIS (OHS): NOT DETECTED
ENTEROCOCCUS FAECIUM (OHS): NOT DETECTED
ERYTHROCYTE [DISTWIDTH] IN BLOOD BY AUTOMATED COUNT: 17.2 % (ref 11.5–17)
ESCHERICHIA COLI (OHS): NOT DETECTED
ETHANOL SERPL-MCNC: <10 MG/DL
FRACTIONAL SHORTENING: 29 % (ref 28–44)
GFR SERPLBLD CREATININE-BSD FMLA CKD-EPI: >60 MLS/MIN/1.73/M2
GLOBULIN SER-MCNC: 2.7 GM/DL (ref 2.4–3.5)
GLUCOSE SERPL-MCNC: 195 MG/DL (ref 82–115)
GP B STREP DNA CSF QL NAA+NON-PROBE: NOT DETECTED
GRAM STN SPEC: NORMAL
HAEM INFLU DNA CSF QL NAA+NON-PROBE: NOT DETECTED
HCO3 BLDA-SCNC: 21.8 MMOL/L (ref 22–26)
HCT VFR BLD AUTO: 35.3 % (ref 42–52)
HGB BLD-MCNC: 11.7 G/DL (ref 14–18)
IMP (OHS): ABNORMAL
INSTRUMENT WBC (OLG): 10.13 X10(3)/MCL
INTERVENTRICULAR SEPTUM: 1.3 CM (ref 0.6–1.1)
KLEBSIELLA AEROGENES (OHS): NOT DETECTED
KLEBSIELLA OXYTOCA (OHS): NOT DETECTED
KLEBSIELLA PNEUMONIAE GROUP (OHS): NOT DETECTED
KPC (OHS): ABNORMAL
L MONOCYTOG DNA CSF QL NAA+NON-PROBE: NOT DETECTED
LEFT ATRIUM SIZE: 3.5 CM
LEFT ATRIUM VOLUME INDEX MOD: 32.9 ML/M2
LEFT ATRIUM VOLUME MOD: 63.5 CM3
LEFT INTERNAL DIMENSION IN SYSTOLE: 2.9 CM (ref 2.1–4)
LEFT VENTRICLE DIASTOLIC VOLUME INDEX: 38.45 ML/M2
LEFT VENTRICLE DIASTOLIC VOLUME: 74.2 ML
LEFT VENTRICLE MASS INDEX: 95 G/M2
LEFT VENTRICLE SYSTOLIC VOLUME INDEX: 16.7 ML/M2
LEFT VENTRICLE SYSTOLIC VOLUME: 32.2 ML
LEFT VENTRICULAR INTERNAL DIMENSION IN DIASTOLE: 4.1 CM (ref 3.5–6)
LEFT VENTRICULAR MASS: 182.45 G
LPM (OHS): 5
LV LATERAL E/E' RATIO: 24.75 M/S
LV SEPTAL E/E' RATIO: 24.75 M/S
LVOT MG: 2 MMHG
LVOT MV: 0.73 CM/S
LYMPHOCYTES NFR BLD MANUAL: 0.41 X10(3)/MCL
LYMPHOCYTES NFR BLD MANUAL: 4 %
MAGNESIUM SERPL-MCNC: 1.9 MG/DL (ref 1.6–2.6)
MAGNESIUM SERPL-MCNC: 1.9 MG/DL (ref 1.6–2.6)
MCH RBC QN AUTO: 29.3 PG (ref 27–31)
MCHC RBC AUTO-ENTMCNC: 33.1 G/DL (ref 33–36)
MCR-1 (OHS): ABNORMAL
MCV RBC AUTO: 88.5 FL (ref 80–94)
MECA/C (OHS): ABNORMAL
MECA/C AND MREJ (MRSA)(OHS): DETECTED
MONOCYTES NFR BLD MANUAL: 0.41 X10(3)/MCL (ref 0.1–1.3)
MONOCYTES NFR BLD MANUAL: 4 %
MV MEAN GRADIENT: 9 MMHG
MV PEAK A VEL: 1.93 M/S
MV PEAK E VEL: 1.98 M/S
MV PEAK GRADIENT: 18 MMHG
MV VALVE AREA BY CONTINUITY EQUATION: 1.96 CM2
N MEN DNA CSF QL NAA+NON-PROBE: NOT DETECTED
NDM (OHS): ABNORMAL
NEUTROPHILS NFR BLD MANUAL: 93 %
NRBC BLD AUTO-RTO: 0 %
OHS LV EJECTION FRACTION SIMPSONS BIPLANE MOD: 59 %
OXA-48-LIKE (OHS): ABNORMAL
OXYGEN DEVICE BLOOD GAS (OHS): ABNORMAL
PCO2 BLDA: 28 MMHG (ref 35–45)
PH BLDA: 7.5 [PH] (ref 7.35–7.45)
PHOSPHATE SERPL-MCNC: 2.6 MG/DL (ref 2.3–4.7)
PHOSPHATE SERPL-MCNC: 3.5 MG/DL (ref 2.3–4.7)
PLATELET # BLD AUTO: 135 X10(3)/MCL (ref 130–400)
PLATELET # BLD EST: NORMAL 10*3/UL
PLATELETS.RETICULATED NFR BLD AUTO: 7.4 % (ref 0.9–11.2)
PMV BLD AUTO: 11.4 FL (ref 7.4–10.4)
PO2 BLDA: 67 MMHG (ref 80–100)
POIKILOCYTOSIS BLD QL SMEAR: ABNORMAL
POTASSIUM BLOOD GAS (OHS): 3.5 MMOL/L (ref 3.5–5)
POTASSIUM SERPL-SCNC: 3.8 MMOL/L (ref 3.5–5.1)
PROT SERPL-MCNC: 5 GM/DL (ref 5.8–7.6)
PROTEUS SPP. (OHS): NOT DETECTED
PSEUDOMONAS AERUGINOSA (OHS): NOT DETECTED
RA WIDTH: 3.2 CM
RBC # BLD AUTO: 3.99 X10(6)/MCL (ref 4.7–6.1)
RBC MORPH BLD: ABNORMAL
RIGHT VENTRICULAR END-DIASTOLIC DIMENSION: 3.6 CM
S ENT+BONG DNA STL QL NAA+NON-PROBE: NOT DETECTED
S PNEUM DNA CSF QL NAA+NON-PROBE: NOT DETECTED
SAMPLE SITE BLOOD GAS (OHS): ABNORMAL
SAO2 % BLDA: 95 %
SERRATIA MARCESCENS (OHS): NOT DETECTED
SODIUM BLOOD GAS (OHS): 127 MMOL/L (ref 137–145)
SODIUM SERPL-SCNC: 131 MMOL/L (ref 136–145)
STAPHYLOCOCCUS AUREUS (OHS): DETECTED
STAPHYLOCOCCUS EPIDERMIDIS (OHS): NOT DETECTED
STAPHYLOCOCCUS LUGDUNENSIS (OHS): NOT DETECTED
STAPHYLOCOCCUS SPP. (OHS): DETECTED
STENOTROPHOMONAS MALTOPHILIA (OHS): NOT DETECTED
STREPTOCOCCUS PYOGENES (GROUP A)(OHS): NOT DETECTED
STREPTOCOCCUS SPP. (OHS): NOT DETECTED
TDI LATERAL: 0.08 M/S
TDI SEPTAL: 0.08 M/S
TDI: 0.08 M/S
TRICUSPID ANNULAR PLANE SYSTOLIC EXCURSION: 1.37 CM
TROPONIN I SERPL-MCNC: 0.1 NG/ML (ref 0–0.04)
TROPONIN I SERPL-MCNC: 0.14 NG/ML (ref 0–0.04)
VANA/B (OHS): ABNORMAL
VANCOMYCIN SERPL-MCNC: 7.9 UG/ML (ref 15–20)
VIM (OHS): ABNORMAL
WBC # SPEC AUTO: 10.13 X10(3)/MCL (ref 4.5–11.5)
Z-SCORE OF LEFT VENTRICULAR DIMENSION IN END DIASTOLE: -2.87
Z-SCORE OF LEFT VENTRICULAR DIMENSION IN END SYSTOLE: -1.17

## 2023-11-25 PROCEDURE — 63600175 PHARM REV CODE 636 W HCPCS: Performed by: INTERNAL MEDICINE

## 2023-11-25 PROCEDURE — 82803 BLOOD GASES ANY COMBINATION: CPT

## 2023-11-25 PROCEDURE — 93010 ELECTROCARDIOGRAM REPORT: CPT | Mod: 76,,, | Performed by: INTERNAL MEDICINE

## 2023-11-25 PROCEDURE — 25000003 PHARM REV CODE 250

## 2023-11-25 PROCEDURE — 25000003 PHARM REV CODE 250: Performed by: STUDENT IN AN ORGANIZED HEALTH CARE EDUCATION/TRAINING PROGRAM

## 2023-11-25 PROCEDURE — 94761 N-INVAS EAR/PLS OXIMETRY MLT: CPT | Mod: XB

## 2023-11-25 PROCEDURE — 93005 ELECTROCARDIOGRAM TRACING: CPT

## 2023-11-25 PROCEDURE — 84100 ASSAY OF PHOSPHORUS: CPT

## 2023-11-25 PROCEDURE — 80053 COMPREHEN METABOLIC PANEL: CPT

## 2023-11-25 PROCEDURE — 27000207 HC ISOLATION

## 2023-11-25 PROCEDURE — 83735 ASSAY OF MAGNESIUM: CPT

## 2023-11-25 PROCEDURE — 84484 ASSAY OF TROPONIN QUANT: CPT

## 2023-11-25 PROCEDURE — 85027 COMPLETE CBC AUTOMATED: CPT

## 2023-11-25 PROCEDURE — 99900035 HC TECH TIME PER 15 MIN (STAT)

## 2023-11-25 PROCEDURE — 80202 ASSAY OF VANCOMYCIN: CPT | Performed by: INTERNAL MEDICINE

## 2023-11-25 PROCEDURE — 63600175 PHARM REV CODE 636 W HCPCS

## 2023-11-25 PROCEDURE — 93010 EKG 12-LEAD: ICD-10-PCS | Mod: 76,,, | Performed by: INTERNAL MEDICINE

## 2023-11-25 PROCEDURE — 36600 WITHDRAWAL OF ARTERIAL BLOOD: CPT

## 2023-11-25 PROCEDURE — 25000003 PHARM REV CODE 250: Performed by: INTERNAL MEDICINE

## 2023-11-25 PROCEDURE — 20000000 HC ICU ROOM

## 2023-11-25 PROCEDURE — 93010 ELECTROCARDIOGRAM REPORT: CPT | Mod: ,,, | Performed by: INTERNAL MEDICINE

## 2023-11-25 RX ORDER — OSELTAMIVIR PHOSPHATE 75 MG/1
75 CAPSULE ORAL 2 TIMES DAILY
Status: DISCONTINUED | OUTPATIENT
Start: 2023-11-25 | End: 2023-11-27

## 2023-11-25 RX ORDER — SODIUM CHLORIDE, SODIUM LACTATE, POTASSIUM CHLORIDE, CALCIUM CHLORIDE 600; 310; 30; 20 MG/100ML; MG/100ML; MG/100ML; MG/100ML
INJECTION, SOLUTION INTRAVENOUS CONTINUOUS
Status: DISCONTINUED | OUTPATIENT
Start: 2023-11-25 | End: 2023-11-28

## 2023-11-25 RX ORDER — MORPHINE SULFATE 4 MG/ML
2 INJECTION, SOLUTION INTRAMUSCULAR; INTRAVENOUS ONCE
Status: COMPLETED | OUTPATIENT
Start: 2023-11-25 | End: 2023-11-25

## 2023-11-25 RX ORDER — CALCIUM GLUCONATE 20 MG/ML
1 INJECTION, SOLUTION INTRAVENOUS
Status: COMPLETED | OUTPATIENT
Start: 2023-11-25 | End: 2023-11-25

## 2023-11-25 RX ORDER — DIGOXIN 0.25 MG/ML
500 INJECTION INTRAMUSCULAR; INTRAVENOUS ONCE
Status: COMPLETED | OUTPATIENT
Start: 2023-11-25 | End: 2023-11-25

## 2023-11-25 RX ORDER — DIGOXIN 0.25 MG/ML
INJECTION INTRAMUSCULAR; INTRAVENOUS
Status: DISPENSED
Start: 2023-11-25 | End: 2023-11-25

## 2023-11-25 RX ORDER — NOREPINEPHRINE BITARTRATE/D5W 8 MG/250ML
0-3 PLASTIC BAG, INJECTION (ML) INTRAVENOUS CONTINUOUS
Status: DISCONTINUED | OUTPATIENT
Start: 2023-11-25 | End: 2023-11-27

## 2023-11-25 RX ORDER — NOREPINEPHRINE BITARTRATE/D5W 8 MG/250ML
PLASTIC BAG, INJECTION (ML) INTRAVENOUS
Status: DISPENSED
Start: 2023-11-25 | End: 2023-11-25

## 2023-11-25 RX ADMIN — ENOXAPARIN SODIUM 70 MG: 80 INJECTION SUBCUTANEOUS at 11:11

## 2023-11-25 RX ADMIN — ATORVASTATIN CALCIUM 40 MG: 40 TABLET, FILM COATED ORAL at 08:11

## 2023-11-25 RX ADMIN — ASPIRIN 81 MG: 81 TABLET, COATED ORAL at 08:11

## 2023-11-25 RX ADMIN — NOREPINEPHRINE BITARTRATE 0.04 MCG/KG/MIN: 8 INJECTION, SOLUTION INTRAVENOUS at 11:11

## 2023-11-25 RX ADMIN — SODIUM CHLORIDE, POTASSIUM CHLORIDE, SODIUM LACTATE AND CALCIUM CHLORIDE: 600; 310; 30; 20 INJECTION, SOLUTION INTRAVENOUS at 04:11

## 2023-11-25 RX ADMIN — SODIUM CHLORIDE, POTASSIUM CHLORIDE, SODIUM LACTATE AND CALCIUM CHLORIDE 500 ML: 600; 310; 30; 20 INJECTION, SOLUTION INTRAVENOUS at 12:11

## 2023-11-25 RX ADMIN — OSELTAMIVIR PHOSPHATE 75 MG: 75 CAPSULE ORAL at 08:11

## 2023-11-25 RX ADMIN — PIPERACILLIN SODIUM AND TAZOBACTAM SODIUM 4.5 G: 4; .5 INJECTION, POWDER, FOR SOLUTION INTRAVENOUS at 05:11

## 2023-11-25 RX ADMIN — CALCIUM GLUCONATE 1 G: 20 INJECTION, SOLUTION INTRAVENOUS at 04:11

## 2023-11-25 RX ADMIN — CALCIUM GLUCONATE 1 G: 20 INJECTION, SOLUTION INTRAVENOUS at 07:11

## 2023-11-25 RX ADMIN — DILTIAZEM HYDROCHLORIDE 15 MG/HR: 5 INJECTION INTRAVENOUS at 12:11

## 2023-11-25 RX ADMIN — SODIUM CHLORIDE, POTASSIUM CHLORIDE, SODIUM LACTATE AND CALCIUM CHLORIDE: 600; 310; 30; 20 INJECTION, SOLUTION INTRAVENOUS at 07:11

## 2023-11-25 RX ADMIN — PIPERACILLIN SODIUM AND TAZOBACTAM SODIUM 4.5 G: 4; .5 INJECTION, POWDER, FOR SOLUTION INTRAVENOUS at 11:11

## 2023-11-25 RX ADMIN — LINACLOTIDE 72 MCG: 72 CAPSULE, GELATIN COATED ORAL at 05:11

## 2023-11-25 RX ADMIN — NOREPINEPHRINE BITARTRATE 0.04 MCG/KG/MIN: 8 INJECTION, SOLUTION INTRAVENOUS at 12:11

## 2023-11-25 RX ADMIN — DIGOXIN 500 MCG: 0.25 INJECTION INTRAMUSCULAR; INTRAVENOUS at 06:11

## 2023-11-25 RX ADMIN — VANCOMYCIN HYDROCHLORIDE 1250 MG: 1.25 INJECTION, POWDER, LYOPHILIZED, FOR SOLUTION INTRAVENOUS at 11:11

## 2023-11-25 RX ADMIN — MORPHINE SULFATE 2 MG: 4 INJECTION, SOLUTION INTRAMUSCULAR; INTRAVENOUS at 02:11

## 2023-11-25 RX ADMIN — DOXYCYCLINE HYCLATE 100 MG: 100 TABLET, COATED ORAL at 08:11

## 2023-11-25 RX ADMIN — QUETIAPINE 200 MG: 100 TABLET ORAL at 08:11

## 2023-11-25 RX ADMIN — SODIUM CHLORIDE, POTASSIUM CHLORIDE, SODIUM LACTATE AND CALCIUM CHLORIDE: 600; 310; 30; 20 INJECTION, SOLUTION INTRAVENOUS at 11:11

## 2023-11-25 RX ADMIN — DILTIAZEM HYDROCHLORIDE 15 MG/HR: 5 INJECTION INTRAVENOUS at 04:11

## 2023-11-25 RX ADMIN — SODIUM CHLORIDE, POTASSIUM CHLORIDE, SODIUM LACTATE AND CALCIUM CHLORIDE: 600; 310; 30; 20 INJECTION, SOLUTION INTRAVENOUS at 06:11

## 2023-11-25 RX ADMIN — CALCIUM GLUCONATE 1 G: 20 INJECTION, SOLUTION INTRAVENOUS at 02:11

## 2023-11-25 RX ADMIN — CALCIUM GLUCONATE 1 G: 20 INJECTION, SOLUTION INTRAVENOUS at 08:11

## 2023-11-25 RX ADMIN — PIPERACILLIN SODIUM AND TAZOBACTAM SODIUM 4.5 G: 4; .5 INJECTION, POWDER, FOR SOLUTION INTRAVENOUS at 02:11

## 2023-11-25 RX ADMIN — AMIODARONE HYDROCHLORIDE 200 MG: 200 TABLET ORAL at 08:11

## 2023-11-25 RX ADMIN — NOREPINEPHRINE BITARTRATE 0.2 MCG/KG/MIN: 8 INJECTION, SOLUTION INTRAVENOUS at 07:11

## 2023-11-25 RX ADMIN — DILTIAZEM HYDROCHLORIDE 15 MG/HR: 5 INJECTION INTRAVENOUS at 07:11

## 2023-11-25 NOTE — NURSING
Nurses Note -- 4 Eyes      11/25/2023   4:49 AM      Skin assessed during: Q Shift Change      [] No Altered Skin Integrity Present    []Prevention Measures Documented      [] Yes- Altered Skin Integrity Present or Discovered   [] LDA Added if Not in Epic (Describe Wound)   [] New Altered Skin Integrity was Present on Admit and Documented in LDA   [] Wound Image Taken    Wound Care Consulted? Yes    Attending Nurse:  Dwain Aiken RN      Second RN/Staff Member:   Raven Santamaria RN

## 2023-11-25 NOTE — PROGRESS NOTES
"Pharmacokinetic Initial Assessment: IV Vancomycin    Assessment/Plan:    Initiate intravenous vancomycin with loading dose of 1500 mg once with subsequent doses when random concentrations are less than 20 mcg/mL  Desired empiric serum trough concentration is 15 to 20 mcg/mL  Draw vancomycin random level on 11/25 at 2000.  Pharmacy will continue to follow and monitor vancomycin.      Please contact pharmacy at extension [phone] with any questions regarding this assessment.     Thank you for the consult,   Mary Horton       Patient brief summary:  Mike Urbina Jr. is a 68 y.o. male initiated on antimicrobial therapy with IV Vancomycin for treatment of suspected lower respiratory infection    Drug Allergies:   Review of patient's allergies indicates:  No Known Allergies    Actual Body Weight:   68 kg    Renal Function:   Estimated Creatinine Clearance: 50.5 mL/min (A) (based on SCr of 1.31 mg/dL (H)).,     Dialysis Method (if applicable):  LOVELY    CBC (last 72 hours):  Recent Labs   Lab Result Units 11/24/23  1413 11/24/23  1818   WBC x10(3)/mcL 13.68* 9.31  9.47   Hgb g/dL 12.5* 12.2*   Hct % 40.0* 36.2*   Platelet x10(3)/mcL 154 124*   Monocytes % % 5 4       Metabolic Panel (last 72 hours):  Recent Labs   Lab Result Units 11/24/23  1413 11/24/23  1818   Sodium Level mmol/L 135* 131*   Potassium Level mmol/L 3.7 4.0   Chloride mmol/L 99 102   Carbon Dioxide mmol/L 17* 15*   Glucose Level mg/dL 179* 199*   Blood Urea Nitrogen mg/dL 36.3* 39.6*   Creatinine mg/dL 1.49* 1.31*   Albumin Level g/dL 2.8* 2.7*   Bilirubin Total mg/dL 2.9* 2.6*   Alkaline Phosphatase unit/L 56 48   Aspartate Aminotransferase unit/L 56* 53*   Alanine Aminotransferase unit/L 29 27       Drug levels (last 3 results):  No results for input(s): "VANCOMYCINRA", "VANCORANDOM", "VANCOMYCINPE", "VANCOPEAK", "VANCOMYCINTR", "VANCOTROUGH" in the last 72 hours.    Microbiologic Results:  Microbiology Results (last 7 days)       Procedure Component " Value Units Date/Time    Respiratory Culture [[id number]] Collected: 11/24/23 2140    Order Status: Sent Specimen: Sputum from Mouth Updated: 11/24/23 2142

## 2023-11-25 NOTE — H&P
[facility]  Emergency Dept  Pulmonary Critical Care Note    Patient Name: Mike Urbina Jr.  MRN: [id number]  Admission Date: 11/24/2023  Hospital Length of Stay: 0 days  Code Status: Prior  Attending Provider: Vijay Canales MD  Primary Care Provider: aDrlene Willams FNP     Subjective:     HPI:   68-year-old male with a past medical history of myocardial infarction (s/p CABG 3/23), diastolic dysfunction grade 1, hypertension who presented earlier today to [facility] Emergency Department with a complaint of shortness a breath and generalized weakness for the past few days.  He was diagnosed with flu last week and was started on Tamiflu and has not been feeling well since.  Chart review reveals there he arrived with crackles was tachypneic with a heart rate in the 200s was given albuterol and DuoNeb treatments along with 125 mg of Solu-Medrol by the EMS.  At [facility] they suspected he was in SVT an attempted cardioversion with 6 mg and 12 mg of adenosine which was unsuccessful.  Then gave 5 mg Lopressor IV which slowed his heart rate revealing AFib with RVR.  Cardizem drip was done and patient was transferred to  [facility] for higher level of care.    On my interview with the patient he confirmed the above.  He has been having some chest pain that is worse with deep inspiration.  He is had poor p.o. intake the past few days.  He did endorse having some nausea and vomiting a couple of days ago, but none since.  He denies any abdominal pain, leg swelling, headache.  He denied any history of smoking although he does drink heavily 84 cans of beer a week.      Hospital Course/Significant events:      24 Hour Interval History:      Past Medical History:   Diagnosis Date    Atrial flutter     CHF (congestive heart failure)     Coronary artery disease     Hypertension     Myocardial infarction     SOB (shortness of breath)     at times       Past Surgical History:   Procedure Laterality  Date    CATARACT EXTRACTION Bilateral     CORONARY ARTERY BYPASS GRAFT (CABG) N/A 4/3/2023    Procedure: CORONARY ARTERY BYPASS GRAFT (CABG);  Surgeon: Deuce Finley IV, MD;  Location: [facility] OR;  Service: Cardiovascular;  Laterality: N/A;  WITH LLAA //  ECHO NOTIFIED    LEFT HEART CATHETERIZATION N/A 03/15/2023    Procedure: CATHETERIZATION, HEART, LEFT;  Surgeon: Sreedhar Herrera MD;  [facility]: [facility] CATH LAB;  Service: Cardiology;  Laterality: N/A;  LHC via RRA       Social History     Socioeconomic History    Marital status: Single   Tobacco Use    Smoking status: Never     Passive exposure: Never    Smokeless tobacco: Never   Substance and Sexual Activity    Alcohol use: Yes     Alcohol/week: 84.0 standard drinks of alcohol     Types: 84 Cans of beer per week     Comment: alcoholic, daily, beer    Drug use: Yes     Frequency: 3.0 times per week     Types: Hydrocodone    Sexual activity: Not Currently   Social History Narrative    ** Merged History Encounter **          Social Determinants of Health     Financial Resource Strain: Low Risk  (6/13/2023)    Overall Financial Resource Strain (CARDIA)     Difficulty of Paying Living Expenses: Not hard at all   Food Insecurity: No Food Insecurity (6/13/2023)    Hunger Vital Sign     Worried About Running Out of Food in the Last Year: Never true     Ran Out of Food in the Last Year: Never true   Transportation Needs: No Transportation Needs (6/13/2023)    PRAPARE - Transportation     Lack of Transportation (Medical): No     Lack of Transportation (Non-Medical): No   Physical Activity: Inactive (6/13/2023)    Exercise Vital Sign     Days of Exercise per Week: 0 days     Minutes of Exercise per Session: 0 min   Stress: No Stress Concern Present (6/13/2023)    [employer] - Occupational Stress Questionnaire     Feeling of Stress : Only a little   Social Connections: Moderately Integrated (6/13/2023)    Social Connection and Isolation Panel  [NHANES]     Frequency of Communication with Friends and Family: More than three times a week     Frequency of Social Gatherings with Friends and Family: More than three times a week     Attends Alevism Services: More than 4 times per year     Active Member of Clubs or Organizations: No     Attends Club or Organization Meetings: 1 to 4 times per year     Marital Status: Never    Housing Stability: Low Risk  (6/13/2023)    Housing Stability Vital Sign     Unable to Pay for Housing in the Last Year: No     Number of Places Lived in the Last Year: 1     Unstable Housing in the Last Year: No           Current Outpatient Medications   Medication Instructions    amiodarone (PACERONE) 200 mg, Oral, Daily    aspirin (ECOTRIN) 81 mg, Oral, Daily    atorvastatin (LIPITOR) 40 mg, Oral, Nightly    furosemide (LASIX) 20 mg, Oral, Daily    hydrOXYzine pamoate (VISTARIL) 50 mg, Oral, Every 8 hours PRN    linaCLOtide (LINZESS) 72 mcg, Oral, Before breakfast    metoprolol succinate (TOPROL-XL) 50 mg, Oral    oseltamivir (TAMIFLU) 75 mg, Oral, 2 times daily    QUEtiapine (SEROQUEL) 200 mg, Oral, Nightly    sodium bicarbonate 650 mg, Oral, Daily    thiamine (VITAMIN B-1) 100 mg, Oral, Daily    valsartan (DIOVAN) 160 mg, Oral, 2 times daily       Current Inpatient Medications   vancomycin (VANCOCIN) IV (PEDS and ADULTS)  1,500 mg Intravenous ED 1 Time       Current Intravenous Infusions   dilTIAZem 15 mg/hr (11/24/23 1814)         Review of Systems   Constitutional:  Positive for malaise/fatigue. Negative for chills and fever.   Respiratory:  Positive for cough, sputum production and shortness of breath. Negative for hemoptysis and wheezing.    Cardiovascular:  Positive for chest pain. Negative for palpitations and leg swelling.   Gastrointestinal:  Negative for abdominal pain, diarrhea, nausea and vomiting.   Genitourinary:  Negative for flank pain and hematuria.   Skin:  Negative for itching and rash.   Neurological:   Negative for weakness and headaches.   Endo/Heme/Allergies:  Does not bruise/bleed easily.          Objective:       Intake/Output Summary (Last 24 hours) at 11/24/2023 2056  Last data filed at 11/24/2023 1930  Gross per 24 hour   Intake 104.02 ml   Output --   Net 104.02 ml         Vital Signs (Most Recent):  Temp: 98.2 °F (36.8 °C) (11/24/23 1718)  Pulse: (!) 123 (11/24/23 2001)  Resp: (!) 28 (11/24/23 2001)  BP: 134/86 (11/24/23 2001)  SpO2: 97 % (11/24/23 2001)  Body mass index is 19.26 kg/m².  Weight: 68 kg (150 lb) Vital Signs (24h Range):  Temp:  [97.7 °F (36.5 °C)-98.2 °F (36.8 °C)] 98.2 °F (36.8 °C)  Pulse:  [116-134] 123  Resp:  [24-40] 28  SpO2:  [92 %-100 %] 97 %  BP: (104-150)/(72-93) 134/86     Physical Exam  Constitutional:       General: He is not in acute distress.     Appearance: Normal appearance. He is ill-appearing.   HENT:      Head: Normocephalic and atraumatic.   Cardiovascular:      Rate and Rhythm: Tachycardia present. Rhythm irregular.      Pulses: Normal pulses.      Heart sounds: No murmur heard.  Pulmonary:      Effort: Pulmonary effort is normal. No respiratory distress.      Breath sounds: Rhonchi present. No wheezing.   Chest:      Chest wall: Tenderness present.   Abdominal:      General: There is no distension.      Palpations: Abdomen is soft. There is no mass.      Tenderness: There is no abdominal tenderness. There is no guarding.   Feet:      Right foot:      Toenail Condition: Right toenails are abnormally thick. Fungal disease present.     Left foot:      Toenail Condition: Left toenails are abnormally thick. Fungal disease present.  Skin:     General: Skin is warm and dry.   Neurological:      Mental Status: He is alert and oriented to person, place, and time. Mental status is at baseline.   Psychiatric:         Mood and Affect: Mood normal.         Behavior: Behavior normal.           Lines/Drains/Airways       Peripheral Intravenous Line  Duration                   Peripheral IV - Single Lumen 11/24/23 1600 20 G Anterior;Left;Proximal Forearm <1 day         Peripheral IV - Single Lumen 11/24/23 1755 20 G Anterior;Distal;Right Upper Arm <1 day                    Significant Labs:    Lab Results   Component Value Date    WBC 9.47 11/24/2023    WBC 9.31 11/24/2023    HGB 12.2 (L) 11/24/2023    HCT 36.2 (L) 11/24/2023    MCV 86.6 11/24/2023     (L) 11/24/2023           BMP  Lab Results   Component Value Date     (L) 11/24/2023    K 4.0 11/24/2023    CHLORIDE 102 11/24/2023    CO2 15 (L) 11/24/2023    BUN 39.6 (H) 11/24/2023    CREATININE 1.31 (H) 11/24/2023    CALCIUM 7.5 (L) 11/24/2023    AGAP 11.0 06/13/2023    EGFRNONAA >60 05/31/2022         ABG  Recent Labs   Lab 11/24/23  1511   PH 7.388   PO2 71*   PCO2 27.3*   HCO3 16.4*       Mechanical Ventilation Support:         Significant Imaging:  I have reviewed the pertinent imaging within the past 24 hours.        Assessment/Plan:     Assessment  AFib with RVR  Hypoxemic respiratory failure  Community-acquired pneumonia of right upper lobe   recent influenza infection (11/19).  Lactic acidosis, lactate 6.   NSTEMI likely type 2  POC troponin 0.12  Troponin I elevated at 0.137  History of hypertension and diastolic dysfunction, grade 1  BNP elevated 3053.  Alcoholism      Plan  Admit to ICU for tele monitoring and further care  Continue Cardizem drip and wean his heart rate will tolerate.  Blood cultures x2 were drawn, MRSA PCR, respiratory culture and panel ordered.  Continue vanc and Zosyn, adding doxycycline for atypical coverage.  Obtain ethanol level  Restart home amiodarone, Toprol 50 mg daily  Trend lactate and continue giving fluids as needed  Trend troponins  Supplemental oxygen    DVT Prophylaxis:  Lovenox  GI Prophylaxis: n/a     34 minutes of critical care was time spent personally by me on the following activities: development of treatment plan with patient or surrogate and bedside caregivers, discussions  with consultants, evaluation of patient's response to treatment, examination of patient, ordering and performing treatments and interventions, ordering and review of laboratory studies, ordering and review of radiographic studies, pulse oximetry, re-evaluation of patient's condition.  This critical care time did not overlap with that of any other provider or involve time for any procedures.     Danielito Medina DO  Pulmonary Critical Care Medicine  [facility] - Emergency Dept  DOS: 11/24/2023

## 2023-11-25 NOTE — ED PROVIDER NOTES
Encounter Date: 11/24/2023    SCRIBE #1 NOTE: I, Emerald España, am scribing for, and in the presence of,  Vijay Canales MD. I have scribed the following portions of the note - the EKG reading. Other sections scribed: HPI, ROS, PE, MDM.       History     Chief Complaint   Patient presents with    transfer     Tx from [place] for pneumonia. Hx afib. Was in afib RVR rate 200s at [facility].  upon arrival.       Patient is a 68 year old male with a history of afib, CHF, CAD, MI, and HTN presents to ED, via EMS, from [facility] for pneumonia. He is complaining of weakness and chest pain. He was recently diagnosed with Flu A on 11/19.  Worsening sx last two days, presented with HR >200 at [place].  He was given adenosine 6mg, 12mg which slowed down rate to note he was in afib with RVR.  Given metoprolol, no rate control.  Given Cardizem no rate control.  Started on Cardizem drip.     The history is provided by the patient. No  was used.   Chest Pain  The current episode started several days ago. Chest pain occurs constantly. The chest pain is unchanged. The pain is associated with breathing. Primary symptoms include shortness of breath and cough. Pertinent negatives for primary symptoms include no fever and no abdominal pain.   Pertinent negatives for associated symptoms include no weakness.     Review of patient's allergies indicates:  No Known Allergies  Past Medical History:   Diagnosis Date    Atrial flutter     CHF (congestive heart failure)     Coronary artery disease     Hypertension     Myocardial infarction     SOB (shortness of breath)     at times     Past Surgical History:   Procedure Laterality Date    CATARACT EXTRACTION Bilateral     CORONARY ARTERY BYPASS GRAFT (CABG) N/A 4/3/2023    Procedure: CORONARY ARTERY BYPASS GRAFT (CABG);  Surgeon: Deuce Finley IV, MD;  Location: [facility] OR;  Service: Cardiovascular;  Laterality: N/A;  WITH LLAA //  ECHO NOTIFIED     LEFT HEART CATHETERIZATION N/A 03/15/2023    Procedure: CATHETERIZATION, HEART, LEFT;  Surgeon: Sreedhar Herrera MD;  Location: [facility] [place] CATH LAB;  Service: Cardiology;  Laterality: N/A;  LHC via RRA     Family History   Problem Relation Age of Onset    Heart attack Mother      Social History     Tobacco Use    Smoking status: Never     Passive exposure: Never    Smokeless tobacco: Never   Substance Use Topics    Alcohol use: Yes     Alcohol/week: 84.0 standard drinks of alcohol     Types: 84 Cans of beer per week     Comment: alcoholic, daily, beer    Drug use: Yes     Frequency: 3.0 times per week     Types: Hydrocodone     Review of Systems   Constitutional:  Negative for fever.   HENT:  Negative for sore throat.    Eyes:  Negative for visual disturbance.   Respiratory:  Positive for cough and shortness of breath.    Cardiovascular:  Positive for chest pain.   Gastrointestinal:  Negative for abdominal pain.   Genitourinary:  Negative for dysuria.   Musculoskeletal:  Negative for joint swelling.   Skin:  Negative for rash.   Neurological:  Negative for weakness.   Psychiatric/Behavioral:  Negative for confusion.    All other systems reviewed and are negative.      Physical Exam     Initial Vitals [11/24/23 1718]   BP Pulse Resp Temp SpO2   104/72 (!) 125 (!) 24 98.2 °F (36.8 °C) 97 %      MAP       --         Physical Exam    Nursing note and vitals reviewed.  Constitutional: He appears well-developed and well-nourished. He appears distressed.   Thin appearing.    HENT:   Head: Normocephalic and atraumatic.   Eyes: Conjunctivae and EOM are normal.   Neck:   Normal range of motion.  Cardiovascular:  Normal heart sounds and intact distal pulses.           No murmur heard.  Tachycardiac.  Irregular rhythm.    Pulmonary/Chest: No respiratory distress. He has wheezes. He has no rales.   Crackles on R side   Abdominal: Abdomen is soft. He exhibits no distension. There is no abdominal tenderness.   Musculoskeletal:          General: No tenderness or edema. Normal range of motion.      Cervical back: Normal range of motion.     Neurological: He is alert and oriented to person, place, and time. No cranial nerve deficit.   Skin: Skin is warm. Capillary refill takes less than 2 seconds. No rash noted. No erythema.   Psychiatric: He has a normal mood and affect.         ED Course   Critical Care    Date/Time: 11/24/2023 6:21 PM    Performed by: Vijay Canales MD  Authorized by: Vijay Canales MD  Direct patient critical care time: 25 minutes  Additional history critical care time: 8 minutes  Ordering / reviewing critical care time: 6 minutes  Documentation critical care time: 5 minutes  Consulting other physicians critical care time: 10 minutes  Consult with family critical care time: 5 minutes  Total critical care time (exclusive of procedural time) : 59 minutes  Critical care time was exclusive of separately billable procedures and treating other patients and teaching time.  Critical care was necessary to treat or prevent imminent or life-threatening deterioration of the following conditions: respiratory failure, renal failure, cardiac failure, circulatory failure, sepsis and metabolic crisis.  Critical care was time spent personally by me on the following activities: development of treatment plan with patient or surrogate, discussions with consultants, interpretation of cardiac output measurements, evaluation of patient's response to treatment, examination of patient, obtaining history from patient or surrogate, ordering and performing treatments and interventions, ordering and review of laboratory studies, ordering and review of radiographic studies, pulse oximetry, re-evaluation of patient's condition and review of old charts.        Labs Reviewed   COMPREHENSIVE METABOLIC PANEL - Abnormal; Notable for the following components:       Result Value    Sodium Level 131 (*)     Carbon Dioxide 15 (*)     Glucose Level 199 (*)      Blood Urea Nitrogen 39.6 (*)     Creatinine 1.31 (*)     Calcium Level Total 7.5 (*)     Protein Total 5.6 (*)     Albumin Level 2.7 (*)     Albumin/Globulin Ratio 0.9 (*)     Bilirubin Total 2.6 (*)     Aspartate Aminotransferase 53 (*)     All other components within normal limits   CBC WITH DIFFERENTIAL - Abnormal; Notable for the following components:    RBC 4.18 (*)     Hgb 12.2 (*)     Hct 36.2 (*)     RDW 17.4 (*)     Platelet 124 (*)     MPV 11.3 (*)     All other components within normal limits   MANUAL DIFFERENTIAL - Abnormal; Notable for the following components:    Metamyelocytes % 4 (*)     Lymphs Abs 0.1862 (*)     RBC Morph Abnormal (*)     Poikilocytosis 3+ (*)     Anisocytosis 1+ (*)     Macrocytosis 1+ (*)     [place] Cells 3+ (*)     Elliptocytosis 1+ (*)     All other components within normal limits   RESPIRATORY CULTURE (OLG)   CBC W/ AUTO DIFFERENTIAL    Narrative:     The following orders were created for panel order CBC auto differential.  Procedure                               Abnormality         Status                     ---------                               -----------         ------                     CBC with Differential[[id number]]       Abnormal            Final result               Manual Differential[[id number]]         Abnormal            Final result                 Please view results for these tests on the individual orders.     EKG Readings: (Independently Interpreted)   Initial Reading: No STEMI. Rhythm: Atrial Fibrillation. Heart Rate: 125. Ectopy: No Ectopy. Conduction: Normal.   Time: 1718       Imaging Results              CTA Chest Non-Coronary (PE Studies) (Final result)  Result time 11/24/23 18:48:28      Final result by Lyrci Deras MD (11/24/23 18:48:28)                   Impression:      1. No pulmonary embolism identified.  2. Findings concerning for multifocal pneumonia, most evident in the right upper lobe.      Electronically signed by: Lyric  Braeden  Date:    11/24/2023  Time:    18:48               Narrative:    EXAMINATION:  CTA CHEST NON CORONARY (PE STUDIES)    CLINICAL HISTORY:  Pulmonary embolism (PE) suspected, high prob;    TECHNIQUE:  Helical-acquisition CT images were obtained prior to and following the intravenous administration of iodine-based contrast media.    The post-contrast images were timed to coincide with arterial opacification for purpose of CT angiography.    Multiplanar reconstructions, to include MIP and volume-rendered (3D) images, were accomplished by the CT technologist at a separate workstation and pushed to PACS for physician review.    Total DLP (mGy-cm) 268 (value may include radiation due to concomitantly performed CT imaging)    Automated tube current modulation and/or weight-based exposure dosing is utilized, when appropriate, to reach lowest reasonably achievable exposure rate.    COMPARISON:  None    FINDINGS:  The heart is normal in size.  There is no pericardial effusion.  The RV to LV ratio is less than 1.  Evaluation is by breathing motion artifact.  There is no pulmonary embolism identified.    There are consolidative changes in the right upper lobe with additional scattered bilateral nodular airspace opacities.  There is no pleural effusion or pneumothorax.    There are no acute findings in the upper abdomen.    There is no acute bony abnormality identified.                                       Medications   diltiaZEM 125 mg in dextrose 5 % 125 mL IVPB (ready to mix) (titrating) (15 mg/hr Intravenous New Bag 11/25/23 0759)   sodium chloride 0.9% flush 10 mL (has no administration in time range)   potassium bicarbonate disintegrating tablet 50 mEq (has no administration in time range)   potassium bicarbonate disintegrating tablet 35 mEq (has no administration in time range)   potassium bicarbonate disintegrating tablet 60 mEq (has no administration in time range)   magnesium oxide tablet 800 mg (has no  administration in time range)   magnesium oxide tablet 800 mg (has no administration in time range)   potassium, sodium phosphates 280-160-250 mg packet 2 packet (has no administration in time range)   potassium, sodium phosphates 280-160-250 mg packet 2 packet (has no administration in time range)   potassium, sodium phosphates 280-160-250 mg packet 2 packet (has no administration in time range)   ondansetron injection 4 mg (has no administration in time range)   amiodarone tablet 200 mg (200 mg Oral Given 11/25/23 0800)   aspirin EC tablet 81 mg (81 mg Oral Given 11/25/23 0801)   atorvastatin tablet 40 mg (40 mg Oral Given 11/24/23 2253)   linaCLOtide capsule 72 mcg (72 mcg Oral Given 11/25/23 0559)   metoprolol succinate (TOPROL-XL) 24 hr tablet 50 mg (50 mg Oral Not Given 11/25/23 0900)   QUEtiapine tablet 200 mg (200 mg Oral Given 11/24/23 2253)   enoxaparin injection 70 mg (70 mg Subcutaneous Given 11/24/23 2254)   doxycycline tablet 100 mg (100 mg Oral Given 11/25/23 0801)   piperacillin-tazobactam (ZOSYN) 4.5 g in dextrose 5 % in water (D5W) 100 mL IVPB (MB+) (0 g Intravenous Stopped 11/25/23 0637)   vancomycin - pharmacy to dose (has no administration in time range)   metoprolol injection 5 mg (5 mg Intravenous Given 11/24/23 2351)   NORepinephrine 8 mg in dextrose 5% 250 mL infusion (0.2 mcg/kg/min × 74.2 kg Intravenous New Bag 11/25/23 0753)   lactated ringers infusion ( Intravenous New Bag 11/25/23 0728)   oseltamivir capsule 75 mg (75 mg Oral Given 11/25/23 0801)   vancomycin 1.5 g in dextrose 5 % 250 mL IVPB (ready to mix) ( Intravenous Verify Only 11/24/23 2121)   piperacillin-tazobactam (ZOSYN) 4.5 g in dextrose 5 % in water (D5W) 100 mL IVPB (MB+) (0 g Intravenous Stopped 11/24/23 1918)   lactated ringers bolus 1,000 mL ( Intravenous Stopped 11/24/23 2047)   iopamidoL (ISOVUE-370) injection 100 mL (100 mLs Intravenous Given 11/24/23 7352)   lactated ringers bolus 500 mL (0 mLs Intravenous Stopped  11/25/23 0115)   calcium gluconate 1 g in NS IVPB (premixed) (0 g Intravenous Stopped 11/25/23 0523)   morphine injection 2 mg (2 mg Intravenous Given 11/25/23 0234)   digoxin injection 500 mcg ( Intravenous Canceled Entry 11/25/23 0700)   calcium gluconate 1 g in NS IVPB (premixed) (0 g Intravenous Stopped 11/25/23 0813)   lactated ringers bolus 500 mL (500 mLs Intravenous Bolus from Bag 11/25/23 0751)     Medical Decision Making  Problems Addressed:  LOVELY (acute kidney injury): acute illness or injury that poses a threat to life or bodily functions  Atrial fibrillation with RVR: acute illness or injury that poses a threat to life or bodily functions  Hypoxia: acute illness or injury that poses a threat to life or bodily functions  Lactic acidosis: acute illness or injury that poses a threat to life or bodily functions  Pneumonia due to infectious organism, unspecified laterality, unspecified part of lung: acute illness or injury that poses a threat to life or bodily functions  Sepsis, due to unspecified organism, unspecified whether acute organ dysfunction present: acute illness or injury that poses a threat to life or bodily functions  SOB (shortness of breath): acute illness or injury that poses a threat to life or bodily functions    Amount and/or Complexity of Data Reviewed  Labs: ordered.  Radiology: ordered and independent interpretation performed.  ECG/medicine tests: ordered and independent interpretation performed.    Risk  Prescription drug management.  Parenteral controlled substances.  Drug therapy requiring intensive monitoring for toxicity.  Decision regarding hospitalization.  Diagnosis or treatment significantly limited by social determinants of health.            Scribe Attestation:   Scribe #1: I performed the above scribed service and the documentation accurately describes the services I performed. I attest to the accuracy of the note.    Attending Attestation:           Physician Attestation for  "Scribe:  Physician Attestation Statement for Scribe #1: I, Vijay Canales MD, reviewed documentation, as scribed by Emerald España in my presence, and it is both accurate and complete.                        Medical Decision Making:   History:   I obtained history from: someone other than patient and EMS provider.       <> Summary of History: Collateral from paramedics  Old Medical Records: I decided to obtain old medical records.  Old Records Summarized: records from clinic visits, records from previous admission(s) and records from another hospital.       <> Summary of Records: Reviewed old records, records from [place]  Initial Assessment:   SOB  Differential Diagnosis:   Judging by the patient's chief complaint and pertinent history, the patient has the following possible differential diagnoses, including but not limited to the following.  Some of these are deemed to be lower likelihood and some more likely based on my physical exam and history combined with possible lab work and/or imaging studies.   Please see the pertinent studies, and refer to the HPI.  Some of these diagnoses will take further evaluation to fully rule out, perhaps as an outpatient and the patient was encouraged to follow up when discharged for more comprehensive evaluation.    ACS, pneumonia, COVID/Flu, congestive heart failure, asthma, COPD, pleural effusion, pulmonary edema, acute bronchitis, PE, electrolyte abnormalities, anemia, anxiety     Independently Interpreted Test(s):   I have ordered and independently interpreted X-rays - see prior notes.  I have ordered and independently interpreted EKG Reading(s) - see prior notes  Clinical Tests:   Lab Tests: Ordered and Reviewed  Radiological Study: Ordered and Reviewed  Medical Tests: Reviewed and Ordered  Sepsis Perfusion Assessment: "I attest a sepsis perfusion exam was performed within 6 hours of sepsis, severe sepsis, or septic shock presentation, following fluid " "resuscitation."    Sepsis Perfusion Assessment Complete: 11/24/2023 6:45 PM    ED Management:    Patient is a 67 y/o male presents to the ED as a transfer for SOB/afib with RVR.  See HPI/exam.  Found to have lobar PNA after recent flu diagnosis. Kept on Cardizem infusion. Labs/imaging as noted.  CTA without PE.  Abx broadened to Vanc/Zosyn for Sepsis/MRSA suspicion.  Will require rate control, titration of drip, on 5L O2.  Will admit to ICU.  Patient remains critically ill. Discussed all results and discussed treatment plan.  Discussed need for admission for continued evaluation, management, diagnosis, and treatment.  Patient verbalized understanding and agreed to plan as discussed.     Other:   I have discussed this case with another health care provider.       <> Summary of the Discussion: [facility]   Discussed case with ICU, will admit to ICU.           Clinical Impression:  Final diagnoses:  [I48.91] Atrial fibrillation with RVR (Primary)  [R06.02] SOB (shortness of breath)  [R09.02] Hypoxia  [A41.9] Sepsis, due to unspecified organism, unspecified whether acute organ dysfunction present  [J18.9] Pneumonia due to infectious organism, unspecified laterality, unspecified part of lung  [E87.20] Lactic acidosis  [N17.9] LOVELY (acute kidney injury)          ED Disposition Condition    Admit Critical                Vijay Canales MD  11/25/23 0916    "

## 2023-11-25 NOTE — NURSING
Nurses Note -- 4 Eyes      11/25/2023   10:45 AM      Skin assessed during: Daily Assessment      [] No Altered Skin Integrity Present    [x]Prevention Measures Documented      [x] Yes- Altered Skin Integrity Present or Discovered   [x] LDA Added if Not in Epic (Describe Wound)   [x] New Altered Skin Integrity was Present on Admit and Documented in LDA   [x] Wound Image Taken    Wound Care Consulted? Yes    Attending Nurse:  Cherri Herrera RN/Staff Member:   Anitha Kendrick RN

## 2023-11-25 NOTE — PLAN OF CARE
Problem: Adult Inpatient Plan of Care  Goal: Plan of Care Review  Outcome: Ongoing, Progressing  Goal: Patient-Specific Goal (Individualized)  Outcome: Ongoing, Progressing  Goal: Absence of Hospital-Acquired Illness or Injury  Outcome: Ongoing, Progressing  Goal: Optimal Comfort and Wellbeing  Outcome: Ongoing, Progressing  Goal: Readiness for Transition of Care  Outcome: Ongoing, Progressing     Problem: Impaired Wound Healing  Goal: Optimal Wound Healing  Outcome: Ongoing, Progressing     Problem: Fluid Imbalance (Pneumonia)  Goal: Fluid Balance  Outcome: Ongoing, Progressing     Problem: Infection (Pneumonia)  Goal: Resolution of Infection Signs and Symptoms  Outcome: Ongoing, Progressing     Problem: Respiratory Compromise (Pneumonia)  Goal: Effective Oxygenation and Ventilation  Outcome: Ongoing, Progressing     Problem: Infection  Goal: Absence of Infection Signs and Symptoms  Outcome: Ongoing, Progressing     Problem: Skin Injury Risk Increased  Goal: Skin Health and Integrity  Outcome: Ongoing, Progressing

## 2023-11-26 LAB
ABS NEUT (OLG): 3.76 X10(3)/MCL (ref 2.1–9.2)
ACANTHOCYTES (OLG): ABNORMAL
ALBUMIN SERPL-MCNC: 2 G/DL (ref 3.4–4.8)
ALBUMIN/GLOB SERPL: 0.8 RATIO (ref 1.1–2)
ALP SERPL-CCNC: 41 UNIT/L (ref 40–150)
ALT SERPL-CCNC: 47 UNIT/L (ref 0–55)
ANISOCYTOSIS BLD QL SMEAR: ABNORMAL
AST SERPL-CCNC: 94 UNIT/L (ref 5–34)
BILIRUB SERPL-MCNC: 1.6 MG/DL
BUN SERPL-MCNC: 27 MG/DL (ref 8.4–25.7)
BURR CELLS (OLG): ABNORMAL
CALCIUM SERPL-MCNC: 7.4 MG/DL (ref 8.8–10)
CHLORIDE SERPL-SCNC: 105 MMOL/L (ref 98–107)
CO2 SERPL-SCNC: 20 MMOL/L (ref 23–31)
CREAT SERPL-MCNC: 0.95 MG/DL (ref 0.73–1.18)
ERYTHROCYTE [DISTWIDTH] IN BLOOD BY AUTOMATED COUNT: 17.7 % (ref 11.5–17)
GFR SERPLBLD CREATININE-BSD FMLA CKD-EPI: >60 MLS/MIN/1.73/M2
GIANT PLATELETS: ABNORMAL
GLOBULIN SER-MCNC: 2.6 GM/DL (ref 2.4–3.5)
GLUCOSE SERPL-MCNC: 102 MG/DL (ref 82–115)
HCT VFR BLD AUTO: 29.6 % (ref 42–52)
HGB BLD-MCNC: 10 G/DL (ref 14–18)
INSTRUMENT WBC (OLG): 4.27 X10(3)/MCL
LYMPHOCYTES NFR BLD MANUAL: 0.47 X10(3)/MCL
LYMPHOCYTES NFR BLD MANUAL: 11 %
MAGNESIUM SERPL-MCNC: 1.9 MG/DL (ref 1.6–2.6)
MCH RBC QN AUTO: 29 PG (ref 27–31)
MCHC RBC AUTO-ENTMCNC: 33.8 G/DL (ref 33–36)
MCV RBC AUTO: 85.8 FL (ref 80–94)
METAMYELOCYTES NFR BLD MANUAL: 1 %
NEUTROPHILS NFR BLD MANUAL: 88 %
NRBC BLD AUTO-RTO: 0 %
PHOSPHATE SERPL-MCNC: 2.2 MG/DL (ref 2.3–4.7)
PLATELET # BLD AUTO: 108 X10(3)/MCL (ref 130–400)
PLATELET # BLD EST: ABNORMAL 10*3/UL
PLATELETS.RETICULATED NFR BLD AUTO: 7.9 % (ref 0.9–11.2)
PMV BLD AUTO: 11.2 FL (ref 7.4–10.4)
POIKILOCYTOSIS BLD QL SMEAR: ABNORMAL
POTASSIUM SERPL-SCNC: 3.4 MMOL/L (ref 3.5–5.1)
PROT SERPL-MCNC: 4.6 GM/DL (ref 5.8–7.6)
RBC # BLD AUTO: 3.45 X10(6)/MCL (ref 4.7–6.1)
RBC MORPH BLD: ABNORMAL
SODIUM SERPL-SCNC: 133 MMOL/L (ref 136–145)
TOXIC GRANULES BLD QL SMEAR: ABNORMAL
VANCOMYCIN TROUGH SERPL-MCNC: 11.2 UG/ML (ref 15–20)
WBC # SPEC AUTO: 4.27 X10(3)/MCL (ref 4.5–11.5)

## 2023-11-26 PROCEDURE — 83735 ASSAY OF MAGNESIUM: CPT | Performed by: INTERNAL MEDICINE

## 2023-11-26 PROCEDURE — 85027 COMPLETE CBC AUTOMATED: CPT | Performed by: INTERNAL MEDICINE

## 2023-11-26 PROCEDURE — 94761 N-INVAS EAR/PLS OXIMETRY MLT: CPT

## 2023-11-26 PROCEDURE — 25000003 PHARM REV CODE 250

## 2023-11-26 PROCEDURE — 63600175 PHARM REV CODE 636 W HCPCS

## 2023-11-26 PROCEDURE — 25000003 PHARM REV CODE 250: Performed by: STUDENT IN AN ORGANIZED HEALTH CARE EDUCATION/TRAINING PROGRAM

## 2023-11-26 PROCEDURE — 20000000 HC ICU ROOM

## 2023-11-26 PROCEDURE — 63600175 PHARM REV CODE 636 W HCPCS: Performed by: INTERNAL MEDICINE

## 2023-11-26 PROCEDURE — 84100 ASSAY OF PHOSPHORUS: CPT | Performed by: INTERNAL MEDICINE

## 2023-11-26 PROCEDURE — 80202 ASSAY OF VANCOMYCIN: CPT | Performed by: INTERNAL MEDICINE

## 2023-11-26 PROCEDURE — 27000221 HC OXYGEN, UP TO 24 HOURS

## 2023-11-26 PROCEDURE — 25000003 PHARM REV CODE 250: Performed by: INTERNAL MEDICINE

## 2023-11-26 PROCEDURE — 27000207 HC ISOLATION

## 2023-11-26 PROCEDURE — 80053 COMPREHEN METABOLIC PANEL: CPT | Performed by: INTERNAL MEDICINE

## 2023-11-26 RX ORDER — GUAIFENESIN/DEXTROMETHORPHAN 100-10MG/5
10 SYRUP ORAL EVERY 4 HOURS PRN
Status: DISCONTINUED | OUTPATIENT
Start: 2023-11-26 | End: 2023-11-27

## 2023-11-26 RX ORDER — METOPROLOL SUCCINATE 50 MG/1
50 TABLET, EXTENDED RELEASE ORAL 2 TIMES DAILY
Status: DISCONTINUED | OUTPATIENT
Start: 2023-11-26 | End: 2023-11-26

## 2023-11-26 RX ORDER — FOLIC ACID 1 MG/1
1 TABLET ORAL DAILY
Status: DISCONTINUED | OUTPATIENT
Start: 2023-11-26 | End: 2023-12-05 | Stop reason: HOSPADM

## 2023-11-26 RX ORDER — METOPROLOL SUCCINATE 50 MG/1
50 TABLET, EXTENDED RELEASE ORAL DAILY
Status: DISCONTINUED | OUTPATIENT
Start: 2023-11-27 | End: 2023-11-27

## 2023-11-26 RX ORDER — LORAZEPAM 2 MG/ML
2 INJECTION INTRAMUSCULAR
Status: DISCONTINUED | OUTPATIENT
Start: 2023-11-26 | End: 2023-12-05 | Stop reason: HOSPADM

## 2023-11-26 RX ORDER — FOLIC ACID 1 MG/1
1 TABLET ORAL DAILY
Status: DISCONTINUED | OUTPATIENT
Start: 2023-11-26 | End: 2023-11-26

## 2023-11-26 RX ADMIN — METOPROLOL SUCCINATE 50 MG: 50 TABLET, EXTENDED RELEASE ORAL at 09:11

## 2023-11-26 RX ADMIN — ASPIRIN 81 MG: 81 TABLET, COATED ORAL at 08:11

## 2023-11-26 RX ADMIN — PIPERACILLIN SODIUM AND TAZOBACTAM SODIUM 4.5 G: 4; .5 INJECTION, POWDER, FOR SOLUTION INTRAVENOUS at 09:11

## 2023-11-26 RX ADMIN — SODIUM CHLORIDE, POTASSIUM CHLORIDE, SODIUM LACTATE AND CALCIUM CHLORIDE: 600; 310; 30; 20 INJECTION, SOLUTION INTRAVENOUS at 11:11

## 2023-11-26 RX ADMIN — SODIUM CHLORIDE, POTASSIUM CHLORIDE, SODIUM LACTATE AND CALCIUM CHLORIDE: 600; 310; 30; 20 INJECTION, SOLUTION INTRAVENOUS at 08:11

## 2023-11-26 RX ADMIN — THIAMINE HYDROCHLORIDE 250 MG: 100 INJECTION, SOLUTION INTRAMUSCULAR; INTRAVENOUS at 02:11

## 2023-11-26 RX ADMIN — ENOXAPARIN SODIUM 70 MG: 80 INJECTION SUBCUTANEOUS at 11:11

## 2023-11-26 RX ADMIN — DILTIAZEM HYDROCHLORIDE 15 MG/HR: 5 INJECTION INTRAVENOUS at 08:11

## 2023-11-26 RX ADMIN — DOXYCYCLINE HYCLATE 100 MG: 100 TABLET, COATED ORAL at 08:11

## 2023-11-26 RX ADMIN — PIPERACILLIN SODIUM AND TAZOBACTAM SODIUM 4.5 G: 4; .5 INJECTION, POWDER, FOR SOLUTION INTRAVENOUS at 05:11

## 2023-11-26 RX ADMIN — POTASSIUM BICARBONATE 60 MEQ: 391 TABLET, EFFERVESCENT ORAL at 09:11

## 2023-11-26 RX ADMIN — QUETIAPINE 200 MG: 100 TABLET ORAL at 08:11

## 2023-11-26 RX ADMIN — OSELTAMIVIR PHOSPHATE 75 MG: 75 CAPSULE ORAL at 08:11

## 2023-11-26 RX ADMIN — ENOXAPARIN SODIUM 70 MG: 80 INJECTION SUBCUTANEOUS at 12:11

## 2023-11-26 RX ADMIN — AMIODARONE HYDROCHLORIDE 200 MG: 200 TABLET ORAL at 08:11

## 2023-11-26 RX ADMIN — THIAMINE HYDROCHLORIDE 250 MG: 100 INJECTION, SOLUTION INTRAMUSCULAR; INTRAVENOUS at 08:11

## 2023-11-26 RX ADMIN — PIPERACILLIN SODIUM AND TAZOBACTAM SODIUM 4.5 G: 4; .5 INJECTION, POWDER, FOR SOLUTION INTRAVENOUS at 01:11

## 2023-11-26 RX ADMIN — FOLIC ACID 1 MG: 1 TABLET ORAL at 12:11

## 2023-11-26 RX ADMIN — ATORVASTATIN CALCIUM 40 MG: 40 TABLET, FILM COATED ORAL at 08:11

## 2023-11-26 RX ADMIN — LINACLOTIDE 72 MCG: 72 CAPSULE, GELATIN COATED ORAL at 05:11

## 2023-11-26 RX ADMIN — DILTIAZEM HYDROCHLORIDE 15 MG/HR: 5 INJECTION INTRAVENOUS at 12:11

## 2023-11-26 RX ADMIN — GUAIFENESIN AND DEXTROMETHORPHAN 10 ML: 100; 10 SYRUP ORAL at 02:11

## 2023-11-26 NOTE — PROGRESS NOTES
Pharmacokinetic Assessment Follow Up: IV Vancomycin    Vancomycin serum concentration assessment(s):    The random level was drawn correctly and can be used to guide therapy at this time. The measurement is below the desired definitive target range of 15 to 20 mcg/mL.    Vancomycin Regimen Plan:  Patient only got one dose  Will start regimen of 1250 mg IV every 18 hours  Check random level on 11/27 @1100    Drug levels (last 3 results):  Recent Labs   Lab Result Units 11/25/23 2036   Vanc Lvl Random ug/ml 7.9*       Vancomycin Administrations:  vancomycin given in the last 96 hours                     vancomycin 1.5 g in dextrose 5 % 250 mL IVPB (ready to mix) (mg) 1,500 mg New Bag 11/24/23 1933                    Pharmacy will continue to follow and monitor vancomycin.    Please contact pharmacy at extension [phone] for questions regarding this assessment.    Thank you for the consult,   Liam Manzo       Patient brief summary:  Mike Urbina Jr. is a 68 y.o. male initiated on antimicrobial therapy with IV Vancomycin for treatment of  PNA    The patient's current regimen is 1250 mg IV every 18 hours    Drug Allergies:   Review of patient's allergies indicates:  No Known Allergies    Actual Body Weight:   73.9 kg    Renal Function:   Estimated Creatinine Clearance: 60.9 mL/min (based on SCr of 1.18 mg/dL).,     Dialysis Method (if applicable):  N/A    CBC (last 72 hours):  Recent Labs   Lab Result Units 11/24/23  1413 11/24/23 1818 11/25/23  0148   WBC x10(3)/mcL 13.68* 9.31  9.47 10.13  10.13   Hgb g/dL 12.5* 12.2* 11.7*   Hct % 40.0* 36.2* 35.3*   Platelet x10(3)/mcL 154 124* 135   Monocytes % % 5 4 4       Metabolic Panel (last 72 hours):  Recent Labs   Lab Result Units 11/24/23  1413 11/24/23 1818 11/25/23  0148   Sodium Level mmol/L 135* 131* 131*   Potassium Level mmol/L 3.7 4.0 3.8   Chloride mmol/L 99 102 101   Carbon Dioxide mmol/L 17* 15* 18*   Glucose Level mg/dL 179* 199* 195*   Blood Urea  Nitrogen mg/dL 36.3* 39.6* 37.4*   Creatinine mg/dL 1.49* 1.31* 1.18   Albumin Level g/dL 2.8* 2.7* 2.3*   Bilirubin Total mg/dL 2.9* 2.6* 2.0*   Alkaline Phosphatase unit/L 56 48 44   Aspartate Aminotransferase unit/L 56* 53* 34   Alanine Aminotransferase unit/L 29 27 23   Magnesium Level mg/dL  --  1.90 1.90   Phosphorus Level mg/dL  --  3.5 2.6       Microbiologic Results:  Microbiology Results (last 7 days)       Procedure Component Value Units Date/Time    Respiratory Culture [[id number]]     Order Status: Sent Specimen: Sputum     Respiratory Culture [[id number]] Collected: 11/24/23 2140    Order Status: Completed Specimen: Sputum, Expectorated Updated: 11/25/23 1230     Respiratory Culture Gram stain demonstrates significant oropharyngeal contamination. Sputum unsatisfactory for culture     GRAM STAIN Quality -2      Many Gram positive cocci      Many Gram Negative Rods

## 2023-11-26 NOTE — PROGRESS NOTES
[facility] - [facility] ICU  Pulmonary/Critical Care  Progress Note  11/26/2023    Patient Name: Mike Urbina Jr.  MRN: [id number]  Admission Date: 11/24/2023  Code Status: Full Code      Subjective:     HPI:  The patient is a 68-year-old white male who has a previous history of coronary artery disease (post CABG 03/2023), chronic heart failure with preserved ejection fraction, and hypertension.  He was diagnosed with influenza a on 11/19/2023, and was treated with course of p.o. Tamiflu.  He presented to [facility] ED 11/24/2023 with complaints of shortness of breath.  He was noted tachycardic, received 2 doses of IV push adenosine without successful cardioversion, thin was felt to have probable atrial fibrillation with RVR, received a single dose of IV push metoprolol without rate control, was then placed on Cardizem infusion and transferred to ED at [facility].  He was noted hypoxic with bilateral pulmonary infiltrates suspicious for pneumonia, lactic acidosis, acute kidney injury, and was admitted to ICU for further care.    Hospital Course:  TTE (11/26/2023):  Normal LVSF, EF 55%.  RV appears normal.  Moderate aortic stenosis, moderate mitral stenosis, mild MR  Blood cultures 11/24/2023: Staph aureus    24hr Interval History:  He is afebrile.  Intake 7220 cc, output 1675 cc over the previous 24 hours.  Blood cultures 11/24/2023 growing Staph aureus, BCID positive for MRSA.  Respiratory cultures with many Gram-positive cocci and many Gram-negative rods, cultures pending.  Respiratory PCR panel negative.  He remains on IV Zosyn, IV vancomycin, and p.o. Tamiflu.  He remains atrial fibrillation with heart rate in the 120s.  He states that he feels much more comfortable, O2 saturations in the mid 90s on 2 L nasal cannula.  He continues with cough, swallows secretions.  Levophed has just been turned off within an hour.  He remains on Cardizem at 15 milligram/hour infusion.    Scheduled Medications:    amiodarone  200 mg Oral Daily    aspirin  81 mg Oral Daily    atorvastatin  40 mg Oral QHS    doxycycline  100 mg Oral Q12H    enoxparin  70 mg Subcutaneous Q12H    linaCLOtide  72 mcg Oral Before breakfast    metoprolol succinate  50 mg Oral BID    oseltamivir  75 mg Oral BID    piperacillin-tazobactam (Zosyn) IV (PEDS and ADULTS) (extended infusion is not appropriate)  4.5 g Intravenous Q8H    QUEtiapine  200 mg Oral QHS    vancomycin (VANCOCIN) IV (PEDS and ADULTS)  1,250 mg Intravenous Q18H     PRN Medications:  magnesium oxide, magnesium oxide, metoprolol, ondansetron, potassium bicarbonate, potassium bicarbonate, potassium bicarbonate, potassium, sodium phosphates, potassium, sodium phosphates, potassium, sodium phosphates, sodium chloride 0.9%, Pharmacy to dose Vancomycin consult **AND** vancomycin - pharmacy to dose  Continuous Infusions:   dilTIAZem 15 mg/hr (11/26/23 0520)    lactated ringers 125 mL/hr at 11/26/23 0520    NORepinephrine bitartrate-D5W 0.02 mcg/kg/min (11/26/23 0520)       Past Medical History:   Diagnosis Date    Atrial flutter     CHF (congestive heart failure)     Coronary artery disease     Hypertension     Myocardial infarction     SOB (shortness of breath)     at times       Past Surgical History:   Procedure Laterality Date    CATARACT EXTRACTION Bilateral     CORONARY ARTERY BYPASS GRAFT (CABG) N/A 4/3/2023    Procedure: CORONARY ARTERY BYPASS GRAFT (CABG);  Surgeon: Deuce Finley IV, MD;  Location: [facility];  Service: Cardiovascular;  Laterality: N/A;  WITH LLAA //  ECHO NOTIFIED    LEFT HEART CATHETERIZATION N/A 03/15/2023    Procedure: CATHETERIZATION, HEART, LEFT;  Surgeon: Sreedhar Herrera MD;  Location: [facility] CATH LAB;  Service: Cardiology;  Laterality: N/A;  C via RRA       Objective:     Input/output:    Intake/Output Summary (Last 24 hours) at 11/26/2023 0840  Last data filed at 11/26/2023 0629  Gross per 24 hour   Intake 5758.38 ml   Output 1375 ml   Net 4383.38 ml        Vital Signs (Most Recent):  Temp: 98 °F (36.7 °C) (11/26/23 0400)  Pulse: (!) 128 (11/26/23 0730)  Resp: (!) 27 (11/26/23 0730)  BP: 93/64 (11/26/23 0730)  SpO2: 95 % (11/26/23 0730)  Body mass index is 22.82 kg/m².  Weight: 73.9 kg (163 lb) Vital Signs (24h Range):  Temp:  [97.6 °F (36.4 °C)-98.6 °F (37 °C)] 98 °F (36.7 °C)  Pulse:  [] 128  Resp:  [1-47] 27  SpO2:  [89 %-97 %] 95 %  BP: ()/(53-87) 93/64     Physical Exam  Constitutional:       Appearance: Normal appearance.   HENT:      Mouth/Throat:      Mouth: Mucous membranes are moist.      Pharynx: Oropharynx is clear.   Eyes:      Conjunctiva/sclera: Conjunctivae normal.      Pupils: Pupils are equal, round, and reactive to light.   Cardiovascular:      Rate and Rhythm: Normal rate and regular rhythm.      Heart sounds: Murmur (2/6 upper sternal border systolic) heard.   Pulmonary:      Breath sounds: Rhonchi (Coarse bilateral rhonchi throughout) present. No wheezing or rales.   Abdominal:      General: Bowel sounds are normal. There is no distension.      Palpations: Abdomen is soft.   Musculoskeletal:      Right lower leg: No edema.      Left lower leg: No edema.   Skin:     General: Skin is warm and dry.   Neurological:      Mental Status: He is alert and oriented to person, place, and time.         Lines/Drains/Airways       Peripheral Intravenous Line  Duration                  Peripheral IV - Single Lumen 11/24/23 1600 20 G Anterior;Left;Proximal Forearm 1 day         Peripheral IV - Single Lumen 11/24/23 1755 20 G Anterior;Distal;Right Upper Arm 1 day                  ABGs:  Lab Results   Component Value Date    PH 7.500 (H) 11/25/2023    PO2 67.0 (L) 11/25/2023    PCO2 28.0 (L) 11/25/2023    POCSATURATED 94 11/24/2023         Significant Labs:    Lab Results   Component Value Date    WBC 4.27 (L) 11/26/2023    HGB 10.0 (L) 11/26/2023    HCT 29.6 (L) 11/26/2023    MCV 85.8 11/26/2023     (L) 11/26/2023         Recent Labs   Lab  11/26/23  0750   *   K 3.4*   CO2 20*   BUN 27.0*   CREATININE 0.95   CALCIUM 7.4*   MG 1.90   PHOS 2.2*   AST 94*   ALT 47   ALKPHOS 41   ALBUMIN 2.0*     Imaging:   Chest x-ray (11/26/2023, my reading of images):  Post median sternotomy changes noted.  There are patchy dense consolidation regions noted right upper lobe, and right lower lung field with mild patchy infiltration in the left upper lobe.    Assessment:     Influenza A diagnosed 11/19/23, received outpatient Tamiflu.  Bilateral pneumonia, post influenza.  Suspect probable staph aureus bacterial etiology at this point.  Staph aureus bacteremia  Hypotensive shock, likely combination of intravascular volume depletion and septic shock etiologies  Wide anion gap metabolic acidosis of lactic acid etiology, improving  Acute kidney injury, resolved.  He was currently nonoliguric  Chronic atrial fibrillation, rapid ventricular rate on presentation  Chronic heart failure with preserved ejection fraction, moderate AS, moderate MS, mild MR. Coronary artery disease, post CABG 03/2023.  Patient admits to daily ethanol use    Plan:     Continue IV antibiotics with Zosyn (day 3), doxycycline (day 3), and vancomycin (day 3).  Continue to closely follow cultures.  Resume home dose of metoprolol XL, continue home dose of amiodarone.  Wean off Cardizem infusion as tolerated.  Continue close ICU observation of this very tenuous patient.  Add IV thiamine and p.o. folic acid in face of daily ethanol use.       35 minutes of critical care was time spent personally by me on the following activities: development of treatment plan with patient or surrogate and bedside caregivers, discussions with consultants, evaluation of patient's response to treatment, examination of patient, ordering and performing treatments and interventions, ordering and review of laboratory studies, ordering and review of radiographic studies, pulse oximetry, re-evaluation of patient's condition.   This patient demonstrates a high probability for further clinical decompensation due to ongoing critical illness.  Critical care time did not overlap with that of any other provider or involve time for any procedures.       Neha Anderson MD, [facility]  Pulmonary/Critical Care

## 2023-11-26 NOTE — NURSING
Nurses Note -- 4 Eyes      11/26/2023   10:34 AM      Skin assessed during: Daily Assessment      [] No Altered Skin Integrity Present    [x]Prevention Measures Documented      [x] Yes- Altered Skin Integrity Present or Discovered   [x] LDA Added if Not in Epic (Describe Wound)   [x] New Altered Skin Integrity was Present on Admit and Documented in LDA   [x] Wound Image Taken    Wound Care Consulted? Yes    Attending Nurse:  Cherri Herrera RN/Staff Member:   Anitha Kendrick RN

## 2023-11-26 NOTE — NURSING
Nurses Note -- 4 Eyes      11/26/2023   2:04 AM      Skin assessed during: Q Shift Change      [] No Altered Skin Integrity Present    []Prevention Measures Documented      [] Yes- Altered Skin Integrity Present or Discovered   [] LDA Added if Not in Epic (Describe Wound)   [] New Altered Skin Integrity was Present on Admit and Documented in LDA   [] Wound Image Taken    Wound Care Consulted? {YES/NO:77941}    Attending Nurse:  Raven Herrera RN/Staff Member:

## 2023-11-27 LAB
ALBUMIN SERPL-MCNC: 2 G/DL (ref 3.4–4.8)
ALBUMIN/GLOB SERPL: 0.7 RATIO (ref 1.1–2)
ALP SERPL-CCNC: 54 UNIT/L (ref 40–150)
ALT SERPL-CCNC: 118 UNIT/L (ref 0–55)
AST SERPL-CCNC: 222 UNIT/L (ref 5–34)
BACTERIA BLD CULT: ABNORMAL
BACTERIA BLD CULT: ABNORMAL
BASOPHILS # BLD AUTO: 0.03 X10(3)/MCL
BASOPHILS NFR BLD AUTO: 0.9 %
BILIRUB SERPL-MCNC: 1.5 MG/DL
BUN SERPL-MCNC: 25 MG/DL (ref 8.4–25.7)
C TRACH DNA SPEC QL NAA+PROBE: NOT DETECTED
CALCIUM SERPL-MCNC: 7.3 MG/DL (ref 8.8–10)
CHLORIDE SERPL-SCNC: 104 MMOL/L (ref 98–107)
CO2 SERPL-SCNC: 23 MMOL/L (ref 23–31)
CREAT SERPL-MCNC: 0.89 MG/DL (ref 0.73–1.18)
EOSINOPHIL # BLD AUTO: 0.01 X10(3)/MCL (ref 0–0.9)
EOSINOPHIL NFR BLD AUTO: 0.3 %
ERYTHROCYTE [DISTWIDTH] IN BLOOD BY AUTOMATED COUNT: 18.1 % (ref 11.5–17)
GFR SERPLBLD CREATININE-BSD FMLA CKD-EPI: >60 MLS/MIN/1.73/M2
GLOBULIN SER-MCNC: 2.8 GM/DL (ref 2.4–3.5)
GLUCOSE SERPL-MCNC: 90 MG/DL (ref 82–115)
GRAM STN SPEC: ABNORMAL
HCT VFR BLD AUTO: 30.3 % (ref 42–52)
HGB BLD-MCNC: 9.9 G/DL (ref 14–18)
IMM GRANULOCYTES # BLD AUTO: 0.02 X10(3)/MCL (ref 0–0.04)
IMM GRANULOCYTES NFR BLD AUTO: 0.6 %
LYMPHOCYTES # BLD AUTO: 0.4 X10(3)/MCL (ref 0.6–4.6)
LYMPHOCYTES NFR BLD AUTO: 12.7 %
MAGNESIUM SERPL-MCNC: 1.9 MG/DL (ref 1.6–2.6)
MCH RBC QN AUTO: 28.4 PG (ref 27–31)
MCHC RBC AUTO-ENTMCNC: 32.7 G/DL (ref 33–36)
MCV RBC AUTO: 86.8 FL (ref 80–94)
MONOCYTES # BLD AUTO: 0.27 X10(3)/MCL (ref 0.1–1.3)
MONOCYTES NFR BLD AUTO: 8.5 %
N GONORRHOEA DNA SPEC QL NAA+PROBE: NOT DETECTED
NEUTROPHILS # BLD AUTO: 2.43 X10(3)/MCL (ref 2.1–9.2)
NEUTROPHILS NFR BLD AUTO: 77 %
NRBC BLD AUTO-RTO: 0 %
PHOSPHATE SERPL-MCNC: 1.6 MG/DL (ref 2.3–4.7)
PLATELET # BLD AUTO: 107 X10(3)/MCL (ref 130–400)
PLATELETS.RETICULATED NFR BLD AUTO: 6.2 % (ref 0.9–11.2)
PMV BLD AUTO: 10.8 FL (ref 7.4–10.4)
POTASSIUM SERPL-SCNC: 3.4 MMOL/L (ref 3.5–5.1)
PROT SERPL-MCNC: 4.8 GM/DL (ref 5.8–7.6)
RBC # BLD AUTO: 3.49 X10(6)/MCL (ref 4.7–6.1)
SODIUM SERPL-SCNC: 134 MMOL/L (ref 136–145)
SOURCE (OHS): NORMAL
VANCOMYCIN TROUGH SERPL-MCNC: 6.4 UG/ML (ref 15–20)
WBC # SPEC AUTO: 3.16 X10(3)/MCL (ref 4.5–11.5)

## 2023-11-27 PROCEDURE — 97162 PT EVAL MOD COMPLEX 30 MIN: CPT

## 2023-11-27 PROCEDURE — 94664 DEMO&/EVAL PT USE INHALER: CPT

## 2023-11-27 PROCEDURE — 27000221 HC OXYGEN, UP TO 24 HOURS

## 2023-11-27 PROCEDURE — 21400001 HC TELEMETRY ROOM

## 2023-11-27 PROCEDURE — 63600175 PHARM REV CODE 636 W HCPCS: Performed by: INTERNAL MEDICINE

## 2023-11-27 PROCEDURE — 84100 ASSAY OF PHOSPHORUS: CPT | Performed by: INTERNAL MEDICINE

## 2023-11-27 PROCEDURE — 97166 OT EVAL MOD COMPLEX 45 MIN: CPT

## 2023-11-27 PROCEDURE — 25000003 PHARM REV CODE 250: Performed by: NURSE PRACTITIONER

## 2023-11-27 PROCEDURE — 80202 ASSAY OF VANCOMYCIN: CPT | Performed by: INTERNAL MEDICINE

## 2023-11-27 PROCEDURE — 80053 COMPREHEN METABOLIC PANEL: CPT | Performed by: INTERNAL MEDICINE

## 2023-11-27 PROCEDURE — 27000646 HC AEROBIKA DEVICE

## 2023-11-27 PROCEDURE — 25000003 PHARM REV CODE 250: Performed by: INTERNAL MEDICINE

## 2023-11-27 PROCEDURE — 87040 BLOOD CULTURE FOR BACTERIA: CPT

## 2023-11-27 PROCEDURE — 87491 CHLMYD TRACH DNA AMP PROBE: CPT | Performed by: NURSE PRACTITIONER

## 2023-11-27 PROCEDURE — 85025 COMPLETE CBC W/AUTO DIFF WBC: CPT | Performed by: INTERNAL MEDICINE

## 2023-11-27 PROCEDURE — 99291 CRITICAL CARE FIRST HOUR: CPT | Mod: FS,,, | Performed by: GENERAL PRACTICE

## 2023-11-27 PROCEDURE — 25000003 PHARM REV CODE 250: Performed by: STUDENT IN AN ORGANIZED HEALTH CARE EDUCATION/TRAINING PROGRAM

## 2023-11-27 PROCEDURE — 27000207 HC ISOLATION

## 2023-11-27 PROCEDURE — 25000003 PHARM REV CODE 250

## 2023-11-27 PROCEDURE — 87045 FECES CULTURE AEROBIC BACT: CPT | Performed by: NURSE PRACTITIONER

## 2023-11-27 PROCEDURE — 83735 ASSAY OF MAGNESIUM: CPT | Performed by: INTERNAL MEDICINE

## 2023-11-27 PROCEDURE — 63600175 PHARM REV CODE 636 W HCPCS

## 2023-11-27 PROCEDURE — 99900035 HC TECH TIME PER 15 MIN (STAT)

## 2023-11-27 PROCEDURE — 99900031 HC PATIENT EDUCATION (STAT)

## 2023-11-27 PROCEDURE — 94761 N-INVAS EAR/PLS OXIMETRY MLT: CPT

## 2023-11-27 PROCEDURE — 99291 PR CRITICAL CARE, E/M 30-74 MINUTES: ICD-10-PCS | Mod: FS,,, | Performed by: GENERAL PRACTICE

## 2023-11-27 RX ORDER — GUAIFENESIN 100 MG/5ML
400 SOLUTION ORAL
Status: DISCONTINUED | OUTPATIENT
Start: 2023-11-27 | End: 2023-12-05

## 2023-11-27 RX ORDER — LOPERAMIDE HYDROCHLORIDE 2 MG/1
2 CAPSULE ORAL 4 TIMES DAILY PRN
Status: DISCONTINUED | OUTPATIENT
Start: 2023-11-27 | End: 2023-12-05

## 2023-11-27 RX ORDER — METOPROLOL SUCCINATE 50 MG/1
100 TABLET, EXTENDED RELEASE ORAL DAILY
Status: DISCONTINUED | OUTPATIENT
Start: 2023-11-28 | End: 2023-12-05

## 2023-11-27 RX ORDER — METOPROLOL SUCCINATE 50 MG/1
50 TABLET, EXTENDED RELEASE ORAL ONCE
Status: COMPLETED | OUTPATIENT
Start: 2023-11-27 | End: 2023-11-27

## 2023-11-27 RX ORDER — MICONAZOLE NITRATE 2 %
POWDER (GRAM) TOPICAL 2 TIMES DAILY
Status: DISCONTINUED | OUTPATIENT
Start: 2023-11-27 | End: 2023-12-05 | Stop reason: HOSPADM

## 2023-11-27 RX ORDER — SODIUM CHLORIDE 9 MG/ML
INJECTION, SOLUTION INTRAVENOUS CONTINUOUS
Status: DISCONTINUED | OUTPATIENT
Start: 2023-11-27 | End: 2023-11-28

## 2023-11-27 RX ADMIN — THIAMINE HYDROCHLORIDE 250 MG: 100 INJECTION, SOLUTION INTRAMUSCULAR; INTRAVENOUS at 09:11

## 2023-11-27 RX ADMIN — GUAIFENESIN 400 MG: 200 SOLUTION ORAL at 03:11

## 2023-11-27 RX ADMIN — AMIODARONE HYDROCHLORIDE 200 MG: 200 TABLET ORAL at 08:11

## 2023-11-27 RX ADMIN — PIPERACILLIN SODIUM AND TAZOBACTAM SODIUM 4.5 G: 4; .5 INJECTION, POWDER, FOR SOLUTION INTRAVENOUS at 02:11

## 2023-11-27 RX ADMIN — ATORVASTATIN CALCIUM 40 MG: 40 TABLET, FILM COATED ORAL at 08:11

## 2023-11-27 RX ADMIN — VANCOMYCIN HYDROCHLORIDE 1250 MG: 1.25 INJECTION, POWDER, LYOPHILIZED, FOR SOLUTION INTRAVENOUS at 11:11

## 2023-11-27 RX ADMIN — ENOXAPARIN SODIUM 70 MG: 80 INJECTION SUBCUTANEOUS at 10:11

## 2023-11-27 RX ADMIN — ASPIRIN 81 MG: 81 TABLET, COATED ORAL at 08:11

## 2023-11-27 RX ADMIN — GUAIFENESIN 400 MG: 200 SOLUTION ORAL at 08:11

## 2023-11-27 RX ADMIN — VANCOMYCIN HYDROCHLORIDE 1250 MG: 1.25 INJECTION, POWDER, LYOPHILIZED, FOR SOLUTION INTRAVENOUS at 12:11

## 2023-11-27 RX ADMIN — POTASSIUM & SODIUM PHOSPHATES POWDER PACK 280-160-250 MG 2 PACKET: 280-160-250 PACK at 04:11

## 2023-11-27 RX ADMIN — POTASSIUM BICARBONATE 35 MEQ: 391 TABLET, EFFERVESCENT ORAL at 04:11

## 2023-11-27 RX ADMIN — FOLIC ACID 1 MG: 1 TABLET ORAL at 08:11

## 2023-11-27 RX ADMIN — LINACLOTIDE 72 MCG: 72 CAPSULE, GELATIN COATED ORAL at 06:11

## 2023-11-27 RX ADMIN — GUAIFENESIN AND DEXTROMETHORPHAN 10 ML: 100; 10 SYRUP ORAL at 08:11

## 2023-11-27 RX ADMIN — THIAMINE HYDROCHLORIDE 250 MG: 100 INJECTION, SOLUTION INTRAMUSCULAR; INTRAVENOUS at 03:11

## 2023-11-27 RX ADMIN — SODIUM CHLORIDE: 9 INJECTION, SOLUTION INTRAVENOUS at 12:11

## 2023-11-27 RX ADMIN — ENOXAPARIN SODIUM 70 MG: 80 INJECTION SUBCUTANEOUS at 11:11

## 2023-11-27 RX ADMIN — THIAMINE HYDROCHLORIDE 250 MG: 100 INJECTION, SOLUTION INTRAMUSCULAR; INTRAVENOUS at 08:11

## 2023-11-27 RX ADMIN — METOPROLOL SUCCINATE 50 MG: 50 TABLET, EXTENDED RELEASE ORAL at 08:11

## 2023-11-27 RX ADMIN — MICONAZOLE NITRATE: 20 POWDER TOPICAL at 08:11

## 2023-11-27 RX ADMIN — POTASSIUM PHOSPHATE, MONOBASIC POTASSIUM PHOSPHATE, DIBASIC 30 MMOL: 224; 236 INJECTION, SOLUTION, CONCENTRATE INTRAVENOUS at 07:11

## 2023-11-27 RX ADMIN — OSELTAMIVIR PHOSPHATE 75 MG: 75 CAPSULE ORAL at 08:11

## 2023-11-27 RX ADMIN — SODIUM CHLORIDE: 9 INJECTION, SOLUTION INTRAVENOUS at 08:11

## 2023-11-27 RX ADMIN — QUETIAPINE 200 MG: 100 TABLET ORAL at 08:11

## 2023-11-27 RX ADMIN — SODIUM CHLORIDE, POTASSIUM CHLORIDE, SODIUM LACTATE AND CALCIUM CHLORIDE: 600; 310; 30; 20 INJECTION, SOLUTION INTRAVENOUS at 03:11

## 2023-11-27 RX ADMIN — METOPROLOL TARTRATE 5 MG: 1 INJECTION, SOLUTION INTRAVENOUS at 02:11

## 2023-11-27 RX ADMIN — METOPROLOL SUCCINATE 50 MG: 50 TABLET, EXTENDED RELEASE ORAL at 10:11

## 2023-11-27 NOTE — PROGRESS NOTES
[facility] ICU  Pulmonary/Critical Care  Progress Note  11/27/2023    Patient Name: Mike Urbina Jr.  MRN: [id number]  Admission Date: 11/24/2023  Code Status: Full Code      Subjective:     HPI:  The patient is a 68-year-old white male who has a previous history of coronary artery disease (post CABG 03/2023), chronic heart failure with preserved ejection fraction, and hypertension.  He was diagnosed with influenza a on 11/19/2023, and was treated with course of p.o. Tamiflu.  He presented to [facility] ED 11/24/2023 with complaints of shortness of breath.  He was noted tachycardic, received 2 doses of IV push adenosine without successful cardioversion, thin was felt to have probable atrial fibrillation with RVR, received a single dose of IV push metoprolol without rate control, was then placed on Cardizem infusion and transferred to ED at [facility].  He was noted hypoxic with bilateral pulmonary infiltrates suspicious for pneumonia, lactic acidosis, acute kidney injury, and was admitted to ICU for further care.    Hospital Course:  TTE (11/26/2023):  Normal LVSF, EF 55%.  RV appears normal.  Moderate aortic stenosis, moderate mitral stenosis, mild MR  Blood cultures 11/24/2023: Staph aureus     dilTIAZem Stopped (11/26/23 1551)    lactated ringers 75 mL/hr at 11/26/23 2351    NORepinephrine bitartrate-D5W Stopped (11/26/23 1801)           24hr Interval History:  He is afebrile.  Intake 1772 cc, output 600 cc over the previous 24 hours.  Blood cultures 11/24/2023 growing Staph aureus, BCID positive for MRSA.  Respiratory cultures with many Gram-positive cocci and many Gram-negative rods, cultures pending.  Respiratory PCR panel negative.  He remains on IV Zosyn, IV vancomycin, and p.o. Tamiflu.  He remains atrial fibrillation with heart rate somewhat better controlled many readings in the 80s and 90s intermittently up into the 120s and 130s. O2 saturations in the mid 90s on 2 L nasal  cannula.  He continues with cough, swallows secretions.  Levophed has just been turned off within an hour.  Cardizem drip was discontinued yesterday around 3:00 p.m.    Scheduled Medications:   amiodarone  200 mg Oral Daily    aspirin  81 mg Oral Daily    atorvastatin  40 mg Oral QHS    doxycycline  100 mg Oral Q12H    enoxparin  70 mg Subcutaneous Q12H    folic acid  1 mg Oral Daily    linaCLOtide  72 mcg Oral Before breakfast    metoprolol succinate  50 mg Oral Daily    oseltamivir  75 mg Oral BID    piperacillin-tazobactam (Zosyn) IV (PEDS and ADULTS) (extended infusion is not appropriate)  4.5 g Intravenous Q8H    QUEtiapine  200 mg Oral QHS    thiamine (B-1) 250 mg in dextrose 5 % (D5W) 100 mL IVPB  250 mg Intravenous TID    vancomycin (VANCOCIN) IV (PEDS and ADULTS)  1,250 mg Intravenous Q18H     PRN Medications:  dextromethorphan-guaiFENesin  mg/5 ml, lorazepam, magnesium oxide, magnesium oxide, metoprolol, ondansetron, potassium bicarbonate, potassium bicarbonate, potassium bicarbonate, potassium, sodium phosphates, potassium, sodium phosphates, potassium, sodium phosphates, sodium chloride 0.9%, Pharmacy to dose Vancomycin consult **AND** vancomycin - pharmacy to dose  Continuous Infusions:   dilTIAZem Stopped (11/26/23 1551)    lactated ringers 75 mL/hr at 11/26/23 2351    NORepinephrine bitartrate-D5W Stopped (11/26/23 1801)       Past Medical History:   Diagnosis Date    Atrial flutter     CHF (congestive heart failure)     Coronary artery disease     Hypertension     Myocardial infarction     SOB (shortness of breath)     at times       Past Surgical History:   Procedure Laterality Date    CATARACT EXTRACTION Bilateral     CORONARY ARTERY BYPASS GRAFT (CABG) N/A 4/3/2023    Procedure: CORONARY ARTERY BYPASS GRAFT (CABG);  Surgeon: Deuce Finley IV, MD;  Location: [facility];  Service: Cardiovascular;  Laterality: N/A;  WITH LLAA //  ECHO NOTIFIED    LEFT HEART CATHETERIZATION N/A 03/15/2023     Procedure: CATHETERIZATION, HEART, LEFT;  Surgeon: Sreedhar Herrera MD;  Location: [facility] CATH LAB;  Service: Cardiology;  Laterality: N/A;  LHC via RRA       Objective:     Input/output:    Intake/Output Summary (Last 24 hours) at 11/27/2023 0554  Last data filed at 11/26/2023 1954  Gross per 24 hour   Intake 1772.32 ml   Output 900 ml   Net 872.32 ml         Vital Signs (Most Recent):  Temp: 97.9 °F (36.6 °C) (11/27/23 0400)  Pulse: 91 (11/27/23 0500)  Resp: (!) 27 (11/27/23 0500)  BP: 116/88 (11/27/23 0500)  SpO2: (!) 93 % (11/27/23 0500)  Body mass index is 22.82 kg/m².  Weight: 73.9 kg (163 lb) Vital Signs (24h Range):  Temp:  [97.6 °F (36.4 °C)-98.2 °F (36.8 °C)] 97.9 °F (36.6 °C)  Pulse:  [] 91  Resp:  [17-34] 27  SpO2:  [86 %-99 %] 93 %  BP: ()/(46-98) 116/88     Physical Exam  Constitutional:       Appearance: Normal appearance.   HENT:      Mouth/Throat:      Mouth: Mucous membranes are moist.      Pharynx: Oropharynx is clear.   Eyes:      Conjunctiva/sclera: Conjunctivae normal.      Pupils: Pupils are equal, round, and reactive to light.   Cardiovascular:      Rate and Rhythm: Normal rate and regular rhythm.      Heart sounds: Murmur (2/6 upper sternal border systolic) heard.   Pulmonary:      Breath sounds: Rhonchi (Coarse bilateral rhonchi throughout) present. No wheezing or rales.   Abdominal:      General: Bowel sounds are normal. There is no distension.      Palpations: Abdomen is soft.   Musculoskeletal:      Right lower leg: No edema.      Left lower leg: No edema.   Skin:     General: Skin is warm and dry.   Neurological:      Mental Status: He is alert and oriented to person, place, and time.         Lines/Drains/Airways       Peripheral Intravenous Line  Duration                  Peripheral IV - Single Lumen 11/24/23 1600 20 G Anterior;Left;Proximal Forearm 2 days         Peripheral IV - Single Lumen 11/24/23 1755 20 G Anterior;Distal;Right Upper Arm 2 days                   ABGs:  Lab Results   Component Value Date    PH 7.500 (H) 11/25/2023    PO2 67.0 (L) 11/25/2023    PCO2 28.0 (L) 11/25/2023    POCSATURATED 94 11/24/2023         Significant Labs:    Lab Results   Component Value Date    WBC 3.16 (L) 11/27/2023    HGB 9.9 (L) 11/27/2023    HCT 30.3 (L) 11/27/2023    MCV 86.8 11/27/2023     (L) 11/27/2023         Recent Labs   Lab 11/27/23  0121   *   K 3.4*   CO2 23   BUN 25.0   CREATININE 0.89   CALCIUM 7.3*   MG 1.90   PHOS 1.6*   *   *   ALKPHOS 54   ALBUMIN 2.0*       Imaging:   Chest x-ray (11/27/2023, my reading of images):  Post median sternotomy changes noted.  There is a similar appearance perhaps slightly increased patchy dense consolidation regions noted right upper lobe, and right lower lung field with mild patchy infiltration in the left upper lobe.    Assessment:     Influenza A diagnosed 11/19/23, received outpatient Tamiflu.  Bilateral pneumonia, post influenza.  Suspect probable staph aureus bacterial etiology at this point.  Staph aureus bacteremia  Hypotensive shock, likely combination of intravascular volume depletion and septic shock etiologies  Wide anion gap metabolic acidosis of lactic acid etiology, improving  Acute kidney injury, resolved.  He was currently nonoliguric  Chronic atrial fibrillation, rapid ventricular rate on presentation  Chronic heart failure with preserved ejection fraction, moderate AS, moderate MS, mild MR. Coronary artery disease, post CABG 03/2023.  Patient admits to daily ethanol use    Plan:     Continue IV antibiotics with Zosyn (day 4), and vancomycin (day 4), Tamiflu.  Continue to closely follow cultures. Repeating cultures today.  Resume home dose of metoprolol XL, continue home dose of amiodarone.  Wean off Cardizem infusion as tolerated.  Continue close ICU observation of this very tenuous patient.  IV thiamine and p.o. folic acid in face of daily ethanol use.       35 minutes of critical care was  time spent personally by me on the following activities: development of treatment plan with patient or surrogate and bedside caregivers, discussions with consultants, evaluation of patient's response to treatment, examination of patient, ordering and performing treatments and interventions, ordering and review of laboratory studies, ordering and review of radiographic studies, pulse oximetry, re-evaluation of patient's condition.  This patient demonstrates a high probability for further clinical decompensation due to ongoing critical illness.  Critical care time did not overlap with that of any other provider or involve time for any procedures.       Danielito Medina, DO  Pulmonary/Critical Care

## 2023-11-27 NOTE — PT/OT/SLP EVAL
"Occupational Therapy  Evaluation    Name: Mike Urbina Jr.  MRN: [id number]  Admitting Diagnosis: influenza A  Recent Surgery: * No surgery found *      Recommendations:     Discharge therapy intensity: Moderate Intensity Therapy   Discharge Equipment Recommendations:  to be determined by next level of care  Barriers to discharge:       Assessment:     Mike Urbina Jr. is a 68 y.o. male with a medical diagnosis of influenza A, tachycardia s/p cardioversion, hypotensive shock, LOVELY, MRSA bacteremia, B pneumonia, LOVELY, hx of CABG and daily alcohol use.  He presents with the following performance deficits affecting function: weakness, impaired endurance, impaired self care skills, impaired functional mobility, gait instability, impaired balance, decreased safety awareness, impaired skin.     Rehab Prognosis: Good; patient would benefit from acute skilled OT services to address these deficits and reach maximum level of function.       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      Patient was found up in chair with  (vital monitoring, sacral mepilex, IV) upon OT entry to room.    General Precautions: Standard, fall  Orthopedic Precautions: " N/A  Braces: N/A    Vital Signs: Blood Pressure: 128/75 HR 97, increased to 129 with minimal exertion to ambulate to toilet    Functional Mobility/Transfers:  Patient completed Sit <> Stand Transfer with minimum assistance  with  rolling walker   Patient completed Toilet Transfer Step Transfer technique with minimum assistance with  rolling walker  Functional Mobility: Min assist ambulation to toilet, quick to fatigue, tachycardic, returned to chair.  HR appeared sustained between 127-129, RN aware    Activities of Daily Living:  Toileting: maximal assistance +diarrhea      Functional Cognition:  Follows commands, slightly impulsive    Upper Extremity Function:  Right Upper Extremity:   WFL    Left Upper Extremity:  WFL    Therapeutic Positioning  Risk for acquired pressure injuries is increased due to relative decrease in mobility d/t hospitalization , impaired mobility, and h/o skin breakdown.    OT interventions performed during the course of today's session:   Education was provided on benefits of and recommendations for therapeutic positioning    Skin assessment:  known skin issue on buttock WC on case, discolored R great toe nail      OT recommendations for therapeutic positioning throughout hospitalization:   Follow [facility] Pressure Injury Prevention Protocol        Patient Education:  Patient provided with verbal education education regarding OT role/goals/POC, fall prevention, and safety awareness.  Understanding was verbalized, however additional teaching warranted.     Patient left up in chair with all lines intact and call button in reach, discussed HR and tolerance with RN    GOALS:   Multidisciplinary Problems       Occupational Therapy Goals          Problem: Occupational Therapy    Goal Priority Disciplines Outcome Interventions   Occupational Therapy Goal     OT, PT/OT Ongoing, Progressing    Description: Goals to be met by: 12/27/23     Patient will increase functional independence with ADLs by  performing:    UE Dressing with Set-up Assistance.  LE Dressing with Set-up Assistance.  Grooming while standing at sink with Set-up Assistance.  Toileting from toilet with Set-up Assistance for hygiene and clothing management.   Toilet transfer to toilet with Supervision.                         History:     Past Medical History:   Diagnosis Date    Atrial flutter     CHF (congestive heart failure)     Coronary artery disease     Hypertension     Myocardial infarction     SOB (shortness of breath)     at times         Past Surgical History:   Procedure Laterality Date    CATARACT EXTRACTION Bilateral     CORONARY ARTERY BYPASS GRAFT (CABG) N/A 4/3/2023    Procedure: CORONARY ARTERY BYPASS GRAFT (CABG);  Surgeon: Deuce Finley IV, MD;  [facility] OR;  Service: Cardiovascular;  Laterality: N/A;  WITH LLAA //  ECHO NOTIFIED    LEFT HEART CATHETERIZATION N/A 03/15/2023    Procedure: CATHETERIZATION, HEART, LEFT;  Surgeon: Sreedhar Herrera MD;  Location: [facility] CATH LAB;  Service: Cardiology;  Laterality: N/A;  C via RRA       Time Tracking:     OT Date of Treatment:    OT Start Time: 1014  OT Stop Time: 1034  OT Total Time (min): 20 min    Billable Minutes:Evaluation MOD    11/27/2023

## 2023-11-27 NOTE — PLAN OF CARE
Problem: Occupational Therapy  Goal: Occupational Therapy Goal  Description: Goals to be met by: 12/27/23     Patient will increase functional independence with ADLs by performing:    UE Dressing with Set-up Assistance.  LE Dressing with Set-up Assistance.  Grooming while standing at sink with Set-up Assistance.  Toileting from toilet with Set-up Assistance for hygiene and clothing management.   Toilet transfer to toilet with Supervision.    Outcome: Ongoing, Progressing

## 2023-11-27 NOTE — CONSULTS
[facility]  Infectious Diseases Consult        ASSESSMENT & PLAN:   He is a 68-year-old male with a past medical history of CAD, s/p CABG in 3/2023, HFpEF, and hypertension who was diagnosed with influenza A on 11/19/2023.  He was compliant with course of Tamiflu, however presented to an outlying facility on 11/24/2023 with shortness of breath.  There was concern for atrial fibrillation with RVR and patient received 2 doses of adenosine followed by a Cardizem drip for rate control.  He was noted to be hypoxic showed right upper lobe consolidation concerning for pneumonia.  CTA of the chest was negative for PE, however did showed multifocal pneumonia, most evident in the right upper lobe.  Admit blood cultures are positive for MRSA.  Patient does have a productive cough, however denies shortness of breath or pleuritic chest pain.  He does continue to require supplemental oxygen although overall is feeling better.  He has been downgraded to the floor.  He reports some low back pain and bilateral lower extremity weakness, however reports back pain is chronic and although somewhat worse recently, denies significant issues.  Also denies any bowel or bladder issues.  He was receiving empiric Zosyn, vancomycin, and remained on Tamiflu, however is currently only on vancomycin.  Remains on Cardizem.   TTE was negative for endocarditis, however did show severe posterior mitral annular calcification with moderate stenosis and mild regurgitation as well as moderate aortic stenosis also from calcified cusps and mild TR.  Blood cultures were repeated this morning.  We have been consulted regarding MRSA bacteremia.    MRSA bacteremia  Post-influenza MRSA pneumonia  Acute hypoxemic respiratory failure  Atrial fibrillation with RVR, now rate controlled on Cardizem  CAD, s/p CABG in 3/2023  H/o HTN / HFpEF  ETOH use  Pancytopenia  Transaminitis       PLAN:  F/u repeat BCx sent this am.  TTE w/o concern for endocarditis.  No indication for  CARO unless bacteremia persists.  Maintain with PIVs only if feasible.   Continue vancomycin, pharmacy dosing for trough goal 15-20.    STI screening.   Noted transaminitis today - Zosyn / Tamiflu discontinued today, possibly related -- follow in am.   MRSA sensitive to dapto and ceftaroline.  Monitor pancytopenia in am, may be s/t vancomycin.    Mobilization / expectorants / therapies as ordered.   Discussed with patient and nursing.       HISTORY OF PRESENT ILLNESS:   He is a 68-year-old male with a past medical history of CAD, s/p CABG in 3/2023, HFpEF, and hypertension who was diagnosed with influenza A on 11/19/2023.  He was compliant with course of Tamiflu, however presented to an outlying facility on 11/24/2023 with shortness of breath.  There was concern for atrial fibrillation with RVR and patient received 2 doses of adenosine followed by a Cardizem drip for rate control.  He was noted to be hypoxic showed right upper lobe consolidation concerning for pneumonia.  CTA of the chest was negative for PE, however did showed multifocal pneumonia, most evident in the right upper lobe.  Admit blood cultures are positive for MRSA.  Patient does have a productive cough, however denies shortness of breath or pleuritic chest pain.  He does continue to require supplemental oxygen although overall is feeling better.  He has been downgraded to the floor.  He reports some low back pain and bilateral lower extremity weakness, however reports back pain is chronic and although somewhat worse recently, denies significant issues.  Also denies any bowel or bladder issues.  He was receiving empiric Zosyn, vancomycin, and remained on Tamiflu, however is currently only on vancomycin.  Remains on Cardizem.   TTE was negative for endocarditis, however did show severe posterior mitral annular calcification with moderate stenosis and mild regurgitation as well as moderate aortic stenosis also from calcified cusps and mild TR.  Blood  cultures were repeated this morning.  We have been consulted regarding MRSA bacteremia.      PAST MEDICAL HISTORY:     Past Medical History:   Diagnosis Date    Atrial flutter     CHF (congestive heart failure)     Coronary artery disease     Hypertension     Myocardial infarction     SOB (shortness of breath)     at times       PAST SURGICAL HISTORY:     Past Surgical History:   Procedure Laterality Date    CATARACT EXTRACTION Bilateral     CORONARY ARTERY BYPASS GRAFT (CABG) N/A 4/3/2023    Procedure: CORONARY ARTERY BYPASS GRAFT (CABG);  Surgeon: Deuce Finley IV, MD;  Location: [facility];  Service: Cardiovascular;  Laterality: N/A;  WITH LLAA //  ECHO NOTIFIED    LEFT HEART CATHETERIZATION N/A 03/15/2023    Procedure: CATHETERIZATION, HEART, LEFT;  Surgeon: Sreedhar Herrera MD;  Location: [facility] CATH LAB;  Service: Cardiology;  Laterality: N/A;  LHC via RRA       ALLERGIES:   Patient has no known allergies.    FAMILY HISTORY:   Reviewed and non-contributory     SOCIAL HISTORY:     Social History     Tobacco Use    Smoking status: Never     Passive exposure: Never    Smokeless tobacco: Never   Substance Use Topics    Alcohol use: Yes     Alcohol/week: 84.0 standard drinks of alcohol     Types: 84 Cans of beer per week     Comment: alcoholic, daily, beer        MEDICATIONS:   Reviewed in EMR    REVIEW OF SYSTEMS:   Except as documented, all other systems reviewed and negative     PHYSICAL EXAM:   T 97.8 °F (36.6 °C)   /84   P 93   RR (!) 32   O2 (!) 93 %  GENERAL: NAD; does not appear toxic  SKIN: no rash  HEENT: sclera non-icteric; PERRL; OP without erythema or exudates  NECK: supple; no LAD  BACK: Minimal lumbar spinal TTP  CHEST: Rhonchi; nonlabored, equal expansion   CARDIOVASCULAR: RRR, S1S2; + murmur; strong, equal peripheral pulses; no edema  ABDOMEN:  active bowel sounds; abdomen soft, nondistended, nontender to palpation  GENITOURINARY: no suprapubic tenderness   EXTREMITIES: no cyanosis or  "clubbing; strength equal bilaterally  NEURO: AAO x4; CN II-XII grossly intact; somewhat [place]  PSYCH: Mentation and affect appropriate     LABS AND IMAGING:     Recent Labs     11/26/23  0750 11/27/23  0121   WBC 4.27  4.27* 3.16*   RBC 3.45* 3.49*   HGB 10.0* 9.9*   HCT 29.6* 30.3*   MCV 85.8 86.8   MCH 29.0 28.4   MCHC 33.8 32.7*   RDW 17.7* 18.1*   * 107*     Recent Labs     11/24/23  2119   LACTIC 3.3*     No results for input(s): "INR", "APTT", "D-DIMER" in the last 72 hours.  No results for input(s): "HGBA1C", "CHOL", "TRIG", "LDL", "VLDL", "HDL" in the last 72 hours.   Recent Labs     11/26/23  0750 11/27/23  0121   * 134*   K 3.4* 3.4*   CHLORIDE 105 104   CO2 20* 23   BUN 27.0* 25.0   CREATININE 0.95 0.89   GLUCOSE 102 90   CALCIUM 7.4* 7.3*   MG 1.90 1.90   PHOS 2.2* 1.6*   ALBUMIN 2.0* 2.0*   GLOBULIN 2.6 2.8   ALKPHOS 41 54   ALT 47 118*   AST 94* 222*   BILITOT 1.6* 1.5     Recent Labs     11/24/23  1818 11/25/23  0148   TROPONINI 0.137* 0.096*          X-Ray Chest 1 View  Narrative: EXAMINATION:  XR CHEST 1 VIEW    CLINICAL HISTORY:  pneumonia;    TECHNIQUE:  AP chest    COMPARISON:  Chest x-ray dated 11/26/2023    FINDINGS:  The heart is stable in size.  There are slightly increasing patchy and consolidative bilateral airspace opacities.  There is no pleural effusion or visible pneumothorax.  Impression: Slightly increasing patchy and consolidative bilateral airspace opacities.    Electronically signed by: Lyric Deras  Date:    11/27/2023  Time:    06:05       LOREN Hinkle  Infectious Diseases     "

## 2023-11-27 NOTE — NURSING
Nurses Note -- 4 Eyes      11/27/2023   4:49 AM      Skin assessed during: Daily Assessment      [] No Altered Skin Integrity Present    [x]Prevention Measures Documented      [x] Yes- Altered Skin Integrity Present or Discovered   [] LDA Added if Not in Epic (Describe Wound)   [] New Altered Skin Integrity was Present on Admit and Documented in LDA   [] Wound Image Taken    Wound Care Consulted? Yes    Attending Nurse:  Micaela Herrera RN/Staff Member:  Fabienne Vaughan RN

## 2023-11-27 NOTE — PT/OT/SLP EVAL
"Physical Therapy Evaluation    Patient Name:  Mike Urbina Jr.   MRN:  [id number]    Recommendations:     Discharge therapy intensity: Moderate Intensity Therapy   Discharge Equipment Recommendations:  (tbd)   Barriers to discharge:  medical dx, impaired mobility, decreased tolerance to ax    Assessment:     Mike Urbina Jr. is a 68 y.o. male admitted with a medical diagnosis of Atrial fibrillation with RVR., B pneumonia, hypotensive shock, LOVELY. Pt initially dx with influenza A on 11/19 at another facility; increased SOB on 11/24, tachycardia with failed cardioversion.  He presents with the following impairments/functional limitations: weakness, gait instability, impaired endurance, impaired balance, decreased lower extremity function, impaired self care skills, impaired functional mobility. Pt with fair tolerance to ax. Pt req min-CGA for mobility and transfers with use of RW. Pt with limited tolerance to ax with complaints of feeling "weak" and "busted up". Will continue to progress as able.     Rehab Prognosis: Good; patient would benefit from acute skilled PT services to address these deficits and reach maximum level of function.    Recent Surgery: * No surgery found *      Plan:     During this hospitalization, patient to be seen 5 x/week to address the identified rehab impairments via gait training, therapeutic activities, therapeutic exercises and progress toward the following goals:    Plan of Care Expires:  12/27/23    Subjective     Chief Complaint: weakness  Patient/Family Comments/goals: to get stronger and return PLFO   Pain/Comfort:  Pain Rating 1: 0/10    Patients cultural, spiritual, Church conflicts given the current situation: no    Living Environment:  Pt lives with a friend in a sLH; 2 steps to enter with a L railing  Prior to admission, patients level of function was independent.    Equipment used at home: none.    Upon discharge, patient will have assistance from friend " ?.    Objective:     Communicated with NSG prior to session.  Patient found up in chair with blood pressure cuff, pulse ox (continuous), telemetry, peripheral IV  upon PT entry to room.    General Precautions: Standard, fall, contact, droplet   Orthopedic Precautions:N/A   Braces: N/A  Respiratory Status: Room air  Heart Rate: 93 at rest, 129 after activity and sustained this rate after as well      Exams:  Cognitive Exam:  Patient is oriented to Person, Place, Time, and Situation  RLE Strength: WFL  LLE Strength: WFL  Skin integrity: Visible skin intact      Functional Mobility:  Transfers:     Sit to/from Stand:  minimum assistance with rolling walker  Gait: pt amb approx 8 ft x2 with use of RW and min-CGA; VC for proper RW management, limited by decreased endurance and weakness associated with sustained increased HR       Treatment & Education:    Patient provided with verbal education regarding POC, mobility, safety.  Understanding was verbalized.     Patient left up in chair with all lines intact and call button in reach.    GOALS:   Multidisciplinary Problems       Physical Therapy Goals          Problem: Physical Therapy    Goal Priority Disciplines Outcome Goal Variances Interventions   Physical Therapy Goal     PT, PT/OT Ongoing, Progressing     Description: Goals to be met by: 23     Patient will increase functional independence with mobility by performin. Supine to sit with [place]  2. Sit to supine with [place]  3. Sit to stand transfer with Modified [place]  4. Gait  x 150 feet with Modified [place] using Rolling Walker vs no AD.                          History:     Past Medical History:   Diagnosis Date    Atrial flutter     CHF (congestive heart failure)     Coronary artery disease     Hypertension     Myocardial infarction     SOB (shortness of breath)     at times       Past Surgical History:   Procedure Laterality Date    CATARACT EXTRACTION Bilateral     CORONARY  ARTERY BYPASS GRAFT (CABG) N/A 4/3/2023    Procedure: CORONARY ARTERY BYPASS GRAFT (CABG);  Surgeon: Duece Finley IV, MD;  Location: [facility];  Service: Cardiovascular;  Laterality: N/A;  WITH LLAA //  ECHO NOTIFIED    LEFT HEART CATHETERIZATION N/A 03/15/2023    Procedure: CATHETERIZATION, HEART, LEFT;  Surgeon: Sreedhar Herrera MD;  Location: [facility] CATH LAB;  Service: Cardiology;  Laterality: N/A;  LHC via RRA       Time Tracking:     PT Received On: 11/27/23  PT Start Time: 1014     PT Stop Time: 1034  PT Total Time (min): 20 min     Billable Minutes: Evaluation mod      11/27/2023

## 2023-11-27 NOTE — PROGRESS NOTES
Pharmacokinetic Assessment Follow Up: IV Vancomycin    Vancomycin serum concentration assessment(s):  The trough level was drawn correctly and can be used to guide therapy at this time. The measurement is below the desired definitive target range of 15 to 20 mcg/mL.    Vancomycin Regimen Plan:  Change regimen to Vancomycin 1250 mg IV every 12 hours with next serum trough concentration measured at 1100 prior to third dose on 11/28    Scheduled Administration Times  0000  1200      Drug levels (last 3 results):  Recent Labs   Lab Result Units 11/25/23  2036 11/26/23  1802 11/27/23  1103   Vanc Lvl Random ug/ml 7.9*  --   --    Vancomycin Trough ug/ml  --  11.2* 6.4*       Vancomycin Administrations:  vancomycin given in the last 96 hours                     vancomycin 1.25 g in dextrose 5% 250 mL IVPB (ready to mix) (mg) 1,250 mg New Bag 11/25/23 2303    vancomycin 1.5 g in dextrose 5 % 250 mL IVPB (ready to mix) (mg) 1,500 mg New Bag 11/24/23 1933                    Pharmacy will continue to follow and monitor vancomycin.    Please contact pharmacy at extension [phone] for questions regarding this assessment.    Thank you for the consult,   Briana Buckley, ClaireD       Patient brief summary:  Mike Urbina Jr. is a 68 y.o. male initiated on antimicrobial therapy with IV Vancomycin for treatment of MRSA bacteremia, pneumonia    The patient's current regimen is 1250 mg IVPB Q18H    Drug Allergies:   Review of patient's allergies indicates:  No Known Allergies    Actual Body Weight:   g    Renal Function:   Estimated Creatinine Clearance: 80.7 mL/min (based on SCr of 0.89 mg/dL).,     Dialysis Method (if applicable):  N/A    CBC (last 72 hours):  Recent Labs   Lab Result Units 11/24/23  1413 11/24/23  1818 11/25/23  0148 11/26/23  0750 11/27/23  0121   WBC x10(3)/mcL 13.68* 9.31  9.47 10.13  10.13 4.27  4.27* 3.16*   Hgb g/dL 12.5* 12.2* 11.7* 10.0* 9.9*   Hct % 40.0* 36.2* 35.3* 29.6* 30.3*   Platelet x10(3)/mcL  154 124* 135 108* 107*   Mono % %  --   --   --   --  8.5   Monocytes % % 5 4 4  --   --    Eos % %  --   --   --   --  0.3   Basophil % %  --   --   --   --  0.9       Metabolic Panel (last 72 hours):  Recent Labs   Lab Result Units 11/24/23  1413 11/24/23  1818 11/25/23  0148 11/26/23  0750 11/27/23  0121   Sodium Level mmol/L 135* 131* 131* 133* 134*   Potassium Level mmol/L 3.7 4.0 3.8 3.4* 3.4*   Chloride mmol/L 99 102 101 105 104   Carbon Dioxide mmol/L 17* 15* 18* 20* 23   Glucose Level mg/dL 179* 199* 195* 102 90   Blood Urea Nitrogen mg/dL 36.3* 39.6* 37.4* 27.0* 25.0   Creatinine mg/dL 1.49* 1.31* 1.18 0.95 0.89   Albumin Level g/dL 2.8* 2.7* 2.3* 2.0* 2.0*   Bilirubin Total mg/dL 2.9* 2.6* 2.0* 1.6* 1.5   Alkaline Phosphatase unit/L 56 48 44 41 54   Aspartate Aminotransferase unit/L 56* 53* 34 94* 222*   Alanine Aminotransferase unit/L 29 27 23 47 118*   Magnesium Level mg/dL  --  1.90 1.90 1.90 1.90   Phosphorus Level mg/dL  --  3.5 2.6 2.2* 1.6*       Microbiologic Results:  Microbiology Results (last 7 days)       Procedure Component Value Units Date/Time    Blood Culture [[id number]] Collected: 11/27/23 0637    Order Status: Sent Specimen: Blood from Wrist, Right Updated: 11/27/23 0700    Blood Culture [[id number]] Collected: 11/27/23 0637    Order Status: Sent Specimen: Blood from Hand, Left Updated: 11/27/23 0700    Respiratory Culture [[id number]]     Order Status: Sent Specimen: Sputum     Respiratory Culture [[id number]] Collected: 11/24/23 2140    Order Status: Completed Specimen: Sputum, Expectorated Updated: 11/25/23 1230     Respiratory Culture Gram stain demonstrates significant oropharyngeal contamination. Sputum unsatisfactory for culture     GRAM STAIN Quality -2      Many Gram positive cocci      Many Gram Negative Rods

## 2023-11-27 NOTE — PLAN OF CARE
Problem: Physical Therapy  Goal: Physical Therapy Goal  Description: Goals to be met by: 23     Patient will increase functional independence with mobility by performin. Supine to sit with [place]  2. Sit to supine with [place]  3. Sit to stand transfer with Modified [place]  4. Gait  x 150 feet with Modified [place] using Rolling Walker vs no AD.     Outcome: Ongoing, Progressing

## 2023-11-27 NOTE — H&P
[facility] Hospital Medicine History & Physical Examination       Patient Name: Mike Urbina Jr.  MRN: [id number]  Patient Class: IP- Inpatient   Admission Date: 11/27/2023   Admitting Service: Hospital Medicine   Length of Stay: 3  Attending Physician: Dr. Patel  Primary Care Provider: Darlene Willams FNP  Face-to-Face encounter date: 11/27/2023  Code Status: Full  Chief Complaint: transfer (Tx from [place] for pneumonia. Hx afib. Was in afib RVR rate 200s at [facility].  upon arrival.  )      Patient information was obtained from patient, patient's family, past medical records and ER records.      HISTORY OF PRESENT ILLNESS:   Mike Urbina Jr. is a 68 y.o. male with a PMHx of  ETOH abuse, VHD-aortic stenosis, thrombocytopenia, CAD status post CABG x3, HTN, PAFlutter s/p SILVANO ligation, chronic HFpEF 55%, chronic anemia who presented to [facility] on 11/24/2023 with via EMS as a transfer from [facility] ED for higher level of care.  He initially presented to the [facility] ED via EMS with complaints of worsening SOB and generalized weakness after being diagnosed with influenza a x5 days prior. Reportedly prescribed Tamiflu on 11/20/2023. He also endorsed decreased oral intake, and chest pain that is worse with deep inspiration.  Patient does endorsed drinking a 6 pack of beer per day.    Labs were notable for sodium 135, CO2 17, glucose 179, BUN 36.2, creatinine 1.49, total bili 2.9, AST 56, BNP 3053, WBC 13.68, hemoglobin 12.5, hematocrit 40, troponin 0.012.  CXR demonstrated right upper lobe consolidation concerning for pneumonia.  EKG demonstrated supraventricular tachycardia with rate of 172.  He was given adenosine 6 mg followed by 12 mg without improvement.  He was then given Lopressor 5 mg IV and rate noted to be atrial fibrillation with RVR.  He was initiated on a diltiazem drip.  He was then transferred to [facility] ED.  CTA chest negative for PE, findings  concerning for multifocal pneumonia most evident in the right upper lobe.  He was started on broad-spectrum IV antibiotics.  Blood cultures x2 obtained.      He was admitted to ICU.  MRSA PCR detected. Antibiotic coverage broadened to vanc, Zosyn and doxycycline.  TTE demonstrated EF 55%, moderate aortic stenosis, moderate mitral stenosis and mild MR.  Blood cultures from 11/24/2023 growing MRSA in 2 of 2 bottles.  Respiratory PCR panel negative.  Tamiflu was also continued.  Also required Levophed for BP support. He has since been weaned off of Levophed and Cardizem.  Zosyn and Tamiflu were discontinued on 11/27/2023.  He remains on IV vancomycin.  ID was consulted on 11/27/2023. Blood cultures repeated x2 on 11/27/2023.  He was cleared for downgrade to the floor on 11/27/2023.  PT/OT consulted. Hospital medicine consulted for assumption of care and further medical management.    Labs on 11/27/2023 notable for WBC 3.16, hemoglobin 9.9, hematocrit 30.3, platelets 107, sodium 134, potassium 3.4, calcium 7.3, phosphorus 1.6, albumin 2, , .    REVIEW OF SYSTEMS:   Except as documented, all other systems reviewed and negative     PAST MEDICAL HISTORY:   Essential hypertension  CAD s/p MI/CABG x3  ETOH abuse   Aortic stenosis   PAF/flutter s/p Ligation of SILVANO  Chronic HFpEF 55%  Chronic anemia and thrombocytopenia     PAST SURGICAL HISTORY:     Past Surgical History:   Procedure Laterality Date    CATARACT EXTRACTION Bilateral     CORONARY ARTERY BYPASS GRAFT (CABG) N/A 4/3/2023    Procedure: CORONARY ARTERY BYPASS GRAFT (CABG);  Surgeon: Deuce Finley IV, MD;  Location: [facility] OR;  Service: Cardiovascular;  Laterality: N/A;  WITH LLAA //  ECHO NOTIFIED    LEFT HEART CATHETERIZATION N/A 03/15/2023    Procedure: CATHETERIZATION, HEART, LEFT;  Surgeon: Sreedhar Herrera MD;  Location: [facility] CATH LAB;  Service: Cardiology;  Laterality: N/A;  [facility] via A       FAMILY HISTORY:   Mother:  CAD/MI    SOCIAL  HISTORY:   Drinks a 6 pack of beer per day for the past 30 years.  Denied tobacco or illicit drug use.     ALLERGIES:   Patient has no known allergies.    HOME MEDICATIONS:     Prior to Admission medications    Medication Sig Start Date End Date Taking? Authorizing Provider   amiodarone (PACERONE) 200 MG Tab Take 1 tablet (200 mg total) by mouth once daily. 6/27/23   Darlene Willams FNP   aspirin (ECOTRIN) 81 MG EC tablet Take 1 tablet (81 mg total) by mouth once daily. 12/30/22 11/7/23  Reyes, Thairy G,    atorvastatin (LIPITOR) 40 MG tablet Take 1 tablet (40 mg total) by mouth every evening. 6/27/23   Darlene Willams FNP   furosemide (LASIX) 20 MG tablet Take 1 tablet (20 mg total) by mouth once daily. for 4 days 11/7/23 11/11/23  Darlene Willams FNP   hydrOXYzine pamoate (VISTARIL) 50 MG Cap Take 1 capsule (50 mg total) by mouth every 8 (eight) hours as needed (as needed for anxiety). 11/7/23   Darlene Willams FNP   linaCLOtide (LINZESS) 72 mcg Cap capsule Take 1 capsule (72 mcg total) by mouth before breakfast. 11/7/23   Darlene Willams FNP   metoprolol succinate (TOPROL-XL) 50 MG 24 hr tablet TAKE 1 TABLET BY MOUTH ONCE DAILY 11/1/23   Darlene Willams FNP   QUEtiapine (SEROQUEL) 100 MG Tab Take 2 tablets (200 mg total) by mouth every evening. 11/7/23   Darlene Willams FNP   sodium bicarbonate 650 MG tablet Take 650 mg by mouth once daily.    Provider, Historical   thiamine (VITAMIN B-1) 100 MG tablet Take 100 mg by mouth once daily.    Provider, Historical   valsartan (DIOVAN) 160 MG tablet Take 1 tablet (160 mg total) by mouth 2 (two) times daily. 6/27/23 6/26/24  Darlene Willams FNP     ________________________________________________________________________  INPATIENT LIST OF MEDICATIONS     Current Facility-Administered Medications:     0.9%  NaCl infusion, , Intravenous, Continuous, [facility] Jr., [facility], Last Rate: 10 mL/hr at 11/27/23 0846, New Bag at 11/27/23 0846    amiodarone  tablet 200 mg, 200 mg, Oral, Daily, Stroda, Danielito, DO, 200 mg at 11/27/23 0827    aspirin EC tablet 81 mg, 81 mg, Oral, Daily, Stroda, Danielito, DO, 81 mg at 11/27/23 0826    atorvastatin tablet 40 mg, 40 mg, Oral, QHS, Stroda, Danielito, DO, 40 mg at 11/26/23 2055    dextromethorphan-guaiFENesin  mg/5 ml liquid 10 mL, 10 mL, Oral, Q4H PRN, Juan Anderson Jr., MD, [facility]P, 10 mL at 11/27/23 0840    enoxaparin injection 70 mg, 70 mg, Subcutaneous, Q12H, Stroda, Danielito, DO, 70 mg at 11/26/23 2351    folic acid tablet 1 mg, 1 mg, Oral, Daily, Juan Anderson Jr., MD, [facility]P, 1 mg at 11/27/23 0827    lactated ringers infusion, , Intravenous, Continuous, [facility], [facility], Last Rate: 75 mL/hr at 11/27/23 0604, Rate Verify at 11/27/23 0604    linaCLOtide capsule 72 mcg, 72 mcg, Oral, Before breakfast, Stroda, Danielito, DO, 72 mcg at 11/27/23 0618    LORazepam injection 2 mg, 2 mg, Intravenous, Q15 Min PRN, Rico Davila MD    magnesium oxide tablet 800 mg, 800 mg, Oral, PRN, Stroda, Danielito, DO    magnesium oxide tablet 800 mg, 800 mg, Oral, PRN, Stroda, Danielito, DO    metoprolol injection 5 mg, 5 mg, Intravenous, Q5 Min PRN, Stroda, Danielito, DO, 5 mg at 11/27/23 0223    [START ON 11/28/2023] metoprolol succinate (TOPROL-XL) 24 hr tablet 100 mg, 100 mg, Oral, Daily, Juan Anderson Jr., [facility], [facility]    metoprolol succinate (TOPROL-XL) 24 hr tablet 50 mg, 50 mg, Oral, Once, Juan Anderson Jr., [facility], [facility]    ondansetron injection 4 mg, 4 mg, Intravenous, Q8H PRN, Stroda, Danielito, DO    potassium bicarbonate disintegrating tablet 35 mEq, 35 mEq, Oral, PRN, Stroda, Danielito, DO, 35 mEq at 11/27/23 0420    potassium bicarbonate disintegrating tablet 50 mEq, 50 mEq, Oral, PRN, Stroda, Danielito, DO    potassium bicarbonate disintegrating tablet 60 mEq, 60 mEq, Oral, PRN, Stroda, Danielito, DO    potassium phosphate 30 mmol in dextrose 5 % (D5W) 500 mL infusion, 30 mmol, Intravenous, Once, Stroda, Danielito, DO, Last Rate: 83.3 mL/hr at 11/27/23 0726, 30 mmol  at 11/27/23 0726    potassium, sodium phosphates 280-160-250 mg packet 2 packet, 2 packet, Oral, PRN, Stroda, Danielito, DO    potassium, sodium phosphates 280-160-250 mg packet 2 packet, 2 packet, Oral, PRN, Stroda, Danielito, DO, 2 packet at 11/27/23 0420    potassium, sodium phosphates 280-160-250 mg packet 2 packet, 2 packet, Oral, PRN, Stroda, Danielito, DO    QUEtiapine tablet 200 mg, 200 mg, Oral, QHS, Stroda, Danielito, DO, 200 mg at 11/26/23 2055    sodium chloride 0.9% flush 10 mL, 10 mL, Intravenous, PRN, Stroda, Danielito, DO    thiamine (B-1) 250 mg in dextrose 5 % (D5W) 100 mL IVPB, 250 mg, Intravenous, TID, Juan Anderson Jr., MD, [facility], Stopped at 11/27/23 0917    Pharmacy to dose Vancomycin consult, , , Once **AND** vancomycin - pharmacy to dose, , Intravenous, pharmacy to manage frequency, Juan Anderson Jr., MD, [facility]    vancomycin 1.25 g in dextrose 5% 250 mL IVPB (ready to mix), 1,250 mg, Intravenous, Q18H, Juan Anderson Jr., MD, [facility], Stopped at 11/26/23 0033    Scheduled Meds:   amiodarone  200 mg Oral Daily    aspirin  81 mg Oral Daily    atorvastatin  40 mg Oral QHS    enoxparin  70 mg Subcutaneous Q12H    folic acid  1 mg Oral Daily    linaCLOtide  72 mcg Oral Before breakfast    [START ON 11/28/2023] metoprolol succinate  100 mg Oral Daily    metoprolol succinate  50 mg Oral Once    potassium phosphate IVPB  30 mmol Intravenous Once    QUEtiapine  200 mg Oral QHS    thiamine (B-1) 250 mg in dextrose 5 % (D5W) 100 mL IVPB  250 mg Intravenous TID    vancomycin (VANCOCIN) IV (PEDS and ADULTS)  1,250 mg Intravenous Q18H     Continuous Infusions:   sodium chloride 0.9% 10 mL/hr at 11/27/23 0846    lactated ringers 75 mL/hr at 11/27/23 0604     PRN Meds:.dextromethorphan-guaiFENesin  mg/5 ml, lorazepam, magnesium oxide, magnesium oxide, metoprolol, ondansetron, potassium bicarbonate, potassium bicarbonate, potassium bicarbonate, potassium, sodium phosphates, potassium, sodium phosphates, potassium, sodium  phosphates, sodium chloride 0.9%, Pharmacy to dose Vancomycin consult **AND** vancomycin - pharmacy to dose    PHYSICAL EXAM:     VITAL SIGNS: 24 HRS MIN & MAX LAST   Temp  Min: 97.6 °F (36.4 °C)  Max: 98.2 °F (36.8 °C) 97.6 °F (36.4 °C)   BP  Min: 90/53  Max: 143/79 128/85   Pulse  Min: 63  Max: 136  (!) 128   Resp  Min: 18  Max: 34 (!) 24   SpO2  Min: 86 %  Max: 99 % (!) 93 %       General appearance: Well-developed male in no apparent distress.  HENT: Atraumatic head. Moist mucous membranes of oral cavity.  Eyes: Normal extraocular movements.   Neck: Supple.   Lungs: Coarse to auscultation bilaterally. Tachypnea  Heart: Regular rhythm. Tachycardia. Systolic Murmur. S1 and S2 present. No pedal edema.  Abdomen: Soft, non-distended, non-tender.  Extremities: No cyanosis, clubbing  Skin: No Rash.   Neuro: Motor and sensory exams grossly intact.   Psych/mental status: Appropriate mood and affect. Responds appropriately to questions.     LABS AND IMAGING:     Recent Labs   Lab 11/25/23  0148 11/26/23  0750 11/27/23  0121   WBC 10.13  10.13 4.27  4.27* 3.16*   RBC 3.99* 3.45* 3.49*   HGB 11.7* 10.0* 9.9*   HCT 35.3* 29.6* 30.3*   MCV 88.5 85.8 86.8   MCH 29.3 29.0 28.4   MCHC 33.1 33.8 32.7*   RDW 17.2* 17.7* 18.1*    108* 107*   MPV 11.4* 11.2* 10.8*       Recent Labs   Lab 11/24/23  1511 11/24/23  1818 11/25/23  0148 11/25/23  0159 11/26/23  0750 11/27/23  0121   NA  --    < > 131*  --  133* 134*   K  --    < > 3.8  --  3.4* 3.4*   CO2  --    < > 18*  --  20* 23   BUN  --    < > 37.4*  --  27.0* 25.0   CREATININE  --    < > 1.18  --  0.95 0.89   CALCIUM  --    < > 7.2*  --  7.4* 7.3*   PH 7.388  --   --  7.500*  --   --    MG  --    < > 1.90  --  1.90 1.90   ALBUMIN  --    < > 2.3*  --  2.0* 2.0*   ALKPHOS  --    < > 44  --  41 54   ALT  --    < > 23  --  47 118*   AST  --    < > 34  --  94* 222*   BILITOT  --    < > 2.0*  --  1.6* 1.5    < > = values in this interval not displayed.       Microbiology Results  (last 7 days)       Procedure Component Value Units Date/Time    Blood Culture [[id number]] Collected: 11/27/23 0637    Order Status: Sent Specimen: Blood from Wrist, Right Updated: 11/27/23 0700    Blood Culture [[id number]] Collected: 11/27/23 0637    Order Status: Sent Specimen: Blood from Hand, Left Updated: 11/27/23 0700    Respiratory Culture [[id number]]     Order Status: Sent Specimen: Sputum     Respiratory Culture [[id number]] Collected: 11/24/23 2140    Order Status: Completed Specimen: Sputum, Expectorated Updated: 11/25/23 1230     Respiratory Culture Gram stain demonstrates significant oropharyngeal contamination. Sputum unsatisfactory for culture     GRAM STAIN Quality -2      Many Gram positive cocci      Many Gram Negative Rods             X-Ray Chest 1 View  Narrative: EXAMINATION:  XR CHEST 1 VIEW    CLINICAL HISTORY:  pneumonia;    TECHNIQUE:  AP chest    COMPARISON:  Chest x-ray dated 11/26/2023    FINDINGS:  The heart is stable in size.  There are slightly increasing patchy and consolidative bilateral airspace opacities.  There is no pleural effusion or visible pneumothorax.  Impression: Slightly increasing patchy and consolidative bilateral airspace opacities.    Electronically signed by: Lyric Deras  Date:    11/27/2023  Time:    06:05        ASSESSMENT & PLAN:     Influenza A, dx on 11/19/2023  Bilateral Pneumonia 2/2 above  MRSA Positive Blood Cultures  PAF with RVR  Chronic HFpEF 55%  Essential hypertension  CAD s/p MI/CABG x3  ETOH abuse   Transaminits  Mild Hypokalemia and Hypophosphatemia   Chronic Anemia and Thrombocytopenia   Hx of VHD    Plan:   Supplemental O2 as needed   Cardiac monitoring   ID consulted prior to downgrade, appreciate recommendations  Continue IV vancomycin   Continue PT/OT  Fall precautions  CIWA AR scale q 8 hrs  LR at 75 ml/hr  Follow repeat blood cultures  Potassium and phosphate replete with K Phos 30 mmol IVPB  Labs in a.m.       VTE Prophylaxis: Lovenox      Discharge Planning and Disposition: TBD    I, Simran Meza, NP have reviewed and discussed the case with Dr. Patel.  Please see the attending MD's addendum for further assessment and plan.    Simran Meza, [facility]  Department of Hospital Medicine   [facility]   11/27/2023    _______________________________________________________________________________  MD Addendum:  I, Dr. Patel, assumed care of this patient today at --am/pm  For the patient encounter, I performed the substantive portion of the visit, I reviewed the NP/PA documentation, treatment plan, and medical decision making.  I had face to face time with this patient     A. History:  Patient is a 68-year-old male with past medical history of alcohol abuse, VHD with aortic stenosis, thrombocytopenia, CAD status post CABG presented on 11/24 from outlying facility.  Patient was diagnosed with influenza x5 days prior to arrival and was just prescribed Tamiflu however clinically worsened.  PA patient became septic for multifocal pneumonia secondary to flu and was started on broad-spectrum IV antibiotics.  Required vasopressor support and was admitted to the ICU.  Patient also developed MRSA bacteremia due to MRSA pneumonia.  We will receiving IV vancomycin along with Zosyn.  Id was consulted after patient was downgraded from ICU.    B. Physical exam:  General appearance: Well-developed male in no apparent distress.  HENT: Atraumatic head. Moist mucous membranes of oral cavity.  Eyes: Normal extraocular movements.   Neck: Supple.   Lungs: Coarse to auscultation bilaterally. Tachypnea  Heart: Regular rhythm. Tachycardia. Systolic Murmur. S1 and S2 present. No pedal edema.  Abdomen: Soft, non-distended, non-tender.  Extremities: No cyanosis, clubbing  Skin: No Rash.   Neuro: Motor and sensory exams grossly intact.   Psych/mental status: Appropriate mood and affect. Responds appropriately to questions  C. Medical decision  making:    Influenza A diagnosed 11/19/23, received outpatient Tamiflu.  Bilateral pneumonia, post influenza, likely MRSA.    MRSA bacteremia  Hypotensive shock, likely combination of intravascular volume depletion and septic shock etiologies, resolved.  Wide anion gap metabolic acidosis of lactic acid etiology, resolved.  Acute kidney injury, resolved.    Chronic atrial fibrillation, rapid ventricular rate on presentation.  He continues with intermittent atrial fibrillation alternating with sinus rhythm/sinus tachycardia.  Chronic heart failure with preserved ejection fraction, moderate AS, moderate MS, mild MR. Coronary artery disease, post CABG 03/2023.  Patient admits to daily ethanol use    ID consulted  Repeat blood cultures done this morning, follow up,   Continue vancomycin per ID,  Wean oxygen as tolerated, patient currently on 3 L  STI  AST ALT ratio greater than 2 likely secondary to alcohol use, however currently a little more elevated.  Normal bilirubin.  Likely secondary to antibiotics, we will monitor with repeat labs tomorrow morning.  Denies any abdominal pain at this time.    All diagnosis and differential diagnosis have been reviewed; assessment and plan has been documented; I have personally reviewed the labs and test results that are presently available; I have reviewed the patients medication list; I have reviewed the consulting providers response and recommendations. I have reviewed or attempted to review medical records based upon their availability.    All of the patient and family questions have been addressed and answered. Patient's is agreeable to the above stated plan. I will continue to monitor closely and make adjustments to medical management as needed.  Betty Patel DO  Department of Hospital Medicine  [facility]  11/27/2023

## 2023-11-27 NOTE — NURSING
Nurses Note -- 4 Eyes      11/27/2023   10:22 AM      Skin assessed during: Daily Assessment      [] No Altered Skin Integrity Present    [x]Prevention Measures Documented      [x] Yes- Altered Skin Integrity Present or Discovered   [] LDA Added if Not in Epic (Describe Wound)   [x] New Altered Skin Integrity was Present on Admit and Documented in LDA   [] Wound Image Taken    Wound Care Consulted? Yes - already consulted    Attending Nurse:  Janet denny RN    Second RN/Staff Member:   Anuradha Landaverde RN

## 2023-11-27 NOTE — NURSING
[facility] [address]  Wound Care    Patient Name:  Mike Urbina Jr.   MRN:  [id number]  Date: 11/27/2023  Diagnosis: Atrial fibrillation with RVR    History:     Past Medical History:   Diagnosis Date    Atrial flutter     CHF (congestive heart failure)     Coronary artery disease     Hypertension     Myocardial infarction     SOB (shortness of breath)     at times       Social History     Socioeconomic History    Marital status: Single   Tobacco Use    Smoking status: Never     Passive exposure: Never    Smokeless tobacco: Never   Substance and Sexual Activity    Alcohol use: Yes     Alcohol/week: 84.0 standard drinks of alcohol     Types: 84 Cans of beer per week     Comment: alcoholic, daily, beer    Drug use: Yes     Frequency: 3.0 times per week     Types: Hydrocodone    Sexual activity: Not Currently   Social History Narrative    ** Merged History Encounter **          Social Determinants of Health     Financial Resource Strain: Low Risk  (6/13/2023)    Overall Financial Resource Strain (CARDIA)     Difficulty of Paying Living Expenses: Not hard at all   Food Insecurity: No Food Insecurity (6/13/2023)    Hunger Vital Sign     Worried About Running Out of Food in the Last Year: Never true     Ran Out of Food in the Last Year: Never true   Transportation Needs: No Transportation Needs (6/13/2023)    PRAPARE - Transportation     Lack of Transportation (Medical): No     Lack of Transportation (Non-Medical): No   Physical Activity: Inactive (6/13/2023)    Exercise Vital Sign     Days of Exercise per Week: 0 days     Minutes of Exercise per Session: 0 min   Stress: No Stress Concern Present (6/13/2023)    [employer] - Occupational Stress Questionnaire     Feeling of Stress : Only a little   Social Connections: Moderately Integrated (6/13/2023)    Social Connection and Isolation Panel [NHANES]     Frequency of Communication with Friends and Family: More than  three times a week     Frequency of Social Gatherings with Friends and Family: More than three times a week     Attends Spiritism Services: More than 4 times per year     Active Member of Clubs or Organizations: No     Attends Club or Organization Meetings: 1 to 4 times per year     Marital Status: Never    Housing Stability: Low Risk  (6/13/2023)    Housing Stability Vital Sign     Unable to Pay for Housing in the Last Year: No     Number of Places Lived in the Last Year: 1     Unstable Housing in the Last Year: No       Precautions:     Allergies as of 11/24/2023    (No Known Allergies)       WOC Assessment Details/Treatment      11/27/23 1100        Altered Skin Integrity 11/24/23 2300 posterior Sacral spine #1 Skin Tear Intact skin with non-blanchable redness of localized area   Date First Assessed/Time First Assessed: 11/24/23 2300   Altered Skin Integrity Present on Admission - Did Patient arrive to the hospital with altered skin?: yes  Orientation: posterior  Location: Sacral spine  Wound Number: #1  Is this injury device ...   Wound Image    Description of Altered Skin Integrity   (discolored skin)   Dressing Appearance Open to air   Drainage Amount None   Appearance Pink;Tan   Tissue loss description Not applicable   Care   (orders placed for antifungal drying agent)     69 y/o male in with  a fib with RVR.   Also the flu.   Hx of CAD CHF HTN.  Awake alert.     Has some issues with discolored skin on buttock area.   Care put in place with instructions to nurse .    Will follow up next week on any changes.       11/27/2023

## 2023-11-28 LAB
ABS NEUT (OLG): ABNORMAL
ACANTHOCYTES (OLG): ABNORMAL
ALBUMIN SERPL-MCNC: 1.9 G/DL (ref 3.4–4.8)
ALBUMIN/GLOB SERPL: 0.5 RATIO (ref 1.1–2)
ALP SERPL-CCNC: 63 UNIT/L (ref 40–150)
ALT SERPL-CCNC: 92 UNIT/L (ref 0–55)
ANISOCYTOSIS BLD QL SMEAR: ABNORMAL
AST SERPL-CCNC: 103 UNIT/L (ref 5–34)
BILIRUB SERPL-MCNC: 1.2 MG/DL
BUN SERPL-MCNC: 17 MG/DL (ref 8.4–25.7)
BURR CELLS (OLG): ABNORMAL
CALCIUM SERPL-MCNC: 7.6 MG/DL (ref 8.8–10)
CHLORIDE SERPL-SCNC: 105 MMOL/L (ref 98–107)
CO2 SERPL-SCNC: 18 MMOL/L (ref 23–31)
CREAT SERPL-MCNC: 0.82 MG/DL (ref 0.73–1.18)
EOSINOPHIL NFR BLD MANUAL: 1 %
ERYTHROCYTE [DISTWIDTH] IN BLOOD BY AUTOMATED COUNT: 17.9 % (ref 11.5–17)
GFR SERPLBLD CREATININE-BSD FMLA CKD-EPI: >60 MLS/MIN/1.73/M2
GLOBULIN SER-MCNC: 3.6 GM/DL (ref 2.4–3.5)
GLUCOSE SERPL-MCNC: 108 MG/DL (ref 82–115)
HBV CORE AB SERPL QL IA: NONREACTIVE
HBV CORE IGM SERPL QL IA: NONREACTIVE
HBV SURFACE AB SER-ACNC: 0.75 MIU/ML
HBV SURFACE AB SERPL IA-ACNC: NONREACTIVE M[IU]/ML
HBV SURFACE AG SERPL QL IA: NONREACTIVE
HCT VFR BLD AUTO: 32 % (ref 42–52)
HCV AB SERPL QL IA: ABNORMAL
HGB BLD-MCNC: 10.5 G/DL (ref 14–18)
HIV 1+2 AB+HIV1 P24 AG SERPL QL IA: NONREACTIVE
LYMPHOCYTES NFR BLD MANUAL: 13 %
MACROCYTES BLD QL SMEAR: ABNORMAL
MCH RBC QN AUTO: 28.6 PG (ref 27–31)
MCHC RBC AUTO-ENTMCNC: 32.8 G/DL (ref 33–36)
MCV RBC AUTO: 87.2 FL (ref 80–94)
MONOCYTES NFR BLD MANUAL: 8 %
MYELOCYTES NFR BLD MANUAL: 1 %
NEUTROPHILS NFR BLD MANUAL: 77 %
NRBC BLD AUTO-RTO: 0 %
NRBC BLD MANUAL-RTO: 1 %
PLATELET # BLD AUTO: 99 X10(3)/MCL (ref 130–400)
PLATELETS.RETICULATED NFR BLD AUTO: 8.1 % (ref 0.9–11.2)
PMV BLD AUTO: 11.6 FL (ref 7.4–10.4)
POIKILOCYTOSIS BLD QL SMEAR: ABNORMAL
POTASSIUM SERPL-SCNC: 3.8 MMOL/L (ref 3.5–5.1)
PROT SERPL-MCNC: 5.5 GM/DL (ref 5.8–7.6)
RBC # BLD AUTO: 3.67 X10(6)/MCL (ref 4.7–6.1)
SODIUM SERPL-SCNC: 134 MMOL/L (ref 136–145)
T PALLIDUM AB SER QL: NONREACTIVE
VANCOMYCIN TROUGH SERPL-MCNC: 20.1 UG/ML (ref 15–20)
WBC # SPEC AUTO: 3.91 X10(3)/MCL (ref 4.5–11.5)

## 2023-11-28 PROCEDURE — 63600175 PHARM REV CODE 636 W HCPCS

## 2023-11-28 PROCEDURE — 21400001 HC TELEMETRY ROOM

## 2023-11-28 PROCEDURE — 25000003 PHARM REV CODE 250: Performed by: INTERNAL MEDICINE

## 2023-11-28 PROCEDURE — 99900035 HC TECH TIME PER 15 MIN (STAT)

## 2023-11-28 PROCEDURE — 99233 SBSQ HOSP IP/OBS HIGH 50: CPT | Mod: FS,,, | Performed by: GENERAL PRACTICE

## 2023-11-28 PROCEDURE — 94761 N-INVAS EAR/PLS OXIMETRY MLT: CPT

## 2023-11-28 PROCEDURE — 25000003 PHARM REV CODE 250: Performed by: NURSE PRACTITIONER

## 2023-11-28 PROCEDURE — 85027 COMPLETE CBC AUTOMATED: CPT | Performed by: NURSE PRACTITIONER

## 2023-11-28 PROCEDURE — 63600175 PHARM REV CODE 636 W HCPCS: Performed by: INTERNAL MEDICINE

## 2023-11-28 PROCEDURE — 99233 PR SUBSEQUENT HOSPITAL CARE,LEVL III: ICD-10-PCS | Mod: FS,,, | Performed by: GENERAL PRACTICE

## 2023-11-28 PROCEDURE — 94664 DEMO&/EVAL PT USE INHALER: CPT

## 2023-11-28 PROCEDURE — 80202 ASSAY OF VANCOMYCIN: CPT | Performed by: INTERNAL MEDICINE

## 2023-11-28 PROCEDURE — A4216 STERILE WATER/SALINE, 10 ML: HCPCS

## 2023-11-28 PROCEDURE — 97530 THERAPEUTIC ACTIVITIES: CPT

## 2023-11-28 PROCEDURE — 87389 HIV-1 AG W/HIV-1&-2 AB AG IA: CPT | Performed by: NURSE PRACTITIONER

## 2023-11-28 PROCEDURE — 25000003 PHARM REV CODE 250

## 2023-11-28 PROCEDURE — 27000646 HC AEROBIKA DEVICE

## 2023-11-28 PROCEDURE — 80053 COMPREHEN METABOLIC PANEL: CPT | Performed by: NURSE PRACTITIONER

## 2023-11-28 PROCEDURE — 86780 TREPONEMA PALLIDUM: CPT | Performed by: NURSE PRACTITIONER

## 2023-11-28 PROCEDURE — 86709 HEPATITIS A IGM ANTIBODY: CPT | Performed by: NURSE PRACTITIONER

## 2023-11-28 PROCEDURE — 87340 HEPATITIS B SURFACE AG IA: CPT | Performed by: NURSE PRACTITIONER

## 2023-11-28 PROCEDURE — 86704 HEP B CORE ANTIBODY TOTAL: CPT | Performed by: NURSE PRACTITIONER

## 2023-11-28 PROCEDURE — 86803 HEPATITIS C AB TEST: CPT | Performed by: NURSE PRACTITIONER

## 2023-11-28 PROCEDURE — 86706 HEP B SURFACE ANTIBODY: CPT | Performed by: NURSE PRACTITIONER

## 2023-11-28 PROCEDURE — 27000221 HC OXYGEN, UP TO 24 HOURS

## 2023-11-28 PROCEDURE — 27000207 HC ISOLATION

## 2023-11-28 RX ORDER — VANCOMYCIN HCL IN 5 % DEXTROSE 1G/250ML
1000 PLASTIC BAG, INJECTION (ML) INTRAVENOUS
Status: DISCONTINUED | OUTPATIENT
Start: 2023-11-28 | End: 2023-12-04

## 2023-11-28 RX ORDER — LEVALBUTEROL INHALATION SOLUTION 1.25 MG/3ML
1.25 SOLUTION RESPIRATORY (INHALATION) EVERY 8 HOURS
Status: DISCONTINUED | OUTPATIENT
Start: 2023-11-29 | End: 2023-12-05

## 2023-11-28 RX ADMIN — GUAIFENESIN 400 MG: 200 SOLUTION ORAL at 08:11

## 2023-11-28 RX ADMIN — GUAIFENESIN 400 MG: 200 SOLUTION ORAL at 09:11

## 2023-11-28 RX ADMIN — ENOXAPARIN SODIUM 70 MG: 80 INJECTION SUBCUTANEOUS at 08:11

## 2023-11-28 RX ADMIN — METOPROLOL SUCCINATE 100 MG: 50 TABLET, EXTENDED RELEASE ORAL at 09:11

## 2023-11-28 RX ADMIN — THIAMINE HYDROCHLORIDE 250 MG: 100 INJECTION, SOLUTION INTRAMUSCULAR; INTRAVENOUS at 10:11

## 2023-11-28 RX ADMIN — POTASSIUM BICARBONATE 50 MEQ: 977.5 TABLET, EFFERVESCENT ORAL at 04:11

## 2023-11-28 RX ADMIN — AMIODARONE HYDROCHLORIDE 200 MG: 200 TABLET ORAL at 09:11

## 2023-11-28 RX ADMIN — FOLIC ACID 1 MG: 1 TABLET ORAL at 09:11

## 2023-11-28 RX ADMIN — MICONAZOLE NITRATE: 20 POWDER TOPICAL at 08:11

## 2023-11-28 RX ADMIN — VANCOMYCIN HYDROCHLORIDE 1000 MG: 1 INJECTION, POWDER, LYOPHILIZED, FOR SOLUTION INTRAVENOUS at 04:11

## 2023-11-28 RX ADMIN — ASPIRIN 81 MG: 81 TABLET, COATED ORAL at 09:11

## 2023-11-28 RX ADMIN — THIAMINE HYDROCHLORIDE 250 MG: 100 INJECTION, SOLUTION INTRAMUSCULAR; INTRAVENOUS at 02:11

## 2023-11-28 RX ADMIN — LOPERAMIDE HYDROCHLORIDE 2 MG: 2 CAPSULE ORAL at 06:11

## 2023-11-28 RX ADMIN — MICONAZOLE NITRATE: 20 POWDER TOPICAL at 10:11

## 2023-11-28 RX ADMIN — LINACLOTIDE 72 MCG: 72 CAPSULE, GELATIN COATED ORAL at 07:11

## 2023-11-28 RX ADMIN — ENOXAPARIN SODIUM 70 MG: 80 INJECTION SUBCUTANEOUS at 10:11

## 2023-11-28 RX ADMIN — QUETIAPINE 200 MG: 100 TABLET ORAL at 08:11

## 2023-11-28 RX ADMIN — LEVALBUTEROL HYDROCHLORIDE 1.25 MG: 1.25 SOLUTION RESPIRATORY (INHALATION) at 11:11

## 2023-11-28 RX ADMIN — ATORVASTATIN CALCIUM 40 MG: 40 TABLET, FILM COATED ORAL at 08:11

## 2023-11-28 RX ADMIN — GUAIFENESIN 400 MG: 200 SOLUTION ORAL at 02:11

## 2023-11-28 RX ADMIN — Medication 10 ML: at 02:11

## 2023-11-28 NOTE — PROGRESS NOTES
[facility] Hospital Medicine Progress Note        Chief Complaint: Inpatient Follow-up for MRSA sepsis and Pneumonia     HPI:   Mike Urbina Jr. is a 68 y.o. male with a PMHx of  ETOH abuse, VHD-aortic stenosis, thrombocytopenia, CAD status post CABG x3, HTN, PAFlutter s/p SILVANO ligation, chronic HFpEF 55%, chronic anemia who presented to [facility] on 11/24/2023 with via EMS as a transfer from [facility] ED for higher level of care.  He initially presented to the [facility] ED via EMS with complaints of worsening SOB and generalized weakness after being diagnosed with influenza a x5 days prior. Reportedly prescribed Tamiflu on 11/20/2023. He also endorsed decreased oral intake, and chest pain that is worse with deep inspiration.  Patient does endorsed drinking a 6 pack of beer per day.     Labs were notable for sodium 135, CO2 17, glucose 179, BUN 36.2, creatinine 1.49, total bili 2.9, AST 56, BNP 3053, WBC 13.68, hemoglobin 12.5, hematocrit 40, troponin 0.012.  CXR demonstrated right upper lobe consolidation concerning for pneumonia.  EKG demonstrated supraventricular tachycardia with rate of 172.  He was given adenosine 6 mg followed by 12 mg without improvement.  He was then given Lopressor 5 mg IV and rate noted to be atrial fibrillation with RVR.  He was initiated on a diltiazem drip.  He was then transferred to [facility] ED.  CTA chest negative for PE, findings concerning for multifocal pneumonia most evident in the right upper lobe.  He was started on broad-spectrum IV antibiotics.  Blood cultures x2 obtained.       He was admitted to ICU.  MRSA PCR detected. Antibiotic coverage broadened to vanc, Zosyn and doxycycline.  TTE demonstrated EF 55%, moderate aortic stenosis, moderate mitral stenosis and mild MR.  Blood cultures from 11/24/2023 growing MRSA in 2 of 2 bottles.  Respiratory PCR panel negative.  Tamiflu was also continued.  Also required Levophed for BP support.  He has since been weaned off of Levophed and Cardizem.  Zosyn and Tamiflu were discontinued on 11/27/2023.  He remains on IV vancomycin.  ID was consulted on 11/27/2023. Blood cultures repeated x2 on 11/27/2023.  He was cleared for downgrade to the floor on 11/27/2023.  PT/OT consulted. Hospital medicine consulted for assumption of care and further medical management.     Labs on 11/27/2023 notable for WBC 3.16, hemoglobin 9.9, hematocrit 30.3, platelets 107, sodium 134, potassium 3.4, calcium 7.3, phosphorus 1.6, albumin 2, , .    Patient was continued on iv vancomycin. He was very weak and needed PT.     Interval Hx:   Patient today awake and comfortable. He has generalized weakness. Mild cough but no sputum or SOB. He also denies any chest pain, fever or chills.   Appetite is ok.   No family at bedside.     Case was discussed with patient's nurse and  on the floor.    Objective/physical exam:  General: In no acute distress  Chest: Ernie rhonchi   Heart: RRR, +S1, S2, no appreciable murmur  Abdomen: Soft, nontender, BS +  MSK: Warm, no lower extremity edema, no clubbing or cyanosis  Neurologic: Cranial nerve II-XII intact, Strength 4/5 in all 4 extremities    VITAL SIGNS: 24 HRS MIN & MAX LAST   Temp  Min: 97.6 °F (36.4 °C)  Max: 99 °F (37.2 °C) 98.8 °F (37.1 °C)   BP  Min: 111/46  Max: 165/80 (!) 111/46   Pulse  Min: 69  Max: 132  (!) 130   Resp  Min: 20  Max: 34 20   SpO2  Min: 86 %  Max: 100 % 96 %     I have reviewed the following labs:  Recent Labs   Lab 11/26/23  0750 11/27/23  0121 11/28/23  0208   WBC 4.27  4.27* 3.16* 3.91*   RBC 3.45* 3.49* 3.67*   HGB 10.0* 9.9* 10.5*   HCT 29.6* 30.3* 32.0*   MCV 85.8 86.8 87.2   MCH 29.0 28.4 28.6   MCHC 33.8 32.7* 32.8*   RDW 17.7* 18.1* 17.9*   * 107* 99*   MPV 11.2* 10.8* 11.6*     Recent Labs   Lab 11/24/23  1511 11/24/23  1818 11/25/23  0148 11/25/23  0159 11/26/23  0750 11/27/23  0121 11/28/23  0208   NA  --    < > 131*  --  133*  134* 134*   K  --    < > 3.8  --  3.4* 3.4* 3.8   CO2  --    < > 18*  --  20* 23 18*   BUN  --    < > 37.4*  --  27.0* 25.0 17.0   CREATININE  --    < > 1.18  --  0.95 0.89 0.82   CALCIUM  --    < > 7.2*  --  7.4* 7.3* 7.6*   PH 7.388  --   --  7.500*  --   --   --    MG  --    < > 1.90  --  1.90 1.90  --    ALBUMIN  --    < > 2.3*  --  2.0* 2.0* 1.9*   ALKPHOS  --    < > 44  --  41 54 63   ALT  --    < > 23  --  47 118* 92*   AST  --    < > 34  --  94* 222* 103*   BILITOT  --    < > 2.0*  --  1.6* 1.5 1.2    < > = values in this interval not displayed.     Microbiology Results (last 7 days)       Procedure Component Value Units Date/Time    Blood Culture [[id number]]  (Normal) Collected: 11/27/23 0637    Order Status: Completed Specimen: Blood from Hand, Left Updated: 11/28/23 1345     CULTURE, BLOOD ([facility]) No Growth At 24 Hours    Blood Culture [[id number]]  (Normal) Collected: 11/27/23 0637    Order Status: Completed Specimen: Blood from Wrist, Right Updated: 11/28/23 1342     CULTURE, BLOOD ([facility]) No Growth At 24 Hours    Stool Culture [[id number]]  (Normal) Collected: 11/27/23 1525    Order Status: Completed Specimen: Stool Updated: 11/28/23 0719     Stool Culture Negative for Salmonella, Shigella, Campylobacter, Vibrio, Aeromonas, Pleisiomonas,Yersinia, or Shiga Toxin 1 and 2.    Chlamydia/GC, PCR [[id number]] Collected: 11/27/23 1651    Order Status: Completed Specimen: Urine Updated: 11/27/23 1843     Chlamydia trachomatis PCR Not Detected     N. gonorrhea PCR Not Detected     Source Urine    Narrative:      The Xpert CT/NG test, performed on the GeneXpert system is a qualitative in vitro real-time polymerase chain reaction (PCR) test for the automated detected and differentiation for genomic DNA from Chlamydia trachomatis (CT) and/or Neisseria gonorrhoeae (NG).    Respiratory Culture [[id number]]     Order Status: Sent Specimen: Sputum     Respiratory Culture [[id number]] Collected: 11/24/23 2140    Order  Status: Completed Specimen: Sputum, Expectorated Updated: 11/25/23 1230     Respiratory Culture Gram stain demonstrates significant oropharyngeal contamination. Sputum unsatisfactory for culture     GRAM STAIN Quality -2      Many Gram positive cocci      Many Gram Negative Rods             See below for Radiology    Scheduled Med:   amiodarone  200 mg Oral Daily    aspirin  81 mg Oral Daily    atorvastatin  40 mg Oral QHS    enoxparin  70 mg Subcutaneous Q12H    folic acid  1 mg Oral Daily    guaiFENesin 100 mg/5 ml  400 mg Oral Q6H WAKE    linaCLOtide  72 mcg Oral Before breakfast    metoprolol succinate  100 mg Oral Daily    miconazole NITRATE 2 %   Topical (Top) BID    QUEtiapine  200 mg Oral QHS    thiamine (B-1) 250 mg in dextrose 5 % (D5W) 100 mL IVPB  250 mg Intravenous TID    vancomycin (VANCOCIN) IV (PEDS and ADULTS)  1,000 mg Intravenous Q12H      Continuous Infusions:   sodium chloride 0.9% 10 mL/hr at 11/27/23 1944    lactated ringers 75 mL/hr at 11/27/23 1944      PRN Meds:  loperamide, lorazepam, magnesium oxide, magnesium oxide, metoprolol, ondansetron, potassium bicarbonate, potassium bicarbonate, potassium bicarbonate, potassium, sodium phosphates, potassium, sodium phosphates, potassium, sodium phosphates, sodium chloride 0.9%, Pharmacy to dose Vancomycin consult **AND** vancomycin - pharmacy to dose     Assessment/Plan:  Influenza A, dx on 11/19/2023  Bilateral Pneumonia 2/2 MRSA and Flu A infection   MRSA sepsis   Generalized weakness   Elevated LFTs from sepsis: Improving   Hypoalbuminemia   PAF with RVR: rate controlled   Chronic HFpEF 55%  Essential hypertension  CAD s/p MI/CABG x3  ETOH abuse   Mild Hypokalemia and Hypophosphatemia   Chronic Anemia and Thrombocytopenia   Hx of VHD    Plan:  Patient making a slow clinical recovery   Will continue iv vancomycin for MRSA sepsis and Pneumonia   Blood cultures done on 11/27/23 has been negative so far   His LFTs are improving   Renal functions  stable, Crt today 0.82, Alb 1.9  Vanc level 20, cont dosing per pharmacy   Will closely monitor patients daily weight, urine out put, renal parameters and volume status    CBC reviewed, WBC 3.91, Hb 10.5, Plt 99  He is on FD lovenox for Afib. If platelets continue o decrease then will have to hold lovenox   Added scheduled bronchodilators  He is on iv fluids, will hold for now and monitor BMP   Continue PT     F/U by ID team and recommending a CARO     Need good nutrition support for better recovery     Cont supportive care     Labs in am       VTE prophylaxis: Lovenox     Patient condition:  Guarded    Anticipated discharge and Disposition:   TCU       All diagnosis and differential diagnosis have been reviewed; assessment and plan has been documented; I have personally reviewed the labs and test results that are presently available; I have reviewed the patients medication list; I have reviewed the consulting providers response and recommendations. I have reviewed or attempted to review medical records based upon their availability    All of the patient's questions have been  addressed and answered. Patient's is agreeable to the above stated plan. I will continue to monitor closely and make adjustments to medical management as needed.  _____________________________________________________________________    Nutrition Status:    Radiology:  I have personally reviewed the following imaging and agree with the radiologist.     X-Ray Chest 1 View  Narrative: EXAMINATION:  XR CHEST 1 VIEW    CLINICAL HISTORY:  pneumonia;    TECHNIQUE:  AP chest    COMPARISON:  Chest x-ray dated 11/26/2023    FINDINGS:  The heart is stable in size.  There are slightly increasing patchy and consolidative bilateral airspace opacities.  There is no pleural effusion or visible pneumothorax.  Impression: Slightly increasing patchy and consolidative bilateral airspace opacities.    Electronically signed by: Lyric  Braeden  Date:    11/27/2023  Time:    06:05      Syed Vogt MD   11/28/2023

## 2023-11-28 NOTE — PLAN OF CARE
11/28/23 0931   Discharge Assessment   Assessment Type Discharge Planning Assessment   Confirmed/corrected address, phone number and insurance Yes   Confirmed Demographics Correct on Facesheet   Source of Information patient   When was your last doctors appointment? 11/21/23   Communicated MODESTO with patient/caregiver Date not available/Unable to determine   Reason For Admission Afib, Pneumonia   People in Home friend(s)   Do you expect to return to your current living situation? Yes   Current cognitive status: Alert/Oriented   Home Accessibility wheelchair accessible   Home Layout Able to live on 1st floor   Equipment Currently Used at Home   (Patient has cane but currently not using)   Patient currently being followed by outpatient case management? No   Do you currently have service(s) that help you manage your care at home? No   Do you take prescription medications? Yes   Do you have any problems affording any of your prescribed medications? No   Is the patient taking medications as prescribed? yes   Who is going to help you get home at discharge? Friend Wyatt   How do you get to doctors appointments? car, drives self   Are you on dialysis? No   Do you take coumadin? No   Discharge Plan discussed with: Patient   Transition of Care Barriers None   Discharge Plan A Home Health   Discharge Plan B Home   OTHER   Name(s) of People in Home Friend Wyatt Parker

## 2023-11-28 NOTE — PT/OT/SLP PROGRESS
"Physical Therapy Treatment    Patient Name:  Mike Urbina Jr.   MRN:  [id number]    Recommendations:     Discharge therapy intensity: Moderate Intensity Therapy   Discharge Equipment Recommendations: to be determined by next level of care  Barriers to discharge:  medical dx, impaired mobility, decreased independence     Assessment:     Mike Urbina Jr. is a 68 y.o. male admitted with a medical diagnosis of Atrial fibrillation with RVR., B pneumonia, hypotensive shock, LOVELY. Pt initially dx with influenza A on 11/19 at another facility; increased SOB on 11/24, tachycardia with failed cardioversion. He presents with the following impairments/functional limitations: weakness, gait instability, impaired endurance, impaired balance, decreased lower extremity function, impaired self care skills, impaired functional mobility, decreased safety awareness.  Patient required encouragement for participation in therapy as well as explanation as to why participating in therapy is important.  With all ax pt c/o of being "so weak"      Rehab Prognosis: Good; patient would benefit from acute skilled PT services to address these deficits and reach maximum level of function.    Recent Surgery: * No surgery found *      Plan:     During this hospitalization, patient to be seen 5 x/week to address the identified rehab impairments via gait training, therapeutic activities, therapeutic exercises and progress toward the following goals:    Plan of Care Expires:  12/27/23    Subjective     Chief Complaint: weakness   Patient/Family Comments/goals: to get stronger   Pain/Comfort:  Pain Rating 1: 0/10      Objective:     Communicated with NSG prior to session.  Patient found HOB elevated with pulse ox (continuous), telemetry, oxygen upon PT entry to room.     General Precautions: Standard, fall  Orthopedic Precautions: N/A  Braces: N/A  Respiratory Status: Nasal cannula, flow 2 L/min  Heart Rate: 120s-130s sustained throughout " ax  SpO2: desat to low 80s with ax; pt c/o SOB but no obvious respiratory signs and symptoms     Functional Mobility:  Bed Mobility:     Supine to Sit: minimum assistance  Transfers:     Sit to/from Stand:  minimum assistance with rolling walker  X2 trials performed from EOB  On trial 1 pt able to perform static marches x10 ea LE  On trial 2 pt able to step to chair. After t/f pt attempting to prematurely sit, req cues for technique and safety   Gait: pt amb about 5 ft in order to t/f from EOB to bedside chair with use of RW and Jennifer      Education:  Patient provided with verbal education  regarding PT role/goals/POC, fall prevention, and safety awareness.  Understanding was verbalized.     Patient left up in chair with all lines intact, call button in reach, and RN notified..    GOALS:   Multidisciplinary Problems       Physical Therapy Goals          Problem: Physical Therapy    Goal Priority Disciplines Outcome Goal Variances Interventions   Physical Therapy Goal     PT, PT/OT Ongoing, Progressing     Description: Goals to be met by: 23     Patient will increase functional independence with mobility by performin. Supine to sit with [place]  2. Sit to supine with [place]  3. Sit to stand transfer with Modified [place]  4. Gait  x 150 feet with Modified [place] using Rolling Walker vs no AD.                          Time Tracking:     PT Received On: 23  PT Start Time: 1446     PT Stop Time: 1509  PT Total Time (min): 23 min     Billable Minutes: Therapeutic Activity 2    Treatment Type: Treatment  PT/PTA: PT     Number of PTA visits since last PT visit: 2023

## 2023-11-28 NOTE — PROGRESS NOTES
[facility]  Infectious Disease Progress Note            ASSESSMENT & PLAN:     He is a 68-year-old male with a past medical history of CAD, s/p CABG in 3/2023, HFpEF, and hypertension who was diagnosed with influenza A on 11/19/2023.  He was compliant with course of Tamiflu, however presented to an outlying facility on 11/24/2023 with shortness of breath.  There was concern for atrial fibrillation with RVR and patient received 2 doses of adenosine followed by a Cardizem drip for rate control.  He was noted to be hypoxic showed right upper lobe consolidation concerning for pneumonia.  CTA of the chest was negative for PE, however did showed multifocal pneumonia, most evident in the right upper lobe.  Admit blood cultures are positive for MRSA.  Patient does have a productive cough, however denies shortness of breath or pleuritic chest pain.  He does continue to require supplemental oxygen although overall is feeling better.  He has been downgraded to the floor.  He reports some low back pain and bilateral lower extremity weakness, however reports back pain is chronic and although somewhat worse recently, denies significant issues.  Also denies any bowel or bladder issues.  He was receiving empiric Zosyn, vancomycin, and remained on Tamiflu, however is currently only on vancomycin.  Remains on Cardizem.   TTE was negative for endocarditis, however did show severe posterior mitral annular calcification with moderate stenosis and mild regurgitation as well as moderate aortic stenosis also from calcified cusps and mild TR.  Blood cultures were repeated this morning.  We have been consulted regarding MRSA bacteremia.     MRSA bacteremia  Post-influenza MRSA pneumonia  Acute hypoxemic respiratory failure  Atrial fibrillation with RVR, now rate controlled on Cardizem  CAD, s/p CABG in 3/2023  H/o HTN / HFpEF  ETOH use  Pancytopenia  Transaminitis     PLAN:  BCx repeated 11/27 - NGTD.   Maintain with PIVs only if feasible.  "  Continue vancomycin, pharmacy dosing for trough goal 15-20.    Consult cardiology for CARO.   LFTs improved some, pancytopenia somewhat worse - monitor in am, may need to change vancomycin if continues.  Continue vancomycin, dosing per pharmacy for trough goal 15-20.  Discussed with patient.     SUBJECTIVE:     Downgraded to floor.  AF, VSS.  No events overnight,    MEDICATIONS:   Reviewed in EMR    REVIEW OF SYSTEMS:   Except as documented, all other systems reviewed and negative     PHYSICAL EXAM:   T 98.8 °F (37.1 °C)   BP (!) 111/46   P (!) 130   RR 20   O2 96 %  GENERAL: NAD; does not appear toxic  SKIN: no rash  HEENT: sclera non-icteric; PERRL   NECK: supple; no LAD  CHEST: Rhonchi; nonlabored, equal expansion   CARDIOVASCULAR: RRR, S1S2; + murmur; strong, equal peripheral pulses; no edema  ABDOMEN:  active bowel sounds; abdomen soft, nondistended, nontender to palpation  GENITOURINARY: no suprapubic tenderness   EXTREMITIES: no cyanosis or clubbing; strength equal bilaterally  NEURO: AAO x4; CN II-XII grossly intact; somewhat [place]  PSYCH: Mentation and affect appropriate     LABS AND IMAGING:     Recent Labs     11/27/23  0121 11/28/23  0208   WBC 3.16* 3.91*   RBC 3.49* 3.67*   HGB 9.9* 10.5*   HCT 30.3* 32.0*   MCV 86.8 87.2   MCH 28.4 28.6   MCHC 32.7* 32.8*   RDW 18.1* 17.9*   * 99*     No results for input(s): "LACTIC" in the last 72 hours.  No results for input(s): "INR", "APTT", "D-DIMER" in the last 72 hours.  No results for input(s): "HGBA1C", "CHOL", "TRIG", "LDL", "VLDL", "HDL" in the last 72 hours.   Recent Labs     11/26/23  0750 11/27/23  0121 11/28/23  0208   * 134* 134*   K 3.4* 3.4* 3.8   CHLORIDE 105 104 105   CO2 20* 23 18*   BUN 27.0* 25.0 17.0   CREATININE 0.95 0.89 0.82   GLUCOSE 102 90 108   CALCIUM 7.4* 7.3* 7.6*   MG 1.90 1.90  --    PHOS 2.2* 1.6*  --    ALBUMIN 2.0* 2.0* 1.9*   GLOBULIN 2.6 2.8 3.6*   ALKPHOS 41 54 63   ALT 47 118* 92*   AST 94* 222* 103*   BILITOT " "1.6* 1.5 1.2     No results for input(s): "BNP", "CPK", "TROPONINI" in the last 72 hours.       X-Ray Chest 1 View  Narrative: EXAMINATION:  XR CHEST 1 VIEW    CLINICAL HISTORY:  pneumonia;    TECHNIQUE:  AP chest    COMPARISON:  Chest x-ray dated 11/26/2023    FINDINGS:  The heart is stable in size.  There are slightly increasing patchy and consolidative bilateral airspace opacities.  There is no pleural effusion or visible pneumothorax.  Impression: Slightly increasing patchy and consolidative bilateral airspace opacities.    Electronically signed by: Lyric Deras  Date:    11/27/2023  Time:    06:05          LOREN Hinkle  Infectious Disease     "

## 2023-11-28 NOTE — PROGRESS NOTES
Pharmacokinetic Assessment Follow Up: IV Vancomycin    Vancomycin serum concentration assessment(s):    The trough level was drawn correctly and can be used to guide therapy at this time. The measurement is above the desired definitive target range of 15 to 20 mcg/mL.    Vancomycin Regimen Plan:    Change regimen to Vancomycin 1000 mg IV every 12 hours with next serum trough concentration measured at 1400 prior to 5th dose on 11/30.    Drug levels (last 3 results):  Recent Labs   Lab Result Units 11/25/23 2036 11/26/23  1802 11/27/23  1103 11/28/23  1101   Vanc Lvl Random ug/ml 7.9*  --   --   --    Vancomycin Trough ug/ml  --  11.2* 6.4* 20.1*       Pharmacy will continue to follow and monitor vancomycin.    Please contact pharmacy at extension . for questions regarding this assessment.    Thank you for the consult,   Andrade Wen       Patient brief summary:  Mike Urbina Jr. is a 68 y.o. male initiated on antimicrobial therapy with IV Vancomycin for treatment of bacteremia    The patient's current regimen is 1250mg q12h.    Drug Allergies:   Review of patient's allergies indicates:  No Known Allergies    Actual Body Weight:   73.9kg    Renal Function:   Estimated Creatinine Clearance: 87.6 mL/min (based on SCr of 0.82 mg/dL).,     Dialysis Method (if applicable):  N/A    CBC (last 72 hours):  Recent Labs   Lab Result Units 11/26/23  0750 11/27/23 0121 11/28/23  0208   WBC x10(3)/mcL 4.27  4.27* 3.16* 3.91*   Hgb g/dL 10.0* 9.9* 10.5*   Hct % 29.6* 30.3* 32.0*   Platelet x10(3)/mcL 108* 107* 99*   Mono % %  --  8.5  --    Monocytes % %  --   --  8   Eosinophils % %  --   --  1   Eos % %  --  0.3  --    Basophil % %  --  0.9  --        Metabolic Panel (last 72 hours):  Recent Labs   Lab Result Units 11/26/23  0750 11/27/23  0121 11/28/23  0208   Sodium Level mmol/L 133* 134* 134*   Potassium Level mmol/L 3.4* 3.4* 3.8   Chloride mmol/L 105 104 105   Carbon Dioxide mmol/L 20* 23 18*   Glucose Level mg/dL  102 90 108   Blood Urea Nitrogen mg/dL 27.0* 25.0 17.0   Creatinine mg/dL 0.95 0.89 0.82   Albumin Level g/dL 2.0* 2.0* 1.9*   Bilirubin Total mg/dL 1.6* 1.5 1.2   Alkaline Phosphatase unit/L 41 54 63   Aspartate Aminotransferase unit/L 94* 222* 103*   Alanine Aminotransferase unit/L 47 118* 92*   Magnesium Level mg/dL 1.90 1.90  --    Phosphorus Level mg/dL 2.2* 1.6*  --        Vancomycin Administrations:  vancomycin given in the last 96 hours                     vancomycin 1.25 g in dextrose 5% 250 mL IVPB (ready to mix) (mg) 1,250 mg New Bag 11/27/23 2358     1,250 mg New Bag  1251    vancomycin 1.25 g in dextrose 5% 250 mL IVPB (ready to mix) (mg) 1,250 mg New Bag 11/25/23 2303    vancomycin 1.5 g in dextrose 5 % 250 mL IVPB (ready to mix) (mg) 1,500 mg New Bag 11/24/23 1933                    Microbiologic Results:  Microbiology Results (last 7 days)       Procedure Component Value Units Date/Time    Blood Culture [[id number]]  (Normal) Collected: 11/27/23 0637    Order Status: Completed Specimen: Blood from Wrist, Right Updated: 11/28/23 0916     CULTURE, BLOOD ([facility]) No Growth At 24 Hours    Blood Culture [[id number]]  (Normal) Collected: 11/27/23 0637    Order Status: Completed Specimen: Blood from Hand, Left Updated: 11/28/23 0916     CULTURE, BLOOD ([facility]) No Growth At 24 Hours    Stool Culture [[id number]]  (Normal) Collected: 11/27/23 1525    Order Status: Completed Specimen: Stool Updated: 11/28/23 0719     Stool Culture Negative for Salmonella, Shigella, Campylobacter, Vibrio, Aeromonas, Pleisiomonas,Yersinia, or Shiga Toxin 1 and 2.    Chlamydia/GC, PCR [[id number]] Collected: 11/27/23 1651    Order Status: Completed Specimen: Urine Updated: 11/27/23 1843     Chlamydia trachomatis PCR Not Detected     N. gonorrhea PCR Not Detected     Source Urine    Narrative:      The Xpert CT/NG test, performed on the [employer] system is a qualitative in vitro real-time polymerase chain reaction (PCR) test for the  automated detected and differentiation for genomic DNA from Chlamydia trachomatis (CT) and/or Neisseria gonorrhoeae (NG).    Respiratory Culture [[id number]]     Order Status: Sent Specimen: Sputum     Respiratory Culture [[id number]] Collected: 11/24/23 2140    Order Status: Completed Specimen: Sputum, Expectorated Updated: 11/25/23 1230     Respiratory Culture Gram stain demonstrates significant oropharyngeal contamination. Sputum unsatisfactory for culture     GRAM STAIN Quality -2      Many Gram positive cocci      Many Gram Negative Rods

## 2023-11-29 LAB
ABS NEUT (OLG): 4 X10(3)/MCL (ref 2.1–9.2)
ALBUMIN SERPL-MCNC: 1.9 G/DL (ref 3.4–4.8)
ALBUMIN/GLOB SERPL: 0.7 RATIO (ref 1.1–2)
ALP SERPL-CCNC: 63 UNIT/L (ref 40–150)
ALT SERPL-CCNC: 58 UNIT/L (ref 0–55)
ANISOCYTOSIS BLD QL SMEAR: ABNORMAL
AST SERPL-CCNC: 47 UNIT/L (ref 5–34)
BACTERIA STL CULT: ABNORMAL
BACTERIA STL CULT: ABNORMAL
BILIRUB SERPL-MCNC: 1.2 MG/DL
BUN SERPL-MCNC: 17.9 MG/DL (ref 8.4–25.7)
BURR CELLS (OLG): ABNORMAL
CALCIUM SERPL-MCNC: 7.2 MG/DL (ref 8.8–10)
CHLORIDE SERPL-SCNC: 104 MMOL/L (ref 98–107)
CO2 SERPL-SCNC: 22 MMOL/L (ref 23–31)
CREAT SERPL-MCNC: 0.8 MG/DL (ref 0.73–1.18)
ERYTHROCYTE [DISTWIDTH] IN BLOOD BY AUTOMATED COUNT: 17.6 % (ref 11.5–17)
GFR SERPLBLD CREATININE-BSD FMLA CKD-EPI: >60 MLS/MIN/1.73/M2
GLOBULIN SER-MCNC: 2.9 GM/DL (ref 2.4–3.5)
GLUCOSE SERPL-MCNC: 113 MG/DL (ref 82–115)
HCT VFR BLD AUTO: 33.1 % (ref 42–52)
HGB BLD-MCNC: 10.7 G/DL (ref 14–18)
INSTRUMENT WBC (OLG): 4.44 X10(3)/MCL
LYMPHOCYTES NFR BLD MANUAL: 0.31 X10(3)/MCL
LYMPHOCYTES NFR BLD MANUAL: 7 %
MACROCYTES BLD QL SMEAR: ABNORMAL
MCH RBC QN AUTO: 28.2 PG (ref 27–31)
MCHC RBC AUTO-ENTMCNC: 32.3 G/DL (ref 33–36)
MCV RBC AUTO: 87.3 FL (ref 80–94)
MONOCYTES NFR BLD MANUAL: 0.13 X10(3)/MCL (ref 0.1–1.3)
MONOCYTES NFR BLD MANUAL: 3 %
NEUTROPHILS NFR BLD MANUAL: 90 %
NRBC BLD AUTO-RTO: 0 %
OVALOCYTES (OLG): ABNORMAL
PLATELET # BLD AUTO: 134 X10(3)/MCL (ref 130–400)
PLATELET # BLD EST: NORMAL 10*3/UL
PLATELETS.RETICULATED NFR BLD AUTO: 7.6 % (ref 0.9–11.2)
PMV BLD AUTO: 11.7 FL (ref 7.4–10.4)
POIKILOCYTOSIS BLD QL SMEAR: ABNORMAL
POLYCHROMASIA BLD QL SMEAR: ABNORMAL
POTASSIUM SERPL-SCNC: 3.4 MMOL/L (ref 3.5–5.1)
PROT SERPL-MCNC: 4.8 GM/DL (ref 5.8–7.6)
RBC # BLD AUTO: 3.79 X10(6)/MCL (ref 4.7–6.1)
RBC MORPH BLD: ABNORMAL
SODIUM SERPL-SCNC: 135 MMOL/L (ref 136–145)
WBC # SPEC AUTO: 4.44 X10(3)/MCL (ref 4.5–11.5)

## 2023-11-29 PROCEDURE — 99900031 HC PATIENT EDUCATION (STAT)

## 2023-11-29 PROCEDURE — 25000242 PHARM REV CODE 250 ALT 637 W/ HCPCS: Performed by: INTERNAL MEDICINE

## 2023-11-29 PROCEDURE — 25000003 PHARM REV CODE 250

## 2023-11-29 PROCEDURE — 94664 DEMO&/EVAL PT USE INHALER: CPT

## 2023-11-29 PROCEDURE — 97530 THERAPEUTIC ACTIVITIES: CPT

## 2023-11-29 PROCEDURE — 25000003 PHARM REV CODE 250: Performed by: INTERNAL MEDICINE

## 2023-11-29 PROCEDURE — 93005 ELECTROCARDIOGRAM TRACING: CPT

## 2023-11-29 PROCEDURE — 99233 SBSQ HOSP IP/OBS HIGH 50: CPT | Mod: FS,,, | Performed by: GENERAL PRACTICE

## 2023-11-29 PROCEDURE — 94799 UNLISTED PULMONARY SVC/PX: CPT

## 2023-11-29 PROCEDURE — 21400001 HC TELEMETRY ROOM

## 2023-11-29 PROCEDURE — 80053 COMPREHEN METABOLIC PANEL: CPT | Performed by: INTERNAL MEDICINE

## 2023-11-29 PROCEDURE — 99900035 HC TECH TIME PER 15 MIN (STAT)

## 2023-11-29 PROCEDURE — 27000207 HC ISOLATION

## 2023-11-29 PROCEDURE — 97535 SELF CARE MNGMENT TRAINING: CPT

## 2023-11-29 PROCEDURE — 27000221 HC OXYGEN, UP TO 24 HOURS

## 2023-11-29 PROCEDURE — 93010 ELECTROCARDIOGRAM REPORT: CPT | Mod: ,,, | Performed by: INTERNAL MEDICINE

## 2023-11-29 PROCEDURE — 25000003 PHARM REV CODE 250: Performed by: NURSE PRACTITIONER

## 2023-11-29 PROCEDURE — 94761 N-INVAS EAR/PLS OXIMETRY MLT: CPT

## 2023-11-29 PROCEDURE — 99233 PR SUBSEQUENT HOSPITAL CARE,LEVL III: ICD-10-PCS | Mod: FS,,, | Performed by: GENERAL PRACTICE

## 2023-11-29 PROCEDURE — 63600175 PHARM REV CODE 636 W HCPCS

## 2023-11-29 PROCEDURE — 63600175 PHARM REV CODE 636 W HCPCS: Performed by: INTERNAL MEDICINE

## 2023-11-29 PROCEDURE — 94640 AIRWAY INHALATION TREATMENT: CPT

## 2023-11-29 PROCEDURE — 93010 EKG 12-LEAD: ICD-10-PCS | Mod: ,,, | Performed by: INTERNAL MEDICINE

## 2023-11-29 PROCEDURE — 85027 COMPLETE CBC AUTOMATED: CPT | Performed by: INTERNAL MEDICINE

## 2023-11-29 RX ORDER — ATORVASTATIN CALCIUM 40 MG/1
80 TABLET, FILM COATED ORAL NIGHTLY
Status: DISCONTINUED | OUTPATIENT
Start: 2023-11-29 | End: 2023-11-29

## 2023-11-29 RX ORDER — POTASSIUM CHLORIDE 20 MEQ/1
40 TABLET, EXTENDED RELEASE ORAL ONCE
Status: COMPLETED | OUTPATIENT
Start: 2023-11-29 | End: 2023-11-29

## 2023-11-29 RX ADMIN — ENOXAPARIN SODIUM 70 MG: 80 INJECTION SUBCUTANEOUS at 10:11

## 2023-11-29 RX ADMIN — POTASSIUM CHLORIDE 40 MEQ: 1500 TABLET, EXTENDED RELEASE ORAL at 11:11

## 2023-11-29 RX ADMIN — VANCOMYCIN HYDROCHLORIDE 1000 MG: 1 INJECTION, POWDER, LYOPHILIZED, FOR SOLUTION INTRAVENOUS at 02:11

## 2023-11-29 RX ADMIN — MICONAZOLE NITRATE: 20 POWDER TOPICAL at 09:11

## 2023-11-29 RX ADMIN — AMIODARONE HYDROCHLORIDE 200 MG: 200 TABLET ORAL at 09:11

## 2023-11-29 RX ADMIN — VANCOMYCIN HYDROCHLORIDE 1000 MG: 1 INJECTION, POWDER, LYOPHILIZED, FOR SOLUTION INTRAVENOUS at 03:11

## 2023-11-29 RX ADMIN — METOPROLOL SUCCINATE 100 MG: 50 TABLET, EXTENDED RELEASE ORAL at 09:11

## 2023-11-29 RX ADMIN — METOPROLOL TARTRATE 5 MG: 1 INJECTION, SOLUTION INTRAVENOUS at 09:11

## 2023-11-29 RX ADMIN — LEVALBUTEROL HYDROCHLORIDE 1.25 MG: 1.25 SOLUTION RESPIRATORY (INHALATION) at 04:11

## 2023-11-29 RX ADMIN — QUETIAPINE 200 MG: 100 TABLET ORAL at 09:11

## 2023-11-29 RX ADMIN — GUAIFENESIN 400 MG: 200 SOLUTION ORAL at 03:11

## 2023-11-29 RX ADMIN — LEVALBUTEROL HYDROCHLORIDE 1.25 MG: 1.25 SOLUTION RESPIRATORY (INHALATION) at 08:11

## 2023-11-29 RX ADMIN — METOPROLOL TARTRATE 5 MG: 1 INJECTION, SOLUTION INTRAVENOUS at 11:11

## 2023-11-29 RX ADMIN — ENOXAPARIN SODIUM 70 MG: 80 INJECTION SUBCUTANEOUS at 11:11

## 2023-11-29 RX ADMIN — GUAIFENESIN 400 MG: 200 SOLUTION ORAL at 09:11

## 2023-11-29 RX ADMIN — DILTIAZEM HYDROCHLORIDE 10 MG/HR: 5 INJECTION INTRAVENOUS at 03:11

## 2023-11-29 RX ADMIN — FOLIC ACID 1 MG: 1 TABLET ORAL at 09:11

## 2023-11-29 RX ADMIN — METOPROLOL TARTRATE 5 MG: 1 INJECTION, SOLUTION INTRAVENOUS at 12:11

## 2023-11-29 RX ADMIN — ASPIRIN 81 MG: 81 TABLET, COATED ORAL at 09:11

## 2023-11-29 NOTE — NURSING
Nurses Note -- 4 Eyes      11/28/23   07:00AM      Skin assessed during: Admit      [] No Altered Skin Integrity Present    []Prevention Measures Documented      [x] Yes- Altered Skin Integrity Present or Discovered   [] LDA Added if Not in Epic (Describe Wound)   [x] New Altered Skin Integrity was Present on Admit and Documented in LDA   [] Wound Image Taken    Wound Care Consulted? Yes    Attending Nurse:  Tamika Garcia RN    Second RN/Staff Member:   Hanna Horner LPN

## 2023-11-29 NOTE — PT/OT/SLP PROGRESS
Physical Therapy Treatment    Patient Name:  Mike Urbina Jr.   MRN:  [id number]    Recommendations:     Discharge therapy intensity: Moderate Intensity Therapy   Discharge Equipment Recommendations: to be determined by next level of care  Barriers to discharge: Impaired mobility and Ongoing medical needs    Assessment:     Mike Urbina Jr. is a 68 y.o. male admitted with a medical diagnosis of Atrial fibrillation with RVR.  He presents with the following impairments/functional limitations: weakness, gait instability, impaired endurance, impaired balance, decreased lower extremity function, impaired self care skills, impaired functional mobility, decreased safety awareness . Unwilling to ambulate today 2/2 fatigue despite education. Noted elevated HR at rest and with activity - Afib with RVR, medical team aware.    Rehab Prognosis: Good; patient would benefit from acute skilled PT services to address these deficits and reach maximum level of function.    Recent Surgery: * No surgery found *      Plan:     During this hospitalization, patient to be seen 5 x/week to address the identified rehab impairments via gait training, therapeutic activities, therapeutic exercises and progress toward the following goals:    Plan of Care Expires:  12/27/23    Subjective     Chief Complaint: fatigue  Patient/Family Comments/goals: get stronger  Pain/Comfort:  Pain Rating 1: 0/10      Objective:     Communicated with RN prior to session.  Patient found HOB elevated with pulse ox (continuous), telemetry, oxygen upon PT entry to room.     General Precautions: Standard, fall  Orthopedic Precautions: N/A  Braces: N/A  Respiratory Status: Nasal cannula, flow 2 L/min  Blood Pressure: NT.  at rest, 128 with transfer to chair  Skin Integrity: Visible skin intact      Functional Mobility:  Bed Mobility:     Supine to Sit: minimum assistance  Transfers:     Sit to Stand:  minimum assistance with rolling walker  Gait: 2 ft  stepping over to chair with RW with Jennifer    Therapeutic Activities/Exercises:  Increased time between tasks due to dizziness and SOB. Educated on importance of max efforts and participation with PT for optimal recovery - pt verbally receptive but still refusing to continue with session.     Education:  Patient provided with verbal education education regarding PT role/goals/POC, fall prevention, and safety awareness.  Understanding was verbalized, however additional teaching warranted.     Patient left up in chair with all lines intact, call button in reach, and RN notified..    GOALS:   Multidisciplinary Problems       Physical Therapy Goals          Problem: Physical Therapy    Goal Priority Disciplines Outcome Goal Variances Interventions   Physical Therapy Goal     PT, PT/OT Ongoing, Progressing     Description: Goals to be met by: 23     Patient will increase functional independence with mobility by performin. Supine to sit with [place]  2. Sit to supine with [place]  3. Sit to stand transfer with Modified [place]  4. Gait  x 150 feet with Modified [place] using Rolling Walker vs no AD.                          Time Tracking:     PT Received On: 23  PT Start Time: 1149     PT Stop Time: 1212  PT Total Time (min): 23 min     Billable Minutes: Therapeutic Activity 23 mins    Treatment Type: Treatment  PT/PTA: PT     Number of PTA visits since last PT visit: 2     2023

## 2023-11-29 NOTE — AI DETERIORATION ALERT
Artificial Intelligence Notification  [facility]  [address]  [place] [address]  Phone: [phone]    This documentation was triggered by an Artificial Intelligence Notification:    Admit Date: 2023   LOS: 5  Code Status: Full Code  : 1955  Age: 68 y.o.  Weight:   Wt Readings from Last 1 Encounters:   23 73.9 kg (163 lb)        Sex: male  Bed: 403/403 A  MRN: [id number]  Attending Physician: Syed Vogt MD     Date of Alert: 2023  Time AI Alert Received: 0203            Vitals:    23 0053   BP: 114/79   Pulse:    Resp:    Temp:      SpO2: (!) 94 % (2 l)      Artificial Intelligence alert discussed with Provider:     Name: MARTY Kellogg   Date/Time of Provider Notification: 033      Patient Condition: Pt. Admitted with SOB after dx of influenza and treated with course of Tamiflu. Was admitted to ICU initially with noted a fib rvr on cardizem drip. Has since been downgraded and moved to the floor with hospital medicine assuming care. Blood and stool cultures remain negative. CARO negative for endocarditis. Receiving vanc for MRSA sepsis and pneumonia. On assessment, pt. Tachycardic in 120s and on 2 L NC. PRN IV lopressor given. Labs, vitals, and notes reviewed. Instructed nursing to call with any further concerns for deterioration.

## 2023-11-29 NOTE — PROGRESS NOTES
[facility]  Infectious Disease Progress Note            ASSESSMENT & PLAN:     He is a 68-year-old male with a past medical history of CAD, s/p CABG in 3/2023, HFpEF, and hypertension who was diagnosed with influenza A on 11/19/2023.  He was compliant with course of Tamiflu, however presented to an outlying facility on 11/24/2023 with shortness of breath.  There was concern for atrial fibrillation with RVR and patient received 2 doses of adenosine followed by a Cardizem drip for rate control.  He was noted to be hypoxic showed right upper lobe consolidation concerning for pneumonia.  CTA of the chest was negative for PE, however did showed multifocal pneumonia, most evident in the right upper lobe.  Admit blood cultures are positive for MRSA.  Patient does have a productive cough, however denies shortness of breath or pleuritic chest pain.  He does continue to require supplemental oxygen although overall is feeling better.  He has been downgraded to the floor.  He reports some low back pain and bilateral lower extremity weakness, however reports back pain is chronic and although somewhat worse recently, denies significant issues.  Also denies any bowel or bladder issues.  He was receiving empiric Zosyn, vancomycin, and remained on Tamiflu, however is currently only on vancomycin.  Remains on Cardizem.   TTE was negative for endocarditis, however did show severe posterior mitral annular calcification with moderate stenosis and mild regurgitation as well as moderate aortic stenosis also from calcified cusps and mild TR.  Blood cultures were repeated this morning.  We have been consulted regarding MRSA bacteremia.     MRSA bacteremia  Post-influenza MRSA pneumonia  Acute hypoxemic respiratory failure  Atrial fibrillation with RVR, now rate controlled on Cardizem  CAD, s/p CABG in 3/2023  H/o HTN / HFpEF  ETOH use  Pancytopenia  Transaminitis     PLAN:  BCx repeated 11/27 - NGTD.   Maintain with PIVs only if feasible.  "  Continue vancomycin, pharmacy dosing for trough goal 15-20.    Await cardiology input re: CARO.   LFTs, pancytopenia improving.  Discussed with patient.     SUBJECTIVE:     AF, VSS.  Some fatigue and ongoing SOB, otherwise no complaints.     MEDICATIONS:   Reviewed in EMR    REVIEW OF SYSTEMS:   Except as documented, all other systems reviewed and negative     PHYSICAL EXAM:   T 98.8 °F (37.1 °C)   /85   P 101   RR 18   O2 (!) 93 %  GENERAL: NAD; does not appear toxic  SKIN: no rash  HEENT: sclera non-icteric; PERRL   NECK: supple; no LAD  CHEST: Rhonchi; nonlabored, equal expansion   CARDIOVASCULAR: RRR, S1S2; + murmur; strong, equal peripheral pulses; no edema  ABDOMEN:  active bowel sounds; abdomen soft, nondistended, nontender to palpation  GENITOURINARY: no suprapubic tenderness   EXTREMITIES: no cyanosis or clubbing; strength equal bilaterally  NEURO: AAO x4; CN II-XII grossly intact; somewhat [facility]  PSYCH: Mentation and affect appropriate     LABS AND IMAGING:     Recent Labs     11/28/23 0208 11/29/23 0401   WBC 3.91* 4.44  4.44*   RBC 3.67* 3.79*   HGB 10.5* 10.7*   HCT 32.0* 33.1*   MCV 87.2 87.3   MCH 28.6 28.2   MCHC 32.8* 32.3*   RDW 17.9* 17.6*   PLT 99* 134       No results for input(s): "LACTIC" in the last 72 hours.  No results for input(s): "INR", "APTT", "D-DIMER" in the last 72 hours.  No results for input(s): "HGBA1C", "CHOL", "TRIG", "LDL", "VLDL", "HDL" in the last 72 hours.   Recent Labs     11/27/23  0121 11/28/23 0208 11/29/23  0401   * 134* 135*   K 3.4* 3.8 3.4*   CHLORIDE 104 105 104   CO2 23 18* 22*   BUN 25.0 17.0 17.9   CREATININE 0.89 0.82 0.80   GLUCOSE 90 108 113   CALCIUM 7.3* 7.6* 7.2*   MG 1.90  --   --    PHOS 1.6*  --   --    ALBUMIN 2.0* 1.9* 1.9*   GLOBULIN 2.8 3.6* 2.9   ALKPHOS 54 63 63   * 92* 58*   * 103* 47*   BILITOT 1.5 1.2 1.2       No results for input(s): "BNP", "CPK", "TROPONINI" in the last 72 hours.       X-Ray Chest 1 " View  Narrative: EXAMINATION:  XR CHEST 1 VIEW    CLINICAL HISTORY:  pneumonia;    TECHNIQUE:  AP chest    COMPARISON:  Chest x-ray dated 11/26/2023    FINDINGS:  The heart is stable in size.  There are slightly increasing patchy and consolidative bilateral airspace opacities.  There is no pleural effusion or visible pneumothorax.  Impression: Slightly increasing patchy and consolidative bilateral airspace opacities.    Electronically signed by: Lyric Deras  Date:    11/27/2023  Time:    06:05          LOREN Hinkle  Infectious Disease

## 2023-11-29 NOTE — CONSULTS
Inpatient consult to Cardiology  Consult performed by: Cristal Mccauley FNP  Consult ordered by: Dulce Aiken FNP  Reason for consult: CARO        [facility] - 4th Floor Medical Telemetry    Cardiology  Consult Note    Patient Name: Mike Urbina Jr.  MRN: [id number]  Admission Date: 11/24/2023  Hospital Length of Stay: 5 days  Code Status: Full Code   Attending Provider: Syed Vogt MD   Consulting Provider: LOREN De La Torre  Primary Care Physician: Darlene Willams FNP  Principal Problem:Atrial fibrillation with RVR    Patient information was obtained from patient, past medical records, ER records, and primary team.     Subjective:     Chief Complaint: Reason for Consult: CARO     HPI: Mr. Urbina is a 67 y/o male with a history of PAF/Flutter, CAD s/p CABG, VHD, Chronic Diastolic Heart Failure, HTN, HLD, who is known to [facility], Dr. Herrera. He presented to the ER as a transfer from [place] [facility] room on 11.24.23 with complaints of shortness of breath and generalized weakness. He reports being diagnosed with Flu the weak prior and placed on Tamiflu. He was suspected to have SVT, but when rate was slowed, he was found to be in AFIB RVR. Significant labs include H&H 10.7/33.1, Na 135, K 3.4, Calcium 7.2, Albumin 1.9, AST/ALT 47/58, BNP 3,053.1, Troponin 0.137. Blood Cultures positive for MRSA. CTA chest negative for PE, findings concerning for multifocal pneumonia most evident in the right upper lobe.TTE demonstrated EF 55%, moderate aortic stenosis, moderate mitral stenosis and mild MR. [facility] has been consulted for AFIB RVR and CARO.     PMH:  CAD, Diastolic Heart Failure, ETOH Abuse, VHD (AS), Thrombocytopenia, NSTEMI, HTN, PAF/Flutter  PSH: LHC, CABG, Cataract Extraction   Family History: Mother, L, MI  Social History: + ETOH Use (6pk/Beer per Day for > 30 Years); Denies Tobacco and Illicit Drug Use     Previous Cardiac Diagnostics:   ECHO (11.25.23):  Left Ventricle: Normal  wall motion. There is normal systolic function. Ejection fraction by visual approximation is 55%. Diastolic function cannot be reliably determined in the presence of mitral valve disease. Right Ventricle: Normal right ventricular cavity size. Systolic function is mildly reduced. Aortic Valve: Moderately calcified cusps. Moderately restricted motion. There is moderate stenosis. Aortic valve area by VTI is 0.90 cm². Aortic valve peak velocity is 3.52 m/s. Mean gradient is 35 mmHg. The dimensionless index is 0.29.  Mitral Valve: There is severe posterior mitral annular calcification present. There is moderate stenosis. The mean pressure gradient across the mitral valve is 9 mmHg at a heart rate of  bpm. There is mild regurgitation. Tricuspid Valve: There is mild regurgitation    ECHO (8.1.23):  The study quality is average. Global left ventricular systolic function is normal. The left ventricular ejection fraction is 55%. The left ventricle diastolic function is impaired (Grade II) with an elevated left atrial pressure. Suspect severe aortic valve stenosis is present; aortic valve is heavily calcified. The trans-aortic peak velocity is 3.5 m/s. The trans-aortic mean gradient is 34.4 mmHg. Dimensionless index ~ 0.24. SVI ~ 42 mL/m^2. Moderate mitral valve calcification with mild to moderate mitral stenosis; mean gradient is 5.1 mmHg. Mild (1+) mitral regurgitation.  Moderate (2+) tricuspid regurgitation.  Mild (1+) aortic regurgitation.  The pulmonary artery systolic pressure is 44 mmHg. Evidence of pulmonary hypertension is noted.      [facility] 3.15.23:  Surgical coronary anatomy with distal left main 50%; LAD proximal to mid 60-70%; circumflex mid 80- 90%; RCA with proximal .  The ejection fraction was 60% with EDP of 10 mmHg.  Right radial access.  The estimated blood loss was less than 10 cc.  CT surgical consult for CABG evaluation.     PET 1.6.23:  This is an abnormal perfusion study. Study is consistent with  ischemia.   This scan is suggestive of moderate risk for future cardiovascular events.   Small reversible perfusion abnormality of moderate intensity in the apical segment. Medium fixed perfusion abnormality of severe intensity in the inferior region.   The left ventricular cavity is noted to be normal on the stress studies. The stress left ventricular ejection fraction was calculated to be 35% and left ventricular global function is moderately reduced. The rest left ventricular cavity is noted to be normal. The rest left ventricular ejection fraction was calculated to be 40% and rest left ventricular global function is mildly reduced.   When compared to the resting ejection fraction (40%), the stress ejection fraction (35%) has decreased.   The study quality is good.   There was a rise in myocardial blood flow between rest and stress.  Global myocardial blood flow reserve was 1.47.  Myocardial blood flow reserve is globally abnormal, placing the patient at a higher coronary event risk.     ECHO 12.9.22:  The left ventricle is normal in size with mild concentric hypertrophy and normal systolic function.  Grade I left ventricular diastolic dysfunction.  The estimated ejection fraction is 55%.  Normal right ventricular size with normal right ventricular systolic function.  There is mild aortic valve stenosis.  There is mild mitral stenosis.  Normal central venous pressure (3 mmHg).    Review of patient's allergies indicates:  No Known Allergies    No current facility-administered medications on file prior to encounter.     Current Outpatient Medications on File Prior to Encounter   Medication Sig    amiodarone (PACERONE) 200 MG Tab Take 1 tablet (200 mg total) by mouth once daily.    aspirin (ECOTRIN) 81 MG EC tablet Take 1 tablet (81 mg total) by mouth once daily.    atorvastatin (LIPITOR) 40 MG tablet Take 1 tablet (40 mg total) by mouth every evening.    furosemide (LASIX) 20 MG tablet Take 1 tablet (20 mg total)  by mouth once daily. for 4 days    hydrOXYzine pamoate (VISTARIL) 50 MG Cap Take 1 capsule (50 mg total) by mouth every 8 (eight) hours as needed (as needed for anxiety).    linaCLOtide (LINZESS) 72 mcg Cap capsule Take 1 capsule (72 mcg total) by mouth before breakfast.    metoprolol succinate (TOPROL-XL) 50 MG 24 hr tablet TAKE 1 TABLET BY MOUTH ONCE DAILY    QUEtiapine (SEROQUEL) 100 MG Tab Take 2 tablets (200 mg total) by mouth every evening.    sodium bicarbonate 650 MG tablet Take 650 mg by mouth once daily.    thiamine (VITAMIN B-1) 100 MG tablet Take 100 mg by mouth once daily.    valsartan (DIOVAN) 160 MG tablet Take 1 tablet (160 mg total) by mouth 2 (two) times daily.     Review of Systems   Respiratory:  Negative for chest tightness and shortness of breath.    Cardiovascular:  Negative for chest pain, palpitations and leg swelling.   All other systems reviewed and are negative.      Objective:     Vital Signs (Most Recent):  Temp: 98.1 °F (36.7 °C) (11/29/23 1148)  Pulse: (!) 119 (11/29/23 1148)  Resp: 18 (11/29/23 1148)  BP: 123/77 (11/29/23 1148)  SpO2: (!) 94 % (11/29/23 1148) Vital Signs (24h Range):  Temp:  [97.5 °F (36.4 °C)-99.2 °F (37.3 °C)] 98.1 °F (36.7 °C)  Pulse:  [] 119  Resp:  [15-24] 18  SpO2:  [93 %-98 %] 94 %  BP: (111-154)/(46-92) 123/77     Weight: 73.9 kg (163 lb)  Body mass index is 22.82 kg/m².    SpO2: (!) 94 %         Intake/Output Summary (Last 24 hours) at 11/29/2023 1211  Last data filed at 11/29/2023 0500  Gross per 24 hour   Intake 1185.67 ml   Output 750 ml   Net 435.67 ml       Lines/Drains/Airways       Peripheral Intravenous Line  Duration                  Peripheral IV - Single Lumen 11/24/23 1600 20 G Anterior;Left;Proximal Forearm 4 days         Peripheral IV - Single Lumen 11/24/23 1755 20 G Anterior;Distal;Right Upper Arm 4 days                    Significant Labs:  Recent Results (from the past 72 hour(s))   VANCOMYCIN, TROUGH    Collection Time: 11/26/23   6:02 PM   Result Value Ref Range    Vancomycin Trough 11.2 (L) 15.0 - 20.0 ug/ml   Phosphorus    Collection Time: 11/27/23  1:21 AM   Result Value Ref Range    Phosphorus Level 1.6 (L) 2.3 - 4.7 mg/dL   Magnesium    Collection Time: 11/27/23  1:21 AM   Result Value Ref Range    Magnesium Level 1.90 1.60 - 2.60 mg/dL   Comprehensive Metabolic Panel    Collection Time: 11/27/23  1:21 AM   Result Value Ref Range    Sodium Level 134 (L) 136 - 145 mmol/L    Potassium Level 3.4 (L) 3.5 - 5.1 mmol/L    Chloride 104 98 - 107 mmol/L    Carbon Dioxide 23 23 - 31 mmol/L    Glucose Level 90 82 - 115 mg/dL    Blood Urea Nitrogen 25.0 8.4 - 25.7 mg/dL    Creatinine 0.89 0.73 - 1.18 mg/dL    Calcium Level Total 7.3 (L) 8.8 - 10.0 mg/dL    Protein Total 4.8 (L) 5.8 - 7.6 gm/dL    Albumin Level 2.0 (L) 3.4 - 4.8 g/dL    Globulin 2.8 2.4 - 3.5 gm/dL    Albumin/Globulin Ratio 0.7 (L) 1.1 - 2.0 ratio    Bilirubin Total 1.5 <=1.5 mg/dL    Alkaline Phosphatase 54 40 - 150 unit/L    Alanine Aminotransferase 118 (H) 0 - 55 unit/L    Aspartate Aminotransferase 222 (H) 5 - 34 unit/L    eGFR >60 mls/min/1.73/m2   CBC with Differential    Collection Time: 11/27/23  1:21 AM   Result Value Ref Range    WBC 3.16 (L) 4.50 - 11.50 x10(3)/mcL    RBC 3.49 (L) 4.70 - 6.10 x10(6)/mcL    Hgb 9.9 (L) 14.0 - 18.0 g/dL    Hct 30.3 (L) 42.0 - 52.0 %    MCV 86.8 80.0 - 94.0 fL    MCH 28.4 27.0 - 31.0 pg    MCHC 32.7 (L) 33.0 - 36.0 g/dL    RDW 18.1 (H) 11.5 - 17.0 %    Platelet 107 (L) 130 - 400 x10(3)/mcL    MPV 10.8 (H) 7.4 - 10.4 fL    Neut % 77.0 %    Lymph % 12.7 %    Mono % 8.5 %    Eos % 0.3 %    Basophil % 0.9 %    Lymph # 0.40 (L) 0.6 - 4.6 x10(3)/mcL    Neut # 2.43 2.1 - 9.2 x10(3)/mcL    Mono # 0.27 0.1 - 1.3 x10(3)/mcL    Eos # 0.01 0 - 0.9 x10(3)/mcL    Baso # 0.03 <=0.2 x10(3)/mcL    IG# 0.02 0 - 0.04 x10(3)/mcL    IG% 0.6 %    NRBC% 0.0 %    IPF 6.2 0.9 - 11.2 %   Blood Culture    Collection Time: 11/27/23  6:37 AM    Specimen: Wrist, Right;  Blood   Result Value Ref Range    CULTURE, BLOOD ([facility]) No Growth At 24 Hours    Blood Culture    Collection Time: 11/27/23  6:37 AM    Specimen: Hand, Left; Blood   Result Value Ref Range    CULTURE, BLOOD ([facility]) No Growth At 24 Hours    VANCOMYCIN, TROUGH    Collection Time: 11/27/23 11:03 AM   Result Value Ref Range    Vancomycin Trough 6.4 (L) 15.0 - 20.0 ug/ml   Stool Culture    Collection Time: 11/27/23  3:25 PM    Specimen: Stool   Result Value Ref Range    Stool Culture       Negative for Salmonella, Shigella, Campylobacter, Vibrio, Aeromonas, Pleisiomonas,Yersinia, or Shiga Toxin 1 and 2.    Stool Culture Many Yeast (A)    Chlamydia/GC, PCR    Collection Time: 11/27/23  4:51 PM    Specimen: Urine   Result Value Ref Range    Chlamydia trachomatis PCR Not Detected Not Detected    N. gonorrhea PCR Not Detected Not Detected    Source Urine    Comprehensive Metabolic Panel    Collection Time: 11/28/23  2:08 AM   Result Value Ref Range    Sodium Level 134 (L) 136 - 145 mmol/L    Potassium Level 3.8 3.5 - 5.1 mmol/L    Chloride 105 98 - 107 mmol/L    Carbon Dioxide 18 (L) 23 - 31 mmol/L    Glucose Level 108 82 - 115 mg/dL    Blood Urea Nitrogen 17.0 8.4 - 25.7 mg/dL    Creatinine 0.82 0.73 - 1.18 mg/dL    Calcium Level Total 7.6 (L) 8.8 - 10.0 mg/dL    Protein Total 5.5 (L) 5.8 - 7.6 gm/dL    Albumin Level 1.9 (L) 3.4 - 4.8 g/dL    Globulin 3.6 (H) 2.4 - 3.5 gm/dL    Albumin/Globulin Ratio 0.5 (L) 1.1 - 2.0 ratio    Bilirubin Total 1.2 <=1.5 mg/dL    Alkaline Phosphatase 63 40 - 150 unit/L    Alanine Aminotransferase 92 (H) 0 - 55 unit/L    Aspartate Aminotransferase 103 (H) 5 - 34 unit/L    eGFR >60 mls/min/1.73/m2   CBC with Differential    Collection Time: 11/28/23  2:08 AM   Result Value Ref Range    WBC 3.91 (L) 4.50 - 11.50 x10(3)/mcL    RBC 3.67 (L) 4.70 - 6.10 x10(6)/mcL    Hgb 10.5 (L) 14.0 - 18.0 g/dL    Hct 32.0 (L) 42.0 - 52.0 %    MCV 87.2 80.0 - 94.0 fL    MCH 28.6 27.0 - 31.0 pg    MCHC 32.8 (L) 33.0  - 36.0 g/dL    RDW 17.9 (H) 11.5 - 17.0 %    Platelet 99 (L) 130 - 400 x10(3)/mcL    MPV 11.6 (H) 7.4 - 10.4 fL    NRBC% 0.0 %    IPF 8.1 0.9 - 11.2 %   Manual Differential    Collection Time: 11/28/23  2:08 AM   Result Value Ref Range    Neutrophils % 77 %    Lymphs % 13 %    Monocytes % 8 %    Eosinophils % 1 %    Myelocytes % 1 (H) <=0 %    nRBC % 1 %    Neutrophils Abs      Poikilocytosis 2+ (A) (none)    Anisocytosis 1+ (A) (none)    Macrocytosis 1+ (A) (none)    Acanthocytes 1+ (A) (none)    [place] Cells 1+ (A) (none)   Hepatitis B Core Antibody, IgM    Collection Time: 11/28/23  5:08 AM   Result Value Ref Range    Hepatitis B Core IgM Nonreactive Nonreactive   Hepatitis B Core Antibody, Total    Collection Time: 11/28/23  5:08 AM   Result Value Ref Range    Hepatitis B Core Antibody Nonreactive Nonreactive   Hepatitis B Surface Ab, Qualitative    Collection Time: 11/28/23  5:08 AM   Result Value Ref Range    Hepatitis B Surface Antibody Nonreactive Nonreactive    Hepatitis B Surface Antibody Qnt 0.75 mIU/mL   Hepatitis B Surface Antigen    Collection Time: 11/28/23  5:08 AM   Result Value Ref Range    Hepatitis B Surface Antigen Nonreactive Nonreactive   Hepatitis C Antibody    Collection Time: 11/28/23  5:08 AM   Result Value Ref Range    Hep C Ab Interp [place] (A) Nonreactive   HIV 1/2 Ag/Ab (4th Gen)    Collection Time: 11/28/23  5:08 AM   Result Value Ref Range    HIV Nonreactive Nonreactive   SYPHILIS ANTIBODY (WITH REFLEX RPR)    Collection Time: 11/28/23  5:08 AM   Result Value Ref Range    Syphilis Antibody Nonreactive Nonreactive, Equivocal   VANCOMYCIN, TROUGH    Collection Time: 11/28/23 11:01 AM   Result Value Ref Range    Vancomycin Trough 20.1 (H) 15.0 - 20.0 ug/ml   Comprehensive Metabolic Panel    Collection Time: 11/29/23  4:01 AM   Result Value Ref Range    Sodium Level 135 (L) 136 - 145 mmol/L    Potassium Level 3.4 (L) 3.5 - 5.1 mmol/L    Chloride 104 98 - 107 mmol/L    Carbon Dioxide 22  (L) 23 - 31 mmol/L    Glucose Level 113 82 - 115 mg/dL    Blood Urea Nitrogen 17.9 8.4 - 25.7 mg/dL    Creatinine 0.80 0.73 - 1.18 mg/dL    Calcium Level Total 7.2 (L) 8.8 - 10.0 mg/dL    Protein Total 4.8 (L) 5.8 - 7.6 gm/dL    Albumin Level 1.9 (L) 3.4 - 4.8 g/dL    Globulin 2.9 2.4 - 3.5 gm/dL    Albumin/Globulin Ratio 0.7 (L) 1.1 - 2.0 ratio    Bilirubin Total 1.2 <=1.5 mg/dL    Alkaline Phosphatase 63 40 - 150 unit/L    Alanine Aminotransferase 58 (H) 0 - 55 unit/L    Aspartate Aminotransferase 47 (H) 5 - 34 unit/L    eGFR >60 mls/min/1.73/m2   CBC with Differential    Collection Time: 11/29/23  4:01 AM   Result Value Ref Range    WBC 4.44 (L) 4.50 - 11.50 x10(3)/mcL    RBC 3.79 (L) 4.70 - 6.10 x10(6)/mcL    Hgb 10.7 (L) 14.0 - 18.0 g/dL    Hct 33.1 (L) 42.0 - 52.0 %    MCV 87.3 80.0 - 94.0 fL    MCH 28.2 27.0 - 31.0 pg    MCHC 32.3 (L) 33.0 - 36.0 g/dL    RDW 17.6 (H) 11.5 - 17.0 %    Platelet 134 130 - 400 x10(3)/mcL    MPV 11.7 (H) 7.4 - 10.4 fL    NRBC% 0.0 %    IPF 7.6 0.9 - 11.2 %   Manual Differential    Collection Time: 11/29/23  4:01 AM   Result Value Ref Range    WBC 4.44 x10(3)/mcL    Neutrophils % 90 %    Lymphs % 7 %    Monocytes % 3 %    Neutrophils Abs 3.996 2.1 - 9.2 x10(3)/mcL    Lymphs Abs 0.3108 (L) 0.6 - 4.6 x10(3)/mcL    Monocytes Abs 0.1332 0.1 - 1.3 x10(3)/mcL    Platelets Normal Normal, Adequate    RBC Morph Abnormal (A) Normal    Polychromasia 1+ (A) (none)    Poikilocytosis 2+ (A) (none)    Anisocytosis 1+ (A) (none)    Macrocytosis 1+ (A) (none)    Divine Cells 2+ (A) (none)    Ovalocytes 1+ (A) (none)       Significant Imaging:  Imaging Results              CTA Chest Non-Coronary (PE Studies) (Final result)  Result time 11/24/23 18:48:28      Final result by Lyric Deras MD (11/24/23 18:48:28)                   Impression:      1. No pulmonary embolism identified.  2. Findings concerning for multifocal pneumonia, most evident in the right upper lobe.      Electronically signed  by: Lyric Rayy  Date:    11/24/2023  Time:    18:48               Narrative:    EXAMINATION:  CTA CHEST NON CORONARY (PE STUDIES)    CLINICAL HISTORY:  Pulmonary embolism (PE) suspected, high prob;    TECHNIQUE:  Helical-acquisition CT images were obtained prior to and following the intravenous administration of iodine-based contrast media.    The post-contrast images were timed to coincide with arterial opacification for purpose of CT angiography.    Multiplanar reconstructions, to include MIP and volume-rendered (3D) images, were accomplished by the CT technologist at a separate workstation and pushed to PACS for physician review.    Total DLP (mGy-cm) 268 (value may include radiation due to concomitantly performed CT imaging)    Automated tube current modulation and/or weight-based exposure dosing is utilized, when appropriate, to reach lowest reasonably achievable exposure rate.    COMPARISON:  None    FINDINGS:  The heart is normal in size.  There is no pericardial effusion.  The RV to LV ratio is less than 1.  Evaluation is by breathing motion artifact.  There is no pulmonary embolism identified.    There are consolidative changes in the right upper lobe with additional scattered bilateral nodular airspace opacities.  There is no pleural effusion or pneumothorax.    There are no acute findings in the upper abdomen.    There is no acute bony abnormality identified.                                      EKG:  unable to upload image        Telemetry:  AFIB    Physical Exam  Vitals and nursing note reviewed.   HENT:      Head: Normocephalic.      Nose: Nose normal.      Mouth/Throat:      Mouth: Mucous membranes are moist.   Eyes:      Pupils: Pupils are equal, round, and reactive to light.   Cardiovascular:      Rate and Rhythm: Tachycardia present. Rhythm irregular.      Pulses: Normal pulses.      Heart sounds: Murmur heard.   Pulmonary:      Effort: Pulmonary effort is normal.      Breath sounds: Normal  breath sounds.   Abdominal:      General: Bowel sounds are normal.      Palpations: Abdomen is soft.   Musculoskeletal:         General: Normal range of motion.      Cervical back: Normal range of motion.   Skin:     General: Skin is warm.   Neurological:      Mental Status: He is alert and oriented to person, place, and time.   Psychiatric:         Mood and Affect: Mood normal.         Behavior: Behavior normal.       Home Medications:   No current facility-administered medications on file prior to encounter.     Current Outpatient Medications on File Prior to Encounter   Medication Sig Dispense Refill    amiodarone (PACERONE) 200 MG Tab Take 1 tablet (200 mg total) by mouth once daily. 30 tablet 3    aspirin (ECOTRIN) 81 MG EC tablet Take 1 tablet (81 mg total) by mouth once daily. 30 tablet 0    atorvastatin (LIPITOR) 40 MG tablet Take 1 tablet (40 mg total) by mouth every evening. 30 tablet 2    furosemide (LASIX) 20 MG tablet Take 1 tablet (20 mg total) by mouth once daily. for 4 days 4 tablet 0    hydrOXYzine pamoate (VISTARIL) 50 MG Cap Take 1 capsule (50 mg total) by mouth every 8 (eight) hours as needed (as needed for anxiety). 30 capsule 1    linaCLOtide (LINZESS) 72 mcg Cap capsule Take 1 capsule (72 mcg total) by mouth before breakfast. 30 each 1    metoprolol succinate (TOPROL-XL) 50 MG 24 hr tablet TAKE 1 TABLET BY MOUTH ONCE DAILY 30 tablet 0    QUEtiapine (SEROQUEL) 100 MG Tab Take 2 tablets (200 mg total) by mouth every evening. 60 tablet 3    sodium bicarbonate 650 MG tablet Take 650 mg by mouth once daily.      thiamine (VITAMIN B-1) 100 MG tablet Take 100 mg by mouth once daily.      valsartan (DIOVAN) 160 MG tablet Take 1 tablet (160 mg total) by mouth 2 (two) times daily. 180 tablet 3       Current Inpatient Medications:    Current Facility-Administered Medications:     amiodarone tablet 200 mg, 200 mg, Oral, Daily, Danielito Medina DO, 200 mg at 11/29/23 0903    aspirin EC tablet 81 mg, 81 mg,  Oral, Daily, Stroda, Danielito, DO, 81 mg at 11/29/23 0903    atorvastatin tablet 40 mg, 40 mg, Oral, QHS, Stroda, Danielito, DO, 40 mg at 11/28/23 2054    enoxaparin injection 70 mg, 70 mg, Subcutaneous, Q12H, Stroda, Danielito, DO, 70 mg at 11/29/23 1151    folic acid tablet 1 mg, 1 mg, Oral, Daily, Juan Anderson Jr., MD, FCCP, 1 mg at 11/29/23 0903    guaiFENesin 100 mg/5 ml syrup 400 mg, 400 mg, Oral, Q6H WAKE, Dulce Aiken, FNP, 400 mg at 11/29/23 0903    levalbuterol nebulizer solution 1.25 mg, 1.25 mg, Nebulization, Q8H, Syed Vogt MD, 1.25 mg at 11/29/23 0808    linaCLOtide capsule 72 mcg, 72 mcg, Oral, Before breakfast, Stroda, Danielito, DO, 72 mcg at 11/28/23 0700    loperamide capsule 2 mg, 2 mg, Oral, QID PRN, Simran Meza, AGACNP-BC, 2 mg at 11/28/23 0649    LORazepam injection 2 mg, 2 mg, Intravenous, Q15 Min PRN, Rico Davila MD    magnesium oxide tablet 800 mg, 800 mg, Oral, PRN, Stroda, Danielito, DO    magnesium oxide tablet 800 mg, 800 mg, Oral, PRN, Stroda, Danielito, DO    metoprolol injection 5 mg, 5 mg, Intravenous, Q5 Min PRN, Stroda, Danielito, DO, 5 mg at 11/29/23 0907    metoprolol succinate (TOPROL-XL) 24 hr tablet 100 mg, 100 mg, Oral, Daily, Juan Anderson Jr., MD, FCCP, 100 mg at 11/29/23 0903    miconazole NITRATE 2 % top powder, , Topical (Top), BID, Bux, Betty, DO, Given at 11/28/23 2055    ondansetron injection 4 mg, 4 mg, Intravenous, Q8H PRN, Stroda, Danielito, DO    potassium bicarbonate disintegrating tablet 35 mEq, 35 mEq, Oral, PRN, Stroda, Danielito, DO, 35 mEq at 11/27/23 0420    potassium bicarbonate disintegrating tablet 50 mEq, 50 mEq, Oral, PRN, Stroda, Danielito, DO, 50 mEq at 11/28/23 1606    potassium bicarbonate disintegrating tablet 60 mEq, 60 mEq, Oral, PRN, Stroda, Danielito, DO    potassium, sodium phosphates 280-160-250 mg packet 2 packet, 2 packet, Oral, PRN, Stroda, Danielito, DO    potassium, sodium phosphates 280-160-250 mg packet 2 packet, 2 packet, Oral, PRN, Stroda, Danielito, DO,  2 packet at 11/27/23 0420    potassium, sodium phosphates 280-160-250 mg packet 2 packet, 2 packet, Oral, PRN, Stroda, Danielito, DO    QUEtiapine tablet 200 mg, 200 mg, Oral, QHS, Stroda, Danielito, DO, 200 mg at 11/28/23 2054    sodium chloride 0.9% flush 10 mL, 10 mL, Intravenous, PRN, Stroda, Danielito, DO, 10 mL at 11/28/23 1407    Pharmacy to dose Vancomycin consult, , , Once **AND** vancomycin - pharmacy to dose, , Intravenous, pharmacy to manage frequency, [facility]., [facility]    vancomycin in dextrose 5 % 1 gram/250 mL IVPB 1,000 mg, 1,000 mg, Intravenous, Q12H, Syed Vogt MD, Stopped at 11/29/23 0417      VTE Risk Mitigation (From admission, onward)           Ordered     enoxaparin injection 70 mg  Every 12 hours (non-standard times)         11/24/23 2133     IP VTE HIGH RISK PATIENT  Once         11/24/23 2116     Place sequential compression device  Until discontinued         11/24/23 2116                    Assessment:   PAF/Flutter  - Currently AFIB RVR    - CHADsVASc - 3 Points - 3.2% Stroke Risk per Year     - s/p (4.3.23) - Ligation of SILVANO with Endostapler  NSTEMI type II secondary to demand ischemia due to Sepsis, PNA, Flu, PAF  Sepsis with MRSA   Bilateral Community Acquired PNA  Recent Influenza   Elevated LFTs (improving)  MVCAD    - s/p CABG (4.3.23) - LIMA to LAD, rSVG to OM1, rSVG to PDA  Chronic Diastolic Heart   Hypoalbuminemia   VHD    - severe AS    - AV is probably trileaflet with marked AV scleral calcification resulting in severe/critical AS, SHARAD 0.5 cm2, peak AV 3.5 m/sec (4.11.23)  HTN/HHD  Chronic Thrombocytopenia   History of ETOH Abuse  No History of GI Bleed    Plan:   Cardizem gtt  NPO after MN  CARO with Possible DCCV tomorrow  Risk, Benefits and Alternatives Reviewed and Discussed with the PT and their Family and they wish to proceed with above Procedure.  Consents Obtained (Placed in Chart)/Procedure Scheduled  Electrolyte Replacement per Primary Team   Keep Mag > 2 and  Potassium > 4  Labs in AM: CBC, BMP, and Mag    Thank you for your consult.     LOREN De La Torre  Cardiology  [facility] - 4th Floor Medical Telemetry  11/29/2023 12:11 PM      I have seen the patient, reviewed the Nurse Practitioner's note, assessment and plan. I have personally interviewed and examined the patient at bedside and agree with the findings. Medical decision making listed above were done under my guidance.

## 2023-11-29 NOTE — PROGRESS NOTES
[facility] Hospital Medicine Progress Note        Chief Complaint: Inpatient Follow-up for MRSA sepsis and Pneumonia     HPI:   Mike Urbina Jr. is a 68 y.o. male with a PMHx of  ETOH abuse, VHD-aortic stenosis, thrombocytopenia, CAD status post CABG x3, HTN, PAFlutter s/p SILVANO ligation, chronic HFpEF 55%, chronic anemia who presented to [facility] on 11/24/2023 with via EMS as a transfer from [facility] ED for higher level of care.  He initially presented to the [facility] ED via EMS with complaints of worsening SOB and generalized weakness after being diagnosed with influenza a x5 days prior. Reportedly prescribed Tamiflu on 11/20/2023. He also endorsed decreased oral intake, and chest pain that is worse with deep inspiration.  Patient does endorsed drinking a 6 pack of beer per day.     Labs were notable for sodium 135, CO2 17, glucose 179, BUN 36.2, creatinine 1.49, total bili 2.9, AST 56, BNP 3053, WBC 13.68, hemoglobin 12.5, hematocrit 40, troponin 0.012.  CXR demonstrated right upper lobe consolidation concerning for pneumonia.  EKG demonstrated supraventricular tachycardia with rate of 172.  He was given adenosine 6 mg followed by 12 mg without improvement.  He was then given Lopressor 5 mg IV and rate noted to be atrial fibrillation with RVR.  He was initiated on a diltiazem drip.  He was then transferred to [facility] ED.  CTA chest negative for PE, findings concerning for multifocal pneumonia most evident in the right upper lobe.  He was started on broad-spectrum IV antibiotics.  Blood cultures x2 obtained.       He was admitted to ICU.  MRSA PCR detected. Antibiotic coverage broadened to vanc, Zosyn and doxycycline.  TTE demonstrated EF 55%, moderate aortic stenosis, moderate mitral stenosis and mild MR.  Blood cultures from 11/24/2023 growing MRSA in 2 of 2 bottles.  Respiratory PCR panel negative.  Tamiflu was also continued.  Also required Levophed for BP support.  He has since been weaned off of Levophed and Cardizem.  Zosyn and Tamiflu were discontinued on 11/27/2023.  He remains on IV vancomycin.  ID was consulted on 11/27/2023. Blood cultures repeated x2 on 11/27/2023.  He was cleared for downgrade to the floor on 11/27/2023.  PT/OT consulted. Hospital medicine consulted for assumption of care and further medical management.     Labs on 11/27/2023 notable for WBC 3.16, hemoglobin 9.9, hematocrit 30.3, platelets 107, sodium 134, potassium 3.4, calcium 7.3, phosphorus 1.6, albumin 2, , .    Patient was continued on iv vancomycin. He was very weak and needed PT.     Interval Hx:   Patient today awake and comfortable. Has mild SOB when he ambulates. Occ cough but no sputum. Has been afebrile. He does have generalized weakness and needing assist with ambulation.     No family at bedside.     Case was discussed with patient's nurse and  on the floor.    Objective/physical exam:  General: In no acute distress  Chest: Ernie mild rhonchi   Heart: Tachycardic +S1, S2  Abdomen: Soft, nontender, BS +  MSK: Warm, no lower extremity edema, no clubbing or cyanosis  Neurologic: Cranial nerve II-XII intact, Strength 4/5 in all 4 extremities    VITAL SIGNS: 24 HRS MIN & MAX LAST   Temp  Min: 97.5 °F (36.4 °C)  Max: 99.2 °F (37.3 °C) 98.1 °F (36.7 °C)   BP  Min: 111/46  Max: 165/80 (!) 133/92   Pulse  Min: 72  Max: 137  (!) 131   Resp  Min: 15  Max: 24 (!) 22   SpO2  Min: 93 %  Max: 98 % 98 %     I have reviewed the following labs:  Recent Labs   Lab 11/27/23  0121 11/28/23  0208 11/29/23  0401   WBC 3.16* 3.91* 4.44  4.44*   RBC 3.49* 3.67* 3.79*   HGB 9.9* 10.5* 10.7*   HCT 30.3* 32.0* 33.1*   MCV 86.8 87.2 87.3   MCH 28.4 28.6 28.2   MCHC 32.7* 32.8* 32.3*   RDW 18.1* 17.9* 17.6*   * 99* 134   MPV 10.8* 11.6* 11.7*     Recent Labs   Lab 11/24/23  1511 11/24/23  1818 11/25/23  0148 11/25/23  0159 11/26/23  0750 11/27/23  0121 11/28/23  0208 11/29/23  0401    NA  --    < > 131*  --  133* 134* 134* 135*   K  --    < > 3.8  --  3.4* 3.4* 3.8 3.4*   CO2  --    < > 18*  --  20* 23 18* 22*   BUN  --    < > 37.4*  --  27.0* 25.0 17.0 17.9   CREATININE  --    < > 1.18  --  0.95 0.89 0.82 0.80   CALCIUM  --    < > 7.2*  --  7.4* 7.3* 7.6* 7.2*   PH 7.388  --   --  7.500*  --   --   --   --    MG  --    < > 1.90  --  1.90 1.90  --   --    ALBUMIN  --    < > 2.3*  --  2.0* 2.0* 1.9* 1.9*   ALKPHOS  --    < > 44  --  41 54 63 63   ALT  --    < > 23  --  47 118* 92* 58*   AST  --    < > 34  --  94* 222* 103* 47*   BILITOT  --    < > 2.0*  --  1.6* 1.5 1.2 1.2    < > = values in this interval not displayed.     Microbiology Results (last 7 days)       Procedure Component Value Units Date/Time    Blood Culture [[id number]]  (Normal) Collected: 11/27/23 0637    Order Status: Completed Specimen: Blood from Hand, Left Updated: 11/28/23 1345     CULTURE, BLOOD ([facility]) No Growth At 24 Hours    Blood Culture [[id number]]  (Normal) Collected: 11/27/23 0637    Order Status: Completed Specimen: Blood from Wrist, Right Updated: 11/28/23 1342     CULTURE, BLOOD ([facility]) No Growth At 24 Hours    Stool Culture [[id number]]  (Normal) Collected: 11/27/23 1525    Order Status: Completed Specimen: Stool Updated: 11/28/23 0719     Stool Culture Negative for Salmonella, Shigella, Campylobacter, Vibrio, Aeromonas, Pleisiomonas,Yersinia, or Shiga Toxin 1 and 2.    Chlamydia/GC, PCR [[id number]] Collected: 11/27/23 1651    Order Status: Completed Specimen: Urine Updated: 11/27/23 1843     Chlamydia trachomatis PCR Not Detected     N. gonorrhea PCR Not Detected     Source Urine    Narrative:      The Xpert CT/NG test, performed on the GeneXpert system is a qualitative in vitro real-time polymerase chain reaction (PCR) test for the automated detected and differentiation for genomic DNA from Chlamydia trachomatis (CT) and/or Neisseria gonorrhoeae (NG).    Respiratory Culture [[id number]]     Order Status: Sent  Specimen: Sputum     Respiratory Culture [[id number]] Collected: 11/24/23 2140    Order Status: Completed Specimen: Sputum, Expectorated Updated: 11/25/23 1230     Respiratory Culture Gram stain demonstrates significant oropharyngeal contamination. Sputum unsatisfactory for culture     GRAM STAIN Quality -2      Many Gram positive cocci      Many Gram Negative Rods             See below for Radiology    Scheduled Med:   amiodarone  200 mg Oral Daily    aspirin  81 mg Oral Daily    atorvastatin  40 mg Oral QHS    enoxparin  70 mg Subcutaneous Q12H    folic acid  1 mg Oral Daily    guaiFENesin 100 mg/5 ml  400 mg Oral Q6H WAKE    levalbuterol  1.25 mg Nebulization Q8H    linaCLOtide  72 mcg Oral Before breakfast    metoprolol succinate  100 mg Oral Daily    miconazole NITRATE 2 %   Topical (Top) BID    QUEtiapine  200 mg Oral QHS    vancomycin (VANCOCIN) IV (PEDS and ADULTS)  1,000 mg Intravenous Q12H      Continuous Infusions:       PRN Meds:  loperamide, lorazepam, magnesium oxide, magnesium oxide, metoprolol, ondansetron, potassium bicarbonate, potassium bicarbonate, potassium bicarbonate, potassium, sodium phosphates, potassium, sodium phosphates, potassium, sodium phosphates, sodium chloride 0.9%, Pharmacy to dose Vancomycin consult **AND** vancomycin - pharmacy to dose     Assessment/Plan:  Influenza A, dx on 11/19/2023  Bilateral Pneumonia 2/2 MRSA and Flu A infection   MRSA sepsis   Generalized weakness   Elevated LFTs from sepsis: Improving   Hypoalbuminemia   PAF with RVR  Chronic HFpEF 55%  Essential hypertension  CAD s/p MI/CABG x3  ETOH abuse   Mild Hypokalemia and Hypophosphatemia   Chronic Anemia and Thrombocytopenia : improved   Hx of VHD    Plan:  Patient had no acute issues overnight. He does have mild SOB on exertion   Will continue iv vancomycin for MRSA sepsis and Pneumonia, F/U by ID team   Blood cultures done on 11/27/23 has been negative so far   His LFTs are improving   cont dosing per  pharmacy and monitor vanc level   Will closely monitor patients daily weight, urine out put, renal parameters and volume status    CBC reviewed, WBC 4.44, Hb 10.7, Plt 134, K 3.4, Alb 1.9  He is on FD lovenox for Afib. If platelets continue o decrease then will have to hold lovenox   HR now elevated. He is in Afib with RVR, needing iv metoprolol. Will continue amiodarone and metoprolol  Inform CIS team and he will also need a CARO    Cont tele monitoring     Cont scheduled bronchodilators    Continue PT       Need good nutrition support for better recovery, consult nutrition team     Cont supportive care     Labs in am     Critical care note:  Critical care diagnosis: Afib with RVR needing iv metoprolol   Critical care interventions: Hands-on evaluation, review of labs/radiographs/records and discussion with patient and family if present  Critical care time spent: 35 minutes     VTE prophylaxis: Lovenox     Patient condition:  Guarded    Anticipated discharge and Disposition:   TCU       All diagnosis and differential diagnosis have been reviewed; assessment and plan has been documented; I have personally reviewed the labs and test results that are presently available; I have reviewed the patients medication list; I have reviewed the consulting providers response and recommendations. I have reviewed or attempted to review medical records based upon their availability    All of the patient's questions have been  addressed and answered. Patient's is agreeable to the above stated plan. I will continue to monitor closely and make adjustments to medical management as needed.  _____________________________________________________________________    Nutrition Status:    Radiology:  I have personally reviewed the following imaging and agree with the radiologist.     X-Ray Chest 1 View  Narrative: EXAMINATION:  XR CHEST 1 VIEW    CLINICAL HISTORY:  pneumonia;    TECHNIQUE:  AP chest    COMPARISON:  Chest x-ray dated  11/26/2023    FINDINGS:  The heart is stable in size.  There are slightly increasing patchy and consolidative bilateral airspace opacities.  There is no pleural effusion or visible pneumothorax.  Impression: Slightly increasing patchy and consolidative bilateral airspace opacities.    Electronically signed by: Lyric Deras  Date:    11/27/2023  Time:    06:05      Syed Vogt MD   11/29/2023

## 2023-11-29 NOTE — PLAN OF CARE
Problem: Adult Inpatient Plan of Care  Goal: Plan of Care Review  Outcome: Met  Goal: Patient-Specific Goal (Individualized)  Outcome: Met  Goal: Absence of Hospital-Acquired Illness or Injury  Outcome: Met  Goal: Optimal Comfort and Wellbeing  Outcome: Met  Goal: Readiness for Transition of Care  11/29/2023 1601 by Tamika Garcia, RN  Outcome: Met  11/29/2023 1032 by Tamika Garcia, RN  Outcome: Ongoing, Progressing

## 2023-11-29 NOTE — AI DETERIORATION ALERT
Artificial Intelligence Notification  [facility]  [address]  [place] [address]  Phone: [phone]    This documentation was triggered by an Artificial Intelligence Notification:    Admit Date: 2023   LOS: 5  Code Status: Full Code  : 1955  Age: 68 y.o.  Weight:   Wt Readings from Last 1 Encounters:   23 73.9 kg (163 lb)        Sex: male  Bed: 403/403 A  MRN: [id number]  Attending Physician: Syed Vogt MD     Date of Alert: 2023  Time AI Alert Received: 1132            Vitals:    23 0907   BP: (!) 133/92   Pulse:    Resp:    Temp:      SpO2: 98 %      Artificial Intelligence alert discussed with Provider:     Name:    Date/Time of Provider Notification:       Patient Condition: Chart reviewed. No changes since last AI alert done at 3:32am today.

## 2023-11-29 NOTE — CARE UPDATE
[id number] Rec consult for snf, TCU. Pt lives in [place] and would like to go to [facility] swing bed. FOC obtained. Referral sent to [facility] bed thru care port

## 2023-11-29 NOTE — PT/OT/SLP PROGRESS
"Occupational Therapy   Treatment    Name: Mike Urbina Jr.  MRN: [id number]  Admitting Diagnosis:  Atrial fibrillation with RVR       Recommendations:     Recommended therapy intensity at discharge: Moderate Intensity Therapy   Discharge Equipment Recommendations:  to be determined by next level of care  Barriers to discharge:       Assessment:     Mike Urbina Jr. is a 68 y.o. male with a medical diagnosis of Atrial fibrillation with RVR.  He presents with sitting up in chair x 30 minutes prior to session. RN request patient to remain [facility] at end of session. Performance deficits affecting function are weakness, impaired endurance, impaired self care skills, impaired functional mobility.     Rehab Prognosis:  Good; patient would benefit from acute skilled OT services to address these deficits and reach maximum level of function.       Plan:     Patient to be seen 3 x/week to address the above listed problems via self-care/home management, therapeutic activities, therapeutic exercises  Plan of Care Expires: 12/27/23  Plan of Care Reviewed with: patient    Subjective     Pain/Comfort:       Objective:     Communicated with: RN prior to session.  Patient found up in chair with pulse ox (continuous), telemetry, oxygen upon OT entry to room.    General Precautions: Standard, fall    Orthopedic Precautions:N/A  Braces: N/A  Respiratory Status: Nasal cannula, flow 2 L/min    Functional Mobility/Transfers:  Patient completed Sit <> Stand Transfer with minimum assistance  with  rolling walker  x 4 trials  Functional Mobility: patient declined to progress stating he was "too weak right now"    Activities of Daily Living:  Grooming: minimum assistance    Bathing: stand by assistance to wash face with cloth  Lower Body Dressing: maximal assistance to elana and doff socks    Therapeutic Positioning    OT interventions performed during the course of today's session in an effort to prevent and/or reduce acquired " pressure injuries:   Education was provided on benefits of and recommendations for therapeutic positioning  Therapeutic positioning was provided at the conclusion of session to offload all bony prominences for the prevention and/or reduction of pressure injuries and for edema management    Patient Education:  Patient provided with verbal education education regarding fall prevention, safety awareness, and pressure ulcer prevention.  Understanding was verbalized, however additional teaching warranted.      Patient left up in chair at RN request with all lines intact, call button in reach, and RN notified    GOALS:   Multidisciplinary Problems       Occupational Therapy Goals          Problem: Occupational Therapy    Goal Priority Disciplines Outcome Interventions   Occupational Therapy Goal     OT, PT/OT Ongoing, Progressing    Description: Goals to be met by: 12/27/23     Patient will increase functional independence with ADLs by performing:    UE Dressing with Set-up Assistance.  LE Dressing with Set-up Assistance.  Grooming while standing at sink with Set-up Assistance.  Toileting from toilet with Set-up Assistance for hygiene and clothing management.   Toilet transfer to toilet with Supervision.                         Time Tracking:     OT Date of Treatment: 11/29/23  OT Start Time: 1230  OT Stop Time: 1250  OT Total Time (min): 20 min    Billable Minutes:Self Care/Home Management 10  Therapeutic Activity 10          Number of ROCCO visits since last OT visit: 1    11/29/2023

## 2023-11-30 ENCOUNTER — ANESTHESIA (OUTPATIENT)
Dept: CARDIOLOGY | Facility: HOSPITAL | Age: 68
DRG: 871 | End: 2023-11-30
Payer: MEDICARE

## 2023-11-30 ENCOUNTER — ANESTHESIA EVENT (OUTPATIENT)
Dept: CARDIOLOGY | Facility: HOSPITAL | Age: 68
DRG: 871 | End: 2023-11-30
Payer: MEDICARE

## 2023-11-30 PROBLEM — E43 SEVERE MALNUTRITION: Status: ACTIVE | Noted: 2023-11-30

## 2023-11-30 LAB
ANION GAP SERPL CALC-SCNC: 10 MEQ/L
BASOPHILS # BLD AUTO: 0.01 X10(3)/MCL
BASOPHILS NFR BLD AUTO: 0.2 %
BUN SERPL-MCNC: 18.1 MG/DL (ref 8.4–25.7)
CALCIUM SERPL-MCNC: 7.3 MG/DL (ref 8.8–10)
CHLORIDE SERPL-SCNC: 104 MMOL/L (ref 98–107)
CO2 SERPL-SCNC: 22 MMOL/L (ref 23–31)
CREAT SERPL-MCNC: 0.84 MG/DL (ref 0.73–1.18)
CREAT/UREA NIT SERPL: 22
EOSINOPHIL # BLD AUTO: 0.02 X10(3)/MCL (ref 0–0.9)
EOSINOPHIL NFR BLD AUTO: 0.3 %
ERYTHROCYTE [DISTWIDTH] IN BLOOD BY AUTOMATED COUNT: 17.6 % (ref 11.5–17)
GFR SERPLBLD CREATININE-BSD FMLA CKD-EPI: >60 MLS/MIN/1.73/M2
GLUCOSE SERPL-MCNC: 99 MG/DL (ref 82–115)
HCT VFR BLD AUTO: 31.6 % (ref 42–52)
HGB BLD-MCNC: 10.1 G/DL (ref 14–18)
IMM GRANULOCYTES # BLD AUTO: 0.09 X10(3)/MCL (ref 0–0.04)
IMM GRANULOCYTES NFR BLD AUTO: 1.4 %
LYMPHOCYTES # BLD AUTO: 0.86 X10(3)/MCL (ref 0.6–4.6)
LYMPHOCYTES NFR BLD AUTO: 13.8 %
MAGNESIUM SERPL-MCNC: 1.9 MG/DL (ref 1.6–2.6)
MCH RBC QN AUTO: 28.7 PG (ref 27–31)
MCHC RBC AUTO-ENTMCNC: 32 G/DL (ref 33–36)
MCV RBC AUTO: 89.8 FL (ref 80–94)
MONOCYTES # BLD AUTO: 0.62 X10(3)/MCL (ref 0.1–1.3)
MONOCYTES NFR BLD AUTO: 10 %
NEUTROPHILS # BLD AUTO: 4.62 X10(3)/MCL (ref 2.1–9.2)
NEUTROPHILS NFR BLD AUTO: 74.3 %
NRBC BLD AUTO-RTO: 0 %
PLATELET # BLD AUTO: 181 X10(3)/MCL (ref 130–400)
PMV BLD AUTO: 11.2 FL (ref 7.4–10.4)
POTASSIUM SERPL-SCNC: 4.2 MMOL/L (ref 3.5–5.1)
RBC # BLD AUTO: 3.52 X10(6)/MCL (ref 4.7–6.1)
SODIUM SERPL-SCNC: 136 MMOL/L (ref 136–145)
WBC # SPEC AUTO: 6.22 X10(3)/MCL (ref 4.5–11.5)

## 2023-11-30 PROCEDURE — 25000242 PHARM REV CODE 250 ALT 637 W/ HCPCS: Performed by: INTERNAL MEDICINE

## 2023-11-30 PROCEDURE — 94668 MNPJ CHEST WALL SBSQ: CPT

## 2023-11-30 PROCEDURE — 80048 BASIC METABOLIC PNL TOTAL CA: CPT | Performed by: INTERNAL MEDICINE

## 2023-11-30 PROCEDURE — 99900035 HC TECH TIME PER 15 MIN (STAT)

## 2023-11-30 PROCEDURE — 25000242 PHARM REV CODE 250 ALT 637 W/ HCPCS: Performed by: ANESTHESIOLOGY

## 2023-11-30 PROCEDURE — 93010 ELECTROCARDIOGRAM REPORT: CPT | Mod: ,,, | Performed by: INTERNAL MEDICINE

## 2023-11-30 PROCEDURE — D9220A PRA ANESTHESIA: ICD-10-PCS | Mod: ANES,,, | Performed by: ANESTHESIOLOGY

## 2023-11-30 PROCEDURE — 63600175 PHARM REV CODE 636 W HCPCS: Performed by: INTERNAL MEDICINE

## 2023-11-30 PROCEDURE — 93005 ELECTROCARDIOGRAM TRACING: CPT

## 2023-11-30 PROCEDURE — D9220A PRA ANESTHESIA: ICD-10-PCS | Mod: CRNA,,,

## 2023-11-30 PROCEDURE — 63600175 PHARM REV CODE 636 W HCPCS: Performed by: NURSE PRACTITIONER

## 2023-11-30 PROCEDURE — 27000207 HC ISOLATION

## 2023-11-30 PROCEDURE — 63600175 PHARM REV CODE 636 W HCPCS

## 2023-11-30 PROCEDURE — 21400001 HC TELEMETRY ROOM

## 2023-11-30 PROCEDURE — 25000003 PHARM REV CODE 250: Performed by: INTERNAL MEDICINE

## 2023-11-30 PROCEDURE — 25000003 PHARM REV CODE 250: Performed by: NURSE PRACTITIONER

## 2023-11-30 PROCEDURE — 97530 THERAPEUTIC ACTIVITIES: CPT | Mod: CQ

## 2023-11-30 PROCEDURE — 94664 DEMO&/EVAL PT USE INHALER: CPT

## 2023-11-30 PROCEDURE — 25000242 PHARM REV CODE 250 ALT 637 W/ HCPCS: Performed by: NURSE PRACTITIONER

## 2023-11-30 PROCEDURE — 94640 AIRWAY INHALATION TREATMENT: CPT

## 2023-11-30 PROCEDURE — D9220A PRA ANESTHESIA: Mod: CRNA,,,

## 2023-11-30 PROCEDURE — 93010 EKG 12-LEAD: ICD-10-PCS | Mod: ,,, | Performed by: INTERNAL MEDICINE

## 2023-11-30 PROCEDURE — 25000003 PHARM REV CODE 250

## 2023-11-30 PROCEDURE — 94761 N-INVAS EAR/PLS OXIMETRY MLT: CPT

## 2023-11-30 PROCEDURE — D9220A PRA ANESTHESIA: Mod: ANES,,, | Performed by: ANESTHESIOLOGY

## 2023-11-30 PROCEDURE — 85025 COMPLETE CBC W/AUTO DIFF WBC: CPT | Performed by: INTERNAL MEDICINE

## 2023-11-30 PROCEDURE — 83735 ASSAY OF MAGNESIUM: CPT

## 2023-11-30 PROCEDURE — 27000221 HC OXYGEN, UP TO 24 HOURS

## 2023-11-30 RX ORDER — PROPOFOL 10 MG/ML
VIAL (ML) INTRAVENOUS
Status: DISCONTINUED | OUTPATIENT
Start: 2023-11-30 | End: 2023-11-30

## 2023-11-30 RX ORDER — LIDOCAINE HYDROCHLORIDE 20 MG/ML
INJECTION, SOLUTION EPIDURAL; INFILTRATION; INTRACAUDAL; PERINEURAL
Status: DISCONTINUED | OUTPATIENT
Start: 2023-11-30 | End: 2023-11-30

## 2023-11-30 RX ORDER — LEVALBUTEROL INHALATION SOLUTION 1.25 MG/3ML
1.25 SOLUTION RESPIRATORY (INHALATION) ONCE
Status: COMPLETED | OUTPATIENT
Start: 2023-11-30 | End: 2023-11-30

## 2023-11-30 RX ORDER — FUROSEMIDE 10 MG/ML
40 INJECTION INTRAMUSCULAR; INTRAVENOUS ONCE
Status: COMPLETED | OUTPATIENT
Start: 2023-11-30 | End: 2023-11-30

## 2023-11-30 RX ORDER — ALBUTEROL SULFATE 0.83 MG/ML
2.5 SOLUTION RESPIRATORY (INHALATION) ONCE
Status: COMPLETED | OUTPATIENT
Start: 2023-11-30 | End: 2023-11-30

## 2023-11-30 RX ADMIN — LEVALBUTEROL HYDROCHLORIDE 1.25 MG: 1.25 SOLUTION RESPIRATORY (INHALATION) at 05:11

## 2023-11-30 RX ADMIN — FUROSEMIDE 40 MG: 10 INJECTION, SOLUTION INTRAMUSCULAR; INTRAVENOUS at 05:11

## 2023-11-30 RX ADMIN — FOLIC ACID 1 MG: 1 TABLET ORAL at 10:11

## 2023-11-30 RX ADMIN — ENOXAPARIN SODIUM 70 MG: 80 INJECTION SUBCUTANEOUS at 11:11

## 2023-11-30 RX ADMIN — ASPIRIN 81 MG: 81 TABLET, COATED ORAL at 10:11

## 2023-11-30 RX ADMIN — VANCOMYCIN HYDROCHLORIDE 1000 MG: 1 INJECTION, POWDER, LYOPHILIZED, FOR SOLUTION INTRAVENOUS at 04:11

## 2023-11-30 RX ADMIN — AMIODARONE HYDROCHLORIDE 200 MG: 200 TABLET ORAL at 10:11

## 2023-11-30 RX ADMIN — LIDOCAINE HYDROCHLORIDE 40 MG: 20 INJECTION, SOLUTION EPIDURAL; INFILTRATION; INTRACAUDAL; PERINEURAL at 02:11

## 2023-11-30 RX ADMIN — PROPOFOL 40 MG: 10 INJECTION, EMULSION INTRAVENOUS at 02:11

## 2023-11-30 RX ADMIN — MICONAZOLE NITRATE: 20 POWDER TOPICAL at 11:11

## 2023-11-30 RX ADMIN — LEVALBUTEROL HYDROCHLORIDE 1.25 MG: 1.25 SOLUTION RESPIRATORY (INHALATION) at 04:11

## 2023-11-30 RX ADMIN — MICONAZOLE NITRATE: 20 POWDER TOPICAL at 10:11

## 2023-11-30 RX ADMIN — LEVALBUTEROL HYDROCHLORIDE 1.25 MG: 1.25 SOLUTION RESPIRATORY (INHALATION) at 08:11

## 2023-11-30 RX ADMIN — PROPOFOL 10 MG: 10 INJECTION, EMULSION INTRAVENOUS at 02:11

## 2023-11-30 RX ADMIN — QUETIAPINE 200 MG: 100 TABLET ORAL at 11:11

## 2023-11-30 RX ADMIN — ALBUTEROL SULFATE 2.5 MG: 2.5 SOLUTION RESPIRATORY (INHALATION) at 01:11

## 2023-11-30 RX ADMIN — PROPOFOL 20 MG: 10 INJECTION, EMULSION INTRAVENOUS at 02:11

## 2023-11-30 RX ADMIN — LEVALBUTEROL HYDROCHLORIDE 1.25 MG: 1.25 SOLUTION RESPIRATORY (INHALATION) at 12:11

## 2023-11-30 RX ADMIN — GUAIFENESIN 400 MG: 200 SOLUTION ORAL at 10:11

## 2023-11-30 RX ADMIN — DILTIAZEM HYDROCHLORIDE 10 MG/HR: 5 INJECTION INTRAVENOUS at 06:11

## 2023-11-30 RX ADMIN — METOPROLOL SUCCINATE 100 MG: 50 TABLET, EXTENDED RELEASE ORAL at 10:11

## 2023-11-30 RX ADMIN — ENOXAPARIN SODIUM 70 MG: 80 INJECTION SUBCUTANEOUS at 10:11

## 2023-11-30 NOTE — TRANSFER OF CARE
"Anesthesia Transfer of Care Note    Patient: Mike Urbina Jr.    Procedure(s) Performed: * No procedures listed *    Patient location: Other: cvss    Anesthesia Type: general    Transport from OR: Transported from OR on 2-3 L/min O2 by NC with adequate spontaneous ventilation    Post pain: adequate analgesia    Post assessment: no apparent anesthetic complications    Post vital signs: stable    Level of consciousness: responds to stimulation    Nausea/Vomiting: no nausea/vomiting    Complications: none    Transfer of care protocol was followedComments: Detailed report with handoff to licensed provider complete      Last vitals: Visit Vitals  BP (!) 150/90   Pulse 89   Temp 36.8 °C (98.2 °F) (Oral)   Resp (!) 34   Ht 5' 10.87" (1.8 m)   Wt 73.9 kg (163 lb)   SpO2 (!) 93%   BMI 22.82 kg/m²     "

## 2023-11-30 NOTE — PROGRESS NOTES
"[facility] - 4th Floor Medical Telemetry    Cardiology  Progress Note    Patient Name: Mike Urbina Jr.  MRN: [id number]  Admission Date: 11/24/2023  Hospital Length of Stay: 6 days  Code Status: Full Code   Attending Physician: Syed Vogt MD   Primary Care Physician: Darlene Willams FNP  Expected Discharge Date:   Principal Problem:Atrial fibrillation with RVR    Subjective:     Brief HPI: Mr. Urbina is a 69 y/o male with a history of PAF/Flutter, CAD s/p CABG, VHD, Chronic Diastolic Heart Failure, HTN, HLD, who is known to [facility], Dr. Herrera. He presented to the ER as a transfer from [facility] room on 11.24.23 with complaints of shortness of breath and generalized weakness. He reports being diagnosed with Flu the weak prior and placed on Tamiflu. He was suspected to have SVT, but when rate was slowed, he was found to be in AFIB RVR. Significant labs include H&H 10.7/33.1, Na 135, K 3.4, Calcium 7.2, Albumin 1.9, AST/ALT 47/58, BNP 3,053.1, Troponin 0.137. Blood Cultures positive for MRSA. CTA chest negative for PE, findings concerning for multifocal pneumonia most evident in the right upper lobe. TTE demonstrated EF 55%, moderate aortic stenosis, moderate mitral stenosis and mild MR. [facility] has been consulted for AFIB RVR and CARO.     Hospital Course:  11.30.23: NAD. VSS. AFIB CVR on telemetry. Denies CP/SOB/Palps. "I feel okay" Awaiting CARO with Possible DCCV    PMH:  CAD, Diastolic Heart Failure, ETOH Abuse, VHD (AS), Thrombocytopenia, NSTEMI, HTN, PAF/Flutter  PSH: LHC, CABG, Cataract Extraction   Family History: Mother, L, MI  Social History: + ETOH Use (6pk/Beer per Day for > 30 Years); Denies Tobacco and Illicit Drug Use     Previous Cardiac Diagnostics:   ECHO (11.25.23):  Left Ventricle: Normal wall motion. There is normal systolic function. Ejection fraction by visual approximation is 55%. Diastolic function cannot be reliably determined in the presence of mitral " valve disease. Right Ventricle: Normal right ventricular cavity size. Systolic function is mildly reduced. Aortic Valve: Moderately calcified cusps. Moderately restricted motion. There is moderate stenosis. Aortic valve area by VTI is 0.90 cm². Aortic valve peak velocity is 3.52 m/s. Mean gradient is 35 mmHg. The dimensionless index is 0.29.  Mitral Valve: There is severe posterior mitral annular calcification present. There is moderate stenosis. The mean pressure gradient across the mitral valve is 9 mmHg at a heart rate of  bpm. There is mild regurgitation. Tricuspid Valve: There is mild regurgitation     ECHO (8.1.23):  The study quality is average. Global left ventricular systolic function is normal. The left ventricular ejection fraction is 55%. The left ventricle diastolic function is impaired (Grade II) with an elevated left atrial pressure. Suspect severe aortic valve stenosis is present; aortic valve is heavily calcified. The trans-aortic peak velocity is 3.5 m/s. The trans-aortic mean gradient is 34.4 mmHg. Dimensionless index ~ 0.24. SVI ~ 42 mL/m^2. Moderate mitral valve calcification with mild to moderate mitral stenosis; mean gradient is 5.1 mmHg. Mild (1+) mitral regurgitation.  Moderate (2+) tricuspid regurgitation.  Mild (1+) aortic regurgitation.  The pulmonary artery systolic pressure is 44 mmHg. Evidence of pulmonary hypertension is noted.      [facility] 3.15.23:  Surgical coronary anatomy with distal left main 50%; LAD proximal to mid 60-70%; circumflex mid 80- 90%; RCA with proximal .  The ejection fraction was 60% with EDP of 10 mmHg.  Right radial access.  The estimated blood loss was less than 10 cc.  CT surgical consult for CABG evaluation.     PET 1.6.23:  This is an abnormal perfusion study. Study is consistent with ischemia.   This scan is suggestive of moderate risk for future cardiovascular events.   Small reversible perfusion abnormality of moderate intensity in the apical segment.  Medium fixed perfusion abnormality of severe intensity in the inferior region.   The left ventricular cavity is noted to be normal on the stress studies. The stress left ventricular ejection fraction was calculated to be 35% and left ventricular global function is moderately reduced. The rest left ventricular cavity is noted to be normal. The rest left ventricular ejection fraction was calculated to be 40% and rest left ventricular global function is mildly reduced.   When compared to the resting ejection fraction (40%), the stress ejection fraction (35%) has decreased.   The study quality is good.   There was a rise in myocardial blood flow between rest and stress.  Global myocardial blood flow reserve was 1.47.  Myocardial blood flow reserve is globally abnormal, placing the patient at a higher coronary event risk.     ECHO 12.9.22:  The left ventricle is normal in size with mild concentric hypertrophy and normal systolic function.  Grade I left ventricular diastolic dysfunction.  The estimated ejection fraction is 55%.  Normal right ventricular size with normal right ventricular systolic function.  There is mild aortic valve stenosis.  There is mild mitral stenosis.  Normal central venous pressure (3 mmHg).       Review of Systems   Cardiovascular:  Negative for chest pain, dyspnea on exertion, leg swelling and palpitations.   Respiratory:  Negative for shortness of breath.    All other systems reviewed and are negative.      Objective:     Vital Signs (Most Recent):  Temp: 97.2 °F (36.2 °C) (11/30/23 0756)  Pulse: 85 (11/30/23 0832)  Resp: (!) 24 (11/30/23 0832)  BP: (!) 146/78 (11/30/23 0756)  SpO2: (!) 94 % (11/30/23 0832) Vital Signs (24h Range):  Temp:  [97.2 °F (36.2 °C)-98.8 °F (37.1 °C)] 97.2 °F (36.2 °C)  Pulse:  [] 85  Resp:  [16-25] 24  SpO2:  [93 %-98 %] 94 %  BP: (100-158)/(68-89) 146/78     Weight: 73.9 kg (163 lb)  Body mass index is 22.82 kg/m².    SpO2: (!) 94 %         Intake/Output  Summary (Last 24 hours) at 11/30/2023 0931  Last data filed at 11/30/2023 0756  Gross per 24 hour   Intake 440 ml   Output 1650 ml   Net -1210 ml       Lines/Drains/Airways       Peripheral Intravenous Line  Duration                  Peripheral IV - Single Lumen 11/24/23 1600 20 G Anterior;Left;Proximal Forearm 5 days         Peripheral IV - Single Lumen 11/24/23 1755 20 G Anterior;Distal;Right Upper Arm 5 days                    Significant Labs:   Recent Results (from the past 72 hour(s))   VANCOMYCIN, TROUGH    Collection Time: 11/27/23 11:03 AM   Result Value Ref Range    Vancomycin Trough 6.4 (L) 15.0 - 20.0 ug/ml   Stool Culture    Collection Time: 11/27/23  3:25 PM    Specimen: Stool   Result Value Ref Range    Stool Culture       Negative for Salmonella, Shigella, Campylobacter, Vibrio, Aeromonas, Pleisiomonas,Yersinia, or Shiga Toxin 1 and 2.    Stool Culture Many Yeast (A)    Chlamydia/GC, PCR    Collection Time: 11/27/23  4:51 PM    Specimen: Urine   Result Value Ref Range    Chlamydia trachomatis PCR Not Detected Not Detected    N. gonorrhea PCR Not Detected Not Detected    Source Urine    Comprehensive Metabolic Panel    Collection Time: 11/28/23  2:08 AM   Result Value Ref Range    Sodium Level 134 (L) 136 - 145 mmol/L    Potassium Level 3.8 3.5 - 5.1 mmol/L    Chloride 105 98 - 107 mmol/L    Carbon Dioxide 18 (L) 23 - 31 mmol/L    Glucose Level 108 82 - 115 mg/dL    Blood Urea Nitrogen 17.0 8.4 - 25.7 mg/dL    Creatinine 0.82 0.73 - 1.18 mg/dL    Calcium Level Total 7.6 (L) 8.8 - 10.0 mg/dL    Protein Total 5.5 (L) 5.8 - 7.6 gm/dL    Albumin Level 1.9 (L) 3.4 - 4.8 g/dL    Globulin 3.6 (H) 2.4 - 3.5 gm/dL    Albumin/Globulin Ratio 0.5 (L) 1.1 - 2.0 ratio    Bilirubin Total 1.2 <=1.5 mg/dL    Alkaline Phosphatase 63 40 - 150 unit/L    Alanine Aminotransferase 92 (H) 0 - 55 unit/L    Aspartate Aminotransferase 103 (H) 5 - 34 unit/L    eGFR >60 mls/min/1.73/m2   CBC with Differential    Collection Time:  11/28/23  2:08 AM   Result Value Ref Range    WBC 3.91 (L) 4.50 - 11.50 x10(3)/mcL    RBC 3.67 (L) 4.70 - 6.10 x10(6)/mcL    Hgb 10.5 (L) 14.0 - 18.0 g/dL    Hct 32.0 (L) 42.0 - 52.0 %    MCV 87.2 80.0 - 94.0 fL    MCH 28.6 27.0 - 31.0 pg    MCHC 32.8 (L) 33.0 - 36.0 g/dL    RDW 17.9 (H) 11.5 - 17.0 %    Platelet 99 (L) 130 - 400 x10(3)/mcL    MPV 11.6 (H) 7.4 - 10.4 fL    NRBC% 0.0 %    IPF 8.1 0.9 - 11.2 %   Manual Differential    Collection Time: 11/28/23  2:08 AM   Result Value Ref Range    Neutrophils % 77 %    Lymphs % 13 %    Monocytes % 8 %    Eosinophils % 1 %    Myelocytes % 1 (H) <=0 %    nRBC % 1 %    Neutrophils Abs      Poikilocytosis 2+ (A) (none)    Anisocytosis 1+ (A) (none)    Macrocytosis 1+ (A) (none)    Acanthocytes 1+ (A) (none)    Richmond Cells 1+ (A) (none)   Hepatitis B Core Antibody, IgM    Collection Time: 11/28/23  5:08 AM   Result Value Ref Range    Hepatitis B Core IgM Nonreactive Nonreactive   Hepatitis B Core Antibody, Total    Collection Time: 11/28/23  5:08 AM   Result Value Ref Range    Hepatitis B Core Antibody Nonreactive Nonreactive   Hepatitis B Surface Ab, Qualitative    Collection Time: 11/28/23  5:08 AM   Result Value Ref Range    Hepatitis B Surface Antibody Nonreactive Nonreactive    Hepatitis B Surface Antibody Qnt 0.75 mIU/mL   Hepatitis B Surface Antigen    Collection Time: 11/28/23  5:08 AM   Result Value Ref Range    Hepatitis B Surface Antigen Nonreactive Nonreactive   Hepatitis C Antibody    Collection Time: 11/28/23  5:08 AM   Result Value Ref Range    Hep C Ab Interp [place] (A) Nonreactive   HIV 1/2 Ag/Ab (4th Gen)    Collection Time: 11/28/23  5:08 AM   Result Value Ref Range    HIV Nonreactive Nonreactive   SYPHILIS ANTIBODY (WITH REFLEX RPR)    Collection Time: 11/28/23  5:08 AM   Result Value Ref Range    Syphilis Antibody Nonreactive Nonreactive, Equivocal   VANCOMYCIN, TROUGH    Collection Time: 11/28/23 11:01 AM   Result Value Ref Range    Vancomycin Trough  20.1 (H) 15.0 - 20.0 ug/ml   Comprehensive Metabolic Panel    Collection Time: 11/29/23  4:01 AM   Result Value Ref Range    Sodium Level 135 (L) 136 - 145 mmol/L    Potassium Level 3.4 (L) 3.5 - 5.1 mmol/L    Chloride 104 98 - 107 mmol/L    Carbon Dioxide 22 (L) 23 - 31 mmol/L    Glucose Level 113 82 - 115 mg/dL    Blood Urea Nitrogen 17.9 8.4 - 25.7 mg/dL    Creatinine 0.80 0.73 - 1.18 mg/dL    Calcium Level Total 7.2 (L) 8.8 - 10.0 mg/dL    Protein Total 4.8 (L) 5.8 - 7.6 gm/dL    Albumin Level 1.9 (L) 3.4 - 4.8 g/dL    Globulin 2.9 2.4 - 3.5 gm/dL    Albumin/Globulin Ratio 0.7 (L) 1.1 - 2.0 ratio    Bilirubin Total 1.2 <=1.5 mg/dL    Alkaline Phosphatase 63 40 - 150 unit/L    Alanine Aminotransferase 58 (H) 0 - 55 unit/L    Aspartate Aminotransferase 47 (H) 5 - 34 unit/L    eGFR >60 mls/min/1.73/m2   CBC with Differential    Collection Time: 11/29/23  4:01 AM   Result Value Ref Range    WBC 4.44 (L) 4.50 - 11.50 x10(3)/mcL    RBC 3.79 (L) 4.70 - 6.10 x10(6)/mcL    Hgb 10.7 (L) 14.0 - 18.0 g/dL    Hct 33.1 (L) 42.0 - 52.0 %    MCV 87.3 80.0 - 94.0 fL    MCH 28.2 27.0 - 31.0 pg    MCHC 32.3 (L) 33.0 - 36.0 g/dL    RDW 17.6 (H) 11.5 - 17.0 %    Platelet 134 130 - 400 x10(3)/mcL    MPV 11.7 (H) 7.4 - 10.4 fL    NRBC% 0.0 %    IPF 7.6 0.9 - 11.2 %   Manual Differential    Collection Time: 11/29/23  4:01 AM   Result Value Ref Range    WBC 4.44 x10(3)/mcL    Neutrophils % 90 %    Lymphs % 7 %    Monocytes % 3 %    Neutrophils Abs 3.996 2.1 - 9.2 x10(3)/mcL    Lymphs Abs 0.3108 (L) 0.6 - 4.6 x10(3)/mcL    Monocytes Abs 0.1332 0.1 - 1.3 x10(3)/mcL    Platelets Normal Normal, Adequate    RBC Morph Abnormal (A) Normal    Polychromasia 1+ (A) (none)    Poikilocytosis 2+ (A) (none)    Anisocytosis 1+ (A) (none)    Macrocytosis 1+ (A) (none)    Divine Cells 2+ (A) (none)    Ovalocytes 1+ (A) (none)   Basic Metabolic Panel    Collection Time: 11/30/23  3:42 AM   Result Value Ref Range    Sodium Level 136 136 - 145 mmol/L     Potassium Level 4.2 3.5 - 5.1 mmol/L    Chloride 104 98 - 107 mmol/L    Carbon Dioxide 22 (L) 23 - 31 mmol/L    Glucose Level 99 82 - 115 mg/dL    Blood Urea Nitrogen 18.1 8.4 - 25.7 mg/dL    Creatinine 0.84 0.73 - 1.18 mg/dL    BUN/Creatinine Ratio 22     Calcium Level Total 7.3 (L) 8.8 - 10.0 mg/dL    Anion Gap 10.0 mEq/L    eGFR >60 mls/min/1.73/m2   Magnesium    Collection Time: 11/30/23  3:42 AM   Result Value Ref Range    Magnesium Level 1.90 1.60 - 2.60 mg/dL   CBC with Differential    Collection Time: 11/30/23  3:42 AM   Result Value Ref Range    WBC 6.22 4.50 - 11.50 x10(3)/mcL    RBC 3.52 (L) 4.70 - 6.10 x10(6)/mcL    Hgb 10.1 (L) 14.0 - 18.0 g/dL    Hct 31.6 (L) 42.0 - 52.0 %    MCV 89.8 80.0 - 94.0 fL    MCH 28.7 27.0 - 31.0 pg    MCHC 32.0 (L) 33.0 - 36.0 g/dL    RDW 17.6 (H) 11.5 - 17.0 %    Platelet 181 130 - 400 x10(3)/mcL    MPV 11.2 (H) 7.4 - 10.4 fL    Neut % 74.3 %    Lymph % 13.8 %    Mono % 10.0 %    Eos % 0.3 %    Basophil % 0.2 %    Lymph # 0.86 0.6 - 4.6 x10(3)/mcL    Neut # 4.62 2.1 - 9.2 x10(3)/mcL    Mono # 0.62 0.1 - 1.3 x10(3)/mcL    Eos # 0.02 0 - 0.9 x10(3)/mcL    Baso # 0.01 <=0.2 x10(3)/mcL    IG# 0.09 (H) 0 - 0.04 x10(3)/mcL    IG% 1.4 %    NRBC% 0.0 %       Telemetry:  AFIB    Physical Exam  Vitals and nursing note reviewed.   Constitutional:       Appearance: Normal appearance.   HENT:      Head: Normocephalic.      Nose: Nose normal.      Mouth/Throat:      Mouth: Mucous membranes are moist.   Eyes:      Pupils: Pupils are equal, round, and reactive to light.   Cardiovascular:      Rate and Rhythm: Normal rate. Rhythm irregular.      Pulses: Normal pulses.      Heart sounds: Normal heart sounds.      Comments: AFIB on telemetry  Abdominal:      General: Bowel sounds are normal.      Palpations: Abdomen is soft.   Musculoskeletal:         General: Normal range of motion.      Cervical back: Normal range of motion.   Skin:     General: Skin is warm.   Neurological:      Mental  Status: He is alert and oriented to person, place, and time.   Psychiatric:         Mood and Affect: Mood normal.         Behavior: Behavior normal.       Current Inpatient Medications:  Current Facility-Administered Medications:     amiodarone tablet 200 mg, 200 mg, Oral, Daily, Stroda, Danielito, DO, 200 mg at 11/29/23 0903    aspirin EC tablet 81 mg, 81 mg, Oral, Daily, Stroda, Danielito, DO, 81 mg at 11/29/23 0903    diltiaZEM 125 mg in dextrose 5 % 125 mL IVPB (ready to mix) (titrating), 0-15 mg/hr, Intravenous, Continuous, Parisa, Cristal, FNP, Last Rate: 10 mL/hr at 11/30/23 0621, 10 mg/hr at 11/30/23 0621    enoxaparin injection 70 mg, 70 mg, Subcutaneous, Q12H, Stroda, Danielito, DO, 70 mg at 11/29/23 2227    folic acid tablet 1 mg, 1 mg, Oral, Daily, Juan Anderson Jr., MD, FCCP, 1 mg at 11/29/23 0903    guaiFENesin 100 mg/5 ml syrup 400 mg, 400 mg, Oral, Q6H WAKE, Dulce Aiken, FNP, 400 mg at 11/29/23 2144    levalbuterol nebulizer solution 1.25 mg, 1.25 mg, Nebulization, Q8H, Syed Vogt MD, 1.25 mg at 11/30/23 0832    linaCLOtide capsule 72 mcg, 72 mcg, Oral, Before breakfast, Stroda, Danielito, DO, 72 mcg at 11/28/23 0700    loperamide capsule 2 mg, 2 mg, Oral, QID PRN, Simran Meza AGACNP-BC, 2 mg at 11/28/23 0649    LORazepam injection 2 mg, 2 mg, Intravenous, Q15 Min PRN, Rico Davila MD    magnesium oxide tablet 800 mg, 800 mg, Oral, PRN, Stroda, Danielito, DO    magnesium oxide tablet 800 mg, 800 mg, Oral, PRN, Stroda, Danielito, DO    metoprolol injection 5 mg, 5 mg, Intravenous, Q5 Min PRN, Stroda, Danielito, DO, 5 mg at 11/29/23 2332    metoprolol succinate (TOPROL-XL) 24 hr tablet 100 mg, 100 mg, Oral, Daily, Juan Anderson Jr., MD, [facility], 100 mg at 11/29/23 0903    miconazole NITRATE 2 % top powder, , Topical (Top), BID, Betty Patel DO, Given at 11/29/23 2145    ondansetron injection 4 mg, 4 mg, Intravenous, Q8H PRN, Danielito Medina DO    potassium bicarbonate disintegrating tablet 35 mEq, 35 mEq,  Oral, PRN, Stroda, Danielito, DO, 35 mEq at 11/27/23 0420    potassium bicarbonate disintegrating tablet 50 mEq, 50 mEq, Oral, PRN, Stroda, Danielito, DO, 50 mEq at 11/28/23 1606    potassium bicarbonate disintegrating tablet 60 mEq, 60 mEq, Oral, PRN, Stroda, Danielito, DO    potassium, sodium phosphates 280-160-250 mg packet 2 packet, 2 packet, Oral, PRN, Stroda, Danielito, DO    potassium, sodium phosphates 280-160-250 mg packet 2 packet, 2 packet, Oral, PRN, Stroda, Danielito, DO, 2 packet at 11/27/23 0420    potassium, sodium phosphates 280-160-250 mg packet 2 packet, 2 packet, Oral, PRN, Stroda, Danielito, DO    QUEtiapine tablet 200 mg, 200 mg, Oral, QHS, Stroda, Danielito, DO, 200 mg at 11/29/23 2144    sodium chloride 0.9% flush 10 mL, 10 mL, Intravenous, PRN, Stroda, Danielito, DO, 10 mL at 11/28/23 1407    Pharmacy to dose Vancomycin consult, , , Once **AND** vancomycin - pharmacy to dose, , Intravenous, pharmacy to manage frequency, [facility]    vancomycin in dextrose 5 % 1 gram/250 mL IVPB 1,000 mg, 1,000 mg, Intravenous, Q12H, Syed Vogt MD, Stopped at 11/30/23 0551    VTE Risk Mitigation (From admission, onward)           Ordered     enoxaparin injection 70 mg  Every 12 hours (non-standard times)         11/24/23 2133     IP VTE HIGH RISK PATIENT  Once         11/24/23 2116     Place sequential compression device  Until discontinued         11/24/23 2116                    Assessment:   PAF/Flutter  - Currently AFIB RVR    - CHADsVASc - 3 Points - 3.2% Stroke Risk per Year     - s/p (4.3.23) - Ligation of ISLVANO with Endostapler  NSTEMI type II secondary to demand ischemia due to Sepsis, PNA, Flu, PAF  Sepsis with MRSA   Bilateral Community Acquired PNA  Recent Influenza   Elevated LFTs (improving)  MVCAD    - s/p CABG (4.3.23) - LIMA to LAD, rSVG to OM1, rSVG to PDA  Chronic Diastolic Heart   Hypoalbuminemia   VHD    - severe AS    - AV is probably trileaflet with marked AV scleral calcification resulting in  severe/critical AS, SHARAD 0.5 cm2, peak AV 3.5 m/sec (4.11.23)  HTN/HHD  Chronic Thrombocytopenia   History of ETOH Abuse  No History of GI Bleed    Plan:   Continue Cardizem gtt   Keep NPO  CARO with Possible DCCV Today   Consents Obtained (Placed in Chart)  Electrolyte Replacement per Primary Team   Keep Mag > 2 and Potassium > 4  Labs in AM: CBC, BMP, and Mag    LOREN De La Torre  Cardiology  [facility] - 4th Floor Medical Telemetry  11/30/2023      I have seen the patient, reviewed the Nurse Practitioner's note, assessment and plan. I have personally interviewed and examined the patient at bedside and agree with the findings. Medical decision making listed above were done under my guidance.   Pt is a well appearing 28 yo m with no focal deficits and vision changes of blurry vision x9 month, on exam pt is 20/20 in both eyes and neurovascular intact with grossly normal exam, will follow up with ophthalmology as directed, return for worsening symptoms.

## 2023-11-30 NOTE — PROGRESS NOTES
[facility] Hospital Medicine Progress Note        Chief Complaint: Inpatient Follow-up for MRSA sepsis and Pneumonia     HPI:   Mike Urbina Jr. is a 68 y.o. male with a PMHx of  ETOH abuse, VHD-aortic stenosis, thrombocytopenia, CAD status post CABG x3, HTN, PAFlutter s/p SILVANO ligation, chronic HFpEF 55%, chronic anemia who presented to [facility] on 11/24/2023 with via EMS as a transfer from [facility] ED for higher level of care.  He initially presented to the [facility] ED via EMS with complaints of worsening SOB and generalized weakness after being diagnosed with influenza a x5 days prior. Reportedly prescribed Tamiflu on 11/20/2023. He also endorsed decreased oral intake, and chest pain that is worse with deep inspiration.  Patient does endorsed drinking a 6 pack of beer per day.     Labs were notable for sodium 135, CO2 17, glucose 179, BUN 36.2, creatinine 1.49, total bili 2.9, AST 56, BNP 3053, WBC 13.68, hemoglobin 12.5, hematocrit 40, troponin 0.012.  CXR demonstrated right upper lobe consolidation concerning for pneumonia.  EKG demonstrated supraventricular tachycardia with rate of 172.  He was given adenosine 6 mg followed by 12 mg without improvement.  He was then given Lopressor 5 mg IV and rate noted to be atrial fibrillation with RVR.  He was initiated on a diltiazem drip.  He was then transferred to [facility] ED.  CTA chest negative for PE, findings concerning for multifocal pneumonia most evident in the right upper lobe.  He was started on broad-spectrum IV antibiotics.  Blood cultures x2 obtained.       He was admitted to ICU.  MRSA PCR detected. Antibiotic coverage broadened to vanc, Zosyn and doxycycline.  TTE demonstrated EF 55%, moderate aortic stenosis, moderate mitral stenosis and mild MR.  Blood cultures from 11/24/2023 growing MRSA in 2 of 2 bottles.  Respiratory PCR panel negative.  Tamiflu was also continued.  Also required Levophed for BP support.  He has since been weaned off of Levophed and Cardizem.  Zosyn and Tamiflu were discontinued on 11/27/2023.  He remains on IV vancomycin.  ID was consulted on 11/27/2023. Blood cultures repeated x2 on 11/27/2023.  He was cleared for downgrade to the floor on 11/27/2023.  PT/OT consulted. Hospital medicine consulted for assumption of care and further medical management.     Labs on 11/27/2023 notable for WBC 3.16, hemoglobin 9.9, hematocrit 30.3, platelets 107, sodium 134, potassium 3.4, calcium 7.3, phosphorus 1.6, albumin 2, , .    Patient was continued on iv vancomycin. He was very weak and needed PT. On 11/29/23 he went into Afib with RVR. He was seen by CIS team and started on iv cardizem drip and planned cardioversion. He was continued on FD anticoagulation.     Interval Hx:   Patient today awake and comfortable. Has a dry cough. No chest pain, fever, chills or SOB. He has generalized weakness and needs assist for ADLs.     No family at bedside.     Case was discussed with patient's nurse and  on the floor.    Objective/physical exam:  General: In no acute distress  Chest: Ernie mild rhonchi   Heart: Regular rate + irregular rhythm + ESM  Abdomen: Soft, nontender, BS +  MSK: Warm, no lower extremity edema, no clubbing or cyanosis  Neurologic: Cranial nerve II-XII intact, Strength 4/5 in all 4 extremities    VITAL SIGNS: 24 HRS MIN & MAX LAST   Temp  Min: 97.2 °F (36.2 °C)  Max: 98.8 °F (37.1 °C) 97.2 °F (36.2 °C)   BP  Min: 100/68  Max: 158/81 (!) 146/78   Pulse  Min: 71  Max: 132  85   Resp  Min: 16  Max: 25 (!) 24   SpO2  Min: 93 %  Max: 98 % (!) 94 %     I have reviewed the following labs:  Recent Labs   Lab 11/28/23  0208 11/29/23  0401 11/30/23  0342   WBC 3.91* 4.44  4.44* 6.22   RBC 3.67* 3.79* 3.52*   HGB 10.5* 10.7* 10.1*   HCT 32.0* 33.1* 31.6*   MCV 87.2 87.3 89.8   MCH 28.6 28.2 28.7   MCHC 32.8* 32.3* 32.0*   RDW 17.9* 17.6* 17.6*   PLT 99* 134 181   MPV 11.6* 11.7* 11.2*      Recent Labs   Lab 11/24/23  1511 11/24/23  1818 11/25/23  0159 11/26/23  0750 11/27/23  0121 11/28/23  0208 11/29/23  0401 11/30/23  0342   NA  --    < >  --  133* 134* 134* 135* 136   K  --    < >  --  3.4* 3.4* 3.8 3.4* 4.2   CO2  --    < >  --  20* 23 18* 22* 22*   BUN  --    < >  --  27.0* 25.0 17.0 17.9 18.1   CREATININE  --    < >  --  0.95 0.89 0.82 0.80 0.84   CALCIUM  --    < >  --  7.4* 7.3* 7.6* 7.2* 7.3*   PH 7.388  --  7.500*  --   --   --   --   --    MG  --    < >  --  1.90 1.90  --   --  1.90   ALBUMIN  --    < >  --  2.0* 2.0* 1.9* 1.9*  --    ALKPHOS  --    < >  --  41 54 63 63  --    ALT  --    < >  --  47 118* 92* 58*  --    AST  --    < >  --  94* 222* 103* 47*  --    BILITOT  --    < >  --  1.6* 1.5 1.2 1.2  --     < > = values in this interval not displayed.     Microbiology Results (last 7 days)       Procedure Component Value Units Date/Time    Stool Culture [[id number]]  (Abnormal) Collected: 11/27/23 1525    Order Status: Completed Specimen: Stool Updated: 11/29/23 1116     Stool Culture Negative for Salmonella, Shigella, Campylobacter, Vibrio, Aeromonas, Pleisiomonas,Yersinia, or Shiga Toxin 1 and 2.      Many Yeast    Blood Culture [[id number]]  (Normal) Collected: 11/27/23 0637    Order Status: Completed Specimen: Blood from Hand, Left Updated: 11/28/23 1345     CULTURE, BLOOD ([facility]) No Growth At 24 Hours    Blood Culture [[id number]]  (Normal) Collected: 11/27/23 0637    Order Status: Completed Specimen: Blood from Wrist, Right Updated: 11/28/23 1342     CULTURE, BLOOD ([facility]) No Growth At 24 Hours    Chlamydia/GC, PCR [[id number]] Collected: 11/27/23 1651    Order Status: Completed Specimen: Urine Updated: 11/27/23 1843     Chlamydia trachomatis PCR Not Detected     N. gonorrhea PCR Not Detected     Source Urine    Narrative:      The Xpert CT/NG test, performed on the GeneXpert system is a qualitative in vitro real-time polymerase chain reaction (PCR) test for the automated detected  and differentiation for genomic DNA from Chlamydia trachomatis (CT) and/or Neisseria gonorrhoeae (NG).    Respiratory Culture [[id number]]     Order Status: Sent Specimen: Sputum     Respiratory Culture [[id number]] Collected: 11/24/23 2140    Order Status: Completed Specimen: Sputum, Expectorated Updated: 11/25/23 1230     Respiratory Culture Gram stain demonstrates significant oropharyngeal contamination. Sputum unsatisfactory for culture     GRAM STAIN Quality -2      Many Gram positive cocci      Many Gram Negative Rods             See below for Radiology    Scheduled Med:   amiodarone  200 mg Oral Daily    aspirin  81 mg Oral Daily    enoxparin  70 mg Subcutaneous Q12H    folic acid  1 mg Oral Daily    guaiFENesin 100 mg/5 ml  400 mg Oral Q6H WAKE    levalbuterol  1.25 mg Nebulization Q8H    linaCLOtide  72 mcg Oral Before breakfast    metoprolol succinate  100 mg Oral Daily    miconazole NITRATE 2 %   Topical (Top) BID    QUEtiapine  200 mg Oral QHS    vancomycin (VANCOCIN) IV (PEDS and ADULTS)  1,000 mg Intravenous Q12H      Continuous Infusions:   dilTIAZem 10 mg/hr (11/30/23 0621)        PRN Meds:  loperamide, lorazepam, magnesium oxide, magnesium oxide, metoprolol, ondansetron, potassium bicarbonate, potassium bicarbonate, potassium bicarbonate, potassium, sodium phosphates, potassium, sodium phosphates, potassium, sodium phosphates, sodium chloride 0.9%, Pharmacy to dose Vancomycin consult **AND** vancomycin - pharmacy to dose     Assessment/Plan:  Influenza A, dx on 11/19/2023  Bilateral Pneumonia 2/2 MRSA and Flu A infection   MRSA sepsis   Generalized weakness   Elevated LFTs from sepsis: Improving   Hypoalbuminemia   PAF with RVR  Chronic HFpEF 55%  Essential hypertension  CAD s/p MI/CABG x3  ETOH abuse   Mild Hypokalemia and Hypophosphatemia   Chronic Anemia and Thrombocytopenia : improved   Hx of VHD    Plan:  Patient clinically looks the same, has mild cough. Afebrile.   His HR is now controlled  and will cont iv cardizem per CIS team   Planned cardioversion today. Continue FD lovenox   He will also need a CARO to r/o valvular vegetation     Will continue iv vancomycin for MRSA sepsis and Pneumonia, F/U by ID team   Blood cultures done on 11/27/23 has been negative so far   His LFTs are improving   cont dosing per pharmacy and monitor vanc level     Will closely monitor patients daily weight, urine out put, renal parameters and volume status    Reviewed today's labs and   WBC 6.22, Hb 10.1, Plt 181, K 4.2, Na 136    Cont tele monitoring     Cont scheduled bronchodilators    Continue PT       Need good nutrition support for better recovery, consult nutrition team     Cont supportive care     Labs in am     Critical care note:  Critical care diagnosis: Afib with RVR needing iv cardizem drip  Critical care interventions: Hands-on evaluation, review of labs/radiographs/records and discussion with patient and family if present  Critical care time spent: 35 minutes     VTE prophylaxis: Lovenox     Patient condition:  Guarded    Anticipated discharge and Disposition:   TCU       All diagnosis and differential diagnosis have been reviewed; assessment and plan has been documented; I have personally reviewed the labs and test results that are presently available; I have reviewed the patients medication list; I have reviewed the consulting providers response and recommendations. I have reviewed or attempted to review medical records based upon their availability    All of the patient's questions have been  addressed and answered. Patient's is agreeable to the above stated plan. I will continue to monitor closely and make adjustments to medical management as needed.  _____________________________________________________________________    Nutrition Status:    Radiology:  I have personally reviewed the following imaging and agree with the radiologist.     X-Ray Chest 1 View  Narrative: EXAMINATION:  XR CHEST 1  VIEW    CLINICAL HISTORY:  SOB;    COMPARISON:  11/27/2023    FINDINGS:  Single view of the chest shows right greater than left upper lobe consolidation, similar to the prior study.  No pneumothorax.  Heart size is stable.  Impression: Stable chest    Electronically signed by: Alexander Rivera MD  Date:    11/30/2023  Time:    07:19      Syed Vogt MD   11/30/2023

## 2023-11-30 NOTE — ANESTHESIA PREPROCEDURE EVALUATION
11/30/2023  Mike Urbina Jr. is a 68 y.o., male.  S/p CABG 4/23  H/o DCCV x1  Presents with rapid aflutter, resp illness.  +Flu last week  Still congested/coughing        Pre-op Assessment          Review of Systems      Physical Exam  General: Well nourished, Cooperative, Alert and Oriented    Airway:  Mallampati: II   Mouth Opening: Normal  TM Distance: Normal  Tongue: Normal  Neck ROM: Normal ROM    Dental:  Edentulous x for a lower tooth      Anesthesia Plan  Type of Anesthesia, risks & benefits discussed:    Anesthesia Type: Gen Natural Airway  Intra-op Monitoring Plan: Standard ASA Monitors  Post Op Pain Control Plan: multimodal analgesia  Induction:  IV  Informed Consent: Informed consent signed with the Patient and all parties understand the risks and agree with anesthesia plan.  All questions answered. Patient consented to blood products? Yes  ASA Score: 3  Day of Surgery Review of History & Physical: H&P Update referred to the surgeon/provider.    Ready For Surgery From Anesthesia Perspective.     .

## 2023-11-30 NOTE — PROGRESS NOTES
Inpatient Nutrition Assessment    Admit Date: 11/24/2023   Total duration of encounter: 6 days   Patient Age: 68 y.o.    Nutrition Recommendation/Prescription     Advance diet as tolerated to heart healthy diet  If appetite remains poor, consider oral supplement  Continue vitamin supplementation as medically feasible, consider adding MVI and thiamine    Communication of Recommendations: reviewed with patient    Nutrition Assessment     Malnutrition Assessment/Nutrition-Focused Physical Exam    Malnutrition Context: acute illness or injury (11/30/23 1245)  Malnutrition Level: severe (11/30/23 1245)  Energy Intake (Malnutrition): less than or equal to 50% for greater than or equal to 5 days (11/30/23 1245)  Weight Loss (Malnutrition): other (see comments) (does not meet criteria) (11/30/23 1245)              Muscle Mass (Malnutrition): moderate depletion (11/30/23 1245)  Taoism Region (Muscle Loss): moderate depletion  Clavicle Bone Region (Muscle Loss): mild depletion  Clavicle and Acromion Bone Region (Muscle Loss): moderate depletion                          A minimum of two characteristics is recommended for diagnosis of either severe or non-severe malnutrition.    Chart Review    Reason Seen: length of stay    Malnutrition Screening Tool Results   Have you recently lost weight without trying?: No  Have you been eating poorly because of a decreased appetite?: Yes   MST Score: 1   Diagnosis:  Influenza A, dx on 11/19/2023  Bilateral Pneumonia 2/2 MRSA and Flu A infection   MRSA sepsis   Generalized weakness   Elevated LFTs from sepsis: Improving   Hypoalbuminemia   PAF with RVR  Chronic HFpEF 55%  Essential hypertension  Mild Hypokalemia and Hypophosphatemia   Chronic Anemia and Thrombocytopenia : improved     Relevant Medical History:  ETOH abuse, VHD-aortic stenosis, thrombocytopenia, CAD status post CABG x3, HTN, PAFlutter s/p SILVANO ligation, chronic HFpEF 55%, chronic anemia     Nutrition-Related Medications:  "folic acid, potassium bicarbonate prn, potassium sodium phosphates prn  Calorie Containing IV Medications: no significant kcals from medications at this time    Nutrition-Related Labs: : RBC-3.52, H/H-10.1/31.6, neena-7.3      Nutrition Orders:   Diet NPO Except for: Sips with Medication      Appetite/Oral Intake: poor/25-50% of meals    Factors Affecting Nutritional Intake: decreased appetite    Food/Mandaen/Cultural Preferences: none reported    Food Allergies: no known food allergies    Wound(s): [REMOVED]      Altered Skin Integrity 23 2300 posterior Sacral spine #1 Skin Tear Intact skin with non-blanchable redness of localized area-Tissue loss description: Not applicable     Last Bowel Movement: 23    Comments    : Pt NPO today for cardioversion. Reports poor appetite ~9 days, declined ONS at this time. Denies GI complaints. UBW ~180 lb and reports unintentional weight loss. Per EMR weights, 4% weight loss in 1 month (not significant). Pt with moderate muscle wasting per NFPA.    Anthropometrics    Height: 5' 10.87" (180 cm)    Last Weight: 73.9 kg (163 lb) (23 0815) Weight Method: Bed Scale  BMI (Calculated): 22.8  BMI Classification: normal (BMI 18.5-24.9)        Ideal Body Weight (IBW), Male: 171.22 lb     % Ideal Body Weight, Male (lb): 95.2 %                 Usual Body Weight (UBW), k.1 kg  % Usual Body Weight: 96.1     Usual Weight Provided By: patient and EMR weight history    Wt Readings from Last 5 Encounters:   23 73.9 kg (163 lb)   23 79.4 kg (175 lb)   23 81.6 kg (180 lb)   23 80 kg (176 lb 4.8 oz)   10/12/23 77.1 kg (170 lb)     Weight Change(s) Since Admission:   Wt Readings from Last 1 Encounters:   23 0815 73.9 kg (163 lb)   23 0252 74.2 kg (163 lb 9.3 oz)   23 2259 74.2 kg (163 lb 9.3 oz)   23 1718 68 kg (150 lb)   Admit Weight: 68 kg (150 lb) (23 5438), Weight Method: Bed Scale    Estimated Needs    Weight " Used For Calorie Calculations: 73.9 kg (162 lb 14.7 oz)  Energy Calorie Requirements (kcal): 3914-4795 kcal (1.2-1.4 stress factor)  Energy Need Method: [place]-St Jeor  Weight Used For Protein Calculations: 73.9 kg (162 lb 14.7 oz)  Protein Requirements: 89-96 g (1.2-1.3 g/kg)  Fluid Requirements (mL): 5962-2992 ml (1 ml/kcal)  Temp (24hrs), Av °F (36.7 °C), Min:97.2 °F (36.2 °C), Max:98.8 °F (37.1 °C)       Enteral Nutrition    Patient not receiving enteral nutrition at this time.    Parenteral Nutrition    Patient not receiving parenteral nutrition support at this time.    Evaluation of Received Nutrient Intake    Calories: not meeting estimated needs  Protein: not meeting estimated needs    Patient Education    Not applicable.    Nutrition Diagnosis     PES: Malnutrition related to acute illness as evidenced by moderate muscle depletion and <50% energy intake in >5 days. (new)    Interventions/Goals     Intervention(s): general/healthful diet, commercial beverage, multivitamin/mineral supplement therapy, and collaboration with other providers    Goal: Meet greater than 75% of nutritional needs by follow-up. (new)    Monitoring & Evaluation     Dietitian will monitor food and beverage intake and weight.    Nutrition Risk/Follow-Up: moderate (follow-up in 3-5 days)   Please consult if re-assessment needed sooner.

## 2023-11-30 NOTE — AI DETERIORATION ALERT
Artificial Intelligence Notification  [facility]  [address]  [place] [address]  Phone: [phone]    This documentation was triggered by an Artificial Intelligence Notification:    Admit Date: 2023   LOS: 5  Code Status: Full Code  : 1955  Age: 68 y.o.  Weight:   Wt Readings from Last 1 Encounters:   23 73.9 kg (163 lb)        Sex: male  Bed: 403/403 A  MRN: [id number]  Attending Physician: Syed Vogt MD     Date of Alert: 2023  Time AI Alert Received:             Vitals:    23 2319   BP: 100/68   Pulse: (!) 126   Resp: (!) 24   Temp: 97.6 °F (36.4 °C)     SpO2: (!) 93 %      Artificial Intelligence alert discussed with Provider:     Name: MARTY Akhtar   Date/Time of Provider Notification:       Patient Condition: Pt HR elevated prior to AI alert, but responded appropriately to Lopressor IV push. Otherwise, pt condition unchanged from previous AI alerts.

## 2023-11-30 NOTE — PROCEDURES
Procedure: CARO guided DCCV      Final impression:    LV systolic function 55%.  Severely AV sclerocalcification with marked reduced mobility of all 3 leaflets resulting in severe AS, SHARAD 0.7cm2 by direct planimetry. Peak AV velocity 4.5 m/sec. Mild AI.  Marked MAC with restricted mobility of the MV resulting in Moderate mitral stenosis, MG 7 mmhg.  Mild-moderate MR.  Mild-moderate TR.  SILVANO is not seen and is probably absent due to prior surgical SILVANO ligation.  Successful DCCV x1      Indication: AS, MS    Informed consent: obtained, signed copy placed in the chart.    Description of the procedure: After sedation was achieved (please see anesthesia report for details), the esophagus intubated without difficulties. Multiple orthogonal views obtained. Patient tolerated procedure without immediate complications noted.    Findings:  Left atrium (LA): Mild-moderately enlarged left atrium.  Left atrial appendage (SILVANO): SILVANO is not seen and is probably absent due to prior surgical SILVANO ligation.  Interatrial septum:  NO ASD or PFO noted on 2D or Color Doppler images.   Right atrium (RA): Normal RA size.  Left ventricle (LV): LVEF 55%.   Normal LV size.  Mild-moderate left ventricular hypertrophy. No Regional wall motion abnormalities noted.  Right ventricle (RV): Partially visualized in this study.  Aortic valve: Trileaflet.  Severely AV sclerocalcification with marked reduced mobility of all 3 leaflets resulting in severe AS, SHARAD 0.7cm2 by direct planimetry. Peak AV velocity 4.5 m/sec. Mild AI. No Vegetation is present.  Mitral valve: Marked MAC with restricted mobility of the MV resulting in Moderate mitral stenosis, MG 7 mmhg. No Vegetation is present.  Mild-moderate MR.  Tricuspid valve: Partially visualized, No Tricuspid stenosis. Mild-moderate Tricuspid regurgitation. No Vegetation is present.  Pulmonic valve: Not well visualized, no hemodynamically significant pulmonic stenosis, no pulmonic regurgitation noted in this  study. No Vegetation is present.  Thoracic aorta: Scattered  Atherosclerotic changes of the descending thoracic aorta.  Normal Caliber aortic root.   Normal Caliber Proximal portion of the ascending aorta.   Pericardium: No significant pericardial effusion is present.    Description of the DCCV procedure:   After completion of CARO, no intracardiac thrombus was visualized, so I decided to proceed with Cardioversion  200 J synchronized cardioversion was completed.  Number of attempts: 1

## 2023-11-30 NOTE — PT/OT/SLP PROGRESS
Physical Therapy Treatment    Patient Name:  Mike Urbina Jr.   MRN:  [id number]    Recommendations:     Discharge therapy intensity: Moderate Intensity Therapy   Discharge Equipment Recommendations: to be determined by next level of care  Barriers to discharge:  medical dx, impaired mobility, decreased independence     Assessment:     Mike Urbina Jr. is a 68 y.o. male admitted with a medical diagnosis of Atrial fibrillation with RVR., B pneumonia, hypotensive shock, LOVELY. Pt initially dx with influenza A on 11/19 at another facility; increased SOB on 11/24, tachycardia with failed cardioversion. He presents with the following impairments/functional limitations: weakness, gait instability, impaired cardiopulmonary response to activity, impaired self care skills, impaired functional mobility.  Pt semi-supine in bed, agreeable to tx session, dizzy with mobility however BP stable. HR monitored closely. Due to have CARO with possible cardioversion.       Rehab Prognosis: Good; patient would benefit from acute skilled PT services to address these deficits and reach maximum level of function.    Recent Surgery: * No surgery found *      Plan:     During this hospitalization, patient to be seen 5 x/week to address the identified rehab impairments via gait training, therapeutic activities, therapeutic exercises and progress toward the following goals:    Plan of Care Expires:  12/27/23    Subjective     Chief Complaint: weakness   Patient/Family Comments/goals: to get stronger   Pain/Comfort:  Pain Rating 1: 0/10      Objective:     Communicated with NSG prior to session.  Patient found HOB elevated with pulse ox (continuous), telemetry, oxygen upon PT entry to room.     General Precautions: Standard, fall  Orthopedic Precautions: N/A  Braces: N/A  Respiratory Status: Nasal cannula, flow 2 L/min  Heart Rate:  with activity  SpO2: SPO2 remained >95%  Skin assessment: Noted redness and sensitivity on JON  elbows     Functional Mobility:  Bed Mobility:     Supine to Sit: minimum assistance  Dizziness upon sitting at EOB that improved mildly with seated LE therex; BP assessed  Transfers:     Sit<->stand and stand step with RW; min assist      Education:  Patient provided with verbal education  regarding PT role/goals/POC, fall prevention, and safety awareness.  Understanding was verbalized.     Patient left up in chair with all lines intact, call button in reach, and RN notified.    GOALS:   Multidisciplinary Problems       Physical Therapy Goals          Problem: Physical Therapy    Goal Priority Disciplines Outcome Goal Variances Interventions   Physical Therapy Goal     PT, PT/OT Ongoing, Progressing     Description: Goals to be met by: 23     Patient will increase functional independence with mobility by performin. Supine to sit with [place]  2. Sit to supine with [place]  3. Sit to stand transfer with Modified [place]  4. Gait  x 150 feet with Modified [place] using Rolling Walker vs no AD.                          Time Tracking:     PT Received On: 23  PT Start Time: 1101     PT Stop Time: 1129  PT Total Time (min): 28 min     Billable Minutes: Therapeutic Activity 2 units    Treatment Type: Treatment  PT/PTA: PTA     Number of PTA visits since last PT visit: 3     2023

## 2023-12-01 LAB
ANION GAP SERPL CALC-SCNC: 6 MEQ/L
BUN SERPL-MCNC: 15.5 MG/DL (ref 8.4–25.7)
CALCIUM SERPL-MCNC: 7.2 MG/DL (ref 8.8–10)
CHLORIDE SERPL-SCNC: 107 MMOL/L (ref 98–107)
CO2 SERPL-SCNC: 23 MMOL/L (ref 23–31)
CREAT SERPL-MCNC: 0.81 MG/DL (ref 0.73–1.18)
CREAT/UREA NIT SERPL: 19
GFR SERPLBLD CREATININE-BSD FMLA CKD-EPI: >60 MLS/MIN/1.73/M2
GLUCOSE SERPL-MCNC: 108 MG/DL (ref 82–115)
POTASSIUM SERPL-SCNC: 4 MMOL/L (ref 3.5–5.1)
SODIUM SERPL-SCNC: 136 MMOL/L (ref 136–145)
VANCOMYCIN TROUGH SERPL-MCNC: 19.9 UG/ML (ref 15–20)

## 2023-12-01 PROCEDURE — 94664 DEMO&/EVAL PT USE INHALER: CPT

## 2023-12-01 PROCEDURE — 80202 ASSAY OF VANCOMYCIN: CPT | Performed by: INTERNAL MEDICINE

## 2023-12-01 PROCEDURE — 80048 BASIC METABOLIC PNL TOTAL CA: CPT | Performed by: INTERNAL MEDICINE

## 2023-12-01 PROCEDURE — 99900035 HC TECH TIME PER 15 MIN (STAT)

## 2023-12-01 PROCEDURE — 99233 SBSQ HOSP IP/OBS HIGH 50: CPT | Mod: FS,,, | Performed by: GENERAL PRACTICE

## 2023-12-01 PROCEDURE — 63600175 PHARM REV CODE 636 W HCPCS

## 2023-12-01 PROCEDURE — 99233 PR SUBSEQUENT HOSPITAL CARE,LEVL III: ICD-10-PCS | Mod: FS,,, | Performed by: GENERAL PRACTICE

## 2023-12-01 PROCEDURE — 25000003 PHARM REV CODE 250: Performed by: INTERNAL MEDICINE

## 2023-12-01 PROCEDURE — 25000003 PHARM REV CODE 250: Performed by: NURSE PRACTITIONER

## 2023-12-01 PROCEDURE — 97116 GAIT TRAINING THERAPY: CPT | Mod: CQ

## 2023-12-01 PROCEDURE — 25000242 PHARM REV CODE 250 ALT 637 W/ HCPCS: Performed by: INTERNAL MEDICINE

## 2023-12-01 PROCEDURE — 94799 UNLISTED PULMONARY SVC/PX: CPT

## 2023-12-01 PROCEDURE — 94640 AIRWAY INHALATION TREATMENT: CPT

## 2023-12-01 PROCEDURE — 25000003 PHARM REV CODE 250

## 2023-12-01 PROCEDURE — 99024 POSTOP FOLLOW-UP VISIT: CPT | Mod: POP,,, | Performed by: PHYSICIAN ASSISTANT

## 2023-12-01 PROCEDURE — 27000207 HC ISOLATION

## 2023-12-01 PROCEDURE — 27000221 HC OXYGEN, UP TO 24 HOURS

## 2023-12-01 PROCEDURE — 94761 N-INVAS EAR/PLS OXIMETRY MLT: CPT

## 2023-12-01 PROCEDURE — 63600175 PHARM REV CODE 636 W HCPCS: Performed by: INTERNAL MEDICINE

## 2023-12-01 PROCEDURE — 21400001 HC TELEMETRY ROOM

## 2023-12-01 PROCEDURE — 27000646 HC AEROBIKA DEVICE

## 2023-12-01 PROCEDURE — 99024 PR POST-OP FOLLOW-UP VISIT: ICD-10-PCS | Mod: POP,,, | Performed by: PHYSICIAN ASSISTANT

## 2023-12-01 RX ORDER — LOSARTAN POTASSIUM 25 MG/1
25 TABLET ORAL DAILY
Status: DISCONTINUED | OUTPATIENT
Start: 2023-12-01 | End: 2023-12-03

## 2023-12-01 RX ADMIN — WHITE PETROLATUM: 1.75 OINTMENT TOPICAL at 09:12

## 2023-12-01 RX ADMIN — LEVALBUTEROL HYDROCHLORIDE 1.25 MG: 1.25 SOLUTION RESPIRATORY (INHALATION) at 03:12

## 2023-12-01 RX ADMIN — WHITE PETROLATUM: 1.75 OINTMENT TOPICAL at 10:12

## 2023-12-01 RX ADMIN — VANCOMYCIN HYDROCHLORIDE 1000 MG: 1 INJECTION, POWDER, LYOPHILIZED, FOR SOLUTION INTRAVENOUS at 06:12

## 2023-12-01 RX ADMIN — ENOXAPARIN SODIUM 70 MG: 80 INJECTION SUBCUTANEOUS at 11:12

## 2023-12-01 RX ADMIN — FOLIC ACID 1 MG: 1 TABLET ORAL at 10:12

## 2023-12-01 RX ADMIN — AMIODARONE HYDROCHLORIDE 200 MG: 200 TABLET ORAL at 10:12

## 2023-12-01 RX ADMIN — GUAIFENESIN 400 MG: 200 SOLUTION ORAL at 02:12

## 2023-12-01 RX ADMIN — GUAIFENESIN 400 MG: 200 SOLUTION ORAL at 09:12

## 2023-12-01 RX ADMIN — LEVALBUTEROL HYDROCHLORIDE 1.25 MG: 1.25 SOLUTION RESPIRATORY (INHALATION) at 08:12

## 2023-12-01 RX ADMIN — ENOXAPARIN SODIUM 70 MG: 80 INJECTION SUBCUTANEOUS at 09:12

## 2023-12-01 RX ADMIN — DILTIAZEM HYDROCHLORIDE 5 MG/HR: 5 INJECTION INTRAVENOUS at 02:12

## 2023-12-01 RX ADMIN — LOSARTAN POTASSIUM 25 MG: 25 TABLET, FILM COATED ORAL at 10:12

## 2023-12-01 RX ADMIN — QUETIAPINE 200 MG: 100 TABLET ORAL at 09:12

## 2023-12-01 RX ADMIN — MICONAZOLE NITRATE: 20 POWDER TOPICAL at 10:12

## 2023-12-01 RX ADMIN — VANCOMYCIN HYDROCHLORIDE 1000 MG: 1 INJECTION, POWDER, LYOPHILIZED, FOR SOLUTION INTRAVENOUS at 04:12

## 2023-12-01 RX ADMIN — METOPROLOL SUCCINATE 100 MG: 50 TABLET, EXTENDED RELEASE ORAL at 10:12

## 2023-12-01 RX ADMIN — ASPIRIN 81 MG: 81 TABLET, COATED ORAL at 10:12

## 2023-12-01 RX ADMIN — LEVALBUTEROL HYDROCHLORIDE 1.25 MG: 1.25 SOLUTION RESPIRATORY (INHALATION) at 12:12

## 2023-12-01 RX ADMIN — GUAIFENESIN 400 MG: 200 SOLUTION ORAL at 10:12

## 2023-12-01 RX ADMIN — MICONAZOLE NITRATE: 20 POWDER TOPICAL at 09:12

## 2023-12-01 RX ADMIN — LINACLOTIDE 72 MCG: 72 CAPSULE, GELATIN COATED ORAL at 06:12

## 2023-12-01 NOTE — PROGRESS NOTES
[facility] Hospital Medicine Progress Note        Chief Complaint: Inpatient Follow-up for MRSA sepsis and Pneumonia     HPI:   Mike Urbina Jr. is a 68 y.o. male with a PMHx of  ETOH abuse, VHD-aortic stenosis, thrombocytopenia, CAD status post CABG x3, HTN, PAFlutter s/p SILVANO ligation, chronic HFpEF 55%, chronic anemia who presented to [facility] on 11/24/2023 with via EMS as a transfer from [facility] ED for higher level of care.  He initially presented to the [facility] ED via EMS with complaints of worsening SOB and generalized weakness after being diagnosed with influenza a x5 days prior. Reportedly prescribed Tamiflu on 11/20/2023. He also endorsed decreased oral intake, and chest pain that is worse with deep inspiration.  Patient does endorsed drinking a 6 pack of beer per day.     Labs were notable for sodium 135, CO2 17, glucose 179, BUN 36.2, creatinine 1.49, total bili 2.9, AST 56, BNP 3053, WBC 13.68, hemoglobin 12.5, hematocrit 40, troponin 0.012.  CXR demonstrated right upper lobe consolidation concerning for pneumonia.  EKG demonstrated supraventricular tachycardia with rate of 172.  He was given adenosine 6 mg followed by 12 mg without improvement.  He was then given Lopressor 5 mg IV and rate noted to be atrial fibrillation with RVR.  He was initiated on a diltiazem drip.  He was then transferred to [facility] ED.  CTA chest negative for PE, findings concerning for multifocal pneumonia most evident in the right upper lobe.  He was started on broad-spectrum IV antibiotics.  Blood cultures x2 obtained.       He was admitted to ICU.  MRSA PCR detected. Antibiotic coverage broadened to vanc, Zosyn and doxycycline.  TTE demonstrated EF 55%, moderate aortic stenosis, moderate mitral stenosis and mild MR.  Blood cultures from 11/24/2023 growing MRSA in 2 of 2 bottles.  Respiratory PCR panel negative.  Tamiflu was also continued.  Also required Levophed for BP support.  He has since been weaned off of Levophed and Cardizem.  Zosyn and Tamiflu were discontinued on 11/27/2023.  He remains on IV vancomycin.  ID was consulted on 11/27/2023. Blood cultures repeated x2 on 11/27/2023.  He was cleared for downgrade to the floor on 11/27/2023.  PT/OT consulted. Hospital medicine consulted for assumption of care and further medical management.     Labs on 11/27/2023 notable for WBC 3.16, hemoglobin 9.9, hematocrit 30.3, platelets 107, sodium 134, potassium 3.4, calcium 7.3, phosphorus 1.6, albumin 2, , .    Patient was continued on iv vancomycin. He was very weak and needed PT. On 11/29/23 he went into Afib with RVR. He was seen by CIS team and started on iv cardizem drip and planned cardioversion. He was continued on FD anticoagulation.     Patient had a CARO done and showed severe Aortic stenosis and no clots or vegetation. Cardioversion was done.    Interval Hx:   Patient today awake and comfortable. He slept well and has been afebrile. He a mild cough. No chest pain or SOB. He was cardioverted on 11/30/23.     No family at bedside.     Case was discussed with patient's nurse and  on the floor.    Objective/physical exam:  General: In no acute distress  Chest: Ernie mild rhonchi   Heart: Regular rate + irregular rhythm + Loud ESM  Abdomen: Soft, nontender, BS +  MSK: Warm, no lower extremity edema, no clubbing or cyanosis  Neurologic: Cranial nerve II-XII intact, Strength 4/5 in all 4 extremities    VITAL SIGNS: 24 HRS MIN & MAX LAST   Temp  Min: 97.2 °F (36.2 °C)  Max: 98.8 °F (37.1 °C) 97.2 °F (36.2 °C)   BP  Min: 100/68  Max: 158/81 (!) 146/78   Pulse  Min: 71  Max: 132  85   Resp  Min: 16  Max: 25 (!) 24   SpO2  Min: 93 %  Max: 98 % (!) 94 %     I have reviewed the following labs:  Recent Labs   Lab 11/28/23  0208 11/29/23  0401 11/30/23  0342   WBC 3.91* 4.44  4.44* 6.22   RBC 3.67* 3.79* 3.52*   HGB 10.5* 10.7* 10.1*   HCT 32.0* 33.1* 31.6*   MCV 87.2 87.3  89.8   MCH 28.6 28.2 28.7   MCHC 32.8* 32.3* 32.0*   RDW 17.9* 17.6* 17.6*   PLT 99* 134 181   MPV 11.6* 11.7* 11.2*     Recent Labs   Lab 11/24/23  1511 11/24/23  1818 11/25/23  0159 11/26/23  0750 11/27/23  0121 11/28/23  0208 11/29/23  0401 11/30/23  0342   NA  --    < >  --  133* 134* 134* 135* 136   K  --    < >  --  3.4* 3.4* 3.8 3.4* 4.2   CO2  --    < >  --  20* 23 18* 22* 22*   BUN  --    < >  --  27.0* 25.0 17.0 17.9 18.1   CREATININE  --    < >  --  0.95 0.89 0.82 0.80 0.84   CALCIUM  --    < >  --  7.4* 7.3* 7.6* 7.2* 7.3*   PH 7.388  --  7.500*  --   --   --   --   --    MG  --    < >  --  1.90 1.90  --   --  1.90   ALBUMIN  --    < >  --  2.0* 2.0* 1.9* 1.9*  --    ALKPHOS  --    < >  --  41 54 63 63  --    ALT  --    < >  --  47 118* 92* 58*  --    AST  --    < >  --  94* 222* 103* 47*  --    BILITOT  --    < >  --  1.6* 1.5 1.2 1.2  --     < > = values in this interval not displayed.     Microbiology Results (last 7 days)       Procedure Component Value Units Date/Time    Stool Culture [[id number]]  (Abnormal) Collected: 11/27/23 1525    Order Status: Completed Specimen: Stool Updated: 11/29/23 1116     Stool Culture Negative for Salmonella, Shigella, Campylobacter, Vibrio, Aeromonas, Pleisiomonas,Yersinia, or Shiga Toxin 1 and 2.      Many Yeast    Blood Culture [[id number]]  (Normal) Collected: 11/27/23 0637    Order Status: Completed Specimen: Blood from Hand, Left Updated: 11/28/23 1345     CULTURE, BLOOD ([facility]) No Growth At 24 Hours    Blood Culture [[id number]]  (Normal) Collected: 11/27/23 0637    Order Status: Completed Specimen: Blood from Wrist, Right Updated: 11/28/23 1342     CULTURE, BLOOD ([facility]) No Growth At 24 Hours    Chlamydia/GC, PCR [[id number]] Collected: 11/27/23 1651    Order Status: Completed Specimen: Urine Updated: 11/27/23 1843     Chlamydia trachomatis PCR Not Detected     N. gonorrhea PCR Not Detected     Source Urine    Narrative:      The Xpert CT/NG test, performed on the  GeneXpert system is a qualitative in vitro real-time polymerase chain reaction (PCR) test for the automated detected and differentiation for genomic DNA from Chlamydia trachomatis (CT) and/or Neisseria gonorrhoeae (NG).    Respiratory Culture [[id number]]     Order Status: Sent Specimen: Sputum     Respiratory Culture [[id number]] Collected: 11/24/23 2140    Order Status: Completed Specimen: Sputum, Expectorated Updated: 11/25/23 1230     Respiratory Culture Gram stain demonstrates significant oropharyngeal contamination. Sputum unsatisfactory for culture     GRAM STAIN Quality -2      Many Gram positive cocci      Many Gram Negative Rods             See below for Radiology    Scheduled Med:   amiodarone  200 mg Oral Daily    aspirin  81 mg Oral Daily    enoxparin  70 mg Subcutaneous Q12H    folic acid  1 mg Oral Daily    guaiFENesin 100 mg/5 ml  400 mg Oral Q6H WAKE    levalbuterol  1.25 mg Nebulization Q8H    linaCLOtide  72 mcg Oral Before breakfast    metoprolol succinate  100 mg Oral Daily    miconazole NITRATE 2 %   Topical (Top) BID    QUEtiapine  200 mg Oral QHS    vancomycin (VANCOCIN) IV (PEDS and ADULTS)  1,000 mg Intravenous Q12H      Continuous Infusions:   dilTIAZem 10 mg/hr (11/30/23 0621)        PRN Meds:  loperamide, lorazepam, magnesium oxide, magnesium oxide, metoprolol, ondansetron, potassium bicarbonate, potassium bicarbonate, potassium bicarbonate, potassium, sodium phosphates, potassium, sodium phosphates, potassium, sodium phosphates, sodium chloride 0.9%, Pharmacy to dose Vancomycin consult **AND** vancomycin - pharmacy to dose     Assessment/Plan:  Influenza A, dx on 11/19/2023  Bilateral Pneumonia 2/2 MRSA and Flu A infection   MRSA sepsis   Generalized weakness   Severe aortic stenosis   Elevated LFTs from sepsis: Improving   Hypoalbuminemia   PAF with RVR s/p cardioversion   Chronic HFpEF 55%  Essential hypertension  CAD s/p MI/CABG x3  ETOH abuse   Mild Hypokalemia and Hypophosphatemia    Chronic Anemia and Thrombocytopenia : improved   Hx of VHD    Plan:  Patient had no acute issues overnight. Has been afebrile  Still has a cough and mild jonelle rhonchi. Will cont neb tx   cont iv cardizem per CIS team, HR controlled   S/P cardioversion, CARO did not show any clots or vegetation  Cont po amiodarone     Will continue iv vancomycin for MRSA sepsis and Pneumonia, F/U by ID team   Blood cultures done on 11/27/23 has been negative so far   His LFTs are improving   cont dosing per pharmacy and monitor vanc level     BP elevated, will add losartan 25 mg po daily   Cont metoprolol     Will closely monitor patients daily weight, urine out put, renal parameters and volume status      Reviewed today's labs and K 4, Na 136    Cont tele monitoring     Continue PT       Need good nutrition support for better recovery, consult nutrition team     Cont supportive care     Labs in am     Critical care note:  Critical care diagnosis: Afib with RVR needing iv cardizem drip  Critical care interventions: Hands-on evaluation, review of labs/radiographs/records and discussion with patient and family if present  Critical care time spent: 35 minutes     VTE prophylaxis: Lovenox     Patient condition:  Guarded    Anticipated discharge and Disposition:   Swing bed when accepted       All diagnosis and differential diagnosis have been reviewed; assessment and plan has been documented; I have personally reviewed the labs and test results that are presently available; I have reviewed the patients medication list; I have reviewed the consulting providers response and recommendations. I have reviewed or attempted to review medical records based upon their availability    All of the patient's questions have been  addressed and answered. Patient's is agreeable to the above stated plan. I will continue to monitor closely and make adjustments to medical management as  needed.  _____________________________________________________________________    Nutrition Status:    Radiology:  I have personally reviewed the following imaging and agree with the radiologist.     X-Ray Chest 1 View  Narrative: EXAMINATION:  XR CHEST 1 VIEW    CLINICAL HISTORY:  SOB;    COMPARISON:  11/27/2023    FINDINGS:  Single view of the chest shows right greater than left upper lobe consolidation, similar to the prior study.  No pneumothorax.  Heart size is stable.  Impression: Stable chest    Electronically signed by: Alexander Rivera MD  Date:    11/30/2023  Time:    07:19      Syed Vogt MD   11/30/2023

## 2023-12-01 NOTE — PROGRESS NOTES
[facility]  Infectious Disease Progress Note            ASSESSMENT & PLAN:     He is a 68-year-old male with a past medical history of CAD, s/p CABG in 3/2023, HFpEF, and hypertension who was diagnosed with influenza A on 11/19/2023.  He was compliant with course of Tamiflu, however presented to an outlying facility on 11/24/2023 with shortness of breath.  There was concern for atrial fibrillation with RVR and patient received 2 doses of adenosine followed by a Cardizem drip for rate control.  He was noted to be hypoxic showed right upper lobe consolidation concerning for pneumonia.  CTA of the chest was negative for PE, however did showed multifocal pneumonia, most evident in the right upper lobe.  Admit blood cultures are positive for MRSA.  Patient does have a productive cough, however denies shortness of breath or pleuritic chest pain.  He does continue to require supplemental oxygen although overall is feeling better.  He has been downgraded to the floor.  He reports some low back pain and bilateral lower extremity weakness, however reports back pain is chronic and although somewhat worse recently, denies significant issues.  Also denies any bowel or bladder issues.  He was receiving empiric Zosyn, vancomycin, and remained on Tamiflu, however is currently only on vancomycin.  Remains on Cardizem.   TTE was negative for endocarditis, however did show severe posterior mitral annular calcification with moderate stenosis and mild regurgitation as well as moderate aortic stenosis also from calcified cusps and mild TR.  Blood cultures were repeated this morning.  We have been consulted regarding MRSA bacteremia.     MRSA bacteremia  Post-influenza MRSA pneumonia  Acute hypoxemic respiratory failure  Atrial fibrillation with RVR, now rate controlled on Cardizem  CAD, s/p CABG in 3/2023  H/o HTN / HFpEF  ETOH use  Pancytopenia  Transaminitis     PLAN:  CARO negative.   Continue vancomycin, pharmacy dosing for trough goal  "15-20.  Plan a 4 week course from negative BCx. End date: 12/25.  Weekly CBC, CMP, CRP, ESR, vanc level.  Fax results to us at [phone].  F/u with us in 3-4 weeks.   Will sign off.  Please call if need arises.   Discussed with patient.     SUBJECTIVE:     AF, VSS.  No events overnight.  Some dizziness when working with therapy per report, no other complaints.     MEDICATIONS:   Reviewed in EMR    REVIEW OF SYSTEMS:   Except as documented, all other systems reviewed and negative     PHYSICAL EXAM:   T 98.4 °F (36.9 °C)   BP (!) 172/92   P 79   RR 19   O2 98 %  GENERAL: NAD; does not appear toxic  SKIN: no rash  HEENT: sclera non-icteric; PERRL   NECK: supple; no LAD  CHEST: Rhonchi; nonlabored, equal expansion   CARDIOVASCULAR: RRR, S1S2; + murmur; strong, equal peripheral pulses; no edema  ABDOMEN:  active bowel sounds; abdomen soft, nondistended, nontender to palpation  GENITOURINARY: no suprapubic tenderness   EXTREMITIES: no cyanosis or clubbing; strength equal bilaterally  NEURO: AAO x4; CN II-XII grossly intact; somewhat [place]  PSYCH: Mentation and affect appropriate     LABS AND IMAGING:     Recent Labs     11/29/23 0401 11/30/23 0342   WBC 4.44  4.44* 6.22   RBC 3.79* 3.52*   HGB 10.7* 10.1*   HCT 33.1* 31.6*   MCV 87.3 89.8   MCH 28.2 28.7   MCHC 32.3* 32.0*   RDW 17.6* 17.6*    181       No results for input(s): "LACTIC" in the last 72 hours.  No results for input(s): "INR", "APTT", "D-DIMER" in the last 72 hours.  No results for input(s): "HGBA1C", "CHOL", "TRIG", "LDL", "VLDL", "HDL" in the last 72 hours.   Recent Labs     11/29/23  0401 11/30/23 0342 12/01/23 0409   * 136 136   K 3.4* 4.2 4.0   CHLORIDE 104 104 107   CO2 22* 22* 23   BUN 17.9 18.1 15.5   CREATININE 0.80 0.84 0.81   GLUCOSE 113 99 108   CALCIUM 7.2* 7.3* 7.2*   MG  --  1.90  --    ALBUMIN 1.9*  --   --    GLOBULIN 2.9  --   --    ALKPHOS 63  --   --    ALT 58*  --   --    AST 47*  --   --    BILITOT 1.2  --   --  " "      No results for input(s): "BNP", "CPK", "TROPONINI" in the last 72 hours.       Transesophageal echo (CARO)with possible cardioversion  Addison Barnhart MD     11/30/2023  3:10 PM  Procedure: CARO guided DCCV    Final impression:    LV systolic function 55%.  Severely AV sclerocalcification with marked reduced mobility of   all 3 leaflets resulting in severe AS, SHARAD 0.7cm2 by direct   planimetry. Peak AV velocity 4.5 m/sec. Mild AI.  Marked MAC with restricted mobility of the MV resulting in   Moderate mitral stenosis, MG 7 mmhg.  Mild-moderate MR.  Mild-moderate TR.  SILVANO is not seen and is probably absent due to prior surgical SILVANO   ligation.  Successful DCCV x1    Indication: AS, MS    Informed consent: obtained, signed copy placed in the chart.    Description of the procedure: After sedation was achieved (please   see anesthesia report for details), the esophagus intubated   without difficulties. Multiple orthogonal views obtained. Patient   tolerated procedure without immediate complications noted.    Findings:  Left atrium (LA): Mild-moderately enlarged left atrium.  Left atrial appendage (SILVANO): SILVANO is not seen and is probably   absent due to prior surgical SILVANO ligation.  Interatrial septum:  NO ASD or PFO noted on 2D or Color Doppler   images.   Right atrium (RA): Normal RA size.  Left ventricle (LV): LVEF 55%.   Normal LV size.  Mild-moderate   left ventricular hypertrophy. No Regional wall motion   abnormalities noted.  Right ventricle (RV): Partially visualized in this study.  Aortic valve: Trileaflet.  Severely AV sclerocalcification with   marked reduced mobility of all 3 leaflets resulting in severe AS,   SHARAD 0.7cm2 by direct planimetry. Peak AV velocity 4.5 m/sec. Mild   AI. No Vegetation is present.  Mitral valve: Marked MAC with restricted mobility of the MV   resulting in Moderate mitral stenosis, MG 7 mmhg. No Vegetation   is present.  Mild-moderate MR.  Tricuspid valve: Partially visualized, No " Tricuspid stenosis.   Mild-moderate Tricuspid regurgitation. No Vegetation is present.  Pulmonic valve: Not well visualized, no hemodynamically   significant pulmonic stenosis, no pulmonic regurgitation noted in   this study. No Vegetation is present.  Thoracic aorta: Scattered  Atherosclerotic changes of the   descending thoracic aorta.  Normal Caliber aortic root.   Normal   Caliber Proximal portion of the ascending aorta.   Pericardium: No significant pericardial effusion is present.    Description of the DCCV procedure:   After completion of CARO, no intracardiac thrombus was visualized,   so I decided to proceed with Cardioversion  200 J synchronized cardioversion was completed.  Number of attempts: 1  X-Ray Chest 1 View  Narrative: EXAMINATION:  XR CHEST 1 VIEW    CLINICAL HISTORY:  SOB;    COMPARISON:  11/27/2023    FINDINGS:  Single view of the chest shows right greater than left upper lobe consolidation, similar to the prior study.  No pneumothorax.  Heart size is stable.  Impression: Stable chest    Electronically signed by: Alexander Rivera MD  Date:    11/30/2023  Time:    07:19          LOREN Hinkle  Infectious Disease

## 2023-12-01 NOTE — CONSULTS
[facility]   Consult Note  Cardiothoracic Surgery    SUBJECTIVE:     Chief Complaint/Reason for Admission: sob    History of Present Illness:  Patient is a 68 y.o. male presents with sob, pneumonia  Afib c rvr  AS, MS - ef 55%  Recent cab this year.      Family History of Heart Disease: no    No current facility-administered medications on file prior to encounter.     Current Outpatient Medications on File Prior to Encounter   Medication Sig Dispense Refill    amiodarone (PACERONE) 200 MG Tab Take 1 tablet (200 mg total) by mouth once daily. 30 tablet 3    aspirin (ECOTRIN) 81 MG EC tablet Take 1 tablet (81 mg total) by mouth once daily. 30 tablet 0    atorvastatin (LIPITOR) 40 MG tablet Take 1 tablet (40 mg total) by mouth every evening. 30 tablet 2    furosemide (LASIX) 20 MG tablet Take 1 tablet (20 mg total) by mouth once daily. for 4 days 4 tablet 0    hydrOXYzine pamoate (VISTARIL) 50 MG Cap Take 1 capsule (50 mg total) by mouth every 8 (eight) hours as needed (as needed for anxiety). 30 capsule 1    linaCLOtide (LINZESS) 72 mcg Cap capsule Take 1 capsule (72 mcg total) by mouth before breakfast. 30 each 1    metoprolol succinate (TOPROL-XL) 50 MG 24 hr tablet TAKE 1 TABLET BY MOUTH ONCE DAILY 30 tablet 0    QUEtiapine (SEROQUEL) 100 MG Tab Take 2 tablets (200 mg total) by mouth every evening. 60 tablet 3    sodium bicarbonate 650 MG tablet Take 650 mg by mouth once daily.      thiamine (VITAMIN B-1) 100 MG tablet Take 100 mg by mouth once daily.      valsartan (DIOVAN) 160 MG tablet Take 1 tablet (160 mg total) by mouth 2 (two) times daily. 180 tablet 3        Review of patient's allergies indicates:  No Known Allergies     Past Medical History:   Diagnosis Date    Atrial flutter     CHF (congestive heart failure)     Coronary artery disease     Hypertension     Myocardial infarction     SOB (shortness of breath)     at times        Past Surgical History:   Procedure Laterality Date    CATARACT  EXTRACTION Bilateral     CORONARY ARTERY BYPASS GRAFT (CABG) N/A 4/3/2023    Procedure: CORONARY ARTERY BYPASS GRAFT (CABG);  Surgeon: Deuce Finley IV, MD;  Location: [facility];  Service: Cardiovascular;  Laterality: N/A;  WITH LLAA //  ECHO NOTIFIED    LEFT HEART CATHETERIZATION N/A 03/15/2023    Procedure: CATHETERIZATION, HEART, LEFT;  Surgeon: Sreedhar Herrera MD;  Location: [facility] CATH LAB;  Service: Cardiology;  Laterality: N/A;  LHC via RRA           Review of Systems:  Review of Systems   Respiratory:  Positive for shortness of breath.    All other systems reviewed and are negative.       OBJECTIVE:        Physical Exam:  Physical Exam  Vitals reviewed.   HENT:      Head: Normocephalic.      Right Ear: Tympanic membrane normal.      Left Ear: Tympanic membrane normal.      Nose: Nose normal.      Mouth/Throat:      Mouth: Mucous membranes are moist.   Eyes:      Pupils: Pupils are equal, round, and reactive to light.   Cardiovascular:      Rate and Rhythm: Normal rate and regular rhythm.      Pulses: Normal pulses.      Heart sounds: Murmur heard.   Pulmonary:      Effort: Pulmonary effort is normal.      Breath sounds: Normal breath sounds.   Abdominal:      Palpations: Abdomen is soft.   Musculoskeletal:         General: Normal range of motion.      Cervical back: Normal range of motion.   Skin:     General: Skin is warm.   Neurological:      General: No focal deficit present.      Mental Status: He is alert.   Psychiatric:         Mood and Affect: Mood normal.          Laboratory:  I have reviewed all pertinent lab results within the past 24 hours.    Diagnostic Results:  Echo: Reviewed          ASSESSMENT/PLAN:     A: AS  P: since recent cab this year, will refer to TAVR team

## 2023-12-01 NOTE — ANESTHESIA POSTPROCEDURE EVALUATION
Anesthesia Post Evaluation    Patient: Mike Urbina Jr.    Procedure(s) Performed: * No procedures listed *    Final Anesthesia Type: general      Patient location during evaluation: PACU  Patient participation: Yes- Able to Participate  Level of consciousness: awake and alert  Post-procedure vital signs: reviewed and stable  Pain management: adequate  Airway patency: patent      Anesthetic complications: no      Cardiovascular status: hemodynamically stable  Respiratory status: unassisted  Hydration status: euvolemic  Follow-up not needed.              Vitals Value Taken Time   /80 12/01/23 0421   Temp 36.4 °C (97.6 °F) 12/01/23 0421   Pulse 82 12/01/23 0421   Resp 17 12/01/23 0013   SpO2 94 % 12/01/23 0421         No case tracking events are documented in the log.      Pain/Wanda Score: No data recorded

## 2023-12-01 NOTE — PT/OT/SLP PROGRESS
Physical Therapy Treatment    Patient Name:  Mike Urbina Jr.   MRN:  [id number]    Recommendations:     Discharge therapy intensity: Moderate Intensity Therapy   Discharge Equipment Recommendations: to be determined by next level of care  Barriers to discharge:  medical dx, impaired mobility, decreased independence     Assessment:     Mike Urbina Jr. is a 68 y.o. male admitted with a medical diagnosis of Atrial fibrillation with RVR., B pneumonia, hypotensive shock, LOVELY. Pt initially dx with influenza A on 11/19 at another facility; increased SOB on 11/24, tachycardia with failed cardioversion. He presents with the following impairments/functional limitations: weakness, gait instability, impaired cardiopulmonary response to activity, impaired self care skills, impaired functional mobility.      Rehab Prognosis: Good; patient would benefit from acute skilled PT services to address these deficits and reach maximum level of function.    Recent Surgery: * No surgery found *      Plan:     During this hospitalization, patient to be seen 5 x/week to address the identified rehab impairments via gait training, therapeutic activities, therapeutic exercises and progress toward the following goals:    Plan of Care Expires:  12/27/23    Subjective     Chief Complaint: weakness   Patient/Family Comments/goals: to get stronger   Pain/Comfort:  Pain Rating 1: 0/10      Objective:     Communicated with NSG prior to session.  Patient found up in chair with pulse ox (continuous), telemetry, oxygen upon PT entry to room.     General Precautions: Standard, fall  Orthopedic Precautions: N/A  Braces: N/A  Respiratory Status: Nasal cannula, flow 3 L/min  SPO2: 99%-74% with activity   Heart rate: remained in the 80-90's  Skin assessment: no new findings    Functional Mobility:  Transfers:     Sit<->stand CGA with RW  Gait with RW: Ambulated x 45', 50', 55' with CGA, chair following 2/2 decreased endurance. Noted labored  breathing and cued pt to sit to rest with fairly quick recovery from 74% to low 90's.        Education:  Patient provided with verbal education  regarding PT role/goals/POC, fall prevention, and safety awareness.  Understanding was verbalized.     Patient left up in chair with all lines intact, call button in reach, and RN notified.    GOALS:   Multidisciplinary Problems       Physical Therapy Goals          Problem: Physical Therapy    Goal Priority Disciplines Outcome Goal Variances Interventions   Physical Therapy Goal     PT, PT/OT Ongoing, Progressing     Description: Goals to be met by: 23     Patient will increase functional independence with mobility by performin. Supine to sit with Cope  2. Sit to supine with Cope  3. Sit to stand transfer with Modified Cope  4. Gait  x 150 feet with Modified Cope using Rolling Walker vs no AD.                          Time Tracking:     PT Received On: 23  PT Start Time: 1356     PT Stop Time: 1419  PT Total Time (min): 23 min     Billable Minutes: Gait Training 2 units    Treatment Type: Treatment  PT/PTA: PTA     Number of PTA visits since last PT visit: 4     2023

## 2023-12-01 NOTE — NURSING
Subjective:      Patient ID: Mike Urbina Jr. is a 68 y.o. male.    Chief Complaint: transfer (Tx from [place] for pneumonia. Hx afib. Was in afib RVR rate 200s at [facility].  upon arrival.  )    HPI  Review of Systems   Objective:     Physical Exam   Assessment:     1. Atrial fibrillation with RVR    2. Troponin level elevated    3. NSTEMI (non-ST elevated myocardial infarction)    4. Heart failure    5. SOB (shortness of breath)    6. Hypoxia    7. Sepsis, due to unspecified organism, unspecified whether acute organ dysfunction present    8. Pneumonia due to infectious organism, unspecified laterality, unspecified part of lung    9. Lactic acidosis    10. LOVELY (acute kidney injury)    11. Cardiac rhythm disorder or disturbance or change    12. Tachycardia    13. Bacteria in urine    14. Bacteremia    15. A-fib      [REMOVED]      Altered Skin Integrity 11/24/23 2300 posterior Sacral spine #1 Skin Tear Intact skin with non-blanchable redness of localized area (Removed)   11/24/23 2300   Altered Skin Integrity Present on Admission - Did Patient arrive to the hospital with altered skin?: yes   Side:    Orientation: posterior   Location: Sacral spine   Wound Number: #1   Is this injury device related?: No   Primary Wound Type: Skin tear   Description of Altered Skin Integrity: Intact skin with non-blanchable redness of localized area   Ankle-Brachial Index:    Pulses:    Removal Indication and Assessment:    Wound Outcome: Healed   (Retired) Wound Length (cm):    (Retired) Wound Width (cm):    (Retired) Depth (cm):    Wound Description (Comments):    Removal Indications:    Removed 11/28/23 0945   Wound Image   11/27/23 1100   Description of Altered Skin Integrity Intact skin with non-blanchable redness of localized area 11/27/23 0705   Dressing Appearance Clean;Dry;Intact 11/28/23 0500   Drainage Amount None 11/27/23 2000   Appearance Dressing in place, unable to visualize 11/27/23 2000   Tissue loss  description Not applicable 11/27/23 1100   Dressing Foam 11/28/23 0500       Plan:   Re eval of skin issues.   Pt with moisture issues on admit and not moving much.  Remains without moisture issues at this time.  Continued with the antifungal powder front and back.   Added aquaphor top oint to very dry scally feet.   Reconsult if needed.

## 2023-12-01 NOTE — PROGRESS NOTES
"[facility] - 4th Floor Medical Telemetry    Cardiology  Progress Note    Patient Name: Mike Urbina Jr.  MRN: [id number]  Admission Date: 11/24/2023  Hospital Length of Stay: 7 days  Code Status: Full Code   Attending Physician: Syed Vogt MD   Primary Care Physician: Darlene Willams FNP  Expected Discharge Date:   Principal Problem:Atrial fibrillation with RVR    Subjective:     Brief HPI: Mr. Urbina is a 67 y/o male with a history of PAF/Flutter, CAD s/p CABG, VHD, Chronic Diastolic Heart Failure, HTN, HLD, who is known to [facility], Dr. Herrera. He presented to the ER as a transfer from [place] [facility] on 11.24.23 with complaints of shortness of breath and generalized weakness. He reports being diagnosed with Flu the weak prior and placed on Tamiflu. He was suspected to have SVT, but when rate was slowed, he was found to be in AFIB RVR. Significant labs include H&H 10.7/33.1, Na 135, K 3.4, Calcium 7.2, Albumin 1.9, AST/ALT 47/58, BNP 3,053.1, Troponin 0.137. Blood Cultures positive for MRSA. CTA chest negative for PE, findings concerning for multifocal pneumonia most evident in the right upper lobe. TTE demonstrated EF 55%, moderate aortic stenosis, moderate mitral stenosis and mild MR. [facility] has been consulted for AFIB RVR and CARO.     Hospital Course:  11.30.23: NAD. VSS. AFIB CVR on telemetry. Denies CP/SOB/Palps. "I feel okay" Awaiting CARO with Possible DCCV  12.1.23: NAD. VSS. SR on telemetry. Denies CP/SOB/Palps. "I'm okay"    PMH:  CAD, Diastolic Heart Failure, ETOH Abuse, VHD (AS), Thrombocytopenia, NSTEMI, HTN, PAF/Flutter  PSH: LHC, CABG, Cataract Extraction   Family History: Mother, L, MI  Social History: + ETOH Use (6pk/Beer per Day for > 30 Years); Denies Tobacco and Illicit Drug Use     Previous Cardiac Diagnostics:   CARO/DCCV (11.30.23):  LV systolic function 55%. Severely AV sclerocalcification with marked reduced mobility of all 3 leaflets resulting in severe " AS, SHARAD 0.7cm2 by direct planimetry. Peak AV velocity 4.5 m/sec. Mild AI. Marked MAC with restricted mobility of the MV resulting in Moderate mitral stenosis, MG 7 mmhg. Mild-moderate MR. Mild-moderate TR. SILVANO is not seen and is probably absent due to prior surgical SILVANO ligation. Successful DCCV x1    ECHO (11.25.23):  Left Ventricle: Normal wall motion. There is normal systolic function. Ejection fraction by visual approximation is 55%. Diastolic function cannot be reliably determined in the presence of mitral valve disease. Right Ventricle: Normal right ventricular cavity size. Systolic function is mildly reduced. Aortic Valve: Moderately calcified cusps. Moderately restricted motion. There is moderate stenosis. Aortic valve area by VTI is 0.90 cm². Aortic valve peak velocity is 3.52 m/s. Mean gradient is 35 mmHg. The dimensionless index is 0.29.  Mitral Valve: There is severe posterior mitral annular calcification present. There is moderate stenosis. The mean pressure gradient across the mitral valve is 9 mmHg at a heart rate of  bpm. There is mild regurgitation. Tricuspid Valve: There is mild regurgitation     ECHO (8.1.23):  The study quality is average. Global left ventricular systolic function is normal. The left ventricular ejection fraction is 55%. The left ventricle diastolic function is impaired (Grade II) with an elevated left atrial pressure. Suspect severe aortic valve stenosis is present; aortic valve is heavily calcified. The trans-aortic peak velocity is 3.5 m/s. The trans-aortic mean gradient is 34.4 mmHg. Dimensionless index ~ 0.24. SVI ~ 42 mL/m^2. Moderate mitral valve calcification with mild to moderate mitral stenosis; mean gradient is 5.1 mmHg. Mild (1+) mitral regurgitation.  Moderate (2+) tricuspid regurgitation.  Mild (1+) aortic regurgitation.  The pulmonary artery systolic pressure is 44 mmHg. Evidence of pulmonary hypertension is noted.      [facility] 3.15.23:  Surgical coronary anatomy with  distal left main 50%; LAD proximal to mid 60-70%; circumflex mid 80- 90%; RCA with proximal .  The ejection fraction was 60% with EDP of 10 mmHg.  Right radial access.  The estimated blood loss was less than 10 cc.  CT surgical consult for CABG evaluation.     PET 1.6.23:  This is an abnormal perfusion study. Study is consistent with ischemia.   This scan is suggestive of moderate risk for future cardiovascular events.   Small reversible perfusion abnormality of moderate intensity in the apical segment. Medium fixed perfusion abnormality of severe intensity in the inferior region.   The left ventricular cavity is noted to be normal on the stress studies. The stress left ventricular ejection fraction was calculated to be 35% and left ventricular global function is moderately reduced. The rest left ventricular cavity is noted to be normal. The rest left ventricular ejection fraction was calculated to be 40% and rest left ventricular global function is mildly reduced.   When compared to the resting ejection fraction (40%), the stress ejection fraction (35%) has decreased.   The study quality is good.   There was a rise in myocardial blood flow between rest and stress.  Global myocardial blood flow reserve was 1.47.  Myocardial blood flow reserve is globally abnormal, placing the patient at a higher coronary event risk.     ECHO 12.9.22:  The left ventricle is normal in size with mild concentric hypertrophy and normal systolic function.  Grade I left ventricular diastolic dysfunction.  The estimated ejection fraction is 55%.  Normal right ventricular size with normal right ventricular systolic function.  There is mild aortic valve stenosis.  There is mild mitral stenosis.  Normal central venous pressure (3 mmHg).       Review of Systems   Cardiovascular:  Negative for chest pain, dyspnea on exertion, leg swelling and palpitations.   Respiratory:  Negative for shortness of breath.    All other systems reviewed and are  negative.      Objective:     Vital Signs (Most Recent):  Temp: 97.7 °F (36.5 °C) (12/01/23 0831)  Pulse: 92 (12/01/23 0831)  Resp: (!) 22 (12/01/23 0811)  BP: (!) 154/82 (12/01/23 0831)  SpO2: (!) 94 % (12/01/23 0811) Vital Signs (24h Range):  Temp:  [97.6 °F (36.4 °C)-98.2 °F (36.8 °C)] 97.7 °F (36.5 °C)  Pulse:  [] 92  Resp:  [15-34] 22  SpO2:  [93 %-100 %] 94 %  BP: (142-168)/(71-90) 154/82     Weight: 73.9 kg (163 lb)  Body mass index is 22.82 kg/m².    SpO2: (!) 94 %         Intake/Output Summary (Last 24 hours) at 12/1/2023 0852  Last data filed at 12/1/2023 0845  Gross per 24 hour   Intake 640 ml   Output 900 ml   Net -260 ml         Lines/Drains/Airways       Peripheral Intravenous Line  Duration                  Peripheral IV - Single Lumen 11/24/23 1600 20 G Anterior;Left;Proximal Forearm 6 days         Peripheral IV - Single Lumen 11/24/23 1755 20 G Anterior;Distal;Right Upper Arm 6 days                    Significant Labs:   Recent Results (from the past 72 hour(s))   VANCOMYCIN, TROUGH    Collection Time: 11/28/23 11:01 AM   Result Value Ref Range    Vancomycin Trough 20.1 (H) 15.0 - 20.0 ug/ml   Comprehensive Metabolic Panel    Collection Time: 11/29/23  4:01 AM   Result Value Ref Range    Sodium Level 135 (L) 136 - 145 mmol/L    Potassium Level 3.4 (L) 3.5 - 5.1 mmol/L    Chloride 104 98 - 107 mmol/L    Carbon Dioxide 22 (L) 23 - 31 mmol/L    Glucose Level 113 82 - 115 mg/dL    Blood Urea Nitrogen 17.9 8.4 - 25.7 mg/dL    Creatinine 0.80 0.73 - 1.18 mg/dL    Calcium Level Total 7.2 (L) 8.8 - 10.0 mg/dL    Protein Total 4.8 (L) 5.8 - 7.6 gm/dL    Albumin Level 1.9 (L) 3.4 - 4.8 g/dL    Globulin 2.9 2.4 - 3.5 gm/dL    Albumin/Globulin Ratio 0.7 (L) 1.1 - 2.0 ratio    Bilirubin Total 1.2 <=1.5 mg/dL    Alkaline Phosphatase 63 40 - 150 unit/L    Alanine Aminotransferase 58 (H) 0 - 55 unit/L    Aspartate Aminotransferase 47 (H) 5 - 34 unit/L    eGFR >60 mls/min/1.73/m2   CBC with Differential     Collection Time: 11/29/23  4:01 AM   Result Value Ref Range    WBC 4.44 (L) 4.50 - 11.50 x10(3)/mcL    RBC 3.79 (L) 4.70 - 6.10 x10(6)/mcL    Hgb 10.7 (L) 14.0 - 18.0 g/dL    Hct 33.1 (L) 42.0 - 52.0 %    MCV 87.3 80.0 - 94.0 fL    MCH 28.2 27.0 - 31.0 pg    MCHC 32.3 (L) 33.0 - 36.0 g/dL    RDW 17.6 (H) 11.5 - 17.0 %    Platelet 134 130 - 400 x10(3)/mcL    MPV 11.7 (H) 7.4 - 10.4 fL    NRBC% 0.0 %    IPF 7.6 0.9 - 11.2 %   Manual Differential    Collection Time: 11/29/23  4:01 AM   Result Value Ref Range    WBC 4.44 x10(3)/mcL    Neutrophils % 90 %    Lymphs % 7 %    Monocytes % 3 %    Neutrophils Abs 3.996 2.1 - 9.2 x10(3)/mcL    Lymphs Abs 0.3108 (L) 0.6 - 4.6 x10(3)/mcL    Monocytes Abs 0.1332 0.1 - 1.3 x10(3)/mcL    Platelets Normal Normal, Adequate    RBC Morph Abnormal (A) Normal    Polychromasia 1+ (A) (none)    Poikilocytosis 2+ (A) (none)    Anisocytosis 1+ (A) (none)    Macrocytosis 1+ (A) (none)    [place] Cells 2+ (A) (none)    Ovalocytes 1+ (A) (none)   Basic Metabolic Panel    Collection Time: 11/30/23  3:42 AM   Result Value Ref Range    Sodium Level 136 136 - 145 mmol/L    Potassium Level 4.2 3.5 - 5.1 mmol/L    Chloride 104 98 - 107 mmol/L    Carbon Dioxide 22 (L) 23 - 31 mmol/L    Glucose Level 99 82 - 115 mg/dL    Blood Urea Nitrogen 18.1 8.4 - 25.7 mg/dL    Creatinine 0.84 0.73 - 1.18 mg/dL    BUN/Creatinine Ratio 22     Calcium Level Total 7.3 (L) 8.8 - 10.0 mg/dL    Anion Gap 10.0 mEq/L    eGFR >60 mls/min/1.73/m2   Magnesium    Collection Time: 11/30/23  3:42 AM   Result Value Ref Range    Magnesium Level 1.90 1.60 - 2.60 mg/dL   CBC with Differential    Collection Time: 11/30/23  3:42 AM   Result Value Ref Range    WBC 6.22 4.50 - 11.50 x10(3)/mcL    RBC 3.52 (L) 4.70 - 6.10 x10(6)/mcL    Hgb 10.1 (L) 14.0 - 18.0 g/dL    Hct 31.6 (L) 42.0 - 52.0 %    MCV 89.8 80.0 - 94.0 fL    MCH 28.7 27.0 - 31.0 pg    MCHC 32.0 (L) 33.0 - 36.0 g/dL    RDW 17.6 (H) 11.5 - 17.0 %    Platelet 181 130 - 400  x10(3)/mcL    MPV 11.2 (H) 7.4 - 10.4 fL    Neut % 74.3 %    Lymph % 13.8 %    Mono % 10.0 %    Eos % 0.3 %    Basophil % 0.2 %    Lymph # 0.86 0.6 - 4.6 x10(3)/mcL    Neut # 4.62 2.1 - 9.2 x10(3)/mcL    Mono # 0.62 0.1 - 1.3 x10(3)/mcL    Eos # 0.02 0 - 0.9 x10(3)/mcL    Baso # 0.01 <=0.2 x10(3)/mcL    IG# 0.09 (H) 0 - 0.04 x10(3)/mcL    IG% 1.4 %    NRBC% 0.0 %   Basic Metabolic Panel    Collection Time: 12/01/23  4:09 AM   Result Value Ref Range    Sodium Level 136 136 - 145 mmol/L    Potassium Level 4.0 3.5 - 5.1 mmol/L    Chloride 107 98 - 107 mmol/L    Carbon Dioxide 23 23 - 31 mmol/L    Glucose Level 108 82 - 115 mg/dL    Blood Urea Nitrogen 15.5 8.4 - 25.7 mg/dL    Creatinine 0.81 0.73 - 1.18 mg/dL    BUN/Creatinine Ratio 19     Calcium Level Total 7.2 (L) 8.8 - 10.0 mg/dL    Anion Gap 6.0 mEq/L    eGFR >60 mls/min/1.73/m2   VANCOMYCIN, TROUGH    Collection Time: 12/01/23  4:09 AM   Result Value Ref Range    Vancomycin Trough 19.9 15.0 - 20.0 ug/ml       Telemetry:  AFIB    Physical Exam  Vitals and nursing note reviewed.   Constitutional:       Appearance: Normal appearance.   HENT:      Head: Normocephalic.      Nose: Nose normal.      Mouth/Throat:      Mouth: Mucous membranes are moist.   Eyes:      Pupils: Pupils are equal, round, and reactive to light.   Cardiovascular:      Rate and Rhythm: Normal rate. Rhythm irregular.      Pulses: Normal pulses.      Heart sounds: Normal heart sounds.      Comments: AFIB on telemetry  Abdominal:      General: Bowel sounds are normal.      Palpations: Abdomen is soft.   Musculoskeletal:         General: Normal range of motion.      Cervical back: Normal range of motion.   Skin:     General: Skin is warm.   Neurological:      Mental Status: He is alert and oriented to person, place, and time.   Psychiatric:         Mood and Affect: Mood normal.         Behavior: Behavior normal.       Current Inpatient Medications:  Current Facility-Administered Medications:      amiodarone tablet 200 mg, 200 mg, Oral, Daily, Stroda, Danielito, DO, 200 mg at 11/30/23 1047    aspirin EC tablet 81 mg, 81 mg, Oral, Daily, Stroda, Danielito, DO, 81 mg at 11/30/23 1046    diltiaZEM 125 mg in dextrose 5 % 125 mL IVPB (ready to mix) (titrating), 0-15 mg/hr, Intravenous, Continuous, Cristal Mccauley FNP, Last Rate: 5 mL/hr at 12/01/23 0210, 5 mg/hr at 12/01/23 0210    enoxaparin injection 70 mg, 70 mg, Subcutaneous, Q12H, Stroda, Danielito, DO, 70 mg at 11/30/23 2300    folic acid tablet 1 mg, 1 mg, Oral, Daily, Juan Anderson Jr., MD, FCCP, 1 mg at 11/30/23 1046    guaiFENesin 100 mg/5 ml syrup 400 mg, 400 mg, Oral, Q6H WAKE, Dulce Aiken, FNP, 400 mg at 11/30/23 2259    levalbuterol nebulizer solution 1.25 mg, 1.25 mg, Nebulization, Q8H, Syed Vogt MD, 1.25 mg at 12/01/23 0811    linaCLOtide capsule 72 mcg, 72 mcg, Oral, Before breakfast, Stroda, Danielito, DO, 72 mcg at 12/01/23 0658    loperamide capsule 2 mg, 2 mg, Oral, QID PRN, Simran Meza [facility], 2 mg at 11/28/23 0649    LORazepam injection 2 mg, 2 mg, Intravenous, Q15 Min PRN, Rico Davila MD    losartan tablet 25 mg, 25 mg, Oral, Daily, Syed Vogt MD    magnesium oxide tablet 800 mg, 800 mg, Oral, PRN, Stroda, Danielito, DO    magnesium oxide tablet 800 mg, 800 mg, Oral, PRN, Stroda, Danielito, DO    metoprolol injection 5 mg, 5 mg, Intravenous, Q5 Min PRN, Stroda, Danielito, DO, 5 mg at 11/29/23 2332    metoprolol succinate (TOPROL-XL) 24 hr tablet 100 mg, 100 mg, Oral, Daily, Juan Anderson Jr., MD, FCCP, 100 mg at 11/30/23 1046    miconazole NITRATE 2 % top powder, , Topical (Top), BID, Bux, Betty, DO, Given at 11/30/23 2300    ondansetron injection 4 mg, 4 mg, Intravenous, Q8H PRN, Stroda, Danielito, DO    potassium bicarbonate disintegrating tablet 35 mEq, 35 mEq, Oral, PRN, Stroda, Danielito, DO, 35 mEq at 11/27/23 0420    potassium bicarbonate disintegrating tablet 50 mEq, 50 mEq, Oral, PRN, Stroda, Danielito, DO, 50 mEq at 11/28/23  1606    potassium bicarbonate disintegrating tablet 60 mEq, 60 mEq, Oral, PRN, Stroda, Danielito, DO    potassium, sodium phosphates 280-160-250 mg packet 2 packet, 2 packet, Oral, PRN, Stroda, Danielito, DO    potassium, sodium phosphates 280-160-250 mg packet 2 packet, 2 packet, Oral, PRN, Stroda, Danielito, DO, 2 packet at 11/27/23 0420    potassium, sodium phosphates 280-160-250 mg packet 2 packet, 2 packet, Oral, PRN, Stroda, Danielito, DO    QUEtiapine tablet 200 mg, 200 mg, Oral, QHS, Stroda, Danielito, DO, 200 mg at 11/30/23 2300    sodium chloride 0.9% flush 10 mL, 10 mL, Intravenous, PRN, Stroda, Danielito, DO, 10 mL at 11/28/23 1407    Pharmacy to dose Vancomycin consult, , , Once **AND** vancomycin - pharmacy to dose, , Intravenous, pharmacy to manage frequency, Juan Anderson Jr., MD, FCCP    vancomycin in dextrose 5 % 1 gram/250 mL IVPB 1,000 mg, 1,000 mg, Intravenous, Q12H, Syed Vogt MD, Last Rate: 166.7 mL/hr at 12/01/23 0658, 1,000 mg at 12/01/23 0658    white petrolatum 41 % ointment, , Topical (Top), BID, Syed Vogt MD    VTE Risk Mitigation (From admission, onward)           Ordered     enoxaparin injection 70 mg  Every 12 hours (non-standard times)         11/24/23 2133     IP VTE HIGH RISK PATIENT  Once         11/24/23 2116     Place sequential compression device  Until discontinued         11/24/23 2116                    Assessment:   PAF/Flutter  - Currently SR     - s/p CARO/DCCV (11.30.23): Successful DCCV x1    - CHADsVASc - 3 Points - 3.2% Stroke Risk per Year     - s/p (4.3.23) - Ligation of SILVAON with Endostapler  NSTEMI type II secondary to demand ischemia due to Sepsis, PNA, Flu, PAF  Sepsis with MRSA   Bilateral Community Acquired PNA  Recent Influenza   Elevated LFTs (improving)  MVCAD    - s/p CABG (4.3.23) - LIMA to LAD, rSVG to OM1, rSVG to PDA  Chronic Diastolic Heart   Hypoalbuminemia   VHD    - severe AS    - AV is probably trileaflet with marked AV scleral calcification resulting  in severe/critical AS, SHARAD 0.5 cm2, peak AV 3.5 m/sec (4.11.23)    - Moderate MS   HTN/HHD  Chronic Thrombocytopenia   History of ETOH Abuse  No History of GI Bleed    Plan:   Awaiting CV Surgery Recommendations for Severe AS/Moderate to Severe MS    - If no plans for Valve, will most likely need TAVR   Keep Mag > 2 and Potassium > 4  Labs in AM: CBC, BMP, and Mag    LOREN De La Torre  Cardiology  [facility] - 4th Floor Medical Telemetry  12/01/2023    I have seen the patient, reviewed the Nurse Practitioner's note, assessment and plan. I have personally interviewed and examined the patient at bedside and agree with the findings. Medical decision making listed above were done under my guidance.

## 2023-12-01 NOTE — PROGRESS NOTES
Pharmacokinetic Assessment Follow Up: IV Vancomycin    Vancomycin serum concentration assessment(s):    The trough level was drawn incorrectly and cannot be used to guide therapy at this time.  It should be noted that the original trough was entered to be drawn at 1400 on 11/30/23. Trough was not drawn, and a stat trough was to be drawn at 1539 which again was not drawn. Current trough value was drawn at 0409 on 12/01/23 is within range at 19.9 mcg/mL. However, this may be falsely low considering the trough was drawn prior to the 6th dose.       Vancomycin Regimen Plan:    Continue regimen to Vancomycin 1000 mg IV every 12 hours with next serum trough concentration measured on 12/02/23 at 1600.    Scheduled Administration Times    0500  1700    Drug levels (last 3 results):  Recent Labs   Lab Result Units 11/28/23  1101 12/01/23  0409   Vancomycin Trough ug/ml 20.1* 19.9       Vancomycin Administrations:  vancomycin given in the last 96 hours                     vancomycin in dextrose 5 % 1 gram/250 mL IVPB 1,000 mg (mg) 1,000 mg New Bag 12/01/23 0658     1,000 mg New Bag 11/30/23 1642     1,000 mg New Bag  0421     1,000 mg New Bag 11/29/23 1528     1,000 mg New Bag  0247     1,000 mg New Bag 11/28/23 1605    vancomycin 1.25 g in dextrose 5% 250 mL IVPB (ready to mix) (mg) 1,250 mg New Bag 11/27/23 2358     1,250 mg New Bag  1251                    Pharmacy will continue to follow and monitor vancomycin.    Please contact pharmacy at extension [phone] for questions regarding this assessment.    Thank you for the consult,   Alyson Wolff       Patient brief summary:  Mike Urbina Jr. is a 68 y.o. male initiated on antimicrobial therapy with IV Vancomycin for treatment of bacteremia    The patient's current regimen is vancomycin 1000 mg IV Q12 hours     Drug Allergies:   Review of patient's allergies indicates:  No Known Allergies    Actual Body Weight:   73.9 kg    Renal Function:   Estimated Creatinine  Clearance: 88.7 mL/min (based on SCr of 0.81 mg/dL).,     Dialysis Method (if applicable):  N/A    CBC (last 72 hours):  Recent Labs   Lab Result Units 11/29/23 0401 11/30/23 0342   WBC x10(3)/mcL 4.44  4.44* 6.22   Hgb g/dL 10.7* 10.1*   Hct % 33.1* 31.6*   Platelet x10(3)/mcL 134 181   Mono % %  --  10.0   Monocytes % % 3  --    Eos % %  --  0.3   Basophil % %  --  0.2       Metabolic Panel (last 72 hours):  Recent Labs   Lab Result Units 11/29/23  0401 11/30/23 0342 12/01/23  0409   Sodium Level mmol/L 135* 136 136   Potassium Level mmol/L 3.4* 4.2 4.0   Chloride mmol/L 104 104 107   Carbon Dioxide mmol/L 22* 22* 23   Glucose Level mg/dL 113 99 108   Blood Urea Nitrogen mg/dL 17.9 18.1 15.5   Creatinine mg/dL 0.80 0.84 0.81   Albumin Level g/dL 1.9*  --   --    Bilirubin Total mg/dL 1.2  --   --    Alkaline Phosphatase unit/L 63  --   --    Aspartate Aminotransferase unit/L 47*  --   --    Alanine Aminotransferase unit/L 58*  --   --    Magnesium Level mg/dL  --  1.90  --        Microbiologic Results:  Microbiology Results (last 7 days)       Procedure Component Value Units Date/Time    Blood Culture [[id number]]  (Normal) Collected: 11/27/23 0637    Order Status: Completed Specimen: Blood from Wrist, Right Updated: 11/30/23 1401     CULTURE, BLOOD ([facility]) No Growth At 48 Hours    Blood Culture [[id number]]  (Normal) Collected: 11/27/23 0637    Order Status: Completed Specimen: Blood from Hand, Left Updated: 11/30/23 1401     CULTURE, BLOOD ([facility]) No Growth At 48 Hours    Stool Culture [[id number]]  (Abnormal) Collected: 11/27/23 1525    Order Status: Completed Specimen: Stool Updated: 11/29/23 1116     Stool Culture Negative for Salmonella, Shigella, Campylobacter, Vibrio, Aeromonas, Pleisiomonas,Yersinia, or Shiga Toxin 1 and 2.      Many Yeast    Chlamydia/GC, PCR [[id number]] Collected: 11/27/23 1651    Order Status: Completed Specimen: Urine Updated: 11/27/23 1843     Chlamydia trachomatis PCR Not Detected      N. gonorrhea PCR Not Detected     Source Urine    Narrative:      The Xpert CT/NG test, performed on the GeneXpert system is a qualitative in vitro real-time polymerase chain reaction (PCR) test for the automated detected and differentiation for genomic DNA from Chlamydia trachomatis (CT) and/or Neisseria gonorrhoeae (NG).    Respiratory Culture [[id number]]     Order Status: Sent Specimen: Sputum     Respiratory Culture [[id number]] Collected: 11/24/23 2140    Order Status: Completed Specimen: Sputum, Expectorated Updated: 11/25/23 1230     Respiratory Culture Gram stain demonstrates significant oropharyngeal contamination. Sputum unsatisfactory for culture     GRAM STAIN Quality -2      Many Gram positive cocci      Many Gram Negative Rods

## 2023-12-02 LAB
ANION GAP SERPL CALC-SCNC: 8 MEQ/L
BACTERIA BLD CULT: NORMAL
BACTERIA BLD CULT: NORMAL
BASOPHILS # BLD AUTO: 0.01 X10(3)/MCL
BASOPHILS NFR BLD AUTO: 0.1 %
BUN SERPL-MCNC: 13.3 MG/DL (ref 8.4–25.7)
CALCIUM SERPL-MCNC: 7.7 MG/DL (ref 8.8–10)
CHLORIDE SERPL-SCNC: 105 MMOL/L (ref 98–107)
CO2 SERPL-SCNC: 23 MMOL/L (ref 23–31)
CREAT SERPL-MCNC: 0.8 MG/DL (ref 0.73–1.18)
CREAT/UREA NIT SERPL: 17
EOSINOPHIL # BLD AUTO: 0.04 X10(3)/MCL (ref 0–0.9)
EOSINOPHIL NFR BLD AUTO: 0.6 %
ERYTHROCYTE [DISTWIDTH] IN BLOOD BY AUTOMATED COUNT: 17.3 % (ref 11.5–17)
GFR SERPLBLD CREATININE-BSD FMLA CKD-EPI: >60 MLS/MIN/1.73/M2
GLUCOSE SERPL-MCNC: 97 MG/DL (ref 82–115)
HCT VFR BLD AUTO: 29.7 % (ref 42–52)
HGB BLD-MCNC: 9.7 G/DL (ref 14–18)
IMM GRANULOCYTES # BLD AUTO: 0.09 X10(3)/MCL (ref 0–0.04)
IMM GRANULOCYTES NFR BLD AUTO: 1.3 %
LYMPHOCYTES # BLD AUTO: 0.76 X10(3)/MCL (ref 0.6–4.6)
LYMPHOCYTES NFR BLD AUTO: 11.3 %
MAGNESIUM SERPL-MCNC: 1.8 MG/DL (ref 1.6–2.6)
MCH RBC QN AUTO: 28.6 PG (ref 27–31)
MCHC RBC AUTO-ENTMCNC: 32.7 G/DL (ref 33–36)
MCV RBC AUTO: 87.6 FL (ref 80–94)
MONOCYTES # BLD AUTO: 0.59 X10(3)/MCL (ref 0.1–1.3)
MONOCYTES NFR BLD AUTO: 8.8 %
NEUTROPHILS # BLD AUTO: 5.21 X10(3)/MCL (ref 2.1–9.2)
NEUTROPHILS NFR BLD AUTO: 77.9 %
NRBC BLD AUTO-RTO: 0 %
PLATELET # BLD AUTO: 195 X10(3)/MCL (ref 130–400)
PMV BLD AUTO: 10.9 FL (ref 7.4–10.4)
POTASSIUM SERPL-SCNC: 3.5 MMOL/L (ref 3.5–5.1)
RBC # BLD AUTO: 3.39 X10(6)/MCL (ref 4.7–6.1)
SODIUM SERPL-SCNC: 136 MMOL/L (ref 136–145)
VANCOMYCIN TROUGH SERPL-MCNC: 19.5 UG/ML (ref 15–20)
WBC # SPEC AUTO: 6.7 X10(3)/MCL (ref 4.5–11.5)

## 2023-12-02 PROCEDURE — 63600175 PHARM REV CODE 636 W HCPCS: Performed by: NURSE PRACTITIONER

## 2023-12-02 PROCEDURE — 21400001 HC TELEMETRY ROOM

## 2023-12-02 PROCEDURE — 99900035 HC TECH TIME PER 15 MIN (STAT)

## 2023-12-02 PROCEDURE — 25000003 PHARM REV CODE 250: Performed by: INTERNAL MEDICINE

## 2023-12-02 PROCEDURE — 63600175 PHARM REV CODE 636 W HCPCS: Performed by: INTERNAL MEDICINE

## 2023-12-02 PROCEDURE — 85025 COMPLETE CBC W/AUTO DIFF WBC: CPT

## 2023-12-02 PROCEDURE — 27000207 HC ISOLATION

## 2023-12-02 PROCEDURE — 27100171 HC OXYGEN HIGH FLOW UP TO 24 HOURS

## 2023-12-02 PROCEDURE — 94761 N-INVAS EAR/PLS OXIMETRY MLT: CPT

## 2023-12-02 PROCEDURE — 25000003 PHARM REV CODE 250

## 2023-12-02 PROCEDURE — 80048 BASIC METABOLIC PNL TOTAL CA: CPT

## 2023-12-02 PROCEDURE — 25000242 PHARM REV CODE 250 ALT 637 W/ HCPCS: Performed by: INTERNAL MEDICINE

## 2023-12-02 PROCEDURE — 63600175 PHARM REV CODE 636 W HCPCS

## 2023-12-02 PROCEDURE — 25000003 PHARM REV CODE 250: Performed by: NURSE PRACTITIONER

## 2023-12-02 PROCEDURE — 94664 DEMO&/EVAL PT USE INHALER: CPT

## 2023-12-02 PROCEDURE — 83735 ASSAY OF MAGNESIUM: CPT

## 2023-12-02 PROCEDURE — 94640 AIRWAY INHALATION TREATMENT: CPT

## 2023-12-02 PROCEDURE — 80202 ASSAY OF VANCOMYCIN: CPT | Performed by: INTERNAL MEDICINE

## 2023-12-02 RX ORDER — POTASSIUM CHLORIDE 20 MEQ/1
40 TABLET, EXTENDED RELEASE ORAL ONCE
Status: COMPLETED | OUTPATIENT
Start: 2023-12-02 | End: 2023-12-02

## 2023-12-02 RX ORDER — TALC
6 POWDER (GRAM) TOPICAL NIGHTLY PRN
Status: DISCONTINUED | OUTPATIENT
Start: 2023-12-02 | End: 2023-12-05

## 2023-12-02 RX ORDER — HYDRALAZINE HYDROCHLORIDE 20 MG/ML
10 INJECTION INTRAMUSCULAR; INTRAVENOUS EVERY 4 HOURS PRN
Status: DISCONTINUED | OUTPATIENT
Start: 2023-12-02 | End: 2023-12-05 | Stop reason: HOSPADM

## 2023-12-02 RX ORDER — LABETALOL HYDROCHLORIDE 5 MG/ML
10 INJECTION, SOLUTION INTRAVENOUS
Status: DISCONTINUED | OUTPATIENT
Start: 2023-12-02 | End: 2023-12-05 | Stop reason: HOSPADM

## 2023-12-02 RX ADMIN — LABETALOL HYDROCHLORIDE 10 MG: 5 INJECTION, SOLUTION INTRAVENOUS at 04:12

## 2023-12-02 RX ADMIN — LEVALBUTEROL HYDROCHLORIDE 1.25 MG: 1.25 SOLUTION RESPIRATORY (INHALATION) at 01:12

## 2023-12-02 RX ADMIN — GUAIFENESIN 400 MG: 200 SOLUTION ORAL at 02:12

## 2023-12-02 RX ADMIN — WHITE PETROLATUM: 1.75 OINTMENT TOPICAL at 08:12

## 2023-12-02 RX ADMIN — VANCOMYCIN HYDROCHLORIDE 1000 MG: 1 INJECTION, POWDER, LYOPHILIZED, FOR SOLUTION INTRAVENOUS at 04:12

## 2023-12-02 RX ADMIN — LEVALBUTEROL HYDROCHLORIDE 1.25 MG: 1.25 SOLUTION RESPIRATORY (INHALATION) at 05:12

## 2023-12-02 RX ADMIN — ENOXAPARIN SODIUM 70 MG: 80 INJECTION SUBCUTANEOUS at 11:12

## 2023-12-02 RX ADMIN — MICONAZOLE NITRATE: 20 POWDER TOPICAL at 08:12

## 2023-12-02 RX ADMIN — POTASSIUM CHLORIDE 40 MEQ: 1500 TABLET, EXTENDED RELEASE ORAL at 11:12

## 2023-12-02 RX ADMIN — ASPIRIN 81 MG: 81 TABLET, COATED ORAL at 08:12

## 2023-12-02 RX ADMIN — GUAIFENESIN 400 MG: 200 SOLUTION ORAL at 08:12

## 2023-12-02 RX ADMIN — Medication 6 MG: at 04:12

## 2023-12-02 RX ADMIN — WHITE PETROLATUM: 1.75 OINTMENT TOPICAL at 11:12

## 2023-12-02 RX ADMIN — LOSARTAN POTASSIUM 25 MG: 25 TABLET, FILM COATED ORAL at 08:12

## 2023-12-02 RX ADMIN — AMIODARONE HYDROCHLORIDE 200 MG: 200 TABLET ORAL at 08:12

## 2023-12-02 RX ADMIN — HYDRALAZINE HYDROCHLORIDE 10 MG: 20 INJECTION INTRAMUSCULAR; INTRAVENOUS at 07:12

## 2023-12-02 RX ADMIN — MICONAZOLE NITRATE: 20 POWDER TOPICAL at 11:12

## 2023-12-02 RX ADMIN — METOPROLOL SUCCINATE 100 MG: 50 TABLET, EXTENDED RELEASE ORAL at 08:12

## 2023-12-02 RX ADMIN — LINACLOTIDE 72 MCG: 72 CAPSULE, GELATIN COATED ORAL at 06:12

## 2023-12-02 RX ADMIN — QUETIAPINE 200 MG: 100 TABLET ORAL at 07:12

## 2023-12-02 RX ADMIN — FOLIC ACID 1 MG: 1 TABLET ORAL at 08:12

## 2023-12-02 RX ADMIN — GUAIFENESIN 400 MG: 200 SOLUTION ORAL at 07:12

## 2023-12-02 NOTE — PROGRESS NOTES
[facility] Hospital Medicine Progress Note        Chief Complaint: Inpatient Follow-up for MRSA sepsis and Pneumonia     HPI:   Mike Urbina Jr. is a 68 y.o. male with a PMHx of  ETOH abuse, VHD-aortic stenosis, thrombocytopenia, CAD status post CABG x3, HTN, PAFlutter s/p SILVANO ligation, chronic HFpEF 55%, chronic anemia who presented to [facility] on 11/24/2023 with via EMS as a transfer from [facility] ED for higher level of care.  He initially presented to the [facility] ED via EMS with complaints of worsening SOB and generalized weakness after being diagnosed with influenza a x5 days prior. Reportedly prescribed Tamiflu on 11/20/2023. He also endorsed decreased oral intake, and chest pain that is worse with deep inspiration.  Patient does endorsed drinking a 6 pack of beer per day.     Labs were notable for sodium 135, CO2 17, glucose 179, BUN 36.2, creatinine 1.49, total bili 2.9, AST 56, BNP 3053, WBC 13.68, hemoglobin 12.5, hematocrit 40, troponin 0.012.  CXR demonstrated right upper lobe consolidation concerning for pneumonia.  EKG demonstrated supraventricular tachycardia with rate of 172.  He was given adenosine 6 mg followed by 12 mg without improvement.  He was then given Lopressor 5 mg IV and rate noted to be atrial fibrillation with RVR.  He was initiated on a diltiazem drip.  He was then transferred to [facility] ED.  CTA chest negative for PE, findings concerning for multifocal pneumonia most evident in the right upper lobe.  He was started on broad-spectrum IV antibiotics.  Blood cultures x2 obtained.       He was admitted to ICU.  MRSA PCR detected. Antibiotic coverage broadened to vanc, Zosyn and doxycycline.  TTE demonstrated EF 55%, moderate aortic stenosis, moderate mitral stenosis and mild MR.  Blood cultures from 11/24/2023 growing MRSA in 2 of 2 bottles.  Respiratory PCR panel negative.  Tamiflu was also continued.  Also required Levophed for BP support.  He has since been weaned off of Levophed and Cardizem.  Zosyn and Tamiflu were discontinued on 11/27/2023.  He remains on IV vancomycin.  ID was consulted on 11/27/2023. Blood cultures repeated x2 on 11/27/2023.  He was cleared for downgrade to the floor on 11/27/2023.  PT/OT consulted. Hospital medicine consulted for assumption of care and further medical management.     Labs on 11/27/2023 notable for WBC 3.16, hemoglobin 9.9, hematocrit 30.3, platelets 107, sodium 134, potassium 3.4, calcium 7.3, phosphorus 1.6, albumin 2, , .    Patient was continued on iv vancomycin. He was very weak and needed PT. On 11/29/23 he went into Afib with RVR. He was seen by [facility] team and started on iv cardizem drip and planned cardioversion. He was continued on FD anticoagulation.     Patient had a CARO done and showed severe Aortic stenosis and no clots or vegetation. Cardioversion was done. Patient will need TAVR when he completes his iv antibiotics.     Interval Hx:   Patient today awake and comfortable. He is now able to sit up in the chair. Has a mild cough, no fever or chills.     No family at bedside.     Case was discussed with patient's nurse and  on the floor.    Objective/physical exam:  General: In no acute distress  Chest: CTA   Heart: Regular rate + irregular rhythm + Loud ESM  Abdomen: Soft, nontender, BS +  MSK: Warm, no lower extremity edema, no clubbing or cyanosis  Neurologic: Cranial nerve II-XII intact, Strength 4/5 in all 4 extremities    VITAL SIGNS: 24 HRS MIN & MAX LAST   Temp  Min: 97.4 °F (36.3 °C)  Max: 98.5 °F (36.9 °C) 97.4 °F (36.3 °C)   BP  Min: 144/68  Max: 182/88 (!) 144/68   Pulse  Min: 79  Max: 88  85   Resp  Min: 16  Max: 28 16   SpO2  Min: 94 %  Max: 99 % 99 %     I have reviewed the following labs:  Recent Labs   Lab 11/29/23  0401 11/30/23  0342 12/02/23  0659   WBC 4.44  4.44* 6.22 6.70   RBC 3.79* 3.52* 3.39*   HGB 10.7* 10.1* 9.7*   HCT 33.1* 31.6* 29.7*   MCV 87.3  89.8 87.6   MCH 28.2 28.7 28.6   MCHC 32.3* 32.0* 32.7*   RDW 17.6* 17.6* 17.3*    181 195   MPV 11.7* 11.2* 10.9*     Recent Labs   Lab 11/27/23  0121 11/28/23  0208 11/29/23  0401 11/30/23  0342 12/01/23  0409 12/02/23  0659   * 134* 135* 136 136 136   K 3.4* 3.8 3.4* 4.2 4.0 3.5   CO2 23 18* 22* 22* 23 23   BUN 25.0 17.0 17.9 18.1 15.5 13.3   CREATININE 0.89 0.82 0.80 0.84 0.81 0.80   CALCIUM 7.3* 7.6* 7.2* 7.3* 7.2* 7.7*   MG 1.90  --   --  1.90  --  1.80   ALBUMIN 2.0* 1.9* 1.9*  --   --   --    ALKPHOS 54 63 63  --   --   --    * 92* 58*  --   --   --    * 103* 47*  --   --   --    BILITOT 1.5 1.2 1.2  --   --   --      Microbiology Results (last 7 days)       Procedure Component Value Units Date/Time    Blood Culture [[id number]]  (Normal) Collected: 11/27/23 0637    Order Status: Completed Specimen: Blood from Wrist, Right Updated: 12/02/23 0944     CULTURE, BLOOD ([facility]) No Growth At 96 Hours    Blood Culture [[id number]]  (Normal) Collected: 11/27/23 0637    Order Status: Completed Specimen: Blood from Hand, Left Updated: 12/02/23 0944     CULTURE, BLOOD ([facility]) No Growth At 96 Hours    Stool Culture [[id number]]  (Abnormal) Collected: 11/27/23 1525    Order Status: Completed Specimen: Stool Updated: 11/29/23 1116     Stool Culture Negative for Salmonella, Shigella, Campylobacter, Vibrio, Aeromonas, Pleisiomonas,Yersinia, or Shiga Toxin 1 and 2.      Many Yeast    Chlamydia/GC, PCR [[id number]] Collected: 11/27/23 1651    Order Status: Completed Specimen: Urine Updated: 11/27/23 1843     Chlamydia trachomatis PCR Not Detected     N. gonorrhea PCR Not Detected     Source Urine    Narrative:      The Xpert CT/NG test, performed on the GeneXpert system is a qualitative in vitro real-time polymerase chain reaction (PCR) test for the automated detected and differentiation for genomic DNA from Chlamydia trachomatis (CT) and/or Neisseria gonorrhoeae (NG).    Respiratory Culture [[id number]]      Order Status: Sent Specimen: Sputum     Respiratory Culture [[id number]] Collected: 11/24/23 2140    Order Status: Completed Specimen: Sputum, Expectorated Updated: 11/25/23 1230     Respiratory Culture Gram stain demonstrates significant oropharyngeal contamination. Sputum unsatisfactory for culture     GRAM STAIN Quality -2      Many Gram positive cocci      Many Gram Negative Rods             See below for Radiology    Scheduled Med:   amiodarone  200 mg Oral Daily    aspirin  81 mg Oral Daily    enoxparin  70 mg Subcutaneous Q12H    folic acid  1 mg Oral Daily    guaiFENesin 100 mg/5 ml  400 mg Oral Q6H WAKE    levalbuterol  1.25 mg Nebulization Q8H    linaCLOtide  72 mcg Oral Before breakfast    losartan  25 mg Oral Daily    metoprolol succinate  100 mg Oral Daily    miconazole NITRATE 2 %   Topical (Top) BID    QUEtiapine  200 mg Oral QHS    vancomycin (VANCOCIN) IV (PEDS and ADULTS)  1,000 mg Intravenous Q12H    white petrolatum   Topical (Top) BID      Continuous Infusions:         PRN Meds:  hydrALAZINE, labetaloL, loperamide, lorazepam, magnesium oxide, magnesium oxide, melatonin, metoprolol, ondansetron, potassium bicarbonate, potassium bicarbonate, potassium bicarbonate, potassium, sodium phosphates, potassium, sodium phosphates, potassium, sodium phosphates, sodium chloride 0.9%, Pharmacy to dose Vancomycin consult **AND** vancomycin - pharmacy to dose     Assessment/Plan:  Influenza A, dx on 11/19/2023  Bilateral Pneumonia 2/2 MRSA and Flu A infection   MRSA sepsis   Generalized weakness   Severe aortic stenosis   Elevated LFTs from sepsis: Improving   Hypoalbuminemia   PAF with RVR s/p cardioversion   Chronic HFpEF 55%  Essential hypertension  CAD s/p MI/CABG x3  ETOH abuse   Mild Hypokalemia and Hypophosphatemia   Chronic Anemia and Thrombocytopenia : improved   Hx of VHD    Plan:  Patient making a slow clinical recovery. Lungs clear today   He is sleeping well and now able to sit up in bed with  no issues  Cont OT/PT   S/P cardioversion, CARO did not show any clots or vegetation  Cont po amiodarone, HR now well controlled     Will continue iv vancomycin for MRSA sepsis and Pneumonia, F/U by ID team   Blood cultures done on 11/27/23 has been negative so far   His LFTs are improving   cont dosing per pharmacy and monitor vanc level     Cont losartan 25 mg po daily and metoprolol     Will closely monitor patients daily weight, urine out put, renal parameters and volume status      Reviewed today's labs and K 3.5 , Na 136    Cont tele monitoring     Continue PT       Need good nutrition support for better recovery, consult nutrition team     Cont supportive care     Labs in am     VTE prophylaxis: Lovenox     Patient condition:  Fair     Anticipated discharge and Disposition:   Swing bed when accepted       All diagnosis and differential diagnosis have been reviewed; assessment and plan has been documented; I have personally reviewed the labs and test results that are presently available; I have reviewed the patients medication list; I have reviewed the consulting providers response and recommendations. I have reviewed or attempted to review medical records based upon their availability    All of the patient's questions have been  addressed and answered. Patient's is agreeable to the above stated plan. I will continue to monitor closely and make adjustments to medical management as needed.  _____________________________________________________________________    Nutrition Status:    Radiology:  I have personally reviewed the following imaging and agree with the radiologist.     Transesophageal echo (CARO)with possible cardioversion  Addison Barnhart MD     11/30/2023  3:10 PM  Procedure: CARO guided DCCV    Final impression:    LV systolic function 55%.  Severely AV sclerocalcification with marked reduced mobility of   all 3 leaflets resulting in severe AS, SHARAD 0.7cm2 by direct   planimetry. Peak AV velocity 4.5  m/sec. Mild AI.  Marked MAC with restricted mobility of the MV resulting in   Moderate mitral stenosis, MG 7 mmhg.  Mild-moderate MR.  Mild-moderate TR.  SILVANO is not seen and is probably absent due to prior surgical SILVANO   ligation.  Successful DCCV x1    Indication: AS, MS    Informed consent: obtained, signed copy placed in the chart.    Description of the procedure: After sedation was achieved (please   see anesthesia report for details), the esophagus intubated   without difficulties. Multiple orthogonal views obtained. Patient   tolerated procedure without immediate complications noted.    Findings:  Left atrium (LA): Mild-moderately enlarged left atrium.  Left atrial appendage (SILVANO): SILVANO is not seen and is probably   absent due to prior surgical SILVANO ligation.  Interatrial septum:  NO ASD or PFO noted on 2D or Color Doppler   images.   Right atrium (RA): Normal RA size.  Left ventricle (LV): LVEF 55%.   Normal LV size.  Mild-moderate   left ventricular hypertrophy. No Regional wall motion   abnormalities noted.  Right ventricle (RV): Partially visualized in this study.  Aortic valve: Trileaflet.  Severely AV sclerocalcification with   marked reduced mobility of all 3 leaflets resulting in severe AS,   SHARAD 0.7cm2 by direct planimetry. Peak AV velocity 4.5 m/sec. Mild   AI. No Vegetation is present.  Mitral valve: Marked MAC with restricted mobility of the MV   resulting in Moderate mitral stenosis, MG 7 mmhg. No Vegetation   is present.  Mild-moderate MR.  Tricuspid valve: Partially visualized, No Tricuspid stenosis.   Mild-moderate Tricuspid regurgitation. No Vegetation is present.  Pulmonic valve: Not well visualized, no hemodynamically   significant pulmonic stenosis, no pulmonic regurgitation noted in   this study. No Vegetation is present.  Thoracic aorta: Scattered  Atherosclerotic changes of the   descending thoracic aorta.  Normal Caliber aortic root.   Normal   Caliber Proximal portion of the ascending  aorta.   Pericardium: No significant pericardial effusion is present.    Description of the DCCV procedure:   After completion of CARO, no intracardiac thrombus was visualized,   so I decided to proceed with Cardioversion  200 J synchronized cardioversion was completed.  Number of attempts: 1  X-Ray Chest 1 View  Narrative: EXAMINATION:  XR CHEST 1 VIEW    CLINICAL HISTORY:  SOB;    COMPARISON:  11/27/2023    FINDINGS:  Single view of the chest shows right greater than left upper lobe consolidation, similar to the prior study.  No pneumothorax.  Heart size is stable.  Impression: Stable chest    Electronically signed by: Alexander Rivera MD  Date:    11/30/2023  Time:    07:19      Syed Vogt MD   12/02/2023

## 2023-12-02 NOTE — PROGRESS NOTES
Pharmacokinetic Assessment Follow Up: IV Vancomycin    Vancomycin serum concentration assessment(s):    The trough level was drawn correctly and can be used to guide therapy at this time. The measurement is within the desired definitive target range of 15 to 20 mcg/mL.    Vancomycin Regimen Plan:    Continue regimen to Vancomycin 1000 mg IV every 12 hours with next serum trough concentration measured at 1600 prior to   dose on 12/04    Drug levels (last 3 results):  Recent Labs   Lab Result Units 12/01/23  0409 12/02/23  1628   Vancomycin Trough ug/ml 19.9 19.5       Pharmacy will continue to follow and monitor vancomycin.    Please contact pharmacy at extension [phone] for questions regarding this assessment.    Thank you for the consult,   Andrade Wen       Patient brief summary:  Mike Urbina Jr. is a 68 y.o. male initiated on antimicrobial therapy with IV Vancomycin for treatment of bacteremia    The patient's current regimen is 1000 mg q12h.    Drug Allergies:   Review of patient's allergies indicates:  No Known Allergies    Actual Body Weight:   73.9kg    Renal Function:   Estimated Creatinine Clearance: 89.8 mL/min (based on SCr of 0.8 mg/dL).,     Dialysis Method (if applicable):  N/A    CBC (last 72 hours):  Recent Labs   Lab Result Units 11/30/23  0342 12/02/23  0659   WBC x10(3)/mcL 6.22 6.70   Hgb g/dL 10.1* 9.7*   Hct % 31.6* 29.7*   Platelet x10(3)/mcL 181 195   Mono % % 10.0 8.8   Eos % % 0.3 0.6   Basophil % % 0.2 0.1       Metabolic Panel (last 72 hours):  Recent Labs   Lab Result Units 11/30/23  0342 12/01/23  0409 12/02/23  0659   Sodium Level mmol/L 136 136 136   Potassium Level mmol/L 4.2 4.0 3.5   Chloride mmol/L 104 107 105   Carbon Dioxide mmol/L 22* 23 23   Glucose Level mg/dL 99 108 97   Blood Urea Nitrogen mg/dL 18.1 15.5 13.3   Creatinine mg/dL 0.84 0.81 0.80   Magnesium Level mg/dL 1.90  --  1.80       Vancomycin Administrations:  vancomycin given in the last 96 hours                      vancomycin in dextrose 5 % 1 gram/250 mL IVPB 1,000 mg (mg) 1,000 mg New Bag 12/02/23 1642     1,000 mg New Bag  0418     1,000 mg New Bag 12/01/23 1621     1,000 mg New Bag  0658     1,000 mg New Bag 11/30/23 1642     1,000 mg New Bag  0421     1,000 mg New Bag 11/29/23 1528     1,000 mg New Bag  0247                    Microbiologic Results:  Microbiology Results (last 7 days)       Procedure Component Value Units Date/Time    Blood Culture [[id number]]  (Normal) Collected: 11/27/23 0637    Order Status: Completed Specimen: Blood from Hand, Left Updated: 12/02/23 1211     CULTURE, BLOOD ([facility]) Final Report: At 5 days. No growth    Blood Culture [[id number]]  (Normal) Collected: 11/27/23 0637    Order Status: Completed Specimen: Blood from Wrist, Right Updated: 12/02/23 1211     CULTURE, BLOOD ([facility]) Final Report: At 5 days. No growth    Stool Culture [[id number]]  (Abnormal) Collected: 11/27/23 1525    Order Status: Completed Specimen: Stool Updated: 11/29/23 1116     Stool Culture Negative for Salmonella, Shigella, Campylobacter, Vibrio, Aeromonas, Pleisiomonas,Yersinia, or Shiga Toxin 1 and 2.      Many Yeast    Chlamydia/GC, PCR [[id number]] Collected: 11/27/23 1651    Order Status: Completed Specimen: Urine Updated: 11/27/23 1843     Chlamydia trachomatis PCR Not Detected     N. gonorrhea PCR Not Detected     Source Urine    Narrative:      The Xpert CT/NG test, performed on the GeneXpert system is a qualitative in vitro real-time polymerase chain reaction (PCR) test for the automated detected and differentiation for genomic DNA from Chlamydia trachomatis (CT) and/or Neisseria gonorrhoeae (NG).

## 2023-12-02 NOTE — PROGRESS NOTES
"[facility] - 4th Floor Medical Telemetry    Cardiology  Progress Note    Patient Name: Mike Urbina Jr.  MRN: [id number]  Admission Date: 11/24/2023  Hospital Length of Stay: 8 days  Code Status: Full Code   Attending Physician: Syed Vogt MD   Primary Care Physician: Darlene Willams FNP  Expected Discharge Date:   Principal Problem:Atrial fibrillation with RVR    Subjective:     Brief HPI: Mr. Urbina is a 69 y/o male with a history of PAF/Flutter, CAD s/p CABG, VHD, Chronic Diastolic Heart Failure, HTN, HLD, who is known to [facility], Dr. Herrera. He presented to the ER as a transfer from [place] [facility] room on 11.24.23 with complaints of shortness of breath and generalized weakness. He reports being diagnosed with Flu the weak prior and placed on Tamiflu. He was suspected to have SVT, but when rate was slowed, he was found to be in AFIB RVR. Significant labs include H&H 10.7/33.1, Na 135, K 3.4, Calcium 7.2, Albumin 1.9, AST/ALT 47/58, BNP 3,053.1, Troponin 0.137. Blood Cultures positive for MRSA. CTA chest negative for PE, findings concerning for multifocal pneumonia most evident in the right upper lobe. TTE demonstrated EF 55%, moderate aortic stenosis, moderate mitral stenosis and mild MR. [facility] has been consulted for AFIB RVR and CARO.     Hospital Course:  11.30.23: NAD. VSS. AFIB CVR on telemetry. Denies CP/SOB/Palps. "I feel okay" Awaiting CARO with Possible DCCV  12.1.23: NAD. VSS. SR on telemetry. Denies CP/SOB/Palps. "I'm okay"  12.2.23: NAD Noted. Vitals Stable.     PMH:  CAD, Diastolic Heart Failure, ETOH Abuse, VHD (AS), Thrombocytopenia, NSTEMI, HTN, PAF/Flutter  PSH: LHC, CABG, Cataract Extraction   Family History: Mother, L, MI  Social History: + ETOH Use (6pk/Beer per Day for > 30 Years); Denies Tobacco and Illicit Drug Use     Previous Cardiac Diagnostics:   CARO/DCCV (11.30.23):  LV systolic function 55%. Severely AV sclerocalcification with marked reduced mobility " of all 3 leaflets resulting in severe AS, SHARAD 0.7cm2 by direct planimetry. Peak AV velocity 4.5 m/sec. Mild AI. Marked MAC with restricted mobility of the MV resulting in Moderate mitral stenosis, MG 7 mmhg. Mild-moderate MR. Mild-moderate TR. SILVANO is not seen and is probably absent due to prior surgical SILVANO ligation. Successful DCCV x1    ECHO (11.25.23):  Left Ventricle: Normal wall motion. There is normal systolic function. Ejection fraction by visual approximation is 55%. Diastolic function cannot be reliably determined in the presence of mitral valve disease. Right Ventricle: Normal right ventricular cavity size. Systolic function is mildly reduced. Aortic Valve: Moderately calcified cusps. Moderately restricted motion. There is moderate stenosis. Aortic valve area by VTI is 0.90 cm². Aortic valve peak velocity is 3.52 m/s. Mean gradient is 35 mmHg. The dimensionless index is 0.29.  Mitral Valve: There is severe posterior mitral annular calcification present. There is moderate stenosis. The mean pressure gradient across the mitral valve is 9 mmHg at a heart rate of  bpm. There is mild regurgitation. Tricuspid Valve: There is mild regurgitation     ECHO (8.1.23):  The study quality is average. Global left ventricular systolic function is normal. The left ventricular ejection fraction is 55%. The left ventricle diastolic function is impaired (Grade II) with an elevated left atrial pressure. Suspect severe aortic valve stenosis is present; aortic valve is heavily calcified. The trans-aortic peak velocity is 3.5 m/s. The trans-aortic mean gradient is 34.4 mmHg. Dimensionless index ~ 0.24. SVI ~ 42 mL/m^2. Moderate mitral valve calcification with mild to moderate mitral stenosis; mean gradient is 5.1 mmHg. Mild (1+) mitral regurgitation.  Moderate (2+) tricuspid regurgitation.  Mild (1+) aortic regurgitation.  The pulmonary artery systolic pressure is 44 mmHg. Evidence of pulmonary hypertension is noted.      [facility]  3.15.23:  Surgical coronary anatomy with distal left main 50%; LAD proximal to mid 60-70%; circumflex mid 80- 90%; RCA with proximal .  The ejection fraction was 60% with EDP of 10 mmHg.  Right radial access.  The estimated blood loss was less than 10 cc.  CT surgical consult for CABG evaluation.     PET 1.6.23:  This is an abnormal perfusion study. Study is consistent with ischemia.   This scan is suggestive of moderate risk for future cardiovascular events.   Small reversible perfusion abnormality of moderate intensity in the apical segment. Medium fixed perfusion abnormality of severe intensity in the inferior region.   The left ventricular cavity is noted to be normal on the stress studies. The stress left ventricular ejection fraction was calculated to be 35% and left ventricular global function is moderately reduced. The rest left ventricular cavity is noted to be normal. The rest left ventricular ejection fraction was calculated to be 40% and rest left ventricular global function is mildly reduced.   When compared to the resting ejection fraction (40%), the stress ejection fraction (35%) has decreased.   The study quality is good.   There was a rise in myocardial blood flow between rest and stress.  Global myocardial blood flow reserve was 1.47.  Myocardial blood flow reserve is globally abnormal, placing the patient at a higher coronary event risk.     ECHO 12.9.22:  The left ventricle is normal in size with mild concentric hypertrophy and normal systolic function.  Grade I left ventricular diastolic dysfunction.  The estimated ejection fraction is 55%.  Normal right ventricular size with normal right ventricular systolic function.  There is mild aortic valve stenosis.  There is mild mitral stenosis.  Normal central venous pressure (3 mmHg).       Review of Systems   Cardiovascular:  Negative for chest pain, dyspnea on exertion, leg swelling and palpitations.   Respiratory:  Negative for shortness of  breath.    All other systems reviewed and are negative.      Objective:     Vital Signs (Most Recent):  Temp: 99 °F (37.2 °C) (12/02/23 1100)  Pulse: 84 (12/02/23 1100)  Resp: 18 (12/02/23 1100)  BP: (!) 142/60 (12/02/23 1100)  SpO2: 98 % (12/02/23 1100) Vital Signs (24h Range):  Temp:  [97.4 °F (36.3 °C)-99 °F (37.2 °C)] 99 °F (37.2 °C)  Pulse:  [79-88] 84  Resp:  [16-28] 18  SpO2:  [94 %-99 %] 98 %  BP: (142-174)/(60-92) 142/60     Weight: 73.9 kg (163 lb)  Body mass index is 22.82 kg/m².    SpO2: 98 %         Intake/Output Summary (Last 24 hours) at 12/2/2023 1238  Last data filed at 12/2/2023 1100  Gross per 24 hour   Intake 330 ml   Output 450 ml   Net -120 ml         Lines/Drains/Airways       Peripheral Intravenous Line  Duration                  Peripheral IV - Single Lumen 11/24/23 1600 20 G Anterior;Left;Proximal Forearm 7 days         Peripheral IV - Single Lumen 11/24/23 1755 20 G Anterior;Distal;Right Upper Arm 7 days                    Significant Labs:   Recent Results (from the past 72 hour(s))   Basic Metabolic Panel    Collection Time: 11/30/23  3:42 AM   Result Value Ref Range    Sodium Level 136 136 - 145 mmol/L    Potassium Level 4.2 3.5 - 5.1 mmol/L    Chloride 104 98 - 107 mmol/L    Carbon Dioxide 22 (L) 23 - 31 mmol/L    Glucose Level 99 82 - 115 mg/dL    Blood Urea Nitrogen 18.1 8.4 - 25.7 mg/dL    Creatinine 0.84 0.73 - 1.18 mg/dL    BUN/Creatinine Ratio 22     Calcium Level Total 7.3 (L) 8.8 - 10.0 mg/dL    Anion Gap 10.0 mEq/L    eGFR >60 mls/min/1.73/m2   Magnesium    Collection Time: 11/30/23  3:42 AM   Result Value Ref Range    Magnesium Level 1.90 1.60 - 2.60 mg/dL   CBC with Differential    Collection Time: 11/30/23  3:42 AM   Result Value Ref Range    WBC 6.22 4.50 - 11.50 x10(3)/mcL    RBC 3.52 (L) 4.70 - 6.10 x10(6)/mcL    Hgb 10.1 (L) 14.0 - 18.0 g/dL    Hct 31.6 (L) 42.0 - 52.0 %    MCV 89.8 80.0 - 94.0 fL    MCH 28.7 27.0 - 31.0 pg    MCHC 32.0 (L) 33.0 - 36.0 g/dL    RDW 17.6  (H) 11.5 - 17.0 %    Platelet 181 130 - 400 x10(3)/mcL    MPV 11.2 (H) 7.4 - 10.4 fL    Neut % 74.3 %    Lymph % 13.8 %    Mono % 10.0 %    Eos % 0.3 %    Basophil % 0.2 %    Lymph # 0.86 0.6 - 4.6 x10(3)/mcL    Neut # 4.62 2.1 - 9.2 x10(3)/mcL    Mono # 0.62 0.1 - 1.3 x10(3)/mcL    Eos # 0.02 0 - 0.9 x10(3)/mcL    Baso # 0.01 <=0.2 x10(3)/mcL    IG# 0.09 (H) 0 - 0.04 x10(3)/mcL    IG% 1.4 %    NRBC% 0.0 %   Basic Metabolic Panel    Collection Time: 12/01/23  4:09 AM   Result Value Ref Range    Sodium Level 136 136 - 145 mmol/L    Potassium Level 4.0 3.5 - 5.1 mmol/L    Chloride 107 98 - 107 mmol/L    Carbon Dioxide 23 23 - 31 mmol/L    Glucose Level 108 82 - 115 mg/dL    Blood Urea Nitrogen 15.5 8.4 - 25.7 mg/dL    Creatinine 0.81 0.73 - 1.18 mg/dL    BUN/Creatinine Ratio 19     Calcium Level Total 7.2 (L) 8.8 - 10.0 mg/dL    Anion Gap 6.0 mEq/L    eGFR >60 mls/min/1.73/m2   VANCOMYCIN, TROUGH    Collection Time: 12/01/23  4:09 AM   Result Value Ref Range    Vancomycin Trough 19.9 15.0 - 20.0 ug/ml   Basic Metabolic Panel    Collection Time: 12/02/23  6:59 AM   Result Value Ref Range    Sodium Level 136 136 - 145 mmol/L    Potassium Level 3.5 3.5 - 5.1 mmol/L    Chloride 105 98 - 107 mmol/L    Carbon Dioxide 23 23 - 31 mmol/L    Glucose Level 97 82 - 115 mg/dL    Blood Urea Nitrogen 13.3 8.4 - 25.7 mg/dL    Creatinine 0.80 0.73 - 1.18 mg/dL    BUN/Creatinine Ratio 17     Calcium Level Total 7.7 (L) 8.8 - 10.0 mg/dL    Anion Gap 8.0 mEq/L    eGFR >60 mls/min/1.73/m2   Magnesium    Collection Time: 12/02/23  6:59 AM   Result Value Ref Range    Magnesium Level 1.80 1.60 - 2.60 mg/dL   CBC with Differential    Collection Time: 12/02/23  6:59 AM   Result Value Ref Range    WBC 6.70 4.50 - 11.50 x10(3)/mcL    RBC 3.39 (L) 4.70 - 6.10 x10(6)/mcL    Hgb 9.7 (L) 14.0 - 18.0 g/dL    Hct 29.7 (L) 42.0 - 52.0 %    MCV 87.6 80.0 - 94.0 fL    MCH 28.6 27.0 - 31.0 pg    MCHC 32.7 (L) 33.0 - 36.0 g/dL    RDW 17.3 (H) 11.5 - 17.0 %     Platelet 195 130 - 400 x10(3)/mcL    MPV 10.9 (H) 7.4 - 10.4 fL    Neut % 77.9 %    Lymph % 11.3 %    Mono % 8.8 %    Eos % 0.6 %    Basophil % 0.1 %    Lymph # 0.76 0.6 - 4.6 x10(3)/mcL    Neut # 5.21 2.1 - 9.2 x10(3)/mcL    Mono # 0.59 0.1 - 1.3 x10(3)/mcL    Eos # 0.04 0 - 0.9 x10(3)/mcL    Baso # 0.01 <=0.2 x10(3)/mcL    IG# 0.09 (H) 0 - 0.04 x10(3)/mcL    IG% 1.3 %    NRBC% 0.0 %       Telemetry:  SR    Physical Exam  Vitals and nursing note reviewed.   Constitutional:       Appearance: Normal appearance.   HENT:      Head: Normocephalic.      Nose: Nose normal.      Mouth/Throat:      Mouth: Mucous membranes are moist.   Eyes:      Pupils: Pupils are equal, round, and reactive to light.   Cardiovascular:      Rate and Rhythm: Normal rate and regular rhythm.      Pulses: Normal pulses.      Heart sounds: Murmur heard.      Comments: SR  Pulmonary:      Effort: Pulmonary effort is normal.      Breath sounds: Rhonchi present.   Abdominal:      General: Bowel sounds are normal.      Palpations: Abdomen is soft.   Musculoskeletal:         General: Normal range of motion.      Cervical back: Normal range of motion.      Right lower leg: No edema.      Left lower leg: No edema.   Skin:     General: Skin is warm.   Neurological:      General: No focal deficit present.      Mental Status: He is alert and oriented to person, place, and time.   Psychiatric:         Mood and Affect: Mood normal.         Behavior: Behavior normal.       Current Inpatient Medications:  Current Facility-Administered Medications:     amiodarone tablet 200 mg, 200 mg, Oral, Daily, Stroda, Danielito, DO, 200 mg at 12/02/23 0824    aspirin EC tablet 81 mg, 81 mg, Oral, Daily, Stroda, Danielito, DO, 81 mg at 12/02/23 0823    enoxaparin injection 70 mg, 70 mg, Subcutaneous, Q12H, Stroda, Danielito, DO, 70 mg at 12/02/23 1107    folic acid tablet 1 mg, 1 mg, Oral, Daily, Juan Anderson Jr., MD, [facility], 1 mg at 12/02/23 0824    guaiFENesin 100 mg/5 ml syrup 400  mg, 400 mg, Oral, Q6H WAKE, Dulce Aiken, FNP, 400 mg at 12/02/23 0823    hydrALAZINE injection 10 mg, 10 mg, Intravenous, Q4H PRN, Laura Boggs, AGACNP-BC    labetaloL injection 10 mg, 10 mg, Intravenous, Q2H PRN, Laura Boggs, AGACNP-BC, 10 mg at 12/02/23 0407    levalbuterol nebulizer solution 1.25 mg, 1.25 mg, Nebulization, Q8H, Syed Vogt MD, 1.25 mg at 12/02/23 0152    linaCLOtide capsule 72 mcg, 72 mcg, Oral, Before breakfast, Vargas Medinale, DO, 72 mcg at 12/02/23 0607    loperamide capsule 2 mg, 2 mg, Oral, QID PRN, Simran Meza, AGACNP-BC, 2 mg at 11/28/23 0649    LORazepam injection 2 mg, 2 mg, Intravenous, Q15 Min PRN, Rico Davila MD    losartan tablet 25 mg, 25 mg, Oral, Daily, Syed Vogt MD, 25 mg at 12/02/23 0824    magnesium oxide tablet 800 mg, 800 mg, Oral, PRN, Stroda, Danielito, DO    magnesium oxide tablet 800 mg, 800 mg, Oral, PRN, Stroda, Danielito, DO    melatonin tablet 6 mg, 6 mg, Oral, Nightly PRN, Laura Boggs AGACNP-BC, 6 mg at 12/02/23 0407    metoprolol injection 5 mg, 5 mg, Intravenous, Q5 Min PRN, Carol, Danielito, DO, 5 mg at 11/29/23 2332    metoprolol succinate (TOPROL-XL) 24 hr tablet 100 mg, 100 mg, Oral, Daily, Juan Anderson Jr., MD, FCCP, 100 mg at 12/02/23 0824    miconazole NITRATE 2 % top powder, , Topical (Top), BID, Bux, Betty, DO, Given at 12/02/23 0825    ondansetron injection 4 mg, 4 mg, Intravenous, Q8H PRN, Stroda, Danielito, DO    potassium bicarbonate disintegrating tablet 35 mEq, 35 mEq, Oral, PRN, Stroda, Danielito, DO, 35 mEq at 11/27/23 0420    potassium bicarbonate disintegrating tablet 50 mEq, 50 mEq, Oral, PRN, Stroda, Danielito, DO, 50 mEq at 11/28/23 1606    potassium bicarbonate disintegrating tablet 60 mEq, 60 mEq, Oral, PRN, Stroda, Danielito, DO    potassium, sodium phosphates 280-160-250 mg packet 2 packet, 2 packet, Oral, PRN, Stroda, Danielito, DO    potassium, sodium phosphates 280-160-250 mg packet 2 packet, 2 packet, Oral,  PRN, Stroda, Danielito, DO, 2 packet at 11/27/23 0420    potassium, sodium phosphates 280-160-250 mg packet 2 packet, 2 packet, Oral, PRN, Stroda, Danielito, DO    QUEtiapine tablet 200 mg, 200 mg, Oral, QHS, Stroda, Danielito, DO, 200 mg at 12/01/23 2109    sodium chloride 0.9% flush 10 mL, 10 mL, Intravenous, PRN, Stroda, Danielito, DO, 10 mL at 11/28/23 1407    Pharmacy to dose Vancomycin consult, , , Once **AND** vancomycin - pharmacy to dose, , Intravenous, pharmacy to manage frequency, [facility], [facility]    vancomycin in dextrose 5 % 1 gram/250 mL IVPB 1,000 mg, 1,000 mg, Intravenous, Q12H, Syed Vogt MD, Stopped at 12/02/23 0548    white petrolatum 41 % ointment, , Topical (Top), BID, Syed Vogt MD, Given at 12/02/23 0824    VTE Risk Mitigation (From admission, onward)           Ordered     enoxaparin injection 70 mg  Every 12 hours (non-standard times)         11/24/23 2133     IP VTE HIGH RISK PATIENT  Once         11/24/23 2116     Place sequential compression device  Until discontinued         11/24/23 2116                    Assessment:   PAF/Flutter  - Currently SR     - s/p CARO/DCCV (11.30.23): Successful DCCV x1    - CHADsVASc - 3 Points - 3.2% Stroke Risk per Year     - s/p (4.3.23) - Ligation of SILVANO with Endostapler  NSTEMI type II secondary to demand ischemia due to Sepsis, PNA, Flu, PAF  Sepsis with MRSA   Bilateral Community Acquired PNA  Recent Influenza   Elevated LFTs (improved)  MVCAD    - s/p CABG (4.3.23) - LIMA to LAD, rSVG to OM1, rSVG to PDA  Chronic Diastolic Heart   Hypoalbuminemia   VHD    - severe AS    - Moderate MS     - CTS suggested TAVR eval  HTN/HHD  Chronic Thrombocytopenia   History of ETOH Abuse  No History of GI Bleed    Plan:   Will need outpt TAVR workup, he is not a candidate until infection clears  He does not look volume overloaded and is in sinus rhythm  I don't think he needs active diuresis, but would avoid excess fluids  He will be in hospital until  his PNA is better  Will sign off for now and f/u in clinic      Reece Granda MD  Cardiology  [facility] - 4th Floor Medical Telemetry  12/02/2023

## 2023-12-03 PROCEDURE — 25000003 PHARM REV CODE 250: Performed by: NURSE PRACTITIONER

## 2023-12-03 PROCEDURE — 63600175 PHARM REV CODE 636 W HCPCS

## 2023-12-03 PROCEDURE — 99900035 HC TECH TIME PER 15 MIN (STAT)

## 2023-12-03 PROCEDURE — 25000003 PHARM REV CODE 250: Performed by: INTERNAL MEDICINE

## 2023-12-03 PROCEDURE — 25000003 PHARM REV CODE 250

## 2023-12-03 PROCEDURE — 99900031 HC PATIENT EDUCATION (STAT)

## 2023-12-03 PROCEDURE — 21400001 HC TELEMETRY ROOM

## 2023-12-03 PROCEDURE — 63600175 PHARM REV CODE 636 W HCPCS: Performed by: INTERNAL MEDICINE

## 2023-12-03 PROCEDURE — 27000221 HC OXYGEN, UP TO 24 HOURS

## 2023-12-03 PROCEDURE — 94640 AIRWAY INHALATION TREATMENT: CPT

## 2023-12-03 PROCEDURE — 94761 N-INVAS EAR/PLS OXIMETRY MLT: CPT

## 2023-12-03 PROCEDURE — 25000242 PHARM REV CODE 250 ALT 637 W/ HCPCS: Performed by: INTERNAL MEDICINE

## 2023-12-03 PROCEDURE — 27000207 HC ISOLATION

## 2023-12-03 RX ORDER — LOSARTAN POTASSIUM 50 MG/1
50 TABLET ORAL DAILY
Status: DISCONTINUED | OUTPATIENT
Start: 2023-12-03 | End: 2023-12-04

## 2023-12-03 RX ADMIN — WHITE PETROLATUM: 1.75 OINTMENT TOPICAL at 08:12

## 2023-12-03 RX ADMIN — GUAIFENESIN 400 MG: 200 SOLUTION ORAL at 02:12

## 2023-12-03 RX ADMIN — ASPIRIN 81 MG: 81 TABLET, COATED ORAL at 09:12

## 2023-12-03 RX ADMIN — AMIODARONE HYDROCHLORIDE 200 MG: 200 TABLET ORAL at 09:12

## 2023-12-03 RX ADMIN — METOPROLOL SUCCINATE 100 MG: 50 TABLET, EXTENDED RELEASE ORAL at 09:12

## 2023-12-03 RX ADMIN — MICONAZOLE NITRATE: 20 POWDER TOPICAL at 08:12

## 2023-12-03 RX ADMIN — LOSARTAN POTASSIUM 50 MG: 50 TABLET, FILM COATED ORAL at 09:12

## 2023-12-03 RX ADMIN — GUAIFENESIN 400 MG: 200 SOLUTION ORAL at 09:12

## 2023-12-03 RX ADMIN — GUAIFENESIN 400 MG: 200 SOLUTION ORAL at 08:12

## 2023-12-03 RX ADMIN — FOLIC ACID 1 MG: 1 TABLET ORAL at 09:12

## 2023-12-03 RX ADMIN — LINACLOTIDE 72 MCG: 72 CAPSULE, GELATIN COATED ORAL at 05:12

## 2023-12-03 RX ADMIN — WHITE PETROLATUM: 1.75 OINTMENT TOPICAL at 09:12

## 2023-12-03 RX ADMIN — ENOXAPARIN SODIUM 70 MG: 80 INJECTION SUBCUTANEOUS at 11:12

## 2023-12-03 RX ADMIN — QUETIAPINE 200 MG: 100 TABLET ORAL at 08:12

## 2023-12-03 RX ADMIN — MICONAZOLE NITRATE: 20 POWDER TOPICAL at 09:12

## 2023-12-03 RX ADMIN — LEVALBUTEROL HYDROCHLORIDE 1.25 MG: 1.25 SOLUTION RESPIRATORY (INHALATION) at 04:12

## 2023-12-03 RX ADMIN — ENOXAPARIN SODIUM 70 MG: 80 INJECTION SUBCUTANEOUS at 12:12

## 2023-12-03 RX ADMIN — LEVALBUTEROL HYDROCHLORIDE 1.25 MG: 1.25 SOLUTION RESPIRATORY (INHALATION) at 12:12

## 2023-12-03 RX ADMIN — VANCOMYCIN HYDROCHLORIDE 1000 MG: 1 INJECTION, POWDER, LYOPHILIZED, FOR SOLUTION INTRAVENOUS at 03:12

## 2023-12-03 RX ADMIN — VANCOMYCIN HYDROCHLORIDE 1000 MG: 1 INJECTION, POWDER, LYOPHILIZED, FOR SOLUTION INTRAVENOUS at 05:12

## 2023-12-03 NOTE — PROGRESS NOTES
[facility] Hospital Medicine Progress Note        Chief Complaint: Inpatient Follow-up for MRSA sepsis and Pneumonia     HPI:   Mike rUbina Jr. is a 68 y.o. male with a PMHx of  ETOH abuse, VHD-aortic stenosis, thrombocytopenia, CAD status post CABG x3, HTN, PAFlutter s/p SILVANO ligation, chronic HFpEF 55%, chronic anemia who presented to [facility] on 11/24/2023 with via EMS as a transfer from [facility] ED for higher level of care.  He initially presented to the [facility] ED via EMS with complaints of worsening SOB and generalized weakness after being diagnosed with influenza a x5 days prior. Reportedly prescribed Tamiflu on 11/20/2023. He also endorsed decreased oral intake, and chest pain that is worse with deep inspiration.  Patient does endorsed drinking a 6 pack of beer per day.     Labs were notable for sodium 135, CO2 17, glucose 179, BUN 36.2, creatinine 1.49, total bili 2.9, AST 56, BNP 3053, WBC 13.68, hemoglobin 12.5, hematocrit 40, troponin 0.012.  CXR demonstrated right upper lobe consolidation concerning for pneumonia.  EKG demonstrated supraventricular tachycardia with rate of 172.  He was given adenosine 6 mg followed by 12 mg without improvement.  He was then given Lopressor 5 mg IV and rate noted to be atrial fibrillation with RVR.  He was initiated on a diltiazem drip.  He was then transferred to [facility] ED.  CTA chest negative for PE, findings concerning for multifocal pneumonia most evident in the right upper lobe.  He was started on broad-spectrum IV antibiotics.  Blood cultures x2 obtained.       He was admitted to ICU.  MRSA PCR detected. Antibiotic coverage broadened to vanc, Zosyn and doxycycline.  TTE demonstrated EF 55%, moderate aortic stenosis, moderate mitral stenosis and mild MR.  Blood cultures from 11/24/2023 growing MRSA in 2 of 2 bottles.  Respiratory PCR panel negative.  Tamiflu was also continued.  Also required Levophed for BP support.  He has since been weaned off of Levophed and Cardizem.  Zosyn and Tamiflu were discontinued on 11/27/2023.  He remains on IV vancomycin.  ID was consulted on 11/27/2023. Blood cultures repeated x2 on 11/27/2023.  He was cleared for downgrade to the floor on 11/27/2023.  PT/OT consulted. Hospital medicine consulted for assumption of care and further medical management.     Labs on 11/27/2023 notable for WBC 3.16, hemoglobin 9.9, hematocrit 30.3, platelets 107, sodium 134, potassium 3.4, calcium 7.3, phosphorus 1.6, albumin 2, , .    Patient was continued on iv vancomycin. He was very weak and needed PT. On 11/29/23 he went into Afib with RVR. He was seen by [facility] team and started on iv cardizem drip and planned cardioversion. He was continued on FD anticoagulation.     Patient had a CARO done and showed severe Aortic stenosis and no clots or vegetation. Cardioversion was done. Patient will need TAVR when he completes his iv antibiotics.     Interval Hx:   Patient today awake and comfortable. Ambulating to the restroom but needing some assist. Denies any cough, fever or chills.     No family at bedside.     Objective/physical exam:  General: In no acute distress  Chest: CTA   Heart: Regular rate + irregular rhythm + Loud ESM  Abdomen: Soft, nontender, BS +  MSK: Warm, no lower extremity edema, no clubbing or cyanosis  Neurologic: Cranial nerve II-XII intact, Strength 4/5 in all 4 extremities    VITAL SIGNS: 24 HRS MIN & MAX LAST   Temp  Min: 97.4 °F (36.3 °C)  Max: 98.5 °F (36.9 °C) 97.4 °F (36.3 °C)   BP  Min: 144/68  Max: 182/88 (!) 144/68   Pulse  Min: 79  Max: 88  85   Resp  Min: 16  Max: 28 16   SpO2  Min: 94 %  Max: 99 % 99 %     I have reviewed the following labs:  Recent Labs   Lab 11/29/23  0401 11/30/23  0342 12/02/23  0659   WBC 4.44  4.44* 6.22 6.70   RBC 3.79* 3.52* 3.39*   HGB 10.7* 10.1* 9.7*   HCT 33.1* 31.6* 29.7*   MCV 87.3 89.8 87.6   MCH 28.2 28.7 28.6   MCHC 32.3* 32.0* 32.7*   RDW  17.6* 17.6* 17.3*    181 195   MPV 11.7* 11.2* 10.9*     Recent Labs   Lab 11/27/23  0121 11/28/23  0208 11/29/23  0401 11/30/23  0342 12/01/23  0409 12/02/23  0659   * 134* 135* 136 136 136   K 3.4* 3.8 3.4* 4.2 4.0 3.5   CO2 23 18* 22* 22* 23 23   BUN 25.0 17.0 17.9 18.1 15.5 13.3   CREATININE 0.89 0.82 0.80 0.84 0.81 0.80   CALCIUM 7.3* 7.6* 7.2* 7.3* 7.2* 7.7*   MG 1.90  --   --  1.90  --  1.80   ALBUMIN 2.0* 1.9* 1.9*  --   --   --    ALKPHOS 54 63 63  --   --   --    * 92* 58*  --   --   --    * 103* 47*  --   --   --    BILITOT 1.5 1.2 1.2  --   --   --      Microbiology Results (last 7 days)       Procedure Component Value Units Date/Time    Blood Culture [[id number]]  (Normal) Collected: 11/27/23 0637    Order Status: Completed Specimen: Blood from Wrist, Right Updated: 12/02/23 0944     CULTURE, BLOOD ([facility]) No Growth At 96 Hours    Blood Culture [[id number]]  (Normal) Collected: 11/27/23 0637    Order Status: Completed Specimen: Blood from Hand, Left Updated: 12/02/23 0944     CULTURE, BLOOD ([facility]) No Growth At 96 Hours    Stool Culture [[id number]]  (Abnormal) Collected: 11/27/23 1525    Order Status: Completed Specimen: Stool Updated: 11/29/23 1116     Stool Culture Negative for Salmonella, Shigella, Campylobacter, Vibrio, Aeromonas, Pleisiomonas,Yersinia, or Shiga Toxin 1 and 2.      Many Yeast    Chlamydia/GC, PCR [[id number]] Collected: 11/27/23 1651    Order Status: Completed Specimen: Urine Updated: 11/27/23 1843     Chlamydia trachomatis PCR Not Detected     N. gonorrhea PCR Not Detected     Source Urine    Narrative:      The Xpert CT/NG test, performed on the GeneXpert system is a qualitative in vitro real-time polymerase chain reaction (PCR) test for the automated detected and differentiation for genomic DNA from Chlamydia trachomatis (CT) and/or Neisseria gonorrhoeae (NG).    Respiratory Culture [[id number]]     Order Status: Sent Specimen: Sputum     Respiratory  Culture [[id number]] Collected: 11/24/23 2140    Order Status: Completed Specimen: Sputum, Expectorated Updated: 11/25/23 1230     Respiratory Culture Gram stain demonstrates significant oropharyngeal contamination. Sputum unsatisfactory for culture     GRAM STAIN Quality -2      Many Gram positive cocci      Many Gram Negative Rods             See below for Radiology    Scheduled Med:   amiodarone  200 mg Oral Daily    aspirin  81 mg Oral Daily    enoxparin  70 mg Subcutaneous Q12H    folic acid  1 mg Oral Daily    guaiFENesin 100 mg/5 ml  400 mg Oral Q6H WAKE    levalbuterol  1.25 mg Nebulization Q8H    linaCLOtide  72 mcg Oral Before breakfast    losartan  25 mg Oral Daily    metoprolol succinate  100 mg Oral Daily    miconazole NITRATE 2 %   Topical (Top) BID    QUEtiapine  200 mg Oral QHS    vancomycin (VANCOCIN) IV (PEDS and ADULTS)  1,000 mg Intravenous Q12H    white petrolatum   Topical (Top) BID      Continuous Infusions:         PRN Meds:  hydrALAZINE, labetaloL, loperamide, lorazepam, magnesium oxide, magnesium oxide, melatonin, metoprolol, ondansetron, potassium bicarbonate, potassium bicarbonate, potassium bicarbonate, potassium, sodium phosphates, potassium, sodium phosphates, potassium, sodium phosphates, sodium chloride 0.9%, Pharmacy to dose Vancomycin consult **AND** vancomycin - pharmacy to dose     Assessment/Plan:  Influenza A, dx on 11/19/2023  Bilateral Pneumonia 2/2 MRSA and Flu A infection   MRSA sepsis   Generalized weakness   Severe aortic stenosis   Elevated LFTs from sepsis: Improving   Hypoalbuminemia   PAF with RVR s/p cardioversion   Chronic HFpEF 55%  Essential hypertension  CAD s/p MI/CABG x3  ETOH abuse   Mild Hypokalemia and Hypophosphatemia   Chronic Anemia and Thrombocytopenia : improved   Hx of VHD    Plan:  Patient doing well today and had no acute issues overnight. Has been afebrile.   Cont OT/PT   S/P cardioversion, CARO did not show any clots or vegetation  Cont po  amiodarone, HR now well controlled     Will continue iv vancomycin for MRSA sepsis and Pneumonia, F/U by ID team   Blood cultures done on 11/27/23 has been negative so far   His LFTs are improving   cont dosing per pharmacy and monitor vanc level     Cont losartan 50 mg po daily and metoprolol     Will closely monitor patients daily weight, urine out put, renal parameters and volume status        Cont tele monitoring     Continue PT       Need good nutrition support for better recovery, consult nutrition team     Cont supportive care     Labs in am     VTE prophylaxis: Lovenox     Patient condition:  Fair     Anticipated discharge and Disposition:   Swing bed when accepted       All diagnosis and differential diagnosis have been reviewed; assessment and plan has been documented; I have personally reviewed the labs and test results that are presently available; I have reviewed the patients medication list; I have reviewed the consulting providers response and recommendations. I have reviewed or attempted to review medical records based upon their availability    All of the patient's questions have been  addressed and answered. Patient's is agreeable to the above stated plan. I will continue to monitor closely and make adjustments to medical management as needed.  _____________________________________________________________________    Nutrition Status:    Radiology:  I have personally reviewed the following imaging and agree with the radiologist.     Transesophageal echo (CARO)with possible cardioversion  Addison Barnhart MD     11/30/2023  3:10 PM  Procedure: CARO guided DCCV    Final impression:    LV systolic function 55%.  Severely AV sclerocalcification with marked reduced mobility of   all 3 leaflets resulting in severe AS, SHARAD 0.7cm2 by direct   planimetry. Peak AV velocity 4.5 m/sec. Mild AI.  Marked MAC with restricted mobility of the MV resulting in   Moderate mitral stenosis, MG 7 mmhg.  Mild-moderate  MR.  Mild-moderate TR.  SILVANO is not seen and is probably absent due to prior surgical SILVANO   ligation.  Successful DCCV x1    Indication: AS, MS    Informed consent: obtained, signed copy placed in the chart.    Description of the procedure: After sedation was achieved (please   see anesthesia report for details), the esophagus intubated   without difficulties. Multiple orthogonal views obtained. Patient   tolerated procedure without immediate complications noted.    Findings:  Left atrium (LA): Mild-moderately enlarged left atrium.  Left atrial appendage (SILVANO): SILVANO is not seen and is probably   absent due to prior surgical SILVANO ligation.  Interatrial septum:  NO ASD or PFO noted on 2D or Color Doppler   images.   Right atrium (RA): Normal RA size.  Left ventricle (LV): LVEF 55%.   Normal LV size.  Mild-moderate   left ventricular hypertrophy. No Regional wall motion   abnormalities noted.  Right ventricle (RV): Partially visualized in this study.  Aortic valve: Trileaflet.  Severely AV sclerocalcification with   marked reduced mobility of all 3 leaflets resulting in severe AS,   SHARAD 0.7cm2 by direct planimetry. Peak AV velocity 4.5 m/sec. Mild   AI. No Vegetation is present.  Mitral valve: Marked MAC with restricted mobility of the MV   resulting in Moderate mitral stenosis, MG 7 mmhg. No Vegetation   is present.  Mild-moderate MR.  Tricuspid valve: Partially visualized, No Tricuspid stenosis.   Mild-moderate Tricuspid regurgitation. No Vegetation is present.  Pulmonic valve: Not well visualized, no hemodynamically   significant pulmonic stenosis, no pulmonic regurgitation noted in   this study. No Vegetation is present.  Thoracic aorta: Scattered  Atherosclerotic changes of the   descending thoracic aorta.  Normal Caliber aortic root.   Normal   Caliber Proximal portion of the ascending aorta.   Pericardium: No significant pericardial effusion is present.    Description of the DCCV procedure:   After completion of  CARO, no intracardiac thrombus was visualized,   so I decided to proceed with Cardioversion  200 J synchronized cardioversion was completed.  Number of attempts: 1  X-Ray Chest 1 View  Narrative: EXAMINATION:  XR CHEST 1 VIEW    CLINICAL HISTORY:  SOB;    COMPARISON:  11/27/2023    FINDINGS:  Single view of the chest shows right greater than left upper lobe consolidation, similar to the prior study.  No pneumothorax.  Heart size is stable.  Impression: Stable chest    Electronically signed by: Alexander Rivera MD  Date:    11/30/2023  Time:    07:19      Syed Vogt MD   12/02/2023

## 2023-12-03 NOTE — PROGRESS NOTES
"[facility] - 4th Floor Medical Telemetry    Cardiology  Progress Note    Patient Name: Mike Urbina Jr.  MRN: [id number]  Admission Date: 11/24/2023  Hospital Length of Stay: 9 days  Code Status: Full Code   Attending Physician: Syed Vogt MD   Primary Care Physician: Darlene Willams FNP  Expected Discharge Date:   Principal Problem:Atrial fibrillation with RVR    Subjective:     Brief HPI: Mr. Urbina is a 67 y/o male with a history of PAF/Flutter, CAD s/p CABG, VHD, Chronic Diastolic Heart Failure, HTN, HLD, who is known to [facility], Dr. Herrera. He presented to the ER as a transfer from [place] [facility] room on 11.24.23 with complaints of shortness of breath and generalized weakness. He reports being diagnosed with Flu the weak prior and placed on Tamiflu. He was suspected to have SVT, but when rate was slowed, he was found to be in AFIB RVR. Significant labs include H&H 10.7/33.1, Na 135, K 3.4, Calcium 7.2, Albumin 1.9, AST/ALT 47/58, BNP 3,053.1, Troponin 0.137. Blood Cultures positive for MRSA. CTA chest negative for PE, findings concerning for multifocal pneumonia most evident in the right upper lobe. TTE demonstrated EF 55%, moderate aortic stenosis, moderate mitral stenosis and mild MR. [facility] has been consulted for AFIB RVR and CARO.     Hospital Course:  11.30.23: NAD. VSS. AFIB CVR on telemetry. Denies CP/SOB/Palps. "I feel okay" Awaiting CARO with Possible DCCV  12.1.23: NAD. VSS. SR on telemetry. Denies CP/SOB/Palps. "I'm okay"  12.2.23: NAD Noted. Vitals Stable.   12.3.23: NAD Noted. Sitting at edge of bed. Voices no complaints. Overall appears comfortable.     PMH:  CAD, Diastolic Heart Failure, ETOH Abuse, VHD (AS), Thrombocytopenia, NSTEMI, HTN, PAF/Flutter  PSH: LHC, CABG, Cataract Extraction   Family History: Mother, L, MI  Social History: + ETOH Use (6pk/Beer per Day for > 30 Years); Denies Tobacco and Illicit Drug Use     Previous Cardiac Diagnostics:   CARO/DCCV " (11.30.23):  LV systolic function 55%. Severely AV sclerocalcification with marked reduced mobility of all 3 leaflets resulting in severe AS, SHARAD 0.7cm2 by direct planimetry. Peak AV velocity 4.5 m/sec. Mild AI. Marked MAC with restricted mobility of the MV resulting in Moderate mitral stenosis, MG 7 mmhg. Mild-moderate MR. Mild-moderate TR. SILVANO is not seen and is probably absent due to prior surgical SILVANO ligation. Successful DCCV x1    ECHO (11.25.23):  Left Ventricle: Normal wall motion. There is normal systolic function. Ejection fraction by visual approximation is 55%. Diastolic function cannot be reliably determined in the presence of mitral valve disease. Right Ventricle: Normal right ventricular cavity size. Systolic function is mildly reduced. Aortic Valve: Moderately calcified cusps. Moderately restricted motion. There is moderate stenosis. Aortic valve area by VTI is 0.90 cm². Aortic valve peak velocity is 3.52 m/s. Mean gradient is 35 mmHg. The dimensionless index is 0.29.  Mitral Valve: There is severe posterior mitral annular calcification present. There is moderate stenosis. The mean pressure gradient across the mitral valve is 9 mmHg at a heart rate of  bpm. There is mild regurgitation. Tricuspid Valve: There is mild regurgitation     ECHO (8.1.23):  The study quality is average. Global left ventricular systolic function is normal. The left ventricular ejection fraction is 55%. The left ventricle diastolic function is impaired (Grade II) with an elevated left atrial pressure. Suspect severe aortic valve stenosis is present; aortic valve is heavily calcified. The trans-aortic peak velocity is 3.5 m/s. The trans-aortic mean gradient is 34.4 mmHg. Dimensionless index ~ 0.24. SVI ~ 42 mL/m^2. Moderate mitral valve calcification with mild to moderate mitral stenosis; mean gradient is 5.1 mmHg. Mild (1+) mitral regurgitation.  Moderate (2+) tricuspid regurgitation.  Mild (1+) aortic regurgitation.  The  pulmonary artery systolic pressure is 44 mmHg. Evidence of pulmonary hypertension is noted.      [facility] 3.15.23:  Surgical coronary anatomy with distal left main 50%; LAD proximal to mid 60-70%; circumflex mid 80- 90%; RCA with proximal .  The ejection fraction was 60% with EDP of 10 mmHg.  Right radial access.  The estimated blood loss was less than 10 cc.  CT surgical consult for CABG evaluation.     PET 1.6.23:  This is an abnormal perfusion study. Study is consistent with ischemia.   This scan is suggestive of moderate risk for future cardiovascular events.   Small reversible perfusion abnormality of moderate intensity in the apical segment. Medium fixed perfusion abnormality of severe intensity in the inferior region.   The left ventricular cavity is noted to be normal on the stress studies. The stress left ventricular ejection fraction was calculated to be 35% and left ventricular global function is moderately reduced. The rest left ventricular cavity is noted to be normal. The rest left ventricular ejection fraction was calculated to be 40% and rest left ventricular global function is mildly reduced.   When compared to the resting ejection fraction (40%), the stress ejection fraction (35%) has decreased.   The study quality is good.   There was a rise in myocardial blood flow between rest and stress.  Global myocardial blood flow reserve was 1.47.  Myocardial blood flow reserve is globally abnormal, placing the patient at a higher coronary event risk.     ECHO 12.9.22:  The left ventricle is normal in size with mild concentric hypertrophy and normal systolic function.  Grade I left ventricular diastolic dysfunction.  The estimated ejection fraction is 55%.  Normal right ventricular size with normal right ventricular systolic function.  There is mild aortic valve stenosis.  There is mild mitral stenosis.  Normal central venous pressure (3 mmHg).     Review of Systems   Cardiovascular:  Negative for chest pain,  dyspnea on exertion, leg swelling and palpitations.   Respiratory:  Negative for shortness of breath.    All other systems reviewed and are negative.    Objective:     Vital Signs (Most Recent):  Temp: 98.9 °F (37.2 °C) (12/03/23 0812)  Pulse: 89 (12/03/23 0812)  Resp: 20 (12/03/23 0812)  BP: (!) 150/87 (12/03/23 0812)  SpO2: 97 % (12/03/23 0812) Vital Signs (24h Range):  Temp:  [97.4 °F (36.3 °C)-99 °F (37.2 °C)] 98.9 °F (37.2 °C)  Pulse:  [80-89] 89  Resp:  [18-23] 20  SpO2:  [94 %-99 %] 97 %  BP: (140-181)/(60-87) 150/87     Weight: 73.9 kg (163 lb)  Body mass index is 22.82 kg/m².    SpO2: 97 %         Intake/Output Summary (Last 24 hours) at 12/3/2023 0845  Last data filed at 12/3/2023 0509  Gross per 24 hour   Intake 1200 ml   Output 1249 ml   Net -49 ml         Lines/Drains/Airways       Peripheral Intravenous Line  Duration                  Peripheral IV - Single Lumen 11/24/23 1600 20 G Anterior;Left;Proximal Forearm 8 days         Peripheral IV - Single Lumen 11/24/23 1755 20 G Anterior;Distal;Right Upper Arm 8 days                    Significant Labs:   Recent Results (from the past 72 hour(s))   Basic Metabolic Panel    Collection Time: 12/01/23  4:09 AM   Result Value Ref Range    Sodium Level 136 136 - 145 mmol/L    Potassium Level 4.0 3.5 - 5.1 mmol/L    Chloride 107 98 - 107 mmol/L    Carbon Dioxide 23 23 - 31 mmol/L    Glucose Level 108 82 - 115 mg/dL    Blood Urea Nitrogen 15.5 8.4 - 25.7 mg/dL    Creatinine 0.81 0.73 - 1.18 mg/dL    BUN/Creatinine Ratio 19     Calcium Level Total 7.2 (L) 8.8 - 10.0 mg/dL    Anion Gap 6.0 mEq/L    eGFR >60 mls/min/1.73/m2   VANCOMYCIN, TROUGH    Collection Time: 12/01/23  4:09 AM   Result Value Ref Range    Vancomycin Trough 19.9 15.0 - 20.0 ug/ml   Basic Metabolic Panel    Collection Time: 12/02/23  6:59 AM   Result Value Ref Range    Sodium Level 136 136 - 145 mmol/L    Potassium Level 3.5 3.5 - 5.1 mmol/L    Chloride 105 98 - 107 mmol/L    Carbon Dioxide 23 23 -  31 mmol/L    Glucose Level 97 82 - 115 mg/dL    Blood Urea Nitrogen 13.3 8.4 - 25.7 mg/dL    Creatinine 0.80 0.73 - 1.18 mg/dL    BUN/Creatinine Ratio 17     Calcium Level Total 7.7 (L) 8.8 - 10.0 mg/dL    Anion Gap 8.0 mEq/L    eGFR >60 mls/min/1.73/m2   Magnesium    Collection Time: 12/02/23  6:59 AM   Result Value Ref Range    Magnesium Level 1.80 1.60 - 2.60 mg/dL   CBC with Differential    Collection Time: 12/02/23  6:59 AM   Result Value Ref Range    WBC 6.70 4.50 - 11.50 x10(3)/mcL    RBC 3.39 (L) 4.70 - 6.10 x10(6)/mcL    Hgb 9.7 (L) 14.0 - 18.0 g/dL    Hct 29.7 (L) 42.0 - 52.0 %    MCV 87.6 80.0 - 94.0 fL    MCH 28.6 27.0 - 31.0 pg    MCHC 32.7 (L) 33.0 - 36.0 g/dL    RDW 17.3 (H) 11.5 - 17.0 %    Platelet 195 130 - 400 x10(3)/mcL    MPV 10.9 (H) 7.4 - 10.4 fL    Neut % 77.9 %    Lymph % 11.3 %    Mono % 8.8 %    Eos % 0.6 %    Basophil % 0.1 %    Lymph # 0.76 0.6 - 4.6 x10(3)/mcL    Neut # 5.21 2.1 - 9.2 x10(3)/mcL    Mono # 0.59 0.1 - 1.3 x10(3)/mcL    Eos # 0.04 0 - 0.9 x10(3)/mcL    Baso # 0.01 <=0.2 x10(3)/mcL    IG# 0.09 (H) 0 - 0.04 x10(3)/mcL    IG% 1.3 %    NRBC% 0.0 %   VANCOMYCIN, TROUGH    Collection Time: 12/02/23  4:28 PM   Result Value Ref Range    Vancomycin Trough 19.5 15.0 - 20.0 ug/ml     Telemetry:  SR    Physical Exam  Vitals and nursing note reviewed.   Constitutional:       General: He is not in acute distress.     Appearance: Normal appearance.   HENT:      Head: Normocephalic.      Nose: Nose normal.   Cardiovascular:      Rate and Rhythm: Normal rate and regular rhythm.      Comments: SR  Pulmonary:      Effort: Pulmonary effort is normal. No respiratory distress.   Musculoskeletal:         General: Normal range of motion.      Right lower leg: No edema.      Left lower leg: No edema.   Skin:     General: Skin is warm.   Neurological:      Mental Status: He is alert. Mental status is at baseline.   Psychiatric:         Behavior: Behavior normal.       Current Inpatient  Medications:  Current Facility-Administered Medications:     amiodarone tablet 200 mg, 200 mg, Oral, Daily, Stroda, Danielito, DO, 200 mg at 12/02/23 0824    aspirin EC tablet 81 mg, 81 mg, Oral, Daily, Stroda, Danielito, DO, 81 mg at 12/02/23 0823    enoxaparin injection 70 mg, 70 mg, Subcutaneous, Q12H, Stroda, Danielito, DO, 70 mg at 12/02/23 2356    folic acid tablet 1 mg, 1 mg, Oral, Daily, Juan Anderson Jr., MD, FCCP, 1 mg at 12/02/23 0824    guaiFENesin 100 mg/5 ml syrup 400 mg, 400 mg, Oral, Q6H WAKE, Dulce Aiken, FNP, 400 mg at 12/02/23 1942    hydrALAZINE injection 10 mg, 10 mg, Intravenous, Q4H PRN, Laura Boggs, AGACNP-BC, 10 mg at 12/1953    labetaloL injection 10 mg, 10 mg, Intravenous, Q2H PRN, Laura Boggs AGACNP-BC, 10 mg at 12/02/23 0407    levalbuterol nebulizer solution 1.25 mg, 1.25 mg, Nebulization, Q8H, Syed Vogt MD, 1.25 mg at 12/03/23 0040    linaCLOtide capsule 72 mcg, 72 mcg, Oral, Before breakfast, Melida, Danielito, DO, 72 mcg at 12/03/23 0547    loperamide capsule 2 mg, 2 mg, Oral, QID PRN, Simran Meza AGACNP-BC, 2 mg at 11/28/23 0649    LORazepam injection 2 mg, 2 mg, Intravenous, Q15 Min PRN, Rico Davila MD    losartan tablet 25 mg, 25 mg, Oral, Daily, Syed Vogt MD, 25 mg at 12/02/23 0824    magnesium oxide tablet 800 mg, 800 mg, Oral, PRN, Stroda, Danielito, DO    magnesium oxide tablet 800 mg, 800 mg, Oral, PRN, Stroda, Danielito, DO    melatonin tablet 6 mg, 6 mg, Oral, Nightly PRN, Laura Boggs, AGACNP-BC, 6 mg at 12/02/23 0407    metoprolol injection 5 mg, 5 mg, Intravenous, Q5 Min PRN, Danielito Medina DO, 5 mg at 11/29/23 2332    metoprolol succinate (TOPROL-XL) 24 hr tablet 100 mg, 100 mg, Oral, Daily, Juan Anderson Jr., MD, FCCP, 100 mg at 12/02/23 0824    miconazole NITRATE 2 % top powder, , Topical (Top), BID, Betty Patel DO, Given at 12/02/23 2359    ondansetron injection 4 mg, 4 mg, Intravenous, Q8H PRN, Danielito Medina DO     potassium bicarbonate disintegrating tablet 35 mEq, 35 mEq, Oral, PRN, Stroda, Danielito, DO, 35 mEq at 11/27/23 0420    potassium bicarbonate disintegrating tablet 50 mEq, 50 mEq, Oral, PRN, Stroda, Danielito, DO, 50 mEq at 11/28/23 1606    potassium bicarbonate disintegrating tablet 60 mEq, 60 mEq, Oral, PRN, Stroda, Danielito, DO    potassium, sodium phosphates 280-160-250 mg packet 2 packet, 2 packet, Oral, PRN, Stroda, Danielito, DO    potassium, sodium phosphates 280-160-250 mg packet 2 packet, 2 packet, Oral, PRN, Stroda, Danielito, DO, 2 packet at 11/27/23 0420    potassium, sodium phosphates 280-160-250 mg packet 2 packet, 2 packet, Oral, PRN, Stroda, Danielito, DO    QUEtiapine tablet 200 mg, 200 mg, Oral, QHS, Stroda, Danielito, DO, 200 mg at 12/02/23 1941    sodium chloride 0.9% flush 10 mL, 10 mL, Intravenous, PRN, Stroda, Danielito, DO, 10 mL at 11/28/23 1407    Pharmacy to dose Vancomycin consult, , , Once **AND** vancomycin - pharmacy to dose, , Intravenous, pharmacy to manage frequency, Juan Anderson Jr., [facility], [facility]    vancomycin in dextrose 5 % 1 gram/250 mL IVPB 1,000 mg, 1,000 mg, Intravenous, Q12H, Syed Vogt MD, Last Rate: 166.7 mL/hr at 12/03/23 0550, 1,000 mg at 12/03/23 0550    white petrolatum 41 % ointment, , Topical (Top), BID, Syed Vogt MD, Given at 12/02/23 1309    VTE Risk Mitigation (From admission, onward)           Ordered     enoxaparin injection 70 mg  Every 12 hours (non-standard times)         11/24/23 2133     IP VTE HIGH RISK PATIENT  Once         11/24/23 2116     Place sequential compression device  Until discontinued         11/24/23 2116                    Assessment:   PAF/Flutter  - Currently SR     - s/p CARO/DCCV (11.30.23): Successful DCCV x1    - CHADsVASc - 3 Points - 3.2% Stroke Risk per Year     - s/p (4.3.23) - Ligation of SILVANO with Endostapler  NSTEMI type II secondary to demand ischemia due to Sepsis, PNA, Flu, PAF  Sepsis with MRSA   Bilateral Community Acquired PNA  Recent  Influenza   Elevated LFTs (improved)  MVCAD    - s/p CABG (4.3.23) - LIMA to LAD, rSVG to OM1, rSVG to PDA  Chronic Diastolic Heart   Hypoalbuminemia   VHD    - severe AS    - Moderate MS     - CTS suggested TAVR eval  HTN/HHD  Chronic Thrombocytopenia   History of ETOH Abuse  No History of GI Bleed    Plan:   Increase Losartan to 50 mg Daily  Continue Lovenox for Stroke Risk Reduction  Structural Heart Evaluation Tomorrow    LOREN Pruitt  Cardiology  [facility] - 4th Floor Medical Telemetry  12/03/2023

## 2023-12-04 LAB
ANION GAP SERPL CALC-SCNC: 8 MEQ/L
BASOPHILS # BLD AUTO: 0.01 X10(3)/MCL
BASOPHILS NFR BLD AUTO: 0.2 %
BUN SERPL-MCNC: 13.7 MG/DL (ref 8.4–25.7)
CALCIUM SERPL-MCNC: 7.3 MG/DL (ref 8.8–10)
CHLORIDE SERPL-SCNC: 107 MMOL/L (ref 98–107)
CO2 SERPL-SCNC: 20 MMOL/L (ref 23–31)
CREAT SERPL-MCNC: 0.8 MG/DL (ref 0.73–1.18)
CREAT/UREA NIT SERPL: 17
EOSINOPHIL # BLD AUTO: 0.02 X10(3)/MCL (ref 0–0.9)
EOSINOPHIL NFR BLD AUTO: 0.3 %
ERYTHROCYTE [DISTWIDTH] IN BLOOD BY AUTOMATED COUNT: 17.2 % (ref 11.5–17)
GFR SERPLBLD CREATININE-BSD FMLA CKD-EPI: >60 MLS/MIN/1.73/M2
GLUCOSE SERPL-MCNC: 100 MG/DL (ref 82–115)
HCT VFR BLD AUTO: 27.3 % (ref 42–52)
HGB BLD-MCNC: 8.6 G/DL (ref 14–18)
IMM GRANULOCYTES # BLD AUTO: 0.07 X10(3)/MCL (ref 0–0.04)
IMM GRANULOCYTES NFR BLD AUTO: 1.1 %
LYMPHOCYTES # BLD AUTO: 0.68 X10(3)/MCL (ref 0.6–4.6)
LYMPHOCYTES NFR BLD AUTO: 10.4 %
MCH RBC QN AUTO: 28.2 PG (ref 27–31)
MCHC RBC AUTO-ENTMCNC: 31.5 G/DL (ref 33–36)
MCV RBC AUTO: 89.5 FL (ref 80–94)
MONOCYTES # BLD AUTO: 0.51 X10(3)/MCL (ref 0.1–1.3)
MONOCYTES NFR BLD AUTO: 7.8 %
NEUTROPHILS # BLD AUTO: 5.26 X10(3)/MCL (ref 2.1–9.2)
NEUTROPHILS NFR BLD AUTO: 80.2 %
NRBC BLD AUTO-RTO: 0 %
PLATELET # BLD AUTO: 200 X10(3)/MCL (ref 130–400)
PMV BLD AUTO: 10.5 FL (ref 7.4–10.4)
POTASSIUM SERPL-SCNC: 3.8 MMOL/L (ref 3.5–5.1)
RBC # BLD AUTO: 3.05 X10(6)/MCL (ref 4.7–6.1)
SODIUM SERPL-SCNC: 135 MMOL/L (ref 136–145)
VANCOMYCIN TROUGH SERPL-MCNC: 31.4 UG/ML (ref 15–20)
WBC # SPEC AUTO: 6.55 X10(3)/MCL (ref 4.5–11.5)

## 2023-12-04 PROCEDURE — 21400001 HC TELEMETRY ROOM

## 2023-12-04 PROCEDURE — 80048 BASIC METABOLIC PNL TOTAL CA: CPT | Performed by: INTERNAL MEDICINE

## 2023-12-04 PROCEDURE — 99900035 HC TECH TIME PER 15 MIN (STAT)

## 2023-12-04 PROCEDURE — 97535 SELF CARE MNGMENT TRAINING: CPT | Mod: CO

## 2023-12-04 PROCEDURE — 27000221 HC OXYGEN, UP TO 24 HOURS

## 2023-12-04 PROCEDURE — 25000003 PHARM REV CODE 250

## 2023-12-04 PROCEDURE — 85025 COMPLETE CBC W/AUTO DIFF WBC: CPT | Performed by: INTERNAL MEDICINE

## 2023-12-04 PROCEDURE — 27000207 HC ISOLATION

## 2023-12-04 PROCEDURE — 25000242 PHARM REV CODE 250 ALT 637 W/ HCPCS: Performed by: INTERNAL MEDICINE

## 2023-12-04 PROCEDURE — 97116 GAIT TRAINING THERAPY: CPT | Mod: CQ

## 2023-12-04 PROCEDURE — 97530 THERAPEUTIC ACTIVITIES: CPT | Mod: CQ

## 2023-12-04 PROCEDURE — 25000003 PHARM REV CODE 250: Performed by: NURSE PRACTITIONER

## 2023-12-04 PROCEDURE — 25000003 PHARM REV CODE 250: Performed by: INTERNAL MEDICINE

## 2023-12-04 PROCEDURE — 63600175 PHARM REV CODE 636 W HCPCS

## 2023-12-04 PROCEDURE — 63600175 PHARM REV CODE 636 W HCPCS: Performed by: INTERNAL MEDICINE

## 2023-12-04 PROCEDURE — 80202 ASSAY OF VANCOMYCIN: CPT | Performed by: INTERNAL MEDICINE

## 2023-12-04 PROCEDURE — 94640 AIRWAY INHALATION TREATMENT: CPT

## 2023-12-04 PROCEDURE — 94761 N-INVAS EAR/PLS OXIMETRY MLT: CPT

## 2023-12-04 RX ADMIN — SACUBITRIL AND VALSARTAN 1 TABLET: 24; 26 TABLET, FILM COATED ORAL at 08:12

## 2023-12-04 RX ADMIN — METOPROLOL SUCCINATE 100 MG: 50 TABLET, EXTENDED RELEASE ORAL at 09:12

## 2023-12-04 RX ADMIN — GUAIFENESIN 400 MG: 200 SOLUTION ORAL at 08:12

## 2023-12-04 RX ADMIN — WHITE PETROLATUM: 1.75 OINTMENT TOPICAL at 08:12

## 2023-12-04 RX ADMIN — QUETIAPINE 200 MG: 100 TABLET ORAL at 08:12

## 2023-12-04 RX ADMIN — ENOXAPARIN SODIUM 70 MG: 80 INJECTION SUBCUTANEOUS at 11:12

## 2023-12-04 RX ADMIN — VANCOMYCIN HYDROCHLORIDE 1000 MG: 1 INJECTION, POWDER, LYOPHILIZED, FOR SOLUTION INTRAVENOUS at 04:12

## 2023-12-04 RX ADMIN — LOSARTAN POTASSIUM 50 MG: 50 TABLET, FILM COATED ORAL at 09:12

## 2023-12-04 RX ADMIN — LEVALBUTEROL HYDROCHLORIDE 1.25 MG: 1.25 SOLUTION RESPIRATORY (INHALATION) at 04:12

## 2023-12-04 RX ADMIN — LINACLOTIDE 72 MCG: 72 CAPSULE, GELATIN COATED ORAL at 06:12

## 2023-12-04 RX ADMIN — MICONAZOLE NITRATE: 20 POWDER TOPICAL at 09:12

## 2023-12-04 RX ADMIN — GUAIFENESIN 400 MG: 200 SOLUTION ORAL at 09:12

## 2023-12-04 RX ADMIN — MICONAZOLE NITRATE: 20 POWDER TOPICAL at 08:12

## 2023-12-04 RX ADMIN — AMIODARONE HYDROCHLORIDE 200 MG: 200 TABLET ORAL at 09:12

## 2023-12-04 RX ADMIN — Medication 6 MG: at 08:12

## 2023-12-04 RX ADMIN — APIXABAN 5 MG: 5 TABLET, FILM COATED ORAL at 12:12

## 2023-12-04 RX ADMIN — WHITE PETROLATUM: 1.75 OINTMENT TOPICAL at 09:12

## 2023-12-04 RX ADMIN — FOLIC ACID 1 MG: 1 TABLET ORAL at 09:12

## 2023-12-04 RX ADMIN — ASPIRIN 81 MG: 81 TABLET, COATED ORAL at 09:12

## 2023-12-04 RX ADMIN — APIXABAN 5 MG: 5 TABLET, FILM COATED ORAL at 08:12

## 2023-12-04 RX ADMIN — LEVALBUTEROL HYDROCHLORIDE 1.25 MG: 1.25 SOLUTION RESPIRATORY (INHALATION) at 08:12

## 2023-12-04 RX ADMIN — GUAIFENESIN 400 MG: 200 SOLUTION ORAL at 02:12

## 2023-12-04 NOTE — PT/OT/SLP PROGRESS
Occupational Therapy   Treatment    Name: Mike Urbina Jr.  MRN: [id number]  Admitting Diagnosis:  Atrial fibrillation with RVR       Recommendations:     Recommended therapy intensity at discharge: Moderate Intensity Therapy   Discharge Equipment Recommendations:  to be determined by next level of care  Barriers to discharge:       Assessment:     Mike Urbina Jr. is a 68 y.o. male with a medical diagnosis of Atrial fibrillation with RVR.  Performance deficits affecting function are weakness, impaired endurance, impaired self care skills, impaired functional mobility.     Rehab Prognosis:  Fair; patient would benefit from acute skilled OT services to address these deficits and reach maximum level of function.       Plan:     Patient to be seen 3 x/week to address the above listed problems via self-care/home management, therapeutic activities, therapeutic exercises  Plan of Care Expires: 12/27/23  Plan of Care Reviewed with: patient    Subjective     Pain/Comfort:       Objective:     Communicated with: RN prior to session.  Patient found  in bathroom sitting on commode  with   upon OT entry to room.    General Precautions: Standard, fall    Orthopedic Precautions:N/A  Braces: N/A     Occupational Performance:     Functional Mobility/Transfers:  Patient completed Sit <> Stand Transfer with minimum assistance  with  rolling walker   Functional Mobility: patient with on LOB while mobilizing    Activities of Daily Living:  Grooming: stand by assistance standing at sink washing hands  Toileting: independence pericare following +BM    Patient Education:  Patient provided with verbal education education regarding OT role/goals/POC.  Understanding was verbalized.      Patient left HOB elevated with all lines intact and call button in reach    GOALS:   Multidisciplinary Problems       Occupational Therapy Goals          Problem: Occupational Therapy    Goal Priority Disciplines Outcome Interventions    Occupational Therapy Goal     OT, PT/OT Ongoing, Progressing    Description: Goals to be met by: 12/27/23     Patient will increase functional independence with ADLs by performing:    UE Dressing with Set-up Assistance.  LE Dressing with Set-up Assistance.  Grooming while standing at sink with Set-up Assistance.  Toileting from toilet with Set-up Assistance for hygiene and clothing management.   Toilet transfer to toilet with Supervision.                         Time Tracking:     OT Date of Treatment: 12/04/23  OT Start Time: 1512  OT Stop Time: 1530  OT Total Time (min): 18 min    Billable Minutes:Self Care/Home Management 1    OT/ROCCO: ROCCO     Number of ROCCO visits since last OT visit: 2    12/4/2023

## 2023-12-04 NOTE — CONSULTS
Consults    [facility] - 4th Floor Medical Telemetry    Cardiology  Structural Heart Consult Note    Patient Name: Mike Urbina Jr.  MRN: [id number]  Admission Date: 11/24/2023  Hospital Length of Stay: 10 days  Code Status: Full Code   Attending Provider: Syed Vogt MD   Consulting Provider: LOREN Dumont  Primary Care Physician: Darlene Willams FNP  Principal Problem:Atrial fibrillation with RVR    Patient information was obtained from patient, past medical records, and ER records.     Subjective:     Reason for consult: TAVR evaluation    HPI:   Mr. Urbina is a 69y/o male, followed by Dr. Herrera, who presented to ED on 11.24.23 with c/o SOB and weakness. He was found to be in A-fib RVR and was placed on amiodarone. CTA chest negative for PE but did reveal multifocal pneumonia. ABX started and Blood cultures obtained which were + MRSA; therefore he underwent CARO which revealed severe AS.  CV surgery consulted and recommend TAVR evaluation. Structural heart has been consulted for TAVR evaluation due to severe AS.       PMH:  CAD, Diastolic Heart Failure, ETOH Abuse, VHD (AS), Thrombocytopenia, NSTEMI, HTN, PAF/Flutter  PSH: LHC, CABG, Cataract Extraction   Family History: Mother, L, MI  Social History: + ETOH Use (6pk/Beer per Day for > 30 Years); Denies Tobacco and Illicit Drug Use     Previous Cardiac Diagnostics:   CARO/DCCV (11.30.23):  LV systolic function 55%. Severely AV sclerocalcification with marked reduced mobility of all 3 leaflets resulting in severe AS, SHARAD 0.7cm2 by direct planimetry. Peak AV velocity 4.5 m/sec. Mild AI. Marked MAC with restricted mobility of the MV resulting in Moderate mitral stenosis, MG 7 mmhg. Mild-moderate MR. Mild-moderate TR. SILVANO is not seen and is probably absent due to prior surgical SILVANO ligation. Successful DCCV x1     ECHO (11.25.23):  Left Ventricle: Normal wall motion. There is normal systolic function. Ejection fraction by  visual approximation is 55%. Diastolic function cannot be reliably determined in the presence of mitral valve disease. Right Ventricle: Normal right ventricular cavity size. Systolic function is mildly reduced. Aortic Valve: Moderately calcified cusps. Moderately restricted motion. There is moderate stenosis. Aortic valve area by VTI is 0.90 cm². Aortic valve peak velocity is 3.52 m/s. Mean gradient is 35 mmHg. The dimensionless index is 0.29.  Mitral Valve: There is severe posterior mitral annular calcification present. There is moderate stenosis. The mean pressure gradient across the mitral valve is 9 mmHg at a heart rate of  bpm. There is mild regurgitation. Tricuspid Valve: There is mild regurgitation     ECHO (8.1.23):  The study quality is average. Global left ventricular systolic function is normal. The left ventricular ejection fraction is 55%. The left ventricle diastolic function is impaired (Grade II) with an elevated left atrial pressure. Suspect severe aortic valve stenosis is present; aortic valve is heavily calcified. The trans-aortic peak velocity is 3.5 m/s. The trans-aortic mean gradient is 34.4 mmHg. Dimensionless index ~ 0.24. SVI ~ 42 mL/m^2. Moderate mitral valve calcification with mild to moderate mitral stenosis; mean gradient is 5.1 mmHg. Mild (1+) mitral regurgitation.  Moderate (2+) tricuspid regurgitation.  Mild (1+) aortic regurgitation.  The pulmonary artery systolic pressure is 44 mmHg. Evidence of pulmonary hypertension is noted.      [facility] 3.15.23:  Surgical coronary anatomy with distal left main 50%; LAD proximal to mid 60-70%; circumflex mid 80- 90%; RCA with proximal .  The ejection fraction was 60% with EDP of 10 mmHg.  Right radial access.  The estimated blood loss was less than 10 cc.  CT surgical consult for CABG evaluation.     PET 1.6.23:  This is an abnormal perfusion study. Study is consistent with ischemia.   This scan is suggestive of moderate risk for future  cardiovascular events.   Small reversible perfusion abnormality of moderate intensity in the apical segment. Medium fixed perfusion abnormality of severe intensity in the inferior region.   The left ventricular cavity is noted to be normal on the stress studies. The stress left ventricular ejection fraction was calculated to be 35% and left ventricular global function is moderately reduced. The rest left ventricular cavity is noted to be normal. The rest left ventricular ejection fraction was calculated to be 40% and rest left ventricular global function is mildly reduced.   When compared to the resting ejection fraction (40%), the stress ejection fraction (35%) has decreased.   The study quality is good.   There was a rise in myocardial blood flow between rest and stress.  Global myocardial blood flow reserve was 1.47.  Myocardial blood flow reserve is globally abnormal, placing the patient at a higher coronary event risk.     ECHO 12.9.22:  The left ventricle is normal in size with mild concentric hypertrophy and normal systolic function.  Grade I left ventricular diastolic dysfunction.  The estimated ejection fraction is 55%.  Normal right ventricular size with normal right ventricular systolic function.  There is mild aortic valve stenosis.  There is mild mitral stenosis.  Normal central venous pressure (3 mmHg).       Past Medical History:   Diagnosis Date    Atrial flutter     CHF (congestive heart failure)     Coronary artery disease     Hypertension     Myocardial infarction     SOB (shortness of breath)     at times       Past Surgical History:   Procedure Laterality Date    CATARACT EXTRACTION Bilateral     CORONARY ARTERY BYPASS GRAFT (CABG) N/A 4/3/2023    Procedure: CORONARY ARTERY BYPASS GRAFT (CABG);  Surgeon: Deuce Finley IV, MD;  Location: [facility];  Service: Cardiovascular;  Laterality: N/A;  WITH LLAA //  ECHO NOTIFIED    LEFT HEART CATHETERIZATION N/A 03/15/2023    Procedure: CATHETERIZATION,  HEART, LEFT;  Surgeon: Sreedhar Herrera MD;  Location: [facility] CATH LAB;  Service: Cardiology;  Laterality: N/A;  LHC via RRA       Review of patient's allergies indicates:  No Known Allergies    No current facility-administered medications on file prior to encounter.     Current Outpatient Medications on File Prior to Encounter   Medication Sig    amiodarone (PACERONE) 200 MG Tab Take 1 tablet (200 mg total) by mouth once daily.    aspirin (ECOTRIN) 81 MG EC tablet Take 1 tablet (81 mg total) by mouth once daily.    atorvastatin (LIPITOR) 40 MG tablet Take 1 tablet (40 mg total) by mouth every evening.    furosemide (LASIX) 20 MG tablet Take 1 tablet (20 mg total) by mouth once daily. for 4 days    hydrOXYzine pamoate (VISTARIL) 50 MG Cap Take 1 capsule (50 mg total) by mouth every 8 (eight) hours as needed (as needed for anxiety).    linaCLOtide (LINZESS) 72 mcg Cap capsule Take 1 capsule (72 mcg total) by mouth before breakfast.    metoprolol succinate (TOPROL-XL) 50 MG 24 hr tablet TAKE 1 TABLET BY MOUTH ONCE DAILY    QUEtiapine (SEROQUEL) 100 MG Tab Take 2 tablets (200 mg total) by mouth every evening.    sodium bicarbonate 650 MG tablet Take 650 mg by mouth once daily.    thiamine (VITAMIN B-1) 100 MG tablet Take 100 mg by mouth once daily.    valsartan (DIOVAN) 160 MG tablet Take 1 tablet (160 mg total) by mouth 2 (two) times daily.     Family History       Problem Relation (Age of Onset)    Heart attack Mother          Tobacco Use    Smoking status: Never     Passive exposure: Never    Smokeless tobacco: Never   Substance and Sexual Activity    Alcohol use: Yes     Alcohol/week: 84.0 standard drinks of alcohol     Types: 84 Cans of beer per week     Comment: alcoholic, daily, beer    Drug use: Yes     Frequency: 3.0 times per week     Types: Hydrocodone    Sexual activity: Not Currently       Review of Systems   Constitutional: Negative.    Respiratory:  Positive for cough and shortness of breath.     Cardiovascular:  Negative for chest pain.   Gastrointestinal: Negative.    Genitourinary: Negative.    Musculoskeletal: Negative.    Skin: Negative.    Neurological: Negative.        Objective:     Vital Signs (Most Recent):  Temp: 99.9 °F (37.7 °C) (12/04/23 1150)  Pulse: 85 (12/04/23 1150)  Resp: 20 (12/04/23 1150)  BP: (!) 183/93 (12/04/23 1150)  SpO2: (!) 94 % (12/04/23 1150) Vital Signs (24h Range):  Temp:  [97.8 °F (36.6 °C)-99.9 °F (37.7 °C)] 99.9 °F (37.7 °C)  Pulse:  [80-88] 85  Resp:  [18-20] 20  SpO2:  [94 %-99 %] 94 %  BP: (132-183)/(66-98) 183/93     Weight: 73.9 kg (163 lb)  Body mass index is 22.82 kg/m².    SpO2: (!) 94 %         Intake/Output Summary (Last 24 hours) at 12/4/2023 1325  Last data filed at 12/4/2023 1150  Gross per 24 hour   Intake 1360 ml   Output 650 ml   Net 710 ml       Lines/Drains/Airways       Peripheral Intravenous Line  Duration                  Peripheral IV - Single Lumen 11/24/23 1755 20 G Anterior;Distal;Right Upper Arm 9 days                    Significant Labs:  Recent Results (from the past 72 hour(s))   Basic Metabolic Panel    Collection Time: 12/02/23  6:59 AM   Result Value Ref Range    Sodium Level 136 136 - 145 mmol/L    Potassium Level 3.5 3.5 - 5.1 mmol/L    Chloride 105 98 - 107 mmol/L    Carbon Dioxide 23 23 - 31 mmol/L    Glucose Level 97 82 - 115 mg/dL    Blood Urea Nitrogen 13.3 8.4 - 25.7 mg/dL    Creatinine 0.80 0.73 - 1.18 mg/dL    BUN/Creatinine Ratio 17     Calcium Level Total 7.7 (L) 8.8 - 10.0 mg/dL    Anion Gap 8.0 mEq/L    eGFR >60 mls/min/1.73/m2   Magnesium    Collection Time: 12/02/23  6:59 AM   Result Value Ref Range    Magnesium Level 1.80 1.60 - 2.60 mg/dL   CBC with Differential    Collection Time: 12/02/23  6:59 AM   Result Value Ref Range    WBC 6.70 4.50 - 11.50 x10(3)/mcL    RBC 3.39 (L) 4.70 - 6.10 x10(6)/mcL    Hgb 9.7 (L) 14.0 - 18.0 g/dL    Hct 29.7 (L) 42.0 - 52.0 %    MCV 87.6 80.0 - 94.0 fL    MCH 28.6 27.0 - 31.0 pg    MCHC  32.7 (L) 33.0 - 36.0 g/dL    RDW 17.3 (H) 11.5 - 17.0 %    Platelet 195 130 - 400 x10(3)/mcL    MPV 10.9 (H) 7.4 - 10.4 fL    Neut % 77.9 %    Lymph % 11.3 %    Mono % 8.8 %    Eos % 0.6 %    Basophil % 0.1 %    Lymph # 0.76 0.6 - 4.6 x10(3)/mcL    Neut # 5.21 2.1 - 9.2 x10(3)/mcL    Mono # 0.59 0.1 - 1.3 x10(3)/mcL    Eos # 0.04 0 - 0.9 x10(3)/mcL    Baso # 0.01 <=0.2 x10(3)/mcL    IG# 0.09 (H) 0 - 0.04 x10(3)/mcL    IG% 1.3 %    NRBC% 0.0 %   VANCOMYCIN, TROUGH    Collection Time: 12/02/23  4:28 PM   Result Value Ref Range    Vancomycin Trough 19.5 15.0 - 20.0 ug/ml   Basic Metabolic Panel    Collection Time: 12/04/23  4:35 AM   Result Value Ref Range    Sodium Level 135 (L) 136 - 145 mmol/L    Potassium Level 3.8 3.5 - 5.1 mmol/L    Chloride 107 98 - 107 mmol/L    Carbon Dioxide 20 (L) 23 - 31 mmol/L    Glucose Level 100 82 - 115 mg/dL    Blood Urea Nitrogen 13.7 8.4 - 25.7 mg/dL    Creatinine 0.80 0.73 - 1.18 mg/dL    BUN/Creatinine Ratio 17     Calcium Level Total 7.3 (L) 8.8 - 10.0 mg/dL    Anion Gap 8.0 mEq/L    eGFR >60 mls/min/1.73/m2   CBC with Differential    Collection Time: 12/04/23  4:35 AM   Result Value Ref Range    WBC 6.55 4.50 - 11.50 x10(3)/mcL    RBC 3.05 (L) 4.70 - 6.10 x10(6)/mcL    Hgb 8.6 (L) 14.0 - 18.0 g/dL    Hct 27.3 (L) 42.0 - 52.0 %    MCV 89.5 80.0 - 94.0 fL    MCH 28.2 27.0 - 31.0 pg    MCHC 31.5 (L) 33.0 - 36.0 g/dL    RDW 17.2 (H) 11.5 - 17.0 %    Platelet 200 130 - 400 x10(3)/mcL    MPV 10.5 (H) 7.4 - 10.4 fL    Neut % 80.2 %    Lymph % 10.4 %    Mono % 7.8 %    Eos % 0.3 %    Basophil % 0.2 %    Lymph # 0.68 0.6 - 4.6 x10(3)/mcL    Neut # 5.26 2.1 - 9.2 x10(3)/mcL    Mono # 0.51 0.1 - 1.3 x10(3)/mcL    Eos # 0.02 0 - 0.9 x10(3)/mcL    Baso # 0.01 <=0.2 x10(3)/mcL    IG# 0.07 (H) 0 - 0.04 x10(3)/mcL    IG% 1.1 %    NRBC% 0.0 %       Significant Imaging:  Imaging Results              CTA Chest Non-Coronary (PE Studies) (Final result)  Result time 11/24/23 18:48:28      Final result  by Lyric Deras MD (11/24/23 18:48:28)                   Impression:      1. No pulmonary embolism identified.  2. Findings concerning for multifocal pneumonia, most evident in the right upper lobe.      Electronically signed by: Lyric Deras  Date:    11/24/2023  Time:    18:48               Narrative:    EXAMINATION:  CTA CHEST NON CORONARY (PE STUDIES)    CLINICAL HISTORY:  Pulmonary embolism (PE) suspected, high prob;    TECHNIQUE:  Helical-acquisition CT images were obtained prior to and following the intravenous administration of iodine-based contrast media.    The post-contrast images were timed to coincide with arterial opacification for purpose of CT angiography.    Multiplanar reconstructions, to include MIP and volume-rendered (3D) images, were accomplished by the CT technologist at a separate workstation and pushed to PACS for physician review.    Total DLP (mGy-cm) 268 (value may include radiation due to concomitantly performed CT imaging)    Automated tube current modulation and/or weight-based exposure dosing is utilized, when appropriate, to reach lowest reasonably achievable exposure rate.    COMPARISON:  None    FINDINGS:  The heart is normal in size.  There is no pericardial effusion.  The RV to LV ratio is less than 1.  Evaluation is by breathing motion artifact.  There is no pulmonary embolism identified.    There are consolidative changes in the right upper lobe with additional scattered bilateral nodular airspace opacities.  There is no pleural effusion or pneumothorax.    There are no acute findings in the upper abdomen.    There is no acute bony abnormality identified.                                      EKG:    Results for orders placed or performed during the hospital encounter of 11/24/23   EKG 12-lead    Narrative    Test Reason : I48.91,    Vent. Rate : 082 BPM     Atrial Rate : 082 BPM     P-R Int : 142 ms          QRS Dur : 092 ms      QT Int : 404 ms       P-R-T Axes :  038 065 057 degrees     QTc Int : 472 ms    Normal sinus rhythm  Normal ECG  When compared with ECG of 29-NOV-2023 11:17,  Sinus rhythm has replaced Atrial flutter  Confirmed by Phuc Jimenes MD ([id number]) on 12/1/2023 12:26:05 AM    Referred By: SARAH REYNOLDS           Confirmed By:Phuc Jimenes MD         Physical Exam  HENT:      Mouth/Throat:      Mouth: Mucous membranes are moist.   Cardiovascular:      Rate and Rhythm: Rhythm irregular.      Heart sounds: Murmur heard.   Pulmonary:      Breath sounds: Rhonchi present.   Abdominal:      Palpations: Abdomen is soft.   Musculoskeletal:         General: Normal range of motion.   Skin:     General: Skin is warm.      Capillary Refill: Capillary refill takes less than 2 seconds.   Neurological:      General: No focal deficit present.      Mental Status: He is alert.   Psychiatric:         Mood and Affect: Mood normal.         Home Medications:   No current facility-administered medications on file prior to encounter.     Current Outpatient Medications on File Prior to Encounter   Medication Sig Dispense Refill    amiodarone (PACERONE) 200 MG Tab Take 1 tablet (200 mg total) by mouth once daily. 30 tablet 3    aspirin (ECOTRIN) 81 MG EC tablet Take 1 tablet (81 mg total) by mouth once daily. 30 tablet 0    atorvastatin (LIPITOR) 40 MG tablet Take 1 tablet (40 mg total) by mouth every evening. 30 tablet 2    furosemide (LASIX) 20 MG tablet Take 1 tablet (20 mg total) by mouth once daily. for 4 days 4 tablet 0    hydrOXYzine pamoate (VISTARIL) 50 MG Cap Take 1 capsule (50 mg total) by mouth every 8 (eight) hours as needed (as needed for anxiety). 30 capsule 1    linaCLOtide (LINZESS) 72 mcg Cap capsule Take 1 capsule (72 mcg total) by mouth before breakfast. 30 each 1    metoprolol succinate (TOPROL-XL) 50 MG 24 hr tablet TAKE 1 TABLET BY MOUTH ONCE DAILY 30 tablet 0    QUEtiapine (SEROQUEL) 100 MG Tab Take 2 tablets (200 mg total) by mouth every evening. 60 tablet 3    sodium  bicarbonate 650 MG tablet Take 650 mg by mouth once daily.      thiamine (VITAMIN B-1) 100 MG tablet Take 100 mg by mouth once daily.      valsartan (DIOVAN) 160 MG tablet Take 1 tablet (160 mg total) by mouth 2 (two) times daily. 180 tablet 3       Current Inpatient Medications:    Current Facility-Administered Medications:     amiodarone tablet 200 mg, 200 mg, Oral, Daily, Stroloni, Danielito, DO, 200 mg at 12/04/23 0929    apixaban tablet 5 mg, 5 mg, Oral, BID, Yessy Helm, FNP, 5 mg at 12/04/23 1240    aspirin EC tablet 81 mg, 81 mg, Oral, Daily, StroVargas iveyle, DO, 81 mg at 12/04/23 0929    folic acid tablet 1 mg, 1 mg, Oral, Daily, Juan Anderson Jr., MD, FCCP, 1 mg at 12/04/23 0929    guaiFENesin 100 mg/5 ml syrup 400 mg, 400 mg, Oral, Q6H WAKE, Dulce Aiken, FNP, 400 mg at 12/04/23 0929    hydrALAZINE injection 10 mg, 10 mg, Intravenous, Q4H PRN, Laura Boggs AGACNP-BC, 10 mg at 12/1953    labetaloL injection 10 mg, 10 mg, Intravenous, Q2H PRN, Laura Boggs AGACNP-BC, 10 mg at 12/02/23 0407    levalbuterol nebulizer solution 1.25 mg, 1.25 mg, Nebulization, Q8H, Syed Vogt MD, 1.25 mg at 12/04/23 0846    linaCLOtide capsule 72 mcg, 72 mcg, Oral, Before breakfast, Vargas Medinale, DO, 72 mcg at 12/04/23 0612    loperamide capsule 2 mg, 2 mg, Oral, QID PRN, Simran Meza AGACNP-BC, 2 mg at 11/28/23 0649    LORazepam injection 2 mg, 2 mg, Intravenous, Q15 Min PRN, Rico Davila MD    magnesium oxide tablet 800 mg, 800 mg, Oral, PRN, Carol Danielito, DO    magnesium oxide tablet 800 mg, 800 mg, Oral, PRN, Danielito Medina, DO    melatonin tablet 6 mg, 6 mg, Oral, Nightly PRN, Laura Boggs, AGACNP-BC, 6 mg at 12/02/23 0407    metoprolol injection 5 mg, 5 mg, Intravenous, Q5 Min PRN, Danielito Medina DO, 5 mg at 11/29/23 2332    metoprolol succinate (TOPROL-XL) 24 hr tablet 100 mg, 100 mg, Oral, Daily, Juan Anderson Jr., MD, FCCP, 100 mg at 12/04/23 0929    miconazole NITRATE  2 % top powder, , Topical (Top), BID, Bux, Betty, DO, Given at 12/04/23 0900    ondansetron injection 4 mg, 4 mg, Intravenous, Q8H PRN, Stroda, Danielito, DO    potassium bicarbonate disintegrating tablet 35 mEq, 35 mEq, Oral, PRN, Stroda, Danielito, DO, 35 mEq at 11/27/23 0420    potassium bicarbonate disintegrating tablet 50 mEq, 50 mEq, Oral, PRN, Stroda, Danielito, DO, 50 mEq at 11/28/23 1606    potassium bicarbonate disintegrating tablet 60 mEq, 60 mEq, Oral, PRN, Stroda, Danielito, DO    potassium, sodium phosphates 280-160-250 mg packet 2 packet, 2 packet, Oral, PRN, Stroda, Danielito, DO    potassium, sodium phosphates 280-160-250 mg packet 2 packet, 2 packet, Oral, PRN, Stroda, Danielito, DO, 2 packet at 11/27/23 0420    potassium, sodium phosphates 280-160-250 mg packet 2 packet, 2 packet, Oral, PRN, Stroda, Danielito, DO    QUEtiapine tablet 200 mg, 200 mg, Oral, QHS, Stroda, Danielito, DO, 200 mg at 12/03/23 2003    sacubitriL-valsartan 24-26 mg per tablet 1 tablet, 1 tablet, Oral, BID, Yessy Helm, FNP    sodium chloride 0.9% flush 10 mL, 10 mL, Intravenous, PRN, Stroda, Danielito, DO, 10 mL at 11/28/23 1407    Pharmacy to dose Vancomycin consult, , , Once **AND** vancomycin - pharmacy to dose, , Intravenous, pharmacy to manage frequency, Juan Anderson Jr., MD, FCCP    vancomycin in dextrose 5 % 1 gram/250 mL IVPB 1,000 mg, 1,000 mg, Intravenous, Q12H, Syed Vogt MD, Stopped at 12/04/23 0604    white petrolatum 41 % ointment, , Topical (Top), BID, Syed Vogt MD, Given at 12/04/23 0932         VTE Risk Mitigation (From admission, onward)           Ordered     apixaban tablet 5 mg  2 times daily         12/04/23 1204     IP VTE HIGH RISK PATIENT  Once         11/24/23 2116     Place sequential compression device  Until discontinued         11/24/23 2116                    Assessment:   PAF/Flutter  - Currently SR     - s/p CARO/DCCV (11.30.23): Successful DCCV x1    - CHADsVASc - 3 Points - 3.2% Stroke Risk per Year      - s/p (4.3.23) - Ligation of SILVANO with Endostapler  NSTEMI type II secondary to demand ischemia due to Sepsis, PNA, Flu, PAF  Bacteremia--blood cultures 11.24.23 + MRSA  Bilateral Community Acquired PNA  Recent Influenza   MVCAD    - s/p CABG (4.3.23) - LIMA to LAD, rSVG to OM1, rSVG to PDA  Chronic Diastolic Heart Failure  VHD    - severe AS    - Moderate MS     - CTS suggested TAVR eval  HTN/HHD  Chronic Thrombocytopenia   --plts stable  History of ETOH Abuse  No History of GI Bleed      Plan:   Continue amiodarone, Metoprolol succinate, and Eliquis   DC losartan. Start entresto 24-26 mg bid. Uptitrate as needed for BP control.  Monitor renal indices  Recommend CTA chest/abdomen/pelvis TAVR protocol   Will need to hold eliquis 48hr prior to TAVR        Thank you for your consult.     LOREN Dumont  Cardiology  [facility] - 4th Floor Medical Telemetry  12/04/2023 1:25 PM

## 2023-12-04 NOTE — PROGRESS NOTES
"[facility] - 4th Floor Medical Telemetry    Cardiology  Progress Note    Patient Name: Mike Urbina Jr.  MRN: [id number]  Admission Date: 11/24/2023  Hospital Length of Stay: 10 days  Code Status: Full Code   Attending Physician: Syed Vogt MD   Primary Care Physician: Darlene Willams FNP  Expected Discharge Date:   Principal Problem:Atrial fibrillation with RVR    Subjective:     Brief HPI: Mr. Urbina is a 69 y/o male with a history of PAF/Flutter, CAD s/p CABG, VHD, Chronic Diastolic Heart Failure, HTN, HLD, who is known to [facility], Dr. Herrera. He presented to the ER as a transfer from [place] [facility] room on 11.24.23 with complaints of shortness of breath and generalized weakness. He reports being diagnosed with Flu the weak prior and placed on Tamiflu. He was suspected to have SVT, but when rate was slowed, he was found to be in AFIB RVR. Significant labs include H&H 10.7/33.1, Na 135, K 3.4, Calcium 7.2, Albumin 1.9, AST/ALT 47/58, BNP 3,053.1, Troponin 0.137. Blood Cultures positive for MRSA. CTA chest negative for PE, findings concerning for multifocal pneumonia most evident in the right upper lobe. TTE demonstrated EF 55%, moderate aortic stenosis, moderate mitral stenosis and mild MR. [facility] has been consulted for AFIB RVR and CARO.     Hospital Course:  11.30.23: NAD. VSS. AFIB CVR on telemetry. Denies CP/SOB/Palps. "I feel okay" Awaiting CARO with Possible DCCV  12.1.23: NAD. VSS. SR on telemetry. Denies CP/SOB/Palps. "I'm okay"  12.2.23: NAD Noted. Vitals Stable.   12.3.23: NAD Noted. Sitting at edge of bed. Voices no complaints. Overall appears comfortable.   12.4.23: Patient awake in bed. NAD. Coarse breath sounds. No leukocytosis. Afebrile. Remains on ABX. H/H down-trending.       PMH:  CAD, Diastolic Heart Failure, ETOH Abuse, VHD (AS), Thrombocytopenia, NSTEMI, HTN, PAF/Flutter  PSH: LHC, CABG, Cataract Extraction   Family History: Mother, L, MI  Social History: + " ETOH Use (6pk/Beer per Day for > 30 Years); Denies Tobacco and Illicit Drug Use     Previous Cardiac Diagnostics:   CARO/DCCV (11.30.23):  LV systolic function 55%. Severely AV sclerocalcification with marked reduced mobility of all 3 leaflets resulting in severe AS, SHARAD 0.7cm2 by direct planimetry. Peak AV velocity 4.5 m/sec. Mild AI. Marked MAC with restricted mobility of the MV resulting in Moderate mitral stenosis, MG 7 mmhg. Mild-moderate MR. Mild-moderate TR. SILVANO is not seen and is probably absent due to prior surgical SILVANO ligation. Successful DCCV x1    ECHO (11.25.23):  Left Ventricle: Normal wall motion. There is normal systolic function. Ejection fraction by visual approximation is 55%. Diastolic function cannot be reliably determined in the presence of mitral valve disease. Right Ventricle: Normal right ventricular cavity size. Systolic function is mildly reduced. Aortic Valve: Moderately calcified cusps. Moderately restricted motion. There is moderate stenosis. Aortic valve area by VTI is 0.90 cm². Aortic valve peak velocity is 3.52 m/s. Mean gradient is 35 mmHg. The dimensionless index is 0.29.  Mitral Valve: There is severe posterior mitral annular calcification present. There is moderate stenosis. The mean pressure gradient across the mitral valve is 9 mmHg at a heart rate of  bpm. There is mild regurgitation. Tricuspid Valve: There is mild regurgitation     ECHO (8.1.23):  The study quality is average. Global left ventricular systolic function is normal. The left ventricular ejection fraction is 55%. The left ventricle diastolic function is impaired (Grade II) with an elevated left atrial pressure. Suspect severe aortic valve stenosis is present; aortic valve is heavily calcified. The trans-aortic peak velocity is 3.5 m/s. The trans-aortic mean gradient is 34.4 mmHg. Dimensionless index ~ 0.24. SVI ~ 42 mL/m^2. Moderate mitral valve calcification with mild to moderate mitral stenosis; mean gradient is  5.1 mmHg. Mild (1+) mitral regurgitation.  Moderate (2+) tricuspid regurgitation.  Mild (1+) aortic regurgitation.  The pulmonary artery systolic pressure is 44 mmHg. Evidence of pulmonary hypertension is noted.      [facility] 3.15.23:  Surgical coronary anatomy with distal left main 50%; LAD proximal to mid 60-70%; circumflex mid 80- 90%; RCA with proximal .  The ejection fraction was 60% with EDP of 10 mmHg.  Right radial access.  The estimated blood loss was less than 10 cc.  CT surgical consult for CABG evaluation.     PET 1.6.23:  This is an abnormal perfusion study. Study is consistent with ischemia.   This scan is suggestive of moderate risk for future cardiovascular events.   Small reversible perfusion abnormality of moderate intensity in the apical segment. Medium fixed perfusion abnormality of severe intensity in the inferior region.   The left ventricular cavity is noted to be normal on the stress studies. The stress left ventricular ejection fraction was calculated to be 35% and left ventricular global function is moderately reduced. The rest left ventricular cavity is noted to be normal. The rest left ventricular ejection fraction was calculated to be 40% and rest left ventricular global function is mildly reduced.   When compared to the resting ejection fraction (40%), the stress ejection fraction (35%) has decreased.   The study quality is good.   There was a rise in myocardial blood flow between rest and stress.  Global myocardial blood flow reserve was 1.47.  Myocardial blood flow reserve is globally abnormal, placing the patient at a higher coronary event risk.     ECHO 12.9.22:  The left ventricle is normal in size with mild concentric hypertrophy and normal systolic function.  Grade I left ventricular diastolic dysfunction.  The estimated ejection fraction is 55%.  Normal right ventricular size with normal right ventricular systolic function.  There is mild aortic valve stenosis.  There is mild  mitral stenosis.  Normal central venous pressure (3 mmHg).     Review of Systems   Cardiovascular:  Negative for chest pain, dyspnea on exertion, leg swelling and palpitations.   Respiratory:  Negative for shortness of breath.    All other systems reviewed and are negative.    Objective:     Vital Signs (Most Recent):  Temp: 98.2 °F (36.8 °C) (12/04/23 0748)  Pulse: 88 (12/04/23 0929)  Resp: 18 (12/04/23 0846)  BP: (!) 164/75 (12/04/23 0929)  SpO2: 99 % (12/04/23 0846) Vital Signs (24h Range):  Temp:  [97.8 °F (36.6 °C)-98.5 °F (36.9 °C)] 98.2 °F (36.8 °C)  Pulse:  [80-88] 88  Resp:  [18-20] 18  SpO2:  [95 %-99 %] 99 %  BP: (132-164)/(66-98) 164/75     Weight: 73.9 kg (163 lb)  Body mass index is 22.82 kg/m².    SpO2: 99 %         Intake/Output Summary (Last 24 hours) at 12/4/2023 1124  Last data filed at 12/4/2023 0929  Gross per 24 hour   Intake 1240 ml   Output 650 ml   Net 590 ml         Lines/Drains/Airways       Peripheral Intravenous Line  Duration                  Peripheral IV - Single Lumen 11/24/23 1755 20 G Anterior;Distal;Right Upper Arm 9 days                    Significant Labs:   Recent Results (from the past 72 hour(s))   Basic Metabolic Panel    Collection Time: 12/02/23  6:59 AM   Result Value Ref Range    Sodium Level 136 136 - 145 mmol/L    Potassium Level 3.5 3.5 - 5.1 mmol/L    Chloride 105 98 - 107 mmol/L    Carbon Dioxide 23 23 - 31 mmol/L    Glucose Level 97 82 - 115 mg/dL    Blood Urea Nitrogen 13.3 8.4 - 25.7 mg/dL    Creatinine 0.80 0.73 - 1.18 mg/dL    BUN/Creatinine Ratio 17     Calcium Level Total 7.7 (L) 8.8 - 10.0 mg/dL    Anion Gap 8.0 mEq/L    eGFR >60 mls/min/1.73/m2   Magnesium    Collection Time: 12/02/23  6:59 AM   Result Value Ref Range    Magnesium Level 1.80 1.60 - 2.60 mg/dL   CBC with Differential    Collection Time: 12/02/23  6:59 AM   Result Value Ref Range    WBC 6.70 4.50 - 11.50 x10(3)/mcL    RBC 3.39 (L) 4.70 - 6.10 x10(6)/mcL    Hgb 9.7 (L) 14.0 - 18.0 g/dL    Hct  29.7 (L) 42.0 - 52.0 %    MCV 87.6 80.0 - 94.0 fL    MCH 28.6 27.0 - 31.0 pg    MCHC 32.7 (L) 33.0 - 36.0 g/dL    RDW 17.3 (H) 11.5 - 17.0 %    Platelet 195 130 - 400 x10(3)/mcL    MPV 10.9 (H) 7.4 - 10.4 fL    Neut % 77.9 %    Lymph % 11.3 %    Mono % 8.8 %    Eos % 0.6 %    Basophil % 0.1 %    Lymph # 0.76 0.6 - 4.6 x10(3)/mcL    Neut # 5.21 2.1 - 9.2 x10(3)/mcL    Mono # 0.59 0.1 - 1.3 x10(3)/mcL    Eos # 0.04 0 - 0.9 x10(3)/mcL    Baso # 0.01 <=0.2 x10(3)/mcL    IG# 0.09 (H) 0 - 0.04 x10(3)/mcL    IG% 1.3 %    NRBC% 0.0 %   VANCOMYCIN, TROUGH    Collection Time: 12/02/23  4:28 PM   Result Value Ref Range    Vancomycin Trough 19.5 15.0 - 20.0 ug/ml   Basic Metabolic Panel    Collection Time: 12/04/23  4:35 AM   Result Value Ref Range    Sodium Level 135 (L) 136 - 145 mmol/L    Potassium Level 3.8 3.5 - 5.1 mmol/L    Chloride 107 98 - 107 mmol/L    Carbon Dioxide 20 (L) 23 - 31 mmol/L    Glucose Level 100 82 - 115 mg/dL    Blood Urea Nitrogen 13.7 8.4 - 25.7 mg/dL    Creatinine 0.80 0.73 - 1.18 mg/dL    BUN/Creatinine Ratio 17     Calcium Level Total 7.3 (L) 8.8 - 10.0 mg/dL    Anion Gap 8.0 mEq/L    eGFR >60 mls/min/1.73/m2   CBC with Differential    Collection Time: 12/04/23  4:35 AM   Result Value Ref Range    WBC 6.55 4.50 - 11.50 x10(3)/mcL    RBC 3.05 (L) 4.70 - 6.10 x10(6)/mcL    Hgb 8.6 (L) 14.0 - 18.0 g/dL    Hct 27.3 (L) 42.0 - 52.0 %    MCV 89.5 80.0 - 94.0 fL    MCH 28.2 27.0 - 31.0 pg    MCHC 31.5 (L) 33.0 - 36.0 g/dL    RDW 17.2 (H) 11.5 - 17.0 %    Platelet 200 130 - 400 x10(3)/mcL    MPV 10.5 (H) 7.4 - 10.4 fL    Neut % 80.2 %    Lymph % 10.4 %    Mono % 7.8 %    Eos % 0.3 %    Basophil % 0.2 %    Lymph # 0.68 0.6 - 4.6 x10(3)/mcL    Neut # 5.26 2.1 - 9.2 x10(3)/mcL    Mono # 0.51 0.1 - 1.3 x10(3)/mcL    Eos # 0.02 0 - 0.9 x10(3)/mcL    Baso # 0.01 <=0.2 x10(3)/mcL    IG# 0.07 (H) 0 - 0.04 x10(3)/mcL    IG% 1.1 %    NRBC% 0.0 %     Telemetry:  SR    Physical Exam  Vitals and nursing note reviewed.    Constitutional:       General: He is not in acute distress.     Appearance: Normal appearance.   HENT:      Head: Normocephalic.      Nose: Nose normal.   Cardiovascular:      Rate and Rhythm: Normal rate and regular rhythm.      Comments: SR  Pulmonary:      Effort: Pulmonary effort is normal. No respiratory distress.      Breath sounds: Rhonchi present.   Musculoskeletal:         General: Normal range of motion.      Right lower leg: No edema.      Left lower leg: No edema.   Skin:     General: Skin is warm.   Neurological:      Mental Status: He is alert. Mental status is at baseline.   Psychiatric:         Behavior: Behavior normal.       Current Inpatient Medications:  Current Facility-Administered Medications:     amiodarone tablet 200 mg, 200 mg, Oral, Daily, Stroda, Danielito, DO, 200 mg at 12/04/23 0929    aspirin EC tablet 81 mg, 81 mg, Oral, Daily, Stroda, Danielito, DO, 81 mg at 12/04/23 0929    enoxaparin injection 70 mg, 70 mg, Subcutaneous, Q12H, Stroda, Danielito, DO, 70 mg at 12/04/23 1114    folic acid tablet 1 mg, 1 mg, Oral, Daily, Juan Anderson Jr., MD, FCCP, 1 mg at 12/04/23 0929    guaiFENesin 100 mg/5 ml syrup 400 mg, 400 mg, Oral, Q6H WAKE, Dulce Aiken, FNP, 400 mg at 12/04/23 0929    hydrALAZINE injection 10 mg, 10 mg, Intravenous, Q4H PRN, Laura Boggs AGACNP-BC, 10 mg at 12/1955    labetaloL injection 10 mg, 10 mg, Intravenous, Q2H PRN, Laura Boggs AGACNP-BC, 10 mg at 12/02/23 0407    levalbuterol nebulizer solution 1.25 mg, 1.25 mg, Nebulization, Q8H, Syed Vogt MD, 1.25 mg at 12/04/23 0846    linaCLOtide capsule 72 mcg, 72 mcg, Oral, Before breakfast, Stroda, Danielito, DO, 72 mcg at 12/04/23 0612    loperamide capsule 2 mg, 2 mg, Oral, QID PRN, Simran Meza AGACNP-BC, 2 mg at 11/28/23 0649    LORazepam injection 2 mg, 2 mg, Intravenous, Q15 Min PRN, Rico Davila MD    [START ON 12/5/2023] losartan tablet 75 mg, 75 mg, Oral, Daily, Sin,  MD Syed    magnesium oxide tablet 800 mg, 800 mg, Oral, PRN, Stroda, Danielito, DO    magnesium oxide tablet 800 mg, 800 mg, Oral, PRN, Stroda, Danielito, DO    melatonin tablet 6 mg, 6 mg, Oral, Nightly PRN, Laura Boggs, AGACNP-BC, 6 mg at 12/02/23 0407    metoprolol injection 5 mg, 5 mg, Intravenous, Q5 Min PRN, Stroda, Danielito, DO, 5 mg at 11/29/23 2332    metoprolol succinate (TOPROL-XL) 24 hr tablet 100 mg, 100 mg, Oral, Daily, Juan Anderson Jr., MD, FCCP, 100 mg at 12/04/23 0929    miconazole NITRATE 2 % top powder, , Topical (Top), BID, Bux, Betty, DO, Given at 12/04/23 0900    ondansetron injection 4 mg, 4 mg, Intravenous, Q8H PRN, Stroda, Danielito, DO    potassium bicarbonate disintegrating tablet 35 mEq, 35 mEq, Oral, PRN, Stroda, Danielito, DO, 35 mEq at 11/27/23 0420    potassium bicarbonate disintegrating tablet 50 mEq, 50 mEq, Oral, PRN, Stroda, Danielito, DO, 50 mEq at 11/28/23 1606    potassium bicarbonate disintegrating tablet 60 mEq, 60 mEq, Oral, PRN, Stroda, Danielito, DO    potassium, sodium phosphates 280-160-250 mg packet 2 packet, 2 packet, Oral, PRN, Stroda, Danielito, DO    potassium, sodium phosphates 280-160-250 mg packet 2 packet, 2 packet, Oral, PRN, Stroda, Danielito, DO, 2 packet at 11/27/23 0420    potassium, sodium phosphates 280-160-250 mg packet 2 packet, 2 packet, Oral, PRN, Stroda, Danielito, DO    QUEtiapine tablet 200 mg, 200 mg, Oral, QHS, Stroda, Danielito, DO, 200 mg at 12/03/23 2003    sodium chloride 0.9% flush 10 mL, 10 mL, Intravenous, PRN, Stroda, Danielito, DO, 10 mL at 11/28/23 1407    Pharmacy to dose Vancomycin consult, , , Once **AND** vancomycin - pharmacy to dose, , Intravenous, pharmacy to manage frequency, [facility] Jr., [facility]    vancomycin in dextrose 5 % 1 gram/250 mL IVPB 1,000 mg, 1,000 mg, Intravenous, Q12H, Syed Vogt MD, Stopped at 12/04/23 0604    white petrolatum 41 % ointment, , Topical (Top), BID, Syed Vogt MD, Given at 12/04/23 0932    VTE Risk Mitigation  (From admission, onward)           Ordered     enoxaparin injection 70 mg  Every 12 hours (non-standard times)         11/24/23 2133     IP VTE HIGH RISK PATIENT  Once         11/24/23 2116     Place sequential compression device  Until discontinued         11/24/23 2116                    Assessment:   PAF/Flutter  - Currently SR     - s/p CARO/DCCV (11.30.23): Successful DCCV x1    - CHADsVASc - 3 Points - 3.2% Stroke Risk per Year     - s/p (4.3.23) - Ligation of SILVANO with Endostapler  NSTEMI type II secondary to demand ischemia due to Sepsis, PNA, Flu, PAF  Bacteremia--blood cultures 11.24.23 + MRSA  Bilateral Community Acquired PNA  Recent Influenza   MVCAD    - s/p CABG (4.3.23) - LIMA to LAD, rSVG to OM1, rSVG to PDA  Chronic Diastolic Heart Failure  VHD    - severe AS    - Moderate MS     - CTS suggested TAVR eval  HTN/HHD  Chronic Thrombocytopenia   --plts stable  History of ETOH Abuse  No History of GI Bleed    Plan:   Continue amiodarone, Metoprolol succinate, and Eliquis   DC losartan. Start entresto 24-26 mg bid. Uptitrate as needed for BP control.  Monitor renal indices  Structural Heart Evaluation Today      Yessy Helm, DIVINAP  Cardiology  [facility] - 4th Floor Medical Telemetry  12/04/2023

## 2023-12-04 NOTE — PROGRESS NOTES
[facility] Hospital Medicine Progress Note        Chief Complaint: Inpatient Follow-up for MRSA sepsis and Pneumonia     HPI:   Mike Urbina Jr. is a 68 y.o. male with a PMHx of  ETOH abuse, VHD-aortic stenosis, thrombocytopenia, CAD status post CABG x3, HTN, PAFlutter s/p SILVANO ligation, chronic HFpEF 55%, chronic anemia who presented to [facility] on 11/24/2023 with via EMS as a transfer from [facility] ED for higher level of care.  He initially presented to the [facility] ED via EMS with complaints of worsening SOB and generalized weakness after being diagnosed with influenza a x5 days prior. Reportedly prescribed Tamiflu on 11/20/2023. He also endorsed decreased oral intake, and chest pain that is worse with deep inspiration.  Patient does endorsed drinking a 6 pack of beer per day.     Labs were notable for sodium 135, CO2 17, glucose 179, BUN 36.2, creatinine 1.49, total bili 2.9, AST 56, BNP 3053, WBC 13.68, hemoglobin 12.5, hematocrit 40, troponin 0.012.  CXR demonstrated right upper lobe consolidation concerning for pneumonia.  EKG demonstrated supraventricular tachycardia with rate of 172.  He was given adenosine 6 mg followed by 12 mg without improvement.  He was then given Lopressor 5 mg IV and rate noted to be atrial fibrillation with RVR.  He was initiated on a diltiazem drip.  He was then transferred to [facility] ED.  CTA chest negative for PE, findings concerning for multifocal pneumonia most evident in the right upper lobe.  He was started on broad-spectrum IV antibiotics.  Blood cultures x2 obtained.       He was admitted to ICU.  MRSA PCR detected. Antibiotic coverage broadened to vanc, Zosyn and doxycycline.  TTE demonstrated EF 55%, moderate aortic stenosis, moderate mitral stenosis and mild MR.  Blood cultures from 11/24/2023 growing MRSA in 2 of 2 bottles.  Respiratory PCR panel negative.  Tamiflu was also continued.  Also required Levophed for BP support.  He has since been weaned off of Levophed and Cardizem.  Zosyn and Tamiflu were discontinued on 11/27/2023.  He remains on IV vancomycin.  ID was consulted on 11/27/2023. Blood cultures repeated x2 on 11/27/2023.  He was cleared for downgrade to the floor on 11/27/2023.  PT/OT consulted. Hospital medicine consulted for assumption of care and further medical management.     Labs on 11/27/2023 notable for WBC 3.16, hemoglobin 9.9, hematocrit 30.3, platelets 107, sodium 134, potassium 3.4, calcium 7.3, phosphorus 1.6, albumin 2, , .    Patient was continued on iv vancomycin. He was very weak and needed PT. On 11/29/23 he went into Afib with RVR. He was seen by [facility] team and started on iv cardizem drip and planned cardioversion. He was continued on FD anticoagulation.     Patient had a CARO done and showed severe Aortic stenosis and no clots or vegetation. Cardioversion was done. Patient will need TAVR when he completes his iv antibiotics.     Interval Hx:   Patient today awake and comfortable. Has no new issues. His strength is getting better, Has been afebrile. Eating well.     No family at bedside.     Objective/physical exam:  General: In no acute distress  Chest: CTA   Heart: Regular rate + irregular rhythm + Loud ESM  Abdomen: Soft, nontender, BS +  MSK: Warm, no lower extremity edema, no clubbing or cyanosis  Neurologic: Cranial nerve II-XII intact, Strength 4/5 in all 4 extremities    VITAL SIGNS: 24 HRS MIN & MAX LAST   Temp  Min: 97.8 °F (36.6 °C)  Max: 98.5 °F (36.9 °C) 98.2 °F (36.8 °C)   BP  Min: 132/72  Max: 164/75 (!) 164/75   Pulse  Min: 80  Max: 88  88   Resp  Min: 16  Max: 20 18   SpO2  Min: 95 %  Max: 99 % 99 %     I have reviewed the following labs:  Recent Labs   Lab 11/30/23  0342 12/02/23  0659 12/04/23  0435   WBC 6.22 6.70 6.55   RBC 3.52* 3.39* 3.05*   HGB 10.1* 9.7* 8.6*   HCT 31.6* 29.7* 27.3*   MCV 89.8 87.6 89.5   MCH 28.7 28.6 28.2   MCHC 32.0* 32.7* 31.5*   RDW 17.6* 17.3*  17.2*    195 200   MPV 11.2* 10.9* 10.5*     Recent Labs   Lab 11/28/23  0208 11/29/23  0401 11/30/23  0342 12/01/23  0409 12/02/23  0659 12/04/23  0435   * 135* 136 136 136 135*   K 3.8 3.4* 4.2 4.0 3.5 3.8   CO2 18* 22* 22* 23 23 20*   BUN 17.0 17.9 18.1 15.5 13.3 13.7   CREATININE 0.82 0.80 0.84 0.81 0.80 0.80   CALCIUM 7.6* 7.2* 7.3* 7.2* 7.7* 7.3*   MG  --   --  1.90  --  1.80  --    ALBUMIN 1.9* 1.9*  --   --   --   --    ALKPHOS 63 63  --   --   --   --    ALT 92* 58*  --   --   --   --    * 47*  --   --   --   --    BILITOT 1.2 1.2  --   --   --   --      Microbiology Results (last 7 days)       Procedure Component Value Units Date/Time    Blood Culture [[id number]]  (Normal) Collected: 11/27/23 0637    Order Status: Completed Specimen: Blood from Hand, Left Updated: 12/02/23 1211     CULTURE, BLOOD ([facility]) Final Report: At 5 days. No growth    Blood Culture [[id number]]  (Normal) Collected: 11/27/23 0637    Order Status: Completed Specimen: Blood from Wrist, Right Updated: 12/02/23 1211     CULTURE, BLOOD ([facility]) Final Report: At 5 days. No growth    Stool Culture [[id number]]  (Abnormal) Collected: 11/27/23 1525    Order Status: Completed Specimen: Stool Updated: 11/29/23 1116     Stool Culture Negative for Salmonella, Shigella, Campylobacter, Vibrio, Aeromonas, Pleisiomonas,Yersinia, or Shiga Toxin 1 and 2.      Many Yeast    Chlamydia/GC, PCR [[id number]] Collected: 11/27/23 1651    Order Status: Completed Specimen: Urine Updated: 11/27/23 1843     Chlamydia trachomatis PCR Not Detected     N. gonorrhea PCR Not Detected     Source Urine    Narrative:      The Xpert CT/NG test, performed on the GeneXpert system is a qualitative in vitro real-time polymerase chain reaction (PCR) test for the automated detected and differentiation for genomic DNA from Chlamydia trachomatis (CT) and/or Neisseria gonorrhoeae (NG).             See below for Radiology    Scheduled Med:   amiodarone  200 mg Oral  Daily    aspirin  81 mg Oral Daily    enoxparin  70 mg Subcutaneous Q12H    folic acid  1 mg Oral Daily    guaiFENesin 100 mg/5 ml  400 mg Oral Q6H WAKE    levalbuterol  1.25 mg Nebulization Q8H    linaCLOtide  72 mcg Oral Before breakfast    [START ON 12/5/2023] losartan  75 mg Oral Daily    metoprolol succinate  100 mg Oral Daily    miconazole NITRATE 2 %   Topical (Top) BID    QUEtiapine  200 mg Oral QHS    vancomycin (VANCOCIN) IV (PEDS and ADULTS)  1,000 mg Intravenous Q12H    white petrolatum   Topical (Top) BID      Continuous Infusions:         PRN Meds:  hydrALAZINE, labetaloL, loperamide, lorazepam, magnesium oxide, magnesium oxide, melatonin, metoprolol, ondansetron, potassium bicarbonate, potassium bicarbonate, potassium bicarbonate, potassium, sodium phosphates, potassium, sodium phosphates, potassium, sodium phosphates, sodium chloride 0.9%, Pharmacy to dose Vancomycin consult **AND** vancomycin - pharmacy to dose     Assessment/Plan:  Influenza A, dx on 11/19/2023  Bilateral Pneumonia 2/2 MRSA and Flu A infection   MRSA sepsis   Generalized weakness   Severe aortic stenosis   Elevated LFTs from sepsis: Improving   Hypoalbuminemia   PAF with RVR s/p cardioversion   Chronic HFpEF 55%  Essential hypertension  CAD s/p MI/CABG x3  ETOH abuse   Mild Hypokalemia and Hypophosphatemia   Chronic Anemia and Thrombocytopenia : improved   Hx of VHD    Plan:  Patient has no new issues. Has been ambulating well.   Over all feels much better since admit. He is ok with going to a swing unit  Cont OT/PT   S/P cardioversion, CARO did not show any clots or vegetation  Cont po amiodarone, HR now well controlled     Will continue iv vancomycin for MRSA sepsis and Pneumonia, F/U by ID team   Blood cultures done on 11/27/23 has been negative so far   His LFTs are improving   cont dosing per pharmacy and monitor vanc level     BP still elevated, will increase losartan to 75 mg po daily and metoprolol     Will closely monitor  patients daily weight, urine out put, renal parameters and volume status      Has mild anemia, will continue to f/u. No obvious GI bleeding noticed  Cont tele monitoring     Continue PT       Need good nutrition support for better recovery, consult nutrition team     Cont supportive care     Reviewed today's labs and  WBC 6.55, Hb 8.6, plt 200, Na 135, Crt 0.80      VTE prophylaxis: Lovenox     Patient condition:  Fair     Anticipated discharge and Disposition:   Swing bed when accepted       All diagnosis and differential diagnosis have been reviewed; assessment and plan has been documented; I have personally reviewed the labs and test results that are presently available; I have reviewed the patients medication list; I have reviewed the consulting providers response and recommendations. I have reviewed or attempted to review medical records based upon their availability    All of the patient's questions have been  addressed and answered. Patient's is agreeable to the above stated plan. I will continue to monitor closely and make adjustments to medical management as needed.  _____________________________________________________________________    Nutrition Status:    Radiology:  I have personally reviewed the following imaging and agree with the radiologist.     Transesophageal echo (CARO)with possible cardioversion  Addison Barnhart MD     11/30/2023  3:10 PM  Procedure: CARO guided DCCV    Final impression:    LV systolic function 55%.  Severely AV sclerocalcification with marked reduced mobility of   all 3 leaflets resulting in severe AS, SHARAD 0.7cm2 by direct   planimetry. Peak AV velocity 4.5 m/sec. Mild AI.  Marked MAC with restricted mobility of the MV resulting in   Moderate mitral stenosis, MG 7 mmhg.  Mild-moderate MR.  Mild-moderate TR.  SILVANO is not seen and is probably absent due to prior surgical SILVAON   ligation.  Successful DCCV x1    Indication: AS, MS    Informed consent: obtained, signed copy placed in  the chart.    Description of the procedure: After sedation was achieved (please   see anesthesia report for details), the esophagus intubated   without difficulties. Multiple orthogonal views obtained. Patient   tolerated procedure without immediate complications noted.    Findings:  Left atrium (LA): Mild-moderately enlarged left atrium.  Left atrial appendage (SILVANO): SILVANO is not seen and is probably   absent due to prior surgical SILVANO ligation.  Interatrial septum:  NO ASD or PFO noted on 2D or Color Doppler   images.   Right atrium (RA): Normal RA size.  Left ventricle (LV): LVEF 55%.   Normal LV size.  Mild-moderate   left ventricular hypertrophy. No Regional wall motion   abnormalities noted.  Right ventricle (RV): Partially visualized in this study.  Aortic valve: Trileaflet.  Severely AV sclerocalcification with   marked reduced mobility of all 3 leaflets resulting in severe AS,   SHARAD 0.7cm2 by direct planimetry. Peak AV velocity 4.5 m/sec. Mild   AI. No Vegetation is present.  Mitral valve: Marked MAC with restricted mobility of the MV   resulting in Moderate mitral stenosis, MG 7 mmhg. No Vegetation   is present.  Mild-moderate MR.  Tricuspid valve: Partially visualized, No Tricuspid stenosis.   Mild-moderate Tricuspid regurgitation. No Vegetation is present.  Pulmonic valve: Not well visualized, no hemodynamically   significant pulmonic stenosis, no pulmonic regurgitation noted in   this study. No Vegetation is present.  Thoracic aorta: Scattered  Atherosclerotic changes of the   descending thoracic aorta.  Normal Caliber aortic root.   Normal   Caliber Proximal portion of the ascending aorta.   Pericardium: No significant pericardial effusion is present.    Description of the DCCV procedure:   After completion of CARO, no intracardiac thrombus was visualized,   so I decided to proceed with Cardioversion  200 J synchronized cardioversion was completed.  Number of attempts: 1  X-Ray Chest 1 View  Narrative:  EXAMINATION:  XR CHEST 1 VIEW    CLINICAL HISTORY:  SOB;    COMPARISON:  11/27/2023    FINDINGS:  Single view of the chest shows right greater than left upper lobe consolidation, similar to the prior study.  No pneumothorax.  Heart size is stable.  Impression: Stable chest    Electronically signed by: Alexander Rivera MD  Date:    11/30/2023  Time:    07:19      Syed Vogt MD   12/04/2023

## 2023-12-04 NOTE — PT/OT/SLP PROGRESS
Physical Therapy Treatment    Patient Name:  Mike Urbina Jr.   MRN:  [id number]    Recommendations:     Discharge therapy intensity: Moderate Intensity Therapy   Discharge Equipment Recommendations: to be determined by next level of care  Barriers to discharge:  medical dx, impaired mobility, decreased independence     Assessment:     Mike Urbina Jr. is a 68 y.o. male admitted with a medical diagnosis of Atrial fibrillation with RVR., B pneumonia, hypotensive shock, LOVELY. Pt initially dx with influenza A on 11/19 at another facility; increased SOB on 11/24, tachycardia with failed cardioversion. He presents with the following impairments/functional limitations: weakness, gait instability, impaired endurance, impaired balance, impaired functional mobility, impaired self care skills, impaired cardiopulmonary response to activity.      Rehab Prognosis: Good; patient would benefit from acute skilled PT services to address these deficits and reach maximum level of function.    Recent Surgery: * No surgery found *      Plan:     During this hospitalization, patient to be seen 5 x/week to address the identified rehab impairments via gait training, therapeutic activities, therapeutic exercises and progress toward the following goals:    Plan of Care Expires:  12/27/23    Subjective     Chief Complaint: leg weakness   Patient/Family Comments/goals: to get stronger   Pain/Comfort:  Pain Rating 1: 0/10      Objective:     Communicated with NSG prior to session.  Patient found  on commode  with pulse ox (continuous), telemetry upon PT entry to room.     General Precautions: Standard, fall  Orthopedic Precautions: N/A  Braces: N/A  Respiratory Status: Room air  SPO2: 96%  Heart rate: remained in the 90-low 100's with activity  Skin assessment: no new findings    Functional Mobility:  Transfers:     Sit<->stand min assist; unsteady upon standing from commode  Gait with RW: Ambulated x 115', CGA, slow but steady  heather, brief standing breaks, no major LOB.       Education:  Patient provided with verbal education  regarding PT role/goals/POC, fall prevention, and safety awareness.  Understanding was verbalized.     Patient left up in chair with all lines intact, call button in reach, and RN notified.    GOALS:   Multidisciplinary Problems       Physical Therapy Goals          Problem: Physical Therapy    Goal Priority Disciplines Outcome Goal Variances Interventions   Physical Therapy Goal     PT, PT/OT Ongoing, Progressing     Description: Goals to be met by: 23     Patient will increase functional independence with mobility by performin. Supine to sit with [place]  2. Sit to supine with [place]  3. Sit to stand transfer with Modified [place]  4. Gait  x 150 feet with Modified [place] using Rolling Walker vs no AD.                          Time Tracking:     PT Received On: 23  PT Start Time: 1503     PT Stop Time: 1530  PT Total Time (min): 27 min     Billable Minutes: Gait Training 1 unit and Therapeutic Activity 1 unit    Treatment Type: Treatment  PT/PTA: PTA     Number of PTA visits since last PT visit: 5     2023

## 2023-12-04 NOTE — PLAN OF CARE
Called [facility] and spoke to Yi. She states there are pending discharges and will update me when bed is available.

## 2023-12-04 NOTE — PLAN OF CARE
Yi from [place] [facility] called and said probable bed availability in the morning. She will update us tomorrow.

## 2023-12-05 ENCOUNTER — HOSPITAL ENCOUNTER (INPATIENT)
Facility: HOSPITAL | Age: 68
LOS: 21 days | Discharge: HOME | DRG: 871 | End: 2023-12-26
Attending: INTERNAL MEDICINE | Admitting: INTERNAL MEDICINE
Payer: MEDICARE

## 2023-12-05 ENCOUNTER — TELEPHONE (OUTPATIENT)
Dept: FAMILY MEDICINE | Facility: CLINIC | Age: 68
End: 2023-12-05

## 2023-12-05 VITALS
SYSTOLIC BLOOD PRESSURE: 165 MMHG | DIASTOLIC BLOOD PRESSURE: 89 MMHG | BODY MASS INDEX: 22.82 KG/M2 | TEMPERATURE: 98 F | WEIGHT: 163 LBS | HEART RATE: 83 BPM | RESPIRATION RATE: 22 BRPM | OXYGEN SATURATION: 96 % | HEIGHT: 71 IN

## 2023-12-05 DIAGNOSIS — I50.32 CHRONIC DIASTOLIC CONGESTIVE HEART FAILURE: ICD-10-CM

## 2023-12-05 DIAGNOSIS — G47.00 INSOMNIA, UNSPECIFIED TYPE: ICD-10-CM

## 2023-12-05 DIAGNOSIS — R78.81 BACTEREMIA: Primary | ICD-10-CM

## 2023-12-05 LAB — VANCOMYCIN TROUGH SERPL-MCNC: 21.9 UG/ML (ref 15–20)

## 2023-12-05 PROCEDURE — 25000003 PHARM REV CODE 250: Performed by: HOSPITALIST

## 2023-12-05 PROCEDURE — 99900035 HC TECH TIME PER 15 MIN (STAT)

## 2023-12-05 PROCEDURE — 99900031 HC PATIENT EDUCATION (STAT)

## 2023-12-05 PROCEDURE — 94760 N-INVAS EAR/PLS OXIMETRY 1: CPT

## 2023-12-05 PROCEDURE — 25000242 PHARM REV CODE 250 ALT 637 W/ HCPCS: Performed by: HOSPITALIST

## 2023-12-05 PROCEDURE — 11000004 HC SNF PRIVATE

## 2023-12-05 PROCEDURE — 25000003 PHARM REV CODE 250

## 2023-12-05 PROCEDURE — 63600175 PHARM REV CODE 636 W HCPCS: Performed by: HOSPITALIST

## 2023-12-05 PROCEDURE — 25000003 PHARM REV CODE 250: Performed by: NURSE PRACTITIONER

## 2023-12-05 PROCEDURE — 25500020 PHARM REV CODE 255: Performed by: HOSPITALIST

## 2023-12-05 PROCEDURE — 27000173 HC ACAPELLA DEVICE DH OR DM

## 2023-12-05 PROCEDURE — 94640 AIRWAY INHALATION TREATMENT: CPT

## 2023-12-05 PROCEDURE — 94761 N-INVAS EAR/PLS OXIMETRY MLT: CPT

## 2023-12-05 PROCEDURE — 27000207 HC ISOLATION

## 2023-12-05 PROCEDURE — 25000003 PHARM REV CODE 250: Performed by: INTERNAL MEDICINE

## 2023-12-05 PROCEDURE — 94664 DEMO&/EVAL PT USE INHALER: CPT

## 2023-12-05 PROCEDURE — 25000242 PHARM REV CODE 250 ALT 637 W/ HCPCS: Performed by: INTERNAL MEDICINE

## 2023-12-05 PROCEDURE — 97164 PT RE-EVAL EST PLAN CARE: CPT

## 2023-12-05 PROCEDURE — 27000221 HC OXYGEN, UP TO 24 HOURS

## 2023-12-05 PROCEDURE — 80202 ASSAY OF VANCOMYCIN: CPT | Performed by: INTERNAL MEDICINE

## 2023-12-05 RX ORDER — MUPIROCIN 20 MG/G
OINTMENT TOPICAL 2 TIMES DAILY
Status: DISCONTINUED | OUTPATIENT
Start: 2023-12-05 | End: 2023-12-07

## 2023-12-05 RX ORDER — LEVALBUTEROL INHALATION SOLUTION 1.25 MG/3ML
1.25 SOLUTION RESPIRATORY (INHALATION) EVERY 8 HOURS
Status: CANCELLED | OUTPATIENT
Start: 2023-12-05

## 2023-12-05 RX ORDER — LOPERAMIDE HYDROCHLORIDE 2 MG/1
2 CAPSULE ORAL 4 TIMES DAILY PRN
Status: CANCELLED | OUTPATIENT
Start: 2023-12-05

## 2023-12-05 RX ORDER — GUAIFENESIN 100 MG/5ML
400 SOLUTION ORAL
Status: CANCELLED | OUTPATIENT
Start: 2023-12-05

## 2023-12-05 RX ORDER — TALC
6 POWDER (GRAM) TOPICAL NIGHTLY PRN
Status: DISCONTINUED | OUTPATIENT
Start: 2023-12-05 | End: 2023-12-07

## 2023-12-05 RX ORDER — ASPIRIN 81 MG/1
81 TABLET ORAL DAILY
Status: DISCONTINUED | OUTPATIENT
Start: 2023-12-06 | End: 2023-12-15

## 2023-12-05 RX ORDER — HYDRALAZINE HYDROCHLORIDE 20 MG/ML
10 INJECTION INTRAMUSCULAR; INTRAVENOUS EVERY 6 HOURS PRN
Status: DISCONTINUED | OUTPATIENT
Start: 2023-12-05 | End: 2023-12-26 | Stop reason: HOSPADM

## 2023-12-05 RX ORDER — AMIODARONE HYDROCHLORIDE 200 MG/1
200 TABLET ORAL DAILY
Status: CANCELLED | OUTPATIENT
Start: 2023-12-06

## 2023-12-05 RX ORDER — CALCIUM CARBONATE 200(500)MG
500 TABLET,CHEWABLE ORAL 2 TIMES DAILY PRN
Status: DISCONTINUED | OUTPATIENT
Start: 2023-12-05 | End: 2023-12-26 | Stop reason: HOSPADM

## 2023-12-05 RX ORDER — LEVALBUTEROL INHALATION SOLUTION 1.25 MG/3ML
1.25 SOLUTION RESPIRATORY (INHALATION) EVERY 8 HOURS
Status: DISCONTINUED | OUTPATIENT
Start: 2023-12-05 | End: 2023-12-18

## 2023-12-05 RX ORDER — CLONIDINE HYDROCHLORIDE 0.1 MG/1
0.2 TABLET ORAL EVERY 4 HOURS PRN
Status: DISCONTINUED | OUTPATIENT
Start: 2023-12-05 | End: 2023-12-26 | Stop reason: HOSPADM

## 2023-12-05 RX ORDER — MICONAZOLE NITRATE 2 %
POWDER (GRAM) TOPICAL 2 TIMES DAILY
Status: CANCELLED | OUTPATIENT
Start: 2023-12-05

## 2023-12-05 RX ORDER — FOLIC ACID 1 MG/1
1 TABLET ORAL DAILY
Status: DISCONTINUED | OUTPATIENT
Start: 2023-12-06 | End: 2023-12-26 | Stop reason: HOSPADM

## 2023-12-05 RX ORDER — MICONAZOLE NITRATE 2 %
POWDER (GRAM) TOPICAL 2 TIMES DAILY
Status: DISCONTINUED | OUTPATIENT
Start: 2023-12-05 | End: 2023-12-26 | Stop reason: HOSPADM

## 2023-12-05 RX ORDER — METOPROLOL SUCCINATE 50 MG/1
100 TABLET, EXTENDED RELEASE ORAL DAILY
Status: CANCELLED | OUTPATIENT
Start: 2023-12-06

## 2023-12-05 RX ORDER — AMOXICILLIN 250 MG
1 CAPSULE ORAL 2 TIMES DAILY
Status: DISCONTINUED | OUTPATIENT
Start: 2023-12-05 | End: 2023-12-07

## 2023-12-05 RX ORDER — TALC
6 POWDER (GRAM) TOPICAL NIGHTLY PRN
Status: DISCONTINUED | OUTPATIENT
Start: 2023-12-05 | End: 2023-12-05

## 2023-12-05 RX ORDER — QUETIAPINE FUMARATE 100 MG/1
200 TABLET, FILM COATED ORAL NIGHTLY
Status: CANCELLED | OUTPATIENT
Start: 2023-12-05

## 2023-12-05 RX ORDER — GUAIFENESIN 100 MG/5ML
400 SOLUTION ORAL
Status: DISCONTINUED | OUTPATIENT
Start: 2023-12-05 | End: 2023-12-24

## 2023-12-05 RX ORDER — ACETAMINOPHEN 325 MG/1
650 TABLET ORAL EVERY 6 HOURS PRN
Status: DISCONTINUED | OUTPATIENT
Start: 2023-12-05 | End: 2023-12-26 | Stop reason: HOSPADM

## 2023-12-05 RX ORDER — AMIODARONE HYDROCHLORIDE 200 MG/1
200 TABLET ORAL DAILY
Status: DISCONTINUED | OUTPATIENT
Start: 2023-12-06 | End: 2023-12-26 | Stop reason: HOSPADM

## 2023-12-05 RX ORDER — METOPROLOL SUCCINATE 50 MG/1
100 TABLET, EXTENDED RELEASE ORAL DAILY
Status: DISCONTINUED | OUTPATIENT
Start: 2023-12-06 | End: 2023-12-26 | Stop reason: HOSPADM

## 2023-12-05 RX ORDER — TALC
6 POWDER (GRAM) TOPICAL NIGHTLY PRN
Status: CANCELLED | OUTPATIENT
Start: 2023-12-05

## 2023-12-05 RX ORDER — QUETIAPINE FUMARATE 100 MG/1
200 TABLET, FILM COATED ORAL NIGHTLY
Status: DISCONTINUED | OUTPATIENT
Start: 2023-12-05 | End: 2023-12-13

## 2023-12-05 RX ORDER — ASPIRIN 81 MG/1
81 TABLET ORAL DAILY
Status: CANCELLED | OUTPATIENT
Start: 2023-12-06

## 2023-12-05 RX ORDER — LOPERAMIDE HYDROCHLORIDE 2 MG/1
2 CAPSULE ORAL 4 TIMES DAILY PRN
Status: DISCONTINUED | OUTPATIENT
Start: 2023-12-05 | End: 2023-12-26 | Stop reason: HOSPADM

## 2023-12-05 RX ORDER — FOLIC ACID 1 MG/1
1 TABLET ORAL DAILY
Status: CANCELLED | OUTPATIENT
Start: 2023-12-06

## 2023-12-05 RX ADMIN — VANCOMYCIN HYDROCHLORIDE 750 MG: 750 INJECTION, POWDER, LYOPHILIZED, FOR SOLUTION INTRAVENOUS at 08:12

## 2023-12-05 RX ADMIN — GUAIFENESIN 400 MG: 200 SOLUTION ORAL at 08:12

## 2023-12-05 RX ADMIN — METOPROLOL SUCCINATE 100 MG: 50 TABLET, EXTENDED RELEASE ORAL at 08:12

## 2023-12-05 RX ADMIN — ASPIRIN 81 MG: 81 TABLET, COATED ORAL at 08:12

## 2023-12-05 RX ADMIN — SACUBITRIL AND VALSARTAN 1 TABLET: 24; 26 TABLET, FILM COATED ORAL at 08:12

## 2023-12-05 RX ADMIN — SENNOSIDES AND DOCUSATE SODIUM 1 TABLET: 8.6; 5 TABLET ORAL at 09:12

## 2023-12-05 RX ADMIN — SACUBITRIL AND VALSARTAN 1 TABLET: 24; 26 TABLET, FILM COATED ORAL at 09:12

## 2023-12-05 RX ADMIN — AMIODARONE HYDROCHLORIDE 200 MG: 200 TABLET ORAL at 08:12

## 2023-12-05 RX ADMIN — MUPIROCIN: 20 OINTMENT TOPICAL at 09:12

## 2023-12-05 RX ADMIN — MICONAZOLE NITRATE 2 % TOPICAL POWDER: at 09:12

## 2023-12-05 RX ADMIN — QUETIAPINE FUMARATE 200 MG: 100 TABLET ORAL at 09:12

## 2023-12-05 RX ADMIN — APIXABAN 5 MG: 5 TABLET, FILM COATED ORAL at 09:12

## 2023-12-05 RX ADMIN — LEVALBUTEROL HYDROCHLORIDE 1.25 MG: 1.25 SOLUTION RESPIRATORY (INHALATION) at 12:12

## 2023-12-05 RX ADMIN — LEVALBUTEROL HYDROCHLORIDE 1.25 MG: 1.25 SOLUTION RESPIRATORY (INHALATION) at 11:12

## 2023-12-05 RX ADMIN — LEVALBUTEROL HYDROCHLORIDE 1.25 MG: 1.25 SOLUTION RESPIRATORY (INHALATION) at 08:12

## 2023-12-05 RX ADMIN — LEVALBUTEROL HYDROCHLORIDE 1.25 MG: 1.25 SOLUTION RESPIRATORY (INHALATION) at 05:12

## 2023-12-05 RX ADMIN — IOPAMIDOL 120 ML: 755 INJECTION, SOLUTION INTRAVENOUS at 04:12

## 2023-12-05 RX ADMIN — WHITE PETROLATUM: 1.75 OINTMENT TOPICAL at 09:12

## 2023-12-05 RX ADMIN — LINACLOTIDE 72 MCG: 72 CAPSULE, GELATIN COATED ORAL at 05:12

## 2023-12-05 RX ADMIN — FOLIC ACID 1 MG: 1 TABLET ORAL at 08:12

## 2023-12-05 RX ADMIN — GUAIFENESIN 400 MG: 200 SOLUTION ORAL at 09:12

## 2023-12-05 RX ADMIN — VANCOMYCIN HYDROCHLORIDE 750 MG: 750 INJECTION, POWDER, LYOPHILIZED, FOR SOLUTION INTRAVENOUS at 09:12

## 2023-12-05 RX ADMIN — APIXABAN 5 MG: 5 TABLET, FILM COATED ORAL at 08:12

## 2023-12-05 RX ADMIN — MICONAZOLE NITRATE: 20 POWDER TOPICAL at 09:12

## 2023-12-05 NOTE — PT/OT/SLP RE-EVAL
Physical Therapy Re-Evaluation    Patient Name:  Mike Urbina Jr.   MRN:  [id number]    Recommendations:     Discharge therapy intensity: Moderate Intensity Therapy   Discharge Equipment Recommendations: to be determined by next level of care   Barriers to discharge:  medical dx, impaired endurance, decreased independence     Assessment:     Mike Urbina Jr. is a 68 y.o. male admitted with a medical diagnosis of Atrial fibrillation with RVR., B pneumonia, hypotensive shock, LOVELY. Pt initially dx with influenza A on 11/19 at another facility; increased SOB on 11/24, tachycardia with failed cardioversion. He presents with the following impairments/functional limitations: weakness, gait instability, impaired cardiopulmonary response to activity, impaired endurance, impaired balance, decreased lower extremity function, impaired self care skills, impaired functional mobility. Patient making good progress as compared to initial eval. Would benefit from continued services in order to maximize functional independence, decrease fall risk, and improve endurance.     Rehab Prognosis: Good; patient would benefit from acute skilled PT services to address these deficits and reach maximum level of function.    Recent Surgery: * No surgery found *      Plan:     During this hospitalization, patient to be seen 5 x/week to address the identified rehab impairments via gait training, therapeutic activities, therapeutic exercises and progress toward the following goals:    Plan of Care Expires:  12/27/23    Subjective     Chief Complaint: n/a  Patient/Family Comments/goals: to return PLOF   Pain/Comfort:  Pain Rating 1: 0/10    Patients cultural, spiritual, Church conflicts given the current situation: no    Objective:     Communicated with NSG prior to session.  Patient found HOB elevated with pulse ox (continuous), telemetry  upon PT entry to room.    General Precautions: Standard, fall  Orthopedic Precautions:N/A    Braces: N/A  Respiratory Status: Nasal cannula, flow 1 L/min  RR: 30-42  SpO2: 97% at rest, 88% with ax      Exams:  Cognitive Exam:  Patient is oriented to Person, Place, Time, and Situation  RLE Strength: WFL  LLE Strength: WFL  Skin integrity: Visible skin intact      Functional Mobility:  Bed Mobility:     Supine to Sit: stand by assistance  Transfers:     Sit to/from Stand:  minimum assistance with rolling walker  Gait: pt amb approx 120 + 100 ft with use of RW and CGA; step through pattern; no overt LOB; multiple standing rest breaks; VC for energy conservation and pursed lip breathing      Treatment & Education:    Patient provided with verbal education regarding PT role/goals/POC, fall prevention, safety awareness, and discharge/DME recommendations.  Understanding was verbalized.     Patient left up in chair with all lines intact and call button in reach.    GOALS:   Multidisciplinary Problems       Physical Therapy Goals          Problem: Physical Therapy    Goal Priority Disciplines Outcome Goal Variances Interventions   Physical Therapy Goal     PT, PT/OT Ongoing, Progressing     Description: Goals to be met by: 23     Patient will increase functional independence with mobility by performin. Supine to sit with [place]  2. Sit to supine with [place]  3. Sit to stand transfer with Modified [place]  4. Gait  x 300 feet with Modified [place] using Rolling Walker vs no AD.                          History:     Past Medical History:   Diagnosis Date    Atrial flutter     CHF (congestive heart failure)     Coronary artery disease     Hypertension     Myocardial infarction     SOB (shortness of breath)     at times       Past Surgical History:   Procedure Laterality Date    CATARACT EXTRACTION Bilateral     CORONARY ARTERY BYPASS GRAFT (CABG) N/A 4/3/2023    Procedure: CORONARY ARTERY BYPASS GRAFT (CABG);  Surgeon: Deuce Finley IV, MD;  Location: [facility];  Service:  Cardiovascular;  Laterality: N/A;  WITH LLAA //  ECHO NOTIFIED    LEFT HEART CATHETERIZATION N/A 03/15/2023    Procedure: CATHETERIZATION, HEART, LEFT;  Surgeon: Sreedhar Herrera MD;  Location: [facility] CATH LAB;  Service: Cardiology;  Laterality: N/A;  [facility] via [facility]       Time Tracking:     PT Received On: 12/05/23  PT Start Time: 1039     PT Stop Time: 1056  PT Total Time (min): 17 min     Billable Minutes: Re-eval 1      12/05/2023

## 2023-12-05 NOTE — PROGRESS NOTES
Pharmacokinetic Assessment Follow Up: IV Vancomycin    Vancomycin serum concentration assessment(s):    The trough level was drawn correctly and can be used to guide therapy at this time. The measurement is above the desired definitive target range of 15 to 20 mcg/mL.    Vancomycin Regimen Plan:    Change regimen to Vancomycin 750 mg IV every 12 hours with next serum trough concentration measured at 2000 prior to 4th dose on 12/06.    Drug levels (last 3 results):  Recent Labs   Lab Result Units 12/02/23  1628 12/04/23  1627 12/05/23  0431   Vancomycin Trough ug/ml 19.5 31.4* 21.9*       Pharmacy will continue to follow and monitor vancomycin.    Please contact pharmacy at extension [phone] for questions regarding this assessment.    Thank you for the consult,   Andrade Wen       Patient brief summary:  Mike Urbina Jr. is a 68 y.o. male initiated on antimicrobial therapy with IV Vancomycin for treatment of lower respiratory infection    The patient's current regimen is 1000mg q12h.    Drug Allergies:   Review of patient's allergies indicates:  No Known Allergies    Actual Body Weight:   73.9kg    Renal Function:   Estimated Creatinine Clearance: 89.8 mL/min (based on SCr of 0.8 mg/dL).,     Dialysis Method (if applicable):  N/A    CBC (last 72 hours):  Recent Labs   Lab Result Units 12/04/23  0435   WBC x10(3)/mcL 6.55   Hgb g/dL 8.6*   Hct % 27.3*   Platelet x10(3)/mcL 200   Mono % % 7.8   Eos % % 0.3   Basophil % % 0.2       Metabolic Panel (last 72 hours):  Recent Labs   Lab Result Units 12/04/23  0435   Sodium Level mmol/L 135*   Potassium Level mmol/L 3.8   Chloride mmol/L 107   Carbon Dioxide mmol/L 20*   Glucose Level mg/dL 100   Blood Urea Nitrogen mg/dL 13.7   Creatinine mg/dL 0.80       Vancomycin Administrations:  vancomycin given in the last 96 hours                     vancomycin in dextrose 5 % 1 gram/250 mL IVPB 1,000 mg (mg) 1,000 mg New Bag 12/04/23 1616     1,000 mg New Bag  0434     1,000  mg New Bag 12/03/23 1549     1,000 mg New Bag  0550     1,000 mg New Bag 12/02/23 1642     1,000 mg New Bag  0418     1,000 mg New Bag 12/01/23 1621                    Microbiologic Results:  Microbiology Results (last 7 days)       Procedure Component Value Units Date/Time    Blood Culture [[id number]]  (Normal) Collected: 11/27/23 0637    Order Status: Completed Specimen: Blood from Hand, Left Updated: 12/02/23 1211     CULTURE, BLOOD ([facility]) Final Report: At 5 days. No growth    Blood Culture [[id number]]  (Normal) Collected: 11/27/23 0637    Order Status: Completed Specimen: Blood from Wrist, Right Updated: 12/02/23 1211     CULTURE, BLOOD ([facility]) Final Report: At 5 days. No growth    Stool Culture [[id number]]  (Abnormal) Collected: 11/27/23 1525    Order Status: Completed Specimen: Stool Updated: 11/29/23 1116     Stool Culture Negative for Salmonella, Shigella, Campylobacter, Vibrio, Aeromonas, Pleisiomonas,Yersinia, or Shiga Toxin 1 and 2.      Many Yeast

## 2023-12-05 NOTE — PLAN OF CARE
Problem: Physical Therapy  Goal: Physical Therapy Goal  Description: Goals to be met by: 23     Patient will increase functional independence with mobility by performin. Supine to sit with [place]  2. Sit to supine with [place]  3. Sit to stand transfer with Modified [place]  4. Gait  x 300 feet with Modified [place] using Rolling Walker vs no AD.     Outcome: Ongoing, Progressing

## 2023-12-05 NOTE — PROGRESS NOTES
"[facility] - 4th Floor Medical Telemetry    Cardiology  Progress Note    Patient Name: Mike Urbina Jr.  MRN: [id number]  Admission Date: 11/24/2023  Hospital Length of Stay: 11 days  Code Status: Full Code   Attending Physician: Ubaldo Durham MD   Primary Care Physician: Darlene Willams FNP  Expected Discharge Date: 12/5/2023  Principal Problem:Atrial fibrillation with RVR    Subjective:     Brief HPI: Mr. Urbina is a 67 y/o male with a history of PAF/Flutter, CAD s/p CABG, VHD, Chronic Diastolic Heart Failure, HTN, HLD, who is known to [facility], Dr. Herrera. He presented to the ER as a transfer from [place] [facility] room on 11.24.23 with complaints of shortness of breath and generalized weakness. He reports being diagnosed with Flu the weak prior and placed on Tamiflu. He was suspected to have SVT, but when rate was slowed, he was found to be in AFIB RVR. Significant labs include H&H 10.7/33.1, Na 135, K 3.4, Calcium 7.2, Albumin 1.9, AST/ALT 47/58, BNP 3,053.1, Troponin 0.137. Blood Cultures positive for MRSA. CTA chest negative for PE, findings concerning for multifocal pneumonia most evident in the right upper lobe. TTE demonstrated EF 55%, moderate aortic stenosis, moderate mitral stenosis and mild MR. [facility] has been consulted for AFIB RVR and CARO.     Hospital Course:  11.30.23: NAD. VSS. AFIB CVR on telemetry. Denies CP/SOB/Palps. "I feel okay" Awaiting CARO with Possible DCCV  12.1.23: NAD. VSS. SR on telemetry. Denies CP/SOB/Palps. "I'm okay"  12.2.23: NAD Noted. Vitals Stable.   12.3.23: NAD Noted. Sitting at edge of bed. Voices no complaints. Overall appears comfortable.   12.4.23: Patient awake in bed. NAD. Coarse breath sounds. No leukocytosis. Afebrile. Remains on ABX. H/H down-trending.   12.5.23: Sitting up at bedside. NAD. Coarse breath sounds. Seen by Structural heart yesterday. Will obtain CTA chest/abdomen/pelvis today as part of TAVR workup. Will need ID clearance prior to " proceeding with TAVR.       PMH:  CAD, Diastolic Heart Failure, ETOH Abuse, VHD (AS), Thrombocytopenia, NSTEMI, HTN, PAF/Flutter  PSH: LHC, CABG, Cataract Extraction   Family History: Mother, L, MI  Social History: + ETOH Use (6pk/Beer per Day for > 30 Years); Denies Tobacco and Illicit Drug Use     Previous Cardiac Diagnostics:   CARO/DCCV (11.30.23):  LV systolic function 55%. Severely AV sclerocalcification with marked reduced mobility of all 3 leaflets resulting in severe AS, SHARAD 0.7cm2 by direct planimetry. Peak AV velocity 4.5 m/sec. Mild AI. Marked MAC with restricted mobility of the MV resulting in Moderate mitral stenosis, MG 7 mmhg. Mild-moderate MR. Mild-moderate TR. SILVANO is not seen and is probably absent due to prior surgical SILVANO ligation. Successful DCCV x1    ECHO (11.25.23):  Left Ventricle: Normal wall motion. There is normal systolic function. Ejection fraction by visual approximation is 55%. Diastolic function cannot be reliably determined in the presence of mitral valve disease. Right Ventricle: Normal right ventricular cavity size. Systolic function is mildly reduced. Aortic Valve: Moderately calcified cusps. Moderately restricted motion. There is moderate stenosis. Aortic valve area by VTI is 0.90 cm². Aortic valve peak velocity is 3.52 m/s. Mean gradient is 35 mmHg. The dimensionless index is 0.29.  Mitral Valve: There is severe posterior mitral annular calcification present. There is moderate stenosis. The mean pressure gradient across the mitral valve is 9 mmHg at a heart rate of  bpm. There is mild regurgitation. Tricuspid Valve: There is mild regurgitation     ECHO (8.1.23):  The study quality is average. Global left ventricular systolic function is normal. The left ventricular ejection fraction is 55%. The left ventricle diastolic function is impaired (Grade II) with an elevated left atrial pressure. Suspect severe aortic valve stenosis is present; aortic valve is heavily calcified. The  trans-aortic peak velocity is 3.5 m/s. The trans-aortic mean gradient is 34.4 mmHg. Dimensionless index ~ 0.24. SVI ~ 42 mL/m^2. Moderate mitral valve calcification with mild to moderate mitral stenosis; mean gradient is 5.1 mmHg. Mild (1+) mitral regurgitation.  Moderate (2+) tricuspid regurgitation.  Mild (1+) aortic regurgitation.  The pulmonary artery systolic pressure is 44 mmHg. Evidence of pulmonary hypertension is noted.      [facility] 3.15.23:  Surgical coronary anatomy with distal left main 50%; LAD proximal to mid 60-70%; circumflex mid 80- 90%; RCA with proximal .  The ejection fraction was 60% with EDP of 10 mmHg.  Right radial access.  The estimated blood loss was less than 10 cc.  CT surgical consult for CABG evaluation.     PET 1.6.23:  This is an abnormal perfusion study. Study is consistent with ischemia.   This scan is suggestive of moderate risk for future cardiovascular events.   Small reversible perfusion abnormality of moderate intensity in the apical segment. Medium fixed perfusion abnormality of severe intensity in the inferior region.   The left ventricular cavity is noted to be normal on the stress studies. The stress left ventricular ejection fraction was calculated to be 35% and left ventricular global function is moderately reduced. The rest left ventricular cavity is noted to be normal. The rest left ventricular ejection fraction was calculated to be 40% and rest left ventricular global function is mildly reduced.   When compared to the resting ejection fraction (40%), the stress ejection fraction (35%) has decreased.   The study quality is good.   There was a rise in myocardial blood flow between rest and stress.  Global myocardial blood flow reserve was 1.47.  Myocardial blood flow reserve is globally abnormal, placing the patient at a higher coronary event risk.     ECHO 12.9.22:  The left ventricle is normal in size with mild concentric hypertrophy and normal systolic function.  Grade  I left ventricular diastolic dysfunction.  The estimated ejection fraction is 55%.  Normal right ventricular size with normal right ventricular systolic function.  There is mild aortic valve stenosis.  There is mild mitral stenosis.  Normal central venous pressure (3 mmHg).     Review of Systems   Cardiovascular:  Negative for chest pain, dyspnea on exertion, leg swelling and palpitations.   Respiratory:  Positive for cough. Negative for shortness of breath.    All other systems reviewed and are negative.    Objective:     Vital Signs (Most Recent):  Temp: 98.1 °F (36.7 °C) (12/05/23 1126)  Pulse: 83 (12/05/23 1126)  Resp: (!) 22 (12/05/23 1126)  BP: (!) 165/89 (12/05/23 1126)  SpO2: 96 % (12/05/23 1126) Vital Signs (24h Range):  Temp:  [97.6 °F (36.4 °C)-99.9 °F (37.7 °C)] 98.1 °F (36.7 °C)  Pulse:  [77-85] 83  Resp:  [16-25] 22  SpO2:  [93 %-98 %] 96 %  BP: (125-183)/(58-93) 165/89     Weight: 73.9 kg (163 lb)  Body mass index is 22.82 kg/m².    SpO2: 96 %         Intake/Output Summary (Last 24 hours) at 12/5/2023 1141  Last data filed at 12/5/2023 0921  Gross per 24 hour   Intake 1910 ml   Output 500 ml   Net 1410 ml         Lines/Drains/Airways       Peripheral Intravenous Line  Duration                  Peripheral IV - Single Lumen 11/24/23 1755 20 G Anterior;Distal;Right Upper Arm 10 days                    Significant Labs:   Recent Results (from the past 72 hour(s))   VANCOMYCIN, TROUGH    Collection Time: 12/02/23  4:28 PM   Result Value Ref Range    Vancomycin Trough 19.5 15.0 - 20.0 ug/ml   Basic Metabolic Panel    Collection Time: 12/04/23  4:35 AM   Result Value Ref Range    Sodium Level 135 (L) 136 - 145 mmol/L    Potassium Level 3.8 3.5 - 5.1 mmol/L    Chloride 107 98 - 107 mmol/L    Carbon Dioxide 20 (L) 23 - 31 mmol/L    Glucose Level 100 82 - 115 mg/dL    Blood Urea Nitrogen 13.7 8.4 - 25.7 mg/dL    Creatinine 0.80 0.73 - 1.18 mg/dL    BUN/Creatinine Ratio 17     Calcium Level Total 7.3 (L) 8.8 -  10.0 mg/dL    Anion Gap 8.0 mEq/L    eGFR >60 mls/min/1.73/m2   CBC with Differential    Collection Time: 12/04/23  4:35 AM   Result Value Ref Range    WBC 6.55 4.50 - 11.50 x10(3)/mcL    RBC 3.05 (L) 4.70 - 6.10 x10(6)/mcL    Hgb 8.6 (L) 14.0 - 18.0 g/dL    Hct 27.3 (L) 42.0 - 52.0 %    MCV 89.5 80.0 - 94.0 fL    MCH 28.2 27.0 - 31.0 pg    MCHC 31.5 (L) 33.0 - 36.0 g/dL    RDW 17.2 (H) 11.5 - 17.0 %    Platelet 200 130 - 400 x10(3)/mcL    MPV 10.5 (H) 7.4 - 10.4 fL    Neut % 80.2 %    Lymph % 10.4 %    Mono % 7.8 %    Eos % 0.3 %    Basophil % 0.2 %    Lymph # 0.68 0.6 - 4.6 x10(3)/mcL    Neut # 5.26 2.1 - 9.2 x10(3)/mcL    Mono # 0.51 0.1 - 1.3 x10(3)/mcL    Eos # 0.02 0 - 0.9 x10(3)/mcL    Baso # 0.01 <=0.2 x10(3)/mcL    IG# 0.07 (H) 0 - 0.04 x10(3)/mcL    IG% 1.1 %    NRBC% 0.0 %   VANCOMYCIN, TROUGH    Collection Time: 12/04/23  4:27 PM   Result Value Ref Range    Vancomycin Trough 31.4 (HH) 15.0 - 20.0 ug/ml   VANCOMYCIN, TROUGH    Collection Time: 12/05/23  4:31 AM   Result Value Ref Range    Vancomycin Trough 21.9 (H) 15.0 - 20.0 ug/ml     Telemetry:  SR    Physical Exam  Vitals and nursing note reviewed.   Constitutional:       General: He is not in acute distress.     Appearance: Normal appearance.   HENT:      Head: Normocephalic.      Nose: Nose normal.      Mouth/Throat:      Mouth: Mucous membranes are moist.   Cardiovascular:      Rate and Rhythm: Normal rate and regular rhythm.      Heart sounds: Murmur heard.      Comments: SR  Pulmonary:      Effort: Pulmonary effort is normal. No respiratory distress.      Breath sounds: Rhonchi present.   Musculoskeletal:         General: Normal range of motion.      Right lower leg: No edema.      Left lower leg: No edema.   Skin:     General: Skin is warm.   Neurological:      Mental Status: He is alert. Mental status is at baseline.   Psychiatric:         Behavior: Behavior normal.       Current Inpatient Medications:  Current Facility-Administered  Medications:     folic acid tablet 1 mg, 1 mg, Oral, Daily, Juan Anderson Jr., MD, FCCP, 1 mg at 12/05/23 0817    hydrALAZINE injection 10 mg, 10 mg, Intravenous, Q4H PRN, Laura Boggs, AGACNP-BC, 10 mg at 12/1955    labetaloL injection 10 mg, 10 mg, Intravenous, Q2H PRN, Laura Boggs, AGACNP-BC, 10 mg at 12/02/23 0407    LORazepam injection 2 mg, 2 mg, Intravenous, Q15 Min PRN, Rico Davila MD    magnesium oxide tablet 800 mg, 800 mg, Oral, PRN, Stroda, Danielito, DO    magnesium oxide tablet 800 mg, 800 mg, Oral, PRN, Stroda, Danielito, DO    metoprolol injection 5 mg, 5 mg, Intravenous, Q5 Min PRN, Stroda, Danielito, DO, 5 mg at 11/29/23 2332    miconazole NITRATE 2 % top powder, , Topical (Top), BID, Bux, Betty, DO, Given at 12/05/23 0900    ondansetron injection 4 mg, 4 mg, Intravenous, Q8H PRN, Stroda, Danielito, DO    potassium bicarbonate disintegrating tablet 35 mEq, 35 mEq, Oral, PRN, Stroda, Danielito, DO, 35 mEq at 11/27/23 0420    potassium bicarbonate disintegrating tablet 50 mEq, 50 mEq, Oral, PRN, Stroda, Danielito, DO, 50 mEq at 11/28/23 1606    potassium bicarbonate disintegrating tablet 60 mEq, 60 mEq, Oral, PRN, Stroda, Danielito, DO    potassium, sodium phosphates 280-160-250 mg packet 2 packet, 2 packet, Oral, PRN, Stroda, Danielito, DO    potassium, sodium phosphates 280-160-250 mg packet 2 packet, 2 packet, Oral, PRN, Stroda, Danielito, DO, 2 packet at 11/27/23 0420    potassium, sodium phosphates 280-160-250 mg packet 2 packet, 2 packet, Oral, PRN, Stroda, Danielito, DO    sodium chloride 0.9% flush 10 mL, 10 mL, Intravenous, PRN, Stroda, Danielito, DO, 10 mL at 11/28/23 1407    Pharmacy to dose Vancomycin consult, , , Once **AND** vancomycin - pharmacy to dose, , Intravenous, pharmacy to manage frequency, Juan Anderson Jr., MD, [facility]    Facility-Administered Medications Ordered in Other Encounters:     [START ON 12/6/2023] amiodarone tablet 200 mg, 200 mg, Oral, Daily, Ubaldo Durham MD    apixaban tablet 5 mg, 5 mg,  Oral, BID, Ubaldo Durham MD    [START ON 12/6/2023] aspirin EC tablet 81 mg, 81 mg, Oral, Daily, Ubaldo Durham MD    guaiFENesin 100 mg/5 ml syrup 400 mg, 400 mg, Oral, Q6H WAKE, Ubaldo Durham MD    levalbuterol nebulizer solution 1.25 mg, 1.25 mg, Nebulization, Q8H, Ubaldo Durham MD    [START ON 12/6/2023] linaCLOtide capsule 72 mcg, 72 mcg, Oral, Before breakfast, Ubaldo Durham MD    loperamide capsule 2 mg, 2 mg, Oral, QID PRN, Ubaldo Durham MD    melatonin tablet 6 mg, 6 mg, Oral, Nightly PRN, Ubaldo Durham MD    [START ON 12/6/2023] metoprolol succinate (TOPROL-XL) 24 hr tablet 100 mg, 100 mg, Oral, Daily, Ubaldo Durham MD    mupirocin 2 % ointment, , Nasal, BID, Mary Cleaning MD    QUEtiapine tablet 200 mg, 200 mg, Oral, QHS, Ubaldo Durham MD    sacubitriL-valsartan 24-26 mg per tablet 1 tablet, 1 tablet, Oral, BID, Ubaldo Durham MD    vancomycin 750 mg in dextrose 5 % (D5W) 250 mL IVPB (Vial-Mate), 750 mg, Intravenous, Q12H, Ubaldo Durham MD    white petrolatum 41 % ointment, , Topical (Top), BID, Ubaldo Durham MD    VTE Risk Mitigation (From admission, onward)           Ordered     IP VTE HIGH RISK PATIENT  Once         11/24/23 2116     Place sequential compression device  Until discontinued         11/24/23 2116                    Assessment:   PAF/Flutter  - Currently SR     - s/p CARO/DCCV (11.30.23): Successful DCCV x1    - CHADsVASc - 3 Points - 3.2% Stroke Risk per Year     - s/p (4.3.23) - Ligation of SILVANO with Endostapler  NSTEMI type II secondary to demand ischemia due to Sepsis, PNA, Flu, PAF  Bacteremia--blood cultures 11.24.23 + MRSA  Bilateral Community Acquired PNA  Recent Influenza   MVCAD    - s/p CABG (4.3.23) - LIMA to LAD, rSVG to OM1, rSVG to PDA  Chronic Diastolic Heart Failure  --compensated  VHD    - severe AS    - Moderate MS     - CTS suggested TAVR eval  HTN/HHD  Chronic Thrombocytopenia   --plts stable  History of ETOH Abuse  No History of GI Bleed    Plan:    Continue amiodarone, Metoprolol succinate, and Eliquis   Continue Metoprolol succinate and entresto 24-26 mg bid. Uptitrate as needed for BP control.  Will obtain CTA chest/abdomen/pelvis --TAVR protocol  Per ID, patient will need 4 weeks of IV ABX end date 12/25/23  Monitor renal indices  Will need to hold eliquis 48hr prior to TAVR.   Patient is to be transferred to swing bed today. Will continue TAVR workup outpatient.       LOREN Dumont  Cardiology  [facility] - 4th Floor Medical Telemetry  12/05/2023

## 2023-12-05 NOTE — PLAN OF CARE
12/05/23 1415   Final Note   Assessment Type Final Discharge Note   Anticipated Discharge Disposition Swing Bed   Post-Acute Status   Post-Acute Authorization Placement   Post-Acute Placement Status Set-up Complete/Auth obtained   Discharge Delays None known at this time     Patient will be transferred to [facility]. After CT today, will be transported by Yuliana at 4:00. Nurse notified.

## 2023-12-05 NOTE — TELEPHONE ENCOUNTER
12/05/23 vamsi mobley appointment patient is [facility] been [facility] , friend said he may be longer . R/s appointment when get better .

## 2023-12-05 NOTE — PROGRESS NOTES
Inpatient Nutrition Assessment    Admit Date: 11/24/2023   Total duration of encounter: 11 days   Patient Age: 68 y.o.    Nutrition Recommendation/Prescription     Oral diet as tolerated; encouraged intake.    Communication of Recommendations: reviewed with patient    Nutrition Assessment     Malnutrition Assessment/Nutrition-Focused Physical Exam    Malnutrition Context: acute illness or injury (11/30/23 1245)  Malnutrition Level: severe (11/30/23 1245)  Energy Intake (Malnutrition): less than or equal to 50% for greater than or equal to 5 days (11/30/23 1245)  Weight Loss (Malnutrition): other (see comments) (does not meet criteria) (11/30/23 1245)              Muscle Mass (Malnutrition): moderate depletion (11/30/23 1245)  [place] Region (Muscle Loss): moderate depletion  Clavicle Bone Region (Muscle Loss): mild depletion  Clavicle and Acromion Bone Region (Muscle Loss): moderate depletion                          A minimum of two characteristics is recommended for diagnosis of either severe or non-severe malnutrition.    Chart Review    Reason Seen: follow-up    Malnutrition Screening Tool Results   Have you recently lost weight without trying?: No  Have you been eating poorly because of a decreased appetite?: Yes   MST Score: 1   Diagnosis:  Influenza A, dx on 11/19/2023  Bilateral Pneumonia 2/2 MRSA and Flu A infection   MRSA sepsis   Generalized weakness   Elevated LFTs from sepsis: Improving   Hypoalbuminemia   PAF with RVR  Chronic HFpEF 55%  Essential hypertension  Mild Hypokalemia and Hypophosphatemia   Chronic Anemia and Thrombocytopenia : improved     Relevant Medical History:  ETOH abuse, VHD-aortic stenosis, thrombocytopenia, CAD status post CABG x3, HTN, PAFlutter s/p SILVANO ligation, chronic HFpEF 55%, chronic anemia     Scheduled Medications:  folic acid, 1 mg, Daily  miconazole NITRATE 2 %, , BID    Continuous Infusions: none       PRN Medications: hydrALAZINE, labetaloL, lorazepam, magnesium oxide,  "magnesium oxide, metoprolol, ondansetron, potassium bicarbonate, potassium bicarbonate, potassium bicarbonate, potassium, sodium phosphates, potassium, sodium phosphates, potassium, sodium phosphates, sodium chloride 0.9%, Pharmacy to dose Vancomycin consult **AND** vancomycin - pharmacy to dose    Recent Labs   Lab 11/29/23  0401 11/30/23  0342 12/01/23  0409 12/02/23  0659 12/04/23  0435   * 136 136 136 135*   K 3.4* 4.2 4.0 3.5 3.8   CALCIUM 7.2* 7.3* 7.2* 7.7* 7.3*   MG  --  1.90  --  1.80  --    CHLORIDE 104 104 107 105 107   CO2 22* 22* 23 23 20*   BUN 17.9 18.1 15.5 13.3 13.7   CREATININE 0.80 0.84 0.81 0.80 0.80   EGFRNORACEVR >60 >60 >60 >60 >60   GLUCOSE 113 99 108 97 100   BILITOT 1.2  --   --   --   --    ALKPHOS 63  --   --   --   --    ALT 58*  --   --   --   --    AST 47*  --   --   --   --    ALBUMIN 1.9*  --   --   --   --    WBC 4.44  4.44* 6.22  --  6.70 6.55   HGB 10.7* 10.1*  --  9.7* 8.6*   HCT 33.1* 31.6*  --  29.7* 27.3*     Nutrition Orders:   Diet heart healthy    Appetite/Oral Intake: fair/50-75% of meals  Factors Affecting Nutritional Intake: decreased appetite  Food/Yazidism/Cultural Preferences: none reported  Food Allergies: no known food allergies  Wound(s): [REMOVED]      Altered Skin Integrity 11/24/23 2300 posterior Sacral spine #1 Skin Tear Intact skin with non-blanchable redness of localized area-Tissue loss description: Not applicable   Last Bowel Movement: 12/04/23    Comments    11/30: Pt NPO today for cardioversion. Reports poor appetite ~9 days, declined ONS at this time. Denies GI complaints. UBW ~180 lb and reports unintentional weight loss. Per EMR weights, 4% weight loss in 1 month (not significant). Pt with moderate muscle wasting per NFPA.    12/5/23 Patient reports appetite improving, offered Boost but not interested, possible discharge today noted.    Anthropometrics    Height: 5' 10.87" (180 cm)    Last Weight: 73.9 kg (163 lb) (11/25/23 0815) Weight Method: " Bed Scale  BMI (Calculated): 22.8  BMI Classification: normal (BMI 18.5-24.9)        Ideal Body Weight (IBW), Male: 171.22 lb     % Ideal Body Weight, Male (lb): 95.2 %                 Usual Body Weight (UBW), k.1 kg  % Usual Body Weight: 96.1     Usual Weight Provided By: patient and EMR weight history    Wt Readings from Last 5 Encounters:   23 73.9 kg (163 lb)   23 79.4 kg (175 lb)   23 81.6 kg (180 lb)   23 80 kg (176 lb 4.8 oz)   10/12/23 77.1 kg (170 lb)     Weight Change(s) Since Admission:   Wt Readings from Last 1 Encounters:   23 0815 73.9 kg (163 lb)   23 0252 74.2 kg (163 lb 9.3 oz)   23 2259 74.2 kg (163 lb 9.3 oz)   23 1718 68 kg (150 lb)   Admit Weight: 68 kg (150 lb) (23 1718), Weight Method: Bed Scale    Estimated Needs    Weight Used For Calorie Calculations: 73.9 kg (162 lb 14.7 oz)  Energy Calorie Requirements (kcal): 8386-2629 kcal (1.2-1.4 stress factor)  Energy Need Method: [place]-St Jeor  Weight Used For Protein Calculations: 73.9 kg (162 lb 14.7 oz)  Protein Requirements: 89-96 g (1.2-1.3 g/kg)  Fluid Requirements (mL): 6802-7638 ml (1 ml/kcal)    Enteral Nutrition Patient not receiving enteral nutrition at this time.    Parenteral Nutrition Patient not receiving parenteral nutrition support at this time.    Evaluation of Received Nutrient Intake    Calories: not meeting estimated needs  Protein: not meeting estimated needs    Patient Education Not applicable.    Nutrition Diagnosis     PES: Malnutrition related to acute illness as evidenced by moderate muscle depletion and <50% energy intake in >5 days. (active)    Interventions/Goals     Intervention(s): general/healthful diet and collaboration with other providers    Goal: Meet greater than 75% of nutritional needs by follow-up. (goal progressing)    Monitoring & Evaluation     Dietitian will monitor food and beverage intake and weight.    Nutrition Risk/Follow-Up: moderate  (follow-up in 3-5 days)   Please consult if re-assessment needed sooner.

## 2023-12-05 NOTE — NURSING
Received a call at 1750 for a critical vancomycin trough (31.4)  MD notified at 1751  Notified Sandra Oh, ClaireD at 1755. She advised me to complete the infusion.

## 2023-12-05 NOTE — PROGRESS NOTES
Pharmacokinetic Assessment Follow Up: IV Vancomycin    Vancomycin serum concentration assessment(s):    The level of 31.4 mcg/ml was drawn while  vancomycin was infusing so it cannot be used for dosing.  I advised the nurse to continue infusion rather than hold it due to diagnosis of MRSA sepsis.  We can get a true trough with next dose.    Vancomycin Regimen Plan:    After dose given, temporarily discontinue vancomycin and check trough at 0400 on 12/5/23.  Resume and adjust dose, if necessary, after level results.    Drug levels (last 3 results):  Recent Labs   Lab Result Units 12/02/23  1628 12/04/23  1627   Vancomycin Trough ug/ml 19.5 31.4*          Patient brief summary:  Mike Urbina Jr. is a 68 y.o. male initiated on antimicrobial therapy with IV Vancomycin for treatment of bacteremia      Drug Allergies:   Review of patient's allergies indicates:  No Known Allergies    Actual Body Weight:   73.9 kg    Renal Function:   Estimated Creatinine Clearance: 89.8 mL/min (based on SCr of 0.8 mg/dL).,     Dialysis Method (if applicable):  N/A    CBC (last 72 hours):  Recent Labs   Lab Result Units 12/02/23  0659 12/04/23  0435   WBC x10(3)/mcL 6.70 6.55   Hgb g/dL 9.7* 8.6*   Hct % 29.7* 27.3*   Platelet x10(3)/mcL 195 200   Mono % % 8.8 7.8   Eos % % 0.6 0.3   Basophil % % 0.1 0.2       Metabolic Panel (last 72 hours):  Recent Labs   Lab Result Units 12/02/23  0659 12/04/23  0435   Sodium Level mmol/L 136 135*   Potassium Level mmol/L 3.5 3.8   Chloride mmol/L 105 107   Carbon Dioxide mmol/L 23 20*   Glucose Level mg/dL 97 100   Blood Urea Nitrogen mg/dL 13.3 13.7   Creatinine mg/dL 0.80 0.80   Magnesium Level mg/dL 1.80  --        Vancomycin Administrations:  vancomycin given in the last 96 hours                     vancomycin in dextrose 5 % 1 gram/250 mL IVPB 1,000 mg (mg) 1,000 mg New Bag 12/04/23 1616     1,000 mg New Bag  0434     1,000 mg New Bag 12/03/23 1549     1,000 mg New Bag  0550     1,000 mg  New Bag 12/02/23 1642     1,000 mg New Bag  0418     1,000 mg New Bag 12/01/23 1621     1,000 mg New Bag  0658                    Microbiologic Results:  Microbiology Results (last 7 days)       Procedure Component Value Units Date/Time    Blood Culture [[id number]]  (Normal) Collected: 11/27/23 0637    Order Status: Completed Specimen: Blood from Hand, Left Updated: 12/02/23 1211     CULTURE, BLOOD ([facility]) Final Report: At 5 days. No growth    Blood Culture [[id number]]  (Normal) Collected: 11/27/23 0637    Order Status: Completed Specimen: Blood from Wrist, Right Updated: 12/02/23 1211     CULTURE, BLOOD ([facility]) Final Report: At 5 days. No growth    Stool Culture [[id number]]  (Abnormal) Collected: 11/27/23 1525    Order Status: Completed Specimen: Stool Updated: 11/29/23 1116     Stool Culture Negative for Salmonella, Shigella, Campylobacter, Vibrio, Aeromonas, Pleisiomonas,Yersinia, or Shiga Toxin 1 and 2.      Many Yeast

## 2023-12-05 NOTE — DISCHARGE SUMMARY
[facility] Medicine Discharge Summary    Admit Date: 11/24/2023  Discharge Date and Time: 12/5/202310:01 AM  Admitting Physician: Hospitalist team   Discharging Physician: Ubaldo Durham MD.  Primary Care Physician: Darlene Willams FNP      Discharge Diagnoses:  Influenza A, dx on 11/19/2023  Bilateral Pneumonia 2/2 MRSA and Flu A infection   MRSA sepsis   Generalized weakness   Severe aortic stenosis   Elevated LFTs from sepsis: Improving   Hypoalbuminemia   PAF with RVR s/p cardioversion   Chronic HFpEF 55%  Essential hypertension  CAD s/p MI/CABG x3  ETOH abuse   Mild Hypokalemia and Hypophosphatemia   Chronic Anemia and Thrombocytopenia : improved   Hx of VHD    Hospital Course:   68 y.o. male with a PMHx of  ETOH abuse, VHD-aortic stenosis, thrombocytopenia, CAD status post CABG x3, HTN, PAFlutter s/p SILVANO ligation, chronic HFpEF 55%, chronic anemia who presented to [facility] on 11/24/2023 with via EMS as a transfer from [facility] ED for higher level of care.  He initially presented to the [facility] ED via EMS with complaints of worsening SOB and generalized weakness after being diagnosed with influenza a x5 days prior. Reportedly prescribed Tamiflu on 11/20/2023. He also endorsed decreased oral intake, and chest pain that is worse with deep inspiration.  Patient does endorsed drinking a 6 pack of beer per day.     Labs were notable for sodium 135, CO2 17, glucose 179, BUN 36.2, creatinine 1.49, total bili 2.9, AST 56, BNP 3053, WBC 13.68, hemoglobin 12.5, hematocrit 40, troponin 0.012.  CXR demonstrated right upper lobe consolidation concerning for pneumonia.  EKG demonstrated supraventricular tachycardia with rate of 172.  He was given adenosine 6 mg followed by 12 mg without improvement.  He was then given Lopressor 5 mg IV and rate noted to be atrial fibrillation with RVR.  He was initiated on a diltiazem drip.  He was then transferred to [facility] ED.  CTA chest  "negative for PE, findings concerning for multifocal pneumonia most evident in the right upper lobe.  He was started on broad-spectrum IV antibiotics.  Blood cultures x2 obtained.       He was admitted to ICU.  MRSA PCR detected. Antibiotic coverage broadened to vanc, Zosyn and doxycycline.  TTE demonstrated EF 55%, moderate aortic stenosis, moderate mitral stenosis and mild MR.  Blood cultures from 11/24/2023 growing MRSA in 2 of 2 bottles.  Respiratory PCR panel negative.  Tamiflu was also continued.  Also required Levophed for BP support. He has since been weaned off of Levophed and Cardizem.  Zosyn and Tamiflu were discontinued on 11/27/2023.  He remains on IV vancomycin.  ID was consulted on 11/27/2023. Blood cultures repeated x2 on 11/27/2023.  He was cleared for downgrade to the floor on 11/27/2023.  PT/OT consulted. Hospital medicine consulted for assumption of care and further medical management.     Labs on 11/27/2023 notable for WBC 3.16, hemoglobin 9.9, hematocrit 30.3, platelets 107, sodium 134, potassium 3.4, calcium 7.3, phosphorus 1.6, albumin 2, , .     Patient was continued on iv vancomycin. He was very weak and needed PT. On 11/29/23 he went into Afib with RVR. He was seen by [facility] team and started on iv cardizem drip and planned cardioversion. He was continued on FD anticoagulation.      Patient had a CARO done and showed severe Aortic stenosis and no clots or vegetation. Cardioversion was done. Patient will need TAVR when he completes his iv antibiotics.        Vitals:  Blood pressure (!) 163/90, pulse 84, temperature 98.1 °F (36.7 °C), temperature source Oral, resp. rate (!) 25, height 5' 10.87" (1.8 m), weight 73.9 kg (163 lb), SpO2 98 %..    Physical Exam:  Awake, Alert, Oriented x 3, No new focal Neurologic deficit, Normal Affect  NC/AT, PERRLA, EOMI  Supple neck, no JVD, No cervical lymphadenopathy  Symmetrical chest, Good air entry bilaterally. No rhonchi, wheezes, crackles " appreciated  RRR, No gallop, rub or murmur  +ve Bowel sounds x4, Abd soft and non tender, no rebound, guarding or rigidity  No Cyanosis, cludding or edema, No new rash or bruises    Procedures Performed: No admission procedures for hospital encounter.     Significant Diagnostic Studies: See Full reports for all details  No results displayed because visit has over 200 results.           Microbiology Results (last 7 days)       Procedure Component Value Units Date/Time    Blood Culture [[id number]]  (Normal) Collected: 11/27/23 0637    Order Status: Completed Specimen: Blood from Hand, Left Updated: 12/02/23 1211     CULTURE, BLOOD ([facility]) Final Report: At 5 days. No growth    Blood Culture [[id number]]  (Normal) Collected: 11/27/23 0637    Order Status: Completed Specimen: Blood from Wrist, Right Updated: 12/02/23 1211     CULTURE, BLOOD ([facility]) Final Report: At 5 days. No growth    Stool Culture [[id number]]  (Abnormal) Collected: 11/27/23 1525    Order Status: Completed Specimen: Stool Updated: 11/29/23 1116     Stool Culture Negative for Salmonella, Shigella, Campylobacter, Vibrio, Aeromonas, Pleisiomonas,Yersinia, or Shiga Toxin 1 and 2.      Many Yeast             Transesophageal echo (CARO)with possible cardioversion    Result Date: 11/30/2023  Addison Barnhart MD     11/30/2023  3:10 PM Procedure: CARO guided DCCV Final impression: LV systolic function 55%. Severely AV sclerocalcification with marked reduced mobility of all 3 leaflets resulting in severe AS, SHARAD 0.7cm2 by direct planimetry. Peak AV velocity 4.5 m/sec. Mild AI. Marked MAC with restricted mobility of the MV resulting in Moderate mitral stenosis, MG 7 mmhg. Mild-moderate MR. Mild-moderate TR. SILVANO is not seen and is probably absent due to prior surgical SILVANO ligation. Successful DCCV x1 Indication: AS, MS Informed consent: obtained, signed copy placed in the chart. Description of the procedure: After sedation was achieved (please see anesthesia report  for details), the esophagus intubated without difficulties. Multiple orthogonal views obtained. Patient tolerated procedure without immediate complications noted. Findings: Left atrium (LA): Mild-moderately enlarged left atrium. Left atrial appendage (SILVANO): SILVANO is not seen and is probably absent due to prior surgical SILVANO ligation. Interatrial septum:  NO ASD or PFO noted on 2D or Color Doppler images. Right atrium (RA): Normal RA size. Left ventricle (LV): LVEF 55%.   Normal LV size.  Mild-moderate left ventricular hypertrophy. No Regional wall motion abnormalities noted. Right ventricle (RV): Partially visualized in this study. Aortic valve: Trileaflet.  Severely AV sclerocalcification with marked reduced mobility of all 3 leaflets resulting in severe AS, SHARAD 0.7cm2 by direct planimetry. Peak AV velocity 4.5 m/sec. Mild AI. No Vegetation is present. Mitral valve: Marked MAC with restricted mobility of the MV resulting in Moderate mitral stenosis, MG 7 mmhg. No Vegetation is present.  Mild-moderate MR. Tricuspid valve: Partially visualized, No Tricuspid stenosis. Mild-moderate Tricuspid regurgitation. No Vegetation is present. Pulmonic valve: Not well visualized, no hemodynamically significant pulmonic stenosis, no pulmonic regurgitation noted in this study. No Vegetation is present. Thoracic aorta: Scattered  Atherosclerotic changes of the descending thoracic aorta.  Normal Caliber aortic root.   Normal Caliber Proximal portion of the ascending aorta. Pericardium: No significant pericardial effusion is present. Description of the DCCV procedure: After completion of CARO, no intracardiac thrombus was visualized, so I decided to proceed with Cardioversion 200 J synchronized cardioversion was completed. Number of attempts: 1     X-Ray Chest 1 View    Result Date: 11/30/2023  EXAMINATION: XR CHEST 1 VIEW CLINICAL HISTORY: SOB; COMPARISON: 11/27/2023 FINDINGS: Single view of the chest shows right greater than left upper  lobe consolidation, similar to the prior study.  No pneumothorax.  Heart size is stable.     Stable chest Electronically signed by: Alexander Rivera MD Date:    11/30/2023 Time:    07:19    X-Ray Chest 1 View    Result Date: 11/27/2023  EXAMINATION: XR CHEST 1 VIEW CLINICAL HISTORY: pneumonia; TECHNIQUE: AP chest COMPARISON: Chest x-ray dated 11/26/2023 FINDINGS: The heart is stable in size.  There are slightly increasing patchy and consolidative bilateral airspace opacities.  There is no pleural effusion or visible pneumothorax.     Slightly increasing patchy and consolidative bilateral airspace opacities. Electronically signed by: Lyric Deras Date:    11/27/2023 Time:    06:05    X-Ray Chest 1 View    Result Date: 11/26/2023  EXAMINATION: XR CHEST 1 VIEW CLINICAL HISTORY: respiratory failure; TECHNIQUE: AP chest COMPARISON: Chest x-ray dated 11/25/2023 FINDINGS: The heart is normal in size.  There are similar consolidative opacities in the right upper lobe with increasing scattered patchy bilateral airspace opacities.  There is no pleural effusion or visible pneumothorax.     Increasing patchy bilateral airspace opacities. Electronically signed by: Lyric Deras Date:    11/26/2023 Time:    07:58    Echo    Result Date: 11/25/2023    Left Ventricle: Normal wall motion. There is normal systolic function. Ejection fraction by visual approximation is 55%. Diastolic function cannot be reliably determined in the presence of mitral valve disease.   Right Ventricle: Normal right ventricular cavity size. Systolic function is mildly reduced.   Aortic Valve: Moderately calcified cusps. Moderately restricted motion. There is moderate stenosis. Aortic valve area by VTI is 0.90 cm². Aortic valve peak velocity is 3.52 m/s. Mean gradient is 35 mmHg. The dimensionless index is 0.29.   Mitral Valve: There is severe posterior mitral annular calcification present. There is moderate stenosis. The mean pressure gradient across  the mitral valve is 9 mmHg at a heart rate of  bpm. There is mild regurgitation.   Tricuspid Valve: There is mild regurgitation.     X-Ray Chest 1 View    Result Date: 11/25/2023  EXAMINATION: XR CHEST 1 VIEW CLINICAL HISTORY: pneumonia; TECHNIQUE: AP chest COMPARISON: Chest x-ray dated 11/24/2023 FINDINGS: The heart is normal in size.  There are similar consolidative changes in the right upper lung.  There is a linear opacity in the left lung base.  There is no pleural effusion or visible pneumothorax.     Stable exam without significant interval change. Electronically signed by: Lyric Deras Date:    11/25/2023 Time:    08:12    CTA Chest Non-Coronary (PE Studies)    Result Date: 11/24/2023  EXAMINATION: CTA CHEST NON CORONARY (PE STUDIES) CLINICAL HISTORY: Pulmonary embolism (PE) suspected, high prob; TECHNIQUE: Helical-acquisition CT images were obtained prior to and following the intravenous administration of iodine-based contrast media. The post-contrast images were timed to coincide with arterial opacification for purpose of CT angiography. Multiplanar reconstructions, to include MIP and volume-rendered (3D) images, were accomplished by the CT technologist at a separate workstation and pushed to PACS for physician review. Total DLP (mGy-cm) 268 (value may include radiation due to concomitantly performed CT imaging) Automated tube current modulation and/or weight-based exposure dosing is utilized, when appropriate, to reach lowest reasonably achievable exposure rate. COMPARISON: None FINDINGS: The heart is normal in size.  There is no pericardial effusion.  The RV to LV ratio is less than 1.  Evaluation is by breathing motion artifact.  There is no pulmonary embolism identified. There are consolidative changes in the right upper lobe with additional scattered bilateral nodular airspace opacities.  There is no pleural effusion or pneumothorax. There are no acute findings in the upper abdomen. There is no  acute bony abnormality identified.     1. No pulmonary embolism identified. 2. Findings concerning for multifocal pneumonia, most evident in the right upper lobe. Electronically signed by: Lyric Deras Date:    11/24/2023 Time:    18:48    X-Ray Chest 1 View    Result Date: 11/24/2023  EXAMINATION: XR CHEST 1 VIEW CLINICAL HISTORY: Shortness of breath TECHNIQUE: AP chest COMPARISON: Chest x-ray dated 11/19/2023 FINDINGS: The heart is normal size.  There is a consolidative opacity over the right upper lobe..  There is no pleural effusion or definite visible pneumothorax.     Right upper lobe consolidation concerning for pneumonia. Electronically signed by: Lyric Deras Date:    11/24/2023 Time:    14:34    CT Soft Tissue Neck WO Contrast    Result Date: 11/20/2023  Technique:CT of the soft tissues of the neck was performed without intravenous contrast. Automated exposure control was utilized to minimize radiation dose.  . Comparison:None. Clinical history:Patient c/o coughing and shortness of breath for the last 2 days. He also reports that it feels like something is stuck in his throat. Findings: Sinuses: Is mild mucoperiosteal thickening of the ethmoidal air cells.  Otherwise, the visualized paranasal sinuses are clear. Nasopharynx:Unremarkable. Oropharynx:Unremarkable. Larynx:Unremarkable. Trachea:Unremarkable. Esophagus:Unremarkable. Bony structures: Mineralization of the Cervical Spine Bony Structures:Normal. Curvature:Normal cervical lordosis. Fractures:None. Degenerative changes:Multilevel degenerative changes are seen.     Impression: No acute findings on this exam without contrast identified. No significant discrepancy with overnight report. Electronically signed by: Toni Eller Date:    11/20/2023 Time:    08:23    X-Ray Chest AP Portable    Result Date: 11/20/2023  EXAMINATION: XR CHEST AP PORTABLE CLINICAL HISTORY: Chest Pain; TECHNIQUE: Single view of the chest COMPARISON: 08/24/2023  FINDINGS: No focal opacification, pleural effusion, or pneumothorax. The cardiomediastinal silhouette is within normal limits with postsurgical changes of median sternotomy. No acute osseous abnormality.     No acute cardiopulmonary process. Electronically signed by: Jose Dunham Date:    11/20/2023 Time:    06:30  - pulls last radiology orders        Medication List        ASK your doctor about these medications      amiodarone 200 MG Tab  Commonly known as: PACERONE  Take 1 tablet (200 mg total) by mouth once daily.     aspirin 81 MG EC tablet  Commonly known as: ECOTRIN  Take 1 tablet (81 mg total) by mouth once daily.     atorvastatin 40 MG tablet  Commonly known as: LIPITOR  Take 1 tablet (40 mg total) by mouth every evening.     furosemide 20 MG tablet  Commonly known as: LASIX  Take 1 tablet (20 mg total) by mouth once daily. for 4 days     hydrOXYzine pamoate 50 MG Cap  Commonly known as: VISTARIL  Take 1 capsule (50 mg total) by mouth every 8 (eight) hours as needed (as needed for anxiety).     linaCLOtide 72 mcg Cap capsule  Commonly known as: LINZESS  Take 1 capsule (72 mcg total) by mouth before breakfast.     metoprolol succinate 50 MG 24 hr tablet  Commonly known as: TOPROL-XL  TAKE 1 TABLET BY MOUTH ONCE DAILY     oseltamivir 75 MG capsule  Commonly known as: TAMIFLU  Take 1 capsule (75 mg total) by mouth 2 (two) times daily. for 5 days  Ask about: Should I take this medication?     QUEtiapine 100 MG Tab  Commonly known as: SEROQUEL  Take 2 tablets (200 mg total) by mouth every evening.     sodium bicarbonate 650 MG tablet     valsartan 160 MG tablet  Commonly known as: DIOVAN  Take 1 tablet (160 mg total) by mouth 2 (two) times daily.     VITAMIN B-1 100 MG tablet  Generic drug: thiamine               Explained in detail to the patient about the discharge plan, medications, and follow-up visits. Pt understands and agrees with the treatment plan  Discharged Condition: stable  Diet:  cardiac  Disposition: [facility]    Medications Per DC med rec  Activities as tolerated  Follow up with your PCP in 2 wks   For further questions contact hospitalist office    Discharge time 33 minutes    For worsening symptoms, chest pain, shortness of breath, increased abdominal pain, high grade fever, stroke or stroke like symptoms, immediately go to the nearest Emergency Room or call 911 as soon as possible.        Ubaldo Rob M.D, on 12/5/2023. at 10:01 AM.

## 2023-12-05 NOTE — PROGRESS NOTES
[facility] Hospital Medicine Progress Note        Chief Complaint: Inpatient Follow-up     HPI:   68 y.o. male with a PMHx of  ETOH abuse, VHD-aortic stenosis, thrombocytopenia, CAD status post CABG x3, HTN, PAFlutter s/p SILVANO ligation, chronic HFpEF 55%, chronic anemia who presented to [facility] on 11/24/2023 with via EMS as a transfer from [facility] ED for higher level of care.  He initially presented to the outlying ED via EMS with complaints of worsening SOB and generalized weakness after being diagnosed with influenza a x5 days prior. Reportedly prescribed Tamiflu on 11/20/2023. He also endorsed decreased oral intake, and chest pain that is worse with deep inspiration.  Patient does endorsed drinking a 6 pack of beer per day.     Labs were notable for sodium 135, CO2 17, glucose 179, BUN 36.2, creatinine 1.49, total bili 2.9, AST 56, BNP 3053, WBC 13.68, hemoglobin 12.5, hematocrit 40, troponin 0.012.  CXR demonstrated right upper lobe consolidation concerning for pneumonia.  EKG demonstrated supraventricular tachycardia with rate of 172.  He was given adenosine 6 mg followed by 12 mg without improvement.  He was then given Lopressor 5 mg IV and rate noted to be atrial fibrillation with RVR.  He was initiated on a diltiazem drip.  He was then transferred to [facility] ED.  CTA chest negative for PE, findings concerning for multifocal pneumonia most evident in the right upper lobe.  He was started on broad-spectrum IV antibiotics.  Blood cultures x2 obtained.       He was admitted to ICU.  MRSA PCR detected. Antibiotic coverage broadened to vanc, Zosyn and doxycycline.  TTE demonstrated EF 55%, moderate aortic stenosis, moderate mitral stenosis and mild MR.  Blood cultures from 11/24/2023 growing MRSA in 2 of 2 bottles.  Respiratory PCR panel negative.  Tamiflu was also continued.  Also required Levophed for BP support. He has since been weaned off of Levophed and Cardizem.  Zosyn  and Tamiflu were discontinued on 11/27/2023.  He remains on IV vancomycin.  ID was consulted on 11/27/2023. Blood cultures repeated x2 on 11/27/2023.  He was cleared for downgrade to the floor on 11/27/2023.  PT/OT consulted. Hospital medicine consulted for assumption of care and further medical management.     Labs on 11/27/2023 notable for WBC 3.16, hemoglobin 9.9, hematocrit 30.3, platelets 107, sodium 134, potassium 3.4, calcium 7.3, phosphorus 1.6, albumin 2, , .     Patient was continued on iv vancomycin. He was very weak and needed PT. On 11/29/23 he went into Afib with RVR. He was seen by [facility] team and started on iv cardizem drip and planned cardioversion. He was continued on FD anticoagulation.      Patient had a CARO done and showed severe Aortic stenosis and no clots or vegetation. Cardioversion was done. Patient will need TAVR when he completes his iv antibiotics.      Interval Hx:   No acute changes or issues overnight.  Feels like he was making some progress.  Afebrile.  Vital stable overnight     Objective/physical exam:  General: In no acute distress  Chest: CTA   Heart: Regular rate + irregular rhythm + Loud ESM  Abdomen: Soft, nontender, BS +  MSK: Warm, no lower extremity edema, no clubbing or cyanosis  Neurologic: Cranial nerve II-XII intact, Strength 4/5 in all 4 extremities    VITAL SIGNS: 24 HRS MIN & MAX LAST   Temp  Min: 97.6 °F (36.4 °C)  Max: 99.9 °F (37.7 °C) 97.7 °F (36.5 °C)   BP  Min: 125/58  Max: 183/93 (!) 169/78   Pulse  Min: 77  Max: 88  84   Resp  Min: 16  Max: 20 18   SpO2  Min: 93 %  Max: 99 % (!) 93 %       Recent Labs   Lab 11/30/23  0342 12/02/23  0659 12/04/23  0435   WBC 6.22 6.70 6.55   RBC 3.52* 3.39* 3.05*   HGB 10.1* 9.7* 8.6*   HCT 31.6* 29.7* 27.3*   MCV 89.8 87.6 89.5   MCH 28.7 28.6 28.2   MCHC 32.0* 32.7* 31.5*   RDW 17.6* 17.3* 17.2*    195 200   MPV 11.2* 10.9* 10.5*       Recent Labs   Lab 11/29/23  0401 11/30/23  0342 12/01/23  0409  12/02/23  0659 12/04/23  0435   * 136 136 136 135*   K 3.4* 4.2 4.0 3.5 3.8   CO2 22* 22* 23 23 20*   BUN 17.9 18.1 15.5 13.3 13.7   CREATININE 0.80 0.84 0.81 0.80 0.80   CALCIUM 7.2* 7.3* 7.2* 7.7* 7.3*   MG  --  1.90  --  1.80  --    ALBUMIN 1.9*  --   --   --   --    ALKPHOS 63  --   --   --   --    ALT 58*  --   --   --   --    AST 47*  --   --   --   --    BILITOT 1.2  --   --   --   --           Microbiology Results (last 7 days)       Procedure Component Value Units Date/Time    Blood Culture [[id number]]  (Normal) Collected: 11/27/23 0637    Order Status: Completed Specimen: Blood from Hand, Left Updated: 12/02/23 1211     CULTURE, BLOOD ([facility]) Final Report: At 5 days. No growth    Blood Culture [[id number]]  (Normal) Collected: 11/27/23 0637    Order Status: Completed Specimen: Blood from Wrist, Right Updated: 12/02/23 1211     CULTURE, BLOOD ([facility]) Final Report: At 5 days. No growth    Stool Culture [[id number]]  (Abnormal) Collected: 11/27/23 1525    Order Status: Completed Specimen: Stool Updated: 11/29/23 1116     Stool Culture Negative for Salmonella, Shigella, Campylobacter, Vibrio, Aeromonas, Pleisiomonas,Yersinia, or Shiga Toxin 1 and 2.      Many Yeast             Radiology:  Transesophageal echo (CARO)with possible cardioversion  Addison Barnhart MD     11/30/2023  3:10 PM  Procedure: CARO guided DCCV    Final impression:    LV systolic function 55%.  Severely AV sclerocalcification with marked reduced mobility of   all 3 leaflets resulting in severe AS, SHARAD 0.7cm2 by direct   planimetry. Peak AV velocity 4.5 m/sec. Mild AI.  Marked MAC with restricted mobility of the MV resulting in   Moderate mitral stenosis, MG 7 mmhg.  Mild-moderate MR.  Mild-moderate TR.  SILVANO is not seen and is probably absent due to prior surgical SILVANO   ligation.  Successful DCCV x1    Indication: AS, MS    Informed consent: obtained, signed copy placed in the chart.    Description of the procedure: After sedation was  achieved (please   see anesthesia report for details), the esophagus intubated   without difficulties. Multiple orthogonal views obtained. Patient   tolerated procedure without immediate complications noted.    Findings:  Left atrium (LA): Mild-moderately enlarged left atrium.  Left atrial appendage (SILVANO): SILVANO is not seen and is probably   absent due to prior surgical SILVANO ligation.  Interatrial septum:  NO ASD or PFO noted on 2D or Color Doppler   images.   Right atrium (RA): Normal RA size.  Left ventricle (LV): LVEF 55%.   Normal LV size.  Mild-moderate   left ventricular hypertrophy. No Regional wall motion   abnormalities noted.  Right ventricle (RV): Partially visualized in this study.  Aortic valve: Trileaflet.  Severely AV sclerocalcification with   marked reduced mobility of all 3 leaflets resulting in severe AS,   SHARAD 0.7cm2 by direct planimetry. Peak AV velocity 4.5 m/sec. Mild   AI. No Vegetation is present.  Mitral valve: Marked MAC with restricted mobility of the MV   resulting in Moderate mitral stenosis, MG 7 mmhg. No Vegetation   is present.  Mild-moderate MR.  Tricuspid valve: Partially visualized, No Tricuspid stenosis.   Mild-moderate Tricuspid regurgitation. No Vegetation is present.  Pulmonic valve: Not well visualized, no hemodynamically   significant pulmonic stenosis, no pulmonic regurgitation noted in   this study. No Vegetation is present.  Thoracic aorta: Scattered  Atherosclerotic changes of the   descending thoracic aorta.  Normal Caliber aortic root.   Normal   Caliber Proximal portion of the ascending aorta.   Pericardium: No significant pericardial effusion is present.    Description of the DCCV procedure:   After completion of CARO, no intracardiac thrombus was visualized,   so I decided to proceed with Cardioversion  200 J synchronized cardioversion was completed.  Number of attempts: 1  X-Ray Chest 1 View  Narrative: EXAMINATION:  XR CHEST 1 VIEW    CLINICAL  HISTORY:  SOB;    COMPARISON:  11/27/2023    FINDINGS:  Single view of the chest shows right greater than left upper lobe consolidation, similar to the prior study.  No pneumothorax.  Heart size is stable.  Impression: Stable chest    Electronically signed by: Alexander Rivera MD  Date:    11/30/2023  Time:    07:19      Scheduled Med:   amiodarone  200 mg Oral Daily    apixaban  5 mg Oral BID    aspirin  81 mg Oral Daily    folic acid  1 mg Oral Daily    guaiFENesin 100 mg/5 ml  400 mg Oral Q6H WAKE    levalbuterol  1.25 mg Nebulization Q8H    linaCLOtide  72 mcg Oral Before breakfast    metoprolol succinate  100 mg Oral Daily    miconazole NITRATE 2 %   Topical (Top) BID    QUEtiapine  200 mg Oral QHS    sacubitriL-valsartan  1 tablet Oral BID    white petrolatum   Topical (Top) BID        Continuous Infusions:       PRN Meds:  hydrALAZINE, labetaloL, loperamide, lorazepam, magnesium oxide, magnesium oxide, melatonin, metoprolol, ondansetron, potassium bicarbonate, potassium bicarbonate, potassium bicarbonate, potassium, sodium phosphates, potassium, sodium phosphates, potassium, sodium phosphates, sodium chloride 0.9%, Pharmacy to dose Vancomycin consult **AND** vancomycin - pharmacy to dose       Assessment/Plan:   Influenza A, dx on 11/19/2023  Bilateral Pneumonia 2/2 MRSA and Flu A infection   MRSA sepsis   Generalized weakness   Severe aortic stenosis   Elevated LFTs from sepsis: Improving   Hypoalbuminemia   PAF with RVR s/p cardioversion   Chronic HFpEF 55%  Essential hypertension  CAD s/p MI/CABG x3  ETOH abuse   Mild Hypokalemia and Hypophosphatemia   Chronic Anemia and Thrombocytopenia : improved   Hx of VHD    Patient continues to work with PT and OT.  Case management looking into placement.    Infectious Disease is recommending 4 weeks of IV vancomycin for MRSA sepsis and pneumonia.  He will need placement for completion of antibiotics.  Unable to provide infusions at home.    Cardiology following along.   Status post cardioversion.  No vegetation noted on CARO.  Cardiology recommending TAVR workup.  CT has been ordered but remainder of workup can be done as an outpatient once he completes his antibiotics.  Will follow up with Cardiology as an outpatient.  Will continue to check labs prn.         Ubaldo Durham MD   12/05/2023     All diagnosis and differential diagnosis have been reviewed; assessment and plan has been documented; I have personally reviewed the labs and test results that are presently available; I have reviewed the patients medication list; I have reviewed the consulting providers response and recommendations. I have reviewed or attempted to review medical records based upon their availability    All of the patient's questions have been  addressed and answered. Patient's is agreeable to the above stated plan. I will continue to monitor closely and make adjustments to medical management as needed.  _____________________________________________________________________

## 2023-12-06 LAB
APPEARANCE UR: CLEAR
BACTERIA #/AREA URNS AUTO: NORMAL /HPF
BILIRUB UR QL STRIP.AUTO: NEGATIVE
COLOR UR AUTO: YELLOW
GLUCOSE UR QL STRIP.AUTO: NEGATIVE
KETONES UR QL STRIP.AUTO: NEGATIVE
LEUKOCYTE ESTERASE UR QL STRIP.AUTO: NEGATIVE
NITRITE UR QL STRIP.AUTO: NEGATIVE
PH UR STRIP.AUTO: 5.5 [PH]
PROT UR QL STRIP.AUTO: ABNORMAL
RBC #/AREA URNS AUTO: NORMAL /HPF
RBC UR QL AUTO: NEGATIVE
SP GR UR STRIP.AUTO: 1.02 (ref 1–1.03)
SQUAMOUS #/AREA URNS AUTO: NORMAL /HPF
UROBILINOGEN UR STRIP-ACNC: 0.2
VANCOMYCIN TROUGH SERPL-MCNC: 18.7 UG/ML (ref 15–20)
WBC #/AREA URNS AUTO: NORMAL /HPF

## 2023-12-06 PROCEDURE — 94760 N-INVAS EAR/PLS OXIMETRY 1: CPT

## 2023-12-06 PROCEDURE — 94799 UNLISTED PULMONARY SVC/PX: CPT | Mod: XB

## 2023-12-06 PROCEDURE — 25000003 PHARM REV CODE 250: Performed by: INTERNAL MEDICINE

## 2023-12-06 PROCEDURE — 99900035 HC TECH TIME PER 15 MIN (STAT)

## 2023-12-06 PROCEDURE — 97116 GAIT TRAINING THERAPY: CPT | Mod: CQ

## 2023-12-06 PROCEDURE — 25000242 PHARM REV CODE 250 ALT 637 W/ HCPCS: Performed by: HOSPITALIST

## 2023-12-06 PROCEDURE — 87522 HEPATITIS C REVRS TRNSCRPJ: CPT | Performed by: INTERNAL MEDICINE

## 2023-12-06 PROCEDURE — 27000207 HC ISOLATION

## 2023-12-06 PROCEDURE — 25000003 PHARM REV CODE 250: Performed by: PHYSICIAN ASSISTANT

## 2023-12-06 PROCEDURE — 97161 PT EVAL LOW COMPLEX 20 MIN: CPT

## 2023-12-06 PROCEDURE — 80202 ASSAY OF VANCOMYCIN: CPT | Performed by: INTERNAL MEDICINE

## 2023-12-06 PROCEDURE — 25000003 PHARM REV CODE 250: Performed by: HOSPITALIST

## 2023-12-06 PROCEDURE — 25000242 PHARM REV CODE 250 ALT 637 W/ HCPCS: Performed by: PHYSICIAN ASSISTANT

## 2023-12-06 PROCEDURE — 94664 DEMO&/EVAL PT USE INHALER: CPT

## 2023-12-06 PROCEDURE — 36569 INSJ PICC 5 YR+ W/O IMAGING: CPT

## 2023-12-06 PROCEDURE — 94640 AIRWAY INHALATION TREATMENT: CPT

## 2023-12-06 PROCEDURE — 97165 OT EVAL LOW COMPLEX 30 MIN: CPT

## 2023-12-06 PROCEDURE — A4216 STERILE WATER/SALINE, 10 ML: HCPCS | Performed by: PHYSICIAN ASSISTANT

## 2023-12-06 PROCEDURE — 63600175 PHARM REV CODE 636 W HCPCS: Performed by: HOSPITALIST

## 2023-12-06 PROCEDURE — 81001 URINALYSIS AUTO W/SCOPE: CPT | Performed by: INTERNAL MEDICINE

## 2023-12-06 PROCEDURE — 63600175 PHARM REV CODE 636 W HCPCS: Performed by: PHYSICIAN ASSISTANT

## 2023-12-06 PROCEDURE — C1751 CATH, INF, PER/CENT/MIDLINE: HCPCS

## 2023-12-06 PROCEDURE — 11000004 HC SNF PRIVATE

## 2023-12-06 PROCEDURE — 27000221 HC OXYGEN, UP TO 24 HOURS

## 2023-12-06 RX ORDER — SODIUM CHLORIDE 0.9 % (FLUSH) 0.9 %
10 SYRINGE (ML) INJECTION EVERY 6 HOURS
Status: DISCONTINUED | OUTPATIENT
Start: 2023-12-06 | End: 2023-12-26 | Stop reason: HOSPADM

## 2023-12-06 RX ORDER — NYSTATIN 100000 [USP'U]/ML
500000 SUSPENSION ORAL 3 TIMES DAILY
Status: DISCONTINUED | OUTPATIENT
Start: 2023-12-06 | End: 2023-12-07

## 2023-12-06 RX ORDER — SODIUM CHLORIDE 0.9 % (FLUSH) 0.9 %
10 SYRINGE (ML) INJECTION
Status: DISCONTINUED | OUTPATIENT
Start: 2023-12-06 | End: 2023-12-07

## 2023-12-06 RX ORDER — ACETYLCYSTEINE 200 MG/ML
4 SOLUTION ORAL; RESPIRATORY (INHALATION) 3 TIMES DAILY
Status: DISCONTINUED | OUTPATIENT
Start: 2023-12-06 | End: 2023-12-10

## 2023-12-06 RX ORDER — SODIUM CHLORIDE 9 MG/ML
INJECTION, SOLUTION INTRAVENOUS
Status: DISCONTINUED | OUTPATIENT
Start: 2023-12-06 | End: 2023-12-26 | Stop reason: HOSPADM

## 2023-12-06 RX ORDER — SODIUM CHLORIDE 0.9 % (FLUSH) 0.9 %
10 SYRINGE (ML) INJECTION EVERY 6 HOURS
Status: DISCONTINUED | OUTPATIENT
Start: 2023-12-06 | End: 2023-12-07

## 2023-12-06 RX ADMIN — LEVALBUTEROL HYDROCHLORIDE 1.25 MG: 1.25 SOLUTION RESPIRATORY (INHALATION) at 04:12

## 2023-12-06 RX ADMIN — SENNOSIDES AND DOCUSATE SODIUM 1 TABLET: 8.6; 5 TABLET ORAL at 08:12

## 2023-12-06 RX ADMIN — Medication 10 ML: at 05:12

## 2023-12-06 RX ADMIN — APIXABAN 5 MG: 5 TABLET, FILM COATED ORAL at 08:12

## 2023-12-06 RX ADMIN — ACETYLCYSTEINE 4 ML: 200 INHALANT RESPIRATORY (INHALATION) at 11:12

## 2023-12-06 RX ADMIN — MELATONIN TAB 3 MG 6 MG: 3 TAB at 08:12

## 2023-12-06 RX ADMIN — WHITE PETROLATUM: 1.75 OINTMENT TOPICAL at 08:12

## 2023-12-06 RX ADMIN — SODIUM CHLORIDE, PRESERVATIVE FREE 10 ML: 5 INJECTION INTRAVENOUS at 01:12

## 2023-12-06 RX ADMIN — PIPERACILLIN AND TAZOBACTAM 4.5 G: 4; .5 INJECTION, POWDER, LYOPHILIZED, FOR SOLUTION INTRAVENOUS; PARENTERAL at 08:12

## 2023-12-06 RX ADMIN — MICONAZOLE NITRATE 2 % TOPICAL POWDER: at 08:12

## 2023-12-06 RX ADMIN — VANCOMYCIN HYDROCHLORIDE 750 MG: 750 INJECTION, POWDER, LYOPHILIZED, FOR SOLUTION INTRAVENOUS at 08:12

## 2023-12-06 RX ADMIN — SACUBITRIL AND VALSARTAN 1 TABLET: 24; 26 TABLET, FILM COATED ORAL at 08:12

## 2023-12-06 RX ADMIN — MUPIROCIN: 20 OINTMENT TOPICAL at 08:12

## 2023-12-06 RX ADMIN — GUAIFENESIN 400 MG: 200 SOLUTION ORAL at 08:12

## 2023-12-06 RX ADMIN — NYSTATIN 500000 UNITS: 100000 SUSPENSION ORAL at 08:12

## 2023-12-06 RX ADMIN — QUETIAPINE FUMARATE 200 MG: 100 TABLET ORAL at 08:12

## 2023-12-06 RX ADMIN — GUAIFENESIN 400 MG: 200 SOLUTION ORAL at 01:12

## 2023-12-06 RX ADMIN — PIPERACILLIN AND TAZOBACTAM 4.5 G: 4; .5 INJECTION, POWDER, LYOPHILIZED, FOR SOLUTION INTRAVENOUS; PARENTERAL at 01:12

## 2023-12-06 RX ADMIN — LINACLOTIDE 72 MCG: 72 CAPSULE, GELATIN COATED ORAL at 05:12

## 2023-12-06 RX ADMIN — METOPROLOL SUCCINATE 100 MG: 50 TABLET, EXTENDED RELEASE ORAL at 08:12

## 2023-12-06 RX ADMIN — ASPIRIN 81 MG: 81 TABLET, COATED ORAL at 08:12

## 2023-12-06 RX ADMIN — LEVALBUTEROL HYDROCHLORIDE 1.25 MG: 1.25 SOLUTION RESPIRATORY (INHALATION) at 07:12

## 2023-12-06 RX ADMIN — ACETAMINOPHEN 650 MG: 325 TABLET ORAL at 04:12

## 2023-12-06 RX ADMIN — ACETYLCYSTEINE 4 ML: 200 INHALANT RESPIRATORY (INHALATION) at 04:12

## 2023-12-06 RX ADMIN — FOLIC ACID 1 MG: 1 TABLET ORAL at 08:12

## 2023-12-06 RX ADMIN — LEVALBUTEROL HYDROCHLORIDE 1.25 MG: 1.25 SOLUTION RESPIRATORY (INHALATION) at 11:12

## 2023-12-06 RX ADMIN — AMIODARONE HYDROCHLORIDE 200 MG: 200 TABLET ORAL at 08:12

## 2023-12-06 RX ADMIN — NYSTATIN 500000 UNITS: 100000 SUSPENSION ORAL at 02:12

## 2023-12-06 RX ADMIN — SODIUM CHLORIDE, PRESERVATIVE FREE 10 ML: 5 INJECTION INTRAVENOUS at 05:12

## 2023-12-06 NOTE — PLAN OF CARE
Problem: Adult Inpatient Plan of Care  Goal: Plan of Care Review  Outcome: Ongoing, Progressing  Flowsheets (Taken 12/6/2023 0953)  Plan of Care Reviewed With: patient  Goal: Patient-Specific Goal (Individualized)  Outcome: Ongoing, Progressing  Flowsheets (Taken 12/6/2023 0953)  Anxieties, Fears or Concerns: None  Individualized Care Needs: IV antibiotics  Goal: Absence of Hospital-Acquired Illness or Injury  Outcome: Ongoing, Progressing  Intervention: Identify and Manage Fall Risk  Flowsheets (Taken 12/6/2023 0953)  Safety Promotion/Fall Prevention:   assistive device/personal item within reach   bed alarm set   chair alarm set   commode/urinal/bedpan at bedside   lighting adjusted   medications reviewed   muscle strengthening facilitated   nonskid shoes/socks when out of bed   side rails raised x 3   instructed to call staff for mobility  Intervention: Prevent Skin Injury  Flowsheets (Taken 12/6/2023 0953)  Body Position:   weight shifting   sitting up in bed   supine   log-rolled   legs elevated  Skin Protection:   adhesive use limited   incontinence pads utilized   transparent dressing maintained   tubing/devices free from skin contact  Intervention: Prevent and Manage VTE (Venous Thromboembolism) Risk  Flowsheets (Taken 12/6/2023 0953)  Activity Management:   Ambulated in room - L4   Up in chair - L3   Standing - L3   Sitting at edge of bed - L2  VTE Prevention/Management:   ambulation promoted   bleeding precations maintained   fluids promoted  Intervention: Prevent Infection  Flowsheets (Taken 12/6/2023 0953)  Infection Prevention:   cohorting utilized   environmental surveillance performed   equipment surfaces disinfected   hand hygiene promoted   personal protective equipment utilized   rest/sleep promoted   single patient room provided  Goal: Optimal Comfort and Wellbeing  Outcome: Ongoing, Progressing  Intervention: Monitor Pain and Promote Comfort  Flowsheets (Taken 12/6/2023 0953)  Pain Management  Interventions:   care clustered   pillow support provided   position adjusted  Intervention: Provide Person-Centered Care  Flowsheets (Taken 12/6/2023 0953)  Trust Relationship/Rapport:   care explained   questions answered   questions encouraged  Goal: Readiness for Transition of Care  Outcome: Ongoing, Progressing     Problem: Fluid Imbalance (Pneumonia)  Goal: Fluid Balance  Outcome: Ongoing, Progressing  Intervention: Monitor and Manage Fluid Balance  Flowsheets (Taken 12/6/2023 0953)  Fluid/Electrolyte Management: fluids provided     Problem: Infection (Pneumonia)  Goal: Resolution of Infection Signs and Symptoms  Outcome: Ongoing, Progressing  Intervention: Prevent Infection Progression  Flowsheets (Taken 12/6/2023 0953)  Infection Management: aseptic technique maintained     Problem: Respiratory Compromise (Pneumonia)  Goal: Effective Oxygenation and Ventilation  Outcome: Ongoing, Progressing  Intervention: Promote Airway Secretion Clearance  Flowsheets (Taken 12/6/2023 0953)  Breathing Techniques/Airway Clearance: deep/controlled cough encouraged  Cough And Deep Breathing: done independently per patient  Intervention: Optimize Oxygenation and Ventilation  Flowsheets (Taken 12/6/2023 0953)  Airway/Ventilation Management: airway patency maintained  Head of Bed (HOB) Positioning: HOB at 30-45 degrees     Problem: Infection  Goal: Absence of Infection Signs and Symptoms  Outcome: Ongoing, Progressing  Intervention: Prevent or Manage Infection  Flowsheets (Taken 12/6/2023 0953)  Infection Management: aseptic technique maintained

## 2023-12-06 NOTE — PLAN OF CARE
Problem: Physical Therapy  Goal: Physical Therapy Goal  Description: Goals to be met by: Discharge     Patient will increase functional independence with mobility by performin. Sit to stand transfer with Modified [place]  2. Bed to chair transfer with Modified [place] using No Assistive Device  3. Gait  x 1000 feet including outdoor negotiation and stair completion with Supervision using No Assistive Device.     Outcome: Ongoing, Progressing

## 2023-12-06 NOTE — PLAN OF CARE
Problem: Occupational Therapy  Goal: Occupational Therapy Goal  Description: Goals to be met by: 12/20/23     Patient will increase functional independence with ADLs by performing:    LE Dressing with Supervision.  Grooming while standing at sink with Modified [place].  Toileting from toilet with Supervision for hygiene and clothing management.   Bathing from  shower chair/bench with Supervision.  Toilet transfer to toilet with Supervision.    Outcome: Ongoing, Progressing

## 2023-12-06 NOTE — PT/OT/SLP EVAL
Physical Therapy Swing Evaluation and Treatment    Patient Name: Mike Urbina Jr.   MRN: [id number]  Recent Surgery: * No surgery found *      Recommendations:     Discharge Recommendations: Low Intensity Therapy   Discharge Equipment Recommendations: walker, rolling   Barriers to discharge: Increased level of assist, Decreased caregiver support, and Ongoing medical treatment    Assessment:     Mike Urbina Jr. is a 68 y.o. male admitted with a medical diagnosis of Bacteremia. He presents with the following impairments/functional limitations: impaired endurance, gait instability, impaired functional mobility, impaired balance, impaired cardiopulmonary response to activity.     Rehab Prognosis: Good; patient would benefit from acute PT services to address these deficits and reach maximum level of function.    Plan:     During this hospitalization, patient to be seen 6 x/week to address the above listed problems via gait training, therapeutic activities, therapeutic exercises    Plan of Care Expires: 01/03/24    Subjective     Chief Complaint: Weak, no energy   Patient Comments/Goals: Get stronger and go home  Pain/Comfort:  Pain Rating 1: 0/10  Pain Rating Post-Intervention 1: 0/10    Social History:  Living Environment: Patient lives with their roommate(s) in a single story home with number of outside stair(s): 2  Prior Level of Function: Prior to admission, patient was independent  Equipment Used at Home: none  DME owned (not currently used): none  Assistance Upon Discharge: roommate(s)    Objective:     Communicated with Nursing, PA and OT prior to session. Patient found HOB elevated with peripheral IV, oxygen, telemetry, pulse ox (continuous) upon PT entry to room.    General Precautions: Standard,   Fall.   Orthopedic Precautions:     Braces:      Respiratory Status: Nasal cannula, flow 2.5 L/min    Exams:  Cognition: Patient is oriented to Person, Place, Time, Situation  RLE ROM: WNL  RLE  Strength: WNL  LLE ROM: WNL  LLE Strength: WNL      Functional Mobility:  Gait belt applied - Yes  Bed Mobility  Rolling Left: stand by assistance  Rolling Right: stand by assistance  Supine<>Sit: SBA, bu extra time needed, HOB elevated and bed rail used.   Transfers  Sit to Stand: minimum assistance with rolling walker  Bed to Chair: contact guard assistance with rolling walker using Stand Pivot  Gait  Patient ambulated 20 feet in room with rolling walker and contact guard assistance. Patient demonstrates  poor endurance, needing to sit down. O2 dropped to 91% .  All lines remained intact throughout ambulation trail and portable Supplemental O2 2L utilized.  Balance  Sitting: stand by assistance  Standing: contact guard assistance      Therapeutic Activities and Exercises:   Patient educated on role of acute care PT and PT POC, safety while in hospital including calling nurse for mobility, and call light usage  Pt is safe to get up with staff, can walk to bathroom with staff using RW.     AM-PAC 6 CLICK MOBILITY  Total Score:20    Patient left up in chair with all lines intact, call button in reach, RN notified, and chair alarm on.    GOALS:   Multidisciplinary Problems       Physical Therapy Goals          Problem: Physical Therapy    Goal Priority Disciplines Outcome Goal Variances Interventions   Physical Therapy Goal     PT, PT/OT Ongoing, Progressing     Description: Goals to be met by: Discharge     Patient will increase functional independence with mobility by performin. Sit to stand transfer with Modified [place]  2. Bed to chair transfer with Modified [place] using No Assistive Device  3. Gait  x 1000 feet including outdoor negotiation and stair completion with Supervision using No Assistive Device.                          History:     Past Medical History:   Diagnosis Date    Atrial flutter     CHF (congestive heart failure)     Coronary artery disease     Hypertension     Myocardial  infarction     SOB (shortness of breath)     at times       Past Surgical History:   Procedure Laterality Date    CATARACT EXTRACTION Bilateral     CORONARY ARTERY BYPASS GRAFT (CABG) N/A 4/3/2023    Procedure: CORONARY ARTERY BYPASS GRAFT (CABG);  Surgeon: Deuce Finley IV, MD;  Location: [facility];  Service: Cardiovascular;  Laterality: N/A;  WITH LLAA //  ECHO NOTIFIED    LEFT HEART CATHETERIZATION N/A 03/15/2023    Procedure: CATHETERIZATION, HEART, LEFT;  Surgeon: Sreedhar Herrera MD;  Location: [facility] CATH LAB;  Service: Cardiology;  Laterality: N/A;  [facility] via [facility]       Time Tracking:     PT Received On: 12/06/23  PT Start Time: 0906  PT Stop Time: 0921  PT Total Time (min): 15 min     Billable Minutes: Evaluation low complexity     12/6/2023

## 2023-12-06 NOTE — CONSULTS
"Inpatient Nutrition Evaluation    Admit Date: 12/5/2023   Total duration of encounter: 1 day    Nutrition Recommendation/Prescription     Continue heart healthy diet.     Nutrition Assessment     Chart Review    Reason Seen: continuous nutrition monitoring and length of stay    Malnutrition Screening Tool Results   Have you recently lost weight without trying?: No  Have you been eating poorly because of a decreased appetite?: Yes   MST Score: 1     Diagnosis:       Noted    Bacteremia 12/5/2023      Pneumonia due to infectious organism 11/24/2023             Relevant Medical History:     Atrial flutter  Date Unknown  CHF (congestive heart failure)  Date Unknown  Coronary artery disease  Date Unknown  Hypertension  Date Unknown  Myocardial infarction  Date Unknown  SOB (shortness of breath)     Nutrition-Related Medications: calcium carbonate, folic acid, zosyn, senna-docusate, vancomycin,     Nutrition-Related Labs:      Diet Order: Diet heart healthy  Oral Supplement Order: none  Appetite/Oral Intake: fair/50-75% of meals  Factors Affecting Nutritional Intake: none identified  Food/Mandaeism/Cultural Preferences: none reported  Food Allergies: none reported    Skin Integrity: cracked  Wound(s):       Comments    Made rounds today. Pt reports intake improved, roughly 50% of breakfast. Offered nutrition supplement. Decline at this time, due state giving him diarrhea. Will monitor intake through hospital stay.     Anthropometrics    Height: 6' 2" (188 cm)    Last Weight: 81.8 kg (180 lb 5.4 oz) (12/05/23 1500) Weight Method: Bed Scale  BMI (Calculated): 23.1  BMI Classification: normal (BMI 18.5-24.9)        Ideal Body Weight (IBW), Male: 190 lb     % Ideal Body Weight, Male (lb): 94.92 %                          Usual Weight Provided By: unable to obtain usual weight    Wt Readings from Last 5 Encounters:   12/05/23 81.8 kg (180 lb 5.4 oz)   11/25/23 73.9 kg (163 lb)   11/24/23 79.4 kg (175 lb)   11/19/23 81.6 kg " (180 lb)   11/07/23 80 kg (176 lb 4.8 oz)     Weight Change(s) Since Admission:  Admit Weight: 81.8 kg (180 lb 5.4 oz) (12/05/23 1500)      Patient Education    Not applicable.    Monitoring & Evaluation     Dietitian will monitor food and beverage intake, weight, weight change, electrolyte/renal panel, glucose/endocrine profile, and gastrointestinal profile.  Nutrition Risk/Follow-Up: low (follow-up in 5-7 days)  Patients assigned 'low nutrition risk' status do not qualify for a full nutritional assessment but will be monitored and re-evaluated in a 5-7 day time period. Please consult if re-evaluation needed sooner.

## 2023-12-06 NOTE — PHYSICIAN QUERY
PT Name: Mike Urbina Jr.  MR #: [id number]    DOCUMENTATION CLARIFICATION     CDS/: Imani Garrido RN               Contact information: [email or web address]    This form is a permanent document in the medical record.     Query Date: 2023    By submitting this query, we are merely seeking further clarification of documentation.  Please utilize your independent clinical judgment when addressing the question(s) below.    The medical record contains the following:   Indicators  Supporting Clinical Findings Location in Medical Record   x Registered Dietician Diagnosis Malnutrition Context: acute illness or injury   Malnutrition Level: severe  PES: Malnutrition related to acute illness as evidenced by moderate muscle depletion and <50% energy intake in >5 days.   Nutrition PN    x Energy Intake  less than or equal to 50% for greater than or equal to 5 days   Nutrition PN    x Weight Loss (does not meet criteria)   Nutrition PN     Fat Loss     x Muscle Loss Muscle Mass (Malnutrition): moderate depletion   [place] (Muscle Loss): moderate depletion  Clavicle Bone Region (Muscle Loss): mild depletion  Clavicle and Acromion Bone Region (Muscle Loss): moderate depletion   Nutrition PN     Edema/Fluid Accumulation      Reduced  Strength (by dynamometer)     x Weight, BMI, Usual Body Weight Last Weight: 73.9 kg (163 lb) (23 0815)   BMI (Calculated): 22.8  Usual Body Weight (UBW), k.1 kg   Nutrition PN     Delayed Wound Healing     x Acute or Chronic Illness Discharge Diagnoses:    Influenza A, dx on 2023  Bilateral Pneumonia 2/2 MRSA and Flu A infection  MRSA sepsis  Generalized weakness  Severe aortic stenosis  Elevated LFTs from sepsis: Improving  Hypoalbuminemia  PAF with RVR s/p cardioversion  Chronic HFpEF 55%  Essential hypertension  CAD s/p MI/CABG x3  ETOH abuse   Mild Hypokalemia and Hypophosphatemia   Chronic Anemia and Thrombocytopenia  : improved  Hx of VHD   Nutrition PN 11/30    Social or Environmental Circumstances     x Treatment  Advance diet as tolerated to heart healthy diet   If appetite remains poor, consider oral supplement   Continue vitamin supplementation as medically feasible, consider adding MVI and thiamine   Nutrition PN 11/30    Other       Academy of Nutrition and Dietetics (Academy) and the American Society for Parenteral and Enteral Nutrition (A.S.P.E.N.) Clinical Characteristics to support Malnutrition      Criteria for mild malnutrition is defined as 1 characteristic outlined above within the established moderate or severe parameters.  A minimum of 2 out of the 6 characteristics noted above are recommended for a diagnosis of moderate or severe malnutrition.  Chronic illness/injury is a disease/condition lasting 3 months or longer.    The noted clinical guidelines are only system guidelines and do not replace the providers clinical judgment.    Provider, please specify the diagnosis associated with above clinical findings.      [  x] Severe Malnutrition - a minimum of 2 of the 6 severe malnutrition characteristics noted above      [  ] Other Nutritional Diagnosis (please specify): _______       Please document in your progress notes daily for the duration of treatment until resolved and  include in your discharge summary.      Reference:    LUCIA Ng, PhD, RD, Orquidea MORSE P., PhD, RN, ASHLEY Sellers MD, PhD, Tomeka REN A., MS, RD, CNSC, CECI Mckenna, MS, RD, The Academy Malnutrition Work Group, The A.S.P.E.N. Board of Directors. (2012). Consensus Statement: Academy of Nutrition and Dietetics and American Society for Parenteral and Enteral Nutrition: Characteristics Recommended for the Identification and Documentation of Adult Malnutrition (Undernutrition). Journal of Parenteral and Enteral Nutrition, 36(3), 275-283. doi:10.1177/4166209481151533     Form No. 73429

## 2023-12-06 NOTE — PLAN OF CARE
[facility] - Medical Surgical Unit  Initial Discharge Assessment       Primary Care Provider: Darlene Willams FNP    Admission Diagnosis: a fib with rvr    Admission Date: 12/5/2023  Expected Discharge Date:     Transition of Care Barriers: None    Payor: MEDICARE / Plan: MEDICARE PART A & B / Product Type: Government /     Extended Emergency Contact Information  Primary Emergency Contact: Ashleigh Bailey  Mobile Phone: [phone]  Relation: Sister   needed? No  Secondary Emergency Contact: Mya Loomis  Mobile Phone: [phone]  Relation: Friend    Discharge Plan A: Home with family, Home Health         [facility] [address]  [address]  [address] [facility] [address]  Phone: [phone] Fax: [phone]      Initial Assessment (most recent)       Adult Discharge Assessment - 12/06/23 1508          Discharge Assessment    Assessment Type Discharge Planning Assessment     Confirmed/corrected address, phone number and insurance Yes     Confirmed Demographics Correct on Facesheet     Source of Information patient     If unable to respond/provide information was family/caregiver contacted? Yes     Contact Name/Number Ashleigh singleton [phone]     Communicated [place] with patient/caregiver Date not available/Unable to determine     Reason For Admission weakness     People in Home alone     Facility Arrived From: [facility] [place] [facility]     Do you expect to return to your current living situation? Yes     Do you have help at home or someone to help you manage your care at home? Yes     Who are your caregiver(s) and their phone number(s)? Ashleigh singleton [phone]     Prior to hospitilization cognitive status: Alert/Oriented     Current cognitive status: Alert/Oriented     Walking or Climbing Stairs ambulation difficulty, requires equipment     Mobility Management WALKER     Home Accessibility wheelchair accessible     Home  Layout Able to live on 1st floor     Equipment Currently Used at Home none     Readmission within 30 days? No     Patient currently being followed by outpatient case management? No     Do you currently have service(s) that help you manage your care at home? No     Do you take prescription medications? Yes     Do you have prescription coverage? Yes     Do you have any problems affording any of your prescribed medications? TBD     Is the patient taking medications as prescribed? yes     Who is going to help you get home at discharge? Ashleigh Bailey sister [phone]     How do you get to doctors appointments? family or friend will provide     Are you on dialysis? No     Do you take coumadin? No     DME Needed Upon Discharge  walker, rolling     Discharge Plan discussed with: Sibling     Name(s) and Number(s) Ashleigh Bailey sister [phone]     Transition of Care Barriers None     Discharge Plan A Home with family;Home Health        Physical Activity    On average, how many days per week do you engage in moderate to strenuous exercise (like a brisk walk)? 0 days     On average, how many minutes do you engage in exercise at this level? 0 min        Financial Resource Strain    How hard is it for you to pay for the very basics like food, housing, medical care, and heating? Not hard at all        Housing Stability    In the last 12 months, was there a time when you were not able to pay the mortgage or rent on time? No     In the last 12 months, how many places have you lived? 1     In the last 12 months, was there a time when you did not have a steady place to sleep or slept in a shelter (including now)? No        Transportation Needs    In the past 12 months, has lack of transportation kept you from medical appointments or from getting medications? No     In the past 12 months, has lack of transportation kept you from meetings, work, or from getting things needed for daily living? No        Food Insecurity    Within  the past 12 months, you worried that your food would run out before you got the money to buy more. Never true     Within the past 12 months, the food you bought just didn't last and you didn't have money to get more. Never true        Stress    Do you feel stress - tense, restless, nervous, or anxious, or unable to sleep at night because your mind is troubled all the time - these days? Only a little        Social Connections    In a typical week, how many times do you talk on the phone with family, friends, or neighbors? More than three times a week     How often do you get together with friends or relatives? More than three times a week     How often do you attend Restoration or Zoroastrian services? 1 to 4 times per year     Do you belong to any clubs or organizations such as [employer]oration groups, unions, fraternal or athletic groups, or school groups? No     How often do you attend meetings of the clubs or organizations you belong to? 1 to 4 times per year     Are you , , , , never , or living with a partner? Never         Alcohol Use    Q1: How often do you have a drink containing alcohol? 2-3 times a week     Q2: How many drinks containing alcohol do you have on a typical day when you are drinking? 5 or 6     Q3: How often do you have six or more drinks on one occasion? Daily or almost daily        OTHER    Name(s) of People in Home Ashleigh singleton [phone]

## 2023-12-06 NOTE — PT/OT/SLP EVAL
"Occupational Therapy   Evaluation    Name: Mike Urbina Jr.  MRN: [id number]  Admitting Diagnosis: Bacteremia  Recent Surgery: * No surgery found *      Recommendations:     Discharge Recommendations: Low Intensity Therapy  Discharge Equipment Recommendations:  walker, rolling  Barriers to discharge:  None    Assessment:     Mike Urbina Jr. is a 68 y.o. male with a medical diagnosis of Bacteremia.  He presents with decreased strength and endurance affecting his ADL performance. Pt reports feeling weak this morning and could audibly hear wetness in his breathing. Performance deficits affecting function: weakness, impaired endurance, edema, impaired self care skills.      Rehab Prognosis: Good; patient would benefit from acute skilled OT services to address these deficits and reach maximum level of function.       Plan:     Patient to be seen 6 x/week (QD/BID as appropriate) to address the above listed problems via self-care/home management, therapeutic activities, therapeutic exercises  Plan of Care Expires: 12/20/23  Plan of Care Reviewed with: patient    Subjective     Chief Complaint: "I feel weak this morning"  Patient/Family Comments/goals: Return to OF    Occupational Profile:  Living Environment: lives with roommate in  home with 2 steps to enter  Previous level of function: Independent with ADLs; ambulating without device   Equipment Used at Home: none  Assistance upon Discharge: roommate     Pain/Comfort:  Pain Rating 1: 0/10    Patients cultural, spiritual, Methodist conflicts given the current situation: no    Objective:     Communicated with: RN prior to session.  Patient found supine with bed alarm, peripheral IV, oxygen, telemetry, pulse ox (continuous) upon OT entry to room.    General Precautions: Standard, fall  Orthopedic Precautions: N/A  Braces: N/A  Respiratory Status: Nasal cannula, flow 2.5 L/min - does not wear O2 at home     Occupational Performance:    Bed Mobility:  "   Patient completed Supine to Sit with stand by assistance    Functional Mobility/Transfers:  Patient completed Sit <> Stand Transfer with minimum assistance  with  rolling walker   Patient completed Bed <> Chair Transfer using Stand Pivot technique with contact guard assistance with rolling walker  Functional Mobility: x20 feet with RW in room CGA - O2 remained 91% and above during gait    Activities of Daily Living:  Grooming: contact guard assistance standing at sink   Bathing: minimum assistance    Upper Body Dressing: supervision    Lower Body Dressing: minimum assistance    Toileting: contact guard assistance      Cognitive/Visual Perceptual:  Cognitive/Psychosocial Skills:     -       Oriented to: Person, Place, Time, and Situation     Physical Exam:  Upper Extremity Range of Motion:     -       Right Upper Extremity: WNL  -       Left Upper Extremity: WNL  Upper Extremity Strength:    -       Right Upper Extremity: 5/5  -       Left Upper Extremity: 5/5    AMPAC 6 Click ADL:  AMPAC Total Score: 21    Treatment & Education:  Pt reports not feeling well this morning; agreed to short walk in room. Pt sounded wet and coughing throughout.    Patient left up in chair with all lines intact, call button in reach, and chair alarm on    GOALS:   Multidisciplinary Problems       Occupational Therapy Goals          Problem: Occupational Therapy    Goal Priority Disciplines Outcome Interventions   Occupational Therapy Goal     OT, PT/OT Ongoing, Progressing    Description: Goals to be met by: 12/20/23     Patient will increase functional independence with ADLs by performing:    LE Dressing with Supervision.  Grooming while standing at sink with Modified [place].  Toileting from toilet with Supervision for hygiene and clothing management.   Bathing from  shower chair/bench with Supervision.  Toilet transfer to toilet with Supervision.                         History:     Past Medical History:   Diagnosis Date     Atrial flutter     CHF (congestive heart failure)     Coronary artery disease     Hypertension     Myocardial infarction     SOB (shortness of breath)     at times         Past Surgical History:   Procedure Laterality Date    CATARACT EXTRACTION Bilateral     CORONARY ARTERY BYPASS GRAFT (CABG) N/A 4/3/2023    Procedure: CORONARY ARTERY BYPASS GRAFT (CABG);  Surgeon: Deuce Finley IV, MD;  Location: [facility];  Service: Cardiovascular;  Laterality: N/A;  WITH LLAA //  ECHO NOTIFIED    LEFT HEART CATHETERIZATION N/A 03/15/2023    Procedure: CATHETERIZATION, HEART, LEFT;  Surgeon: Sreedhar Herrera MD;  Location: [facility] CATH LAB;  Service: Cardiology;  Laterality: N/A;  C via RRA       Time Tracking:     OT Date of Treatment: 12/06/23  OT Start Time: 0906  OT Stop Time: 0921  OT Total Time (min): 15 min    Billable Minutes:Evaluation 15 min    12/6/2023

## 2023-12-06 NOTE — PT/OT/SLP PROGRESS
Physical Therapy Treatment Note           Name: Mike Urbina Jr.    : 1955 (68 y.o.)  MRN: [id number]           TREATMENT SUMMARY AND RECOMMENDATIONS:    PT Received On: 23  PT Start Time: 1500     PT Stop Time: 1510  PT Total Time (min): 10 min     Subjective Assessment:   No complaints  Lethargic    Awake, alert, cooperative  Uncooperative    Agitated  c/o pain    Appropriate x c/o fatigue    Confused  Treated at bedside     Emotionally labile  Treated in gym/dept.    Impulsive  Other:    Flat affect       Therapy Precautions:    Cognitive deficits  Spinal precautions    Collar - hard  Sternal precautions    Collar - soft   TLSO    Fall risk  LSO    Hip precautions - posterior  Knee immobilizer    Hip precautions - anterior  WBAT    Impaired communication  Partial weightbearing   x Oxygen  TTWB    PEG tube  NWB    Visual deficits  Other:    Hearing deficits          Treatment Objectives:     Mobility Training:   Assist level Comments    Bed mobility     Transfer     Gait CGA Amb on firm surface with  ft    Sit to stand transitions     Sitting balance     Standing balance      Wheelchair mobility     Car transfer     Other:          Therapeutic Exercise:   Exercise Sets Reps Comments                               Additional Comments:  Pt c/o fatigue- 2L O2 donned     Assessment: Patient tolerated session fair.    PT Plan: continue  Revisions made to plan of care: No    GOALS:   Multidisciplinary Problems       Physical Therapy Goals          Problem: Physical Therapy    Goal Priority Disciplines Outcome Goal Variances Interventions   Physical Therapy Goal     PT, PT/OT Ongoing, Progressing     Description: Goals to be met by: Discharge     Patient will increase functional independence with mobility by performin. Sit to stand transfer with Modified [place]  2. Bed to chair transfer with Modified [place] using No Assistive Device  3. Gait  x 1000 feet including  outdoor negotiation and stair completion with Supervision using No Assistive Device.                          Skilled PT Minutes Provided: 10   Communication with Treatment Team:     Equipment recommendations:       At end of treatment, patient remained:  x Up in chair     Up in wheelchair in room    In bed   x With alarm activated    Bed rails up   x Call bell in reach     Family/friends present    Restraints secured properly    In bathroom with CNA/RN notified    Nurse aware    In gym with therapist/tech    Other:

## 2023-12-06 NOTE — H&P
[facility]  [facility] MEDICINE - H&P ADMISSION NOTE    Patient Name: Mike Urbina Jr.  MRN: [id number]  Patient Class: IP- Swing   Admission Date: 12/5/2023   Admitting Physician: NACHO Service   Attending Physician: GISEL Russell  Primary Care Provider: Darlene Willams FNP  Face-to-Face encounter date: 12/06/2023      CHIEF COMPLAINT   No chief complaint on file.    HISTORY OF PRESENTING ILLNESS     Patient is a 69yo  male with a past medical history of CAD, diastolic CHF, Afib, aortic stenosis, NSTEMI, and HTN who was recently admitted to [facility] for pneumonia and Afib RVR. Patient was diagnosed with Influenza A on 11/19/2023 prior to admission and started on Tamiflu. On arrival to the ER, CXR on 11/24/2023 showed RUL pneumonia. CTA chest 11/24/2023 showed no PE, but multifocal pneumonia most evident in RUL. Labs were significant for BNP 3053.1, Troponin 0.137>0.096. Patient was admitted for ICU, started on Cardizem drip, Vanc, Zosyn, Doxy, and required Levophed for hypotension. Sputum culture from 11/24/2023 showed unsatisfactory sample for culture. Echo 11/25/2023 showed LVEF 55%, diastolic function cannot be reliably determined in the presence of mitral valve disease, right ventricle systolic function is mildly reduced, moderately calcified aortic cusps with moderately restricted motion, moderate aortic stenosis, severe posterior mitral annular calcification present, moderate mitral stenosis, mild mitral regurgitation, mild tricuspid regurgitation. Blood cultures x2 resulted positive for MRSA on 11/27/2023.  Infectious Disease recommended patient to continue vancomycin for 4 weeks with suspected in date on 12/25/2023.  Patient was downgraded on 11/27/2023. CXR on 11/26/2023 and 11/27/2023 showed increasing patchy and consolidative bilateral airspace opacities. Stool culture on 11/27/2023 showed many yeast. Repeat blood cultures x2 on 12/02/2023 showed no growth.  Patient underwent a CARO guided DCCV on 11/30/2023 which showed mild-moderately enlarged right atrium, no ASD or PFO, LVEF 55%, mild-moderate left ventricular hypertrophy, severe aortic stenosis, moderate mitral stenosis, mild-moderate mitral regurgitation, mild-moderate tricuspid regurgitation, no vegetation present, no intracardiac thrombus, successful cardioversion. CTA chest, abdomen, pelvis 12/05/2023 showed small bilateral pleural effusion R>L and patchy consolidation in both lungs increased compared to prior. CTA was obtained for TAVR workup to be completed outpatient after ID clearance. On exam today, patient is currently on 2 L nasal cannula with O2 sat 99%.  With worsening pneumonia and lung sounds on exam, we will start Zosyn, obtain sputum culture, add nystatin for fungal coverage, and schedule Mucomyst with the Xopenex nebulizers 3 times a day.    PAST MEDICAL HISTORY     Past Medical History:   Diagnosis Date    Atrial flutter     CHF (congestive heart failure)     Coronary artery disease     Hypertension     Myocardial infarction     SOB (shortness of breath)     at times     PAST SURGICAL HISTORY     Past Surgical History:   Procedure Laterality Date    CATARACT EXTRACTION Bilateral     CORONARY ARTERY BYPASS GRAFT (CABG) N/A 4/3/2023    Procedure: CORONARY ARTERY BYPASS GRAFT (CABG);  Surgeon: Deuce Finley IV, MD;  Location: [facility] OR;  Service: Cardiovascular;  Laterality: N/A;  WITH LLAA //  ECHO NOTIFIED    LEFT HEART CATHETERIZATION N/A 03/15/2023    Procedure: CATHETERIZATION, HEART, LEFT;  Surgeon: Sreedhar Herrera MD;  Location: [facility] CATH LAB;  Service: Cardiology;  Laterality: N/A;  LHC via RRA     FAMILY HISTORY   Reviewed and noncontributory to this case    SOCIAL HISTORY     Social History     Socioeconomic History    Marital status: Single   Tobacco Use    Smoking status: Never     Passive exposure: Never    Smokeless tobacco: Never   Substance and Sexual Activity    Alcohol use: Yes      Alcohol/week: 84.0 standard drinks of alcohol     Types: 84 Cans of beer per week     Comment: alcoholic, daily, beer    Drug use: Yes     Frequency: 3.0 times per week     Types: Hydrocodone    Sexual activity: Not Currently   Social History Narrative    ** Merged History Encounter **          Social Determinants of Health     Financial Resource Strain: Low Risk  (6/13/2023)    Overall Financial Resource Strain (CARDIA)     Difficulty of Paying Living Expenses: Not hard at all   Food Insecurity: No Food Insecurity (6/13/2023)    Hunger Vital Sign     Worried About Running Out of Food in the Last Year: Never true     Ran Out of Food in the Last Year: Never true   Transportation Needs: No Transportation Needs (6/13/2023)    PRAPARE - Transportation     Lack of Transportation (Medical): No     Lack of Transportation (Non-Medical): No   Physical Activity: Inactive (6/13/2023)    Exercise Vital Sign     Days of Exercise per Week: 0 days     Minutes of Exercise per Session: 0 min   Stress: No Stress Concern Present (6/13/2023)    [place] [employer] - Occupational Stress Questionnaire     Feeling of Stress : Only a little   Social Connections: Moderately Integrated (6/13/2023)    Social Connection and Isolation Panel [NHANES]     Frequency of Communication with Friends and Family: More than three times a week     Frequency of Social Gatherings with Friends and Family: More than three times a week     Attends Confucianism Services: More than 4 times per year     Active Member of Clubs or Organizations: No     Attends Club or Organization Meetings: 1 to 4 times per year     Marital Status: Never    Housing Stability: Low Risk  (6/13/2023)    Housing Stability Vital Sign     Unable to Pay for Housing in the Last Year: No     Number of Places Lived in the Last Year: 1     Unstable Housing in the Last Year: No     HOME MEDICATIONS     Prior to Admission medications    Medication Sig Start Date End  Date Taking? Authorizing Provider   amiodarone (PACERONE) 200 MG Tab Take 1 tablet (200 mg total) by mouth once daily. 6/27/23   Darlene Willams FNP   aspirin (ECOTRIN) 81 MG EC tablet Take 1 tablet (81 mg total) by mouth once daily. 12/30/22 11/7/23  Reyes, Thairy G, DO   atorvastatin (LIPITOR) 40 MG tablet Take 1 tablet (40 mg total) by mouth every evening. 6/27/23   Darlene Willams FNP   furosemide (LASIX) 20 MG tablet Take 1 tablet (20 mg total) by mouth once daily. for 4 days 11/7/23 11/11/23  Darlene Willams FNP   hydrOXYzine pamoate (VISTARIL) 50 MG Cap Take 1 capsule (50 mg total) by mouth every 8 (eight) hours as needed (as needed for anxiety). 11/7/23   Darlene Willams FNP   linaCLOtide (LINZESS) 72 mcg Cap capsule Take 1 capsule (72 mcg total) by mouth before breakfast. 11/7/23   Darlene Willams FNP   metoprolol succinate (TOPROL-XL) 50 MG 24 hr tablet TAKE 1 TABLET BY MOUTH ONCE DAILY 11/1/23   Darlene Willams FNP   QUEtiapine (SEROQUEL) 100 MG Tab Take 2 tablets (200 mg total) by mouth every evening. 11/7/23   Darlene Willams FNP   sodium bicarbonate 650 MG tablet Take 650 mg by mouth once daily.    Provider, Historical   thiamine (VITAMIN B-1) 100 MG tablet Take 100 mg by mouth once daily.    Provider, Historical   valsartan (DIOVAN) 160 MG tablet Take 1 tablet (160 mg total) by mouth 2 (two) times daily. 6/27/23 6/26/24  Darlene Willams FNP     ALLERGIES   Patient has no known allergies.    REVIEW OF SYSTEMS   Except as documented above, all other systems reviewed and negative    PHYSICAL EXAM     Vitals:    12/06/23 0840   BP: (!) 144/76   Pulse:    Resp:    Temp:         General:  In no acute distress, resting comfortably  Head and neck:  Atraumatic, normocephalic, moist mucous membranes, supple neck  Chest:  Coarse breath sounds throughout bilaterally  Heart:  S1, S2, no appreciable murmur  Abdomen:  Soft, nontender, BS +  MSK:  Warm, no lower extremity edema, no clubbing or  cyanosis  Neuro:  Alert and oriented x4, moving all extremities with good strength  Integumentary:  No obvious skin rash  Psychiatry:  Appropriate mood and affect    ASSESSMENT AND PLAN     MRSA bacteriemia   Pneumonia  Recent Influenza A infection (11/19/2023)  CXR on 11/24/2023 showed RUL pneumonia  CTA chest 11/24/2023 showed no PE, but multifocal pneumonia most evident in RUL  Sputum culture from 11/24/2023 showed unsatisfactory sample for culture  Blood cultures x2 resulted positive for MRSA on 11/27/2023  Infectious Disease recommended patient to continue vancomycin for 4 weeks with suspected in date on 12/25/2023  CXR on 11/26/2023 and 11/27/2023 showed increasing patchy and consolidative bilateral airspace opacities  Repeat blood cultures x2 on 12/02/2023 showed no growth  CTA chest, abdomen, pelvis 12/05/2023 showed small bilateral pleural effusion R>L and patchy consolidation in both lungs increased compared to prior  Currently on 2 L nasal cannula with O2 sat 99%  Add Zosyn and Nystatin  IS and cough assist   Sputum culture collected   Xopenex Q8 with Mucomyst     Stool infection   Stool culture on 11/27/2023 showed many yeast  Start Nystatin swish and swallow 12/06/2023 x 7 days     Transamnitis   Grayzone hepatitis C Ab  Hepatitis C viral load ordered      Hx of CAD, diastolic CHF, Afib, aortic stenosis, NSTEMI, and HTN  BNP 3053.1, Troponin 0.137>0.096 on 11/24/2023  Echo 11/25/2023 showed LVEF 55%, diastolic function cannot be reliably determined in the presence of mitral valve disease, right ventricle systolic function is mildly reduced, moderately calcified aortic cusps with moderately restricted motion, moderate aortic stenosis, severe posterior mitral annular calcification present, moderate mitral stenosis, mild mitral regurgitation, mild tricuspid regurgitation  CARO guided DCCV on 11/30/2023 which showed mild-moderately enlarged right atrium, no ASD or PFO, LVEF 55%, mild-moderate left  ventricular hypertrophy, severe aortic stenosis, moderate mitral stenosis, mild-moderate mitral regurgitation, mild-moderate tricuspid regurgitation, no vegetation present, no intracardiac thrombus, successful cardioversion  TAVR to be completed outpatient after ID clearance  Continue Entresto, Toprol XL, Amiodarone, Eliquis, and Aspirin     DVT prophylaxis: Eliquis   __________________________________________________________________  LABS/MICRO/MEDS/DIAGNOSTICS     LABS  Recent Labs     12/04/23  0435   *   K 3.8   CHLORIDE 107   CO2 20*   BUN 13.7   CREATININE 0.80   GLUCOSE 100   CALCIUM 7.3*     Recent Labs     12/04/23  0435   WBC 6.55   RBC 3.05*   HCT 27.3*   MCV 89.5        MICROBIOLOGY  Microbiology Results (last 7 days)       Procedure Component Value Units Date/Time    Respiratory Culture [[id number]]     Order Status: Sent Specimen: Sputum           MEDICATIONS   amiodarone  200 mg Oral Daily    apixaban  5 mg Oral BID    aspirin  81 mg Oral Daily    folic acid  1 mg Oral Daily    guaiFENesin 100 mg/5 ml  400 mg Oral Q6H WAKE    levalbuterol  1.25 mg Nebulization Q8H    linaCLOtide  72 mcg Oral Before breakfast    metoprolol succinate  100 mg Oral Daily    miconazole NITRATE 2 %   Topical (Top) BID    mupirocin   Nasal BID    QUEtiapine  200 mg Oral QHS    sacubitriL-valsartan  1 tablet Oral BID    senna-docusate 8.6-50 mg  1 tablet Oral BID    sodium chloride 0.9%  10 mL Intravenous Q6H    vancomycin (VANCOCIN) IV (PEDS and ADULTS)  750 mg Intravenous Q12H    white petrolatum   Topical (Top) BID      INFUSIONS    DIAGNOSTIC TESTS  No orders to display      Patient information was obtained from patient, patient's family, past medical records and ER records.   All diagnosis and differential diagnosis have been reviewed; assessment and plan has been documented. I have personally reviewed the labs and test results that are presently available; I have reviewed the patients medication list. I  have reviewed the consulting providers response and recommendations. I have reviewed or attempted to review medical records based upon their availability.  All of the patient's questions have been addressed and answered. Patient's is agreeable to the above stated plan. I will continue to monitor closely and make adjustments to medical management as needed.  This note was created using [employer] voice recognition software that occasionally misinterpreted phrases or words.  Please contact me if any questions may rise regarding documentation to clarify verbiage.      GISEL Russell   Internal Medicine  Department of Hospital Medicine [facility] - [facility]

## 2023-12-06 NOTE — PLAN OF CARE
Problem: Adult Inpatient Plan of Care  Goal: Plan of Care Review  Outcome: Ongoing, Progressing  Flowsheets (Taken 12/5/2023 2000)  Plan of Care Reviewed With: patient  Goal: Patient-Specific Goal (Individualized)  Outcome: Ongoing, Progressing  Flowsheets (Taken 12/5/2023 2000)  Anxieties, Fears or Concerns: none  Individualized Care Needs: none  Goal: Absence of Hospital-Acquired Illness or Injury  Outcome: Ongoing, Progressing  Intervention: Identify and Manage Fall Risk  Flowsheets (Taken 12/5/2023 2000)  Safety Promotion/Fall Prevention:   assistive device/personal item within reach   bed alarm set   Fall Risk reviewed with patient/family   high risk medications identified   medications reviewed   nonskid shoes/socks when out of bed   room near unit station   side rails raised x 3   instructed to call staff for mobility  Intervention: Prevent Skin Injury  Flowsheets (Taken 12/5/2023 2000)  Body Position: position changed independently  Skin Protection:   adhesive use limited   incontinence pads utilized   tubing/devices free from skin contact  Intervention: Prevent and Manage VTE (Venous Thromboembolism) Risk  Flowsheets (Taken 12/5/2023 2000)  Activity Management: Rolling - L1  VTE Prevention/Management:   bleeding risk assessed   bleeding precations maintained  Range of Motion: active ROM (range of motion) encouraged  Intervention: Prevent Infection  Flowsheets (Taken 12/5/2023 2000)  Infection Prevention:   cohorting utilized   environmental surveillance performed   hand hygiene promoted   single patient room provided   rest/sleep promoted  Goal: Optimal Comfort and Wellbeing  Outcome: Ongoing, Progressing  Intervention: Monitor Pain and Promote Comfort  Flowsheets (Taken 12/5/2023 2000)  Pain Management Interventions:   quiet environment facilitated   relaxation techniques promoted  Intervention: Provide Person-Centered Care  Flowsheets (Taken 12/5/2023 2000)  Trust Relationship/Rapport:   care  explained   questions answered   questions encouraged   empathic listening provided   thoughts/feelings acknowledged  Goal: Readiness for Transition of Care  Outcome: Ongoing, Progressing     Problem: Fluid Imbalance (Pneumonia)  Goal: Fluid Balance  Outcome: Ongoing, Progressing  Intervention: Monitor and Manage Fluid Balance  Flowsheets (Taken 12/5/2023 2000)  Fluid/Electrolyte Management: fluids provided     Problem: Infection (Pneumonia)  Goal: Resolution of Infection Signs and Symptoms  Outcome: Ongoing, Progressing  Intervention: Prevent Infection Progression  Flowsheets (Taken 12/5/2023 2000)  Fever Reduction/Comfort Measures:   lightweight bedding   lightweight clothing  Isolation Precautions: protective     Problem: Respiratory Compromise (Pneumonia)  Goal: Effective Oxygenation and Ventilation  Outcome: Ongoing, Progressing  Intervention: Promote Airway Secretion Clearance  Flowsheets (Taken 12/5/2023 2000)  Cough And Deep Breathing: done independently per patient  Intervention: Optimize Oxygenation and Ventilation  Flowsheets (Taken 12/5/2023 2000)  Airway/Ventilation Management: airway patency maintained  Head of Bed (HOB) Positioning: HOB at 20-30 degrees     Problem: Infection  Goal: Absence of Infection Signs and Symptoms  Outcome: Ongoing, Progressing  Intervention: Prevent or Manage Infection  Flowsheets (Taken 12/5/2023 2000)  Fever Reduction/Comfort Measures:   lightweight bedding   lightweight clothing  Isolation Precautions: protective

## 2023-12-06 NOTE — PROCEDURES
"Mike Urbina Jr. is a 68 y.o. male patient.    Temp: 99 °F (37.2 °C) (12/06/23 0718)  Pulse: 86 (12/06/23 0718)  Resp: 18 (12/06/23 0718)  BP: (!) 144/76 (12/06/23 0840)  SpO2: 98 % (12/06/23 0718)  Weight: 81.8 kg (180 lb 5.4 oz) (12/05/23 1500)  Height: 6' 2" (188 cm) (12/05/23 1500)    PICC  Date/Time: 12/6/2023 11:47 AM  Performed by: Zakia Abraham RN  Consent Done: Yes  Time out: Immediately prior to procedure a time out was called to verify the correct patient, procedure, equipment, support staff and site/side marked as required  Indications: vascular access  Anesthesia: local infiltration  Local anesthetic: lidocaine 1% without epinephrine  Anesthetic Total (mL): 5  Preparation: skin prepped with ChloraPrep  Skin prep agent dried: skin prep agent completely dried prior to procedure  Sterile barriers: all five maximum sterile barriers used - cap, mask, sterile gown, sterile gloves, and large sterile sheet  Hand hygiene: hand hygiene performed prior to central venous catheter insertion  Location details: right basilic  Catheter type: double lumen  Catheter size: 5 Fr  Catheter Length: 36cm    Ultrasound guidance: yes  Vessel Caliber: medium and patent, compressibility normal  Needle advanced into vessel with real time Ultrasound guidance.  Guidewire confirmed in vessel.  Sterile sheath used.  Number of attempts: 1  Post-procedure: blood return through all ports, sterile dressing applied and chlorhexidine patch    Assessment: placement verified by x-ray  Complications: none  Comments: Arm circumference 29 cm. Vanc x 4 weeks          Name Zakia Abraham RN  12/6/2023    "

## 2023-12-07 LAB
ALBUMIN SERPL-MCNC: 1.7 G/DL (ref 3.4–4.8)
ALBUMIN/GLOB SERPL: 0.6 RATIO (ref 1.1–2)
ALP SERPL-CCNC: 68 UNIT/L (ref 40–150)
ALT SERPL-CCNC: 12 UNIT/L (ref 0–55)
AST SERPL-CCNC: 13 UNIT/L (ref 5–34)
BASOPHILS # BLD AUTO: 0.03 X10(3)/MCL
BASOPHILS NFR BLD AUTO: 0.5 %
BILIRUB SERPL-MCNC: 0.8 MG/DL
BUN SERPL-MCNC: 12.3 MG/DL (ref 8.4–25.7)
CALCIUM SERPL-MCNC: 7.4 MG/DL (ref 8.8–10)
CHLORIDE SERPL-SCNC: 105 MMOL/L (ref 98–107)
CO2 SERPL-SCNC: 23 MMOL/L (ref 23–31)
CREAT SERPL-MCNC: 0.95 MG/DL (ref 0.73–1.18)
EOSINOPHIL # BLD AUTO: 0.03 X10(3)/MCL (ref 0–0.9)
EOSINOPHIL NFR BLD AUTO: 0.5 %
ERYTHROCYTE [DISTWIDTH] IN BLOOD BY AUTOMATED COUNT: 16.7 % (ref 11.5–17)
GFR SERPLBLD CREATININE-BSD FMLA CKD-EPI: >60 MLS/MIN/1.73/M2
GLOBULIN SER-MCNC: 2.8 GM/DL (ref 2.4–3.5)
GLUCOSE SERPL-MCNC: 104 MG/DL (ref 82–115)
HCT VFR BLD AUTO: 26.1 % (ref 42–52)
HGB BLD-MCNC: 8 G/DL (ref 14–18)
IMM GRANULOCYTES # BLD AUTO: 0.02 X10(3)/MCL (ref 0–0.04)
IMM GRANULOCYTES NFR BLD AUTO: 0.3 %
LYMPHOCYTES # BLD AUTO: 0.72 X10(3)/MCL (ref 0.6–4.6)
LYMPHOCYTES NFR BLD AUTO: 12.6 %
MAYO GENERIC ORDERABLE RESULT: NORMAL
MCH RBC QN AUTO: 27.9 PG (ref 27–31)
MCHC RBC AUTO-ENTMCNC: 30.7 G/DL (ref 33–36)
MCV RBC AUTO: 90.9 FL (ref 80–94)
MONOCYTES # BLD AUTO: 0.55 X10(3)/MCL (ref 0.1–1.3)
MONOCYTES NFR BLD AUTO: 9.6 %
NEUTROPHILS # BLD AUTO: 4.37 X10(3)/MCL (ref 2.1–9.2)
NEUTROPHILS NFR BLD AUTO: 76.5 %
PLATELET # BLD AUTO: 146 X10(3)/MCL (ref 130–400)
PMV BLD AUTO: 10.8 FL (ref 7.4–10.4)
POTASSIUM SERPL-SCNC: 3.6 MMOL/L (ref 3.5–5.1)
PROT SERPL-MCNC: 4.5 GM/DL (ref 5.8–7.6)
RBC # BLD AUTO: 2.87 X10(6)/MCL (ref 4.7–6.1)
SODIUM SERPL-SCNC: 133 MMOL/L (ref 136–145)
WBC # SPEC AUTO: 5.72 X10(3)/MCL (ref 4.5–11.5)

## 2023-12-07 PROCEDURE — 25000242 PHARM REV CODE 250 ALT 637 W/ HCPCS: Performed by: STUDENT IN AN ORGANIZED HEALTH CARE EDUCATION/TRAINING PROGRAM

## 2023-12-07 PROCEDURE — 94664 DEMO&/EVAL PT USE INHALER: CPT

## 2023-12-07 PROCEDURE — 25000003 PHARM REV CODE 250: Performed by: STUDENT IN AN ORGANIZED HEALTH CARE EDUCATION/TRAINING PROGRAM

## 2023-12-07 PROCEDURE — A4216 STERILE WATER/SALINE, 10 ML: HCPCS | Performed by: PHYSICIAN ASSISTANT

## 2023-12-07 PROCEDURE — 97116 GAIT TRAINING THERAPY: CPT

## 2023-12-07 PROCEDURE — 25000003 PHARM REV CODE 250: Performed by: PHYSICIAN ASSISTANT

## 2023-12-07 PROCEDURE — 25000003 PHARM REV CODE 250: Performed by: HOSPITALIST

## 2023-12-07 PROCEDURE — 97110 THERAPEUTIC EXERCISES: CPT

## 2023-12-07 PROCEDURE — 87633 RESP VIRUS 12-25 TARGETS: CPT | Performed by: STUDENT IN AN ORGANIZED HEALTH CARE EDUCATION/TRAINING PROGRAM

## 2023-12-07 PROCEDURE — 99900035 HC TECH TIME PER 15 MIN (STAT)

## 2023-12-07 PROCEDURE — A4216 STERILE WATER/SALINE, 10 ML: HCPCS | Performed by: STUDENT IN AN ORGANIZED HEALTH CARE EDUCATION/TRAINING PROGRAM

## 2023-12-07 PROCEDURE — 11000004 HC SNF PRIVATE

## 2023-12-07 PROCEDURE — 25000003 PHARM REV CODE 250: Performed by: INTERNAL MEDICINE

## 2023-12-07 PROCEDURE — 25000242 PHARM REV CODE 250 ALT 637 W/ HCPCS: Performed by: HOSPITALIST

## 2023-12-07 PROCEDURE — 87798 DETECT AGENT NOS DNA AMP: CPT | Performed by: STUDENT IN AN ORGANIZED HEALTH CARE EDUCATION/TRAINING PROGRAM

## 2023-12-07 PROCEDURE — 63600175 PHARM REV CODE 636 W HCPCS: Performed by: STUDENT IN AN ORGANIZED HEALTH CARE EDUCATION/TRAINING PROGRAM

## 2023-12-07 PROCEDURE — 80053 COMPREHEN METABOLIC PANEL: CPT | Performed by: PHYSICIAN ASSISTANT

## 2023-12-07 PROCEDURE — 27000221 HC OXYGEN, UP TO 24 HOURS

## 2023-12-07 PROCEDURE — 85025 COMPLETE CBC W/AUTO DIFF WBC: CPT | Performed by: PHYSICIAN ASSISTANT

## 2023-12-07 PROCEDURE — 94640 AIRWAY INHALATION TREATMENT: CPT

## 2023-12-07 PROCEDURE — 25000242 PHARM REV CODE 250 ALT 637 W/ HCPCS: Performed by: PHYSICIAN ASSISTANT

## 2023-12-07 PROCEDURE — 94760 N-INVAS EAR/PLS OXIMETRY 1: CPT

## 2023-12-07 PROCEDURE — 97535 SELF CARE MNGMENT TRAINING: CPT

## 2023-12-07 PROCEDURE — 27000207 HC ISOLATION

## 2023-12-07 PROCEDURE — 63600175 PHARM REV CODE 636 W HCPCS: Performed by: PHYSICIAN ASSISTANT

## 2023-12-07 RX ORDER — FUROSEMIDE 10 MG/ML
40 INJECTION INTRAMUSCULAR; INTRAVENOUS DAILY
Status: DISCONTINUED | OUTPATIENT
Start: 2023-12-07 | End: 2023-12-08

## 2023-12-07 RX ORDER — TALC
9 POWDER (GRAM) TOPICAL NIGHTLY PRN
Status: DISCONTINUED | OUTPATIENT
Start: 2023-12-07 | End: 2023-12-07

## 2023-12-07 RX ORDER — AMOXICILLIN 250 MG
1 CAPSULE ORAL DAILY PRN
Status: DISCONTINUED | OUTPATIENT
Start: 2023-12-07 | End: 2023-12-26 | Stop reason: HOSPADM

## 2023-12-07 RX ORDER — LACTOBACILLUS ACIDOPHILUS 500MM CELL
1 CAPSULE ORAL
Status: DISCONTINUED | OUTPATIENT
Start: 2023-12-07 | End: 2023-12-26 | Stop reason: HOSPADM

## 2023-12-07 RX ORDER — TALC
9 POWDER (GRAM) TOPICAL NIGHTLY
Status: DISCONTINUED | OUTPATIENT
Start: 2023-12-07 | End: 2023-12-13

## 2023-12-07 RX ADMIN — MELATONIN TAB 3 MG 9 MG: 3 TAB at 08:12

## 2023-12-07 RX ADMIN — VANCOMYCIN HYDROCHLORIDE 750 MG: 750 INJECTION, POWDER, LYOPHILIZED, FOR SOLUTION INTRAVENOUS at 09:12

## 2023-12-07 RX ADMIN — WHITE PETROLATUM: 1.75 OINTMENT TOPICAL at 08:12

## 2023-12-07 RX ADMIN — VANCOMYCIN HYDROCHLORIDE 750 MG: 750 INJECTION, POWDER, LYOPHILIZED, FOR SOLUTION INTRAVENOUS at 08:12

## 2023-12-07 RX ADMIN — SODIUM CHLORIDE: 9 INJECTION, SOLUTION INTRAVENOUS at 12:12

## 2023-12-07 RX ADMIN — AMIODARONE HYDROCHLORIDE 200 MG: 200 TABLET ORAL at 09:12

## 2023-12-07 RX ADMIN — CEFEPIME 1 G: 1 INJECTION, POWDER, FOR SOLUTION INTRAMUSCULAR; INTRAVENOUS at 12:12

## 2023-12-07 RX ADMIN — SODIUM CHLORIDE, PRESERVATIVE FREE 10 ML: 5 INJECTION INTRAVENOUS at 12:12

## 2023-12-07 RX ADMIN — SACUBITRIL AND VALSARTAN 1 TABLET: 24; 26 TABLET, FILM COATED ORAL at 06:12

## 2023-12-07 RX ADMIN — FUROSEMIDE 40 MG: 10 INJECTION, SOLUTION INTRAMUSCULAR; INTRAVENOUS at 09:12

## 2023-12-07 RX ADMIN — SODIUM CHLORIDE, PRESERVATIVE FREE 10 ML: 5 INJECTION INTRAVENOUS at 06:12

## 2023-12-07 RX ADMIN — NYSTATIN 500000 UNITS: 100000 SUSPENSION ORAL at 09:12

## 2023-12-07 RX ADMIN — APIXABAN 5 MG: 5 TABLET, FILM COATED ORAL at 09:12

## 2023-12-07 RX ADMIN — GUAIFENESIN 400 MG: 200 SOLUTION ORAL at 08:12

## 2023-12-07 RX ADMIN — SODIUM CHLORIDE, PRESERVATIVE FREE 10 ML: 5 INJECTION INTRAVENOUS at 11:12

## 2023-12-07 RX ADMIN — LINACLOTIDE 72 MCG: 72 CAPSULE, GELATIN COATED ORAL at 05:12

## 2023-12-07 RX ADMIN — ACETYLCYSTEINE 4 ML: 200 INHALANT RESPIRATORY (INHALATION) at 07:12

## 2023-12-07 RX ADMIN — Medication 1 CAPSULE: at 04:12

## 2023-12-07 RX ADMIN — LEVALBUTEROL HYDROCHLORIDE 1.25 MG: 1.25 SOLUTION RESPIRATORY (INHALATION) at 07:12

## 2023-12-07 RX ADMIN — Medication 10 ML: at 12:12

## 2023-12-07 RX ADMIN — GUAIFENESIN 400 MG: 200 SOLUTION ORAL at 01:12

## 2023-12-07 RX ADMIN — CEFEPIME 1 G: 1 INJECTION, POWDER, FOR SOLUTION INTRAMUSCULAR; INTRAVENOUS at 08:12

## 2023-12-07 RX ADMIN — ACETYLCYSTEINE 4 ML: 200 INHALANT RESPIRATORY (INHALATION) at 02:12

## 2023-12-07 RX ADMIN — ASPIRIN 81 MG: 81 TABLET, COATED ORAL at 09:12

## 2023-12-07 RX ADMIN — SODIUM CHLORIDE: 9 INJECTION, SOLUTION INTRAVENOUS at 11:12

## 2023-12-07 RX ADMIN — MUPIROCIN: 20 OINTMENT TOPICAL at 09:12

## 2023-12-07 RX ADMIN — ACETYLCYSTEINE 4 ML: 200 INHALANT RESPIRATORY (INHALATION) at 10:12

## 2023-12-07 RX ADMIN — WHITE PETROLATUM: 1.75 OINTMENT TOPICAL at 09:12

## 2023-12-07 RX ADMIN — SACUBITRIL AND VALSARTAN 1 TABLET: 24; 26 TABLET, FILM COATED ORAL at 09:12

## 2023-12-07 RX ADMIN — PIPERACILLIN AND TAZOBACTAM 4.5 G: 4; .5 INJECTION, POWDER, LYOPHILIZED, FOR SOLUTION INTRAVENOUS; PARENTERAL at 04:12

## 2023-12-07 RX ADMIN — APIXABAN 5 MG: 5 TABLET, FILM COATED ORAL at 06:12

## 2023-12-07 RX ADMIN — SODIUM CHLORIDE, PRESERVATIVE FREE 10 ML: 5 INJECTION INTRAVENOUS at 05:12

## 2023-12-07 RX ADMIN — QUETIAPINE FUMARATE 200 MG: 100 TABLET ORAL at 06:12

## 2023-12-07 RX ADMIN — MICONAZOLE NITRATE 2 % TOPICAL POWDER: at 09:12

## 2023-12-07 RX ADMIN — SENNOSIDES AND DOCUSATE SODIUM 1 TABLET: 8.6; 5 TABLET ORAL at 09:12

## 2023-12-07 RX ADMIN — MICONAZOLE NITRATE 2 % TOPICAL POWDER: at 08:12

## 2023-12-07 RX ADMIN — METOPROLOL SUCCINATE 100 MG: 50 TABLET, EXTENDED RELEASE ORAL at 09:12

## 2023-12-07 RX ADMIN — LEVALBUTEROL HYDROCHLORIDE 1.25 MG: 1.25 SOLUTION RESPIRATORY (INHALATION) at 02:12

## 2023-12-07 RX ADMIN — FOLIC ACID 1 MG: 1 TABLET ORAL at 09:12

## 2023-12-07 RX ADMIN — LEVALBUTEROL HYDROCHLORIDE 1.25 MG: 1.25 SOLUTION RESPIRATORY (INHALATION) at 11:12

## 2023-12-07 NOTE — PLAN OF CARE
Problem: Adult Inpatient Plan of Care  Goal: Plan of Care Review  Outcome: Ongoing, Progressing  Flowsheets (Taken 12/7/2023 1258)  Plan of Care Reviewed With: patient  Goal: Patient-Specific Goal (Individualized)  Outcome: Ongoing, Progressing  Flowsheets (Taken 12/7/2023 1258)  Anxieties, Fears or Concerns: anxious about his IV when getting into the shower, was concerned he would need a new one  Individualized Care Needs: PICC line wrapped with aqua guard for shower w/therapy, monitor vs and O2 sat, IV abt therapy  Goal: Absence of Hospital-Acquired Illness or Injury  Outcome: Ongoing, Progressing  Intervention: Identify and Manage Fall Risk  Flowsheets (Taken 12/7/2023 1258)  Safety Promotion/Fall Prevention:   assistive device/personal item within reach   chair alarm set   in recliner, wheels locked   medications reviewed   nonskid shoes/socks when out of bed   instructed to call staff for mobility  Intervention: Prevent Skin Injury  Flowsheets (Taken 12/7/2023 1258)  Body Position:   position changed independently   weight shifting  Skin Protection:   adhesive use limited   skin sealant/moisture barrier applied   pulse oximeter probe site changed   tubing/devices free from skin contact   protective footwear used  Intervention: Prevent and Manage VTE (Venous Thromboembolism) Risk  Flowsheets (Taken 12/7/2023 1258)  Activity Management:   Ambulated to bathroom - L4   Up in chair - L3   Standing - L3   Walk with assistive devise and /or staff member - L3  VTE Prevention/Management:   ambulation promoted   bleeding precations maintained   bleeding risk assessed   dorsiflexion/plantar flexion performed  Range of Motion: active ROM (range of motion) encouraged  Intervention: Prevent Infection  Flowsheets (Taken 12/7/2023 1258)  Infection Prevention:   equipment surfaces disinfected   environmental surveillance performed   hand hygiene promoted   personal protective equipment utilized   rest/sleep promoted   single  patient room provided  Goal: Optimal Comfort and Wellbeing  Outcome: Ongoing, Progressing  Intervention: Monitor Pain and Promote Comfort  Flowsheets (Taken 12/7/2023 1258)  Pain Management Interventions:   care clustered   pillow support provided   quiet environment facilitated   relaxation techniques promoted  Intervention: Provide Person-Centered Care  Flowsheets (Taken 12/7/2023 1258)  Trust Relationship/Rapport:   care explained   choices provided   questions answered  Goal: Readiness for Transition of Care  Outcome: Ongoing, Progressing     Problem: Fluid Imbalance (Pneumonia)  Goal: Fluid Balance  Outcome: Ongoing, Progressing  Intervention: Monitor and Manage Fluid Balance  Flowsheets (Taken 12/7/2023 1258)  Fluid/Electrolyte Management: fluids provided     Problem: Infection (Pneumonia)  Goal: Resolution of Infection Signs and Symptoms  Outcome: Ongoing, Progressing     Problem: Respiratory Compromise (Pneumonia)  Goal: Effective Oxygenation and Ventilation  Outcome: Ongoing, Progressing  Intervention: Promote Airway Secretion Clearance  Flowsheets (Taken 12/7/2023 1258)  Breathing Techniques/Airway Clearance: deep/controlled cough encouraged  Cough And Deep Breathing: done with encouragement  Intervention: Optimize Oxygenation and Ventilation  Flowsheets (Taken 12/7/2023 1258)  Airway/Ventilation Management: airway patency maintained  Head of Bed (HOB) Positioning: HOB at 60-90 degrees     Problem: Infection  Goal: Absence of Infection Signs and Symptoms  Outcome: Ongoing, Progressing  Intervention: Prevent or Manage Infection  Flowsheets (Taken 12/7/2023 1258)  Infection Management: aseptic technique maintained  Isolation Precautions:   precautions maintained   contact

## 2023-12-07 NOTE — PT/OT/SLP PROGRESS
Physical Therapy Treatment Note           Name: Mike Urbina Jr.    : 1955 (68 y.o.)  MRN: [id number]           TREATMENT SUMMARY AND RECOMMENDATIONS:    PT Received On: 23  PT Start Time: 1445     PT Stop Time: 1502  PT Total Time (min): 17 min     Subjective Assessment:   No complaints  Lethargic    Awake, alert, cooperative  Uncooperative    Agitated  c/o pain    Appropriate x c/o fatigue    Confused  Treated at bedside     Emotionally labile  Treated in gym/dept.    Impulsive  Other:    Flat affect       Therapy Precautions:    Cognitive deficits  Spinal precautions    Collar - hard  Sternal precautions    Collar - soft   TLSO    Fall risk  LSO    Hip precautions - posterior  Knee immobilizer    Hip precautions - anterior  WBAT    Impaired communication  Partial weightbearing   x Oxygen  TTWB    PEG tube  NWB    Visual deficits  Other:    Hearing deficits          Treatment Objectives:     Mobility Training:   Assist level Comments    Bed mobility     Transfer SBA Using RW   Gait SBA Amb on firm surface using RW in room x 150 ft O2 sats at 83% on 4L O2   Sit to stand transitions SBA Using RW   Sitting balance     Standing balance      Wheelchair mobility     Car transfer     Other:          Therapeutic Exercise:   Exercise Sets Reps Comments                               Additional Comments:  Pt c/o fatigue- 4L O2 donned     Assessment: Patient tolerated session fair.    PT Plan: continue  Revisions made to plan of care: No    GOALS:   Multidisciplinary Problems       Physical Therapy Goals          Problem: Physical Therapy    Goal Priority Disciplines Outcome Goal Variances Interventions   Physical Therapy Goal     PT, PT/OT Ongoing, Progressing     Description: Goals to be met by: Discharge     Patient will increase functional independence with mobility by performin. Sit to stand transfer with Modified [place]  2. Bed to chair transfer with Modified [place] using  No Assistive Device  3. Gait  x 1000 feet including outdoor negotiation and stair completion with Supervision using No Assistive Device.                          Skilled PT Minutes Provided: 15   Communication with Treatment Team:     Equipment recommendations:       At end of treatment, patient remained:  x Up in chair     Up in wheelchair in room    In bed   x With alarm activated    Bed rails up   x Call bell in reach     Family/friends present    Restraints secured properly    In bathroom with CNA/RN notified    Nurse aware    In gym with therapist/tech    Other:

## 2023-12-07 NOTE — PROGRESS NOTES
[facility] MEDICINE ~ PROGRESS NOTE        CHIEF COMPLAINT   Hospital follow up    HOSPITAL COURSE     Patient is a 69yo  male with a past medical history of CAD, diastolic CHF, Afib, aortic stenosis, NSTEMI, and HTN who was recently admitted to [facility] for pneumonia and Afib RVR. Patient was diagnosed with Influenza A on 11/19/2023 prior to admission and started on Tamiflu. On arrival to the ER, CXR on 11/24/2023 showed RUL pneumonia. CTA chest 11/24/2023 showed no PE, but multifocal pneumonia most evident in RUL. Labs were significant for BNP 3053.1, Troponin 0.137>0.096. Patient was admitted for ICU, started on Cardizem drip, Vanc, Zosyn, Doxy, and required Levophed for hypotension. Sputum culture from 11/24/2023 showed unsatisfactory sample for culture. Echo 11/25/2023 showed LVEF 55%, diastolic function cannot be reliably determined in the presence of mitral valve disease, right ventricle systolic function is mildly reduced, moderately calcified aortic cusps with moderately restricted motion, moderate aortic stenosis, severe posterior mitral annular calcification present, moderate mitral stenosis, mild mitral regurgitation, mild tricuspid regurgitation. Blood cultures x2 resulted positive for MRSA on 11/27/2023.  Infectious Disease recommended patient to continue vancomycin for 4 weeks with suspected in date on 12/25/2023.  Patient was downgraded on 11/27/2023. CXR on 11/26/2023 and 11/27/2023 showed increasing patchy and consolidative bilateral airspace opacities. Stool culture on 11/27/2023 showed many yeast. Repeat blood cultures x2 on 12/02/2023 showed no growth. Patient underwent a CARO guided DCCV on 11/30/2023 which showed mild-moderately enlarged right atrium, no ASD or PFO, LVEF 55%, mild-moderate left ventricular hypertrophy, severe aortic stenosis, moderate mitral stenosis, mild-moderate mitral regurgitation, mild-moderate tricuspid regurgitation, no  vegetation present, no intracardiac thrombus, successful cardioversion. CTA chest, abdomen, pelvis 12/05/2023 showed small bilateral pleural effusion R>L and patchy consolidation in both lungs increased compared to prior. CTA was obtained for TAVR workup to be completed outpatient after ID clearance. On exam today, patient is currently on 2 L nasal cannula with O2 sat 99%.  With worsening pneumonia and lung sounds on exam, we will start Zosyn, obtain sputum culture, add nystatin for fungal coverage, and schedule Mucomyst with the Xopenex nebulizers 3 times a day.     Today  Patient complains of not sleeping well last night despite taking home dose of Seroquel. Increase and schedule Melatonin with Seroquel. Currently on 4L NC with O2 sat 97%.     OBJECTIVE/PHYSICAL EXAM     VITAL SIGNS (MOST RECENT):  Temp: 98 °F (36.7 °C) (12/06/23 2032)  Pulse: 83 (12/07/23 0918)  Resp: (!) 22 (12/07/23 0740)  BP: 121/63 (12/07/23 0918)  SpO2: (!) 93 % (12/07/23 0740) VITAL SIGNS (24 HOUR RANGE):  Temp:  [98 °F (36.7 °C)] 98 °F (36.7 °C)  Pulse:  [78-93] 83  Resp:  [18-22] 22  SpO2:  [91 %-97 %] 93 %  BP: (121-166)/(63-86) 121/63     GENERAL: In no acute distress, afebrile  HEENT: Atraumatic, normocephalic, moist mucous membranes, supple neck   CHEST: Crackles to bilateral bases   HEART: S1, S2, no appreciable murmur  ABDOMEN: Soft, nontender, BS +  MSK: Warm, no lower extremity edema, no clubbing or cyanosis  NEUROLOGIC: Alert and oriented x4, moving all extremities with good strength   INTEGUMENTARY: No obvious skin rash   PSYCHIATRY: Appropriate mood and affect     ASSESSMENT/PLAN     MRSA bacteriemia   Pneumonia  Recent Influenza A infection (11/19/2023)  CXR on 11/24/2023 showed RUL pneumonia  CTA chest 11/24/2023 showed no PE, but multifocal pneumonia most evident in RUL  Sputum culture from 11/24/2023 showed unsatisfactory sample for culture  Blood cultures x2 resulted positive for MRSA on 11/27/2023  ID recommended patient  to continue vancomycin for 4 weeks with suspected in date on 12/25/2023  CXR on 11/26/2023 and 11/27/2023 showed increasing patchy and consolidative bilateral airspace opacities  Repeat blood cultures x2 on 12/02/2023 showed no growth  CTA chest, abdomen, pelvis 12/05/2023 showed small bilateral pleural effusion R>L and patchy consolidation in both lungs increased compared to prior  Currently on 4L NC with O2 sat 97%  Continue Vanc and Nystatin  D/c Zosyn, start Cefepime   IS and cough assist   Sputum culture collected   Xopenex Q8 with Mucomyst      Stool infection   Stool culture on 11/27/2023 showed many yeast  Start Nystatin swish and swallow 12/06/2023 x 7 days      Transamnitis   Grayzone hepatitis C Ab  Hepatitis C viral load ordered      Hx of CAD, diastolic CHF, Afib, aortic stenosis, NSTEMI, and HTN  BNP 3053.1, Troponin 0.137>0.096 on 11/24/2023  Echo 11/25/2023 showed LVEF 55%, diastolic function cannot be reliably determined in the presence of mitral valve disease, right ventricle systolic function is mildly reduced, moderately calcified aortic cusps with moderately restricted motion, moderate aortic stenosis, severe posterior mitral annular calcification present, moderate mitral stenosis, mild mitral regurgitation, mild tricuspid regurgitation  CARO guided DCCV on 11/30/2023 which showed mild-moderately enlarged right atrium, no ASD or PFO, LVEF 55%, mild-moderate left ventricular hypertrophy, severe aortic stenosis, moderate mitral stenosis, mild-moderate mitral regurgitation, mild-moderate tricuspid regurgitation, no vegetation present, no intracardiac thrombus, successful cardioversion  TAVR to be completed outpatient after ID clearance  Continue Entresto, Toprol XL, Amiodarone, Eliquis, and Aspirin     DVT prophylaxis: Eliquis     Anticipated discharge and disposition: Continue PT/OT   __________________________________________________________________________    LABS/MICRO/MEDS/DIAGNOSTICS      LABS  Recent Labs     12/07/23  0335   *   K 3.6   CHLORIDE 105   CO2 23   BUN 12.3   CREATININE 0.95   GLUCOSE 104   CALCIUM 7.4*   ALKPHOS 68   AST 13   ALT 12   ALBUMIN 1.7*     Recent Labs     12/07/23  0335   WBC 5.72   RBC 2.87*   HCT 26.1*   MCV 90.9        MICROBIOLOGY  Microbiology Results (last 7 days)       Procedure Component Value Units Date/Time    Respiratory Culture [[id number]]     Order Status: Sent Specimen: Sputum              MEDICATIONS   acetylcysteine 200 mg/ml (20%)  4 mL Nebulization TID    amiodarone  200 mg Oral Daily    apixaban  5 mg Oral BID    aspirin  81 mg Oral Daily    folic acid  1 mg Oral Daily    guaiFENesin 100 mg/5 ml  400 mg Oral Q6H WAKE    levalbuterol  1.25 mg Nebulization Q8H    linaCLOtide  72 mcg Oral Before breakfast    melatonin  9 mg Oral Nightly    metoprolol succinate  100 mg Oral Daily    miconazole NITRATE 2 %   Topical (Top) BID    mupirocin   Nasal BID    nystatin  500,000 Units Oral TID    piperacillin-tazobactam (Zosyn) IV (PEDS and ADULTS) (extended infusion is not appropriate)  4.5 g Intravenous Q8H    QUEtiapine  200 mg Oral QHS    sacubitriL-valsartan  1 tablet Oral BID    senna-docusate 8.6-50 mg  1 tablet Oral BID    sodium chloride 0.9%  10 mL Intravenous Q6H    sodium chloride 0.9%  10 mL Intravenous Q6H    vancomycin (VANCOCIN) IV (PEDS and ADULTS)  750 mg Intravenous Q12H    white petrolatum   Topical (Top) BID         INFUSIONS       DIAGNOSTIC TESTS  X-Ray Chest 1 View for Line/Tube Placement   Final Result      Consolidative changes in the right perihilar region right mid and right upper lung zone similar to previous exam.      Interval insertion of right-sided PICC line         Electronically signed by: Juan Wharton   Date:    12/06/2023   Time:    12:14        EF   Date Value Ref Range Status   11/25/2023 55 % Final   12/29/2022 55 % Final        NUTRITION STATUS  Patient meets ASPEN criteria for   malnutrition of   per RD  assessment as evidenced by:                       A minimum of two characteristics is recommended for diagnosis of either severe or non-severe malnutrition.     Case related differential diagnoses have been reviewed; assessment and plan has been documented. I have personally reviewed the labs and test results that are currently available; I have reviewed the patients medication list. I have reviewed the consulting providers recommendations. I have reviewed or attempted to review medical records based upon their availability.  All of the patient's and/or family's questions have been addressed and answered to the best of my ability.  I will continue to monitor closely and make adjustments to medical management as needed.  This document was created using [employer] Fluency Direct.  Transcription errors may have been made.  Please contact me if any questions may rise regarding documentation to clarify transcription.      GISEL Russell   Internal Medicine  Department of Hospital Medicine [facility]

## 2023-12-07 NOTE — PLAN OF CARE
Problem: Adult Inpatient Plan of Care  Goal: Plan of Care Review  Outcome: Ongoing, Progressing  Flowsheets (Taken 12/6/2023 2000)  Plan of Care Reviewed With: patient  Goal: Patient-Specific Goal (Individualized)  Outcome: Ongoing, Progressing  Flowsheets (Taken 12/6/2023 2000)  Anxieties, Fears or Concerns: none  Individualized Care Needs: IV abx  Goal: Absence of Hospital-Acquired Illness or Injury  Outcome: Ongoing, Progressing  Intervention: Identify and Manage Fall Risk  Flowsheets (Taken 12/6/2023 2000)  Safety Promotion/Fall Prevention:   assistive device/personal item within reach   bed alarm set   Fall Risk reviewed with patient/family   high risk medications identified   medications reviewed   nonskid shoes/socks when out of bed   room near unit station   side rails raised x 3   instructed to call staff for mobility  Intervention: Prevent Skin Injury  Flowsheets (Taken 12/6/2023 2000)  Skin Protection:   adhesive use limited   incontinence pads utilized   tubing/devices free from skin contact  Intervention: Prevent and Manage VTE (Venous Thromboembolism) Risk  Flowsheets (Taken 12/6/2023 2000)  Activity Management: Rolling - L1  VTE Prevention/Management:   bleeding risk assessed   bleeding precations maintained   ambulation promoted  Range of Motion: active ROM (range of motion) encouraged  Intervention: Prevent Infection  Flowsheets (Taken 12/6/2023 2000)  Infection Prevention:   cohorting utilized   environmental surveillance performed   hand hygiene promoted   single patient room provided   rest/sleep promoted  Goal: Optimal Comfort and Wellbeing  Outcome: Ongoing, Progressing  Intervention: Monitor Pain and Promote Comfort  Flowsheets (Taken 12/6/2023 2000)  Pain Management Interventions:   quiet environment facilitated   relaxation techniques promoted  Intervention: Provide Person-Centered Care  Flowsheets (Taken 12/6/2023 2000)  Trust Relationship/Rapport:   care explained   empathic listening  provided   questions answered   questions encouraged   thoughts/feelings acknowledged  Goal: Readiness for Transition of Care  Outcome: Ongoing, Progressing     Problem: Fluid Imbalance (Pneumonia)  Goal: Fluid Balance  Outcome: Ongoing, Progressing  Intervention: Monitor and Manage Fluid Balance  Flowsheets (Taken 12/6/2023 2000)  Fluid/Electrolyte Management: fluids provided     Problem: Infection (Pneumonia)  Goal: Resolution of Infection Signs and Symptoms  Outcome: Ongoing, Progressing  Intervention: Prevent Infection Progression  Flowsheets (Taken 12/6/2023 2000)  Fever Reduction/Comfort Measures:   lightweight bedding   lightweight clothing  Infection Management: aseptic technique maintained  Isolation Precautions: protective     Problem: Respiratory Compromise (Pneumonia)  Goal: Effective Oxygenation and Ventilation  Outcome: Ongoing, Progressing  Intervention: Promote Airway Secretion Clearance  Flowsheets (Taken 12/6/2023 2000)  Breathing Techniques/Airway Clearance: deep/controlled cough encouraged  Cough And Deep Breathing: done independently per patient  Intervention: Optimize Oxygenation and Ventilation  Flowsheets (Taken 12/6/2023 2000)  Airway/Ventilation Management: airway patency maintained     Problem: Infection  Goal: Absence of Infection Signs and Symptoms  Outcome: Ongoing, Progressing  Intervention: Prevent or Manage Infection  Flowsheets (Taken 12/6/2023 2000)  Fever Reduction/Comfort Measures:   lightweight bedding   lightweight clothing  Infection Management: aseptic technique maintained  Isolation Precautions: protective

## 2023-12-07 NOTE — PT/OT/SLP PROGRESS
"Name: Mike Urbina     : 1955 (68 y.o.)  MRN: [id number]           Patient Unavailable      Patient unable to be seen at this time secondary to: attempted to see pt but reports "brother is visiting, can you come back later?"          "

## 2023-12-07 NOTE — PT/OT/SLP PROGRESS
Occupational Therapy  Treatment    Name: Mike Urbina Jr.    : 1955 (68 y.o.)  MRN: [id number]           TREATMENT SUMMARY AND RECOMMENDATIONS:      OT Date of Treatment: 23  OT Start Time: 910  OT Stop Time: 950  OT Total Time (min): 40 min      Subjective Assessment:   No complaints  Lethargic   x Awake, alert, cooperative  Impulsive    Uncooperative   Flat affect    Agitated  c/o pain    Appropriate  c/o fatigue    Confused x Treated at bedside     Emotionally labile  Treated in gym/dept.      Other:        Therapy Precautions:    Cognitive deficits  Spinal precautions    Collar - hard  Sternal precautions    Collar - soft   TLSO    Fall risk  LSO    Hip precautions - posterior  Knee immobilizer    Hip precautions - anterior  WBAT    Impaired communication  Partial weightbearing   x Oxygen - 4L nasal cannula  TTWB    PEG tube  NWB    Visual deficits      Hearing deficits   Other:        Treatment Objectives:     Mobility Training:    Mobility task Assist level Comments    Bed mobility SBA Supine>EOB   Transfer CGA Stand/pivot t/f with RW to shower chair; stand/pivot t/f with RW to BSchair   Sit to stands transitions     Functional mobility CGA Short bouts in room with RW    Sitting balance     Standing balance  Good No LOB during session with/without UE support    Other:        ADL Training:    ADL Assist level Comments    Feeding     Grooming/hygiene     Bathing CGA Pt able to bath 10/10 body parts with CGA for balance/safety    Upper body dressing SBA Pt able to elana hospital gown    Lower body dressing Min A Pt able to elana briefs with CGA in standing for safety; required assist to elana B socks d/t fatigue/SOB reaching to floor level    Toileting     Toilet transfer CGA Stand/pivot t/f with RW to toilet    Adaptive equipment training     Other:         Additional Comments:  RN present to administer meds prior to shower. O2 remained 91% and above throughout session. Pt reported increased  SOB during tasks requiring frequent rest breaks.     Assessment: Patient tolerated session well.    GOALS:   Multidisciplinary Problems       Occupational Therapy Goals          Problem: Occupational Therapy    Goal Priority Disciplines Outcome Interventions   Occupational Therapy Goal     OT, PT/OT Ongoing, Progressing    Description: Goals to be met by: 12/20/23     Patient will increase functional independence with ADLs by performing:    LE Dressing with Supervision.  Grooming while standing at sink with Modified [place].  Toileting from toilet with Supervision for hygiene and clothing management.   Bathing from  shower chair/bench with Supervision.  Toilet transfer to toilet with Supervision.                         Recommendations:     Discharge Recommendations: home with HH  Discharge Equipment Recommendations:  walker, rolling  Barriers to discharge:  None     Plan:     Patient to be seen 6 x/week (QD/BID as appropriate) to address the above listed problems via self-care/home management, therapeutic activities, therapeutic exercises  Plan of Care Expires: 12/20/23  Plan of Care Reviewed with: patient  Revisions made to plan of care: No      Skilled OT Minutes Provided: 40  Communication with Treatment Team:     Equipment recommendations:       At end of treatment, patient remained:  x Up in chair     Up in wheelchair in room    In bed   x With alarm activated    Bed rails up   x Call bell in reach     Family/friends present    Restraints secured properly    In bathroom with CNA/RN notified    In gym with PT/PTA/tech    Nurse aware    Other:

## 2023-12-07 NOTE — PROGRESS NOTES
Pharmacokinetic Assessment Follow Up: IV Vancomycin    Vancomycin serum concentration assessment(s):    The trough level was drawn correctly and can be used to guide therapy at this time. The measurement is within the desired definitive target range of 15 to 20 mcg/mL.    Vancomycin Regimen Plan:    Continue 750 mg q12h and check trough at 2000 on 12/8/23.    Drug levels (last 3 results):  Recent Labs   Lab Result Units 12/04/23  1627 12/05/23  0431 12/06/23 2008   Vancomycin Trough ug/ml 31.4* 21.9* 18.7          Patient brief summary:  Mike Urbina Jr. is a 68 y.o. male initiated on antimicrobial therapy with IV Vancomycin for treatment of  pneumonia        Drug Allergies:   Review of patient's allergies indicates:  No Known Allergies    Actual Body Weight:   81.8kg    Renal Function:   Estimated Creatinine Clearance: 99.4 mL/min (based on SCr of 0.8 mg/dL).,     Dialysis Method (if applicable):  N/A    CBC (last 72 hours):  Recent Labs   Lab Result Units 12/04/23  0435   WBC x10(3)/mcL 6.55   Hgb g/dL 8.6*   Hct % 27.3*   Platelet x10(3)/mcL 200   Mono % % 7.8   Eos % % 0.3   Basophil % % 0.2       Metabolic Panel (last 72 hours):  Recent Labs   Lab Result Units 12/04/23  0435 12/06/23  0502   Sodium Level mmol/L 135*  --    Potassium Level mmol/L 3.8  --    Chloride mmol/L 107  --    Carbon Dioxide mmol/L 20*  --    Glucose Level mg/dL 100  --    Glucose, UA   --  Negative   Blood Urea Nitrogen mg/dL 13.7  --    Creatinine mg/dL 0.80  --        Vancomycin Administrations:  vancomycin given in the last 96 hours                     vancomycin 750 mg in dextrose 5 % (D5W) 250 mL IVPB (Vial-Mate) (mg) 750 mg New Bag 12/06/23 2033     750 mg New Bag  0807     750 mg New Bag 12/05/23 2114    vancomycin 750 mg in dextrose 5 % 250 mL IVPB (ready to mix) (mg) 750 mg New Bag 12/05/23 0848    vancomycin in dextrose 5 % 1 gram/250 mL IVPB 1,000 mg (mg) 1,000 mg New Bag 12/04/23 1616     1,000 mg New Bag  0434      1,000 mg New Bag 12/03/23 1549     1,000 mg New Bag  0550                    Microbiologic Results:  Microbiology Results (last 7 days)       Procedure Component Value Units Date/Time    Respiratory Culture [[id number]]     Order Status: Sent Specimen: Sputum

## 2023-12-07 NOTE — PT/OT/SLP PROGRESS
Occupational Therapy  Treatment    Name: Mike Urbina Jr.    : 1955 (68 y.o.)  MRN: [id number]           TREATMENT SUMMARY AND RECOMMENDATIONS:      OT Date of Treatment: 23  OT Start Time:   OT Stop Time: 154  OT Total Time (min): 19 min      Subjective Assessment:   No complaints  Lethargic   x Awake, alert, cooperative  Impulsive    Uncooperative   Flat affect    Agitated  c/o pain    Appropriate  c/o fatigue    Confused x Treated at bedside     Emotionally labile  Treated in gym/dept.      Other:        Therapy Precautions:    Cognitive deficits  Spinal precautions    Collar - hard  Sternal precautions    Collar - soft   TLSO    Fall risk  LSO    Hip precautions - posterior  Knee immobilizer    Hip precautions - anterior  WBAT    Impaired communication  Partial weightbearing   x Oxygen - 4L  TTWB    PEG tube  NWB    Visual deficits      Hearing deficits   Other:        Treatment Objectives:       Therapeutic Exercise:   Exercise Sets Reps Comments   Arm bike 2 5 min Level 1; forwards/backwards                          Additional Comments:  Pt up in chair on entry; reports he walked multiple laps with PT and feeling weak but agreed to arm bike. O2 remained above 95% throughout. Rest breaks as needed.     Assessment: Patient tolerated session well.    GOALS:   Multidisciplinary Problems       Occupational Therapy Goals          Problem: Occupational Therapy    Goal Priority Disciplines Outcome Interventions   Occupational Therapy Goal     OT, PT/OT Ongoing, Progressing    Description: Goals to be met by: 23     Patient will increase functional independence with ADLs by performing:    LE Dressing with Supervision.  Grooming while standing at sink with Modified [place].  Toileting from toilet with Supervision for hygiene and clothing management.   Bathing from  shower chair/bench with Supervision.  Toilet transfer to toilet with Supervision.                          Recommendations:     Discharge Recommendations: home with HH  Discharge Equipment Recommendations:  walker, rolling  Barriers to discharge:  None      Plan:     Patient to be seen 6 x/week (QD/BID as appropriate) to address the above listed problems via self-care/home management, therapeutic activities, therapeutic exercises  Plan of Care Expires: 12/20/23  Plan of Care Reviewed with: patient  Revisions made to plan of care: No      Skilled OT Minutes Provided: 19  Communication with Treatment Team:     Equipment recommendations:       At end of treatment, patient remained:  x Up in chair     Up in wheelchair in room    In bed   x With alarm activated    Bed rails up   x Call bell in reach     Family/friends present    Restraints secured properly    In bathroom with CNA/RN notified    In gym with PT/PTA/tech    Nurse aware    Other:

## 2023-12-08 LAB
ANION GAP SERPL CALC-SCNC: 5 MEQ/L
B PERT.PT PRMT NPH QL NAA+NON-PROBE: NOT DETECTED
BASOPHILS # BLD AUTO: 0.03 X10(3)/MCL
BASOPHILS NFR BLD AUTO: 0.6 %
BUN SERPL-MCNC: 11.1 MG/DL (ref 8.4–25.7)
C PNEUM DNA NPH QL NAA+NON-PROBE: NOT DETECTED
CALCIUM SERPL-MCNC: 7.7 MG/DL (ref 8.8–10)
CHLORIDE SERPL-SCNC: 104 MMOL/L (ref 98–107)
CO2 SERPL-SCNC: 25 MMOL/L (ref 23–31)
CREAT SERPL-MCNC: 0.95 MG/DL (ref 0.73–1.18)
CREAT/UREA NIT SERPL: 12
EOSINOPHIL # BLD AUTO: 0.04 X10(3)/MCL (ref 0–0.9)
EOSINOPHIL NFR BLD AUTO: 0.8 %
ERYTHROCYTE [DISTWIDTH] IN BLOOD BY AUTOMATED COUNT: 16.8 % (ref 11.5–17)
FERRITIN SERPL-MCNC: 305.7 NG/ML (ref 21.81–274.66)
FOLATE SERPL-MCNC: 14 NG/ML (ref 7–31.4)
GFR SERPLBLD CREATININE-BSD FMLA CKD-EPI: >60 MLS/MIN/1.73/M2
GLUCOSE SERPL-MCNC: 97 MG/DL (ref 82–115)
HADV DNA NPH QL NAA+NON-PROBE: NOT DETECTED
HCOV 229E RNA NPH QL NAA+NON-PROBE: NOT DETECTED
HCOV HKU1 RNA NPH QL NAA+NON-PROBE: NOT DETECTED
HCOV NL63 RNA NPH QL NAA+NON-PROBE: NOT DETECTED
HCOV OC43 RNA NPH QL NAA+NON-PROBE: NOT DETECTED
HCT VFR BLD AUTO: 25.3 % (ref 42–52)
HGB BLD-MCNC: 7.8 G/DL (ref 14–18)
HMPV RNA NPH QL NAA+NON-PROBE: NOT DETECTED
HPIV1 RNA NPH QL NAA+NON-PROBE: NOT DETECTED
HPIV2 RNA NPH QL NAA+NON-PROBE: NOT DETECTED
HPIV3 RNA NPH QL NAA+NON-PROBE: NOT DETECTED
HPIV4 RNA NPH QL NAA+NON-PROBE: NOT DETECTED
IMM GRANULOCYTES # BLD AUTO: 0.03 X10(3)/MCL (ref 0–0.04)
IMM GRANULOCYTES NFR BLD AUTO: 0.6 %
IRON SATN MFR SERPL: 14 % (ref 20–50)
IRON SERPL-MCNC: 24 UG/DL (ref 65–175)
LYMPHOCYTES # BLD AUTO: 0.81 X10(3)/MCL (ref 0.6–4.6)
LYMPHOCYTES NFR BLD AUTO: 15.7 %
M PNEUMO DNA NPH QL NAA+NON-PROBE: NOT DETECTED
MCH RBC QN AUTO: 27.9 PG (ref 27–31)
MCHC RBC AUTO-ENTMCNC: 30.8 G/DL (ref 33–36)
MCV RBC AUTO: 90.4 FL (ref 80–94)
MONOCYTES # BLD AUTO: 0.52 X10(3)/MCL (ref 0.1–1.3)
MONOCYTES NFR BLD AUTO: 10.1 %
NEUTROPHILS # BLD AUTO: 3.74 X10(3)/MCL (ref 2.1–9.2)
NEUTROPHILS NFR BLD AUTO: 72.2 %
PLATELET # BLD AUTO: 134 X10(3)/MCL (ref 130–400)
PMV BLD AUTO: 11.7 FL (ref 7.4–10.4)
POTASSIUM SERPL-SCNC: 3.3 MMOL/L (ref 3.5–5.1)
RBC # BLD AUTO: 2.8 X10(6)/MCL (ref 4.7–6.1)
RSV RNA NPH QL NAA+NON-PROBE: NOT DETECTED
RV+EV RNA NPH QL NAA+NON-PROBE: NOT DETECTED
SODIUM SERPL-SCNC: 134 MMOL/L (ref 136–145)
TIBC SERPL-MCNC: 153 UG/DL (ref 69–240)
TIBC SERPL-MCNC: 177 UG/DL (ref 250–450)
TRANSFERRIN SERPL-MCNC: 170 MG/DL (ref 163–344)
VANCOMYCIN TROUGH SERPL-MCNC: 18.9 UG/ML (ref 15–20)
VIT B12 SERPL-MCNC: 898 PG/ML (ref 213–816)
WBC # SPEC AUTO: 5.17 X10(3)/MCL (ref 4.5–11.5)

## 2023-12-08 PROCEDURE — 82746 ASSAY OF FOLIC ACID SERUM: CPT | Performed by: PHYSICIAN ASSISTANT

## 2023-12-08 PROCEDURE — 25000242 PHARM REV CODE 250 ALT 637 W/ HCPCS: Performed by: STUDENT IN AN ORGANIZED HEALTH CARE EDUCATION/TRAINING PROGRAM

## 2023-12-08 PROCEDURE — 63600175 PHARM REV CODE 636 W HCPCS: Performed by: STUDENT IN AN ORGANIZED HEALTH CARE EDUCATION/TRAINING PROGRAM

## 2023-12-08 PROCEDURE — 94761 N-INVAS EAR/PLS OXIMETRY MLT: CPT

## 2023-12-08 PROCEDURE — 27000207 HC ISOLATION

## 2023-12-08 PROCEDURE — 94640 AIRWAY INHALATION TREATMENT: CPT

## 2023-12-08 PROCEDURE — 97530 THERAPEUTIC ACTIVITIES: CPT

## 2023-12-08 PROCEDURE — 94760 N-INVAS EAR/PLS OXIMETRY 1: CPT

## 2023-12-08 PROCEDURE — 27000221 HC OXYGEN, UP TO 24 HOURS

## 2023-12-08 PROCEDURE — 97116 GAIT TRAINING THERAPY: CPT | Mod: CQ

## 2023-12-08 PROCEDURE — 82607 VITAMIN B-12: CPT | Performed by: PHYSICIAN ASSISTANT

## 2023-12-08 PROCEDURE — 63600175 PHARM REV CODE 636 W HCPCS: Performed by: PHYSICIAN ASSISTANT

## 2023-12-08 PROCEDURE — 25000003 PHARM REV CODE 250: Performed by: STUDENT IN AN ORGANIZED HEALTH CARE EDUCATION/TRAINING PROGRAM

## 2023-12-08 PROCEDURE — 80048 BASIC METABOLIC PNL TOTAL CA: CPT | Performed by: PHYSICIAN ASSISTANT

## 2023-12-08 PROCEDURE — 99900035 HC TECH TIME PER 15 MIN (STAT)

## 2023-12-08 PROCEDURE — 83540 ASSAY OF IRON: CPT | Performed by: PHYSICIAN ASSISTANT

## 2023-12-08 PROCEDURE — 25000003 PHARM REV CODE 250: Performed by: PHYSICIAN ASSISTANT

## 2023-12-08 PROCEDURE — A4216 STERILE WATER/SALINE, 10 ML: HCPCS | Performed by: STUDENT IN AN ORGANIZED HEALTH CARE EDUCATION/TRAINING PROGRAM

## 2023-12-08 PROCEDURE — 84425 ASSAY OF VITAMIN B-1: CPT | Performed by: PHYSICIAN ASSISTANT

## 2023-12-08 PROCEDURE — 11000004 HC SNF PRIVATE

## 2023-12-08 PROCEDURE — 94664 DEMO&/EVAL PT USE INHALER: CPT

## 2023-12-08 PROCEDURE — 85025 COMPLETE CBC W/AUTO DIFF WBC: CPT | Performed by: PHYSICIAN ASSISTANT

## 2023-12-08 PROCEDURE — 82728 ASSAY OF FERRITIN: CPT | Performed by: PHYSICIAN ASSISTANT

## 2023-12-08 PROCEDURE — 80202 ASSAY OF VANCOMYCIN: CPT | Performed by: STUDENT IN AN ORGANIZED HEALTH CARE EDUCATION/TRAINING PROGRAM

## 2023-12-08 RX ORDER — FUROSEMIDE 40 MG/1
40 TABLET ORAL DAILY
Status: DISCONTINUED | OUTPATIENT
Start: 2023-12-08 | End: 2023-12-26 | Stop reason: HOSPADM

## 2023-12-08 RX ORDER — POTASSIUM CHLORIDE 20 MEQ/1
20 TABLET, EXTENDED RELEASE ORAL 2 TIMES DAILY
Status: COMPLETED | OUTPATIENT
Start: 2023-12-08 | End: 2023-12-08

## 2023-12-08 RX ADMIN — SODIUM CHLORIDE, PRESERVATIVE FREE 10 ML: 5 INJECTION INTRAVENOUS at 11:12

## 2023-12-08 RX ADMIN — QUETIAPINE FUMARATE 200 MG: 100 TABLET ORAL at 06:12

## 2023-12-08 RX ADMIN — SODIUM CHLORIDE 125 MG: 9 INJECTION, SOLUTION INTRAVENOUS at 01:12

## 2023-12-08 RX ADMIN — LINACLOTIDE 72 MCG: 72 CAPSULE, GELATIN COATED ORAL at 05:12

## 2023-12-08 RX ADMIN — SODIUM CHLORIDE: 9 INJECTION, SOLUTION INTRAVENOUS at 09:12

## 2023-12-08 RX ADMIN — MICONAZOLE NITRATE 2 % TOPICAL POWDER: at 09:12

## 2023-12-08 RX ADMIN — METOPROLOL SUCCINATE 100 MG: 50 TABLET, EXTENDED RELEASE ORAL at 09:12

## 2023-12-08 RX ADMIN — GUAIFENESIN 400 MG: 200 SOLUTION ORAL at 08:12

## 2023-12-08 RX ADMIN — SODIUM CHLORIDE, PRESERVATIVE FREE 10 ML: 5 INJECTION INTRAVENOUS at 05:12

## 2023-12-08 RX ADMIN — Medication 1 CAPSULE: at 11:12

## 2023-12-08 RX ADMIN — SACUBITRIL AND VALSARTAN 1 TABLET: 24; 26 TABLET, FILM COATED ORAL at 09:12

## 2023-12-08 RX ADMIN — SACUBITRIL AND VALSARTAN 1 TABLET: 24; 26 TABLET, FILM COATED ORAL at 06:12

## 2023-12-08 RX ADMIN — POTASSIUM CHLORIDE 20 MEQ: 1500 TABLET, EXTENDED RELEASE ORAL at 09:12

## 2023-12-08 RX ADMIN — Medication 1 CAPSULE: at 07:12

## 2023-12-08 RX ADMIN — GUAIFENESIN 400 MG: 200 SOLUTION ORAL at 09:12

## 2023-12-08 RX ADMIN — ACETYLCYSTEINE 4 ML: 200 INHALANT RESPIRATORY (INHALATION) at 02:12

## 2023-12-08 RX ADMIN — AMIODARONE HYDROCHLORIDE 200 MG: 200 TABLET ORAL at 09:12

## 2023-12-08 RX ADMIN — ACETYLCYSTEINE 4 ML: 200 INHALANT RESPIRATORY (INHALATION) at 07:12

## 2023-12-08 RX ADMIN — VANCOMYCIN HYDROCHLORIDE 750 MG: 750 INJECTION, POWDER, LYOPHILIZED, FOR SOLUTION INTRAVENOUS at 09:12

## 2023-12-08 RX ADMIN — MELATONIN TAB 3 MG 9 MG: 3 TAB at 06:12

## 2023-12-08 RX ADMIN — Medication 1 CAPSULE: at 04:12

## 2023-12-08 RX ADMIN — LEVALBUTEROL HYDROCHLORIDE 1.25 MG: 1.25 SOLUTION RESPIRATORY (INHALATION) at 02:12

## 2023-12-08 RX ADMIN — CEFEPIME 1 G: 1 INJECTION, POWDER, FOR SOLUTION INTRAMUSCULAR; INTRAVENOUS at 09:12

## 2023-12-08 RX ADMIN — WHITE PETROLATUM: 1.75 OINTMENT TOPICAL at 09:12

## 2023-12-08 RX ADMIN — SODIUM CHLORIDE: 9 INJECTION, SOLUTION INTRAVENOUS at 01:12

## 2023-12-08 RX ADMIN — GUAIFENESIN 400 MG: 200 SOLUTION ORAL at 01:12

## 2023-12-08 RX ADMIN — LEVALBUTEROL HYDROCHLORIDE 1.25 MG: 1.25 SOLUTION RESPIRATORY (INHALATION) at 11:12

## 2023-12-08 RX ADMIN — LEVALBUTEROL HYDROCHLORIDE 1.25 MG: 1.25 SOLUTION RESPIRATORY (INHALATION) at 07:12

## 2023-12-08 RX ADMIN — FOLIC ACID 1 MG: 1 TABLET ORAL at 09:12

## 2023-12-08 RX ADMIN — FUROSEMIDE 40 MG: 10 INJECTION, SOLUTION INTRAMUSCULAR; INTRAVENOUS at 09:12

## 2023-12-08 RX ADMIN — CEFEPIME 1 G: 1 INJECTION, POWDER, FOR SOLUTION INTRAMUSCULAR; INTRAVENOUS at 05:12

## 2023-12-08 RX ADMIN — CEFEPIME 1 G: 1 INJECTION, POWDER, FOR SOLUTION INTRAMUSCULAR; INTRAVENOUS at 11:12

## 2023-12-08 RX ADMIN — APIXABAN 5 MG: 5 TABLET, FILM COATED ORAL at 09:12

## 2023-12-08 RX ADMIN — APIXABAN 5 MG: 5 TABLET, FILM COATED ORAL at 06:12

## 2023-12-08 RX ADMIN — SODIUM CHLORIDE: 9 INJECTION, SOLUTION INTRAVENOUS at 11:12

## 2023-12-08 RX ADMIN — SODIUM CHLORIDE, PRESERVATIVE FREE 10 ML: 5 INJECTION INTRAVENOUS at 06:12

## 2023-12-08 RX ADMIN — ACETYLCYSTEINE 4 ML: 200 INHALANT RESPIRATORY (INHALATION) at 11:12

## 2023-12-08 RX ADMIN — ASPIRIN 81 MG: 81 TABLET, COATED ORAL at 09:12

## 2023-12-08 NOTE — PT/OT/SLP PROGRESS
Occupational Therapy  Treatment    Name: Mike Urbina Jr.    : 1955 (68 y.o.)  MRN: [id number]           TREATMENT SUMMARY AND RECOMMENDATIONS:      OT Date of Treatment: 23  OT Start Time: 1000  OT Stop Time: 1025  OT Total Time (min): 25 min      Subjective Assessment:   No complaints  Lethargic   x Awake, alert, cooperative  Impulsive    Uncooperative   Flat affect    Agitated  c/o pain    Appropriate  c/o fatigue    Confused x Treated at bedside     Emotionally labile  Treated in gym/dept.      Other:        Therapy Precautions:    Cognitive deficits  Spinal precautions    Collar - hard  Sternal precautions    Collar - soft   TLSO    Fall risk  LSO    Hip precautions - posterior  Knee immobilizer    Hip precautions - anterior  WBAT    Impaired communication  Partial weightbearing   x Oxygen - 2L  TTWB    PEG tube  NWB    Visual deficits      Hearing deficits   Other:        Treatment Objectives:     Mobility Training:    Mobility task Assist level Comments    Bed mobility     Transfer     Sit to stands transitions     Functional mobility     Sitting balance     Standing balance  CGA Pt stood without UE support and participated in balloon volleyball ax to hit back <>forth with tech; no LOB noted. X3 rounds with extended rest break between sets d/t SOB/fatigue. Pt able to tolerate standing 2-3 min per round.    Other:        ADL Training:    ADL Assist level Comments    Feeding     Grooming/hygiene     Bathing     Upper body dressing     Lower body dressing SBA Pt able elana/doff B socks    Toileting     Toilet transfer     Adaptive equipment training     Other:           Therapeutic Exercise:   Exercise Sets Reps Comments   Sit to stands 2 5 reps  SBA from bschair - rest breaks between sets d/t SOB                          Additional Comments:  O2 remained 94-99% throughout     Assessment: Patient tolerated session well.    GOALS:   Multidisciplinary Problems       Occupational Therapy Goals           Problem: Occupational Therapy    Goal Priority Disciplines Outcome Interventions   Occupational Therapy Goal     OT, PT/OT Ongoing, Progressing    Description: Goals to be met by: 12/20/23     Patient will increase functional independence with ADLs by performing:    LE Dressing with Supervision.  Grooming while standing at sink with Modified [place].  Toileting from toilet with Supervision for hygiene and clothing management.   Bathing from  shower chair/bench with Supervision.  Toilet transfer to toilet with Supervision.                         Recommendations:     Discharge Equipment Recommendations:  walker, rolling     Plan:     Patient to be seen 6 x/week (QD/BID as appropriate) to address the above listed problems via self-care/home management, therapeutic activities, therapeutic exercises  Plan of Care Expires: 12/20/23  Plan of Care Reviewed with: patient  Revisions made to plan of care: No      Skilled OT Minutes Provided: 25  Communication with Treatment Team:     Equipment recommendations:       At end of treatment, patient remained:  x Up in chair     Up in wheelchair in room    In bed   x With alarm activated    Bed rails up   x Call bell in reach     Family/friends present    Restraints secured properly    In bathroom with CNA/RN notified    In gym with PT/PTA/tech    Nurse aware    Other:

## 2023-12-08 NOTE — PT/OT/SLP PROGRESS
Physical Therapy Treatment Note           Name: Mike Urbina Jr.    : 1955 (68 y.o.)  MRN: [id number]           TREATMENT SUMMARY AND RECOMMENDATIONS:    PT Received On: 23  PT Start Time: 1130     PT Stop Time: 1145  PT Total Time (min): 15 min     Subjective Assessment:  x No complaints  Lethargic    Awake, alert, cooperative  Uncooperative    Agitated  c/o pain    Appropriate  c/o fatigue    Confused  Treated at bedside     Emotionally labile  Treated in gym/dept.    Impulsive  Other:    Flat affect       Therapy Precautions:    Cognitive deficits  Spinal precautions    Collar - hard  Sternal precautions    Collar - soft   TLSO    Fall risk  LSO    Hip precautions - posterior  Knee immobilizer    Hip precautions - anterior  WBAT    Impaired communication  Partial weightbearing   x Oxygen  TTWB    PEG tube  NWB    Visual deficits  Other:    Hearing deficits          Treatment Objectives:     Mobility Training:   Assist level Comments    Bed mobility     Transfer     Gait CGA Amb on firm surface with  ft    Sit to stand transitions     Sitting balance     Standing balance      Wheelchair mobility     Car transfer     Other:          Therapeutic Exercise:   Exercise Sets Reps Comments                               Additional Comments:  2L O2 donned     Assessment: Patient tolerated session well.    PT Plan: continue  Revisions made to plan of care: No    GOALS:   Multidisciplinary Problems       Physical Therapy Goals          Problem: Physical Therapy    Goal Priority Disciplines Outcome Goal Variances Interventions   Physical Therapy Goal     PT, PT/OT Ongoing, Progressing     Description: Goals to be met by: Discharge     Patient will increase functional independence with mobility by performin. Sit to stand transfer with Modified [place]  2. Bed to chair transfer with Modified [place] using No Assistive Device  3. Gait  x 1000 feet including outdoor negotiation  and stair completion with Supervision using No Assistive Device.                          Skilled PT Minutes Provided: 15   Communication with Treatment Team:     Equipment recommendations:       At end of treatment, patient remained:  x Up in chair     Up in wheelchair in room    In bed   x With alarm activated    Bed rails up   x Call bell in reach     Family/friends present    Restraints secured properly    In bathroom with CNA/RN notified    Nurse aware    In gym with therapist/tech    Other:

## 2023-12-08 NOTE — PLAN OF CARE
Problem: Adult Inpatient Plan of Care  Goal: Plan of Care Review  Outcome: Ongoing, Progressing  Flowsheets (Taken 12/7/2023 1930)  Plan of Care Reviewed With: patient  Goal: Patient-Specific Goal (Individualized)  Outcome: Ongoing, Progressing  Flowsheets (Taken 12/7/2023 1930)  Anxieties, Fears or Concerns: none at this time  Individualized Care Needs: monitor VS and O2 Sat, IV abx therapy  Goal: Absence of Hospital-Acquired Illness or Injury  Outcome: Ongoing, Progressing  Intervention: Identify and Manage Fall Risk  Flowsheets (Taken 12/7/2023 1930)  Safety Promotion/Fall Prevention:   assistive device/personal item within reach   bed alarm set   Fall Risk reviewed with patient/family   high risk medications identified   medications reviewed   nonskid shoes/socks when out of bed   room near unit station   side rails raised x 3   instructed to call staff for mobility  Intervention: Prevent Skin Injury  Flowsheets (Taken 12/7/2023 1930)  Body Position: position changed independently  Skin Protection:   adhesive use limited   incontinence pads utilized   tubing/devices free from skin contact  Intervention: Prevent and Manage VTE (Venous Thromboembolism) Risk  Flowsheets (Taken 12/7/2023 1930)  Activity Management: Rolling - L1  VTE Prevention/Management:   bleeding risk assessed   bleeding precations maintained   ambulation promoted  Range of Motion: active ROM (range of motion) encouraged  Intervention: Prevent Infection  Flowsheets (Taken 12/7/2023 1930)  Infection Prevention:   cohorting utilized   environmental surveillance performed   hand hygiene promoted   single patient room provided   rest/sleep promoted  Goal: Optimal Comfort and Wellbeing  Outcome: Ongoing, Progressing  Intervention: Monitor Pain and Promote Comfort  Flowsheets (Taken 12/7/2023 1930)  Pain Management Interventions:   quiet environment facilitated   relaxation techniques promoted  Intervention: Provide Person-Centered Care  Flowsheets  (Taken 12/7/2023 1930)  Trust Relationship/Rapport:   care explained   empathic listening provided   questions answered   questions encouraged   thoughts/feelings acknowledged  Goal: Readiness for Transition of Care  Outcome: Ongoing, Progressing     Problem: Fluid Imbalance (Pneumonia)  Goal: Fluid Balance  Outcome: Ongoing, Progressing  Intervention: Monitor and Manage Fluid Balance  Flowsheets (Taken 12/7/2023 1930)  Fluid/Electrolyte Management: fluids provided     Problem: Infection (Pneumonia)  Goal: Resolution of Infection Signs and Symptoms  Outcome: Ongoing, Progressing  Intervention: Prevent Infection Progression  Flowsheets (Taken 12/7/2023 1930)  Fever Reduction/Comfort Measures:   lightweight bedding   lightweight clothing  Infection Management: aseptic technique maintained  Isolation Precautions: protective     Problem: Respiratory Compromise (Pneumonia)  Goal: Effective Oxygenation and Ventilation  Outcome: Ongoing, Progressing  Intervention: Promote Airway Secretion Clearance  Flowsheets (Taken 12/7/2023 1930)  Breathing Techniques/Airway Clearance: deep/controlled cough encouraged  Cough And Deep Breathing: done independently per patient  Intervention: Optimize Oxygenation and Ventilation  Flowsheets (Taken 12/7/2023 1930)  Airway/Ventilation Management: airway patency maintained  Head of Bed (HOB) Positioning: HOB at 20-30 degrees     Problem: Infection  Goal: Absence of Infection Signs and Symptoms  Outcome: Ongoing, Progressing  Intervention: Prevent or Manage Infection  Flowsheets (Taken 12/7/2023 1930)  Fever Reduction/Comfort Measures:   lightweight bedding   lightweight clothing  Infection Management: aseptic technique maintained  Isolation Precautions: protective

## 2023-12-08 NOTE — PROGRESS NOTES
Inpatient Nutrition Evaluation    Admit Date: 12/5/2023   Total duration of encounter: 3 days    Nutrition Recommendation/Prescription     Continue heart healthy diet.     Nutrition Assessment     Chart Review    Reason Seen: continuous nutrition monitoring, length of stay, and follow-up    Malnutrition Screening Tool Results   Have you recently lost weight without trying?: No  Have you been eating poorly because of a decreased appetite?: Yes   MST Score: 1     Diagnosis:   Bacteremia 12/5/2023       Pneumonia due to infectious organism 11/24/2023                Relevant Medical History: Atrial flutter  Date Unknown  CHF (congestive heart failure)  Date Unknown  Coronary artery disease  Date Unknown  Hypertension  Date Unknown  Myocardial infarction  Date Unknown  SOB (shortness of breath)        Nutrition-Related Medications: calcium carbonate, folic acid, zosyn, senna-docusate, vancomycin,     Nutrition-Related Labs:   Latest Reference Range & Units 12/08/23 03:04   WBC 4.50 - 11.50 x10(3)/mcL 5.17   RBC 4.70 - 6.10 x10(6)/mcL 2.80 (L)   Hemoglobin 14.0 - 18.0 g/dL 7.8 (L)   Hematocrit 42.0 - 52.0 % 25.3 (L)   MCV 80.0 - 94.0 fL 90.4   MCH 27.0 - 31.0 pg 27.9   MCHC 33.0 - 36.0 g/dL 30.8 (L)   RDW 11.5 - 17.0 % 16.8   Platelet Count 130 - 400 x10(3)/mcL 134   MPV 7.4 - 10.4 fL 11.7 (H)   Neut % % 72.2   LYMPH % % 15.7   Mono % % 10.1   Eosinophil % % 0.8   Basophil % % 0.6   Immature Granulocytes % 0.6   Neut # 2.1 - 9.2 x10(3)/mcL 3.74   Lymph # 0.6 - 4.6 x10(3)/mcL 0.81   Mono # 0.1 - 1.3 x10(3)/mcL 0.52   Eos # 0 - 0.9 x10(3)/mcL 0.04   Baso # <=0.2 x10(3)/mcL 0.03   Immature Grans (Abs) 0 - 0.04 x10(3)/mcL 0.03   Iron 65 - 175 ug/dL 24 (L)   TIBC 250 - 450 ug/dL 177 (L)   Iron Binding Capacity Unsaturated 69 - 240 ug/dL 153   Transferrin 163 - 344 mg/dL 170   Ferritin 21.81 - 274.66 ng/mL 305.70 (H)   Folate 7.0 - 31.4 ng/mL 14.0   Vitamin B-12 213 - 816 pg/mL 898 (H)   Iron Saturation 20 - 50 % 14 (L)  "  Sodium 136 - 145 mmol/L 134 (L)   Potassium 3.5 - 5.1 mmol/L 3.3 (L)   Chloride 98 - 107 mmol/L 104   CO2 23 - 31 mmol/L 25   Anion Gap mEq/L 5.0   BUN 8.4 - 25.7 mg/dL 11.1   Creatinine 0.73 - 1.18 mg/dL 0.95   BUN/CREAT RATIO  12   eGFR mls/min/1.73/m2 >60   Glucose 82 - 115 mg/dL 97   Calcium 8.8 - 10.0 mg/dL 7.7 (L)   (L): Data is abnormally low  (H): Data is abnormally high    Diet Order: Diet heart healthy  Oral Supplement Order: none  Appetite/Oral Intake: good/% of meals  Factors Affecting Nutritional Intake: none identified  Food/Pentecostal/Cultural Preferences: none reported  Food Allergies: none reported    Skin Integrity: cracked  Wound(s):       Comments    Pt in therapy during rounds. Will try again. Reviewed intake in chart 75% of meals. Will monitor.     Anthropometrics    Height: 6' 2" (188 cm)    Last Weight: 81.8 kg (180 lb 5.4 oz) (12/05/23 1500) Weight Method: Bed Scale  BMI (Calculated): 23.1  BMI Classification: normal (BMI 18.5-24.9)        Ideal Body Weight (IBW), Male: 190 lb     % Ideal Body Weight, Male (lb): 94.92 %                          Usual Weight Provided By: unable to obtain usual weight    Wt Readings from Last 5 Encounters:   12/05/23 81.8 kg (180 lb 5.4 oz)   11/25/23 73.9 kg (163 lb)   11/24/23 79.4 kg (175 lb)   11/19/23 81.6 kg (180 lb)   11/07/23 80 kg (176 lb 4.8 oz)     Weight Change(s) Since Admission:  Admit Weight: 81.8 kg (180 lb 5.4 oz) (12/05/23 1500)    No new wt recorded  Patient Education    Not applicable.    Monitoring & Evaluation     Dietitian will monitor food and beverage intake, weight, weight change, electrolyte/renal panel, glucose/endocrine profile, and gastrointestinal profile.  Nutrition Risk/Follow-Up: low (follow-up in 5-7 days)  Patients assigned 'low nutrition risk' status do not qualify for a full nutritional assessment but will be monitored and re-evaluated in a 5-7 day time period. Please consult if re-evaluation needed sooner.  "

## 2023-12-08 NOTE — PLAN OF CARE
Problem: Adult Inpatient Plan of Care  Goal: Plan of Care Review  Outcome: Ongoing, Progressing  Flowsheets (Taken 12/8/2023 1237)  Plan of Care Reviewed With: patient  Goal: Patient-Specific Goal (Individualized)  Outcome: Ongoing, Progressing  Flowsheets (Taken 12/8/2023 1237)  Anxieties, Fears or Concerns: no concerns now, slept better  Individualized Care Needs: monitor vs and O2 sat, encourage IS and flutter use, IV abt  Goal: Absence of Hospital-Acquired Illness or Injury  Outcome: Ongoing, Progressing  Intervention: Identify and Manage Fall Risk  Flowsheets (Taken 12/8/2023 1237)  Safety Promotion/Fall Prevention:   assistive device/personal item within reach   chair alarm set   in recliner, wheels locked   medications reviewed   nonskid shoes/socks when out of bed   gait belt with ambulation   instructed to call staff for mobility  Intervention: Prevent Skin Injury  Flowsheets (Taken 12/8/2023 1237)  Body Position:   position changed independently   legs elevated   weight shifting  Skin Protection:   adhesive use limited   protective footwear used   tubing/devices free from skin contact  Intervention: Prevent and Manage VTE (Venous Thromboembolism) Risk  Flowsheets (Taken 12/8/2023 1237)  Activity Management:   Ambulated in room - L4   Ambulated in [place] - L4   Ambulated to bathroom - L4   Standing - L3   Up in chair - L3  VTE Prevention/Management:   bleeding risk assessed   bleeding precations maintained   ambulation promoted   dorsiflexion/plantar flexion performed  Range of Motion: active ROM (range of motion) encouraged  Intervention: Prevent Infection  Flowsheets (Taken 12/8/2023 1237)  Infection Prevention:   hand hygiene promoted   personal protective equipment utilized   equipment surfaces disinfected   environmental surveillance performed   single patient room provided   rest/sleep promoted  Goal: Optimal Comfort and Wellbeing  Outcome: Ongoing, Progressing  Intervention: Monitor Pain and Promote  Comfort  Flowsheets (Taken 12/8/2023 1237)  Pain Management Interventions:   care clustered   pillow support provided   quiet environment facilitated   relaxation techniques promoted  Intervention: Provide Person-Centered Care  Flowsheets (Taken 12/8/2023 1237)  Trust Relationship/Rapport:   care explained   choices provided   reassurance provided   questions encouraged  Goal: Readiness for Transition of Care  Outcome: Ongoing, Progressing     Problem: Fluid Imbalance (Pneumonia)  Goal: Fluid Balance  Outcome: Ongoing, Progressing  Intervention: Monitor and Manage Fluid Balance  Flowsheets (Taken 12/8/2023 1237)  Fluid/Electrolyte Management: fluids provided     Problem: Infection (Pneumonia)  Goal: Resolution of Infection Signs and Symptoms  Outcome: Ongoing, Progressing  Intervention: Prevent Infection Progression  Flowsheets (Taken 12/8/2023 1237)  Infection Management: aseptic technique maintained  Isolation Precautions:   contact   precautions maintained     Problem: Respiratory Compromise (Pneumonia)  Goal: Effective Oxygenation and Ventilation  Outcome: Ongoing, Progressing  Intervention: Promote Airway Secretion Clearance  Flowsheets (Taken 12/8/2023 1237)  Breathing Techniques/Airway Clearance: deep/controlled cough encouraged  Cough And Deep Breathing: done with encouragement  Intervention: Optimize Oxygenation and Ventilation  Flowsheets (Taken 12/8/2023 1237)  Airway/Ventilation Management: airway patency maintained  Head of Bed (HOB) Positioning: HOB at 60-90 degrees     Problem: Infection  Goal: Absence of Infection Signs and Symptoms  Outcome: Ongoing, Progressing

## 2023-12-08 NOTE — PROGRESS NOTES
[facility] MEDICINE ~ PROGRESS NOTE        CHIEF COMPLAINT   Hospital follow up    HOSPITAL COURSE     Patient is a 67yo  male with a past medical history of CAD, diastolic CHF, Afib, aortic stenosis, NSTEMI, and HTN who was recently admitted to [facility] for pneumonia and Afib RVR. Patient was diagnosed with Influenza A on 11/19/2023 prior to admission and started on Tamiflu. On arrival to the ER, CXR on 11/24/2023 showed RUL pneumonia. CTA chest 11/24/2023 showed no PE, but multifocal pneumonia most evident in RUL. Labs were significant for BNP 3053.1, Troponin 0.137>0.096. Patient was admitted for ICU, started on Cardizem drip, Vanc, Zosyn, Doxy, and required Levophed for hypotension. Sputum culture from 11/24/2023 showed unsatisfactory sample for culture. Echo 11/25/2023 showed LVEF 55%, diastolic function cannot be reliably determined in the presence of mitral valve disease, right ventricle systolic function is mildly reduced, moderately calcified aortic cusps with moderately restricted motion, moderate aortic stenosis, severe posterior mitral annular calcification present, moderate mitral stenosis, mild mitral regurgitation, mild tricuspid regurgitation. Blood cultures x2 resulted positive for MRSA on 11/27/2023.  Infectious Disease recommended patient to continue vancomycin for 4 weeks with suspected in date on 12/25/2023.  Patient was downgraded on 11/27/2023. CXR on 11/26/2023 and 11/27/2023 showed increasing patchy and consolidative bilateral airspace opacities. Stool culture on 11/27/2023 showed many yeast. Repeat blood cultures x2 on 12/02/2023 showed no growth. Patient underwent a CARO guided DCCV on 11/30/2023 which showed mild-moderately enlarged right atrium, no ASD or PFO, LVEF 55%, mild-moderate left ventricular hypertrophy, severe aortic stenosis, moderate mitral stenosis, mild-moderate mitral regurgitation, mild-moderate tricuspid regurgitation, no  vegetation present, no intracardiac thrombus, successful cardioversion. CTA chest, abdomen, pelvis 12/05/2023 showed small bilateral pleural effusion R>L and patchy consolidation in both lungs increased compared to prior. CTA was obtained for TAVR workup to be completed outpatient after ID clearance. On exam today, patient is currently on 2 L nasal cannula with O2 sat 99%.  With worsening pneumonia and lung sounds on exam, we will start Zosyn, obtain sputum culture, add nystatin for fungal coverage, and schedule Mucomyst with the Xopenex nebulizers 3 times a day.     Today  Lungs sounds improved today. Transition to PO Lasix. H&H on a downward trend. Reports did not sleep well again last night.     OBJECTIVE/PHYSICAL EXAM     VITAL SIGNS (MOST RECENT):  Temp: 98.1 °F (36.7 °C) (12/08/23 0854)  Pulse: 82 (12/08/23 0854)  Resp: 17 (12/08/23 0854)  BP: 130/74 (12/08/23 0854)  SpO2: 100 % (12/08/23 0854) VITAL SIGNS (24 HOUR RANGE):  Temp:  [97.3 °F (36.3 °C)-98.3 °F (36.8 °C)] 98.1 °F (36.7 °C)  Pulse:  [73-85] 82  Resp:  [17-24] 17  SpO2:  [94 %-100 %] 100 %  BP: (130-152)/(65-78) 130/74     GENERAL: In no acute distress, afebrile  HEENT: Atraumatic, normocephalic, moist mucous membranes, supple neck   CHEST: Improved lung sounds, LLL rhonchi    HEART: S1, S2, grade IV murmur  ABDOMEN: Soft, nontender, BS +  MSK: Warm, no lower extremity edema, no clubbing or cyanosis  NEUROLOGIC: Alert and oriented x4, moving all extremities with good strength   INTEGUMENTARY: No obvious skin rash   PSYCHIATRY: Appropriate mood and affect     ASSESSMENT/PLAN     MRSA bacteriemia   Pneumonia  Recent Influenza A infection (11/19/2023)  CXR on 11/24/2023 showed RUL pneumonia  CTA chest 11/24/2023 showed no PE, but multifocal pneumonia most evident in RUL  Sputum culture from 11/24/2023 showed unsatisfactory sample for culture  Blood cultures x2 resulted positive for MRSA on 11/27/2023  ID recommended patient to continue vancomycin for 4  weeks with suspected in date on 12/25/2023  CXR on 11/26/2023 and 11/27/2023 showed increasing patchy and consolidative bilateral airspace opacities  Repeat blood cultures x2 on 12/02/2023 showed no growth  CTA chest, abdomen, pelvis 12/05/2023 showed small bilateral pleural effusion R>L and patchy consolidation in both lungs increased compared to prior  Currently on 4L NC with O2 sat 97%  Continue Vanc until 12/25/2023 and Cefepime (12/07/2023-12/11/2023)  D/c Zosyn (12/06/2023-12/07/203) and Nystatin (12/06/2023-12/07/2023)  IS, cough assist, acapella   Sputum culture collected   Xopenex Q8 with Mucomyst      Recent stool infection   Diarrhea   Stool culture on 11/27/2023 showed many yeast  D/c Nystatin (12/06/2023-12/07/2023)  Continue Probiotic, may need to change Linzess to prn     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   Hx of alcohol use disorder  H&H on downward trend   Awaiting thiamine results  Start supplementation     Hypokalemia   Replete     Transamnitis   Grayzone hepatitis C Ab  Hepatitis C viral load ordered      Hx of CAD, diastolic CHF, Afib, aortic stenosis, NSTEMI, and HTN  BNP 3053.1, Troponin 0.137>0.096 on 11/24/2023  Echo 11/25/2023 showed LVEF 55%, diastolic function cannot be reliably determined in the presence of mitral valve disease, right ventricle systolic function is mildly reduced, moderately calcified aortic cusps with moderately restricted motion, moderate aortic stenosis, severe posterior mitral annular calcification present, moderate mitral stenosis, mild mitral regurgitation, mild tricuspid regurgitation  CARO guided DCCV on 11/30/2023 which showed mild-moderately enlarged right atrium, no ASD or PFO, LVEF 55%, mild-moderate left ventricular hypertrophy, severe aortic stenosis, moderate mitral stenosis, mild-moderate mitral regurgitation, mild-moderate tricuspid regurgitation, no vegetation present, no intracardiac thrombus, successful cardioversion  TAVR to be  completed outpatient after ID clearance  Continue Entresto, Toprol XL, Amiodarone, Eliquis, and Aspirin   Transition to PO Lasix 40mg  Appropriate cardiac follow up     DVT prophylaxis: Eliquis     Anticipated discharge and disposition: Continue PT/OT   __________________________________________________________________________    LABS/MICRO/MEDS/DIAGNOSTICS     LABS  Recent Labs     12/07/23  0335 12/08/23  0304   * 134*   K 3.6 3.3*   CHLORIDE 105 104   CO2 23 25   BUN 12.3 11.1   CREATININE 0.95 0.95   GLUCOSE 104 97   CALCIUM 7.4* 7.7*   ALKPHOS 68  --    AST 13  --    ALT 12  --    ALBUMIN 1.7*  --      Recent Labs     12/08/23  0304   WBC 5.17   RBC 2.80*   HCT 25.3*   MCV 90.4        MICROBIOLOGY  Microbiology Results (last 7 days)       Procedure Component Value Units Date/Time    Respiratory Culture [[id number]]     Order Status: Sent Specimen: Sputum              MEDICATIONS   acetylcysteine 200 mg/ml (20%)  4 mL Nebulization TID    amiodarone  200 mg Oral Daily    apixaban  5 mg Oral BID    aspirin  81 mg Oral Daily    ceFEPime (MAXIPIME) IVPB  1 g Intravenous Q8H    folic acid  1 mg Oral Daily    furosemide  40 mg Oral Daily    guaiFENesin 100 mg/5 ml  400 mg Oral Q6H WAKE    Lactobacillus acidophilus  1 capsule Oral TID WM    levalbuterol  1.25 mg Nebulization Q8H    linaCLOtide  72 mcg Oral Before breakfast    melatonin  9 mg Oral Nightly    metoprolol succinate  100 mg Oral Daily    miconazole NITRATE 2 %   Topical (Top) BID    potassium chloride  20 mEq Oral BID    QUEtiapine  200 mg Oral QHS    sacubitriL-valsartan  1 tablet Oral BID    sodium chloride 0.9%  10 mL Intravenous Q6H    vancomycin (VANCOCIN) IV (PEDS and ADULTS)  750 mg Intravenous Q12H    white petrolatum   Topical (Top) BID         INFUSIONS       DIAGNOSTIC TESTS  X-Ray Chest 1 View for Line/Tube Placement   Final Result      Consolidative changes in the right perihilar region right mid and right upper lung zone similar  to previous exam.      Interval insertion of right-sided PICC line         Electronically signed by: Juan Wharton   Date:    12/06/2023   Time:    12:14        EF   Date Value Ref Range Status   11/25/2023 55 % Final   12/29/2022 55 % Final        NUTRITION STATUS  Patient meets ASPEN criteria for   malnutrition of   per RD assessment as evidenced by:                       A minimum of two characteristics is recommended for diagnosis of either severe or non-severe malnutrition.     Case related differential diagnoses have been reviewed; assessment and plan has been documented. I have personally reviewed the labs and test results that are currently available; I have reviewed the patients medication list. I have reviewed the consulting providers recommendations. I have reviewed or attempted to review medical records based upon their availability.  All of the patient's and/or family's questions have been addressed and answered to the best of my ability.  I will continue to monitor closely and make adjustments to medical management as needed.  This document was created using M*Modal Fluency Direct.  Transcription errors may have been made.  Please contact me if any questions may rise regarding documentation to clarify transcription.      GISEL Russell   Internal Medicine  Department of Hospital Medicine [facility] Surgical Unit

## 2023-12-09 LAB
ANION GAP SERPL CALC-SCNC: 7 MEQ/L
BASOPHILS # BLD AUTO: 0.02 X10(3)/MCL
BASOPHILS NFR BLD AUTO: 0.5 %
BUN SERPL-MCNC: 9.8 MG/DL (ref 8.4–25.7)
CALCIUM SERPL-MCNC: 7.4 MG/DL (ref 8.8–10)
CHLORIDE SERPL-SCNC: 103 MMOL/L (ref 98–107)
CO2 SERPL-SCNC: 27 MMOL/L (ref 23–31)
CREAT SERPL-MCNC: 0.81 MG/DL (ref 0.73–1.18)
CREAT/UREA NIT SERPL: 12
EOSINOPHIL # BLD AUTO: 0.03 X10(3)/MCL (ref 0–0.9)
EOSINOPHIL NFR BLD AUTO: 0.7 %
ERYTHROCYTE [DISTWIDTH] IN BLOOD BY AUTOMATED COUNT: 16.6 % (ref 11.5–17)
GFR SERPLBLD CREATININE-BSD FMLA CKD-EPI: >60 MLS/MIN/1.73/M2
GLUCOSE SERPL-MCNC: 91 MG/DL (ref 82–115)
HCT VFR BLD AUTO: 25.5 % (ref 42–52)
HGB BLD-MCNC: 7.7 G/DL (ref 14–18)
IMM GRANULOCYTES # BLD AUTO: 0.04 X10(3)/MCL (ref 0–0.04)
IMM GRANULOCYTES NFR BLD AUTO: 1 %
LYMPHOCYTES # BLD AUTO: 0.84 X10(3)/MCL (ref 0.6–4.6)
LYMPHOCYTES NFR BLD AUTO: 20.9 %
MCH RBC QN AUTO: 27.3 PG (ref 27–31)
MCHC RBC AUTO-ENTMCNC: 30.2 G/DL (ref 33–36)
MCV RBC AUTO: 90.4 FL (ref 80–94)
MONOCYTES # BLD AUTO: 0.44 X10(3)/MCL (ref 0.1–1.3)
MONOCYTES NFR BLD AUTO: 10.9 %
NEUTROPHILS # BLD AUTO: 2.65 X10(3)/MCL (ref 2.1–9.2)
NEUTROPHILS NFR BLD AUTO: 66 %
PLATELET # BLD AUTO: 120 X10(3)/MCL (ref 130–400)
PMV BLD AUTO: 11.8 FL (ref 7.4–10.4)
POTASSIUM SERPL-SCNC: 3.6 MMOL/L (ref 3.5–5.1)
RBC # BLD AUTO: 2.82 X10(6)/MCL (ref 4.7–6.1)
SODIUM SERPL-SCNC: 137 MMOL/L (ref 136–145)
WBC # SPEC AUTO: 4.02 X10(3)/MCL (ref 4.5–11.5)

## 2023-12-09 PROCEDURE — 94664 DEMO&/EVAL PT USE INHALER: CPT

## 2023-12-09 PROCEDURE — A4216 STERILE WATER/SALINE, 10 ML: HCPCS | Performed by: STUDENT IN AN ORGANIZED HEALTH CARE EDUCATION/TRAINING PROGRAM

## 2023-12-09 PROCEDURE — 27000207 HC ISOLATION

## 2023-12-09 PROCEDURE — 27000221 HC OXYGEN, UP TO 24 HOURS

## 2023-12-09 PROCEDURE — 25000242 PHARM REV CODE 250 ALT 637 W/ HCPCS: Performed by: STUDENT IN AN ORGANIZED HEALTH CARE EDUCATION/TRAINING PROGRAM

## 2023-12-09 PROCEDURE — 94640 AIRWAY INHALATION TREATMENT: CPT

## 2023-12-09 PROCEDURE — 63600175 PHARM REV CODE 636 W HCPCS: Performed by: STUDENT IN AN ORGANIZED HEALTH CARE EDUCATION/TRAINING PROGRAM

## 2023-12-09 PROCEDURE — 25000003 PHARM REV CODE 250: Performed by: STUDENT IN AN ORGANIZED HEALTH CARE EDUCATION/TRAINING PROGRAM

## 2023-12-09 PROCEDURE — 97530 THERAPEUTIC ACTIVITIES: CPT

## 2023-12-09 PROCEDURE — 99900035 HC TECH TIME PER 15 MIN (STAT)

## 2023-12-09 PROCEDURE — 80048 BASIC METABOLIC PNL TOTAL CA: CPT | Performed by: PHYSICIAN ASSISTANT

## 2023-12-09 PROCEDURE — 85025 COMPLETE CBC W/AUTO DIFF WBC: CPT | Performed by: PHYSICIAN ASSISTANT

## 2023-12-09 PROCEDURE — 94760 N-INVAS EAR/PLS OXIMETRY 1: CPT

## 2023-12-09 PROCEDURE — 25000003 PHARM REV CODE 250: Performed by: PHYSICIAN ASSISTANT

## 2023-12-09 PROCEDURE — 11000004 HC SNF PRIVATE

## 2023-12-09 RX ADMIN — APIXABAN 5 MG: 5 TABLET, FILM COATED ORAL at 06:12

## 2023-12-09 RX ADMIN — GUAIFENESIN 400 MG: 200 SOLUTION ORAL at 08:12

## 2023-12-09 RX ADMIN — MICONAZOLE NITRATE 2 % TOPICAL POWDER: at 09:12

## 2023-12-09 RX ADMIN — Medication 1 CAPSULE: at 05:12

## 2023-12-09 RX ADMIN — GUAIFENESIN 400 MG: 200 SOLUTION ORAL at 09:12

## 2023-12-09 RX ADMIN — SODIUM CHLORIDE, PRESERVATIVE FREE 10 ML: 5 INJECTION INTRAVENOUS at 11:12

## 2023-12-09 RX ADMIN — FOLIC ACID 1 MG: 1 TABLET ORAL at 08:12

## 2023-12-09 RX ADMIN — ACETYLCYSTEINE 4 ML: 200 INHALANT RESPIRATORY (INHALATION) at 11:12

## 2023-12-09 RX ADMIN — WHITE PETROLATUM: 1.75 OINTMENT TOPICAL at 09:12

## 2023-12-09 RX ADMIN — ACETYLCYSTEINE 4 ML: 200 INHALANT RESPIRATORY (INHALATION) at 01:12

## 2023-12-09 RX ADMIN — SACUBITRIL AND VALSARTAN 1 TABLET: 24; 26 TABLET, FILM COATED ORAL at 06:12

## 2023-12-09 RX ADMIN — METOPROLOL SUCCINATE 100 MG: 50 TABLET, EXTENDED RELEASE ORAL at 08:12

## 2023-12-09 RX ADMIN — CEFEPIME 1 G: 1 INJECTION, POWDER, FOR SOLUTION INTRAMUSCULAR; INTRAVENOUS at 01:12

## 2023-12-09 RX ADMIN — VANCOMYCIN HYDROCHLORIDE 750 MG: 750 INJECTION, POWDER, LYOPHILIZED, FOR SOLUTION INTRAVENOUS at 09:12

## 2023-12-09 RX ADMIN — CEFEPIME 1 G: 1 INJECTION, POWDER, FOR SOLUTION INTRAMUSCULAR; INTRAVENOUS at 09:12

## 2023-12-09 RX ADMIN — QUETIAPINE FUMARATE 200 MG: 100 TABLET ORAL at 06:12

## 2023-12-09 RX ADMIN — LEVALBUTEROL HYDROCHLORIDE 1.25 MG: 1.25 SOLUTION RESPIRATORY (INHALATION) at 01:12

## 2023-12-09 RX ADMIN — SODIUM CHLORIDE, PRESERVATIVE FREE 10 ML: 5 INJECTION INTRAVENOUS at 06:12

## 2023-12-09 RX ADMIN — SODIUM CHLORIDE: 9 INJECTION, SOLUTION INTRAVENOUS at 09:12

## 2023-12-09 RX ADMIN — SACUBITRIL AND VALSARTAN 1 TABLET: 24; 26 TABLET, FILM COATED ORAL at 08:12

## 2023-12-09 RX ADMIN — ASPIRIN 81 MG: 81 TABLET, COATED ORAL at 08:12

## 2023-12-09 RX ADMIN — SODIUM CHLORIDE, PRESERVATIVE FREE 10 ML: 5 INJECTION INTRAVENOUS at 12:12

## 2023-12-09 RX ADMIN — Medication 1 CAPSULE: at 08:12

## 2023-12-09 RX ADMIN — FUROSEMIDE 40 MG: 40 TABLET ORAL at 08:12

## 2023-12-09 RX ADMIN — GUAIFENESIN 400 MG: 200 SOLUTION ORAL at 02:12

## 2023-12-09 RX ADMIN — Medication 1 CAPSULE: at 12:12

## 2023-12-09 RX ADMIN — LEVALBUTEROL HYDROCHLORIDE 1.25 MG: 1.25 SOLUTION RESPIRATORY (INHALATION) at 11:12

## 2023-12-09 RX ADMIN — WHITE PETROLATUM: 1.75 OINTMENT TOPICAL at 08:12

## 2023-12-09 RX ADMIN — MELATONIN TAB 3 MG 9 MG: 3 TAB at 06:12

## 2023-12-09 RX ADMIN — MICONAZOLE NITRATE 2 % TOPICAL POWDER: at 08:12

## 2023-12-09 RX ADMIN — SODIUM CHLORIDE: 9 INJECTION, SOLUTION INTRAVENOUS at 01:12

## 2023-12-09 RX ADMIN — CEFEPIME 1 G: 1 INJECTION, POWDER, FOR SOLUTION INTRAMUSCULAR; INTRAVENOUS at 06:12

## 2023-12-09 RX ADMIN — APIXABAN 5 MG: 5 TABLET, FILM COATED ORAL at 08:12

## 2023-12-09 RX ADMIN — SODIUM CHLORIDE, PRESERVATIVE FREE 10 ML: 5 INJECTION INTRAVENOUS at 05:12

## 2023-12-09 RX ADMIN — LEVALBUTEROL HYDROCHLORIDE 1.25 MG: 1.25 SOLUTION RESPIRATORY (INHALATION) at 07:12

## 2023-12-09 RX ADMIN — ACETYLCYSTEINE 4 ML: 200 INHALANT RESPIRATORY (INHALATION) at 07:12

## 2023-12-09 RX ADMIN — AMIODARONE HYDROCHLORIDE 200 MG: 200 TABLET ORAL at 08:12

## 2023-12-09 RX ADMIN — LINACLOTIDE 72 MCG: 72 CAPSULE, GELATIN COATED ORAL at 06:12

## 2023-12-09 NOTE — PROGRESS NOTES
[facility] MEDICINE ~ PROGRESS NOTE    CHIEF COMPLAINT   Hospital follow up    HOSPITAL COURSE   67yo male with a past medical history of CAD, diastolic CHF, Afib, aortic stenosis, NSTEMI, and HTN who was recently admitted to [facility] for pneumonia and Afib RVR. Patient was diagnosed with Influenza A on 11/19/2023 prior to admission and started on Tamiflu. CTA chest 11/24/2023 showed no PE, but multifocal pneumonia most evident in RUL. Patient was admitted for ICU, started on Cardizem drip, Vanc, Zosyn, Doxy, and required Levophed for hypotension. Echo 11/25/2023 showed LVEF 55% moderate aortic stenosis, severe posterior mitral annular calcification present. Blood cultures x2 resulted positive for MRSA on 11/27/2023.  Infectious Disease recommended patient to continue vancomycin for 4 weeks with suspected in date on 12/25/2023. Stool culture on 11/27/2023 showed many yeast. Repeat blood cultures x2 on 12/02/2023 showed no growth. Patient underwent a CARO guided DCCV on 11/30/2023 which showed mild-moderately enlarged right atrium, no ASD or PFO, LVEF 55%, mild-moderate left ventricular hypertrophy, severe aortic stenosis, moderate mitral stenosis, mild-moderate mitral regurgitation, mild-moderate tricuspid regurgitation, no vegetation present, no intracardiac thrombus, successful cardioversion. CTA chest, abdomen, pelvis 12/05/2023 showed small bilateral pleural effusion R>L and patchy consolidation in both lungs increased compared to prior. CTA was obtained for TAVR workup to be completed outpatient after ID clearance. On exam today, patient is currently on 2 L nasal cannula with O2 sat 99%.  With worsening pneumonia and lung sounds on exam, we will start Zosyn, obtain sputum culture, add nystatin for fungal coverage, and schedule Mucomyst with the Xopenex nebulizers 3 times a day.     Today  H&H stable, no evidence of bleeding.  Ambulating 150 ft with a rolling walker and  standby/contact guard assistance.    OBJECTIVE/PHYSICAL EXAM     VITAL SIGNS (MOST RECENT):  Temp: 97.2 °F (36.2 °C) (12/09/23 0700)  Pulse: 80 (12/09/23 0844)  Resp: 20 (12/09/23 0844)  BP: 121/71 (12/09/23 0844)  SpO2: 99 % (12/09/23 0844) VITAL SIGNS (24 HOUR RANGE):  Temp:  [97.2 °F (36.2 °C)-98.5 °F (36.9 °C)] 97.2 °F (36.2 °C)  Pulse:  [72-85] 80  Resp:  [18-22] 20  SpO2:  [94 %-100 %] 99 %  BP: (102-148)/(56-76) 121/71   GENERAL: In no acute distress, afebrile  HEENT: Atraumatic, normocephalic, moist mucous membranes, supple neck   CHEST: Improved lung sounds, LLL rhonchi    HEART: S1, S2, grade IV murmur  ABDOMEN: Soft, nontender, BS +  MSK: Warm, no lower extremity edema, no clubbing or cyanosis  NEUROLOGIC: Alert and oriented x4, moving all extremities with good strength   INTEGUMENTARY: No obvious skin rash   PSYCHIATRY: Appropriate mood and affect     ASSESSMENT/PLAN   MRSA bacteriemia   Multifocal Pneumonia  Recent Influenza A infection (11/19/2023)  CTA chest 11/24/2023 showed no PE, but multifocal pneumonia most evident in RUL  Blood cultures x2 resulted positive for MRSA on 11/27/2023  ID recommended patient to continue vancomycin for 4 weeks with suspected in date on 12/25/2023, Cefepime (12/07/2023-12/11/2023)  D/c Zosyn (12/06/2023-12/07/203) and Nystatin (12/06/2023-12/07/2023)  IS, cough assist, acapella   Xopenex Q8 with Mucomyst      Yeast + Stool  D/c Nystatin (12/06/2023-12/07/2023) low concern for disseminated candidemia   Continue Probiotic    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   Hx of alcohol use disorder  Pending thiamine results  Ferrlecit    Hypokalemia   Replete as condition requires     Transamnitis   Grayzone hepatitis C Ab  Hepatitis C viral load ordered      CAD  Diastolic CHF  Afib  Aortic stenosis  Essential HTN  CARO guided DCCV on 11/30/2023 which showed mild-moderately enlarged right atrium, no ASD or PFO, LVEF 55%, mild-moderate left ventricular hypertrophy,  severe aortic stenosis, moderate mitral stenosis, mild-moderate mitral regurgitation, mild-moderate tricuspid regurgitation, no vegetation present, no intracardiac thrombus, successful cardioversion  TAVR to be completed outpatient after ID clearance  Continue Entresto, Toprol XL, Amiodarone, Eliquis, and Aspirin   Transition to PO Lasix 40mg    DVT prophylaxis: Eliquis   Anticipated discharge and disposition: Continue PT/OT   __________________________________________________________________________    LABS/MICRO/MEDS/DIAGNOSTICS     LABS  Recent Labs     12/07/23  0335 12/08/23  0304 12/09/23  0510   *   < > 137   K 3.6   < > 3.6   CHLORIDE 105   < > 103   CO2 23   < > 27   BUN 12.3   < > 9.8   CREATININE 0.95   < > 0.81   GLUCOSE 104   < > 91   CALCIUM 7.4*   < > 7.4*   ALKPHOS 68  --   --    AST 13  --   --    ALT 12  --   --    ALBUMIN 1.7*  --   --     < > = values in this interval not displayed.     Recent Labs     12/09/23  0510   WBC 4.02*   RBC 2.82*   HCT 25.5*   MCV 90.4   *     MICROBIOLOGY  Microbiology Results (last 7 days)       Procedure Component Value Units Date/Time    Respiratory Culture [[id number]]     Order Status: Sent Specimen: Sputum              MEDICATIONS   acetylcysteine 200 mg/ml (20%)  4 mL Nebulization TID    amiodarone  200 mg Oral Daily    apixaban  5 mg Oral BID    aspirin  81 mg Oral Daily    ceFEPime (MAXIPIME) IVPB  1 g Intravenous Q8H    [START ON 12/11/2023] ferric gluconate (FERRLECIT) 125 mg in sodium chloride 0.9% 100 mL IVPB  125 mg Intravenous Daily    folic acid  1 mg Oral Daily    furosemide  40 mg Oral Daily    guaiFENesin 100 mg/5 ml  400 mg Oral Q6H WAKE    Lactobacillus acidophilus  1 capsule Oral TID WM    levalbuterol  1.25 mg Nebulization Q8H    linaCLOtide  72 mcg Oral Before breakfast    melatonin  9 mg Oral Nightly    metoprolol succinate  100 mg Oral Daily    miconazole NITRATE 2 %   Topical (Top) BID    QUEtiapine  200 mg Oral QHS     sacubitriL-valsartan  1 tablet Oral BID    sodium chloride 0.9%  10 mL Intravenous Q6H    vancomycin (VANCOCIN) IV (PEDS and ADULTS)  750 mg Intravenous Q12H    white petrolatum   Topical (Top) BID         INFUSIONS       DIAGNOSTIC TESTS  X-Ray Chest 1 View for Line/Tube Placement   Final Result      Consolidative changes in the right perihilar region right mid and right upper lung zone similar to previous exam.      Interval insertion of right-sided PICC line         Electronically signed by: Juan Wharton   Date:    12/06/2023   Time:    12:14        EF   Date Value Ref Range Status   11/25/2023 55 % Final   12/29/2022 55 % Final        NUTRITION STATUS  Patient meets ASPEN criteria for   malnutrition of   per RD assessment as evidenced by:                       A minimum of two characteristics is recommended for diagnosis of either severe or non-severe malnutrition.     Case related differential diagnoses have been reviewed; assessment and plan has been documented. I have personally reviewed the labs and test results that are currently available; I have reviewed the patients medication list. I have reviewed the consulting providers recommendations. I have reviewed or attempted to review medical records based upon their availability.  All of the patient's and/or family's questions have been addressed and answered to the best of my ability.  I will continue to monitor closely and make adjustments to medical management as needed.  This document was created using M*Modal Fluency Direct.  Transcription errors may have been made.  Please contact me if any questions may rise regarding documentation to clarify transcription.      Thairy G Reyes,    Internal Medicine  Department of Hospital Medicine [facility] Surgical Unit

## 2023-12-09 NOTE — PLAN OF CARE
Problem: Adult Inpatient Plan of Care  Goal: Plan of Care Review  Outcome: Ongoing, Progressing  Flowsheets (Taken 12/9/2023 1643)  Plan of Care Reviewed With: patient  Goal: Patient-Specific Goal (Individualized)  Outcome: Ongoing, Progressing  Flowsheets (Taken 12/9/2023 1643)  Anxieties, Fears or Concerns: no concerns now  Individualized Care Needs: IV abt, encourage therapy and IS and acapella therapy, cardiac monitoring  Goal: Absence of Hospital-Acquired Illness or Injury  Outcome: Ongoing, Progressing  Intervention: Identify and Manage Fall Risk  Flowsheets (Taken 12/9/2023 1643)  Safety Promotion/Fall Prevention:   assistive device/personal item within reach   chair alarm set   in recliner, wheels locked   medications reviewed   nonskid shoes/socks when out of bed   instructed to call staff for mobility  Intervention: Prevent Skin Injury  Flowsheets (Taken 12/9/2023 1643)  Body Position:   position changed independently   weight shifting   legs elevated  Skin Protection:   adhesive use limited   tubing/devices free from skin contact   silicone foam dressing in place  Intervention: Prevent and Manage VTE (Venous Thromboembolism) Risk  Flowsheets (Taken 12/9/2023 1643)  Activity Management:   Arm raise - L1   Up in chair - L3   Walk with assistive devise and /or staff member - L3  VTE Prevention/Management:   ambulation promoted   bleeding precations maintained   bleeding risk assessed   dorsiflexion/plantar flexion performed  Range of Motion: active ROM (range of motion) encouraged  Goal: Optimal Comfort and Wellbeing  Outcome: Ongoing, Progressing  Goal: Readiness for Transition of Care  Outcome: Ongoing, Progressing     Problem: Fluid Imbalance (Pneumonia)  Goal: Fluid Balance  Outcome: Ongoing, Progressing  Intervention: Monitor and Manage Fluid Balance  Flowsheets (Taken 12/9/2023 1643)  Fluid/Electrolyte Management: fluids provided     Problem: Infection (Pneumonia)  Goal: Resolution of Infection Signs  and Symptoms  Outcome: Ongoing, Progressing     Problem: Respiratory Compromise (Pneumonia)  Goal: Effective Oxygenation and Ventilation  Outcome: Ongoing, Progressing     Problem: Infection  Goal: Absence of Infection Signs and Symptoms  Outcome: Ongoing, Progressing  Intervention: Prevent or Manage Infection  Flowsheets (Taken 12/9/2023 1643)  Fever Reduction/Comfort Measures: lightweight bedding  Infection Management: aseptic technique maintained  Isolation Precautions:   contact   precautions maintained     Problem: Fall Injury Risk  Goal: Absence of Fall and Fall-Related Injury  Outcome: Ongoing, Progressing  Intervention: Identify and Manage Contributors  Flowsheets (Taken 12/9/2023 1643)  Self-Care Promotion:   independence encouraged   BADL personal objects within reach  Medication Review/Management: medications reviewed  Intervention: Promote Injury-Free Environment  Flowsheets (Taken 12/9/2023 1643)  Safety Promotion/Fall Prevention:   assistive device/personal item within reach   chair alarm set   in recliner, wheels locked   medications reviewed   nonskid shoes/socks when out of bed   instructed to call staff for mobility

## 2023-12-09 NOTE — PT/OT/SLP PROGRESS
"Occupational Therapy  Treatment    Name: Mike Urbina Jr.    : 1955 (68 y.o.)  MRN: [id number]           TREATMENT SUMMARY AND RECOMMENDATIONS:      OT Date of Treatment: 23  OT Start Time: 1115  OT Stop Time: 1130  OT Total Time (min): 15 min      Subjective Assessment:   No complaints  Lethargic   x Awake, alert, cooperative  Impulsive    Uncooperative   Flat affect    Agitated  c/o pain    Appropriate  c/o fatigue    Confused x Treated at bedside     Emotionally labile  Treated in gym/dept.      Other:        Therapy Precautions:    Cognitive deficits  Spinal precautions    Collar - hard  Sternal precautions    Collar - soft   TLSO    Fall risk  LSO    Hip precautions - posterior  Knee immobilizer    Hip precautions - anterior  WBAT    Impaired communication  Partial weightbearing   x Oxygen  TTWB    PEG tube  NWB    Visual deficits      Hearing deficits   Other:        Treatment Objectives:     Mobility Training:    Mobility task Assist level Comments    Bed mobility SBA Supine>EOB   Transfer CGA/SBA Stand/pivot t/f with RW EOB>BSchair   Sit to stands transitions     Functional mobility CGA/SBA X335 feet with RW; two standing rest breaks as needed d/t SOB. O2 remained 95-96% throughout session.   Sitting balance     Standing balance      Other:          Additional Comments: pt reports feeling better today; agreed to walk only. Offered exercises however Pt states "I had enough for today"      Assessment: Patient tolerated session well.    GOALS:   Multidisciplinary Problems       Occupational Therapy Goals          Problem: Occupational Therapy    Goal Priority Disciplines Outcome Interventions   Occupational Therapy Goal     OT, PT/OT Ongoing, Progressing    Description: Goals to be met by: 23     Patient will increase functional independence with ADLs by performing:    LE Dressing with Supervision.  Grooming while standing at sink with Modified [place].  Toileting from toilet with " Supervision for hygiene and clothing management.   Bathing from  shower chair/bench with Supervision.  Toilet transfer to toilet with Supervision.                         Recommendations:     Discharge Equipment Recommendations:  walker, rolling     Plan:     Patient to be seen 6 x/week (QD/BID as appropriate) to address the above listed problems via self-care/home management, therapeutic activities, therapeutic exercises  Plan of Care Expires: 12/20/23  Plan of Care Reviewed with: patient  Revisions made to plan of care: No      Skilled OT Minutes Provided: 15  Communication with Treatment Team:     Equipment recommendations:       At end of treatment, patient remained:  x Up in chair     Up in wheelchair in room    In bed   x With alarm activated    Bed rails up   x Call bell in reach     Family/friends present    Restraints secured properly    In bathroom with CNA/RN notified    In gym with PT/PTA/tech    Nurse aware    Other:

## 2023-12-09 NOTE — PROGRESS NOTES
Pharmacokinetic Assessment Follow Up: IV Vancomycin    Vancomycin serum concentration assessment(s):    The trough level was drawn correctly and can be used to guide therapy at this time. The measurement is within the desired definitive target range of 15 to 20 mcg/mL.    Vancomycin Regimen Plan:    Continue 750 mg q12h and chek trough at 0800 on 12/11/23.    Drug levels (last 3 results):  Recent Labs   Lab Result Units 12/06/23 2008 12/08/23 2001   Vancomycin Trough ug/ml 18.7 18.9       Patient brief summary:  Mike Urbina Jr. is a 68 y.o. male initiated on antimicrobial therapy with IV Vancomycin for treatment of  pneumonia.      Drug Allergies:   Review of patient's allergies indicates:  No Known Allergies    Actual Body Weight:   81.8 kg    Renal Function:   Estimated Creatinine Clearance: 83.7 mL/min (based on SCr of 0.95 mg/dL).,     Dialysis Method (if applicable):  N/A    CBC (last 72 hours):  Recent Labs   Lab Result Units 12/07/23  0335 12/08/23  0304   WBC x10(3)/mcL 5.72 5.17   Hgb g/dL 8.0* 7.8*   Hct % 26.1* 25.3*   Platelet x10(3)/mcL 146 134   Mono % % 9.6 10.1   Eos % % 0.5 0.8   Basophil % % 0.5 0.6       Metabolic Panel (last 72 hours):  Recent Labs   Lab Result Units 12/06/23  0502 12/07/23  0335 12/08/23  0304   Sodium Level mmol/L  --  133* 134*   Potassium Level mmol/L  --  3.6 3.3*   Chloride mmol/L  --  105 104   Carbon Dioxide mmol/L  --  23 25   Glucose Level mg/dL  --  104 97   Glucose, UA  Negative  --   --    Blood Urea Nitrogen mg/dL  --  12.3 11.1   Creatinine mg/dL  --  0.95 0.95   Albumin Level g/dL  --  1.7*  --    Bilirubin Total mg/dL  --  0.8  --    Alkaline Phosphatase unit/L  --  68  --    Aspartate Aminotransferase unit/L  --  13  --    Alanine Aminotransferase unit/L  --  12  --        Vancomycin Administrations:  vancomycin given in the last 96 hours                     vancomycin 750 mg in dextrose 5 % (D5W) 250 mL IVPB (Vial-Mate) (mg) 750 mg New Bag 12/08/23  0920     750 mg New Bag 12/07/23 2027     750 mg New Bag  0957     750 mg New Bag 12/06/23 2033     750 mg New Bag  0807     750 mg New Bag 12/05/23 2114    vancomycin 750 mg in dextrose 5 % 250 mL IVPB (ready to mix) (mg) 750 mg New Bag 12/05/23 0848                    Microbiologic Results:  Microbiology Results (last 7 days)       Procedure Component Value Units Date/Time    Respiratory Culture [[id number]]     Order Status: Sent Specimen: Sputum

## 2023-12-09 NOTE — PLAN OF CARE
Problem: Adult Inpatient Plan of Care  Goal: Plan of Care Review  Outcome: Ongoing, Progressing  Flowsheets (Taken 12/8/2023 2155)  Plan of Care Reviewed With: patient  Goal: Patient-Specific Goal (Individualized)  Outcome: Ongoing, Progressing  Flowsheets (Taken 12/8/2023 2155)  Anxieties, Fears or Concerns: None offered at this time.  Individualized Care Needs:   Safety with mobility to prevent injury   Isolation precautions   Monitor for s/s of worsening infection   Encourage resp exercises   Telemetry monitoring.  Goal: Absence of Hospital-Acquired Illness or Injury  Outcome: Ongoing, Progressing  Intervention: Identify and Manage Fall Risk  Flowsheets (Taken 12/8/2023 2155)  Safety Promotion/Fall Prevention:   bed alarm set   assistive device/personal item within reach   commode/urinal/bedpan at bedside   Fall Risk reviewed with patient/family   Fall Risk signage in place   high risk medications identified   gait belt with ambulation   medications reviewed   lighting adjusted   nonskid shoes/socks when out of bed   room near unit station   instructed to call staff for mobility  Intervention: Prevent Skin Injury  Flowsheets (Taken 12/8/2023 2155)  Body Position: position changed independently  Skin Protection:   incontinence pads utilized   skin sealant/moisture barrier applied   silicone foam dressing in place   tubing/devices free from skin contact  Intervention: Prevent and Manage VTE (Venous Thromboembolism) Risk  Flowsheets (Taken 12/8/2023 2155)  Activity Management:   Rolling - L1   Up in chair - L3   Walk with assistive devise and /or staff member - L3  Range of Motion: active ROM (range of motion) encouraged  Intervention: Prevent Infection  Flowsheets (Taken 12/8/2023 2155)  Infection Prevention:   cohorting utilized   environmental surveillance performed   equipment surfaces disinfected   hand hygiene promoted   personal protective equipment utilized   rest/sleep promoted   single patient room  provided  Goal: Optimal Comfort and Wellbeing  Outcome: Ongoing, Progressing  Intervention: Monitor Pain and Promote Comfort  Flowsheets (Taken 12/8/2023 2155)  Pain Management Interventions:   pain management plan reviewed with patient/caregiver   care clustered   pillow support provided   quiet environment facilitated   relaxation techniques promoted  Intervention: Provide Person-Centered Care  Flowsheets (Taken 12/8/2023 2155)  Trust Relationship/Rapport:   care explained   choices provided   emotional support provided   empathic listening provided   questions answered   questions encouraged   reassurance provided   thoughts/feelings acknowledged     Problem: Fluid Imbalance (Pneumonia)  Goal: Fluid Balance  Outcome: Ongoing, Progressing  Intervention: Monitor and Manage Fluid Balance  Flowsheets (Taken 12/8/2023 2155)  Fluid/Electrolyte Management: fluids provided     Problem: Infection (Pneumonia)  Goal: Resolution of Infection Signs and Symptoms  Outcome: Ongoing, Progressing  Intervention: Prevent Infection Progression  Flowsheets (Taken 12/8/2023 2155)  Fever Reduction/Comfort Measures:   lightweight clothing   lightweight bedding  Infection Management: aseptic technique maintained  Isolation Precautions:   contact   precautions maintained     Problem: Respiratory Compromise (Pneumonia)  Goal: Effective Oxygenation and Ventilation  Outcome: Ongoing, Progressing  Intervention: Promote Airway Secretion Clearance  Flowsheets (Taken 12/8/2023 2155)  Breathing Techniques/Airway Clearance: deep/controlled cough encouraged  Cough And Deep Breathing: done independently per patient  Intervention: Optimize Oxygenation and Ventilation  Flowsheets (Taken 12/8/2023 2155)  Airway/Ventilation Management: airway patency maintained  Head of Bed (HOB) Positioning: HOB at 20-30 degrees     Problem: Infection  Goal: Absence of Infection Signs and Symptoms  Outcome: Ongoing, Progressing  Intervention: Prevent or Manage  Infection  Flowsheets (Taken 12/8/2023 5835)  Fever Reduction/Comfort Measures:   lightweight clothing   lightweight bedding  Infection Management: aseptic technique maintained  Isolation Precautions:   contact   precautions maintained

## 2023-12-10 PROCEDURE — 25000003 PHARM REV CODE 250: Performed by: STUDENT IN AN ORGANIZED HEALTH CARE EDUCATION/TRAINING PROGRAM

## 2023-12-10 PROCEDURE — 11000004 HC SNF PRIVATE

## 2023-12-10 PROCEDURE — 94640 AIRWAY INHALATION TREATMENT: CPT

## 2023-12-10 PROCEDURE — 27100171 HC OXYGEN HIGH FLOW UP TO 24 HOURS

## 2023-12-10 PROCEDURE — 27000221 HC OXYGEN, UP TO 24 HOURS

## 2023-12-10 PROCEDURE — 94664 DEMO&/EVAL PT USE INHALER: CPT

## 2023-12-10 PROCEDURE — 25000003 PHARM REV CODE 250: Performed by: PHYSICIAN ASSISTANT

## 2023-12-10 PROCEDURE — 27000207 HC ISOLATION

## 2023-12-10 PROCEDURE — A4216 STERILE WATER/SALINE, 10 ML: HCPCS | Performed by: STUDENT IN AN ORGANIZED HEALTH CARE EDUCATION/TRAINING PROGRAM

## 2023-12-10 PROCEDURE — 27000173 HC ACAPELLA DEVICE DH OR DM

## 2023-12-10 PROCEDURE — 99900035 HC TECH TIME PER 15 MIN (STAT)

## 2023-12-10 PROCEDURE — 63600175 PHARM REV CODE 636 W HCPCS: Performed by: STUDENT IN AN ORGANIZED HEALTH CARE EDUCATION/TRAINING PROGRAM

## 2023-12-10 PROCEDURE — 94760 N-INVAS EAR/PLS OXIMETRY 1: CPT

## 2023-12-10 PROCEDURE — 25000242 PHARM REV CODE 250 ALT 637 W/ HCPCS: Performed by: STUDENT IN AN ORGANIZED HEALTH CARE EDUCATION/TRAINING PROGRAM

## 2023-12-10 RX ORDER — ACETYLCYSTEINE 200 MG/ML
4 SOLUTION ORAL; RESPIRATORY (INHALATION) DAILY
Status: DISCONTINUED | OUTPATIENT
Start: 2023-12-11 | End: 2023-12-18 | Stop reason: ALTCHOICE

## 2023-12-10 RX ADMIN — LINACLOTIDE 72 MCG: 72 CAPSULE, GELATIN COATED ORAL at 05:12

## 2023-12-10 RX ADMIN — MICONAZOLE NITRATE 2 % TOPICAL POWDER: at 08:12

## 2023-12-10 RX ADMIN — GUAIFENESIN 400 MG: 200 SOLUTION ORAL at 07:12

## 2023-12-10 RX ADMIN — QUETIAPINE FUMARATE 200 MG: 100 TABLET ORAL at 06:12

## 2023-12-10 RX ADMIN — APIXABAN 5 MG: 5 TABLET, FILM COATED ORAL at 06:12

## 2023-12-10 RX ADMIN — SODIUM CHLORIDE: 9 INJECTION, SOLUTION INTRAVENOUS at 05:12

## 2023-12-10 RX ADMIN — ALTEPLASE 2 MG: 2.2 INJECTION, POWDER, LYOPHILIZED, FOR SOLUTION INTRAVENOUS at 11:12

## 2023-12-10 RX ADMIN — ASPIRIN 81 MG: 81 TABLET, COATED ORAL at 08:12

## 2023-12-10 RX ADMIN — SODIUM CHLORIDE: 9 INJECTION, SOLUTION INTRAVENOUS at 09:12

## 2023-12-10 RX ADMIN — SODIUM CHLORIDE: 9 INJECTION, SOLUTION INTRAVENOUS at 08:12

## 2023-12-10 RX ADMIN — Medication 1 CAPSULE: at 07:12

## 2023-12-10 RX ADMIN — WHITE PETROLATUM: 1.75 OINTMENT TOPICAL at 08:12

## 2023-12-10 RX ADMIN — SACUBITRIL AND VALSARTAN 1 TABLET: 24; 26 TABLET, FILM COATED ORAL at 06:12

## 2023-12-10 RX ADMIN — SODIUM CHLORIDE, PRESERVATIVE FREE 10 ML: 5 INJECTION INTRAVENOUS at 12:12

## 2023-12-10 RX ADMIN — APIXABAN 5 MG: 5 TABLET, FILM COATED ORAL at 08:12

## 2023-12-10 RX ADMIN — Medication 1 CAPSULE: at 11:12

## 2023-12-10 RX ADMIN — CEFEPIME 1 G: 1 INJECTION, POWDER, FOR SOLUTION INTRAMUSCULAR; INTRAVENOUS at 08:12

## 2023-12-10 RX ADMIN — VANCOMYCIN HYDROCHLORIDE 750 MG: 750 INJECTION, POWDER, LYOPHILIZED, FOR SOLUTION INTRAVENOUS at 08:12

## 2023-12-10 RX ADMIN — LEVALBUTEROL HYDROCHLORIDE 1.25 MG: 1.25 SOLUTION RESPIRATORY (INHALATION) at 04:12

## 2023-12-10 RX ADMIN — Medication 1 CAPSULE: at 06:12

## 2023-12-10 RX ADMIN — VANCOMYCIN HYDROCHLORIDE 750 MG: 750 INJECTION, POWDER, LYOPHILIZED, FOR SOLUTION INTRAVENOUS at 09:12

## 2023-12-10 RX ADMIN — FOLIC ACID 1 MG: 1 TABLET ORAL at 08:12

## 2023-12-10 RX ADMIN — CEFEPIME 1 G: 1 INJECTION, POWDER, FOR SOLUTION INTRAMUSCULAR; INTRAVENOUS at 01:12

## 2023-12-10 RX ADMIN — MELATONIN TAB 3 MG 9 MG: 3 TAB at 06:12

## 2023-12-10 RX ADMIN — GUAIFENESIN 400 MG: 200 SOLUTION ORAL at 08:12

## 2023-12-10 RX ADMIN — SODIUM CHLORIDE: 9 INJECTION, SOLUTION INTRAVENOUS at 12:12

## 2023-12-10 RX ADMIN — SACUBITRIL AND VALSARTAN 1 TABLET: 24; 26 TABLET, FILM COATED ORAL at 08:12

## 2023-12-10 RX ADMIN — SODIUM CHLORIDE, PRESERVATIVE FREE 10 ML: 5 INJECTION INTRAVENOUS at 06:12

## 2023-12-10 RX ADMIN — LEVALBUTEROL HYDROCHLORIDE 1.25 MG: 1.25 SOLUTION RESPIRATORY (INHALATION) at 11:12

## 2023-12-10 RX ADMIN — LEVALBUTEROL HYDROCHLORIDE 1.25 MG: 1.25 SOLUTION RESPIRATORY (INHALATION) at 08:12

## 2023-12-10 RX ADMIN — AMIODARONE HYDROCHLORIDE 200 MG: 200 TABLET ORAL at 08:12

## 2023-12-10 RX ADMIN — CEFEPIME 1 G: 1 INJECTION, POWDER, FOR SOLUTION INTRAMUSCULAR; INTRAVENOUS at 05:12

## 2023-12-10 RX ADMIN — GUAIFENESIN 400 MG: 200 SOLUTION ORAL at 02:12

## 2023-12-10 RX ADMIN — SODIUM CHLORIDE, PRESERVATIVE FREE 10 ML: 5 INJECTION INTRAVENOUS at 05:12

## 2023-12-10 RX ADMIN — FUROSEMIDE 40 MG: 40 TABLET ORAL at 08:12

## 2023-12-10 RX ADMIN — METOPROLOL SUCCINATE 100 MG: 50 TABLET, EXTENDED RELEASE ORAL at 08:12

## 2023-12-10 NOTE — PLAN OF CARE
[facility]  Discharge Reassessment    Primary Care Provider: Darlene Willams FNP    Expected Discharge Date:     Reassessment (most recent)       Discharge Reassessment - 12/10/23 1726          Discharge Reassessment    Assessment Type Discharge Planning Reassessment     Did the patient's condition or plan change since previous assessment? No     Discharge Plan discussed with: Parent(s)     Communicated MODESTO with patient/caregiver Date not available/Unable to determine     Discharge Plan A Home with family;Home Health     DME Needed Upon Discharge  walker, standard     Transition of Care Barriers None     Why the patient remains in the hospital Requires continued medical care        Post-Acute Status    Post-Acute Authorization Home Health     Home Health Status Pending medical clearance/testing     Hospital Resources/Appts/Education Provided Provided patient/caregiver with written discharge plan information     Discharge Delays None known at this time

## 2023-12-10 NOTE — PLAN OF CARE
Problem: Adult Inpatient Plan of Care  Goal: Plan of Care Review  12/9/2023 2336 by Geni Sandoval RN  Outcome: Ongoing, Progressing  12/9/2023 2251 by Geni Sandoval RN  Outcome: Ongoing, Progressing  Flowsheets (Taken 12/9/2023 2150)  Plan of Care Reviewed With: patient  Goal: Patient-Specific Goal (Individualized)  12/9/2023 2336 by Geni Sandoval RN  Outcome: Ongoing, Progressing  Flowsheets (Taken 12/9/2023 2150)  Anxieties, Fears or Concerns: None offered at this time  Individualized Care Needs:   Safety with mobility to prevent injury   Resp exercises   Monitor for worsening s/s of infection   Telemetry monitoring   IV antibiotic therapy as ordered.  12/9/2023 2251 by Geni Sandoval RN  Outcome: Ongoing, Progressing  Goal: Absence of Hospital-Acquired Illness or Injury  12/9/2023 2336 by Geni Sandoval RN  Outcome: Ongoing, Progressing  12/9/2023 2251 by Geni Sandoval RN  Outcome: Ongoing, Progressing  Intervention: Identify and Manage Fall Risk  Flowsheets (Taken 12/9/2023 2150)  Safety Promotion/Fall Prevention:   bed alarm set   assistive device/personal item within reach   Fall Risk reviewed with patient/family   Fall Risk signage in place   high risk medications identified   medications reviewed   lighting adjusted   nonskid shoes/socks when out of bed   room near unit station   instructed to call staff for mobility  Intervention: Prevent Skin Injury  Flowsheets (Taken 12/9/2023 2150)  Body Position:   position changed independently   side-lying   right  Skin Protection:   incontinence pads utilized   tubing/devices free from skin contact   silicone foam dressing in place   protective footwear used  Intervention: Prevent and Manage VTE (Venous Thromboembolism) Risk  Flowsheets (Taken 12/9/2023 2150)  Activity Management:   Walk with assistive devise and /or staff member - L3   Up in chair - L3  VTE Prevention/Management:   bleeding risk assessed   bleeding precations maintained    ambulation promoted   dorsiflexion/plantar flexion performed   fluids promoted   ROM (active) performed  Range of Motion: active ROM (range of motion) encouraged  Intervention: Prevent Infection  Flowsheets (Taken 12/9/2023 2150)  Infection Prevention:   environmental surveillance performed   equipment surfaces disinfected   hand hygiene promoted   personal protective equipment utilized   rest/sleep promoted   single patient room provided  Goal: Optimal Comfort and Wellbeing  12/9/2023 2336 by Geni Sandoval RN  Outcome: Ongoing, Progressing  12/9/2023 2251 by Geni Sandoval RN  Outcome: Ongoing, Progressing  Intervention: Monitor Pain and Promote Comfort  Flowsheets (Taken 12/9/2023 2150)  Pain Management Interventions:   care clustered   pain management plan reviewed with patient/caregiver   quiet environment facilitated  Intervention: Provide Person-Centered Care  Flowsheets (Taken 12/9/2023 2150)  Trust Relationship/Rapport:   care explained   choices provided   emotional support provided   empathic listening provided   questions answered   questions encouraged   reassurance provided   thoughts/feelings acknowledged  Goal: Readiness for Transition of Care  Outcome: Ongoing, Progressing     Problem: Fluid Imbalance (Pneumonia)  Goal: Fluid Balance  12/9/2023 2336 by Geni Sandoval RN  Outcome: Ongoing, Progressing  12/9/2023 2251 by Geni Sandoval RN  Outcome: Ongoing, Progressing  Intervention: Monitor and Manage Fluid Balance  Flowsheets (Taken 12/9/2023 2150)  Fluid/Electrolyte Management: fluids provided     Problem: Infection (Pneumonia)  Goal: Resolution of Infection Signs and Symptoms  12/9/2023 2336 by Geni Sandoval RN  Outcome: Ongoing, Progressing  12/9/2023 2251 by Geni Sandoval RN  Outcome: Ongoing, Progressing  Intervention: Prevent Infection Progression  Flowsheets (Taken 12/9/2023 2150)  Fever Reduction/Comfort Measures:   lightweight bedding   lightweight clothing  Infection  Management: aseptic technique maintained  Isolation Precautions:   contact   precautions maintained     Problem: Respiratory Compromise (Pneumonia)  Goal: Effective Oxygenation and Ventilation  12/9/2023 2336 by Geni Sandoval RN  Outcome: Ongoing, Progressing  12/9/2023 2251 by Geni Sandoval RN  Outcome: Ongoing, Progressing  Intervention: Promote Airway Secretion Clearance  Flowsheets (Taken 12/9/2023 2150)  Cough And Deep Breathing: done independently per patient  Intervention: Optimize Oxygenation and Ventilation  Flowsheets (Taken 12/9/2023 2150)  Airway/Ventilation Management: airway patency maintained  Head of Bed (HOB) Positioning: HOB at 15 degrees     Problem: Infection  Goal: Absence of Infection Signs and Symptoms  12/9/2023 2336 by Geni Sandoval RN  Outcome: Ongoing, Progressing  12/9/2023 2251 by Geni Sandoval RN  Outcome: Ongoing, Progressing  Intervention: Prevent or Manage Infection  Flowsheets (Taken 12/9/2023 2150)  Fever Reduction/Comfort Measures:   lightweight bedding   lightweight clothing  Infection Management: aseptic technique maintained  Isolation Precautions:   contact   precautions maintained     Problem: Fall Injury Risk  Goal: Absence of Fall and Fall-Related Injury  12/9/2023 2336 by Geni Sandoval RN  Outcome: Ongoing, Progressing  12/9/2023 2251 by Geni Sandoval RN  Outcome: Ongoing, Progressing  Intervention: Identify and Manage Contributors  Flowsheets (Taken 12/9/2023 2150)  Self-Care Promotion:   independence encouraged   BADL personal objects within reach   BADL personal routines maintained  Medication Review/Management:   medications reviewed   high-risk medications identified  Intervention: Promote Injury-Free Environment  Flowsheets (Taken 12/9/2023 2150)  Safety Promotion/Fall Prevention:   bed alarm set   assistive device/personal item within reach   Fall Risk reviewed with patient/family   Fall Risk signage in place   high risk medications identified    medications reviewed   lighting adjusted   nonskid shoes/socks when out of bed   room near unit station   instructed to call staff for mobility

## 2023-12-10 NOTE — PROGRESS NOTES
[facility] MEDICINE ~ PROGRESS NOTE    CHIEF COMPLAINT   Hospital follow up    HOSPITAL COURSE   67yo male with a past medical history of CAD, diastolic CHF, Afib, aortic stenosis, NSTEMI, and HTN who was recently admitted to [facility] for pneumonia and Afib RVR. Patient was diagnosed with Influenza A on 11/19/2023 prior to admission and started on Tamiflu. CTA chest 11/24/2023 showed no PE, but multifocal pneumonia most evident in RUL. Patient was admitted for ICU, started on Cardizem drip, Vanc, Zosyn, Doxy, and required Levophed for hypotension. Echo 11/25/2023 showed LVEF 55% moderate aortic stenosis, severe posterior mitral annular calcification present. Blood cultures x2 resulted positive for MRSA on 11/27/2023.  Infectious Disease recommended patient to continue vancomycin for 4 weeks with suspected in date on 12/25/2023. Stool culture on 11/27/2023 showed many yeast. Repeat blood cultures x2 on 12/02/2023 showed no growth. Patient underwent a CARO guided DCCV on 11/30/2023 which showed mild-moderately enlarged right atrium, no ASD or PFO, LVEF 55%, mild-moderate left ventricular hypertrophy, severe aortic stenosis, moderate mitral stenosis, mild-moderate mitral regurgitation, mild-moderate tricuspid regurgitation, no vegetation present, no intracardiac thrombus, successful cardioversion. CTA chest, abdomen, pelvis 12/05/2023 showed small bilateral pleural effusion R>L and patchy consolidation in both lungs increased compared to prior. CTA was obtained for TAVR workup to be completed outpatient after ID clearance. On exam today, patient is currently on 2 L nasal cannula with O2 sat 99%.  With worsening pneumonia and lung sounds on exam, we will start Zosyn, obtain sputum culture, add nystatin for fungal coverage, and schedule Mucomyst with the Xopenex nebulizers 3 times a day.     Today  Doing well, no complaints  OBJECTIVE/PHYSICAL EXAM     VITAL SIGNS (MOST  RECENT):  Temp: 98.1 °F (36.7 °C) (12/10/23 0700)  Pulse: 85 (12/10/23 0807)  Resp: 18 (12/10/23 0807)  BP: 139/69 (12/10/23 0700)  SpO2: 96 % (12/10/23 0807) VITAL SIGNS (24 HOUR RANGE):  Temp:  [97.3 °F (36.3 °C)-98.4 °F (36.9 °C)] 98.1 °F (36.7 °C)  Pulse:  [71-85] 85  Resp:  [16-20] 18  SpO2:  [94 %-99 %] 96 %  BP: (114-152)/(66-80) 139/69   GENERAL: In no acute distress, afebrile  HEENT: Atraumatic, normocephalic, moist mucous membranes, supple neck   CHEST: Improved lung sounds, LLL rhonchi    HEART: S1, S2, grade IV murmur  ABDOMEN: Soft, nontender, BS +  MSK: Warm, no lower extremity edema, no clubbing or cyanosis  NEUROLOGIC: Alert and oriented x4, moving all extremities with good strength   INTEGUMENTARY: No obvious skin rash   PSYCHIATRY: Appropriate mood and affect     ASSESSMENT/PLAN   MRSA bacteriemia   Multifocal Pneumonia  Recent Influenza A infection (11/19/2023)  CTA chest 11/24/2023 showed no PE, but multifocal pneumonia most evident in RUL  Blood cultures x2 resulted positive for MRSA on 11/27/2023  ID recommended patient to continue vancomycin for 4 weeks with suspected in date on 12/25/2023, Cefepime (12/07/2023-12/11/2023)  D/c Zosyn (12/06/2023-12/07/203) and Nystatin (12/06/2023-12/07/2023)  IS, cough assist, acapella   Xopenex, Mucomyst      Yeast + Stool  D/c Nystatin (12/06/2023-12/07/2023) low concern for disseminated candidemia   Continue Probiotic    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   Hx of alcohol use disorder  Pending thiamine results  Ferrlecit    Hypokalemia   Replete as condition requires     Transamnitis   Grayzone hepatitis C Ab  Hepatitis C viral load ordered      CAD  Diastolic CHF  Afib  Aortic stenosis  Essential HTN  CARO guided DCCV on 11/30/2023 which showed mild-moderately enlarged right atrium, no ASD or PFO, LVEF 55%, mild-moderate left ventricular hypertrophy, severe aortic stenosis, moderate mitral stenosis, mild-moderate mitral regurgitation,  mild-moderate tricuspid regurgitation, no vegetation present, no intracardiac thrombus, successful cardioversion  TAVR to be completed outpatient after ID clearance  Continue Entresto, Toprol XL, Amiodarone, Eliquis, and Aspirin   Transition to PO Lasix 40mg    DVT prophylaxis: Eliquis   Anticipated discharge and disposition: Continue PT/OT   __________________________________________________________________________    LABS/MICRO/MEDS/DIAGNOSTICS     LABS  Recent Labs     12/09/23  0510      K 3.6   CHLORIDE 103   CO2 27   BUN 9.8   CREATININE 0.81   GLUCOSE 91   CALCIUM 7.4*     Recent Labs     12/09/23  0510   WBC 4.02*   RBC 2.82*   HCT 25.5*   MCV 90.4   *     MICROBIOLOGY  Microbiology Results (last 7 days)       Procedure Component Value Units Date/Time    Respiratory Culture [[id number]]     Order Status: Sent Specimen: Sputum              MEDICATIONS   acetylcysteine 200 mg/ml (20%)  4 mL Nebulization TID    amiodarone  200 mg Oral Daily    apixaban  5 mg Oral BID    aspirin  81 mg Oral Daily    ceFEPime (MAXIPIME) IVPB  1 g Intravenous Q8H    [START ON 12/11/2023] ferric gluconate (FERRLECIT) 125 mg in sodium chloride 0.9% 100 mL IVPB  125 mg Intravenous Daily    folic acid  1 mg Oral Daily    furosemide  40 mg Oral Daily    guaiFENesin 100 mg/5 ml  400 mg Oral Q6H WAKE    Lactobacillus acidophilus  1 capsule Oral TID WM    levalbuterol  1.25 mg Nebulization Q8H    linaCLOtide  72 mcg Oral Before breakfast    melatonin  9 mg Oral Nightly    metoprolol succinate  100 mg Oral Daily    miconazole NITRATE 2 %   Topical (Top) BID    QUEtiapine  200 mg Oral QHS    sacubitriL-valsartan  1 tablet Oral BID    sodium chloride 0.9%  10 mL Intravenous Q6H    vancomycin (VANCOCIN) IV (PEDS and ADULTS)  750 mg Intravenous Q12H    white petrolatum   Topical (Top) BID         INFUSIONS       DIAGNOSTIC TESTS  X-Ray Chest 1 View for Line/Tube Placement   Final Result      Consolidative changes in the right  perihilar region right mid and right upper lung zone similar to previous exam.      Interval insertion of right-sided PICC line         Electronically signed by: Juan Wharton   Date:    12/06/2023   Time:    12:14        EF   Date Value Ref Range Status   11/25/2023 55 % Final   12/29/2022 55 % Final        NUTRITION STATUS  Patient meets ASPEN criteria for   malnutrition of   per RD assessment as evidenced by:                       A minimum of two characteristics is recommended for diagnosis of either severe or non-severe malnutrition.     Case related differential diagnoses have been reviewed; assessment and plan has been documented. I have personally reviewed the labs and test results that are currently available; I have reviewed the patients medication list. I have reviewed the consulting providers recommendations. I have reviewed or attempted to review medical records based upon their availability.  All of the patient's and/or family's questions have been addressed and answered to the best of my ability.  I will continue to monitor closely and make adjustments to medical management as needed.  This document was created using M*Modal Fluency Direct.  Transcription errors may have been made.  Please contact me if any questions may rise regarding documentation to clarify transcription.      Thairy G Reyes,    Internal Medicine  Department of Hospital Medicine [facility]

## 2023-12-11 LAB
BASOPHILS # BLD AUTO: 0.01 X10(3)/MCL
BASOPHILS NFR BLD AUTO: 0.2 %
EOSINOPHIL # BLD AUTO: 0.08 X10(3)/MCL (ref 0–0.9)
EOSINOPHIL NFR BLD AUTO: 1.9 %
ERYTHROCYTE [DISTWIDTH] IN BLOOD BY AUTOMATED COUNT: 16.7 % (ref 11.5–17)
HCT VFR BLD AUTO: 25.9 % (ref 42–52)
HGB BLD-MCNC: 7.9 G/DL (ref 14–18)
IMM GRANULOCYTES # BLD AUTO: 0.04 X10(3)/MCL (ref 0–0.04)
IMM GRANULOCYTES NFR BLD AUTO: 0.9 %
LYMPHOCYTES # BLD AUTO: 0.92 X10(3)/MCL (ref 0.6–4.6)
LYMPHOCYTES NFR BLD AUTO: 21.5 %
MCH RBC QN AUTO: 27.5 PG (ref 27–31)
MCHC RBC AUTO-ENTMCNC: 30.5 G/DL (ref 33–36)
MCV RBC AUTO: 90.2 FL (ref 80–94)
MONOCYTES # BLD AUTO: 0.45 X10(3)/MCL (ref 0.1–1.3)
MONOCYTES NFR BLD AUTO: 10.5 %
NEUTROPHILS # BLD AUTO: 2.78 X10(3)/MCL (ref 2.1–9.2)
NEUTROPHILS NFR BLD AUTO: 65 %
PLATELET # BLD AUTO: 92 X10(3)/MCL (ref 130–400)
PMV BLD AUTO: 11.5 FL (ref 7.4–10.4)
RBC # BLD AUTO: 2.87 X10(6)/MCL (ref 4.7–6.1)
VANCOMYCIN TROUGH SERPL-MCNC: 19.4 UG/ML (ref 15–20)
WBC # SPEC AUTO: 4.28 X10(3)/MCL (ref 4.5–11.5)

## 2023-12-11 PROCEDURE — 94799 UNLISTED PULMONARY SVC/PX: CPT | Mod: XB

## 2023-12-11 PROCEDURE — 11000004 HC SNF PRIVATE

## 2023-12-11 PROCEDURE — 25000242 PHARM REV CODE 250 ALT 637 W/ HCPCS: Performed by: STUDENT IN AN ORGANIZED HEALTH CARE EDUCATION/TRAINING PROGRAM

## 2023-12-11 PROCEDURE — 94664 DEMO&/EVAL PT USE INHALER: CPT

## 2023-12-11 PROCEDURE — 63600175 PHARM REV CODE 636 W HCPCS: Performed by: STUDENT IN AN ORGANIZED HEALTH CARE EDUCATION/TRAINING PROGRAM

## 2023-12-11 PROCEDURE — 25000003 PHARM REV CODE 250: Performed by: STUDENT IN AN ORGANIZED HEALTH CARE EDUCATION/TRAINING PROGRAM

## 2023-12-11 PROCEDURE — 85025 COMPLETE CBC W/AUTO DIFF WBC: CPT | Performed by: STUDENT IN AN ORGANIZED HEALTH CARE EDUCATION/TRAINING PROGRAM

## 2023-12-11 PROCEDURE — 99900035 HC TECH TIME PER 15 MIN (STAT)

## 2023-12-11 PROCEDURE — 63600175 PHARM REV CODE 636 W HCPCS: Performed by: PHYSICIAN ASSISTANT

## 2023-12-11 PROCEDURE — 94640 AIRWAY INHALATION TREATMENT: CPT

## 2023-12-11 PROCEDURE — 27000207 HC ISOLATION

## 2023-12-11 PROCEDURE — 27000173 HC ACAPELLA DEVICE DH OR DM

## 2023-12-11 PROCEDURE — A4216 STERILE WATER/SALINE, 10 ML: HCPCS | Performed by: STUDENT IN AN ORGANIZED HEALTH CARE EDUCATION/TRAINING PROGRAM

## 2023-12-11 PROCEDURE — 80202 ASSAY OF VANCOMYCIN: CPT | Performed by: STUDENT IN AN ORGANIZED HEALTH CARE EDUCATION/TRAINING PROGRAM

## 2023-12-11 PROCEDURE — 27000221 HC OXYGEN, UP TO 24 HOURS

## 2023-12-11 PROCEDURE — 25000003 PHARM REV CODE 250: Performed by: PHYSICIAN ASSISTANT

## 2023-12-11 PROCEDURE — 97116 GAIT TRAINING THERAPY: CPT | Mod: CQ

## 2023-12-11 PROCEDURE — 94760 N-INVAS EAR/PLS OXIMETRY 1: CPT

## 2023-12-11 PROCEDURE — 97530 THERAPEUTIC ACTIVITIES: CPT

## 2023-12-11 RX ADMIN — GUAIFENESIN 400 MG: 200 SOLUTION ORAL at 09:12

## 2023-12-11 RX ADMIN — APIXABAN 5 MG: 5 TABLET, FILM COATED ORAL at 09:12

## 2023-12-11 RX ADMIN — LEVALBUTEROL HYDROCHLORIDE 1.25 MG: 1.25 SOLUTION RESPIRATORY (INHALATION) at 03:12

## 2023-12-11 RX ADMIN — SODIUM CHLORIDE: 9 INJECTION, SOLUTION INTRAVENOUS at 04:12

## 2023-12-11 RX ADMIN — APIXABAN 5 MG: 5 TABLET, FILM COATED ORAL at 07:12

## 2023-12-11 RX ADMIN — SODIUM CHLORIDE: 9 INJECTION, SOLUTION INTRAVENOUS at 10:12

## 2023-12-11 RX ADMIN — ASPIRIN 81 MG: 81 TABLET, COATED ORAL at 09:12

## 2023-12-11 RX ADMIN — LINACLOTIDE 72 MCG: 72 CAPSULE, GELATIN COATED ORAL at 05:12

## 2023-12-11 RX ADMIN — ACETYLCYSTEINE 4 ML: 200 INHALANT RESPIRATORY (INHALATION) at 07:12

## 2023-12-11 RX ADMIN — MICONAZOLE NITRATE 2 % TOPICAL POWDER: at 09:12

## 2023-12-11 RX ADMIN — VANCOMYCIN HYDROCHLORIDE 750 MG: 750 INJECTION, POWDER, LYOPHILIZED, FOR SOLUTION INTRAVENOUS at 09:12

## 2023-12-11 RX ADMIN — WHITE PETROLATUM: 1.75 OINTMENT TOPICAL at 10:12

## 2023-12-11 RX ADMIN — SODIUM CHLORIDE: 9 INJECTION, SOLUTION INTRAVENOUS at 09:12

## 2023-12-11 RX ADMIN — SODIUM CHLORIDE, PRESERVATIVE FREE 10 ML: 5 INJECTION INTRAVENOUS at 10:12

## 2023-12-11 RX ADMIN — GUAIFENESIN 400 MG: 200 SOLUTION ORAL at 01:12

## 2023-12-11 RX ADMIN — METOPROLOL SUCCINATE 100 MG: 50 TABLET, EXTENDED RELEASE ORAL at 09:12

## 2023-12-11 RX ADMIN — Medication 1 CAPSULE: at 07:12

## 2023-12-11 RX ADMIN — FUROSEMIDE 40 MG: 40 TABLET ORAL at 09:12

## 2023-12-11 RX ADMIN — QUETIAPINE FUMARATE 200 MG: 100 TABLET ORAL at 07:12

## 2023-12-11 RX ADMIN — SODIUM CHLORIDE, PRESERVATIVE FREE 10 ML: 5 INJECTION INTRAVENOUS at 12:12

## 2023-12-11 RX ADMIN — SODIUM CHLORIDE 125 MG: 9 INJECTION, SOLUTION INTRAVENOUS at 09:12

## 2023-12-11 RX ADMIN — LEVALBUTEROL HYDROCHLORIDE 1.25 MG: 1.25 SOLUTION RESPIRATORY (INHALATION) at 07:12

## 2023-12-11 RX ADMIN — CEFEPIME 1 G: 1 INJECTION, POWDER, FOR SOLUTION INTRAMUSCULAR; INTRAVENOUS at 11:12

## 2023-12-11 RX ADMIN — WHITE PETROLATUM: 1.75 OINTMENT TOPICAL at 09:12

## 2023-12-11 RX ADMIN — LEVALBUTEROL HYDROCHLORIDE 1.25 MG: 1.25 SOLUTION RESPIRATORY (INHALATION) at 11:12

## 2023-12-11 RX ADMIN — SACUBITRIL AND VALSARTAN 1 TABLET: 24; 26 TABLET, FILM COATED ORAL at 07:12

## 2023-12-11 RX ADMIN — MICONAZOLE NITRATE 2 % TOPICAL POWDER: at 10:12

## 2023-12-11 RX ADMIN — SODIUM CHLORIDE, PRESERVATIVE FREE 10 ML: 5 INJECTION INTRAVENOUS at 06:12

## 2023-12-11 RX ADMIN — MELATONIN TAB 3 MG 9 MG: 3 TAB at 07:12

## 2023-12-11 RX ADMIN — FOLIC ACID 1 MG: 1 TABLET ORAL at 09:12

## 2023-12-11 RX ADMIN — Medication 1 CAPSULE: at 11:12

## 2023-12-11 RX ADMIN — AMIODARONE HYDROCHLORIDE 200 MG: 200 TABLET ORAL at 09:12

## 2023-12-11 RX ADMIN — Medication 1 CAPSULE: at 04:12

## 2023-12-11 RX ADMIN — SACUBITRIL AND VALSARTAN 1 TABLET: 24; 26 TABLET, FILM COATED ORAL at 09:12

## 2023-12-11 RX ADMIN — CEFEPIME 1 G: 1 INJECTION, POWDER, FOR SOLUTION INTRAMUSCULAR; INTRAVENOUS at 10:12

## 2023-12-11 RX ADMIN — VANCOMYCIN HYDROCHLORIDE 750 MG: 750 INJECTION, POWDER, LYOPHILIZED, FOR SOLUTION INTRAVENOUS at 10:12

## 2023-12-11 RX ADMIN — SODIUM CHLORIDE, PRESERVATIVE FREE 10 ML: 5 INJECTION INTRAVENOUS at 05:12

## 2023-12-11 RX ADMIN — SODIUM CHLORIDE: 9 INJECTION, SOLUTION INTRAVENOUS at 11:12

## 2023-12-11 RX ADMIN — CEFEPIME 1 G: 1 INJECTION, POWDER, FOR SOLUTION INTRAMUSCULAR; INTRAVENOUS at 04:12

## 2023-12-11 RX ADMIN — GUAIFENESIN 400 MG: 200 SOLUTION ORAL at 08:12

## 2023-12-11 NOTE — PT/OT/SLP PROGRESS
Occupational Therapy  Treatment    Name: Mike Urbina Jr.    : 1955 (68 y.o.)  MRN: [id number]           TREATMENT SUMMARY AND RECOMMENDATIONS:      OT Date of Treatment: 23  OT Start Time: 0900  OT Stop Time: 915  OT Total Time (min): 15 min      Subjective Assessment:   No complaints  Lethargic   x Awake, alert, cooperative  Impulsive    Uncooperative   Flat affect    Agitated  c/o pain    Appropriate  c/o fatigue    Confused x Treated at bedside     Emotionally labile  Treated in gym/dept.      Other:        Therapy Precautions:    Cognitive deficits  Spinal precautions    Collar - hard  Sternal precautions    Collar - soft   TLSO    Fall risk  LSO    Hip precautions - posterior  Knee immobilizer    Hip precautions - anterior  WBAT    Impaired communication  Partial weightbearing   x Oxygen  TTWB    PEG tube  NWB    Visual deficits      Hearing deficits   Other:        Treatment Objectives:     Mobility Training:    Mobility task Assist level Comments    Bed mobility SBA Supine>EOB   Transfer SBA Stand/pivot t/f with RW to BSchair   Sit to stands transitions     Functional mobility SBA X335 feet with RW   Sitting balance     Standing balance      Other:          Additional Comments:  O2 remained 91-96% throughout session on 2L nasal cannula    Assessment: Patient tolerated session well.    GOALS:   Multidisciplinary Problems       Occupational Therapy Goals          Problem: Occupational Therapy    Goal Priority Disciplines Outcome Interventions   Occupational Therapy Goal     OT, PT/OT Ongoing, Progressing    Description: Goals to be met by: 23     Patient will increase functional independence with ADLs by performing:    LE Dressing with Supervision.  Grooming while standing at sink with Modified Shannon.  Toileting from toilet with Supervision for hygiene and clothing management.   Bathing from  shower chair/bench with Supervision.  Toilet transfer to toilet with Supervision.                          Recommendations:     Discharge Equipment Recommendations:  walker, rolling     Plan:     Patient to be seen 6 x/week (QD/BID as appropriate) to address the above listed problems via self-care/home management, therapeutic activities, therapeutic exercises  Plan of Care Expires: 12/20/23  Plan of Care Reviewed with: patient  Revisions made to plan of care: No      Skilled OT Minutes Provided: 15  Communication with Treatment Team:     Equipment recommendations:       At end of treatment, patient remained:  x Up in chair     Up in wheelchair in room    In bed   x With alarm activated    Bed rails up   x Call bell in reach     Family/friends present    Restraints secured properly    In bathroom with CNA/RN notified    In gym with PT/PTA/tech    Nurse aware    Other:

## 2023-12-11 NOTE — PROGRESS NOTES
Pharmacokinetic Assessment Follow Up: IV Vancomycin    Vancomycin serum concentration assessment(s):    The trough level was drawn correctly and can be used to guide therapy at this time. The measurement is within the desired definitive target range of 15 to 20 mcg/mL.    Vancomycin Regimen Plan:    Continue regimen to Vancomycin 750 mg IV every 12 hours with next serum trough concentration measured at 0800 prior to 5th dose on 12/13    Scheduled Administration Times    0900,2100    Drug levels (last 3 results):  Recent Labs   Lab Result Units 12/08/23 2001 12/11/23  0828   Vancomycin Trough ug/ml 18.9 19.4       Vancomycin Administrations:  vancomycin given in the last 96 hours                     vancomycin 750 mg in dextrose 5 % (D5W) 250 mL IVPB (Vial-Mate) (mg) 750 mg New Bag 12/11/23 0949     750 mg New Bag 12/10/23 2022     750 mg New Bag  0952     750 mg New Bag 12/09/23 2156     750 mg New Bag  0902     750 mg New Bag 12/08/23 2148     750 mg New Bag  0920     750 mg New Bag 12/07/23 2027     750 mg New Bag  0957                    Pharmacy will continue to follow and monitor vancomycin.    Please contact pharmacy at extension [phone] for questions regarding this assessment.    Thank you for the consult,   Simeon Yanez       Patient brief summary:  Mike Urbina Jr. is a 68 y.o. male initiated on antimicrobial therapy with IV Vancomycin for treatment of MRSA bacteremia    The patient's current regimen is 750 mg Q12    Drug Allergies:   Review of patient's allergies indicates:  No Known Allergies    Actual Body Weight:   76.3 kg    Renal Function:   Estimated Creatinine Clearance: 91.6 mL/min (based on SCr of 0.81 mg/dL).,     Dialysis Method (if applicable):  N/A    CBC (last 72 hours):  Recent Labs   Lab Result Units 12/09/23  0510 12/11/23  0306   WBC x10(3)/mcL 4.02* 4.28*   Hgb g/dL 7.7* 7.9*   Hct % 25.5* 25.9*   Platelet x10(3)/mcL 120* 92*   Mono % % 10.9 10.5   Eos % % 0.7 1.9   Basophil %  % 0.5 0.2       Metabolic Panel (last 72 hours):  Recent Labs   Lab Result Units 12/09/23  0510   Sodium Level mmol/L 137   Potassium Level mmol/L 3.6   Chloride mmol/L 103   Carbon Dioxide mmol/L 27   Glucose Level mg/dL 91   Blood Urea Nitrogen mg/dL 9.8   Creatinine mg/dL 0.81       Microbiologic Results:  Microbiology Results (last 7 days)       Procedure Component Value Units Date/Time    Respiratory Culture [[id number]]     Order Status: Sent Specimen: Sputum

## 2023-12-11 NOTE — PT/OT/SLP PROGRESS
Physical Therapy Treatment Note           Name: Mkie Urbina Jr.    : 1955 (68 y.o.)  MRN: [id number]           TREATMENT SUMMARY AND RECOMMENDATIONS:    PT Received On: 23  PT Start Time: 1500     PT Stop Time: 1515  PT Total Time (min): 15 min     Subjective Assessment:  x No complaints  Lethargic    Awake, alert, cooperative  Uncooperative    Agitated  c/o pain    Appropriate  c/o fatigue    Confused  Treated at bedside     Emotionally labile  Treated in gym/dept.    Impulsive  Other:    Flat affect       Therapy Precautions:    Cognitive deficits  Spinal precautions    Collar - hard  Sternal precautions    Collar - soft   TLSO    Fall risk  LSO    Hip precautions - posterior  Knee immobilizer    Hip precautions - anterior  WBAT    Impaired communication  Partial weightbearing   x Oxygen  TTWB    PEG tube  NWB    Visual deficits  Other:    Hearing deficits          Treatment Objectives:     Mobility Training:   Assist level Comments    Bed mobility     Transfer     Gait Rei Amb on firm surface with  ft    Sit to stand transitions     Sitting balance     Standing balance      Wheelchair mobility     Car transfer     Other:          Therapeutic Exercise:   Exercise Sets Reps Comments                               Additional Comments:  2L O2 donned     Assessment: Patient tolerated session well.    PT Plan: continue  Revisions made to plan of care: No    GOALS:   Multidisciplinary Problems       Physical Therapy Goals          Problem: Physical Therapy    Goal Priority Disciplines Outcome Goal Variances Interventions   Physical Therapy Goal     PT, PT/OT Ongoing, Progressing     Description: Goals to be met by: Discharge     Patient will increase functional independence with mobility by performin. Sit to stand transfer with Modified [place]  2. Bed to chair transfer with Modified [place] using No Assistive Device  3. Gait  x 1000 feet including outdoor negotiation  and stair completion with Supervision using No Assistive Device.                          Skilled PT Minutes Provided: 15   Communication with Treatment Team:     Equipment recommendations:       At end of treatment, patient remained:  x Up in chair     Up in wheelchair in room    In bed   x With alarm activated    Bed rails up   x Call bell in reach     Family/friends present    Restraints secured properly    In bathroom with CNA/RN notified    Nurse aware    In gym with therapist/tech    Other:

## 2023-12-11 NOTE — PT/OT/SLP PROGRESS
Physical Therapy Treatment Note           Name: Mike Urbina Jr.    : 1955 (68 y.o.)  MRN: [id number]           TREATMENT SUMMARY AND RECOMMENDATIONS:    PT Received On: 23  PT Start Time: 1115     PT Stop Time: 1130  PT Total Time (min): 15 min     Subjective Assessment:  x No complaints  Lethargic    Awake, alert, cooperative  Uncooperative    Agitated  c/o pain    Appropriate  c/o fatigue    Confused  Treated at bedside     Emotionally labile  Treated in gym/dept.    Impulsive  Other:    Flat affect       Therapy Precautions:    Cognitive deficits  Spinal precautions    Collar - hard  Sternal precautions    Collar - soft   TLSO    Fall risk  LSO    Hip precautions - posterior  Knee immobilizer    Hip precautions - anterior  WBAT    Impaired communication  Partial weightbearing   x Oxygen  TTWB    PEG tube  NWB    Visual deficits  Other:    Hearing deficits          Treatment Objectives:     Mobility Training:   Assist level Comments    Bed mobility     Transfer     Gait SBA Amb on firm surface with  ft    Sit to stand transitions     Sitting balance     Standing balance      Wheelchair mobility     Car transfer     Other:          Therapeutic Exercise:   Exercise Sets Reps Comments                               Additional Comments:  2L O2 donned     Assessment: Patient tolerated session no issues noted.    PT Plan: continue  Revisions made to plan of care: No    GOALS:   Multidisciplinary Problems       Physical Therapy Goals          Problem: Physical Therapy    Goal Priority Disciplines Outcome Goal Variances Interventions   Physical Therapy Goal     PT, PT/OT Ongoing, Progressing     Description: Goals to be met by: Discharge     Patient will increase functional independence with mobility by performin. Sit to stand transfer with Modified [place]  2. Bed to chair transfer with Modified [place] using No Assistive Device  3. Gait  x 1000 feet including outdoor  negotiation and stair completion with Supervision using No Assistive Device.                          Skilled PT Minutes Provided: 15   Communication with Treatment Team:     Equipment recommendations:       At end of treatment, patient remained:  x Up in chair     Up in wheelchair in room    In bed   x With alarm activated    Bed rails up   x Call bell in reach     Family/friends present    Restraints secured properly    In bathroom with CNA/RN notified    Nurse aware    In gym with therapist/tech    Other:

## 2023-12-11 NOTE — PROGRESS NOTES
[facility] MEDICINE ~ PROGRESS NOTE    CHIEF COMPLAINT   Hospital follow up    HOSPITAL COURSE   67yo male with a past medical history of CAD, diastolic CHF, Afib, aortic stenosis, NSTEMI, and HTN who was recently admitted to [facility] for pneumonia and Afib RVR. Patient was diagnosed with Influenza A on 11/19/2023 prior to admission and started on Tamiflu. CTA chest 11/24/2023 showed no PE, but multifocal pneumonia most evident in RUL. Patient was admitted for ICU, started on Cardizem drip, Vanc, Zosyn, Doxy, and required Levophed for hypotension. Echo 11/25/2023 showed LVEF 55% moderate aortic stenosis, severe posterior mitral annular calcification present. Blood cultures x2 resulted positive for MRSA on 11/27/2023.  Infectious Disease recommended patient to continue vancomycin for 4 weeks with suspected in date on 12/25/2023. Stool culture on 11/27/2023 showed many yeast. Repeat blood cultures x2 on 12/02/2023 showed no growth. Patient underwent a CARO guided DCCV on 11/30/2023 which showed mild-moderately enlarged right atrium, no ASD or PFO, LVEF 55%, mild-moderate left ventricular hypertrophy, severe aortic stenosis, moderate mitral stenosis, mild-moderate mitral regurgitation, mild-moderate tricuspid regurgitation, no vegetation present, no intracardiac thrombus, successful cardioversion. CTA chest, abdomen, pelvis 12/05/2023 showed small bilateral pleural effusion R>L and patchy consolidation in both lungs increased compared to prior. CTA was obtained for TAVR workup to be completed outpatient after ID clearance. On exam today, patient is currently on 2 L nasal cannula with O2 sat 99%.  With worsening pneumonia and lung sounds on exam, we will start Zosyn, obtain sputum culture, add nystatin for fungal coverage, and schedule Mucomyst with the Xopenex nebulizers 3 times a day.     Today  Patient complains of shortness of breath on exertion.  We will have PT/OT monitor O2  sat with therapy today.  H and H remains low, stable.  Platelets on a downward trend, may need to hold Eliquis if this continues.    OBJECTIVE/PHYSICAL EXAM     VITAL SIGNS (MOST RECENT):  Temp: 97.3 °F (36.3 °C) (12/11/23 0730)  Pulse: 81 (12/11/23 0730)  Resp: 16 (12/11/23 0713)  BP: (!) 160/89 (12/11/23 0730)  SpO2: 98 % (12/11/23 0730) VITAL SIGNS (24 HOUR RANGE):  Temp:  [97.3 °F (36.3 °C)-98.1 °F (36.7 °C)] 97.3 °F (36.3 °C)  Pulse:  [71-81] 81  Resp:  [16-20] 16  SpO2:  [95 %-100 %] 98 %  BP: (113-165)/(47-89) 160/89     GENERAL: In no acute distress, afebrile  HEENT: Atraumatic, normocephalic, moist mucous membranes, supple neck   CHEST: Improved lung sounds throughout  HEART: S1, S2, grade IV murmur  ABDOMEN: Soft, nontender, BS +  MSK: Warm, no lower extremity edema, no clubbing or cyanosis  NEUROLOGIC: Alert and oriented x4, moving all extremities with good strength   INTEGUMENTARY: No obvious skin rash   PSYCHIATRY: Appropriate mood and affect     ASSESSMENT/PLAN   MRSA bacteriemia   Multifocal Pneumonia  Recent Influenza A infection (11/19/2023)  CTA chest 11/24/2023 showed no PE, but multifocal pneumonia most evident in RUL  Blood cultures x2 resulted positive for MRSA on 11/27/2023  ID recommended patient to continue vancomycin for 4 weeks with suspected in date on 12/25/2023, Cefepime (12/07/2023-12/12/2023)  D/c Zosyn (12/06/2023-12/07/203) and Nystatin (12/06/2023-12/07/2023)  IS, cough assist, acapella   Xopenex, Mucomyst      Yeast + Stool  D/c Nystatin (12/06/2023-12/07/2023) low concern for disseminated candidemia   Continue Probiotic    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   Thrombocytopenia  Hx of alcohol use disorder  Pending thiamine results  Ferrlecit x3  Hold Eliquis if platelets <50    Hypokalemia   Replete as condition requires     Transamnitis   Grayzone hepatitis C Ab  Hepatitis C viral load ordered      CAD  Diastolic CHF  Afib  Aortic stenosis  Essential HTN  CARO  guided DCCV on 11/30/2023 which showed mild-moderately enlarged right atrium, no ASD or PFO, LVEF 55%, mild-moderate left ventricular hypertrophy, severe aortic stenosis, moderate mitral stenosis, mild-moderate mitral regurgitation, mild-moderate tricuspid regurgitation, no vegetation present, no intracardiac thrombus, successful cardioversion  TAVR to be completed outpatient after ID clearance  Continue Entresto, Toprol XL, Amiodarone, Eliquis, and Aspirin   Continue PO Lasix 40mg    DVT prophylaxis: Eliquis   Anticipated discharge and disposition: Continue PT/OT   __________________________________________________________________________    LABS/MICRO/MEDS/DIAGNOSTICS     LABS  Recent Labs     12/09/23  0510      K 3.6   CHLORIDE 103   CO2 27   BUN 9.8   CREATININE 0.81   GLUCOSE 91   CALCIUM 7.4*     Recent Labs     12/11/23  0306   WBC 4.28*   RBC 2.87*   HCT 25.9*   MCV 90.2   PLT 92*     MICROBIOLOGY  Microbiology Results (last 7 days)       Procedure Component Value Units Date/Time    Respiratory Culture [[id number]]     Order Status: Sent Specimen: Sputum              MEDICATIONS   acetylcysteine 200 mg/ml (20%)  4 mL Nebulization Daily    amiodarone  200 mg Oral Daily    apixaban  5 mg Oral BID    aspirin  81 mg Oral Daily    ceFEPime (MAXIPIME) IVPB  1 g Intravenous Q8H    ferric gluconate (FERRLECIT) 125 mg in sodium chloride 0.9% 100 mL IVPB  125 mg Intravenous Daily    folic acid  1 mg Oral Daily    furosemide  40 mg Oral Daily    guaiFENesin 100 mg/5 ml  400 mg Oral Q6H WAKE    Lactobacillus acidophilus  1 capsule Oral TID WM    levalbuterol  1.25 mg Nebulization Q8H    linaCLOtide  72 mcg Oral Before breakfast    melatonin  9 mg Oral Nightly    metoprolol succinate  100 mg Oral Daily    miconazole NITRATE 2 %   Topical (Top) BID    QUEtiapine  200 mg Oral QHS    sacubitriL-valsartan  1 tablet Oral BID    sodium chloride 0.9%  10 mL Intravenous Q6H    vancomycin (VANCOCIN) IV (PEDS and ADULTS)   750 mg Intravenous Q12H    white petrolatum   Topical (Top) BID         INFUSIONS       DIAGNOSTIC TESTS  X-Ray Chest 1 View for Line/Tube Placement   Final Result      Consolidative changes in the right perihilar region right mid and right upper lung zone similar to previous exam.      Interval insertion of right-sided PICC line         Electronically signed by: Juan Wharton   Date:    12/06/2023   Time:    12:14        EF   Date Value Ref Range Status   11/25/2023 55 % Final   12/29/2022 55 % Final        NUTRITION STATUS  Patient meets ASPEN criteria for   malnutrition of   per RD assessment as evidenced by:                       A minimum of two characteristics is recommended for diagnosis of either severe or non-severe malnutrition.     Case related differential diagnoses have been reviewed; assessment and plan has been documented. I have personally reviewed the labs and test results that are currently available; I have reviewed the patients medication list. I have reviewed the consulting providers recommendations. I have reviewed or attempted to review medical records based upon their availability.  All of the patient's and/or family's questions have been addressed and answered to the best of my ability.  I will continue to monitor closely and make adjustments to medical management as needed.  This document was created using M*Modal Fluency Direct.  Transcription errors may have been made.  Please contact me if any questions may rise regarding documentation to clarify transcription.      GISEL Russell   Internal Medicine  Department of Hospital Medicine [facility]

## 2023-12-11 NOTE — PROGRESS NOTES
Inpatient Nutrition Evaluation    Admit Date: 12/5/2023   Total duration of encounter: 6 days   Patient Age: 68 y.o.    Nutrition Recommendation/Prescription     Continue heart healthy diet.     Nutrition Assessment     Chart Review    Reason Seen: continuous nutrition monitoring, length of stay, and follow-up    Malnutrition Screening Tool Results   Have you recently lost weight without trying?: No  Have you been eating poorly because of a decreased appetite?: Yes   MST Score: 1   Diagnosis:   Bacteremia 12/5/2023      Pneumonia due to infectious organism        Relevant Medical History: Atrial flutter  Date Unknown  CHF (congestive heart failure)  Date Unknown  Coronary artery disease  Date Unknown  Hypertension  Date Unknown  Myocardial infarction  Date Unknown  SOB (shortness of breath)     Scheduled Medications:  acetylcysteine 200 mg/ml (20%), 4 mL, Daily  amiodarone, 200 mg, Daily  apixaban, 5 mg, BID  aspirin, 81 mg, Daily  ceFEPime (MAXIPIME) IVPB, 1 g, Q8H  ferric gluconate (FERRLECIT) 125 mg in sodium chloride 0.9% 100 mL IVPB, 125 mg, Daily  folic acid, 1 mg, Daily  furosemide, 40 mg, Daily  guaiFENesin 100 mg/5 ml, 400 mg, Q6H WAKE  Lactobacillus acidophilus, 1 capsule, TID WM  levalbuterol, 1.25 mg, Q8H  linaCLOtide, 72 mcg, Before breakfast  melatonin, 9 mg, Nightly  metoprolol succinate, 100 mg, Daily  miconazole NITRATE 2 %, , BID  QUEtiapine, 200 mg, QHS  sacubitriL-valsartan, 1 tablet, BID  sodium chloride 0.9%, 10 mL, Q6H  vancomycin (VANCOCIN) IV (PEDS and ADULTS), 750 mg, Q12H  white petrolatum, , BID    Continuous Infusions:   PRN Medications: sodium chloride 0.9%, acetaminophen, calcium carbonate, cloNIDine, hydrALAZINE, loperamide, senna-docusate 8.6-50 mg, Pharmacy to dose Vancomycin consult **AND** vancomycin - pharmacy to dose    Recent Labs   Lab 12/07/23  0335 12/08/23  0304 12/09/23  0510 12/11/23  0306   * 134* 137  --    K 3.6 3.3* 3.6  --    CALCIUM 7.4* 7.7* 7.4*  --   "  CHLORIDE 105 104 103  --    CO2 23 25 27  --    BUN 12.3 11.1 9.8  --    CREATININE 0.95 0.95 0.81  --    EGFRNORACEVR >60 >60 >60  --    GLUCOSE 104 97 91  --    BILITOT 0.8  --   --   --    ALKPHOS 68  --   --   --    ALT 12  --   --   --    AST 13  --   --   --    ALBUMIN 1.7*  --   --   --    WBC 5.72 5.17 4.02* 4.28*   HGB 8.0* 7.8* 7.7* 7.9*   HCT 26.1* 25.3* 25.5* 25.9*     Nutrition Orders:  Diet heart healthy      Appetite/Oral Intake: good/% of meals  Factors Affecting Nutritional Intake:     Food/Lutheran/Cultural Preferences: none reported  Food Allergies: none reported  Last Bowel Movement: 12/10/23  Wound(s):      Comments    Pt intake is % of meals. Provide phone call no answer. Will try again.     Anthropometrics    Height: 6' 2" (188 cm),    Last Weight: 76.3 kg (168 lb 3.4 oz) (12/11/23 0342), Weight Method: Bed Scale  BMI (Calculated): 21.6  BMI Classification: normal (BMI 18.5-24.9)        Ideal Body Weight (IBW), Male: 190 lb     % Ideal Body Weight, Male (lb): 94.92 %                          Usual Weight Provided By: unable to obtain usual weight    Wt Readings from Last 5 Encounters:   12/11/23 76.3 kg (168 lb 3.4 oz)   11/25/23 73.9 kg (163 lb)   11/24/23 79.4 kg (175 lb)   11/19/23 81.6 kg (180 lb)   11/07/23 80 kg (176 lb 4.8 oz)     Weight Change(s) Since Admission: -5.5 kg wt loss. On furosemide.   Wt Readings from Last 1 Encounters:   12/11/23 0342 76.3 kg (168 lb 3.4 oz)   12/05/23 1500 81.8 kg (180 lb 5.4 oz)   Admit Weight: 81.8 kg (180 lb 5.4 oz) (12/05/23 1500), Weight Method: Bed Scale    Patient Education     Not applicable.    Nutrition Goals & Monitoring     Dietitian will monitor: food and beverage intake, weight, weight change, electrolyte/renal panel, glucose/endocrine profile, and gastrointestinal profile    Nutrition Risk/Follow-Up: low (follow-up in 5-7 days)  Patients assigned 'low nutrition risk' status do not qualify for a full nutritional assessment but " will be monitored and re-evaluated in a 5-7 day time period. Please consult if re-evaluation needed sooner.

## 2023-12-11 NOTE — PLAN OF CARE
Problem: Adult Inpatient Plan of Care  Goal: Plan of Care Review  Outcome: Ongoing, Progressing  Flowsheets (Taken 12/10/2023 2030)  Plan of Care Reviewed With: patient  Goal: Patient-Specific Goal (Individualized)  Outcome: Ongoing, Progressing  Flowsheets (Taken 12/10/2023 2030)  Anxieties, Fears or Concerns: None offered at this time, just wants to sleep.  Individualized Care Needs:   Isolation precautions   Monitor for s/s of worsening infection   Telemetry moniitoring   Resp exercises   IV antibiotic therapy.  Goal: Absence of Hospital-Acquired Illness or Injury  Outcome: Ongoing, Progressing  Intervention: Identify and Manage Fall Risk  Flowsheets (Taken 12/10/2023 2030)  Safety Promotion/Fall Prevention:   bed alarm set   assistive device/personal item within reach   commode/urinal/bedpan at bedside   Fall Risk reviewed with patient/family   Fall Risk signage in place   gait belt with ambulation   medications reviewed   lighting adjusted   nonskid shoes/socks when out of bed   pulse ox   room near unit station   instructed to call staff for mobility  Intervention: Prevent Skin Injury  Flowsheets (Taken 12/10/2023 2030)  Body Position:   position changed independently   side-lying   left  Skin Protection:   adhesive use limited   incontinence pads utilized   silicone foam dressing in place   skin sealant/moisture barrier applied   pulse oximeter probe site changed   tubing/devices free from skin contact  Intervention: Prevent and Manage VTE (Venous Thromboembolism) Risk  Flowsheets (Taken 12/10/2023 2030)  Activity Management:   Walk with assistive devise and /or staff member - L3   Up in chair - L3  Range of Motion: active ROM (range of motion) encouraged  Intervention: Prevent Infection  Flowsheets (Taken 12/10/2023 2030)  Infection Prevention:   environmental surveillance performed   equipment surfaces disinfected   hand hygiene promoted   personal protective equipment utilized   rest/sleep promoted    single patient room provided  Goal: Optimal Comfort and Wellbeing  Outcome: Ongoing, Progressing  Intervention: Monitor Pain and Promote Comfort  Flowsheets (Taken 12/10/2023 2030)  Pain Management Interventions:   pain management plan reviewed with patient/caregiver   care clustered   quiet environment facilitated  Intervention: Provide Person-Centered Care  Flowsheets (Taken 12/10/2023 2030)  Trust Relationship/Rapport:   care explained   choices provided   emotional support provided   empathic listening provided   questions answered   questions encouraged   reassurance provided   thoughts/feelings acknowledged     Problem: Fluid Imbalance (Pneumonia)  Goal: Fluid Balance  Outcome: Ongoing, Progressing  Intervention: Monitor and Manage Fluid Balance  Flowsheets (Taken 12/10/2023 2030)  Fluid/Electrolyte Management: fluids provided     Problem: Infection (Pneumonia)  Goal: Resolution of Infection Signs and Symptoms  Outcome: Ongoing, Progressing     Problem: Respiratory Compromise (Pneumonia)  Goal: Effective Oxygenation and Ventilation  Outcome: Ongoing, Progressing  Intervention: Promote Airway Secretion Clearance  Flowsheets (Taken 12/10/2023 2030)  Cough And Deep Breathing: done independently per patient  Intervention: Optimize Oxygenation and Ventilation  Flowsheets (Taken 12/10/2023 2030)  Airway/Ventilation Management: airway patency maintained  Head of Bed (HOB) Positioning: HOB at 20-30 degrees     Problem: Infection  Goal: Absence of Infection Signs and Symptoms  Outcome: Ongoing, Progressing  Intervention: Prevent or Manage Infection  Flowsheets (Taken 12/10/2023 2030)  Fever Reduction/Comfort Measures:   lightweight bedding   lightweight clothing  Infection Management: aseptic technique maintained  Isolation Precautions:   contact   precautions maintained     Problem: Fall Injury Risk  Goal: Absence of Fall and Fall-Related Injury  Outcome: Ongoing, Progressing  Intervention: Identify and Manage  Contributors  Flowsheets (Taken 12/10/2023 2030)  Self-Care Promotion:   independence encouraged   BADL personal objects within reach   BADL personal routines maintained  Medication Review/Management:   medications reviewed   high-risk medications identified  Intervention: Promote Injury-Free Environment  Flowsheets (Taken 12/10/2023 2030)  Safety Promotion/Fall Prevention:   bed alarm set   assistive device/personal item within reach   commode/urinal/bedpan at bedside   Fall Risk reviewed with patient/family   Fall Risk signage in place   gait belt with ambulation   medications reviewed   lighting adjusted   nonskid shoes/socks when out of bed   pulse ox   room near unit station   instructed to call staff for mobility

## 2023-12-12 LAB
ANION GAP SERPL CALC-SCNC: 8 MEQ/L
BASOPHILS # BLD AUTO: 0.03 X10(3)/MCL
BASOPHILS NFR BLD AUTO: 0.7 %
BUN SERPL-MCNC: 11.8 MG/DL (ref 8.4–25.7)
CALCIUM SERPL-MCNC: 7.8 MG/DL (ref 8.8–10)
CHLORIDE SERPL-SCNC: 103 MMOL/L (ref 98–107)
CO2 SERPL-SCNC: 27 MMOL/L (ref 23–31)
CREAT SERPL-MCNC: 0.91 MG/DL (ref 0.73–1.18)
CREAT/UREA NIT SERPL: 13
EOSINOPHIL # BLD AUTO: 0.07 X10(3)/MCL (ref 0–0.9)
EOSINOPHIL NFR BLD AUTO: 1.7 %
ERYTHROCYTE [DISTWIDTH] IN BLOOD BY AUTOMATED COUNT: 16.6 % (ref 11.5–17)
GFR SERPLBLD CREATININE-BSD FMLA CKD-EPI: >60 MLS/MIN/1.73/M2
GLUCOSE SERPL-MCNC: 96 MG/DL (ref 82–115)
HCT VFR BLD AUTO: 25.1 % (ref 42–52)
HGB BLD-MCNC: 7.4 G/DL (ref 14–18)
IMM GRANULOCYTES # BLD AUTO: 0.04 X10(3)/MCL (ref 0–0.04)
IMM GRANULOCYTES NFR BLD AUTO: 1 %
LYMPHOCYTES # BLD AUTO: 1.1 X10(3)/MCL (ref 0.6–4.6)
LYMPHOCYTES NFR BLD AUTO: 26.5 %
MCH RBC QN AUTO: 26.8 PG (ref 27–31)
MCHC RBC AUTO-ENTMCNC: 29.5 G/DL (ref 33–36)
MCV RBC AUTO: 90.9 FL (ref 80–94)
MONOCYTES # BLD AUTO: 0.45 X10(3)/MCL (ref 0.1–1.3)
MONOCYTES NFR BLD AUTO: 10.8 %
NEUTROPHILS # BLD AUTO: 2.46 X10(3)/MCL (ref 2.1–9.2)
NEUTROPHILS NFR BLD AUTO: 59.3 %
PLATELET # BLD AUTO: 95 X10(3)/MCL (ref 130–400)
PMV BLD AUTO: 11.7 FL (ref 7.4–10.4)
POTASSIUM SERPL-SCNC: 3.3 MMOL/L (ref 3.5–5.1)
RBC # BLD AUTO: 2.76 X10(6)/MCL (ref 4.7–6.1)
SODIUM SERPL-SCNC: 138 MMOL/L (ref 136–145)
VIT B1 BLD-SCNC: 96 NMOL/L (ref 70–180)
WBC # SPEC AUTO: 4.15 X10(3)/MCL (ref 4.5–11.5)

## 2023-12-12 PROCEDURE — 25000003 PHARM REV CODE 250: Performed by: STUDENT IN AN ORGANIZED HEALTH CARE EDUCATION/TRAINING PROGRAM

## 2023-12-12 PROCEDURE — 94664 DEMO&/EVAL PT USE INHALER: CPT

## 2023-12-12 PROCEDURE — 97116 GAIT TRAINING THERAPY: CPT

## 2023-12-12 PROCEDURE — 99900035 HC TECH TIME PER 15 MIN (STAT)

## 2023-12-12 PROCEDURE — 25000242 PHARM REV CODE 250 ALT 637 W/ HCPCS: Performed by: STUDENT IN AN ORGANIZED HEALTH CARE EDUCATION/TRAINING PROGRAM

## 2023-12-12 PROCEDURE — A4216 STERILE WATER/SALINE, 10 ML: HCPCS | Performed by: STUDENT IN AN ORGANIZED HEALTH CARE EDUCATION/TRAINING PROGRAM

## 2023-12-12 PROCEDURE — 27000207 HC ISOLATION

## 2023-12-12 PROCEDURE — 25000003 PHARM REV CODE 250: Performed by: PHYSICIAN ASSISTANT

## 2023-12-12 PROCEDURE — 63600175 PHARM REV CODE 636 W HCPCS: Performed by: PHYSICIAN ASSISTANT

## 2023-12-12 PROCEDURE — 63600175 PHARM REV CODE 636 W HCPCS: Performed by: STUDENT IN AN ORGANIZED HEALTH CARE EDUCATION/TRAINING PROGRAM

## 2023-12-12 PROCEDURE — 94640 AIRWAY INHALATION TREATMENT: CPT

## 2023-12-12 PROCEDURE — 85025 COMPLETE CBC W/AUTO DIFF WBC: CPT | Performed by: PHYSICIAN ASSISTANT

## 2023-12-12 PROCEDURE — 94799 UNLISTED PULMONARY SVC/PX: CPT | Mod: XB

## 2023-12-12 PROCEDURE — 97530 THERAPEUTIC ACTIVITIES: CPT

## 2023-12-12 PROCEDURE — 80048 BASIC METABOLIC PNL TOTAL CA: CPT | Performed by: PHYSICIAN ASSISTANT

## 2023-12-12 PROCEDURE — 27000221 HC OXYGEN, UP TO 24 HOURS

## 2023-12-12 PROCEDURE — 11000004 HC SNF PRIVATE

## 2023-12-12 PROCEDURE — 94760 N-INVAS EAR/PLS OXIMETRY 1: CPT

## 2023-12-12 RX ORDER — POTASSIUM CHLORIDE 20 MEQ/1
20 TABLET, EXTENDED RELEASE ORAL 2 TIMES DAILY
Status: COMPLETED | OUTPATIENT
Start: 2023-12-12 | End: 2023-12-12

## 2023-12-12 RX ADMIN — LEVALBUTEROL HYDROCHLORIDE 1.25 MG: 1.25 SOLUTION RESPIRATORY (INHALATION) at 08:12

## 2023-12-12 RX ADMIN — SODIUM CHLORIDE: 9 INJECTION, SOLUTION INTRAVENOUS at 12:12

## 2023-12-12 RX ADMIN — CEFEPIME 1 G: 1 INJECTION, POWDER, FOR SOLUTION INTRAMUSCULAR; INTRAVENOUS at 12:12

## 2023-12-12 RX ADMIN — SODIUM CHLORIDE, PRESERVATIVE FREE 10 ML: 5 INJECTION INTRAVENOUS at 12:12

## 2023-12-12 RX ADMIN — APIXABAN 5 MG: 5 TABLET, FILM COATED ORAL at 09:12

## 2023-12-12 RX ADMIN — FOLIC ACID 1 MG: 1 TABLET ORAL at 09:12

## 2023-12-12 RX ADMIN — POTASSIUM CHLORIDE 20 MEQ: 1500 TABLET, EXTENDED RELEASE ORAL at 09:12

## 2023-12-12 RX ADMIN — SODIUM CHLORIDE: 9 INJECTION, SOLUTION INTRAVENOUS at 08:12

## 2023-12-12 RX ADMIN — SODIUM CHLORIDE: 9 INJECTION, SOLUTION INTRAVENOUS at 09:12

## 2023-12-12 RX ADMIN — POTASSIUM CHLORIDE 20 MEQ: 1500 TABLET, EXTENDED RELEASE ORAL at 08:12

## 2023-12-12 RX ADMIN — QUETIAPINE FUMARATE 200 MG: 100 TABLET ORAL at 07:12

## 2023-12-12 RX ADMIN — GUAIFENESIN 400 MG: 200 SOLUTION ORAL at 07:12

## 2023-12-12 RX ADMIN — SODIUM CHLORIDE 125 MG: 9 INJECTION, SOLUTION INTRAVENOUS at 11:12

## 2023-12-12 RX ADMIN — SACUBITRIL AND VALSARTAN 1 TABLET: 24; 26 TABLET, FILM COATED ORAL at 07:12

## 2023-12-12 RX ADMIN — CEFEPIME 1 G: 1 INJECTION, POWDER, FOR SOLUTION INTRAMUSCULAR; INTRAVENOUS at 04:12

## 2023-12-12 RX ADMIN — Medication 1 CAPSULE: at 09:12

## 2023-12-12 RX ADMIN — SACUBITRIL AND VALSARTAN 1 TABLET: 24; 26 TABLET, FILM COATED ORAL at 09:12

## 2023-12-12 RX ADMIN — SODIUM CHLORIDE, PRESERVATIVE FREE 10 ML: 5 INJECTION INTRAVENOUS at 05:12

## 2023-12-12 RX ADMIN — GUAIFENESIN 400 MG: 200 SOLUTION ORAL at 09:12

## 2023-12-12 RX ADMIN — MICONAZOLE NITRATE 2 % TOPICAL POWDER: at 08:12

## 2023-12-12 RX ADMIN — APIXABAN 5 MG: 5 TABLET, FILM COATED ORAL at 07:12

## 2023-12-12 RX ADMIN — LINACLOTIDE 72 MCG: 72 CAPSULE, GELATIN COATED ORAL at 05:12

## 2023-12-12 RX ADMIN — MICONAZOLE NITRATE 2 % TOPICAL POWDER: at 09:12

## 2023-12-12 RX ADMIN — AMIODARONE HYDROCHLORIDE 200 MG: 200 TABLET ORAL at 09:12

## 2023-12-12 RX ADMIN — GUAIFENESIN 400 MG: 200 SOLUTION ORAL at 02:12

## 2023-12-12 RX ADMIN — WHITE PETROLATUM: 1.75 OINTMENT TOPICAL at 08:12

## 2023-12-12 RX ADMIN — ACETYLCYSTEINE 4 ML: 200 INHALANT RESPIRATORY (INHALATION) at 08:12

## 2023-12-12 RX ADMIN — WHITE PETROLATUM: 1.75 OINTMENT TOPICAL at 09:12

## 2023-12-12 RX ADMIN — Medication 1 CAPSULE: at 12:12

## 2023-12-12 RX ADMIN — ASPIRIN 81 MG: 81 TABLET, COATED ORAL at 09:12

## 2023-12-12 RX ADMIN — FUROSEMIDE 40 MG: 40 TABLET ORAL at 09:12

## 2023-12-12 RX ADMIN — VANCOMYCIN HYDROCHLORIDE 750 MG: 750 INJECTION, POWDER, LYOPHILIZED, FOR SOLUTION INTRAVENOUS at 09:12

## 2023-12-12 RX ADMIN — SODIUM CHLORIDE: 9 INJECTION, SOLUTION INTRAVENOUS at 04:12

## 2023-12-12 RX ADMIN — VANCOMYCIN HYDROCHLORIDE 750 MG: 750 INJECTION, POWDER, LYOPHILIZED, FOR SOLUTION INTRAVENOUS at 08:12

## 2023-12-12 RX ADMIN — SODIUM CHLORIDE, PRESERVATIVE FREE 10 ML: 5 INJECTION INTRAVENOUS at 06:12

## 2023-12-12 RX ADMIN — Medication 1 CAPSULE: at 04:12

## 2023-12-12 RX ADMIN — METOPROLOL SUCCINATE 100 MG: 50 TABLET, EXTENDED RELEASE ORAL at 09:12

## 2023-12-12 RX ADMIN — LEVALBUTEROL HYDROCHLORIDE 1.25 MG: 1.25 SOLUTION RESPIRATORY (INHALATION) at 11:12

## 2023-12-12 RX ADMIN — LEVALBUTEROL HYDROCHLORIDE 1.25 MG: 1.25 SOLUTION RESPIRATORY (INHALATION) at 04:12

## 2023-12-12 RX ADMIN — MELATONIN TAB 3 MG 9 MG: 3 TAB at 07:12

## 2023-12-12 NOTE — PT/OT/SLP PROGRESS
Physical Therapy Treatment Note           Name: Mike Urbina Jr.    : 1955 (68 y.o.)  MRN: [id number]           TREATMENT SUMMARY AND RECOMMENDATIONS:    PT Received On: 23  PT Start Time: 1333     PT Stop Time: 1356  PT Total Time (min): 23 min     Subjective Assessment:  x No complaints  Lethargic    Awake, alert, cooperative  Uncooperative    Agitated  c/o pain    Appropriate  c/o fatigue    Confused x Treated at bedside     Emotionally labile  Treated in gym/dept.    Impulsive  Other:    Flat affect       Therapy Precautions:    Cognitive deficits  Spinal precautions    Collar - hard  Sternal precautions    Collar - soft   TLSO    Fall risk  LSO    Hip precautions - posterior  Knee immobilizer    Hip precautions - anterior  WBAT    Impaired communication  Partial weightbearing    Oxygen  TTWB    PEG tube  NWB    Visual deficits x Other: Contact, MRSA    Hearing deficits          Treatment Objectives:     Mobility Training:   Assist level Comments    Bed mobility MOD I Sit>supine   Transfer MOD I Stand pivot using RW   Gait CGA and SPV Amb on firm outside and in ferreira with RW 2 x 335 feet monitoring O2 sats on room air 88%-92%   Sit to stand transitions MOD I Using RW   Sitting balance     Standing balance      Wheelchair mobility     Car transfer     Other:          Therapeutic Exercise:   Exercise Sets Reps Comments                               Additional Comments:  Room air ambulation, no symptoms. Pt brought outside for therapy today.     Assessment: Patient tolerated session well.    PT Plan: continue  Revisions made to plan of care: No    GOALS:   Multidisciplinary Problems       Physical Therapy Goals          Problem: Physical Therapy    Goal Priority Disciplines Outcome Goal Variances Interventions   Physical Therapy Goal     PT, PT/OT Ongoing, Progressing     Description: Goals to be met by: Discharge     Patient will increase functional independence with mobility by  performin. Sit to stand transfer with Modified [place]  2. Bed to chair transfer with Modified [place] using No Assistive Device  3. Gait  x 1000 feet including outdoor negotiation and stair completion with Supervision using No Assistive Device.                          Skilled PT Minutes Provided: 23   Communication with Treatment Team:     Equipment recommendations:       At end of treatment, patient remained:   Up in chair     Up in wheelchair in room   x In bed    With alarm activated   x Bed rails up   x Call bell in reach     Family/friends present    Restraints secured properly    In bathroom with CNA/RN notified    Nurse aware    In gym with therapist/tech    Other:

## 2023-12-12 NOTE — PLAN OF CARE
Problem: Adult Inpatient Plan of Care  Goal: Plan of Care Review  Outcome: Ongoing, Progressing  Flowsheets (Taken 12/12/2023 0529)  Plan of Care Reviewed With: patient  Goal: Patient-Specific Goal (Individualized)  Outcome: Ongoing, Progressing  Flowsheets (Taken 12/12/2023 0529)  Anxieties, Fears or Concerns: None at this time  Individualized Care Needs: Maintain isolation precaution, monitor telemetry, IV Abx, Monitor O2Sat  Goal: Absence of Hospital-Acquired Illness or Injury  Outcome: Ongoing, Progressing  Intervention: Prevent and Manage VTE (Venous Thromboembolism) Risk  Flowsheets (Taken 12/11/2023 2000)  VTE Prevention/Management:   ambulation promoted   bleeding precations maintained   bleeding risk assessed   dorsiflexion/plantar flexion performed   fluids promoted   ROM (active) performed  Goal: Optimal Comfort and Wellbeing  Outcome: Ongoing, Progressing  Intervention: Monitor Pain and Promote Comfort  Flowsheets (Taken 12/11/2023 2000)  Pain Management Interventions:   pain management plan reviewed with patient/caregiver   care clustered   diversional activity provided   pillow support provided   position adjusted   quiet environment facilitated   relaxation techniques promoted     Problem: Fall Injury Risk  Goal: Absence of Fall and Fall-Related Injury  Outcome: Ongoing, Progressing

## 2023-12-12 NOTE — PLAN OF CARE
Problem: Adult Inpatient Plan of Care  Goal: Plan of Care Review  Outcome: Ongoing, Progressing  Goal: Patient-Specific Goal (Individualized)  Outcome: Ongoing, Progressing  Flowsheets (Taken 12/11/2023 0800)  Anxieties, Fears or Concerns: None expressed  Individualized Care Needs: Maintain isolation precaution, monitor telemetry, IV Abx, monitor O2     Problem: Infection (Pneumonia)  Goal: Resolution of Infection Signs and Symptoms  Outcome: Ongoing, Progressing     Problem: Infection  Goal: Absence of Infection Signs and Symptoms  Outcome: Ongoing, Progressing     Problem: Fall Injury Risk  Goal: Absence of Fall and Fall-Related Injury  Outcome: Ongoing, Progressing

## 2023-12-12 NOTE — PLAN OF CARE
Problem: Occupational Therapy  Goal: Occupational Therapy Goal  Description: Goals to be met by: 12/20/23     Patient will increase functional independence with ADLs by performing:    LE Dressing with Supervision.  Grooming while standing at sink with Modified [place].  Toileting from toilet with Supervision for hygiene and clothing management.   Bathing from  shower chair/bench with Supervision.  Toilet transfer to toilet with Supervision.    Outcome: Met

## 2023-12-12 NOTE — PROGRESS NOTES
[facility] MEDICINE ~ PROGRESS NOTE    CHIEF COMPLAINT   Hospital follow up    HOSPITAL COURSE   69yo male with a past medical history of CAD, diastolic CHF, Afib, aortic stenosis, NSTEMI, and HTN who was recently admitted to [facility] for pneumonia and Afib RVR. Patient was diagnosed with Influenza A on 11/19/2023 prior to admission and started on Tamiflu. CTA chest 11/24/2023 showed no PE, but multifocal pneumonia most evident in RUL. Patient was admitted for ICU, started on Cardizem drip, Vanc, Zosyn, Doxy, and required Levophed for hypotension. Echo 11/25/2023 showed LVEF 55% moderate aortic stenosis, severe posterior mitral annular calcification present. Blood cultures x2 resulted positive for MRSA on 11/27/2023.  Infectious Disease recommended patient to continue vancomycin for 4 weeks with suspected in date on 12/25/2023. Stool culture on 11/27/2023 showed many yeast. Repeat blood cultures x2 on 12/02/2023 showed no growth. Patient underwent a CARO guided DCCV on 11/30/2023 which showed mild-moderately enlarged right atrium, no ASD or PFO, LVEF 55%, mild-moderate left ventricular hypertrophy, severe aortic stenosis, moderate mitral stenosis, mild-moderate mitral regurgitation, mild-moderate tricuspid regurgitation, no vegetation present, no intracardiac thrombus, successful cardioversion. CTA chest, abdomen, pelvis 12/05/2023 showed small bilateral pleural effusion R>L and patchy consolidation in both lungs increased compared to prior. CTA was obtained for TAVR workup to be completed outpatient after ID clearance. On exam today, patient is currently on 2 L nasal cannula with O2 sat 99%.  With worsening pneumonia and lung sounds on exam, we will start Zosyn, obtain sputum culture, add nystatin for fungal coverage, and schedule Mucomyst with the Xopenex nebulizers 3 times a day.     Today  O2 sat 99% on RA at rest. Complains of worsening cough - repeat CXR today. Sputum  culture not received at [facility]. PT reports patient ambulated on firm surface in room 2 x 10 ft without AD at [facility] for bathroom use, then in ferreira with RW 2 x 335 ft monitoring O2 sats on room air 83%-88%.     OBJECTIVE/PHYSICAL EXAM     VITAL SIGNS (MOST RECENT):  Temp: 97.6 °F (36.4 °C) (12/12/23 0717)  Pulse: 72 (12/12/23 0807)  Resp: 18 (12/12/23 0807)  BP: (!) 146/76 (12/12/23 0908)  SpO2: 100 % (12/12/23 0807) VITAL SIGNS (24 HOUR RANGE):  Temp:  [97.5 °F (36.4 °C)-98.3 °F (36.8 °C)] 97.6 °F (36.4 °C)  Pulse:  [71-77] 72  Resp:  [18] 18  SpO2:  [94 %-100 %] 100 %  BP: (117-146)/(62-76) 146/76     GENERAL: In no acute distress, afebrile  HEENT: Atraumatic, normocephalic, moist mucous membranes, supple neck   CHEST: Improved lung sounds throughout  HEART: S1, S2, grade IV murmur  ABDOMEN: Soft, nontender, BS +  MSK: Warm, no lower extremity edema, no clubbing or cyanosis  NEUROLOGIC: Alert and oriented x4, moving all extremities with good strength   INTEGUMENTARY: No obvious skin rash   PSYCHIATRY: Appropriate mood and affect     ASSESSMENT/PLAN   MRSA bacteriemia   Multifocal Pneumonia  Recent Influenza A infection (11/19/2023)  Acute hypoxic respiratory failure  CTA chest 11/24/2023 showed no PE, but multifocal pneumonia most evident in RUL  Blood cultures x2 resulted positive for MRSA on 11/27/2023  ID recommended patient to continue vancomycin for 4 weeks with suspected in date on 12/25/2023, Cefepime (12/07/2023-12/12/2023)  D/c Zosyn (12/06/2023-12/07/203) and Nystatin (12/06/2023-12/07/2023)  IS, cough assist, acapella   Xopenex, Mucomyst   Repeat CXR today shows slightly more confluent opacities in NIKKI  Sputum culture never received at [facility]      Yeast + Stool  D/c Nystatin (12/06/2023-12/07/2023) low concern for disseminated candidemia   Continue Probiotic    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   Thrombocytopenia  Hx of alcohol use disorder  Pending thiamine results  Ferrlecit x3  Hold  Eliquis if platelets <50    Hypokalemia   Replete as condition requires     Transamnitis   Grayzone hepatitis C Ab  Hepatitis C viral load ordered      CAD  Diastolic CHF  Afib  Aortic stenosis  Essential HTN  CARO guided DCCV on 11/30/2023 which showed mild-moderately enlarged right atrium, no ASD or PFO, LVEF 55%, mild-moderate left ventricular hypertrophy, severe aortic stenosis, moderate mitral stenosis, mild-moderate mitral regurgitation, mild-moderate tricuspid regurgitation, no vegetation present, no intracardiac thrombus, successful cardioversion  TAVR to be completed outpatient after ID clearance  Continue Entresto, Toprol XL, Amiodarone, Eliquis, and Aspirin   Continue PO Lasix 40mg    DVT prophylaxis: Eliquis   Anticipated discharge and disposition: Continue PT/OT   __________________________________________________________________________    LABS/MICRO/MEDS/DIAGNOSTICS     LABS  Recent Labs     12/12/23  0335      K 3.3*   CHLORIDE 103   CO2 27   BUN 11.8   CREATININE 0.91   GLUCOSE 96   CALCIUM 7.8*     Recent Labs     12/12/23  0335   WBC 4.15*   RBC 2.76*   HCT 25.1*   MCV 90.9   PLT 95*     MICROBIOLOGY  Microbiology Results (last 7 days)       Procedure Component Value Units Date/Time    Respiratory Culture [[id number]]     Order Status: Sent Specimen: Sputum              MEDICATIONS   acetylcysteine 200 mg/ml (20%)  4 mL Nebulization Daily    amiodarone  200 mg Oral Daily    apixaban  5 mg Oral BID    aspirin  81 mg Oral Daily    ceFEPime (MAXIPIME) IVPB  1 g Intravenous Q8H    ferric gluconate (FERRLECIT) 125 mg in sodium chloride 0.9% 100 mL IVPB  125 mg Intravenous Daily    folic acid  1 mg Oral Daily    furosemide  40 mg Oral Daily    guaiFENesin 100 mg/5 ml  400 mg Oral Q6H WAKE    Lactobacillus acidophilus  1 capsule Oral TID WM    levalbuterol  1.25 mg Nebulization Q8H    linaCLOtide  72 mcg Oral Before breakfast    melatonin  9 mg Oral Nightly    metoprolol succinate  100 mg Oral Daily     miconazole NITRATE 2 %   Topical (Top) BID    potassium chloride  20 mEq Oral BID    QUEtiapine  200 mg Oral QHS    sacubitriL-valsartan  1 tablet Oral BID    sodium chloride 0.9%  10 mL Intravenous Q6H    vancomycin (VANCOCIN) IV (PEDS and ADULTS)  750 mg Intravenous Q12H    white petrolatum   Topical (Top) BID         INFUSIONS       DIAGNOSTIC TESTS  X-Ray Chest 1 View for Line/Tube Placement   Final Result      Consolidative changes in the right perihilar region right mid and right upper lung zone similar to previous exam.      Interval insertion of right-sided PICC line         Electronically signed by: Juan Wharton   Date:    12/06/2023   Time:    12:14      X-Ray Chest 1 View    (Results Pending)     EF   Date Value Ref Range Status   11/25/2023 55 % Final   12/29/2022 55 % Final        NUTRITION STATUS  Patient meets ASPEN criteria for   malnutrition of   per RD assessment as evidenced by:                       A minimum of two characteristics is recommended for diagnosis of either severe or non-severe malnutrition.     Case related differential diagnoses have been reviewed; assessment and plan has been documented. I have personally reviewed the labs and test results that are currently available; I have reviewed the patients medication list. I have reviewed the consulting providers recommendations. I have reviewed or attempted to review medical records based upon their availability.  All of the patient's and/or family's questions have been addressed and answered to the best of my ability.  I will continue to monitor closely and make adjustments to medical management as needed.  This document was created using M*Modal Fluency Direct.  Transcription errors may have been made.  Please contact me if any questions may rise regarding documentation to clarify transcription.      GISEL Russell   Internal Medicine  Department of Hospital Medicine [facility]

## 2023-12-12 NOTE — PT/OT/SLP DISCHARGE
Occupational Therapy Discharge Summary    Mike Urbina Jr.  MRN: [id number]   Principal Problem: Bacteremia      Patient Discharged from acute Occupational Therapy on 12/12/23.    Assessment:      Patient has met all goals and is not appropriate for therapy.    Objective:     GOALS:   Multidisciplinary Problems       Occupational Therapy Goals       Not on file              Multidisciplinary Problems (Resolved)          Problem: Occupational Therapy    Goal Priority Disciplines Outcome Interventions   Occupational Therapy Goal   (Resolved)     OT, PT/OT Met    Description: Goals to be met by: 12/20/23     Patient will increase functional independence with ADLs by performing:    LE Dressing with Supervision.  Grooming while standing at sink with Modified [place].  Toileting from toilet with Supervision for hygiene and clothing management.   Bathing from  shower chair/bench with Supervision.  Toilet transfer to toilet with Supervision.                         Reasons for Discontinuation of Therapy Services  Satisfactory goal achievement.      Plan:     Patient Discharged to: Home no OT services needed    12/12/2023

## 2023-12-12 NOTE — PLAN OF CARE
Problem: Adult Inpatient Plan of Care  Goal: Plan of Care Review  Outcome: Ongoing, Progressing  Goal: Patient-Specific Goal (Individualized)  Outcome: Ongoing, Progressing  Flowsheets (Taken 12/12/2023 1435)  Anxieties, Fears or Concerns: none expressed  Individualized Care Needs: IV antibiotics, monitor O2  Goal: Absence of Hospital-Acquired Illness or Injury  Outcome: Ongoing, Progressing  Intervention: Identify and Manage Fall Risk  Flowsheets (Taken 12/12/2023 1435)  Safety Promotion/Fall Prevention:   assistive device/personal item within reach   bed alarm set   nonskid shoes/socks when out of bed   side rails raised x 2  Intervention: Prevent Skin Injury  Flowsheets (Taken 12/12/2023 1435)  Body Position: supine  Skin Protection:   adhesive use limited   incontinence pads utilized   tubing/devices free from skin contact  Goal: Optimal Comfort and Wellbeing  Outcome: Ongoing, Progressing  Goal: Readiness for Transition of Care  Outcome: Ongoing, Progressing     Problem: Fluid Imbalance (Pneumonia)  Goal: Fluid Balance  Outcome: Ongoing, Progressing  Intervention: Monitor and Manage Fluid Balance  Flowsheets (Taken 12/12/2023 1435)  Fluid/Electrolyte Management: fluids provided     Problem: Infection (Pneumonia)  Goal: Resolution of Infection Signs and Symptoms  Outcome: Ongoing, Progressing     Problem: Respiratory Compromise (Pneumonia)  Goal: Effective Oxygenation and Ventilation  Outcome: Ongoing, Progressing  Intervention: Promote Airway Secretion Clearance  Flowsheets (Taken 12/12/2023 1435)  Cough And Deep Breathing: done independently per patient     Problem: Infection  Goal: Absence of Infection Signs and Symptoms  Outcome: Ongoing, Progressing     Problem: Fall Injury Risk  Goal: Absence of Fall and Fall-Related Injury  Outcome: Ongoing, Progressing  Intervention: Identify and Manage Contributors  Flowsheets (Taken 12/12/2023 1435)  Self-Care Promotion:   independence encouraged   safe use of  adaptive equipment encouraged  Medication Review/Management: medications reviewed  Intervention: Promote Injury-Free Environment  Flowsheets (Taken 12/12/2023 4214)  Safety Promotion/Fall Prevention:   assistive device/personal item within reach   bed alarm set   nonskid shoes/socks when out of bed   side rails raised x 2

## 2023-12-12 NOTE — PT/OT/SLP PROGRESS
Physical Therapy Treatment Note           Name: Mike Urbina Jr.    : 1955 (68 y.o.)  MRN: [id number]           TREATMENT SUMMARY AND RECOMMENDATIONS:    PT Received On: 23  PT Start Time: 1001     PT Stop Time: 1020  PT Total Time (min): 19 min     Subjective Assessment:  x No complaints  Lethargic    Awake, alert, cooperative  Uncooperative    Agitated  c/o pain    Appropriate  c/o fatigue    Confused x Treated at bedside     Emotionally labile  Treated in gym/dept.    Impulsive  Other:    Flat affect       Therapy Precautions:    Cognitive deficits  Spinal precautions    Collar - hard  Sternal precautions    Collar - soft   TLSO    Fall risk  LSO    Hip precautions - posterior  Knee immobilizer    Hip precautions - anterior  WBAT    Impaired communication  Partial weightbearing    Oxygen  TTWB    PEG tube  NWB    Visual deficits x Other: Contact, MRSA    Hearing deficits          Treatment Objectives:     Mobility Training:   Assist level Comments    Bed mobility MOD I    Transfer SBA Toilet transfers without AD   Gait CGA and SPV Amb on firm surface in room 2 x 10 feet without AD at CGA for bathroom use, then in ferreira with RW 2 x 335 feet monitoring O2 sats on room air 83%-88%   Sit to stand transitions SBA Without AD   Sitting balance     Standing balance      Wheelchair mobility     Car transfer     Other:          Therapeutic Exercise:   Exercise Sets Reps Comments                               Additional Comments:  Room air ambulation, no symptoms.     Assessment: Patient tolerated session well.    PT Plan: continue  Revisions made to plan of care: No    GOALS:   Multidisciplinary Problems       Physical Therapy Goals          Problem: Physical Therapy    Goal Priority Disciplines Outcome Goal Variances Interventions   Physical Therapy Goal     PT, PT/OT Ongoing, Progressing     Description: Goals to be met by: Discharge     Patient will increase functional independence with  mobility by performin. Sit to stand transfer with Modified [place]  2. Bed to chair transfer with Modified [place] using No Assistive Device  3. Gait  x 1000 feet including outdoor negotiation and stair completion with Supervision using No Assistive Device.                          Skilled PT Minutes Provided: 18   Communication with Treatment Team:     Equipment recommendations:       At end of treatment, patient remained:  x Up in chair     Up in wheelchair in room    In bed   x With alarm activated    Bed rails up   x Call bell in reach     Family/friends present    Restraints secured properly    In bathroom with CNA/RN notified    Nurse aware    In gym with therapist/tech    Other:

## 2023-12-13 LAB — VANCOMYCIN TROUGH SERPL-MCNC: 21.8 UG/ML (ref 15–20)

## 2023-12-13 PROCEDURE — A4216 STERILE WATER/SALINE, 10 ML: HCPCS | Performed by: STUDENT IN AN ORGANIZED HEALTH CARE EDUCATION/TRAINING PROGRAM

## 2023-12-13 PROCEDURE — 25000242 PHARM REV CODE 250 ALT 637 W/ HCPCS: Performed by: STUDENT IN AN ORGANIZED HEALTH CARE EDUCATION/TRAINING PROGRAM

## 2023-12-13 PROCEDURE — 94640 AIRWAY INHALATION TREATMENT: CPT

## 2023-12-13 PROCEDURE — 94760 N-INVAS EAR/PLS OXIMETRY 1: CPT

## 2023-12-13 PROCEDURE — 11000004 HC SNF PRIVATE

## 2023-12-13 PROCEDURE — 80202 ASSAY OF VANCOMYCIN: CPT | Performed by: STUDENT IN AN ORGANIZED HEALTH CARE EDUCATION/TRAINING PROGRAM

## 2023-12-13 PROCEDURE — 27000207 HC ISOLATION

## 2023-12-13 PROCEDURE — 25000003 PHARM REV CODE 250: Performed by: STUDENT IN AN ORGANIZED HEALTH CARE EDUCATION/TRAINING PROGRAM

## 2023-12-13 PROCEDURE — 97116 GAIT TRAINING THERAPY: CPT | Mod: CQ

## 2023-12-13 PROCEDURE — 94664 DEMO&/EVAL PT USE INHALER: CPT

## 2023-12-13 PROCEDURE — 25000003 PHARM REV CODE 250: Performed by: PHYSICIAN ASSISTANT

## 2023-12-13 PROCEDURE — 97535 SELF CARE MNGMENT TRAINING: CPT

## 2023-12-13 PROCEDURE — 63600175 PHARM REV CODE 636 W HCPCS: Performed by: STUDENT IN AN ORGANIZED HEALTH CARE EDUCATION/TRAINING PROGRAM

## 2023-12-13 PROCEDURE — 99900035 HC TECH TIME PER 15 MIN (STAT)

## 2023-12-13 RX ORDER — TALC
9 POWDER (GRAM) TOPICAL NIGHTLY
Status: DISCONTINUED | OUTPATIENT
Start: 2023-12-13 | End: 2023-12-13

## 2023-12-13 RX ORDER — QUETIAPINE FUMARATE 100 MG/1
200 TABLET, FILM COATED ORAL NIGHTLY
Status: DISCONTINUED | OUTPATIENT
Start: 2023-12-13 | End: 2023-12-13

## 2023-12-13 RX ADMIN — Medication 1 CAPSULE: at 08:12

## 2023-12-13 RX ADMIN — METOPROLOL SUCCINATE 100 MG: 50 TABLET, EXTENDED RELEASE ORAL at 08:12

## 2023-12-13 RX ADMIN — SODIUM CHLORIDE, PRESERVATIVE FREE 10 ML: 5 INJECTION INTRAVENOUS at 11:12

## 2023-12-13 RX ADMIN — LEVALBUTEROL HYDROCHLORIDE 1.25 MG: 1.25 SOLUTION RESPIRATORY (INHALATION) at 10:12

## 2023-12-13 RX ADMIN — WHITE PETROLATUM: 1.75 OINTMENT TOPICAL at 08:12

## 2023-12-13 RX ADMIN — VANCOMYCIN HYDROCHLORIDE 750 MG: 750 INJECTION, POWDER, LYOPHILIZED, FOR SOLUTION INTRAVENOUS at 09:12

## 2023-12-13 RX ADMIN — Medication 1 CAPSULE: at 05:12

## 2023-12-13 RX ADMIN — SODIUM CHLORIDE: 9 INJECTION, SOLUTION INTRAVENOUS at 08:12

## 2023-12-13 RX ADMIN — ACETYLCYSTEINE 4 ML: 200 INHALANT RESPIRATORY (INHALATION) at 07:12

## 2023-12-13 RX ADMIN — GUAIFENESIN 400 MG: 200 SOLUTION ORAL at 08:12

## 2023-12-13 RX ADMIN — SODIUM CHLORIDE, PRESERVATIVE FREE 10 ML: 5 INJECTION INTRAVENOUS at 06:12

## 2023-12-13 RX ADMIN — MICONAZOLE NITRATE 2 % TOPICAL POWDER: at 08:12

## 2023-12-13 RX ADMIN — TRAZODONE HYDROCHLORIDE 25 MG: 50 TABLET ORAL at 08:12

## 2023-12-13 RX ADMIN — SODIUM CHLORIDE, PRESERVATIVE FREE 10 ML: 5 INJECTION INTRAVENOUS at 05:12

## 2023-12-13 RX ADMIN — SODIUM CHLORIDE: 9 INJECTION, SOLUTION INTRAVENOUS at 09:12

## 2023-12-13 RX ADMIN — SACUBITRIL AND VALSARTAN 1 TABLET: 24; 26 TABLET, FILM COATED ORAL at 05:12

## 2023-12-13 RX ADMIN — SODIUM CHLORIDE, PRESERVATIVE FREE 10 ML: 5 INJECTION INTRAVENOUS at 12:12

## 2023-12-13 RX ADMIN — FOLIC ACID 1 MG: 1 TABLET ORAL at 08:12

## 2023-12-13 RX ADMIN — SACUBITRIL AND VALSARTAN 1 TABLET: 24; 26 TABLET, FILM COATED ORAL at 08:12

## 2023-12-13 RX ADMIN — LEVALBUTEROL HYDROCHLORIDE 1.25 MG: 1.25 SOLUTION RESPIRATORY (INHALATION) at 02:12

## 2023-12-13 RX ADMIN — FUROSEMIDE 40 MG: 40 TABLET ORAL at 08:12

## 2023-12-13 RX ADMIN — VANCOMYCIN HYDROCHLORIDE 750 MG: 750 INJECTION, POWDER, LYOPHILIZED, FOR SOLUTION INTRAVENOUS at 08:12

## 2023-12-13 RX ADMIN — APIXABAN 5 MG: 5 TABLET, FILM COATED ORAL at 08:12

## 2023-12-13 RX ADMIN — LINACLOTIDE 72 MCG: 72 CAPSULE, GELATIN COATED ORAL at 05:12

## 2023-12-13 RX ADMIN — LEVALBUTEROL HYDROCHLORIDE 1.25 MG: 1.25 SOLUTION RESPIRATORY (INHALATION) at 07:12

## 2023-12-13 RX ADMIN — AMIODARONE HYDROCHLORIDE 200 MG: 200 TABLET ORAL at 08:12

## 2023-12-13 RX ADMIN — Medication 1 CAPSULE: at 11:12

## 2023-12-13 RX ADMIN — GUAIFENESIN 400 MG: 200 SOLUTION ORAL at 02:12

## 2023-12-13 RX ADMIN — ASPIRIN 81 MG: 81 TABLET, COATED ORAL at 08:12

## 2023-12-13 NOTE — PROGRESS NOTES
Inpatient Nutrition Evaluation    Admit Date: 12/5/2023   Total duration of encounter: 8 days   Patient Age: 68 y.o.    Nutrition Recommendation/Prescription     Continue heart healthy diet.     Nutrition Assessment     Chart Review    Reason Seen: continuous nutrition monitoring, length of stay, and follow-up    Malnutrition Screening Tool Results   Have you recently lost weight without trying?: No  Have you been eating poorly because of a decreased appetite?: Yes   MST Score: 1   Diagnosis:   Bacteremia 12/5/2023       Pneumonia due to infectious organism        Relevant Medical History: Atrial flutter  Date Unknown  CHF (congestive heart failure)  Date Unknown  Coronary artery disease  Date Unknown  Hypertension  Date Unknown  Myocardial infarction  Date Unknown  SOB (shortness of breath)     Scheduled Medications:  acetylcysteine 200 mg/ml (20%), 4 mL, Daily  amiodarone, 200 mg, Daily  apixaban, 5 mg, BID  aspirin, 81 mg, Daily  folic acid, 1 mg, Daily  furosemide, 40 mg, Daily  guaiFENesin 100 mg/5 ml, 400 mg, Q6H WAKE  Lactobacillus acidophilus, 1 capsule, TID WM  levalbuterol, 1.25 mg, Q8H  linaCLOtide, 72 mcg, Before breakfast  melatonin, 9 mg, Nightly  metoprolol succinate, 100 mg, Daily  miconazole NITRATE 2 %, , BID  QUEtiapine, 200 mg, QHS  sacubitriL-valsartan, 1 tablet, BID  sodium chloride 0.9%, 10 mL, Q6H  vancomycin (VANCOCIN) IV (PEDS and ADULTS), 750 mg, Q12H  white petrolatum, , BID    Continuous Infusions:   PRN Medications: sodium chloride 0.9%, acetaminophen, calcium carbonate, cloNIDine, hydrALAZINE, loperamide, senna-docusate 8.6-50 mg, Pharmacy to dose Vancomycin consult **AND** vancomycin - pharmacy to dose    Recent Labs   Lab 12/07/23  0335 12/08/23  0304 12/09/23  0510 12/11/23  0306 12/12/23  0335   * 134* 137  --  138   K 3.6 3.3* 3.6  --  3.3*   CALCIUM 7.4* 7.7* 7.4*  --  7.8*   CHLORIDE 105 104 103  --  103   CO2 23 25 27  --  27   BUN 12.3 11.1 9.8  --  11.8   CREATININE  "0.95 0.95 0.81  --  0.91   EGFRNORACEVR >60 >60 >60  --  >60   GLUCOSE 104 97 91  --  96   BILITOT 0.8  --   --   --   --    ALKPHOS 68  --   --   --   --    ALT 12  --   --   --   --    AST 13  --   --   --   --    ALBUMIN 1.7*  --   --   --   --    WBC 5.72 5.17 4.02* 4.28* 4.15*   HGB 8.0* 7.8* 7.7* 7.9* 7.4*   HCT 26.1* 25.3* 25.5* 25.9* 25.1*     Nutrition Orders:  Diet heart healthy      Appetite/Oral Intake: good/% of meals  Factors Affecting Nutritional Intake: none identified  Food/Hoah[place]ism/Cultural Preferences: none reported  Food Allergies: none reported  Last Bowel Movement: 12/12/23  Wound(s):      Comments    Pt intake is good. Reviewed intake 100%. Will monitor.     Anthropometrics    Height: 6' 2" (188 cm),    Last Weight: 76.3 kg (168 lb 3.4 oz) (12/11/23 0342), Weight Method: Bed Scale  BMI (Calculated): 21.6  BMI Classification: normal (BMI 18.5-24.9)        Ideal Body Weight (IBW), Male: 190 lb     % Ideal Body Weight, Male (lb): 94.92 %                          Usual Weight Provided By: unable to obtain usual weight    Wt Readings from Last 5 Encounters:   12/11/23 76.3 kg (168 lb 3.4 oz)   11/25/23 73.9 kg (163 lb)   11/24/23 79.4 kg (175 lb)   11/19/23 81.6 kg (180 lb)   11/07/23 80 kg (176 lb 4.8 oz)     Weight Change(s) Since Admission: no new wt recorded  Wt Readings from Last 1 Encounters:   12/11/23 0342 76.3 kg (168 lb 3.4 oz)   12/05/23 1500 81.8 kg (180 lb 5.4 oz)   Admit Weight: 81.8 kg (180 lb 5.4 oz) (12/05/23 1500), Weight Method: Bed Scale    Patient Education     Not applicable.    Nutrition Goals & Monitoring     Dietitian will monitor: food and beverage intake, weight, weight change, electrolyte/renal panel, glucose/endocrine profile, and gastrointestinal profile    Nutrition Risk/Follow-Up: low (follow-up in 5-7 days)  Patients assigned 'low nutrition risk' status do not qualify for a full nutritional assessment but will be monitored and re-evaluated in a 5-7 day time " period. Please consult if re-evaluation needed sooner.

## 2023-12-13 NOTE — PROGRESS NOTES
[facility] MEDICINE ~ PROGRESS NOTE    CHIEF COMPLAINT   Hospital follow up    HOSPITAL COURSE   67yo male with a past medical history of CAD, diastolic CHF, Afib, aortic stenosis, NSTEMI, and HTN who was recently admitted to [facility] for pneumonia and Afib RVR. Patient was diagnosed with Influenza A on 11/19/2023 prior to admission and started on Tamiflu. CTA chest 11/24/2023 showed no PE, but multifocal pneumonia most evident in RUL. Patient was admitted for ICU, started on Cardizem drip, Vanc, Zosyn, Doxy, and required Levophed for hypotension. Echo 11/25/2023 showed LVEF 55% moderate aortic stenosis, severe posterior mitral annular calcification present. Blood cultures x2 resulted positive for MRSA on 11/27/2023.  Infectious Disease recommended patient to continue vancomycin for 4 weeks with suspected in date on 12/25/2023. Stool culture on 11/27/2023 showed many yeast. Repeat blood cultures x2 on 12/02/2023 showed no growth. Patient underwent a CARO guided DCCV on 11/30/2023 which showed mild-moderately enlarged right atrium, no ASD or PFO, LVEF 55%, mild-moderate left ventricular hypertrophy, severe aortic stenosis, moderate mitral stenosis, mild-moderate mitral regurgitation, mild-moderate tricuspid regurgitation, no vegetation present, no intracardiac thrombus, successful cardioversion. CTA chest, abdomen, pelvis 12/05/2023 showed small bilateral pleural effusion R>L and patchy consolidation in both lungs increased compared to prior. CTA was obtained for TAVR workup to be completed outpatient after ID clearance. On exam today, patient is currently on 2 L nasal cannula with O2 sat 99%.  With worsening pneumonia and lung sounds on exam, we will start Zosyn, obtain sputum culture, add nystatin for fungal coverage, and schedule Mucomyst with the Xopenex nebulizers 3 times a day.     Today  Patient maintained O2 sat 88-92% on RA with ambulation [id number] with RW yesterday.  Reports coughing improved.     OBJECTIVE/PHYSICAL EXAM     VITAL SIGNS (MOST RECENT):  Temp: 98.8 °F (37.1 °C) (12/13/23 1122)  Pulse: 78 (12/13/23 1122)  Resp: 18 (12/13/23 0731)  BP: 118/70 (12/13/23 1122)  SpO2: 95 % (12/13/23 1122) VITAL SIGNS (24 HOUR RANGE):  Temp:  [97.4 °F (36.3 °C)-98.8 °F (37.1 °C)] 98.8 °F (37.1 °C)  Pulse:  [73-81] 78  Resp:  [18] 18  SpO2:  [92 %-98 %] 95 %  BP: (102-149)/(57-75) 118/70     GENERAL: In no acute distress, afebrile  HEENT: Atraumatic, normocephalic, moist mucous membranes, supple neck   CHEST: Improved lung sounds throughout  HEART: S1, S2, grade IV murmur  ABDOMEN: Soft, nontender, BS +  MSK: Warm, no lower extremity edema, no clubbing or cyanosis  NEUROLOGIC: Alert and oriented x4, moving all extremities with good strength   INTEGUMENTARY: No obvious skin rash   PSYCHIATRY: Appropriate mood and affect     ASSESSMENT/PLAN   MRSA bacteriemia   Multifocal Pneumonia  Recent Influenza A infection (11/19/2023)  Acute hypoxic respiratory failure  CTA chest 11/24/2023 showed no PE, but multifocal pneumonia most evident in RUL  Blood cultures x2 resulted positive for MRSA on 11/27/2023, repeats were negative  ID recommended patient to continue vancomycin for 4 weeks with suspected in date on 12/25/2023, Cefepime (12/07/2023-12/12/2023)  D/c Zosyn (12/06/2023-12/07/203) and Nystatin (12/06/2023-12/07/2023)  IS, cough assist, acapella   Xopenex, Mucomyst   Repeat CXR 12/12/2023 shows slightly more confluent opacities in NIKKI  Sputum culture never received at [facility]      Yeast + Stool  D/c Nystatin (12/06/2023-12/07/2023) low concern for disseminated candidemia   Continue Probiotic    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   Thrombocytopenia  Hx of alcohol use disorder  Pending thiamine results  Ferrlecit x3  Hold Eliquis if platelets <50    Hypokalemia   Replete as condition requires     Transamnitis   Grayzone hepatitis C Ab  Hepatitis C viral load ordered       CAD  Diastolic CHF  Afib  Aortic stenosis  Essential HTN  CARO guided DCCV on 11/30/2023 which showed mild-moderately enlarged right atrium, no ASD or PFO, LVEF 55%, mild-moderate left ventricular hypertrophy, severe aortic stenosis, moderate mitral stenosis, mild-moderate mitral regurgitation, mild-moderate tricuspid regurgitation, no vegetation present, no intracardiac thrombus, successful cardioversion  TAVR to be completed outpatient after ID clearance  Continue Entresto, Toprol XL, Amiodarone, Eliquis, and Aspirin   Continue PO Lasix 40mg    DVT prophylaxis: Eliquis   Anticipated discharge and disposition: Continue PT/OT and IV antibiotics   __________________________________________________________________________    LABS/MICRO/MEDS/DIAGNOSTICS     LABS  Recent Labs     12/12/23  0335      K 3.3*   CHLORIDE 103   CO2 27   BUN 11.8   CREATININE 0.91   GLUCOSE 96   CALCIUM 7.8*     Recent Labs     12/12/23  0335   WBC 4.15*   RBC 2.76*   HCT 25.1*   MCV 90.9   PLT 95*     MICROBIOLOGY  Microbiology Results (last 7 days)       ** No results found for the last 168 hours. **             MEDICATIONS   acetylcysteine 200 mg/ml (20%)  4 mL Nebulization Daily    amiodarone  200 mg Oral Daily    apixaban  5 mg Oral BID    aspirin  81 mg Oral Daily    folic acid  1 mg Oral Daily    furosemide  40 mg Oral Daily    guaiFENesin 100 mg/5 ml  400 mg Oral Q6H WAKE    Lactobacillus acidophilus  1 capsule Oral TID WM    levalbuterol  1.25 mg Nebulization Q8H    linaCLOtide  72 mcg Oral Before breakfast    melatonin  9 mg Oral Nightly    metoprolol succinate  100 mg Oral Daily    miconazole NITRATE 2 %   Topical (Top) BID    QUEtiapine  200 mg Oral QHS    sacubitriL-valsartan  1 tablet Oral BID    sodium chloride 0.9%  10 mL Intravenous Q6H    vancomycin (VANCOCIN) IV (PEDS and ADULTS)  750 mg Intravenous Q12H    white petrolatum   Topical (Top) BID         INFUSIONS       DIAGNOSTIC TESTS  X-Ray Chest 1 View   Final  Result      Persistent consolidative changes in the right perihilar region and right upper lobe.      Slightly more confluent opacities in the left upper lobe.      No other interval change         Electronically signed by: Juan Wharton   Date:    12/12/2023   Time:    10:04      X-Ray Chest 1 View for Line/Tube Placement   Final Result      Consolidative changes in the right perihilar region right mid and right upper lung zone similar to previous exam.      Interval insertion of right-sided PICC line         Electronically signed by: Juan Wharton   Date:    12/06/2023   Time:    12:14        EF   Date Value Ref Range Status   11/25/2023 55 % Final   12/29/2022 55 % Final        NUTRITION STATUS  Patient meets ASPEN criteria for   malnutrition of   per RD assessment as evidenced by:                       A minimum of two characteristics is recommended for diagnosis of either severe or non-severe malnutrition.     Case related differential diagnoses have been reviewed; assessment and plan has been documented. I have personally reviewed the labs and test results that are currently available; I have reviewed the patients medication list. I have reviewed the consulting providers recommendations. I have reviewed or attempted to review medical records based upon their availability.  All of the patient's and/or family's questions have been addressed and answered to the best of my ability.  I will continue to monitor closely and make adjustments to medical management as needed.  This document was created using M*Modal Fluency Direct.  Transcription errors may have been made.  Please contact me if any questions may rise regarding documentation to clarify transcription.      GISEL Russell   Internal Medicine  Department of Hospital Medicine [facility]

## 2023-12-13 NOTE — PROGRESS NOTES
Pharmacokinetic Assessment Follow Up: IV Vancomycin    Vancomycin serum concentration assessment(s):    The trough level was drawn correctly and can be used to guide therapy at this time. The measurement is above the desired definitive target range of 15 to 20 mcg/mL.    Vancomycin Regimen Plan:    Continue regimen to Vancomycin 750 mg IV every 12 hours with next serum trough concentration measured at 60 minutes prior to 0900 dose on 12/14    Drug levels (last 3 results):  Recent Labs   Lab Result Units 12/11/23  0828 12/13/23  0833   Vancomycin Trough ug/ml 19.4 21.8*       Pharmacy will continue to follow and monitor vancomycin.    Please contact pharmacy at extension [phone] for questions regarding this assessment.    Thank you for the consult,   Rico Bauman       Patient brief summary:  Mike Urbina Jr. is a 68 y.o. male initiated on antimicrobial therapy with IV Vancomycin for treatment of bacteremia    The patient's current regimen is vancomycin 750 mg q12hr    Drug Allergies:   Review of patient's allergies indicates:  No Known Allergies    Actual Body Weight:   76.3 kg    Renal Function:   Estimated Creatinine Clearance: 81.5 mL/min (based on SCr of 0.91 mg/dL).,     Dialysis Method (if applicable):  N/A    CBC (last 72 hours):  Recent Labs   Lab Result Units 12/11/23  0306 12/12/23  0335   WBC x10(3)/mcL 4.28* 4.15*   Hgb g/dL 7.9* 7.4*   Hct % 25.9* 25.1*   Platelet x10(3)/mcL 92* 95*   Mono % % 10.5 10.8   Eos % % 1.9 1.7   Basophil % % 0.2 0.7       Metabolic Panel (last 72 hours):  Recent Labs   Lab Result Units 12/12/23  0335   Sodium Level mmol/L 138   Potassium Level mmol/L 3.3*   Chloride mmol/L 103   Carbon Dioxide mmol/L 27   Glucose Level mg/dL 96   Blood Urea Nitrogen mg/dL 11.8   Creatinine mg/dL 0.91       Vancomycin Administrations:  vancomycin given in the last 96 hours                     vancomycin 750 mg in dextrose 5 % (D5W) 250 mL IVPB (Vial-Mate) (mg) 750 mg New Bag 12/13/23  0903     750 mg New Bag 12/12/23 2012      Restarted  0921     750 mg New Bag  0918     750 mg New Bag 12/11/23 2256     750 mg New Bag  0949     750 mg New Bag 12/10/23 2022     750 mg New Bag  0952     750 mg New Bag 12/09/23 2156                    Microbiologic Results:  Microbiology Results (last 7 days)       ** No results found for the last 168 hours. **

## 2023-12-13 NOTE — PT/OT/SLP PROGRESS
Physical Therapy Treatment Note           Name: Mike Urbina Jr.    : 1955 (68 y.o.)  MRN: [id number]           TREATMENT SUMMARY AND RECOMMENDATIONS:    PT Received On: 23  PT Start Time: 0900     PT Stop Time: 0915  PT Total Time (min): 15 min     Subjective Assessment:  x No complaints  Lethargic    Awake, alert, cooperative  Uncooperative    Agitated  c/o pain    Appropriate  c/o fatigue    Confused  Treated at bedside     Emotionally labile  Treated in gym/dept.    Impulsive  Other:    Flat affect       Therapy Precautions:    Cognitive deficits  Spinal precautions    Collar - hard  Sternal precautions    Collar - soft   TLSO    Fall risk  LSO    Hip precautions - posterior  Knee immobilizer    Hip precautions - anterior  WBAT    Impaired communication  Partial weightbearing    Oxygen  TTWB    PEG tube  NWB    Visual deficits  Other:    Hearing deficits          Treatment Objectives:     Mobility Training:   Assist level Comments    Bed mobility     Transfer     Gait Rei Amb on firm surface with  ft    Sit to stand transitions     Sitting balance     Standing balance      Wheelchair mobility     Car transfer     Other:          Therapeutic Exercise:   Exercise Sets Reps Comments                               Additional Comments:  No issues noted     Assessment: Patient tolerated session well.    PT Plan: continue  Revisions made to plan of care: No    GOALS:   Multidisciplinary Problems       Physical Therapy Goals          Problem: Physical Therapy    Goal Priority Disciplines Outcome Goal Variances Interventions   Physical Therapy Goal     PT, PT/OT Ongoing, Progressing     Description: Goals to be met by: Discharge     Patient will increase functional independence with mobility by performin. Sit to stand transfer with Modified [place]  2. Bed to chair transfer with Modified [place] using No Assistive Device  3. Gait  x 1000 feet including outdoor  negotiation and stair completion with Supervision using No Assistive Device.                          Skilled PT Minutes Provided: 15   Communication with Treatment Team:     Equipment recommendations:       At end of treatment, patient remained:  x Up in chair     Up in wheelchair in room    In bed    With alarm activated    Bed rails up   x Call bell in reach     Family/friends present    Restraints secured properly    In bathroom with CNA/RN notified    Nurse aware    In gym with therapist/tech    Other:

## 2023-12-13 NOTE — PATIENT CARE CONFERENCE
Name: Mike Urbina Jr.    : 1955 (68 y.o.)  MRN: [id number]            Interdisciplinary Team Conference     Case conference held with patient/family and care team to discuss progress, plan of care, barriers to be addressed for safe return home, equipment recommendations, and discharge planning. Communicated therapy progress with MD, RN, therapy clinicians and case management. All questions/concerns answered.

## 2023-12-13 NOTE — PT/OT/SLP PROGRESS
Name: Mike Urbina Jr.    : 1955 (68 y.o.)  MRN: [id number]           Patient Unavailable      Patient unable to be seen at this time secondary to: Pt reports already walking this AM, being very tired due to not sleeping at night and asking to please let him rest. PT to ask nursing if any other medication can be giving for sleeping.

## 2023-12-13 NOTE — PLAN OF CARE
Problem: Adult Inpatient Plan of Care  Goal: Plan of Care Review  Outcome: Ongoing, Progressing  Goal: Absence of Hospital-Acquired Illness or Injury  Outcome: Ongoing, Progressing  Intervention: Prevent Skin Injury  Flowsheets (Taken 12/12/2023 2000)  Body Position:   position changed independently   position maintained  Skin Protection:   adhesive use limited   incontinence pads utilized   pulse oximeter probe site changed     Problem: Fall Injury Risk  Goal: Absence of Fall and Fall-Related Injury  Outcome: Ongoing, Progressing  Intervention: Identify and Manage Contributors  Flowsheets (Taken 12/12/2023 2000)  Self-Care Promotion:   independence encouraged   safe use of adaptive equipment encouraged   BADL personal objects within reach   BADL personal routines maintained   meal set-up provided

## 2023-12-14 LAB
ANION GAP SERPL CALC-SCNC: 10 MEQ/L
BASOPHILS # BLD AUTO: 0.02 X10(3)/MCL
BASOPHILS NFR BLD AUTO: 0.3 %
BUN SERPL-MCNC: 25 MG/DL (ref 8.4–25.7)
CALCIUM SERPL-MCNC: 8.3 MG/DL (ref 8.8–10)
CHLORIDE SERPL-SCNC: 104 MMOL/L (ref 98–107)
CO2 SERPL-SCNC: 24 MMOL/L (ref 23–31)
CREAT SERPL-MCNC: 1 MG/DL (ref 0.73–1.18)
CREAT/UREA NIT SERPL: 25
EOSINOPHIL # BLD AUTO: 0.11 X10(3)/MCL (ref 0–0.9)
EOSINOPHIL NFR BLD AUTO: 1.7 %
ERYTHROCYTE [DISTWIDTH] IN BLOOD BY AUTOMATED COUNT: 17.6 % (ref 11.5–17)
GFR SERPLBLD CREATININE-BSD FMLA CKD-EPI: >60 MLS/MIN/1.73/M2
GLUCOSE SERPL-MCNC: 98 MG/DL (ref 82–115)
HCT VFR BLD AUTO: 27.4 % (ref 42–52)
HGB BLD-MCNC: 7.9 G/DL (ref 14–18)
IMM GRANULOCYTES # BLD AUTO: 0.05 X10(3)/MCL (ref 0–0.04)
IMM GRANULOCYTES NFR BLD AUTO: 0.8 %
LYMPHOCYTES # BLD AUTO: 1.16 X10(3)/MCL (ref 0.6–4.6)
LYMPHOCYTES NFR BLD AUTO: 17.8 %
MCH RBC QN AUTO: 26.9 PG (ref 27–31)
MCHC RBC AUTO-ENTMCNC: 28.8 G/DL (ref 33–36)
MCV RBC AUTO: 93.2 FL (ref 80–94)
MONOCYTES # BLD AUTO: 0.63 X10(3)/MCL (ref 0.1–1.3)
MONOCYTES NFR BLD AUTO: 9.7 %
NEUTROPHILS # BLD AUTO: 4.53 X10(3)/MCL (ref 2.1–9.2)
NEUTROPHILS NFR BLD AUTO: 69.7 %
PLATELET # BLD AUTO: 120 X10(3)/MCL (ref 130–400)
PMV BLD AUTO: 12.1 FL (ref 7.4–10.4)
POTASSIUM SERPL-SCNC: 4.5 MMOL/L (ref 3.5–5.1)
RBC # BLD AUTO: 2.94 X10(6)/MCL (ref 4.7–6.1)
SODIUM SERPL-SCNC: 138 MMOL/L (ref 136–145)
VANCOMYCIN TROUGH SERPL-MCNC: 20.2 UG/ML (ref 15–20)
WBC # SPEC AUTO: 6.5 X10(3)/MCL (ref 4.5–11.5)

## 2023-12-14 PROCEDURE — 80048 BASIC METABOLIC PNL TOTAL CA: CPT | Performed by: PHYSICIAN ASSISTANT

## 2023-12-14 PROCEDURE — 25000003 PHARM REV CODE 250: Performed by: INTERNAL MEDICINE

## 2023-12-14 PROCEDURE — 94640 AIRWAY INHALATION TREATMENT: CPT

## 2023-12-14 PROCEDURE — 25000003 PHARM REV CODE 250: Performed by: PHYSICIAN ASSISTANT

## 2023-12-14 PROCEDURE — 25000242 PHARM REV CODE 250 ALT 637 W/ HCPCS: Performed by: STUDENT IN AN ORGANIZED HEALTH CARE EDUCATION/TRAINING PROGRAM

## 2023-12-14 PROCEDURE — A4216 STERILE WATER/SALINE, 10 ML: HCPCS | Performed by: STUDENT IN AN ORGANIZED HEALTH CARE EDUCATION/TRAINING PROGRAM

## 2023-12-14 PROCEDURE — 25000003 PHARM REV CODE 250: Performed by: STUDENT IN AN ORGANIZED HEALTH CARE EDUCATION/TRAINING PROGRAM

## 2023-12-14 PROCEDURE — 11000004 HC SNF PRIVATE

## 2023-12-14 PROCEDURE — 63600175 PHARM REV CODE 636 W HCPCS: Performed by: STUDENT IN AN ORGANIZED HEALTH CARE EDUCATION/TRAINING PROGRAM

## 2023-12-14 PROCEDURE — 85025 COMPLETE CBC W/AUTO DIFF WBC: CPT | Performed by: PHYSICIAN ASSISTANT

## 2023-12-14 PROCEDURE — 80202 ASSAY OF VANCOMYCIN: CPT | Performed by: STUDENT IN AN ORGANIZED HEALTH CARE EDUCATION/TRAINING PROGRAM

## 2023-12-14 PROCEDURE — 97116 GAIT TRAINING THERAPY: CPT | Mod: CQ

## 2023-12-14 PROCEDURE — 94760 N-INVAS EAR/PLS OXIMETRY 1: CPT

## 2023-12-14 RX ORDER — FUROSEMIDE 10 MG/ML
40 INJECTION INTRAMUSCULAR; INTRAVENOUS ONCE
Status: DISCONTINUED | OUTPATIENT
Start: 2023-12-14 | End: 2023-12-14

## 2023-12-14 RX ORDER — FUROSEMIDE 10 MG/ML
40 INJECTION INTRAMUSCULAR; INTRAVENOUS ONCE
Status: COMPLETED | OUTPATIENT
Start: 2023-12-14 | End: 2023-12-14

## 2023-12-14 RX ORDER — TRAZODONE HYDROCHLORIDE 50 MG/1
50 TABLET ORAL NIGHTLY
Status: DISCONTINUED | OUTPATIENT
Start: 2023-12-14 | End: 2023-12-15

## 2023-12-14 RX ADMIN — FUROSEMIDE 40 MG: 10 INJECTION, SOLUTION INTRAMUSCULAR; INTRAVENOUS at 06:12

## 2023-12-14 RX ADMIN — SODIUM CHLORIDE, PRESERVATIVE FREE 10 ML: 5 INJECTION INTRAVENOUS at 12:12

## 2023-12-14 RX ADMIN — SODIUM CHLORIDE: 9 INJECTION, SOLUTION INTRAVENOUS at 08:12

## 2023-12-14 RX ADMIN — VANCOMYCIN HYDROCHLORIDE 500 MG: 500 INJECTION, POWDER, LYOPHILIZED, FOR SOLUTION INTRAVENOUS at 09:12

## 2023-12-14 RX ADMIN — SODIUM CHLORIDE: 9 INJECTION, SOLUTION INTRAVENOUS at 10:12

## 2023-12-14 RX ADMIN — GUAIFENESIN 400 MG: 200 SOLUTION ORAL at 02:12

## 2023-12-14 RX ADMIN — ACETYLCYSTEINE 4 ML: 200 INHALANT RESPIRATORY (INHALATION) at 08:12

## 2023-12-14 RX ADMIN — ACETAMINOPHEN 650 MG: 325 TABLET ORAL at 05:12

## 2023-12-14 RX ADMIN — ACETAMINOPHEN 650 MG: 325 TABLET ORAL at 11:12

## 2023-12-14 RX ADMIN — VANCOMYCIN HYDROCHLORIDE 500 MG: 500 INJECTION, POWDER, LYOPHILIZED, FOR SOLUTION INTRAVENOUS at 10:12

## 2023-12-14 RX ADMIN — SACUBITRIL AND VALSARTAN 1 TABLET: 24; 26 TABLET, FILM COATED ORAL at 06:12

## 2023-12-14 RX ADMIN — LEVALBUTEROL HYDROCHLORIDE 1.25 MG: 1.25 SOLUTION RESPIRATORY (INHALATION) at 08:12

## 2023-12-14 RX ADMIN — TRAZODONE HYDROCHLORIDE 50 MG: 50 TABLET ORAL at 08:12

## 2023-12-14 RX ADMIN — MICONAZOLE NITRATE 2 % TOPICAL POWDER: at 08:12

## 2023-12-14 RX ADMIN — AMIODARONE HYDROCHLORIDE 200 MG: 200 TABLET ORAL at 08:12

## 2023-12-14 RX ADMIN — ASPIRIN 81 MG: 81 TABLET, COATED ORAL at 08:12

## 2023-12-14 RX ADMIN — APIXABAN 5 MG: 5 TABLET, FILM COATED ORAL at 08:12

## 2023-12-14 RX ADMIN — WHITE PETROLATUM: 1.75 OINTMENT TOPICAL at 08:12

## 2023-12-14 RX ADMIN — FUROSEMIDE 40 MG: 40 TABLET ORAL at 08:12

## 2023-12-14 RX ADMIN — LEVALBUTEROL HYDROCHLORIDE 1.25 MG: 1.25 SOLUTION RESPIRATORY (INHALATION) at 03:12

## 2023-12-14 RX ADMIN — FOLIC ACID 1 MG: 1 TABLET ORAL at 08:12

## 2023-12-14 RX ADMIN — Medication 1 CAPSULE: at 11:12

## 2023-12-14 RX ADMIN — Medication 1 CAPSULE: at 05:12

## 2023-12-14 RX ADMIN — GUAIFENESIN 400 MG: 200 SOLUTION ORAL at 08:12

## 2023-12-14 RX ADMIN — SACUBITRIL AND VALSARTAN 1 TABLET: 24; 26 TABLET, FILM COATED ORAL at 08:12

## 2023-12-14 RX ADMIN — Medication 1 CAPSULE: at 08:12

## 2023-12-14 RX ADMIN — METOPROLOL SUCCINATE 100 MG: 50 TABLET, EXTENDED RELEASE ORAL at 08:12

## 2023-12-14 RX ADMIN — SODIUM CHLORIDE, PRESERVATIVE FREE 10 ML: 5 INJECTION INTRAVENOUS at 05:12

## 2023-12-14 RX ADMIN — APIXABAN 5 MG: 5 TABLET, FILM COATED ORAL at 06:12

## 2023-12-14 NOTE — PT/OT/SLP PROGRESS
Physical Therapy Treatment Note           Name: Mike Urbina Jr.    : 1955 (68 y.o.)  MRN: [id number]           TREATMENT SUMMARY AND RECOMMENDATIONS:    PT Received On: 23  PT Start Time: 930     PT Stop Time: 945  PT Total Time (min): 15 min     Subjective Assessment:  x No complaints  Lethargic    Awake, alert, cooperative  Uncooperative    Agitated  c/o pain    Appropriate  c/o fatigue    Confused  Treated at bedside     Emotionally labile  Treated in gym/dept.    Impulsive  Other:    Flat affect       Therapy Precautions:    Cognitive deficits  Spinal precautions    Collar - hard  Sternal precautions    Collar - soft   TLSO    Fall risk  LSO    Hip precautions - posterior  Knee immobilizer    Hip precautions - anterior  WBAT    Impaired communication  Partial weightbearing    Oxygen  TTWB    PEG tube  NWB    Visual deficits  Other:    Hearing deficits          Treatment Objectives:     Mobility Training:   Assist level Comments    Bed mobility     Transfer     Gait Rei Amb on firm surface with  ft    Sit to stand transitions     Sitting balance     Standing balance      Wheelchair mobility     Car transfer     Other:          Therapeutic Exercise:   Exercise Sets Reps Comments                               Additional Comments:  No issues noted    Assessment: Patient tolerated session well.    PT Plan: continue  Revisions made to plan of care: No    GOALS:   Multidisciplinary Problems       Physical Therapy Goals          Problem: Physical Therapy    Goal Priority Disciplines Outcome Goal Variances Interventions   Physical Therapy Goal     PT, PT/OT Ongoing, Progressing     Description: Goals to be met by: Discharge     Patient will increase functional independence with mobility by performin. Sit to stand transfer with Modified [place]  2. Bed to chair transfer with Modified [place] using No Assistive Device  3. Gait  x 1000 feet including outdoor  negotiation and stair completion with Supervision using No Assistive Device.                          Skilled PT Minutes Provided: 15   Communication with Treatment Team:     Equipment recommendations:       At end of treatment, patient remained:  x Up in chair     Up in wheelchair in room    In bed    With alarm activated    Bed rails up    Call bell in reach     Family/friends present    Restraints secured properly    In bathroom with CNA/RN notified    Nurse aware    In gym with therapist/tech    Other:

## 2023-12-14 NOTE — NURSING
Patient had coughed up small amount of frothy blood last night, CXR and IV lasix given early am, patient up in recliner this am and resting comfortably, evaluated by doctor and okay to have elquis, H&H stable; will continue to monitor H&H and monitor for blood in sputum, patient stated today he usually swallow his sputum when he coughs.

## 2023-12-14 NOTE — NURSING
Pt called me to his room coughed up small amount of frothy bright red sputum collected in specimen container.  Called dr reyes explained situation new orders noted states she will update dr decker this am about situation

## 2023-12-14 NOTE — PT/OT/SLP PROGRESS
"         Physical Therapy Treatment Note           Name: Mike Urbina Jr.    : 1955 (68 y.o.)  MRN: [id number]           TREATMENT SUMMARY AND RECOMMENDATIONS:    PT Received On: 23  PT Start Time: 1430     PT Stop Time: 1445  PT Total Time (min): 15 min     Subjective Assessment:  x No complaints  Lethargic    Awake, alert, cooperative  Uncooperative    Agitated  c/o pain    Appropriate  c/o fatigue    Confused  Treated at bedside     Emotionally labile  Treated in gym/dept.    Impulsive  Other:    Flat affect       Therapy Precautions:    Cognitive deficits  Spinal precautions    Collar - hard  Sternal precautions    Collar - soft   TLSO    Fall risk  LSO    Hip precautions - posterior  Knee immobilizer    Hip precautions - anterior  WBAT    Impaired communication  Partial weightbearing    Oxygen  TTWB    PEG tube  NWB    Visual deficits  Other:    Hearing deficits          Treatment Objectives:     Mobility Training:   Assist level Comments    Bed mobility     Transfer     Gait Rei Amb on firm surface with  ft    Sit to stand transitions     Sitting balance     Standing balance      Wheelchair mobility     Car transfer     Other:          Therapeutic Exercise:   Exercise Sets Reps Comments                               Additional Comments:  Pt reports "buttocks is hurting"- nurse informed     Assessment: Patient tolerated session well.    PT Plan: continue  Revisions made to plan of care: No    GOALS:   Multidisciplinary Problems       Physical Therapy Goals          Problem: Physical Therapy    Goal Priority Disciplines Outcome Goal Variances Interventions   Physical Therapy Goal     PT, PT/OT Ongoing, Progressing     Description: Goals to be met by: Discharge     Patient will increase functional independence with mobility by performin. Sit to stand transfer with Modified [place]  2. Bed to chair transfer with Modified [place] using No Assistive Device  3. Gait  x 1000 " feet including outdoor negotiation and stair completion with Supervision using No Assistive Device.                          Skilled PT Minutes Provided: 15   Communication with Treatment Team:     Equipment recommendations:       At end of treatment, patient remained:  x Up in chair     Up in wheelchair in room    In bed   x With alarm activated    Bed rails up   x Call bell in reach     Family/friends present    Restraints secured properly    In bathroom with CNA/RN notified    Nurse aware    In gym with therapist/tech    Other:

## 2023-12-14 NOTE — PLAN OF CARE
Problem: Adult Inpatient Plan of Care  Goal: Plan of Care Review  Outcome: Ongoing, Progressing  Flowsheets (Taken 12/13/2023 2004)  Plan of Care Reviewed With: patient  Goal: Patient-Specific Goal (Individualized)  Outcome: Ongoing, Progressing  Flowsheets (Taken 12/13/2023 2004)  Anxieties, Fears or Concerns: none  Individualized Care Needs: iv abx safety maintained     Problem: Infection  Goal: Absence of Infection Signs and Symptoms  Outcome: Ongoing, Progressing  Intervention: Prevent or Manage Infection  Flowsheets (Taken 12/13/2023 2004)  Isolation Precautions:   contact   precautions maintained     Problem: Fall Injury Risk  Goal: Absence of Fall and Fall-Related Injury  Outcome: Ongoing, Progressing  Intervention: Identify and Manage Contributors  Flowsheets (Taken 12/13/2023 2004)  Medication Review/Management: medications reviewed

## 2023-12-14 NOTE — PROGRESS NOTES
Pharmacokinetic Assessment Follow Up: IV Vancomycin    Vancomycin serum concentration assessment(s):    The trough level was drawn correctly and can be used to guide therapy at this time. The measurement is above the desired definitive target range of 15 to 20 mcg/mL.    Vancomycin Regimen Plan:    Change regimen to Vancomycin 500 mg IV every 12 hours with next serum trough concentration measured at 60 minutes prior to 3rd dose on 12/15    Drug levels (last 3 results):  Recent Labs   Lab Result Units 12/13/23  0833 12/14/23  0758   Vancomycin Trough ug/ml 21.8* 20.2*       Pharmacy will continue to follow and monitor vancomycin.    Please contact pharmacy at extension [phone] for questions regarding this assessment.    Thank you for the consult,   Rico Bauman       Patient brief summary:  Mike Urbina Jr. is a 68 y.o. male initiated on antimicrobial therapy with IV Vancomycin for treatment of bacteremia    The patient's current regimen is vancomycin 500 mg q12hr    Drug Allergies:   Review of patient's allergies indicates:  No Known Allergies    Actual Body Weight:   76.3 kg    Renal Function:   Estimated Creatinine Clearance: 74.2 mL/min (based on SCr of 1 mg/dL).,     Dialysis Method (if applicable):  N/A    CBC (last 72 hours):  Recent Labs   Lab Result Units 12/12/23  0335 12/14/23  0331   WBC x10(3)/mcL 4.15* 6.50   Hgb g/dL 7.4* 7.9*   Hct % 25.1* 27.4*   Platelet x10(3)/mcL 95* 120*   Mono % % 10.8 9.7   Eos % % 1.7 1.7   Basophil % % 0.7 0.3       Metabolic Panel (last 72 hours):  Recent Labs   Lab Result Units 12/12/23  0335 12/14/23  0331   Sodium Level mmol/L 138 138   Potassium Level mmol/L 3.3* 4.5   Chloride mmol/L 103 104   Carbon Dioxide mmol/L 27 24   Glucose Level mg/dL 96 98   Blood Urea Nitrogen mg/dL 11.8 25.0   Creatinine mg/dL 0.91 1.00       Vancomycin Administrations:  vancomycin given in the last 96 hours                     vancomycin 750 mg in dextrose 5 % (D5W) 250 mL IVPB  (Vial-Mate) (mg) 750 mg New Bag 12/13/23 2014     750 mg New Bag  0903     750 mg New Bag 12/12/23 2012      Restarted  0921     750 mg New Bag  0918     750 mg New Bag 12/11/23 2256     750 mg New Bag  0949     750 mg New Bag 12/10/23 2022     750 mg New Bag  0952                    Microbiologic Results:  Microbiology Results (last 7 days)       ** No results found for the last 168 hours. **

## 2023-12-14 NOTE — PLAN OF CARE
Weekly Staffing Report      Date Admitted: 12/5/2023 :   Staffing Date: 12/14/2023     Patient Active Problem List   Diagnosis    Hypertensive urgency    Chronic diastolic congestive heart failure    Hyponatremia    History of alcohol abuse    Atrial flutter    Thrombocytopenia    Hypomagnesemia    CAD s/p CABG     Atrial fibrillation with RVR    Postoperative blood loss anemia    Hyperlipidemia    Toxic/metabolic encephalopathy    Severe aortic stenosis    Pneumonia due to infectious organism    Severe malnutrition    Bacteremia          Team Members Present:       Nursing Present     Yes [x]    No []    Physical Therapy Present    Yes [x]    No []    Occupational Therapy Present     Yes [x]    No []    Speech Therapy Present    Yes []    No []    NA [x]    Dietary Present    Yes [x]    No []        Physician Present     [x] Thairy Reyes    [] Mary Cleaning    [] GISEL Hollis    []       Family Present    [] Adult Children    [] Spouse    [] POA    [] Friend/ Caregiver    [] Other       Interdisciplinary Meeting Notes:      Patient is here for IV Meds till 12/25 PT is seeing patient once a day and will continue until discharge             Please see Documented PT/OT/ST, Dietary, Nursing, and Physician notes for detailed treatment information.

## 2023-12-14 NOTE — PLAN OF CARE
[facility]  Discharge Reassessment    Primary Care Provider: Darlene Willams FNP    Expected Discharge Date:     Reassessment (most recent)       Discharge Reassessment - 12/14/23 0741          Discharge Reassessment    Assessment Type Discharge Planning Reassessment     Did the patient's condition or plan change since previous assessment? No     Discharge Plan discussed with: Sibling     Name(s) and Number(s) Ashleigh singleton [phone]     Communicated MODESTO with patient/caregiver Date not available/Unable to determine     Discharge Plan A Home with family;Home Health     DME Needed Upon Discharge  walker, rolling     Transition of Care Barriers None     Why the patient remains in the hospital Requires continued medical care        Post-Acute Status    Post-Acute Authorization Home Health     Home Health Status Pending medical clearance/testing     Hospital Resources/Appts/Education Provided Provided patient/caregiver with written discharge plan information     Discharge Delays None known at this time

## 2023-12-14 NOTE — PROGRESS NOTES
[facility] MEDICINE ~ PROGRESS NOTE    CHIEF COMPLAINT   Hospital follow up    HOSPITAL COURSE   67yo male with a past medical history of CAD, diastolic CHF, Afib, aortic stenosis, NSTEMI, and HTN who was recently admitted to [facility] for pneumonia and Afib RVR. Patient was diagnosed with Influenza A on 11/19/2023 prior to admission and started on Tamiflu. CTA chest 11/24/2023 showed no PE, but multifocal pneumonia most evident in RUL. Patient was admitted for ICU, started on Cardizem drip, Vanc, Zosyn, Doxy, and required Levophed for hypotension. Echo 11/25/2023 showed LVEF 55% moderate aortic stenosis, severe posterior mitral annular calcification present. Blood cultures x2 resulted positive for MRSA on 11/27/2023.  Infectious Disease recommended patient to continue vancomycin for 4 weeks with suspected in date on 12/25/2023. Stool culture on 11/27/2023 showed many yeast. Repeat blood cultures x2 on 12/02/2023 showed no growth. Patient underwent a CARO guided DCCV on 11/30/2023 which showed mild-moderately enlarged right atrium, no ASD or PFO, LVEF 55%, mild-moderate left ventricular hypertrophy, severe aortic stenosis, moderate mitral stenosis, mild-moderate mitral regurgitation, mild-moderate tricuspid regurgitation, no vegetation present, no intracardiac thrombus, successful cardioversion. CTA chest, abdomen, pelvis 12/05/2023 showed small bilateral pleural effusion R>L and patchy consolidation in both lungs increased compared to prior. CTA was obtained for TAVR workup to be completed outpatient after ID clearance. On exam today, patient is currently on 2 L nasal cannula with O2 sat 99%.  With worsening pneumonia and lung sounds on exam, we will start Zosyn, obtain sputum culture, add nystatin for fungal coverage, and schedule Mucomyst with the Xopenex nebulizers 3 times a day.     Today  Patient reportedly coughed up frothy, bloody sputum overnight. Lasix IV 40mg x1 given.  Coughing improved on exam today. CXR today showed no significant change.    OBJECTIVE/PHYSICAL EXAM     VITAL SIGNS (MOST RECENT):  Temp: 99 °F (37.2 °C) (12/14/23 0708)  Pulse: 87 (12/14/23 0810)  Resp: 18 (12/14/23 0810)  BP: 135/77 (12/14/23 0708)  SpO2: 96 % (12/14/23 0810) VITAL SIGNS (24 HOUR RANGE):  Temp:  [97.8 °F (36.6 °C)-99 °F (37.2 °C)] 99 °F (37.2 °C)  Pulse:  [74-87] 87  Resp:  [18-20] 18  SpO2:  [94 %-97 %] 96 %  BP: (118-179)/(70-84) 135/77     GENERAL: In no acute distress, afebrile  HEENT: Atraumatic, normocephalic, moist mucous membranes, supple neck   CHEST: Improved lung sounds throughout  HEART: S1, S2, grade IV murmur  ABDOMEN: Soft, nontender, BS +  MSK: Warm, no lower extremity edema, no clubbing or cyanosis  NEUROLOGIC: Alert and oriented x4, moving all extremities with good strength   INTEGUMENTARY: No obvious skin rash   PSYCHIATRY: Appropriate mood and affect     ASSESSMENT/PLAN   MRSA bacteriemia   Multifocal Pneumonia  Recent Influenza A infection (11/19/2023)  Acute hypoxic respiratory failure  CTA chest 11/24/2023 showed no PE, but multifocal pneumonia most evident in RUL  Blood cultures x2 resulted positive for MRSA on 11/27/2023, repeats were negative  ID recommended patient to continue vancomycin for 4 weeks with suspected in date on 12/25/2023, Cefepime (12/07/2023-12/12/2023)  D/c Zosyn (12/06/2023-12/07/203) and Nystatin (12/06/2023-12/07/2023)  IS, cough assist, acapella   Xopenex, Mucomyst   Repeat CXR 12/12/2023 shows slightly more confluent opacities in NIKKI  Lasix x1 given for cough with pink, frothy sputum   CXR today shows no changes      Yeast + Stool  D/c Nystatin (12/06/2023-12/07/2023) low concern for disseminated candidemia   Continue Probiotic    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   Thrombocytopenia  Hx of alcohol use disorder  Thiamine wnl   Ferrlecit x3  Hold Eliquis if platelets <50  Continue Eliquis, monitoring H&H    Hypokalemia   Replete as  condition requires     Transamnitis   Grayzone hepatitis C Ab  HCV RNA Detect/Quant, S Undetected      CAD  Diastolic CHF  Afib  Aortic stenosis  Essential HTN  CARO guided DCCV on 11/30/2023 which showed mild-moderately enlarged right atrium, no ASD or PFO, LVEF 55%, mild-moderate left ventricular hypertrophy, severe aortic stenosis, moderate mitral stenosis, mild-moderate mitral regurgitation, mild-moderate tricuspid regurgitation, no vegetation present, no intracardiac thrombus, successful cardioversion  TAVR to be completed outpatient after ID clearance  Continue Entresto, Toprol XL, Amiodarone, Eliquis, and Aspirin   Continue PO Lasix 40mg  Continue Eliquis, monitoring H&H    DVT prophylaxis: Eliquis   Anticipated discharge and disposition: Continue PT/OT and IV antibiotics   __________________________________________________________________________    LABS/MICRO/MEDS/DIAGNOSTICS     LABS  Recent Labs     12/14/23  0331      K 4.5   CHLORIDE 104   CO2 24   BUN 25.0   CREATININE 1.00   GLUCOSE 98   CALCIUM 8.3*     Recent Labs     12/14/23  0331   WBC 6.50   RBC 2.94*   HCT 27.4*   MCV 93.2   *     MICROBIOLOGY  Microbiology Results (last 7 days)       ** No results found for the last 168 hours. **             MEDICATIONS   acetylcysteine 200 mg/ml (20%)  4 mL Nebulization Daily    amiodarone  200 mg Oral Daily    apixaban  5 mg Oral BID    aspirin  81 mg Oral Daily    folic acid  1 mg Oral Daily    furosemide  40 mg Oral Daily    guaiFENesin 100 mg/5 ml  400 mg Oral Q6H WAKE    Lactobacillus acidophilus  1 capsule Oral TID WM    levalbuterol  1.25 mg Nebulization Q8H    linaCLOtide  72 mcg Oral Before breakfast    metoprolol succinate  100 mg Oral Daily    miconazole NITRATE 2 %   Topical (Top) BID    sacubitriL-valsartan  1 tablet Oral BID    sodium chloride 0.9%  10 mL Intravenous Q6H    traZODone  25 mg Oral QHS    vancomycin (VANCOCIN) IV (PEDS and ADULTS)  750 mg Intravenous Q12H    white  petrolatum   Topical (Top) BID         INFUSIONS       DIAGNOSTIC TESTS  X-Ray Chest 1 View   Final Result      No significant change         Electronically signed by: Juan Wharton   Date:    12/14/2023   Time:    07:51      X-Ray Chest 1 View   Final Result      Persistent consolidative changes in the right perihilar region and right upper lobe.      Slightly more confluent opacities in the left upper lobe.      No other interval change         Electronically signed by: Juan Wharton   Date:    12/12/2023   Time:    10:04      X-Ray Chest 1 View for Line/Tube Placement   Final Result      Consolidative changes in the right perihilar region right mid and right upper lung zone similar to previous exam.      Interval insertion of right-sided PICC line         Electronically signed by: Juan Wharton   Date:    12/06/2023   Time:    12:14        EF   Date Value Ref Range Status   11/25/2023 55 % Final   12/29/2022 55 % Final        NUTRITION STATUS  Patient meets ASPEN criteria for   malnutrition of   per RD assessment as evidenced by:                       A minimum of two characteristics is recommended for diagnosis of either severe or non-severe malnutrition.     Case related differential diagnoses have been reviewed; assessment and plan has been documented. I have personally reviewed the labs and test results that are currently available; I have reviewed the patients medication list. I have reviewed the consulting providers recommendations. I have reviewed or attempted to review medical records based upon their availability.  All of the patient's and/or family's questions have been addressed and answered to the best of my ability.  I will continue to monitor closely and make adjustments to medical management as needed.  This document was created using M*Modal Fluency Direct.  Transcription errors may have been made.  Please contact me if any questions may rise regarding documentation to clarify  transcription.      GISEL Russell   Internal Medicine  Department of Hospital Medicine [facility]

## 2023-12-14 NOTE — PLAN OF CARE
Problem: Adult Inpatient Plan of Care  Goal: Plan of Care Review  Outcome: Ongoing, Progressing  Flowsheets (Taken 12/14/2023 1112)  Plan of Care Reviewed With: patient  Goal: Patient-Specific Goal (Individualized)  Outcome: Ongoing, Progressing  Flowsheets (Taken 12/14/2023 1112)  Anxieties, Fears or Concerns: unable to sleep last night and coughed up some blood in his sputum  Individualized Care Needs: cxr and IV lasix given early am, H&H monitored, vanc trough and IV abt  Goal: Absence of Hospital-Acquired Illness or Injury  Outcome: Ongoing, Progressing  Intervention: Identify and Manage Fall Risk  Flowsheets (Taken 12/14/2023 1112)  Safety Promotion/Fall Prevention:   assistive device/personal item within reach   chair alarm set   in recliner, wheels locked   medications reviewed   nonskid shoes/socks when out of bed   instructed to call staff for mobility  Intervention: Prevent Skin Injury  Flowsheets (Taken 12/14/2023 1112)  Body Position:   position changed independently   weight shifting   legs elevated  Skin Protection:   adhesive use limited   silicone foam dressing in place   tubing/devices free from skin contact  Intervention: Prevent and Manage VTE (Venous Thromboembolism) Risk  Flowsheets (Taken 12/14/2023 1112)  Activity Management:   Ambulated in ferreira - L4   Arm raise - L1   Standing - L3   Walk with assistive devise and /or staff member - L3   Up in chair - L3  VTE Prevention/Management:   bleeding precations maintained   ambulation promoted   bleeding risk assessed   dorsiflexion/plantar flexion performed  Range of Motion: active ROM (range of motion) encouraged  Intervention: Prevent Infection  Flowsheets (Taken 12/14/2023 1112)  Infection Prevention:   hand hygiene promoted   rest/sleep promoted   single patient room provided   personal protective equipment utilized   equipment surfaces disinfected   environmental surveillance performed  Goal: Optimal Comfort and Wellbeing  Outcome: Ongoing,  Progressing  Intervention: Monitor Pain and Promote Comfort  Flowsheets (Taken 12/14/2023 1112)  Pain Management Interventions:   care clustered   quiet environment facilitated   relaxation techniques promoted   pillow support provided  Intervention: Provide Person-Centered Care  Flowsheets (Taken 12/14/2023 1112)  Trust Relationship/Rapport:   care explained   choices provided   questions encouraged   reassurance provided   questions answered  Goal: Readiness for Transition of Care  Outcome: Ongoing, Progressing     Problem: Fluid Imbalance (Pneumonia)  Goal: Fluid Balance  Outcome: Ongoing, Progressing     Problem: Infection (Pneumonia)  Goal: Resolution of Infection Signs and Symptoms  Outcome: Ongoing, Progressing     Problem: Respiratory Compromise (Pneumonia)  Goal: Effective Oxygenation and Ventilation  Outcome: Ongoing, Progressing  Intervention: Promote Airway Secretion Clearance  Flowsheets (Taken 12/14/2023 1112)  Breathing Techniques/Airway Clearance: deep/controlled cough encouraged  Cough And Deep Breathing: done with encouragement  Intervention: Optimize Oxygenation and Ventilation  Flowsheets (Taken 12/14/2023 1112)  Airway/Ventilation Management: airway patency maintained  Head of Bed (HOB) Positioning: HOB at 60-90 degrees     Problem: Infection  Goal: Absence of Infection Signs and Symptoms  Outcome: Ongoing, Progressing  Intervention: Prevent or Manage Infection  Flowsheets (Taken 12/14/2023 1112)  Fever Reduction/Comfort Measures: lightweight bedding  Infection Management: aseptic technique maintained  Isolation Precautions:   precautions maintained   droplet     Problem: Fall Injury Risk  Goal: Absence of Fall and Fall-Related Injury  Outcome: Ongoing, Progressing  Intervention: Identify and Manage Contributors  Flowsheets (Taken 12/14/2023 1112)  Self-Care Promotion:   independence encouraged   BADL personal objects within reach  Medication Review/Management: medications  reviewed  Intervention: Promote Injury-Free Environment  Flowsheets (Taken 12/14/2023 1112)  Safety Promotion/Fall Prevention:   assistive device/personal item within reach   chair alarm set   in recliner, wheels locked   medications reviewed   nonskid shoes/socks when out of bed   instructed to call staff for mobility

## 2023-12-14 NOTE — PT/OT/SLP PROGRESS
Physical Therapy Treatment Note           Name: Mike Urbina Jr.    : 1955 (68 y.o.)  MRN: [id number]           TREATMENT SUMMARY AND RECOMMENDATIONS:    PT Received On: 23  PT Start Time: 1500     PT Stop Time: 1515  PT Total Time (min): 15 min     Subjective Assessment:  x No complaints  Lethargic    Awake, alert, cooperative  Uncooperative    Agitated  c/o pain    Appropriate  c/o fatigue    Confused  Treated at bedside     Emotionally labile  Treated in gym/dept.    Impulsive  Other:    Flat affect       Therapy Precautions:    Cognitive deficits  Spinal precautions    Collar - hard  Sternal precautions    Collar - soft   TLSO    Fall risk  LSO    Hip precautions - posterior  Knee immobilizer    Hip precautions - anterior  WBAT    Impaired communication  Partial weightbearing    Oxygen  TTWB    PEG tube  NWB    Visual deficits  Other:    Hearing deficits          Treatment Objectives:     Mobility Training:   Assist level Comments    Bed mobility     Transfer     Gait Rei Amb on firm surface with  ft    Sit to stand transitions     Sitting balance     Standing balance      Wheelchair mobility     Car transfer     Other:          Therapeutic Exercise:   Exercise Sets Reps Comments                               Additional Comments:  No issues noted     Assessment: Patient tolerated session well.    PT Plan: continue  Revisions made to plan of care: No    GOALS:   Multidisciplinary Problems       Physical Therapy Goals          Problem: Physical Therapy    Goal Priority Disciplines Outcome Goal Variances Interventions   Physical Therapy Goal     PT, PT/OT Ongoing, Progressing     Description: Goals to be met by: Discharge     Patient will increase functional independence with mobility by performin. Sit to stand transfer with Modified [place]  2. Bed to chair transfer with Modified [place] using No Assistive Device  3. Gait  x 1000 feet including outdoor  negotiation and stair completion with Supervision using No Assistive Device.                          Skilled PT Minutes Provided: 15   Communication with Treatment Team:     Equipment recommendations:       At end of treatment, patient remained:  x Up in chair     Up in wheelchair in room    In bed   x With alarm activated    Bed rails up   x Call bell in reach     Family/friends present    Restraints secured properly    In bathroom with CNA/RN notified    Nurse aware    In gym with therapist/tech    Other:

## 2023-12-14 NOTE — NURSING
Nurses Note -- 4 Eyes      12/13/2023   7:05 pm      Skin assessed during: Daily Assessment      [] No Altered Skin Integrity Present    [x]Prevention Measures Documented      [] Yes- Altered Skin Integrity Present or Discovered   [] LDA Added if Not in Epic (Describe Wound)   [] New Altered Skin Integrity was Present on Admit and Documented in LDA   [] Wound Image Taken    Wound Care Consulted? No    Attending Nurse:  Lou Herrera RN/Staff Member:   [email or web address]

## 2023-12-15 LAB
ABO + RH BLD: NORMAL
ABO + RH BLD: NORMAL
BASOPHILS # BLD AUTO: 0.02 X10(3)/MCL
BASOPHILS NFR BLD AUTO: 0.4 %
BLD PROD TYP BPU: NORMAL
BLD PROD TYP BPU: NORMAL
BLOOD UNIT EXPIRATION DATE: NORMAL
BLOOD UNIT EXPIRATION DATE: NORMAL
BLOOD UNIT TYPE CODE: 9500
BLOOD UNIT TYPE CODE: 9500
CROSSMATCH INTERPRETATION: NORMAL
CROSSMATCH INTERPRETATION: NORMAL
DISPENSE STATUS: NORMAL
DISPENSE STATUS: NORMAL
EOSINOPHIL # BLD AUTO: 0.13 X10(3)/MCL (ref 0–0.9)
EOSINOPHIL NFR BLD AUTO: 2.3 %
ERYTHROCYTE [DISTWIDTH] IN BLOOD BY AUTOMATED COUNT: 17.7 % (ref 11.5–17)
GROUP & RH: NORMAL
HCT VFR BLD AUTO: 21.9 % (ref 42–52)
HCT VFR BLD AUTO: 30.4 % (ref 42–52)
HGB BLD-MCNC: 6.6 G/DL (ref 14–18)
HGB BLD-MCNC: 9.5 G/DL (ref 14–18)
IMM GRANULOCYTES # BLD AUTO: 0.04 X10(3)/MCL (ref 0–0.04)
IMM GRANULOCYTES NFR BLD AUTO: 0.7 %
INDIRECT COOMBS GEL: NORMAL
LYMPHOCYTES # BLD AUTO: 1.15 X10(3)/MCL (ref 0.6–4.6)
LYMPHOCYTES NFR BLD AUTO: 20.4 %
MCH RBC QN AUTO: 27.6 PG (ref 27–31)
MCHC RBC AUTO-ENTMCNC: 30.1 G/DL (ref 33–36)
MCV RBC AUTO: 91.6 FL (ref 80–94)
MONOCYTES # BLD AUTO: 0.51 X10(3)/MCL (ref 0.1–1.3)
MONOCYTES NFR BLD AUTO: 9 %
NEUTROPHILS # BLD AUTO: 3.79 X10(3)/MCL (ref 2.1–9.2)
NEUTROPHILS NFR BLD AUTO: 67.2 %
PLATELET # BLD AUTO: 125 X10(3)/MCL (ref 130–400)
PMV BLD AUTO: 12 FL (ref 7.4–10.4)
RBC # BLD AUTO: 2.39 X10(6)/MCL (ref 4.7–6.1)
SPECIMEN OUTDATE: NORMAL
UNIT NUMBER: NORMAL
UNIT NUMBER: NORMAL
VANCOMYCIN TROUGH SERPL-MCNC: 18.1 UG/ML (ref 15–20)
WBC # SPEC AUTO: 5.64 X10(3)/MCL (ref 4.5–11.5)

## 2023-12-15 PROCEDURE — P9016 RBC LEUKOCYTES REDUCED: HCPCS | Performed by: PHYSICIAN ASSISTANT

## 2023-12-15 PROCEDURE — 94640 AIRWAY INHALATION TREATMENT: CPT

## 2023-12-15 PROCEDURE — 25000003 PHARM REV CODE 250: Performed by: STUDENT IN AN ORGANIZED HEALTH CARE EDUCATION/TRAINING PROGRAM

## 2023-12-15 PROCEDURE — 25000242 PHARM REV CODE 250 ALT 637 W/ HCPCS: Performed by: STUDENT IN AN ORGANIZED HEALTH CARE EDUCATION/TRAINING PROGRAM

## 2023-12-15 PROCEDURE — 25000003 PHARM REV CODE 250: Performed by: PHYSICIAN ASSISTANT

## 2023-12-15 PROCEDURE — 86920 COMPATIBILITY TEST SPIN: CPT | Performed by: PHYSICIAN ASSISTANT

## 2023-12-15 PROCEDURE — 63600175 PHARM REV CODE 636 W HCPCS: Performed by: STUDENT IN AN ORGANIZED HEALTH CARE EDUCATION/TRAINING PROGRAM

## 2023-12-15 PROCEDURE — 94760 N-INVAS EAR/PLS OXIMETRY 1: CPT

## 2023-12-15 PROCEDURE — 85014 HEMATOCRIT: CPT | Performed by: PHYSICIAN ASSISTANT

## 2023-12-15 PROCEDURE — 80202 ASSAY OF VANCOMYCIN: CPT | Performed by: STUDENT IN AN ORGANIZED HEALTH CARE EDUCATION/TRAINING PROGRAM

## 2023-12-15 PROCEDURE — A4216 STERILE WATER/SALINE, 10 ML: HCPCS | Performed by: STUDENT IN AN ORGANIZED HEALTH CARE EDUCATION/TRAINING PROGRAM

## 2023-12-15 PROCEDURE — 85025 COMPLETE CBC W/AUTO DIFF WBC: CPT | Performed by: PHYSICIAN ASSISTANT

## 2023-12-15 PROCEDURE — 11000004 HC SNF PRIVATE

## 2023-12-15 PROCEDURE — 63600175 PHARM REV CODE 636 W HCPCS: Performed by: PHYSICIAN ASSISTANT

## 2023-12-15 PROCEDURE — 86850 RBC ANTIBODY SCREEN: CPT | Performed by: PHYSICIAN ASSISTANT

## 2023-12-15 RX ORDER — LANOLIN ALCOHOL/MO/W.PET/CERES
1 CREAM (GRAM) TOPICAL DAILY
Status: DISCONTINUED | OUTPATIENT
Start: 2023-12-15 | End: 2023-12-26 | Stop reason: HOSPADM

## 2023-12-15 RX ORDER — QUETIAPINE FUMARATE 100 MG/1
200 TABLET, FILM COATED ORAL NIGHTLY
Status: DISCONTINUED | OUTPATIENT
Start: 2023-12-15 | End: 2023-12-26 | Stop reason: HOSPADM

## 2023-12-15 RX ORDER — HYDROCODONE BITARTRATE AND ACETAMINOPHEN 500; 5 MG/1; MG/1
TABLET ORAL
Status: DISCONTINUED | OUTPATIENT
Start: 2023-12-15 | End: 2023-12-26 | Stop reason: HOSPADM

## 2023-12-15 RX ORDER — FUROSEMIDE 10 MG/ML
40 INJECTION INTRAMUSCULAR; INTRAVENOUS ONCE
Status: COMPLETED | OUTPATIENT
Start: 2023-12-15 | End: 2023-12-15

## 2023-12-15 RX ADMIN — LEVALBUTEROL HYDROCHLORIDE 1.25 MG: 1.25 SOLUTION RESPIRATORY (INHALATION) at 12:12

## 2023-12-15 RX ADMIN — GUAIFENESIN 400 MG: 200 SOLUTION ORAL at 01:12

## 2023-12-15 RX ADMIN — MICONAZOLE NITRATE 2 % TOPICAL POWDER: at 08:12

## 2023-12-15 RX ADMIN — FUROSEMIDE 40 MG: 40 TABLET ORAL at 08:12

## 2023-12-15 RX ADMIN — LEVALBUTEROL HYDROCHLORIDE 1.25 MG: 1.25 SOLUTION RESPIRATORY (INHALATION) at 11:12

## 2023-12-15 RX ADMIN — SODIUM CHLORIDE, PRESERVATIVE FREE 10 ML: 5 INJECTION INTRAVENOUS at 06:12

## 2023-12-15 RX ADMIN — LEVALBUTEROL HYDROCHLORIDE 1.25 MG: 1.25 SOLUTION RESPIRATORY (INHALATION) at 03:12

## 2023-12-15 RX ADMIN — SODIUM CHLORIDE: 9 INJECTION, SOLUTION INTRAVENOUS at 10:12

## 2023-12-15 RX ADMIN — WHITE PETROLATUM: 1.75 OINTMENT TOPICAL at 08:12

## 2023-12-15 RX ADMIN — ACETYLCYSTEINE 4 ML: 200 INHALANT RESPIRATORY (INHALATION) at 07:12

## 2023-12-15 RX ADMIN — Medication 1 CAPSULE: at 08:12

## 2023-12-15 RX ADMIN — VANCOMYCIN HYDROCHLORIDE 500 MG: 500 INJECTION, POWDER, LYOPHILIZED, FOR SOLUTION INTRAVENOUS at 11:12

## 2023-12-15 RX ADMIN — GUAIFENESIN 400 MG: 200 SOLUTION ORAL at 08:12

## 2023-12-15 RX ADMIN — SACUBITRIL AND VALSARTAN 1 TABLET: 24; 26 TABLET, FILM COATED ORAL at 08:12

## 2023-12-15 RX ADMIN — QUETIAPINE FUMARATE 200 MG: 100 TABLET ORAL at 08:12

## 2023-12-15 RX ADMIN — FUROSEMIDE 40 MG: 10 INJECTION, SOLUTION INTRAMUSCULAR; INTRAVENOUS at 01:12

## 2023-12-15 RX ADMIN — LEVALBUTEROL HYDROCHLORIDE 1.25 MG: 1.25 SOLUTION RESPIRATORY (INHALATION) at 07:12

## 2023-12-15 RX ADMIN — ASPIRIN 81 MG: 81 TABLET, COATED ORAL at 08:12

## 2023-12-15 RX ADMIN — SODIUM CHLORIDE, PRESERVATIVE FREE 10 ML: 5 INJECTION INTRAVENOUS at 11:12

## 2023-12-15 RX ADMIN — AMIODARONE HYDROCHLORIDE 200 MG: 200 TABLET ORAL at 08:12

## 2023-12-15 RX ADMIN — SACUBITRIL AND VALSARTAN 1 TABLET: 24; 26 TABLET, FILM COATED ORAL at 07:12

## 2023-12-15 RX ADMIN — SODIUM CHLORIDE, PRESERVATIVE FREE 10 ML: 5 INJECTION INTRAVENOUS at 12:12

## 2023-12-15 RX ADMIN — METOPROLOL SUCCINATE 100 MG: 50 TABLET, EXTENDED RELEASE ORAL at 08:12

## 2023-12-15 RX ADMIN — FOLIC ACID 1 MG: 1 TABLET ORAL at 08:12

## 2023-12-15 RX ADMIN — FERROUS SULFATE TAB 325 MG (65 MG ELEMENTAL FE) 1 EACH: 325 (65 FE) TAB at 11:12

## 2023-12-15 RX ADMIN — Medication 1 CAPSULE: at 04:12

## 2023-12-15 RX ADMIN — LINACLOTIDE 72 MCG: 72 CAPSULE, GELATIN COATED ORAL at 05:12

## 2023-12-15 RX ADMIN — Medication 1 CAPSULE: at 11:12

## 2023-12-15 RX ADMIN — VANCOMYCIN HYDROCHLORIDE 500 MG: 500 INJECTION, POWDER, LYOPHILIZED, FOR SOLUTION INTRAVENOUS at 10:12

## 2023-12-15 NOTE — PROGRESS NOTES
Pharmacokinetic Assessment Follow Up: IV Vancomycin    Vancomycin serum concentration assessment(s):    The trough level was drawn correctly and can be used to guide therapy at this time. The measurement is within the desired definitive target range of 15 to 20 mcg/mL.    Vancomycin Regimen Plan:    Continue regimen to Vancomycin 500 mg IV every 12 hours with next serum trough concentration measured at 60 minutes prior to 1000 dose on 12/18    Drug levels (last 3 results):  Recent Labs   Lab Result Units 12/13/23  0833 12/14/23  0758 12/15/23  0905   Vancomycin Trough ug/ml 21.8* 20.2* 18.1       Pharmacy will continue to follow and monitor vancomycin.    Please contact pharmacy at extension [phone] for questions regarding this assessment.    Thank you for the consult,   Rico Bauman       Patient brief summary:  Mike Urbina Jr. is a 68 y.o. male initiated on antimicrobial therapy with IV Vancomycin for treatment of bacteremia    The patient's current regimen is vancomycin 500 mg q12hr    Drug Allergies:   Review of patient's allergies indicates:  No Known Allergies    Actual Body Weight:   76.3 kg    Renal Function:   Estimated Creatinine Clearance: 74.2 mL/min (based on SCr of 1 mg/dL).,     Dialysis Method (if applicable):  N/A    CBC (last 72 hours):  Recent Labs   Lab Result Units 12/14/23  0331 12/15/23  0436   WBC x10(3)/mcL 6.50 5.64   Hgb g/dL 7.9* 6.6*   Hct % 27.4* 21.9*   Platelet x10(3)/mcL 120* 125*   Mono % % 9.7 9.0   Eos % % 1.7 2.3   Basophil % % 0.3 0.4       Metabolic Panel (last 72 hours):  Recent Labs   Lab Result Units 12/14/23  0331   Sodium Level mmol/L 138   Potassium Level mmol/L 4.5   Chloride mmol/L 104   Carbon Dioxide mmol/L 24   Glucose Level mg/dL 98   Blood Urea Nitrogen mg/dL 25.0   Creatinine mg/dL 1.00       Vancomycin Administrations:  vancomycin given in the last 96 hours                     vancomycin (VANCOCIN) 500 mg in dextrose 5 % in water (D5W) 100 mL IVPB (MB+)  (mg) 500 mg New Bag 12/15/23 1019     500 mg New Bag 12/14/23 2107     500 mg New Bag  1010    vancomycin 750 mg in dextrose 5 % (D5W) 250 mL IVPB (Vial-Mate) (mg) 750 mg New Bag 12/13/23 2014     750 mg New Bag  0903     750 mg New Bag 12/12/23 2012      Restarted  0921     750 mg New Bag  0918     750 mg New Bag 12/11/23 2256                    Microbiologic Results:  Microbiology Results (last 7 days)       ** No results found for the last 168 hours. **

## 2023-12-15 NOTE — PLAN OF CARE
Problem: Adult Inpatient Plan of Care  Goal: Plan of Care Review  Outcome: Ongoing, Progressing  Goal: Patient-Specific Goal (Individualized)  Outcome: Ongoing, Progressing  Flowsheets (Taken 12/14/2023 2045)  Anxieties, Fears or Concerns: none voiced  Individualized Care Needs: iv abx  Goal: Absence of Hospital-Acquired Illness or Injury  Outcome: Ongoing, Progressing  Intervention: Prevent Skin Injury  Flowsheets (Taken 12/14/2023 2000)  Skin Protection:   adhesive use limited   incontinence pads utilized     Problem: Fall Injury Risk  Goal: Absence of Fall and Fall-Related Injury  Outcome: Ongoing, Progressing  Intervention: Identify and Manage Contributors  Flowsheets (Taken 12/14/2023 2000)  Self-Care Promotion:   safe use of adaptive equipment encouraged   independence encouraged   BADL personal objects within reach   BADL personal routines maintained   meal set-up provided

## 2023-12-15 NOTE — PROGRESS NOTES
[facility] MEDICINE ~ PROGRESS NOTE    CHIEF COMPLAINT   Hospital follow up    HOSPITAL COURSE   67yo male with a past medical history of CAD, diastolic CHF, Afib, aortic stenosis, NSTEMI, and HTN who was recently admitted to [facility] for pneumonia and Afib RVR. Patient was diagnosed with Influenza A on 11/19/2023 prior to admission and started on Tamiflu. CTA chest 11/24/2023 showed no PE, but multifocal pneumonia most evident in RUL. Patient was admitted for ICU, started on Cardizem drip, Vanc, Zosyn, Doxy, and required Levophed for hypotension. Echo 11/25/2023 showed LVEF 55% moderate aortic stenosis, severe posterior mitral annular calcification present. Blood cultures x2 resulted positive for MRSA on 11/27/2023.  Infectious Disease recommended patient to continue vancomycin for 4 weeks with suspected in date on 12/25/2023. Stool culture on 11/27/2023 showed many yeast. Repeat blood cultures x2 on 12/02/2023 showed no growth. Patient underwent a CARO guided DCCV on 11/30/2023 which showed mild-moderately enlarged right atrium, no ASD or PFO, LVEF 55%, mild-moderate left ventricular hypertrophy, severe aortic stenosis, moderate mitral stenosis, mild-moderate mitral regurgitation, mild-moderate tricuspid regurgitation, no vegetation present, no intracardiac thrombus, successful cardioversion. CTA chest, abdomen, pelvis 12/05/2023 showed small bilateral pleural effusion R>L and patchy consolidation in both lungs increased compared to prior. CTA was obtained for TAVR workup to be completed outpatient after ID clearance. On exam today, patient is currently on 2 L nasal cannula with O2 sat 99%.  With worsening pneumonia and lung sounds on exam, we will start Zosyn, obtain sputum culture, add nystatin for fungal coverage, and schedule Mucomyst with the Xopenex nebulizers 3 times a day.     Today  H&H 6.6/21.9 today.  Giving 2 units PRBC.  40 mg of IV Lasix in between.  CIS  recommends holding Eliquis until hemoglobin stable above 8.  Denies any active bleeding.    OBJECTIVE/PHYSICAL EXAM     VITAL SIGNS (MOST RECENT):  Temp: 97.5 °F (36.4 °C) (12/15/23 0853)  Pulse: 81 (12/15/23 0853)  Resp: 18 (12/15/23 0853)  BP: 127/64 (12/15/23 0853)  SpO2: 95 % (12/15/23 0853) VITAL SIGNS (24 HOUR RANGE):  Temp:  [97.5 °F (36.4 °C)-98.6 °F (37 °C)] 97.5 °F (36.4 °C)  Pulse:  [70-89] 81  Resp:  [18-20] 18  SpO2:  [93 %-99 %] 95 %  BP: ()/(55-78) 127/64     GENERAL: In no acute distress, afebrile  HEENT: Atraumatic, normocephalic, moist mucous membranes, supple neck   CHEST: Improved lung sounds throughout  HEART: S1, S2, grade IV murmur  ABDOMEN: Soft, nontender, BS +  MSK: Warm, no lower extremity edema, no clubbing or cyanosis  NEUROLOGIC: Alert and oriented x4, moving all extremities with good strength   INTEGUMENTARY: No obvious skin rash   PSYCHIATRY: Appropriate mood and affect     ASSESSMENT/PLAN   MRSA bacteriemia   Multifocal Pneumonia  Recent Influenza A infection (11/19/2023)  Acute hypoxic respiratory failure  CTA chest 11/24/2023 showed no PE, but multifocal pneumonia most evident in RUL  Blood cultures x2 resulted positive for MRSA on 11/27/2023, repeats were negative  ID recommended patient to continue vancomycin for 4 weeks with suspected in date on 12/25/2023, Cefepime (12/07/2023-12/12/2023)  D/c Zosyn (12/06/2023-12/07/203) and Nystatin (12/06/2023-12/07/2023)  IS, cough assist, acapella, Xopenex, Mucomyst   CXR 12/14/2023 shows no changes, Lasix 40mg IVP x1 given for cough with pink, frothy sputum     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   Thrombocytopenia  Hx of alcohol use disorder  Thiamine wnl   Ferrlecit x3, start oral supplementation   Hold Eliquis if platelets <50  2u pRBC ordered  Lasix 40mg IVP between units   Hold Eliquis and Aspirin per CIS until hemoglobin >8-9      Yeast + Stool  D/c Nystatin (12/06/2023-12/07/2023) low concern for disseminated  candidemia   Continue Probiotic    Hypokalemia   Replete as condition requires     Transamnitis   Grayzone hepatitis C Ab  HCV RNA Detect/Quant, S Undetected      CAD  Diastolic CHF  Afib  Aortic stenosis  Essential HTN  CARO guided DCCV on 11/30/2023 which showed mild-moderately enlarged right atrium, no ASD or PFO, LVEF 55%, mild-moderate left ventricular hypertrophy, severe aortic stenosis, moderate mitral stenosis, mild-moderate mitral regurgitation, mild-moderate tricuspid regurgitation, no vegetation present, no intracardiac thrombus, successful cardioversion  TAVR to be completed outpatient after ID clearance  Continue Entresto, Toprol XL, Amiodarone  Continue PO Lasix 40mg  Hold Eliquis and Aspirin as above     DVT prophylaxis: Eliquis   Anticipated discharge and disposition: Continue PT/OT and IV antibiotics   __________________________________________________________________________    LABS/MICRO/MEDS/DIAGNOSTICS     LABS  Recent Labs     12/14/23  0331      K 4.5   CHLORIDE 104   CO2 24   BUN 25.0   CREATININE 1.00   GLUCOSE 98   CALCIUM 8.3*     Recent Labs     12/15/23  0436   WBC 5.64   RBC 2.39*   HCT 21.9*   MCV 91.6   *     MICROBIOLOGY  Microbiology Results (last 7 days)       ** No results found for the last 168 hours. **             MEDICATIONS   acetylcysteine 200 mg/ml (20%)  4 mL Nebulization Daily    amiodarone  200 mg Oral Daily    aspirin  81 mg Oral Daily    folic acid  1 mg Oral Daily    furosemide (LASIX) injection  40 mg Intravenous Once    furosemide  40 mg Oral Daily    guaiFENesin 100 mg/5 ml  400 mg Oral Q6H WAKE    Lactobacillus acidophilus  1 capsule Oral TID WM    levalbuterol  1.25 mg Nebulization Q8H    linaCLOtide  72 mcg Oral Before breakfast    metoprolol succinate  100 mg Oral Daily    miconazole NITRATE 2 %   Topical (Top) BID    sacubitriL-valsartan  1 tablet Oral BID    sodium chloride 0.9%  10 mL Intravenous Q6H    traZODone  50 mg Oral QHS    vancomycin  (VANCOCIN) IV (PEDS and ADULTS)  500 mg Intravenous Q12H    white petrolatum   Topical (Top) BID         INFUSIONS       DIAGNOSTIC TESTS  X-Ray Chest 1 View   Final Result      No significant change         Electronically signed by: Juan Wharton   Date:    12/14/2023   Time:    07:51      X-Ray Chest 1 View   Final Result      Persistent consolidative changes in the right perihilar region and right upper lobe.      Slightly more confluent opacities in the left upper lobe.      No other interval change         Electronically signed by: Juan Wharton   Date:    12/12/2023   Time:    10:04      X-Ray Chest 1 View for Line/Tube Placement   Final Result      Consolidative changes in the right perihilar region right mid and right upper lung zone similar to previous exam.      Interval insertion of right-sided PICC line         Electronically signed by: Juan Wharton   Date:    12/06/2023   Time:    12:14        EF   Date Value Ref Range Status   11/25/2023 55 % Final   12/29/2022 55 % Final        NUTRITION STATUS  Patient meets ASPEN criteria for   malnutrition of   per RD assessment as evidenced by:                       A minimum of two characteristics is recommended for diagnosis of either severe or non-severe malnutrition.     Case related differential diagnoses have been reviewed; assessment and plan has been documented. I have personally reviewed the labs and test results that are currently available; I have reviewed the patients medication list. I have reviewed the consulting providers recommendations. I have reviewed or attempted to review medical records based upon their availability.  All of the patient's and/or family's questions have been addressed and answered to the best of my ability.  I will continue to monitor closely and make adjustments to medical management as needed.  This document was created using M*Modal Fluency Direct.  Transcription errors may have been made.  Please contact me if  any questions may rise regarding documentation to clarify transcription.      GISEL Russell   Internal Medicine  Department of Hospital Medicine [facility]

## 2023-12-15 NOTE — PLAN OF CARE
Problem: Adult Inpatient Plan of Care  Goal: Plan of Care Review  Outcome: Ongoing, Progressing  Goal: Patient-Specific Goal (Individualized)  Outcome: Ongoing, Progressing  Flowsheets (Taken 12/15/2023 0800)  Anxieties, Fears or Concerns: None  Individualized Care Needs: IV Abx, monitor labs, maintain safety this shift [id number]     Problem: Infection (Pneumonia)  Goal: Resolution of Infection Signs and Symptoms  Outcome: Ongoing, Progressing  Intervention: Prevent Infection Progression  Flowsheets (Taken 12/15/2023 0800)  Fever Reduction/Comfort Measures:   lightweight bedding   lightweight clothing     Problem: Fall Injury Risk  Goal: Absence of Fall and Fall-Related Injury  Outcome: Ongoing, Progressing

## 2023-12-15 NOTE — NURSING
Two units of RBC completed at 1750.  Lab called and reminded to complete blood work at the two hour clement after transfusion (1930); spoke to Tala.

## 2023-12-15 NOTE — PT/OT/SLP PROGRESS
Name: Mike Urbina .    : 1955 (68 y.o.)  MRN: [id number]           Patient Unavailable      Patient unable to be seen at this time secondary to: PT attempted in between pt receiving units of blood, but he reports not feeling up to it with everything going on today, stating he would do therapy tomorrow. PT to return tomorrow.  Nursing in room and aware.

## 2023-12-15 NOTE — CONSULTS
Inpatient consult to Cardiology  Consult performed by: Ebony Mccallum FNP  Consult ordered by: Leigh Calderon PA  Reason for consult: Recs for AOC due to anemia      [facility]    Cardiology  Consult Note    Patient Name: Mike Urbina Jr.  MRN: [id number]  Admission Date: 12/5/2023  Hospital Length of Stay: 10 days  Code Status: Full Code   Attending Provider: Mary Cleaning MD   Consulting Provider: LOREN Santiago  Primary Care Physician: Darlene Willams FNP  Principal Problem:Bacteremia    Patient information was obtained from patient, past medical records, ER records, and primary team.     Subjective:     Chief Complaint:  Reason for consult: OAC recs due to anemia     HPI: Patient is a 69 y/o male, followed by Dr. Herrera, who presented to [facility] ED on 11.24.23 with c/o SOB and weakness. He was found to be in A-fib RVR and was placed on amiodarone. CTA chest negative for PE but did reveal multifocal pneumonia. ABX started and Blood cultures obtained which were + MRSA; therefore he underwent CARO which revealed severe AS.  CV surgery consulted and recommend TAVR evaluation. Structural heart has been consulted for TAVR evaluation due to severe AS. Patient has been transferred to [facility] for further abx treatment and rehab services.  While here his H/H has been trending downward with H/H 6.6/21.9 today (12/15/2023).  [facility] has been consulted for recs on OAC.         PMH:  CAD, Diastolic Heart Failure, ETOH Abuse, VHD (AS), Thrombocytopenia, NSTEMI, HTN, PAF/Flutter  PSH: LHC, CABG, Cataract Extraction   Family History: Mother, L, MI  Social History: + ETOH Use (6pk/Beer per Day for > 30 Years); Denies Tobacco and Illicit Drug Use     Previous Cardiac Diagnostics:   CARO/DCCV (11.30.23):  LV systolic function 55%. Severely AV sclerocalcification with marked reduced mobility of all 3 leaflets resulting in severe AS, SHARAD 0.7cm2 by direct planimetry. Peak AV  velocity 4.5 m/sec. Mild AI. Marked MAC with restricted mobility of the MV resulting in Moderate mitral stenosis, MG 7 mmhg. Mild-moderate MR. Mild-moderate TR. SILVANO is not seen and is probably absent due to prior surgical SILVANO ligation. Successful DCCV x1     ECHO (11.25.23):  Left Ventricle: Normal wall motion. There is normal systolic function. Ejection fraction by visual approximation is 55%. Diastolic function cannot be reliably determined in the presence of mitral valve disease. Right Ventricle: Normal right ventricular cavity size. Systolic function is mildly reduced. Aortic Valve: Moderately calcified cusps. Moderately restricted motion. There is moderate stenosis. Aortic valve area by VTI is 0.90 cm². Aortic valve peak velocity is 3.52 m/s. Mean gradient is 35 mmHg. The dimensionless index is 0.29.  Mitral Valve: There is severe posterior mitral annular calcification present. There is moderate stenosis. The mean pressure gradient across the mitral valve is 9 mmHg at a heart rate of  bpm. There is mild regurgitation. Tricuspid Valve: There is mild regurgitation     ECHO (8.1.23):  The study quality is average. Global left ventricular systolic function is normal. The left ventricular ejection fraction is 55%. The left ventricle diastolic function is impaired (Grade II) with an elevated left atrial pressure. Suspect severe aortic valve stenosis is present; aortic valve is heavily calcified. The trans-aortic peak velocity is 3.5 m/s. The trans-aortic mean gradient is 34.4 mmHg. Dimensionless index ~ 0.24. SVI ~ 42 mL/m^2. Moderate mitral valve calcification with mild to moderate mitral stenosis; mean gradient is 5.1 mmHg. Mild (1+) mitral regurgitation.  Moderate (2+) tricuspid regurgitation.  Mild (1+) aortic regurgitation.  The pulmonary artery systolic pressure is 44 mmHg. Evidence of pulmonary hypertension is noted.      [facility] 3.15.23:  Surgical coronary anatomy with distal left main 50%; LAD proximal to mid  60-70%; circumflex mid 80- 90%; RCA with proximal .  The ejection fraction was 60% with EDP of 10 mmHg.  Right radial access.  The estimated blood loss was less than 10 cc.  CT surgical consult for CABG evaluation.     PET 1.6.23:  This is an abnormal perfusion study. Study is consistent with ischemia.   This scan is suggestive of moderate risk for future cardiovascular events.   Small reversible perfusion abnormality of moderate intensity in the apical segment. Medium fixed perfusion abnormality of severe intensity in the inferior region.   The left ventricular cavity is noted to be normal on the stress studies. The stress left ventricular ejection fraction was calculated to be 35% and left ventricular global function is moderately reduced. The rest left ventricular cavity is noted to be normal. The rest left ventricular ejection fraction was calculated to be 40% and rest left ventricular global function is mildly reduced.   When compared to the resting ejection fraction (40%), the stress ejection fraction (35%) has decreased.   The study quality is good.   There was a rise in myocardial blood flow between rest and stress.  Global myocardial blood flow reserve was 1.47.  Myocardial blood flow reserve is globally abnormal, placing the patient at a higher coronary event risk.     ECHO 12.9.22:  The left ventricle is normal in size with mild concentric hypertrophy and normal systolic function.  Grade I left ventricular diastolic dysfunction.  The estimated ejection fraction is 55%.  Normal right ventricular size with normal right ventricular systolic function.  There is mild aortic valve stenosis.  There is mild mitral stenosis.  Normal central venous pressure (3 mmHg).        Review of patient's allergies indicates:  No Known Allergies    No current facility-administered medications on file prior to encounter.     Current Outpatient Medications on File Prior to Encounter   Medication Sig    amiodarone (PACERONE)  200 MG Tab Take 1 tablet (200 mg total) by mouth once daily.    aspirin (ECOTRIN) 81 MG EC tablet Take 1 tablet (81 mg total) by mouth once daily.    atorvastatin (LIPITOR) 40 MG tablet Take 1 tablet (40 mg total) by mouth every evening.    furosemide (LASIX) 20 MG tablet Take 1 tablet (20 mg total) by mouth once daily. for 4 days    hydrOXYzine pamoate (VISTARIL) 50 MG Cap Take 1 capsule (50 mg total) by mouth every 8 (eight) hours as needed (as needed for anxiety).    linaCLOtide (LINZESS) 72 mcg Cap capsule Take 1 capsule (72 mcg total) by mouth before breakfast.    metoprolol succinate (TOPROL-XL) 50 MG 24 hr tablet TAKE 1 TABLET BY MOUTH ONCE DAILY    QUEtiapine (SEROQUEL) 100 MG Tab Take 2 tablets (200 mg total) by mouth every evening.    sodium bicarbonate 650 MG tablet Take 650 mg by mouth once daily.    thiamine (VITAMIN B-1) 100 MG tablet Take 100 mg by mouth once daily.    valsartan (DIOVAN) 160 MG tablet Take 1 tablet (160 mg total) by mouth 2 (two) times daily.         Review of Systems   All other systems reviewed and are negative.      Objective:     Vital Signs (Most Recent):  Temp: 98.3 °F (36.8 °C) (12/15/23 0720)  Pulse: 75 (12/15/23 0751)  Resp: 18 (12/15/23 0751)  BP: (!) 150/78 (12/15/23 0720)  SpO2: 96 % (12/15/23 0751) Vital Signs (24h Range):  Temp:  [97.8 °F (36.6 °C)-98.6 °F (37 °C)] 98.3 °F (36.8 °C)  Pulse:  [70-89] 75  Resp:  [18-20] 18  SpO2:  [93 %-99 %] 96 %  BP: ()/(55-78) 150/78     Weight: 76.3 kg (168 lb 3.4 oz)  Body mass index is 21.6 kg/m².    SpO2: 96 %         Intake/Output Summary (Last 24 hours) at 12/15/2023 0829  Last data filed at 12/15/2023 0828  Gross per 24 hour   Intake 2357.3 ml   Output 1900 ml   Net 457.3 ml       Lines/Drains/Airways       Peripheral Intravenous Line  Duration                  Midline Catheter Insertion/Assessment  - Single Lumen 12/06/23 1147 Right basilic vein (medial side of arm) 8 days                    Significant Labs:  Recent  Results (from the past 72 hour(s))   VANCOMYCIN, TROUGH    Collection Time: 12/13/23  8:33 AM   Result Value Ref Range    Vancomycin Trough 21.8 (H) 15.0 - 20.0 ug/ml   Basic Metabolic Panel    Collection Time: 12/14/23  3:31 AM   Result Value Ref Range    Sodium Level 138 136 - 145 mmol/L    Potassium Level 4.5 3.5 - 5.1 mmol/L    Chloride 104 98 - 107 mmol/L    Carbon Dioxide 24 23 - 31 mmol/L    Glucose Level 98 82 - 115 mg/dL    Blood Urea Nitrogen 25.0 8.4 - 25.7 mg/dL    Creatinine 1.00 0.73 - 1.18 mg/dL    BUN/Creatinine Ratio 25     Calcium Level Total 8.3 (L) 8.8 - 10.0 mg/dL    Anion Gap 10.0 mEq/L    eGFR >60 mls/min/1.73/m2   CBC with Differential    Collection Time: 12/14/23  3:31 AM   Result Value Ref Range    WBC 6.50 4.50 - 11.50 x10(3)/mcL    RBC 2.94 (L) 4.70 - 6.10 x10(6)/mcL    Hgb 7.9 (L) 14.0 - 18.0 g/dL    Hct 27.4 (L) 42.0 - 52.0 %    MCV 93.2 80.0 - 94.0 fL    MCH 26.9 (L) 27.0 - 31.0 pg    MCHC 28.8 (L) 33.0 - 36.0 g/dL    RDW 17.6 (H) 11.5 - 17.0 %    Platelet 120 (L) 130 - 400 x10(3)/mcL    MPV 12.1 (H) 7.4 - 10.4 fL    Neut % 69.7 %    Lymph % 17.8 %    Mono % 9.7 %    Eos % 1.7 %    Basophil % 0.3 %    Lymph # 1.16 0.6 - 4.6 x10(3)/mcL    Neut # 4.53 2.1 - 9.2 x10(3)/mcL    Mono # 0.63 0.1 - 1.3 x10(3)/mcL    Eos # 0.11 0 - 0.9 x10(3)/mcL    Baso # 0.02 <=0.2 x10(3)/mcL    IG# 0.05 (H) 0 - 0.04 x10(3)/mcL    IG% 0.8 %   VANCOMYCIN, TROUGH    Collection Time: 12/14/23  7:58 AM   Result Value Ref Range    Vancomycin Trough 20.2 (H) 15.0 - 20.0 ug/ml   CBC with Differential    Collection Time: 12/15/23  4:36 AM   Result Value Ref Range    WBC 5.64 4.50 - 11.50 x10(3)/mcL    RBC 2.39 (L) 4.70 - 6.10 x10(6)/mcL    Hgb 6.6 (L) 14.0 - 18.0 g/dL    Hct 21.9 (L) 42.0 - 52.0 %    MCV 91.6 80.0 - 94.0 fL    MCH 27.6 27.0 - 31.0 pg    MCHC 30.1 (L) 33.0 - 36.0 g/dL    RDW 17.7 (H) 11.5 - 17.0 %    Platelet 125 (L) 130 - 400 x10(3)/mcL    MPV 12.0 (H) 7.4 - 10.4 fL    Neut % 67.2 %    Lymph % 20.4 %     Mono % 9.0 %    Eos % 2.3 %    Basophil % 0.4 %    Lymph # 1.15 0.6 - 4.6 x10(3)/mcL    Neut # 3.79 2.1 - 9.2 x10(3)/mcL    Mono # 0.51 0.1 - 1.3 x10(3)/mcL    Eos # 0.13 0 - 0.9 x10(3)/mcL    Baso # 0.02 <=0.2 x10(3)/mcL    IG# 0.04 0 - 0.04 x10(3)/mcL    IG% 0.7 %   Type & Screen    Collection Time: 12/15/23  7:26 AM   Result Value Ref Range    Group & Rh O NEG     Indirect Rox GEL NEG     Specimen Outdate 12/18/2023 23:59    Prepare RBC 2 Units; hemoglobin <7, history of chf    Collection Time: 12/15/23  7:26 AM   Result Value Ref Range    UNIT NUMBER [id number]     UNIT ABO/RH O NEG     DISPENSE STATUS Issued     Unit Expiration [id number]     Product Code O3161D25     Unit Blood Type Code 9500     CROSSMATCH INTERPRETATION Compatible        Significant Imaging:      EKG:  No results found for this visit on 12/05/23.    Telemetry:  SR 79    Physical Exam  Vitals reviewed.   Constitutional:       Appearance: Normal appearance.   HENT:      Head: Normocephalic and atraumatic.      Mouth/Throat:      Mouth: Mucous membranes are moist.      Pharynx: Oropharynx is clear.   Eyes:      Conjunctiva/sclera: Conjunctivae normal.      Pupils: Pupils are equal, round, and reactive to light.   Cardiovascular:      Rate and Rhythm: Normal rate and regular rhythm.      Heart sounds: Murmur heard.   Pulmonary:      Effort: Pulmonary effort is normal.      Breath sounds: Normal breath sounds.      Comments: O2 96% on RA  Musculoskeletal:         General: Normal range of motion.      Right lower leg: No edema.      Left lower leg: No edema.   Skin:     General: Skin is warm and dry.   Neurological:      General: No focal deficit present.      Mental Status: He is alert and oriented to person, place, and time.   Psychiatric:         Mood and Affect: Mood normal.         Thought Content: Thought content normal.         Home Medications:   No current facility-administered medications on file prior to encounter.      Current Outpatient Medications on File Prior to Encounter   Medication Sig Dispense Refill    amiodarone (PACERONE) 200 MG Tab Take 1 tablet (200 mg total) by mouth once daily. 30 tablet 3    aspirin (ECOTRIN) 81 MG EC tablet Take 1 tablet (81 mg total) by mouth once daily. 30 tablet 0    atorvastatin (LIPITOR) 40 MG tablet Take 1 tablet (40 mg total) by mouth every evening. 30 tablet 2    furosemide (LASIX) 20 MG tablet Take 1 tablet (20 mg total) by mouth once daily. for 4 days 4 tablet 0    hydrOXYzine pamoate (VISTARIL) 50 MG Cap Take 1 capsule (50 mg total) by mouth every 8 (eight) hours as needed (as needed for anxiety). 30 capsule 1    linaCLOtide (LINZESS) 72 mcg Cap capsule Take 1 capsule (72 mcg total) by mouth before breakfast. 30 each 1    metoprolol succinate (TOPROL-XL) 50 MG 24 hr tablet TAKE 1 TABLET BY MOUTH ONCE DAILY 30 tablet 0    QUEtiapine (SEROQUEL) 100 MG Tab Take 2 tablets (200 mg total) by mouth every evening. 60 tablet 3    sodium bicarbonate 650 MG tablet Take 650 mg by mouth once daily.      thiamine (VITAMIN B-1) 100 MG tablet Take 100 mg by mouth once daily.      valsartan (DIOVAN) 160 MG tablet Take 1 tablet (160 mg total) by mouth 2 (two) times daily. 180 tablet 3       Current Inpatient Medications:    Current Facility-Administered Medications:     0.9%  NaCl infusion (for blood administration), , Intravenous, Q24H PRN, Leigh Calderon PA    0.9%  NaCl infusion, , Intravenous, PRN, Reyes, Thairy G, DO, Last Rate: 10 mL/hr at 12/14/23 2028, New Bag at 12/14/23 2028    acetaminophen tablet 650 mg, 650 mg, Oral, Q6H PRN, Mary Cleaning MD, 650 mg at 12/14/23 1733    acetylcysteine 200 mg/ml (20%) solution 4 mL, 4 mL, Nebulization, Daily, Reyes, Thairy G, DO, 4 mL at 12/15/23 0751    amiodarone tablet 200 mg, 200 mg, Oral, Daily, Reyes, Thairy G, DO, 200 mg at 12/14/23 0847    apixaban tablet 5 mg, 5 mg, Oral, BID, Reyes, Thairy G, DO, 5 mg at 12/14/23 1854    aspirin EC tablet  81 mg, 81 mg, Oral, Daily, Reyes, Thairy G, DO, 81 mg at 12/14/23 0847    calcium carbonate 200 mg calcium (500 mg) chewable tablet 500 mg, 500 mg, Oral, BID PRN, Mary Cleaning MD    cloNIDine tablet 0.2 mg, 0.2 mg, Oral, Q4H PRN, Reyes, Thairy G, DO    folic acid tablet 1 mg, 1 mg, Oral, Daily, Reyes, Thairy G, DO, 1 mg at 12/14/23 0845    furosemide injection 40 mg, 40 mg, Intravenous, Once, Leigh Calderon PA    furosemide tablet 40 mg, 40 mg, Oral, Daily, Leigh Calderon PA, 40 mg at 12/14/23 0846    guaiFENesin 100 mg/5 ml syrup 400 mg, 400 mg, Oral, Q6H WAKE, Reyes, Thairy G, DO, 400 mg at 12/14/23 2025    hydrALAZINE injection 10 mg, 10 mg, Intravenous, Q6H PRN, Reyes, Thairy G, DO    Lactobacillus acidophilus capsule 1 capsule, 1 capsule, Oral, TID WM, Reyes, Thairy G, DO, 1 capsule at 12/14/23 1732    levalbuterol nebulizer solution 1.25 mg, 1.25 mg, Nebulization, Q8H, Reyes, Thairy G, DO, 1.25 mg at 12/15/23 0751    linaCLOtide capsule 72 mcg, 72 mcg, Oral, Before breakfast, Reyes, Thairy G, DO, 72 mcg at 12/15/23 0514    loperamide capsule 2 mg, 2 mg, Oral, QID PRN, Reyes, Thairy G, DO    metoprolol succinate (TOPROL-XL) 24 hr tablet 100 mg, 100 mg, Oral, Daily, Reyes, Thairy G, DO, 100 mg at 12/14/23 0847    miconazole NITRATE 2 % top powder, , Topical (Top), BID, Reyes, Thairy G, DO, Given at 12/14/23 2028    sacubitriL-valsartan 24-26 mg per tablet 1 tablet, 1 tablet, Oral, BID, Reyes, Thairy G, DO, 1 tablet at 12/14/23 1854    senna-docusate 8.6-50 mg per tablet 1 tablet, 1 tablet, Oral, Daily PRN, Reyes, Thairy G, DO    Flushing PICC/Midline Protocol, , , Until Discontinued **AND** sodium chloride 0.9% flush 10 mL, 10 mL, Intravenous, Q6H, 10 mL at 12/15/23 0600 **AND** [DISCONTINUED] sodium chloride 0.9% flush 10 mL, 10 mL, Intravenous, PRN, Reyes, Thairy G, DO    traZODone tablet 50 mg, 50 mg, Oral, QHS, Mary Cleaning MD, 50 mg at 12/14/23 2025    vancomycin (VANCOCIN) 500 mg in dextrose  5 % in water (D5W) 100 mL IVPB (MB+), 500 mg, Intravenous, Q12H, ReyesElio nielsenry G, DO, Stopped at 12/14/23 2207    Pharmacy to dose Vancomycin consult, , , Once **AND** vancomycin - pharmacy to dose, , Intravenous, pharmacy to manage frequency, ReyesAlejandra G, DO    white petrolatum 41 % ointment, , Topical (Top), BID, Reyes, Thairy G, DO, Given at 12/14/23 2028         VTE Risk Mitigation (From admission, onward)           Ordered     IP VTE HIGH RISK PATIENT  Once         12/07/23 0743     Place sequential compression device  Until discontinued         12/07/23 0743     apixaban tablet 5 mg  2 times daily         12/05/23 1034                    Assessment:   PAF/Flutter  - Currently SR     - s/p CARO/DCCV (11.30.23): Successful DCCV x1    - CHADsVASc - 3 Points - 3.2% Stroke Risk per Year     - s/p (4.3.23) - Ligation of SILVANO with Endostapler  Anemia  --H/H 6.6/21.9  NSTEMI type II secondary to demand ischemia due to Sepsis, PNA, Flu, PAF  Bacteremia--blood cultures 11.24.23 + MRSA  Bilateral Community Acquired PNA  Recent Influenza   MVCAD    - s/p CABG (4.3.23) - LIMA to LAD, rSVG to OM1, rSVG to PDA  Chronic Diastolic Heart Failure  VHD    - severe AS    - Moderate MS     - CTS suggested TAVR eval  HTN/HHD  Chronic Thrombocytopenia   --plts stable  History of ETOH Abuse  No History of GI Bleed      Plan:   OK to hold Eliquis at this time given H/H of 6.6/21.9 and patient is now in SR.  Resume Eliquis once H/H recovers with Hgb >8-9.  Consider uptitration of Entresto as BP allows  Will follow from afar, please contact [facility] if further assistance is needed in patient's cardiac care.      Thank you for your consult.     LOREN Santiago  Cardiology  [facility]  12/15/2023 8:29 AM

## 2023-12-16 LAB
ALBUMIN SERPL-MCNC: 2.5 G/DL (ref 3.4–4.8)
ALBUMIN/GLOB SERPL: 0.8 RATIO (ref 1.1–2)
ALP SERPL-CCNC: 73 UNIT/L (ref 40–150)
ALT SERPL-CCNC: 10 UNIT/L (ref 0–55)
AST SERPL-CCNC: 13 UNIT/L (ref 5–34)
BASOPHILS # BLD AUTO: 0.02 X10(3)/MCL
BASOPHILS NFR BLD AUTO: 0.4 %
BILIRUB SERPL-MCNC: 1.2 MG/DL
BUN SERPL-MCNC: 20.1 MG/DL (ref 8.4–25.7)
CALCIUM SERPL-MCNC: 8.5 MG/DL (ref 8.8–10)
CHLORIDE SERPL-SCNC: 100 MMOL/L (ref 98–107)
CO2 SERPL-SCNC: 30 MMOL/L (ref 23–31)
CREAT SERPL-MCNC: 1.1 MG/DL (ref 0.73–1.18)
EOSINOPHIL # BLD AUTO: 0.15 X10(3)/MCL (ref 0–0.9)
EOSINOPHIL NFR BLD AUTO: 2.9 %
ERYTHROCYTE [DISTWIDTH] IN BLOOD BY AUTOMATED COUNT: 17.4 % (ref 11.5–17)
GFR SERPLBLD CREATININE-BSD FMLA CKD-EPI: >60 MLS/MIN/1.73/M2
GLOBULIN SER-MCNC: 3.1 GM/DL (ref 2.4–3.5)
GLUCOSE SERPL-MCNC: 93 MG/DL (ref 82–115)
HCT VFR BLD AUTO: 30.4 % (ref 42–52)
HGB BLD-MCNC: 9.3 G/DL (ref 14–18)
IMM GRANULOCYTES # BLD AUTO: 0.04 X10(3)/MCL (ref 0–0.04)
IMM GRANULOCYTES NFR BLD AUTO: 0.8 %
LYMPHOCYTES # BLD AUTO: 1.26 X10(3)/MCL (ref 0.6–4.6)
LYMPHOCYTES NFR BLD AUTO: 24 %
MCH RBC QN AUTO: 27.6 PG (ref 27–31)
MCHC RBC AUTO-ENTMCNC: 30.6 G/DL (ref 33–36)
MCV RBC AUTO: 90.2 FL (ref 80–94)
MONOCYTES # BLD AUTO: 0.54 X10(3)/MCL (ref 0.1–1.3)
MONOCYTES NFR BLD AUTO: 10.3 %
NEUTROPHILS # BLD AUTO: 3.24 X10(3)/MCL (ref 2.1–9.2)
NEUTROPHILS NFR BLD AUTO: 61.6 %
PLATELET # BLD AUTO: 108 X10(3)/MCL (ref 130–400)
PMV BLD AUTO: 11.8 FL (ref 7.4–10.4)
POTASSIUM SERPL-SCNC: 3.6 MMOL/L (ref 3.5–5.1)
PROT SERPL-MCNC: 5.6 GM/DL (ref 5.8–7.6)
RBC # BLD AUTO: 3.37 X10(6)/MCL (ref 4.7–6.1)
SODIUM SERPL-SCNC: 140 MMOL/L (ref 136–145)
WBC # SPEC AUTO: 5.25 X10(3)/MCL (ref 4.5–11.5)

## 2023-12-16 PROCEDURE — 25000003 PHARM REV CODE 250: Performed by: STUDENT IN AN ORGANIZED HEALTH CARE EDUCATION/TRAINING PROGRAM

## 2023-12-16 PROCEDURE — 94640 AIRWAY INHALATION TREATMENT: CPT

## 2023-12-16 PROCEDURE — A4216 STERILE WATER/SALINE, 10 ML: HCPCS | Performed by: STUDENT IN AN ORGANIZED HEALTH CARE EDUCATION/TRAINING PROGRAM

## 2023-12-16 PROCEDURE — 25000003 PHARM REV CODE 250: Performed by: PHYSICIAN ASSISTANT

## 2023-12-16 PROCEDURE — 85025 COMPLETE CBC W/AUTO DIFF WBC: CPT | Performed by: PHYSICIAN ASSISTANT

## 2023-12-16 PROCEDURE — 25000003 PHARM REV CODE 250: Performed by: INTERNAL MEDICINE

## 2023-12-16 PROCEDURE — 25000242 PHARM REV CODE 250 ALT 637 W/ HCPCS: Performed by: STUDENT IN AN ORGANIZED HEALTH CARE EDUCATION/TRAINING PROGRAM

## 2023-12-16 PROCEDURE — 63600175 PHARM REV CODE 636 W HCPCS: Performed by: STUDENT IN AN ORGANIZED HEALTH CARE EDUCATION/TRAINING PROGRAM

## 2023-12-16 PROCEDURE — 11000004 HC SNF PRIVATE

## 2023-12-16 PROCEDURE — 80053 COMPREHEN METABOLIC PANEL: CPT | Performed by: PHYSICIAN ASSISTANT

## 2023-12-16 PROCEDURE — 94760 N-INVAS EAR/PLS OXIMETRY 1: CPT

## 2023-12-16 PROCEDURE — 97116 GAIT TRAINING THERAPY: CPT

## 2023-12-16 RX ORDER — NAPROXEN SODIUM 220 MG/1
81 TABLET, FILM COATED ORAL DAILY
Status: DISCONTINUED | OUTPATIENT
Start: 2023-12-16 | End: 2023-12-26 | Stop reason: HOSPADM

## 2023-12-16 RX ADMIN — ACETYLCYSTEINE 4 ML: 200 INHALANT RESPIRATORY (INHALATION) at 07:12

## 2023-12-16 RX ADMIN — WHITE PETROLATUM: 1.75 OINTMENT TOPICAL at 08:12

## 2023-12-16 RX ADMIN — MICONAZOLE NITRATE 2 % TOPICAL POWDER: at 08:12

## 2023-12-16 RX ADMIN — LINACLOTIDE 72 MCG: 72 CAPSULE, GELATIN COATED ORAL at 05:12

## 2023-12-16 RX ADMIN — FUROSEMIDE 40 MG: 40 TABLET ORAL at 08:12

## 2023-12-16 RX ADMIN — SODIUM CHLORIDE: 9 INJECTION, SOLUTION INTRAVENOUS at 09:12

## 2023-12-16 RX ADMIN — Medication 1 CAPSULE: at 11:12

## 2023-12-16 RX ADMIN — QUETIAPINE FUMARATE 200 MG: 100 TABLET ORAL at 08:12

## 2023-12-16 RX ADMIN — METOPROLOL SUCCINATE 100 MG: 50 TABLET, EXTENDED RELEASE ORAL at 08:12

## 2023-12-16 RX ADMIN — VANCOMYCIN HYDROCHLORIDE 500 MG: 500 INJECTION, POWDER, LYOPHILIZED, FOR SOLUTION INTRAVENOUS at 10:12

## 2023-12-16 RX ADMIN — GUAIFENESIN 400 MG: 200 SOLUTION ORAL at 08:12

## 2023-12-16 RX ADMIN — SODIUM CHLORIDE 35 ML/HR: 9 INJECTION, SOLUTION INTRAVENOUS at 10:12

## 2023-12-16 RX ADMIN — SODIUM CHLORIDE, PRESERVATIVE FREE 10 ML: 5 INJECTION INTRAVENOUS at 05:12

## 2023-12-16 RX ADMIN — SACUBITRIL AND VALSARTAN 1 TABLET: 24; 26 TABLET, FILM COATED ORAL at 08:12

## 2023-12-16 RX ADMIN — SODIUM CHLORIDE, PRESERVATIVE FREE 10 ML: 5 INJECTION INTRAVENOUS at 06:12

## 2023-12-16 RX ADMIN — Medication 1 CAPSULE: at 08:12

## 2023-12-16 RX ADMIN — GUAIFENESIN 400 MG: 200 SOLUTION ORAL at 01:12

## 2023-12-16 RX ADMIN — LEVALBUTEROL HYDROCHLORIDE 1.25 MG: 1.25 SOLUTION RESPIRATORY (INHALATION) at 03:12

## 2023-12-16 RX ADMIN — Medication 1 CAPSULE: at 04:12

## 2023-12-16 RX ADMIN — ACETAMINOPHEN 650 MG: 325 TABLET ORAL at 09:12

## 2023-12-16 RX ADMIN — LEVALBUTEROL HYDROCHLORIDE 1.25 MG: 1.25 SOLUTION RESPIRATORY (INHALATION) at 11:12

## 2023-12-16 RX ADMIN — FOLIC ACID 1 MG: 1 TABLET ORAL at 08:12

## 2023-12-16 RX ADMIN — LEVALBUTEROL HYDROCHLORIDE 1.25 MG: 1.25 SOLUTION RESPIRATORY (INHALATION) at 07:12

## 2023-12-16 RX ADMIN — ASPIRIN 81 MG CHEWABLE TABLET 81 MG: 81 TABLET CHEWABLE at 09:12

## 2023-12-16 RX ADMIN — SACUBITRIL AND VALSARTAN 1 TABLET: 24; 26 TABLET, FILM COATED ORAL at 06:12

## 2023-12-16 RX ADMIN — FERROUS SULFATE TAB 325 MG (65 MG ELEMENTAL FE) 1 EACH: 325 (65 FE) TAB at 08:12

## 2023-12-16 RX ADMIN — VANCOMYCIN HYDROCHLORIDE 500 MG: 500 INJECTION, POWDER, LYOPHILIZED, FOR SOLUTION INTRAVENOUS at 09:12

## 2023-12-16 RX ADMIN — SODIUM CHLORIDE, PRESERVATIVE FREE 10 ML: 5 INJECTION INTRAVENOUS at 12:12

## 2023-12-16 RX ADMIN — AMIODARONE HYDROCHLORIDE 200 MG: 200 TABLET ORAL at 08:12

## 2023-12-16 NOTE — PLAN OF CARE
Problem: Adult Inpatient Plan of Care  Goal: Plan of Care Review  Outcome: Ongoing, Progressing  Goal: Patient-Specific Goal (Individualized)  Outcome: Ongoing, Progressing  Flowsheets (Taken 12/16/2023 0912)  Anxieties, Fears or Concerns: none voiced  Individualized Care Needs: iv abx  Goal: Absence of Hospital-Acquired Illness or Injury  Outcome: Ongoing, Progressing  Intervention: Prevent Skin Injury  Flowsheets (Taken 12/16/2023 0800)  Body Position:   position changed independently   position maintained  Skin Protection:   incontinence pads utilized   protective footwear used   adhesive use limited     Problem: Fall Injury Risk  Goal: Absence of Fall and Fall-Related Injury  Outcome: Ongoing, Progressing  Intervention: Identify and Manage Contributors  Flowsheets (Taken 12/16/2023 0800)  Self-Care Promotion:   independence encouraged   safe use of adaptive equipment encouraged   BADL personal objects within reach   BADL personal routines maintained   meal set-up provided

## 2023-12-16 NOTE — PROGRESS NOTES
[facility] MEDICINE ~ PROGRESS NOTE    CHIEF COMPLAINT   Hospital follow up    HOSPITAL COURSE   67yo male with a past medical history of CAD, diastolic CHF, Afib, aortic stenosis, NSTEMI, and HTN who was recently admitted to [facility] for pneumonia and Afib RVR. Patient was diagnosed with Influenza A on 11/19/2023 prior to admission and started on Tamiflu. CTA chest 11/24/2023 showed no PE, but multifocal pneumonia most evident in RUL. Patient was admitted for ICU, started on Cardizem drip, Vanc, Zosyn, Doxy, and required Levophed for hypotension. Echo 11/25/2023 showed LVEF 55% moderate aortic stenosis, severe posterior mitral annular calcification present. Blood cultures x2 resulted positive for MRSA on 11/27/2023.  Infectious Disease recommended patient to continue vancomycin for 4 weeks with suspected in date on 12/25/2023. Stool culture on 11/27/2023 showed many yeast. Repeat blood cultures x2 on 12/02/2023 showed no growth. Patient underwent a CARO guided DCCV on 11/30/2023 which showed mild-moderately enlarged right atrium, no ASD or PFO, LVEF 55%, mild-moderate left ventricular hypertrophy, severe aortic stenosis, moderate mitral stenosis, mild-moderate mitral regurgitation, mild-moderate tricuspid regurgitation, no vegetation present, no intracardiac thrombus, successful cardioversion. CTA chest, abdomen, pelvis 12/05/2023 showed small bilateral pleural effusion R>L and patchy consolidation in both lungs increased compared to prior. CTA was obtained for TAVR workup to be completed outpatient after ID clearance. On exam today, patient is currently on 2 L nasal cannula with O2 sat 99%.  With worsening pneumonia and lung sounds on exam, we will start Zosyn, obtain sputum culture, add nystatin for fungal coverage, and schedule Mucomyst with the Xopenex nebulizers 3 times a day.     Today  H/H stable today, Will check in am. If H/H stable will restart Eliquis  monday    OBJECTIVE/PHYSICAL EXAM     VITAL SIGNS (MOST RECENT):  Temp: 97.8 °F (36.6 °C) (12/16/23 0806)  Pulse: 79 (12/16/23 0806)  Resp: 18 (12/16/23 0730)  BP: 112/68 (12/16/23 0806)  SpO2: 96 % (12/16/23 0806) VITAL SIGNS (24 HOUR RANGE):  Temp:  [97.5 °F (36.4 °C)-98.6 °F (37 °C)] 97.8 °F (36.6 °C)  Pulse:  [70-86] 79  Resp:  [18-20] 18  SpO2:  [90 %-97 %] 96 %  BP: (112-165)/(63-81) 112/68     GENERAL: In no acute distress, afebrile  HEENT: Atraumatic, normocephalic, moist mucous membranes, supple neck   CHEST: Improved lung sounds throughout  HEART: S1, S2, grade IV murmur  ABDOMEN: Soft, nontender, BS +  MSK: Warm, no lower extremity edema, no clubbing or cyanosis  NEUROLOGIC: Alert and oriented x4, moving all extremities with good strength   INTEGUMENTARY: No obvious skin rash   PSYCHIATRY: Appropriate mood and affect     ASSESSMENT/PLAN   MRSA bacteriemia   Multifocal Pneumonia  Recent Influenza A infection (11/19/2023)  Acute hypoxic respiratory failure  CTA chest 11/24/2023 showed no PE, but multifocal pneumonia most evident in RUL  Blood cultures x2 resulted positive for MRSA on 11/27/2023, repeats were negative  ID recommended patient to continue vancomycin for 4 weeks with suspected in date on 12/25/2023, Cefepime (12/07/2023-12/12/2023)  D/c Zosyn (12/06/2023-12/07/203) and Nystatin (12/06/2023-12/07/2023)  IS, cough assist, acapella, Xopenex, Mucomyst   CXR 12/14/2023 shows no changes, Lasix 40mg IVP x1 given for cough with pink, frothy sputum     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 Will restart ASA.      Yeast + Stool  D/c Nystatin (12/06/2023-12/07/2023) low concern for  disseminated candidemia   Continue Probiotic    Hypokalemia   Replete as condition requires     Transamnitis   Grayzone hepatitis C Ab  HCV RNA Detect/Quant, S Undetected      CAD  Diastolic CHF  Afib  Aortic stenosis  Essential HTN  CARO guided DCCV on 11/30/2023 which showed mild-moderately enlarged right atrium, no ASD or PFO, LVEF 55%, mild-moderate left ventricular hypertrophy, severe aortic stenosis, moderate mitral stenosis, mild-moderate mitral regurgitation, mild-moderate tricuspid regurgitation, no vegetation present, no intracardiac thrombus, successful cardioversion  TAVR to be completed outpatient after ID clearance  Continue Entresto, Toprol XL, Amiodarone  Continue PO Lasix 40mg  Hold Eliquis and Aspirin as above     DVT prophylaxis: Eliquis (on HOLD til 12/15-12/17, may resume Monday)  Anticipated discharge and disposition: Continue PT/OT and IV antibiotics   __________________________________________________________________________    LABS/MICRO/MEDS/DIAGNOSTICS     LABS  Recent Labs     12/16/23  0422      K 3.6   CHLORIDE 100   CO2 30   BUN 20.1   CREATININE 1.10   GLUCOSE 93   CALCIUM 8.5*   ALKPHOS 73   AST 13   ALT 10   ALBUMIN 2.5*     Recent Labs     12/16/23  0422   WBC 5.25   RBC 3.37*   HCT 30.4*   MCV 90.2   *     MICROBIOLOGY  Microbiology Results (last 7 days)       ** No results found for the last 168 hours. **             MEDICATIONS   acetylcysteine 200 mg/ml (20%)  4 mL Nebulization Daily    amiodarone  200 mg Oral Daily    [START ON 12/18/2023] apixaban  5 mg Oral BID    ferrous sulfate  1 tablet Oral Daily    folic acid  1 mg Oral Daily    furosemide  40 mg Oral Daily    guaiFENesin 100 mg/5 ml  400 mg Oral Q6H WAKE    Lactobacillus acidophilus  1 capsule Oral TID WM    levalbuterol  1.25 mg Nebulization Q8H    linaCLOtide  72 mcg Oral Before breakfast    metoprolol succinate  100 mg Oral Daily    miconazole NITRATE 2 %   Topical (Top) BID    QUEtiapine  200 mg Oral  QHS    sacubitriL-valsartan  1 tablet Oral BID    sodium chloride 0.9%  10 mL Intravenous Q6H    vancomycin (VANCOCIN) IV (PEDS and ADULTS)  500 mg Intravenous Q12H    white petrolatum   Topical (Top) BID         INFUSIONS       DIAGNOSTIC TESTS  X-Ray Chest 1 View   Final Result      No significant change         Electronically signed by: Juan Wharton   Date:    12/14/2023   Time:    07:51      X-Ray Chest 1 View   Final Result      Persistent consolidative changes in the right perihilar region and right upper lobe.      Slightly more confluent opacities in the left upper lobe.      No other interval change         Electronically signed by: Juan Wharton   Date:    12/12/2023   Time:    10:04      X-Ray Chest 1 View for Line/Tube Placement   Final Result      Consolidative changes in the right perihilar region right mid and right upper lung zone similar to previous exam.      Interval insertion of right-sided PICC line         Electronically signed by: Juan Wharton   Date:    12/06/2023   Time:    12:14        EF   Date Value Ref Range Status   11/25/2023 55 % Final   12/29/2022 55 % Final        NUTRITION STATUS  Patient meets ASPEN criteria for   malnutrition of   per RD assessment as evidenced by:                       A minimum of two characteristics is recommended for diagnosis of either severe or non-severe malnutrition.     Case related differential diagnoses have been reviewed; assessment and plan has been documented. I have personally reviewed the labs and test results that are currently available; I have reviewed the patients medication list. I have reviewed the consulting providers recommendations. I have reviewed or attempted to review medical records based upon their availability.  All of the patient's and/or family's questions have been addressed and answered to the best of my ability.  I will continue to monitor closely and make adjustments to medical management as needed.  This  document was created using M*Modal Fluency Direct.  Transcription errors may have been made.  Please contact me if any questions may rise regarding documentation to clarify transcription.      Mary Cleaning MD   Internal Medicine  Department of Hospital Medicine [facility] - [facility] Surgical [facility]

## 2023-12-16 NOTE — PLAN OF CARE
Problem: Adult Inpatient Plan of Care  Goal: Plan of Care Review  Outcome: Ongoing, Progressing  Flowsheets (Taken 12/15/2023 2000)  Plan of Care Reviewed With: patient  Goal: Patient-Specific Goal (Individualized)  Outcome: Ongoing, Progressing  Flowsheets (Taken 12/15/2023 2000)  Anxieties, Fears or Concerns: none at this time  Individualized Care Needs: IV abx, monitor labs, maintain safety  Goal: Absence of Hospital-Acquired Illness or Injury  Outcome: Ongoing, Progressing  Intervention: Identify and Manage Fall Risk  Flowsheets (Taken 12/15/2023 2000)  Safety Promotion/Fall Prevention:   assistive device/personal item within reach   bed alarm set   Fall Risk reviewed with patient/family   high risk medications identified   medications reviewed   room near unit station   side rails raised x 3   instructed to call staff for mobility   nonskid shoes/socks when out of bed  Intervention: Prevent Skin Injury  Flowsheets (Taken 12/15/2023 2000)  Skin Protection:   adhesive use limited   incontinence pads utilized   tubing/devices free from skin contact  Intervention: Prevent and Manage VTE (Venous Thromboembolism) Risk  Flowsheets (Taken 12/15/2023 2000)  Activity Management: Rolling - L1  Range of Motion: active ROM (range of motion) encouraged  Intervention: Prevent Infection  Flowsheets (Taken 12/15/2023 2000)  Infection Prevention:   cohorting utilized   environmental surveillance performed   hand hygiene promoted   single patient room provided   rest/sleep promoted  Goal: Optimal Comfort and Wellbeing  Outcome: Ongoing, Progressing  Intervention: Monitor Pain and Promote Comfort  Flowsheets (Taken 12/15/2023 2000)  Pain Management Interventions:   quiet environment facilitated   relaxation techniques promoted  Intervention: Provide Person-Centered Care  Flowsheets (Taken 12/15/2023 2000)  Trust Relationship/Rapport:   care explained   empathic listening provided   questions answered   questions encouraged    thoughts/feelings acknowledged  Goal: Readiness for Transition of Care  Outcome: Ongoing, Progressing     Problem: Fluid Imbalance (Pneumonia)  Goal: Fluid Balance  Outcome: Ongoing, Progressing  Intervention: Monitor and Manage Fluid Balance  Flowsheets (Taken 12/15/2023 2000)  Fluid/Electrolyte Management: fluids provided     Problem: Infection (Pneumonia)  Goal: Resolution of Infection Signs and Symptoms  Outcome: Ongoing, Progressing  Intervention: Prevent Infection Progression  Flowsheets (Taken 12/15/2023 2000)  Fever Reduction/Comfort Measures:   lightweight bedding   lightweight clothing  Infection Management: aseptic technique maintained  Isolation Precautions: protective     Problem: Respiratory Compromise (Pneumonia)  Goal: Effective Oxygenation and Ventilation  Outcome: Ongoing, Progressing  Intervention: Promote Airway Secretion Clearance  Flowsheets (Taken 12/15/2023 2000)  Breathing Techniques/Airway Clearance: deep/controlled cough encouraged  Cough And Deep Breathing: done with encouragement  Intervention: Optimize Oxygenation and Ventilation  Flowsheets (Taken 12/15/2023 2000)  Airway/Ventilation Management: airway patency maintained     Problem: Infection  Goal: Absence of Infection Signs and Symptoms  Outcome: Ongoing, Progressing  Intervention: Prevent or Manage Infection  Flowsheets (Taken 12/15/2023 2000)  Fever Reduction/Comfort Measures:   lightweight bedding   lightweight clothing  Infection Management: aseptic technique maintained  Isolation Precautions: protective     Problem: Fall Injury Risk  Goal: Absence of Fall and Fall-Related Injury  Outcome: Ongoing, Progressing  Intervention: Identify and Manage Contributors  Flowsheets (Taken 12/15/2023 2000)  Self-Care Promotion:   independence encouraged   BADL personal objects within reach   safe use of adaptive equipment encouraged  Medication Review/Management:   medications reviewed   high-risk medications identified  Intervention: Promote  Injury-Free Environment  Flowsheets (Taken 12/15/2023 2000)  Safety Promotion/Fall Prevention:   assistive device/personal item within reach   bed alarm set   Fall Risk reviewed with patient/family   high risk medications identified   medications reviewed   room near unit station   side rails raised x 3   instructed to call staff for mobility   nonskid shoes/socks when out of bed

## 2023-12-16 NOTE — PT/OT/SLP PROGRESS
Physical Therapy Treatment Note           Name: Mike Urbina Jr.    : 1955 (68 y.o.)  MRN: [id number]           TREATMENT SUMMARY AND RECOMMENDATIONS:    PT Received On: 23  PT Start Time: 852     PT Stop Time: 910  PT Total Time (min): 18 min     Subjective Assessment:   No complaints  Lethargic   x Awake, alert, cooperative  Uncooperative    Agitated  c/o pain    Appropriate  c/o fatigue    Confused  Treated at bedside     Emotionally labile  Treated in gym/dept.    Impulsive  Other:    Flat affect       Therapy Precautions:    Cognitive deficits  Spinal precautions    Collar - hard  Sternal precautions    Collar - soft   TLSO    Fall risk  LSO    Hip precautions - posterior  Knee immobilizer    Hip precautions - anterior  WBAT    Impaired communication  Partial weightbearing    Oxygen  TTWB    PEG tube  NWB    Visual deficits  Other:    Hearing deficits          Treatment Objectives:     Mobility Training:   Assist level Comments    Bed mobility MOD I Supine > sit   Transfer     Gait MOD I X1,155ft using RW without rest breaks    Sit to stand transitions MOD I From EOB level    Sitting balance     Standing balance      Wheelchair mobility     Car transfer     Other:          Therapeutic Exercise:   Exercise Sets Reps Comments                               Additional Comments:      Assessment: Patient tolerated session well; patient in a very pleasant mood. He reports he is feeling much better today vs yesterday.    PT Plan: continue POC  Revisions made to plan of care: No    GOALS:   Multidisciplinary Problems       Physical Therapy Goals          Problem: Physical Therapy    Goal Priority Disciplines Outcome Goal Variances Interventions   Physical Therapy Goal     PT, PT/OT Ongoing, Progressing     Description: Goals to be met by: Discharge     Patient will increase functional independence with mobility by performin. Sit to stand transfer with Modified [place]  2. Bed  to chair transfer with Modified [place] using No Assistive Device  3. Gait  x 1000 feet including outdoor negotiation and stair completion with Supervision using No Assistive Device.                          Skilled PT Minutes Provided: 18 minutes   Communication with Treatment Team:     Equipment recommendations:       At end of treatment, patient remained:  x Up in chair     Up in wheelchair in room    In bed   x With alarm activated    Bed rails up   x Call bell in reach     Family/friends present    Restraints secured properly    In bathroom with CNA/RN notified    Nurse aware    In gym with therapist/tech    Other:

## 2023-12-17 LAB
ALBUMIN SERPL-MCNC: 2.3 G/DL (ref 3.4–4.8)
ALBUMIN/GLOB SERPL: 0.7 RATIO (ref 1.1–2)
ALP SERPL-CCNC: 67 UNIT/L (ref 40–150)
ALT SERPL-CCNC: 9 UNIT/L (ref 0–55)
AST SERPL-CCNC: 13 UNIT/L (ref 5–34)
BASOPHILS # BLD AUTO: 0.01 X10(3)/MCL
BASOPHILS NFR BLD AUTO: 0.2 %
BILIRUB SERPL-MCNC: 1 MG/DL
BUN SERPL-MCNC: 24.3 MG/DL (ref 8.4–25.7)
CALCIUM SERPL-MCNC: 8.4 MG/DL (ref 8.8–10)
CHLORIDE SERPL-SCNC: 100 MMOL/L (ref 98–107)
CO2 SERPL-SCNC: 27 MMOL/L (ref 23–31)
CREAT SERPL-MCNC: 1.08 MG/DL (ref 0.73–1.18)
EOSINOPHIL # BLD AUTO: 0.18 X10(3)/MCL (ref 0–0.9)
EOSINOPHIL NFR BLD AUTO: 3.8 %
ERYTHROCYTE [DISTWIDTH] IN BLOOD BY AUTOMATED COUNT: 17.1 % (ref 11.5–17)
GFR SERPLBLD CREATININE-BSD FMLA CKD-EPI: >60 MLS/MIN/1.73/M2
GLOBULIN SER-MCNC: 3.3 GM/DL (ref 2.4–3.5)
GLUCOSE SERPL-MCNC: 92 MG/DL (ref 82–115)
HCT VFR BLD AUTO: 30.1 % (ref 42–52)
HGB BLD-MCNC: 9.1 G/DL (ref 14–18)
IMM GRANULOCYTES # BLD AUTO: 0.03 X10(3)/MCL (ref 0–0.04)
IMM GRANULOCYTES NFR BLD AUTO: 0.6 %
LYMPHOCYTES # BLD AUTO: 1.17 X10(3)/MCL (ref 0.6–4.6)
LYMPHOCYTES NFR BLD AUTO: 24.6 %
MAGNESIUM SERPL-MCNC: 1.9 MG/DL (ref 1.6–2.6)
MCH RBC QN AUTO: 27.2 PG (ref 27–31)
MCHC RBC AUTO-ENTMCNC: 30.2 G/DL (ref 33–36)
MCV RBC AUTO: 89.9 FL (ref 80–94)
MONOCYTES # BLD AUTO: 0.47 X10(3)/MCL (ref 0.1–1.3)
MONOCYTES NFR BLD AUTO: 9.9 %
NEUTROPHILS # BLD AUTO: 2.89 X10(3)/MCL (ref 2.1–9.2)
NEUTROPHILS NFR BLD AUTO: 60.9 %
PHOSPHATE SERPL-MCNC: 3.4 MG/DL (ref 2.3–4.7)
PLATELET # BLD AUTO: 108 X10(3)/MCL (ref 130–400)
PMV BLD AUTO: 10.5 FL (ref 7.4–10.4)
POCT GLUCOSE: 99 MG/DL (ref 70–110)
POTASSIUM SERPL-SCNC: 3.3 MMOL/L (ref 3.5–5.1)
PROT SERPL-MCNC: 5.6 GM/DL (ref 5.8–7.6)
RBC # BLD AUTO: 3.35 X10(6)/MCL (ref 4.7–6.1)
SODIUM SERPL-SCNC: 138 MMOL/L (ref 136–145)
VANCOMYCIN TROUGH SERPL-MCNC: 16 UG/ML (ref 15–20)
WBC # SPEC AUTO: 4.75 X10(3)/MCL (ref 4.5–11.5)

## 2023-12-17 PROCEDURE — 94640 AIRWAY INHALATION TREATMENT: CPT

## 2023-12-17 PROCEDURE — 25000242 PHARM REV CODE 250 ALT 637 W/ HCPCS: Performed by: STUDENT IN AN ORGANIZED HEALTH CARE EDUCATION/TRAINING PROGRAM

## 2023-12-17 PROCEDURE — 25000003 PHARM REV CODE 250: Performed by: STUDENT IN AN ORGANIZED HEALTH CARE EDUCATION/TRAINING PROGRAM

## 2023-12-17 PROCEDURE — 25000003 PHARM REV CODE 250: Performed by: PHYSICIAN ASSISTANT

## 2023-12-17 PROCEDURE — 94664 DEMO&/EVAL PT USE INHALER: CPT

## 2023-12-17 PROCEDURE — 80053 COMPREHEN METABOLIC PANEL: CPT | Performed by: INTERNAL MEDICINE

## 2023-12-17 PROCEDURE — 84100 ASSAY OF PHOSPHORUS: CPT | Performed by: INTERNAL MEDICINE

## 2023-12-17 PROCEDURE — 63600175 PHARM REV CODE 636 W HCPCS: Performed by: STUDENT IN AN ORGANIZED HEALTH CARE EDUCATION/TRAINING PROGRAM

## 2023-12-17 PROCEDURE — 85025 COMPLETE CBC W/AUTO DIFF WBC: CPT | Performed by: INTERNAL MEDICINE

## 2023-12-17 PROCEDURE — 80202 ASSAY OF VANCOMYCIN: CPT | Performed by: INTERNAL MEDICINE

## 2023-12-17 PROCEDURE — A4216 STERILE WATER/SALINE, 10 ML: HCPCS | Performed by: STUDENT IN AN ORGANIZED HEALTH CARE EDUCATION/TRAINING PROGRAM

## 2023-12-17 PROCEDURE — 99900035 HC TECH TIME PER 15 MIN (STAT)

## 2023-12-17 PROCEDURE — 83735 ASSAY OF MAGNESIUM: CPT | Performed by: INTERNAL MEDICINE

## 2023-12-17 PROCEDURE — 94760 N-INVAS EAR/PLS OXIMETRY 1: CPT

## 2023-12-17 PROCEDURE — 25000003 PHARM REV CODE 250: Performed by: INTERNAL MEDICINE

## 2023-12-17 PROCEDURE — 11000004 HC SNF PRIVATE

## 2023-12-17 RX ORDER — POTASSIUM CHLORIDE 20 MEQ/1
40 TABLET, EXTENDED RELEASE ORAL ONCE
Status: COMPLETED | OUTPATIENT
Start: 2023-12-17 | End: 2023-12-17

## 2023-12-17 RX ADMIN — SODIUM CHLORIDE, PRESERVATIVE FREE 10 ML: 5 INJECTION INTRAVENOUS at 06:12

## 2023-12-17 RX ADMIN — AMIODARONE HYDROCHLORIDE 200 MG: 200 TABLET ORAL at 08:12

## 2023-12-17 RX ADMIN — VANCOMYCIN HYDROCHLORIDE 500 MG: 500 INJECTION, POWDER, LYOPHILIZED, FOR SOLUTION INTRAVENOUS at 09:12

## 2023-12-17 RX ADMIN — MICONAZOLE NITRATE 2 % TOPICAL POWDER: at 08:12

## 2023-12-17 RX ADMIN — LINACLOTIDE 72 MCG: 72 CAPSULE, GELATIN COATED ORAL at 05:12

## 2023-12-17 RX ADMIN — METOPROLOL SUCCINATE 100 MG: 50 TABLET, EXTENDED RELEASE ORAL at 08:12

## 2023-12-17 RX ADMIN — SACUBITRIL AND VALSARTAN 1 TABLET: 24; 26 TABLET, FILM COATED ORAL at 08:12

## 2023-12-17 RX ADMIN — POTASSIUM CHLORIDE 40 MEQ: 1500 TABLET, EXTENDED RELEASE ORAL at 08:12

## 2023-12-17 RX ADMIN — QUETIAPINE FUMARATE 200 MG: 100 TABLET ORAL at 08:12

## 2023-12-17 RX ADMIN — LEVALBUTEROL HYDROCHLORIDE 1.25 MG: 1.25 SOLUTION RESPIRATORY (INHALATION) at 11:12

## 2023-12-17 RX ADMIN — ACETAMINOPHEN 650 MG: 325 TABLET ORAL at 03:12

## 2023-12-17 RX ADMIN — SODIUM CHLORIDE, PRESERVATIVE FREE 10 ML: 5 INJECTION INTRAVENOUS at 05:12

## 2023-12-17 RX ADMIN — GUAIFENESIN 400 MG: 200 SOLUTION ORAL at 08:12

## 2023-12-17 RX ADMIN — LEVALBUTEROL HYDROCHLORIDE 1.25 MG: 1.25 SOLUTION RESPIRATORY (INHALATION) at 03:12

## 2023-12-17 RX ADMIN — FERROUS SULFATE TAB 325 MG (65 MG ELEMENTAL FE) 1 EACH: 325 (65 FE) TAB at 08:12

## 2023-12-17 RX ADMIN — Medication 1 CAPSULE: at 08:12

## 2023-12-17 RX ADMIN — WHITE PETROLATUM: 1.75 OINTMENT TOPICAL at 08:12

## 2023-12-17 RX ADMIN — GUAIFENESIN 400 MG: 200 SOLUTION ORAL at 02:12

## 2023-12-17 RX ADMIN — SACUBITRIL AND VALSARTAN 1 TABLET: 24; 26 TABLET, FILM COATED ORAL at 06:12

## 2023-12-17 RX ADMIN — Medication 1 CAPSULE: at 11:12

## 2023-12-17 RX ADMIN — Medication 1 CAPSULE: at 05:12

## 2023-12-17 RX ADMIN — SODIUM CHLORIDE, PRESERVATIVE FREE 10 ML: 5 INJECTION INTRAVENOUS at 12:12

## 2023-12-17 RX ADMIN — SODIUM CHLORIDE, PRESERVATIVE FREE 10 ML: 5 INJECTION INTRAVENOUS at 11:12

## 2023-12-17 RX ADMIN — ASPIRIN 81 MG CHEWABLE TABLET 81 MG: 81 TABLET CHEWABLE at 08:12

## 2023-12-17 RX ADMIN — ACETYLCYSTEINE 4 ML: 200 INHALANT RESPIRATORY (INHALATION) at 07:12

## 2023-12-17 RX ADMIN — SODIUM CHLORIDE: 9 INJECTION, SOLUTION INTRAVENOUS at 09:12

## 2023-12-17 RX ADMIN — ACETAMINOPHEN 650 MG: 325 TABLET ORAL at 08:12

## 2023-12-17 RX ADMIN — LEVALBUTEROL HYDROCHLORIDE 1.25 MG: 1.25 SOLUTION RESPIRATORY (INHALATION) at 07:12

## 2023-12-17 RX ADMIN — FOLIC ACID 1 MG: 1 TABLET ORAL at 08:12

## 2023-12-17 RX ADMIN — FUROSEMIDE 40 MG: 40 TABLET ORAL at 08:12

## 2023-12-17 NOTE — PROGRESS NOTES
Pharmacokinetic Assessment Follow Up: IV Vancomycin    Vancomycin serum concentration assessment(s):    The trough level was drawn correctly and can be used to guide therapy at this time. The measurement is within the desired definitive target range of 15 to 20 mcg/mL.    Vancomycin Regimen Plan:    Continue regimen to Vancomycin 500 mg IV every 12h hours with next serum trough concentration measured at 0900 prior to the dose on 12/20    Drug levels (last 3 results):  Recent Labs   Lab Result Units 12/15/23  0905 12/17/23  0856   Vancomycin Trough ug/ml 18.1 16.0       Pharmacy will continue to follow and monitor vancomycin.    Please contact pharmacy at extension [phone] for questions regarding this assessment.    Thank you for the consult,   Kunal Alcazar       Patient brief summary:  Mike Urbina Jr. is a 68 y.o. male initiated on antimicrobial therapy with IV Vancomycin for treatment of bacteremia    The patient's current regimen is 500mg q12H    Drug Allergies:   Review of patient's allergies indicates:  No Known Allergies    Actual Body Weight:   76.3kg    Renal Function:   Estimated Creatinine Clearance: 68.7 mL/min (based on SCr of 1.08 mg/dL).,     Dialysis Method (if applicable):  N/A    CBC (last 72 hours):  Recent Labs   Lab Result Units 12/15/23  0436 12/15/23  1932 12/16/23  0422 12/17/23  0428   WBC x10(3)/mcL 5.64  --  5.25 4.75   Hgb g/dL 6.6* 9.5* 9.3* 9.1*   Hct % 21.9* 30.4* 30.4* 30.1*   Platelet x10(3)/mcL 125*  --  108* 108*   Mono % % 9.0  --  10.3 9.9   Eos % % 2.3  --  2.9 3.8   Basophil % % 0.4  --  0.4 0.2       Metabolic Panel (last 72 hours):  Recent Labs   Lab Result Units 12/16/23  0422 12/17/23  0428   Sodium Level mmol/L 140 138   Potassium Level mmol/L 3.6 3.3*   Chloride mmol/L 100 100   Carbon Dioxide mmol/L 30 27   Glucose Level mg/dL 93 92   Blood Urea Nitrogen mg/dL 20.1 24.3   Creatinine mg/dL 1.10 1.08   Albumin Level g/dL 2.5* 2.3*   Bilirubin Total mg/dL 1.2 1.0    Alkaline Phosphatase unit/L 73 67   Aspartate Aminotransferase unit/L 13 13   Alanine Aminotransferase unit/L 10 9   Magnesium Level mg/dL  --  1.90   Phosphorus Level mg/dL  --  3.4       Vancomycin Administrations:  vancomycin given in the last 96 hours                     vancomycin (VANCOCIN) 500 mg in dextrose 5 % in water (D5W) 100 mL IVPB (MB+) (mg) 500 mg New Bag 12/17/23 0957     500 mg New Bag 12/16/23 2240     500 mg New Bag  0937     500 mg New Bag 12/15/23 2300     500 mg New Bag  1019     500 mg New Bag 12/14/23 2107     500 mg New Bag  1010    vancomycin 750 mg in dextrose 5 % (D5W) 250 mL IVPB (Vial-Mate) (mg) 750 mg New Bag 12/13/23 2014                    Microbiologic Results:  Microbiology Results (last 7 days)       ** No results found for the last 168 hours. **

## 2023-12-17 NOTE — PLAN OF CARE
Problem: Infection  Goal: Absence of Infection Signs and Symptoms  Outcome: Ongoing, Progressing  Intervention: Prevent or Manage Infection  Flowsheets (Taken 12/16/2023 2000)  Fever Reduction/Comfort Measures:   lightweight bedding   lightweight clothing  Isolation Precautions: protective     Problem: Fall Injury Risk  Goal: Absence of Fall and Fall-Related Injury  Outcome: Ongoing, Progressing  Intervention: Identify and Manage Contributors  Flowsheets (Taken 12/16/2023 2000)  Self-Care Promotion:   independence encouraged   BADL personal objects within reach   BADL personal routines maintained  Medication Review/Management: medications reviewed  Intervention: Promote Injury-Free Environment  Flowsheets (Taken 12/16/2023 2000)  Safety Promotion/Fall Prevention:   assistive device/personal item within reach   bed alarm refused   lighting adjusted   medications reviewed   nonskid shoes/socks when out of bed   side rails raised x 3   instructed to call staff for mobility

## 2023-12-17 NOTE — PLAN OF CARE
Problem: Adult Inpatient Plan of Care  Goal: Plan of Care Review  Outcome: Ongoing, Progressing  Flowsheets (Taken 12/17/2023 1039)  Plan of Care Reviewed With: patient  Goal: Patient-Specific Goal (Individualized)  Outcome: Ongoing, Progressing  Flowsheets (Taken 12/17/2023 1039)  Anxieties, Fears or Concerns: none  Individualized Care Needs: IV ABX  Goal: Absence of Hospital-Acquired Illness or Injury  Outcome: Ongoing, Progressing  Intervention: Identify and Manage Fall Risk  Flowsheets (Taken 12/17/2023 1039)  Safety Promotion/Fall Prevention: assistive device/personal item within reach  Intervention: Prevent Skin Injury  Flowsheets (Taken 12/17/2023 1039)  Body Position: sitting up in bed  Skin Protection: incontinence pads utilized  Intervention: Prevent and Manage VTE (Venous Thromboembolism) Risk  Flowsheets (Taken 12/17/2023 1039)  Activity Management: Ambulated in room - L4  VTE Prevention/Management: ambulation promoted  Range of Motion: active ROM (range of motion) encouraged  Intervention: Prevent Infection  Flowsheets (Taken 12/17/2023 1039)  Infection Prevention:   rest/sleep promoted   single patient room provided  Goal: Optimal Comfort and Wellbeing  Outcome: Ongoing, Progressing  Intervention: Monitor Pain and Promote Comfort  Flowsheets (Taken 12/17/2023 1039)  Pain Management Interventions: quiet environment facilitated  Intervention: Provide Person-Centered Care  Flowsheets (Taken 12/17/2023 1039)  Trust Relationship/Rapport:   care explained   emotional support provided   reassurance provided   questions encouraged  Goal: Readiness for Transition of Care  Outcome: Ongoing, Progressing  Intervention: Mutually Develop Transition Plan  Flowsheets (Taken 12/17/2023 1039)  Equipment Currently Used at Home: none  Transportation Anticipated: family or friend will provide  Who are your caregiver(s) and their phone number(s)?: Sister (Ashleigh Bailey) [phone]  Communicated [place] with  patient/caregiver: Date not available/Unable to determine  Do you expect to return to your current living situation?: Yes  Do you have help at home or someone to help you manage your care at home?: Yes  Readmission within 30 days?: No  Do you currently have service(s) that help you manage your care at home?: No  Is the pt/caregiver preference to resume services with current agency: No     Problem: Fluid Imbalance (Pneumonia)  Goal: Fluid Balance  Outcome: Ongoing, Progressing  Intervention: Monitor and Manage Fluid Balance  Flowsheets (Taken 12/17/2023 1039)  Fluid/Electrolyte Management: fluids provided     Problem: Infection (Pneumonia)  Goal: Resolution of Infection Signs and Symptoms  Outcome: Ongoing, Progressing  Intervention: Prevent Infection Progression  Flowsheets (Taken 12/17/2023 1039)  Fever Reduction/Comfort Measures:   lightweight bedding   lightweight clothing  Infection Management: aseptic technique maintained  Isolation Precautions: protective     Problem: Respiratory Compromise (Pneumonia)  Goal: Effective Oxygenation and Ventilation  Outcome: Ongoing, Progressing  Intervention: Promote Airway Secretion Clearance  Flowsheets (Taken 12/17/2023 1039)  Breathing Techniques/Airway Clearance: deep/controlled cough encouraged  Cough And Deep Breathing: done with encouragement  Intervention: Optimize Oxygenation and Ventilation  Flowsheets (Taken 12/17/2023 1039)  Head of Bed (HOB) Positioning: HOB at 20-30 degrees     Problem: Infection  Goal: Absence of Infection Signs and Symptoms  Outcome: Ongoing, Progressing  Intervention: Prevent or Manage Infection  Flowsheets (Taken 12/17/2023 1039)  Fever Reduction/Comfort Measures:   lightweight bedding   lightweight clothing  Infection Management: aseptic technique maintained  Isolation Precautions: protective     Problem: Fall Injury Risk  Goal: Absence of Fall and Fall-Related Injury  Outcome: Ongoing, Progressing  Intervention: Identify and Manage  Contributors  Flowsheets (Taken 12/17/2023 1039)  Self-Care Promotion:   independence encouraged   BADL personal objects within reach   BADL personal routines maintained  Medication Review/Management: medications reviewed  Intervention: Promote Injury-Free Environment  Flowsheets (Taken 12/17/2023 1039)  Safety Promotion/Fall Prevention: assistive device/personal item within reach

## 2023-12-17 NOTE — PLAN OF CARE
[facility]  Discharge Reassessment    Primary Care Provider: Darlene Willams FNP    Expected Discharge Date:     Reassessment (most recent)       Discharge Reassessment - 12/17/23 1635          Discharge Reassessment    Assessment Type Discharge Planning Reassessment     Did the patient's condition or plan change since previous assessment? No     Discharge Plan discussed with: Sibling     Name(s) and Number(s) Ashleigh Wills sister      Communicated [place] with patient/caregiver Date not available/Unable to determine     Discharge Plan A Home with family;Home Health     DME Needed Upon Discharge  walker, standard     Transition of Care Barriers None     Why the patient remains in the hospital Requires continued medical care        Post-Acute Status    Post-Acute Authorization Home Health     Home Health Status Pending medical clearance/testing     Discharge Delays None known at this time

## 2023-12-17 NOTE — PROGRESS NOTES
[facility] MEDICINE ~ PROGRESS NOTE    CHIEF COMPLAINT   Hospital follow up    HOSPITAL COURSE   67yo male with a past medical history of CAD, diastolic CHF, Afib, aortic stenosis, NSTEMI, and HTN who was recently admitted to [facility] for pneumonia and Afib RVR. Patient was diagnosed with Influenza A on 11/19/2023 prior to admission and started on Tamiflu. CTA chest 11/24/2023 showed no PE, but multifocal pneumonia most evident in RUL. Patient was admitted for ICU, started on Cardizem drip, Vanc, Zosyn, Doxy, and required Levophed for hypotension. Echo 11/25/2023 showed LVEF 55% moderate aortic stenosis, severe posterior mitral annular calcification present. Blood cultures x2 resulted positive for MRSA on 11/27/2023.  Infectious Disease recommended patient to continue vancomycin for 4 weeks with suspected in date on 12/25/2023. Stool culture on 11/27/2023 showed many yeast. Repeat blood cultures x2 on 12/02/2023 showed no growth. Patient underwent a CARO guided DCCV on 11/30/2023 which showed mild-moderately enlarged right atrium, no ASD or PFO, LVEF 55%, mild-moderate left ventricular hypertrophy, severe aortic stenosis, moderate mitral stenosis, mild-moderate mitral regurgitation, mild-moderate tricuspid regurgitation, no vegetation present, no intracardiac thrombus, successful cardioversion. CTA chest, abdomen, pelvis 12/05/2023 showed small bilateral pleural effusion R>L and patchy consolidation in both lungs increased compared to prior. CTA was obtained for TAVR workup to be completed outpatient after ID clearance. On exam today, patient is currently on 2 L nasal cannula with O2 sat 99%.  With worsening pneumonia and lung sounds on exam, we will start Zosyn, obtain sputum culture, add nystatin for fungal coverage, and schedule Mucomyst with the Xopenex nebulizers 3 times a day.     Today  H/H stable today. Restart Eliquis Monday    OBJECTIVE/PHYSICAL EXAM     VITAL SIGNS  (MOST RECENT):  Temp: 98.8 °F (37.1 °C) (12/17/23 0700)  Pulse: 74 (12/17/23 0838)  Resp: 18 (12/17/23 0734)  BP: 119/69 (12/17/23 0838)  SpO2: 95 % (12/17/23 0734) VITAL SIGNS (24 HOUR RANGE):  Temp:  [98.5 °F (36.9 °C)-98.8 °F (37.1 °C)] 98.8 °F (37.1 °C)  Pulse:  [71-76] 74  Resp:  [18-20] 18  SpO2:  [94 %-97 %] 95 %  BP: ()/(62-69) 119/69     GENERAL: In no acute distress, afebrile  HEENT: Atraumatic, normocephalic, moist mucous membranes, supple neck   CHEST: Improved lung sounds throughout  HEART: S1, S2, grade IV murmur  ABDOMEN: Soft, nontender, BS +  MSK: Warm, no lower extremity edema, no clubbing or cyanosis  NEUROLOGIC: Alert and oriented x4, moving all extremities with good strength   INTEGUMENTARY: No obvious skin rash   PSYCHIATRY: Appropriate mood and affect     ASSESSMENT/PLAN   MRSA bacteriemia   Multifocal Pneumonia  Recent Influenza A infection (11/19/2023)  Acute hypoxic respiratory failure  CTA chest 11/24/2023 showed no PE, but multifocal pneumonia most evident in RUL  Blood cultures x2 resulted positive for MRSA on 11/27/2023, repeats were negative  ID recommended patient to continue vancomycin for 4 weeks with suspected in date on 12/25/2023, Cefepime (12/07/2023-12/12/2023)  D/c Zosyn (12/06/2023-12/07/203) and Nystatin (12/06/2023-12/07/2023)  IS, cough assist, acapella, Xopenex, Mucomyst   CXR 12/14/2023 shows no changes, Lasix 40mg IVP x1 given for cough with pink, frothy sputum     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 Will restart ASA.      Yeast + Stool  D/c Nystatin (12/06/2023-12/07/2023) low concern for disseminated candidemia   Continue  Probiotic    Hypokalemia   Replete as condition requires     Transamnitis   Grayzone hepatitis C Ab  HCV RNA Detect/Quant, S Undetected      CAD  Diastolic CHF  Afib  Aortic stenosis  Essential HTN  CARO guided DCCV on 11/30/2023 which showed mild-moderately enlarged right atrium, no ASD or PFO, LVEF 55%, mild-moderate left ventricular hypertrophy, severe aortic stenosis, moderate mitral stenosis, mild-moderate mitral regurgitation, mild-moderate tricuspid regurgitation, no vegetation present, no intracardiac thrombus, successful cardioversion  TAVR to be completed outpatient after ID clearance  Continue Entresto, Toprol XL, Amiodarone  Continue PO Lasix 40mg  Hold Eliquis and Aspirin as above     DVT prophylaxis: Eliquis (on HOLD til 12/15-12/17, may resume Monday)  Anticipated discharge and disposition: Continue PT/OT and IV antibiotics   __________________________________________________________________________    LABS/MICRO/MEDS/DIAGNOSTICS     LABS  Recent Labs     12/17/23  0428      K 3.3*   CHLORIDE 100   CO2 27   BUN 24.3   CREATININE 1.08   GLUCOSE 92   CALCIUM 8.4*   ALKPHOS 67   AST 13   ALT 9   ALBUMIN 2.3*     Recent Labs     12/17/23  0428   WBC 4.75   RBC 3.35*   HCT 30.1*   MCV 89.9   *     MICROBIOLOGY  Microbiology Results (last 7 days)       ** No results found for the last 168 hours. **             MEDICATIONS   acetylcysteine 200 mg/ml (20%)  4 mL Nebulization Daily    amiodarone  200 mg Oral Daily    [START ON 12/18/2023] apixaban  5 mg Oral BID    aspirin  81 mg Oral Daily    ferrous sulfate  1 tablet Oral Daily    folic acid  1 mg Oral Daily    furosemide  40 mg Oral Daily    guaiFENesin 100 mg/5 ml  400 mg Oral Q6H WAKE    Lactobacillus acidophilus  1 capsule Oral TID WM    levalbuterol  1.25 mg Nebulization Q8H    linaCLOtide  72 mcg Oral Before breakfast    metoprolol succinate  100 mg Oral Daily    miconazole NITRATE 2 %   Topical (Top) BID    QUEtiapine  200 mg Oral QHS     sacubitriL-valsartan  1 tablet Oral BID    sodium chloride 0.9%  10 mL Intravenous Q6H    vancomycin (VANCOCIN) IV (PEDS and ADULTS)  500 mg Intravenous Q12H    white petrolatum   Topical (Top) BID         INFUSIONS       DIAGNOSTIC TESTS  X-Ray Chest 1 View   Final Result      No significant change         Electronically signed by: Juan Wharotn   Date:    12/14/2023   Time:    07:51      X-Ray Chest 1 View   Final Result      Persistent consolidative changes in the right perihilar region and right upper lobe.      Slightly more confluent opacities in the left upper lobe.      No other interval change         Electronically signed by: Juan Wharton   Date:    12/12/2023   Time:    10:04      X-Ray Chest 1 View for Line/Tube Placement   Final Result      Consolidative changes in the right perihilar region right mid and right upper lung zone similar to previous exam.      Interval insertion of right-sided PICC line         Electronically signed by: Juan Wharton   Date:    12/06/2023   Time:    12:14        EF   Date Value Ref Range Status   11/25/2023 55 % Final   12/29/2022 55 % Final        NUTRITION STATUS  Patient meets ASPEN criteria for   malnutrition of   per RD assessment as evidenced by:                       A minimum of two characteristics is recommended for diagnosis of either severe or non-severe malnutrition.     Case related differential diagnoses have been reviewed; assessment and plan has been documented. I have personally reviewed the labs and test results that are currently available; I have reviewed the patients medication list. I have reviewed the consulting providers recommendations. I have reviewed or attempted to review medical records based upon their availability.  All of the patient's and/or family's questions have been addressed and answered to the best of my ability.  I will continue to monitor closely and make adjustments to medical management as needed.  This document was  created using M*Modal Fluency Direct.  Transcription errors may have been made.  Please contact me if any questions may rise regarding documentation to clarify transcription.      Mary Cleaning MD   Internal Medicine  Department of Hospital Medicine [facility]

## 2023-12-18 LAB
ALBUMIN SERPL-MCNC: 2.3 G/DL (ref 3.4–4.8)
ALBUMIN/GLOB SERPL: 0.8 RATIO (ref 1.1–2)
ALP SERPL-CCNC: 70 UNIT/L (ref 40–150)
ALT SERPL-CCNC: 9 UNIT/L (ref 0–55)
AST SERPL-CCNC: 11 UNIT/L (ref 5–34)
BASOPHILS # BLD AUTO: 0.02 X10(3)/MCL
BASOPHILS NFR BLD AUTO: 0.5 %
BILIRUB SERPL-MCNC: 0.9 MG/DL
BUN SERPL-MCNC: 24.3 MG/DL (ref 8.4–25.7)
CALCIUM SERPL-MCNC: 8.1 MG/DL (ref 8.8–10)
CHLORIDE SERPL-SCNC: 103 MMOL/L (ref 98–107)
CO2 SERPL-SCNC: 26 MMOL/L (ref 23–31)
CREAT SERPL-MCNC: 1.01 MG/DL (ref 0.73–1.18)
EOSINOPHIL # BLD AUTO: 0.17 X10(3)/MCL (ref 0–0.9)
EOSINOPHIL NFR BLD AUTO: 4.3 %
ERYTHROCYTE [DISTWIDTH] IN BLOOD BY AUTOMATED COUNT: 17.5 % (ref 11.5–17)
GFR SERPLBLD CREATININE-BSD FMLA CKD-EPI: >60 MLS/MIN/1.73/M2
GLOBULIN SER-MCNC: 2.9 GM/DL (ref 2.4–3.5)
GLUCOSE SERPL-MCNC: 84 MG/DL (ref 82–115)
HCT VFR BLD AUTO: 30.3 % (ref 42–52)
HGB BLD-MCNC: 9.1 G/DL (ref 14–18)
IMM GRANULOCYTES # BLD AUTO: 0.04 X10(3)/MCL (ref 0–0.04)
IMM GRANULOCYTES NFR BLD AUTO: 1 %
LYMPHOCYTES # BLD AUTO: 0.97 X10(3)/MCL (ref 0.6–4.6)
LYMPHOCYTES NFR BLD AUTO: 24.3 %
MAGNESIUM SERPL-MCNC: 1.9 MG/DL (ref 1.6–2.6)
MCH RBC QN AUTO: 27.5 PG (ref 27–31)
MCHC RBC AUTO-ENTMCNC: 30 G/DL (ref 33–36)
MCV RBC AUTO: 91.5 FL (ref 80–94)
MONOCYTES # BLD AUTO: 0.45 X10(3)/MCL (ref 0.1–1.3)
MONOCYTES NFR BLD AUTO: 11.3 %
NEUTROPHILS # BLD AUTO: 2.34 X10(3)/MCL (ref 2.1–9.2)
NEUTROPHILS NFR BLD AUTO: 58.6 %
PHOSPHATE SERPL-MCNC: 3.5 MG/DL (ref 2.3–4.7)
PLATELET # BLD AUTO: 113 X10(3)/MCL (ref 130–400)
PMV BLD AUTO: 10.9 FL (ref 7.4–10.4)
POTASSIUM SERPL-SCNC: 3.7 MMOL/L (ref 3.5–5.1)
PROT SERPL-MCNC: 5.2 GM/DL (ref 5.8–7.6)
RBC # BLD AUTO: 3.31 X10(6)/MCL (ref 4.7–6.1)
SODIUM SERPL-SCNC: 138 MMOL/L (ref 136–145)
WBC # SPEC AUTO: 3.99 X10(3)/MCL (ref 4.5–11.5)

## 2023-12-18 PROCEDURE — 25000003 PHARM REV CODE 250: Performed by: STUDENT IN AN ORGANIZED HEALTH CARE EDUCATION/TRAINING PROGRAM

## 2023-12-18 PROCEDURE — 80053 COMPREHEN METABOLIC PANEL: CPT | Performed by: INTERNAL MEDICINE

## 2023-12-18 PROCEDURE — 99900035 HC TECH TIME PER 15 MIN (STAT)

## 2023-12-18 PROCEDURE — 94799 UNLISTED PULMONARY SVC/PX: CPT | Mod: XB

## 2023-12-18 PROCEDURE — 25000003 PHARM REV CODE 250: Performed by: INTERNAL MEDICINE

## 2023-12-18 PROCEDURE — 25000242 PHARM REV CODE 250 ALT 637 W/ HCPCS: Performed by: STUDENT IN AN ORGANIZED HEALTH CARE EDUCATION/TRAINING PROGRAM

## 2023-12-18 PROCEDURE — 11000004 HC SNF PRIVATE

## 2023-12-18 PROCEDURE — 94760 N-INVAS EAR/PLS OXIMETRY 1: CPT

## 2023-12-18 PROCEDURE — 97110 THERAPEUTIC EXERCISES: CPT | Mod: CQ

## 2023-12-18 PROCEDURE — 27000173 HC ACAPELLA DEVICE DH OR DM

## 2023-12-18 PROCEDURE — 94664 DEMO&/EVAL PT USE INHALER: CPT

## 2023-12-18 PROCEDURE — 85025 COMPLETE CBC W/AUTO DIFF WBC: CPT | Performed by: INTERNAL MEDICINE

## 2023-12-18 PROCEDURE — 83735 ASSAY OF MAGNESIUM: CPT | Performed by: INTERNAL MEDICINE

## 2023-12-18 PROCEDURE — A4216 STERILE WATER/SALINE, 10 ML: HCPCS | Performed by: STUDENT IN AN ORGANIZED HEALTH CARE EDUCATION/TRAINING PROGRAM

## 2023-12-18 PROCEDURE — 25000003 PHARM REV CODE 250: Performed by: PHYSICIAN ASSISTANT

## 2023-12-18 PROCEDURE — 94761 N-INVAS EAR/PLS OXIMETRY MLT: CPT

## 2023-12-18 PROCEDURE — 97116 GAIT TRAINING THERAPY: CPT | Mod: CQ

## 2023-12-18 PROCEDURE — 63600175 PHARM REV CODE 636 W HCPCS: Performed by: STUDENT IN AN ORGANIZED HEALTH CARE EDUCATION/TRAINING PROGRAM

## 2023-12-18 PROCEDURE — 84100 ASSAY OF PHOSPHORUS: CPT | Performed by: INTERNAL MEDICINE

## 2023-12-18 PROCEDURE — 94640 AIRWAY INHALATION TREATMENT: CPT

## 2023-12-18 RX ORDER — LEVALBUTEROL INHALATION SOLUTION 1.25 MG/3ML
1.25 SOLUTION RESPIRATORY (INHALATION) EVERY 8 HOURS PRN
Status: DISCONTINUED | OUTPATIENT
Start: 2023-12-18 | End: 2023-12-26 | Stop reason: HOSPADM

## 2023-12-18 RX ADMIN — Medication 1 CAPSULE: at 12:12

## 2023-12-18 RX ADMIN — SODIUM CHLORIDE: 9 INJECTION, SOLUTION INTRAVENOUS at 10:12

## 2023-12-18 RX ADMIN — SACUBITRIL AND VALSARTAN 1 TABLET: 24; 26 TABLET, FILM COATED ORAL at 10:12

## 2023-12-18 RX ADMIN — SODIUM CHLORIDE, PRESERVATIVE FREE 10 ML: 5 INJECTION INTRAVENOUS at 12:12

## 2023-12-18 RX ADMIN — MICONAZOLE NITRATE 2 % TOPICAL POWDER: at 10:12

## 2023-12-18 RX ADMIN — ACETYLCYSTEINE 4 ML: 200 INHALANT RESPIRATORY (INHALATION) at 07:12

## 2023-12-18 RX ADMIN — FOLIC ACID 1 MG: 1 TABLET ORAL at 09:12

## 2023-12-18 RX ADMIN — WHITE PETROLATUM: 1.75 OINTMENT TOPICAL at 08:12

## 2023-12-18 RX ADMIN — Medication 1 CAPSULE: at 09:12

## 2023-12-18 RX ADMIN — FERROUS SULFATE TAB 325 MG (65 MG ELEMENTAL FE) 1 EACH: 325 (65 FE) TAB at 09:12

## 2023-12-18 RX ADMIN — LEVALBUTEROL HYDROCHLORIDE 1.25 MG: 1.25 SOLUTION RESPIRATORY (INHALATION) at 07:12

## 2023-12-18 RX ADMIN — ASPIRIN 81 MG CHEWABLE TABLET 81 MG: 81 TABLET CHEWABLE at 09:12

## 2023-12-18 RX ADMIN — GUAIFENESIN 400 MG: 200 SOLUTION ORAL at 10:12

## 2023-12-18 RX ADMIN — SODIUM CHLORIDE, PRESERVATIVE FREE 10 ML: 5 INJECTION INTRAVENOUS at 06:12

## 2023-12-18 RX ADMIN — WHITE PETROLATUM: 1.75 OINTMENT TOPICAL at 10:12

## 2023-12-18 RX ADMIN — ACETAMINOPHEN 650 MG: 325 TABLET ORAL at 05:12

## 2023-12-18 RX ADMIN — AMIODARONE HYDROCHLORIDE 200 MG: 200 TABLET ORAL at 09:12

## 2023-12-18 RX ADMIN — GUAIFENESIN 400 MG: 200 SOLUTION ORAL at 02:12

## 2023-12-18 RX ADMIN — Medication 1 CAPSULE: at 04:12

## 2023-12-18 RX ADMIN — GUAIFENESIN 400 MG: 200 SOLUTION ORAL at 09:12

## 2023-12-18 RX ADMIN — VANCOMYCIN HYDROCHLORIDE 500 MG: 500 INJECTION, POWDER, LYOPHILIZED, FOR SOLUTION INTRAVENOUS at 10:12

## 2023-12-18 RX ADMIN — QUETIAPINE FUMARATE 200 MG: 100 TABLET ORAL at 10:12

## 2023-12-18 RX ADMIN — LINACLOTIDE 72 MCG: 72 CAPSULE, GELATIN COATED ORAL at 05:12

## 2023-12-18 RX ADMIN — APIXABAN 5 MG: 5 TABLET, FILM COATED ORAL at 09:12

## 2023-12-18 RX ADMIN — APIXABAN 5 MG: 5 TABLET, FILM COATED ORAL at 10:12

## 2023-12-18 RX ADMIN — ACETAMINOPHEN 650 MG: 325 TABLET ORAL at 10:12

## 2023-12-18 NOTE — PROGRESS NOTES
Inpatient Nutrition Evaluation    Admit Date: 12/5/2023   Total duration of encounter: 13 days   Patient Age: 68 y.o.    Nutrition Recommendation/Prescription     Continue heart healthy diet.     Nutrition Assessment     Chart Review    Reason Seen: continuous nutrition monitoring, length of stay, and follow-up    Malnutrition Screening Tool Results   Have you recently lost weight without trying?: No  Have you been eating poorly because of a decreased appetite?: Yes   MST Score: 1   Diagnosis:  Bacteremia 12/5/2023       Pneumonia due to infectious organism          Relevant Medical History: Atrial flutter  Date Unknown  CHF (congestive heart failure)  Date Unknown  Coronary artery disease  Date Unknown  Hypertension  Date Unknown  Myocardial infarction  Date Unknown  SOB (shortness of breath)     Scheduled Medications:  amiodarone, 200 mg, Daily  apixaban, 5 mg, BID  aspirin, 81 mg, Daily  ferrous sulfate, 1 tablet, Daily  folic acid, 1 mg, Daily  furosemide, 40 mg, Daily  guaiFENesin 100 mg/5 ml, 400 mg, Q6H WAKE  Lactobacillus acidophilus, 1 capsule, TID WM  linaCLOtide, 72 mcg, Before breakfast  metoprolol succinate, 100 mg, Daily  miconazole NITRATE 2 %, , BID  QUEtiapine, 200 mg, QHS  sacubitriL-valsartan, 1 tablet, BID  sodium chloride 0.9%, 10 mL, Q6H  vancomycin (VANCOCIN) IV (PEDS and ADULTS), 500 mg, Q12H  white petrolatum, , BID    Continuous Infusions:   PRN Medications: 0.9%  NaCl infusion (for blood administration), sodium chloride 0.9%, acetaminophen, calcium carbonate, cloNIDine, hydrALAZINE, levalbuterol, loperamide, senna-docusate 8.6-50 mg, Pharmacy to dose Vancomycin consult **AND** vancomycin - pharmacy to dose    Recent Labs   Lab 12/12/23  0335 12/14/23  0331 12/15/23  0436 12/15/23  1932 12/16/23  0422 12/17/23  0428 12/18/23  0501    138  --   --  140 138 138   K 3.3* 4.5  --   --  3.6 3.3* 3.7   CALCIUM 7.8* 8.3*  --   --  8.5* 8.4* 8.1*   PHOS  --   --   --   --   --  3.4 3.5   MG   "--   --   --   --   --  1.90 1.90   CHLORIDE 103 104  --   --  100 100 103   CO2 27 24  --   --  30 27 26   BUN 11.8 25.0  --   --  20.1 24.3 24.3   CREATININE 0.91 1.00  --   --  1.10 1.08 1.01   EGFRNORACEVR >60 >60  --   --  >60 >60 >60   GLUCOSE 96 98  --   --  93 92 84   BILITOT  --   --   --   --  1.2 1.0 0.9   ALKPHOS  --   --   --   --  73 67 70   ALT  --   --   --   --  10 9 9   AST  --   --   --   --  13 13 11   ALBUMIN  --   --   --   --  2.5* 2.3* 2.3*   WBC 4.15* 6.50 5.64  --  5.25 4.75 3.99*   HGB 7.4* 7.9* 6.6* 9.5* 9.3* 9.1* 9.1*   HCT 25.1* 27.4* 21.9* 30.4* 30.4* 30.1* 30.3*     Nutrition Orders:  Diet heart healthy      Appetite/Oral Intake: good/% of meals  Factors Affecting Nutritional Intake: none identified  Food/Caodaism/Cultural Preferences: none reported  Food Allergies: none reported  Last Bowel Movement: 12/13/23  Wound(s):      Comments    Pt  intake is good. Discussed food preferences. Pt does not like coffee. Marked menus.  PO intake 100% of breakfast. Will monitor.     Anthropometrics    Height: 6' 2" (188 cm),    Last Weight: 73.9 kg (162 lb 14.7 oz) (12/18/23 0500), Weight Method: Bed Scale  BMI (Calculated): 20.9  BMI Classification: normal (BMI 18.5-24.9)        Ideal Body Weight (IBW), Male: 190 lb     % Ideal Body Weight, Male (lb): 94.92 %                          Usual Weight Provided By: unable to obtain usual weight    Wt Readings from Last 5 Encounters:   12/18/23 73.9 kg (162 lb 14.7 oz)   11/25/23 73.9 kg (163 lb)   11/24/23 79.4 kg (175 lb)   11/19/23 81.6 kg (180 lb)   11/07/23 80 kg (176 lb 4.8 oz)     Weight Change(s) Since Admission: -7.9 kg wt loss Patient is on furosemide. Intake is good.   Wt Readings from Last 1 Encounters:   12/18/23 0500 73.9 kg (162 lb 14.7 oz)   12/11/23 0342 76.3 kg (168 lb 3.4 oz)   12/05/23 1500 81.8 kg (180 lb 5.4 oz)   Admit Weight: 81.8 kg (180 lb 5.4 oz) (12/05/23 1500), Weight Method: Bed Scale    Estimated Needs              "           Enteral Nutrition    Patient not receiving enteral nutrition at this time.    Parenteral Nutrition    Patient not receiving parenteral nutrition support at this time.    Patient Education     Not applicable.    Nutrition Goals & Monitoring     Dietitian will monitor: food and beverage intake, weight, weight change, electrolyte/renal panel, glucose/endocrine profile, and gastrointestinal profile    Nutrition Risk/Follow-Up: low (follow-up in 5-7 days)  Patients assigned 'low nutrition risk' status do not qualify for a full nutritional assessment but will be monitored and re-evaluated in a 5-7 day time period. Please consult if re-evaluation needed sooner.

## 2023-12-18 NOTE — PLAN OF CARE
Problem: Adult Inpatient Plan of Care  Goal: Plan of Care Review  Outcome: Ongoing, Progressing  Flowsheets (Taken 12/18/2023 1254)  Plan of Care Reviewed With: patient  Goal: Patient-Specific Goal (Individualized)  Outcome: Ongoing, Progressing  Flowsheets (Taken 12/18/2023 1254)  Anxieties, Fears or Concerns: None expressed at this time  Individualized Care Needs: IV abx therapy, monitor blood pressure  Goal: Absence of Hospital-Acquired Illness or Injury  Outcome: Ongoing, Progressing  Intervention: Identify and Manage Fall Risk  Flowsheets (Taken 12/18/2023 1254)  Safety Promotion/Fall Prevention:   assistive device/personal item within reach   bed alarm set   nonskid shoes/socks when out of bed   side rails raised x 2  Intervention: Prevent Skin Injury  Flowsheets (Taken 12/18/2023 1254)  Body Position: sitting up in bed  Skin Protection:   adhesive use limited   incontinence pads utilized   tubing/devices free from skin contact  Intervention: Prevent and Manage VTE (Venous Thromboembolism) Risk  Flowsheets (Taken 12/18/2023 1254)  Activity Management: Up in chair - L3  VTE Prevention/Management:   ambulation promoted   bleeding precations maintained   bleeding risk assessed  Range of Motion: active ROM (range of motion) encouraged  Intervention: Prevent Infection  Flowsheets (Taken 12/18/2023 1254)  Infection Prevention:   cohorting utilized   single patient room provided   equipment surfaces disinfected   hand hygiene promoted   rest/sleep promoted  Goal: Optimal Comfort and Wellbeing  Outcome: Ongoing, Progressing  Intervention: Monitor Pain and Promote Comfort  Flowsheets (Taken 12/18/2023 1254)  Pain Management Interventions:   care clustered   pillow support provided   position adjusted  Intervention: Provide Person-Centered Care  Flowsheets (Taken 12/18/2023 1254)  Trust Relationship/Rapport:   care explained   reassurance provided   choices provided   thoughts/feelings acknowledged   emotional support  provided   empathic listening provided   questions answered   questions encouraged  Goal: Readiness for Transition of Care  Outcome: Ongoing, Progressing  Intervention: Mutually Develop Transition Plan  Flowsheets (Taken 12/18/2023 1254)  Communicated [place] with patient/caregiver: Date not available/Unable to determine     Problem: Fluid Imbalance (Pneumonia)  Goal: Fluid Balance  Outcome: Ongoing, Progressing  Intervention: Monitor and Manage Fluid Balance  Flowsheets (Taken 12/18/2023 1254)  Fluid/Electrolyte Management: fluids provided     Problem: Infection (Pneumonia)  Goal: Resolution of Infection Signs and Symptoms  Outcome: Ongoing, Progressing  Intervention: Prevent Infection Progression  Flowsheets (Taken 12/18/2023 1254)  Fever Reduction/Comfort Measures:   lightweight bedding   lightweight clothing  Infection Management: aseptic technique maintained  Isolation Precautions:   precautions maintained   protective     Problem: Respiratory Compromise (Pneumonia)  Goal: Effective Oxygenation and Ventilation  Outcome: Ongoing, Progressing  Intervention: Promote Airway Secretion Clearance  Flowsheets (Taken 12/18/2023 1254)  Breathing Techniques/Airway Clearance: deep/controlled cough encouraged  Cough And Deep Breathing: done independently per patient  Intervention: Optimize Oxygenation and Ventilation  Flowsheets (Taken 12/18/2023 1254)  Head of Bed (HOB) Positioning: HOB at 30-45 degrees     Problem: Infection  Goal: Absence of Infection Signs and Symptoms  Outcome: Ongoing, Progressing  Intervention: Prevent or Manage Infection  Flowsheets (Taken 12/18/2023 1254)  Fever Reduction/Comfort Measures:   lightweight bedding   lightweight clothing  Infection Management: aseptic technique maintained  Isolation Precautions:   precautions maintained   protective     Problem: Fall Injury Risk  Goal: Absence of Fall and Fall-Related Injury  Outcome: Ongoing, Progressing  Intervention: Identify and Manage  Contributors  Flowsheets (Taken 12/18/2023 1254)  Self-Care Promotion:   independence encouraged   BADL personal objects within reach   BADL personal routines maintained   safe use of adaptive equipment encouraged  Medication Review/Management: medications reviewed  Intervention: Promote Injury-Free Environment  Flowsheets (Taken 12/18/2023 1255)  Safety Promotion/Fall Prevention:   assistive device/personal item within reach   bed alarm set   nonskid shoes/socks when out of bed   side rails raised x 2

## 2023-12-18 NOTE — PT/OT/SLP PROGRESS
Physical Therapy Treatment Note           Name: Mike Urbina Jr.    : 1955 (68 y.o.)  MRN: [id number]           TREATMENT SUMMARY AND RECOMMENDATIONS:    PT Received On:    PT Start Time: 900     PT Stop Time: 925  PT Total Time (min): 25 min     Subjective Assessment:   No complaints  Lethargic   x Awake, alert, cooperative  Uncooperative    Agitated  c/o pain    Appropriate  c/o fatigue    Confused  Treated at bedside     Emotionally labile x Treated in gym/dept.    Impulsive  Other:    Flat affect       Therapy Precautions:    Cognitive deficits  Spinal precautions    Collar - hard  Sternal precautions    Collar - soft   TLSO    Fall risk  LSO    Hip precautions - posterior  Knee immobilizer    Hip precautions - anterior  WBAT    Impaired communication  Partial weightbearing    Oxygen  TTWB    PEG tube  NWB    Visual deficits  Other:    Hearing deficits          Treatment Objectives:     Mobility Training:   Assist level Comments    Bed mobility MOD I Supine > sit   Transfer     Gait MOD I X335 ft using RW without rest breaks    Sit to stand transitions MOD I From EOB level with RW   Sitting balance     Standing balance      Wheelchair mobility     Car transfer     Other:          Therapeutic Exercise:   Exercise Sets Reps Comments   BLE hip flexion and LAQ  15 Seated with #2                         Additional Comments:  Upon on arrival: BP: 87/57, 78 HR (sitting), 83/54, 83 HR (standing). RN present. Cleared pt for PT.   Pt was asymptomatic and requested to walk. After ambulating, /71, 80 HR.     Assessment: Patient tolerated session well and demo'd readiness to ambulated.     PT Plan: continue POC  Revisions made to plan of care: No    GOALS:   Multidisciplinary Problems       Physical Therapy Goals          Problem: Physical Therapy    Goal Priority Disciplines Outcome Goal Variances Interventions   Physical Therapy Goal     PT, PT/OT Ongoing, Progressing     Description: Goals  to be met by: Discharge     Patient will increase functional independence with mobility by performin. Sit to stand transfer with Modified [place]  2. Bed to chair transfer with Modified [place] using No Assistive Device  3. Gait  x 1000 feet including outdoor negotiation and stair completion with Supervision using No Assistive Device.                          Skilled PT Minutes Provided: 25 minutes   Communication with Treatment Team: yes    Equipment recommendations:       At end of treatment, patient remained:  x Up in chair     Up in wheelchair in room    In bed   x With alarm activated    Bed rails up   x Call bell in reach     Family/friends present    Restraints secured properly    In bathroom with CNA/RN notified   x Nurse aware    In gym with therapist/tech    Other:

## 2023-12-18 NOTE — PROGRESS NOTES
[facility] MEDICINE ~ PROGRESS NOTE    CHIEF COMPLAINT   Hospital follow up    HOSPITAL COURSE   69yo male with a past medical history of CAD, diastolic CHF, Afib, aortic stenosis, NSTEMI, and HTN who was recently admitted to [facility] for pneumonia and Afib RVR. Patient was diagnosed with Influenza A on 11/19/2023 prior to admission and started on Tamiflu. CTA chest 11/24/2023 showed no PE, but multifocal pneumonia most evident in RUL. Patient was admitted for ICU, started on Cardizem drip, Vanc, Zosyn, Doxy, and required Levophed for hypotension. Echo 11/25/2023 showed LVEF 55% moderate aortic stenosis, severe posterior mitral annular calcification present. Blood cultures x2 resulted positive for MRSA on 11/27/2023.  Infectious Disease recommended patient to continue vancomycin for 4 weeks with suspected in date on 12/25/2023. Stool culture on 11/27/2023 showed many yeast. Repeat blood cultures x2 on 12/02/2023 showed no growth. Patient underwent a CARO guided DCCV on 11/30/2023 which showed mild-moderately enlarged right atrium, no ASD or PFO, LVEF 55%, mild-moderate left ventricular hypertrophy, severe aortic stenosis, moderate mitral stenosis, mild-moderate mitral regurgitation, mild-moderate tricuspid regurgitation, no vegetation present, no intracardiac thrombus, successful cardioversion. CTA chest, abdomen, pelvis 12/05/2023 showed small bilateral pleural effusion R>L and patchy consolidation in both lungs increased compared to prior. CTA was obtained for TAVR workup to be completed outpatient after ID clearance. On exam today, patient is currently on 2 L nasal cannula with O2 sat 99%.  With worsening pneumonia and lung sounds on exam, we will start Zosyn, obtain sputum culture, add nystatin for fungal coverage, and schedule Mucomyst with the Xopenex nebulizers 3 times a day.     Today  Resume Eliquis today since H&H remained stable.  Patient's only complaint is being  awakened throughout the night causing him not to sleep well.  We will adjust nighttime medications to be given at 2000 as well as vital signs Q shift 8 and 2000. Nebs changing to prn.     OBJECTIVE/PHYSICAL EXAM     VITAL SIGNS (MOST RECENT):  Temp: 97.6 °F (36.4 °C) (12/18/23 0717)  Pulse: 83 (12/18/23 0904)  Resp: 20 (12/18/23 0717)  BP: (!) 83/54 (12/18/23 0904)  SpO2: 96 % (12/18/23 0717) VITAL SIGNS (24 HOUR RANGE):  Temp:  [97.6 °F (36.4 °C)-98.9 °F (37.2 °C)] 97.6 °F (36.4 °C)  Pulse:  [69-90] 83  Resp:  [16-20] 20  SpO2:  [94 %-98 %] 96 %  BP: ()/(54-75) 83/54     GENERAL: In no acute distress, afebrile  HEENT: Atraumatic, normocephalic, moist mucous membranes, supple neck   CHEST: Improved lung sounds throughout  HEART: S1, S2, grade IV murmur  ABDOMEN: Soft, nontender, BS +  MSK: Warm, no lower extremity edema, no clubbing or cyanosis  NEUROLOGIC: Alert and oriented x4, moving all extremities with good strength   INTEGUMENTARY: No obvious skin rash   PSYCHIATRY: Appropriate mood and affect     ASSESSMENT/PLAN   MRSA bacteriemia   Multifocal Pneumonia  Recent Influenza A infection (11/19/2023)  Acute hypoxic respiratory failure  CTA chest 11/24/2023 showed no PE, but multifocal pneumonia most evident in RUL  Blood cultures x2 resulted positive for MRSA on 11/27/2023, repeats were negative  ID recommended patient to continue vancomycin for 4 weeks with suspected in date on 12/25/2023, Cefepime (12/07/2023-12/12/2023)  D/c Zosyn (12/06/2023-12/07/203) and Nystatin (12/06/2023-12/07/2023)  CXR 12/14/2023 shows no changes, Lasix 40mg IVP x1 given for cough with pink, frothy sputum   IS, cough assist, acapella, Xopenex prn    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   Thrombocytopenia  Hx of alcohol use disorder  Thiamine wnl   Ferrlecit x3, oral supplementation   Hold Eliquis if platelets <50  Received 2u pRBC 12/15/2023 with Lasix 40mg IVP between units   Continue Aspirin and Eliquis - monitor  H&H      Yeast + Stool  D/c Nystatin (12/06/2023-12/07/2023) low concern for disseminated candidemia   Continue Probiotic    Hypokalemia   Replete as condition requires     Transamnitis   Grayzone hepatitis C Ab  HCV RNA Detect/Quant, S Undetected      CAD  Diastolic CHF  Afib  Aortic stenosis  Essential HTN  CARO guided DCCV on 11/30/2023 which showed mild-moderately enlarged right atrium, no ASD or PFO, LVEF 55%, mild-moderate left ventricular hypertrophy, severe aortic stenosis, moderate mitral stenosis, mild-moderate mitral regurgitation, mild-moderate tricuspid regurgitation, no vegetation present, no intracardiac thrombus, successful cardioversion  TAVR to be completed outpatient after ID clearance  Continue Entresto, Toprol XL, Amiodarone, Aspiring, and Eliquis   Continue PO Lasix 40mg    DVT prophylaxis: Eliquis   Anticipated discharge and disposition: Continue PT/OT and IV antibiotics   __________________________________________________________________________    LABS/MICRO/MEDS/DIAGNOSTICS     LABS  Recent Labs     12/18/23  0501      K 3.7   CHLORIDE 103   CO2 26   BUN 24.3   CREATININE 1.01   GLUCOSE 84   CALCIUM 8.1*   ALKPHOS 70   AST 11   ALT 9   ALBUMIN 2.3*     Recent Labs     12/18/23  0501   WBC 3.99*   RBC 3.31*   HCT 30.3*   MCV 91.5   *     MICROBIOLOGY  Microbiology Results (last 7 days)       ** No results found for the last 168 hours. **             MEDICATIONS   amiodarone  200 mg Oral Daily    apixaban  5 mg Oral BID    aspirin  81 mg Oral Daily    ferrous sulfate  1 tablet Oral Daily    folic acid  1 mg Oral Daily    furosemide  40 mg Oral Daily    guaiFENesin 100 mg/5 ml  400 mg Oral Q6H WAKE    Lactobacillus acidophilus  1 capsule Oral TID WM    linaCLOtide  72 mcg Oral Before breakfast    metoprolol succinate  100 mg Oral Daily    miconazole NITRATE 2 %   Topical (Top) BID    QUEtiapine  200 mg Oral QHS    sacubitriL-valsartan  1 tablet Oral BID    sodium chloride 0.9%  10  mL Intravenous Q6H    vancomycin (VANCOCIN) IV (PEDS and ADULTS)  500 mg Intravenous Q12H    white petrolatum   Topical (Top) BID         INFUSIONS       DIAGNOSTIC TESTS  X-Ray Chest 1 View   Final Result      No significant change         Electronically signed by: Juan Wharton   Date:    12/14/2023   Time:    07:51      X-Ray Chest 1 View   Final Result      Persistent consolidative changes in the right perihilar region and right upper lobe.      Slightly more confluent opacities in the left upper lobe.      No other interval change         Electronically signed by: Juan Wharton   Date:    12/12/2023   Time:    10:04      X-Ray Chest 1 View for Line/Tube Placement   Final Result      Consolidative changes in the right perihilar region right mid and right upper lung zone similar to previous exam.      Interval insertion of right-sided PICC line         Electronically signed by: Juan Wharton   Date:    12/06/2023   Time:    12:14        EF   Date Value Ref Range Status   11/25/2023 55 % Final   12/29/2022 55 % Final        NUTRITION STATUS  Patient meets ASPEN criteria for   malnutrition of   per RD assessment as evidenced by:                       A minimum of two characteristics is recommended for diagnosis of either severe or non-severe malnutrition.     Case related differential diagnoses have been reviewed; assessment and plan has been documented. I have personally reviewed the labs and test results that are currently available; I have reviewed the patients medication list. I have reviewed the consulting providers recommendations. I have reviewed or attempted to review medical records based upon their availability.  All of the patient's and/or family's questions have been addressed and answered to the best of my ability.  I will continue to monitor closely and make adjustments to medical management as needed.  This document was created using M*Modal Fluency Direct.  Transcription errors may have  been made.  Please contact me if any questions may rise regarding documentation to clarify transcription.      GIESL Russell   Internal Medicine  Department of Hospital Medicine [facility]

## 2023-12-19 PROCEDURE — 99900035 HC TECH TIME PER 15 MIN (STAT)

## 2023-12-19 PROCEDURE — 25000003 PHARM REV CODE 250: Performed by: PHYSICIAN ASSISTANT

## 2023-12-19 PROCEDURE — 27000173 HC ACAPELLA DEVICE DH OR DM

## 2023-12-19 PROCEDURE — 97535 SELF CARE MNGMENT TRAINING: CPT

## 2023-12-19 PROCEDURE — A4216 STERILE WATER/SALINE, 10 ML: HCPCS | Performed by: STUDENT IN AN ORGANIZED HEALTH CARE EDUCATION/TRAINING PROGRAM

## 2023-12-19 PROCEDURE — 25000003 PHARM REV CODE 250: Performed by: STUDENT IN AN ORGANIZED HEALTH CARE EDUCATION/TRAINING PROGRAM

## 2023-12-19 PROCEDURE — 97116 GAIT TRAINING THERAPY: CPT

## 2023-12-19 PROCEDURE — 11000004 HC SNF PRIVATE

## 2023-12-19 PROCEDURE — 63600175 PHARM REV CODE 636 W HCPCS: Performed by: STUDENT IN AN ORGANIZED HEALTH CARE EDUCATION/TRAINING PROGRAM

## 2023-12-19 PROCEDURE — 97530 THERAPEUTIC ACTIVITIES: CPT

## 2023-12-19 PROCEDURE — 94761 N-INVAS EAR/PLS OXIMETRY MLT: CPT

## 2023-12-19 PROCEDURE — 25000003 PHARM REV CODE 250: Performed by: INTERNAL MEDICINE

## 2023-12-19 PROCEDURE — 94664 DEMO&/EVAL PT USE INHALER: CPT

## 2023-12-19 RX ADMIN — SODIUM CHLORIDE, PRESERVATIVE FREE 10 ML: 5 INJECTION INTRAVENOUS at 11:12

## 2023-12-19 RX ADMIN — FERROUS SULFATE TAB 325 MG (65 MG ELEMENTAL FE) 1 EACH: 325 (65 FE) TAB at 09:12

## 2023-12-19 RX ADMIN — SODIUM CHLORIDE, PRESERVATIVE FREE 10 ML: 5 INJECTION INTRAVENOUS at 12:12

## 2023-12-19 RX ADMIN — GUAIFENESIN 400 MG: 200 SOLUTION ORAL at 03:12

## 2023-12-19 RX ADMIN — METOPROLOL SUCCINATE 100 MG: 50 TABLET, EXTENDED RELEASE ORAL at 09:12

## 2023-12-19 RX ADMIN — VANCOMYCIN HYDROCHLORIDE 500 MG: 500 INJECTION, POWDER, LYOPHILIZED, FOR SOLUTION INTRAVENOUS at 09:12

## 2023-12-19 RX ADMIN — MICONAZOLE NITRATE 2 % TOPICAL POWDER: at 08:12

## 2023-12-19 RX ADMIN — QUETIAPINE FUMARATE 200 MG: 100 TABLET ORAL at 08:12

## 2023-12-19 RX ADMIN — SODIUM CHLORIDE, PRESERVATIVE FREE 10 ML: 5 INJECTION INTRAVENOUS at 05:12

## 2023-12-19 RX ADMIN — Medication 1 CAPSULE: at 11:12

## 2023-12-19 RX ADMIN — GUAIFENESIN 400 MG: 200 SOLUTION ORAL at 08:12

## 2023-12-19 RX ADMIN — WHITE PETROLATUM: 1.75 OINTMENT TOPICAL at 09:12

## 2023-12-19 RX ADMIN — GUAIFENESIN 400 MG: 200 SOLUTION ORAL at 09:12

## 2023-12-19 RX ADMIN — SODIUM CHLORIDE: 9 INJECTION, SOLUTION INTRAVENOUS at 09:12

## 2023-12-19 RX ADMIN — AMIODARONE HYDROCHLORIDE 200 MG: 200 TABLET ORAL at 09:12

## 2023-12-19 RX ADMIN — APIXABAN 5 MG: 5 TABLET, FILM COATED ORAL at 09:12

## 2023-12-19 RX ADMIN — SACUBITRIL AND VALSARTAN 1 TABLET: 24; 26 TABLET, FILM COATED ORAL at 09:12

## 2023-12-19 RX ADMIN — MICONAZOLE NITRATE 2 % TOPICAL POWDER: at 09:12

## 2023-12-19 RX ADMIN — FOLIC ACID 1 MG: 1 TABLET ORAL at 09:12

## 2023-12-19 RX ADMIN — ASPIRIN 81 MG CHEWABLE TABLET 81 MG: 81 TABLET CHEWABLE at 09:12

## 2023-12-19 RX ADMIN — ACETAMINOPHEN 650 MG: 325 TABLET ORAL at 03:12

## 2023-12-19 RX ADMIN — WHITE PETROLATUM: 1.75 OINTMENT TOPICAL at 08:12

## 2023-12-19 RX ADMIN — SACUBITRIL AND VALSARTAN 1 TABLET: 24; 26 TABLET, FILM COATED ORAL at 08:12

## 2023-12-19 RX ADMIN — Medication 1 CAPSULE: at 09:12

## 2023-12-19 RX ADMIN — LINACLOTIDE 72 MCG: 72 CAPSULE, GELATIN COATED ORAL at 05:12

## 2023-12-19 RX ADMIN — APIXABAN 5 MG: 5 TABLET, FILM COATED ORAL at 08:12

## 2023-12-19 RX ADMIN — FUROSEMIDE 40 MG: 40 TABLET ORAL at 09:12

## 2023-12-19 RX ADMIN — Medication 1 CAPSULE: at 05:12

## 2023-12-19 NOTE — PT/OT/SLP PROGRESS
Physical Therapy Treatment Note           Name: Mike Urbina Jr.    : 1955 (68 y.o.)  MRN: [id number]           TREATMENT SUMMARY AND RECOMMENDATIONS:    PT Received On: 23  PT Start Time: 1338     PT Stop Time: 1406  PT Total Time (min): 28 min     Subjective Assessment:  x No complaints  Lethargic    Awake, alert, cooperative  Uncooperative    Agitated  c/o pain    Appropriate  c/o fatigue    Confused x Treated at bedside     Emotionally labile  Treated in gym/dept.    Impulsive  Other:    Flat affect       Therapy Precautions:    Cognitive deficits  Spinal precautions    Collar - hard  Sternal precautions    Collar - soft   TLSO    Fall risk  LSO    Hip precautions - posterior  Knee immobilizer    Hip precautions - anterior  WBAT    Impaired communication  Partial weightbearing    Oxygen  TTWB    PEG tube  NWB    Visual deficits  Other:    Hearing deficits          Treatment Objectives:     Mobility Training:   Assist level Comments    Bed mobility     Transfer MOD I Using quad cane   Gait SPV/MOD I Amb on firm surface with SBQC x 900 feet including outdoor,  ramp and curb negotiation    Sit to stand transitions MOD I    Sitting balance     Standing balance  SPV Dynamic reaching in standing   Wheelchair mobility     Car transfer     Other: Stair negotiation          Therapeutic Exercise:   Exercise Sets Reps Comments                               Additional Comments:  No issues noted. Progressing as able. Medication is complete on   .Assessment: Patient tolerated session well.    PT Plan: Continue  Revisions made to plan of care: Yes.     GOALS:   Multidisciplinary Problems       Physical Therapy Goals          Problem: Physical Therapy    Goal Priority Disciplines Outcome Goal Variances Interventions   Physical Therapy Goal     PT, PT/OT Ongoing, Progressing     Description: Goals to be met by: Discharge     Patient will increase functional independence with mobility by  performin. Sit to stand transfer with Modified [place] -MET  2. Bed to chair transfer with Modified [place] using No Assistive Device - Partially Met (using cane)  3. Gait  x 1000 feet including outdoor negotiation and stair completion with Supervision using No Assistive Device. - Partially Met (using cane, going 800 feet)                         Skilled PT Minutes Provided: 25   Communication with Treatment Team:     Equipment recommendations:       At end of treatment, patient remained:  x Up in chair     Up in wheelchair in room    In bed    With alarm activated    Bed rails up   x Call bell in reach     Family/friends present    Restraints secured properly    In bathroom with CNA/RN notified   x Nurse aware    In gym with therapist/tech    Other:

## 2023-12-19 NOTE — PT/OT/SLP PROGRESS
Physical Therapy Treatment Note           Name: Mike Urbina Jr.    : 1955 (68 y.o.)  MRN: [id number]           TREATMENT SUMMARY AND RECOMMENDATIONS:    PT Received On: 23  PT Start Time: 1400     PT Stop Time: 1415  PT Total Time (min): 15 min     Subjective Assessment:  x No complaints  Lethargic    Awake, alert, cooperative  Uncooperative    Agitated  c/o pain    Appropriate  c/o fatigue    Confused  Treated at bedside     Emotionally labile  Treated in gym/dept.    Impulsive  Other:    Flat affect       Therapy Precautions:    Cognitive deficits  Spinal precautions    Collar - hard  Sternal precautions    Collar - soft   TLSO    Fall risk  LSO    Hip precautions - posterior  Knee immobilizer    Hip precautions - anterior  WBAT    Impaired communication  Partial weightbearing    Oxygen  TTWB    PEG tube  NWB    Visual deficits  Other:    Hearing deficits          Treatment Objectives:     Mobility Training:   Assist level Comments    Bed mobility     Transfer     Gait Rei Amb on firm surface with SBQC 300 ft    Sit to stand transitions     Sitting balance     Standing balance      Wheelchair mobility     Car transfer     Other:          Therapeutic Exercise:   Exercise Sets Reps Comments                               Additional Comments:  No issues noted    Assessment: Patient tolerated session well.    PT Plan: continue  Revisions made to plan of care: No    GOALS:   Multidisciplinary Problems       Physical Therapy Goals          Problem: Physical Therapy    Goal Priority Disciplines Outcome Goal Variances Interventions   Physical Therapy Goal     PT, PT/OT Ongoing, Progressing     Description: Goals to be met by: Discharge     Patient will increase functional independence with mobility by performin. Sit to stand transfer with Modified [place]  2. Bed to chair transfer with Modified [place] using No Assistive Device  3. Gait  x 1000 feet including outdoor  negotiation and stair completion with Supervision using No Assistive Device.                          Skilled PT Minutes Provided: 15   Communication with Treatment Team:     Equipment recommendations:       At end of treatment, patient remained:  x Up in chair     Up in wheelchair in room    In bed    With alarm activated    Bed rails up    Call bell in reach     Family/friends present    Restraints secured properly    In bathroom with CNA/RN notified    Nurse aware    In gym with therapist/tech    Other:

## 2023-12-19 NOTE — PLAN OF CARE
Problem: Adult Inpatient Plan of Care  Goal: Plan of Care Review  Outcome: Ongoing, Progressing  Goal: Patient-Specific Goal (Individualized)  Outcome: Ongoing, Progressing  Flowsheets (Taken 12/19/2023 1157)  Anxieties, Fears or Concerns: none expressed  Individualized Care Needs: IV abx therapy  Goal: Absence of Hospital-Acquired Illness or Injury  Outcome: Ongoing, Progressing  Intervention: Identify and Manage Fall Risk  Flowsheets (Taken 12/19/2023 1157)  Safety Promotion/Fall Prevention:   assistive device/personal item within reach   bed alarm set   nonskid shoes/socks when out of bed   side rails raised x 2  Goal: Optimal Comfort and Wellbeing  Outcome: Ongoing, Progressing  Goal: Readiness for Transition of Care  Outcome: Ongoing, Progressing     Problem: Fluid Imbalance (Pneumonia)  Goal: Fluid Balance  Outcome: Ongoing, Progressing     Problem: Infection (Pneumonia)  Goal: Resolution of Infection Signs and Symptoms  Outcome: Ongoing, Progressing  Intervention: Prevent Infection Progression  Flowsheets (Taken 12/19/2023 1157)  Infection Management: aseptic technique maintained  Isolation Precautions: protective     Problem: Respiratory Compromise (Pneumonia)  Goal: Effective Oxygenation and Ventilation  Outcome: Ongoing, Progressing     Problem: Infection  Goal: Absence of Infection Signs and Symptoms  Outcome: Ongoing, Progressing  Intervention: Prevent or Manage Infection  Flowsheets (Taken 12/19/2023 1157)  Infection Management: aseptic technique maintained  Isolation Precautions: protective     Problem: Fall Injury Risk  Goal: Absence of Fall and Fall-Related Injury  Outcome: Ongoing, Progressing  Intervention: Identify and Manage Contributors  Flowsheets (Taken 12/19/2023 1157)  Self-Care Promotion:   safe use of adaptive equipment encouraged   independence encouraged  Intervention: Promote Injury-Free Environment  Flowsheets (Taken 12/19/2023 1157)  Safety Promotion/Fall Prevention:   assistive  device/personal item within reach   bed alarm set   nonskid shoes/socks when out of bed   side rails raised x 2

## 2023-12-19 NOTE — PT/OT/SLP PROGRESS
Physical Therapy Treatment Note           Name: Mike Urbina Jr.    : 1955 (68 y.o.)  MRN: [id number]           TREATMENT SUMMARY AND RECOMMENDATIONS:    PT Received On: 23  PT Start Time: 830     PT Stop Time: 853  PT Total Time (min): 23 min     Subjective Assessment:  x No complaints  Lethargic    Awake, alert, cooperative  Uncooperative    Agitated  c/o pain    Appropriate  c/o fatigue    Confused x Treated at bedside     Emotionally labile  Treated in gym/dept.    Impulsive  Other:    Flat affect       Therapy Precautions:    Cognitive deficits  Spinal precautions    Collar - hard  Sternal precautions    Collar - soft   TLSO    Fall risk  LSO    Hip precautions - posterior  Knee immobilizer    Hip precautions - anterior  WBAT    Impaired communication  Partial weightbearing    Oxygen  TTWB    PEG tube  NWB    Visual deficits  Other:    Hearing deficits          Treatment Objectives:     Mobility Training:   Assist level Comments    Bed mobility MOD I    Transfer MOD I Using quad cane   Gait SPV/MOD I Amb on firm surface with SBQC x 800 feet O2 sats at 98% on room air   Sit to stand transitions MOD I    Sitting balance     Standing balance  SPV Dynamic reaching in standing   Wheelchair mobility     Car transfer     Other: Stair negotiation  SPV X 8 steps using rail       Therapeutic Exercise:   Exercise Sets Reps Comments                               Additional Comments:  No issues noted. Updated POC. Pt no longer needs alarms, nursing made aware. Pt ok to go to bathroom on his own.     Assessment: Patient tolerated session well.    PT Plan: Continue  Revisions made to plan of care: Yes.     GOALS:   Multidisciplinary Problems       Physical Therapy Goals          Problem: Physical Therapy    Goal Priority Disciplines Outcome Goal Variances Interventions   Physical Therapy Goal     PT, PT/OT Ongoing, Progressing     Description: Goals to be met by: Discharge     Patient will  increase functional independence with mobility by performin. Sit to stand transfer with Modified [place] -MET  2. Bed to chair transfer with Modified [place] using No Assistive Device - Partially Met (using cane)  3. Gait  x 1000 feet including outdoor negotiation and stair completion with Supervision using No Assistive Device. - Partially Met (using cane, going 800 feet)                         Skilled PT Minutes Provided: 23   Communication with Treatment Team:     Equipment recommendations:       At end of treatment, patient remained:  x Up in chair     Up in wheelchair in room    In bed    With alarm activated    Bed rails up   x Call bell in reach     Family/friends present    Restraints secured properly    In bathroom with CNA/RN notified   x Nurse aware    In gym with therapist/tech    Other:

## 2023-12-19 NOTE — PLAN OF CARE
Problem: Adult Inpatient Plan of Care  Goal: Plan of Care Review  Outcome: Ongoing, Progressing  Goal: Patient-Specific Goal (Individualized)  Outcome: Ongoing, Progressing  Flowsheets (Taken 12/18/2023 2000)  Anxieties, Fears or Concerns: Concerned about being able to sleep over night  Individualized Care Needs: IV abx therapy, maintain safety, promote adequate rest  Goal: Absence of Hospital-Acquired Illness or Injury  Outcome: Ongoing, Progressing  Intervention: Prevent and Manage VTE (Venous Thromboembolism) Risk  Flowsheets (Taken 12/18/2023 2000)  VTE Prevention/Management:   ambulation promoted   bleeding precations maintained   bleeding risk assessed     Problem: Infection (Pneumonia)  Goal: Resolution of Infection Signs and Symptoms  Outcome: Ongoing, Progressing

## 2023-12-19 NOTE — PLAN OF CARE
Problem: Physical Therapy  Goal: Physical Therapy Goal  Description: Goals to be met by: Discharge     Patient will increase functional independence with mobility by performin. Sit to stand transfer with Modified [place] -MET  2. Bed to chair transfer with Modified [place] using No Assistive Device - Partially Met (using cane)  3. Gait  x 1000 feet including outdoor negotiation and stair completion with Supervision using No Assistive Device. - Partially Met (using cane, going 800 feet)    Outcome: Ongoing, Progressing      Statement Selected

## 2023-12-19 NOTE — PROGRESS NOTES
[facility] MEDICINE ~ PROGRESS NOTE    CHIEF COMPLAINT   Hospital follow up    HOSPITAL COURSE   69yo male with a past medical history of CAD, diastolic CHF, Afib, aortic stenosis, NSTEMI, and HTN who was recently admitted to [facility] for pneumonia and Afib RVR. Patient was diagnosed with Influenza A on 11/19/2023 prior to admission and started on Tamiflu. CTA chest 11/24/2023 showed no PE, but multifocal pneumonia most evident in RUL. Patient was admitted for ICU, started on Cardizem drip, Vanc, Zosyn, Doxy, and required Levophed for hypotension. Echo 11/25/2023 showed LVEF 55% moderate aortic stenosis, severe posterior mitral annular calcification present. Blood cultures x2 resulted positive for MRSA on 11/27/2023.  Infectious Disease recommended patient to continue vancomycin for 4 weeks with suspected in date on 12/25/2023. Stool culture on 11/27/2023 showed many yeast. Repeat blood cultures x2 on 12/02/2023 showed no growth. Patient underwent a CARO guided DCCV on 11/30/2023 which showed mild-moderately enlarged right atrium, no ASD or PFO, LVEF 55%, mild-moderate left ventricular hypertrophy, severe aortic stenosis, moderate mitral stenosis, mild-moderate mitral regurgitation, mild-moderate tricuspid regurgitation, no vegetation present, no intracardiac thrombus, successful cardioversion. CTA chest, abdomen, pelvis 12/05/2023 showed small bilateral pleural effusion R>L and patchy consolidation in both lungs increased compared to prior. CTA was obtained for TAVR workup to be completed outpatient after ID clearance. On exam today, patient is currently on 2 L nasal cannula with O2 sat 99%.  With worsening pneumonia and lung sounds on exam, we will start Zosyn, obtain sputum culture, add nystatin for fungal coverage, and schedule Mucomyst with the Xopenex nebulizers 3 times a day.     Today  Patient reports better sleep overnight.  Patient denies any complaints today.  Doing  well with therapy, walking 800 ft on room air maintaining O2 sats.    OBJECTIVE/PHYSICAL EXAM     VITAL SIGNS (MOST RECENT):  Temp: 97.8 °F (36.6 °C) (12/19/23 1107)  Pulse: 79 (12/19/23 1107)  Resp: 18 (12/19/23 0750)  BP: 124/63 (12/19/23 1107)  SpO2: 98 % (12/19/23 1107) VITAL SIGNS (24 HOUR RANGE):  Temp:  [97.4 °F (36.3 °C)-98.7 °F (37.1 °C)] 97.8 °F (36.6 °C)  Pulse:  [74-87] 79  Resp:  [18-20] 18  SpO2:  [95 %-98 %] 98 %  BP: (123-149)/(62-76) 124/63     GENERAL: In no acute distress, afebrile  HEENT: Atraumatic, normocephalic, moist mucous membranes, supple neck   CHEST: CTAB  HEART: S1, S2, grade IV murmur  ABDOMEN: Soft, nontender, BS +  MSK: Warm, no lower extremity edema, no clubbing or cyanosis  NEUROLOGIC: Alert and oriented x4, moving all extremities with good strength   INTEGUMENTARY: No obvious skin rash   PSYCHIATRY: Appropriate mood and affect     ASSESSMENT/PLAN   MRSA bacteriemia   Multifocal Pneumonia  Recent Influenza A infection (11/19/2023)  Acute hypoxic respiratory failure  CTA chest 11/24/2023 showed no PE, but multifocal pneumonia most evident in RUL  Blood cultures x2 resulted positive for MRSA on 11/27/2023, repeats were negative  ID recommended patient to continue vancomycin for 4 weeks with suspected in date on 12/25/2023, Cefepime (12/07/2023-12/12/2023)  D/c Zosyn (12/06/2023-12/07/203) and Nystatin (12/06/2023-12/07/2023)  CXR 12/14/2023 shows no changes, Lasix 40mg IVP x1 given for cough with pink, frothy sputum   IS, cough assist, acapella, Xopenex prn    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   Thrombocytopenia  Hx of alcohol use disorder  Thiamine wnl   Ferrlecit x3, oral supplementation   Hold Eliquis if platelets <50  Received 2u pRBC 12/15/2023 with Lasix 40mg IVP between units   Continue Aspirin and Eliquis - monitor H&H      Yeast + Stool  D/c Nystatin (12/06/2023-12/07/2023) low concern for disseminated candidemia   Continue Probiotic    Hypokalemia    Replete as condition requires     Transamnitis   Grayzone hepatitis C Ab  HCV RNA Detect/Quant, S Undetected      CAD  Diastolic CHF  Afib  Aortic stenosis  Essential HTN  CARO guided DCCV on 11/30/2023 which showed mild-moderately enlarged right atrium, no ASD or PFO, LVEF 55%, mild-moderate left ventricular hypertrophy, severe aortic stenosis, moderate mitral stenosis, mild-moderate mitral regurgitation, mild-moderate tricuspid regurgitation, no vegetation present, no intracardiac thrombus, successful cardioversion  TAVR to be completed outpatient after ID clearance  Continue Entresto, Toprol XL, Amiodarone, Aspiring, and Eliquis   Continue PO Lasix 40mg    DVT prophylaxis: Eliquis   Anticipated discharge and disposition: Continue PT/OT and IV antibiotics until 12/25/2023. Plan for d/c home on 12/26/2023  __________________________________________________________________________    LABS/MICRO/MEDS/DIAGNOSTICS     LABS  Recent Labs     12/18/23  0501      K 3.7   CHLORIDE 103   CO2 26   BUN 24.3   CREATININE 1.01   GLUCOSE 84   CALCIUM 8.1*   ALKPHOS 70   AST 11   ALT 9   ALBUMIN 2.3*     Recent Labs     12/18/23  0501   WBC 3.99*   RBC 3.31*   HCT 30.3*   MCV 91.5   *     MICROBIOLOGY  Microbiology Results (last 7 days)       ** No results found for the last 168 hours. **             MEDICATIONS   amiodarone  200 mg Oral Daily    apixaban  5 mg Oral BID    aspirin  81 mg Oral Daily    ferrous sulfate  1 tablet Oral Daily    folic acid  1 mg Oral Daily    furosemide  40 mg Oral Daily    guaiFENesin 100 mg/5 ml  400 mg Oral Q6H WAKE    Lactobacillus acidophilus  1 capsule Oral TID WM    linaCLOtide  72 mcg Oral Before breakfast    metoprolol succinate  100 mg Oral Daily    miconazole NITRATE 2 %   Topical (Top) BID    QUEtiapine  200 mg Oral QHS    sacubitriL-valsartan  1 tablet Oral BID    sodium chloride 0.9%  10 mL Intravenous Q6H    vancomycin (VANCOCIN) IV (PEDS and ADULTS)  500 mg Intravenous  Q12H    white petrolatum   Topical (Top) BID         INFUSIONS       DIAGNOSTIC TESTS  X-Ray Chest 1 View   Final Result      No significant change         Electronically signed by: Juan Wharton   Date:    12/14/2023   Time:    07:51      X-Ray Chest 1 View   Final Result      Persistent consolidative changes in the right perihilar region and right upper lobe.      Slightly more confluent opacities in the left upper lobe.      No other interval change         Electronically signed by: Juan Wharton   Date:    12/12/2023   Time:    10:04      X-Ray Chest 1 View for Line/Tube Placement   Final Result      Consolidative changes in the right perihilar region right mid and right upper lung zone similar to previous exam.      Interval insertion of right-sided PICC line         Electronically signed by: Juan Wharton   Date:    12/06/2023   Time:    12:14        EF   Date Value Ref Range Status   11/25/2023 55 % Final   12/29/2022 55 % Final        NUTRITION STATUS  Patient meets ASPEN criteria for   malnutrition of   per RD assessment as evidenced by:                       A minimum of two characteristics is recommended for diagnosis of either severe or non-severe malnutrition.     Case related differential diagnoses have been reviewed; assessment and plan has been documented. I have personally reviewed the labs and test results that are currently available; I have reviewed the patients medication list. I have reviewed the consulting providers recommendations. I have reviewed or attempted to review medical records based upon their availability.  All of the patient's and/or family's questions have been addressed and answered to the best of my ability.  I will continue to monitor closely and make adjustments to medical management as needed.  This document was created using [employer] Fluency Direct.  Transcription errors may have been made.  Please contact me if any questions may rise regarding documentation to  clarify transcription.      GISEL Russell   Internal Medicine  Department of Hospital Medicine [facility]

## 2023-12-19 NOTE — PROGRESS NOTES
Name: Mike Urbina Jr.    : 1955 (68 y.o.)  MRN: [id number]            Interdisciplinary Team Conference     Case conference held with patient/family and care team to discuss progress, plan of care, barriers to be addressed for safe return home, equipment recommendations, and discharge planning. Communicated therapy progress with MD, RN, therapy clinicians and case management. All questions/concerns answered.

## 2023-12-20 LAB
ANION GAP SERPL CALC-SCNC: 8 MEQ/L
BASOPHILS # BLD AUTO: 0 X10(3)/MCL
BASOPHILS NFR BLD AUTO: 0 %
BUN SERPL-MCNC: 22.1 MG/DL (ref 8.4–25.7)
CALCIUM SERPL-MCNC: 8.2 MG/DL (ref 8.8–10)
CHLORIDE SERPL-SCNC: 104 MMOL/L (ref 98–107)
CO2 SERPL-SCNC: 27 MMOL/L (ref 23–31)
CREAT SERPL-MCNC: 0.88 MG/DL (ref 0.73–1.18)
CREAT/UREA NIT SERPL: 25
EOSINOPHIL # BLD AUTO: 0.2 X10(3)/MCL (ref 0–0.9)
EOSINOPHIL NFR BLD AUTO: 5.4 %
ERYTHROCYTE [DISTWIDTH] IN BLOOD BY AUTOMATED COUNT: 17.2 % (ref 11.5–17)
GFR SERPLBLD CREATININE-BSD FMLA CKD-EPI: >60 MLS/MIN/1.73/M2
GLUCOSE SERPL-MCNC: 83 MG/DL (ref 82–115)
HCT VFR BLD AUTO: 30 % (ref 42–52)
HGB BLD-MCNC: 9.1 G/DL (ref 14–18)
IMM GRANULOCYTES # BLD AUTO: 0.02 X10(3)/MCL (ref 0–0.04)
IMM GRANULOCYTES NFR BLD AUTO: 0.5 %
LYMPHOCYTES # BLD AUTO: 1.06 X10(3)/MCL (ref 0.6–4.6)
LYMPHOCYTES NFR BLD AUTO: 28.9 %
MCH RBC QN AUTO: 28.2 PG (ref 27–31)
MCHC RBC AUTO-ENTMCNC: 30.3 G/DL (ref 33–36)
MCV RBC AUTO: 92.9 FL (ref 80–94)
MONOCYTES # BLD AUTO: 0.38 X10(3)/MCL (ref 0.1–1.3)
MONOCYTES NFR BLD AUTO: 10.4 %
NEUTROPHILS # BLD AUTO: 2.01 X10(3)/MCL (ref 2.1–9.2)
NEUTROPHILS NFR BLD AUTO: 54.8 %
PLATELET # BLD AUTO: 122 X10(3)/MCL (ref 130–400)
PMV BLD AUTO: 11.3 FL (ref 7.4–10.4)
POTASSIUM SERPL-SCNC: 3.8 MMOL/L (ref 3.5–5.1)
RBC # BLD AUTO: 3.23 X10(6)/MCL (ref 4.7–6.1)
SODIUM SERPL-SCNC: 139 MMOL/L (ref 136–145)
VANCOMYCIN TROUGH SERPL-MCNC: 15.4 UG/ML (ref 15–20)
WBC # SPEC AUTO: 3.67 X10(3)/MCL (ref 4.5–11.5)

## 2023-12-20 PROCEDURE — 80048 BASIC METABOLIC PNL TOTAL CA: CPT | Performed by: PHYSICIAN ASSISTANT

## 2023-12-20 PROCEDURE — 80202 ASSAY OF VANCOMYCIN: CPT | Performed by: INTERNAL MEDICINE

## 2023-12-20 PROCEDURE — 63600175 PHARM REV CODE 636 W HCPCS: Performed by: STUDENT IN AN ORGANIZED HEALTH CARE EDUCATION/TRAINING PROGRAM

## 2023-12-20 PROCEDURE — 25000003 PHARM REV CODE 250: Performed by: STUDENT IN AN ORGANIZED HEALTH CARE EDUCATION/TRAINING PROGRAM

## 2023-12-20 PROCEDURE — 94664 DEMO&/EVAL PT USE INHALER: CPT

## 2023-12-20 PROCEDURE — 94760 N-INVAS EAR/PLS OXIMETRY 1: CPT

## 2023-12-20 PROCEDURE — 85025 COMPLETE CBC W/AUTO DIFF WBC: CPT | Performed by: PHYSICIAN ASSISTANT

## 2023-12-20 PROCEDURE — 94799 UNLISTED PULMONARY SVC/PX: CPT | Mod: XB

## 2023-12-20 PROCEDURE — A4216 STERILE WATER/SALINE, 10 ML: HCPCS | Performed by: STUDENT IN AN ORGANIZED HEALTH CARE EDUCATION/TRAINING PROGRAM

## 2023-12-20 PROCEDURE — 97116 GAIT TRAINING THERAPY: CPT | Mod: CQ

## 2023-12-20 PROCEDURE — 25000003 PHARM REV CODE 250: Performed by: INTERNAL MEDICINE

## 2023-12-20 PROCEDURE — 25000003 PHARM REV CODE 250: Performed by: PHYSICIAN ASSISTANT

## 2023-12-20 PROCEDURE — 11000004 HC SNF PRIVATE

## 2023-12-20 PROCEDURE — 99900035 HC TECH TIME PER 15 MIN (STAT)

## 2023-12-20 RX ADMIN — ASPIRIN 81 MG CHEWABLE TABLET 81 MG: 81 TABLET CHEWABLE at 08:12

## 2023-12-20 RX ADMIN — FERROUS SULFATE TAB 325 MG (65 MG ELEMENTAL FE) 1 EACH: 325 (65 FE) TAB at 08:12

## 2023-12-20 RX ADMIN — GUAIFENESIN 400 MG: 200 SOLUTION ORAL at 02:12

## 2023-12-20 RX ADMIN — SODIUM CHLORIDE, PRESERVATIVE FREE 10 ML: 5 INJECTION INTRAVENOUS at 06:12

## 2023-12-20 RX ADMIN — VANCOMYCIN HYDROCHLORIDE 500 MG: 500 INJECTION, POWDER, LYOPHILIZED, FOR SOLUTION INTRAVENOUS at 10:12

## 2023-12-20 RX ADMIN — ACETAMINOPHEN 650 MG: 325 TABLET ORAL at 02:12

## 2023-12-20 RX ADMIN — SACUBITRIL AND VALSARTAN 1 TABLET: 24; 26 TABLET, FILM COATED ORAL at 08:12

## 2023-12-20 RX ADMIN — MICONAZOLE NITRATE 2 % TOPICAL POWDER: at 08:12

## 2023-12-20 RX ADMIN — QUETIAPINE FUMARATE 200 MG: 100 TABLET ORAL at 08:12

## 2023-12-20 RX ADMIN — Medication 1 CAPSULE: at 08:12

## 2023-12-20 RX ADMIN — APIXABAN 5 MG: 5 TABLET, FILM COATED ORAL at 08:12

## 2023-12-20 RX ADMIN — SODIUM CHLORIDE: 9 INJECTION, SOLUTION INTRAVENOUS at 10:12

## 2023-12-20 RX ADMIN — FUROSEMIDE 40 MG: 40 TABLET ORAL at 08:12

## 2023-12-20 RX ADMIN — Medication 1 CAPSULE: at 06:12

## 2023-12-20 RX ADMIN — GUAIFENESIN 400 MG: 200 SOLUTION ORAL at 08:12

## 2023-12-20 RX ADMIN — Medication 1 CAPSULE: at 12:12

## 2023-12-20 RX ADMIN — SODIUM CHLORIDE, PRESERVATIVE FREE 10 ML: 5 INJECTION INTRAVENOUS at 12:12

## 2023-12-20 RX ADMIN — LINACLOTIDE 72 MCG: 72 CAPSULE, GELATIN COATED ORAL at 06:12

## 2023-12-20 RX ADMIN — WHITE PETROLATUM: 1.75 OINTMENT TOPICAL at 08:12

## 2023-12-20 RX ADMIN — FOLIC ACID 1 MG: 1 TABLET ORAL at 08:12

## 2023-12-20 RX ADMIN — AMIODARONE HYDROCHLORIDE 200 MG: 200 TABLET ORAL at 08:12

## 2023-12-20 RX ADMIN — METOPROLOL SUCCINATE 100 MG: 50 TABLET, EXTENDED RELEASE ORAL at 08:12

## 2023-12-20 NOTE — PT/OT/SLP PROGRESS
Physical Therapy Treatment Note           Name: Mike Urbina Jr.    : 1955 (68 y.o.)  MRN: [id number]           TREATMENT SUMMARY AND RECOMMENDATIONS:    PT Received On: 23  PT Start Time: 0900     PT Stop Time: 0915  PT Total Time (min): 15 min     Subjective Assessment:  x No complaints  Lethargic    Awake, alert, cooperative  Uncooperative    Agitated  c/o pain    Appropriate  c/o fatigue    Confused  Treated at bedside     Emotionally labile  Treated in gym/dept.    Impulsive  Other:    Flat affect       Therapy Precautions:    Cognitive deficits  Spinal precautions    Collar - hard  Sternal precautions    Collar - soft   TLSO    Fall risk  LSO    Hip precautions - posterior  Knee immobilizer    Hip precautions - anterior  WBAT    Impaired communication  Partial weightbearing    Oxygen  TTWB    PEG tube  NWB    Visual deficits  Other:    Hearing deficits          Treatment Objectives:     Mobility Training:   Assist level Comments    Bed mobility     Transfer     Gait Rei Amb on firm surface with SBQC 800 ft    Sit to stand transitions     Sitting balance     Standing balance      Wheelchair mobility     Car transfer     Other:          Therapeutic Exercise:   Exercise Sets Reps Comments                               Additional Comments:  No issues noted    Assessment: Patient tolerated session well.    PT Plan: continue  Revisions made to plan of care: No    GOALS:   Multidisciplinary Problems       Physical Therapy Goals          Problem: Physical Therapy    Goal Priority Disciplines Outcome Goal Variances Interventions   Physical Therapy Goal     PT, PT/OT Ongoing, Progressing     Description: Goals to be met by: Discharge     Patient will increase functional independence with mobility by performin. Sit to stand transfer with Modified [place] -MET  2. Bed to chair transfer with Modified [place] using No Assistive Device - Partially Met (using cane)  3. Gait  x  1000 feet including outdoor negotiation and stair completion with Supervision using No Assistive Device. - Partially Met (using cane, going 800 feet)                         Skilled PT Minutes Provided: 15   Communication with Treatment Team:     Equipment recommendations:       At end of treatment, patient remained:  x Up in chair     Up in wheelchair in room    In bed    With alarm activated    Bed rails up    Call bell in reach     Family/friends present    Restraints secured properly    In bathroom with CNA/RN notified    Nurse aware    In gym with therapist/tech    Other:

## 2023-12-20 NOTE — PROGRESS NOTES
Pharmacokinetic Assessment Follow Up: IV Vancomycin    Vancomycin serum concentration assessment(s):    The trough level was drawn correctly and can be used to guide therapy at this time. The measurement is within the desired definitive target range of 15 to 20 mcg/mL.    Vancomycin Regimen Plan:    Continue regimen to Vancomycin 500 mg IV every 12 hours with next serum trough concentration measured at 1 hr prior to 1000 dose on 12/23    Drug levels (last 3 results):  Recent Labs   Lab Result Units 12/20/23  0927   Vancomycin Trough ug/ml 15.4       Pharmacy will continue to follow and monitor vancomycin.    Please contact pharmacy at extension [phone] for questions regarding this assessment.    Thank you for the consult,   Rico Bauman       Patient brief summary:  Mike Urbina Jr. is a 68 y.o. male initiated on antimicrobial therapy with IV Vancomycin for treatment of bacteremia    The patient's current regimen is vancomycin 500 mg q12hr    Drug Allergies:   Review of patient's allergies indicates:  No Known Allergies    Actual Body Weight:   73.9 kg    Renal Function:   Estimated Creatinine Clearance: 81.6 mL/min (based on SCr of 0.88 mg/dL).,     Dialysis Method (if applicable):  N/A    CBC (last 72 hours):  Recent Labs   Lab Result Units 12/18/23  0501 12/20/23  0311   WBC x10(3)/mcL 3.99* 3.67*   Hgb g/dL 9.1* 9.1*   Hct % 30.3* 30.0*   Platelet x10(3)/mcL 113* 122*   Mono % % 11.3 10.4   Eos % % 4.3 5.4   Basophil % % 0.5 0.0       Metabolic Panel (last 72 hours):  Recent Labs   Lab Result Units 12/18/23  0501 12/20/23  0351   Sodium Level mmol/L 138 139   Potassium Level mmol/L 3.7 3.8   Chloride mmol/L 103 104   Carbon Dioxide mmol/L 26 27   Glucose Level mg/dL 84 83   Blood Urea Nitrogen mg/dL 24.3 22.1   Creatinine mg/dL 1.01 0.88   Albumin Level g/dL 2.3*  --    Bilirubin Total mg/dL 0.9  --    Alkaline Phosphatase unit/L 70  --    Aspartate Aminotransferase unit/L 11  --    Alanine  Aminotransferase unit/L 9  --    Magnesium Level mg/dL 1.90  --    Phosphorus Level mg/dL 3.5  --        Vancomycin Administrations:  vancomycin given in the last 96 hours                     vancomycin (VANCOCIN) 500 mg in dextrose 5 % in water (D5W) 100 mL IVPB (MB+) (mg) 500 mg New Bag 12/20/23 1029     500 mg New Bag 12/19/23 2152     500 mg New Bag  0925     500 mg New Bag 12/18/23 2220     500 mg New Bag  1019     500 mg New Bag 12/17/23 2108     500 mg New Bag  0957     500 mg New Bag 12/16/23 2240                    Microbiologic Results:  Microbiology Results (last 7 days)       ** No results found for the last 168 hours. **

## 2023-12-20 NOTE — PLAN OF CARE
Problem: Adult Inpatient Plan of Care  Goal: Plan of Care Review  Outcome: Ongoing, Progressing  Goal: Patient-Specific Goal (Individualized)  Outcome: Ongoing, Progressing  Flowsheets (Taken 12/19/2023 1919)  Anxieties, Fears or Concerns: Concerned about being able to sleep over night  Individualized Care Needs: IV abx therapy, maintain safety, promote adequate rest  Goal: Absence of Hospital-Acquired Illness or Injury  Outcome: Ongoing, Progressing  Intervention: Prevent and Manage VTE (Venous Thromboembolism) Risk  Flowsheets (Taken 12/19/2023 1919)  VTE Prevention/Management:   ambulation promoted   bleeding precations maintained   bleeding risk assessed     Problem: Infection (Pneumonia)  Goal: Resolution of Infection Signs and Symptoms  Outcome: Ongoing, Progressing

## 2023-12-20 NOTE — PLAN OF CARE
Problem: Adult Inpatient Plan of Care  Goal: Plan of Care Review  Outcome: Ongoing, Progressing  Flowsheets (Taken 12/20/2023 1134)  Plan of Care Reviewed With: patient  Goal: Patient-Specific Goal (Individualized)  Outcome: Ongoing, Progressing  Flowsheets (Taken 12/20/2023 1135)  Anxieties, Fears or Concerns: None expressed at this time  Individualized Care Needs: Iv abx therapy  Goal: Absence of Hospital-Acquired Illness or Injury  Outcome: Ongoing, Progressing  Intervention: Identify and Manage Fall Risk  Flowsheets (Taken 12/20/2023 1135)  Safety Promotion/Fall Prevention:   assistive device/personal item within reach   nonskid shoes/socks when out of bed   side rails raised x 2  Intervention: Prevent Skin Injury  Flowsheets (Taken 12/20/2023 1135)  Body Position: position changed independently  Skin Protection:   adhesive use limited   tubing/devices free from skin contact   transparent dressing maintained   silicone foam dressing in place  Intervention: Prevent and Manage VTE (Venous Thromboembolism) Risk  Flowsheets (Taken 12/20/2023 1135)  Activity Management: Rolling - L1  VTE Prevention/Management:   ambulation promoted   bleeding precations maintained   bleeding risk assessed   fluids promoted  Range of Motion: active ROM (range of motion) encouraged  Intervention: Prevent Infection  Flowsheets (Taken 12/20/2023 1135)  Infection Prevention:   cohorting utilized   environmental surveillance performed   equipment surfaces disinfected   hand hygiene promoted   single patient room provided   rest/sleep promoted  Goal: Optimal Comfort and Wellbeing  Outcome: Ongoing, Progressing  Intervention: Monitor Pain and Promote Comfort  Flowsheets (Taken 12/20/2023 1135)  Pain Management Interventions:   care clustered   diversional activity provided   relaxation techniques promoted   quiet environment facilitated   position adjusted  Intervention: Provide Person-Centered Care  Flowsheets (Taken 12/20/2023  1135)  Trust Relationship/Rapport:   care explained   reassurance provided   choices provided   thoughts/feelings acknowledged   emotional support provided   empathic listening provided   questions answered   questions encouraged  Goal: Readiness for Transition of Care  Outcome: Ongoing, Progressing  Intervention: Mutually Develop Transition Plan  Flowsheets (Taken 12/20/2023 1135)  Communicated [place] with patient/caregiver: Date not available/Unable to determine     Problem: Fluid Imbalance (Pneumonia)  Goal: Fluid Balance  Outcome: Ongoing, Progressing  Intervention: Monitor and Manage Fluid Balance  Flowsheets (Taken 12/20/2023 1135)  Fluid/Electrolyte Management: fluids provided     Problem: Infection (Pneumonia)  Goal: Resolution of Infection Signs and Symptoms  Outcome: Ongoing, Progressing  Intervention: Prevent Infection Progression  Flowsheets (Taken 12/20/2023 1135)  Fever Reduction/Comfort Measures:   lightweight bedding   lightweight clothing  Infection Management: aseptic technique maintained  Isolation Precautions:   precautions maintained   protective     Problem: Respiratory Compromise (Pneumonia)  Goal: Effective Oxygenation and Ventilation  Outcome: Ongoing, Progressing  Intervention: Promote Airway Secretion Clearance  Flowsheets (Taken 12/20/2023 1135)  Breathing Techniques/Airway Clearance: deep/controlled cough encouraged  Cough And Deep Breathing: done independently per patient  Intervention: Optimize Oxygenation and Ventilation  Flowsheets (Taken 12/20/2023 1135)  Airway/Ventilation Management:   airway patency maintained   pulmonary hygiene promoted  Head of Bed (HOB) Positioning: HOB at 30-45 degrees     Problem: Infection  Goal: Absence of Infection Signs and Symptoms  Outcome: Ongoing, Progressing  Intervention: Prevent or Manage Infection  Flowsheets (Taken 12/20/2023 1135)  Fever Reduction/Comfort Measures:   lightweight bedding   lightweight clothing  Infection Management: aseptic  technique maintained  Isolation Precautions:   precautions maintained   protective     Problem: Fall Injury Risk  Goal: Absence of Fall and Fall-Related Injury  Outcome: Ongoing, Progressing  Intervention: Identify and Manage Contributors  Flowsheets (Taken 12/20/2023 1135)  Self-Care Promotion:   independence encouraged   BADL personal objects within reach   BADL personal routines maintained   safe use of adaptive equipment encouraged  Medication Review/Management: medications reviewed  Intervention: Promote Injury-Free Environment  Flowsheets (Taken 12/20/2023 1135)  Safety Promotion/Fall Prevention:   assistive device/personal item within reach   nonskid shoes/socks when out of bed   side rails raised x 2

## 2023-12-20 NOTE — PROGRESS NOTES
[facility] MEDICINE ~ PROGRESS NOTE    CHIEF COMPLAINT   Hospital follow up    HOSPITAL COURSE   69yo male with a past medical history of CAD, diastolic CHF, Afib, aortic stenosis, NSTEMI, and HTN who was recently admitted to [facility] for pneumonia and Afib RVR. Patient was diagnosed with Influenza A on 11/19/2023 prior to admission and started on Tamiflu. CTA chest 11/24/2023 showed no PE, but multifocal pneumonia most evident in RUL. Patient was admitted for ICU, started on Cardizem drip, Vanc, Zosyn, Doxy, and required Levophed for hypotension. Echo 11/25/2023 showed LVEF 55% moderate aortic stenosis, severe posterior mitral annular calcification present. Blood cultures x2 resulted positive for MRSA on 11/27/2023.  Infectious Disease recommended patient to continue vancomycin for 4 weeks with suspected in date on 12/25/2023. Stool culture on 11/27/2023 showed many yeast. Repeat blood cultures x2 on 12/02/2023 showed no growth. Patient underwent a CARO guided DCCV on 11/30/2023 which showed mild-moderately enlarged right atrium, no ASD or PFO, LVEF 55%, mild-moderate left ventricular hypertrophy, severe aortic stenosis, moderate mitral stenosis, mild-moderate mitral regurgitation, mild-moderate tricuspid regurgitation, no vegetation present, no intracardiac thrombus, successful cardioversion. CTA chest, abdomen, pelvis 12/05/2023 showed small bilateral pleural effusion R>L and patchy consolidation in both lungs increased compared to prior. CTA was obtained for TAVR workup to be completed outpatient after ID clearance. On exam today, patient is currently on 2 L nasal cannula with O2 sat 99%.  With worsening pneumonia and lung sounds on exam, we will start Zosyn, obtain sputum culture, add nystatin for fungal coverage, and schedule Mucomyst with the Xopenex nebulizers 3 times a day.     Today  Denies any complaints today.    OBJECTIVE/PHYSICAL EXAM     VITAL SIGNS (MOST  RECENT):  Temp: 97.9 °F (36.6 °C) (12/20/23 0721)  Pulse: 73 (12/20/23 0844)  Resp: 18 (12/19/23 1936)  BP: 136/69 (12/20/23 0844)  SpO2: 96 % (12/20/23 0721) VITAL SIGNS (24 HOUR RANGE):  Temp:  [97.8 °F (36.6 °C)-98.6 °F (37 °C)] 97.9 °F (36.6 °C)  Pulse:  [72-79] 73  Resp:  [18-20] 18  SpO2:  [92 %-98 %] 96 %  BP: (105-149)/(62-76) 136/69     GENERAL: In no acute distress, afebrile  HEENT: Atraumatic, normocephalic, moist mucous membranes, supple neck   CHEST: CTAB  HEART: S1, S2, grade IV murmur  ABDOMEN: Soft, nontender, BS +  MSK: Warm, no lower extremity edema, no clubbing or cyanosis  NEUROLOGIC: Alert and oriented x4, moving all extremities with good strength   INTEGUMENTARY: No obvious skin rash   PSYCHIATRY: Appropriate mood and affect     ASSESSMENT/PLAN   MRSA bacteriemia   Multifocal Pneumonia  Recent Influenza A infection (11/19/2023)  Acute hypoxic respiratory failure  CTA chest 11/24/2023 showed no PE, but multifocal pneumonia most evident in RUL  Blood cultures x2 positive for MRSA on 11/27/2023, repeats were negative  ID recommended patient to continue vancomycin for 4 weeks with suspected in date on 12/25/2023, Cefepime (12/07/2023-12/12/2023)  D/c Zosyn (12/06/2023-12/07/203) and Nystatin (12/06/2023-12/07/2023)  CXR 12/14/2023 shows no changes, Lasix 40mg IVP x1 given for cough with pink, frothy sputum   IS, cough assist, acapella, Xopenex prn    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   Thrombocytopenia  Hx of alcohol use disorder  Thiamine wnl   Ferrlecit x3, oral supplementation   Hold Eliquis if platelets <50  Received 2u pRBC 12/15/2023 with Lasix 40mg IVP between units   Continue Aspirin and Eliquis - monitor H&H      Yeast + Stool  D/c Nystatin (12/06/2023-12/07/2023) low concern for disseminated candidemia   Continue Probiotic    Hypokalemia   Replete as condition requires     Transamnitis - resolved   Grayzone hepatitis C Ab  HCV RNA Detect/Quant, S Undetected       CAD  Diastolic CHF  Afib  Aortic stenosis  Essential HTN  CARO guided DCCV on 11/30/2023 which showed mild-moderately enlarged right atrium, no ASD or PFO, LVEF 55%, mild-moderate left ventricular hypertrophy, severe aortic stenosis, moderate mitral stenosis, mild-moderate mitral regurgitation, mild-moderate tricuspid regurgitation, no vegetation present, no intracardiac thrombus, successful cardioversion  TAVR to be completed outpatient after ID clearance  Continue Entresto, Toprol XL, Amiodarone, Aspiring, and Eliquis   Continue PO Lasix 40mg    DVT prophylaxis: Eliquis   Anticipated discharge and disposition: Continue PT/OT and IV antibiotics until 12/25/2023. Plan for d/c home on 12/26/2023  __________________________________________________________________________    LABS/MICRO/MEDS/DIAGNOSTICS     LABS  Recent Labs     12/18/23  0501 12/20/23  0351    139   K 3.7 3.8   CHLORIDE 103 104   CO2 26 27   BUN 24.3 22.1   CREATININE 1.01 0.88   GLUCOSE 84 83   CALCIUM 8.1* 8.2*   ALKPHOS 70  --    AST 11  --    ALT 9  --    ALBUMIN 2.3*  --      Recent Labs     12/20/23  0311   WBC 3.67*   RBC 3.23*   HCT 30.0*   MCV 92.9   *     MICROBIOLOGY  Microbiology Results (last 7 days)       ** No results found for the last 168 hours. **             MEDICATIONS   amiodarone  200 mg Oral Daily    apixaban  5 mg Oral BID    aspirin  81 mg Oral Daily    ferrous sulfate  1 tablet Oral Daily    folic acid  1 mg Oral Daily    furosemide  40 mg Oral Daily    guaiFENesin 100 mg/5 ml  400 mg Oral Q6H WAKE    Lactobacillus acidophilus  1 capsule Oral TID WM    linaCLOtide  72 mcg Oral Before breakfast    metoprolol succinate  100 mg Oral Daily    miconazole NITRATE 2 %   Topical (Top) BID    QUEtiapine  200 mg Oral QHS    sacubitriL-valsartan  1 tablet Oral BID    sodium chloride 0.9%  10 mL Intravenous Q6H    vancomycin (VANCOCIN) IV (PEDS and ADULTS)  500 mg Intravenous Q12H    white petrolatum   Topical (Top) BID          INFUSIONS       DIAGNOSTIC TESTS  X-Ray Chest 1 View   Final Result      No significant change         Electronically signed by: Juan Wharton   Date:    12/14/2023   Time:    07:51      X-Ray Chest 1 View   Final Result      Persistent consolidative changes in the right perihilar region and right upper lobe.      Slightly more confluent opacities in the left upper lobe.      No other interval change         Electronically signed by: Juan Wharton   Date:    12/12/2023   Time:    10:04      X-Ray Chest 1 View for Line/Tube Placement   Final Result      Consolidative changes in the right perihilar region right mid and right upper lung zone similar to previous exam.      Interval insertion of right-sided PICC line         Electronically signed by: Juan Wharton   Date:    12/06/2023   Time:    12:14        EF   Date Value Ref Range Status   11/25/2023 55 % Final   12/29/2022 55 % Final        NUTRITION STATUS  Patient meets ASPEN criteria for   malnutrition of   per RD assessment as evidenced by:                       A minimum of two characteristics is recommended for diagnosis of either severe or non-severe malnutrition.     Case related differential diagnoses have been reviewed; assessment and plan has been documented. I have personally reviewed the labs and test results that are currently available; I have reviewed the patients medication list. I have reviewed the consulting providers recommendations. I have reviewed or attempted to review medical records based upon their availability.  All of the patient's and/or family's questions have been addressed and answered to the best of my ability.  I will continue to monitor closely and make adjustments to medical management as needed.  This document was created using M*Modal Fluency Direct.  Transcription errors may have been made.  Please contact me if any questions may rise regarding documentation to clarify transcription.      GISEL Russell    Internal Medicine  Department of Hospital Medicine [facility]

## 2023-12-21 PROCEDURE — 11000004 HC SNF PRIVATE

## 2023-12-21 PROCEDURE — 25000003 PHARM REV CODE 250: Performed by: STUDENT IN AN ORGANIZED HEALTH CARE EDUCATION/TRAINING PROGRAM

## 2023-12-21 PROCEDURE — 25000003 PHARM REV CODE 250: Performed by: INTERNAL MEDICINE

## 2023-12-21 PROCEDURE — 97116 GAIT TRAINING THERAPY: CPT

## 2023-12-21 PROCEDURE — 99900035 HC TECH TIME PER 15 MIN (STAT)

## 2023-12-21 PROCEDURE — A4216 STERILE WATER/SALINE, 10 ML: HCPCS | Performed by: STUDENT IN AN ORGANIZED HEALTH CARE EDUCATION/TRAINING PROGRAM

## 2023-12-21 PROCEDURE — 94760 N-INVAS EAR/PLS OXIMETRY 1: CPT

## 2023-12-21 PROCEDURE — 25000003 PHARM REV CODE 250: Performed by: PHYSICIAN ASSISTANT

## 2023-12-21 PROCEDURE — 94664 DEMO&/EVAL PT USE INHALER: CPT

## 2023-12-21 PROCEDURE — 63600175 PHARM REV CODE 636 W HCPCS: Performed by: STUDENT IN AN ORGANIZED HEALTH CARE EDUCATION/TRAINING PROGRAM

## 2023-12-21 RX ADMIN — METOPROLOL SUCCINATE 100 MG: 50 TABLET, EXTENDED RELEASE ORAL at 09:12

## 2023-12-21 RX ADMIN — SODIUM CHLORIDE, PRESERVATIVE FREE 10 ML: 5 INJECTION INTRAVENOUS at 05:12

## 2023-12-21 RX ADMIN — ASPIRIN 81 MG CHEWABLE TABLET 81 MG: 81 TABLET CHEWABLE at 09:12

## 2023-12-21 RX ADMIN — SODIUM CHLORIDE, PRESERVATIVE FREE 10 ML: 5 INJECTION INTRAVENOUS at 06:12

## 2023-12-21 RX ADMIN — GUAIFENESIN 400 MG: 200 SOLUTION ORAL at 08:12

## 2023-12-21 RX ADMIN — APIXABAN 5 MG: 5 TABLET, FILM COATED ORAL at 09:12

## 2023-12-21 RX ADMIN — FUROSEMIDE 40 MG: 40 TABLET ORAL at 09:12

## 2023-12-21 RX ADMIN — SACUBITRIL AND VALSARTAN 1 TABLET: 24; 26 TABLET, FILM COATED ORAL at 08:12

## 2023-12-21 RX ADMIN — Medication 1 CAPSULE: at 07:12

## 2023-12-21 RX ADMIN — Medication 1 CAPSULE: at 11:12

## 2023-12-21 RX ADMIN — WHITE PETROLATUM: 1.75 OINTMENT TOPICAL at 09:12

## 2023-12-21 RX ADMIN — FOLIC ACID 1 MG: 1 TABLET ORAL at 09:12

## 2023-12-21 RX ADMIN — FERROUS SULFATE TAB 325 MG (65 MG ELEMENTAL FE) 1 EACH: 325 (65 FE) TAB at 09:12

## 2023-12-21 RX ADMIN — GUAIFENESIN 400 MG: 200 SOLUTION ORAL at 02:12

## 2023-12-21 RX ADMIN — QUETIAPINE FUMARATE 200 MG: 100 TABLET ORAL at 08:12

## 2023-12-21 RX ADMIN — MICONAZOLE NITRATE 2 % TOPICAL POWDER: at 09:12

## 2023-12-21 RX ADMIN — VANCOMYCIN HYDROCHLORIDE 500 MG: 500 INJECTION, POWDER, LYOPHILIZED, FOR SOLUTION INTRAVENOUS at 10:12

## 2023-12-21 RX ADMIN — SACUBITRIL AND VALSARTAN 1 TABLET: 24; 26 TABLET, FILM COATED ORAL at 09:12

## 2023-12-21 RX ADMIN — LINACLOTIDE 72 MCG: 72 CAPSULE, GELATIN COATED ORAL at 05:12

## 2023-12-21 RX ADMIN — SODIUM CHLORIDE: 9 INJECTION, SOLUTION INTRAVENOUS at 11:12

## 2023-12-21 RX ADMIN — ACETAMINOPHEN 650 MG: 325 TABLET ORAL at 03:12

## 2023-12-21 RX ADMIN — APIXABAN 5 MG: 5 TABLET, FILM COATED ORAL at 08:12

## 2023-12-21 RX ADMIN — WHITE PETROLATUM: 1.75 OINTMENT TOPICAL at 08:12

## 2023-12-21 RX ADMIN — Medication 1 CAPSULE: at 03:12

## 2023-12-21 RX ADMIN — GUAIFENESIN 400 MG: 200 SOLUTION ORAL at 07:12

## 2023-12-21 RX ADMIN — SODIUM CHLORIDE, PRESERVATIVE FREE 10 ML: 5 INJECTION INTRAVENOUS at 12:12

## 2023-12-21 RX ADMIN — AMIODARONE HYDROCHLORIDE 200 MG: 200 TABLET ORAL at 09:12

## 2023-12-21 RX ADMIN — ACETAMINOPHEN 650 MG: 325 TABLET ORAL at 09:12

## 2023-12-21 RX ADMIN — MICONAZOLE NITRATE 2 % TOPICAL POWDER: at 08:12

## 2023-12-21 RX ADMIN — VANCOMYCIN HYDROCHLORIDE 500 MG: 500 INJECTION, POWDER, LYOPHILIZED, FOR SOLUTION INTRAVENOUS at 09:12

## 2023-12-21 NOTE — PLAN OF CARE
[facility]  Discharge Reassessment    Primary Care Provider: Darlene Willams FNP    Expected Discharge Date:     Reassessment (most recent)       Discharge Reassessment - 12/20/23 1825          Discharge Reassessment    Assessment Type Discharge Planning Reassessment     Did the patient's condition or plan change since previous assessment? No     Discharge Plan discussed with: Patient     Communicated MODESTO with patient/caregiver Date not available/Unable to determine     Discharge Plan A Home     DME Needed Upon Discharge  none     Why the patient remains in the hospital Requires continued medical care        Post-Acute Status    Hospital Resources/Appts/Education Provided Provided patient/caregiver with written discharge plan information     Discharge Delays None known at this time

## 2023-12-21 NOTE — PT/OT/SLP DISCHARGE
Physical Therapy Discharge Summary    Name: Mike Urbina Jr.  MRN: [id number]   Principal Problem: Bacteremia     Patient Discharged from acute Physical Therapy on .  Please refer to prior PT noted date on  for functional status.     Assessment:     Patient has met all goals and is not appropriate for therapy.    Objective:     GOALS:   Multidisciplinary Problems       Physical Therapy Goals       Not on file              Multidisciplinary Problems (Resolved)          Problem: Physical Therapy    Goal Priority Disciplines Outcome Goal Variances Interventions   Physical Therapy Goal   (Resolved)     PT, PT/OT Met     Description: Goals to be met by: Discharge     Patient will increase functional independence with mobility by performin. Sit to stand transfer with Modified [place] -MET  2. Bed to chair transfer with Modified [place] using No Assistive Device - Partially Met (using cane)  3. Gait  x 1000 feet including outdoor negotiation and stair completion with Supervision using No Assistive Device. - Partially Met (using cane, going 1000 + feet)                         Reasons for Discontinuation of Therapy Services  Satisfactory goal achievement.      Plan:     Patient Discharged to: Home no PT services needed.      2023

## 2023-12-21 NOTE — NURSING
Nurses Note -- 4 Eyes      12/21/2023   12:27 AM      Skin assessed during: Daily Assessment      [] No Altered Skin Integrity Present    []Prevention Measures Documented      [x] Yes- Altered Skin Integrity Present or Discovered   [] LDA Added if Not in Epic (Describe Wound)   [x] New Altered Skin Integrity was Present on Admit and Documented in LDA   [] Wound Image Taken    Wound Care Consulted? No    Attending Nurse:  Alka Shahid LPN    Second RN/Staff Member:   Marla Cisneros RN

## 2023-12-21 NOTE — PROGRESS NOTES
[facility] MEDICINE ~ PROGRESS NOTE    CHIEF COMPLAINT   Hospital follow up    HOSPITAL COURSE   67yo male with a past medical history of CAD, diastolic CHF, Afib, aortic stenosis, NSTEMI, and HTN who was recently admitted to [facility] for pneumonia and Afib RVR. Patient was diagnosed with Influenza A on 11/19/2023 prior to admission and started on Tamiflu. CTA chest 11/24/2023 showed no PE, but multifocal pneumonia most evident in RUL. Patient was admitted for ICU, started on Cardizem drip, Vanc, Zosyn, Doxy, and required Levophed for hypotension. Echo 11/25/2023 showed LVEF 55% moderate aortic stenosis, severe posterior mitral annular calcification present. Blood cultures x2 resulted positive for MRSA on 11/27/2023.  Infectious Disease recommended patient to continue vancomycin for 4 weeks with suspected in date on 12/25/2023. Stool culture on 11/27/2023 showed many yeast. Repeat blood cultures x2 on 12/02/2023 showed no growth. Patient underwent a CARO guided DCCV on 11/30/2023 which showed mild-moderately enlarged right atrium, no ASD or PFO, LVEF 55%, mild-moderate left ventricular hypertrophy, severe aortic stenosis, moderate mitral stenosis, mild-moderate mitral regurgitation, mild-moderate tricuspid regurgitation, no vegetation present, no intracardiac thrombus, successful cardioversion. CTA chest, abdomen, pelvis 12/05/2023 showed small bilateral pleural effusion R>L and patchy consolidation in both lungs increased compared to prior. CTA was obtained for TAVR workup to be completed outpatient after ID clearance. On exam today, patient is currently on 2 L nasal cannula with O2 sat 99%.  With worsening pneumonia and lung sounds on exam, we will start Zosyn, obtain sputum culture, add nystatin for fungal coverage, and schedule Mucomyst with the Xopenex nebulizers 3 times a day.     Today  Denies any complaints today.    OBJECTIVE/PHYSICAL EXAM     VITAL SIGNS (MOST  RECENT):  Temp: 97.9 °F (36.6 °C) (12/21/23 0724)  Pulse: 66 (12/21/23 0724)  Resp: 18 (12/20/23 1935)  BP: 134/77 (12/21/23 0724)  SpO2: 97 % (12/21/23 0724) VITAL SIGNS (24 HOUR RANGE):  Temp:  [97.9 °F (36.6 °C)-98.7 °F (37.1 °C)] 97.9 °F (36.6 °C)  Pulse:  [66-74] 66  Resp:  [18] 18  SpO2:  [96 %-98 %] 97 %  BP: (105-136)/(59-77) 134/77     GENERAL: In no acute distress, afebrile  HEENT: Atraumatic, normocephalic, moist mucous membranes, supple neck   CHEST: CTAB  HEART: S1, S2, grade IV murmur  ABDOMEN: Soft, nontender, BS +  MSK: Warm, no lower extremity edema, no clubbing or cyanosis  NEUROLOGIC: Alert and oriented x4, moving all extremities with good strength   INTEGUMENTARY: No obvious skin rash   PSYCHIATRY: Appropriate mood and affect     ASSESSMENT/PLAN   MRSA bacteriemia   Multifocal Pneumonia  Recent Influenza A infection (11/19/2023)  Acute hypoxic respiratory failure  CTA chest 11/24/2023 showed no PE, but multifocal pneumonia most evident in RUL  Blood cultures x2 positive for MRSA on 11/27/2023, repeats were negative  ID recommended patient to continue vancomycin for 4 weeks with suspected in date on 12/25/2023, Cefepime (12/07/2023-12/12/2023)  D/c Zosyn (12/06/2023-12/07/203) and Nystatin (12/06/2023-12/07/2023)  CXR 12/14/2023 shows no changes, Lasix 40mg IVP x1 given for cough with pink, frothy sputum   IS, cough assist, acapella, Xopenex prn    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   Thrombocytopenia  Hx of alcohol use disorder  Thiamine wnl   Ferrlecit x3, oral supplementation   Hold Eliquis if platelets <50  Received 2u pRBC 12/15/2023 with Lasix 40mg IVP between units   Continue Aspirin and Eliquis - monitor H&H      Yeast + Stool  D/c Nystatin (12/06/2023-12/07/2023) low concern for disseminated candidemia   Continue Probiotic    Hypokalemia   Replete as condition requires     Transamnitis - resolved   Grayzone hepatitis C Ab  HCV RNA Detect/Quant, S Undetected       CAD  Diastolic CHF  Afib  Aortic stenosis  Essential HTN  CARO guided DCCV on 11/30/2023 which showed mild-moderately enlarged right atrium, no ASD or PFO, LVEF 55%, mild-moderate left ventricular hypertrophy, severe aortic stenosis, moderate mitral stenosis, mild-moderate mitral regurgitation, mild-moderate tricuspid regurgitation, no vegetation present, no intracardiac thrombus, successful cardioversion  TAVR to be completed outpatient after ID clearance  Continue Entresto, Toprol XL, Amiodarone, Aspiring, and Eliquis   Continue PO Lasix 40mg    DVT prophylaxis: Eliquis   Anticipated discharge and disposition: Continue PT/OT and IV antibiotics until 12/25/2023. Plan for d/c home on 12/26/2023  __________________________________________________________________________    LABS/MICRO/MEDS/DIAGNOSTICS     LABS  Recent Labs     12/20/23  0351      K 3.8   CHLORIDE 104   CO2 27   BUN 22.1   CREATININE 0.88   GLUCOSE 83   CALCIUM 8.2*     Recent Labs     12/20/23  0311   WBC 3.67*   RBC 3.23*   HCT 30.0*   MCV 92.9   *     MICROBIOLOGY  Microbiology Results (last 7 days)       ** No results found for the last 168 hours. **             MEDICATIONS   amiodarone  200 mg Oral Daily    apixaban  5 mg Oral BID    aspirin  81 mg Oral Daily    ferrous sulfate  1 tablet Oral Daily    folic acid  1 mg Oral Daily    furosemide  40 mg Oral Daily    guaiFENesin 100 mg/5 ml  400 mg Oral Q6H WAKE    Lactobacillus acidophilus  1 capsule Oral TID WM    linaCLOtide  72 mcg Oral Before breakfast    metoprolol succinate  100 mg Oral Daily    miconazole NITRATE 2 %   Topical (Top) BID    QUEtiapine  200 mg Oral QHS    sacubitriL-valsartan  1 tablet Oral BID    sodium chloride 0.9%  10 mL Intravenous Q6H    vancomycin (VANCOCIN) IV (PEDS and ADULTS)  500 mg Intravenous Q12H    white petrolatum   Topical (Top) BID         INFUSIONS       DIAGNOSTIC TESTS  X-Ray Chest 1 View   Final Result      No significant change          Electronically signed by: Juan Wharton   Date:    12/14/2023   Time:    07:51      X-Ray Chest 1 View   Final Result      Persistent consolidative changes in the right perihilar region and right upper lobe.      Slightly more confluent opacities in the left upper lobe.      No other interval change         Electronically signed by: Juan Wharton   Date:    12/12/2023   Time:    10:04      X-Ray Chest 1 View for Line/Tube Placement   Final Result      Consolidative changes in the right perihilar region right mid and right upper lung zone similar to previous exam.      Interval insertion of right-sided PICC line         Electronically signed by: Juan Wharton   Date:    12/06/2023   Time:    12:14        EF   Date Value Ref Range Status   11/25/2023 55 % Final   12/29/2022 55 % Final        NUTRITION STATUS  Patient meets ASPEN criteria for   malnutrition of   per RD assessment as evidenced by:                       A minimum of two characteristics is recommended for diagnosis of either severe or non-severe malnutrition.     Case related differential diagnoses have been reviewed; assessment and plan has been documented. I have personally reviewed the labs and test results that are currently available; I have reviewed the patients medication list. I have reviewed the consulting providers recommendations. I have reviewed or attempted to review medical records based upon their availability.  All of the patient's and/or family's questions have been addressed and answered to the best of my ability.  I will continue to monitor closely and make adjustments to medical management as needed.  This document was created using M*Modal Fluency Direct.  Transcription errors may have been made.  Please contact me if any questions may rise regarding documentation to clarify transcription.      Mary Cleaning MD   Internal Medicine  Department of Hospital Medicine [facility] - [facility]

## 2023-12-21 NOTE — PLAN OF CARE
Problem: Physical Therapy  Goal: Physical Therapy Goal  Description: Goals to be met by: Discharge     Patient will increase functional independence with mobility by performin. Sit to stand transfer with Modified [place] -MET  2. Bed to chair transfer with Modified [place] using No Assistive Device - Partially Met (using cane)  3. Gait  x 1000 feet including outdoor negotiation and stair completion with Supervision using No Assistive Device. - Partially Met (using cane, going 1000 + feet)    Outcome: Met

## 2023-12-21 NOTE — PLAN OF CARE
Problem: Adult Inpatient Plan of Care  Goal: Plan of Care Review  Outcome: Ongoing, Progressing  Goal: Patient-Specific Goal (Individualized)  Outcome: Ongoing, Progressing  Flowsheets (Taken 12/21/2023 0800)  Anxieties, Fears or Concerns: None  Individualized Care Needs: Monitor via telemetry, IV Abx, provide update on discharge by the end of this shift [id number]     Problem: Infection  Goal: Absence of Infection Signs and Symptoms  Outcome: Ongoing, Progressing  Intervention: Prevent or Manage Infection  Flowsheets (Taken 12/21/2023 0800)  Fever Reduction/Comfort Measures:   lightweight bedding   lightweight clothing

## 2023-12-21 NOTE — PLAN OF CARE
Problem: Adult Inpatient Plan of Care  Goal: Plan of Care Review  Outcome: Ongoing, Progressing  Goal: Patient-Specific Goal (Individualized)  Outcome: Ongoing, Progressing  Flowsheets (Taken 12/20/2023 1925)  Anxieties, Fears or Concerns: Concerned about being able to sleep over night  Individualized Care Needs: IV abx therapy, maintain safety, promote adequate rest  Goal: Absence of Hospital-Acquired Illness or Injury  Outcome: Ongoing, Progressing  Intervention: Prevent and Manage VTE (Venous Thromboembolism) Risk  Flowsheets (Taken 12/20/2023 1925)  VTE Prevention/Management:   ambulation promoted   bleeding precations maintained   bleeding risk assessed     Problem: Infection (Pneumonia)  Goal: Resolution of Infection Signs and Symptoms  Outcome: Ongoing, Progressing

## 2023-12-21 NOTE — PT/OT/SLP PROGRESS
Physical Therapy Treatment Note           Name: Mike Urbina Jr.    : 1955 (68 y.o.)  MRN: [id number]           TREATMENT SUMMARY AND RECOMMENDATIONS:    PT Received On: 23  PT Start Time: 1315     PT Stop Time: 1330  PT Total Time (min): 15 min     Subjective Assessment:  x No complaints  Lethargic    Awake, alert, cooperative  Uncooperative    Agitated  c/o pain    Appropriate  c/o fatigue    Confused  Treated at bedside     Emotionally labile x Treated in gym/dept.    Impulsive  Other:    Flat affect       Therapy Precautions:    Cognitive deficits  Spinal precautions    Collar - hard  Sternal precautions    Collar - soft   TLSO    Fall risk  LSO    Hip precautions - posterior  Knee immobilizer    Hip precautions - anterior  WBAT    Impaired communication  Partial weightbearing    Oxygen  TTWB    PEG tube  NWB    Visual deficits  Other:    Hearing deficits          Treatment Objectives:     Mobility Training:   Assist level Comments    Bed mobility     Transfer MOD I Using quad cane   Gait MOD I Amb on firm surface with SBQC x 1000+ feet including outdoor,  ramp and curb negotiation    Sit to stand transitions MOD I    Sitting balance     Standing balance  MOD I Dynamic reaching in standing   Wheelchair mobility     Car transfer     Other: Stair negotiation          Therapeutic Exercise:   Exercise Sets Reps Comments                               Additional Comments:  No issues noted. Pt has met goals and ready for DC.     .Assessment: Patient tolerated session well.    PT Plan:DC from PT  Revisions made to plan of care: Yes, goals met.     GOALS:   Multidisciplinary Problems       Physical Therapy Goals       Not on file              Multidisciplinary Problems (Resolved)          Problem: Physical Therapy    Goal Priority Disciplines Outcome Goal Variances Interventions   Physical Therapy Goal   (Resolved)     PT, PT/OT Met     Description: Goals to be met by: Discharge     Patient  will increase functional independence with mobility by performin. Sit to stand transfer with Modified [place] -MET  2. Bed to chair transfer with Modified [place] using No Assistive Device - Partially Met (using cane)  3. Gait  x 1000 feet including outdoor negotiation and stair completion with Supervision using No Assistive Device. - Partially Met (using cane, going 1000 + feet)                         Skilled PT Minutes Provided: 15   Communication with Treatment Team:     Equipment recommendations:       At end of treatment, patient remained:  x Up in chair     Up in wheelchair in room    In bed    With alarm activated    Bed rails up   x Call bell in reach     Family/friends present    Restraints secured properly    In bathroom with CNA/RN notified   x Nurse aware    In gym with therapist/tech    Other:

## 2023-12-22 PROCEDURE — 25000003 PHARM REV CODE 250: Performed by: STUDENT IN AN ORGANIZED HEALTH CARE EDUCATION/TRAINING PROGRAM

## 2023-12-22 PROCEDURE — 11000004 HC SNF PRIVATE

## 2023-12-22 PROCEDURE — 25000003 PHARM REV CODE 250: Performed by: PHYSICIAN ASSISTANT

## 2023-12-22 PROCEDURE — 94760 N-INVAS EAR/PLS OXIMETRY 1: CPT

## 2023-12-22 PROCEDURE — 99900035 HC TECH TIME PER 15 MIN (STAT)

## 2023-12-22 PROCEDURE — 94799 UNLISTED PULMONARY SVC/PX: CPT | Mod: XB

## 2023-12-22 PROCEDURE — A4216 STERILE WATER/SALINE, 10 ML: HCPCS | Performed by: STUDENT IN AN ORGANIZED HEALTH CARE EDUCATION/TRAINING PROGRAM

## 2023-12-22 PROCEDURE — 25000003 PHARM REV CODE 250: Performed by: INTERNAL MEDICINE

## 2023-12-22 PROCEDURE — 94664 DEMO&/EVAL PT USE INHALER: CPT

## 2023-12-22 PROCEDURE — 63600175 PHARM REV CODE 636 W HCPCS: Performed by: STUDENT IN AN ORGANIZED HEALTH CARE EDUCATION/TRAINING PROGRAM

## 2023-12-22 RX ADMIN — MICONAZOLE NITRATE 2 % TOPICAL POWDER: at 08:12

## 2023-12-22 RX ADMIN — VANCOMYCIN HYDROCHLORIDE 500 MG: 500 INJECTION, POWDER, LYOPHILIZED, FOR SOLUTION INTRAVENOUS at 09:12

## 2023-12-22 RX ADMIN — APIXABAN 5 MG: 5 TABLET, FILM COATED ORAL at 08:12

## 2023-12-22 RX ADMIN — AMIODARONE HYDROCHLORIDE 200 MG: 200 TABLET ORAL at 08:12

## 2023-12-22 RX ADMIN — Medication 1 CAPSULE: at 05:12

## 2023-12-22 RX ADMIN — FUROSEMIDE 40 MG: 40 TABLET ORAL at 08:12

## 2023-12-22 RX ADMIN — METOPROLOL SUCCINATE 100 MG: 50 TABLET, EXTENDED RELEASE ORAL at 08:12

## 2023-12-22 RX ADMIN — SACUBITRIL AND VALSARTAN 1 TABLET: 24; 26 TABLET, FILM COATED ORAL at 08:12

## 2023-12-22 RX ADMIN — SODIUM CHLORIDE, PRESERVATIVE FREE 10 ML: 5 INJECTION INTRAVENOUS at 06:12

## 2023-12-22 RX ADMIN — Medication 1 CAPSULE: at 08:12

## 2023-12-22 RX ADMIN — GUAIFENESIN 400 MG: 200 SOLUTION ORAL at 08:12

## 2023-12-22 RX ADMIN — ACETAMINOPHEN 650 MG: 325 TABLET ORAL at 09:12

## 2023-12-22 RX ADMIN — SODIUM CHLORIDE: 9 INJECTION, SOLUTION INTRAVENOUS at 10:12

## 2023-12-22 RX ADMIN — FOLIC ACID 1 MG: 1 TABLET ORAL at 08:12

## 2023-12-22 RX ADMIN — QUETIAPINE FUMARATE 200 MG: 100 TABLET ORAL at 08:12

## 2023-12-22 RX ADMIN — WHITE PETROLATUM: 1.75 OINTMENT TOPICAL at 08:12

## 2023-12-22 RX ADMIN — FERROUS SULFATE TAB 325 MG (65 MG ELEMENTAL FE) 1 EACH: 325 (65 FE) TAB at 08:12

## 2023-12-22 RX ADMIN — ACETAMINOPHEN 650 MG: 325 TABLET ORAL at 05:12

## 2023-12-22 RX ADMIN — VANCOMYCIN HYDROCHLORIDE 500 MG: 500 INJECTION, POWDER, LYOPHILIZED, FOR SOLUTION INTRAVENOUS at 10:12

## 2023-12-22 RX ADMIN — GUAIFENESIN 400 MG: 200 SOLUTION ORAL at 02:12

## 2023-12-22 RX ADMIN — ASPIRIN 81 MG CHEWABLE TABLET 81 MG: 81 TABLET CHEWABLE at 08:12

## 2023-12-22 RX ADMIN — SODIUM CHLORIDE, PRESERVATIVE FREE 10 ML: 5 INJECTION INTRAVENOUS at 12:12

## 2023-12-22 RX ADMIN — SODIUM CHLORIDE, PRESERVATIVE FREE 10 ML: 5 INJECTION INTRAVENOUS at 11:12

## 2023-12-22 RX ADMIN — Medication 1 CAPSULE: at 11:12

## 2023-12-22 RX ADMIN — LINACLOTIDE 72 MCG: 72 CAPSULE, GELATIN COATED ORAL at 05:12

## 2023-12-22 NOTE — PLAN OF CARE
Problem: Adult Inpatient Plan of Care  Goal: Plan of Care Review  Outcome: Ongoing, Progressing  Flowsheets (Taken 12/21/2023 2000)  Plan of Care Reviewed With: patient  Goal: Patient-Specific Goal (Individualized)  Outcome: Ongoing, Progressing  Flowsheets (Taken 12/21/2023 2000)  Anxieties, Fears or Concerns: none  Individualized Care Needs: IV abx  Goal: Absence of Hospital-Acquired Illness or Injury  Outcome: Ongoing, Progressing  Intervention: Identify and Manage Fall Risk  Flowsheets (Taken 12/21/2023 2000)  Safety Promotion/Fall Prevention:   assistive device/personal item within reach   lighting adjusted   medications reviewed   nonskid shoes/socks when out of bed   side rails raised x 2   instructed to call staff for mobility  Intervention: Prevent Skin Injury  Flowsheets (Taken 12/21/2023 2000)  Body Position: position changed independently  Skin Protection:   adhesive use limited   incontinence pads utilized   tubing/devices free from skin contact  Intervention: Prevent and Manage VTE (Venous Thromboembolism) Risk  Flowsheets (Taken 12/21/2023 2000)  Activity Management: Up in chair - L3  VTE Prevention/Management:   ambulation promoted   fluids promoted  Range of Motion: active ROM (range of motion) encouraged  Intervention: Prevent Infection  Flowsheets (Taken 12/21/2023 2000)  Infection Prevention:   cohorting utilized   environmental surveillance performed   hand hygiene promoted   single patient room provided   rest/sleep promoted     Problem: Fluid Imbalance (Pneumonia)  Goal: Fluid Balance  Outcome: Ongoing, Progressing  Intervention: Monitor and Manage Fluid Balance  Flowsheets (Taken 12/21/2023 2000)  Fluid/Electrolyte Management: fluids adjusted     Problem: Infection (Pneumonia)  Goal: Resolution of Infection Signs and Symptoms  Outcome: Ongoing, Progressing  Intervention: Prevent Infection Progression  Flowsheets (Taken 12/21/2023 2133)  Fever Reduction/Comfort Measures:   lightweight  bedding   lightweight clothing  Infection Management: aseptic technique maintained  Isolation Precautions:   protective   precautions maintained     Problem: Infection  Goal: Absence of Infection Signs and Symptoms  Outcome: Ongoing, Progressing  Intervention: Prevent or Manage Infection  Flowsheets (Taken 12/21/2023 2133)  Fever Reduction/Comfort Measures:   lightweight bedding   lightweight clothing  Infection Management: aseptic technique maintained  Isolation Precautions:   protective   precautions maintained

## 2023-12-22 NOTE — PLAN OF CARE
Problem: Adult Inpatient Plan of Care  Goal: Plan of Care Review  Outcome: Ongoing, Progressing  Flowsheets (Taken 12/22/2023 1137)  Plan of Care Reviewed With: patient  Goal: Patient-Specific Goal (Individualized)  Outcome: Ongoing, Progressing  Flowsheets (Taken 12/22/2023 1137)  Anxieties, Fears or Concerns: None expressed at this time  Individualized Care Needs: IV abx  Goal: Absence of Hospital-Acquired Illness or Injury  Outcome: Ongoing, Progressing  Intervention: Identify and Manage Fall Risk  Flowsheets (Taken 12/22/2023 1137)  Safety Promotion/Fall Prevention:   assistive device/personal item within reach   bed alarm refused   nonskid shoes/socks when out of bed   side rails raised x 2  Intervention: Prevent Skin Injury  Flowsheets (Taken 12/22/2023 1137)  Body Position: position changed independently  Skin Protection:   adhesive use limited   incontinence pads utilized   silicone foam dressing in place   tubing/devices free from skin contact  Intervention: Prevent and Manage VTE (Venous Thromboembolism) Risk  Flowsheets (Taken 12/22/2023 1137)  Activity Management: Ambulated in ferreira - L4  VTE Prevention/Management:   ambulation promoted   fluids promoted  Range of Motion: active ROM (range of motion) encouraged  Intervention: Prevent Infection  Flowsheets (Taken 12/22/2023 1137)  Infection Prevention:   cohorting utilized   environmental surveillance performed   equipment surfaces disinfected   hand hygiene promoted   single patient room provided   rest/sleep promoted  Goal: Optimal Comfort and Wellbeing  Outcome: Ongoing, Progressing  Intervention: Monitor Pain and Promote Comfort  Flowsheets (Taken 12/22/2023 1137)  Pain Management Interventions:   care clustered   quiet environment facilitated   relaxation techniques promoted   pillow support provided   position adjusted  Intervention: Provide Person-Centered Care  Flowsheets (Taken 12/22/2023 1137)  Trust Relationship/Rapport:   care explained    reassurance provided   choices provided   thoughts/feelings acknowledged   emotional support provided   empathic listening provided   questions answered   questions encouraged  Goal: Readiness for Transition of Care  Outcome: Ongoing, Progressing  Intervention: Mutually Develop Transition Plan  Flowsheets (Taken 12/22/2023 1137)  Communicated MODESTO with patient/caregiver: Date not available/Unable to determine     Problem: Fluid Imbalance (Pneumonia)  Goal: Fluid Balance  Outcome: Ongoing, Progressing  Intervention: Monitor and Manage Fluid Balance  Flowsheets (Taken 12/22/2023 1137)  Fluid/Electrolyte Management: fluids adjusted     Problem: Infection (Pneumonia)  Goal: Resolution of Infection Signs and Symptoms  Outcome: Ongoing, Progressing  Intervention: Prevent Infection Progression  Flowsheets (Taken 12/22/2023 1137)  Fever Reduction/Comfort Measures:   lightweight bedding   lightweight clothing  Infection Management: aseptic technique maintained  Isolation Precautions:   precautions maintained   protective     Problem: Infection  Goal: Absence of Infection Signs and Symptoms  Outcome: Ongoing, Progressing  Intervention: Prevent or Manage Infection  Flowsheets (Taken 12/22/2023 1137)  Fever Reduction/Comfort Measures:   lightweight bedding   lightweight clothing  Infection Management: aseptic technique maintained  Isolation Precautions:   precautions maintained   protective     Problem: Fall Injury Risk  Goal: Absence of Fall and Fall-Related Injury  Outcome: Ongoing, Progressing  Intervention: Identify and Manage Contributors  Flowsheets (Taken 12/22/2023 1137)  Self-Care Promotion:   independence encouraged   BADL personal objects within reach   safe use of adaptive equipment encouraged   BADL personal routines maintained  Medication Review/Management:   medications reviewed   high-risk medications identified  Intervention: Promote Injury-Free Environment  Flowsheets (Taken 12/22/2023 1137)  Safety  Promotion/Fall Prevention:   assistive device/personal item within reach   bed alarm refused   nonskid shoes/socks when out of bed   side rails raised x 2

## 2023-12-22 NOTE — PROGRESS NOTES
[facility] MEDICINE ~ PROGRESS NOTE    CHIEF COMPLAINT   Hospital follow up    HOSPITAL COURSE   69yo male with a past medical history of CAD, diastolic CHF, Afib, aortic stenosis, NSTEMI, and HTN who was recently admitted to [facility] for pneumonia and Afib RVR. Patient was diagnosed with Influenza A on 11/19/2023 prior to admission and started on Tamiflu. CTA chest 11/24/2023 showed no PE, but multifocal pneumonia most evident in RUL. Patient was admitted for ICU, started on Cardizem drip, Vanc, Zosyn, Doxy, and required Levophed for hypotension. Echo 11/25/2023 showed LVEF 55% moderate aortic stenosis, severe posterior mitral annular calcification present. Blood cultures x2 resulted positive for MRSA on 11/27/2023.  Infectious Disease recommended patient to continue vancomycin for 4 weeks with suspected in date on 12/25/2023. Stool culture on 11/27/2023 showed many yeast. Repeat blood cultures x2 on 12/02/2023 showed no growth. Patient underwent a CARO guided DCCV on 11/30/2023 which showed mild-moderately enlarged right atrium, no ASD or PFO, LVEF 55%, mild-moderate left ventricular hypertrophy, severe aortic stenosis, moderate mitral stenosis, mild-moderate mitral regurgitation, mild-moderate tricuspid regurgitation, no vegetation present, no intracardiac thrombus, successful cardioversion. CTA chest, abdomen, pelvis 12/05/2023 showed small bilateral pleural effusion R>L and patchy consolidation in both lungs increased compared to prior. CTA was obtained for TAVR workup to be completed outpatient after ID clearance. On exam today, patient is currently on 2 L nasal cannula with O2 sat 99%.  With worsening pneumonia and lung sounds on exam, we will start Zosyn, obtain sputum culture, add nystatin for fungal coverage, and schedule Mucomyst with the Xopenex nebulizers 3 times a day.     Today  Denies any complaints today.    OBJECTIVE/PHYSICAL EXAM     VITAL SIGNS (MOST  RECENT):  Temp: 97.7 °F (36.5 °C) (12/22/23 0711)  Pulse: 67 (12/22/23 0828)  Resp: 18 (12/22/23 0700)  BP: 118/77 (12/22/23 0828)  SpO2: 96 % (12/22/23 0711) VITAL SIGNS (24 HOUR RANGE):  Temp:  [97.7 °F (36.5 °C)-98.3 °F (36.8 °C)] 97.7 °F (36.5 °C)  Pulse:  [66-72] 67  Resp:  [18] 18  SpO2:  [96 %-97 %] 96 %  BP: (118-122)/(75-77) 118/77     GENERAL: In no acute distress, afebrile  HEENT: Atraumatic, normocephalic, moist mucous membranes, supple neck   CHEST: CTAB  HEART: S1, S2, grade IV murmur  ABDOMEN: Soft, nontender, BS +  MSK: Warm, no lower extremity edema, no clubbing or cyanosis  NEUROLOGIC: Alert and oriented x4, moving all extremities with good strength   INTEGUMENTARY: No obvious skin rash   PSYCHIATRY: Appropriate mood and affect     ASSESSMENT/PLAN   MRSA bacteriemia   Multifocal Pneumonia  Recent Influenza A infection (11/19/2023)  Acute hypoxic respiratory failure  CTA chest 11/24/2023 showed no PE, but multifocal pneumonia most evident in RUL  Blood cultures x2 positive for MRSA on 11/27/2023, repeats were negative  ID recommended patient to continue vancomycin for 4 weeks with suspected in date on 12/25/2023, Cefepime (12/07/2023-12/12/2023)  D/c Zosyn (12/06/2023-12/07/203) and Nystatin (12/06/2023-12/07/2023)  CXR 12/14/2023 shows no changes, Lasix 40mg IVP x1 given for cough with pink, frothy sputum   IS, cough assist, acapella, Xopenex prn    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   Thrombocytopenia  Hx of alcohol use disorder  Thiamine wnl   Ferrlecit x3, oral supplementation   Hold Eliquis if platelets <50  Received 2u pRBC 12/15/2023 with Lasix 40mg IVP between units   Continue Aspirin and Eliquis - monitor H&H      Yeast + Stool  D/c Nystatin (12/06/2023-12/07/2023) low concern for disseminated candidemia   Continue Probiotic    Hypokalemia   Replete as condition requires     Transamnitis - resolved   Grayzone hepatitis C Ab  HCV RNA Detect/Quant, S Undetected       CAD  Diastolic CHF  Afib  Aortic stenosis  Essential HTN  CARO guided DCCV on 11/30/2023 which showed mild-moderately enlarged right atrium, no ASD or PFO, LVEF 55%, mild-moderate left ventricular hypertrophy, severe aortic stenosis, moderate mitral stenosis, mild-moderate mitral regurgitation, mild-moderate tricuspid regurgitation, no vegetation present, no intracardiac thrombus, successful cardioversion  TAVR to be completed outpatient after ID clearance  Continue Entresto, Toprol XL, Amiodarone, Aspiring, and Eliquis   Continue PO Lasix 40mg    DVT prophylaxis: Eliquis   Anticipated discharge and disposition: Continue PT/OT and IV antibiotics until 12/25/2023. Plan for d/c home on 12/26/2023  __________________________________________________________________________    LABS/MICRO/MEDS/DIAGNOSTICS     LABS  Recent Labs     12/20/23  0351      K 3.8   CHLORIDE 104   CO2 27   BUN 22.1   CREATININE 0.88   GLUCOSE 83   CALCIUM 8.2*     Recent Labs     12/20/23  0311   WBC 3.67*   RBC 3.23*   HCT 30.0*   MCV 92.9   *     MICROBIOLOGY  Microbiology Results (last 7 days)       ** No results found for the last 168 hours. **             MEDICATIONS   amiodarone  200 mg Oral Daily    apixaban  5 mg Oral BID    aspirin  81 mg Oral Daily    ferrous sulfate  1 tablet Oral Daily    folic acid  1 mg Oral Daily    furosemide  40 mg Oral Daily    guaiFENesin 100 mg/5 ml  400 mg Oral Q6H WAKE    Lactobacillus acidophilus  1 capsule Oral TID WM    linaCLOtide  72 mcg Oral Before breakfast    metoprolol succinate  100 mg Oral Daily    miconazole NITRATE 2 %   Topical (Top) BID    QUEtiapine  200 mg Oral QHS    sacubitriL-valsartan  1 tablet Oral BID    sodium chloride 0.9%  10 mL Intravenous Q6H    vancomycin (VANCOCIN) IV (PEDS and ADULTS)  500 mg Intravenous Q12H    white petrolatum   Topical (Top) BID         INFUSIONS       DIAGNOSTIC TESTS  X-Ray Chest 1 View   Final Result      No significant change          Electronically signed by: Juan Wharton   Date:    12/14/2023   Time:    07:51      X-Ray Chest 1 View   Final Result      Persistent consolidative changes in the right perihilar region and right upper lobe.      Slightly more confluent opacities in the left upper lobe.      No other interval change         Electronically signed by: Juan Wharton   Date:    12/12/2023   Time:    10:04      X-Ray Chest 1 View for Line/Tube Placement   Final Result      Consolidative changes in the right perihilar region right mid and right upper lung zone similar to previous exam.      Interval insertion of right-sided PICC line         Electronically signed by: Juan Wharton   Date:    12/06/2023   Time:    12:14        EF   Date Value Ref Range Status   11/25/2023 55 % Final   12/29/2022 55 % Final        NUTRITION STATUS  Patient meets ASPEN criteria for   malnutrition of   per RD assessment as evidenced by:                       A minimum of two characteristics is recommended for diagnosis of either severe or non-severe malnutrition.     Case related differential diagnoses have been reviewed; assessment and plan has been documented. I have personally reviewed the labs and test results that are currently available; I have reviewed the patients medication list. I have reviewed the consulting providers recommendations. I have reviewed or attempted to review medical records based upon their availability.  All of the patient's and/or family's questions have been addressed and answered to the best of my ability.  I will continue to monitor closely and make adjustments to medical management as needed.  This document was created using M*Modal Fluency Direct.  Transcription errors may have been made.  Please contact me if any questions may rise regarding documentation to clarify transcription.      Mary Cleaning MD   Internal Medicine  Department of Hospital Medicine [facility] - [facility] Surgical Unit

## 2023-12-22 NOTE — PLAN OF CARE
Weekly Staffing Report      Date Admitted: 12/5/2023 :   Staffing Date: 12/22/2023     Patient Active Problem List   Diagnosis    Hypertensive urgency    Chronic diastolic congestive heart failure    Hyponatremia    History of alcohol abuse    Atrial flutter    Thrombocytopenia    Hypomagnesemia    CAD s/p CABG     Atrial fibrillation with RVR    Postoperative blood loss anemia    Hyperlipidemia    Toxic/metabolic encephalopathy    Severe aortic stenosis    Pneumonia due to infectious organism    Severe malnutrition    Bacteremia          Team Members Present:       Nursing Present     Yes [x]    No []    Physical Therapy Present    Yes [x]    No []    Occupational Therapy Present     Yes [x]    No []    Speech Therapy Present    Yes []    No []    NA []    Dietary Present    Yes [x]    No []        Physician Present     [x] Thairy Reyes    [] Mary Cleaning    [x] GISEL Hollis    []       Family Present    [] Adult Children    [] Spouse    [] POA    [] Friend/ Caregiver    [] Other       Interdisciplinary Meeting Notes:  Did not want to call family. PT- doing well. Mod I walking 100ft with RW. OT- does not see. Appetite good. Medically- discussed slept well no acute issues. No DME/ No HH needed on discharge. All questions answered at this time                 Please see Documented PT/OT/ST, Dietary, Nursing, and Physician notes for detailed treatment information.

## 2023-12-23 LAB — VANCOMYCIN TROUGH SERPL-MCNC: 16.9 UG/ML (ref 15–20)

## 2023-12-23 PROCEDURE — 11000004 HC SNF PRIVATE

## 2023-12-23 PROCEDURE — 99900035 HC TECH TIME PER 15 MIN (STAT)

## 2023-12-23 PROCEDURE — 63600175 PHARM REV CODE 636 W HCPCS: Performed by: STUDENT IN AN ORGANIZED HEALTH CARE EDUCATION/TRAINING PROGRAM

## 2023-12-23 PROCEDURE — 25000003 PHARM REV CODE 250: Performed by: STUDENT IN AN ORGANIZED HEALTH CARE EDUCATION/TRAINING PROGRAM

## 2023-12-23 PROCEDURE — 94664 DEMO&/EVAL PT USE INHALER: CPT

## 2023-12-23 PROCEDURE — A4216 STERILE WATER/SALINE, 10 ML: HCPCS | Performed by: STUDENT IN AN ORGANIZED HEALTH CARE EDUCATION/TRAINING PROGRAM

## 2023-12-23 PROCEDURE — 25000003 PHARM REV CODE 250: Performed by: PHYSICIAN ASSISTANT

## 2023-12-23 PROCEDURE — 94760 N-INVAS EAR/PLS OXIMETRY 1: CPT

## 2023-12-23 PROCEDURE — 80202 ASSAY OF VANCOMYCIN: CPT | Performed by: INTERNAL MEDICINE

## 2023-12-23 PROCEDURE — 25000003 PHARM REV CODE 250: Performed by: INTERNAL MEDICINE

## 2023-12-23 RX ADMIN — VANCOMYCIN HYDROCHLORIDE 500 MG: 500 INJECTION, POWDER, LYOPHILIZED, FOR SOLUTION INTRAVENOUS at 11:12

## 2023-12-23 RX ADMIN — FERROUS SULFATE TAB 325 MG (65 MG ELEMENTAL FE) 1 EACH: 325 (65 FE) TAB at 09:12

## 2023-12-23 RX ADMIN — MICONAZOLE NITRATE 2 % TOPICAL POWDER: at 08:12

## 2023-12-23 RX ADMIN — WHITE PETROLATUM: 1.75 OINTMENT TOPICAL at 09:12

## 2023-12-23 RX ADMIN — GUAIFENESIN 400 MG: 200 SOLUTION ORAL at 09:12

## 2023-12-23 RX ADMIN — FOLIC ACID 1 MG: 1 TABLET ORAL at 09:12

## 2023-12-23 RX ADMIN — APIXABAN 5 MG: 5 TABLET, FILM COATED ORAL at 09:12

## 2023-12-23 RX ADMIN — Medication 1 CAPSULE: at 04:12

## 2023-12-23 RX ADMIN — AMIODARONE HYDROCHLORIDE 200 MG: 200 TABLET ORAL at 09:12

## 2023-12-23 RX ADMIN — ACETAMINOPHEN 650 MG: 325 TABLET ORAL at 09:12

## 2023-12-23 RX ADMIN — VANCOMYCIN HYDROCHLORIDE 500 MG: 500 INJECTION, POWDER, LYOPHILIZED, FOR SOLUTION INTRAVENOUS at 10:12

## 2023-12-23 RX ADMIN — MICONAZOLE NITRATE 2 % TOPICAL POWDER: at 09:12

## 2023-12-23 RX ADMIN — WHITE PETROLATUM: 1.75 OINTMENT TOPICAL at 08:12

## 2023-12-23 RX ADMIN — SACUBITRIL AND VALSARTAN 1 TABLET: 24; 26 TABLET, FILM COATED ORAL at 08:12

## 2023-12-23 RX ADMIN — SODIUM CHLORIDE, PRESERVATIVE FREE 10 ML: 5 INJECTION INTRAVENOUS at 12:12

## 2023-12-23 RX ADMIN — QUETIAPINE FUMARATE 200 MG: 100 TABLET ORAL at 08:12

## 2023-12-23 RX ADMIN — LINACLOTIDE 72 MCG: 72 CAPSULE, GELATIN COATED ORAL at 06:12

## 2023-12-23 RX ADMIN — SODIUM CHLORIDE, PRESERVATIVE FREE 10 ML: 5 INJECTION INTRAVENOUS at 06:12

## 2023-12-23 RX ADMIN — SODIUM CHLORIDE 35 ML/HR: 9 INJECTION, SOLUTION INTRAVENOUS at 11:12

## 2023-12-23 RX ADMIN — Medication 1 CAPSULE: at 09:12

## 2023-12-23 RX ADMIN — GUAIFENESIN 400 MG: 200 SOLUTION ORAL at 03:12

## 2023-12-23 RX ADMIN — ACETAMINOPHEN 650 MG: 325 TABLET ORAL at 08:12

## 2023-12-23 RX ADMIN — SODIUM CHLORIDE: 9 INJECTION, SOLUTION INTRAVENOUS at 10:12

## 2023-12-23 RX ADMIN — ASPIRIN 81 MG CHEWABLE TABLET 81 MG: 81 TABLET CHEWABLE at 09:12

## 2023-12-23 RX ADMIN — APIXABAN 5 MG: 5 TABLET, FILM COATED ORAL at 08:12

## 2023-12-23 RX ADMIN — GUAIFENESIN 400 MG: 200 SOLUTION ORAL at 08:12

## 2023-12-23 RX ADMIN — Medication 1 CAPSULE: at 12:12

## 2023-12-23 RX ADMIN — SODIUM CHLORIDE, PRESERVATIVE FREE 10 ML: 5 INJECTION INTRAVENOUS at 11:12

## 2023-12-23 NOTE — PROGRESS NOTES
[facility] MEDICINE ~ PROGRESS NOTE    CHIEF COMPLAINT   Hospital follow up    HOSPITAL COURSE   67yo male with a past medical history of CAD, diastolic CHF, Afib, aortic stenosis, NSTEMI, and HTN who was recently admitted to [facility] for pneumonia and Afib RVR. Patient was diagnosed with Influenza A on 11/19/2023 prior to admission and started on Tamiflu. CTA chest 11/24/2023 showed no PE, but multifocal pneumonia most evident in RUL. Patient was admitted for ICU, started on Cardizem drip, Vanc, Zosyn, Doxy, and required Levophed for hypotension. Echo 11/25/2023 showed LVEF 55% moderate aortic stenosis, severe posterior mitral annular calcification present. Blood cultures x2 resulted positive for MRSA on 11/27/2023.  Infectious Disease recommended patient to continue vancomycin for 4 weeks with suspected in date on 12/25/2023. Stool culture on 11/27/2023 showed many yeast. Repeat blood cultures x2 on 12/02/2023 showed no growth. Patient underwent a CARO guided DCCV on 11/30/2023 which showed mild-moderately enlarged right atrium, no ASD or PFO, LVEF 55%, mild-moderate left ventricular hypertrophy, severe aortic stenosis, moderate mitral stenosis, mild-moderate mitral regurgitation, mild-moderate tricuspid regurgitation, no vegetation present, no intracardiac thrombus, successful cardioversion. CTA chest, abdomen, pelvis 12/05/2023 showed small bilateral pleural effusion R>L and patchy consolidation in both lungs increased compared to prior. CTA was obtained for TAVR workup to be completed outpatient after ID clearance. On exam today, patient is currently on 2 L nasal cannula with O2 sat 99%.  With worsening pneumonia and lung sounds on exam, we will start Zosyn, obtain sputum culture, add nystatin for fungal coverage, and schedule Mucomyst with the Xopenex nebulizers 3 times a day.     Today  Denies any complaints today. He is excited to go home.    OBJECTIVE/PHYSICAL EXAM      VITAL SIGNS (MOST RECENT):  Temp: 98.5 °F (36.9 °C) (12/22/23 2052)  Pulse: 68 (12/23/23 0655)  Resp: 18 (12/23/23 0655)  BP: 122/75 (12/22/23 2052)  SpO2: 97 % (12/23/23 0655) VITAL SIGNS (24 HOUR RANGE):  Temp:  [98.5 °F (36.9 °C)] 98.5 °F (36.9 °C)  Pulse:  [65-68] 68  Resp:  [18] 18  SpO2:  [97 %-99 %] 97 %  BP: (122)/(75) 122/75     GENERAL: In no acute distress, afebrile  HEENT: Atraumatic, normocephalic, moist mucous membranes, supple neck   CHEST: CTAB  HEART: S1, S2, grade IV murmur  ABDOMEN: Soft, nontender, BS +  MSK: Warm, no lower extremity edema, no clubbing or cyanosis  NEUROLOGIC: Alert and oriented x4, moving all extremities with good strength   INTEGUMENTARY: No obvious skin rash   PSYCHIATRY: Appropriate mood and affect     ASSESSMENT/PLAN   MRSA bacteriemia   Multifocal Pneumonia  Recent Influenza A infection (11/19/2023)  Acute hypoxic respiratory failure  CTA chest 11/24/2023 showed no PE, but multifocal pneumonia most evident in RUL  Blood cultures x2 positive for MRSA on 11/27/2023, repeats were negative  ID recommended patient to continue vancomycin for 4 weeks with suspected in date on 12/25/2023, Cefepime (12/07/2023-12/12/2023)  D/c Zosyn (12/06/2023-12/07/203) and Nystatin (12/06/2023-12/07/2023)  CXR 12/14/2023 shows no changes, Lasix 40mg IVP x1 given for cough with pink, frothy sputum   IS, cough assist, acapella, Xopenex prn    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   Thrombocytopenia  Hx of alcohol use disorder  Thiamine wnl   Ferrlecit x3, oral supplementation   Hold Eliquis if platelets <50  Received 2u pRBC 12/15/2023 with Lasix 40mg IVP between units   Continue Aspirin and Eliquis - monitor H&H      Yeast + Stool  D/c Nystatin (12/06/2023-12/07/2023) low concern for disseminated candidemia   Continue Probiotic    Hypokalemia   Replete as condition requires     Transamnitis - resolved   Grayzone hepatitis C Ab  HCV RNA Detect/Quant, S Undetected     "  CAD  Diastolic CHF  Afib  Aortic stenosis  Essential HTN  CARO guided DCCV on 11/30/2023 which showed mild-moderately enlarged right atrium, no ASD or PFO, LVEF 55%, mild-moderate left ventricular hypertrophy, severe aortic stenosis, moderate mitral stenosis, mild-moderate mitral regurgitation, mild-moderate tricuspid regurgitation, no vegetation present, no intracardiac thrombus, successful cardioversion  TAVR to be completed outpatient after ID clearance  Continue Entresto, Toprol XL, Amiodarone, Aspiring, and Eliquis   Continue PO Lasix 40mg    DVT prophylaxis: Eliquis   Anticipated discharge and disposition: Continue PT/OT and IV antibiotics until 12/25/2023. Plan for d/c home on 12/26/2023  __________________________________________________________________________    LABS/MICRO/MEDS/DIAGNOSTICS     LABS  No results for input(s): "NA", "K", "CHLORIDE", "CO2", "BUN", "CREATININE", "GLUCOSE", "CALCIUM", "ALKPHOS", "AST", "ALT", "ALBUMIN" in the last 72 hours.    No results for input(s): "WBC", "RBC", "HCT", "MCV", "PLT" in the last 72 hours.    Invalid input(s): "HG"    MICROBIOLOGY  Microbiology Results (last 7 days)       ** No results found for the last 168 hours. **             MEDICATIONS   amiodarone  200 mg Oral Daily    apixaban  5 mg Oral BID    aspirin  81 mg Oral Daily    ferrous sulfate  1 tablet Oral Daily    folic acid  1 mg Oral Daily    furosemide  40 mg Oral Daily    guaiFENesin 100 mg/5 ml  400 mg Oral Q6H WAKE    Lactobacillus acidophilus  1 capsule Oral TID WM    linaCLOtide  72 mcg Oral Before breakfast    metoprolol succinate  100 mg Oral Daily    miconazole NITRATE 2 %   Topical (Top) BID    QUEtiapine  200 mg Oral QHS    sacubitriL-valsartan  1 tablet Oral BID    sodium chloride 0.9%  10 mL Intravenous Q6H    vancomycin (VANCOCIN) IV (PEDS and ADULTS)  500 mg Intravenous Q12H    white petrolatum   Topical (Top) BID         INFUSIONS       DIAGNOSTIC TESTS  X-Ray Chest 1 View   Final Result "      No significant change         Electronically signed by: Juan Wharton   Date:    12/14/2023   Time:    07:51      X-Ray Chest 1 View   Final Result      Persistent consolidative changes in the right perihilar region and right upper lobe.      Slightly more confluent opacities in the left upper lobe.      No other interval change         Electronically signed by: Juan Wharton   Date:    12/12/2023   Time:    10:04      X-Ray Chest 1 View for Line/Tube Placement   Final Result      Consolidative changes in the right perihilar region right mid and right upper lung zone similar to previous exam.      Interval insertion of right-sided PICC line         Electronically signed by: Juan Wharton   Date:    12/06/2023   Time:    12:14        EF   Date Value Ref Range Status   11/25/2023 55 % Final   12/29/2022 55 % Final        NUTRITION STATUS  Patient meets ASPEN criteria for   malnutrition of   per RD assessment as evidenced by:                       A minimum of two characteristics is recommended for diagnosis of either severe or non-severe malnutrition.     Case related differential diagnoses have been reviewed; assessment and plan has been documented. I have personally reviewed the labs and test results that are currently available; I have reviewed the patients medication list. I have reviewed the consulting providers recommendations. I have reviewed or attempted to review medical records based upon their availability.  All of the patient's and/or family's questions have been addressed and answered to the best of my ability.  I will continue to monitor closely and make adjustments to medical management as needed.  This document was created using M*Modal Fluency Direct.  Transcription errors may have been made.  Please contact me if any questions may rise regarding documentation to clarify transcription.      Mary Cleaning MD   Internal Medicine  Department of Hospital Medicine [facility] -  Medical Surgical Unit

## 2023-12-23 NOTE — PLAN OF CARE
Problem: Adult Inpatient Plan of Care  Goal: Plan of Care Review  Outcome: Ongoing, Progressing  Flowsheets (Taken 12/23/2023 1152)  Plan of Care Reviewed With: patient  Goal: Patient-Specific Goal (Individualized)  Outcome: Ongoing, Progressing  Flowsheets (Taken 12/23/2023 1152)  Anxieties, Fears or Concerns: None expressed at htis time  Individualized Care Needs: Iv abx therapy  Goal: Absence of Hospital-Acquired Illness or Injury  Outcome: Ongoing, Progressing  Intervention: Identify and Manage Fall Risk  Flowsheets (Taken 12/23/2023 1152)  Safety Promotion/Fall Prevention:   assistive device/personal item within reach   in recliner, wheels locked   nonskid shoes/socks when out of bed  Intervention: Prevent Skin Injury  Flowsheets (Taken 12/23/2023 1152)  Body Position: weight shifting  Skin Protection:   adhesive use limited   incontinence pads utilized   tubing/devices free from skin contact  Intervention: Prevent and Manage VTE (Venous Thromboembolism) Risk  Flowsheets (Taken 12/23/2023 1152)  Activity Management: Up in chair - L3  VTE Prevention/Management:   ambulation promoted   bleeding precations maintained  Range of Motion: active ROM (range of motion) encouraged  Intervention: Prevent Infection  Flowsheets (Taken 12/23/2023 1152)  Infection Prevention:   cohorting utilized   environmental surveillance performed   hand hygiene promoted   rest/sleep promoted   single patient room provided  Goal: Optimal Comfort and Wellbeing  Outcome: Ongoing, Progressing  Intervention: Monitor Pain and Promote Comfort  Flowsheets (Taken 12/23/2023 1152)  Pain Management Interventions:   care clustered   medication offered   position adjusted   pillow support provided  Intervention: Provide Person-Centered Care  Flowsheets (Taken 12/23/2023 1152)  Trust Relationship/Rapport:   questions encouraged   care explained   reassurance provided   emotional support provided   thoughts/feelings acknowledged   empathic listening  provided   questions answered   choices provided  Goal: Readiness for Transition of Care  Outcome: Ongoing, Progressing  Intervention: Mutually Develop Transition Plan  Flowsheets (Taken 12/23/2023 1152)  Communicated [place] with patient/caregiver: Date not available/Unable to determine     Problem: Fluid Imbalance (Pneumonia)  Goal: Fluid Balance  Outcome: Ongoing, Progressing  Intervention: Monitor and Manage Fluid Balance  Flowsheets (Taken 12/23/2023 1152)  Fluid/Electrolyte Management: fluids provided     Problem: Infection (Pneumonia)  Goal: Resolution of Infection Signs and Symptoms  Outcome: Ongoing, Progressing  Intervention: Prevent Infection Progression  Flowsheets (Taken 12/23/2023 1152)  Fever Reduction/Comfort Measures:   lightweight bedding   lightweight clothing  Infection Management: aseptic technique maintained  Isolation Precautions:   precautions maintained   protective     Problem: Infection  Goal: Absence of Infection Signs and Symptoms  Outcome: Ongoing, Progressing  Intervention: Prevent or Manage Infection  Flowsheets (Taken 12/23/2023 1152)  Fever Reduction/Comfort Measures:   lightweight bedding   lightweight clothing  Infection Management: aseptic technique maintained  Isolation Precautions:   precautions maintained   protective     Problem: Fall Injury Risk  Goal: Absence of Fall and Fall-Related Injury  Outcome: Ongoing, Progressing  Intervention: Identify and Manage Contributors  Flowsheets (Taken 12/23/2023 1152)  Self-Care Promotion:   independence encouraged   BADL personal objects within reach   BADL personal routines maintained   meal set-up provided   safe use of adaptive equipment encouraged  Medication Review/Management:   medications reviewed   high-risk medications identified  Intervention: Promote Injury-Free Environment  Flowsheets (Taken 12/23/2023 1152)  Safety Promotion/Fall Prevention:   assistive device/personal item within reach   in recliner, wheels locked   nonskid  shoes/socks when out of bed

## 2023-12-23 NOTE — PROGRESS NOTES
"Pharmacokinetic Assessment Follow Up: IV Vancomycin    Vancomycin serum concentration assessment(s):    The trough level was drawn correctly and can be used to guide therapy at this time. The measurement is within the desired definitive target range of 15 to 20 mcg/mL.    Vancomycin Regimen Plan:    Continue regimen to Vancomycin 500 mg IV every 12 hours with next serum trough concentration measured at 1000 prior to   dose on 12/26.    Drug levels (last 3 results):  Recent Labs   Lab Result Units 12/23/23  0903   Vancomycin Trough ug/ml 16.9       Pharmacy will continue to follow and monitor vancomycin.    Please contact pharmacy at extension [phone] for questions regarding this assessment.    Thank you for the consult,   Andrade Wen       Patient brief summary:  Mike Urbina Jr. is a 68 y.o. male initiated on antimicrobial therapy with IV Vancomycin for treatment of bacteremia    The patient's current regimen is 500mg q12h.    Drug Allergies:   Review of patient's allergies indicates:  No Known Allergies    Actual Body Weight:   73.9kg    Renal Function:   Estimated Creatinine Clearance: 81.6 mL/min (based on SCr of 0.88 mg/dL).,     Dialysis Method (if applicable):  N/A    CBC (last 72 hours):  No results for input(s): "WHITE BLOOD CELL COUNT", "HEMOGLOBIN", "HEMATOCRIT", "PLATELETS", "GRAN%", "LYMPH%", "MONO%", "EOSINOPHIL%", "BASOPHIL%", "DIFFERENTIAL METHOD" in the last 72 hours.    Metabolic Panel (last 72 hours):  No results for input(s): "SODIUM", "POTASSIUM", "CHLORIDE", "CO2", "GLUCOSE", "BUN BLD", "CREATININE", "ALBUMIN", "BILIRUBIN TOTAL", "ALK PHOS", "AST", "ALT", "MAGNESIUM", "PHOSPHORUS" in the last 72 hours.    Vancomycin Administrations:  vancomycin given in the last 96 hours                     vancomycin (VANCOCIN) 500 mg in dextrose 5 % in water (D5W) 100 mL IVPB (MB+) (mg) 500 mg New Bag 12/23/23 1057      Restarted 12/22/23 2137     500 mg New Bag  2116     500 mg New Bag  1011     500 mg " New Bag 12/21/23 2129     500 mg New Bag  1000     500 mg New Bag 12/20/23 2201     500 mg New Bag  1029     500 mg New Bag 12/19/23 2152                    Microbiologic Results:  Microbiology Results (last 7 days)       ** No results found for the last 168 hours. **

## 2023-12-24 PROCEDURE — A4216 STERILE WATER/SALINE, 10 ML: HCPCS | Performed by: STUDENT IN AN ORGANIZED HEALTH CARE EDUCATION/TRAINING PROGRAM

## 2023-12-24 PROCEDURE — 11000004 HC SNF PRIVATE

## 2023-12-24 PROCEDURE — 99900035 HC TECH TIME PER 15 MIN (STAT)

## 2023-12-24 PROCEDURE — 94760 N-INVAS EAR/PLS OXIMETRY 1: CPT

## 2023-12-24 PROCEDURE — 94664 DEMO&/EVAL PT USE INHALER: CPT

## 2023-12-24 PROCEDURE — 25000003 PHARM REV CODE 250: Performed by: PHYSICIAN ASSISTANT

## 2023-12-24 PROCEDURE — 63600175 PHARM REV CODE 636 W HCPCS: Performed by: STUDENT IN AN ORGANIZED HEALTH CARE EDUCATION/TRAINING PROGRAM

## 2023-12-24 PROCEDURE — 25000003 PHARM REV CODE 250: Performed by: INTERNAL MEDICINE

## 2023-12-24 PROCEDURE — 25000003 PHARM REV CODE 250: Performed by: STUDENT IN AN ORGANIZED HEALTH CARE EDUCATION/TRAINING PROGRAM

## 2023-12-24 RX ORDER — GUAIFENESIN 100 MG/5ML
400 SOLUTION ORAL EVERY 6 HOURS PRN
Status: DISCONTINUED | OUTPATIENT
Start: 2023-12-24 | End: 2023-12-26 | Stop reason: HOSPADM

## 2023-12-24 RX ADMIN — VANCOMYCIN HYDROCHLORIDE 500 MG: 500 INJECTION, POWDER, LYOPHILIZED, FOR SOLUTION INTRAVENOUS at 12:12

## 2023-12-24 RX ADMIN — MICONAZOLE NITRATE 2 % TOPICAL POWDER: at 08:12

## 2023-12-24 RX ADMIN — LINACLOTIDE 72 MCG: 72 CAPSULE, GELATIN COATED ORAL at 05:12

## 2023-12-24 RX ADMIN — Medication 1 CAPSULE: at 12:12

## 2023-12-24 RX ADMIN — VANCOMYCIN HYDROCHLORIDE 500 MG: 500 INJECTION, POWDER, LYOPHILIZED, FOR SOLUTION INTRAVENOUS at 09:12

## 2023-12-24 RX ADMIN — AMIODARONE HYDROCHLORIDE 200 MG: 200 TABLET ORAL at 08:12

## 2023-12-24 RX ADMIN — FERROUS SULFATE TAB 325 MG (65 MG ELEMENTAL FE) 1 EACH: 325 (65 FE) TAB at 08:12

## 2023-12-24 RX ADMIN — Medication 1 CAPSULE: at 08:12

## 2023-12-24 RX ADMIN — ASPIRIN 81 MG CHEWABLE TABLET 81 MG: 81 TABLET CHEWABLE at 08:12

## 2023-12-24 RX ADMIN — FUROSEMIDE 40 MG: 40 TABLET ORAL at 08:12

## 2023-12-24 RX ADMIN — WHITE PETROLATUM: 1.75 OINTMENT TOPICAL at 08:12

## 2023-12-24 RX ADMIN — Medication 1 CAPSULE: at 05:12

## 2023-12-24 RX ADMIN — SACUBITRIL AND VALSARTAN 1 TABLET: 24; 26 TABLET, FILM COATED ORAL at 08:12

## 2023-12-24 RX ADMIN — FOLIC ACID 1 MG: 1 TABLET ORAL at 08:12

## 2023-12-24 RX ADMIN — APIXABAN 5 MG: 5 TABLET, FILM COATED ORAL at 08:12

## 2023-12-24 RX ADMIN — SODIUM CHLORIDE, PRESERVATIVE FREE 10 ML: 5 INJECTION INTRAVENOUS at 05:12

## 2023-12-24 RX ADMIN — METOPROLOL SUCCINATE 100 MG: 50 TABLET, EXTENDED RELEASE ORAL at 08:12

## 2023-12-24 RX ADMIN — SODIUM CHLORIDE, PRESERVATIVE FREE 10 ML: 5 INJECTION INTRAVENOUS at 12:12

## 2023-12-24 RX ADMIN — QUETIAPINE FUMARATE 200 MG: 100 TABLET ORAL at 08:12

## 2023-12-24 RX ADMIN — SODIUM CHLORIDE: 9 INJECTION, SOLUTION INTRAVENOUS at 12:12

## 2023-12-24 NOTE — PLAN OF CARE
Problem: Adult Inpatient Plan of Care  Goal: Plan of Care Review  Outcome: Ongoing, Progressing  Flowsheets (Taken 12/24/2023 1317)  Plan of Care Reviewed With: patient  Goal: Patient-Specific Goal (Individualized)  Outcome: Ongoing, Progressing  Flowsheets (Taken 12/24/2023 1317)  Anxieties, Fears or Concerns: None expressed at this time  Individualized Care Needs: IV abx therapy  Goal: Absence of Hospital-Acquired Illness or Injury  Outcome: Ongoing, Progressing  Intervention: Identify and Manage Fall Risk  Flowsheets (Taken 12/24/2023 1317)  Safety Promotion/Fall Prevention:   assistive device/personal item within reach   bed alarm refused   nonskid shoes/socks when out of bed   side rails raised x 2  Intervention: Prevent Skin Injury  Flowsheets (Taken 12/24/2023 1317)  Body Position: position changed independently  Skin Protection:   adhesive use limited   incontinence pads utilized   tubing/devices free from skin contact   silicone foam dressing in place  Intervention: Prevent and Manage VTE (Venous Thromboembolism) Risk  Flowsheets (Taken 12/24/2023 1317)  Activity Management: Up in chair - L3  VTE Prevention/Management:   ambulation promoted   bleeding precations maintained  Range of Motion: active ROM (range of motion) encouraged  Intervention: Prevent Infection  Flowsheets (Taken 12/24/2023 1317)  Infection Prevention:   rest/sleep promoted   single patient room provided   environmental surveillance performed   cohorting utilized   equipment surfaces disinfected   hand hygiene promoted  Goal: Optimal Comfort and Wellbeing  Outcome: Ongoing, Progressing  Intervention: Monitor Pain and Promote Comfort  Flowsheets (Taken 12/24/2023 1317)  Pain Management Interventions:   care clustered   pillow support provided   position adjusted   relaxation techniques promoted   quiet environment facilitated  Intervention: Provide Person-Centered Care  Flowsheets (Taken 12/24/2023 1317)  Trust Relationship/Rapport:    care explained   questions encouraged   choices provided   reassurance provided   emotional support provided   thoughts/feelings acknowledged   empathic listening provided   questions answered  Goal: Readiness for Transition of Care  Outcome: Ongoing, Progressing  Intervention: Mutually Develop Transition Plan  Flowsheets (Taken 12/24/2023 1317)  Communicated [place] with patient/caregiver: Date not available/Unable to determine     Problem: Fluid Imbalance (Pneumonia)  Goal: Fluid Balance  Outcome: Ongoing, Progressing  Intervention: Monitor and Manage Fluid Balance  Flowsheets (Taken 12/24/2023 1317)  Fluid/Electrolyte Management: fluids provided     Problem: Infection (Pneumonia)  Goal: Resolution of Infection Signs and Symptoms  Outcome: Ongoing, Progressing  Intervention: Prevent Infection Progression  Flowsheets (Taken 12/24/2023 1317)  Fever Reduction/Comfort Measures:   lightweight bedding   lightweight clothing  Infection Management: aseptic technique maintained  Isolation Precautions:   precautions maintained   protective     Problem: Infection  Goal: Absence of Infection Signs and Symptoms  Outcome: Ongoing, Progressing  Intervention: Prevent or Manage Infection  Flowsheets (Taken 12/24/2023 1317)  Fever Reduction/Comfort Measures:   lightweight bedding   lightweight clothing  Infection Management: aseptic technique maintained  Isolation Precautions:   precautions maintained   protective     Problem: Fall Injury Risk  Goal: Absence of Fall and Fall-Related Injury  Outcome: Ongoing, Progressing  Intervention: Identify and Manage Contributors  Flowsheets (Taken 12/24/2023 1317)  Self-Care Promotion:   independence encouraged   BADL personal objects within reach   BADL personal routines maintained   safe use of adaptive equipment encouraged  Medication Review/Management:   medications reviewed   high-risk medications identified  Intervention: Promote Injury-Free Environment  Flowsheets (Taken 12/24/2023  1317)  Safety Promotion/Fall Prevention:   assistive device/personal item within reach   bed alarm refused   nonskid shoes/socks when out of bed   side rails raised x 2     Problem: Impaired Wound Healing  Goal: Optimal Wound Healing  Outcome: Ongoing, Progressing  Intervention: Promote Wound Healing  Flowsheets (Taken 12/24/2023 1317)  Oral Nutrition Promotion:   rest periods promoted   safe use of adaptive equipment encouraged  Sleep/Rest Enhancement:   awakenings minimized   noise level reduced   regular sleep/rest pattern promoted  Activity Management: Up in chair - L3  Pain Management Interventions:   care clustered   pillow support provided   position adjusted   relaxation techniques promoted   quiet environment facilitated

## 2023-12-24 NOTE — PROGRESS NOTES
[facility] MEDICINE ~ PROGRESS NOTE    CHIEF COMPLAINT   Hospital follow up    HOSPITAL COURSE   67yo male with a past medical history of CAD, diastolic CHF, Afib, aortic stenosis, NSTEMI, and HTN who was recently admitted to [facility] for pneumonia and Afib RVR. Patient was diagnosed with Influenza A on 11/19/2023 prior to admission and started on Tamiflu. CTA chest 11/24/2023 showed no PE, but multifocal pneumonia most evident in RUL. Patient was admitted for ICU, started on Cardizem drip, Vanc, Zosyn, Doxy, and required Levophed for hypotension. Echo 11/25/2023 showed LVEF 55% moderate aortic stenosis, severe posterior mitral annular calcification present. Blood cultures x2 resulted positive for MRSA on 11/27/2023.  Infectious Disease recommended patient to continue vancomycin for 4 weeks with suspected in date on 12/25/2023. Stool culture on 11/27/2023 showed many yeast. Repeat blood cultures x2 on 12/02/2023 showed no growth. Patient underwent a CARO guided DCCV on 11/30/2023 which showed mild-moderately enlarged right atrium, no ASD or PFO, LVEF 55%, mild-moderate left ventricular hypertrophy, severe aortic stenosis, moderate mitral stenosis, mild-moderate mitral regurgitation, mild-moderate tricuspid regurgitation, no vegetation present, no intracardiac thrombus, successful cardioversion. CTA chest, abdomen, pelvis 12/05/2023 showed small bilateral pleural effusion R>L and patchy consolidation in both lungs increased compared to prior. CTA was obtained for TAVR workup to be completed outpatient after ID clearance. On exam today, patient is currently on 2 L nasal cannula with O2 sat 99%.  With worsening pneumonia and lung sounds on exam, we will start Zosyn, obtain sputum culture, add nystatin for fungal coverage, and schedule Mucomyst with the Xopenex nebulizers 3 times a day.     Today  Denies any complaints today. He is excited to go home.    OBJECTIVE/PHYSICAL EXAM      VITAL SIGNS (MOST RECENT):  Temp: 97.3 °F (36.3 °C) (12/24/23 0900)  Pulse: 80 (12/24/23 0900)  Resp: 18 (12/24/23 0706)  BP: 128/70 (12/24/23 0900)  SpO2: 97 % (12/24/23 0900) VITAL SIGNS (24 HOUR RANGE):  Temp:  [97.3 °F (36.3 °C)-98.4 °F (36.9 °C)] 97.3 °F (36.3 °C)  Pulse:  [70-80] 80  Resp:  [18] 18  SpO2:  [97 %-98 %] 97 %  BP: (105-128)/(62-70) 128/70     GENERAL: In no acute distress, afebrile  HEENT: Atraumatic, normocephalic, moist mucous membranes, supple neck   CHEST: CTAB  HEART: S1, S2, grade IV murmur  ABDOMEN: Soft, nontender, BS +  MSK: Warm, no lower extremity edema, no clubbing or cyanosis  NEUROLOGIC: Alert and oriented x4, moving all extremities with good strength   INTEGUMENTARY: No obvious skin rash   PSYCHIATRY: Appropriate mood and affect     ASSESSMENT/PLAN   MRSA bacteriemia   Multifocal Pneumonia  Recent Influenza A infection (11/19/2023)  Acute hypoxic respiratory failure  CTA chest 11/24/2023 showed no PE, but multifocal pneumonia most evident in RUL  Blood cultures x2 positive for MRSA on 11/27/2023, repeats were negative  ID recommended patient to continue vancomycin for 4 weeks with suspected in date on 12/25/2023, Cefepime (12/07/2023-12/12/2023)  D/c Zosyn (12/06/2023-12/07/203) and Nystatin (12/06/2023-12/07/2023)  CXR 12/14/2023 shows no changes, Lasix 40mg IVP x1 given for cough with pink, frothy sputum   IS, cough assist, acapella, Xopenex prn    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   Thrombocytopenia  Hx of alcohol use disorder  Thiamine wnl   Ferrlecit x3, oral supplementation   Hold Eliquis if platelets <50  Received 2u pRBC 12/15/2023 with Lasix 40mg IVP between units   Continue Aspirin and Eliquis - monitor H&H      Yeast + Stool  D/c Nystatin (12/06/2023-12/07/2023) low concern for disseminated candidemia   Continue Probiotic    Hypokalemia   Replete as condition requires     Transamnitis - resolved   Grayzone hepatitis C Ab  HCV RNA Detect/Quant, S  "Undetected      CAD  Diastolic CHF  Afib  Aortic stenosis  Essential HTN  CARO guided DCCV on 11/30/2023 which showed mild-moderately enlarged right atrium, no ASD or PFO, LVEF 55%, mild-moderate left ventricular hypertrophy, severe aortic stenosis, moderate mitral stenosis, mild-moderate mitral regurgitation, mild-moderate tricuspid regurgitation, no vegetation present, no intracardiac thrombus, successful cardioversion  TAVR to be completed outpatient after ID clearance  Continue Entresto, Toprol XL, Amiodarone, Aspiring, and Eliquis   Continue PO Lasix 40mg    DVT prophylaxis: Eliquis   Anticipated discharge and disposition: Continue PT/OT and IV antibiotics until 12/25/2023. Plan for d/c home on 12/26/2023  __________________________________________________________________________    LABS/MICRO/MEDS/DIAGNOSTICS     LABS  No results for input(s): "NA", "K", "CHLORIDE", "CO2", "BUN", "CREATININE", "GLUCOSE", "CALCIUM", "ALKPHOS", "AST", "ALT", "ALBUMIN" in the last 72 hours.    No results for input(s): "WBC", "RBC", "HCT", "MCV", "PLT" in the last 72 hours.    Invalid input(s): "HG"    MICROBIOLOGY  Microbiology Results (last 7 days)       ** No results found for the last 168 hours. **             MEDICATIONS   amiodarone  200 mg Oral Daily    apixaban  5 mg Oral BID    aspirin  81 mg Oral Daily    ferrous sulfate  1 tablet Oral Daily    folic acid  1 mg Oral Daily    furosemide  40 mg Oral Daily    Lactobacillus acidophilus  1 capsule Oral TID WM    linaCLOtide  72 mcg Oral Before breakfast    metoprolol succinate  100 mg Oral Daily    miconazole NITRATE 2 %   Topical (Top) BID    QUEtiapine  200 mg Oral QHS    sacubitriL-valsartan  1 tablet Oral BID    sodium chloride 0.9%  10 mL Intravenous Q6H    vancomycin (VANCOCIN) IV (PEDS and ADULTS)  500 mg Intravenous Q12H    white petrolatum   Topical (Top) BID         INFUSIONS       DIAGNOSTIC TESTS  X-Ray Chest 1 View   Final Result      No significant change       "   Electronically signed by: Juan Wharton   Date:    12/14/2023   Time:    07:51      X-Ray Chest 1 View   Final Result      Persistent consolidative changes in the right perihilar region and right upper lobe.      Slightly more confluent opacities in the left upper lobe.      No other interval change         Electronically signed by: Juan Wharton   Date:    12/12/2023   Time:    10:04      X-Ray Chest 1 View for Line/Tube Placement   Final Result      Consolidative changes in the right perihilar region right mid and right upper lung zone similar to previous exam.      Interval insertion of right-sided PICC line         Electronically signed by: Juan Wharton   Date:    12/06/2023   Time:    12:14        EF   Date Value Ref Range Status   11/25/2023 55 % Final   12/29/2022 55 % Final        NUTRITION STATUS  Patient meets ASPEN criteria for   malnutrition of   per RD assessment as evidenced by:                       A minimum of two characteristics is recommended for diagnosis of either severe or non-severe malnutrition.     Case related differential diagnoses have been reviewed; assessment and plan has been documented. I have personally reviewed the labs and test results that are currently available; I have reviewed the patients medication list. I have reviewed the consulting providers recommendations. I have reviewed or attempted to review medical records based upon their availability.  All of the patient's and/or family's questions have been addressed and answered to the best of my ability.  I will continue to monitor closely and make adjustments to medical management as needed.  This document was created using M*Modal Fluency Direct.  Transcription errors may have been made.  Please contact me if any questions may rise regarding documentation to clarify transcription.      Mary Cleaning MD   Internal Medicine  Department of Hospital Medicine [facility]

## 2023-12-24 NOTE — PLAN OF CARE
Problem: Infection  Goal: Absence of Infection Signs and Symptoms  Outcome: Ongoing, Progressing  Intervention: Prevent or Manage Infection  Flowsheets (Taken 12/23/2023 2000)  Fever Reduction/Comfort Measures:   lightweight clothing   lightweight bedding  Isolation Precautions: protective     Problem: Fall Injury Risk  Goal: Absence of Fall and Fall-Related Injury  Outcome: Ongoing, Progressing  Intervention: Identify and Manage Contributors  Flowsheets (Taken 12/23/2023 2000)  Self-Care Promotion:   independence encouraged   BADL personal objects within reach   BADL personal routines maintained  Medication Review/Management: medications reviewed  Intervention: Promote Injury-Free Environment  Flowsheets (Taken 12/23/2023 2000)  Safety Promotion/Fall Prevention:   assistive device/personal item within reach   commode/urinal/bedpan at bedside   Fall Risk reviewed with patient/family   lighting adjusted   medications reviewed   nonskid shoes/socks when out of bed   side rails raised x 3

## 2023-12-25 PROCEDURE — 94664 DEMO&/EVAL PT USE INHALER: CPT

## 2023-12-25 PROCEDURE — 99900035 HC TECH TIME PER 15 MIN (STAT)

## 2023-12-25 PROCEDURE — 25000003 PHARM REV CODE 250: Performed by: STUDENT IN AN ORGANIZED HEALTH CARE EDUCATION/TRAINING PROGRAM

## 2023-12-25 PROCEDURE — 25000003 PHARM REV CODE 250: Performed by: INTERNAL MEDICINE

## 2023-12-25 PROCEDURE — 63600175 PHARM REV CODE 636 W HCPCS: Performed by: STUDENT IN AN ORGANIZED HEALTH CARE EDUCATION/TRAINING PROGRAM

## 2023-12-25 PROCEDURE — A4216 STERILE WATER/SALINE, 10 ML: HCPCS | Performed by: STUDENT IN AN ORGANIZED HEALTH CARE EDUCATION/TRAINING PROGRAM

## 2023-12-25 PROCEDURE — 94760 N-INVAS EAR/PLS OXIMETRY 1: CPT

## 2023-12-25 PROCEDURE — 25000003 PHARM REV CODE 250: Performed by: PHYSICIAN ASSISTANT

## 2023-12-25 PROCEDURE — 11000004 HC SNF PRIVATE

## 2023-12-25 RX ADMIN — VANCOMYCIN HYDROCHLORIDE 500 MG: 500 INJECTION, POWDER, LYOPHILIZED, FOR SOLUTION INTRAVENOUS at 10:12

## 2023-12-25 RX ADMIN — QUETIAPINE FUMARATE 200 MG: 100 TABLET ORAL at 08:12

## 2023-12-25 RX ADMIN — Medication 1 CAPSULE: at 05:12

## 2023-12-25 RX ADMIN — AMIODARONE HYDROCHLORIDE 200 MG: 200 TABLET ORAL at 08:12

## 2023-12-25 RX ADMIN — LINACLOTIDE 72 MCG: 72 CAPSULE, GELATIN COATED ORAL at 06:12

## 2023-12-25 RX ADMIN — APIXABAN 5 MG: 5 TABLET, FILM COATED ORAL at 08:12

## 2023-12-25 RX ADMIN — SODIUM CHLORIDE, PRESERVATIVE FREE 10 ML: 5 INJECTION INTRAVENOUS at 05:12

## 2023-12-25 RX ADMIN — FOLIC ACID 1 MG: 1 TABLET ORAL at 08:12

## 2023-12-25 RX ADMIN — MICONAZOLE NITRATE 2 % TOPICAL POWDER: at 08:12

## 2023-12-25 RX ADMIN — SODIUM CHLORIDE, PRESERVATIVE FREE 10 ML: 5 INJECTION INTRAVENOUS at 11:12

## 2023-12-25 RX ADMIN — FERROUS SULFATE TAB 325 MG (65 MG ELEMENTAL FE) 1 EACH: 325 (65 FE) TAB at 08:12

## 2023-12-25 RX ADMIN — SACUBITRIL AND VALSARTAN 1 TABLET: 24; 26 TABLET, FILM COATED ORAL at 08:12

## 2023-12-25 RX ADMIN — SODIUM CHLORIDE, PRESERVATIVE FREE 10 ML: 5 INJECTION INTRAVENOUS at 12:12

## 2023-12-25 RX ADMIN — SODIUM CHLORIDE, PRESERVATIVE FREE 10 ML: 5 INJECTION INTRAVENOUS at 06:12

## 2023-12-25 RX ADMIN — VANCOMYCIN HYDROCHLORIDE 500 MG: 500 INJECTION, POWDER, LYOPHILIZED, FOR SOLUTION INTRAVENOUS at 12:12

## 2023-12-25 RX ADMIN — Medication 1 CAPSULE: at 11:12

## 2023-12-25 RX ADMIN — FUROSEMIDE 40 MG: 40 TABLET ORAL at 08:12

## 2023-12-25 RX ADMIN — SODIUM CHLORIDE: 9 INJECTION, SOLUTION INTRAVENOUS at 12:12

## 2023-12-25 RX ADMIN — Medication 1 CAPSULE: at 08:12

## 2023-12-25 RX ADMIN — WHITE PETROLATUM: 1.75 OINTMENT TOPICAL at 08:12

## 2023-12-25 RX ADMIN — METOPROLOL SUCCINATE 100 MG: 50 TABLET, EXTENDED RELEASE ORAL at 08:12

## 2023-12-25 RX ADMIN — ASPIRIN 81 MG CHEWABLE TABLET 81 MG: 81 TABLET CHEWABLE at 08:12

## 2023-12-25 NOTE — PROGRESS NOTES
[facility] MEDICINE ~ PROGRESS NOTE    CHIEF COMPLAINT   Hospital follow up    HOSPITAL COURSE   69yo male with a past medical history of CAD, diastolic CHF, Afib, aortic stenosis, NSTEMI, and HTN who was recently admitted to [facility] for pneumonia and Afib RVR. Patient was diagnosed with Influenza A on 11/19/2023 prior to admission and started on Tamiflu. CTA chest 11/24/2023 showed no PE, but multifocal pneumonia most evident in RUL. Patient was admitted for ICU, started on Cardizem drip, Vanc, Zosyn, Doxy, and required Levophed for hypotension. Echo 11/25/2023 showed LVEF 55% moderate aortic stenosis, severe posterior mitral annular calcification present. Blood cultures x2 resulted positive for MRSA on 11/27/2023.  Infectious Disease recommended patient to continue vancomycin for 4 weeks with suspected in date on 12/25/2023. Stool culture on 11/27/2023 showed many yeast. Repeat blood cultures x2 on 12/02/2023 showed no growth. Patient underwent a CARO guided DCCV on 11/30/2023 which showed mild-moderately enlarged right atrium, no ASD or PFO, LVEF 55%, mild-moderate left ventricular hypertrophy, severe aortic stenosis, moderate mitral stenosis, mild-moderate mitral regurgitation, mild-moderate tricuspid regurgitation, no vegetation present, no intracardiac thrombus, successful cardioversion. CTA chest, abdomen, pelvis 12/05/2023 showed small bilateral pleural effusion R>L and patchy consolidation in both lungs increased compared to prior. CTA was obtained for TAVR workup to be completed outpatient after ID clearance. On exam today, patient is currently on 2 L nasal cannula with O2 sat 99%.  With worsening pneumonia and lung sounds on exam, we will start Zosyn, obtain sputum culture, add nystatin for fungal coverage, and schedule Mucomyst with the Xopenex nebulizers 3 times a day.     Today  Patient with no acute events overnight.  He is excited to go home tomorrow.  He will  be planning on going home tomorrow in the morning.    OBJECTIVE/PHYSICAL EXAM     VITAL SIGNS (MOST RECENT):  Temp: 97.7 °F (36.5 °C) (12/25/23 0723)  Pulse: 74 (12/25/23 0723)  Resp: 18 (12/25/23 0717)  BP: (!) 111/55 (12/25/23 0723)  SpO2: 96 % (12/25/23 0723) VITAL SIGNS (24 HOUR RANGE):  Temp:  [97.3 °F (36.3 °C)-98.1 °F (36.7 °C)] 97.7 °F (36.5 °C)  Pulse:  [70-80] 74  Resp:  [18] 18  SpO2:  [96 %-98 %] 96 %  BP: (111-128)/(55-70) 111/55     GENERAL: In no acute distress, afebrile  HEENT: Atraumatic, normocephalic, moist mucous membranes, supple neck   CHEST: CTAB  HEART: S1, S2, grade IV murmur  ABDOMEN: Soft, nontender, BS +  MSK: Warm, no lower extremity edema, no clubbing or cyanosis  NEUROLOGIC: Alert and oriented x4, moving all extremities with good strength   INTEGUMENTARY: No obvious skin rash   PSYCHIATRY: Appropriate mood and affect     ASSESSMENT/PLAN   MRSA bacteriemia   Multifocal Pneumonia  Recent Influenza A infection (11/19/2023)  Acute hypoxic respiratory failure  CTA chest 11/24/2023 showed no PE, but multifocal pneumonia most evident in RUL  Blood cultures x2 positive for MRSA on 11/27/2023, repeats were negative  ID recommended patient to continue vancomycin for 4 weeks with suspected in date on 12/25/2023, Cefepime (12/07/2023-12/12/2023)  D/c Zosyn (12/06/2023-12/07/203) and Nystatin (12/06/2023-12/07/2023)  CXR 12/14/2023 shows no changes, Lasix 40mg IVP x1 given for cough with pink, frothy sputum   IS, cough assist, acapella, Xopenex prn    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   Thrombocytopenia  Hx of alcohol use disorder  Thiamine wnl   Ferrlecit x3, oral supplementation   Hold Eliquis if platelets <50  Received 2u pRBC 12/15/2023 with Lasix 40mg IVP between units   Continue Aspirin and Eliquis - monitor H&H      Yeast + Stool  D/c Nystatin (12/06/2023-12/07/2023) low concern for disseminated candidemia   Continue Probiotic    Hypokalemia   Replete as condition  "requires     Transamnitis - resolved   Grayzone hepatitis C Ab  HCV RNA Detect/Quant, S Undetected      CAD  Diastolic CHF  Afib  Aortic stenosis  Essential HTN  CARO guided DCCV on 11/30/2023 which showed mild-moderately enlarged right atrium, no ASD or PFO, LVEF 55%, mild-moderate left ventricular hypertrophy, severe aortic stenosis, moderate mitral stenosis, mild-moderate mitral regurgitation, mild-moderate tricuspid regurgitation, no vegetation present, no intracardiac thrombus, successful cardioversion  TAVR to be completed outpatient after ID clearance  Continue Entresto, Toprol XL, Amiodarone, Aspiring, and Eliquis   Continue PO Lasix 40mg    DVT prophylaxis: Eliquis   Anticipated discharge and disposition: Continue PT/OT and IV antibiotics until 12/25/2023. Plan for d/c home on 12/26/2023  __________________________________________________________________________    LABS/MICRO/MEDS/DIAGNOSTICS     LABS  No results for input(s): "NA", "K", "CHLORIDE", "CO2", "BUN", "CREATININE", "GLUCOSE", "CALCIUM", "ALKPHOS", "AST", "ALT", "ALBUMIN" in the last 72 hours.    No results for input(s): "WBC", "RBC", "HCT", "MCV", "PLT" in the last 72 hours.    Invalid input(s): "HG"    MICROBIOLOGY  Microbiology Results (last 7 days)       ** No results found for the last 168 hours. **             MEDICATIONS   amiodarone  200 mg Oral Daily    apixaban  5 mg Oral BID    aspirin  81 mg Oral Daily    ferrous sulfate  1 tablet Oral Daily    folic acid  1 mg Oral Daily    furosemide  40 mg Oral Daily    Lactobacillus acidophilus  1 capsule Oral TID WM    linaCLOtide  72 mcg Oral Before breakfast    metoprolol succinate  100 mg Oral Daily    miconazole NITRATE 2 %   Topical (Top) BID    QUEtiapine  200 mg Oral QHS    sacubitriL-valsartan  1 tablet Oral BID    sodium chloride 0.9%  10 mL Intravenous Q6H    vancomycin (VANCOCIN) IV (PEDS and ADULTS)  500 mg Intravenous Q12H    white petrolatum   Topical (Top) BID         INFUSIONS   "     DIAGNOSTIC TESTS  X-Ray Chest 1 View   Final Result      No significant change         Electronically signed by: Juan Wharton   Date:    12/14/2023   Time:    07:51      X-Ray Chest 1 View   Final Result      Persistent consolidative changes in the right perihilar region and right upper lobe.      Slightly more confluent opacities in the left upper lobe.      No other interval change         Electronically signed by: Juan Wharton   Date:    12/12/2023   Time:    10:04      X-Ray Chest 1 View for Line/Tube Placement   Final Result      Consolidative changes in the right perihilar region right mid and right upper lung zone similar to previous exam.      Interval insertion of right-sided PICC line         Electronically signed by: Juan Wharton   Date:    12/06/2023   Time:    12:14        EF   Date Value Ref Range Status   11/25/2023 55 % Final   12/29/2022 55 % Final        NUTRITION STATUS  Patient meets ASPEN criteria for   malnutrition of   per RD assessment as evidenced by:                       A minimum of two characteristics is recommended for diagnosis of either severe or non-severe malnutrition.     Case related differential diagnoses have been reviewed; assessment and plan has been documented. I have personally reviewed the labs and test results that are currently available; I have reviewed the patients medication list. I have reviewed the consulting providers recommendations. I have reviewed or attempted to review medical records based upon their availability.  All of the patient's and/or family's questions have been addressed and answered to the best of my ability.  I will continue to monitor closely and make adjustments to medical management as needed.  This document was created using M*Modal Fluency Direct.  Transcription errors may have been made.  Please contact me if any questions may rise regarding documentation to clarify transcription.      Mary Cleaning MD   Internal  Medicine  Department of Hospital Medicine [facility]

## 2023-12-25 NOTE — PLAN OF CARE
Problem: Adult Inpatient Plan of Care  Goal: Plan of Care Review  12/24/2023 2128 by Elizabeth Cabral RN  Outcome: Ongoing, Progressing  Flowsheets (Taken 12/24/2023 2000)  Plan of Care Reviewed With: patient  12/24/2023 2128 by Elizabeth Cabral RN  Outcome: Ongoing, Progressing  Goal: Patient-Specific Goal (Individualized)  12/24/2023 2128 by Elizabeth Cabral RN  Outcome: Ongoing, Progressing  Flowsheets (Taken 12/24/2023 2000)  Anxieties, Fears or Concerns: ready to go home  Individualized Care Needs: IV abx  12/24/2023 2128 by Elizabeth Cabral RN  Outcome: Ongoing, Progressing     Problem: Infection  Goal: Absence of Infection Signs and Symptoms  12/24/2023 2128 by Elizabeth Cabral RN  Outcome: Ongoing, Progressing  12/24/2023 2128 by Elizabeth Cabral RN  Outcome: Ongoing, Progressing  Intervention: Prevent or Manage Infection  Flowsheets (Taken 12/24/2023 2128)  Fever Reduction/Comfort Measures:   lightweight clothing   lightweight bedding  Infection Management: aseptic technique maintained  Isolation Precautions:   precautions initiated   protective

## 2023-12-25 NOTE — PLAN OF CARE
Problem: Adult Inpatient Plan of Care  Goal: Plan of Care Review  Outcome: Ongoing, Progressing  Goal: Patient-Specific Goal (Individualized)  Outcome: Ongoing, Progressing  Flowsheets (Taken 12/25/2023 0800)  Anxieties, Fears or Concerns: ready to go home tomorrow  Individualized Care Needs: IV ABX, ambulation  Goal: Absence of Hospital-Acquired Illness or Injury  Outcome: Ongoing, Progressing  Intervention: Prevent Skin Injury  Flowsheets (Taken 12/25/2023 0800)  Body Position: position maintained  Intervention: Prevent and Manage VTE (Venous Thromboembolism) Risk  Flowsheets (Taken 12/25/2023 0800)  Range of Motion: active ROM (range of motion) encouraged  Goal: Optimal Comfort and Wellbeing  Outcome: Ongoing, Progressing  Intervention: Monitor Pain and Promote Comfort  Flowsheets (Taken 12/25/2023 0800)  Pain Management Interventions:   care clustered   pillow support provided   quiet environment facilitated   prescribed exercises encouraged   relaxation techniques promoted   position adjusted  Goal: Readiness for Transition of Care  Outcome: Ongoing, Progressing     Problem: Fluid Imbalance (Pneumonia)  Goal: Fluid Balance  Outcome: Ongoing, Progressing     Problem: Infection (Pneumonia)  Goal: Resolution of Infection Signs and Symptoms  Outcome: Ongoing, Progressing  Intervention: Prevent Infection Progression  Flowsheets (Taken 12/25/2023 0800)  Fever Reduction/Comfort Measures:   lightweight bedding   lightweight clothing     Problem: Infection  Goal: Absence of Infection Signs and Symptoms  Outcome: Ongoing, Progressing  Intervention: Prevent or Manage Infection  Flowsheets (Taken 12/25/2023 0800)  Fever Reduction/Comfort Measures:   lightweight bedding   lightweight clothing     Problem: Fall Injury Risk  Goal: Absence of Fall and Fall-Related Injury  Outcome: Ongoing, Progressing  Intervention: Identify and Manage Contributors  Flowsheets (Taken 12/25/2023 0800)  Self-Care Promotion:   independence  encouraged   BADL personal routines maintained   BADL personal objects within reach   safe use of adaptive equipment encouraged  Medication Review/Management: medications reviewed     Problem: Impaired Wound Healing  Goal: Optimal Wound Healing  Outcome: Ongoing, Progressing  Intervention: Promote Wound Healing  Flowsheets (Taken 12/25/2023 0800)  Sleep/Rest Enhancement:   awakenings minimized   room darkened   regular sleep/rest pattern promoted  Pain Management Interventions:   care clustered   pillow support provided   quiet environment facilitated   prescribed exercises encouraged   relaxation techniques promoted   position adjusted

## 2023-12-26 VITALS
TEMPERATURE: 98 F | RESPIRATION RATE: 18 BRPM | WEIGHT: 157.88 LBS | OXYGEN SATURATION: 96 % | BODY MASS INDEX: 20.26 KG/M2 | HEART RATE: 76 BPM | DIASTOLIC BLOOD PRESSURE: 59 MMHG | HEIGHT: 74 IN | SYSTOLIC BLOOD PRESSURE: 105 MMHG

## 2023-12-26 PROCEDURE — 25000003 PHARM REV CODE 250: Performed by: STUDENT IN AN ORGANIZED HEALTH CARE EDUCATION/TRAINING PROGRAM

## 2023-12-26 PROCEDURE — 25000003 PHARM REV CODE 250: Performed by: PHYSICIAN ASSISTANT

## 2023-12-26 PROCEDURE — 94760 N-INVAS EAR/PLS OXIMETRY 1: CPT

## 2023-12-26 PROCEDURE — A4216 STERILE WATER/SALINE, 10 ML: HCPCS | Performed by: STUDENT IN AN ORGANIZED HEALTH CARE EDUCATION/TRAINING PROGRAM

## 2023-12-26 PROCEDURE — 25000003 PHARM REV CODE 250: Performed by: INTERNAL MEDICINE

## 2023-12-26 RX ORDER — FOLIC ACID 1 MG/1
1 TABLET ORAL DAILY
Qty: 30 TABLET | Refills: 0 | Status: SHIPPED | OUTPATIENT
Start: 2023-12-27 | End: 2024-01-10 | Stop reason: SDUPTHER

## 2023-12-26 RX ORDER — ASPIRIN 81 MG/1
81 TABLET ORAL DAILY
Qty: 30 TABLET | Refills: 0 | Status: SHIPPED | OUTPATIENT
Start: 2023-12-26 | End: 2024-01-10 | Stop reason: SDUPTHER

## 2023-12-26 RX ORDER — ATORVASTATIN CALCIUM 40 MG/1
40 TABLET, FILM COATED ORAL NIGHTLY
Qty: 30 TABLET | Refills: 0 | Status: SHIPPED | OUTPATIENT
Start: 2023-12-26 | End: 2024-01-10 | Stop reason: SDUPTHER

## 2023-12-26 RX ORDER — QUETIAPINE FUMARATE 100 MG/1
200 TABLET, FILM COATED ORAL NIGHTLY
Qty: 60 TABLET | Refills: 0 | Status: SHIPPED | OUTPATIENT
Start: 2023-12-26 | End: 2024-01-10 | Stop reason: SDUPTHER

## 2023-12-26 RX ORDER — FUROSEMIDE 40 MG/1
40 TABLET ORAL DAILY
Qty: 30 TABLET | Refills: 0 | Status: SHIPPED | OUTPATIENT
Start: 2023-12-27 | End: 2024-01-10 | Stop reason: SDUPTHER

## 2023-12-26 RX ORDER — METOPROLOL SUCCINATE 100 MG/1
100 TABLET, EXTENDED RELEASE ORAL DAILY
Qty: 30 TABLET | Refills: 0 | Status: SHIPPED | OUTPATIENT
Start: 2023-12-27 | End: 2024-01-10 | Stop reason: SDUPTHER

## 2023-12-26 RX ORDER — AMIODARONE HYDROCHLORIDE 200 MG/1
200 TABLET ORAL DAILY
Qty: 30 TABLET | Refills: 0 | Status: SHIPPED | OUTPATIENT
Start: 2023-12-26 | End: 2024-01-10 | Stop reason: SDUPTHER

## 2023-12-26 RX ORDER — FERROUS SULFATE 325(65) MG
325 TABLET, DELAYED RELEASE (ENTERIC COATED) ORAL DAILY
Qty: 30 TABLET | Refills: 0 | Status: SHIPPED | OUTPATIENT
Start: 2023-12-26 | End: 2024-01-10 | Stop reason: SDUPTHER

## 2023-12-26 RX ADMIN — MICONAZOLE NITRATE 2 % TOPICAL POWDER: at 09:12

## 2023-12-26 RX ADMIN — METOPROLOL SUCCINATE 100 MG: 50 TABLET, EXTENDED RELEASE ORAL at 09:12

## 2023-12-26 RX ADMIN — WHITE PETROLATUM: 1.75 OINTMENT TOPICAL at 09:12

## 2023-12-26 RX ADMIN — SODIUM CHLORIDE, PRESERVATIVE FREE 10 ML: 5 INJECTION INTRAVENOUS at 06:12

## 2023-12-26 RX ADMIN — FOLIC ACID 1 MG: 1 TABLET ORAL at 09:12

## 2023-12-26 RX ADMIN — Medication 1 CAPSULE: at 09:12

## 2023-12-26 RX ADMIN — APIXABAN 5 MG: 5 TABLET, FILM COATED ORAL at 09:12

## 2023-12-26 RX ADMIN — ASPIRIN 81 MG CHEWABLE TABLET 81 MG: 81 TABLET CHEWABLE at 09:12

## 2023-12-26 RX ADMIN — FERROUS SULFATE TAB 325 MG (65 MG ELEMENTAL FE) 1 EACH: 325 (65 FE) TAB at 09:12

## 2023-12-26 RX ADMIN — AMIODARONE HYDROCHLORIDE 200 MG: 200 TABLET ORAL at 09:12

## 2023-12-26 RX ADMIN — SODIUM CHLORIDE, PRESERVATIVE FREE 10 ML: 5 INJECTION INTRAVENOUS at 12:12

## 2023-12-26 RX ADMIN — SACUBITRIL AND VALSARTAN 1 TABLET: 24; 26 TABLET, FILM COATED ORAL at 09:12

## 2023-12-26 RX ADMIN — FUROSEMIDE 40 MG: 40 TABLET ORAL at 09:12

## 2023-12-26 RX ADMIN — LINACLOTIDE 72 MCG: 72 CAPSULE, GELATIN COATED ORAL at 06:12

## 2023-12-26 NOTE — PLAN OF CARE
Problem: Adult Inpatient Plan of Care  Goal: Plan of Care Review  Outcome: Met  Goal: Patient-Specific Goal (Individualized)  Outcome: Met  Goal: Absence of Hospital-Acquired Illness or Injury  Outcome: Met  Goal: Optimal Comfort and Wellbeing  Outcome: Met  Goal: Readiness for Transition of Care  Outcome: Met     Problem: Fluid Imbalance (Pneumonia)  Goal: Fluid Balance  Outcome: Met     Problem: Infection (Pneumonia)  Goal: Resolution of Infection Signs and Symptoms  Outcome: Met     Problem: Infection  Goal: Absence of Infection Signs and Symptoms  Outcome: Met     Problem: Fall Injury Risk  Goal: Absence of Fall and Fall-Related Injury  Outcome: Met     Problem: Impaired Wound Healing  Goal: Optimal Wound Healing  Outcome: Met

## 2023-12-26 NOTE — PLAN OF CARE
Problem: Adult Inpatient Plan of Care  Goal: Plan of Care Review  12/25/2023 2101 by Elizabeth Cabral RN  Outcome: Ongoing, Progressing  Flowsheets (Taken 12/25/2023 2000)  Plan of Care Reviewed With: patient  12/25/2023 2101 by Elizabeth Cabral RN  Outcome: Ongoing, Progressing  Goal: Patient-Specific Goal (Individualized)  12/25/2023 2101 by Elizabeth Cabral RN  Outcome: Ongoing, Progressing  Flowsheets (Taken 12/25/2023 2000)  Anxieties, Fears or Concerns: ready to go home  Individualized Care Needs: IV abx  12/25/2023 2101 by Elizabeth Cabral RN  Outcome: Ongoing, Progressing  Goal: Absence of Hospital-Acquired Illness or Injury  12/25/2023 2101 by Elizabeth Cabral RN  Outcome: Ongoing, Progressing  12/25/2023 2101 by Elizabeth Cabral RN  Outcome: Ongoing, Progressing  Intervention: Identify and Manage Fall Risk  Flowsheets (Taken 12/25/2023 2000)  Safety Promotion/Fall Prevention: assistive device/personal item within reach  Intervention: Prevent Skin Injury  Flowsheets (Taken 12/25/2023 2000)  Body Position: position changed independently  Skin Protection:   adhesive use limited   incontinence pads utilized   tubing/devices free from skin contact  Intervention: Prevent and Manage VTE (Venous Thromboembolism) Risk  Flowsheets (Taken 12/25/2023 2000)  Activity Management: Ambulated -L4  VTE Prevention/Management:   ambulation promoted   bleeding precations maintained   fluids promoted  Range of Motion: active ROM (range of motion) encouraged  Intervention: Prevent Infection  Flowsheets (Taken 12/25/2023 2000)  Infection Prevention:   cohorting utilized   hand hygiene promoted     Problem: Infection  Goal: Absence of Infection Signs and Symptoms  Outcome: Ongoing, Progressing     Problem: Fall Injury Risk  Goal: Absence of Fall and Fall-Related Injury  Outcome: Ongoing, Progressing  Intervention: Identify and Manage Contributors  Flowsheets (Taken 12/25/2023 2000)  Self-Care Promotion:   independence  encouraged   BADL personal objects within reach   BADL personal routines maintained  Medication Review/Management: medications reviewed  Intervention: Promote Injury-Free Environment  Flowsheets (Taken 12/25/2023 2000)  Safety Promotion/Fall Prevention: assistive device/personal item within reach

## 2023-12-26 NOTE — NURSING
Patient educated on discharge instructions and verbalized understanding at this time.  PICC discontinued at this time and patient tolerated well. Patient left unit via wheelchair

## 2023-12-26 NOTE — DISCHARGE SUMMARY
[facility]  HOSPITAL MEDICINE - DISCHARGE SUMMARY    Patient Name: Mike Urbina Jr.  MRN: [id number]  Admission Date: 12/5/2023  Discharge Date: 12/26/2023  Hospital Length of Stay: 21 days  Discharge Provider: GISEL Russell  Primary Care Provider: Darlene Willams FNP    HOSPITAL COURSE     69yo male with a past medical history of CAD, diastolic CHF, Afib, aortic stenosis, NSTEMI, and HTN who was recently admitted to [facility] for pneumonia and Afib RVR. Patient was diagnosed with Influenza A on 11/19/2023 prior to admission and started on Tamiflu. CTA chest 11/24/2023 showed no PE, but multifocal pneumonia most evident in RUL. Patient was admitted for ICU, started on Cardizem drip, Vanc, Zosyn, Doxy, and required Levophed for hypotension. Echo 11/25/2023 showed LVEF 55% moderate aortic stenosis, severe posterior mitral annular calcification present. Blood cultures x2 resulted positive for MRSA on 11/27/2023.  Infectious Disease recommended patient to continue vancomycin for 4 weeks with suspected in date on 12/25/2023. Stool culture on 11/27/2023 showed many yeast. Repeat blood cultures x2 on 12/02/2023 showed no growth. Patient underwent a CARO guided DCCV on 11/30/2023 which showed mild-moderately enlarged right atrium, no ASD or PFO, LVEF 55%, mild-moderate left ventricular hypertrophy, severe aortic stenosis, moderate mitral stenosis, mild-moderate mitral regurgitation, mild-moderate tricuspid regurgitation, no vegetation present, no intracardiac thrombus, successful cardioversion. CTA chest, abdomen, pelvis 12/05/2023 showed small bilateral pleural effusion R>L and patchy consolidation in both lungs increased compared to prior. CTA was obtained for TAVR workup to be completed outpatient after ID clearance. On exam today, patient is currently on 2 L nasal cannula with O2 sat 99%.  With worsening pneumonia and lung sounds on exam.  Patient received Zosyn from  [id number].  Cefepime was added on 12/07/2023 and continued until 12/11/2023.  Patient remained on vancomycin until 12/25/2023.  Nystatin was added on 12/06/2023 and discontinued on 12/07/2023 due to low likelihood for disseminated candidemia.  Respiratory status improved and patient was weaned off of supplemental oxygen.  Patient did require her 2 units PRBC transfusion on 12/15/2023 due to decrease in H&H.  Eliquis and aspirin were held for a few days then resumed per [facility] recommendations.  Patient is being discharged home today need appropriate follow-up with PCP and Cardiology outpatient.    PHYSICAL EXAM     Most Recent Vital Signs:  Temp: 98.4 °F (36.9 °C) (12/26/23 0726)  Pulse: 76 (12/26/23 0910)  Resp: 18 (12/26/23 0910)  BP: (!) 105/59 (12/26/23 0909)  SpO2: 96 % (12/26/23 0910)     GENERAL: In no acute distress, afebrile  HEENT: Atraumatic, normocephalic, moist mucous membranes, supple neck   CHEST: Clear to auscultation bilaterally  HEART: S1, S2, grade IV murmur   ABDOMEN: Soft, nontender, BS +  MSK: Warm, no lower extremity edema, no clubbing or cyanosis  NEUROLOGIC: Alert and oriented x4, moving all extremities with good strength   INTEGUMENTARY: No obvious skin rash    PSYCHIATRY: Appropriate mood and affect      DISCHARGE DIAGNOSIS     MRSA bacteriemia   Multifocal Pneumonia  Recent Influenza A infection (11/19/2023)  Acute hypoxic respiratory failure  CTA chest 11/24/2023 showed no PE, but multifocal pneumonia most evident in RUL  Blood cultures x2 positive for MRSA on 11/27/2023, repeats were negative  ID recommended patient to continue vancomycin for 4 weeks with suspected in date on 12/25/2023, Cefepime (12/07/2023-12/12/2023)  D/c Zosyn (12/06/2023-12/07/203) and Nystatin (12/06/2023-12/07/2023)  CXR 12/14/2023 shows no changes, Lasix 40mg IVP x1 given for cough with pink, frothy sputum      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   Thrombocytopenia  Hx of alcohol use  disorder  Thiamine wnl   Ferrlecit x3, oral supplementation   Hold Eliquis if platelets <50  Received 2u pRBC 12/15/2023 with Lasix 40mg IVP between units   Continue Aspirin and Eliquis - monitor H&H      Yeast + Stool  D/c Nystatin (12/06/2023-12/07/2023) low concern for disseminated candidemia      Hypokalemia   Replete as condition requires     Transamnitis - resolved   Grayzone hepatitis C Ab  HCV RNA Detect/Quant, S Undetected      CAD  Diastolic CHF  Afib  Aortic stenosis  Essential HTN  CARO guided DCCV on 11/30/2023 which showed mild-moderately enlarged right atrium, no ASD or PFO, LVEF 55%, mild-moderate left ventricular hypertrophy, severe aortic stenosis, moderate mitral stenosis, mild-moderate mitral regurgitation, mild-moderate tricuspid regurgitation, no vegetation present, no intracardiac thrombus, successful cardioversion  TAVR to be completed outpatient after ID clearance  Continue Entresto, Toprol XL, Amiodarone, Aspiring, and Eliquis   Continue PO Lasix 40mg  Follow up with [facility] outpatient   _____________________________________________________________________________    DISCHARGE MED REC     Current Discharge Medication List        START taking these medications    Details   apixaban (ELIQUIS) 5 mg Tab Take 1 tablet (5 mg total) by mouth 2 (two) times daily.  Qty: 60 tablet, Refills: 0      ferrous sulfate 325 (65 FE) MG EC tablet Take 1 tablet (325 mg total) by mouth once daily.  Qty: 30 tablet, Refills: 0      folic acid (FOLVITE) 1 MG tablet Take 1 tablet (1 mg total) by mouth once daily.  Qty: 30 tablet, Refills: 0      sacubitriL-valsartan (ENTRESTO) 24-26 mg per tablet Take 1 tablet by mouth 2 (two) times daily.  Qty: 60 tablet, Refills: 0           CONTINUE these medications which have CHANGED    Details   amiodarone (PACERONE) 200 MG Tab Take 1 tablet (200 mg total) by mouth once daily.  Qty: 30 tablet, Refills: 0      aspirin (ECOTRIN) 81 MG EC tablet Take 1 tablet (81 mg total) by mouth  once daily.  Qty: 30 tablet, Refills: 0      atorvastatin (LIPITOR) 40 MG tablet Take 1 tablet (40 mg total) by mouth every evening.  Qty: 30 tablet, Refills: 0      furosemide (LASIX) 40 MG tablet Take 1 tablet (40 mg total) by mouth once daily.  Qty: 30 tablet, Refills: 0      linaCLOtide (LINZESS) 72 mcg Cap capsule Take 1 capsule (72 mcg total) by mouth before breakfast.  Qty: 30 capsule, Refills: 0    Comments: Constipation      metoprolol succinate (TOPROL-XL) 100 MG 24 hr tablet Take 1 tablet (100 mg total) by mouth once daily.  Qty: 30 tablet, Refills: 0    Comments: .      QUEtiapine (SEROQUEL) 100 MG Tab Take 2 tablets (200 mg total) by mouth every evening.  Qty: 60 tablet, Refills: 0    Associated Diagnoses: Insomnia, unspecified type           STOP taking these medications       hydrOXYzine pamoate (VISTARIL) 50 MG Cap Comments:   Reason for Stopping:         sodium bicarbonate 650 MG tablet Comments:   Reason for Stopping:         thiamine (VITAMIN B-1) 100 MG tablet Comments:   Reason for Stopping:         valsartan (DIOVAN) 160 MG tablet Comments:   Reason for Stopping:              CONSULTS     Consults (From admission, onward)          Status Ordering Provider     Inpatient consult to Cardiology  Once        Provider:  Sreedhar Herrera MD    Completed JAYCEE BISHOP     Inpatient consult to PICC team ([facility])  Once        Provider:  (Not yet assigned)    Acknowledged REYES, THAIRY G     Pharmacy to dose Vancomycin consult  Once        Provider:  (Not yet assigned)    Acknowledged REYES, THAIRY G     Pharmacy to dose Vancomycin consult  Once        Provider:  (Not yet assigned)   See Erasmo for full Linked Orders Report.    Acknowledged REYES, THAIRY G     Inpatient consult to Registered Dietitian/Nutritionist  Once        Provider:  (Not yet assigned)    Completed MARIYA LONG          FOLLOW UP      Follow-up Information       Darlene Willams, LOREN Follow up.    Specialty: Family  Medicine  Contact information:  [place] [id number]  [phone]               Darlene Willams, LOREN Follow up.    Specialty: Family Medicine  Contact information:  [place] [id number]  [phone]                           DISCHARGE INSTRUCTIONS     Explained in detail to the patient about the discharge plan, medications, and follow-up visits. Pt understands and agrees with the treatment plan.  Discharged Condition: stable  Diet as tolerated  Activities as tolerated  Discharge to: Home or Self Care    TIME SPENT ON DISCHARGE   35 minutes    GISEL Russell  Internal Medicine  Department of Hospital Medicine  [facility]    This document was created using electronic dictation services.  Please excuse any errors that may have been made.  Contact me if any questions regarding documentation to clarify verbiage.

## 2023-12-27 ENCOUNTER — PATIENT OUTREACH (OUTPATIENT)
Dept: ADMINISTRATIVE | Facility: CLINIC | Age: 68
End: 2023-12-27
Payer: MEDICARE

## 2023-12-27 NOTE — PROGRESS NOTES
C3 nurse attempted to contact Mike Urbina Jr.  for a TCC post hospital discharge follow up call. No answer. Left voicemail with callback information. The patient has a scheduled HOSFU appointment with [facility] on 02/28/2023 @ 250 pm.  The patient does not have a scheduled HOSFU appointment with Darlene Willams FNP  within 5-7 days post hospital discharge date 12/26/2023.     Message sent to PCP staff requesting they contact patient and schedule follow up appointment.

## 2023-12-28 NOTE — PROGRESS NOTES
C3 nurse attempted to contact Mike Urbina Jr.  for a T[facility] post hospital discharge follow up call. No answer. Left voicemail with callback information. The patient has a scheduled HOSFU appointment with [facility] on 02/28/2023 @ 250 pm. HOSFU appointment with Darlene Willams FNP on 01/03/2024 @ 11 am

## 2024-01-10 ENCOUNTER — OFFICE VISIT (OUTPATIENT)
Dept: FAMILY MEDICINE | Facility: CLINIC | Age: 69
End: 2024-01-10
Payer: MEDICARE

## 2024-01-10 VITALS
TEMPERATURE: 98 F | SYSTOLIC BLOOD PRESSURE: 110 MMHG | BODY MASS INDEX: 21.71 KG/M2 | DIASTOLIC BLOOD PRESSURE: 70 MMHG | OXYGEN SATURATION: 99 % | HEART RATE: 70 BPM | HEIGHT: 74 IN | RESPIRATION RATE: 18 BRPM | WEIGHT: 169.13 LBS

## 2024-01-10 DIAGNOSIS — F10.20 ALCOHOLISM: Primary | ICD-10-CM

## 2024-01-10 DIAGNOSIS — I48.91 ATRIAL FIBRILLATION, UNSPECIFIED TYPE: ICD-10-CM

## 2024-01-10 DIAGNOSIS — Z09 HOSPITAL DISCHARGE FOLLOW-UP: ICD-10-CM

## 2024-01-10 DIAGNOSIS — I50.32 CHRONIC DIASTOLIC CONGESTIVE HEART FAILURE: ICD-10-CM

## 2024-01-10 DIAGNOSIS — E43 SEVERE MALNUTRITION: ICD-10-CM

## 2024-01-10 DIAGNOSIS — I25.118 CORONARY ARTERY DISEASE OF NATIVE ARTERY OF NATIVE HEART WITH STABLE ANGINA PECTORIS: ICD-10-CM

## 2024-01-10 DIAGNOSIS — G47.00 INSOMNIA, UNSPECIFIED TYPE: ICD-10-CM

## 2024-01-10 PROCEDURE — 99214 OFFICE O/P EST MOD 30 MIN: CPT | Mod: ,,, | Performed by: NURSE PRACTITIONER

## 2024-01-10 RX ORDER — FOLIC ACID 1 MG/1
1 TABLET ORAL DAILY
Qty: 30 TABLET | Refills: 0 | Status: SHIPPED | OUTPATIENT
Start: 2024-01-10 | End: 2024-02-09

## 2024-01-10 RX ORDER — METOPROLOL SUCCINATE 100 MG/1
100 TABLET, EXTENDED RELEASE ORAL DAILY
Qty: 30 TABLET | Refills: 0 | Status: ON HOLD | OUTPATIENT
Start: 2024-01-10 | End: 2024-02-24 | Stop reason: HOSPADM

## 2024-01-10 RX ORDER — ASPIRIN 81 MG/1
81 TABLET ORAL DAILY
Qty: 30 TABLET | Refills: 0 | Status: SHIPPED | OUTPATIENT
Start: 2024-01-10 | End: 2024-02-09

## 2024-01-10 RX ORDER — ATORVASTATIN CALCIUM 40 MG/1
40 TABLET, FILM COATED ORAL NIGHTLY
Qty: 30 TABLET | Refills: 0 | Status: SHIPPED | OUTPATIENT
Start: 2024-01-10 | End: 2024-02-09

## 2024-01-10 RX ORDER — FUROSEMIDE 40 MG/1
40 TABLET ORAL DAILY
Qty: 30 TABLET | Refills: 0 | Status: SHIPPED | OUTPATIENT
Start: 2024-01-10 | End: 2024-02-09

## 2024-01-10 RX ORDER — AMIODARONE HYDROCHLORIDE 200 MG/1
200 TABLET ORAL DAILY
Qty: 30 TABLET | Refills: 0 | Status: SHIPPED | OUTPATIENT
Start: 2024-01-10 | End: 2024-02-09

## 2024-01-10 RX ORDER — FERROUS SULFATE 325(65) MG
325 TABLET, DELAYED RELEASE (ENTERIC COATED) ORAL DAILY
Qty: 30 TABLET | Refills: 0 | Status: SHIPPED | OUTPATIENT
Start: 2024-01-10 | End: 2024-02-09

## 2024-01-10 RX ORDER — QUETIAPINE FUMARATE 100 MG/1
200 TABLET, FILM COATED ORAL NIGHTLY
Qty: 60 TABLET | Refills: 0 | Status: SHIPPED | OUTPATIENT
Start: 2024-01-10 | End: 2024-02-09

## 2024-01-10 NOTE — PROGRESS NOTES
SUBJECTIVE:     History of Present Illness      Chief Complaint: Hospital Follow Up (Inpatient 12/05/2023-12/26/2023 for bacteremia, chronic diastolic CHF, insomnia.  Overall feeling better.  Went to the hospital initially for weakness.)    HPI:  Patient is a 68 y.o. year old male who presents to clinic for hospital follow-up CHF.  Patient has followed up in the past with cardiologist for similar issues proximally a year ago.  History includes atrial fibrillation coronary artery disease patient reports that he is doing well today reports compliance with medication.  He does have a follow up with cardiologist next month.  Chest pain, SOB,palpitations ,dizziness or blurred vision.    Review of Systems:    Review of Systems    12 point review of systems conducted, negative except as stated in the history of present illness. See HPI for details.     Previous History    Darlene Willams FNP  Review of patient's allergies indicates:  No Known Allergies    Past Medical History:   Diagnosis Date    Atrial flutter     CHF (congestive heart failure)     Coronary artery disease     Hyperlipidemia     Hypertension     Myocardial infarction     SOB (shortness of breath)     at times     Current Outpatient Medications   Medication Instructions    amiodarone (PACERONE) 200 mg, Oral, Daily    apixaban (ELIQUIS) 5 mg, Oral, 2 times daily    aspirin (ECOTRIN) 81 mg, Oral, Daily    atorvastatin (LIPITOR) 40 mg, Oral, Nightly    ferrous sulfate 325 mg, Oral, Daily    folic acid (FOLVITE) 1 mg, Oral, Daily    furosemide (LASIX) 40 mg, Oral, Daily    linaCLOtide (LINZESS) 72 mcg, Oral, Before breakfast    metoprolol succinate (TOPROL-XL) 100 mg, Oral, Daily    QUEtiapine (SEROQUEL) 200 mg, Oral, Nightly    sacubitriL-valsartan (ENTRESTO) 24-26 mg per tablet 1 tablet, Oral, 2 times daily     Past Surgical History:   Procedure Laterality Date    CATARACT EXTRACTION Bilateral     CORONARY ARTERY BYPASS GRAFT (CABG) N/A 4/3/2023     "Procedure: CORONARY ARTERY BYPASS GRAFT (CABG);  Surgeon: Deuce Finley IV, MD;  Location: [facility] OR;  Service: Cardiovascular;  Laterality: N/A;  WITH LLAA //  ECHO NOTIFIED    LEFT HEART CATHETERIZATION N/A 03/15/2023    Procedure: CATHETERIZATION, HEART, LEFT;  Surgeon: Sreedhar Herrera MD;  Location: [facility] CATH LAB;  Service: Cardiology;  Laterality: N/A;  LHC via RRA     Family History   Problem Relation Age of Onset    Heart attack Mother        Social History     Tobacco Use    Smoking status: Never     Passive exposure: Never    Smokeless tobacco: Never   Substance Use Topics    Alcohol use: Yes     Alcohol/week: 84.0 standard drinks of alcohol     Types: 84 Cans of beer per week     Comment: alcoholic, daily, beer    Drug use: Yes     Frequency: 3.0 times per week     Types: Hydrocodone        Health Maintenance      Health Maintenance   Topic Date Due    TETANUS VACCINE  Never done    Colorectal Cancer Screening  Never done    Shingles Vaccine (1 of 2) Never done    High Dose Statin  01/10/2025    Lipid Panel  01/18/2028    Hepatitis C Screening  Completed       OBJECTIVE:     Physical Exam      Vital Signs Reviewed   Visit Vitals  /70 (BP Location: Left arm, Patient Position: Sitting)   Pulse 70   Temp 98.3 °F (36.8 °C) (Oral)   Resp 18   Ht 6' 2" (1.88 m)   Wt 76.7 kg (169 lb 1.6 oz)   SpO2 99%   BMI 21.71 kg/m²       Physical Exam    Physical Exam:  General: Alert, well nourished, no acute distress, non-toxic appearing.   Eyes: Anicteric sclera, without conjunctival injection, normal lids, no purulent drainage, EOMs grossly intact.   Ears: No tragal tenderness. Tympanic membranes intact, pearly grey, without effusion or erythema and with a positive light reflex.   Mouth: Posterior pharynx without erythema. No exudate, ulcerations, or lesion. No tonsillar swelling.   Neck: Supple, full ROM, no rigidity, no cervical adenopathy.   Cardio: Normal rate and rhythm    Resp: Respirations even and " unlabored, clear to auscultation bilaterally.   Abd: No ecchymosis or distension. Normal bowel sounds in all 4 quadrants. No tenderness to palpation. No rebound tenderness or guarding. No CVA tenderness.   Skin: No rashes or open lesions noted.   MSK: No swelling. No abrasions or signs of trauma. Ambulating without assistance.   Neuro: Alert,oriented No focal deficits noted. Facial expressions even.   Psych: Cooperative, Normal affect      Procedures    Procedures     Labs     Results for orders placed or performed during the hospital encounter of 12/05/23   Urinalysis, Reflex to Urine Culture    Specimen: Urine   Result Value Ref Range    Color, UA Yellow Yellow, Light-Yellow, Dark Yellow, Lauren, Straw    Appearance, UA Clear Clear    Specific Gravity, UA 1.020 1.005 - 1.030    pH, UA 5.5 5.0 - 8.5    Protein, UA Trace (A) Negative    Glucose, UA Negative Negative, Normal    Ketones, UA Negative Negative    Blood, UA Negative Negative    Bilirubin, UA Negative Negative    Urobilinogen, UA 0.2 0.2, 1.0, Normal    Nitrites, UA Negative Negative    Leukocyte Esterase, UA Negative Negative   Urinalysis, Microscopic   Result Value Ref Range    Bacteria, UA None Seen None Seen, Rare, Occasional /HPF    RBC, UA None Seen None Seen, 0-2, 3-5, 0-5 /HPF    WBC, UA None Seen None Seen, 0-2, 3-5, 0-5 /HPF    Squamous Epithelial Cells, UA Rare None Seen, Rare, Occasional, Occ /HPF   PULIDO GENERIC ORDERABLE HCVQN   Result Value Ref Range    [facility]    VANCOMYCIN, TROUGH   Result Value Ref Range    Vancomycin Trough 18.7 15.0 - 20.0 ug/ml   Comprehensive Metabolic Panel   Result Value Ref Range    Sodium Level 133 (L) 136 - 145 mmol/L    Potassium Level 3.6 3.5 - 5.1 mmol/L    Chloride 105 98 - 107 mmol/L    Carbon Dioxide 23 23 - 31 mmol/L    Glucose Level 104 82 - 115 mg/dL    Blood Urea Nitrogen 12.3 8.4 - 25.7 mg/dL    Creatinine 0.95 0.73 - 1.18 mg/dL    Calcium Level Total 7.4 (L) 8.8 - 10.0 mg/dL     Protein Total 4.5 (L) 5.8 - 7.6 gm/dL    Albumin Level 1.7 (L) 3.4 - 4.8 g/dL    Globulin 2.8 2.4 - 3.5 gm/dL    Albumin/Globulin Ratio 0.6 (L) 1.1 - 2.0 ratio    Bilirubin Total 0.8 <=1.5 mg/dL    Alkaline Phosphatase 68 40 - 150 unit/L    Alanine Aminotransferase 12 0 - 55 unit/L    Aspartate Aminotransferase 13 5 - 34 unit/L    eGFR >60 mls/min/1.73/m2   CBC with Differential   Result Value Ref Range    WBC 5.72 4.50 - 11.50 x10(3)/mcL    RBC 2.87 (L) 4.70 - 6.10 x10(6)/mcL    Hgb 8.0 (L) 14.0 - 18.0 g/dL    Hct 26.1 (L) 42.0 - 52.0 %    MCV 90.9 80.0 - 94.0 fL    MCH 27.9 27.0 - 31.0 pg    MCHC 30.7 (L) 33.0 - 36.0 g/dL    RDW 16.7 11.5 - 17.0 %    Platelet 146 130 - 400 x10(3)/mcL    MPV 10.8 (H) 7.4 - 10.4 fL    Neut % 76.5 %    Lymph % 12.6 %    Mono % 9.6 %    Eos % 0.5 %    Basophil % 0.5 %    Lymph # 0.72 0.6 - 4.6 x10(3)/mcL    Neut # 4.37 2.1 - 9.2 x10(3)/mcL    Mono # 0.55 0.1 - 1.3 x10(3)/mcL    Eos # 0.03 0 - 0.9 x10(3)/mcL    Baso # 0.03 <=0.2 x10(3)/mcL    IG# 0.02 0 - 0.04 x10(3)/mcL    IG% 0.3 %   Respiratory Panel   Result Value Ref Range    Adenovirus Not Detected Not Detected    Coronavirus 229E Not Detected Not Detected    Coronavirus HKU1 Not Detected Not Detected    Coronavirus NL63 Not Detected Not Detected    Coronavirus OC43 PCR, Common Cold Virus Not Detected Not Detected    Human Metapneumovirus Not Detected Not Detected    Parainfluenza Virus 1 Not Detected Not Detected    Parainfluenza Virus 2 Not Detected Not Detected    Parainfluenza Virus 3 Not Detected Not Detected    Parainfluenza Virus 4 Not Detected Not Detected    Bordetella pertussis (ptxP) Not Detected Not Detected    Chlamydia pneumoniae Not Detected Not Detected    Mycoplasma pneumoniae Not Detected Not Detected    Human Rhinovirus/Enterovirus Not Detected Not Detected    Bordetella parapertussis ([id number]) Not Detected Not Detected   Basic Metabolic Panel   Result Value Ref Range    Sodium Level 134 (L) 136 - 145 mmol/L     Potassium Level 3.3 (L) 3.5 - 5.1 mmol/L    Chloride 104 98 - 107 mmol/L    Carbon Dioxide 25 23 - 31 mmol/L    Glucose Level 97 82 - 115 mg/dL    Blood Urea Nitrogen 11.1 8.4 - 25.7 mg/dL    Creatinine 0.95 0.73 - 1.18 mg/dL    BUN/Creatinine Ratio 12     Calcium Level Total 7.7 (L) 8.8 - 10.0 mg/dL    Anion Gap 5.0 mEq/L    eGFR >60 mls/min/1.73/m2   CBC with Differential   Result Value Ref Range    WBC 5.17 4.50 - 11.50 x10(3)/mcL    RBC 2.80 (L) 4.70 - 6.10 x10(6)/mcL    Hgb 7.8 (L) 14.0 - 18.0 g/dL    Hct 25.3 (L) 42.0 - 52.0 %    MCV 90.4 80.0 - 94.0 fL    MCH 27.9 27.0 - 31.0 pg    MCHC 30.8 (L) 33.0 - 36.0 g/dL    RDW 16.8 11.5 - 17.0 %    Platelet 134 130 - 400 x10(3)/mcL    MPV 11.7 (H) 7.4 - 10.4 fL    Neut % 72.2 %    Lymph % 15.7 %    Mono % 10.1 %    Eos % 0.8 %    Basophil % 0.6 %    Lymph # 0.81 0.6 - 4.6 x10(3)/mcL    Neut # 3.74 2.1 - 9.2 x10(3)/mcL    Mono # 0.52 0.1 - 1.3 x10(3)/mcL    Eos # 0.04 0 - 0.9 x10(3)/mcL    Baso # 0.03 <=0.2 x10(3)/mcL    IG# 0.03 0 - 0.04 x10(3)/mcL    IG% 0.6 %   Iron Panel   Result Value Ref Range    Iron Binding Capacity Unsaturated 153 69 - 240 ug/dL    Iron Level 24 (L) 65 - 175 ug/dL    Transferrin 170 163 - 344 mg/dL    Iron Binding Capacity Total 177 (L) 250 - 450 ug/dL    Iron Saturation 14 (L) 20 - 50 %   Folate   Result Value Ref Range    Folate Level 14.0 7.0 - 31.4 ng/mL   Ferritin   Result Value Ref Range    Ferritin Level 305.70 (H) 21.81 - 274.66 ng/mL   Vitamin B12   Result Value Ref Range    Vitamin B12 Level 898 (H) 213 - 816 pg/mL   Vitamin B1, WB   Result Value Ref Range    Thiamin (Vitamin B1), WB 96 70 - 180 nmol/L   VANCOMYCIN, TROUGH   Result Value Ref Range    Vancomycin Trough 18.9 15.0 - 20.0 ug/ml   Basic Metabolic Panel   Result Value Ref Range    Sodium Level 137 136 - 145 mmol/L    Potassium Level 3.6 3.5 - 5.1 mmol/L    Chloride 103 98 - 107 mmol/L    Carbon Dioxide 27 23 - 31 mmol/L    Glucose Level 91 82 - 115 mg/dL    Blood Urea  Nitrogen 9.8 8.4 - 25.7 mg/dL    Creatinine 0.81 0.73 - 1.18 mg/dL    BUN/Creatinine Ratio 12     Calcium Level Total 7.4 (L) 8.8 - 10.0 mg/dL    Anion Gap 7.0 mEq/L    eGFR >60 mls/min/1.73/m2   CBC with Differential   Result Value Ref Range    WBC 4.02 (L) 4.50 - 11.50 x10(3)/mcL    RBC 2.82 (L) 4.70 - 6.10 x10(6)/mcL    Hgb 7.7 (L) 14.0 - 18.0 g/dL    Hct 25.5 (L) 42.0 - 52.0 %    MCV 90.4 80.0 - 94.0 fL    MCH 27.3 27.0 - 31.0 pg    MCHC 30.2 (L) 33.0 - 36.0 g/dL    RDW 16.6 11.5 - 17.0 %    Platelet 120 (L) 130 - 400 x10(3)/mcL    MPV 11.8 (H) 7.4 - 10.4 fL    Neut % 66.0 %    Lymph % 20.9 %    Mono % 10.9 %    Eos % 0.7 %    Basophil % 0.5 %    Lymph # 0.84 0.6 - 4.6 x10(3)/mcL    Neut # 2.65 2.1 - 9.2 x10(3)/mcL    Mono # 0.44 0.1 - 1.3 x10(3)/mcL    Eos # 0.03 0 - 0.9 x10(3)/mcL    Baso # 0.02 <=0.2 x10(3)/mcL    IG# 0.04 0 - 0.04 x10(3)/mcL    IG% 1.0 %   CBC with Differential   Result Value Ref Range    WBC 4.28 (L) 4.50 - 11.50 x10(3)/mcL    RBC 2.87 (L) 4.70 - 6.10 x10(6)/mcL    Hgb 7.9 (L) 14.0 - 18.0 g/dL    Hct 25.9 (L) 42.0 - 52.0 %    MCV 90.2 80.0 - 94.0 fL    MCH 27.5 27.0 - 31.0 pg    MCHC 30.5 (L) 33.0 - 36.0 g/dL    RDW 16.7 11.5 - 17.0 %    Platelet 92 (L) 130 - 400 x10(3)/mcL    MPV 11.5 (H) 7.4 - 10.4 fL    Neut % 65.0 %    Lymph % 21.5 %    Mono % 10.5 %    Eos % 1.9 %    Basophil % 0.2 %    Lymph # 0.92 0.6 - 4.6 x10(3)/mcL    Neut # 2.78 2.1 - 9.2 x10(3)/mcL    Mono # 0.45 0.1 - 1.3 x10(3)/mcL    Eos # 0.08 0 - 0.9 x10(3)/mcL    Baso # 0.01 <=0.2 x10(3)/mcL    IG# 0.04 0 - 0.04 x10(3)/mcL    IG% 0.9 %   VANCOMYCIN, TROUGH   Result Value Ref Range    Vancomycin Trough 19.4 15.0 - 20.0 ug/ml   Basic Metabolic Panel   Result Value Ref Range    Sodium Level 138 136 - 145 mmol/L    Potassium Level 3.3 (L) 3.5 - 5.1 mmol/L    Chloride 103 98 - 107 mmol/L    Carbon Dioxide 27 23 - 31 mmol/L    Glucose Level 96 82 - 115 mg/dL    Blood Urea Nitrogen 11.8 8.4 - 25.7 mg/dL    Creatinine 0.91 0.73 -  1.18 mg/dL    BUN/Creatinine Ratio 13     Calcium Level Total 7.8 (L) 8.8 - 10.0 mg/dL    Anion Gap 8.0 mEq/L    eGFR >60 mls/min/1.73/m2   CBC with Differential   Result Value Ref Range    WBC 4.15 (L) 4.50 - 11.50 x10(3)/mcL    RBC 2.76 (L) 4.70 - 6.10 x10(6)/mcL    Hgb 7.4 (L) 14.0 - 18.0 g/dL    Hct 25.1 (L) 42.0 - 52.0 %    MCV 90.9 80.0 - 94.0 fL    MCH 26.8 (L) 27.0 - 31.0 pg    MCHC 29.5 (L) 33.0 - 36.0 g/dL    RDW 16.6 11.5 - 17.0 %    Platelet 95 (L) 130 - 400 x10(3)/mcL    MPV 11.7 (H) 7.4 - 10.4 fL    Neut % 59.3 %    Lymph % 26.5 %    Mono % 10.8 %    Eos % 1.7 %    Basophil % 0.7 %    Lymph # 1.10 0.6 - 4.6 x10(3)/mcL    Neut # 2.46 2.1 - 9.2 x10(3)/mcL    Mono # 0.45 0.1 - 1.3 x10(3)/mcL    Eos # 0.07 0 - 0.9 x10(3)/mcL    Baso # 0.03 <=0.2 x10(3)/mcL    IG# 0.04 0 - 0.04 x10(3)/mcL    IG% 1.0 %   VANCOMYCIN, TROUGH   Result Value Ref Range    Vancomycin Trough 21.8 (H) 15.0 - 20.0 ug/ml   Basic Metabolic Panel   Result Value Ref Range    Sodium Level 138 136 - 145 mmol/L    Potassium Level 4.5 3.5 - 5.1 mmol/L    Chloride 104 98 - 107 mmol/L    Carbon Dioxide 24 23 - 31 mmol/L    Glucose Level 98 82 - 115 mg/dL    Blood Urea Nitrogen 25.0 8.4 - 25.7 mg/dL    Creatinine 1.00 0.73 - 1.18 mg/dL    BUN/Creatinine Ratio 25     Calcium Level Total 8.3 (L) 8.8 - 10.0 mg/dL    Anion Gap 10.0 mEq/L    eGFR >60 mls/min/1.73/m2   CBC with Differential   Result Value Ref Range    WBC 6.50 4.50 - 11.50 x10(3)/mcL    RBC 2.94 (L) 4.70 - 6.10 x10(6)/mcL    Hgb 7.9 (L) 14.0 - 18.0 g/dL    Hct 27.4 (L) 42.0 - 52.0 %    MCV 93.2 80.0 - 94.0 fL    MCH 26.9 (L) 27.0 - 31.0 pg    MCHC 28.8 (L) 33.0 - 36.0 g/dL    RDW 17.6 (H) 11.5 - 17.0 %    Platelet 120 (L) 130 - 400 x10(3)/mcL    MPV 12.1 (H) 7.4 - 10.4 fL    Neut % 69.7 %    Lymph % 17.8 %    Mono % 9.7 %    Eos % 1.7 %    Basophil % 0.3 %    Lymph # 1.16 0.6 - 4.6 x10(3)/mcL    Neut # 4.53 2.1 - 9.2 x10(3)/mcL    Mono # 0.63 0.1 - 1.3 x10(3)/mcL    Eos # 0.11 0 -  0.9 x10(3)/mcL    Baso # 0.02 <=0.2 x10(3)/mcL    IG# 0.05 (H) 0 - 0.04 x10(3)/mcL    IG% 0.8 %   VANCOMYCIN, TROUGH   Result Value Ref Range    Vancomycin Trough 20.2 (H) 15.0 - 20.0 ug/ml   CBC with Differential   Result Value Ref Range    WBC 5.64 4.50 - 11.50 x10(3)/mcL    RBC 2.39 (L) 4.70 - 6.10 x10(6)/mcL    Hgb 6.6 (L) 14.0 - 18.0 g/dL    Hct 21.9 (L) 42.0 - 52.0 %    MCV 91.6 80.0 - 94.0 fL    MCH 27.6 27.0 - 31.0 pg    MCHC 30.1 (L) 33.0 - 36.0 g/dL    RDW 17.7 (H) 11.5 - 17.0 %    Platelet 125 (L) 130 - 400 x10(3)/mcL    MPV 12.0 (H) 7.4 - 10.4 fL    Neut % 67.2 %    Lymph % 20.4 %    Mono % 9.0 %    Eos % 2.3 %    Basophil % 0.4 %    Lymph # 1.15 0.6 - 4.6 x10(3)/mcL    Neut # 3.79 2.1 - 9.2 x10(3)/mcL    Mono # 0.51 0.1 - 1.3 x10(3)/mcL    Eos # 0.13 0 - 0.9 x10(3)/mcL    Baso # 0.02 <=0.2 x10(3)/mcL    IG# 0.04 0 - 0.04 x10(3)/mcL    IG% 0.7 %   VANCOMYCIN, TROUGH   Result Value Ref Range    Vancomycin Trough 18.1 15.0 - 20.0 ug/ml   Hemoglobin and Hematocrit   Result Value Ref Range    Hgb 9.5 (L) 14.0 - 18.0 g/dL    Hct 30.4 (L) 42.0 - 52.0 %   Comprehensive Metabolic Panel   Result Value Ref Range    Sodium Level 140 136 - 145 mmol/L    Potassium Level 3.6 3.5 - 5.1 mmol/L    Chloride 100 98 - 107 mmol/L    Carbon Dioxide 30 23 - 31 mmol/L    Glucose Level 93 82 - 115 mg/dL    Blood Urea Nitrogen 20.1 8.4 - 25.7 mg/dL    Creatinine 1.10 0.73 - 1.18 mg/dL    Calcium Level Total 8.5 (L) 8.8 - 10.0 mg/dL    Protein Total 5.6 (L) 5.8 - 7.6 gm/dL    Albumin Level 2.5 (L) 3.4 - 4.8 g/dL    Globulin 3.1 2.4 - 3.5 gm/dL    Albumin/Globulin Ratio 0.8 (L) 1.1 - 2.0 ratio    Bilirubin Total 1.2 <=1.5 mg/dL    Alkaline Phosphatase 73 40 - 150 unit/L    Alanine Aminotransferase 10 0 - 55 unit/L    Aspartate Aminotransferase 13 5 - 34 unit/L    eGFR >60 mls/min/1.73/m2   CBC with Differential   Result Value Ref Range    WBC 5.25 4.50 - 11.50 x10(3)/mcL    RBC 3.37 (L) 4.70 - 6.10 x10(6)/mcL    Hgb 9.3 (L) 14.0 -  18.0 g/dL    Hct 30.4 (L) 42.0 - 52.0 %    MCV 90.2 80.0 - 94.0 fL    MCH 27.6 27.0 - 31.0 pg    MCHC 30.6 (L) 33.0 - 36.0 g/dL    RDW 17.4 (H) 11.5 - 17.0 %    Platelet 108 (L) 130 - 400 x10(3)/mcL    MPV 11.8 (H) 7.4 - 10.4 fL    Neut % 61.6 %    Lymph % 24.0 %    Mono % 10.3 %    Eos % 2.9 %    Basophil % 0.4 %    Lymph # 1.26 0.6 - 4.6 x10(3)/mcL    Neut # 3.24 2.1 - 9.2 x10(3)/mcL    Mono # 0.54 0.1 - 1.3 x10(3)/mcL    Eos # 0.15 0 - 0.9 x10(3)/mcL    Baso # 0.02 <=0.2 x10(3)/mcL    IG# 0.04 0 - 0.04 x10(3)/mcL    IG% 0.8 %   Comprehensive Metabolic Panel   Result Value Ref Range    Sodium Level 138 136 - 145 mmol/L    Potassium Level 3.3 (L) 3.5 - 5.1 mmol/L    Chloride 100 98 - 107 mmol/L    Carbon Dioxide 27 23 - 31 mmol/L    Glucose Level 92 82 - 115 mg/dL    Blood Urea Nitrogen 24.3 8.4 - 25.7 mg/dL    Creatinine 1.08 0.73 - 1.18 mg/dL    Calcium Level Total 8.4 (L) 8.8 - 10.0 mg/dL    Protein Total 5.6 (L) 5.8 - 7.6 gm/dL    Albumin Level 2.3 (L) 3.4 - 4.8 g/dL    Globulin 3.3 2.4 - 3.5 gm/dL    Albumin/Globulin Ratio 0.7 (L) 1.1 - 2.0 ratio    Bilirubin Total 1.0 <=1.5 mg/dL    Alkaline Phosphatase 67 40 - 150 unit/L    Alanine Aminotransferase 9 0 - 55 unit/L    Aspartate Aminotransferase 13 5 - 34 unit/L    eGFR >60 mls/min/1.73/m2   Magnesium   Result Value Ref Range    Magnesium Level 1.90 1.60 - 2.60 mg/dL   Phosphorus   Result Value Ref Range    Phosphorus Level 3.4 2.3 - 4.7 mg/dL   CBC with Differential   Result Value Ref Range    WBC 4.75 4.50 - 11.50 x10(3)/mcL    RBC 3.35 (L) 4.70 - 6.10 x10(6)/mcL    Hgb 9.1 (L) 14.0 - 18.0 g/dL    Hct 30.1 (L) 42.0 - 52.0 %    MCV 89.9 80.0 - 94.0 fL    MCH 27.2 27.0 - 31.0 pg    MCHC 30.2 (L) 33.0 - 36.0 g/dL    RDW 17.1 (H) 11.5 - 17.0 %    Platelet 108 (L) 130 - 400 x10(3)/mcL    MPV 10.5 (H) 7.4 - 10.4 fL    Neut % 60.9 %    Lymph % 24.6 %    Mono % 9.9 %    Eos % 3.8 %    Basophil % 0.2 %    Lymph # 1.17 0.6 - 4.6 x10(3)/mcL    Neut # 2.89 2.1 - 9.2  x10(3)/mcL    Mono # 0.47 0.1 - 1.3 x10(3)/mcL    Eos # 0.18 0 - 0.9 x10(3)/mcL    Baso # 0.01 <=0.2 x10(3)/mcL    IG# 0.03 0 - 0.04 x10(3)/mcL    IG% 0.6 %   VANCOMYCIN, TROUGH   Result Value Ref Range    Vancomycin Trough 16.0 15.0 - 20.0 ug/ml   Comprehensive Metabolic Panel   Result Value Ref Range    Sodium Level 138 136 - 145 mmol/L    Potassium Level 3.7 3.5 - 5.1 mmol/L    Chloride 103 98 - 107 mmol/L    Carbon Dioxide 26 23 - 31 mmol/L    Glucose Level 84 82 - 115 mg/dL    Blood Urea Nitrogen 24.3 8.4 - 25.7 mg/dL    Creatinine 1.01 0.73 - 1.18 mg/dL    Calcium Level Total 8.1 (L) 8.8 - 10.0 mg/dL    Protein Total 5.2 (L) 5.8 - 7.6 gm/dL    Albumin Level 2.3 (L) 3.4 - 4.8 g/dL    Globulin 2.9 2.4 - 3.5 gm/dL    Albumin/Globulin Ratio 0.8 (L) 1.1 - 2.0 ratio    Bilirubin Total 0.9 <=1.5 mg/dL    Alkaline Phosphatase 70 40 - 150 unit/L    Alanine Aminotransferase 9 0 - 55 unit/L    Aspartate Aminotransferase 11 5 - 34 unit/L    eGFR >60 mls/min/1.73/m2   Magnesium   Result Value Ref Range    Magnesium Level 1.90 1.60 - 2.60 mg/dL   Phosphorus   Result Value Ref Range    Phosphorus Level 3.5 2.3 - 4.7 mg/dL   CBC with Differential   Result Value Ref Range    WBC 3.99 (L) 4.50 - 11.50 x10(3)/mcL    RBC 3.31 (L) 4.70 - 6.10 x10(6)/mcL    Hgb 9.1 (L) 14.0 - 18.0 g/dL    Hct 30.3 (L) 42.0 - 52.0 %    MCV 91.5 80.0 - 94.0 fL    MCH 27.5 27.0 - 31.0 pg    MCHC 30.0 (L) 33.0 - 36.0 g/dL    RDW 17.5 (H) 11.5 - 17.0 %    Platelet 113 (L) 130 - 400 x10(3)/mcL    MPV 10.9 (H) 7.4 - 10.4 fL    Neut % 58.6 %    Lymph % 24.3 %    Mono % 11.3 %    Eos % 4.3 %    Basophil % 0.5 %    Lymph # 0.97 0.6 - 4.6 x10(3)/mcL    Neut # 2.34 2.1 - 9.2 x10(3)/mcL    Mono # 0.45 0.1 - 1.3 x10(3)/mcL    Eos # 0.17 0 - 0.9 x10(3)/mcL    Baso # 0.02 <=0.2 x10(3)/mcL    IG# 0.04 0 - 0.04 x10(3)/mcL    IG% 1.0 %   Basic Metabolic Panel   Result Value Ref Range    Sodium Level 139 136 - 145 mmol/L    Potassium Level 3.8 3.5 - 5.1 mmol/L     Chloride 104 98 - 107 mmol/L    Carbon Dioxide 27 23 - 31 mmol/L    Glucose Level 83 82 - 115 mg/dL    Blood Urea Nitrogen 22.1 8.4 - 25.7 mg/dL    Creatinine 0.88 0.73 - 1.18 mg/dL    BUN/Creatinine Ratio 25     Calcium Level Total 8.2 (L) 8.8 - 10.0 mg/dL    Anion Gap 8.0 mEq/L    eGFR >60 mls/min/1.73/m2   CBC with Differential   Result Value Ref Range    WBC 3.67 (L) 4.50 - 11.50 x10(3)/mcL    RBC 3.23 (L) 4.70 - 6.10 x10(6)/mcL    Hgb 9.1 (L) 14.0 - 18.0 g/dL    Hct 30.0 (L) 42.0 - 52.0 %    MCV 92.9 80.0 - 94.0 fL    MCH 28.2 27.0 - 31.0 pg    MCHC 30.3 (L) 33.0 - 36.0 g/dL    RDW 17.2 (H) 11.5 - 17.0 %    Platelet 122 (L) 130 - 400 x10(3)/mcL    MPV 11.3 (H) 7.4 - 10.4 fL    Neut % 54.8 %    Lymph % 28.9 %    Mono % 10.4 %    Eos % 5.4 %    Basophil % 0.0 %    Lymph # 1.06 0.6 - 4.6 x10(3)/mcL    Neut # 2.01 (L) 2.1 - 9.2 x10(3)/mcL    Mono # 0.38 0.1 - 1.3 x10(3)/mcL    Eos # 0.20 0 - 0.9 x10(3)/mcL    Baso # 0.00 <=0.2 x10(3)/mcL    IG# 0.02 0 - 0.04 x10(3)/mcL    IG% 0.5 %   VANCOMYCIN, TROUGH   Result Value Ref Range    Vancomycin Trough 15.4 15.0 - 20.0 ug/ml   VANCOMYCIN, TROUGH   Result Value Ref Range    Vancomycin Trough 16.9 15.0 - 20.0 ug/ml   Type & Screen   Result Value Ref Range    Group & Rh O NEG     Indirect Rox GEL NEG     Specimen Outdate 12/18/2023 23:59    POCT glucose   Result Value Ref Range    POCT Glucose 99 70 - 110 mg/dL   Prepare RBC 2 Units; hemoglobin <7, history of chf   Result Value Ref Range    UNIT NUMBER [id number]     UNIT ABO/RH O NEG     DISPENSE STATUS Transfused     Unit Expiration [id number]     Product Code A4685V59     Unit Blood Type Code 9500     CROSSMATCH INTERPRETATION Compatible     UNIT NUMBER [id number]     UNIT ABO/RH O NEG     DISPENSE STATUS Transfused     Unit Expiration [id number]     Product Code D2010E52     Unit Blood Type Code 9500     CROSSMATCH INTERPRETATION Compatible        Chemistry:  Lab Results   Component Value Date      12/20/2023    K 3.8 12/20/2023    CHLORIDE 104 12/20/2023    BUN 22.1 12/20/2023    CREATININE 0.88 12/20/2023    EGFRNORACEVR >60 12/20/2023    GLUCOSE 83 12/20/2023    CALCIUM 8.2 (L) 12/20/2023    ALKPHOS 70 12/18/2023    LABPROT 5.2 (L) 12/18/2023    ALBUMIN 2.3 (L) 12/18/2023    AST 11 12/18/2023    ALT 9 12/18/2023    MG 1.90 12/18/2023    PHOS 3.5 12/18/2023    [id number] 19.2 (L) 01/18/2023    TSH 3.312 06/12/2023    PSA 1.25 01/18/2023        Lab Results   Component Value Date    HGBA1C 5.3 01/18/2023        Hematology:  Lab Results   Component Value Date    WBC 3.67 (L) 12/20/2023    HGB 9.1 (L) 12/20/2023    HCT 30.0 (L) 12/20/2023     (L) 12/20/2023       Lipid Panel:  Lab Results   Component Value Date    CHOL 189 01/18/2023    HDL 84 (H) 01/18/2023    LDL 95.00 01/18/2023    TRIG 49 01/18/2023    TOTALCHOLEST 2 01/18/2023        Urine:  Lab Results   Component Value Date    COLORUA Yellow 12/06/2023    APPEARANCEUA Clear 12/06/2023    SGUA 1.020 12/06/2023    PHUA 5.5 12/06/2023    PROTEINUA Trace (A) 12/06/2023    GLUCOSEUA Negative 12/06/2023    KETONESUA Negative 12/06/2023    BLOODUA Negative 12/06/2023    NITRITESUA Negative 12/06/2023    LEUKOCYTESUR Negative 12/06/2023    RBCUA None Seen 12/06/2023    WBCUA None Seen 12/06/2023    BACTERIA None Seen 12/06/2023    CREATRANDUR 18.2 (L) 01/18/2023         Assessment            ICD-10-CM ICD-9-CM   1. Alcoholism  F10.20 303.90   2. Hospital discharge follow-up  Z09 V67.59   3. Coronary artery disease of native artery of native heart with stable angina pectoris  I25.118 414.01     413.9   4. Chronic diastolic congestive heart failure  I50.32 428.32     428.0   5. Atrial fibrillation, unspecified type  I48.91 427.31   6. Severe malnutrition  E43 261   7. Insomnia, unspecified type  G47.00 780.52       Plan       1. Hospital discharge follow-up  Improved patient   denies SOB, blurred vision ,chest pain  2. Alcoholism  Discussed with patient  importance of cutting back alcohol  cessation.  Patient states that he has cut back he has 4-5 beers daily.  3. Coronary artery disease of native artery of native heart with stable angina pectoris  Stable continue current medication as prescribed.  Follow-up cardiologist as recommended.  ED precautions discussed.  4. Chronic diastolic congestive heart failure  Stable continue Entresto as prescribed we will refill until patient follows up with cardiologist for management  5. Atrial fibrillation, unspecified type  Rate controlled continue Eliquis, amiodarone  6. Severe malnutrition  Healthy habits diet discussed  7. Insomnia, unspecified type  - QUEtiapine (SEROQUEL) 100 MG Tab; Take 2 tablets (200 mg total) by mouth every evening.  Dispense: 60 tablet; Refill: 0    Orders Placed This Encounter    amiodarone (PACERONE) 200 MG Tab    apixaban (ELIQUIS) 5 mg Tab    aspirin (ECOTRIN) 81 MG EC tablet    atorvastatin (LIPITOR) 40 MG tablet    ferrous sulfate 325 (65 FE) MG EC tablet    folic acid (FOLVITE) 1 MG tablet    furosemide (LASIX) 40 MG tablet    metoprolol succinate (TOPROL-XL) 100 MG 24 hr tablet    QUEtiapine (SEROQUEL) 100 MG Tab    sacubitriL-valsartan (ENTRESTO) 24-26 mg per tablet      Medication List with Changes/Refills   Current Medications    LINACLOTIDE (LINZESS) 72 MCG CAP CAPSULE    Take 1 capsule (72 mcg total) by mouth before breakfast.   Changed and/or Refilled Medications    Modified Medication Previous Medication    AMIODARONE (PACERONE) 200 MG TAB amiodarone (PACERONE) 200 MG Tab       Take 1 tablet (200 mg total) by mouth once daily.    Take 1 tablet (200 mg total) by mouth once daily.    APIXABAN (ELIQUIS) 5 MG TAB apixaban (ELIQUIS) 5 mg Tab       Take 1 tablet (5 mg total) by mouth 2 (two) times daily.    Take 1 tablet (5 mg total) by mouth 2 (two) times daily.    ASPIRIN (ECOTRIN) 81 MG EC TABLET aspirin (ECOTRIN) 81 MG EC tablet       Take 1 tablet (81 mg total) by mouth once daily.     Take 1 tablet (81 mg total) by mouth once daily.    ATORVASTATIN (LIPITOR) 40 MG TABLET atorvastatin (LIPITOR) 40 MG tablet       Take 1 tablet (40 mg total) by mouth every evening.    Take 1 tablet (40 mg total) by mouth every evening.    FERROUS SULFATE 325 (65 FE) MG EC TABLET ferrous sulfate 325 (65 FE) MG EC tablet       Take 1 tablet (325 mg total) by mouth once daily.    Take 1 tablet (325 mg total) by mouth once daily.    FOLIC ACID (FOLVITE) 1 MG TABLET folic acid (FOLVITE) 1 MG tablet       Take 1 tablet (1 mg total) by mouth once daily.    Take 1 tablet (1 mg total) by mouth once daily.    FUROSEMIDE (LASIX) 40 MG TABLET furosemide (LASIX) 40 MG tablet       Take 1 tablet (40 mg total) by mouth once daily.    Take 1 tablet (40 mg total) by mouth once daily.    METOPROLOL SUCCINATE (TOPROL-XL) 100 MG 24 HR TABLET metoprolol succinate (TOPROL-XL) 100 MG 24 hr tablet       Take 1 tablet (100 mg total) by mouth once daily.    Take 1 tablet (100 mg total) by mouth once daily.    QUETIAPINE (SEROQUEL) 100 MG TAB QUEtiapine (SEROQUEL) 100 MG Tab       Take 2 tablets (200 mg total) by mouth every evening.    Take 2 tablets (200 mg total) by mouth every evening.    SACUBITRIL-VALSARTAN (ENTRESTO) 24-26 MG PER TABLET sacubitriL-valsartan (ENTRESTO) 24-26 mg per tablet       Take 1 tablet by mouth 2 (two) times daily.    Take 1 tablet by mouth 2 (two) times daily.       Follow up in about 8 weeks (around 3/6/2024) for follow up after [facility] appointment .   Follow up in about 8 weeks (around 3/6/2024) for follow up after [facility] appointment . In addition to their scheduled follow up, the patient has also been instructed to follow up on as needed basis.   Future Appointments   Date Time Provider Department Center   3/11/2024 10:15 AM Darlene Willams FNP [facility] [place]

## 2024-01-24 DIAGNOSIS — D69.6 THROMBOCYTOPENIA: Primary | ICD-10-CM

## 2024-01-24 RX ORDER — FERROUS SULFATE TAB 325 MG (65 MG ELEMENTAL FE) 325 (65 FE) MG
TAB ORAL
Qty: 30 TABLET | Refills: 1 | Status: SHIPPED | OUTPATIENT
Start: 2024-01-24 | End: 2024-02-27

## 2024-02-14 NOTE — PLAN OF CARE
[facility]  Discharge Final Note    Primary Care Provider: Darlene Willams FNP    Expected Discharge Date: 12/26/2023    Final Discharge Note (most recent)       Final Note - 02/13/24 1833          Final Note    Assessment Type Final Discharge Note     Anticipated Discharge Disposition Home or Self Care     What phone number can be called within the next 1-3 days to see how you are doing after discharge? [phone]     Hospital Resources/Appts/Education Provided Provided patient/caregiver with written discharge plan information        Post-Acute Status    Discharge Delays None known at this time                     Important Message from Medicare             Contact Info       Darlene Willams FNP   Specialty: Family Medicine   Relationship: PCP - [address] [address]   Phone: [phone]       Next Steps: Follow up    Instructions: office is closed 12/26 @ 10:45a.m.  The office of LOREN Carson will call the patient with an appointment date and time.  Spoke to Fady    [facility]   Specialty: Cardiovascular Disease, Cardiology    [address] [place] [address] A  [facility] [address]   Phone: [phone]       Next Steps: Go on 2/28/2024    Instructions: 2/28 @ 2:50 p.m.     Spoke to Deneen

## 2024-02-21 ENCOUNTER — HOSPITAL ENCOUNTER (INPATIENT)
Facility: HOSPITAL | Age: 69
LOS: 2 days | Discharge: HOME HEALTH | DRG: 683 | End: 2024-02-24
Attending: STUDENT IN AN ORGANIZED HEALTH CARE EDUCATION/TRAINING PROGRAM | Admitting: INTERNAL MEDICINE
Payer: MEDICARE

## 2024-02-21 DIAGNOSIS — R53.1 WEAKNESS: ICD-10-CM

## 2024-02-21 DIAGNOSIS — I50.9 CHF (CONGESTIVE HEART FAILURE): ICD-10-CM

## 2024-02-21 DIAGNOSIS — R07.9 CHEST PAIN: ICD-10-CM

## 2024-02-21 DIAGNOSIS — N17.9 AKI (ACUTE KIDNEY INJURY): Primary | ICD-10-CM

## 2024-02-21 PROBLEM — F10.920 ACUTE ALCOHOLIC INTOXICATION WITHOUT COMPLICATION: Status: ACTIVE | Noted: 2024-02-21

## 2024-02-21 LAB
ALBUMIN SERPL-MCNC: 3.1 G/DL (ref 3.4–4.8)
ALBUMIN/GLOB SERPL: 0.9 RATIO (ref 1.1–2)
ALP SERPL-CCNC: 59 UNIT/L (ref 40–150)
ALT SERPL-CCNC: 9 UNIT/L (ref 0–55)
AMPHET UR QL SCN: NEGATIVE
AST SERPL-CCNC: 14 UNIT/L (ref 5–34)
BARBITURATE SCN PRESENT UR: NEGATIVE
BASOPHILS # BLD AUTO: 0.02 X10(3)/MCL
BASOPHILS NFR BLD AUTO: 0.3 %
BENZODIAZ UR QL SCN: NEGATIVE
BILIRUB SERPL-MCNC: 1.2 MG/DL
BUN SERPL-MCNC: 57.8 MG/DL (ref 8.4–25.7)
CALCIUM SERPL-MCNC: 8.4 MG/DL (ref 8.8–10)
CANNABINOIDS UR QL SCN: NEGATIVE
CHLORIDE SERPL-SCNC: 102 MMOL/L (ref 98–107)
CK SERPL-CCNC: 28 U/L (ref 30–200)
CO2 SERPL-SCNC: 19 MMOL/L (ref 23–31)
COCAINE UR QL SCN: NEGATIVE
CREAT SERPL-MCNC: 2.32 MG/DL (ref 0.73–1.18)
EOSINOPHIL # BLD AUTO: 0.27 X10(3)/MCL (ref 0–0.9)
EOSINOPHIL NFR BLD AUTO: 4.7 %
ERYTHROCYTE [DISTWIDTH] IN BLOOD BY AUTOMATED COUNT: 17.3 % (ref 11.5–17)
ETHANOL SERPL-MCNC: 62 MG/DL
FLUAV AG UPPER RESP QL IA.RAPID: NOT DETECTED
FLUBV AG UPPER RESP QL IA.RAPID: NOT DETECTED
GFR SERPLBLD CREATININE-BSD FMLA CKD-EPI: 29 MLS/MIN/1.73/M2
GLOBULIN SER-MCNC: 3.3 GM/DL (ref 2.4–3.5)
GLUCOSE SERPL-MCNC: 111 MG/DL (ref 82–115)
HCT VFR BLD AUTO: 28.7 % (ref 42–52)
HGB BLD-MCNC: 8.9 G/DL (ref 14–18)
IMM GRANULOCYTES # BLD AUTO: 0.01 X10(3)/MCL (ref 0–0.04)
IMM GRANULOCYTES NFR BLD AUTO: 0.2 %
LYMPHOCYTES # BLD AUTO: 1.17 X10(3)/MCL (ref 0.6–4.6)
LYMPHOCYTES NFR BLD AUTO: 20.2 %
MCH RBC QN AUTO: 29.6 PG (ref 27–31)
MCHC RBC AUTO-ENTMCNC: 31 G/DL (ref 33–36)
MCV RBC AUTO: 95.3 FL (ref 80–94)
MONOCYTES # BLD AUTO: 0.47 X10(3)/MCL (ref 0.1–1.3)
MONOCYTES NFR BLD AUTO: 8.1 %
NEUTROPHILS # BLD AUTO: 3.86 X10(3)/MCL (ref 2.1–9.2)
NEUTROPHILS NFR BLD AUTO: 66.5 %
OPIATES UR QL SCN: POSITIVE
PCP UR QL: NEGATIVE
PH UR: 5.5 [PH] (ref 3–11)
PLATELET # BLD AUTO: 109 X10(3)/MCL (ref 130–400)
PMV BLD AUTO: 11.5 FL (ref 7.4–10.4)
POC CARDIAC TROPONIN I: 0.02 NG/ML (ref 0–0.08)
POTASSIUM SERPL-SCNC: 4.5 MMOL/L (ref 3.5–5.1)
PROT SERPL-MCNC: 6.4 GM/DL (ref 5.8–7.6)
RBC # BLD AUTO: 3.01 X10(6)/MCL (ref 4.7–6.1)
SAMPLE: NORMAL
SARS-COV-2 RNA RESP QL NAA+PROBE: NOT DETECTED
SODIUM SERPL-SCNC: 132 MMOL/L (ref 136–145)
SPECIFIC GRAVITY, URINE AUTO (.000) (OHS): 1.02 (ref 1–1.03)
WBC # SPEC AUTO: 5.8 X10(3)/MCL (ref 4.5–11.5)

## 2024-02-21 PROCEDURE — 96361 HYDRATE IV INFUSION ADD-ON: CPT

## 2024-02-21 PROCEDURE — 82077 ASSAY SPEC XCP UR&BREATH IA: CPT | Performed by: STUDENT IN AN ORGANIZED HEALTH CARE EDUCATION/TRAINING PROGRAM

## 2024-02-21 PROCEDURE — 93005 ELECTROCARDIOGRAM TRACING: CPT

## 2024-02-21 PROCEDURE — 80053 COMPREHEN METABOLIC PANEL: CPT | Performed by: STUDENT IN AN ORGANIZED HEALTH CARE EDUCATION/TRAINING PROGRAM

## 2024-02-21 PROCEDURE — 93010 ELECTROCARDIOGRAM REPORT: CPT | Mod: ,,, | Performed by: INTERNAL MEDICINE

## 2024-02-21 PROCEDURE — G0378 HOSPITAL OBSERVATION PER HR: HCPCS

## 2024-02-21 PROCEDURE — 94760 N-INVAS EAR/PLS OXIMETRY 1: CPT

## 2024-02-21 PROCEDURE — 85025 COMPLETE CBC W/AUTO DIFF WBC: CPT | Performed by: STUDENT IN AN ORGANIZED HEALTH CARE EDUCATION/TRAINING PROGRAM

## 2024-02-21 PROCEDURE — 82550 ASSAY OF CK (CPK): CPT | Performed by: STUDENT IN AN ORGANIZED HEALTH CARE EDUCATION/TRAINING PROGRAM

## 2024-02-21 PROCEDURE — 84484 ASSAY OF TROPONIN QUANT: CPT

## 2024-02-21 PROCEDURE — 99900035 HC TECH TIME PER 15 MIN (STAT)

## 2024-02-21 PROCEDURE — 0240U COVID/FLU A&B PCR: CPT | Performed by: STUDENT IN AN ORGANIZED HEALTH CARE EDUCATION/TRAINING PROGRAM

## 2024-02-21 PROCEDURE — 96360 HYDRATION IV INFUSION INIT: CPT

## 2024-02-21 PROCEDURE — 25000003 PHARM REV CODE 250: Performed by: STUDENT IN AN ORGANIZED HEALTH CARE EDUCATION/TRAINING PROGRAM

## 2024-02-21 PROCEDURE — 80307 DRUG TEST PRSMV CHEM ANLYZR: CPT | Performed by: STUDENT IN AN ORGANIZED HEALTH CARE EDUCATION/TRAINING PROGRAM

## 2024-02-21 PROCEDURE — 99285 EMERGENCY DEPT VISIT HI MDM: CPT | Mod: 25

## 2024-02-21 RX ORDER — METOPROLOL SUCCINATE 50 MG/1
100 TABLET, EXTENDED RELEASE ORAL DAILY
Status: DISCONTINUED | OUTPATIENT
Start: 2024-02-22 | End: 2024-02-22

## 2024-02-21 RX ORDER — ONDANSETRON 4 MG/1
8 TABLET, ORALLY DISINTEGRATING ORAL EVERY 8 HOURS PRN
Status: DISCONTINUED | OUTPATIENT
Start: 2024-02-21 | End: 2024-02-24 | Stop reason: HOSPADM

## 2024-02-21 RX ORDER — GLUCAGON 1 MG
1 KIT INJECTION
Status: DISCONTINUED | OUTPATIENT
Start: 2024-02-21 | End: 2024-02-24 | Stop reason: HOSPADM

## 2024-02-21 RX ORDER — HYDROCODONE BITARTRATE AND ACETAMINOPHEN 5; 325 MG/1; MG/1
1 TABLET ORAL EVERY 6 HOURS PRN
Status: DISCONTINUED | OUTPATIENT
Start: 2024-02-21 | End: 2024-02-24 | Stop reason: HOSPADM

## 2024-02-21 RX ORDER — SODIUM CHLORIDE 0.9 % (FLUSH) 0.9 %
10 SYRINGE (ML) INJECTION
Status: DISCONTINUED | OUTPATIENT
Start: 2024-02-21 | End: 2024-02-21

## 2024-02-21 RX ORDER — LANOLIN ALCOHOL/MO/W.PET/CERES
1 CREAM (GRAM) TOPICAL DAILY
Status: DISCONTINUED | OUTPATIENT
Start: 2024-02-22 | End: 2024-02-24 | Stop reason: HOSPADM

## 2024-02-21 RX ORDER — QUETIAPINE FUMARATE 100 MG/1
200 TABLET, FILM COATED ORAL NIGHTLY
Status: DISCONTINUED | OUTPATIENT
Start: 2024-02-26 | End: 2024-02-24 | Stop reason: HOSPADM

## 2024-02-21 RX ORDER — AMIODARONE HYDROCHLORIDE 200 MG/1
200 TABLET ORAL DAILY
Status: DISCONTINUED | OUTPATIENT
Start: 2024-02-22 | End: 2024-02-24 | Stop reason: HOSPADM

## 2024-02-21 RX ORDER — ACETAMINOPHEN 325 MG/1
650 TABLET ORAL EVERY 4 HOURS PRN
Status: DISCONTINUED | OUTPATIENT
Start: 2024-02-21 | End: 2024-02-24 | Stop reason: HOSPADM

## 2024-02-21 RX ORDER — POLYETHYLENE GLYCOL 3350 17 G/17G
17 POWDER, FOR SOLUTION ORAL 3 TIMES DAILY PRN
Status: DISCONTINUED | OUTPATIENT
Start: 2024-02-21 | End: 2024-02-24 | Stop reason: HOSPADM

## 2024-02-21 RX ORDER — ONDANSETRON HYDROCHLORIDE 2 MG/ML
4 INJECTION, SOLUTION INTRAVENOUS EVERY 8 HOURS PRN
Status: DISCONTINUED | OUTPATIENT
Start: 2024-02-21 | End: 2024-02-24 | Stop reason: HOSPADM

## 2024-02-21 RX ORDER — SODIUM CHLORIDE 0.9 % (FLUSH) 0.9 %
10 SYRINGE (ML) INJECTION
Status: DISCONTINUED | OUTPATIENT
Start: 2024-02-21 | End: 2024-02-24 | Stop reason: HOSPADM

## 2024-02-21 RX ORDER — ATORVASTATIN CALCIUM 40 MG/1
40 TABLET, FILM COATED ORAL NIGHTLY
Status: DISCONTINUED | OUTPATIENT
Start: 2024-02-21 | End: 2024-02-24 | Stop reason: HOSPADM

## 2024-02-21 RX ORDER — ASPIRIN 81 MG/1
81 TABLET ORAL DAILY
Status: DISCONTINUED | OUTPATIENT
Start: 2024-02-22 | End: 2024-02-24 | Stop reason: HOSPADM

## 2024-02-21 RX ORDER — MORPHINE SULFATE 4 MG/ML
2 INJECTION, SOLUTION INTRAMUSCULAR; INTRAVENOUS EVERY 6 HOURS PRN
Status: DISCONTINUED | OUTPATIENT
Start: 2024-02-21 | End: 2024-02-24 | Stop reason: HOSPADM

## 2024-02-21 RX ORDER — ALPRAZOLAM 0.25 MG/1
0.25 TABLET ORAL 2 TIMES DAILY
Status: COMPLETED | OUTPATIENT
Start: 2024-02-23 | End: 2024-02-23

## 2024-02-21 RX ORDER — THIAMINE HCL 100 MG
100 TABLET ORAL DAILY
Status: DISCONTINUED | OUTPATIENT
Start: 2024-02-21 | End: 2024-02-24 | Stop reason: HOSPADM

## 2024-02-21 RX ORDER — IPRATROPIUM BROMIDE AND ALBUTEROL SULFATE 2.5; .5 MG/3ML; MG/3ML
3 SOLUTION RESPIRATORY (INHALATION) EVERY 6 HOURS PRN
Status: DISCONTINUED | OUTPATIENT
Start: 2024-02-21 | End: 2024-02-24 | Stop reason: HOSPADM

## 2024-02-21 RX ORDER — BISACODYL 10 MG/1
10 SUPPOSITORY RECTAL DAILY PRN
Status: DISCONTINUED | OUTPATIENT
Start: 2024-02-21 | End: 2024-02-24 | Stop reason: HOSPADM

## 2024-02-21 RX ORDER — ALPRAZOLAM 0.25 MG/1
0.25 TABLET ORAL 3 TIMES DAILY
Status: DISPENSED | OUTPATIENT
Start: 2024-02-21 | End: 2024-02-22

## 2024-02-21 RX ORDER — SODIUM CHLORIDE 9 MG/ML
1000 INJECTION, SOLUTION INTRAVENOUS
Status: COMPLETED | OUTPATIENT
Start: 2024-02-21 | End: 2024-02-21

## 2024-02-21 RX ORDER — FOLIC ACID 1 MG/1
1 TABLET ORAL DAILY
Status: DISCONTINUED | OUTPATIENT
Start: 2024-02-21 | End: 2024-02-24 | Stop reason: HOSPADM

## 2024-02-21 RX ORDER — IBUPROFEN 200 MG
24 TABLET ORAL
Status: DISCONTINUED | OUTPATIENT
Start: 2024-02-21 | End: 2024-02-24 | Stop reason: HOSPADM

## 2024-02-21 RX ORDER — ALUMINUM HYDROXIDE, MAGNESIUM HYDROXIDE, AND SIMETHICONE 1200; 120; 1200 MG/30ML; MG/30ML; MG/30ML
30 SUSPENSION ORAL 4 TIMES DAILY PRN
Status: DISCONTINUED | OUTPATIENT
Start: 2024-02-21 | End: 2024-02-24 | Stop reason: HOSPADM

## 2024-02-21 RX ORDER — FUROSEMIDE 40 MG/1
40 TABLET ORAL DAILY
Status: DISCONTINUED | OUTPATIENT
Start: 2024-02-26 | End: 2024-02-22

## 2024-02-21 RX ORDER — NALOXONE HCL 0.4 MG/ML
0.02 VIAL (ML) INJECTION
Status: DISCONTINUED | OUTPATIENT
Start: 2024-02-21 | End: 2024-02-24 | Stop reason: HOSPADM

## 2024-02-21 RX ORDER — IBUPROFEN 200 MG
16 TABLET ORAL
Status: DISCONTINUED | OUTPATIENT
Start: 2024-02-21 | End: 2024-02-24 | Stop reason: HOSPADM

## 2024-02-21 RX ORDER — SODIUM CHLORIDE 9 MG/ML
INJECTION, SOLUTION INTRAVENOUS CONTINUOUS
Status: ACTIVE | OUTPATIENT
Start: 2024-02-21 | End: 2024-02-22

## 2024-02-21 RX ORDER — TALC
9 POWDER (GRAM) TOPICAL NIGHTLY PRN
Status: DISCONTINUED | OUTPATIENT
Start: 2024-02-21 | End: 2024-02-24 | Stop reason: HOSPADM

## 2024-02-21 RX ORDER — SIMETHICONE 80 MG
1 TABLET,CHEWABLE ORAL 4 TIMES DAILY PRN
Status: DISCONTINUED | OUTPATIENT
Start: 2024-02-21 | End: 2024-02-24 | Stop reason: HOSPADM

## 2024-02-21 RX ADMIN — APIXABAN 5 MG: 5 TABLET, FILM COATED ORAL at 08:02

## 2024-02-21 RX ADMIN — FOLIC ACID 1 MG: 1 TABLET ORAL at 08:02

## 2024-02-21 RX ADMIN — ATORVASTATIN CALCIUM 40 MG: 40 TABLET, FILM COATED ORAL at 08:02

## 2024-02-21 RX ADMIN — SODIUM CHLORIDE 1000 ML: 9 INJECTION, SOLUTION INTRAVENOUS at 04:02

## 2024-02-21 RX ADMIN — ALPRAZOLAM 0.25 MG: 0.25 TABLET ORAL at 08:02

## 2024-02-21 RX ADMIN — SODIUM CHLORIDE 1000 ML: 9 INJECTION, SOLUTION INTRAVENOUS at 01:02

## 2024-02-21 RX ADMIN — Medication 9 MG: at 08:02

## 2024-02-21 RX ADMIN — SODIUM CHLORIDE 1000 ML: 9 INJECTION, SOLUTION INTRAVENOUS at 03:02

## 2024-02-21 RX ADMIN — SODIUM CHLORIDE: 9 INJECTION, SOLUTION INTRAVENOUS at 08:02

## 2024-02-21 RX ADMIN — SACUBITRIL AND VALSARTAN 1 TABLET: 24; 26 TABLET, FILM COATED ORAL at 08:02

## 2024-02-21 RX ADMIN — THIAMINE HCL TAB 100 MG 100 MG: 100 TAB at 08:02

## 2024-02-21 NOTE — ED PROVIDER NOTES
"Encounter Date: 2/21/2024       History     Chief Complaint   Patient presents with    Weakness     Pt c/o weakness and n/v that started this morning; denies CP/ SOB. Pt sluggish in triage; reports he did drink "a few beers this morning".      HPI  Patient is a 68-year-old male past medical history of CHF, CAD, hyperlipidemia, hypertension, who presents to the ER complaining of weakness, nausea, vomiting started this morning.  Patient states he just woke up feeling weak.  Patient was sluggish in triage, endorses he did drink a few beers this morning.  Patient states he has a daily drinker which he quantifies as about 3-4 beers a day.  He denies any fevers, chills, chest pain, shortness of breath, dysuria.  Review of patient's allergies indicates:  No Known Allergies  Past Medical History:   Diagnosis Date    Atrial flutter     CHF (congestive heart failure)     Coronary artery disease     Hyperlipidemia     Hypertension     Myocardial infarction     SOB (shortness of breath)     at times     Past Surgical History:   Procedure Laterality Date    CATARACT EXTRACTION Bilateral     CORONARY ARTERY BYPASS GRAFT (CABG) N/A 4/3/2023    Procedure: CORONARY ARTERY BYPASS GRAFT (CABG);  Surgeon: Deuce Finley IV, MD;  Location: [facility] OR;  Service: Cardiovascular;  Laterality: N/A;  WITH LLAA //  ECHO NOTIFIED    LEFT HEART CATHETERIZATION N/A 03/15/2023    Procedure: CATHETERIZATION, HEART, LEFT;  Surgeon: Sreedhar Herrera MD;  Location: [facility] CATH LAB;  Service: Cardiology;  Laterality: N/A;  LHC via RRA     Family History   Problem Relation Age of Onset    Heart attack Mother      Social History     Tobacco Use    Smoking status: Never     Passive exposure: Never    Smokeless tobacco: Never   Substance Use Topics    Alcohol use: Yes     Alcohol/week: 84.0 standard drinks of alcohol     Types: 84 Cans of beer per week     Comment: alcoholic, daily, beer    Drug use: Yes     Frequency: 3.0 times per week     Types: " Hydrocodone     Review of Systems   Constitutional:  Negative for fever.   HENT:  Negative for sore throat.    Eyes:  Negative for visual disturbance.   Respiratory:  Negative for shortness of breath.    Cardiovascular:  Negative for chest pain.   Gastrointestinal:  Negative for nausea.   Endocrine: Negative for polyuria.   Genitourinary:  Negative for dysuria.   Musculoskeletal:  Negative for back pain.   Skin:  Negative for rash.   Neurological:  Positive for weakness.   Hematological:  Does not bruise/bleed easily.   All other systems reviewed and are negative.      Physical Exam     Initial Vitals [02/21/24 1311]   BP Pulse Resp Temp SpO2   (!) 65/0 95 15 99.8 °F (37.7 °C) 96 %      MAP       --         Physical Exam    Nursing note and vitals reviewed.  Constitutional: Vital signs are normal. He appears well-developed and well-nourished. He is not diaphoretic. He is active.  Non-toxic appearance. He does not appear ill. No distress.   HENT:   Head: Normocephalic and atraumatic.   Eyes: Conjunctivae are normal. Pupils are equal, round, and reactive to light. Right conjunctiva is not injected. Left conjunctiva is not injected.   Neck: Trachea normal. Neck supple.   Normal range of motion.   Full passive range of motion without pain.     Cardiovascular:  Normal rate, regular rhythm, S1 normal, S2 normal, intact distal pulses and normal pulses.           Pulmonary/Chest: No respiratory distress. He has no wheezes.   Occasional rhonchi noted to left lower lobe.  No accessory muscle use.   Abdominal: Abdomen is soft. Bowel sounds are normal. There is no abdominal tenderness.   Musculoskeletal:         General: No tenderness or edema. Normal range of motion.      Cervical back: Full passive range of motion without pain, normal range of motion and neck supple. No rigidity.      Right lower leg: No swelling. No edema.      Left lower leg: No swelling. No edema.     Neurological: He is alert and oriented to person,  place, and time.   Skin: Skin is warm and dry. Capillary refill takes less than 2 seconds.         ED Course   Procedures  Labs Reviewed   COMPREHENSIVE METABOLIC PANEL - Abnormal; Notable for the following components:       Result Value    Sodium Level 132 (*)     Carbon Dioxide 19 (*)     Blood Urea Nitrogen 57.8 (*)     Creatinine 2.32 (*)     Calcium Level Total 8.4 (*)     Albumin Level 3.1 (*)     Albumin/Globulin Ratio 0.9 (*)     All other components within normal limits   DRUG SCREEN, URINE (BEAKER) - Abnormal; Notable for the following components:    Opiates, Urine Positive (*)     All other components within normal limits    Narrative:     Cut off concentrations:    Amphetamines - 1000 ng/ml  Barbiturates - 200 ng/ml  Benzodiazepine - 200 ng/ml  Cannabinoids (THC) - 50 ng/ml  Cocaine - 300 ng/ml  Fentanyl - 1.0 ng/ml  MDMA - 500 ng/ml  Opiates - 300 ng/ml   Phencyclidine (PCP) - 25 ng/ml    Specimen submitted for drug analysis and tested for pH and specific gravity in order to evaluate sample integrity. Suspect tampering if specific gravity is <1.003 and/or pH is not within the range of 4.5 - 8.0  False negatives may result form substances such as bleach added to urine.  False positives may result for the presence of a substance with similar chemical structure to the drug or its metabolite.    This test provides only a PRELIMINARY analytical test result. A more specific alternate chemical method must be used in order to obtain a confirmed analytical result. Gas chromatography/mass spectrometry (GC/MS) is the preferred confirmatory method. Other chemical confirmation methods are available. Clinical consideration and professional judgement should be applied to any drug of abuse test result, particularly when preliminary positive results are used.    Positive results will be confirmed only at the physicians request. Unconfirmed screening results are to be used only for medical purposes (treatment).         ALCOHOL,MEDICAL (ETHANOL) - Abnormal; Notable for the following components:    Ethanol Level 62.0 (*)     All other components within normal limits   CBC WITH DIFFERENTIAL - Abnormal; Notable for the following components:    RBC 3.01 (*)     Hgb 8.9 (*)     Hct 28.7 (*)     MCV 95.3 (*)     MCHC 31.0 (*)     RDW 17.3 (*)     Platelet 109 (*)     MPV 11.5 (*)     All other components within normal limits   CK - Abnormal; Notable for the following components:    Creatine Kinase 28 (*)     All other components within normal limits   COVID/FLU A&B PCR - Normal    Narrative:     The Xpert Xpress SARS-CoV-2/FLU/RSV plus is a rapid, multiplexed real-time PCR test intended for the simultaneous qualitative detection and differentiation of SARS-CoV-2, Influenza A, Influenza B, and respiratory syncytial virus (RSV) viral RNA in either nasopharyngeal swab or nasal swab specimens.         CBC W/ AUTO DIFFERENTIAL    Narrative:     The following orders were created for panel order CBC auto differential.  Procedure                               Abnormality         Status                     ---------                               -----------         ------                     CBC with Differential[[id number]]       Abnormal            Final result                 Please view results for these tests on the individual orders.   TROPONIN ISTAT   POCT TROPONIN     EKG Readings: (Independently Interpreted)   Initial Reading: No STEMI. Rhythm: Normal Sinus Rhythm. Heart Rate: 61. Axis: Normal.   Normal sinus rhythm, rate of 61 beats per minute, no acute ST elevations noted.  Intervals within normal limits.  Normal axis.       Imaging Results              X-Ray Chest AP Portable (In process)                      CT Head Without Contrast (Final result)  Result time 02/21/24 14:35:36      Final result by Toni Eller MD (02/21/24 14:35:36)                   Impression:      1.  No acute intracranial findings identified.    2.   Chronic microangiopathic ischemia and atrophy.      Electronically signed by: Toni Eller  Date:    02/21/2024  Time:    14:35               Narrative:    EXAMINATION:  CT HEAD WITHOUT CONTRAST    CLINICAL HISTORY:  Weakness;    TECHNIQUE:  Sequential axial images were performed of the brain without contrast.    Dose product length of 1795 mGycm. Automated exposure control was utilized to minimize radiation dose.    COMPARISON:  April 6, 2023..    FINDINGS:  There is no intracranial mass effect, midline shift, hydrocephalus or hemorrhage. There is no sulcal effacement or low attenuation changes to suggest recent large vessel territory infarction. Chronic appearing periventricular and subcortical white matter low attenuation changes are present and are consistent with chronic microangiopathic ischemia. The ventricular system and sulcal markings prominence is consistent with atrophy. There is no acute extra axial fluid collection.  Dense calcified plaque of the right distal vertebral artery.  Minimal mucoperiosteal thickening of the maxillary sinuses.  Otherwise, visualized paranasal sinuses are clear without mucosal thickening, polypoidal abnormality or air-fluid levels. Mastoid air cells aeration is optimal.                                       Medications   sodium chloride 0.9% bolus 1,000 mL 1,000 mL (1,000 mLs Intravenous New Bag 2/21/24 1534)   sodium chloride 0.9% bolus 1,000 mL 1,000 mL (0 mLs Intravenous Stopped 2/21/24 1418)     Medical Decision Making  Patient is a 68-year-old male past medical history of CHF, CAD, hyperlipidemia, hypertension, who presents to the ER complaining of weakness, nausea, vomiting started this morning.      Differential diagnosis includes but not limited to:  Alcohol intoxication, polysubstance abuse, LOVELY, electrolyte derangements, CVA, seizure, viral URI    Patient on arrival was noted to be hypotensive 70s systolic.  Patient subsequently was given a L IV fluids, placed in  Trendelenburg.  Blood pressure subsequently improved to 95/58.  Physical exam was grossly unremarkable for any physical abnormalities.  We obtained labs including CT head imaging, chest x-ray.  Toxicology screen.  Labs returned negative for any acute anemia however patient noted to have an H&H of 28/8.9 which appears to be around his baseline.  MCV of 95.  There were no gross electrolyte derangements noted other than mild hyponatremia at 132.  Patient noted have LOVELY with BUN/creatinine 57/2.32.  Patient's kidney function appears to be within within normal limits at baseline.  Patient's is GFR 29 today as well.  Patient's ETOH level returned at 62.  Toxicology screen noted  positive opiates which patient states he takes for his chronic back pain (however not prescribed from practitioner).  Secondary to patient's declining renal function, likely secondary to hypovolemia in setting of his hypotension on arrival, will admit for further IV fluid hydration, monitoring overnight.  Patient is amenable to stay.  I have discussed the case with hospitalist Dr. Reyes who is in agreement with plan as noted.  Will monitor until transfer to the floor.    Toby Beasley M.D.  Emergency Medicine        Amount and/or Complexity of Data Reviewed  Labs: ordered.  Radiology: ordered.                                      Clinical Impression:  Final diagnoses:  [R53.1] Weakness  [N17.9] LOVELY (acute kidney injury) (Primary)          ED Disposition Condition    Observation Stable                Toby Beasley MD  02/21/24 8964

## 2024-02-22 LAB
ANION GAP SERPL CALC-SCNC: 9 MEQ/L
BASOPHILS # BLD AUTO: 0.01 X10(3)/MCL
BASOPHILS NFR BLD AUTO: 0.3 %
BUN SERPL-MCNC: 49.5 MG/DL (ref 8.4–25.7)
CALCIUM SERPL-MCNC: 8.1 MG/DL (ref 8.8–10)
CHLORIDE SERPL-SCNC: 109 MMOL/L (ref 98–107)
CO2 SERPL-SCNC: 21 MMOL/L (ref 23–31)
CREAT SERPL-MCNC: 1.62 MG/DL (ref 0.73–1.18)
CREAT/UREA NIT SERPL: 31
EOSINOPHIL # BLD AUTO: 0.2 X10(3)/MCL (ref 0–0.9)
EOSINOPHIL NFR BLD AUTO: 5.6 %
ERYTHROCYTE [DISTWIDTH] IN BLOOD BY AUTOMATED COUNT: 17.4 % (ref 11.5–17)
ETHANOL SERPL-MCNC: <10 MG/DL
GFR SERPLBLD CREATININE-BSD FMLA CKD-EPI: 45 MLS/MIN/1.73/M2
GLUCOSE SERPL-MCNC: 94 MG/DL (ref 82–115)
HCT VFR BLD AUTO: 27.9 % (ref 42–52)
HGB BLD-MCNC: 8.4 G/DL (ref 14–18)
IMM GRANULOCYTES # BLD AUTO: 0.01 X10(3)/MCL (ref 0–0.04)
IMM GRANULOCYTES NFR BLD AUTO: 0.3 %
LYMPHOCYTES # BLD AUTO: 0.97 X10(3)/MCL (ref 0.6–4.6)
LYMPHOCYTES NFR BLD AUTO: 27 %
MAGNESIUM SERPL-MCNC: 2.3 MG/DL (ref 1.6–2.6)
MCH RBC QN AUTO: 29.4 PG (ref 27–31)
MCHC RBC AUTO-ENTMCNC: 30.1 G/DL (ref 33–36)
MCV RBC AUTO: 97.6 FL (ref 80–94)
MONOCYTES # BLD AUTO: 0.4 X10(3)/MCL (ref 0.1–1.3)
MONOCYTES NFR BLD AUTO: 11.1 %
NEUTROPHILS # BLD AUTO: 2 X10(3)/MCL (ref 2.1–9.2)
NEUTROPHILS NFR BLD AUTO: 55.7 %
PLATELET # BLD AUTO: 86 X10(3)/MCL (ref 130–400)
PMV BLD AUTO: 11.8 FL (ref 7.4–10.4)
POTASSIUM SERPL-SCNC: 4.5 MMOL/L (ref 3.5–5.1)
RBC # BLD AUTO: 2.86 X10(6)/MCL (ref 4.7–6.1)
SODIUM SERPL-SCNC: 139 MMOL/L (ref 136–145)
WBC # SPEC AUTO: 3.59 X10(3)/MCL (ref 4.5–11.5)

## 2024-02-22 PROCEDURE — 97161 PT EVAL LOW COMPLEX 20 MIN: CPT

## 2024-02-22 PROCEDURE — 85025 COMPLETE CBC W/AUTO DIFF WBC: CPT | Performed by: STUDENT IN AN ORGANIZED HEALTH CARE EDUCATION/TRAINING PROGRAM

## 2024-02-22 PROCEDURE — 94760 N-INVAS EAR/PLS OXIMETRY 1: CPT

## 2024-02-22 PROCEDURE — 82077 ASSAY SPEC XCP UR&BREATH IA: CPT | Performed by: STUDENT IN AN ORGANIZED HEALTH CARE EDUCATION/TRAINING PROGRAM

## 2024-02-22 PROCEDURE — 97116 GAIT TRAINING THERAPY: CPT

## 2024-02-22 PROCEDURE — 11000001 HC ACUTE MED/SURG PRIVATE ROOM

## 2024-02-22 PROCEDURE — 97165 OT EVAL LOW COMPLEX 30 MIN: CPT

## 2024-02-22 PROCEDURE — 97530 THERAPEUTIC ACTIVITIES: CPT

## 2024-02-22 PROCEDURE — 96361 HYDRATE IV INFUSION ADD-ON: CPT

## 2024-02-22 PROCEDURE — 80048 BASIC METABOLIC PNL TOTAL CA: CPT | Performed by: STUDENT IN AN ORGANIZED HEALTH CARE EDUCATION/TRAINING PROGRAM

## 2024-02-22 PROCEDURE — 25000003 PHARM REV CODE 250: Performed by: STUDENT IN AN ORGANIZED HEALTH CARE EDUCATION/TRAINING PROGRAM

## 2024-02-22 PROCEDURE — 83735 ASSAY OF MAGNESIUM: CPT | Performed by: STUDENT IN AN ORGANIZED HEALTH CARE EDUCATION/TRAINING PROGRAM

## 2024-02-22 PROCEDURE — 99900035 HC TECH TIME PER 15 MIN (STAT)

## 2024-02-22 RX ORDER — METOPROLOL SUCCINATE 50 MG/1
50 TABLET, EXTENDED RELEASE ORAL DAILY
Status: DISCONTINUED | OUTPATIENT
Start: 2024-02-23 | End: 2024-02-23

## 2024-02-22 RX ORDER — FUROSEMIDE 20 MG/1
20 TABLET ORAL DAILY
Status: DISCONTINUED | OUTPATIENT
Start: 2024-02-26 | End: 2024-02-24 | Stop reason: HOSPADM

## 2024-02-22 RX ADMIN — WHITE PETROLATUM: 1.75 OINTMENT TOPICAL at 09:02

## 2024-02-22 RX ADMIN — FOLIC ACID 1 MG: 1 TABLET ORAL at 09:02

## 2024-02-22 RX ADMIN — FERROUS SULFATE TAB 325 MG (65 MG ELEMENTAL FE) 1 EACH: 325 (65 FE) TAB at 09:02

## 2024-02-22 RX ADMIN — APIXABAN 5 MG: 5 TABLET, FILM COATED ORAL at 08:02

## 2024-02-22 RX ADMIN — WHITE PETROLATUM: 1.75 OINTMENT TOPICAL at 08:02

## 2024-02-22 RX ADMIN — APIXABAN 5 MG: 5 TABLET, FILM COATED ORAL at 09:02

## 2024-02-22 RX ADMIN — ATORVASTATIN CALCIUM 40 MG: 40 TABLET, FILM COATED ORAL at 08:02

## 2024-02-22 RX ADMIN — THIAMINE HCL TAB 100 MG 100 MG: 100 TAB at 09:02

## 2024-02-22 RX ADMIN — Medication 9 MG: at 08:02

## 2024-02-22 RX ADMIN — SACUBITRIL AND VALSARTAN 1 TABLET: 24; 26 TABLET, FILM COATED ORAL at 08:02

## 2024-02-22 RX ADMIN — ASPIRIN 81 MG: 81 TABLET, COATED ORAL at 09:02

## 2024-02-22 RX ADMIN — SACUBITRIL AND VALSARTAN 1 TABLET: 24; 26 TABLET, FILM COATED ORAL at 09:02

## 2024-02-22 RX ADMIN — SODIUM CHLORIDE: 9 INJECTION, SOLUTION INTRAVENOUS at 05:02

## 2024-02-22 RX ADMIN — AMIODARONE HYDROCHLORIDE 200 MG: 200 TABLET ORAL at 09:02

## 2024-02-22 NOTE — H&P
"[facility] MEDICINE - H&P ADMISSION NOTE    Patient Name: Mike Urbina Jr.  MRN: [id number]  Patient Class: OP- Observation   Admission Date: 2/21/2024   Admitting Physician: HM Service   Attending Physician: Thairy G Reyes, DO  Primary Care Provider: Darlene Willams FNP  Face-to-Face encounter date: 02/21/2024      CHIEF COMPLAINT     Chief Complaint   Patient presents with    Weakness     Pt c/o weakness and n/v that started this morning; denies CP/ SOB. Pt sluggish in triage; reports he did drink "a few beers this morning".      HISTORY OF PRESENTING ILLNESS   69yo male with a past medical history of CAD, diastolic CHF, Afib, aortic stenosis, HTN, Afib, valvulopathy (severe aortic stenosis) who presents with complaint of feeling weak. Pt is well known to our service and he is visibly acutely intoxicated during my evaluation. Difficulty with coordination noted and slurred speech. Work up in the emergency room yielded a Cr of 2.32 where his baseline is 0.88. Affirms to drinking beer nothing else. Last drink was earlier today. He has had prior withdrawals.  PAST MEDICAL HISTORY     Past Medical History:   Diagnosis Date    Atrial flutter     CHF (congestive heart failure)     Coronary artery disease     Hyperlipidemia     Hypertension     Myocardial infarction     SOB (shortness of breath)     at times       PAST SURGICAL HISTORY     Past Surgical History:   Procedure Laterality Date    CATARACT EXTRACTION Bilateral     CORONARY ARTERY BYPASS GRAFT (CABG) N/A 4/3/2023    Procedure: CORONARY ARTERY BYPASS GRAFT (CABG);  Surgeon: Deuce Finley IV, MD;  Location: [facility];  Service: Cardiovascular;  Laterality: N/A;  WITH LLAA //  ECHO NOTIFIED    LEFT HEART CATHETERIZATION N/A 03/15/2023    Procedure: CATHETERIZATION, HEART, LEFT;  Surgeon: Sreedhar Herrera MD;  Location: [facility] CATH LAB;  Service: Cardiology;  Laterality: N/A;  LHC via A       FAMILY HISTORY "   Reviewed and noncontributory to this case    SOCIAL HISTORY     Social History     Socioeconomic History    Marital status: Single   Tobacco Use    Smoking status: Never     Passive exposure: Never    Smokeless tobacco: Never   Substance and Sexual Activity    Alcohol use: Yes     Alcohol/week: 84.0 standard drinks of alcohol     Types: 84 Cans of beer per week     Comment: alcoholic, daily, beer    Drug use: Yes     Frequency: 3.0 times per week     Types: Hydrocodone    Sexual activity: Not Currently   Social History Narrative    ** Merged History Encounter **          Social Determinants of Health     Financial Resource Strain: Low Risk  (12/6/2023)    Overall Financial Resource Strain (CARDIA)     Difficulty of Paying Living Expenses: Not hard at all   Food Insecurity: No Food Insecurity (12/6/2023)    Hunger Vital Sign     Worried About Running Out of Food in the Last Year: Never true     Ran Out of Food in the Last Year: Never true   Transportation Needs: No Transportation Needs (12/6/2023)    PRAPARE - Transportation     Lack of Transportation (Medical): No     Lack of Transportation (Non-Medical): No   Physical Activity: Inactive (12/6/2023)    Exercise Vital Sign     Days of Exercise per Week: 0 days     Minutes of Exercise per Session: 0 min   Stress: No Stress Concern Present (12/6/2023)    [place] [employer] - Occupational Stress Questionnaire     Feeling of Stress : Only a little   Social Connections: Moderately Integrated (12/6/2023)    Social Connection and Isolation Panel [NHANES]     Frequency of Communication with Friends and Family: More than three times a week     Frequency of Social Gatherings with Friends and Family: More than three times a week     Attends Ep[employer] Services: 1 to 4 times per year     Active Member of Clubs or Organizations: No     Attends Club or Organization Meetings: 1 to 4 times per year     Marital Status: Never    Housing Stability: Low  Risk  (12/6/2023)    Housing Stability Vital Sign     Unable to Pay for Housing in the Last Year: No     Number of Places Lived in the Last Year: 1     Unstable Housing in the Last Year: No       HOME MEDICATIONS     Prior to Admission medications    Medication Sig Start Date End Date Taking? Authorizing Provider   amiodarone (PACERONE) 200 MG Tab Take 1 tablet (200 mg total) by mouth once daily. 1/10/24 2/9/24  Darlene Willams FNP   aspirin (ECOTRIN) 81 MG EC tablet Take 1 tablet (81 mg total) by mouth once daily. 1/10/24 2/9/24  Darlene Willams FNP   atorvastatin (LIPITOR) 40 MG tablet Take 1 tablet (40 mg total) by mouth every evening. 1/10/24 2/9/24  Darlene Willams FNP   ferrous sulfate (FEROSUL) 325 mg (65 mg iron) Tab tablet TAKE ONE TABLET BY MOUTH DAILY 1/24/24   Darlene Willams FNP   folic acid (FOLVITE) 1 MG tablet Take 1 tablet (1 mg total) by mouth once daily. 1/10/24 2/9/24  Darlene Willams FNP   furosemide (LASIX) 40 MG tablet Take 1 tablet (40 mg total) by mouth once daily. 1/10/24 2/9/24  Darlene Willams FNP   metoprolol succinate (TOPROL-XL) 100 MG 24 hr tablet Take 1 tablet (100 mg total) by mouth once daily. 1/10/24 2/9/24  Darlene Willams FNP   QUEtiapine (SEROQUEL) 100 MG Tab Take 2 tablets (200 mg total) by mouth every evening. 1/10/24 2/9/24  Darlene Willams FNP       ALLERGIES   Patient has no known allergies.  REVIEW OF SYSTEMS   Except as documented above, all other systems reviewed and negative    PHYSICAL EXAM     Vitals:    02/21/24 1715   BP: 117/62   Pulse: 80   Resp: 18   Temp: 98.2 °F (36.8 °C)      General:  In no acute distress, resting comfortably  Head and neck:  Atraumatic, normocephalic, moist mucous membranes, supple neck  Chest:  Clear to auscultation bilaterally  Heart:  S1, S2, Grade IV/VI murmur  Abdomen:  Soft, nontender, BS +  MSK:  Warm, no lower extremity edema, no clubbing or cyanosis  Neuro: ataxia, slurred speech  Integumentary:  No obvious skin  rash  Psychiatry:  Appropriate mood and affect  ASSESSMENT AND PLAN   LOVELY  -prerenal, hypotensive on admission that resolved to tburg and volume resus  -c/w IVF    Acute EtOH intoxication  Chronic thrombocytopenia  Chronic Anemia  -tid xanax for 24 hours, followed by BID for 24 hours  -monitor for withdrawls  -high fall risk  -thiamine, folic acid, iron    AF  -metoprolol, eliquis     Severe AS  -has not yet followed up with physicians since last admission  -needs ID clearance for TAVR     DVT prophylaxis:  eliquis for AF  Admit to observation status under my care   __________________________________________________________________  LABS/MICRO/MEDS/DIAGNOSTICS       LABS  Recent Labs     02/21/24  1330   *   K 4.5   CHLORIDE 102   CO2 19*   BUN 57.8*   CREATININE 2.32*   GLUCOSE 111   CALCIUM 8.4*   ALKPHOS 59   AST 14   ALT 9   ALBUMIN 3.1*     Recent Labs     02/21/24  1330   WBC 5.80   RBC 3.01*   HCT 28.7*   MCV 95.3*   *       MICROBIOLOGY  Microbiology Results (last 7 days)       ** No results found for the last 168 hours. **            MEDICATIONS   ALPRAZolam  0.25 mg Oral TID    [START ON 2/23/2024] ALPRAZolam  0.25 mg Oral BID    [START ON 2/22/2024] amiodarone  200 mg Oral Daily    apixaban  5 mg Oral BID    [START ON 2/22/2024] aspirin  81 mg Oral Daily    atorvastatin  40 mg Oral QHS    [START ON 2/22/2024] ferrous sulfate  1 tablet Oral Daily    folic acid  1 mg Oral Daily    [START ON 2/26/2024] furosemide  40 mg Oral Daily    [START ON 2/22/2024] metoprolol succinate  100 mg Oral Daily    [START ON 2/26/2024] QUEtiapine  200 mg Oral QHS    sacubitriL-valsartan  1 tablet Oral BID    thiamine  100 mg Oral Daily      INFUSIONS   sodium chloride 0.9%         DIAGNOSTIC TESTS  X-Ray Chest AP Portable   Final Result      No acute findings.  Improved aeration of the right lung.         Electronically signed by: Tuan Joy   Date:    02/21/2024   Time:    15:56      CT Head Without Contrast    Final Result      1.  No acute intracranial findings identified.      2.  Chronic microangiopathic ischemia and atrophy.         Electronically signed by: Toni Eller   Date:    02/21/2024   Time:    14:35             Patient information was obtained from patient, patient's family, past medical records and ER records.   All diagnosis and differential diagnosis have been reviewed; assessment and plan has been documented. I have personally reviewed the labs and test results that are presently available; I have reviewed the patients medication list. I have reviewed the consulting providers response and recommendations. I have reviewed or attempted to review medical records based upon their availability.  All of the patient's questions have been addressed and answered. Patient's is agreeable to the above stated plan. I will continue to monitor closely and make adjustments to medical management as needed.  This note was created using [employer] voice recognition software that occasionally misinterpreted phrases or words.  Please contact me if any questions may rise regarding documentation to clarify verbiage.        Thairy G Reyes,    Internal Medicine  Department of Hospital Medicine [facility] Surgical Unit

## 2024-02-22 NOTE — PT/OT/SLP PROGRESS
Physical Therapy Treatment Note           Name: Mike Urbina Jr.    : 1955 (68 y.o.)  MRN: [id number]           TREATMENT SUMMARY AND RECOMMENDATIONS:    PT Received On: 24  PT Start Time: 1405     PT Stop Time: 1430  PT Total Time (min): 25 min     Subjective Assessment:   No complaints  Lethargic   x Awake, alert, cooperative  Uncooperative    Agitated  c/o pain    Appropriate  c/o fatigue    Confused  Treated at bedside     Emotionally labile x Treated in gym/dept.    Impulsive  Other:    Flat affect       Therapy Precautions:    Cognitive deficits  Spinal precautions    Collar - hard  Sternal precautions    Collar - soft   TLSO    Fall risk  LSO    Hip precautions - posterior  Knee immobilizer    Hip precautions - anterior  WBAT    Impaired communication  Partial weightbearing    Oxygen  TTWB    PEG tube  NWB    Visual deficits  Other:    Hearing deficits          Treatment Objectives:     Mobility Training:   Assist level Comments    Bed mobility MOD I    Transfer SPV Only due to IV   Gait CGA      SBA Outdoor ambulation including ramps 2 x 200 feet without AD, IV in tow. B LE leg fatigue reported leading to sitting rest break.   Indoor ambulation x 175 feet without AD   Sit to stand transitions MOD I    Sitting balance     Standing balance  SPV Functional reaching tasks without UE support, no losses in balance.    Wheelchair mobility     Car transfer     Other:          Therapeutic Exercise:   Exercise Sets Reps Comments                               Additional Comments:  Pt with mild fatigue noted, but overall did well. Pt was happy to be outside. PT to progress as able.     Assessment: Patient tolerated session well.    PT Plan: continue per POC  Revisions made to plan of care: No    GOALS:   Multidisciplinary Problems       Physical Therapy Goals          Problem: Physical Therapy    Goal Priority Disciplines Outcome Goal Variances Interventions   Physical Therapy Goal     PT,  "PT/OT Ongoing, Progressing     Description: Goals to be met by: Discharge      Pt to be seen 5-6 days per week QD allowing for endurance and outdoor ambulation     Patient will increase functional independence with mobility by performin. Gait  x 1000+ feet with Supervision using No Assistive Device.   2. Ascend/descend 4 stairs with bilateral Handrails Supervision using No Assistive Device.   3. Ascend/Descend 4" inch curb with Supervision using No Assistive Device.                         Skilled PT Minutes Provided: 23   Communication with Treatment Team:     Equipment recommendations:       At end of treatment, patient remained:  x Up in chair     Up in wheelchair in room    In bed   x With alarm activated    Bed rails up   x Call bell in reach     Family/friends present    Restraints secured properly    In bathroom with CNA/RN notified   x Nurse aware    In gym with therapist/tech    Other:      "

## 2024-02-22 NOTE — PROGRESS NOTES
"[facility]  [facility] MEDICINE - Progress Note    Patient Name: Mike Urbina Jr.  MRN: [id number]  Patient Class: OP- Observation   Admission Date: 2/21/2024   Admitting Physician:  Service   Attending Physician: Mary Cleaning MD  Primary Care Provider: Darlene Willams FNP  Face-to-Face encounter date: 02/22/2024      CHIEF COMPLAINT     Chief Complaint   Patient presents with    Weakness     Pt c/o weakness and n/v that started this morning; denies CP/ SOB. Pt sluggish in triage; reports he did drink "a few beers this morning".      HISTORY OF PRESENTING ILLNESS   67yo male with a past medical history of CAD, diastolic CHF, Afib, aortic stenosis, HTN, Afib, valvulopathy (severe aortic stenosis) who presents with complaint of feeling weak. Pt is well known to our service and he is visibly acutely intoxicated during my evaluation. Difficulty with coordination noted and slurred speech. Work up in the emergency room yielded a Cr of 2.32 where his baseline is 0.88. Affirms to drinking beer nothing else. Last drink was earlier today. He has had prior withdrawals.    Today:  He states he gets weak and dizzy when he takes him am meds. BUN/CR improved today, may need to adjust home lasix.  I will decrease to 20mg daily today.  I am unable to locate a recent echo for him. I will order one.     To note, he stated he only had 3 beers bt appeared visibly intoxicated when evaluated by the admitting MD.   PAST MEDICAL HISTORY     Past Medical History:   Diagnosis Date    Atrial flutter     CHF (congestive heart failure)     Coronary artery disease     Hyperlipidemia     Hypertension     Myocardial infarction     SOB (shortness of breath)     at times       PAST SURGICAL HISTORY     Past Surgical History:   Procedure Laterality Date    CATARACT EXTRACTION Bilateral     CORONARY ARTERY BYPASS GRAFT (CABG) N/A 4/3/2023    Procedure: CORONARY ARTERY BYPASS GRAFT (CABG);  Surgeon: Deuce PERKINS" Tushar SOLORIO MD;  Location: [facility] OR;  Service: Cardiovascular;  Laterality: N/A;  WITH LLAA //  ECHO NOTIFIED    LEFT HEART CATHETERIZATION N/A 03/15/2023    Procedure: CATHETERIZATION, HEART, LEFT;  Surgeon: Sreedhar Herrera MD;  Location[facility] CATH LAB;  Service: Cardiology;  Laterality: N/A;  LHC via RRA       FAMILY HISTORY   Reviewed and noncontributory to this case    SOCIAL HISTORY     Social History     Socioeconomic History    Marital status: Single   Tobacco Use    Smoking status: Never     Passive exposure: Never    Smokeless tobacco: Never   Substance and Sexual Activity    Alcohol use: Yes     Alcohol/week: 84.0 standard drinks of alcohol     Types: 84 Cans of beer per week     Comment: alcoholic, daily, beer    Drug use: Yes     Frequency: 3.0 times per week     Types: Hydrocodone    Sexual activity: Not Currently   Social History Narrative    ** Merged History Encounter **          Social Determinants of Health     Financial Resource Strain: Low Risk  (12/6/2023)    Overall Financial Resource Strain (CARDIA)     Difficulty of Paying Living Expenses: Not hard at all   Food Insecurity: No Food Insecurity (12/6/2023)    Hunger Vital Sign     Worried About Running Out of Food in the Last Year: Never true     Ran Out of Food in the Last Year: Never true   Transportation Needs: No Transportation Needs (12/6/2023)    PRAPARE - Transportation     Lack of Transportation (Medical): No     Lack of Transportation (Non-Medical): No   Physical Activity: Inactive (12/6/2023)    Exercise Vital Sign     Days of Exercise per Week: 0 days     Minutes of Exercise per Session: 0 min   Stress: No Stress Concern Present (12/6/2023)    [place] [employer] - Occupational Stress Questionnaire     Feeling of Stress : Only a little   Social Connections: Moderately Integrated (12/6/2023)    Social Connection and Isolation Panel [NHANES]     Frequency of Communication with Friends and Family: More than three  times a week     Frequency of Social Gatherings with Friends and Family: More than three times a week     Attends Christianity Services: 1 to 4 times per year     Active Member of Clubs or Organizations: No     Attends Club or Organization Meetings: 1 to 4 times per year     Marital Status: Never    Housing Stability: Low Risk  (12/6/2023)    Housing Stability Vital Sign     Unable to Pay for Housing in the Last Year: No     Number of Places Lived in the Last Year: 1     Unstable Housing in the Last Year: No       HOME MEDICATIONS     Prior to Admission medications    Medication Sig Start Date End Date Taking? Authorizing Provider   amiodarone (PACERONE) 200 MG Tab Take 1 tablet (200 mg total) by mouth once daily. 1/10/24 2/9/24  Darlene Willams FNP   aspirin (ECOTRIN) 81 MG EC tablet Take 1 tablet (81 mg total) by mouth once daily. 1/10/24 2/9/24  Darlene Willams FNP   atorvastatin (LIPITOR) 40 MG tablet Take 1 tablet (40 mg total) by mouth every evening. 1/10/24 2/9/24  Darlene Willams FNP   ferrous sulfate (FEROSUL) 325 mg (65 mg iron) Tab tablet TAKE ONE TABLET BY MOUTH DAILY 1/24/24   Darlene Willams FNP   folic acid (FOLVITE) 1 MG tablet Take 1 tablet (1 mg total) by mouth once daily. 1/10/24 2/9/24  Darlene Willams FNP   furosemide (LASIX) 40 MG tablet Take 1 tablet (40 mg total) by mouth once daily. 1/10/24 2/9/24  Darlene Willams FNP   metoprolol succinate (TOPROL-XL) 100 MG 24 hr tablet Take 1 tablet (100 mg total) by mouth once daily. 1/10/24 2/9/24  Darlene Willams FNP   QUEtiapine (SEROQUEL) 100 MG Tab Take 2 tablets (200 mg total) by mouth every evening. 1/10/24 2/9/24  Darlene Willams FNP       ALLERGIES   Patient has no known allergies.  REVIEW OF SYSTEMS   Except as documented above, all other systems reviewed and negative    PHYSICAL EXAM     Vitals:    02/22/24 0920   BP: 113/61   Pulse:    Resp:    Temp:       General:  In no acute distress, resting comfortably  Head and neck:   Atraumatic, normocephalic, moist mucous membranes, supple neck  Chest:  Clear to auscultation bilaterally  Heart:  S1, S2, Grade IV/VI murmur  Abdomen:  Soft, nontender, BS +  MSK:  Warm, no lower extremity edema, no clubbing or cyanosis  Neuro: ataxia, slurred speech  Integumentary:  No obvious skin rash  Psychiatry:  Appropriate mood and affect  ASSESSMENT AND PLAN   LOVELY  -prerenal, hypotensive on admission that resolved to tburg and volume resus  -c/w IVF    Acute EtOH intoxication  Chronic thrombocytopenia  Chronic Anemia  -tid xanax for 24 hours, followed by BID for 24 hours  -monitor for withdrawls  -high fall risk  -thiamine, folic acid, iron    AF  -metoprolol, eliquis     Severe AS  -has not yet followed up with physicians since last admission  -needs ID clearance for TAVR     DVT prophylaxis:  eliquis for AF  Admit to observation status under my care   __________________________________________________________________  LABS/MICRO/MEDS/DIAGNOSTICS       LABS  Recent Labs     02/21/24  1330 02/22/24  0440   * 139   K 4.5 4.5   CHLORIDE 102 109*   CO2 19* 21*   BUN 57.8* 49.5*   CREATININE 2.32* 1.62*   GLUCOSE 111 94   CALCIUM 8.4* 8.1*   ALKPHOS 59  --    AST 14  --    ALT 9  --    ALBUMIN 3.1*  --      Recent Labs     02/22/24  0440   WBC 3.59*   RBC 2.86*   HCT 27.9*   MCV 97.6*   PLT 86*       MICROBIOLOGY  Microbiology Results (last 7 days)       ** No results found for the last 168 hours. **            MEDICATIONS   ALPRAZolam  0.25 mg Oral TID    [START ON 2/23/2024] ALPRAZolam  0.25 mg Oral BID    amiodarone  200 mg Oral Daily    apixaban  5 mg Oral BID    aspirin  81 mg Oral Daily    atorvastatin  40 mg Oral QHS    ferrous sulfate  1 tablet Oral Daily    folic acid  1 mg Oral Daily    [START ON 2/26/2024] furosemide  40 mg Oral Daily    [START ON 2/23/2024] metoprolol succinate  50 mg Oral Daily    [START ON 2/26/2024] QUEtiapine  200 mg Oral QHS    sacubitriL-valsartan  1 tablet Oral BID     thiamine  100 mg Oral Daily    white petrolatum   Topical (Top) BID      INFUSIONS        DIAGNOSTIC TESTS  X-Ray Chest AP Portable   Final Result      No acute findings.  Improved aeration of the right lung.         Electronically signed by: Tuan Joy   Date:    02/21/2024   Time:    15:56      CT Head Without Contrast   Final Result      1.  No acute intracranial findings identified.      2.  Chronic microangiopathic ischemia and atrophy.         Electronically signed by: Toni Eller   Date:    02/21/2024   Time:    14:35             Patient information was obtained from patient, patient's family, past medical records and ER records.   All diagnosis and differential diagnosis have been reviewed; assessment and plan has been documented. I have personally reviewed the labs and test results that are presently available; I have reviewed the patients medication list. I have reviewed the consulting providers response and recommendations. I have reviewed or attempted to review medical records based upon their availability.  All of the patient's questions have been addressed and answered. Patient's is agreeable to the above stated plan. I will continue to monitor closely and make adjustments to medical management as needed.  This note was created using [employer] voice recognition software that occasionally misinterpreted phrases or words.  Please contact me if any questions may rise regarding documentation to clarify verbiage.        Mary Cleaning MD   Internal Medicine  Department of Hospital Medicine [facility] Surgical Unit

## 2024-02-22 NOTE — PLAN OF CARE
Problem: Adult Inpatient Plan of Care  Goal: Plan of Care Review  Outcome: Ongoing, Progressing  Flowsheets (Taken 2/22/2024 1415)  Plan of Care Reviewed With: patient  Goal: Patient-Specific Goal (Individualized)  Outcome: Ongoing, Progressing  Flowsheets (Taken 2/22/2024 1415)  Anxieties, Fears or Concerns: None expressed at this time  Individualized Care Needs: seizure precautions, monitor s/s of detox, safety maintained  Goal: Absence of Hospital-Acquired Illness or Injury  Outcome: Ongoing, Progressing  Intervention: Identify and Manage Fall Risk  Flowsheets (Taken 2/22/2024 1415)  Safety Promotion/Fall Prevention:   assistive device/personal item within reach   in recliner, wheels locked   nonskid shoes/socks when out of bed  Intervention: Prevent Skin Injury  Flowsheets (Taken 2/22/2024 1415)  Body Position: position changed independently  Skin Protection:   adhesive use limited   incontinence pads utilized   tubing/devices free from skin contact  Intervention: Prevent and Manage VTE (Venous Thromboembolism) Risk  Flowsheets (Taken 2/22/2024 1415)  Activity Management: Up in chair - L3  VTE Prevention/Management:   ambulation promoted   bleeding precations maintained  Range of Motion: active ROM (range of motion) encouraged  Intervention: Prevent Infection  Flowsheets (Taken 2/22/2024 1415)  Infection Prevention:   cohorting utilized   hand hygiene promoted   equipment surfaces disinfected   single patient room provided   rest/sleep promoted  Goal: Optimal Comfort and Wellbeing  Outcome: Ongoing, Progressing  Intervention: Monitor Pain and Promote Comfort  Flowsheets (Taken 2/22/2024 1415)  Pain Management Interventions:   care clustered   pillow support provided   position adjusted   relaxation techniques promoted   quiet environment facilitated  Intervention: Provide Person-Centered Care  Flowsheets (Taken 2/22/2024 1415)  Trust Relationship/Rapport:   care explained   questions encouraged   choices  provided   reassurance provided   emotional support provided   thoughts/feelings acknowledged   empathic listening provided   questions answered  Goal: Readiness for Transition of Care  Outcome: Ongoing, Progressing  Intervention: Mutually Develop Transition Plan  Flowsheets (Taken 2/22/2024 1415)  Communicated MODESTO with patient/caregiver: Date not available/Unable to determine     Problem: Fluid Imbalance (Pneumonia)  Goal: Fluid Balance  Outcome: Ongoing, Progressing  Intervention: Monitor and Manage Fluid Balance  Flowsheets (Taken 2/22/2024 1415)  Fluid/Electrolyte Management: fluids provided     Problem: Infection (Pneumonia)  Goal: Resolution of Infection Signs and Symptoms  Outcome: Ongoing, Progressing  Intervention: Prevent Infection Progression  Flowsheets (Taken 2/22/2024 1415)  Fever Reduction/Comfort Measures:   lightweight bedding   lightweight clothing  Infection Management: aseptic technique maintained  Isolation Precautions:   precautions maintained   protective     Problem: Respiratory Compromise (Pneumonia)  Goal: Effective Oxygenation and Ventilation  Outcome: Ongoing, Progressing  Intervention: Promote Airway Secretion Clearance  Flowsheets (Taken 2/22/2024 1415)  Breathing Techniques/Airway Clearance: deep/controlled cough encouraged  Cough And Deep Breathing: done independently per patient  Intervention: Optimize Oxygenation and Ventilation  Flowsheets (Taken 2/22/2024 1415)  Head of Bed (HOB) Positioning: HOB at 30-45 degrees     Problem: Skin Injury Risk Increased  Goal: Skin Health and Integrity  Outcome: Ongoing, Progressing  Intervention: Optimize Skin Protection  Flowsheets (Taken 2/22/2024 1415)  Pressure Reduction Techniques:   frequent weight shift encouraged   weight shift assistance provided  Pressure Reduction Devices: positioning supports utilized  Skin Protection:   adhesive use limited   incontinence pads utilized   tubing/devices free from skin contact  Head of Bed (HOB)  Positioning: HOB at 30-45 degrees  Intervention: Promote and Optimize Oral Intake  Flowsheets (Taken 2/22/2024 1415)  Oral Nutrition Promotion:   calorie-dense foods provided   calorie-dense liquids provided   safe use of adaptive equipment encouraged     Problem: Impaired Wound Healing  Goal: Optimal Wound Healing  Outcome: Ongoing, Progressing  Intervention: Promote Wound Healing  Flowsheets (Taken 2/22/2024 1415)  Oral Nutrition Promotion:   calorie-dense foods provided   calorie-dense liquids provided   safe use of adaptive equipment encouraged  Sleep/Rest Enhancement:   awakenings minimized   relaxation techniques promoted  Activity Management: Up in chair - L3  Pain Management Interventions:   care clustered   pillow support provided   position adjusted   relaxation techniques promoted   quiet environment facilitated     Problem: Fluid and Electrolyte Imbalance (Acute Kidney Injury/Impairment)  Goal: Fluid and Electrolyte Balance  Outcome: Ongoing, Progressing  Intervention: Monitor and Manage Fluid and Electrolyte Balance  Flowsheets (Taken 2/22/2024 1415)  Fluid/Electrolyte Management: fluids provided     Problem: Oral Intake Inadequate (Acute Kidney Injury/Impairment)  Goal: Optimal Nutrition Intake  Outcome: Ongoing, Progressing  Intervention: Promote and Optimize Nutrition  Flowsheets (Taken 2/22/2024 1415)  Oral Nutrition Promotion:   calorie-dense foods provided   calorie-dense liquids provided   safe use of adaptive equipment encouraged     Problem: Renal Function Impairment (Acute Kidney Injury/Impairment)  Goal: Effective Renal Function  Outcome: Ongoing, Progressing  Intervention: Monitor and Support Renal Function  Flowsheets (Taken 2/22/2024 1415)  Medication Review/Management:   medications reviewed   high-risk medications identified     Problem: Infection  Goal: Absence of Infection Signs and Symptoms  Outcome: Ongoing, Progressing  Intervention: Prevent or Manage Infection  Flowsheets (Taken  2/22/2024 1415)  Fever Reduction/Comfort Measures:   lightweight bedding   lightweight clothing  Infection Management: aseptic technique maintained  Isolation Precautions:   precautions maintained   protective

## 2024-02-22 NOTE — PLAN OF CARE
Problem: Adult Inpatient Plan of Care  Goal: Plan of Care Review  Outcome: Ongoing, Progressing  Flowsheets (Taken 2/21/2024 2356)  Plan of Care Reviewed With: patient  Goal: Patient-Specific Goal (Individualized)  Outcome: Ongoing, Progressing  Flowsheets (Taken 2/21/2024 2356)  Anxieties, Fears or Concerns: pt worried about not being able to sleep  Individualized Care Needs: seizure precautions, monitor s\s of detox, safety  Goal: Absence of Hospital-Acquired Illness or Injury  Outcome: Ongoing, Progressing  Intervention: Identify and Manage Fall Risk  Flowsheets (Taken 2/21/2024 2356)  Safety Promotion/Fall Prevention:   assistive device/personal item within reach   bed alarm set   nonskid shoes/socks when out of bed   side rails raised x 3  Intervention: Prevent Skin Injury  Flowsheets (Taken 2/21/2024 2356)  Body Position: position changed independently  Skin Protection: adhesive use limited  Intervention: Prevent and Manage VTE (Venous Thromboembolism) Risk  Flowsheets (Taken 2/21/2024 2356)  Activity Management: Sitting at edge of bed - L2  VTE Prevention/Management: ambulation promoted  Range of Motion: active ROM (range of motion) encouraged  Intervention: Prevent Infection  Flowsheets (Taken 2/21/2024 2356)  Infection Prevention:   cohorting utilized   personal protective equipment utilized   rest/sleep promoted  Goal: Optimal Comfort and Wellbeing  Outcome: Ongoing, Progressing  Intervention: Monitor Pain and Promote Comfort  Flowsheets (Taken 2/21/2024 2356)  Pain Management Interventions:   pillow support provided   quiet environment facilitated   relaxation techniques promoted   position adjusted  Intervention: Provide Person-Centered Care  Flowsheets (Taken 2/21/2024 2356)  Trust Relationship/Rapport:   care explained   choices provided   emotional support provided   thoughts/feelings acknowledged   reassurance provided  Goal: Readiness for Transition of Care  Outcome: Ongoing,  Progressing  Intervention: Mutually Develop Transition Plan  Flowsheets (Taken 2/21/2024 2356)  Equipment Currently Used at Home: none  Transportation Anticipated: family or friend will provide  Communicated MODESTO with patient/caregiver: Date not available/Unable to determine  Do you expect to return to your current living situation?: Yes  Readmission within 30 days?: No  Do you currently have service(s) that help you manage your care at home?: No  Is the pt/caregiver preference to resume services with current agency: No     Problem: Fluid Imbalance (Pneumonia)  Goal: Fluid Balance  Outcome: Ongoing, Progressing  Intervention: Monitor and Manage Fluid Balance  Flowsheets (Taken 2/21/2024 2356)  Fluid/Electrolyte Management: fluids provided     Problem: Infection (Pneumonia)  Goal: Resolution of Infection Signs and Symptoms  Outcome: Ongoing, Progressing  Intervention: Prevent Infection Progression  Flowsheets (Taken 2/21/2024 2356)  Fever Reduction/Comfort Measures:   lightweight bedding   lightweight clothing  Infection Management: aseptic technique maintained  Isolation Precautions: protective     Problem: Respiratory Compromise (Pneumonia)  Goal: Effective Oxygenation and Ventilation  Outcome: Ongoing, Progressing  Intervention: Promote Airway Secretion Clearance  Flowsheets (Taken 2/21/2024 2356)  Cough And Deep Breathing: done independently per patient  Intervention: Optimize Oxygenation and Ventilation  Flowsheets (Taken 2/21/2024 2356)  Airway/Ventilation Management: airway patency maintained  Head of Bed (HOB) Positioning: HOB at 20-30 degrees     Problem: Skin Injury Risk Increased  Goal: Skin Health and Integrity  Outcome: Ongoing, Progressing  Intervention: Optimize Skin Protection  Flowsheets (Taken 2/21/2024 2356)  Pressure Reduction Techniques: frequent weight shift encouraged  Pressure Reduction Devices: positioning supports utilized  Skin Protection: adhesive use limited  Head of Bed (HOB) Positioning:  HOB at 20-30 degrees  Intervention: Promote and Optimize Oral Intake  Flowsheets (Taken 2/21/2024 2356)  Oral Nutrition Promotion:   calorie-dense foods provided   calorie-dense liquids provided     Problem: Impaired Wound Healing  Goal: Optimal Wound Healing  Outcome: Ongoing, Progressing  Intervention: Promote Wound Healing  Flowsheets (Taken 2/21/2024 2356)  Oral Nutrition Promotion:   calorie-dense foods provided   calorie-dense liquids provided  Sleep/Rest Enhancement:   awakenings minimized   relaxation techniques promoted   room darkened   noise level reduced   regular sleep/rest pattern promoted  Activity Management: Sitting at edge of bed - L2  Pain Management Interventions:   pillow support provided   quiet environment facilitated   relaxation techniques promoted   position adjusted     Problem: Fluid and Electrolyte Imbalance (Acute Kidney Injury/Impairment)  Goal: Fluid and Electrolyte Balance  Outcome: Ongoing, Progressing  Intervention: Monitor and Manage Fluid and Electrolyte Balance  Flowsheets (Taken 2/21/2024 2356)  Fluid/Electrolyte Management: fluids provided     Problem: Oral Intake Inadequate (Acute Kidney Injury/Impairment)  Goal: Optimal Nutrition Intake  Outcome: Ongoing, Progressing  Intervention: Promote and Optimize Nutrition  Flowsheets (Taken 2/21/2024 2356)  Oral Nutrition Promotion:   calorie-dense foods provided   calorie-dense liquids provided     Problem: Renal Function Impairment (Acute Kidney Injury/Impairment)  Goal: Effective Renal Function  Outcome: Ongoing, Progressing  Intervention: Monitor and Support Renal Function  Flowsheets (Taken 2/21/2024 2356)  Medication Review/Management: medications reviewed     Problem: Infection  Goal: Absence of Infection Signs and Symptoms  Outcome: Ongoing, Progressing  Intervention: Prevent or Manage Infection  Flowsheets (Taken 2/21/2024 2356)  Fever Reduction/Comfort Measures:   lightweight bedding   lightweight clothing  Infection  Management: aseptic technique maintained  Isolation Precautions: protective

## 2024-02-22 NOTE — PLAN OF CARE
[facility] - Medical Surgical Unit  Initial Discharge Assessment       Primary Care Provider: Darlene Willams FNP    Admission Diagnosis: Weakness [R53.1]  LOVELY (acute kidney injury) [N17.9]    Admission Date: 2/21/2024  Expected Discharge Date:     Transition of Care Barriers: None    Payor: HU[employer] MANAGED MEDICARE / Plan: HU[employer] MEDICARE HMO / Product Type: Capitation /     Extended Emergency Contact Information  Primary Emergency Contact: Ashleigh Bailey  Mobile Phone: [phone]  Relation: Sister   needed? No  Secondary Emergency Contact: laurita zhao  Mobile Phone: [phone]  Relation: Sister  Preferred language: English   needed? No    Discharge Plan A: Home with family, Home Health         [facility], [place] - [place]  [address]  [facility]  [address] [place] [id number]  Phone: [phone] Fax: [phone]      Initial Assessment (most recent)       Adult Discharge Assessment - 02/22/24 1422          Discharge Assessment    Assessment Type Discharge Planning Assessment     Confirmed/corrected address, phone number and insurance Yes     Confirmed Demographics Correct on Facesheet     Source of Information patient     If unable to respond/provide information was family/caregiver contacted? Yes     Contact Name/Number Ashleigh singleton      Communicated MODESTO with patient/caregiver Date not available/Unable to determine     Reason For Admission LOVELY     People in Home alone     Facility Arrived From: home     Do you expect to return to your current living situation? Yes     Do you have help at home or someone to help you manage your care at home? Yes     Who are your caregiver(s) and their phone number(s)? Ashleigh singleton      Prior to hospitilization cognitive status: Alert/Oriented     Current cognitive status: Alert/Oriented     Walking or Climbing Stairs Difficulty no      Dressing/Bathing Difficulty no     Home Accessibility not wheelchair accessible     Home Layout Able to live on 1st floor     Equipment Currently Used at Home walker, standard     Readmission within 30 days? No     Patient currently being followed by outpatient case management? No     Do you currently have service(s) that help you manage your care at home? No     Is the pt/caregiver preference to resume services with current agency No     Do you take prescription medications? Yes     Do you have prescription coverage? Yes     Coverage [employer]     Do you have any problems affording any of your prescribed medications? TBD     Is the patient taking medications as prescribed? yes     Who is going to help you get home at discharge? Ashleigh sister     How do you get to doctors appointments? family or friend will provide     Are you on dialysis? No     Do you take coumadin? No     Discharge Plan A Home with family;Home Health     DME Needed Upon Discharge  none     Discharge Plan discussed with: Sibling     Name(s) and Number(s) Ashleigh sister     Transition of Care Barriers None        Physical Activity    On average, how many days per week do you engage in moderate to strenuous exercise (like a brisk walk)? 0 days     On average, how many minutes do you engage in exercise at this level? 0 min        Financial Resource Strain    How hard is it for you to pay for the very basics like food, housing, medical care, and heating? Not hard at all        Housing Stability    In the last 12 months, was there a time when you were not able to pay the mortgage or rent on time? No     In the last 12 months, how many places have you lived? 1     In the last 12 months, was there a time when you did not have a steady place to sleep or slept in a shelter (including now)? No        Transportation Needs    In the past 12 months, has lack of transportation kept you from medical appointments or from getting medications? No     In the past 12  months, has lack of transportation kept you from meetings, work, or from getting things needed for daily living? No        Food Insecurity    Within the past 12 months, you worried that your food would run out before you got the money to buy more. Never true     Within the past 12 months, the food you bought just didn't last and you didn't have money to get more. Never true        Stress    Do you feel stress - tense, restless, nervous, or anxious, or unable to sleep at night because your mind is troubled all the time - these days? Only a little        Social Connections    In a typical week, how many times do you talk on the phone with family, friends, or neighbors? More than three times a week     How often do you get together with friends or relatives? More than three times a week     How often do you attend Mormonism or Rastafari services? More than 4 times per year     Do you belong to any clubs or organizations such as Mormonism groups, unions, fraternal or athletic groups, or school groups? Yes     How often do you attend meetings of the clubs or organizations you belong to? 1 to 4 times per year     Are you , , , , never , or living with a partner? Never         Alcohol Use    Q1: How often do you have a drink containing alcohol? 4 or more times a week     Q2: How many drinks containing alcohol do you have on a typical day when you are drinking? 5 or 6     Q3: How often do you have six or more drinks on one occasion? Daily or almost daily

## 2024-02-22 NOTE — PT/OT/SLP EVAL
Occupational Therapy Evaluation  and Discharge Note    Name: Mike Urbina Jr.  MRN: [id number]  Admitting Diagnosis: Acute alcoholic intoxication without complication  Recent Surgery: * No surgery found *      Recommendations:     Discharge Recommendations:    Level of Assistance Recommended:  none  Discharge Equipment Recommendations: none  Barriers to discharge:      Assessment:     Mike Urbina Jr. is a 68 y.o. male with a medical diagnosis of Acute alcoholic intoxication without complication. He does not warrant  OT services at the time. Pt is independent with his ADLs.     Plan:     Patient to be seen   to address the above listed problems via    Plan of Care Expires:    Plan of Care Reviewed with: patient    Subjective     Chief Complaint: No c/o at this time   Patient Comments/Goals: Return home  Pain/Comfort:  Pain Rating 1: 0/10    Patients cultural, spiritual, Restorationist conflicts given the current situation: no    Social History:  Living Environment: Patient lives alone in a single story home.  Prior Level of Function: Prior to admission, patient was independent with ADLs.  Roles and Routines: Retired  Equipment Used at Home: walker, rolling  DME owned (not currently used): rolling walker  Assistance Upon Discharge: none, has not needed.    Objective:     Communicated with NSG, PT prior to session. Patient found HOB elevated with bed alarm, blood pressure cuff, peripheral IV, telemetry upon OT entry to room.    General Precautions: Standard, fall   Orthopedic Precautions:    Braces:    Respiratory Status: Room air    Occupational Performance    Bed Mobility:  Rolling/Turning to Right with independence  Scooting anteriorly to EOB to have both feet planted on floor: independence  Supine to sit from right side of bed with independence    Functional Mobility/Transfers:  Sit <> Stand Transfer with supervision with no assistive device d/t having IV pole  Functional Mobility: x335ft using a gait  belt    Activities of Daily Living:  Lower Body Dressing: independence don shoes    Cognitive/Visual Perceptual:  Cognitive/Psychosocial Skills:     -       Oriented to: Person, Place, Time, and Situation     Physical Exam:  Upper Extremity Range of Motion:     -       Right Upper Extremity: WNL  -       Left Upper Extremity: WNL  Upper Extremity Strength:    -       Right Upper Extremity: WNL  -       Left Upper Extremity: WNL   Strength:    -       Right Upper Extremity: WNL  -       Left Upper Extremity: WNL    AMPAC 6 Click ADL:  AMPAC Total Score: 24    Treatment & Education:  Patient educated on role of OT, POC and goals for therapy  Patient educated on importance of OOB activities with staff member assistance and sitting OOB majority of the day.     Patient clear to ambulate to/from bathroom with RN/PCT, assist xSVP with gait belt .    Patient left up in chair with all lines intact, call button in reach, RN notified, and chair alarm on.    GOALS:   Multidisciplinary Problems       Occupational Therapy Goals       Not on file                    History:     Past Medical History:   Diagnosis Date    Atrial flutter     CHF (congestive heart failure)     Coronary artery disease     Hyperlipidemia     Hypertension     Myocardial infarction     SOB (shortness of breath)     at times         Past Surgical History:   Procedure Laterality Date    CATARACT EXTRACTION Bilateral     CORONARY ARTERY BYPASS GRAFT (CABG) N/A 4/3/2023    Procedure: CORONARY ARTERY BYPASS GRAFT (CABG);  Surgeon: Deuce Finley IV, MD;  Location: [facility];  Service: Cardiovascular;  Laterality: N/A;  WITH LLAA //  ECHO NOTIFIED    LEFT HEART CATHETERIZATION N/A 03/15/2023    Procedure: CATHETERIZATION, HEART, LEFT;  Surgeon: Sreedhar Herrera MD;  Location: [facility] CATH LAB;  Service: Cardiology;  Laterality: N/A;  C via RRA       Time Tracking:     OT Date of Treatment: 02/22/24  OT Start Time: 1136  OT Stop Time: 1155  OT Total Time  (min): 19 min    Billable Minutes: Evaluation 19 2/22/2024

## 2024-02-22 NOTE — PLAN OF CARE
"  Problem: Physical Therapy  Goal: Physical Therapy Goal  Description: Goals to be met by: Discharge      Pt to be seen 5-6 days per week QD allowing for endurance and outdoor ambulation     Patient will increase functional independence with mobility by performin. Gait  x 1000+ feet with Supervision using No Assistive Device.   2. Ascend/descend 4 stairs with bilateral Handrails Supervision using No Assistive Device.   3. Ascend/Descend 4" inch curb with Supervision using No Assistive Device.    Outcome: Ongoing, Progressing     "

## 2024-02-22 NOTE — PT/OT/SLP EVAL
Physical Therapy Evaluation     Patient Name: Mike Urbina Jr.   MRN: [id number]  Recent Surgery: * No surgery found *      Recommendations:     Discharge Recommendations: No Therapy Indicated   Discharge Equipment Recommendations: none   Barriers to discharge: Ongoing medical treatment    Assessment:     Mike Urbina Jr. is a 68 y.o. male admitted with a medical diagnosis of Acute alcoholic intoxication without complication. He presents with the following impairments/functional limitations:  . Occasional unsteadiness in gait, decreased endurance.     Rehab Prognosis: Good; patient would benefit from acute PT services to address these deficits and reach maximum level of function.    Plan:     During this hospitalization, patient to be seen  (5-6 days per week QD) to address the above listed problems via gait training, therapeutic exercises, therapeutic activities    Plan of Care Expires: 02/22/24    Subjective     Chief Complaint: Being stuck in bed  Patient Comments/Goals: Be able to go  outside and get home.   Pain/Comfort:  Pain Rating 1: 0/10  Pain Rating Post-Intervention 1: 0/10    Social History:  Living Environment: Patient lives with their roommate(s) in a single story home   Prior Level of Function: Prior to admission, patient was independent  Equipment Used at Home: none  DME owned (not currently used): rolling walker  Assistance Upon Discharge: roommate(s)    Objective:     Communicated with Nursing and OT prior to session. Patient found HOB elevated with peripheral IV upon PT entry to room.    General Precautions: Standard,     Orthopedic Precautions:     Braces:      Respiratory Status: Room air    Exams:  Cognition: Patient is oriented to Person, Place, Time, Situation  RLE ROM: WNL  RLE Strength: WNL  LLE ROM: WNL  LLE Strength: WNL      Functional Mobility:  Gait belt applied - Yes  Bed Mobility  Rolling Left: modified independence  Rolling Right: modified independence  Supine<>Sit:  "modified independence   Transfers  Sit to Stand: stand by assistance with no AD  Bed to Chair: stand by assistance with no AD using Stand Pivot  Gait  Patient ambulated 335 feet with no AD and stand by assistance and contact guard assistance. Patient demonstrates occasional unsteady gait. . All lines remained intact throughout ambulation trail.  Balance  Sitting: modified independence  Standing: stand by assistance and contact guard assistance      Therapeutic Activities and Exercises:   Patient educated on role of acute care PT and PT POC, safety while in hospital including calling nurse for mobility, and call light usage  OK to get up with staff, supervision without AD    AM-PAC 6 CLICK MOBILITY  Total Score:22    Patient left up in chair with all lines intact, call button in reach, RN notified, and chair alarm on.    GOALS:   Multidisciplinary Problems       Physical Therapy Goals          Problem: Physical Therapy    Goal Priority Disciplines Outcome Goal Variances Interventions   Physical Therapy Goal     PT, PT/OT Ongoing, Progressing     Description: Goals to be met by: Discharge      Pt to be seen 5-6 days per week QD allowing for endurance and outdoor ambulation     Patient will increase functional independence with mobility by performin. Gait  x 1000+ feet with Supervision using No Assistive Device.   2. Ascend/descend 4 stairs with bilateral Handrails Supervision using No Assistive Device.   3. Ascend/Descend 4" inch curb with Supervision using No Assistive Device.                         History:     Past Medical History:   Diagnosis Date    Atrial flutter     CHF (congestive heart failure)     Coronary artery disease     Hyperlipidemia     Hypertension     Myocardial infarction     SOB (shortness of breath)     at times       Past Surgical History:   Procedure Laterality Date    CATARACT EXTRACTION Bilateral     CORONARY ARTERY BYPASS GRAFT (CABG) N/A 4/3/2023    Procedure: CORONARY ARTERY BYPASS " GRAFT (CABG);  Surgeon: Deuce Finley IV, MD;  Location: [facility];  Service: Cardiovascular;  Laterality: N/A;  WITH LLAA //  ECHO NOTIFIED    LEFT HEART CATHETERIZATION N/A 03/15/2023    Procedure: CATHETERIZATION, HEART, LEFT;  Surgeon: Sreedhar Herrera MD;  Location: [facility] CATH LAB;  Service: Cardiology;  Laterality: N/A;  LHC via RRA       Time Tracking:     PT Received On: 02/22/24  PT Start Time: 1134  PT Stop Time: 1200  PT Total Time (min): 26 min     Billable Minutes: Evaluation low complexity     2/22/2024

## 2024-02-23 LAB
ALBUMIN SERPL-MCNC: 2.8 G/DL (ref 3.4–4.8)
ALBUMIN/GLOB SERPL: 0.9 RATIO (ref 1.1–2)
ALP SERPL-CCNC: 65 UNIT/L (ref 40–150)
ALT SERPL-CCNC: 10 UNIT/L (ref 0–55)
AST SERPL-CCNC: 14 UNIT/L (ref 5–34)
BASOPHILS # BLD AUTO: 0.01 X10(3)/MCL
BASOPHILS NFR BLD AUTO: 0.3 %
BILIRUB SERPL-MCNC: 1 MG/DL
BUN SERPL-MCNC: 36.9 MG/DL (ref 8.4–25.7)
CALCIUM SERPL-MCNC: 8.3 MG/DL (ref 8.8–10)
CHLORIDE SERPL-SCNC: 109 MMOL/L (ref 98–107)
CO2 SERPL-SCNC: 19 MMOL/L (ref 23–31)
CREAT SERPL-MCNC: 1.23 MG/DL (ref 0.73–1.18)
EOSINOPHIL # BLD AUTO: 0.21 X10(3)/MCL (ref 0–0.9)
EOSINOPHIL NFR BLD AUTO: 6.3 %
ERYTHROCYTE [DISTWIDTH] IN BLOOD BY AUTOMATED COUNT: 17.2 % (ref 11.5–17)
GFR SERPLBLD CREATININE-BSD FMLA CKD-EPI: >60 MLS/MIN/1.73/M2
GLOBULIN SER-MCNC: 3.1 GM/DL (ref 2.4–3.5)
GLUCOSE SERPL-MCNC: 88 MG/DL (ref 82–115)
HCT VFR BLD AUTO: 28.9 % (ref 42–52)
HGB BLD-MCNC: 8.9 G/DL (ref 14–18)
IMM GRANULOCYTES # BLD AUTO: 0.01 X10(3)/MCL (ref 0–0.04)
IMM GRANULOCYTES NFR BLD AUTO: 0.3 %
LYMPHOCYTES # BLD AUTO: 0.99 X10(3)/MCL (ref 0.6–4.6)
LYMPHOCYTES NFR BLD AUTO: 29.7 %
MAGNESIUM SERPL-MCNC: 2.2 MG/DL (ref 1.6–2.6)
MCH RBC QN AUTO: 29.6 PG (ref 27–31)
MCHC RBC AUTO-ENTMCNC: 30.8 G/DL (ref 33–36)
MCV RBC AUTO: 96 FL (ref 80–94)
MONOCYTES # BLD AUTO: 0.46 X10(3)/MCL (ref 0.1–1.3)
MONOCYTES NFR BLD AUTO: 13.8 %
NEUTROPHILS # BLD AUTO: 1.65 X10(3)/MCL (ref 2.1–9.2)
NEUTROPHILS NFR BLD AUTO: 49.6 %
OHS QRS DURATION: 98 MS
OHS QTC CALCULATION: 430 MS
PHOSPHATE SERPL-MCNC: 3.1 MG/DL (ref 2.3–4.7)
PLATELET # BLD AUTO: 98 X10(3)/MCL (ref 130–400)
PMV BLD AUTO: 10.9 FL (ref 7.4–10.4)
POTASSIUM SERPL-SCNC: 4.6 MMOL/L (ref 3.5–5.1)
PROT SERPL-MCNC: 5.9 GM/DL (ref 5.8–7.6)
RBC # BLD AUTO: 3.01 X10(6)/MCL (ref 4.7–6.1)
SODIUM SERPL-SCNC: 138 MMOL/L (ref 136–145)
WBC # SPEC AUTO: 3.33 X10(3)/MCL (ref 4.5–11.5)

## 2024-02-23 PROCEDURE — 97116 GAIT TRAINING THERAPY: CPT | Mod: CQ

## 2024-02-23 PROCEDURE — 11000001 HC ACUTE MED/SURG PRIVATE ROOM

## 2024-02-23 PROCEDURE — 25000003 PHARM REV CODE 250: Performed by: INTERNAL MEDICINE

## 2024-02-23 PROCEDURE — 99900035 HC TECH TIME PER 15 MIN (STAT)

## 2024-02-23 PROCEDURE — 83735 ASSAY OF MAGNESIUM: CPT | Performed by: INTERNAL MEDICINE

## 2024-02-23 PROCEDURE — 85025 COMPLETE CBC W/AUTO DIFF WBC: CPT | Performed by: INTERNAL MEDICINE

## 2024-02-23 PROCEDURE — 94760 N-INVAS EAR/PLS OXIMETRY 1: CPT

## 2024-02-23 PROCEDURE — 80053 COMPREHEN METABOLIC PANEL: CPT | Performed by: INTERNAL MEDICINE

## 2024-02-23 PROCEDURE — 84100 ASSAY OF PHOSPHORUS: CPT | Performed by: INTERNAL MEDICINE

## 2024-02-23 PROCEDURE — 25000003 PHARM REV CODE 250: Performed by: STUDENT IN AN ORGANIZED HEALTH CARE EDUCATION/TRAINING PROGRAM

## 2024-02-23 RX ORDER — SODIUM BICARBONATE 650 MG/1
650 TABLET ORAL ONCE
Status: DISCONTINUED | OUTPATIENT
Start: 2024-02-23 | End: 2024-02-23

## 2024-02-23 RX ORDER — SODIUM BICARBONATE 650 MG/1
650 TABLET ORAL 2 TIMES DAILY
Status: DISCONTINUED | OUTPATIENT
Start: 2024-02-23 | End: 2024-02-24 | Stop reason: HOSPADM

## 2024-02-23 RX ORDER — METOPROLOL SUCCINATE 25 MG/1
25 TABLET, EXTENDED RELEASE ORAL 2 TIMES DAILY
Status: DISCONTINUED | OUTPATIENT
Start: 2024-02-23 | End: 2024-02-24 | Stop reason: HOSPADM

## 2024-02-23 RX ADMIN — WHITE PETROLATUM: 1.75 OINTMENT TOPICAL at 08:02

## 2024-02-23 RX ADMIN — Medication 9 MG: at 08:02

## 2024-02-23 RX ADMIN — AMIODARONE HYDROCHLORIDE 200 MG: 200 TABLET ORAL at 09:02

## 2024-02-23 RX ADMIN — SODIUM BICARBONATE 650 MG TABLET 650 MG: at 08:02

## 2024-02-23 RX ADMIN — ATORVASTATIN CALCIUM 40 MG: 40 TABLET, FILM COATED ORAL at 08:02

## 2024-02-23 RX ADMIN — METOPROLOL SUCCINATE 25 MG: 25 TABLET, EXTENDED RELEASE ORAL at 09:02

## 2024-02-23 RX ADMIN — SACUBITRIL AND VALSARTAN 1 TABLET: 24; 26 TABLET, FILM COATED ORAL at 09:02

## 2024-02-23 RX ADMIN — FOLIC ACID 1 MG: 1 TABLET ORAL at 09:02

## 2024-02-23 RX ADMIN — ALPRAZOLAM 0.25 MG: 0.25 TABLET ORAL at 08:02

## 2024-02-23 RX ADMIN — WHITE PETROLATUM: 1.75 OINTMENT TOPICAL at 09:02

## 2024-02-23 RX ADMIN — SODIUM BICARBONATE 650 MG TABLET 650 MG: at 09:02

## 2024-02-23 RX ADMIN — ASPIRIN 81 MG: 81 TABLET, COATED ORAL at 09:02

## 2024-02-23 RX ADMIN — APIXABAN 5 MG: 5 TABLET, FILM COATED ORAL at 09:02

## 2024-02-23 RX ADMIN — ALPRAZOLAM 0.25 MG: 0.25 TABLET ORAL at 09:02

## 2024-02-23 RX ADMIN — FERROUS SULFATE TAB 325 MG (65 MG ELEMENTAL FE) 1 EACH: 325 (65 FE) TAB at 09:02

## 2024-02-23 RX ADMIN — APIXABAN 5 MG: 5 TABLET, FILM COATED ORAL at 08:02

## 2024-02-23 RX ADMIN — SACUBITRIL AND VALSARTAN 1 TABLET: 24; 26 TABLET, FILM COATED ORAL at 08:02

## 2024-02-23 RX ADMIN — THIAMINE HCL TAB 100 MG 100 MG: 100 TAB at 09:02

## 2024-02-23 NOTE — PT/OT/SLP PROGRESS
Physical Therapy Treatment Note           Name: Mike Urbina Jr.    : 1955 (68 y.o.)  MRN: [id number]           TREATMENT SUMMARY AND RECOMMENDATIONS:    PT Received On: 24  PT Start Time: 1100     PT Stop Time: 1125  PT Total Time (min): 25 min     Subjective Assessment:  x No complaints  Lethargic    Awake, alert, cooperative  Uncooperative    Agitated  c/o pain    Appropriate  c/o fatigue    Confused  Treated at bedside     Emotionally labile  Treated in gym/dept.    Impulsive  Other:    Flat affect       Therapy Precautions:    Cognitive deficits  Spinal precautions    Collar - hard  Sternal precautions    Collar - soft   TLSO    Fall risk  LSO    Hip precautions - posterior  Knee immobilizer    Hip precautions - anterior  WBAT    Impaired communication  Partial weightbearing    Oxygen  TTWB    PEG tube  NWB    Visual deficits  Other:    Hearing deficits          Treatment Objectives:     Mobility Training:   Assist level Comments    Bed mobility     Transfer     Gait SBA/CGA Amb outdoors on various surfaces and up and down curb with no issues >800 ft   Sit to stand transitions     Sitting balance     Standing balance      Wheelchair mobility     Car transfer     Other:          Therapeutic Exercise:   Exercise Sets Reps Comments                               Additional Comments:  No issues noted     Assessment: Patient tolerated session well.    PT Plan: continue  Revisions made to plan of care: No    GOALS:   Multidisciplinary Problems       Physical Therapy Goals          Problem: Physical Therapy    Goal Priority Disciplines Outcome Goal Variances Interventions   Physical Therapy Goal     PT, PT/OT Ongoing, Progressing     Description: Goals to be met by: Discharge      Pt to be seen 5-6 days per week QD allowing for endurance and outdoor ambulation     Patient will increase functional independence with mobility by performin. Gait  x 1000+ feet with Supervision using No  "Assistive Device.   2. Ascend/descend 4 stairs with bilateral Handrails Supervision using No Assistive Device.   3. Ascend/Descend 4" inch curb with Supervision using No Assistive Device.                         Skilled PT Minutes Provided: 25   Communication with Treatment Team:     Equipment recommendations:       At end of treatment, patient remained:   Up in chair     Up in wheelchair in room    In bed    With alarm activated    Bed rails up    Call bell in reach     Family/friends present    Restraints secured properly   x In bathroom with CNA/RN notified    Nurse aware    In gym with therapist/tech    Other:       "

## 2024-02-23 NOTE — PLAN OF CARE
Problem: Adult Inpatient Plan of Care  Goal: Plan of Care Review  Outcome: Ongoing, Progressing  Flowsheets (Taken 2/23/2024 1044)  Plan of Care Reviewed With: patient  Goal: Patient-Specific Goal (Individualized)  Outcome: Ongoing, Progressing  Flowsheets (Taken 2/23/2024 1044)  Anxieties, Fears or Concerns: None expressed at this time  Individualized Care Needs: safety maintained, seizure precautions, monitor for s/s of detox  Goal: Absence of Hospital-Acquired Illness or Injury  Outcome: Ongoing, Progressing  Intervention: Identify and Manage Fall Risk  Flowsheets (Taken 2/23/2024 1044)  Safety Promotion/Fall Prevention:   assistive device/personal item within reach   bed alarm set   nonskid shoes/socks when out of bed   side rails raised x 3  Intervention: Prevent Skin Injury  Flowsheets (Taken 2/23/2024 1044)  Body Position: position changed independently  Skin Protection:   adhesive use limited   incontinence pads utilized   tubing/devices free from skin contact  Intervention: Prevent and Manage VTE (Venous Thromboembolism) Risk  Flowsheets (Taken 2/23/2024 1044)  Activity Management: Rolling - L1  VTE Prevention/Management:   ambulation promoted   bleeding precations maintained   bleeding risk assessed  Range of Motion: active ROM (range of motion) encouraged  Intervention: Prevent Infection  Flowsheets (Taken 2/23/2024 1044)  Infection Prevention:   cohorting utilized   equipment surfaces disinfected   hand hygiene promoted   single patient room provided   rest/sleep promoted  Goal: Optimal Comfort and Wellbeing  Outcome: Ongoing, Progressing  Intervention: Monitor Pain and Promote Comfort  Flowsheets (Taken 2/23/2024 1044)  Pain Management Interventions:   care clustered   pillow support provided   position adjusted   relaxation techniques promoted   quiet environment facilitated  Intervention: Provide Person-Centered Care  Flowsheets (Taken 2/23/2024 1044)  Trust Relationship/Rapport:   care explained    questions encouraged   choices provided   reassurance provided   emotional support provided   thoughts/feelings acknowledged   empathic listening provided   questions answered  Goal: Readiness for Transition of Care  Outcome: Ongoing, Progressing  Intervention: Mutually Develop Transition Plan  Flowsheets (Taken 2/23/2024 1044)  Communicated [place] with patient/caregiver: Date not available/Unable to determine     Problem: Fluid Imbalance (Pneumonia)  Goal: Fluid Balance  Outcome: Ongoing, Progressing  Intervention: Monitor and Manage Fluid Balance  Flowsheets (Taken 2/23/2024 1044)  Fluid/Electrolyte Management: fluids provided     Problem: Infection (Pneumonia)  Goal: Resolution of Infection Signs and Symptoms  Outcome: Ongoing, Progressing  Intervention: Prevent Infection Progression  Flowsheets (Taken 2/23/2024 1044)  Fever Reduction/Comfort Measures:   lightweight bedding   lightweight clothing  Infection Management: aseptic technique maintained  Isolation Precautions:   precautions maintained   protective     Problem: Respiratory Compromise (Pneumonia)  Goal: Effective Oxygenation and Ventilation  Outcome: Ongoing, Progressing  Intervention: Promote Airway Secretion Clearance  Flowsheets (Taken 2/23/2024 1044)  Breathing Techniques/Airway Clearance: deep/controlled cough encouraged  Cough And Deep Breathing: done independently per patient  Intervention: Optimize Oxygenation and Ventilation  Flowsheets (Taken 2/23/2024 1044)  Airway/Ventilation Management: airway patency maintained  Head of Bed (HOB) Positioning: HOB at 30-45 degrees     Problem: Skin Injury Risk Increased  Goal: Skin Health and Integrity  Outcome: Ongoing, Progressing  Intervention: Optimize Skin Protection  Flowsheets (Taken 2/23/2024 1044)  Pressure Reduction Techniques: frequent weight shift encouraged  Pressure Reduction Devices: positioning supports utilized  Skin Protection:   adhesive use limited   incontinence pads utilized    tubing/devices free from skin contact  Head of Bed (HOB) Positioning: HOB at 30-45 degrees  Intervention: Promote and Optimize Oral Intake  Flowsheets (Taken 2/23/2024 1044)  Oral Nutrition Promotion:   calorie-dense foods provided   calorie-dense liquids provided   safe use of adaptive equipment encouraged   rest periods promoted     Problem: Impaired Wound Healing  Goal: Optimal Wound Healing  Outcome: Ongoing, Progressing  Intervention: Promote Wound Healing  Flowsheets (Taken 2/23/2024 1044)  Oral Nutrition Promotion:   calorie-dense foods provided   calorie-dense liquids provided   safe use of adaptive equipment encouraged   rest periods promoted  Sleep/Rest Enhancement:   awakenings minimized   relaxation techniques promoted  Activity Management: Rolling - L1  Pain Management Interventions:   care clustered   pillow support provided   position adjusted   relaxation techniques promoted   quiet environment facilitated     Problem: Fluid and Electrolyte Imbalance (Acute Kidney Injury/Impairment)  Goal: Fluid and Electrolyte Balance  Outcome: Ongoing, Progressing  Intervention: Monitor and Manage Fluid and Electrolyte Balance  Flowsheets (Taken 2/23/2024 1044)  Fluid/Electrolyte Management: fluids provided     Problem: Oral Intake Inadequate (Acute Kidney Injury/Impairment)  Goal: Optimal Nutrition Intake  Outcome: Ongoing, Progressing  Intervention: Promote and Optimize Nutrition  Flowsheets (Taken 2/23/2024 1044)  Oral Nutrition Promotion:   calorie-dense foods provided   calorie-dense liquids provided   safe use of adaptive equipment encouraged   rest periods promoted     Problem: Renal Function Impairment (Acute Kidney Injury/Impairment)  Goal: Effective Renal Function  Outcome: Ongoing, Progressing  Intervention: Monitor and Support Renal Function  Flowsheets (Taken 2/23/2024 1044)  Medication Review/Management:   medications reviewed   high-risk medications identified     Problem: Infection  Goal: Absence of  Infection Signs and Symptoms  Outcome: Ongoing, Progressing  Intervention: Prevent or Manage Infection  Flowsheets (Taken 2/23/2024 1049)  Fever Reduction/Comfort Measures:   lightweight bedding   lightweight clothing  Infection Management: aseptic technique maintained  Isolation Precautions:   precautions maintained   protective

## 2024-02-23 NOTE — PLAN OF CARE
Problem: Adult Inpatient Plan of Care  Goal: Plan of Care Review  Outcome: Ongoing, Progressing  Flowsheets (Taken 2/22/2024 2224)  Plan of Care Reviewed With: patient  Goal: Patient-Specific Goal (Individualized)  Outcome: Ongoing, Progressing  Flowsheets (Taken 2/22/2024 2224)  Anxieties, Fears or Concerns: none  Individualized Care Needs: safety, seizure precautions, monitor s\s of detox  Goal: Absence of Hospital-Acquired Illness or Injury  Outcome: Ongoing, Progressing  Intervention: Identify and Manage Fall Risk  Flowsheets (Taken 2/22/2024 2224)  Safety Promotion/Fall Prevention:   assistive device/personal item within reach   bed alarm set   side rails raised x 3  Intervention: Prevent Skin Injury  Flowsheets (Taken 2/22/2024 2224)  Body Position: position changed independently  Skin Protection: adhesive use limited  Intervention: Prevent and Manage VTE (Venous Thromboembolism) Risk  Flowsheets (Taken 2/22/2024 2224)  Activity Management: Rolling - L1  VTE Prevention/Management: ambulation promoted  Range of Motion: active ROM (range of motion) encouraged  Intervention: Prevent Infection  Flowsheets (Taken 2/22/2024 2224)  Infection Prevention: cohorting utilized  Goal: Optimal Comfort and Wellbeing  Outcome: Ongoing, Progressing  Intervention: Monitor Pain and Promote Comfort  Flowsheets (Taken 2/22/2024 2224)  Pain Management Interventions:   care clustered   pillow support provided   position adjusted   relaxation techniques promoted   quiet environment facilitated  Intervention: Provide Person-Centered Care  Flowsheets (Taken 2/22/2024 2224)  Trust Relationship/Rapport:   care explained   questions answered   reassurance provided   thoughts/feelings acknowledged   choices provided   emotional support provided   empathic listening provided   questions encouraged  Goal: Readiness for Transition of Care  Outcome: Ongoing, Progressing  Intervention: Mutually Develop Transition Plan  Flowsheets (Taken  2/22/2024 2224)  Equipment Currently Used at Home: walker, rolling  Transportation Anticipated: family or friend will provide  Who are your caregiver(s) and their phone number(s)?: Ashleigh singleton [phone]  Communicated [place] with patient/caregiver: Date not available/Unable to determine  Do you expect to return to your current living situation?: Yes  Do you have help at home or someone to help you manage your care at home?: Yes  Readmission within 30 days?: No  Do you currently have service(s) that help you manage your care at home?: No  Is the pt/caregiver preference to resume services with current agency: No     Problem: Fluid Imbalance (Pneumonia)  Goal: Fluid Balance  Outcome: Ongoing, Progressing  Intervention: Monitor and Manage Fluid Balance  Flowsheets (Taken 2/22/2024 2224)  Fluid/Electrolyte Management: fluids provided     Problem: Infection (Pneumonia)  Goal: Resolution of Infection Signs and Symptoms  Outcome: Ongoing, Progressing  Intervention: Prevent Infection Progression  Flowsheets (Taken 2/22/2024 2224)  Fever Reduction/Comfort Measures:   lightweight bedding   lightweight clothing  Infection Management: aseptic technique maintained  Isolation Precautions: precautions maintained     Problem: Respiratory Compromise (Pneumonia)  Goal: Effective Oxygenation and Ventilation  Outcome: Ongoing, Progressing  Intervention: Promote Airway Secretion Clearance  Flowsheets (Taken 2/22/2024 2224)  Breathing Techniques/Airway Clearance: deep/controlled cough encouraged  Cough And Deep Breathing: done independently per patient  Intervention: Optimize Oxygenation and Ventilation  Flowsheets (Taken 2/22/2024 2224)  Airway/Ventilation Management: airway patency maintained  Head of Bed (HOB) Positioning: HOB at 30 degrees     Problem: Skin Injury Risk Increased  Goal: Skin Health and Integrity  Outcome: Ongoing, Progressing  Intervention: Optimize Skin Protection  Flowsheets (Taken 2/22/2024 2224)  Pressure  Reduction Techniques: frequent weight shift encouraged  Pressure Reduction Devices: positioning supports utilized  Skin Protection: adhesive use limited  Head of Bed (HOB) Positioning: HOB at 30 degrees  Intervention: Promote and Optimize Oral Intake  Flowsheets (Taken 2/22/2024 2224)  Oral Nutrition Promotion:   calorie-dense foods provided   calorie-dense liquids provided   safe use of adaptive equipment encouraged     Problem: Impaired Wound Healing  Goal: Optimal Wound Healing  Outcome: Ongoing, Progressing  Intervention: Promote Wound Healing  Flowsheets (Taken 2/22/2024 2224)  Oral Nutrition Promotion:   calorie-dense foods provided   calorie-dense liquids provided   safe use of adaptive equipment encouraged  Sleep/Rest Enhancement:   awakenings minimized   relaxation techniques promoted  Activity Management: Rolling - L1  Pain Management Interventions:   care clustered   pillow support provided   position adjusted   relaxation techniques promoted   quiet environment facilitated     Problem: Fluid and Electrolyte Imbalance (Acute Kidney Injury/Impairment)  Goal: Fluid and Electrolyte Balance  Outcome: Ongoing, Progressing  Intervention: Monitor and Manage Fluid and Electrolyte Balance  Flowsheets (Taken 2/22/2024 2224)  Fluid/Electrolyte Management: fluids provided     Problem: Oral Intake Inadequate (Acute Kidney Injury/Impairment)  Goal: Optimal Nutrition Intake  Outcome: Ongoing, Progressing  Intervention: Promote and Optimize Nutrition  Flowsheets (Taken 2/22/2024 2224)  Oral Nutrition Promotion:   calorie-dense foods provided   calorie-dense liquids provided   safe use of adaptive equipment encouraged     Problem: Renal Function Impairment (Acute Kidney Injury/Impairment)  Goal: Effective Renal Function  Outcome: Ongoing, Progressing  Intervention: Monitor and Support Renal Function  Flowsheets (Taken 2/22/2024 2224)  Medication Review/Management: medications reviewed     Problem: Infection  Goal: Absence  of Infection Signs and Symptoms  Outcome: Ongoing, Progressing  Intervention: Prevent or Manage Infection  Flowsheets (Taken 2/22/2024 2224)  Fever Reduction/Comfort Measures:   lightweight bedding   lightweight clothing  Infection Management: aseptic technique maintained  Isolation Precautions: precautions maintained

## 2024-02-23 NOTE — PROGRESS NOTES
"[facility]  \[facility] [place] [facility]\"" MEDICINE - Progress Note    Patient Name: Mike Urbina Jr.  MRN: [id number]  Patient Class: IP- Inpatient   Admission Date: 2/21/2024   Admitting Physician:  Service   Attending Physician: Mary Cleaning MD  Primary Care Provider: Darlene Willams FNP  Face-to-Face encounter date: 02/23/2024      CHIEF COMPLAINT     Chief Complaint   Patient presents with    Weakness     Pt c/o weakness and n/v that started this morning; denies CP/ SOB. Pt sluggish in triage; reports he did drink "a few beers this morning".      HISTORY OF PRESENTING ILLNESS   67yo male with a past medical history of CAD, diastolic CHF, Afib, aortic stenosis, HTN, Afib, valvulopathy (severe aortic stenosis) who presents with complaint of feeling weak. Pt is well known to our service and he is visibly acutely intoxicated during my evaluation. Difficulty with coordination noted and slurred speech. Work up in the emergency room yielded a Cr of 2.32 where his baseline is 0.88. Affirms to drinking beer nothing else. Last drink was earlier today. He has had prior withdrawals.    Today:  He states he gets weak and dizzy when he takes him am meds. BUN/CR improved today, may need to adjust home lasix.  I will decrease to 20mg daily today.  I am unable to locate a recent echo for him. I will order one.     To note, he stated he only had 3 beers bt appeared visibly intoxicated when evaluated by the admitting MD.   PAST MEDICAL HISTORY     Past Medical History:   Diagnosis Date    Atrial flutter     CHF (congestive heart failure)     Coronary artery disease     Hyperlipidemia     Hypertension     Myocardial infarction     SOB (shortness of breath)     at times       PAST SURGICAL HISTORY     Past Surgical History:   Procedure Laterality Date    CATARACT EXTRACTION Bilateral     CORONARY ARTERY BYPASS GRAFT (CABG) N/A 4/3/2023    Procedure: CORONARY ARTERY BYPASS GRAFT (CABG);  Surgeon: Deuce PERKINS" Tushar SOLORIO MD;  Location: [facility] OR;  Service: Cardiovascular;  Laterality: N/A;  WITH LLAA //  ECHO NOTIFIED    LEFT HEART CATHETERIZATION N/A 03/15/2023    Procedure: CATHETERIZATION, HEART, LEFT;  Surgeon: Sreedhar Herrera MD;  Location: [facility] CATH LAB;  Service: Cardiology;  Laterality: N/A;  LHC via RRA       FAMILY HISTORY   Reviewed and noncontributory to this case    SOCIAL HISTORY     Social History     Socioeconomic History    Marital status: Single   Tobacco Use    Smoking status: Never     Passive exposure: Never    Smokeless tobacco: Never   Substance and Sexual Activity    Alcohol use: Yes     Alcohol/week: 84.0 standard drinks of alcohol     Types: 84 Cans of beer per week     Comment: alcoholic, daily, beer    Drug use: Yes     Frequency: 3.0 times per week     Types: Hydrocodone    Sexual activity: Not Currently   Social History Narrative    ** Merged History Encounter **          Social Determinants of Health     Financial Resource Strain: Low Risk  (2/22/2024)    Overall Financial Resource Strain (CARDIA)     Difficulty of Paying Living Expenses: Not hard at all   Food Insecurity: No Food Insecurity (2/22/2024)    Hunger Vital Sign     Worried About Running Out of Food in the Last Year: Never true     Ran Out of Food in the Last Year: Never true   Transportation Needs: No Transportation Needs (2/22/2024)    PRAPARE - Transportation     Lack of Transportation (Medical): No     Lack of Transportation (Non-Medical): No   Physical Activity: Inactive (2/22/2024)    Exercise Vital Sign     Days of Exercise per Week: 0 days     Minutes of Exercise per Session: 0 min   Stress: No Stress Concern Present (2/22/2024)    [employer] [place] [employer] - Occupational Stress Questionnaire     Feeling of Stress : Only a little   Social Connections: Moderately Integrated (2/22/2024)    Social Connection and Isolation Panel [NHANES]     Frequency of Communication with Friends and Family: More than three  times a week     Frequency of Social Gatherings with Friends and Family: More than three times a week     Attends Confucianist Services: More than 4 times per year     Active Member of Clubs or Organizations: Yes     Attends Club or Organization Meetings: 1 to 4 times per year     Marital Status: Never    Housing Stability: Low Risk  (2/22/2024)    Housing Stability Vital Sign     Unable to Pay for Housing in the Last Year: No     Number of Places Lived in the Last Year: 1     Unstable Housing in the Last Year: No       HOME MEDICATIONS     Prior to Admission medications    Medication Sig Start Date End Date Taking? Authorizing Provider   amiodarone (PACERONE) 200 MG Tab Take 1 tablet (200 mg total) by mouth once daily. 1/10/24 2/9/24  Darlene Wilalms FNP   aspirin (ECOTRIN) 81 MG EC tablet Take 1 tablet (81 mg total) by mouth once daily. 1/10/24 2/9/24  Darlene Willams FNP   atorvastatin (LIPITOR) 40 MG tablet Take 1 tablet (40 mg total) by mouth every evening. 1/10/24 2/9/24  Darlene Willams FNP   ferrous sulfate (FEROSUL) 325 mg (65 mg iron) Tab tablet TAKE ONE TABLET BY MOUTH DAILY 1/24/24   Darlene Willams FNP   folic acid (FOLVITE) 1 MG tablet Take 1 tablet (1 mg total) by mouth once daily. 1/10/24 2/9/24  Darlene Willams FNP   furosemide (LASIX) 40 MG tablet Take 1 tablet (40 mg total) by mouth once daily. 1/10/24 2/9/24  Darlene Willams FNP   metoprolol succinate (TOPROL-XL) 100 MG 24 hr tablet Take 1 tablet (100 mg total) by mouth once daily. 1/10/24 2/9/24  Darlene Willams FNP   QUEtiapine (SEROQUEL) 100 MG Tab Take 2 tablets (200 mg total) by mouth every evening. 1/10/24 2/9/24  Darlene Willams FNP       ALLERGIES   Patient has no known allergies.  REVIEW OF SYSTEMS   Except as documented above, all other systems reviewed and negative    PHYSICAL EXAM     Vitals:    02/23/24 0710   BP: 137/64   Pulse: 61   Resp:    Temp: 98.4 °F (36.9 °C)      General:  In no acute distress, resting  comfortably  Head and neck:  Atraumatic, normocephalic, moist mucous membranes, supple neck  Chest:  Clear to auscultation bilaterally  Heart:  S1, S2, Grade IV/VI murmur  Abdomen:  Soft, nontender, BS +  MSK:  Warm, no lower extremity edema, no clubbing or cyanosis  Neuro: ataxia, slurred speech  Integumentary:  No obvious skin rash  Psychiatry:  Appropriate mood and affect  ASSESSMENT AND PLAN   LOVELY  -prerenal, hypotensive on admission that resolved to tburg and volume resus  -c/w IVF    Acute EtOH intoxication  Chronic thrombocytopenia  Chronic Anemia  -tid xanax for 24 hours, followed by BID for 24 hours  -monitor for withdrawls  -high fall risk  -thiamine, folic acid, iron    AF  -metoprolol, eliquis     Severe AS  -has not yet followed up with physicians since last admission  -needs ID clearance for TAVR     NAIGMA  -replace with bicarb    DVT prophylaxis:  eliquis for AF  Admit to observation status under my care   __________________________________________________________________  LABS/MICRO/MEDS/DIAGNOSTICS       LABS  Recent Labs     02/23/24  0510      K 4.6   CHLORIDE 109*   CO2 19*   BUN 36.9*   CREATININE 1.23*   GLUCOSE 88   CALCIUM 8.3*   ALKPHOS 65   AST 14   ALT 10   ALBUMIN 2.8*     Recent Labs     02/23/24  0510   WBC 3.33*   RBC 3.01*   HCT 28.9*   MCV 96.0*   PLT 98*       MICROBIOLOGY  Microbiology Results (last 7 days)       ** No results found for the last 168 hours. **            MEDICATIONS   ALPRAZolam  0.25 mg Oral BID    amiodarone  200 mg Oral Daily    apixaban  5 mg Oral BID    aspirin  81 mg Oral Daily    atorvastatin  40 mg Oral QHS    ferrous sulfate  1 tablet Oral Daily    folic acid  1 mg Oral Daily    [START ON 2/26/2024] furosemide  20 mg Oral Daily    metoprolol succinate  50 mg Oral Daily    [START ON 2/26/2024] QUEtiapine  200 mg Oral QHS    sacubitriL-valsartan  1 tablet Oral BID    sodium bicarbonate  650 mg Oral BID    thiamine  100 mg Oral Daily    white petrolatum    Topical (Top) BID      INFUSIONS        DIAGNOSTIC TESTS  X-Ray Chest AP Portable   Final Result      No acute findings.  Improved aeration of the right lung.         Electronically signed by: Tuan Joy   Date:    02/21/2024   Time:    15:56      CT Head Without Contrast   Final Result      1.  No acute intracranial findings identified.      2.  Chronic microangiopathic ischemia and atrophy.         Electronically signed by: Toni Eller   Date:    02/21/2024   Time:    14:35             Patient information was obtained from patient, patient's family, past medical records and ER records.   All diagnosis and differential diagnosis have been reviewed; assessment and plan has been documented. I have personally reviewed the labs and test results that are presently available; I have reviewed the patients medication list. I have reviewed the consulting providers response and recommendations. I have reviewed or attempted to review medical records based upon their availability.  All of the patient's questions have been addressed and answered. Patient's is agreeable to the above stated plan. I will continue to monitor closely and make adjustments to medical management as needed.  This note was created using [employer] voice recognition software that occasionally misinterpreted phrases or words.  Please contact me if any questions may rise regarding documentation to clarify verbiage.        Mary Cleaning MD   Internal Medicine  Department of Hospital Medicine [facility]

## 2024-02-23 NOTE — CONSULTS
[facility]    Cardiology  Consult Note    Patient Name: Mike Urbina Jr.  MRN: [id number]  Admission Date: 2/21/2024  Hospital Length of Stay: 1 days  Code Status: Full Code   Attending Provider: Mary Cleaning MD   Consulting Provider: Phuc Jimenes MD  Primary Care Physician: Dralene Willams FNP  Principal Problem:Acute alcoholic intoxication without complication    Patient information was obtained from patient, EMS personnel, and ER records.     Subjective:     Chief Complaint/Reason for Consult:  Increasing weakness, bradycardia    HPI:  Patient is a 68-year-old male who came to hospital complaining of weakness.  Patient reported he has been weak for the last few days.  He has been drinking alcohol every day in the morning.  He was somewhat in inebriated state during his presentation to hospital.  Patient has been restarted back on his medications but metoprolol held since his heart rate has been.  He denied any chest pain.  He has some shortness of breath with exertion.  No PND orthopnea syncopal or sick presyncopal symptoms reported.      PMH:   PSH:   Family History:   Social History:     Previous Cardiac Diagnostics:       Past Medical History:   Diagnosis Date    Atrial flutter     CHF (congestive heart failure)     Coronary artery disease     Hyperlipidemia     Hypertension     Myocardial infarction     SOB (shortness of breath)     at times     Past Surgical History:   Procedure Laterality Date    CATARACT EXTRACTION Bilateral     CORONARY ARTERY BYPASS GRAFT (CABG) N/A 4/3/2023    Procedure: CORONARY ARTERY BYPASS GRAFT (CABG);  Surgeon: Deuce Finley IV, MD;  Location: [facility];  Service: Cardiovascular;  Laterality: N/A;  WITH LLAA //  ECHO NOTIFIED    LEFT HEART CATHETERIZATION N/A 03/15/2023    Procedure: CATHETERIZATION, HEART, LEFT;  Surgeon: Sreedhar Herrera MD;  Location: [facility] CATH LAB;  Service: Cardiology;  Laterality: N/A;  [facility] via [facility]     Review of  patient's allergies indicates:  No Known Allergies  No current facility-administered medications on file prior to encounter.     Current Outpatient Medications on File Prior to Encounter   Medication Sig    amiodarone (PACERONE) 200 MG Tab Take 1 tablet (200 mg total) by mouth once daily.    aspirin (ECOTRIN) 81 MG EC tablet Take 1 tablet (81 mg total) by mouth once daily.    atorvastatin (LIPITOR) 40 MG tablet Take 1 tablet (40 mg total) by mouth every evening.    ferrous sulfate (FEROSUL) 325 mg (65 mg iron) Tab tablet TAKE ONE TABLET BY MOUTH DAILY    folic acid (FOLVITE) 1 MG tablet Take 1 tablet (1 mg total) by mouth once daily.    furosemide (LASIX) 40 MG tablet Take 1 tablet (40 mg total) by mouth once daily.    metoprolol succinate (TOPROL-XL) 100 MG 24 hr tablet Take 1 tablet (100 mg total) by mouth once daily.    QUEtiapine (SEROQUEL) 100 MG Tab Take 2 tablets (200 mg total) by mouth every evening.     Family History       Problem Relation (Age of Onset)    Heart attack Mother          Tobacco Use    Smoking status: Never     Passive exposure: Never    Smokeless tobacco: Never   Substance and Sexual Activity    Alcohol use: Yes     Alcohol/week: 84.0 standard drinks of alcohol     Types: 84 Cans of beer per week     Comment: alcoholic, daily, beer    Drug use: Yes     Frequency: 3.0 times per week     Types: Hydrocodone    Sexual activity: Not Currently       Review of Systems  Objective:     Vital Signs (Most Recent):  Temp: 97.7 °F (36.5 °C) (02/22/24 2306)  Pulse: (!) 56 (02/22/24 2306)  Resp: 19 (02/22/24 2306)  BP: (!) 145/65 (02/22/24 2306)  SpO2: 95 % (02/22/24 2306) Vital Signs (24h Range):  Temp:  [97.5 °F (36.4 °C)-98.7 °F (37.1 °C)] 97.7 °F (36.5 °C)  Pulse:  [56-60] 56  Resp:  [18-19] 19  SpO2:  [93 %-100 %] 95 %  BP: (113-168)/(61-76) 145/65   Weight: 79.7 kg (175 lb 11.3 oz)  Body mass index is 22.56 kg/m².  SpO2: 95 %       Intake/Output Summary (Last 24 hours) at 2/23/2024 0325  Last data  filed at 2/22/2024 1744  Gross per 24 hour   Intake 1706.28 ml   Output 1750 ml   Net -43.72 ml     Lines/Drains/Airways       Peripheral Intravenous Line  Duration                  Peripheral IV - Single Lumen 02/21/24 1312 20 G Right Antecubital 1 day         Peripheral IV - Single Lumen 02/22/24 1230 22 G Anterior;Distal;Left Forearm <1 day                  Significant Labs:  Recent Results (from the past 72 hour(s))   EKG 12-lead    Collection Time: 02/21/24  1:07 PM   Result Value Ref Range    QRS Duration 98 ms    [facility] QTC Calculation 430 ms   COVID/FLU A&B PCR    Collection Time: 02/21/24  1:15 PM   Result Value Ref Range    Influenza A PCR Not Detected Not Detected    Influenza B PCR Not Detected Not Detected    SARS-CoV-2 PCR Not Detected Not Detected, Negative   Troponin ISTAT    Collection Time: 02/21/24  1:17 PM   Result Value Ref Range    POC Cardiac Troponin I 0.02 0.00 - 0.08 ng/mL    Sample unknown    Comprehensive metabolic panel    Collection Time: 02/21/24  1:30 PM   Result Value Ref Range    Sodium Level 132 (L) 136 - 145 mmol/L    Potassium Level 4.5 3.5 - 5.1 mmol/L    Chloride 102 98 - 107 mmol/L    Carbon Dioxide 19 (L) 23 - 31 mmol/L    Glucose Level 111 82 - 115 mg/dL    Blood Urea Nitrogen 57.8 (H) 8.4 - 25.7 mg/dL    Creatinine 2.32 (H) 0.73 - 1.18 mg/dL    Calcium Level Total 8.4 (L) 8.8 - 10.0 mg/dL    Protein Total 6.4 5.8 - 7.6 gm/dL    Albumin Level 3.1 (L) 3.4 - 4.8 g/dL    Globulin 3.3 2.4 - 3.5 gm/dL    Albumin/Globulin Ratio 0.9 (L) 1.1 - 2.0 ratio    Bilirubin Total 1.2 <=1.5 mg/dL    Alkaline Phosphatase 59 40 - 150 unit/L    Alanine Aminotransferase 9 0 - 55 unit/L    Aspartate Aminotransferase 14 5 - 34 unit/L    eGFR 29 mls/min/1.73/m2   Ethanol    Collection Time: 02/21/24  1:30 PM   Result Value Ref Range    Ethanol Level 62.0 (H) <=10.0 mg/dL   CBC with Differential    Collection Time: 02/21/24  1:30 PM   Result Value Ref Range    WBC 5.80 4.50 - 11.50 x10(3)/mcL    RBC  3.01 (L) 4.70 - 6.10 x10(6)/mcL    Hgb 8.9 (L) 14.0 - 18.0 g/dL    Hct 28.7 (L) 42.0 - 52.0 %    MCV 95.3 (H) 80.0 - 94.0 fL    MCH 29.6 27.0 - 31.0 pg    MCHC 31.0 (L) 33.0 - 36.0 g/dL    RDW 17.3 (H) 11.5 - 17.0 %    Platelet 109 (L) 130 - 400 x10(3)/mcL    MPV 11.5 (H) 7.4 - 10.4 fL    Neut % 66.5 %    Lymph % 20.2 %    Mono % 8.1 %    Eos % 4.7 %    Basophil % 0.3 %    Lymph # 1.17 0.6 - 4.6 x10(3)/mcL    Neut # 3.86 2.1 - 9.2 x10(3)/mcL    Mono # 0.47 0.1 - 1.3 x10(3)/mcL    Eos # 0.27 0 - 0.9 x10(3)/mcL    Baso # 0.02 <=0.2 x10(3)/mcL    IG# 0.01 0 - 0.04 x10(3)/mcL    IG% 0.2 %   Drug Screen, Urine    Collection Time: 02/21/24  3:10 PM   Result Value Ref Range    Amphetamines, Urine Negative Negative    Barbituates, Urine Negative Negative    Benzodiazepine, Urine Negative Negative    Cannabinoids, Urine Negative Negative    Cocaine, Urine Negative Negative    Opiates, Urine Positive (A) Negative    Phencyclidine, Urine Negative Negative    pH, Urine 5.5 3.0 - 11.0    Specific Gravity, Urine Auto 1.020 1.001 - 1.035   CK    Collection Time: 02/21/24  3:15 PM   Result Value Ref Range    Creatine Kinase 28 (L) 30 - 200 U/L   Basic Metabolic Panel    Collection Time: 02/22/24  4:40 AM   Result Value Ref Range    Sodium Level 139 136 - 145 mmol/L    Potassium Level 4.5 3.5 - 5.1 mmol/L    Chloride 109 (H) 98 - 107 mmol/L    Carbon Dioxide 21 (L) 23 - 31 mmol/L    Glucose Level 94 82 - 115 mg/dL    Blood Urea Nitrogen 49.5 (H) 8.4 - 25.7 mg/dL    Creatinine 1.62 (H) 0.73 - 1.18 mg/dL    BUN/Creatinine Ratio 31     Calcium Level Total 8.1 (L) 8.8 - 10.0 mg/dL    Anion Gap 9.0 mEq/L    eGFR 45 mls/min/1.73/m2   Magnesium    Collection Time: 02/22/24  4:40 AM   Result Value Ref Range    Magnesium Level 2.30 1.60 - 2.60 mg/dL   Ethanol    Collection Time: 02/22/24  4:40 AM   Result Value Ref Range    Ethanol Level <10.0 <=10.0 mg/dL   CBC with Differential    Collection Time: 02/22/24  4:40 AM   Result Value Ref Range     WBC 3.59 (L) 4.50 - 11.50 x10(3)/mcL    RBC 2.86 (L) 4.70 - 6.10 x10(6)/mcL    Hgb 8.4 (L) 14.0 - 18.0 g/dL    Hct 27.9 (L) 42.0 - 52.0 %    MCV 97.6 (H) 80.0 - 94.0 fL    MCH 29.4 27.0 - 31.0 pg    MCHC 30.1 (L) 33.0 - 36.0 g/dL    RDW 17.4 (H) 11.5 - 17.0 %    Platelet 86 (L) 130 - 400 x10(3)/mcL    MPV 11.8 (H) 7.4 - 10.4 fL    Neut % 55.7 %    Lymph % 27.0 %    Mono % 11.1 %    Eos % 5.6 %    Basophil % 0.3 %    Lymph # 0.97 0.6 - 4.6 x10(3)/mcL    Neut # 2.00 (L) 2.1 - 9.2 x10(3)/mcL    Mono # 0.40 0.1 - 1.3 x10(3)/mcL    Eos # 0.20 0 - 0.9 x10(3)/mcL    Baso # 0.01 <=0.2 x10(3)/mcL    IG# 0.01 0 - 0.04 x10(3)/mcL    IG% 0.3 %   Echo    Collection Time: 02/22/24  8:19 PM   Result Value Ref Range    BSA 2.04 m2     Significant Imaging:  Imaging Results              X-Ray Chest AP Portable (Final result)  Result time 02/21/24 15:56:59      Final result by Tuan Joy MD (02/21/24 15:56:59)                   Impression:      No acute findings.  Improved aeration of the right lung.      Electronically signed by: Tuan Joy  Date:    02/21/2024  Time:    15:56               Narrative:    EXAMINATION:  XR CHEST AP PORTABLE    CLINICAL HISTORY:  Weakness    COMPARISON:  14 December 2023    FINDINGS:  Frontal view of the chest was obtained. Median sternotomy.  The heart is not significantly enlarged.  There is aortic atherosclerosis.  There is improved aeration right mid lung with mild residual opacities.  Similar left perihilar opacities.  No pneumothorax.                                       CT Head Without Contrast (Final result)  Result time 02/21/24 14:35:36      Final result by Toni Eller MD (02/21/24 14:35:36)                   Impression:      1.  No acute intracranial findings identified.    2.  Chronic microangiopathic ischemia and atrophy.      Electronically signed by: Toni Eller  Date:    02/21/2024  Time:    14:35               Narrative:    EXAMINATION:  CT HEAD WITHOUT  CONTRAST    CLINICAL HISTORY:  Weakness;    TECHNIQUE:  Sequential axial images were performed of the brain without contrast.    Dose product length of 1795 mGycm. Automated exposure control was utilized to minimize radiation dose.    COMPARISON:  April 6, 2023..    FINDINGS:  There is no intracranial mass effect, midline shift, hydrocephalus or hemorrhage. There is no sulcal effacement or low attenuation changes to suggest recent large vessel territory infarction. Chronic appearing periventricular and subcortical white matter low attenuation changes are present and are consistent with chronic microangiopathic ischemia. The ventricular system and sulcal markings prominence is consistent with atrophy. There is no acute extra axial fluid collection.  Dense calcified plaque of the right distal vertebral artery.  Minimal mucoperiosteal thickening of the maxillary sinuses.  Otherwise, visualized paranasal sinuses are clear without mucosal thickening, polypoidal abnormality or air-fluid levels. Mastoid air cells aeration is optimal.                                    EKG: NSR    Telemetry: Sinus    Physical Exam  Alert and oriented  Cardiovascular exam:  S1-S2 regular  Respiratory exam:  Clear to auscultation bilaterally.  Home Medications:   No current facility-administered medications on file prior to encounter.     Current Outpatient Medications on File Prior to Encounter   Medication Sig Dispense Refill    amiodarone (PACERONE) 200 MG Tab Take 1 tablet (200 mg total) by mouth once daily. 30 tablet 0    aspirin (ECOTRIN) 81 MG EC tablet Take 1 tablet (81 mg total) by mouth once daily. 30 tablet 0    atorvastatin (LIPITOR) 40 MG tablet Take 1 tablet (40 mg total) by mouth every evening. 30 tablet 0    ferrous sulfate (FEROSUL) 325 mg (65 mg iron) Tab tablet TAKE ONE TABLET BY MOUTH DAILY 30 tablet 1    folic acid (FOLVITE) 1 MG tablet Take 1 tablet (1 mg total) by mouth once daily. 30 tablet 0    furosemide (LASIX) 40  MG tablet Take 1 tablet (40 mg total) by mouth once daily. 30 tablet 0    metoprolol succinate (TOPROL-XL) 100 MG 24 hr tablet Take 1 tablet (100 mg total) by mouth once daily. 30 tablet 0    QUEtiapine (SEROQUEL) 100 MG Tab Take 2 tablets (200 mg total) by mouth every evening. 60 tablet 0     Current Inpatient Medications:    Current Facility-Administered Medications:     acetaminophen tablet 650 mg, 650 mg, Oral, Q4H PRN, Reyes, Thairy G, DO    albuterol-ipratropium 2.5 mg-0.5 mg/3 mL nebulizer solution 3 mL, 3 mL, Nebulization, Q6H PRN, Reyes, Thairy G, DO    ALPRAZolam tablet 0.25 mg, 0.25 mg, Oral, BID, Reyes, Thairy G, DO    aluminum-magnesium hydroxide-simethicone 200-200-20 mg/5 mL suspension 30 mL, 30 mL, Oral, QID PRN, Reyes, Thairy G, DO    amiodarone tablet 200 mg, 200 mg, Oral, Daily, Reyes, Thairy G, DO, 200 mg at 02/22/24 0919    apixaban tablet 5 mg, 5 mg, Oral, BID, Reyes, Thairy G, DO, 5 mg at 02/22/24 2021    aspirin EC tablet 81 mg, 81 mg, Oral, Daily, Reyes, Thairy G, DO, 81 mg at 02/22/24 0919    atorvastatin tablet 40 mg, 40 mg, Oral, QHS, Reyes, Thairy G, DO, 40 mg at 02/22/24 2021    bisacodyL suppository 10 mg, 10 mg, Rectal, Daily PRN, Reyes, Thairy G, DO    ferrous sulfate tablet 1 each, 1 tablet, Oral, Daily, Reyes, Thairy G, DO, 1 each at 02/22/24 0920    folic acid tablet 1 mg, 1 mg, Oral, Daily, Reyes, Thairy G, DO, 1 mg at 02/22/24 0920    [START ON 2/26/2024] furosemide tablet 20 mg, 20 mg, Oral, Daily, Mary Cleaning MD    glucagon (human recombinant) injection 1 mg, 1 mg, Intramuscular, PRN, Reyes, Thairy G, DO    glucose chewable tablet 16 g, 16 g, Oral, PRN, Reyes, Thairy G, DO    glucose chewable tablet 24 g, 24 g, Oral, PRN, Reyes, Eliory G, DO    HYDROcodone-acetaminophen 5-325 mg per tablet 1 tablet, 1 tablet, Oral, Q6H PRN, Reyes, Eliory G, DO    melatonin tablet 9 mg, 9 mg, Oral, Nightly PRN, Reyes, Thairy G, DO, 9 mg at 02/22/24 2023    metoprolol succinate (TOPROL-XL)  24 hr tablet 50 mg, 50 mg, Oral, Daily, Mary Cleaning MD    morphine injection 2 mg, 2 mg, Intravenous, Q6H PRN, Reyes, Thairy G, DO    naloxone 0.4 mg/mL injection 0.02 mg, 0.02 mg, Intravenous, PRN, Reyes, Thairy G, DO    ondansetron disintegrating tablet 8 mg, 8 mg, Oral, Q8H PRN, Reyes, Thairy G, DO    ondansetron injection 4 mg, 4 mg, Intravenous, Q8H PRN, Reyes, Thairy G, DO    polyethylene glycol packet 17 g, 17 g, Oral, TID PRN, Reyes, Thairy G, DO    [START ON 2/26/2024] QUEtiapine tablet 200 mg, 200 mg, Oral, QHS, Reyes, Thairy G, DO    sacubitriL-valsartan 24-26 mg per tablet 1 tablet, 1 tablet, Oral, BID, Reyes, Thairy G, , 1 tablet at 02/22/24 2021    simethicone chewable tablet 80 mg, 1 tablet, Oral, QID PRN, Reyes, Thairy G, DO    sodium chloride 0.9% flush 10 mL, 10 mL, Intravenous, PRN, Toby Beasley MD    thiamine tablet 100 mg, 100 mg, Oral, Daily, Reyes, Thairy G, DO, 100 mg at 02/22/24 0919    white petrolatum 41 % ointment, , Topical (Top), BID, Reyes, Thairy G, , Given at 02/22/24 2023  VTE Risk Mitigation (From admission, onward)           Ordered     apixaban tablet 5 mg  2 times daily         02/21/24 1905     IP VTE HIGH RISK PATIENT  Once         02/21/24 1905     Place sequential compression device  Until discontinued         02/21/24 1905     Reason for No Pharmacological VTE Prophylaxis  Once        Question:  Reasons:  Answer:  Already adequately anticoagulated on oral Anticoagulants    02/21/24 1905     Place RENA hose  Until discontinued         02/21/24 1608                  Assessment:   Weakness  Bradycardia  Hypertension  History of heart failure  Normal systolic function as per past echocardiogram      Plan:   Patient has been complaining of increasing weakness.  He has strong history of alcohol use and poor nutrition.  His weakness is likely due to poor nutrition that is mainly protein malnutrition patient has.  I will recommend him to continue drinking water.    Patient has episodes of bradycardia heart rate between 50-60.  We will cut down his metoprolol to 25 mg twice a day.  I will continue rest of the medications as it is.  Patient has been on amiodarone as well Entresto which we will continue.  Recommend to do an echocardiogram to evaluate the heart function.    Please call us if any questions.      Thank you for your consult.     Phuc Jimenes MD  Cardiology  [facility]  02/23/2024

## 2024-02-23 NOTE — PLAN OF CARE
Spoke with patient about home health care services. He does not want home health care services at this time

## 2024-02-24 VITALS
RESPIRATION RATE: 18 BRPM | SYSTOLIC BLOOD PRESSURE: 138 MMHG | HEIGHT: 74 IN | DIASTOLIC BLOOD PRESSURE: 88 MMHG | HEART RATE: 63 BPM | WEIGHT: 175.69 LBS | OXYGEN SATURATION: 97 % | TEMPERATURE: 98 F | BODY MASS INDEX: 22.55 KG/M2

## 2024-02-24 LAB
ALBUMIN SERPL-MCNC: 2.6 G/DL (ref 3.4–4.8)
ALBUMIN/GLOB SERPL: 0.9 RATIO (ref 1.1–2)
ALP SERPL-CCNC: 57 UNIT/L (ref 40–150)
ALT SERPL-CCNC: 9 UNIT/L (ref 0–55)
AST SERPL-CCNC: 14 UNIT/L (ref 5–34)
BASOPHILS # BLD AUTO: 0.01 X10(3)/MCL
BASOPHILS NFR BLD AUTO: 0.3 %
BILIRUB SERPL-MCNC: 0.8 MG/DL
BSA FOR ECHO PROCEDURE: 2.04 M2
BUN SERPL-MCNC: 23.7 MG/DL (ref 8.4–25.7)
CALCIUM SERPL-MCNC: 8.1 MG/DL (ref 8.8–10)
CHLORIDE SERPL-SCNC: 110 MMOL/L (ref 98–107)
CO2 SERPL-SCNC: 23 MMOL/L (ref 23–31)
CREAT SERPL-MCNC: 1.18 MG/DL (ref 0.73–1.18)
EJECTION FRACTION: 55 %
EOSINOPHIL # BLD AUTO: 0.22 X10(3)/MCL (ref 0–0.9)
EOSINOPHIL NFR BLD AUTO: 7.5 %
ERYTHROCYTE [DISTWIDTH] IN BLOOD BY AUTOMATED COUNT: 17.2 % (ref 11.5–17)
GFR SERPLBLD CREATININE-BSD FMLA CKD-EPI: >60 MLS/MIN/1.73/M2
GLOBULIN SER-MCNC: 2.9 GM/DL (ref 2.4–3.5)
GLUCOSE SERPL-MCNC: 100 MG/DL (ref 82–115)
HCT VFR BLD AUTO: 28.7 % (ref 42–52)
HGB BLD-MCNC: 8.6 G/DL (ref 14–18)
IMM GRANULOCYTES # BLD AUTO: 0.02 X10(3)/MCL (ref 0–0.04)
IMM GRANULOCYTES NFR BLD AUTO: 0.7 %
LYMPHOCYTES # BLD AUTO: 1.05 X10(3)/MCL (ref 0.6–4.6)
LYMPHOCYTES NFR BLD AUTO: 36 %
MAGNESIUM SERPL-MCNC: 2 MG/DL (ref 1.6–2.6)
MCH RBC QN AUTO: 29.4 PG (ref 27–31)
MCHC RBC AUTO-ENTMCNC: 30 G/DL (ref 33–36)
MCV RBC AUTO: 98 FL (ref 80–94)
MONOCYTES # BLD AUTO: 0.45 X10(3)/MCL (ref 0.1–1.3)
MONOCYTES NFR BLD AUTO: 15.4 %
NEUTROPHILS # BLD AUTO: 1.17 X10(3)/MCL (ref 2.1–9.2)
NEUTROPHILS NFR BLD AUTO: 40.1 %
PHOSPHATE SERPL-MCNC: 2.9 MG/DL (ref 2.3–4.7)
PLATELET # BLD AUTO: 99 X10(3)/MCL (ref 130–400)
PMV BLD AUTO: 11 FL (ref 7.4–10.4)
POTASSIUM SERPL-SCNC: 4.3 MMOL/L (ref 3.5–5.1)
PROT SERPL-MCNC: 5.5 GM/DL (ref 5.8–7.6)
RA PRESSURE ESTIMATED: 15 MMHG
RBC # BLD AUTO: 2.93 X10(6)/MCL (ref 4.7–6.1)
SODIUM SERPL-SCNC: 140 MMOL/L (ref 136–145)
WBC # SPEC AUTO: 2.92 X10(3)/MCL (ref 4.5–11.5)

## 2024-02-24 PROCEDURE — 25000003 PHARM REV CODE 250: Performed by: INTERNAL MEDICINE

## 2024-02-24 PROCEDURE — 83735 ASSAY OF MAGNESIUM: CPT | Performed by: INTERNAL MEDICINE

## 2024-02-24 PROCEDURE — 85025 COMPLETE CBC W/AUTO DIFF WBC: CPT | Performed by: INTERNAL MEDICINE

## 2024-02-24 PROCEDURE — 99900035 HC TECH TIME PER 15 MIN (STAT)

## 2024-02-24 PROCEDURE — 80053 COMPREHEN METABOLIC PANEL: CPT | Performed by: INTERNAL MEDICINE

## 2024-02-24 PROCEDURE — 25000003 PHARM REV CODE 250: Performed by: STUDENT IN AN ORGANIZED HEALTH CARE EDUCATION/TRAINING PROGRAM

## 2024-02-24 PROCEDURE — 84100 ASSAY OF PHOSPHORUS: CPT | Performed by: INTERNAL MEDICINE

## 2024-02-24 RX ORDER — METOPROLOL SUCCINATE 25 MG/1
25 TABLET, EXTENDED RELEASE ORAL 2 TIMES DAILY
Qty: 60 TABLET | Refills: 0 | Status: SHIPPED | OUTPATIENT
Start: 2024-02-24 | End: 2024-05-28 | Stop reason: SDUPTHER

## 2024-02-24 RX ADMIN — APIXABAN 5 MG: 5 TABLET, FILM COATED ORAL at 09:02

## 2024-02-24 RX ADMIN — METOPROLOL SUCCINATE 25 MG: 25 TABLET, EXTENDED RELEASE ORAL at 09:02

## 2024-02-24 RX ADMIN — FOLIC ACID 1 MG: 1 TABLET ORAL at 09:02

## 2024-02-24 RX ADMIN — SACUBITRIL AND VALSARTAN 1 TABLET: 24; 26 TABLET, FILM COATED ORAL at 09:02

## 2024-02-24 RX ADMIN — FERROUS SULFATE TAB 325 MG (65 MG ELEMENTAL FE) 1 EACH: 325 (65 FE) TAB at 09:02

## 2024-02-24 RX ADMIN — SODIUM BICARBONATE 650 MG TABLET 650 MG: at 09:02

## 2024-02-24 RX ADMIN — WHITE PETROLATUM: 1.75 OINTMENT TOPICAL at 09:02

## 2024-02-24 RX ADMIN — THIAMINE HCL TAB 100 MG 100 MG: 100 TAB at 09:02

## 2024-02-24 RX ADMIN — AMIODARONE HYDROCHLORIDE 200 MG: 200 TABLET ORAL at 09:02

## 2024-02-24 RX ADMIN — ASPIRIN 81 MG: 81 TABLET, COATED ORAL at 09:02

## 2024-02-24 NOTE — PLAN OF CARE
Problem: Adult Inpatient Plan of Care  Goal: Plan of Care Review  Outcome: Met  Goal: Patient-Specific Goal (Individualized)  Outcome: Met  Goal: Absence of Hospital-Acquired Illness or Injury  Outcome: Met  Goal: Optimal Comfort and Wellbeing  Outcome: Met  Goal: Readiness for Transition of Care  Outcome: Met     Problem: Fluid Imbalance (Pneumonia)  Goal: Fluid Balance  Outcome: Met     Problem: Infection (Pneumonia)  Goal: Resolution of Infection Signs and Symptoms  Outcome: Met     Problem: Respiratory Compromise (Pneumonia)  Goal: Effective Oxygenation and Ventilation  Outcome: Met     Problem: Skin Injury Risk Increased  Goal: Skin Health and Integrity  Outcome: Met     Problem: Impaired Wound Healing  Goal: Optimal Wound Healing  Outcome: Met     Problem: Fluid and Electrolyte Imbalance (Acute Kidney Injury/Impairment)  Goal: Fluid and Electrolyte Balance  Outcome: Met     Problem: Oral Intake Inadequate (Acute Kidney Injury/Impairment)  Goal: Optimal Nutrition Intake  Outcome: Met     Problem: Renal Function Impairment (Acute Kidney Injury/Impairment)  Goal: Effective Renal Function  Outcome: Met     Problem: Infection  Goal: Absence of Infection Signs and Symptoms  Outcome: Met     Problem: Fall Injury Risk  Goal: Absence of Fall and Fall-Related Injury  Outcome: Met

## 2024-02-24 NOTE — PLAN OF CARE
Problem: Adult Inpatient Plan of Care  Goal: Plan of Care Review  2/23/2024 2351 by Alka Shahid LPN  Outcome: Ongoing, Progressing  2/23/2024 2329 by Alka Shahid LPN  Outcome: Ongoing, Progressing  Flowsheets (Taken 2/23/2024 2032)  Plan of Care Reviewed With: patient  Goal: Patient-Specific Goal (Individualized)  2/23/2024 2351 by Alka Shahid LPN  Outcome: Ongoing, Progressing  Flowsheets (Taken 2/23/2024 2032)  Anxieties, Fears or Concerns: None verbalized at this time  2/23/2024 2329 by Alka Shahid LPN  Outcome: Ongoing, Progressing  Flowsheets (Taken 2/23/2024 2032)  Anxieties, Fears or Concerns: None verbalized at this time  Individualized Care Needs: maintain safety, monitor for ETOH withdrawal s/s  Goal: Absence of Hospital-Acquired Illness or Injury  2/23/2024 2351 by Alka Shahid LPN  Outcome: Ongoing, Progressing  2/23/2024 2329 by Alka Shahid LPN  Outcome: Ongoing, Progressing  Intervention: Prevent Skin Injury  Flowsheets (Taken 2/23/2024 2032)  Skin Protection:   adhesive use limited   tubing/devices free from skin contact   incontinence pads utilized   skin sealant/moisture barrier applied  Intervention: Prevent and Manage VTE (Venous Thromboembolism) Risk  Flowsheets (Taken 2/23/2024 2032)  VTE Prevention/Management:   ambulation promoted   bleeding precations maintained   bleeding risk assessed  Range of Motion: active ROM (range of motion) encouraged  Intervention: Prevent Infection  Flowsheets (Taken 2/23/2024 2032)  Infection Prevention:   cohorting utilized   environmental surveillance performed   equipment surfaces disinfected   rest/sleep promoted   hand hygiene promoted   single patient room provided

## 2024-02-24 NOTE — DISCHARGE SUMMARY
[facility]  HOSPITAL MEDICINE - DISCHARGE SUMMARY    Patient Name: Mike Urbina Jr.  MRN: [id number]  Admission Date: 2/21/2024  Discharge Date: 02/24/2024  Hospital Length of Stay: 2 days  Discharge Provider: Mary Cleaning MD  Primary Care Provider: Darlene Willams FNP      HOSPITAL COURSE     69yo male with a past medical history of CAD, diastolic CHF, Afib, aortic stenosis, HTN, Afib, valvulopathy (severe aortic stenosis) who presents with complaint of feeling weak. Pt is well known to our service and he is visibly acutely intoxicated during my evaluation. Difficulty with coordination noted and slurred speech. Work up in the emergency room yielded a Cr of 2.32 where his baseline is 0.88. Affirms to drinking beer nothing else. Last drink was earlier today. He has had prior withdrawals.       2/23  He states he gets weak and dizzy when he takes him am meds. BUN/CR improved today, may need to adjust home lasix.  I will decrease to 20mg daily today.  I am unable to locate a recent echo for him. I will order one.      To note, he stated he only had 3 beers bt appeared visibly intoxicated when evaluated by the admitting MD.     2/24  Okay to DC home today    PHYSICAL EXAM     Most Recent Vital Signs:  Temp: 98.6 °F (37 °C) (02/24/24 0645)  Pulse: (!) 59 (02/24/24 0645)  Resp: 18 (02/24/24 0311)  BP: 132/68 (02/24/24 0645)  SpO2: 97 % (02/24/24 0645)   GENERAL: In no acute distress, afebrile  HEENT:  CHEST: Clear to auscultation bilaterally  HEART: S1, S2, no appreciable murmur  ABDOMEN: Soft, nontender, BS +  MSK: Warm, no lower extremity edema, no clubbing or cyanosis  NEUROLOGIC: Alert and oriented x4, moving all extremities with good strength   INTEGUMENTARY:  PSYCHIATRY:          DISCHARGE DIAGNOSIS     Active Hospital Problems    Diagnosis  POA    *Acute alcoholic intoxication without complication [F10.920]  Yes    LOVELY (acute kidney injury) [N17.9]  Yes      Resolved  Hospital Problems   No resolved problems to display.            _____________________________________________________________________________      DISCHARGE MED REC     Current Discharge Medication List        START taking these medications    Details   sacubitriL-valsartan (ENTRESTO) 24-26 mg per tablet Take 1 tablet by mouth 2 (two) times daily.  Qty: 60 tablet, Refills: 0           CONTINUE these medications which have CHANGED    Details   metoprolol succinate (TOPROL-XL) 25 MG 24 hr tablet Take 1 tablet (25 mg total) by mouth 2 (two) times daily.  Qty: 60 tablet, Refills: 0    Comments: .           CONTINUE these medications which have NOT CHANGED    Details   amiodarone (PACERONE) 200 MG Tab Take 1 tablet (200 mg total) by mouth once daily.  Qty: 30 tablet, Refills: 0      aspirin (ECOTRIN) 81 MG EC tablet Take 1 tablet (81 mg total) by mouth once daily.  Qty: 30 tablet, Refills: 0      atorvastatin (LIPITOR) 40 MG tablet Take 1 tablet (40 mg total) by mouth every evening.  Qty: 30 tablet, Refills: 0      ferrous sulfate (FEROSUL) 325 mg (65 mg iron) Tab tablet TAKE ONE TABLET BY MOUTH DAILY  Qty: 30 tablet, Refills: 1    Associated Diagnoses: Thrombocytopenia      folic acid (FOLVITE) 1 MG tablet Take 1 tablet (1 mg total) by mouth once daily.  Qty: 30 tablet, Refills: 0      furosemide (LASIX) 40 MG tablet Take 1 tablet (40 mg total) by mouth once daily.  Qty: 30 tablet, Refills: 0      QUEtiapine (SEROQUEL) 100 MG Tab Take 2 tablets (200 mg total) by mouth every evening.  Qty: 60 tablet, Refills: 0    Associated Diagnoses: Insomnia, unspecified type                CONSULTS     Consults (From admission, onward)          Status Ordering Provider     Inpatient consult to Cardiology  Once        Provider:  Teleperformance    Completed MARIYA LONG              FOLLOW UP      Follow-up Information       Darlene Willams FNP. Call.    Specialty: Family Medicine  Why: The office of [facility]OR[facility] Jerry will call  the patient with a follow up  appointment date and time.  Spoke to Malaika.  Contact information:  Zoey DAWSON [id number] [phone]               Patient does not want Home Health on discharge Follow up.                                 DISCHARGE INSTRUCTIONS     Explained in detail to the patient about the discharge plan, medications, and follow-up visits. Pt understands and agrees with the treatment plan.  Discharged Condition: stable  Diet as tolerated  Activities as tolerated  Discharge to: Home-Health Care [facility]    TIME SPENT ON DISCHARGE   35 minutes        Mary Cleaning MD  Internal Medicine  Department of Hospital Medicine [facility]      This document was created using electronic dictation services.  Please excuse any errors that may have been made.  Contact me if any questions regarding documentation to clarify verbiage.

## 2024-02-24 NOTE — PROGRESS NOTES
Patient discharged to home @ 1350 via [employer] with all belongings, voucher for cab obtained and turned in. AVS printed and given to patient. Discharge instructions including follow up appt (has appt scheduled for March 11th, office to call patient with an earlier appt time) and medications reviewed with patient, all questions answered. Patient has 2 new prescriptions that were [facility]  as patient's pharmacy, [facility] in [place], was not open on the weekend. Patient to  scripts with his roommate/friend once he gets home and gets in contact with him. Patient verbalized understanding new prescriptions. IV and telemetry d/c'd prior to discharge. VS; temp 98.3, pulse 63, bp 138/88, resp 18, O2 sat 97% on room air, patient in no distress at discharge.

## 2024-02-26 ENCOUNTER — PATIENT OUTREACH (OUTPATIENT)
Dept: ADMINISTRATIVE | Facility: CLINIC | Age: 69
End: 2024-02-26
Payer: MEDICARE

## 2024-02-26 NOTE — TELEPHONE ENCOUNTER
The patient does not have a scheduled HOSFU appointment with Darlene Willams FNP  within 5-7 days post hospital discharge date 02/24/2024.  Due to (Acute alcoholic intoxication without complication; LOVELY (acute kidney injury) sent to front to schedule appointment .  They couldn't reach patient .

## 2024-02-26 NOTE — PROGRESS NOTES
C3 nurse attempted to contact Mike Urbina Jr.  for a TCC post hospital discharge follow up call. No answer. No voicemail available. The patient does not have a scheduled HOSFU appointment with Darlene Willams FNP  within 5-7 days post hospital discharge date 02/24/2024.     Message sent to PCP staff requesting they contact patient and schedule follow up appointment.

## 2024-02-26 NOTE — PROGRESS NOTES
C3 nurse attempted to contact Mike Urbina Jr.  for a TCC post hospital discharge follow up call. No answer. Left voicemail with callback information. The patient has a scheduled [facility] appointment with Darlene Willams FNP  on 02/29/2024 @ 11 am.

## 2024-02-27 DIAGNOSIS — E43 SEVERE MALNUTRITION: Primary | ICD-10-CM

## 2024-02-27 DIAGNOSIS — D69.6 THROMBOCYTOPENIA: ICD-10-CM

## 2024-02-27 DIAGNOSIS — I16.0 HYPERTENSIVE URGENCY: ICD-10-CM

## 2024-02-27 RX ORDER — FOLIC ACID 1 MG/1
1000 TABLET ORAL
Qty: 30 TABLET | Refills: 0 | Status: SHIPPED | OUTPATIENT
Start: 2024-02-27 | End: 2024-04-11

## 2024-02-27 RX ORDER — FERROUS SULFATE TAB 325 MG (65 MG ELEMENTAL FE) 325 (65 FE) MG
TAB ORAL
Qty: 30 TABLET | Refills: 1 | Status: SHIPPED | OUTPATIENT
Start: 2024-02-27 | End: 2024-05-16

## 2024-02-27 RX ORDER — FUROSEMIDE 40 MG/1
40 TABLET ORAL
Qty: 30 TABLET | Refills: 0 | Status: SHIPPED | OUTPATIENT
Start: 2024-02-27 | End: 2024-04-11

## 2024-02-27 NOTE — PROGRESS NOTES
C3 nurse attempted to contact Mike Urbina Jr.  for a TCC post hospital discharge follow up call. No answer. Left voicemail with callback information. The patient has a scheduled [facility] appointment with Darlene Willams FNP  on 02/29/2024 @ 11 am.

## 2024-02-29 ENCOUNTER — OFFICE VISIT (OUTPATIENT)
Dept: FAMILY MEDICINE | Facility: CLINIC | Age: 69
End: 2024-02-29
Payer: MEDICARE

## 2024-02-29 VITALS
SYSTOLIC BLOOD PRESSURE: 155 MMHG | BODY MASS INDEX: 23.33 KG/M2 | HEART RATE: 69 BPM | WEIGHT: 181.69 LBS | TEMPERATURE: 98 F | DIASTOLIC BLOOD PRESSURE: 73 MMHG | OXYGEN SATURATION: 95 %

## 2024-02-29 DIAGNOSIS — I48.20 CHRONIC A-FIB: ICD-10-CM

## 2024-02-29 DIAGNOSIS — Z09 HOSPITAL DISCHARGE FOLLOW-UP: ICD-10-CM

## 2024-02-29 DIAGNOSIS — Z76.0 MEDICATION REFILL: ICD-10-CM

## 2024-02-29 DIAGNOSIS — I50.32 CHRONIC DIASTOLIC CONGESTIVE HEART FAILURE: ICD-10-CM

## 2024-02-29 DIAGNOSIS — G47.00 INSOMNIA, UNSPECIFIED TYPE: ICD-10-CM

## 2024-02-29 PROCEDURE — 3077F SYST BP >= 140 MM HG: CPT | Mod: ,,, | Performed by: NURSE PRACTITIONER

## 2024-02-29 PROCEDURE — 1101F PT FALLS ASSESS-DOCD LE1/YR: CPT | Mod: ,,, | Performed by: NURSE PRACTITIONER

## 2024-02-29 PROCEDURE — 4010F ACE/ARB THERAPY RXD/TAKEN: CPT | Mod: ,,, | Performed by: NURSE PRACTITIONER

## 2024-02-29 PROCEDURE — 99214 OFFICE O/P EST MOD 30 MIN: CPT | Mod: ,,, | Performed by: NURSE PRACTITIONER

## 2024-02-29 PROCEDURE — 1126F AMNT PAIN NOTED NONE PRSNT: CPT | Mod: ,,, | Performed by: NURSE PRACTITIONER

## 2024-02-29 PROCEDURE — 3288F FALL RISK ASSESSMENT DOCD: CPT | Mod: ,,, | Performed by: NURSE PRACTITIONER

## 2024-02-29 PROCEDURE — 1111F DSCHRG MED/CURRENT MED MERGE: CPT | Mod: ,,, | Performed by: NURSE PRACTITIONER

## 2024-02-29 PROCEDURE — 1159F MED LIST DOCD IN RCRD: CPT | Mod: ,,, | Performed by: NURSE PRACTITIONER

## 2024-02-29 PROCEDURE — 3078F DIAST BP <80 MM HG: CPT | Mod: ,,, | Performed by: NURSE PRACTITIONER

## 2024-02-29 RX ORDER — ATORVASTATIN CALCIUM 40 MG/1
40 TABLET, FILM COATED ORAL NIGHTLY
Qty: 30 TABLET | Refills: 0 | Status: SHIPPED | OUTPATIENT
Start: 2024-02-29 | End: 2024-05-30 | Stop reason: SDUPTHER

## 2024-02-29 RX ORDER — ASPIRIN 81 MG/1
81 TABLET ORAL DAILY
Qty: 30 TABLET | Refills: 0 | Status: SHIPPED | OUTPATIENT
Start: 2024-02-29 | End: 2024-04-11

## 2024-02-29 RX ORDER — QUETIAPINE FUMARATE 100 MG/1
200 TABLET, FILM COATED ORAL NIGHTLY
Qty: 60 TABLET | Refills: 0 | Status: SHIPPED | OUTPATIENT
Start: 2024-02-29

## 2024-02-29 RX ORDER — AMIODARONE HYDROCHLORIDE 200 MG/1
200 TABLET ORAL DAILY
Qty: 30 TABLET | Refills: 0 | Status: SHIPPED | OUTPATIENT
Start: 2024-02-29 | End: 2024-04-11

## 2024-02-29 NOTE — PROGRESS NOTES
SUBJECTIVE:     History of Present Illness      Chief Complaint: hospital f/u     HPI:  Patient is a 68 y.o. year old male who presents to clinic for for hospital follow-up congestive heart failure atrial fibrillation alcoholism.  Patient has had quite a few hospital follow-ups for similar symptoms.  Reports that metoprolol was decreased from 100 to 25 mg b.i.d  Patient does have appointment this afternoon with cardiologist had to 30 he has not sure if he will be able to make appointment due to transportation.    Review of Systems:    Review of Systems    12 point review of systems conducted, negative except as stated in the history of present illness. See HPI for details.     Previous History    Darlene Willams, DIVINAP  Review of patient's allergies indicates:  No Known Allergies    Past Medical History:   Diagnosis Date    Atrial flutter     CHF (congestive heart failure)     Coronary artery disease     Hyperlipidemia     Hypertension     Myocardial infarction     SOB (shortness of breath)     at times     Current Outpatient Medications   Medication Instructions    amiodarone (PACERONE) 200 mg, Oral, Daily    aspirin (ECOTRIN) 81 mg, Oral, Daily    atorvastatin (LIPITOR) 40 mg, Oral, Nightly    FEROSUL 325 mg (65 mg iron) Tab tablet TAKE ONE TABLET BY MOUTH DAILY    folic acid (FOLVITE) 1,000 mcg, Oral    furosemide (LASIX) 40 mg, Oral    metoprolol succinate (TOPROL-XL) 25 mg, Oral, 2 times daily    QUEtiapine (SEROQUEL) 200 mg, Oral, Nightly    sacubitriL-valsartan (ENTRESTO) 24-26 mg per tablet 1 tablet, Oral, 2 times daily     Past Surgical History:   Procedure Laterality Date    CATARACT EXTRACTION Bilateral     CORONARY ARTERY BYPASS GRAFT (CABG) N/A 4/3/2023    Procedure: CORONARY ARTERY BYPASS GRAFT (CABG);  Surgeon: Deuce Finley IV, MD;  Location: [facility];  Service: Cardiovascular;  Laterality: N/A;  WITH LLAA //  ECHO NOTIFIED    LEFT HEART CATHETERIZATION N/A 03/15/2023    Procedure: CATHETERIZATION,  HEART, LEFT;  Surgeon: Sreedhar Herrera MD;  Location: [facility] CATH LAB;  Service: Cardiology;  Laterality: N/A;  LHC via RRA     Family History   Problem Relation Age of Onset    Heart attack Mother        Social History     Tobacco Use    Smoking status: Never     Passive exposure: Never    Smokeless tobacco: Never   Substance Use Topics    Alcohol use: Yes     Alcohol/week: 84.0 standard drinks of alcohol     Types: 84 Cans of beer per week     Comment: alcoholic, daily, beer    Drug use: Yes     Frequency: 3.0 times per week     Types: Hydrocodone        Health Maintenance      Health Maintenance   Topic Date Due    TETANUS VACCINE  Never done    Colorectal Cancer Screening  Never done    Shingles Vaccine (1 of 2) Never done    High Dose Statin  02/28/2025    Lipid Panel  01/18/2028    Hepatitis C Screening  Completed       OBJECTIVE:     Physical Exam      Vital Signs Reviewed   Visit Vitals  BP (!) 155/73   Pulse 69   Temp 97.9 °F (36.6 °C)   Wt 82.4 kg (181 lb 11.2 oz)   SpO2 95%   BMI 23.33 kg/m²       Physical Exam    Physical Exam:  General: Alert, well nourished, no acute distress, non-toxic appearing.   Eyes: Anicteric sclera, without conjunctival injection, normal lids, no purulent drainage, EOMs grossly intact.   Ears: No tragal tenderness. Tympanic membranes intact, pearly grey, without effusion or erythema and with a positive light reflex.   Mouth: Posterior pharynx without erythema. No exudate, ulcerations, or lesion. No tonsillar swelling.   Neck: Supple, full ROM, no rigidity, no cervical adenopathy.   Cardio: Normal rate and rhythm    Resp: Respirations even and unlabored, clear to auscultation bilaterally.   Abd: No ecchymosis or distension. Normal bowel sounds in all 4 quadrants. No tenderness to palpation. No rebound tenderness or guarding. No CVA tenderness.   Skin: No rashes or open lesions noted.   MSK: No swelling. No abrasions or signs of trauma. Ambulating without assistance.   Neuro:  Alert,oriented No focal deficits noted. Facial expressions even.   Psych: Cooperative, Normal affect      Procedures    Procedures     Labs     Results for orders placed or performed during the hospital encounter of 02/21/24   Comprehensive metabolic panel   Result Value Ref Range    Sodium Level 132 (L) 136 - 145 mmol/L    Potassium Level 4.5 3.5 - 5.1 mmol/L    Chloride 102 98 - 107 mmol/L    Carbon Dioxide 19 (L) 23 - 31 mmol/L    Glucose Level 111 82 - 115 mg/dL    Blood Urea Nitrogen 57.8 (H) 8.4 - 25.7 mg/dL    Creatinine 2.32 (H) 0.73 - 1.18 mg/dL    Calcium Level Total 8.4 (L) 8.8 - 10.0 mg/dL    Protein Total 6.4 5.8 - 7.6 gm/dL    Albumin Level 3.1 (L) 3.4 - 4.8 g/dL    Globulin 3.3 2.4 - 3.5 gm/dL    Albumin/Globulin Ratio 0.9 (L) 1.1 - 2.0 ratio    Bilirubin Total 1.2 <=1.5 mg/dL    Alkaline Phosphatase 59 40 - 150 unit/L    Alanine Aminotransferase 9 0 - 55 unit/L    Aspartate Aminotransferase 14 5 - 34 unit/L    eGFR 29 mls/min/1.73/m2   Drug Screen, Urine   Result Value Ref Range    Amphetamines, Urine Negative Negative    Barbituates, Urine Negative Negative    Benzodiazepine, Urine Negative Negative    Cannabinoids, Urine Negative Negative    Cocaine, Urine Negative Negative    Opiates, Urine Positive (A) Negative    Phencyclidine, Urine Negative Negative    pH, Urine 5.5 3.0 - 11.0    Specific Gravity, Urine Auto 1.020 1.001 - 1.035   Ethanol   Result Value Ref Range    Ethanol Level 62.0 (H) <=10.0 mg/dL   CBC with Differential   Result Value Ref Range    WBC 5.80 4.50 - 11.50 x10(3)/mcL    RBC 3.01 (L) 4.70 - 6.10 x10(6)/mcL    Hgb 8.9 (L) 14.0 - 18.0 g/dL    Hct 28.7 (L) 42.0 - 52.0 %    MCV 95.3 (H) 80.0 - 94.0 fL    MCH 29.6 27.0 - 31.0 pg    MCHC 31.0 (L) 33.0 - 36.0 g/dL    RDW 17.3 (H) 11.5 - 17.0 %    Platelet 109 (L) 130 - 400 x10(3)/mcL    MPV 11.5 (H) 7.4 - 10.4 fL    Neut % 66.5 %    Lymph % 20.2 %    Mono % 8.1 %    Eos % 4.7 %    Basophil % 0.3 %    Lymph # 1.17 0.6 - 4.6 x10(3)/mcL     Neut # 3.86 2.1 - 9.2 x10(3)/mcL    Mono # 0.47 0.1 - 1.3 x10(3)/mcL    Eos # 0.27 0 - 0.9 x10(3)/mcL    Baso # 0.02 <=0.2 x10(3)/mcL    IG# 0.01 0 - 0.04 x10(3)/mcL    IG% 0.2 %   COVID/FLU A&B PCR   Result Value Ref Range    Influenza A PCR Not Detected Not Detected    Influenza B PCR Not Detected Not Detected    SARS-CoV-2 PCR Not Detected Not Detected, Negative   Troponin ISTAT   Result Value Ref Range    POC Cardiac Troponin I 0.02 0.00 - 0.08 ng/mL    Sample unknown    CK   Result Value Ref Range    Creatine Kinase 28 (L) 30 - 200 U/L   Basic Metabolic Panel   Result Value Ref Range    Sodium Level 139 136 - 145 mmol/L    Potassium Level 4.5 3.5 - 5.1 mmol/L    Chloride 109 (H) 98 - 107 mmol/L    Carbon Dioxide 21 (L) 23 - 31 mmol/L    Glucose Level 94 82 - 115 mg/dL    Blood Urea Nitrogen 49.5 (H) 8.4 - 25.7 mg/dL    Creatinine 1.62 (H) 0.73 - 1.18 mg/dL    BUN/Creatinine Ratio 31     Calcium Level Total 8.1 (L) 8.8 - 10.0 mg/dL    Anion Gap 9.0 mEq/L    eGFR 45 mls/min/1.73/m2   Magnesium   Result Value Ref Range    Magnesium Level 2.30 1.60 - 2.60 mg/dL   Ethanol   Result Value Ref Range    Ethanol Level <10.0 <=10.0 mg/dL   CBC with Differential   Result Value Ref Range    WBC 3.59 (L) 4.50 - 11.50 x10(3)/mcL    RBC 2.86 (L) 4.70 - 6.10 x10(6)/mcL    Hgb 8.4 (L) 14.0 - 18.0 g/dL    Hct 27.9 (L) 42.0 - 52.0 %    MCV 97.6 (H) 80.0 - 94.0 fL    MCH 29.4 27.0 - 31.0 pg    MCHC 30.1 (L) 33.0 - 36.0 g/dL    RDW 17.4 (H) 11.5 - 17.0 %    Platelet 86 (L) 130 - 400 x10(3)/mcL    MPV 11.8 (H) 7.4 - 10.4 fL    Neut % 55.7 %    Lymph % 27.0 %    Mono % 11.1 %    Eos % 5.6 %    Basophil % 0.3 %    Lymph # 0.97 0.6 - 4.6 x10(3)/mcL    Neut # 2.00 (L) 2.1 - 9.2 x10(3)/mcL    Mono # 0.40 0.1 - 1.3 x10(3)/mcL    Eos # 0.20 0 - 0.9 x10(3)/mcL    Baso # 0.01 <=0.2 x10(3)/mcL    IG# 0.01 0 - 0.04 x10(3)/mcL    IG% 0.3 %   Comprehensive Metabolic Panel   Result Value Ref Range    Sodium Level 138 136 - 145 mmol/L     Potassium Level 4.6 3.5 - 5.1 mmol/L    Chloride 109 (H) 98 - 107 mmol/L    Carbon Dioxide 19 (L) 23 - 31 mmol/L    Glucose Level 88 82 - 115 mg/dL    Blood Urea Nitrogen 36.9 (H) 8.4 - 25.7 mg/dL    Creatinine 1.23 (H) 0.73 - 1.18 mg/dL    Calcium Level Total 8.3 (L) 8.8 - 10.0 mg/dL    Protein Total 5.9 5.8 - 7.6 gm/dL    Albumin Level 2.8 (L) 3.4 - 4.8 g/dL    Globulin 3.1 2.4 - 3.5 gm/dL    Albumin/Globulin Ratio 0.9 (L) 1.1 - 2.0 ratio    Bilirubin Total 1.0 <=1.5 mg/dL    Alkaline Phosphatase 65 40 - 150 unit/L    Alanine Aminotransferase 10 0 - 55 unit/L    Aspartate Aminotransferase 14 5 - 34 unit/L    eGFR >60 mls/min/1.73/m2   Magnesium   Result Value Ref Range    Magnesium Level 2.20 1.60 - 2.60 mg/dL   Phosphorus   Result Value Ref Range    Phosphorus Level 3.1 2.3 - 4.7 mg/dL   CBC with Differential   Result Value Ref Range    WBC 3.33 (L) 4.50 - 11.50 x10(3)/mcL    RBC 3.01 (L) 4.70 - 6.10 x10(6)/mcL    Hgb 8.9 (L) 14.0 - 18.0 g/dL    Hct 28.9 (L) 42.0 - 52.0 %    MCV 96.0 (H) 80.0 - 94.0 fL    MCH 29.6 27.0 - 31.0 pg    MCHC 30.8 (L) 33.0 - 36.0 g/dL    RDW 17.2 (H) 11.5 - 17.0 %    Platelet 98 (L) 130 - 400 x10(3)/mcL    MPV 10.9 (H) 7.4 - 10.4 fL    Neut % 49.6 %    Lymph % 29.7 %    Mono % 13.8 %    Eos % 6.3 %    Basophil % 0.3 %    Lymph # 0.99 0.6 - 4.6 x10(3)/mcL    Neut # 1.65 (L) 2.1 - 9.2 x10(3)/mcL    Mono # 0.46 0.1 - 1.3 x10(3)/mcL    Eos # 0.21 0 - 0.9 x10(3)/mcL    Baso # 0.01 <=0.2 x10(3)/mcL    IG# 0.01 0 - 0.04 x10(3)/mcL    IG% 0.3 %   Comprehensive Metabolic Panel   Result Value Ref Range    Sodium Level 140 136 - 145 mmol/L    Potassium Level 4.3 3.5 - 5.1 mmol/L    Chloride 110 (H) 98 - 107 mmol/L    Carbon Dioxide 23 23 - 31 mmol/L    Glucose Level 100 82 - 115 mg/dL    Blood Urea Nitrogen 23.7 8.4 - 25.7 mg/dL    Creatinine 1.18 0.73 - 1.18 mg/dL    Calcium Level Total 8.1 (L) 8.8 - 10.0 mg/dL    Protein Total 5.5 (L) 5.8 - 7.6 gm/dL    Albumin Level 2.6 (L) 3.4 - 4.8 g/dL     Globulin 2.9 2.4 - 3.5 gm/dL    Albumin/Globulin Ratio 0.9 (L) 1.1 - 2.0 ratio    Bilirubin Total 0.8 <=1.5 mg/dL    Alkaline Phosphatase 57 40 - 150 unit/L    Alanine Aminotransferase 9 0 - 55 unit/L    Aspartate Aminotransferase 14 5 - 34 unit/L    eGFR >60 mls/min/1.73/m2   Magnesium   Result Value Ref Range    Magnesium Level 2.00 1.60 - 2.60 mg/dL   Phosphorus   Result Value Ref Range    Phosphorus Level 2.9 2.3 - 4.7 mg/dL   CBC with Differential   Result Value Ref Range    WBC 2.92 (L) 4.50 - 11.50 x10(3)/mcL    RBC 2.93 (L) 4.70 - 6.10 x10(6)/mcL    Hgb 8.6 (L) 14.0 - 18.0 g/dL    Hct 28.7 (L) 42.0 - 52.0 %    MCV 98.0 (H) 80.0 - 94.0 fL    MCH 29.4 27.0 - 31.0 pg    MCHC 30.0 (L) 33.0 - 36.0 g/dL    RDW 17.2 (H) 11.5 - 17.0 %    Platelet 99 (L) 130 - 400 x10(3)/mcL    MPV 11.0 (H) 7.4 - 10.4 fL    Neut % 40.1 %    Lymph % 36.0 %    Mono % 15.4 %    Eos % 7.5 %    Basophil % 0.3 %    Lymph # 1.05 0.6 - 4.6 x10(3)/mcL    Neut # 1.17 (L) 2.1 - 9.2 x10(3)/mcL    Mono # 0.45 0.1 - 1.3 x10(3)/mcL    Eos # 0.22 0 - 0.9 x10(3)/mcL    Baso # 0.01 <=0.2 x10(3)/mcL    IG# 0.02 0 - 0.04 x10(3)/mcL    IG% 0.7 %   EKG 12-lead   Result Value Ref Range    QRS Duration 98 ms    OHS QTC Calculation 430 ms   Echo   Result Value Ref Range    BSA 2.04 m2    EF 55 %    Est. RA pres 15 mmHg       Chemistry:  Lab Results   Component Value Date     02/24/2024    K 4.3 02/24/2024    CHLORIDE 110 (H) 02/24/2024    BUN 23.7 02/24/2024    CREATININE 1.18 02/24/2024    EGFRNORACEVR >60 02/24/2024    GLUCOSE 100 02/24/2024    CALCIUM 8.1 (L) 02/24/2024    ALKPHOS 57 02/24/2024    LABPROT 5.5 (L) 02/24/2024    ALBUMIN 2.6 (L) 02/24/2024    AST 14 02/24/2024    ALT 9 02/24/2024    MG 2.00 02/24/2024    PHOS 2.9 02/24/2024    [id number] 19.2 (L) 01/18/2023    TSH 3.312 06/12/2023    PSA 1.25 01/18/2023        Lab Results   Component Value Date    HGBA1C 5.3 01/18/2023        Hematology:  Lab Results   Component Value Date    WBC  2.92 (L) 02/24/2024    HGB 8.6 (L) 02/24/2024    HCT 28.7 (L) 02/24/2024    PLT 99 (L) 02/24/2024       Lipid Panel:  Lab Results   Component Value Date    CHOL 189 01/18/2023    HDL 84 (H) 01/18/2023    LDL 95.00 01/18/2023    TRIG 49 01/18/2023    TOTALCHOLEST 2 01/18/2023        Urine:  Lab Results   Component Value Date    COLORUA Yellow 12/06/2023    APPEARANCEUA Clear 12/06/2023    SGUA 1.020 12/06/2023    PHUA 5.5 12/06/2023    PROTEINUA Trace (A) 12/06/2023    GLUCOSEUA Negative 12/06/2023    KETONESUA Negative 12/06/2023    BLOODUA Negative 12/06/2023    NITRITESUA Negative 12/06/2023    LEUKOCYTESUR Negative 12/06/2023    RBCUA None Seen 12/06/2023    WBCUA None Seen 12/06/2023    BACTERIA None Seen 12/06/2023    CREATRANDUR 18.2 (L) 01/18/2023         Assessment            ICD-10-CM ICD-9-CM   1. Hospital discharge follow-up  Z09 V67.59   2. Insomnia, unspecified type  G47.00 780.52   3. Medication refill  Z76.0 V68.1   4. Chronic diastolic congestive heart failure  I50.32 428.32     428.0   5. Chronic a-fib  I48.20 427.31       Plan       1. Hospital discharge follow-up  Refill medication discussed with patient importance of medication compliance and abstaining from alcohol  2. Insomnia, unspecified type  Refill trazodone  3. Medication refill  - amiodarone (PACERONE) 200 MG Tab; Take 1 tablet (200 mg total) by mouth once daily.  Dispense: 30 tablet; Refill: 0  - aspirin (ECOTRIN) 81 MG EC tablet; Take 1 tablet (81 mg total) by mouth once daily.  Dispense: 30 tablet; Refill: 0  - atorvastatin (LIPITOR) 40 MG tablet; Take 1 tablet (40 mg total) by mouth every evening.  Dispense: 30 tablet; Refill: 0  - QUEtiapine (SEROQUEL) 100 MG Tab; Take 2 tablets (200 mg total) by mouth every evening.  Dispense: 60 tablet; Refill: 0    4. Chronic diastolic congestive heart failure  Continue Entresto patient has follow up appointment with cardiologist today  5. Chronic a-fib  Rate control continue amiodarone 200 mg  patient follow up with cardiologist for recommendations or changes in medication  Orders Placed This Encounter    amiodarone (PACERONE) 200 MG Tab    aspirin (ECOTRIN) 81 MG EC tablet    atorvastatin (LIPITOR) 40 MG tablet    QUEtiapine (SEROQUEL) 100 MG Tab      Medication List with Changes/Refills   Current Medications    FEROSUL 325 MG (65 MG IRON) TAB TABLET    TAKE ONE TABLET BY MOUTH DAILY    FOLIC ACID (FOLVITE) 1 MG TABLET    TAKE ONE TABLET BY MOUTH DAILY    FUROSEMIDE (LASIX) 40 MG TABLET    TAKE ONE TABLET BY MOUTH DAILY    METOPROLOL SUCCINATE (TOPROL-XL) 25 MG 24 HR TABLET    Take 1 tablet (25 mg total) by mouth 2 (two) times daily.    SACUBITRIL-VALSARTAN (ENTRESTO) 24-26 MG PER TABLET    Take 1 tablet by mouth 2 (two) times daily.   Changed and/or Refilled Medications    Modified Medication Previous Medication    AMIODARONE (PACERONE) 200 MG TAB amiodarone (PACERONE) 200 MG Tab       Take 1 tablet (200 mg total) by mouth once daily.    Take 1 tablet (200 mg total) by mouth once daily.    ASPIRIN (ECOTRIN) 81 MG EC TABLET aspirin (ECOTRIN) 81 MG EC tablet       Take 1 tablet (81 mg total) by mouth once daily.    Take 1 tablet (81 mg total) by mouth once daily.    ATORVASTATIN (LIPITOR) 40 MG TABLET atorvastatin (LIPITOR) 40 MG tablet       Take 1 tablet (40 mg total) by mouth every evening.    Take 1 tablet (40 mg total) by mouth every evening.    QUETIAPINE (SEROQUEL) 100 MG TAB QUEtiapine (SEROQUEL) 100 MG Tab       Take 2 tablets (200 mg total) by mouth every evening.    Take 2 tablets (200 mg total) by mouth every evening.   Discontinued Medications    APIXABAN (ELIQUIS) 5 MG TAB    Take 5 mg by mouth 2 (two) times daily.       Follow up in about 6 weeks (around 4/11/2024), or if symptoms worsen or fail to improve, for Routine appointment as scheduled.   Follow up in about 6 weeks (around 4/11/2024), or if symptoms worsen or fail to improve, for Routine appointment as scheduled. In addition to  their scheduled follow up, the patient has also been instructed to follow up on as needed basis.   Future Appointments   Date Time Provider Department Center   4/11/2024  9:00 AM Darlene Willams, [facility]OR[facility]

## 2024-03-07 ENCOUNTER — TELEPHONE (OUTPATIENT)
Dept: FAMILY MEDICINE | Facility: CLINIC | Age: 69
End: 2024-03-07
Payer: MEDICARE

## 2024-03-07 NOTE — PT/OT/SLP DISCHARGE
"Physical Therapy Discharge Summary    Name: Mike Urbina Jr.  MRN: [id number]   Principal Problem: Acute alcoholic intoxication without complication     Patient Discharged from acute Physical Therapy on .  Please refer to prior PT noted date on  for functional status.     Assessment:     Patient appropriate for care in another setting.    Objective:     GOALS:   Multidisciplinary Problems       Physical Therapy Goals          Problem: Physical Therapy    Goal Priority Disciplines Outcome Goal Variances Interventions   Physical Therapy Goal     PT, PT/OT Adequate for Care Transition     Description: Goals to be met by: Discharge      Pt to be seen 5-6 days per week QD allowing for endurance and outdoor ambulation     Patient will increase functional independence with mobility by performin. Gait  x 1000+ feet with Supervision using No Assistive Device.   2. Ascend/descend 4 stairs with bilateral Handrails Supervision using No Assistive Device.   3. Ascend/Descend 4" inch curb with Supervision using No Assistive Device.                         Reasons for Discontinuation of Therapy Services  Transfer to alternate level of care. and Satisfactory goal achievement.      Plan:     Patient Discharged to: Home no PT services needed.      3/7/2024    "

## 2024-03-07 NOTE — PLAN OF CARE
"  Problem: Physical Therapy  Goal: Physical Therapy Goal  Description: Goals to be met by: Discharge      Pt to be seen 5-6 days per week QD allowing for endurance and outdoor ambulation     Patient will increase functional independence with mobility by performin. Gait  x 1000+ feet with Supervision using No Assistive Device.   2. Ascend/descend 4 stairs with bilateral Handrails Supervision using No Assistive Device.   3. Ascend/Descend 4" inch curb with Supervision using No Assistive Device.    Outcome: Adequate for Care Transition     "

## 2024-03-11 PROBLEM — J18.9 PNEUMONIA DUE TO INFECTIOUS ORGANISM: Status: RESOLVED | Noted: 2023-11-24 | Resolved: 2024-03-11

## 2024-03-29 NOTE — PLAN OF CARE
[facility]  Discharge Final Note    Primary Care Provider: Darlene Willams FNP    Expected Discharge Date: 2/24/2024    Final Discharge Note (most recent)       Final Note - 03/29/24 0943          Final Note    Assessment Type Final Discharge Note     Anticipated Discharge Disposition Home or Self Care     What phone number can be called within the next 1-3 days to see how you are doing after discharge? [phone]     Hospital Resources/Appts/Education Provided Provided patient/caregiver with written discharge plan information        Post-Acute Status    Discharge Delays None known at this time                     Important Message from Medicare             Contact Info       Darlene Willams FNP   Specialty: Family Medicine   Relationship: PCP - [address] [address]   Phone: [phone]       Next Steps: Call    Instructions: The office of [facility] Jerry will call the patient with a follow up  appointment date and time.  Spoke to [facility].    Patient does not want Home Health on discharge        Next Steps: Follow up

## 2024-04-11 ENCOUNTER — OFFICE VISIT (OUTPATIENT)
Dept: FAMILY MEDICINE | Facility: CLINIC | Age: 69
End: 2024-04-11
Payer: MEDICARE

## 2024-04-11 VITALS
OXYGEN SATURATION: 99 % | WEIGHT: 183.13 LBS | SYSTOLIC BLOOD PRESSURE: 122 MMHG | TEMPERATURE: 99 F | BODY MASS INDEX: 23.5 KG/M2 | HEART RATE: 84 BPM | RESPIRATION RATE: 18 BRPM | DIASTOLIC BLOOD PRESSURE: 74 MMHG | HEIGHT: 74 IN

## 2024-04-11 DIAGNOSIS — I16.0 HYPERTENSIVE URGENCY: ICD-10-CM

## 2024-04-11 DIAGNOSIS — E43 SEVERE MALNUTRITION: ICD-10-CM

## 2024-04-11 DIAGNOSIS — I50.33 ACUTE ON CHRONIC DIASTOLIC (CONGESTIVE) HEART FAILURE: ICD-10-CM

## 2024-04-11 DIAGNOSIS — F19.11 HISTORY OF SUBSTANCE ABUSE: Primary | ICD-10-CM

## 2024-04-11 PROCEDURE — 3074F SYST BP LT 130 MM HG: CPT | Mod: ,,, | Performed by: NURSE PRACTITIONER

## 2024-04-11 PROCEDURE — 3008F BODY MASS INDEX DOCD: CPT | Mod: ,,, | Performed by: NURSE PRACTITIONER

## 2024-04-11 PROCEDURE — 4010F ACE/ARB THERAPY RXD/TAKEN: CPT | Mod: ,,, | Performed by: NURSE PRACTITIONER

## 2024-04-11 PROCEDURE — 99213 OFFICE O/P EST LOW 20 MIN: CPT | Mod: ,,, | Performed by: NURSE PRACTITIONER

## 2024-04-11 PROCEDURE — 1125F AMNT PAIN NOTED PAIN PRSNT: CPT | Mod: ,,, | Performed by: NURSE PRACTITIONER

## 2024-04-11 PROCEDURE — 3078F DIAST BP <80 MM HG: CPT | Mod: ,,, | Performed by: NURSE PRACTITIONER

## 2024-04-11 PROCEDURE — 1159F MED LIST DOCD IN RCRD: CPT | Mod: ,,, | Performed by: NURSE PRACTITIONER

## 2024-04-11 PROCEDURE — 3288F FALL RISK ASSESSMENT DOCD: CPT | Mod: ,,, | Performed by: NURSE PRACTITIONER

## 2024-04-11 PROCEDURE — 1101F PT FALLS ASSESS-DOCD LE1/YR: CPT | Mod: ,,, | Performed by: NURSE PRACTITIONER

## 2024-04-11 RX ORDER — FUROSEMIDE 40 MG/1
40 TABLET ORAL
Qty: 30 TABLET | Refills: 0 | Status: SHIPPED | OUTPATIENT
Start: 2024-04-11 | End: 2024-05-16

## 2024-04-11 RX ORDER — SACUBITRIL AND VALSARTAN 24; 26 MG/1; MG/1
1 TABLET, FILM COATED ORAL 2 TIMES DAILY
COMMUNITY

## 2024-04-11 RX ORDER — FOLIC ACID 1 MG/1
1000 TABLET ORAL
Qty: 30 TABLET | Refills: 0 | Status: SHIPPED | OUTPATIENT
Start: 2024-04-11 | End: 2024-05-16

## 2024-04-11 RX ORDER — APIXABAN 5 MG/1
5 TABLET, FILM COATED ORAL 2 TIMES DAILY
COMMUNITY
Start: 2024-03-19

## 2024-04-11 NOTE — PATIENT INSTRUCTIONS
Josh Mckeon,     If you are due for any health screening(s) below please notify me so we can arrange them to be ordered and scheduled. Most healthy patients at your age complete them, but you are free to accept or refuse.     If you can't do it, I'll definitely understand. If you can, I'd certainly appreciate it!    Tests to Keep You Healthy    Colon Cancer Screening: DUE  Last Blood Pressure <= 139/89 (4/11/2024): NO      Its time for your colon cancer screening     Colorectal cancer is one of the leading causes of cancer death for men and women but it doesnt have to be. Screenings can prevent colorectal cancer or find it early enough to treat and cure the disease.     Our records indicate that you may be overdue for colon cancer screening. A colonoscopy or stool screening test can help identify patients at risk for developing colon cancer. Cancer screenings save lives, so schedule yours today to stay healthy.     A colonoscopy is the preferred test for detecting colon cancer. It is needed only once every 10 years if results are negative. While you are sedated, a flexible, lighted tube with a tiny camera is inserted into the rectum and advanced through the colon to look for cancers.     An alternative screening test that is used at home and returned to the lab may also be used. It detects hidden blood in bowel movements which could indicate cancer in the colon. If results are positive, you will need a colonoscopy to determine if the blood is a sign of cancer. This type of follow up (diagnostic) colonoscopy usually requires additional copays as required by your insurance provider.     If you recently had your colon cancer screening performed outside of [facility], please let your Health care team know so that they can update your health record. Please contact your PCP if you have any questions.

## 2024-05-16 DIAGNOSIS — D69.6 THROMBOCYTOPENIA: ICD-10-CM

## 2024-05-16 DIAGNOSIS — E43 SEVERE MALNUTRITION: ICD-10-CM

## 2024-05-16 DIAGNOSIS — I16.0 HYPERTENSIVE URGENCY: ICD-10-CM

## 2024-05-16 RX ORDER — FOLIC ACID 1 MG/1
1000 TABLET ORAL
Qty: 30 TABLET | Refills: 0 | Status: SHIPPED | OUTPATIENT
Start: 2024-05-16

## 2024-05-16 RX ORDER — FUROSEMIDE 40 MG/1
40 TABLET ORAL
Qty: 30 TABLET | Refills: 0 | Status: SHIPPED | OUTPATIENT
Start: 2024-05-16

## 2024-05-16 RX ORDER — FERROUS SULFATE TAB 325 MG (65 MG ELEMENTAL FE) 325 (65 FE) MG
TAB ORAL
Qty: 30 TABLET | Refills: 1 | Status: SHIPPED | OUTPATIENT
Start: 2024-05-16

## 2024-05-21 DIAGNOSIS — Z76.0 MEDICATION REFILL: Primary | ICD-10-CM

## 2024-05-21 RX ORDER — METOPROLOL SUCCINATE 25 MG/1
25 TABLET, EXTENDED RELEASE ORAL 2 TIMES DAILY
Qty: 60 TABLET | Refills: 0 | OUTPATIENT
Start: 2024-05-21 | End: 2024-06-20

## 2024-05-21 NOTE — TELEPHONE ENCOUNTER
Pharmacy called in need of a new script for metoprolol 25 mg due to patient states he still on this. They need verification that he is still on this as well as a new script or refill . [facility] .

## 2024-05-27 PROBLEM — N17.9 AKI (ACUTE KIDNEY INJURY): Status: RESOLVED | Noted: 2024-02-21 | Resolved: 2024-05-27

## 2024-05-28 RX ORDER — METOPROLOL SUCCINATE 25 MG/1
25 TABLET, EXTENDED RELEASE ORAL 2 TIMES DAILY
Qty: 60 TABLET | Refills: 2 | Status: SHIPPED | OUTPATIENT
Start: 2024-05-28

## 2024-05-30 ENCOUNTER — TELEPHONE (OUTPATIENT)
Dept: FAMILY MEDICINE | Facility: CLINIC | Age: 69
End: 2024-05-30
Payer: MEDICARE

## 2024-05-30 DIAGNOSIS — Z76.0 MEDICATION REFILL: ICD-10-CM

## 2024-05-30 RX ORDER — ATORVASTATIN CALCIUM 40 MG/1
40 TABLET, FILM COATED ORAL NIGHTLY
Qty: 30 TABLET | Refills: 0 | Status: SHIPPED | OUTPATIENT
Start: 2024-05-30 | End: 2024-06-29

## 2024-06-11 ENCOUNTER — OFFICE VISIT (OUTPATIENT)
Dept: FAMILY MEDICINE | Facility: CLINIC | Age: 69
End: 2024-06-11
Payer: MEDICARE

## 2024-06-11 VITALS
HEIGHT: 74 IN | HEART RATE: 51 BPM | SYSTOLIC BLOOD PRESSURE: 144 MMHG | WEIGHT: 173.63 LBS | DIASTOLIC BLOOD PRESSURE: 60 MMHG | OXYGEN SATURATION: 98 % | BODY MASS INDEX: 22.28 KG/M2 | TEMPERATURE: 98 F

## 2024-06-11 DIAGNOSIS — D69.6 THROMBOCYTOPENIA: ICD-10-CM

## 2024-06-11 DIAGNOSIS — R42 DIZZINESS: Primary | ICD-10-CM

## 2024-06-11 PROCEDURE — 3008F BODY MASS INDEX DOCD: CPT | Mod: ,,, | Performed by: NURSE PRACTITIONER

## 2024-06-11 PROCEDURE — 1159F MED LIST DOCD IN RCRD: CPT | Mod: ,,, | Performed by: NURSE PRACTITIONER

## 2024-06-11 PROCEDURE — 1100F PTFALLS ASSESS-DOCD GE2>/YR: CPT | Mod: ,,, | Performed by: NURSE PRACTITIONER

## 2024-06-11 PROCEDURE — 3288F FALL RISK ASSESSMENT DOCD: CPT | Mod: ,,, | Performed by: NURSE PRACTITIONER

## 2024-06-11 PROCEDURE — 3077F SYST BP >= 140 MM HG: CPT | Mod: ,,, | Performed by: NURSE PRACTITIONER

## 2024-06-11 PROCEDURE — 1126F AMNT PAIN NOTED NONE PRSNT: CPT | Mod: ,,, | Performed by: NURSE PRACTITIONER

## 2024-06-11 PROCEDURE — 1160F RVW MEDS BY RX/DR IN RCRD: CPT | Mod: ,,, | Performed by: NURSE PRACTITIONER

## 2024-06-11 PROCEDURE — 4010F ACE/ARB THERAPY RXD/TAKEN: CPT | Mod: ,,, | Performed by: NURSE PRACTITIONER

## 2024-06-11 PROCEDURE — 3078F DIAST BP <80 MM HG: CPT | Mod: ,,, | Performed by: NURSE PRACTITIONER

## 2024-06-11 PROCEDURE — 99213 OFFICE O/P EST LOW 20 MIN: CPT | Mod: ,,, | Performed by: NURSE PRACTITIONER

## 2024-06-11 NOTE — PROGRESS NOTES
SUBJECTIVE:     History of Present Illness      Chief Complaint: Follow-up (3 month dizzy , sob recently , gets dizzy upon standing )    HPI:  Patient is a 68 y.o. year old male who presents to clinic for three-month follow-up history hypertension, CAD, CABG x3 2023, CHF, AFib.  Patient was recently evaluated by his cardiologist proximally week ago shortness of  breath and dizziness.  Medication discontinued per cardio amiodarone, Eliquis, Entresto, Lasix    Review of Systems:    Review of Systems    12 point review of systems conducted, negative except as stated in the history of present illness. See HPI for details.     Previous History    Darlene Willams, DIVIN[facility]  Review of patient's allergies indicates:  No Known Allergies    Past Medical History:   Diagnosis Date    Atrial flutter     CHF (congestive heart failure)     Coronary artery disease     Hyperlipidemia     Hypertension     Myocardial infarction     SOB (shortness of breath)     at times     Current Outpatient Medications   Medication Instructions    amiodarone (PACERONE) 200 mg, Oral, Daily    aspirin (ECOTRIN) 81 mg, Oral, Daily    atorvastatin (LIPITOR) 40 mg, Oral, Nightly    ELIQUIS 5 mg, 2 times daily    FEROSUL 325 mg (65 mg iron) Tab tablet TAKE ONE TABLET BY MOUTH DAILY    folic acid (FOLVITE) 1,000 mcg, Oral    furosemide (LASIX) 40 mg, Oral    metoprolol succinate (TOPROL-XL) 25 mg, Oral, 2 times daily    QUEtiapine (SEROQUEL) 200 mg, Oral, Nightly    sacubitriL-valsartan (ENTRESTO) 24-26 mg per tablet 1 tablet, 2 times daily     Past Surgical History:   Procedure Laterality Date    CATARACT EXTRACTION Bilateral     CORONARY ARTERY BYPASS GRAFT (CABG) N/A 4/3/2023    Procedure: CORONARY ARTERY BYPASS GRAFT (CABG);  Surgeon: Deuce Finley IV, MD;  Location: [facility];  Service: Cardiovascular;  Laterality: N/A;  WITH LLAA //  ECHO NOTIFIED    LEFT HEART CATHETERIZATION N/A 03/15/2023    Procedure: CATHETERIZATION, HEART, LEFT;  Surgeon: Sreedhar  "MD Javier;  Location: [facility] CATH LAB;  Service: Cardiology;  Laterality: N/A;  C via RRA     Family History   Problem Relation Name Age of Onset    Heart attack Mother      Heart attack Brother         Social History     Tobacco Use    Smoking status: Never     Passive exposure: Never    Smokeless tobacco: Never   Substance Use Topics    Alcohol use: Yes     Alcohol/week: 84.0 standard drinks of alcohol     Types: 84 Cans of beer per week     Comment: alcoholic, daily, beer    Drug use: Yes     Frequency: 3.0 times per week     Types: Hydrocodone        Health Maintenance      Health Maintenance   Topic Date Due    TETANUS VACCINE  Never done    Colorectal Cancer Screening  Never done    Shingles Vaccine (1 of 2) Never done    High Dose Statin  05/30/2025    Lipid Panel  01/18/2028    Hepatitis C Screening  Completed       OBJECTIVE:     Physical Exam      Vital Signs Reviewed   Visit Vitals  BP (!) 144/60   Pulse (!) 51   Temp 97.7 °F (36.5 °C)   Ht 6' 2" (1.88 m)   Wt 78.7 kg (173 lb 9.6 oz)   SpO2 98%   BMI 22.29 kg/m²       Physical Exam    Physical Exam:  General: Alert, well nourished, no acute distress, non-toxic appearing.   Eyes: Anicteric sclera, without conjunctival injection, normal lids, no purulent drainage, EOMs grossly intact.   Ears: No tragal tenderness. Tympanic membranes intact, pearly grey, without effusion or erythema and with a positive light reflex.   Mouth: Posterior pharynx without erythema. No exudate, ulcerations, or lesion. No tonsillar swelling.   Neck: Supple, full ROM, no rigidity, no cervical adenopathy.   Cardio: Normal rate and rhythm    Resp: Respirations even and unlabored, clear to auscultation bilaterally.   Abd: No ecchymosis or distension. Normal bowel sounds in all 4 quadrants. No tenderness to palpation. No rebound tenderness or guarding. No CVA tenderness.   Skin: No rashes or open lesions noted.   MSK: No swelling. No abrasions or signs of trauma. Ambulating without " assistance.   Neuro: Alert,oriented No focal deficits noted. Facial expressions even.   Psych: Cooperative, Normal affect      Procedures    Procedures     Labs     Results for orders placed or performed during the hospital encounter of 02/21/24   Comprehensive metabolic panel   Result Value Ref Range    Sodium 132 (L) 136 - 145 mmol/L    Potassium 4.5 3.5 - 5.1 mmol/L    Chloride 102 98 - 107 mmol/L    CO2 19 (L) 23 - 31 mmol/L    Glucose 111 82 - 115 mg/dL    Blood Urea Nitrogen 57.8 (H) 8.4 - 25.7 mg/dL    Creatinine 2.32 (H) 0.73 - 1.18 mg/dL    Calcium 8.4 (L) 8.8 - 10.0 mg/dL    Protein Total 6.4 5.8 - 7.6 gm/dL    Albumin 3.1 (L) 3.4 - 4.8 g/dL    Globulin 3.3 2.4 - 3.5 gm/dL    Albumin/Globulin Ratio 0.9 (L) 1.1 - 2.0 ratio    Bilirubin Total 1.2 <=1.5 mg/dL    ALP 59 40 - 150 unit/L    ALT 9 0 - 55 unit/L    AST 14 5 - 34 unit/L    eGFR 29 mls/min/1.73/m2   Drug Screen, Urine   Result Value Ref Range    Amphetamines, Urine Negative Negative    Barbiturates, Urine Negative Negative    Benzodiazepine, Urine Negative Negative    Cannabinoids, Urine Negative Negative    Cocaine, Urine Negative Negative    Opiates, Urine Positive (A) Negative    Phencyclidine, Urine Negative Negative    pH, Urine 5.5 3.0 - 11.0    Specific Gravity, Urine Auto 1.020 1.001 - 1.035   Ethanol   Result Value Ref Range    Ethanol Level 62.0 (H) <=10.0 mg/dL   CBC with Differential   Result Value Ref Range    WBC 5.80 4.50 - 11.50 x10(3)/mcL    RBC 3.01 (L) 4.70 - 6.10 x10(6)/mcL    Hgb 8.9 (L) 14.0 - 18.0 g/dL    Hct 28.7 (L) 42.0 - 52.0 %    MCV 95.3 (H) 80.0 - 94.0 fL    MCH 29.6 27.0 - 31.0 pg    MCHC 31.0 (L) 33.0 - 36.0 g/dL    RDW 17.3 (H) 11.5 - 17.0 %    Platelet 109 (L) 130 - 400 x10(3)/mcL    MPV 11.5 (H) 7.4 - 10.4 fL    Neut % 66.5 %    Lymph % 20.2 %    Mono % 8.1 %    Eos % 4.7 %    Basophil % 0.3 %    Lymph # 1.17 0.6 - 4.6 x10(3)/mcL    Neut # 3.86 2.1 - 9.2 x10(3)/mcL    Mono # 0.47 0.1 - 1.3 x10(3)/mcL    Eos # 0.27  0 - 0.9 x10(3)/mcL    Baso # 0.02 <=0.2 x10(3)/mcL    IG# 0.01 0 - 0.04 x10(3)/mcL    IG% 0.2 %   COVID/FLU A&B PCR   Result Value Ref Range    Influenza A PCR Not Detected Not Detected    Influenza B PCR Not Detected Not Detected    SARS-CoV-2 PCR Not Detected Not Detected, Negative   Troponin ISTAT   Result Value Ref Range    POC Cardiac Troponin I 0.02 0.00 - 0.08 ng/mL    Sample unknown    CK   Result Value Ref Range    Creatine Kinase 28 (L) 30 - 200 U/L   Basic Metabolic Panel   Result Value Ref Range    Sodium 139 136 - 145 mmol/L    Potassium 4.5 3.5 - 5.1 mmol/L    Chloride 109 (H) 98 - 107 mmol/L    CO2 21 (L) 23 - 31 mmol/L    Glucose 94 82 - 115 mg/dL    Blood Urea Nitrogen 49.5 (H) 8.4 - 25.7 mg/dL    Creatinine 1.62 (H) 0.73 - 1.18 mg/dL    BUN/Creatinine Ratio 31     Calcium 8.1 (L) 8.8 - 10.0 mg/dL    Anion Gap 9.0 mEq/L    eGFR 45 mls/min/1.73/m2   Magnesium   Result Value Ref Range    Magnesium Level 2.30 1.60 - 2.60 mg/dL   Ethanol   Result Value Ref Range    Ethanol Level <10.0 <=10.0 mg/dL   CBC with Differential   Result Value Ref Range    WBC 3.59 (L) 4.50 - 11.50 x10(3)/mcL    RBC 2.86 (L) 4.70 - 6.10 x10(6)/mcL    Hgb 8.4 (L) 14.0 - 18.0 g/dL    Hct 27.9 (L) 42.0 - 52.0 %    MCV 97.6 (H) 80.0 - 94.0 fL    MCH 29.4 27.0 - 31.0 pg    MCHC 30.1 (L) 33.0 - 36.0 g/dL    RDW 17.4 (H) 11.5 - 17.0 %    Platelet 86 (L) 130 - 400 x10(3)/mcL    MPV 11.8 (H) 7.4 - 10.4 fL    Neut % 55.7 %    Lymph % 27.0 %    Mono % 11.1 %    Eos % 5.6 %    Basophil % 0.3 %    Lymph # 0.97 0.6 - 4.6 x10(3)/mcL    Neut # 2.00 (L) 2.1 - 9.2 x10(3)/mcL    Mono # 0.40 0.1 - 1.3 x10(3)/mcL    Eos # 0.20 0 - 0.9 x10(3)/mcL    Baso # 0.01 <=0.2 x10(3)/mcL    IG# 0.01 0 - 0.04 x10(3)/mcL    IG% 0.3 %   Comprehensive Metabolic Panel   Result Value Ref Range    Sodium 138 136 - 145 mmol/L    Potassium 4.6 3.5 - 5.1 mmol/L    Chloride 109 (H) 98 - 107 mmol/L    CO2 19 (L) 23 - 31 mmol/L    Glucose 88 82 - 115 mg/dL    Blood Urea  Nitrogen 36.9 (H) 8.4 - 25.7 mg/dL    Creatinine 1.23 (H) 0.73 - 1.18 mg/dL    Calcium 8.3 (L) 8.8 - 10.0 mg/dL    Protein Total 5.9 5.8 - 7.6 gm/dL    Albumin 2.8 (L) 3.4 - 4.8 g/dL    Globulin 3.1 2.4 - 3.5 gm/dL    Albumin/Globulin Ratio 0.9 (L) 1.1 - 2.0 ratio    Bilirubin Total 1.0 <=1.5 mg/dL    ALP 65 40 - 150 unit/L    ALT 10 0 - 55 unit/L    AST 14 5 - 34 unit/L    eGFR >60 mls/min/1.73/m2   Magnesium   Result Value Ref Range    Magnesium Level 2.20 1.60 - 2.60 mg/dL   Phosphorus   Result Value Ref Range    Phosphorus Level 3.1 2.3 - 4.7 mg/dL   CBC with Differential   Result Value Ref Range    WBC 3.33 (L) 4.50 - 11.50 x10(3)/mcL    RBC 3.01 (L) 4.70 - 6.10 x10(6)/mcL    Hgb 8.9 (L) 14.0 - 18.0 g/dL    Hct 28.9 (L) 42.0 - 52.0 %    MCV 96.0 (H) 80.0 - 94.0 fL    MCH 29.6 27.0 - 31.0 pg    MCHC 30.8 (L) 33.0 - 36.0 g/dL    RDW 17.2 (H) 11.5 - 17.0 %    Platelet 98 (L) 130 - 400 x10(3)/mcL    MPV 10.9 (H) 7.4 - 10.4 fL    Neut % 49.6 %    Lymph % 29.7 %    Mono % 13.8 %    Eos % 6.3 %    Basophil % 0.3 %    Lymph # 0.99 0.6 - 4.6 x10(3)/mcL    Neut # 1.65 (L) 2.1 - 9.2 x10(3)/mcL    Mono # 0.46 0.1 - 1.3 x10(3)/mcL    Eos # 0.21 0 - 0.9 x10(3)/mcL    Baso # 0.01 <=0.2 x10(3)/mcL    IG# 0.01 0 - 0.04 x10(3)/mcL    IG% 0.3 %   Comprehensive Metabolic Panel   Result Value Ref Range    Sodium 140 136 - 145 mmol/L    Potassium 4.3 3.5 - 5.1 mmol/L    Chloride 110 (H) 98 - 107 mmol/L    CO2 23 23 - 31 mmol/L    Glucose 100 82 - 115 mg/dL    Blood Urea Nitrogen 23.7 8.4 - 25.7 mg/dL    Creatinine 1.18 0.73 - 1.18 mg/dL    Calcium 8.1 (L) 8.8 - 10.0 mg/dL    Protein Total 5.5 (L) 5.8 - 7.6 gm/dL    Albumin 2.6 (L) 3.4 - 4.8 g/dL    Globulin 2.9 2.4 - 3.5 gm/dL    Albumin/Globulin Ratio 0.9 (L) 1.1 - 2.0 ratio    Bilirubin Total 0.8 <=1.5 mg/dL    ALP 57 40 - 150 unit/L    ALT 9 0 - 55 unit/L    AST 14 5 - 34 unit/L    eGFR >60 mls/min/1.73/m2   Magnesium   Result Value Ref Range    Magnesium Level 2.00 1.60 - 2.60  mg/dL   Phosphorus   Result Value Ref Range    Phosphorus Level 2.9 2.3 - 4.7 mg/dL   CBC with Differential   Result Value Ref Range    WBC 2.92 (L) 4.50 - 11.50 x10(3)/mcL    RBC 2.93 (L) 4.70 - 6.10 x10(6)/mcL    Hgb 8.6 (L) 14.0 - 18.0 g/dL    Hct 28.7 (L) 42.0 - 52.0 %    MCV 98.0 (H) 80.0 - 94.0 fL    MCH 29.4 27.0 - 31.0 pg    MCHC 30.0 (L) 33.0 - 36.0 g/dL    RDW 17.2 (H) 11.5 - 17.0 %    Platelet 99 (L) 130 - 400 x10(3)/mcL    MPV 11.0 (H) 7.4 - 10.4 fL    Neut % 40.1 %    Lymph % 36.0 %    Mono % 15.4 %    Eos % 7.5 %    Basophil % 0.3 %    Lymph # 1.05 0.6 - 4.6 x10(3)/mcL    Neut # 1.17 (L) 2.1 - 9.2 x10(3)/mcL    Mono # 0.45 0.1 - 1.3 x10(3)/mcL    Eos # 0.22 0 - 0.9 x10(3)/mcL    Baso # 0.01 <=0.2 x10(3)/mcL    IG# 0.02 0 - 0.04 x10(3)/mcL    IG% 0.7 %   EKG 12-lead   Result Value Ref Range    QRS Duration 98 ms    [facility] QTC Calculation 430 ms   Echo   Result Value Ref Range    BSA 2.04 m2    EF 55 %    Est. RA pres 15 mmHg       Chemistry:  Lab Results   Component Value Date     02/24/2024    K 4.3 02/24/2024    BUN 23.7 02/24/2024    CREATININE 1.18 02/24/2024    EGFRNORACEVR >60 02/24/2024    GLUCOSE 100 02/24/2024    CALCIUM 8.1 (L) 02/24/2024    ALKPHOS 57 02/24/2024    LABPROT 5.5 (L) 02/24/2024    ALBUMIN 2.6 (L) 02/24/2024    AST 14 02/24/2024    ALT 9 02/24/2024    MG 2.00 02/24/2024    PHOS 2.9 02/24/2024    LMGRHUKC18VY 19.2 (L) 01/18/2023    TSH 3.312 06/12/2023    PSA 1.25 01/18/2023        Lab Results   Component Value Date    HGBA1C 5.3 01/18/2023        Hematology:  Lab Results   Component Value Date    WBC 2.92 (L) 02/24/2024    HGB 8.6 (L) 02/24/2024    HCT 28.7 (L) 02/24/2024    PLT 99 (L) 02/24/2024       Lipid Panel:  Lab Results   Component Value Date    CHOL 189 01/18/2023    HDL 84 (H) 01/18/2023    LDL 95.00 01/18/2023    TRIG 49 01/18/2023    TOTALCHOLEST 2 01/18/2023        Urine:  Lab Results   Component Value Date    APPEARANCEUA Clear 12/06/2023    SGUA 1.020 12/06/2023     PROTEINUA Trace (A) 12/06/2023    KETONESUA Negative 12/06/2023    LEUKOCYTESUR Negative 12/06/2023    RBCUA None Seen 12/06/2023    WBCUA None Seen 12/06/2023    BACTERIA None Seen 12/06/2023    CREATRANDUR 18.2 (L) 01/18/2023         Assessment            ICD-10-CM ICD-9-CM   1. Dizziness  R42 780.4       Plan       1. Dizziness  Cardio workup in place  Recently discontinue amiodarone, Eliquis, Entresto, Lasix per cardiologist he will follow up with them in 4 weeks follow up in clinic in 4 weeks ED precautions discussed.      Medication List with Changes/Refills   Current Medications    AMIODARONE (PACERONE) 200 MG TAB    Take 1 tablet (200 mg total) by mouth once daily.    ASPIRIN (ECOTRIN) 81 MG EC TABLET    Take 1 tablet (81 mg total) by mouth once daily.    ATORVASTATIN (LIPITOR) 40 MG TABLET    Take 1 tablet (40 mg total) by mouth every evening.    ELIQUIS 5 MG TAB    Take 5 mg by mouth 2 (two) times daily.    FEROSUL 325 MG (65 MG IRON) TAB TABLET    TAKE ONE TABLET BY MOUTH DAILY    FOLIC ACID (FOLVITE) 1 MG TABLET    TAKE ONE TABLET BY MOUTH DAILY    FUROSEMIDE (LASIX) 40 MG TABLET    TAKE ONE TABLET BY MOUTH DAILY    METOPROLOL SUCCINATE (TOPROL-XL) 25 MG 24 HR TABLET    Take 1 tablet (25 mg total) by mouth 2 (two) times daily.    QUETIAPINE (SEROQUEL) 100 MG TAB    Take 2 tablets (200 mg total) by mouth every evening.    SACUBITRIL-VALSARTAN (ENTRESTO) 24-26 MG PER TABLET    Take 1 tablet by mouth 2 (two) times daily.       Follow up in about 5 weeks (around 7/15/2024) for at 11 after he sees [facility] at 8;15 .   Follow up in about 5 weeks (around 7/15/2024) for at 11 after he sees [facility] at 8;15 . In addition to their scheduled follow up, the patient has also been instructed to follow up on as needed basis.   Future Appointments   Date Time Provider Department Center   7/15/2024 11:00 AM Darlene Willams, [facility]

## 2024-06-18 ENCOUNTER — PATIENT OUTREACH (OUTPATIENT)
Facility: CLINIC | Age: 69
End: 2024-06-18
Payer: MEDICARE

## 2024-06-18 NOTE — PROGRESS NOTES
Population Health Outreach. Health Maintenance Topic(s) Outreach Outcomes & Actions Taken:    Colorectal Cancer Screening - Outreach Outcomes & Actions Taken  : due    Blood Pressure - Outreach Outcomes & Actions Taken  : Primary Care Follow Up Visit Scheduled       5/11/23 2:39 PM Note Population Health Outreach. No record of colorectal screening found, called patient/no answer.     Colonoscopy -Referred 11/07/23 to Zayra Mcintosh MD.    Per 3/27/24 letter from Dr. Mcintosh's office-unable to schedule patient after several attempts.   Dr. Mcintosh's office phone #:[phone].     Additional Notes:  Next PCP appointment: 07/15/24 @ 1100    Called patient -phone number not in service.        Care Management, Digital Medicine, and/or Education Referrals  Cardiologist: Sreedhar Herrera  Next appointment: 07/15/24 @ 0815

## 2024-06-24 DIAGNOSIS — E43 SEVERE MALNUTRITION: ICD-10-CM

## 2024-06-24 RX ORDER — FOLIC ACID 1 MG/1
1000 TABLET ORAL
Qty: 30 TABLET | Refills: 3 | Status: SHIPPED | OUTPATIENT
Start: 2024-06-24

## 2024-07-15 DIAGNOSIS — Z76.0 MEDICATION REFILL: ICD-10-CM

## 2024-07-15 RX ORDER — ATORVASTATIN CALCIUM 40 MG/1
40 TABLET, FILM COATED ORAL NIGHTLY
Qty: 30 TABLET | Refills: 0 | Status: SHIPPED | OUTPATIENT
Start: 2024-07-15 | End: 2024-07-22 | Stop reason: SDUPTHER

## 2024-07-22 ENCOUNTER — OFFICE VISIT (OUTPATIENT)
Dept: FAMILY MEDICINE | Facility: CLINIC | Age: 69
End: 2024-07-22
Payer: MEDICARE

## 2024-07-22 VITALS
BODY MASS INDEX: 21.77 KG/M2 | DIASTOLIC BLOOD PRESSURE: 69 MMHG | HEIGHT: 74 IN | OXYGEN SATURATION: 100 % | TEMPERATURE: 98 F | HEART RATE: 68 BPM | SYSTOLIC BLOOD PRESSURE: 124 MMHG | WEIGHT: 169.63 LBS

## 2024-07-22 DIAGNOSIS — I48.20 CHRONIC A-FIB: ICD-10-CM

## 2024-07-22 DIAGNOSIS — Z76.0 MEDICATION REFILL: ICD-10-CM

## 2024-07-22 DIAGNOSIS — R42 DIZZINESS: Primary | ICD-10-CM

## 2024-07-22 PROCEDURE — 3078F DIAST BP <80 MM HG: CPT | Mod: ,,, | Performed by: NURSE PRACTITIONER

## 2024-07-22 PROCEDURE — 3074F SYST BP LT 130 MM HG: CPT | Mod: ,,, | Performed by: NURSE PRACTITIONER

## 2024-07-22 PROCEDURE — 3288F FALL RISK ASSESSMENT DOCD: CPT | Mod: ,,, | Performed by: NURSE PRACTITIONER

## 2024-07-22 PROCEDURE — 1159F MED LIST DOCD IN RCRD: CPT | Mod: ,,, | Performed by: NURSE PRACTITIONER

## 2024-07-22 PROCEDURE — 4010F ACE/ARB THERAPY RXD/TAKEN: CPT | Mod: ,,, | Performed by: NURSE PRACTITIONER

## 2024-07-22 PROCEDURE — 1126F AMNT PAIN NOTED NONE PRSNT: CPT | Mod: ,,, | Performed by: NURSE PRACTITIONER

## 2024-07-22 PROCEDURE — 99213 OFFICE O/P EST LOW 20 MIN: CPT | Mod: ,,, | Performed by: NURSE PRACTITIONER

## 2024-07-22 PROCEDURE — 1101F PT FALLS ASSESS-DOCD LE1/YR: CPT | Mod: ,,, | Performed by: NURSE PRACTITIONER

## 2024-07-22 PROCEDURE — 1160F RVW MEDS BY RX/DR IN RCRD: CPT | Mod: ,,, | Performed by: NURSE PRACTITIONER

## 2024-07-22 PROCEDURE — 3008F BODY MASS INDEX DOCD: CPT | Mod: ,,, | Performed by: NURSE PRACTITIONER

## 2024-07-22 RX ORDER — ATORVASTATIN CALCIUM 40 MG/1
40 TABLET, FILM COATED ORAL NIGHTLY
Qty: 30 TABLET | Refills: 0 | Status: SHIPPED | OUTPATIENT
Start: 2024-07-22

## 2024-07-22 RX ORDER — FERROUS SULFATE 325(65) MG
325 TABLET ORAL DAILY
Qty: 30 TABLET | Refills: 1 | Status: SHIPPED | OUTPATIENT
Start: 2024-07-22

## 2024-07-22 NOTE — PROGRESS NOTES
SUBJECTIVE:     History of Present Illness      Chief Complaint: No chief complaint on file.    HPI:  Patient is a 68 y.o. year old male who presents to clinic for follow up states that he still has some dizziness in the morning when he wakes up.  He missed his last appointment with cardiologist.  We will decrease his Seroquel to 50 mg at bedtime    Review of Systems:    Review of Systems    12 point review of systems conducted, negative except as stated in the history of present illness. See HPI for details.     Previous History    Darlene Willams, LOREN  Review of patient's allergies indicates:  No Known Allergies    Past Medical History:   Diagnosis Date    Atrial flutter     CHF (congestive heart failure)     Coronary artery disease     Hyperlipidemia     Hypertension     Myocardial infarction     SOB (shortness of breath)     at times     Current Outpatient Medications   Medication Instructions    aspirin (ECOTRIN) 81 mg, Oral, Daily    atorvastatin (LIPITOR) 40 mg, Oral, Nightly    ferrous sulfate (FEROSUL) 325 mg, Oral, Daily    folic acid (FOLVITE) 1,000 mcg, Oral    metoprolol succinate (TOPROL-XL) 25 mg, Oral, 2 times daily    QUEtiapine (SEROQUEL) 50 mg, Oral, Nightly     Past Surgical History:   Procedure Laterality Date    CATARACT EXTRACTION Bilateral     CORONARY ARTERY BYPASS GRAFT (CABG) N/A 4/3/2023    Procedure: CORONARY ARTERY BYPASS GRAFT (CABG);  Surgeon: Deuce Finley IV, MD;  Location: [facility];  Service: Cardiovascular;  Laterality: N/A;  WITH LLAA //  ECHO NOTIFIED    LEFT HEART CATHETERIZATION N/A 03/15/2023    Procedure: CATHETERIZATION, HEART, LEFT;  Surgeon: Sreedhar Herrera MD;  Location: [facility] CATH LAB;  Service: Cardiology;  Laterality: N/A;  [facility] via [facility]     Family History   Problem Relation Name Age of Onset    Heart attack Mother      Heart attack Brother         Social History     Tobacco Use    Smoking status: Never     Passive exposure: Never    Smokeless tobacco: Never  "  Substance Use Topics    Alcohol use: Yes     Alcohol/week: 84.0 standard drinks of alcohol     Types: 84 Cans of beer per week     Comment: alcoholic, daily, beer    Drug use: Yes     Frequency: 3.0 times per week     Types: Hydrocodone        Health Maintenance      Health Maintenance   Topic Date Due    TETANUS VACCINE  Never done    Colorectal Cancer Screening  Never done    Shingles Vaccine (1 of 2) Never done    High Dose Statin  07/22/2025    Lipid Panel  01/18/2028    Hepatitis C Screening  Completed       OBJECTIVE:     Physical Exam      Vital Signs Reviewed   Visit Vitals  /69   Pulse 68   Temp 97.9 °F (36.6 °C)   Ht 6' 2" (1.88 m)   Wt 76.9 kg (169 lb 9.6 oz)   SpO2 100%   BMI 21.78 kg/m²       Physical Exam    Physical Exam:  General: Alert, well nourished, no acute distress, non-toxic appearing.   Eyes: Anicteric sclera, without conjunctival injection, normal lids, no purulent drainage, EOMs grossly intact.   Ears: No tragal tenderness. Tympanic membranes intact, pearly grey, without effusion or erythema and with a positive light reflex.   Mouth: Posterior pharynx without erythema. No exudate, ulcerations, or lesion. No tonsillar swelling.   Neck: Supple, full ROM, no rigidity, no cervical adenopathy.   Cardio: Normal rate and rhythm    Resp: Respirations even and unlabored, clear to auscultation bilaterally.   Abd: No ecchymosis or distension. Normal bowel sounds in all 4 quadrants. No tenderness to palpation. No rebound tenderness or guarding. No CVA tenderness.   Skin: No rashes or open lesions noted.   MSK: No swelling. No abrasions or signs of trauma. Ambulating without assistance.   Neuro: Alert,oriented No focal deficits noted. Facial expressions even.   Psych: Cooperative, Normal affect      Procedures    Procedures     Labs     Results for orders placed or performed during the hospital encounter of 02/21/24   Comprehensive metabolic panel   Result Value Ref Range    Sodium 132 (L) 136 - " 145 mmol/L    Potassium 4.5 3.5 - 5.1 mmol/L    Chloride 102 98 - 107 mmol/L    CO2 19 (L) 23 - 31 mmol/L    Glucose 111 82 - 115 mg/dL    Blood Urea Nitrogen 57.8 (H) 8.4 - 25.7 mg/dL    Creatinine 2.32 (H) 0.73 - 1.18 mg/dL    Calcium 8.4 (L) 8.8 - 10.0 mg/dL    Protein Total 6.4 5.8 - 7.6 gm/dL    Albumin 3.1 (L) 3.4 - 4.8 g/dL    Globulin 3.3 2.4 - 3.5 gm/dL    Albumin/Globulin Ratio 0.9 (L) 1.1 - 2.0 ratio    Bilirubin Total 1.2 <=1.5 mg/dL    ALP 59 40 - 150 unit/L    ALT 9 0 - 55 unit/L    AST 14 5 - 34 unit/L    eGFR 29 mls/min/1.73/m2   Drug Screen, Urine   Result Value Ref Range    Amphetamines, Urine Negative Negative    Barbiturates, Urine Negative Negative    Benzodiazepine, Urine Negative Negative    Cannabinoids, Urine Negative Negative    Cocaine, Urine Negative Negative    Opiates, Urine Positive (A) Negative    Phencyclidine, Urine Negative Negative    pH, Urine 5.5 3.0 - 11.0    Specific Gravity, Urine Auto 1.020 1.001 - 1.035   Ethanol   Result Value Ref Range    Ethanol Level 62.0 (H) <=10.0 mg/dL   CBC with Differential   Result Value Ref Range    WBC 5.80 4.50 - 11.50 x10(3)/mcL    RBC 3.01 (L) 4.70 - 6.10 x10(6)/mcL    Hgb 8.9 (L) 14.0 - 18.0 g/dL    Hct 28.7 (L) 42.0 - 52.0 %    MCV 95.3 (H) 80.0 - 94.0 fL    MCH 29.6 27.0 - 31.0 pg    MCHC 31.0 (L) 33.0 - 36.0 g/dL    RDW 17.3 (H) 11.5 - 17.0 %    Platelet 109 (L) 130 - 400 x10(3)/mcL    MPV 11.5 (H) 7.4 - 10.4 fL    Neut % 66.5 %    Lymph % 20.2 %    Mono % 8.1 %    Eos % 4.7 %    Basophil % 0.3 %    Lymph # 1.17 0.6 - 4.6 x10(3)/mcL    Neut # 3.86 2.1 - 9.2 x10(3)/mcL    Mono # 0.47 0.1 - 1.3 x10(3)/mcL    Eos # 0.27 0 - 0.9 x10(3)/mcL    Baso # 0.02 <=0.2 x10(3)/mcL    IG# 0.01 0 - 0.04 x10(3)/mcL    IG% 0.2 %   COVID/FLU A&B PCR   Result Value Ref Range    Influenza A PCR Not Detected Not Detected    Influenza B PCR Not Detected Not Detected    SARS-CoV-2 PCR Not Detected Not Detected, Negative   Troponin ISTAT   Result Value Ref Range     POC Cardiac Troponin I 0.02 0.00 - 0.08 ng/mL    Sample unknown    CK   Result Value Ref Range    Creatine Kinase 28 (L) 30 - 200 U/L   Basic Metabolic Panel   Result Value Ref Range    Sodium 139 136 - 145 mmol/L    Potassium 4.5 3.5 - 5.1 mmol/L    Chloride 109 (H) 98 - 107 mmol/L    CO2 21 (L) 23 - 31 mmol/L    Glucose 94 82 - 115 mg/dL    Blood Urea Nitrogen 49.5 (H) 8.4 - 25.7 mg/dL    Creatinine 1.62 (H) 0.73 - 1.18 mg/dL    BUN/Creatinine Ratio 31     Calcium 8.1 (L) 8.8 - 10.0 mg/dL    Anion Gap 9.0 mEq/L    eGFR 45 mls/min/1.73/m2   Magnesium   Result Value Ref Range    Magnesium Level 2.30 1.60 - 2.60 mg/dL   Ethanol   Result Value Ref Range    Ethanol Level <10.0 <=10.0 mg/dL   CBC with Differential   Result Value Ref Range    WBC 3.59 (L) 4.50 - 11.50 x10(3)/mcL    RBC 2.86 (L) 4.70 - 6.10 x10(6)/mcL    Hgb 8.4 (L) 14.0 - 18.0 g/dL    Hct 27.9 (L) 42.0 - 52.0 %    MCV 97.6 (H) 80.0 - 94.0 fL    MCH 29.4 27.0 - 31.0 pg    MCHC 30.1 (L) 33.0 - 36.0 g/dL    RDW 17.4 (H) 11.5 - 17.0 %    Platelet 86 (L) 130 - 400 x10(3)/mcL    MPV 11.8 (H) 7.4 - 10.4 fL    Neut % 55.7 %    Lymph % 27.0 %    Mono % 11.1 %    Eos % 5.6 %    Basophil % 0.3 %    Lymph # 0.97 0.6 - 4.6 x10(3)/mcL    Neut # 2.00 (L) 2.1 - 9.2 x10(3)/mcL    Mono # 0.40 0.1 - 1.3 x10(3)/mcL    Eos # 0.20 0 - 0.9 x10(3)/mcL    Baso # 0.01 <=0.2 x10(3)/mcL    IG# 0.01 0 - 0.04 x10(3)/mcL    IG% 0.3 %   Comprehensive Metabolic Panel   Result Value Ref Range    Sodium 138 136 - 145 mmol/L    Potassium 4.6 3.5 - 5.1 mmol/L    Chloride 109 (H) 98 - 107 mmol/L    CO2 19 (L) 23 - 31 mmol/L    Glucose 88 82 - 115 mg/dL    Blood Urea Nitrogen 36.9 (H) 8.4 - 25.7 mg/dL    Creatinine 1.23 (H) 0.73 - 1.18 mg/dL    Calcium 8.3 (L) 8.8 - 10.0 mg/dL    Protein Total 5.9 5.8 - 7.6 gm/dL    Albumin 2.8 (L) 3.4 - 4.8 g/dL    Globulin 3.1 2.4 - 3.5 gm/dL    Albumin/Globulin Ratio 0.9 (L) 1.1 - 2.0 ratio    Bilirubin Total 1.0 <=1.5 mg/dL    ALP 65 40 - 150 unit/L     ALT 10 0 - 55 unit/L    AST 14 5 - 34 unit/L    eGFR >60 mls/min/1.73/m2   Magnesium   Result Value Ref Range    Magnesium Level 2.20 1.60 - 2.60 mg/dL   Phosphorus   Result Value Ref Range    Phosphorus Level 3.1 2.3 - 4.7 mg/dL   CBC with Differential   Result Value Ref Range    WBC 3.33 (L) 4.50 - 11.50 x10(3)/mcL    RBC 3.01 (L) 4.70 - 6.10 x10(6)/mcL    Hgb 8.9 (L) 14.0 - 18.0 g/dL    Hct 28.9 (L) 42.0 - 52.0 %    MCV 96.0 (H) 80.0 - 94.0 fL    MCH 29.6 27.0 - 31.0 pg    MCHC 30.8 (L) 33.0 - 36.0 g/dL    RDW 17.2 (H) 11.5 - 17.0 %    Platelet 98 (L) 130 - 400 x10(3)/mcL    MPV 10.9 (H) 7.4 - 10.4 fL    Neut % 49.6 %    Lymph % 29.7 %    Mono % 13.8 %    Eos % 6.3 %    Basophil % 0.3 %    Lymph # 0.99 0.6 - 4.6 x10(3)/mcL    Neut # 1.65 (L) 2.1 - 9.2 x10(3)/mcL    Mono # 0.46 0.1 - 1.3 x10(3)/mcL    Eos # 0.21 0 - 0.9 x10(3)/mcL    Baso # 0.01 <=0.2 x10(3)/mcL    IG# 0.01 0 - 0.04 x10(3)/mcL    IG% 0.3 %   Comprehensive Metabolic Panel   Result Value Ref Range    Sodium 140 136 - 145 mmol/L    Potassium 4.3 3.5 - 5.1 mmol/L    Chloride 110 (H) 98 - 107 mmol/L    CO2 23 23 - 31 mmol/L    Glucose 100 82 - 115 mg/dL    Blood Urea Nitrogen 23.7 8.4 - 25.7 mg/dL    Creatinine 1.18 0.73 - 1.18 mg/dL    Calcium 8.1 (L) 8.8 - 10.0 mg/dL    Protein Total 5.5 (L) 5.8 - 7.6 gm/dL    Albumin 2.6 (L) 3.4 - 4.8 g/dL    Globulin 2.9 2.4 - 3.5 gm/dL    Albumin/Globulin Ratio 0.9 (L) 1.1 - 2.0 ratio    Bilirubin Total 0.8 <=1.5 mg/dL    ALP 57 40 - 150 unit/L    ALT 9 0 - 55 unit/L    AST 14 5 - 34 unit/L    eGFR >60 mls/min/1.73/m2   Magnesium   Result Value Ref Range    Magnesium Level 2.00 1.60 - 2.60 mg/dL   Phosphorus   Result Value Ref Range    Phosphorus Level 2.9 2.3 - 4.7 mg/dL   CBC with Differential   Result Value Ref Range    WBC 2.92 (L) 4.50 - 11.50 x10(3)/mcL    RBC 2.93 (L) 4.70 - 6.10 x10(6)/mcL    Hgb 8.6 (L) 14.0 - 18.0 g/dL    Hct 28.7 (L) 42.0 - 52.0 %    MCV 98.0 (H) 80.0 - 94.0 fL    MCH 29.4 27.0 - 31.0  pg    MCHC 30.0 (L) 33.0 - 36.0 g/dL    RDW 17.2 (H) 11.5 - 17.0 %    Platelet 99 (L) 130 - 400 x10(3)/mcL    MPV 11.0 (H) 7.4 - 10.4 fL    Neut % 40.1 %    Lymph % 36.0 %    Mono % 15.4 %    Eos % 7.5 %    Basophil % 0.3 %    Lymph # 1.05 0.6 - 4.6 x10(3)/mcL    Neut # 1.17 (L) 2.1 - 9.2 x10(3)/mcL    Mono # 0.45 0.1 - 1.3 x10(3)/mcL    Eos # 0.22 0 - 0.9 x10(3)/mcL    Baso # 0.01 <=0.2 x10(3)/mcL    IG# 0.02 0 - 0.04 x10(3)/mcL    IG% 0.7 %   EKG 12-lead   Result Value Ref Range    QRS Duration 98 ms    OHS QTC Calculation 430 ms   Echo   Result Value Ref Range    BSA 2.04 m2    EF 55 %    Est. RA pres 15 mmHg       Chemistry:  Lab Results   Component Value Date     02/24/2024    K 4.3 02/24/2024    BUN 23.7 02/24/2024    CREATININE 1.18 02/24/2024    EGFRNORACEVR >60 02/24/2024    GLUCOSE 100 02/24/2024    CALCIUM 8.1 (L) 02/24/2024    ALKPHOS 57 02/24/2024    LABPROT 5.5 (L) 02/24/2024    ALBUMIN 2.6 (L) 02/24/2024    AST 14 02/24/2024    ALT 9 02/24/2024    MG 2.00 02/24/2024    PHOS 2.9 02/24/2024    [id number] 19.2 (L) 01/18/2023    TSH 3.312 06/12/2023    PSA 1.25 01/18/2023        Lab Results   Component Value Date    HGBA1C 5.3 01/18/2023        Hematology:  Lab Results   Component Value Date    WBC 2.92 (L) 02/24/2024    HGB 8.6 (L) 02/24/2024    HCT 28.7 (L) 02/24/2024    PLT 99 (L) 02/24/2024       Lipid Panel:  Lab Results   Component Value Date    CHOL 189 01/18/2023    HDL 84 (H) 01/18/2023    LDL 95.00 01/18/2023    TRIG 49 01/18/2023    TOTALCHOLEST 2 01/18/2023        Urine:  Lab Results   Component Value Date    APPEARANCEUA Clear 12/06/2023    SGUA 1.020 12/06/2023    PROTEINUA Trace (A) 12/06/2023    KETONESUA Negative 12/06/2023    LEUKOCYTESUR Negative 12/06/2023    RBCUA None Seen 12/06/2023    WBCUA None Seen 12/06/2023    BACTERIA None Seen 12/06/2023    CREATRANDUR 18.2 (L) 01/18/2023         Assessment            ICD-10-CM ICD-9-CM   1. Dizziness  R42 780.4   2. Medication refill   Z76.0 V68.1   3. Chronic a-fib  I48.20 427.31       Plan       1. Medication refill  - ferrous sulfate (FEROSUL) 325 mg (65 mg iron) Tab tablet; Take 1 tablet (325 mg total) by mouth once daily.  Dispense: 30 tablet; Refill: 1  - atorvastatin (LIPITOR) 40 MG tablet; Take 1 tablet (40 mg total) by mouth every evening.  Dispense: 30 tablet; Refill: 0  - QUEtiapine (SEROQUEL) 50 MG tablet; Take 1 tablet (50 mg total) by mouth every evening.  Dispense: 30 tablet; Refill: 4    2. Dizziness  Decrease Seroquel to 50 mg at bedtime.  3. Chronic a-fib  Rate controlled continue metoprolol follow up with cardiologist as recommended.  Orders Placed This Encounter    ferrous sulfate (FEROSUL) 325 mg (65 mg iron) Tab tablet    atorvastatin (LIPITOR) 40 MG tablet    QUEtiapine (SEROQUEL) 50 MG tablet      Medication List with Changes/Refills   Current Medications    ASPIRIN (ECOTRIN) 81 MG EC TABLET    Take 1 tablet (81 mg total) by mouth once daily.    FOLIC ACID (FOLVITE) 1 MG TABLET    TAKE ONE TABLET BY MOUTH ONCE DAILY    METOPROLOL SUCCINATE (TOPROL-XL) 25 MG 24 HR TABLET    Take 1 tablet (25 mg total) by mouth 2 (two) times daily.   Changed and/or Refilled Medications    Modified Medication Previous Medication    ATORVASTATIN (LIPITOR) 40 MG TABLET atorvastatin (LIPITOR) 40 MG tablet       Take 1 tablet (40 mg total) by mouth every evening.    TAKE 1 TABLET BY MOUTH EVERY EVENING    FERROUS SULFATE (FEROSUL) 325 MG (65 MG IRON) TAB TABLET FEROSUL 325 mg (65 mg iron) Tab tablet       Take 1 tablet (325 mg total) by mouth once daily.    TAKE ONE TABLET BY MOUTH DAILY    QUETIAPINE (SEROQUEL) 50 MG TABLET QUEtiapine (SEROQUEL) 100 MG Tab       Take 1 tablet (50 mg total) by mouth every evening.    Take 2 tablets (200 mg total) by mouth every evening.   Discontinued Medications    AMIODARONE (PACERONE) 200 MG TAB    Take 1 tablet (200 mg total) by mouth once daily.    ELIQUIS 5 MG TAB    Take 5 mg by mouth 2 (two) times daily.     FUROSEMIDE (LASIX) 40 MG TABLET    TAKE ONE TABLET BY MOUTH DAILY    SACUBITRIL-VALSARTAN (ENTRESTO) 24-26 MG PER TABLET    Take 1 tablet by mouth 2 (two) times daily.       Follow up in about 3 months (around 10/22/2024).   Follow up in about 3 months (around 10/22/2024). In addition to their scheduled follow up, the patient has also been instructed to follow up on as needed basis.   Future Appointments   Date Time Provider Department Center   10/22/2024 10:00 AM Darlene Willams, [facility]

## 2024-07-24 RX ORDER — QUETIAPINE FUMARATE 50 MG/1
50 TABLET, FILM COATED ORAL NIGHTLY
Qty: 30 TABLET | Refills: 4 | Status: SHIPPED | OUTPATIENT
Start: 2024-07-24

## 2024-09-18 DIAGNOSIS — Z76.0 MEDICATION REFILL: ICD-10-CM

## 2024-09-18 RX ORDER — ATORVASTATIN CALCIUM 40 MG/1
40 TABLET, FILM COATED ORAL NIGHTLY
Qty: 30 TABLET | Refills: 0 | Status: SHIPPED | OUTPATIENT
Start: 2024-09-18

## 2024-10-15 ENCOUNTER — TELEPHONE (OUTPATIENT)
Dept: FAMILY MEDICINE | Facility: CLINIC | Age: 69
End: 2024-10-15
Payer: MEDICARE

## 2024-10-18 DIAGNOSIS — Z76.0 MEDICATION REFILL: ICD-10-CM

## 2024-10-21 RX ORDER — ATORVASTATIN CALCIUM 40 MG/1
40 TABLET, FILM COATED ORAL NIGHTLY
Qty: 30 TABLET | Refills: 0 | Status: SHIPPED | OUTPATIENT
Start: 2024-10-21 | End: 2024-10-22 | Stop reason: SDUPTHER

## 2024-10-22 ENCOUNTER — OFFICE VISIT (OUTPATIENT)
Dept: FAMILY MEDICINE | Facility: CLINIC | Age: 69
End: 2024-10-22
Payer: MEDICARE

## 2024-10-22 VITALS
SYSTOLIC BLOOD PRESSURE: 134 MMHG | OXYGEN SATURATION: 99 % | TEMPERATURE: 98 F | RESPIRATION RATE: 17 BRPM | BODY MASS INDEX: 24.26 KG/M2 | HEART RATE: 73 BPM | WEIGHT: 189 LBS | HEIGHT: 74 IN | DIASTOLIC BLOOD PRESSURE: 79 MMHG

## 2024-10-22 DIAGNOSIS — I50.32 CHRONIC DIASTOLIC CONGESTIVE HEART FAILURE: Primary | ICD-10-CM

## 2024-10-22 DIAGNOSIS — Z76.0 MEDICATION REFILL: ICD-10-CM

## 2024-10-22 DIAGNOSIS — Z95.1 S/P CABG (CORONARY ARTERY BYPASS GRAFT): ICD-10-CM

## 2024-10-22 PROCEDURE — 4010F ACE/ARB THERAPY RXD/TAKEN: CPT | Mod: ,,, | Performed by: NURSE PRACTITIONER

## 2024-10-22 PROCEDURE — 3008F BODY MASS INDEX DOCD: CPT | Mod: ,,, | Performed by: NURSE PRACTITIONER

## 2024-10-22 PROCEDURE — 99213 OFFICE O/P EST LOW 20 MIN: CPT | Mod: ,,, | Performed by: NURSE PRACTITIONER

## 2024-10-22 PROCEDURE — 3078F DIAST BP <80 MM HG: CPT | Mod: ,,, | Performed by: NURSE PRACTITIONER

## 2024-10-22 PROCEDURE — 3075F SYST BP GE 130 - 139MM HG: CPT | Mod: ,,, | Performed by: NURSE PRACTITIONER

## 2024-10-22 PROCEDURE — 1159F MED LIST DOCD IN RCRD: CPT | Mod: ,,, | Performed by: NURSE PRACTITIONER

## 2024-10-22 PROCEDURE — 1126F AMNT PAIN NOTED NONE PRSNT: CPT | Mod: ,,, | Performed by: NURSE PRACTITIONER

## 2024-10-22 PROCEDURE — 3288F FALL RISK ASSESSMENT DOCD: CPT | Mod: ,,, | Performed by: NURSE PRACTITIONER

## 2024-10-22 PROCEDURE — 1100F PTFALLS ASSESS-DOCD GE2>/YR: CPT | Mod: ,,, | Performed by: NURSE PRACTITIONER

## 2024-10-22 RX ORDER — QUETIAPINE FUMARATE 50 MG/1
50 TABLET, FILM COATED ORAL NIGHTLY
Qty: 30 TABLET | Refills: 4 | Status: SHIPPED | OUTPATIENT
Start: 2024-10-22

## 2024-10-22 RX ORDER — QUETIAPINE FUMARATE 100 MG/1
200 TABLET, FILM COATED ORAL DAILY
COMMUNITY
End: 2024-10-22

## 2024-10-22 RX ORDER — ATORVASTATIN CALCIUM 40 MG/1
40 TABLET, FILM COATED ORAL NIGHTLY
Qty: 30 TABLET | Refills: 6 | Status: SHIPPED | OUTPATIENT
Start: 2024-10-22

## 2024-10-22 NOTE — PROGRESS NOTES
SUBJECTIVE:     History of Present Illness      Chief Complaint: Follow-up (3 month follow up for dizziness.  Patient states overall better.  Some days no energy.)    HPI:  Patient is a 69 y.o. year old male who presents to clinic for Follow-up for dizziness which has improved.    Patient reports that he is still a little tired in the morning although last appointment  decreased his Seroquel to 50 mg, he still has Seroquel 100mg in med bag. Labeled med do not take.     Review of Systems:    Review of Systems    12 point review of systems conducted, negative except as stated in the history of present illness. See HPI for details.     Previous History    Darlene Willams, DIVINAP  Review of patient's allergies indicates:  No Known Allergies    Past Medical History:   Diagnosis Date    Atrial flutter     CHF (congestive heart failure)     Coronary artery disease     Hyperlipidemia     Hypertension     Myocardial infarction     SOB (shortness of breath)     at times     Current Outpatient Medications   Medication Instructions    aspirin (ECOTRIN) 81 mg, Oral, Daily    atorvastatin (LIPITOR) 40 mg, Oral, Nightly    ferrous sulfate (FEROSUL) 325 mg, Oral, Daily    folic acid (FOLVITE) 1,000 mcg, Oral    metoprolol succinate (TOPROL-XL) 25 mg, Oral, 2 times daily    QUEtiapine (SEROQUEL) 50 mg, Oral, Nightly     Past Surgical History:   Procedure Laterality Date    CATARACT EXTRACTION Bilateral     CORONARY ARTERY BYPASS GRAFT (CABG) N/A 4/3/2023    Procedure: CORONARY ARTERY BYPASS GRAFT (CABG);  Surgeon: Deuce Finley IV, MD;  Location: [facility];  Service: Cardiovascular;  Laterality: N/A;  WITH LLAA //  ECHO NOTIFIED    LEFT HEART CATHETERIZATION N/A 03/15/2023    Procedure: CATHETERIZATION, HEART, LEFT;  Surgeon: Sreedhar Herrera MD;  Location: [facility] CATH LAB;  Service: Cardiology;  Laterality: N/A;  [facility] via [facility]     Family History   Problem Relation Name Age of Onset    Heart attack Mother      Heart attack Brother    "      Social History     Tobacco Use    Smoking status: Never     Passive exposure: Never    Smokeless tobacco: Never   Substance Use Topics    Alcohol use: Yes     Alcohol/week: 84.0 standard drinks of alcohol     Types: 84 Cans of beer per week     Comment: alcoholic, daily, beer    Drug use: Yes     Frequency: 3.0 times per week     Types: Hydrocodone        Health Maintenance      Health Maintenance   Topic Date Due    TETANUS VACCINE  Never done    Colorectal Cancer Screening  Never done    Shingles Vaccine (1 of 2) Never done    High Dose Statin  10/22/2025    Lipid Panel  01/18/2028    Hepatitis C Screening  Completed       OBJECTIVE:     Physical Exam      Vital Signs Reviewed   Visit Vitals  /79 (BP Location: Left arm, Patient Position: Sitting)   Pulse 73   Temp 97.8 °F (36.6 °C) (Oral)   Resp 17   Ht 6' 2" (1.88 m)   Wt 85.7 kg (189 lb)   SpO2 99%   BMI 24.27 kg/m²       Physical Exam    Physical Exam:  General: Alert, well nourished, no acute distress, non-toxic appearing.   Eyes: Anicteric sclera, without conjunctival injection, normal lids, no purulent drainage, EOMs grossly intact.   Ears: No tragal tenderness. Tympanic membranes intact, pearly grey, without effusion or erythema and with a positive light reflex.   Mouth: Posterior pharynx without erythema. No exudate, ulcerations, or lesion. No tonsillar swelling.   Neck: Supple, full ROM, no rigidity, no cervical adenopathy.   Cardio: Normal rate and rhythm    Resp: Respirations even and unlabored, clear to auscultation bilaterally.   Abd: No ecchymosis or distension. Normal bowel sounds in all 4 quadrants. No tenderness to palpation. No rebound tenderness or guarding. No CVA tenderness.   Skin: No rashes or open lesions noted.   MSK: No swelling. No abrasions or signs of trauma. Ambulating without assistance.   Neuro: Alert,oriented No focal deficits noted. Facial expressions even.   Psych: Cooperative, Normal affect      Procedures  "   Procedures     Labs     Results for orders placed or performed during the hospital encounter of 02/21/24   EKG 12-lead    Collection Time: 02/21/24  1:07 PM   Result Value Ref Range    QRS Duration 98 ms    [facility] QTC Calculation 430 ms   COVID/FLU A&B PCR    Collection Time: 02/21/24  1:15 PM   Result Value Ref Range    Influenza A PCR Not Detected Not Detected    Influenza B PCR Not Detected Not Detected    SARS-CoV-2 PCR Not Detected Not Detected, Negative   Troponin ISTAT    Collection Time: 02/21/24  1:17 PM   Result Value Ref Range    POC Cardiac Troponin I 0.02 0.00 - 0.08 ng/mL    Sample unknown    Comprehensive metabolic panel    Collection Time: 02/21/24  1:30 PM   Result Value Ref Range    Sodium 132 (L) 136 - 145 mmol/L    Potassium 4.5 3.5 - 5.1 mmol/L    Chloride 102 98 - 107 mmol/L    CO2 19 (L) 23 - 31 mmol/L    Glucose 111 82 - 115 mg/dL    Blood Urea Nitrogen 57.8 (H) 8.4 - 25.7 mg/dL    Creatinine 2.32 (H) 0.73 - 1.18 mg/dL    Calcium 8.4 (L) 8.8 - 10.0 mg/dL    Protein Total 6.4 5.8 - 7.6 gm/dL    Albumin 3.1 (L) 3.4 - 4.8 g/dL    Globulin 3.3 2.4 - 3.5 gm/dL    Albumin/Globulin Ratio 0.9 (L) 1.1 - 2.0 ratio    Bilirubin Total 1.2 <=1.5 mg/dL    ALP 59 40 - 150 unit/L    ALT 9 0 - 55 unit/L    AST 14 5 - 34 unit/L    eGFR 29 mls/min/1.73/m2   Ethanol    Collection Time: 02/21/24  1:30 PM   Result Value Ref Range    Ethanol Level 62.0 (H) <=10.0 mg/dL   CBC with Differential    Collection Time: 02/21/24  1:30 PM   Result Value Ref Range    WBC 5.80 4.50 - 11.50 x10(3)/mcL    RBC 3.01 (L) 4.70 - 6.10 x10(6)/mcL    Hgb 8.9 (L) 14.0 - 18.0 g/dL    Hct 28.7 (L) 42.0 - 52.0 %    MCV 95.3 (H) 80.0 - 94.0 fL    MCH 29.6 27.0 - 31.0 pg    MCHC 31.0 (L) 33.0 - 36.0 g/dL    RDW 17.3 (H) 11.5 - 17.0 %    Platelet 109 (L) 130 - 400 x10(3)/mcL    MPV 11.5 (H) 7.4 - 10.4 fL    Neut % 66.5 %    Lymph % 20.2 %    Mono % 8.1 %    Eos % 4.7 %    Basophil % 0.3 %    Lymph # 1.17 0.6 - 4.6 x10(3)/mcL    Neut # 3.86  2.1 - 9.2 x10(3)/mcL    Mono # 0.47 0.1 - 1.3 x10(3)/mcL    Eos # 0.27 0 - 0.9 x10(3)/mcL    Baso # 0.02 <=0.2 x10(3)/mcL    IG# 0.01 0 - 0.04 x10(3)/mcL    IG% 0.2 %   Drug Screen, Urine    Collection Time: 02/21/24  3:10 PM   Result Value Ref Range    Amphetamines, Urine Negative Negative    Barbiturates, Urine Negative Negative    Benzodiazepine, Urine Negative Negative    Cannabinoids, Urine Negative Negative    Cocaine, Urine Negative Negative    Opiates, Urine Positive (A) Negative    Phencyclidine, Urine Negative Negative    pH, Urine 5.5 3.0 - 11.0    Specific Gravity, Urine Auto 1.020 1.001 - 1.035   CK    Collection Time: 02/21/24  3:15 PM   Result Value Ref Range    Creatine Kinase 28 (L) 30 - 200 U/L   Basic Metabolic Panel    Collection Time: 02/22/24  4:40 AM   Result Value Ref Range    Sodium 139 136 - 145 mmol/L    Potassium 4.5 3.5 - 5.1 mmol/L    Chloride 109 (H) 98 - 107 mmol/L    CO2 21 (L) 23 - 31 mmol/L    Glucose 94 82 - 115 mg/dL    Blood Urea Nitrogen 49.5 (H) 8.4 - 25.7 mg/dL    Creatinine 1.62 (H) 0.73 - 1.18 mg/dL    BUN/Creatinine Ratio 31     Calcium 8.1 (L) 8.8 - 10.0 mg/dL    Anion Gap 9.0 mEq/L    eGFR 45 mls/min/1.73/m2   Magnesium    Collection Time: 02/22/24  4:40 AM   Result Value Ref Range    Magnesium Level 2.30 1.60 - 2.60 mg/dL   Ethanol    Collection Time: 02/22/24  4:40 AM   Result Value Ref Range    Ethanol Level <10.0 <=10.0 mg/dL   CBC with Differential    Collection Time: 02/22/24  4:40 AM   Result Value Ref Range    WBC 3.59 (L) 4.50 - 11.50 x10(3)/mcL    RBC 2.86 (L) 4.70 - 6.10 x10(6)/mcL    Hgb 8.4 (L) 14.0 - 18.0 g/dL    Hct 27.9 (L) 42.0 - 52.0 %    MCV 97.6 (H) 80.0 - 94.0 fL    MCH 29.4 27.0 - 31.0 pg    MCHC 30.1 (L) 33.0 - 36.0 g/dL    RDW 17.4 (H) 11.5 - 17.0 %    Platelet 86 (L) 130 - 400 x10(3)/mcL    MPV 11.8 (H) 7.4 - 10.4 fL    Neut % 55.7 %    Lymph % 27.0 %    Mono % 11.1 %    Eos % 5.6 %    Basophil % 0.3 %    Lymph # 0.97 0.6 - 4.6 x10(3)/mcL    Neut  # 2.00 (L) 2.1 - 9.2 x10(3)/mcL    Mono # 0.40 0.1 - 1.3 x10(3)/mcL    Eos # 0.20 0 - 0.9 x10(3)/mcL    Baso # 0.01 <=0.2 x10(3)/mcL    IG# 0.01 0 - 0.04 x10(3)/mcL    IG% 0.3 %   Echo    Collection Time: 02/22/24  8:19 PM   Result Value Ref Range    BSA 2.04 m2    EF 55 %    Est. RA pres 15 mmHg   Comprehensive Metabolic Panel    Collection Time: 02/23/24  5:10 AM   Result Value Ref Range    Sodium 138 136 - 145 mmol/L    Potassium 4.6 3.5 - 5.1 mmol/L    Chloride 109 (H) 98 - 107 mmol/L    CO2 19 (L) 23 - 31 mmol/L    Glucose 88 82 - 115 mg/dL    Blood Urea Nitrogen 36.9 (H) 8.4 - 25.7 mg/dL    Creatinine 1.23 (H) 0.73 - 1.18 mg/dL    Calcium 8.3 (L) 8.8 - 10.0 mg/dL    Protein Total 5.9 5.8 - 7.6 gm/dL    Albumin 2.8 (L) 3.4 - 4.8 g/dL    Globulin 3.1 2.4 - 3.5 gm/dL    Albumin/Globulin Ratio 0.9 (L) 1.1 - 2.0 ratio    Bilirubin Total 1.0 <=1.5 mg/dL    ALP 65 40 - 150 unit/L    ALT 10 0 - 55 unit/L    AST 14 5 - 34 unit/L    eGFR >60 mls/min/1.73/m2   Magnesium    Collection Time: 02/23/24  5:10 AM   Result Value Ref Range    Magnesium Level 2.20 1.60 - 2.60 mg/dL   Phosphorus    Collection Time: 02/23/24  5:10 AM   Result Value Ref Range    Phosphorus Level 3.1 2.3 - 4.7 mg/dL   CBC with Differential    Collection Time: 02/23/24  5:10 AM   Result Value Ref Range    WBC 3.33 (L) 4.50 - 11.50 x10(3)/mcL    RBC 3.01 (L) 4.70 - 6.10 x10(6)/mcL    Hgb 8.9 (L) 14.0 - 18.0 g/dL    Hct 28.9 (L) 42.0 - 52.0 %    MCV 96.0 (H) 80.0 - 94.0 fL    MCH 29.6 27.0 - 31.0 pg    MCHC 30.8 (L) 33.0 - 36.0 g/dL    RDW 17.2 (H) 11.5 - 17.0 %    Platelet 98 (L) 130 - 400 x10(3)/mcL    MPV 10.9 (H) 7.4 - 10.4 fL    Neut % 49.6 %    Lymph % 29.7 %    Mono % 13.8 %    Eos % 6.3 %    Basophil % 0.3 %    Lymph # 0.99 0.6 - 4.6 x10(3)/mcL    Neut # 1.65 (L) 2.1 - 9.2 x10(3)/mcL    Mono # 0.46 0.1 - 1.3 x10(3)/mcL    Eos # 0.21 0 - 0.9 x10(3)/mcL    Baso # 0.01 <=0.2 x10(3)/mcL    IG# 0.01 0 - 0.04 x10(3)/mcL    IG% 0.3 %   Comprehensive  Metabolic Panel    Collection Time: 02/24/24  5:26 AM   Result Value Ref Range    Sodium 140 136 - 145 mmol/L    Potassium 4.3 3.5 - 5.1 mmol/L    Chloride 110 (H) 98 - 107 mmol/L    CO2 23 23 - 31 mmol/L    Glucose 100 82 - 115 mg/dL    Blood Urea Nitrogen 23.7 8.4 - 25.7 mg/dL    Creatinine 1.18 0.73 - 1.18 mg/dL    Calcium 8.1 (L) 8.8 - 10.0 mg/dL    Protein Total 5.5 (L) 5.8 - 7.6 gm/dL    Albumin 2.6 (L) 3.4 - 4.8 g/dL    Globulin 2.9 2.4 - 3.5 gm/dL    Albumin/Globulin Ratio 0.9 (L) 1.1 - 2.0 ratio    Bilirubin Total 0.8 <=1.5 mg/dL    ALP 57 40 - 150 unit/L    ALT 9 0 - 55 unit/L    AST 14 5 - 34 unit/L    eGFR >60 mls/min/1.73/m2   Magnesium    Collection Time: 02/24/24  5:26 AM   Result Value Ref Range    Magnesium Level 2.00 1.60 - 2.60 mg/dL   Phosphorus    Collection Time: 02/24/24  5:26 AM   Result Value Ref Range    Phosphorus Level 2.9 2.3 - 4.7 mg/dL   CBC with Differential    Collection Time: 02/24/24  5:26 AM   Result Value Ref Range    WBC 2.92 (L) 4.50 - 11.50 x10(3)/mcL    RBC 2.93 (L) 4.70 - 6.10 x10(6)/mcL    Hgb 8.6 (L) 14.0 - 18.0 g/dL    Hct 28.7 (L) 42.0 - 52.0 %    MCV 98.0 (H) 80.0 - 94.0 fL    MCH 29.4 27.0 - 31.0 pg    MCHC 30.0 (L) 33.0 - 36.0 g/dL    RDW 17.2 (H) 11.5 - 17.0 %    Platelet 99 (L) 130 - 400 x10(3)/mcL    MPV 11.0 (H) 7.4 - 10.4 fL    Neut % 40.1 %    Lymph % 36.0 %    Mono % 15.4 %    Eos % 7.5 %    Basophil % 0.3 %    Lymph # 1.05 0.6 - 4.6 x10(3)/mcL    Neut # 1.17 (L) 2.1 - 9.2 x10(3)/mcL    Mono # 0.45 0.1 - 1.3 x10(3)/mcL    Eos # 0.22 0 - 0.9 x10(3)/mcL    Baso # 0.01 <=0.2 x10(3)/mcL    IG# 0.02 0 - 0.04 x10(3)/mcL    IG% 0.7 %       Chemistry:  Lab Results   Component Value Date     02/24/2024    K 4.3 02/24/2024    BUN 23.7 02/24/2024    CREATININE 1.18 02/24/2024    EGFRNORACEVR >60 02/24/2024    GLUCOSE 100 02/24/2024    CALCIUM 8.1 (L) 02/24/2024    ALKPHOS 57 02/24/2024    LABPROT 5.5 (L) 02/24/2024    ALBUMIN 2.6 (L) 02/24/2024    AST 14 02/24/2024     ALT 9 02/24/2024    MG 2.00 02/24/2024    PHOS 2.9 02/24/2024    ROEDIOND52QU 19.2 (L) 01/18/2023    TSH 3.312 06/12/2023    PSA 1.25 01/18/2023        Lab Results   Component Value Date    HGBA1C 5.3 01/18/2023        Hematology:  Lab Results   Component Value Date    WBC 2.92 (L) 02/24/2024    HGB 8.6 (L) 02/24/2024    HCT 28.7 (L) 02/24/2024    PLT 99 (L) 02/24/2024       Lipid Panel:  Lab Results   Component Value Date    CHOL 189 01/18/2023    HDL 84 (H) 01/18/2023    LDL 95.00 01/18/2023    TRIG 49 01/18/2023    TOTALCHOLEST 2 01/18/2023        Urine:  Lab Results   Component Value Date    APPEARANCEUA Clear 12/06/2023    SGUA 1.020 12/06/2023    PROTEINUA Trace (A) 12/06/2023    KETONESUA Negative 12/06/2023    LEUKOCYTESUR Negative 12/06/2023    RBCUA None Seen 12/06/2023    WBCUA None Seen 12/06/2023    BACTERIA None Seen 12/06/2023    CREATRANDUR 18.2 (L) 01/18/2023         Assessment            ICD-10-CM ICD-9-CM   1. Chronic diastolic congestive heart failure  I50.32 428.32     428.0   2. Medication refill  Z76.0 V68.1   3. S/P CABG (coronary artery bypass graft)  Z95.1 V45.81       Plan       1. Chronic diastolic congestive heart failure  Stable con metoprolol   2. Medication refill  - atorvastatin (LIPITOR) 40 MG tablet; Take 1 tablet (40 mg total) by mouth every evening.  Dispense: 30 tablet; Refill: 6      decrease- QUEtiapine (SEROQUEL) 50 MG tablet; Take 1 tablet (50 mg total) by mouth every evening.  Dispense: 30 tablet; Refill: 4    3. S/P CABG (coronary artery bypass graft)  Stable continue meds as prescribed   Orders Placed This Encounter    atorvastatin (LIPITOR) 40 MG tablet    QUEtiapine (SEROQUEL) 50 MG tablet      Medication List with Changes/Refills   Current Medications    ASPIRIN (ECOTRIN) 81 MG EC TABLET    Take 1 tablet (81 mg total) by mouth once daily.    FERROUS SULFATE (FEROSUL) 325 MG (65 MG IRON) TAB TABLET    Take 1 tablet (325 mg total) by mouth once daily.    FOLIC ACID  (FOLVITE) 1 MG TABLET    TAKE ONE TABLET BY MOUTH ONCE DAILY    METOPROLOL SUCCINATE (TOPROL-XL) 25 MG 24 HR TABLET    Take 1 tablet (25 mg total) by mouth 2 (two) times daily.   Changed and/or Refilled Medications    Modified Medication Previous Medication    ATORVASTATIN (LIPITOR) 40 MG TABLET atorvastatin (LIPITOR) 40 MG tablet       Take 1 tablet (40 mg total) by mouth every evening.    TAKE ONE TABLET BY MOUTH EVERY EVENING    QUETIAPINE (SEROQUEL) 50 MG TABLET QUEtiapine (SEROQUEL) 50 MG tablet       Take 1 tablet (50 mg total) by mouth every evening.    Take 1 tablet (50 mg total) by mouth every evening.   Discontinued Medications    QUETIAPINE (SEROQUEL) 100 MG TAB    Take 200 mg by mouth once daily.       No follow-ups on file.   No follow-ups on file. In addition to their scheduled follow up, the patient has also been instructed to follow up on as needed basis.   Future Appointments   Date Time Provider Department Center   11/19/2024 11:00 AM Darlene Willams, [facility]

## 2024-10-28 DIAGNOSIS — Z76.0 MEDICATION REFILL: ICD-10-CM

## 2024-10-28 RX ORDER — FERROUS SULFATE TAB 325 MG (65 MG ELEMENTAL FE) 325 (65 FE) MG
TAB ORAL
Qty: 30 TABLET | Refills: 1 | Status: SHIPPED | OUTPATIENT
Start: 2024-10-28

## 2024-11-07 ENCOUNTER — TELEPHONE (OUTPATIENT)
Dept: FAMILY MEDICINE | Facility: CLINIC | Age: 69
End: 2024-11-07
Payer: MEDICARE

## 2024-11-07 DIAGNOSIS — E43 SEVERE MALNUTRITION: ICD-10-CM

## 2024-11-07 RX ORDER — FOLIC ACID 1 MG/1
1000 TABLET ORAL DAILY
Qty: 30 TABLET | Refills: 3 | Status: SHIPPED | OUTPATIENT
Start: 2024-11-07

## 2024-11-07 NOTE — TELEPHONE ENCOUNTER
----- Message from Abigail sent at 11/7/2024  8:40 AM CST -----  Regarding: Pharmacy Called  Brianna from [facility] called for a refill request for patient. The medication is Folic Acid.

## 2024-11-11 DIAGNOSIS — E43 SEVERE MALNUTRITION: ICD-10-CM

## 2024-11-11 RX ORDER — FOLIC ACID 1 MG/1
1000 TABLET ORAL DAILY
Qty: 30 TABLET | Refills: 3 | Status: SHIPPED | OUTPATIENT
Start: 2024-11-11

## 2024-11-12 ENCOUNTER — TELEPHONE (OUTPATIENT)
Dept: FAMILY MEDICINE | Facility: CLINIC | Age: 69
End: 2024-11-12
Payer: MEDICARE

## 2024-11-12 DIAGNOSIS — Z00.00 WELLNESS EXAMINATION: ICD-10-CM

## 2024-11-12 DIAGNOSIS — E78.5 HYPERLIPIDEMIA, UNSPECIFIED HYPERLIPIDEMIA TYPE: ICD-10-CM

## 2024-11-12 DIAGNOSIS — D69.6 THROMBOCYTOPENIA: Primary | ICD-10-CM

## 2024-11-12 DIAGNOSIS — Z12.5 SCREENING PSA (PROSTATE SPECIFIC ANTIGEN): ICD-10-CM

## 2024-11-14 ENCOUNTER — LAB VISIT (OUTPATIENT)
Dept: LAB | Facility: HOSPITAL | Age: 69
End: 2024-11-14
Attending: NURSE PRACTITIONER
Payer: MEDICARE

## 2024-11-14 DIAGNOSIS — E78.5 HYPERLIPIDEMIA, UNSPECIFIED HYPERLIPIDEMIA TYPE: ICD-10-CM

## 2024-11-14 DIAGNOSIS — Z12.5 SCREENING PSA (PROSTATE SPECIFIC ANTIGEN): ICD-10-CM

## 2024-11-14 DIAGNOSIS — Z00.00 WELLNESS EXAMINATION: ICD-10-CM

## 2024-11-14 DIAGNOSIS — D69.6 THROMBOCYTOPENIA: ICD-10-CM

## 2024-11-14 LAB
25(OH)D3+25(OH)D2 SERPL-MCNC: 26 NG/ML (ref 30–80)
ALBUMIN SERPL-MCNC: 4.1 G/DL (ref 3.4–4.8)
ALBUMIN/GLOB SERPL: 1.3 RATIO (ref 1.1–2)
ALP SERPL-CCNC: 76 UNIT/L (ref 40–150)
ALT SERPL-CCNC: 28 UNIT/L (ref 0–55)
ANION GAP SERPL CALC-SCNC: 6 MEQ/L
AST SERPL-CCNC: 29 UNIT/L (ref 5–34)
BASOPHILS # BLD AUTO: 0.01 X10(3)/MCL
BASOPHILS NFR BLD AUTO: 0.2 %
BILIRUB SERPL-MCNC: 1.9 MG/DL
BUN SERPL-MCNC: 16.9 MG/DL (ref 8.4–25.7)
CALCIUM SERPL-MCNC: 9.5 MG/DL (ref 8.8–10)
CHLORIDE SERPL-SCNC: 100 MMOL/L (ref 98–107)
CHOLEST SERPL-MCNC: 134 MG/DL
CHOLEST/HDLC SERPL: 2 {RATIO} (ref 0–5)
CO2 SERPL-SCNC: 27 MMOL/L (ref 23–31)
CREAT SERPL-MCNC: 1 MG/DL (ref 0.72–1.25)
CREAT/UREA NIT SERPL: 17
EOSINOPHIL # BLD AUTO: 0.13 X10(3)/MCL (ref 0–0.9)
EOSINOPHIL NFR BLD AUTO: 2.9 %
ERYTHROCYTE [DISTWIDTH] IN BLOOD BY AUTOMATED COUNT: 14.6 % (ref 11.5–17)
EST. AVERAGE GLUCOSE BLD GHB EST-MCNC: 96.8 MG/DL
GFR SERPLBLD CREATININE-BSD FMLA CKD-EPI: >60 ML/MIN/1.73/M2
GLOBULIN SER-MCNC: 3.1 GM/DL (ref 2.4–3.5)
GLUCOSE SERPL-MCNC: 103 MG/DL (ref 82–115)
HBA1C MFR BLD: 5 %
HCT VFR BLD AUTO: 42.9 % (ref 42–52)
HDLC SERPL-MCNC: 65 MG/DL (ref 35–60)
HGB BLD-MCNC: 13.9 G/DL (ref 14–18)
IMM GRANULOCYTES # BLD AUTO: 0.02 X10(3)/MCL (ref 0–0.04)
IMM GRANULOCYTES NFR BLD AUTO: 0.4 %
LDLC SERPL CALC-MCNC: 58 MG/DL (ref 50–140)
LYMPHOCYTES # BLD AUTO: 1.52 X10(3)/MCL (ref 0.6–4.6)
LYMPHOCYTES NFR BLD AUTO: 34 %
MCH RBC QN AUTO: 29.8 PG (ref 27–31)
MCHC RBC AUTO-ENTMCNC: 32.4 G/DL (ref 33–36)
MCV RBC AUTO: 92.1 FL (ref 80–94)
MONOCYTES # BLD AUTO: 0.48 X10(3)/MCL (ref 0.1–1.3)
MONOCYTES NFR BLD AUTO: 10.7 %
NEUTROPHILS # BLD AUTO: 2.31 X10(3)/MCL (ref 2.1–9.2)
NEUTROPHILS NFR BLD AUTO: 51.8 %
PLATELET # BLD AUTO: 98 X10(3)/MCL (ref 130–400)
PMV BLD AUTO: 10.7 FL (ref 7.4–10.4)
POTASSIUM SERPL-SCNC: 4.6 MMOL/L (ref 3.5–5.1)
PROT SERPL-MCNC: 7.2 GM/DL (ref 5.8–7.6)
PSA SERPL-MCNC: 0.72 NG/ML
RBC # BLD AUTO: 4.66 X10(6)/MCL (ref 4.7–6.1)
SODIUM SERPL-SCNC: 133 MMOL/L (ref 136–145)
TRIGL SERPL-MCNC: 56 MG/DL (ref 34–140)
TSH SERPL-ACNC: 6.31 UIU/ML (ref 0.35–4.94)
VLDLC SERPL CALC-MCNC: 11 MG/DL
WBC # BLD AUTO: 4.47 X10(3)/MCL (ref 4.5–11.5)

## 2024-11-14 PROCEDURE — 85025 COMPLETE CBC W/AUTO DIFF WBC: CPT

## 2024-11-14 PROCEDURE — 82306 VITAMIN D 25 HYDROXY: CPT

## 2024-11-14 PROCEDURE — 84443 ASSAY THYROID STIM HORMONE: CPT

## 2024-11-14 PROCEDURE — 83036 HEMOGLOBIN GLYCOSYLATED A1C: CPT

## 2024-11-14 PROCEDURE — 80053 COMPREHEN METABOLIC PANEL: CPT

## 2024-11-14 PROCEDURE — 80061 LIPID PANEL: CPT

## 2024-11-14 PROCEDURE — 36415 COLL VENOUS BLD VENIPUNCTURE: CPT

## 2024-11-14 PROCEDURE — 84153 ASSAY OF PSA TOTAL: CPT

## 2024-11-19 ENCOUNTER — OFFICE VISIT (OUTPATIENT)
Dept: FAMILY MEDICINE | Facility: CLINIC | Age: 69
End: 2024-11-19
Payer: MEDICARE

## 2024-11-19 VITALS
OXYGEN SATURATION: 96 % | DIASTOLIC BLOOD PRESSURE: 74 MMHG | SYSTOLIC BLOOD PRESSURE: 173 MMHG | HEART RATE: 69 BPM | TEMPERATURE: 98 F

## 2024-11-19 DIAGNOSIS — F10.10 ETOH ABUSE: ICD-10-CM

## 2024-11-19 DIAGNOSIS — I25.118 CORONARY ARTERY DISEASE OF NATIVE ARTERY OF NATIVE HEART WITH STABLE ANGINA PECTORIS: ICD-10-CM

## 2024-11-19 DIAGNOSIS — Z00.00 WELLNESS EXAMINATION: Primary | ICD-10-CM

## 2024-11-19 DIAGNOSIS — Z12.11 SCREENING FOR MALIGNANT NEOPLASM OF COLON: ICD-10-CM

## 2024-11-19 DIAGNOSIS — I10 HYPERTENSION, UNSPECIFIED TYPE: ICD-10-CM

## 2024-11-19 PROCEDURE — 1159F MED LIST DOCD IN RCRD: CPT | Mod: ,,, | Performed by: NURSE PRACTITIONER

## 2024-11-19 PROCEDURE — G0439 PPPS, SUBSEQ VISIT: HCPCS | Mod: ,,, | Performed by: NURSE PRACTITIONER

## 2024-11-19 PROCEDURE — 3078F DIAST BP <80 MM HG: CPT | Mod: ,,, | Performed by: NURSE PRACTITIONER

## 2024-11-19 PROCEDURE — 4010F ACE/ARB THERAPY RXD/TAKEN: CPT | Mod: ,,, | Performed by: NURSE PRACTITIONER

## 2024-11-19 PROCEDURE — 1160F RVW MEDS BY RX/DR IN RCRD: CPT | Mod: ,,, | Performed by: NURSE PRACTITIONER

## 2024-11-19 PROCEDURE — 3044F HG A1C LEVEL LT 7.0%: CPT | Mod: ,,, | Performed by: NURSE PRACTITIONER

## 2024-11-19 PROCEDURE — 3077F SYST BP >= 140 MM HG: CPT | Mod: ,,, | Performed by: NURSE PRACTITIONER

## 2024-11-19 PROCEDURE — 1126F AMNT PAIN NOTED NONE PRSNT: CPT | Mod: ,,, | Performed by: NURSE PRACTITIONER

## 2024-11-19 RX ORDER — CLONIDINE HYDROCHLORIDE 0.1 MG/1
0.2 TABLET ORAL
Status: COMPLETED | OUTPATIENT
Start: 2024-11-19 | End: 2024-11-19

## 2024-11-19 RX ADMIN — CLONIDINE HYDROCHLORIDE 0.2 MG: 0.1 TABLET ORAL at 11:11

## 2024-11-19 NOTE — PROGRESS NOTES
Patient given Clonidine 0.2 mg po for elevated blood pressure.  Blood pressure will be retaken 15 minutes after Clonidine given.  Taken po without difficulty.

## 2024-11-25 NOTE — PROGRESS NOTES
Patient ID: [id number]     Chief Complaint: aw (Wellness with labs )      HPI:     Mike Urbina Jr. is a 69 y.o. male here today for a Medicare Wellness.   Dizziness has resolved -    Pt scheduled for colon screening.     -------------------------------------    Atrial flutter    CHF (congestive heart failure)    Coronary artery disease    Hyperlipidemia    Hypertension    Myocardial infarction    SOB (shortness of breath)    at times        Past Surgical History:   Procedure Laterality Date    CATARACT EXTRACTION Bilateral     CORONARY ARTERY BYPASS GRAFT (CABG) N/A 4/3/2023    Procedure: CORONARY ARTERY BYPASS GRAFT (CABG);  Surgeon: Deuce Finley IV, MD;  Location: [facility];  Service: Cardiovascular;  Laterality: N/A;  WITH LLAA //  ECHO NOTIFIED    LEFT HEART CATHETERIZATION N/A 03/15/2023    Procedure: CATHETERIZATION, HEART, LEFT;  Surgeon: Sreedhar Herrera MD;  Location: [facility] CATH LAB;  Service: Cardiology;  Laterality: N/A;  [facility] via [facility]       Review of patient's allergies indicates:  No Known Allergies    Outpatient Medications Marked as Taking for the 11/19/24 encounter (Office Visit) with Darlene Willams FNP   Medication Sig Dispense Refill    atorvastatin (LIPITOR) 40 MG tablet Take 1 tablet (40 mg total) by mouth every evening. 30 tablet 6    FEROSUL 325 mg (65 mg iron) Tab tablet TAKE ONE TABLET BY MOUTH ONCE DAILY 30 tablet 1    folic acid (FOLVITE) 1 MG tablet Take 1 tablet (1,000 mcg total) by mouth once daily. 30 tablet 3    metoprolol succinate (TOPROL-XL) 25 MG 24 hr tablet Take 1 tablet (25 mg total) by mouth 2 (two) times daily. 60 tablet 2    QUEtiapine (SEROQUEL) 50 MG tablet Take 1 tablet (50 mg total) by mouth every evening. 30 tablet 4       Social History     Socioeconomic History    Marital status: Single   Tobacco Use    Smoking status: Never     Passive exposure: Never    Smokeless tobacco: Never   Substance and Sexual Activity    Alcohol use: Yes     Alcohol/week: 84.0  standard drinks of alcohol     Types: 84 Cans of beer per week     Comment: alcoholic, daily, beer    Drug use: Yes     Frequency: 3.0 times per week     Types: Hydrocodone    Sexual activity: Not Currently   Social History Narrative    ** Merged History Encounter **          Social Drivers of Health     Financial Resource Strain: Low Risk  (2/22/2024)    Overall Financial Resource Strain (CARDIA)     Difficulty of Paying Living Expenses: Not hard at all   Food Insecurity: No Food Insecurity (2/22/2024)    Hunger Vital Sign     Worried About Running Out of Food in the Last Year: Never true     Ran Out of Food in the Last Year: Never true   Transportation Needs: No Transportation Needs (2/22/2024)    PRAPARE - Transportation     Lack of Transportation (Medical): No     Lack of Transportation (Non-Medical): No   Physical Activity: Inactive (2/22/2024)    Exercise Vital Sign     Days of Exercise per Week: 0 days     Minutes of Exercise per Session: 0 min   Stress: No Stress Concern Present (2/22/2024)    [place] [employer] - Occupational Stress Questionnaire     Feeling of Stress : Only a little   Housing Stability: Low Risk  (2/22/2024)    Housing Stability Vital Sign     Unable to Pay for Housing in the Last Year: No     Number of Places Lived in the Last Year: 1     Unstable Housing in the Last Year: No        Family History   Problem Relation Name Age of Onset    Heart attack Mother      Heart attack Brother          Patient Care Team:  Darlene Willams FNP as PCP - General (Family Medicine)  Rosalba Baxter LPN as Care Coordinator  Sreedhar Herrera MD as Consulting Physician (Cardiovascular Disease)     Subjective:   ROS      Objective:   BP (!) 173/74   Pulse 69   Temp 98.3 °F (36.8 °C)   SpO2 96%     Physical Exam    A comprehensive HEALTH RISK ASSESSMENT was completed today. Results are summarized below:    There are NO EMOTIONAL/SOCIAL CONCERNS identified on today's screening  for Social Isolation, Depression and Anxiety.    There are NO COGNITIVE FUNCTION CONCERNS identified on today's screening.  There are NO FUNCTIONAL OR SAFETY CONCERNS were identified on today's screening for Physical Symptoms, Nutritional, Cognitive Function, Home Safety/Living Situation, Fall Risk, Activities of Daily Living, Independent Activities of Daily Living, Physical Activity, Timed Up and Go test and Whisper test.   The patient reports NO OPIOID PRESCRIPTIONS. This was confirmed through medication reconciliation and the [facility] website.    The patient is NOT A TOBACCO USER.  The patient has reported possibly SIGNIFICANT ALCOHOL USE.  Alcohol Use: Alcohol Misuse (2/22/2024)    AUDIT-C     Frequency of Alcohol Consumption: 4 or more times a week     Average Number of Drinks: 5 or 6     Frequency of Binge Drinking: Daily or almost daily     The patient has DRUG USE: He reports current drug use. Frequency: 3.00 times per week. Drug: Hydrocodone.    All Questions regarding food, transportation or housing were not answered today.      Assessment/Plan:       ICD-10-CM ICD-9-CM   1. Wellness examination  Z00.00 V70.0   2. Hypertension, unspecified type  I10 401.9   3. Coronary artery disease of native artery of native heart with stable angina pectoris  I25.118 414.01     413.9   4. ETOH abuse  F10.10 305.00   5. Screening for malignant neoplasm of colon  Z12.11 V76.51     1. Wellness examination  Discussed labs and preventative screenings   Overall health status reviewed.    Significant chronic conditions addressed, including ongoing treatment plans.   Good health habits reinforced.    Cardiovascular disease risk factors discussed.   Appropriate recommendations and preventative care medical   information provided with annual wellness exams encouraged.  Vaccination status     Colon schedule at [facility] 12/3 Dr Mcintosh   PSA  WNL     2. Hypertension, unspecified type  Elevated today in clinic  Continue current medication  as prescribed   Low salt/ DASH diet   Discussed lifestyle modifications.   encourage aerobic excise at least 30 mins a day   Monitor BP daily goal less than 140/90.   Denies headaches, blurred vision, or dizziness  -     cloNIDine tablet 0.2 mg oral  in clinic     3. Coronary artery disease of native artery of native heart with stable angina pectoris  Stable   Continue routine medication keep routine appointment with Cardiology    4. ETOH abuse  Abstain from alcohol    5. Screening for malignant neoplasm of colon  Scheduled for colonoscopy a [facility] Medicare Annual Wellness and Personalized Prevention Plan:   Fall Risk + Home Safety + Hearing Impairment + Depression Screen + Opioid and Substance Abuse Screening + Cognitive Impairment Screen + Health Risk Assessment all reviewed.     Health Maintenance Topics with due status: Not Due       Topic Last Completion Date    Lipid Panel 11/14/2024    High Dose Statin 11/19/2024      The patient's Health Maintenance was reviewed and the following appears to be due at this time:   Health Maintenance Due   Topic Date Due    Pneumococcal Vaccines (Age 65+) (1 of 2 - PCV) Never done    TETANUS VACCINE  Never done    Colorectal Cancer Screening  Never done    Shingles Vaccine (1 of 2) Never done    RSV Vaccine (Age 60+ and Pregnant patients) (1 - Risk 60-74 years 1-dose series) Never done    Influenza Vaccine (1) 09/01/2024    COVID-19 Vaccine (1 - 2024-25 season) Never done       Advance Care Planning   I attest to discussing Advance Care Planning with patient and/or family member.  Education was provided including the importance of the Health Care Power of , Advance Directives, and/or [place] documentation.  The patient expressed understanding to the importance of this information and discussion.       Future Appointments   Date Time Provider Department Center   11/21/2025  9:00 AM Darlene Willams, [facility]     No follow-ups on  file. In addition to their scheduled follow up, the patient has also been instructed to follow up on as needed basis.

## 2024-11-29 RX ORDER — FUROSEMIDE 20 MG/1
20 TABLET ORAL DAILY
COMMUNITY

## 2024-11-29 RX ORDER — AMIODARONE HYDROCHLORIDE 200 MG/1
200 TABLET ORAL DAILY
COMMUNITY
End: 2024-12-09

## 2024-11-29 RX ORDER — HYDROXYZINE PAMOATE 50 MG/1
25 CAPSULE ORAL EVERY 8 HOURS PRN
COMMUNITY

## 2024-11-29 RX ORDER — SODIUM CHLORIDE 0.9 % (FLUSH) 0.9 %
10 SYRINGE (ML) INJECTION
OUTPATIENT
Start: 2024-11-29

## 2024-12-01 ENCOUNTER — ANESTHESIA EVENT (OUTPATIENT)
Dept: SURGERY | Facility: HOSPITAL | Age: 69
End: 2024-12-01
Payer: MEDICARE

## 2024-12-02 NOTE — ANESTHESIA PREPROCEDURE EVALUATION
12/02/2024  Mike Urbina Jr. is a 69 y.o., male.      Pre-op Assessment    I have reviewed the Patient Summary Reports.     I have reviewed the Nursing Notes. I have reviewed the NPO Status.   I have reviewed the Medications.     Review of Systems  Anesthesia Hx:             Denies Family Hx of Anesthesia complications.    Denies Personal Hx of Anesthesia complications.                    Social:  Smoker, Alcohol Use   Tobacco Use:  Alcohol Use:    Hematology/Oncology:  Hematology Normal   Oncology Normal                                   EENT/Dental:  EENT/Dental Normal           Cardiovascular:     Hypertension  Past MI CAD      Angina CHF            Cardiovascular Symptoms: Angina   Shortness of Breath    Coronary Artery Disease:          Hx of Myocardial Infarction, MI was > 1 year ago   Aortic Stenosis (AS)       Congestive Heart Failure (CHF)                Hypertension     Disorder of Cardiac Rhythm, Atrial Fibrillation, Atrial Flutter     Pulmonary:      Shortness of breath                  Renal/:  Renal/ Normal                 Hepatic/GI:  Hepatic/GI Normal                    Musculoskeletal:  Musculoskeletal Normal                Neurological:    Neuromuscular Disease,                                 Neuromuscular Disease   Endocrine:  Endocrine Normal            Dermatological:  Skin Normal    Psych:  Psychiatric Normal                  Physical Exam  General: Well nourished, Cooperative, Alert and Oriented    Chest/Lungs:  Clear to auscultation, Normal Respiratory Rate    Heart:  Rate: Normal  Rhythm: Regular Rhythm      Anesthesia Plan  Type of Anesthesia, risks & benefits discussed:    Anesthesia Type: Gen Natural Airway  Intra-op Monitoring Plan: Standard ASA Monitors  Post Op Pain Control Plan: multimodal analgesia  Induction:  IV  Informed Consent: Informed consent signed with  the Patient and all parties understand the risks and agree with anesthesia plan.  All questions answered. Patient consented to blood products? No  ASA Score: 3  Day of Surgery Review of History & Physical: H&P Update referred to the surgeon/provider.I have interviewed and examined the patient. I have reviewed the patient's H&P dated: 12/3/24. There are no significant changes.     Ready For Surgery From Anesthesia Perspective.     .

## 2024-12-03 ENCOUNTER — ANESTHESIA (OUTPATIENT)
Dept: SURGERY | Facility: HOSPITAL | Age: 69
End: 2024-12-03
Payer: MEDICARE

## 2024-12-03 ENCOUNTER — HOSPITAL ENCOUNTER (OUTPATIENT)
Facility: HOSPITAL | Age: 69
Discharge: HOME | End: 2024-12-03
Attending: SURGERY | Admitting: SURGERY
Payer: MEDICARE

## 2024-12-03 DIAGNOSIS — Z12.11 ENCOUNTER FOR SCREENING COLONOSCOPY: ICD-10-CM

## 2024-12-03 PROCEDURE — 37000008 HC ANESTHESIA 1ST 15 MINUTES: Performed by: SURGERY

## 2024-12-03 PROCEDURE — G0121 COLON CA SCRN NOT HI RSK IND: HCPCS | Performed by: SURGERY

## 2024-12-03 PROCEDURE — 37000009 HC ANESTHESIA EA ADD 15 MINS: Performed by: SURGERY

## 2024-12-03 PROCEDURE — 25000003 PHARM REV CODE 250: Performed by: NURSE ANESTHETIST, CERTIFIED REGISTERED

## 2024-12-03 PROCEDURE — 63600175 PHARM REV CODE 636 W HCPCS: Performed by: NURSE ANESTHETIST, CERTIFIED REGISTERED

## 2024-12-03 PROCEDURE — D9220A PRA ANESTHESIA: Mod: ,,, | Performed by: NURSE ANESTHETIST, CERTIFIED REGISTERED

## 2024-12-03 RX ORDER — PROPOFOL 10 MG/ML
VIAL (ML) INTRAVENOUS
Status: DISCONTINUED | OUTPATIENT
Start: 2024-12-03 | End: 2024-12-03

## 2024-12-03 RX ORDER — LIDOCAINE HYDROCHLORIDE 20 MG/ML
INJECTION INTRAVENOUS
Status: DISCONTINUED | OUTPATIENT
Start: 2024-12-03 | End: 2024-12-03

## 2024-12-03 RX ORDER — SODIUM CHLORIDE 9 MG/ML
INJECTION, SOLUTION INTRAVENOUS CONTINUOUS
Status: DISCONTINUED | OUTPATIENT
Start: 2024-12-03 | End: 2024-12-03 | Stop reason: HOSPADM

## 2024-12-03 RX ADMIN — PROPOFOL 100 MG: 10 INJECTION, EMULSION INTRAVENOUS at 08:12

## 2024-12-03 RX ADMIN — SODIUM CHLORIDE: 9 INJECTION, SOLUTION INTRAVENOUS at 08:12

## 2024-12-03 RX ADMIN — LIDOCAINE HYDROCHLORIDE 50 MG: 20 INJECTION, SOLUTION INTRAVENOUS at 08:12

## 2024-12-03 NOTE — OP NOTE
[facility] - [facility]  Brief Operative Note    Surgery Date: 12/3/2024     Surgeons and Role:     * Zayra Mcintosh MD - Primary    Assisting Surgeon: None    Pre-op Diagnosis:  Screening for malignant neoplasm of colon [Z12.11]    Post-op Diagnosis:  Post-Op Diagnosis Codes:     * Screening for malignant neoplasm of colon [Z12.11]    Procedure(s) (LRB):  COLONOSCOPY (N/A)    Anesthesia: Monitor Anesthesia Care    Operative Findings:  Normal exam    Estimated Blood Loss: * No values recorded between 12/3/2024  8:52 AM and 12/3/2024  9:05 AM *         Specimens:   Specimen (24h ago, onward)      None              Discharge Note    OUTCOME: Patient tolerated treatment/procedure well without complication and is now ready for discharge.    DISPOSITION: Home or Self Care    FINAL DIAGNOSIS:  <principal problem not specified>    FOLLOWUP: With primary care provider    DISCHARGE INSTRUCTIONS:  No discharge procedures on file.     Clinical Reference Documents Added to Patient Instructions         Document    COLONOSCOPY DISCHARGE INSTRUCTIONS (ENGLISH)

## 2024-12-03 NOTE — OP NOTE
[facility]  Operative Note      Date of Procedure: 12/3/2024     Procedure: Procedure(s) (LRB):  COLONOSCOPY (N/A)     Surgeons and Role:     * Zayra Mcintosh MD - Primary    Assisting Surgeon: None    Pre-Operative Diagnosis: Screening for malignant neoplasm of colon [Z12.11]    Post-Operative Diagnosis: Post-Op Diagnosis Codes:     * Screening for malignant neoplasm of colon [Z12.11]    Anesthesia: Monitor Anesthesia Care    Operative Findings (including complications, if any):  Normal exam    Description of Technical Procedures:  After obtaining consent patient was taken to the operating room placed in left lateral decubitus position given IV sedation which tolerated well.  Colonoscope was introduced through the rectum passed to the cecum no abnormalities were noted throughout the entirety exam.  Scope was withdrawn patient tolerated procedure well be discharged for follow up with primary care provider and cleared for follow up colonoscopy in 10 years    Significant Surgical Tasks Conducted by the Assistant(s), if Applicable:  Not applicable    Estimated Blood Loss (EBL): * No values recorded between 12/3/2024  8:52 AM and 12/3/2024  9:06 AM *           Implants: * No implants in log *    Specimens:   Specimen (24h ago, onward)      None                    Condition: Good    Disposition: PACU - hemodynamically stable.    Attestation: A qualified resident physician was not available.    Discharge Note    OUTCOME: Patient tolerated treatment/procedure well without complication and is now ready for discharge.    DISPOSITION: Home or Self Care    FINAL DIAGNOSIS:  <principal problem not specified>    FOLLOWUP: With primary care provider    DISCHARGE INSTRUCTIONS:  No discharge procedures on file.     Clinical Reference Documents Added to Patient Instructions         Document    COLONOSCOPY DISCHARGE INSTRUCTIONS (ENGLISH)

## 2024-12-03 NOTE — H&P
H&P completed on 11 19 24 has been reviewed, the patient has been examined and:  I concur with the findings and no changes have occurred since H&P was written.    There are no hospital problems to display for this patient.

## 2024-12-03 NOTE — TRANSFER OF CARE
"Anesthesia Transfer of Care Note    Patient: Mike Urbina Jr.    Procedure(s) Performed: Procedure(s) (LRB):  COLONOSCOPY (N/A)    Patient location: OPS    Anesthesia Type: general    Transport from OR: Transported from OR on room air with adequate spontaneous ventilation    Post pain: adequate analgesia    Post assessment: no apparent anesthetic complications    Post vital signs: stable    Level of consciousness: awake, alert and oriented    Nausea/Vomiting: no nausea/vomiting    Complications: none    Transfer of care protocol was followedComments: /73  HR 63  RR 16  O2 Sat 100  Temp 36.6      Last vitals: Visit Vitals  BP (!) 171/84 (BP Location: Left arm, Patient Position: Sitting)   Pulse 66   Temp 37.1 °C (98.8 °F) (Temporal)   Resp 18   Ht 6' 2" (1.88 m)   Wt 84.6 kg (186 lb 8.2 oz)   SpO2 100%   BMI 23.95 kg/m²     "

## 2024-12-03 NOTE — ANESTHESIA POSTPROCEDURE EVALUATION
Anesthesia Post Evaluation    Patient: Mike Urbina JrVan    Procedure(s) Performed: Procedure(s) (LRB):  COLONOSCOPY (N/A)    Final Anesthesia Type: general      Patient location during evaluation: OPS  Patient participation: Yes- Able to Participate  Level of consciousness: awake and alert and oriented  Post-procedure vital signs: reviewed and stable  Pain management: adequate  Airway patency: patent    PONV status at discharge: No PONV  Anesthetic complications: no      Cardiovascular status: stable  Respiratory status: unassisted, spontaneous ventilation and room air  Hydration status: euvolemic  Follow-up not needed.                  No case tracking events are documented in the log.      Pain/Wanda Score: No data recorded

## 2024-12-06 VITALS
OXYGEN SATURATION: 98 % | DIASTOLIC BLOOD PRESSURE: 90 MMHG | WEIGHT: 186.5 LBS | RESPIRATION RATE: 20 BRPM | BODY MASS INDEX: 23.93 KG/M2 | HEIGHT: 74 IN | SYSTOLIC BLOOD PRESSURE: 162 MMHG | TEMPERATURE: 99 F | HEART RATE: 64 BPM

## 2024-12-09 ENCOUNTER — OFFICE VISIT (OUTPATIENT)
Dept: FAMILY MEDICINE | Facility: CLINIC | Age: 69
End: 2024-12-09
Payer: MEDICARE

## 2024-12-09 VITALS
HEART RATE: 80 BPM | SYSTOLIC BLOOD PRESSURE: 138 MMHG | RESPIRATION RATE: 16 BRPM | DIASTOLIC BLOOD PRESSURE: 73 MMHG | BODY MASS INDEX: 24.68 KG/M2 | TEMPERATURE: 98 F | HEIGHT: 74 IN | WEIGHT: 192.31 LBS | OXYGEN SATURATION: 99 %

## 2024-12-09 DIAGNOSIS — Z12.11 SCREENING FOR MALIGNANT NEOPLASM OF COLON: ICD-10-CM

## 2024-12-09 DIAGNOSIS — I10 HYPERTENSION, UNSPECIFIED TYPE: Primary | ICD-10-CM

## 2024-12-09 PROCEDURE — 3044F HG A1C LEVEL LT 7.0%: CPT | Mod: ,,, | Performed by: NURSE PRACTITIONER

## 2024-12-09 PROCEDURE — 1101F PT FALLS ASSESS-DOCD LE1/YR: CPT | Mod: ,,, | Performed by: NURSE PRACTITIONER

## 2024-12-09 PROCEDURE — 3288F FALL RISK ASSESSMENT DOCD: CPT | Mod: ,,, | Performed by: NURSE PRACTITIONER

## 2024-12-09 PROCEDURE — 1126F AMNT PAIN NOTED NONE PRSNT: CPT | Mod: ,,, | Performed by: NURSE PRACTITIONER

## 2024-12-09 PROCEDURE — 4010F ACE/ARB THERAPY RXD/TAKEN: CPT | Mod: ,,, | Performed by: NURSE PRACTITIONER

## 2024-12-09 PROCEDURE — 99213 OFFICE O/P EST LOW 20 MIN: CPT | Mod: ,,, | Performed by: NURSE PRACTITIONER

## 2024-12-09 PROCEDURE — 3008F BODY MASS INDEX DOCD: CPT | Mod: ,,, | Performed by: NURSE PRACTITIONER

## 2024-12-09 PROCEDURE — 1159F MED LIST DOCD IN RCRD: CPT | Mod: ,,, | Performed by: NURSE PRACTITIONER

## 2024-12-09 PROCEDURE — 1160F RVW MEDS BY RX/DR IN RCRD: CPT | Mod: ,,, | Performed by: NURSE PRACTITIONER

## 2024-12-09 PROCEDURE — 3075F SYST BP GE 130 - 139MM HG: CPT | Mod: ,,, | Performed by: NURSE PRACTITIONER

## 2024-12-09 PROCEDURE — 3078F DIAST BP <80 MM HG: CPT | Mod: ,,, | Performed by: NURSE PRACTITIONER

## 2024-12-09 NOTE — PROGRESS NOTES
SUBJECTIVE:     History of Present Illness      Chief Complaint: Follow-up (Follow up after colonoscopy on December 3rd, blood pressure was elevated.  No complaints at this time.)    HPI:  Patient is a 69 y.o. year old male who presents to clinic for follow-up for elevated blood pressure readings during colonoscopy.  Patient denies any chest pain, SOB, dizziness or blurred vision.  Recent had colonoscopy screening with Dr. Mcintosh findings within normal repeat in 10 years.  Blood pressure normotensive today.  Patient currently taking metoprolol    Review of Systems:    Review of Systems    12 point review of systems conducted, negative except as stated in the history of present illness. See HPI for details.     Previous History    Darlene Willams, DIVIN[facility]  Review of patient's allergies indicates:  No Known Allergies    Past Medical History:   Diagnosis Date    Atrial flutter     CHF (congestive heart failure)     Coronary artery disease     Hyperlipidemia     Hypertension     Myocardial infarction     SOB (shortness of breath)     at times     Current Outpatient Medications   Medication Instructions    aspirin (ECOTRIN) 81 mg, Oral, Daily    atorvastatin (LIPITOR) 40 mg, Oral, Nightly    FEROSUL 325 mg (65 mg iron) Tab tablet TAKE ONE TABLET BY MOUTH ONCE DAILY    folic acid (FOLVITE) 1,000 mcg, Oral, Daily    furosemide (LASIX) 20 mg, Daily    hydrOXYzine pamoate (VISTARIL) 25 mg, Every 8 hours PRN    metoprolol succinate (TOPROL-XL) 25 mg, Oral, 2 times daily    QUEtiapine (SEROQUEL) 50 mg, Oral, Nightly     Past Surgical History:   Procedure Laterality Date    CATARACT EXTRACTION Bilateral     COLONOSCOPY N/A 12/3/2024    Procedure: COLONOSCOPY;  Surgeon: Zayra Mcintosh MD;  Location: [facility];  Service: General;  Laterality: N/A;    CORONARY ARTERY BYPASS GRAFT (CABG) N/A 4/3/2023    Procedure: CORONARY ARTERY BYPASS GRAFT (CABG);  Surgeon: Deuce Finley IV, MD;  Location: [facility];  Service:  "Cardiovascular;  Laterality: N/A;  WITH LLAA //  ECHO NOTIFIED    LEFT HEART CATHETERIZATION N/A 03/15/2023    Procedure: CATHETERIZATION, HEART, LEFT;  Surgeon: Sreedhar Herrera MD;  [facility] CATH LAB;  Service: Cardiology;  Laterality: N/A;  C via RRA     Family History   Problem Relation Name Age of Onset    Heart attack Mother      Heart attack Brother         Social History     Tobacco Use    Smoking status: Never     Passive exposure: Never    Smokeless tobacco: Never   Substance Use Topics    Alcohol use: Yes     Alcohol/week: 84.0 standard drinks of alcohol     Types: 84 Cans of beer per week     Comment: alcoholic, daily, beer    Drug use: Yes     Frequency: 3.0 times per week        Health Maintenance      Health Maintenance   Topic Date Due    Pneumococcal Vaccines (Age 65+) (1 of 2 - PCV) Never done    TETANUS VACCINE  Never done    Shingles Vaccine (1 of 2) Never done    RSV Vaccine (Age 60+ and Pregnant patients) (1 - Risk 60-74 years 1-dose series) Never done    Influenza Vaccine (1) 09/01/2024    COVID-19 Vaccine (1 - 2024-25 season) Never done    High Dose Statin  12/09/2025    Lipid Panel  11/14/2029    Colorectal Cancer Screening  12/03/2034    Hepatitis C Screening  Completed       OBJECTIVE:     Physical Exam      Vital Signs Reviewed   Visit Vitals  /73 (BP Location: Left arm, Patient Position: Sitting)   Pulse 80   Temp 98.3 °F (36.8 °C) (Oral)   Resp 16   Ht 6' 2" (1.88 m)   Wt 87.2 kg (192 lb 4.8 oz)   SpO2 99%   BMI 24.69 kg/m²       Physical Exam    Physical Exam:  General: Alert, well nourished, no acute distress, non-toxic appearing.   Eyes: Anicteric sclera, without conjunctival injection, normal lids, no purulent drainage, EOMs grossly intact.   Ears: No tragal tenderness. Tympanic membranes intact, pearly grey, without effusion or erythema and with a positive light reflex.   Mouth: Posterior pharynx without erythema. No exudate, ulcerations, or lesion. No tonsillar " swelling.   Neck: Supple, full ROM, no rigidity, no cervical adenopathy.   Cardio: Normal rate and rhythm    Resp: Respirations even and unlabored, clear to auscultation bilaterally.   Abd: No ecchymosis or distension. Normal bowel sounds in all 4 quadrants. No tenderness to palpation. No rebound tenderness or guarding. No CVA tenderness.   Skin: No rashes or open lesions noted.   MSK: No swelling. No abrasions or signs of trauma. Ambulating without assistance.   Neuro: Alert,oriented No focal deficits noted. Facial expressions even.   Psych: Cooperative, Normal affect      Procedures    Procedures     Labs     Results for orders placed or performed in visit on 11/14/24   PSA, Screening    Collection Time: 11/14/24  9:50 AM   Result Value Ref Range    Prostate Specific Antigen 0.72 <=4.00 ng/mL   Comprehensive Metabolic Panel    Collection Time: 11/14/24  9:50 AM   Result Value Ref Range    Sodium 133 (L) 136 - 145 mmol/L    Potassium 4.6 3.5 - 5.1 mmol/L    Chloride 100 98 - 107 mmol/L    CO2 27 23 - 31 mmol/L    Glucose 103 82 - 115 mg/dL    Blood Urea Nitrogen 16.9 8.4 - 25.7 mg/dL    Creatinine 1.00 0.72 - 1.25 mg/dL    Calcium 9.5 8.8 - 10.0 mg/dL    Protein Total 7.2 5.8 - 7.6 gm/dL    Albumin 4.1 3.4 - 4.8 g/dL    Globulin 3.1 2.4 - 3.5 gm/dL    Albumin/Globulin Ratio 1.3 1.1 - 2.0 ratio    Bilirubin Total 1.9 (H) <=1.5 mg/dL    ALP 76 40 - 150 unit/L    ALT 28 0 - 55 unit/L    AST 29 5 - 34 unit/L    eGFR >60 mL/min/1.73/m2    Anion Gap 6.0 mEq/L    BUN/Creatinine Ratio 17    Lipid Panel    Collection Time: 11/14/24  9:50 AM   Result Value Ref Range    Cholesterol Total 134 <=200 mg/dL    HDL Cholesterol 65 (H) 35 - 60 mg/dL    Triglyceride 56 34 - 140 mg/dL    Cholesterol/HDL Ratio 2 0 - 5    Very Low Density Lipoprotein 11     LDL Cholesterol 58.00 50.00 - 140.00 mg/dL   TSH    Collection Time: 11/14/24  9:50 AM   Result Value Ref Range    TSH 6.309 (H) 0.350 - 4.940 uIU/mL   Hemoglobin A1C    Collection  Time: 11/14/24  9:50 AM   Result Value Ref Range    Hemoglobin A1c 5.0 <=7.0 %    Estimated Average Glucose 96.8 mg/dL   Vitamin D    Collection Time: 11/14/24  9:50 AM   Result Value Ref Range    Vitamin D 26 (L) 30 - 80 ng/mL   CBC with Differential    Collection Time: 11/14/24  9:50 AM   Result Value Ref Range    WBC 4.47 (L) 4.50 - 11.50 x10(3)/mcL    RBC 4.66 (L) 4.70 - 6.10 x10(6)/mcL    Hgb 13.9 (L) 14.0 - 18.0 g/dL    Hct 42.9 42.0 - 52.0 %    MCV 92.1 80.0 - 94.0 fL    MCH 29.8 27.0 - 31.0 pg    MCHC 32.4 (L) 33.0 - 36.0 g/dL    RDW 14.6 11.5 - 17.0 %    Platelet 98 (L) 130 - 400 x10(3)/mcL    MPV 10.7 (H) 7.4 - 10.4 fL    Neut % 51.8 %    Lymph % 34.0 %    Mono % 10.7 %    Eos % 2.9 %    Basophil % 0.2 %    Lymph # 1.52 0.6 - 4.6 x10(3)/mcL    Neut # 2.31 2.1 - 9.2 x10(3)/mcL    Mono # 0.48 0.1 - 1.3 x10(3)/mcL    Eos # 0.13 0 - 0.9 x10(3)/mcL    Baso # 0.01 <=0.2 x10(3)/mcL    IG# 0.02 0 - 0.04 x10(3)/mcL    IG% 0.4 %       Chemistry:  Lab Results   Component Value Date     (L) 11/14/2024    K 4.6 11/14/2024    BUN 16.9 11/14/2024    CREATININE 1.00 11/14/2024    EGFRNORACEVR >60 11/14/2024    GLUCOSE 103 11/14/2024    CALCIUM 9.5 11/14/2024    ALKPHOS 76 11/14/2024    LABPROT 7.2 11/14/2024    ALBUMIN 4.1 11/14/2024    AST 29 11/14/2024    ALT 28 11/14/2024    MG 2.00 02/24/2024    PHOS 2.9 02/24/2024    [id number] 26 (L) 11/14/2024    TSH 6.309 (H) 11/14/2024    PSA 0.72 11/14/2024        Lab Results   Component Value Date    HGBA1C 5.0 11/14/2024        Hematology:  Lab Results   Component Value Date    WBC 4.47 (L) 11/14/2024    HGB 13.9 (L) 11/14/2024    HCT 42.9 11/14/2024    PLT 98 (L) 11/14/2024       Lipid Panel:  Lab Results   Component Value Date    CHOL 134 11/14/2024    HDL 65 (H) 11/14/2024    LDL 58.00 11/14/2024    TRIG 56 11/14/2024    TOTALCHOLEST 2 11/14/2024        Urine:  Lab Results   Component Value Date    APPEARANCEUA Clear 12/06/2023    SGUA 1.020 12/06/2023    PROTEINUA  Trace (A) 12/06/2023    KETONESUA Negative 12/06/2023    LEUKOCYTESUR Negative 12/06/2023    RBCUA None Seen 12/06/2023    WBCUA None Seen 12/06/2023    BACTERIA None Seen 12/06/2023    CREATRANDUR 18.2 (L) 01/18/2023         Assessment            ICD-10-CM ICD-9-CM   1. Hypertension, unspecified type  I10 401.9   2. Screening for malignant neoplasm of colon  Z12.11 V76.51       Plan       1. Hypertension, unspecified type  Stable .  Continue current med   Controlled with medication.   Continue current medication as prescribed   Low salt/ DASH diet   Discussed lifestyle modifications.   encourage aerobic excise at least 30 mins a day   Monitor BP daily goal less than 140/90.   Denies headaches, blurred vision, or dizziness    2. Screening for malignant neoplasm of colon  UTD recent colon cancer screening with Dr. Mcintosh repeat in 10 years      Medication List with Changes/Refills   Current Medications    ASPIRIN (ECOTRIN) 81 MG EC TABLET    Take 1 tablet (81 mg total) by mouth once daily.    ATORVASTATIN (LIPITOR) 40 MG TABLET    Take 1 tablet (40 mg total) by mouth every evening.    FEROSUL 325 MG (65 MG IRON) TAB TABLET    TAKE ONE TABLET BY MOUTH ONCE DAILY    FOLIC ACID (FOLVITE) 1 MG TABLET    Take 1 tablet (1,000 mcg total) by mouth once daily.    FUROSEMIDE (LASIX) 20 MG TABLET    Take 20 mg by mouth once daily.    HYDROXYZINE PAMOATE (VISTARIL) 50 MG CAP    Take 25 mg by mouth every 8 (eight) hours as needed.    METOPROLOL SUCCINATE (TOPROL-XL) 25 MG 24 HR TABLET    Take 1 tablet (25 mg total) by mouth 2 (two) times daily.    QUETIAPINE (SEROQUEL) 50 MG TABLET    Take 1 tablet (50 mg total) by mouth every evening.   Discontinued Medications    AMIODARONE (PACERONE) 200 MG TAB    Take 200 mg by mouth once daily.       Follow up in about 5 months (around 5/9/2025).   Follow up in about 5 months (around 5/9/2025). In addition to their scheduled follow up, the patient has also been instructed to follow up on  as needed basis.   Future Appointments   Date Time Provider Department Center   5/8/2025  8:30 AM Darlene Willams FNP [facility]   11/21/2025  9:00 AM Darlene Willams FNP [facility]

## 2024-12-19 DIAGNOSIS — Z76.0 MEDICATION REFILL: ICD-10-CM

## 2024-12-19 RX ORDER — FERROUS SULFATE 325(65) MG
TABLET ORAL
Qty: 30 TABLET | Refills: 1 | Status: SHIPPED | OUTPATIENT
Start: 2024-12-19

## 2025-03-06 DIAGNOSIS — E43 SEVERE MALNUTRITION: ICD-10-CM

## 2025-03-06 RX ORDER — FOLIC ACID 1 MG/1
1000 TABLET ORAL
Qty: 30 TABLET | Refills: 3 | Status: SHIPPED | OUTPATIENT
Start: 2025-03-06

## 2025-03-11 DIAGNOSIS — Z76.0 MEDICATION REFILL: ICD-10-CM

## 2025-03-11 RX ORDER — FERROUS SULFATE 325(65) MG
TABLET ORAL
Qty: 30 TABLET | Refills: 1 | Status: SHIPPED | OUTPATIENT
Start: 2025-03-11

## 2025-03-23 DIAGNOSIS — Z76.0 MEDICATION REFILL: ICD-10-CM

## 2025-03-24 RX ORDER — QUETIAPINE FUMARATE 50 MG/1
50 TABLET, FILM COATED ORAL NIGHTLY
Qty: 30 TABLET | Refills: 4 | Status: SHIPPED | OUTPATIENT
Start: 2025-03-24

## 2025-05-01 DIAGNOSIS — Z76.0 MEDICATION REFILL: ICD-10-CM

## 2025-05-01 RX ORDER — FERROUS SULFATE 325(65) MG
TABLET ORAL
Qty: 30 TABLET | Refills: 1 | Status: SHIPPED | OUTPATIENT
Start: 2025-05-01

## 2025-05-08 ENCOUNTER — OFFICE VISIT (OUTPATIENT)
Dept: FAMILY MEDICINE | Facility: CLINIC | Age: 70
End: 2025-05-08
Payer: MEDICARE

## 2025-05-08 VITALS
SYSTOLIC BLOOD PRESSURE: 157 MMHG | WEIGHT: 197.5 LBS | HEART RATE: 109 BPM | TEMPERATURE: 98 F | BODY MASS INDEX: 25.35 KG/M2 | DIASTOLIC BLOOD PRESSURE: 103 MMHG | HEIGHT: 74 IN | RESPIRATION RATE: 18 BRPM | OXYGEN SATURATION: 94 %

## 2025-05-08 DIAGNOSIS — I50.32 CHRONIC DIASTOLIC CONGESTIVE HEART FAILURE: ICD-10-CM

## 2025-05-08 DIAGNOSIS — I48.91 ATRIAL FIBRILLATION WITH RVR: ICD-10-CM

## 2025-05-08 DIAGNOSIS — I10 HYPERTENSION, UNSPECIFIED TYPE: Primary | ICD-10-CM

## 2025-05-08 RX ORDER — FUROSEMIDE 20 MG/1
20 TABLET ORAL DAILY
Qty: 30 TABLET | Refills: 0 | Status: SHIPPED | OUTPATIENT
Start: 2025-05-08

## 2025-05-08 RX ORDER — CLONIDINE HYDROCHLORIDE 0.1 MG/1
0.1 TABLET ORAL
Status: COMPLETED | OUTPATIENT
Start: 2025-05-08 | End: 2025-05-08

## 2025-05-08 RX ADMIN — CLONIDINE HYDROCHLORIDE 0.1 MG: 0.1 TABLET ORAL at 09:05

## 2025-05-08 NOTE — PROGRESS NOTES
SUBJECTIVE:     History of Present Illness      Chief Complaint: Follow-up and Shortness of Breath (Pt states SOB started s7mbbym ago. Pt states he's out of breath when he walks or does any activity.)    HPI:  Patient is a 69 y.o. year old male who presents to clinic for follow-up.  Patient continues to have shortness a breath.  History of CHF, atrial fibrillation status post CABG.  He was followed by Cardiology.  Last appointment six-month ago at that time Lasix, Entresto , Eliquis and amiodarone was discontinued.  He reports for the last 2-3 weeks increase  of shortness. Denies any chest pain.        Review of Systems:    Review of Systems    12 point review of systems conducted, negative except as stated in the history of present illness. See HPI for details.     Previous History      PCP: Darlene Willams FNP  Review of patient's allergies indicates:  No Known Allergies    Past Medical History:   Diagnosis Date    Atrial flutter     CHF (congestive heart failure)     Coronary artery disease     Hyperlipidemia     Hypertension     Myocardial infarction     SOB (shortness of breath)     at times       Past Surgical History:   Procedure Laterality Date    CATARACT EXTRACTION Bilateral     COLONOSCOPY N/A 12/3/2024    Procedure: COLONOSCOPY;  Surgeon: Zayra Mcintosh MD;  Location: [facility] ENDO;  Service: General;  Laterality: N/A;    CORONARY ARTERY BYPASS GRAFT (CABG) N/A 4/3/2023    Procedure: CORONARY ARTERY BYPASS GRAFT (CABG);  Surgeon: Deuce Finley IV, MD;  Location: [facility] OR;  Service: Cardiovascular;  Laterality: N/A;  WITH LLAA //  ECHO NOTIFIED    LEFT HEART CATHETERIZATION N/A 03/15/2023    Procedure: CATHETERIZATION, HEART, LEFT;  Surgeon: Sreedhar Herrera MD;  Location: [facility] CATH LAB;  Service: Cardiology;  Laterality: N/A;  LHC via RRA     Family History   Problem Relation Name Age of Onset    Heart attack Mother      Heart attack Brother         Social History[1]     Health Maintenance   "    Health Maintenance   Topic Date Due    TETANUS VACCINE  Never done    Pneumococcal Vaccines (Age 50+) (1 of 2 - PCV) Never done    Shingles Vaccine (1 of 2) Never done    RSV Vaccine (Age 60+ and Pregnant patients) (1 - Risk 60-74 years 1-dose series) Never done    COVID-19 Vaccine (1 - 2024-25 season) Never done    Influenza Vaccine (Season Ended) 09/01/2025    High Dose Statin  05/08/2026    Lipid Panel  11/14/2029    Colorectal Cancer Screening  12/03/2034    Hepatitis C Screening  Completed       OBJECTIVE:     Physical Exam      Vital Signs Reviewed   Visit Vitals  BP (!) 157/103 (Patient Position: Sitting)   Pulse 109   Temp 98.4 °F (36.9 °C)   Resp 18   Ht 6' 2" (1.88 m)   Wt 89.6 kg (197 lb 8 oz)   SpO2 (!) 94%   BMI 25.36 kg/m²       Physical Exam    Physical Exam:  General: Alert, well nourished, no acute distress, non-toxic appearing.   Eyes: Anicteric sclera, without conjunctival injection, normal lids, no purulent drainage, EOMs grossly intact.   Ears: No tragal tenderness. Tympanic membranes intact, pearly grey, without effusion or erythema and with a positive light reflex.   Mouth: Posterior pharynx without erythema. No exudate, ulcerations, or lesion. No tonsillar swelling.   Neck: Supple, full ROM, no rigidity, no cervical adenopathy.   Cardio: Normal rate and rhythm    Resp: Respirations even and unlabored, clear to auscultation bilaterally.   Abd: No ecchymosis or distension. Normal bowel sounds in all 4 quadrants. No tenderness to palpation. No rebound tenderness or guarding. No CVA tenderness.   Skin: No rashes or open lesions noted.   MSK: No swelling. No abrasions or signs of trauma. Ambulating without assistance.   Neuro: Alert,oriented No focal deficits noted. Facial expressions even.   Psych: Cooperative, Normal affect      Labs   Chemistry:  Lab Results   Component Value Date     (L) 11/14/2024    K 4.6 11/14/2024    BUN 16.9 11/14/2024    CREATININE 1.00 11/14/2024    " EGFRNORACEVR >60 11/14/2024    CALCIUM 9.5 11/14/2024    ALKPHOS 76 11/14/2024    ALBUMIN 4.1 11/14/2024    AST 29 11/14/2024    ALT 28 11/14/2024    MG 2.00 02/24/2024    PHOS 2.9 02/24/2024    [id number] 26 (L) 11/14/2024    TSH 6.309 (H) 11/14/2024    PSA 0.72 11/14/2024        Lab Results   Component Value Date    HGBA1C 5.0 11/14/2024        Hematology:  Lab Results   Component Value Date    WBC 4.47 (L) 11/14/2024    HGB 13.9 (L) 11/14/2024    HCT 42.9 11/14/2024    PLT 98 (L) 11/14/2024       Lipid Panel:  Lab Results   Component Value Date    CHOL 134 11/14/2024    HDL 65 (H) 11/14/2024    LDL 58.00 11/14/2024    TRIG 56 11/14/2024    TOTALCHOLEST 2 11/14/2024        Urine:  Lab Results   Component Value Date    APPEARANCEUA Clear 12/06/2023    SGUA 1.020 12/06/2023    PROTEINUA Trace (A) 12/06/2023    KETONESUA Negative 12/06/2023    LEUKOCYTESUR Negative 12/06/2023    RBCUA None Seen 12/06/2023    WBCUA None Seen 12/06/2023    BACTERIA None Seen 12/06/2023    CREATRANDUR 18.2 (L) 01/18/2023         Assessment         ICD-10-CM ICD-9-CM   1. Hypertension, unspecified type  I10 401.9   2. Chronic diastolic congestive heart failure  I50.32 428.32     428.0   3. Atrial fibrillation with RVR  I48.91 427.31       Plan       Assessment & Plan  Hypertension, unspecified type  Elevated during visit 0.1 mg oral clonidine given to patient in office.  Restart Lasix.  Patient will follow-up with cardiology on Monday for elevated blood pressure reading    Orders:    cloNIDine tablet 0.1 mg    Chronic diastolic congestive heart failure  Restart Lasix follow up with Cardiology on Monday    Orders:    furosemide (LASIX) 20 MG tablet; Take 1 tablet (20 mg total) by mouth once daily.    Atrial fibrillation with RVR  In the past was on amiodarone, Eliquis  Follow up with Cardiology on Monday            Orders Placed This Encounter    cloNIDine tablet 0.1 mg    furosemide (LASIX) 20 MG tablet      Medication List with  Changes/Refills   Current Medications    ASPIRIN (ECOTRIN) 81 MG EC TABLET    Take 1 tablet (81 mg total) by mouth once daily.    ATORVASTATIN (LIPITOR) 40 MG TABLET    Take 1 tablet (40 mg total) by mouth every evening.    FEROSUL 325 MG (65 MG IRON) TAB TABLET    TAKE ONE TABLET BY MOUTH ONCE DAILY    FOLIC ACID (FOLVITE) 1 MG TABLET    TAKE ONE TABLET BY MOUTH ONCE DAILY    HYDROXYZINE PAMOATE (VISTARIL) 50 MG CAP    Take 25 mg by mouth every 8 (eight) hours as needed.    METOPROLOL SUCCINATE (TOPROL-XL) 25 MG 24 HR TABLET    Take 1 tablet (25 mg total) by mouth 2 (two) times daily.    QUETIAPINE (SEROQUEL) 50 MG TABLET    TAKE ONE TABLET BY MOUTH EVERY EVENING   Changed and/or Refilled Medications    Modified Medication Previous Medication    FUROSEMIDE (LASIX) 20 MG TABLET furosemide (LASIX) 20 MG tablet       Take 1 tablet (20 mg total) by mouth once daily.    Take 20 mg by mouth once daily.         Follow up in about 4 weeks (around 6/5/2025), or if symptoms worsen or fail to improve. In addition to their scheduled follow up, the patient has also been instructed to follow up on as needed basis.   Future Appointments   Date Time Provider Department Center   6/4/2025  8:15 AM Darlene Willams FNP [facility]   11/21/2025  9:00 AM Darlene Willams FNP [facility]       LOREN Jerry              [1]   Social History  Tobacco Use    Smoking status: Never     Passive exposure: Never    Smokeless tobacco: Never   Substance Use Topics    Alcohol use: Yes     Alcohol/week: 84.0 standard drinks of alcohol     Types: 84 Cans of beer per week     Comment: alcoholic, daily, beer    Drug use: Yes     Frequency: 3.0 times per week

## 2025-05-12 ENCOUNTER — LAB VISIT (OUTPATIENT)
Dept: LAB | Facility: HOSPITAL | Age: 70
End: 2025-05-12
Attending: NURSE PRACTITIONER
Payer: MEDICARE

## 2025-05-12 DIAGNOSIS — I10 ESSENTIAL HYPERTENSION, MALIGNANT: ICD-10-CM

## 2025-05-12 DIAGNOSIS — I25.10 CORONARY ATHEROSCLEROSIS OF NATIVE CORONARY ARTERY: Primary | ICD-10-CM

## 2025-05-12 LAB
ALBUMIN SERPL-MCNC: 3.5 G/DL (ref 3.4–4.8)
ALBUMIN/GLOB SERPL: 1.1 RATIO (ref 1.1–2)
ALP SERPL-CCNC: 123 UNIT/L (ref 40–150)
ALT SERPL-CCNC: 25 UNIT/L (ref 0–55)
ANION GAP SERPL CALC-SCNC: 8 MEQ/L
AST SERPL-CCNC: 37 UNIT/L (ref 11–45)
BILIRUB SERPL-MCNC: 2.6 MG/DL
BUN SERPL-MCNC: 29.9 MG/DL (ref 8.4–25.7)
CALCIUM SERPL-MCNC: 9.1 MG/DL (ref 8.8–10)
CHLORIDE SERPL-SCNC: 105 MMOL/L (ref 98–107)
CHOLEST SERPL-MCNC: 103 MG/DL
CHOLEST/HDLC SERPL: 3 {RATIO} (ref 0–5)
CO2 SERPL-SCNC: 27 MMOL/L (ref 23–31)
CREAT SERPL-MCNC: 1.27 MG/DL (ref 0.72–1.25)
CREAT/UREA NIT SERPL: 24
GFR SERPLBLD CREATININE-BSD FMLA CKD-EPI: 60 ML/MIN/1.73/M2
GLOBULIN SER-MCNC: 3.3 GM/DL (ref 2.4–3.5)
GLUCOSE SERPL-MCNC: 101 MG/DL (ref 82–115)
HDLC SERPL-MCNC: 33 MG/DL (ref 35–60)
LDLC SERPL CALC-MCNC: 46 MG/DL (ref 50–140)
POTASSIUM SERPL-SCNC: 4.7 MMOL/L (ref 3.5–5.1)
PROT SERPL-MCNC: 6.8 GM/DL (ref 5.8–7.6)
SODIUM SERPL-SCNC: 140 MMOL/L (ref 136–145)
TRIGL SERPL-MCNC: 118 MG/DL (ref 34–140)
VLDLC SERPL CALC-MCNC: 24 MG/DL

## 2025-05-12 PROCEDURE — 80053 COMPREHEN METABOLIC PANEL: CPT

## 2025-05-12 PROCEDURE — 80061 LIPID PANEL: CPT

## 2025-05-12 PROCEDURE — 36415 COLL VENOUS BLD VENIPUNCTURE: CPT

## 2025-05-20 NOTE — PROGRESS NOTES
"[facility]   Cardiology  Progress Note    Patient Name: Mike Urbina Jr.  MRN: [id number]  Admission Date: 4/3/2023  Hospital Length of Stay: 5 days  Code Status: Full Code   Attending Provider: Deuce Finley IV, MD   Consulting Provider: LOREN Chen  Primary Care Physician: LOREN Jerry  Principal Problem:<principal problem not specified>    Patient information was obtained from patient, past medical records, and ER records.     Subjective:     Chief Complaint: Reason for Consult: Post CABG Medical Management    HPI: Mr. Urbina is a 66 y/o male who is known to [facility], Dr. Herrera. The patient presented to [facility] on 4.3.23 for a Planned CABG. The patient was recently worked up worked up for a NSTEMI and found to have an Abnormal PET Scan. The PT underwent a LHC with Noted MVCAD. He was referred to [facility] and deemed a candidate for CABG and on 4.3.23 he underwent a Sternotomy with Results of: LIMA to LAD, rSVG to OM1, rSVG to PDA and Ligation of SILVANO with Endostapler. The patient tolerated the procedure well and was stabilized in the ER. [facility] has been consulted for Medical Management of the PT.    4.5.23: NAD. Sitting in Bed. Denies SOB and Palps. + CP/Incisional. "I am doing great."   4.6.23: NAD. PT is a 1:1 - AMS Last PM. Denies SOB and Palps. + CP/Incisional. "I am feeling well."   4.7.23: NAD. Remains 1:1. Denies SOB or palps. + Incisional CP. SR on tele w/ intermittent Afib RVR. Resting.   4.8.23: NAD. Sitting up in chair with family at bedside. "I am feeling good." Afib RVR on tele. Remains on amio infusion. Denies CP, SOB, or palps.     PMH: ETOH Abuse, VHD (AS), Thrombocytopenia, NSTEMI, HTN, PAF/Flutter  PSH: Angiogram, CABG, Cataract Extraction   Family History: Mother, L, MI  Social History: + ETOH Use (6pk/Beer per Day for > 30 Years); Denies Tobacco and Illicit Drug Use    Previous Cardiac Diagnostics:   [facility] 3.15.23:  Surgical coronary anatomy with distal left main 50%; " Called patient for two weeks pre-call interview. Verified patient's date of birth. Instructed patient [facility] provider recommend holding Plavix seven days prior to June 02,2025 EGD/Enteroscopy procedures starting May 26,2025. Patient verbalized understanding.MS   LAD proximal to mid 60-70%; circumflex mid 80- 90%; RCA with proximal .  The ejection fraction was 60% with EDP of 10 mmHg.  Right radial access.  The estimated blood loss was less than 10 cc.  CT surgical consult for CABG evaluation.    PET 1.6.23:  This is an abnormal perfusion study. Study is consistent with ischemia.   This scan is suggestive of moderate risk for future cardiovascular events.   Small reversible perfusion abnormality of moderate intensity in the apical segment. Medium fixed perfusion abnormality of severe intensity in the inferior region.   The left ventricular cavity is noted to be normal on the stress studies. The stress left ventricular ejection fraction was calculated to be 35% and left ventricular global function is moderately reduced. The rest left ventricular cavity is noted to be normal. The rest left ventricular ejection fraction was calculated to be 40% and rest left ventricular global function is mildly reduced.   When compared to the resting ejection fraction (40%), the stress ejection fraction (35%) has decreased.   The study quality is good.   There was a rise in myocardial blood flow between rest and stress.  Global myocardial blood flow reserve was 1.47.  Myocardial blood flow reserve is globally abnormal, placing the patient at a higher coronary event risk.    ECHO 12.9.22:  The left ventricle is normal in size with mild concentric hypertrophy and normal systolic function.  Grade I left ventricular diastolic dysfunction.  The estimated ejection fraction is 55%.  Normal right ventricular size with normal right ventricular systolic function.  There is mild aortic valve stenosis.  There is mild mitral stenosis.  Normal central venous pressure (3 mmHg).    Review of patient's allergies indicates:  No Known Allergies    No current facility-administered medications on file prior to encounter.     Current Outpatient Medications on File Prior to Encounter   Medication Sig    aspirin  (ECOTRIN) 81 MG EC tablet Take 1 tablet (81 mg total) by mouth once daily.    atorvastatin (LIPITOR) 10 MG tablet Take 40 mg by mouth once daily.    furosemide (LASIX) 40 MG tablet TAKE ONE TABLET BY MOUTH DAILY DOSE INCREASED    hydrOXYzine HCL (ATARAX) 25 MG tablet TAKE 1 TABLET BY MOUTH EVERY EVENING    metoprolol succinate (TOPROL-XL) 100 MG 24 hr tablet Take 1 tablet (100 mg total) by mouth once daily.    NIFEdipine (PROCARDIA-XL) 30 MG (OSM) 24 hr tablet Take 1 tablet (30 mg total) by mouth once daily. (Patient taking differently: Take 60 mg by mouth once daily.)    valsartan (DIOVAN) 40 MG tablet Take 1 tablet (40 mg total) by mouth 2 (two) times daily.     Review of Systems   Constitutional:  Negative for fatigue and fever.   Respiratory:  Negative for cough, chest tightness and shortness of breath.    Cardiovascular:  Negative for chest pain, palpitations and leg swelling.   Gastrointestinal:  Negative for abdominal distention.   All other systems reviewed and are negative.    Objective:     Vital Signs (Most Recent):  Temp: 98.3 °F (36.8 °C) (04/08/23 1108)  Pulse: (!) 125 (04/08/23 1108)  Resp: 20 (04/08/23 1108)  BP: (!) 168/98 (04/08/23 1108)  SpO2: 98 % (04/08/23 1108)   Vital Signs (24h Range):  Temp:  [97.5 °F (36.4 °C)-99.2 °F (37.3 °C)] 98.3 °F (36.8 °C)  Pulse:  [] 125  Resp:  [18-20] 20  SpO2:  [84 %-98 %] 98 %  BP: (136-168)/(80-98) 168/98     Weight: 83.5 kg (184 lb 1.4 oz)  Body mass index is 23.64 kg/m².    SpO2: 98 %         Intake/Output Summary (Last 24 hours) at 4/8/2023 1122  Last data filed at 4/8/2023 0503  Gross per 24 hour   Intake 120 ml   Output 600 ml   Net -480 ml         Lines/Drains/Airways       Peripheral Intravenous Line  Duration                  Peripheral IV - Single Lumen 04/03/23 0540 18 G Anterior;Right Forearm 5 days         Peripheral IV - Single Lumen 04/06/23 1714 20 G Left;Posterior Forearm 1 day                  Significant Labs:  Recent Results (from the  past 72 hour(s))   Comprehensive Metabolic Panel    Collection Time: 04/06/23  6:13 AM   Result Value Ref Range    Sodium Level 132 (L) 136 - 145 mmol/L    Potassium Level 4.1 3.5 - 5.1 mmol/L    Chloride 104 98 - 107 mmol/L    Carbon Dioxide 17 (L) 23 - 31 mmol/L    Glucose Level 107 82 - 115 mg/dL    Blood Urea Nitrogen 29.4 (H) 8.4 - 25.7 mg/dL    Creatinine 0.99 0.73 - 1.18 mg/dL    Calcium Level Total 8.2 (L) 8.8 - 10.0 mg/dL    Protein Total 5.3 (L) 5.8 - 7.6 gm/dL    Albumin Level 3.0 (L) 3.4 - 4.8 g/dL    Globulin 2.3 (L) 2.4 - 3.5 gm/dL    Albumin/Globulin Ratio 1.3 1.1 - 2.0 ratio    Bilirubin Total 2.0 (H) <=1.5 mg/dL    Alkaline Phosphatase 91 40 - 150 unit/L    Alanine Aminotransferase 23 0 - 55 unit/L    Aspartate Aminotransferase 32 5 - 34 unit/L    eGFR >60 mls/min/1.73/m2   Magnesium    Collection Time: 04/06/23  6:13 AM   Result Value Ref Range    Magnesium Level 2.00 1.60 - 2.60 mg/dL   CBC with Differential    Collection Time: 04/06/23  6:13 AM   Result Value Ref Range    WBC 4.4 (L) 4.5 - 11.5 x10(3)/mcL    RBC 3.33 (L) 4.70 - 6.10 x10(6)/mcL    Hgb 10.0 (L) 14.0 - 18.0 g/dL    Hct 29.2 (L) 42.0 - 52.0 %    MCV 87.7 80.0 - 94.0 fL    MCH 30.0 27.0 - 31.0 pg    MCHC 34.2 33.0 - 36.0 g/dL    RDW 15.7 11.5 - 17.0 %    Platelet 81 (L) 130 - 400 x10(3)/mcL    MPV 10.5 (H) 7.4 - 10.4 fL    Neut % 74.5 %    Lymph % 11.9 %    Mono % 11.0 %    Eos % 1.4 %    Basophil % 0.5 %    Lymph # 0.53 (L) 0.6 - 4.6 x10(3)/mcL    Neut # 3.31 2.1 - 9.2 x10(3)/mcL    Mono # 0.49 0.1 - 1.3 x10(3)/mcL    Eos # 0.06 0 - 0.9 x10(3)/mcL    Baso # 0.02 0 - 0.2 x10(3)/mcL    IG# 0.03 0 - 0.04 x10(3)/mcL    IG% 0.7 %    NRBC% 0.0 %   POCT glucose    Collection Time: 04/06/23  8:42 PM   Result Value Ref Range    POCT Glucose 105 70 - 110 mg/dL   Comprehensive Metabolic Panel    Collection Time: 04/07/23  5:02 AM   Result Value Ref Range    Sodium Level 136 136 - 145 mmol/L    Potassium Level 3.9 3.5 - 5.1 mmol/L    Chloride  107 98 - 107 mmol/L    Carbon Dioxide 18 (L) 23 - 31 mmol/L    Glucose Level 99 82 - 115 mg/dL    Blood Urea Nitrogen 24.8 8.4 - 25.7 mg/dL    Creatinine 0.86 0.73 - 1.18 mg/dL    Calcium Level Total 8.1 (L) 8.8 - 10.0 mg/dL    Protein Total 4.7 (L) 5.8 - 7.6 gm/dL    Albumin Level 2.7 (L) 3.4 - 4.8 g/dL    Globulin 2.0 (L) 2.4 - 3.5 gm/dL    Albumin/Globulin Ratio 1.4 1.1 - 2.0 ratio    Bilirubin Total 1.4 <=1.5 mg/dL    Alkaline Phosphatase 79 40 - 150 unit/L    Alanine Aminotransferase 19 0 - 55 unit/L    Aspartate Aminotransferase 24 5 - 34 unit/L    eGFR >60 mls/min/1.73/m2   Magnesium    Collection Time: 04/07/23  5:02 AM   Result Value Ref Range    Magnesium Level 2.00 1.60 - 2.60 mg/dL   Ammonia    Collection Time: 04/07/23  5:02 AM   Result Value Ref Range    Ammonia Level 35.2 18.0 - 72.0 umol/L   Protime-INR    Collection Time: 04/07/23  5:02 AM   Result Value Ref Range    PT 14.6 (H) 12.5 - 14.5 seconds    INR 1.15 0.00 - 1.30   Phosphorus    Collection Time: 04/07/23  5:02 AM   Result Value Ref Range    Phosphorus Level 4.0 2.3 - 4.7 mg/dL   CBC with Differential    Collection Time: 04/07/23  5:02 AM   Result Value Ref Range    WBC 2.6 (L) 4.5 - 11.5 x10(3)/mcL    RBC 3.16 (L) 4.70 - 6.10 x10(6)/mcL    Hgb 9.3 (L) 14.0 - 18.0 g/dL    Hct 27.9 (L) 42.0 - 52.0 %    MCV 88.3 80.0 - 94.0 fL    MCH 29.4 27.0 - 31.0 pg    MCHC 33.3 33.0 - 36.0 g/dL    RDW 15.4 11.5 - 17.0 %    Platelet 76 (L) 130 - 400 x10(3)/mcL    MPV 10.3 7.4 - 10.4 fL    NRBC% 0.0 %   Manual Differential    Collection Time: 04/07/23  5:02 AM   Result Value Ref Range    Neut Man 70 %    Lymph Man 11 %    Monocyte Man 14 %    Eos Man 5 %    Instr WBC 2.6 x10(3)/mcL    Abs Mono 0.364 0.1 - 1.3 x10(3)/mcL    Abs Eos  0.13 0 - 0.9 x10(3)/mcL    Abs Lymp 0.286 (L) 0.6 - 4.6 x10(3)/mcL    Abs Neut 1.82 (L) 2.1 - 9.2 x10(3)/mcL    RBC Morph Abnormal (A) Normal    Platelet Est Decreased (A) Normal, Adequate    Giant Platelets 1+    POCT glucose     Collection Time: 04/07/23  8:24 PM   Result Value Ref Range    POCT Glucose 116 (H) 70 - 110 mg/dL   Basic Metabolic Panel    Collection Time: 04/08/23  4:19 AM   Result Value Ref Range    Sodium Level 135 (L) 136 - 145 mmol/L    Potassium Level 3.7 3.5 - 5.1 mmol/L    Chloride 106 98 - 107 mmol/L    Carbon Dioxide 18 (L) 23 - 31 mmol/L    Glucose Level 94 82 - 115 mg/dL    Blood Urea Nitrogen 25.6 8.4 - 25.7 mg/dL    Creatinine 1.01 0.73 - 1.18 mg/dL    BUN/Creatinine Ratio 25     Calcium Level Total 8.2 (L) 8.8 - 10.0 mg/dL    Anion Gap 11.0 mEq/L    eGFR >60 mls/min/1.73/m2   Magnesium    Collection Time: 04/08/23  4:19 AM   Result Value Ref Range    Magnesium Level 2.00 1.60 - 2.60 mg/dL   Phosphorus    Collection Time: 04/08/23  4:19 AM   Result Value Ref Range    Phosphorus Level 4.6 2.3 - 4.7 mg/dL   CBC with Differential    Collection Time: 04/08/23  4:19 AM   Result Value Ref Range    WBC 3.9 (L) 4.5 - 11.5 x10(3)/mcL    RBC 3.27 (L) 4.70 - 6.10 x10(6)/mcL    Hgb 9.7 (L) 14.0 - 18.0 g/dL    Hct 29.6 (L) 42.0 - 52.0 %    MCV 90.5 80.0 - 94.0 fL    MCH 29.7 27.0 - 31.0 pg    MCHC 32.8 (L) 33.0 - 36.0 g/dL    RDW 15.6 11.5 - 17.0 %    Platelet 89 (L) 130 - 400 x10(3)/mcL    MPV 10.1 7.4 - 10.4 fL    Neut % 65.6 %    Lymph % 15.2 %    Mono % 16.5 %    Eos % 2.1 %    Basophil % 0.3 %    Lymph # 0.59 (L) 0.6 - 4.6 x10(3)/mcL    Neut # 2.55 2.1 - 9.2 x10(3)/mcL    Mono # 0.64 0.1 - 1.3 x10(3)/mcL    Eos # 0.08 0 - 0.9 x10(3)/mcL    Baso # 0.01 0 - 0.2 x10(3)/mcL    IG# 0.01 0 - 0.04 x10(3)/mcL    IG% 0.3 %    NRBC% 0.0 %   POCT glucose    Collection Time: 04/08/23  6:38 AM   Result Value Ref Range    POCT Glucose 100 70 - 110 mg/dL     Telemetry: SR    Physical Exam  Constitutional:       Appearance: Normal appearance.   HENT:      Head: Normocephalic.      Nose: Nose normal.      Mouth/Throat:      Mouth: Mucous membranes are moist.   Eyes:      Extraocular Movements: Extraocular movements intact.    Cardiovascular:      Rate and Rhythm: Normal rate and regular rhythm.      Pulses: Normal pulses.      Heart sounds: Normal heart sounds. No murmur heard.  Pulmonary:      Effort: Pulmonary effort is normal.      Breath sounds: Normal breath sounds.   Abdominal:      Palpations: Abdomen is soft.   Skin:     General: Skin is warm and dry.      Comments: Midline Sternotomy Dressing C/D/I.    Neurological:      General: No focal deficit present.      Mental Status: He is alert and oriented to person, place, and time.   Psychiatric:         Mood and Affect: Mood normal.         Behavior: Behavior normal.     Home Medications:   No current facility-administered medications on file prior to encounter.     Current Outpatient Medications on File Prior to Encounter   Medication Sig Dispense Refill    aspirin (ECOTRIN) 81 MG EC tablet Take 1 tablet (81 mg total) by mouth once daily. 30 tablet 0    atorvastatin (LIPITOR) 10 MG tablet Take 40 mg by mouth once daily.      furosemide (LASIX) 40 MG tablet TAKE ONE TABLET BY MOUTH DAILY DOSE INCREASED      hydrOXYzine HCL (ATARAX) 25 MG tablet TAKE 1 TABLET BY MOUTH EVERY EVENING 30 tablet 1    metoprolol succinate (TOPROL-XL) 100 MG 24 hr tablet Take 1 tablet (100 mg total) by mouth once daily. 30 tablet 0    NIFEdipine (PROCARDIA-XL) 30 MG (OSM) 24 hr tablet Take 1 tablet (30 mg total) by mouth once daily. (Patient taking differently: Take 60 mg by mouth once daily.) 30 tablet 0    valsartan (DIOVAN) 40 MG tablet Take 1 tablet (40 mg total) by mouth 2 (two) times daily. 60 tablet 0     Current Inpatient Medications:    Current Facility-Administered Medications:     0.9%  NaCl infusion (for blood administration), , Intravenous, Q24H PRN, Graham Aiken MD    0.9%  NaCl infusion (for blood administration), , Intravenous, Q24H PRN, Graham Aiken MD    acetaminophen oral solution 650 mg, 650 mg, Per OG tube, Q6H PRN, GISEL Bingham, 650 mg at 04/05/23 2014    albumin  human 5% bottle 12.5 g, 12.5 g, Intravenous, PRN, GISEL Bingham, Stopped at 04/03/23 2100    amiodarone 360 mg/200 mL (1.8 mg/mL) infusion, 0.5 mg/min, Intravenous, Continuous, HONORIO Villalpando, Last Rate: 16.7 mL/hr at 04/08/23 0342, 0.5 mg/min at 04/08/23 0342    amLODIPine tablet 10 mg, 10 mg, Oral, Daily, HONORIO Villalpando, 10 mg at 04/08/23 1039    aspirin EC tablet 81 mg, 81 mg, Oral, Daily, GISEL Bingham, 81 mg at 04/08/23 1039    atorvastatin tablet 40 mg, 40 mg, Oral, Daily, HONORIO Villalpando, 40 mg at 04/08/23 1039    calcium gluconate 1 g in NS IVPB (premixed), 1 g, Intravenous, PRN, GISEL Bingham    calcium gluconate 1 g in NS IVPB (premixed), 2 g, Intravenous, PRN, GISEL Bingham    calcium gluconate 1 g in NS IVPB (premixed), 3 g, Intravenous, PRN, GISEL Bingham    [COMPLETED] chlordiazepoxide capsule 100 mg, 100 mg, Oral, Once, 100 mg at 04/06/23 1353 **FOLLOWED BY** [COMPLETED] chlordiazepoxide capsule 50 mg, 50 mg, Oral, Q6H, 50 mg at 04/07/23 1519 **FOLLOWED BY** chlordiazepoxide capsule 50 mg, 50 mg, Oral, Q8H, 50 mg at 04/08/23 0639 **FOLLOWED BY** chlordiazepoxide capsule 25 mg, 25 mg, Oral, Q6H **FOLLOWED BY** [START ON 4/9/2023] chlordiazepoxide capsule 25 mg, 25 mg, Oral, Q8H **FOLLOWED BY** [START ON 4/10/2023] chlordiazepoxide capsule 25 mg, 25 mg, Oral, Q12H **FOLLOWED BY** [START ON 4/11/2023] chlordiazepoxide capsule 25 mg, 25 mg, Oral, Q24H, Simran Meza, AGACNP-BC    dextrose 10% bolus 125 mL 125 mL, 12.5 g, Intravenous, PRN, GISEL Bingham    dextrose 10% bolus 125 mL 125 mL, 12.5 g, Intravenous, PRN, Graham Aiken MD    dextrose 10% bolus 250 mL 250 mL, 25 g, Intravenous, PRN, GISEL Bingham    dextrose 10% bolus 250 mL 250 mL, 25 g, Intravenous, PRN, Graham Aiken MD    docusate sodium capsule 100 mg, 100 mg, Oral, BID, GISEL Bingham, 100 mg at 04/08/23 1039    famotidine tablet 20 mg, 20 mg, Oral, BID,  Deuce Finley IV, MD, 20 mg at 04/08/23 1039    folic acid tablet 1 mg, 1 mg, Oral, Daily, Frankie Metcalf MD, 1 mg at 04/08/23 1039    furosemide tablet 20 mg, 20 mg, Oral, BID, Wyatt Carl PA-C    glucagon (human recombinant) injection 1 mg, 1 mg, Intramuscular, PRN, Graham Aiken MD    glucose chewable tablet 16 g, 16 g, Oral, PRN, Graham Aiken MD    glucose chewable tablet 24 g, 24 g, Oral, PRN, Graham Aiken MD    haloperidol lactate injection 2 mg, 2 mg, Intravenous, Q6H PRN, Simran Meza, [facility]-BC    hydrALAZINE injection 20 mg, 20 mg, Intravenous, Q6H PRN, Elizabeth Bates NP, 20 mg at 04/06/23 1601    HYDROcodone-acetaminophen 5-325 mg per tablet 1 tablet, 1 tablet, Oral, Q4H PRN, GISEL Bingham, 1 tablet at 04/04/23 0915    insulin aspart U-100 injection 0-5 Units, 0-5 Units, Subcutaneous, QID (AC + HS) PRN, Graham Aiken MD    labetaloL injection 20 mg, 20 mg, Intravenous, Q4H PRN, Elizabeth Bates NP, 20 mg at 04/07/23 0558    LORazepam (ATIVAN) injection 2 mg, 2 mg, Intravenous, Q8H PRN, Frankie Metcalf MD, 2 mg at 04/07/23 0033    magnesium sulfate 2g in water 50mL IVPB (premix), 2 g, Intravenous, PRN, GISEL Bingham    magnesium sulfate 2g in water 50mL IVPB (premix), 4 g, Intravenous, PRN, GISEL Bingham    metoprolol succinate (TOPROL-XL) 24 hr tablet 100 mg, 100 mg, Oral, Daily, LOREN Chen, 100 mg at 04/08/23 1039    multivitamin tablet, 1 tablet, Oral, Daily, Frankie Metcalf MD, 1 tablet at 04/08/23 1039    ondansetron injection 4 mg, 4 mg, Intravenous, Q4H PRN, Nicholas P Jeffrey, PA    potassium chloride 20 mEq in 100 mL IVPB (FOR CENTRAL LINE ADMINISTRATION ONLY), 20 mEq, Intravenous, PRN, Nicholas P Langgabyais, PA    potassium chloride 20 mEq in 100 mL IVPB (FOR CENTRAL LINE ADMINISTRATION ONLY), 20 mEq, Intravenous, PRN, Nicholas P Karlaais, PA    potassium chloride 40 mEq in 100 mL IVPB (FOR CENTRAL LINE ADMINISTRATION  ONLY), 40 mEq, Intravenous, PRN, GISEL Bingham    potassium chloride SA CR tablet 20 mEq, 20 mEq, Oral, Daily, GISEL Baer-C, 20 mEq at 04/08/23 1039    sodium bicarbonate tablet 650 mg, 650 mg, Oral, TID, Frankie Metcalf MD, 650 mg at 04/08/23 1039    sodium phosphate 15 mmol in dextrose 5 % (D5W) 250 mL IVPB, 15 mmol, Intravenous, PRN, GISEL Bingham    sodium phosphate 20.01 mmol in dextrose 5 % (D5W) 250 mL IVPB, 20.01 mmol, Intravenous, PRN, GISEL Bingham    sodium phosphate 30 mmol in dextrose 5 % (D5W) 250 mL IVPB, 30 mmol, Intravenous, PRN, GISEL Bingham    sucralfate tablet 1 g, 1 g, Oral, QID (AC & HS), GISEL Bingham, 1 g at 04/08/23 1039    thiamine tablet 200 mg, 200 mg, Oral, Daily, Frankie Metcalf MD, 200 mg at 04/08/23 1039    valsartan tablet 160 mg, 160 mg, Oral, BID, HONORIO Villalpando, 160 mg at 04/08/23 1039    VTE Risk Mitigation (From admission, onward)           Ordered     Place sequential compression device  Until discontinued         04/04/23 0324     IP VTE HIGH RISK PATIENT  Once         04/03/23 0951                  Assessment:   MVCAD    - s/p CABG (4.3.23) - LIMA to LAD, rSVG to OM1, rSVG to PDA  PAF/Flutter with RVR    - CHADsVASc - 3 Points - 3.2% Stroke Risk per Year     - s/p (4.3.23) - Ligation of SILVANO with Endostapler  HTN/HHD without Hx of HF or CKD  Encephalopathy - Likely Related to ETOH DTs  Anemia - Acute Blood Loss - Improved with Transfusion  Thrombocytopenia - Chronic   VHD (Mild AS, Mild MS)  Hx of NSTEMI  ETOH Abuse  No Hx of GIB    Plan:     **Case discussed with Dr. Jimenes.   Continue amiodarone gtt for now. Will change to metoprolol tartrate 100 mg BID.   Monitor H&H   Continue ASA, ARB and Statin  Continue Norvasc 10mg PO Qday  Aggressive Mobilization and Q1HR IS  Labs in AM: CBC, CMP and Mg    LOREN Chen   Cardiology  [facility]   04/08/2023

## 2025-05-27 ENCOUNTER — HOSPITAL ENCOUNTER (EMERGENCY)
Facility: HOSPITAL | Age: 70
Discharge: ANOTHER HOSPITAL | End: 2025-05-27
Attending: EMERGENCY MEDICINE
Payer: MEDICARE

## 2025-05-27 ENCOUNTER — HOSPITAL ENCOUNTER (INPATIENT)
Facility: HOSPITAL | Age: 70
LOS: 2 days | Discharge: HOME | DRG: 381 | End: 2025-05-30
Attending: INTERNAL MEDICINE | Admitting: INTERNAL MEDICINE
Payer: MEDICARE

## 2025-05-27 VITALS
BODY MASS INDEX: 23.1 KG/M2 | WEIGHT: 180 LBS | RESPIRATION RATE: 20 BRPM | OXYGEN SATURATION: 96 % | HEART RATE: 97 BPM | SYSTOLIC BLOOD PRESSURE: 120 MMHG | DIASTOLIC BLOOD PRESSURE: 85 MMHG | HEIGHT: 74 IN | TEMPERATURE: 98 F

## 2025-05-27 DIAGNOSIS — I95.9 HYPOTENSION, UNSPECIFIED HYPOTENSION TYPE: ICD-10-CM

## 2025-05-27 DIAGNOSIS — K20.90 ESOPHAGITIS: ICD-10-CM

## 2025-05-27 DIAGNOSIS — K20.90 ESOPHAGITIS: Primary | ICD-10-CM

## 2025-05-27 DIAGNOSIS — E86.0 DEHYDRATION: Primary | ICD-10-CM

## 2025-05-27 DIAGNOSIS — N17.9 AKI (ACUTE KIDNEY INJURY): ICD-10-CM

## 2025-05-27 DIAGNOSIS — R07.9 CHEST PAIN: ICD-10-CM

## 2025-05-27 LAB
ALBUMIN SERPL-MCNC: 3.8 G/DL (ref 3.4–4.8)
ALBUMIN/GLOB SERPL: 1 RATIO (ref 1.1–2)
ALP SERPL-CCNC: 138 UNIT/L (ref 40–150)
ALT SERPL-CCNC: 33 UNIT/L (ref 0–55)
ANION GAP SERPL CALC-SCNC: 13 MEQ/L
AST SERPL-CCNC: 37 UNIT/L (ref 11–45)
BASOPHILS # BLD AUTO: 0.03 X10(3)/MCL
BASOPHILS NFR BLD AUTO: 0.4 %
BILIRUB SERPL-MCNC: 4.3 MG/DL
BUN SERPL-MCNC: 48.5 MG/DL (ref 8.4–25.7)
CALCIUM SERPL-MCNC: 10 MG/DL (ref 8.8–10)
CHLORIDE SERPL-SCNC: 95 MMOL/L (ref 98–107)
CO2 SERPL-SCNC: 30 MMOL/L (ref 23–31)
CREAT SERPL-MCNC: 1.9 MG/DL (ref 0.72–1.25)
CREAT/UREA NIT SERPL: 26
EOSINOPHIL # BLD AUTO: 0.07 X10(3)/MCL (ref 0–0.9)
EOSINOPHIL NFR BLD AUTO: 0.9 %
ERYTHROCYTE [DISTWIDTH] IN BLOOD BY AUTOMATED COUNT: 17.8 % (ref 11.5–17)
ETHANOL SERPL-MCNC: <10 MG/DL
FLUAV AG UPPER RESP QL IA.RAPID: NOT DETECTED
FLUBV AG UPPER RESP QL IA.RAPID: NOT DETECTED
GFR SERPLBLD CREATININE-BSD FMLA CKD-EPI: 37 ML/MIN/1.73/M2
GLOBULIN SER-MCNC: 4 GM/DL (ref 2.4–3.5)
GLUCOSE SERPL-MCNC: 157 MG/DL (ref 82–115)
HCT VFR BLD AUTO: 55.2 % (ref 42–52)
HGB BLD-MCNC: 18.6 G/DL (ref 14–18)
IMM GRANULOCYTES # BLD AUTO: 0.03 X10(3)/MCL (ref 0–0.04)
IMM GRANULOCYTES NFR BLD AUTO: 0.4 %
LYMPHOCYTES # BLD AUTO: 1.51 X10(3)/MCL (ref 0.6–4.6)
LYMPHOCYTES NFR BLD AUTO: 19.8 %
MCH RBC QN AUTO: 29.2 PG (ref 27–31)
MCHC RBC AUTO-ENTMCNC: 33.7 G/DL (ref 33–36)
MCV RBC AUTO: 86.7 FL (ref 80–94)
MONO SCR (OHS): NEGATIVE
MONOCYTES # BLD AUTO: 0.53 X10(3)/MCL (ref 0.1–1.3)
MONOCYTES NFR BLD AUTO: 7 %
NEUTROPHILS # BLD AUTO: 5.45 X10(3)/MCL (ref 2.1–9.2)
NEUTROPHILS NFR BLD AUTO: 71.5 %
PLATELET # BLD AUTO: 119 X10(3)/MCL (ref 130–400)
PMV BLD AUTO: 11.1 FL (ref 7.4–10.4)
POTASSIUM SERPL-SCNC: 4.1 MMOL/L (ref 3.5–5.1)
PROT SERPL-MCNC: 7.8 GM/DL (ref 5.8–7.6)
RBC # BLD AUTO: 6.37 X10(6)/MCL (ref 4.7–6.1)
SARS-COV-2 RNA RESP QL NAA+PROBE: NOT DETECTED
SODIUM SERPL-SCNC: 138 MMOL/L (ref 136–145)
STREP A PCR (OHS): NOT DETECTED
WBC # BLD AUTO: 7.62 X10(3)/MCL (ref 4.5–11.5)

## 2025-05-27 PROCEDURE — 85025 COMPLETE CBC W/AUTO DIFF WBC: CPT | Performed by: EMERGENCY MEDICINE

## 2025-05-27 PROCEDURE — 25000003 PHARM REV CODE 250: Performed by: SPECIALIST

## 2025-05-27 PROCEDURE — 80053 COMPREHEN METABOLIC PANEL: CPT | Performed by: EMERGENCY MEDICINE

## 2025-05-27 PROCEDURE — 25000003 PHARM REV CODE 250: Performed by: EMERGENCY MEDICINE

## 2025-05-27 PROCEDURE — 96361 HYDRATE IV INFUSION ADD-ON: CPT

## 2025-05-27 PROCEDURE — 96374 THER/PROPH/DIAG INJ IV PUSH: CPT

## 2025-05-27 PROCEDURE — 63600175 PHARM REV CODE 636 W HCPCS: Performed by: INTERNAL MEDICINE

## 2025-05-27 PROCEDURE — 82077 ASSAY SPEC XCP UR&BREATH IA: CPT

## 2025-05-27 PROCEDURE — 96376 TX/PRO/DX INJ SAME DRUG ADON: CPT

## 2025-05-27 PROCEDURE — 25500020 PHARM REV CODE 255: Performed by: INTERNAL MEDICINE

## 2025-05-27 PROCEDURE — 63600175 PHARM REV CODE 636 W HCPCS: Performed by: EMERGENCY MEDICINE

## 2025-05-27 PROCEDURE — 0240U COVID/FLU A&B PCR: CPT | Performed by: EMERGENCY MEDICINE

## 2025-05-27 PROCEDURE — 87651 STREP A DNA AMP PROBE: CPT | Performed by: EMERGENCY MEDICINE

## 2025-05-27 PROCEDURE — 36415 COLL VENOUS BLD VENIPUNCTURE: CPT

## 2025-05-27 PROCEDURE — G0379 DIRECT REFER HOSPITAL OBSERV: HCPCS

## 2025-05-27 PROCEDURE — 25000003 PHARM REV CODE 250

## 2025-05-27 PROCEDURE — 99285 EMERGENCY DEPT VISIT HI MDM: CPT | Mod: 25

## 2025-05-27 PROCEDURE — 63600175 PHARM REV CODE 636 W HCPCS

## 2025-05-27 PROCEDURE — 86308 HETEROPHILE ANTIBODY SCREEN: CPT

## 2025-05-27 PROCEDURE — G0378 HOSPITAL OBSERVATION PER HR: HCPCS

## 2025-05-27 RX ORDER — FOLIC ACID 1 MG/1
1 TABLET ORAL DAILY
Status: DISCONTINUED | OUTPATIENT
Start: 2025-05-28 | End: 2025-05-28

## 2025-05-27 RX ORDER — THIAMINE HYDROCHLORIDE 100 MG/ML
400 INJECTION, SOLUTION INTRAMUSCULAR; INTRAVENOUS ONCE
Status: COMPLETED | OUTPATIENT
Start: 2025-05-27 | End: 2025-05-27

## 2025-05-27 RX ORDER — PANTOPRAZOLE SODIUM 40 MG/10ML
40 INJECTION, POWDER, LYOPHILIZED, FOR SOLUTION INTRAVENOUS 2 TIMES DAILY
Status: DISCONTINUED | OUTPATIENT
Start: 2025-05-27 | End: 2025-05-27 | Stop reason: HOSPADM

## 2025-05-27 RX ORDER — CHLORDIAZEPOXIDE HYDROCHLORIDE 25 MG/1
50 CAPSULE, GELATIN COATED ORAL
Status: DISPENSED | OUTPATIENT
Start: 2025-05-28 | End: 2025-05-29

## 2025-05-27 RX ORDER — BISACODYL 10 MG/1
10 SUPPOSITORY RECTAL DAILY PRN
Status: DISCONTINUED | OUTPATIENT
Start: 2025-05-27 | End: 2025-05-30 | Stop reason: HOSPADM

## 2025-05-27 RX ORDER — CHLORDIAZEPOXIDE HYDROCHLORIDE 25 MG/1
100 CAPSULE, GELATIN COATED ORAL ONCE
Status: COMPLETED | OUTPATIENT
Start: 2025-05-27 | End: 2025-05-28

## 2025-05-27 RX ORDER — THIAMINE HCL 100 MG
100 TABLET ORAL DAILY
Status: DISCONTINUED | OUTPATIENT
Start: 2025-05-28 | End: 2025-05-30 | Stop reason: HOSPADM

## 2025-05-27 RX ORDER — POLYETHYLENE GLYCOL 3350 17 G/17G
17 POWDER, FOR SOLUTION ORAL 2 TIMES DAILY PRN
Status: DISCONTINUED | OUTPATIENT
Start: 2025-05-27 | End: 2025-05-30 | Stop reason: HOSPADM

## 2025-05-27 RX ORDER — GLUCAGON 1 MG
1 KIT INJECTION
Status: DISCONTINUED | OUTPATIENT
Start: 2025-05-27 | End: 2025-05-30 | Stop reason: HOSPADM

## 2025-05-27 RX ORDER — ACETAMINOPHEN 325 MG/1
650 TABLET ORAL EVERY 4 HOURS PRN
Status: DISCONTINUED | OUTPATIENT
Start: 2025-05-27 | End: 2025-05-30 | Stop reason: HOSPADM

## 2025-05-27 RX ORDER — ALUMINUM HYDROXIDE, MAGNESIUM HYDROXIDE, AND SIMETHICONE 1200; 120; 1200 MG/30ML; MG/30ML; MG/30ML
30 SUSPENSION ORAL 4 TIMES DAILY PRN
Status: DISCONTINUED | OUTPATIENT
Start: 2025-05-27 | End: 2025-05-30 | Stop reason: HOSPADM

## 2025-05-27 RX ORDER — IBUPROFEN 200 MG
24 TABLET ORAL
Status: DISCONTINUED | OUTPATIENT
Start: 2025-05-27 | End: 2025-05-30 | Stop reason: HOSPADM

## 2025-05-27 RX ORDER — CHLORDIAZEPOXIDE HYDROCHLORIDE 25 MG/1
25 CAPSULE, GELATIN COATED ORAL
Status: DISCONTINUED | OUTPATIENT
Start: 2025-06-01 | End: 2025-05-30

## 2025-05-27 RX ORDER — ALUMINUM HYDROXIDE, MAGNESIUM HYDROXIDE, AND SIMETHICONE 1200; 120; 1200 MG/30ML; MG/30ML; MG/30ML
30 SUSPENSION ORAL ONCE
Status: COMPLETED | OUTPATIENT
Start: 2025-05-27 | End: 2025-05-27

## 2025-05-27 RX ORDER — CHLORDIAZEPOXIDE HYDROCHLORIDE 25 MG/1
25 CAPSULE, GELATIN COATED ORAL
Status: DISCONTINUED | OUTPATIENT
Start: 2025-06-02 | End: 2025-05-30

## 2025-05-27 RX ORDER — CHLORDIAZEPOXIDE HYDROCHLORIDE 25 MG/1
25 CAPSULE, GELATIN COATED ORAL
Status: DISCONTINUED | OUTPATIENT
Start: 2025-05-31 | End: 2025-05-30

## 2025-05-27 RX ORDER — PANTOPRAZOLE SODIUM 40 MG/10ML
40 INJECTION, POWDER, LYOPHILIZED, FOR SOLUTION INTRAVENOUS
Status: COMPLETED | OUTPATIENT
Start: 2025-05-27 | End: 2025-05-27

## 2025-05-27 RX ORDER — LIDOCAINE HYDROCHLORIDE 20 MG/ML
15 SOLUTION OROPHARYNGEAL ONCE
Status: COMPLETED | OUTPATIENT
Start: 2025-05-27 | End: 2025-05-27

## 2025-05-27 RX ORDER — CHLORDIAZEPOXIDE HYDROCHLORIDE 25 MG/1
25 CAPSULE, GELATIN COATED ORAL
Status: DISCONTINUED | OUTPATIENT
Start: 2025-05-30 | End: 2025-05-30

## 2025-05-27 RX ORDER — CHLORDIAZEPOXIDE HYDROCHLORIDE 25 MG/1
50 CAPSULE, GELATIN COATED ORAL
Status: DISCONTINUED | OUTPATIENT
Start: 2025-05-29 | End: 2025-05-30

## 2025-05-27 RX ORDER — PANTOPRAZOLE SODIUM 40 MG/10ML
40 INJECTION, POWDER, LYOPHILIZED, FOR SOLUTION INTRAVENOUS DAILY
Status: DISCONTINUED | OUTPATIENT
Start: 2025-05-28 | End: 2025-05-29

## 2025-05-27 RX ORDER — LORAZEPAM 1 MG/1
2 TABLET ORAL EVERY 4 HOURS PRN
Status: DISCONTINUED | OUTPATIENT
Start: 2025-05-27 | End: 2025-05-30 | Stop reason: HOSPADM

## 2025-05-27 RX ORDER — SODIUM CHLORIDE 0.9 % (FLUSH) 0.9 %
10 SYRINGE (ML) INJECTION
Status: DISCONTINUED | OUTPATIENT
Start: 2025-05-27 | End: 2025-05-30 | Stop reason: HOSPADM

## 2025-05-27 RX ORDER — SODIUM CHLORIDE 9 MG/ML
INJECTION, SOLUTION INTRAVENOUS CONTINUOUS
Status: ACTIVE | OUTPATIENT
Start: 2025-05-27 | End: 2025-05-28

## 2025-05-27 RX ORDER — TALC
6 POWDER (GRAM) TOPICAL NIGHTLY PRN
Status: DISCONTINUED | OUTPATIENT
Start: 2025-05-27 | End: 2025-05-30 | Stop reason: HOSPADM

## 2025-05-27 RX ORDER — IBUPROFEN 200 MG
16 TABLET ORAL
Status: DISCONTINUED | OUTPATIENT
Start: 2025-05-27 | End: 2025-05-30 | Stop reason: HOSPADM

## 2025-05-27 RX ADMIN — SODIUM CHLORIDE 1000 ML: 9 INJECTION, SOLUTION INTRAVENOUS at 12:05

## 2025-05-27 RX ADMIN — IOHEXOL 100 ML: 350 INJECTION, SOLUTION INTRAVENOUS at 10:05

## 2025-05-27 RX ADMIN — ALUMINUM HYDROXIDE, MAGNESIUM HYDROXIDE, AND SIMETHICONE 30 ML: 200; 200; 20 SUSPENSION ORAL at 12:05

## 2025-05-27 RX ADMIN — SODIUM CHLORIDE: 9 INJECTION, SOLUTION INTRAVENOUS at 11:05

## 2025-05-27 RX ADMIN — PANTOPRAZOLE SODIUM 40 MG: 40 INJECTION, POWDER, LYOPHILIZED, FOR SOLUTION INTRAVENOUS at 06:05

## 2025-05-27 RX ADMIN — THIAMINE HYDROCHLORIDE 400 MG: 100 INJECTION, SOLUTION INTRAMUSCULAR; INTRAVENOUS at 11:05

## 2025-05-27 RX ADMIN — SODIUM CHLORIDE 1000 ML: 9 INJECTION, SOLUTION INTRAVENOUS at 06:05

## 2025-05-27 RX ADMIN — PANTOPRAZOLE SODIUM 40 MG: 40 INJECTION, POWDER, LYOPHILIZED, FOR SOLUTION INTRAVENOUS at 03:05

## 2025-05-27 RX ADMIN — CHLORDIAZEPOXIDE HYDROCHLORIDE 100 MG: 25 CAPSULE ORAL at 11:05

## 2025-05-27 RX ADMIN — LIDOCAINE HYDROCHLORIDE 15 ML: 20 SOLUTION ORAL at 12:05

## 2025-05-27 RX ADMIN — Medication 6 MG: at 11:05

## 2025-05-28 LAB
ACCEPTIBLE SP GR UR QL: 1.04 (ref 1–1.03)
ALBUMIN SERPL-MCNC: 3.3 G/DL (ref 3.4–4.8)
ALBUMIN/GLOB SERPL: 1 RATIO (ref 1.1–2)
ALP SERPL-CCNC: 108 UNIT/L (ref 40–150)
ALT SERPL-CCNC: 29 UNIT/L (ref 0–55)
AMPHET UR QL SCN: POSITIVE
ANION GAP SERPL CALC-SCNC: 14 MEQ/L
AST SERPL-CCNC: 33 UNIT/L (ref 11–45)
B PERT.PT PRMT NPH QL NAA+NON-PROBE: NOT DETECTED
BACTERIA #/AREA URNS AUTO: ABNORMAL /HPF
BARBITURATE SCN PRESENT UR: NEGATIVE
BASOPHILS # BLD AUTO: 0.03 X10(3)/MCL
BASOPHILS NFR BLD AUTO: 0.5 %
BENZODIAZ UR QL SCN: NEGATIVE
BILIRUB SERPL-MCNC: 2.9 MG/DL
BILIRUB UR QL STRIP.AUTO: NEGATIVE
BUN SERPL-MCNC: 48.8 MG/DL (ref 8.4–25.7)
C PNEUM DNA NPH QL NAA+NON-PROBE: NOT DETECTED
CALCIUM SERPL-MCNC: 8.5 MG/DL (ref 8.8–10)
CANNABINOIDS UR QL SCN: NEGATIVE
CHLORIDE SERPL-SCNC: 103 MMOL/L (ref 98–107)
CLARITY UR: CLEAR
CO2 SERPL-SCNC: 19 MMOL/L (ref 23–31)
COCAINE UR QL SCN: NEGATIVE
COLOR UR AUTO: YELLOW
CREAT SERPL-MCNC: 1.34 MG/DL (ref 0.72–1.25)
CREAT/UREA NIT SERPL: 36
EOSINOPHIL # BLD AUTO: 0.07 X10(3)/MCL (ref 0–0.9)
EOSINOPHIL NFR BLD AUTO: 1.1 %
ERYTHROCYTE [DISTWIDTH] IN BLOOD BY AUTOMATED COUNT: 17.7 % (ref 11.5–17)
FENTANYL UR QL SCN: NEGATIVE
GFR SERPLBLD CREATININE-BSD FMLA CKD-EPI: 57 ML/MIN/1.73/M2
GLOBULIN SER-MCNC: 3.2 GM/DL (ref 2.4–3.5)
GLUCOSE SERPL-MCNC: 95 MG/DL (ref 82–115)
GLUCOSE UR QL STRIP: NORMAL
HADV DNA NPH QL NAA+NON-PROBE: NOT DETECTED
HCOV 229E RNA NPH QL NAA+NON-PROBE: NOT DETECTED
HCOV HKU1 RNA NPH QL NAA+NON-PROBE: NOT DETECTED
HCOV NL63 RNA NPH QL NAA+NON-PROBE: NOT DETECTED
HCOV OC43 RNA NPH QL NAA+NON-PROBE: NOT DETECTED
HCT VFR BLD AUTO: 52.6 % (ref 42–52)
HGB BLD-MCNC: 17 G/DL (ref 14–18)
HGB UR QL STRIP: NEGATIVE
HMPV RNA NPH QL NAA+NON-PROBE: NOT DETECTED
HPIV1 RNA NPH QL NAA+NON-PROBE: NOT DETECTED
HPIV2 RNA NPH QL NAA+NON-PROBE: NOT DETECTED
HPIV3 RNA NPH QL NAA+NON-PROBE: NOT DETECTED
HPIV4 RNA NPH QL NAA+NON-PROBE: NOT DETECTED
IMM GRANULOCYTES # BLD AUTO: 0.03 X10(3)/MCL (ref 0–0.04)
IMM GRANULOCYTES NFR BLD AUTO: 0.5 %
KETONES UR QL STRIP: NEGATIVE
LEUKOCYTE ESTERASE UR QL STRIP: NEGATIVE
LYMPHOCYTES # BLD AUTO: 1.65 X10(3)/MCL (ref 0.6–4.6)
LYMPHOCYTES NFR BLD AUTO: 25.2 %
M PNEUMO DNA NPH QL NAA+NON-PROBE: NOT DETECTED
MAGNESIUM SERPL-MCNC: 1.7 MG/DL (ref 1.6–2.6)
MCH RBC QN AUTO: 28.1 PG (ref 27–31)
MCHC RBC AUTO-ENTMCNC: 32.3 G/DL (ref 33–36)
MCV RBC AUTO: 86.8 FL (ref 80–94)
MDMA UR QL SCN: NEGATIVE
MONOCYTES # BLD AUTO: 0.77 X10(3)/MCL (ref 0.1–1.3)
MONOCYTES NFR BLD AUTO: 11.8 %
NEUTROPHILS # BLD AUTO: 4 X10(3)/MCL (ref 2.1–9.2)
NEUTROPHILS NFR BLD AUTO: 60.9 %
NITRITE UR QL STRIP: NEGATIVE
NRBC BLD AUTO-RTO: 0 %
OPIATES UR QL SCN: NEGATIVE
PCP UR QL: NEGATIVE
PH UR STRIP: 5.5 [PH]
PH UR: 5.5 [PH] (ref 3–11)
PLATELET # BLD AUTO: 99 X10(3)/MCL (ref 130–400)
PLATELETS.RETICULATED NFR BLD AUTO: 5.2 % (ref 0.9–11.2)
PMV BLD AUTO: 12.5 FL (ref 7.4–10.4)
POTASSIUM SERPL-SCNC: 3.7 MMOL/L (ref 3.5–5.1)
PROT SERPL-MCNC: 6.5 GM/DL (ref 5.8–7.6)
PROT UR QL STRIP: NEGATIVE
RBC # BLD AUTO: 6.06 X10(6)/MCL (ref 4.7–6.1)
RBC #/AREA URNS AUTO: ABNORMAL /HPF
RSV RNA NPH QL NAA+NON-PROBE: NOT DETECTED
RV+EV RNA NPH QL NAA+NON-PROBE: NOT DETECTED
SODIUM SERPL-SCNC: 136 MMOL/L (ref 136–145)
SP GR UR STRIP.AUTO: 1.04 (ref 1–1.03)
SQUAMOUS #/AREA URNS LPF: ABNORMAL /HPF
UROBILINOGEN UR STRIP-ACNC: NORMAL
WBC # BLD AUTO: 6.55 X10(3)/MCL (ref 4.5–11.5)
WBC #/AREA URNS AUTO: ABNORMAL /HPF

## 2025-05-28 PROCEDURE — 36415 COLL VENOUS BLD VENIPUNCTURE: CPT

## 2025-05-28 PROCEDURE — 81001 URINALYSIS AUTO W/SCOPE: CPT

## 2025-05-28 PROCEDURE — 85025 COMPLETE CBC W/AUTO DIFF WBC: CPT

## 2025-05-28 PROCEDURE — 80053 COMPREHEN METABOLIC PANEL: CPT

## 2025-05-28 PROCEDURE — 87632 RESP VIRUS 6-11 TARGETS: CPT

## 2025-05-28 PROCEDURE — 80307 DRUG TEST PRSMV CHEM ANLYZR: CPT

## 2025-05-28 PROCEDURE — 25000003 PHARM REV CODE 250: Performed by: INTERNAL MEDICINE

## 2025-05-28 PROCEDURE — 21400001 HC TELEMETRY ROOM

## 2025-05-28 PROCEDURE — 83735 ASSAY OF MAGNESIUM: CPT

## 2025-05-28 PROCEDURE — 25000003 PHARM REV CODE 250: Performed by: PHYSICIAN ASSISTANT

## 2025-05-28 PROCEDURE — 82550 ASSAY OF CK (CPK): CPT | Performed by: INTERNAL MEDICINE

## 2025-05-28 PROCEDURE — 25000003 PHARM REV CODE 250

## 2025-05-28 PROCEDURE — 63600175 PHARM REV CODE 636 W HCPCS

## 2025-05-28 RX ORDER — SUCRALFATE 1 G/1
1 TABLET ORAL
Status: DISCONTINUED | OUTPATIENT
Start: 2025-05-28 | End: 2025-05-30 | Stop reason: HOSPADM

## 2025-05-28 RX ORDER — METOPROLOL SUCCINATE 50 MG/1
50 TABLET, EXTENDED RELEASE ORAL 2 TIMES DAILY
Status: DISCONTINUED | OUTPATIENT
Start: 2025-05-28 | End: 2025-05-30 | Stop reason: HOSPADM

## 2025-05-28 RX ADMIN — CHLORDIAZEPOXIDE HYDROCHLORIDE 50 MG: 25 CAPSULE ORAL at 11:05

## 2025-05-28 RX ADMIN — PANTOPRAZOLE SODIUM 40 MG: 40 INJECTION, POWDER, FOR SOLUTION INTRAVENOUS at 10:05

## 2025-05-28 RX ADMIN — CHLORDIAZEPOXIDE HYDROCHLORIDE 50 MG: 25 CAPSULE ORAL at 06:05

## 2025-05-28 RX ADMIN — THIAMINE HCL TAB 100 MG 100 MG: 100 TAB at 10:05

## 2025-05-28 RX ADMIN — CHLORDIAZEPOXIDE HYDROCHLORIDE 50 MG: 25 CAPSULE ORAL at 05:05

## 2025-05-28 RX ADMIN — Medication 6 MG: at 08:05

## 2025-05-28 RX ADMIN — METOPROLOL SUCCINATE 50 MG: 50 TABLET, EXTENDED RELEASE ORAL at 08:05

## 2025-05-28 RX ADMIN — SUCRALFATE 1 G: 1 TABLET ORAL at 08:05

## 2025-05-28 RX ADMIN — SUCRALFATE 1 G: 1 TABLET ORAL at 04:05

## 2025-05-28 RX ADMIN — METOPROLOL SUCCINATE 50 MG: 50 TABLET, EXTENDED RELEASE ORAL at 10:05

## 2025-05-28 RX ADMIN — SUCRALFATE 1 G: 1 TABLET ORAL at 06:05

## 2025-05-28 RX ADMIN — THERA TABS 1 TABLET: TAB at 10:05

## 2025-05-29 ENCOUNTER — ANESTHESIA (OUTPATIENT)
Dept: ENDOSCOPY | Facility: HOSPITAL | Age: 70
DRG: 381 | End: 2025-05-29
Payer: MEDICARE

## 2025-05-29 ENCOUNTER — ANESTHESIA EVENT (OUTPATIENT)
Dept: ENDOSCOPY | Facility: HOSPITAL | Age: 70
DRG: 381 | End: 2025-05-29
Payer: MEDICARE

## 2025-05-29 LAB
ANION GAP SERPL CALC-SCNC: 11 MEQ/L
BUN SERPL-MCNC: 32.4 MG/DL (ref 8.4–25.7)
CALCIUM SERPL-MCNC: 8.9 MG/DL (ref 8.8–10)
CHLORIDE SERPL-SCNC: 103 MMOL/L (ref 98–107)
CK SERPL-CCNC: 22 U/L (ref 30–200)
CO2 SERPL-SCNC: 24 MMOL/L (ref 23–31)
CREAT SERPL-MCNC: 1.28 MG/DL (ref 0.72–1.25)
CREAT/UREA NIT SERPL: 25
GFR SERPLBLD CREATININE-BSD FMLA CKD-EPI: 60 ML/MIN/1.73/M2
GLUCOSE SERPL-MCNC: 99 MG/DL (ref 82–115)
MAGNESIUM SERPL-MCNC: 1.7 MG/DL (ref 1.6–2.6)
PHOSPHATE SERPL-MCNC: 2.5 MG/DL (ref 2.3–4.7)
POTASSIUM SERPL-SCNC: 3.6 MMOL/L (ref 3.5–5.1)
SODIUM SERPL-SCNC: 138 MMOL/L (ref 136–145)

## 2025-05-29 PROCEDURE — 0DB58ZX EXCISION OF ESOPHAGUS, VIA NATURAL OR ARTIFICIAL OPENING ENDOSCOPIC, DIAGNOSTIC: ICD-10-PCS | Performed by: INTERNAL MEDICINE

## 2025-05-29 PROCEDURE — 63600175 PHARM REV CODE 636 W HCPCS

## 2025-05-29 PROCEDURE — 83735 ASSAY OF MAGNESIUM: CPT | Performed by: INTERNAL MEDICINE

## 2025-05-29 PROCEDURE — 25000003 PHARM REV CODE 250: Performed by: INTERNAL MEDICINE

## 2025-05-29 PROCEDURE — 25000003 PHARM REV CODE 250

## 2025-05-29 PROCEDURE — 27201423 OPTIME MED/SURG SUP & DEVICES STERILE SUPPLY: Performed by: INTERNAL MEDICINE

## 2025-05-29 PROCEDURE — 84100 ASSAY OF PHOSPHORUS: CPT | Performed by: INTERNAL MEDICINE

## 2025-05-29 PROCEDURE — D9220A PRA ANESTHESIA: Mod: ,,, | Performed by: NURSE ANESTHETIST, CERTIFIED REGISTERED

## 2025-05-29 PROCEDURE — 25000003 PHARM REV CODE 250: Performed by: PHYSICIAN ASSISTANT

## 2025-05-29 PROCEDURE — 25000003 PHARM REV CODE 250: Performed by: NURSE ANESTHETIST, CERTIFIED REGISTERED

## 2025-05-29 PROCEDURE — 80048 BASIC METABOLIC PNL TOTAL CA: CPT | Performed by: INTERNAL MEDICINE

## 2025-05-29 PROCEDURE — 0DB68ZX EXCISION OF STOMACH, VIA NATURAL OR ARTIFICIAL OPENING ENDOSCOPIC, DIAGNOSTIC: ICD-10-PCS | Performed by: INTERNAL MEDICINE

## 2025-05-29 PROCEDURE — 43239 EGD BIOPSY SINGLE/MULTIPLE: CPT | Performed by: INTERNAL MEDICINE

## 2025-05-29 PROCEDURE — 63600175 PHARM REV CODE 636 W HCPCS: Performed by: NURSE ANESTHETIST, CERTIFIED REGISTERED

## 2025-05-29 PROCEDURE — 21400001 HC TELEMETRY ROOM

## 2025-05-29 PROCEDURE — 37000008 HC ANESTHESIA 1ST 15 MINUTES: Performed by: INTERNAL MEDICINE

## 2025-05-29 PROCEDURE — 36415 COLL VENOUS BLD VENIPUNCTURE: CPT | Performed by: INTERNAL MEDICINE

## 2025-05-29 RX ORDER — ACETAMINOPHEN 10 MG/ML
1000 INJECTION, SOLUTION INTRAVENOUS ONCE
OUTPATIENT
Start: 2025-05-29 | End: 2025-05-29

## 2025-05-29 RX ORDER — LIDOCAINE HYDROCHLORIDE 10 MG/ML
1 INJECTION, SOLUTION EPIDURAL; INFILTRATION; INTRACAUDAL; PERINEURAL ONCE
OUTPATIENT
Start: 2025-05-29 | End: 2025-05-29

## 2025-05-29 RX ORDER — SODIUM CHLORIDE, SODIUM LACTATE, POTASSIUM CHLORIDE, CALCIUM CHLORIDE 600; 310; 30; 20 MG/100ML; MG/100ML; MG/100ML; MG/100ML
INJECTION, SOLUTION INTRAVENOUS CONTINUOUS
Status: ACTIVE | OUTPATIENT
Start: 2025-05-29 | End: 2025-05-30

## 2025-05-29 RX ORDER — SODIUM CHLORIDE, SODIUM GLUCONATE, SODIUM ACETATE, POTASSIUM CHLORIDE AND MAGNESIUM CHLORIDE 30; 37; 368; 526; 502 MG/100ML; MG/100ML; MG/100ML; MG/100ML; MG/100ML
INJECTION, SOLUTION INTRAVENOUS CONTINUOUS
OUTPATIENT
Start: 2025-05-29 | End: 2025-06-28

## 2025-05-29 RX ORDER — PROPOFOL 10 MG/ML
VIAL (ML) INTRAVENOUS
Status: DISCONTINUED | OUTPATIENT
Start: 2025-05-29 | End: 2025-05-29

## 2025-05-29 RX ORDER — PROPOFOL 10 MG/ML
VIAL (ML) INTRAVENOUS
Status: COMPLETED
Start: 2025-05-29 | End: 2025-05-29

## 2025-05-29 RX ORDER — GLUCAGON 1 MG
1 KIT INJECTION
OUTPATIENT
Start: 2025-05-29

## 2025-05-29 RX ORDER — ONDANSETRON HYDROCHLORIDE 2 MG/ML
4 INJECTION, SOLUTION INTRAVENOUS DAILY PRN
OUTPATIENT
Start: 2025-05-29

## 2025-05-29 RX ORDER — DIPHENHYDRAMINE HYDROCHLORIDE 50 MG/ML
25 INJECTION, SOLUTION INTRAMUSCULAR; INTRAVENOUS EVERY 6 HOURS PRN
OUTPATIENT
Start: 2025-05-29

## 2025-05-29 RX ORDER — PANTOPRAZOLE SODIUM 40 MG/1
40 TABLET, DELAYED RELEASE ORAL
Status: DISCONTINUED | OUTPATIENT
Start: 2025-05-29 | End: 2025-05-30 | Stop reason: HOSPADM

## 2025-05-29 RX ADMIN — SUCRALFATE 1 G: 1 TABLET ORAL at 05:05

## 2025-05-29 RX ADMIN — SODIUM CHLORIDE, POTASSIUM CHLORIDE, SODIUM LACTATE AND CALCIUM CHLORIDE: 600; 310; 30; 20 INJECTION, SOLUTION INTRAVENOUS at 08:05

## 2025-05-29 RX ADMIN — SODIUM CHLORIDE: 9 INJECTION, SOLUTION INTRAVENOUS at 11:05

## 2025-05-29 RX ADMIN — PROPOFOL 40 MG: 10 INJECTION, EMULSION INTRAVENOUS at 11:05

## 2025-05-29 RX ADMIN — THERA TABS 1 TABLET: TAB at 08:05

## 2025-05-29 RX ADMIN — Medication 6 MG: at 08:05

## 2025-05-29 RX ADMIN — SUCRALFATE 1 G: 1 TABLET ORAL at 08:05

## 2025-05-29 RX ADMIN — CHLORDIAZEPOXIDE HYDROCHLORIDE 50 MG: 25 CAPSULE ORAL at 05:05

## 2025-05-29 RX ADMIN — PANTOPRAZOLE SODIUM 40 MG: 40 TABLET, DELAYED RELEASE ORAL at 05:05

## 2025-05-29 RX ADMIN — METOPROLOL SUCCINATE 50 MG: 50 TABLET, EXTENDED RELEASE ORAL at 08:05

## 2025-05-29 RX ADMIN — PANTOPRAZOLE SODIUM 40 MG: 40 INJECTION, POWDER, FOR SOLUTION INTRAVENOUS at 09:05

## 2025-05-29 RX ADMIN — PROPOFOL 60 MG: 10 INJECTION, EMULSION INTRAVENOUS at 11:05

## 2025-05-29 RX ADMIN — THIAMINE HCL TAB 100 MG 100 MG: 100 TAB at 08:05

## 2025-05-29 RX ADMIN — CHLORDIAZEPOXIDE HYDROCHLORIDE 50 MG: 25 CAPSULE ORAL at 08:05

## 2025-05-30 VITALS
BODY MASS INDEX: 23.1 KG/M2 | RESPIRATION RATE: 18 BRPM | SYSTOLIC BLOOD PRESSURE: 166 MMHG | WEIGHT: 180 LBS | DIASTOLIC BLOOD PRESSURE: 99 MMHG | HEART RATE: 97 BPM | TEMPERATURE: 98 F | OXYGEN SATURATION: 98 % | HEIGHT: 74 IN

## 2025-05-30 PROBLEM — K20.90 ESOPHAGITIS: Status: ACTIVE | Noted: 2025-05-30

## 2025-05-30 LAB
ANION GAP SERPL CALC-SCNC: 13 MEQ/L
BASOPHILS # BLD AUTO: 0.04 X10(3)/MCL
BASOPHILS NFR BLD AUTO: 0.9 %
BUN SERPL-MCNC: 27.4 MG/DL (ref 8.4–25.7)
CALCIUM SERPL-MCNC: 8.6 MG/DL (ref 8.8–10)
CHLORIDE SERPL-SCNC: 104 MMOL/L (ref 98–107)
CO2 SERPL-SCNC: 23 MMOL/L (ref 23–31)
CREAT SERPL-MCNC: 1.2 MG/DL (ref 0.72–1.25)
CREAT/UREA NIT SERPL: 23
EOSINOPHIL # BLD AUTO: 0.09 X10(3)/MCL (ref 0–0.9)
EOSINOPHIL NFR BLD AUTO: 2 %
ERYTHROCYTE [DISTWIDTH] IN BLOOD BY AUTOMATED COUNT: 16.7 % (ref 11.5–17)
GFR SERPLBLD CREATININE-BSD FMLA CKD-EPI: >60 ML/MIN/1.73/M2
GLUCOSE SERPL-MCNC: 93 MG/DL (ref 82–115)
HCT VFR BLD AUTO: 49.6 % (ref 42–52)
HGB BLD-MCNC: 15.5 G/DL (ref 14–18)
IMM GRANULOCYTES # BLD AUTO: 0.01 X10(3)/MCL (ref 0–0.04)
IMM GRANULOCYTES NFR BLD AUTO: 0.2 %
LYMPHOCYTES # BLD AUTO: 1.63 X10(3)/MCL (ref 0.6–4.6)
LYMPHOCYTES NFR BLD AUTO: 36.1 %
MAGNESIUM SERPL-MCNC: 1.7 MG/DL (ref 1.6–2.6)
MCH RBC QN AUTO: 27.4 PG (ref 27–31)
MCHC RBC AUTO-ENTMCNC: 31.3 G/DL (ref 33–36)
MCV RBC AUTO: 87.8 FL (ref 80–94)
MONOCYTES # BLD AUTO: 0.59 X10(3)/MCL (ref 0.1–1.3)
MONOCYTES NFR BLD AUTO: 13.1 %
NEUTROPHILS # BLD AUTO: 2.16 X10(3)/MCL (ref 2.1–9.2)
NEUTROPHILS NFR BLD AUTO: 47.7 %
NRBC BLD AUTO-RTO: 0 %
PHOSPHATE SERPL-MCNC: 2.8 MG/DL (ref 2.3–4.7)
PLATELET # BLD AUTO: 101 X10(3)/MCL (ref 130–400)
PLATELETS.RETICULATED NFR BLD AUTO: 5.5 % (ref 0.9–11.2)
PMV BLD AUTO: 12 FL (ref 7.4–10.4)
POTASSIUM SERPL-SCNC: 3.6 MMOL/L (ref 3.5–5.1)
PSYCHE PATHOLOGY RESULT: NORMAL
RBC # BLD AUTO: 5.65 X10(6)/MCL (ref 4.7–6.1)
SODIUM SERPL-SCNC: 140 MMOL/L (ref 136–145)
WBC # BLD AUTO: 4.52 X10(3)/MCL (ref 4.5–11.5)

## 2025-05-30 PROCEDURE — 36415 COLL VENOUS BLD VENIPUNCTURE: CPT | Performed by: INTERNAL MEDICINE

## 2025-05-30 PROCEDURE — 83735 ASSAY OF MAGNESIUM: CPT | Performed by: INTERNAL MEDICINE

## 2025-05-30 PROCEDURE — 92610 EVALUATE SWALLOWING FUNCTION: CPT

## 2025-05-30 PROCEDURE — 25000003 PHARM REV CODE 250

## 2025-05-30 PROCEDURE — 25000003 PHARM REV CODE 250: Performed by: PHYSICIAN ASSISTANT

## 2025-05-30 PROCEDURE — 80048 BASIC METABOLIC PNL TOTAL CA: CPT | Performed by: INTERNAL MEDICINE

## 2025-05-30 PROCEDURE — 84100 ASSAY OF PHOSPHORUS: CPT | Performed by: INTERNAL MEDICINE

## 2025-05-30 PROCEDURE — 87389 HIV-1 AG W/HIV-1&-2 AB AG IA: CPT | Performed by: INTERNAL MEDICINE

## 2025-05-30 PROCEDURE — 25000003 PHARM REV CODE 250: Performed by: INTERNAL MEDICINE

## 2025-05-30 PROCEDURE — 85025 COMPLETE CBC W/AUTO DIFF WBC: CPT | Performed by: INTERNAL MEDICINE

## 2025-05-30 RX ORDER — FOLIC ACID 1 MG/1
1 TABLET ORAL DAILY
Status: DISCONTINUED | OUTPATIENT
Start: 2025-05-30 | End: 2025-05-30 | Stop reason: HOSPADM

## 2025-05-30 RX ORDER — SUCRALFATE 1 G/1
1 TABLET ORAL
Qty: 28 TABLET | Refills: 0 | Status: SHIPPED | OUTPATIENT
Start: 2025-05-30 | End: 2025-06-06

## 2025-05-30 RX ORDER — PANTOPRAZOLE SODIUM 40 MG/1
40 TABLET, DELAYED RELEASE ORAL
Qty: 180 TABLET | Refills: 0 | Status: SHIPPED | OUTPATIENT
Start: 2025-05-30 | End: 2025-08-28

## 2025-05-30 RX ORDER — CHLORDIAZEPOXIDE HYDROCHLORIDE 5 MG/1
10 CAPSULE, GELATIN COATED ORAL 3 TIMES DAILY
Status: DISCONTINUED | OUTPATIENT
Start: 2025-05-30 | End: 2025-05-30 | Stop reason: HOSPADM

## 2025-05-30 RX ADMIN — PANTOPRAZOLE SODIUM 40 MG: 40 TABLET, DELAYED RELEASE ORAL at 02:05

## 2025-05-30 RX ADMIN — CHLORDIAZEPOXIDE HYDROCHLORIDE 10 MG: 5 CAPSULE ORAL at 02:05

## 2025-05-30 RX ADMIN — SUCRALFATE 1 G: 1 TABLET ORAL at 02:05

## 2025-05-30 RX ADMIN — METOPROLOL SUCCINATE 50 MG: 50 TABLET, EXTENDED RELEASE ORAL at 09:05

## 2025-05-30 RX ADMIN — PANTOPRAZOLE SODIUM 40 MG: 40 TABLET, DELAYED RELEASE ORAL at 06:05

## 2025-05-30 RX ADMIN — CHLORDIAZEPOXIDE HYDROCHLORIDE 25 MG: 25 CAPSULE ORAL at 06:05

## 2025-05-30 RX ADMIN — THERA TABS 1 TABLET: TAB at 09:05

## 2025-05-30 RX ADMIN — SUCRALFATE 1 G: 1 TABLET ORAL at 06:05

## 2025-05-30 RX ADMIN — THIAMINE HCL TAB 100 MG 100 MG: 100 TAB at 09:05

## 2025-05-30 RX ADMIN — FOLIC ACID 1 MG: 1 TABLET ORAL at 09:05

## 2025-05-31 LAB — HIV 1+2 AB+HIV1 P24 AG SERPL QL IA: NONREACTIVE

## 2025-06-04 ENCOUNTER — OFFICE VISIT (OUTPATIENT)
Dept: FAMILY MEDICINE | Facility: CLINIC | Age: 70
End: 2025-06-04
Payer: MEDICARE

## 2025-06-04 ENCOUNTER — HOSPITAL ENCOUNTER (EMERGENCY)
Facility: HOSPITAL | Age: 70
Discharge: HOME | End: 2025-06-04
Attending: EMERGENCY MEDICINE
Payer: MEDICARE

## 2025-06-04 VITALS
HEIGHT: 74 IN | OXYGEN SATURATION: 100 % | TEMPERATURE: 97 F | WEIGHT: 180 LBS | BODY MASS INDEX: 23.1 KG/M2 | SYSTOLIC BLOOD PRESSURE: 126 MMHG | DIASTOLIC BLOOD PRESSURE: 83 MMHG | RESPIRATION RATE: 18 BRPM | HEART RATE: 78 BPM

## 2025-06-04 VITALS
HEIGHT: 74 IN | BODY MASS INDEX: 23.4 KG/M2 | OXYGEN SATURATION: 92 % | TEMPERATURE: 98 F | WEIGHT: 182.31 LBS | HEART RATE: 94 BPM | RESPIRATION RATE: 18 BRPM | DIASTOLIC BLOOD PRESSURE: 64 MMHG | SYSTOLIC BLOOD PRESSURE: 82 MMHG

## 2025-06-04 DIAGNOSIS — E86.0 DEHYDRATION: Primary | ICD-10-CM

## 2025-06-04 DIAGNOSIS — I95.9 HYPOTENSION, UNSPECIFIED HYPOTENSION TYPE: ICD-10-CM

## 2025-06-04 DIAGNOSIS — Z09 HOSPITAL DISCHARGE FOLLOW-UP: Primary | ICD-10-CM

## 2025-06-04 LAB
ACCEPTIBLE SP GR UR QL: <=1.005 (ref 1–1.03)
ALBUMIN SERPL-MCNC: 3.2 G/DL (ref 3.4–4.8)
ALBUMIN/GLOB SERPL: 0.9 RATIO (ref 1.1–2)
ALP SERPL-CCNC: 120 UNIT/L (ref 40–150)
ALT SERPL-CCNC: 35 UNIT/L (ref 0–55)
AMPHET UR QL SCN: NEGATIVE
ANION GAP SERPL CALC-SCNC: 6 MEQ/L
AST SERPL-CCNC: 33 UNIT/L (ref 11–45)
BARBITURATE SCN PRESENT UR: NEGATIVE
BASOPHILS # BLD AUTO: 0.02 X10(3)/MCL
BASOPHILS NFR BLD AUTO: 0.5 %
BENZODIAZ UR QL SCN: POSITIVE
BILIRUB SERPL-MCNC: 1.5 MG/DL
BUN SERPL-MCNC: 40.5 MG/DL (ref 8.4–25.7)
CALCIUM SERPL-MCNC: 8.6 MG/DL (ref 8.8–10)
CANNABINOIDS UR QL SCN: NEGATIVE
CHLORIDE SERPL-SCNC: 102 MMOL/L (ref 98–107)
CO2 SERPL-SCNC: 29 MMOL/L (ref 23–31)
COCAINE UR QL SCN: NEGATIVE
CREAT SERPL-MCNC: 1.82 MG/DL (ref 0.72–1.25)
CREAT/UREA NIT SERPL: 22
EOSINOPHIL # BLD AUTO: 0.08 X10(3)/MCL (ref 0–0.9)
EOSINOPHIL NFR BLD AUTO: 1.9 %
ERYTHROCYTE [DISTWIDTH] IN BLOOD BY AUTOMATED COUNT: 16.4 % (ref 11.5–17)
ETHANOL SERPL-MCNC: <10 MG/DL
FENTANYL UR QL SCN: NEGATIVE
GFR SERPLBLD CREATININE-BSD FMLA CKD-EPI: 39 ML/MIN/1.73/M2
GLOBULIN SER-MCNC: 3.4 GM/DL (ref 2.4–3.5)
GLUCOSE SERPL-MCNC: 90 MG/DL (ref 82–115)
HCT VFR BLD AUTO: 47.8 % (ref 42–52)
HGB BLD-MCNC: 15.3 G/DL (ref 14–18)
IMM GRANULOCYTES # BLD AUTO: 0.02 X10(3)/MCL (ref 0–0.04)
IMM GRANULOCYTES NFR BLD AUTO: 0.5 %
LYMPHOCYTES # BLD AUTO: 1.12 X10(3)/MCL (ref 0.6–4.6)
LYMPHOCYTES NFR BLD AUTO: 26 %
MCH RBC QN AUTO: 28.4 PG (ref 27–31)
MCHC RBC AUTO-ENTMCNC: 32 G/DL (ref 33–36)
MCV RBC AUTO: 88.7 FL (ref 80–94)
MONOCYTES # BLD AUTO: 0.59 X10(3)/MCL (ref 0.1–1.3)
MONOCYTES NFR BLD AUTO: 13.7 %
NEUTROPHILS # BLD AUTO: 2.48 X10(3)/MCL (ref 2.1–9.2)
NEUTROPHILS NFR BLD AUTO: 57.4 %
OHS QRS DURATION: 96 MS
OHS QTC CALCULATION: 473 MS
OPIATES UR QL SCN: POSITIVE
PCP UR QL: NEGATIVE
PH UR: 5.5 [PH] (ref 3–11)
PLATELET # BLD AUTO: 83 X10(3)/MCL (ref 130–400)
PMV BLD AUTO: 12.4 FL (ref 7.4–10.4)
POTASSIUM SERPL-SCNC: 4 MMOL/L (ref 3.5–5.1)
PROT SERPL-MCNC: 6.6 GM/DL (ref 5.8–7.6)
RBC # BLD AUTO: 5.39 X10(6)/MCL (ref 4.7–6.1)
SODIUM SERPL-SCNC: 137 MMOL/L (ref 136–145)
WBC # BLD AUTO: 4.31 X10(3)/MCL (ref 4.5–11.5)

## 2025-06-04 PROCEDURE — 99215 OFFICE O/P EST HI 40 MIN: CPT | Mod: ,,, | Performed by: NURSE PRACTITIONER

## 2025-06-04 PROCEDURE — 96361 HYDRATE IV INFUSION ADD-ON: CPT

## 2025-06-04 PROCEDURE — 99284 EMERGENCY DEPT VISIT MOD MDM: CPT | Mod: 25

## 2025-06-04 PROCEDURE — 25000003 PHARM REV CODE 250: Performed by: EMERGENCY MEDICINE

## 2025-06-04 PROCEDURE — 80053 COMPREHEN METABOLIC PANEL: CPT | Performed by: EMERGENCY MEDICINE

## 2025-06-04 PROCEDURE — 82077 ASSAY SPEC XCP UR&BREATH IA: CPT | Performed by: EMERGENCY MEDICINE

## 2025-06-04 PROCEDURE — 96360 HYDRATION IV INFUSION INIT: CPT

## 2025-06-04 PROCEDURE — 80307 DRUG TEST PRSMV CHEM ANLYZR: CPT | Performed by: EMERGENCY MEDICINE

## 2025-06-04 PROCEDURE — 85025 COMPLETE CBC W/AUTO DIFF WBC: CPT | Performed by: EMERGENCY MEDICINE

## 2025-06-04 RX ORDER — SODIUM CHLORIDE 9 MG/ML
1000 INJECTION, SOLUTION INTRAVENOUS
Status: DISCONTINUED | OUTPATIENT
Start: 2025-06-04 | End: 2025-06-04

## 2025-06-04 RX ORDER — SODIUM CHLORIDE 9 MG/ML
1000 INJECTION, SOLUTION INTRAVENOUS
Status: COMPLETED | OUTPATIENT
Start: 2025-06-04 | End: 2025-06-04

## 2025-06-04 RX ADMIN — SODIUM CHLORIDE 1000 ML: 9 INJECTION, SOLUTION INTRAVENOUS at 11:06

## 2025-06-04 RX ADMIN — SODIUM CHLORIDE 1000 ML: 9 INJECTION, SOLUTION INTRAVENOUS at 10:06

## 2025-06-04 NOTE — ED NOTES
Orthostatic   1331----Laying BP  112/85  81    1333----Sitting  BP  116/77  82  1335-------Standing  BP  119/88 89    Pt denies any symptoms with position change

## 2025-06-04 NOTE — ED PROVIDER NOTES
NAME:  Mike Urbina Jr.  CSN:     [id number]  MRN:    [id number]  ADMIT DATE: 6/4/2025        eMERGENCY dEPARTMENT eNCOUnter    CHIEF COMPLAINT    Chief Complaint   Patient presents with    Fatigue     Pt sent from [facility] for c/o hypotension and weakness; pt reports he has been feeling weak and fatigue for the past week; recently d/c from hospital for dehydration; hx of alcohol abuse, reports no ETOH today, but did drink 4 beers yesterday        HPI      Mike Urbina Jr. is a 69 y.o. male who presents to the ED for evaluation of low blood pressure and generalized weakness.  Patient was seen here last week and diagnosed with a esophagitis in dehydration secondary to decreased p.o. intake.  Patient went for his hospital follow-up appointment today and was found to be hypotensive and weak again.  Patient reports alcohol consumption yesterday.  He denies any throat pain today.  No episodes of syncope.          ALLERGIES    Review of patient's allergies indicates:  No Known Allergies    PAST MEDICAL HISTORY  Past Medical History:   Diagnosis Date    Atrial flutter     CHF (congestive heart failure)     Coronary artery disease     Digestive disorder     Hyperlipidemia     Hypertension     Myocardial infarction     SOB (shortness of breath)     at times       SURGICAL HISTORY    Past Surgical History:   Procedure Laterality Date    CATARACT EXTRACTION Bilateral     COLONOSCOPY N/A 12/3/2024    Procedure: COLONOSCOPY;  Surgeon: Zayra Mcintosh MD;  Location: [facility];  Service: General;  Laterality: N/A;    CORONARY ARTERY BYPASS GRAFT (CABG) N/A 4/3/2023    Procedure: CORONARY ARTERY BYPASS GRAFT (CABG);  Surgeon: Deuce Finley IV, MD;  Location: [facility] OR;  Service: Cardiovascular;  Laterality: N/A;  WITH LLAA //  ECHO NOTIFIED    EGD, WITH CLOSED BIOPSY  5/29/2025    Procedure: EGD, WITH CLOSED BIOPSY;  Surgeon: Vikash Rico MD;  Location: [facility] ENDOSCOPY;  Service:  Gastroenterology;;    ESOPHAGOGASTRODUODENOSCOPY N/A 5/29/2025    Procedure: EGD;  Surgeon: Vikash Rico MD;  Location: [facility] ENDOSCOPY;  Service: Gastroenterology;  Laterality: N/A;    LEFT HEART CATHETERIZATION N/A 03/15/2023    Procedure: CATHETERIZATION, HEART, LEFT;  Surgeon: Sreedhar Herrera MD;  Location: [facility] CATH LAB;  Service: Cardiology;  Laterality: N/A;  LHC via RRA       SOCIAL HISTORY    Social History     Socioeconomic History    Marital status: Single   Tobacco Use    Smoking status: Never     Passive exposure: Never    Smokeless tobacco: Never   Substance and Sexual Activity    Alcohol use: Yes     Alcohol/week: 50.0 standard drinks of alcohol     Types: 50 Cans of beer per week     Comment: alcoholic, daily, beer    Drug use: Yes     Types: Hydrocodone    Sexual activity: Not Currently   Social History Narrative    ** Merged History Encounter **          Social Drivers of Health     Financial Resource Strain: Low Risk  (5/28/2025)    Overall Financial Resource Strain (CARDIA)     Difficulty of Paying Living Expenses: Not hard at all   Food Insecurity: No Food Insecurity (5/28/2025)    Hunger Vital Sign     Worried About Running Out of Food in the Last Year: Never true     Ran Out of Food in the Last Year: Never true   Transportation Needs: No Transportation Needs (5/28/2025)    PRAPARE - Transportation     Lack of Transportation (Medical): No     Lack of Transportation (Non-Medical): No   Physical Activity: Inactive (5/28/2025)    Exercise Vital Sign     Days of Exercise per Week: 0 days     Minutes of Exercise per Session: 0 min   Stress: No Stress Concern Present (5/28/2025)    [employer] - Occupational Stress Questionnaire     Feeling of Stress : Not at all   Housing Stability: Low Risk  (5/28/2025)    Housing Stability Vital Sign     Unable to Pay for Housing in the Last Year: No     Homeless in the Last Year: No       FAMILY HISTORY    Family History  "  Problem Relation Name Age of Onset    Heart attack Mother      Heart attack Brother         REVIEW OF SYSTEMS   ROS  All Systems otherwise negative except as noted in the History of Present Illness.        PHYSICAL EXAM    Reviewed Triage Note  VITAL SIGNS:   ED Triage Vitals [06/04/25 0958]   Encounter Vitals Group      BP (!) 79/51      Systolic BP Percentile       Diastolic BP Percentile       Pulse 83      Resp 18      Temp 97.4 °F (36.3 °C)      Temp Source Oral      SpO2 95 %      Weight 180 lb      Height 6' 2"      Head Circumference       Peak Flow       Pain Score       Pain Loc       Pain Education       Exclude from Growth Chart        Patient Vitals for the past 24 hrs:   BP Temp Temp src Pulse Resp SpO2 Height Weight   06/04/25 1438 126/83 -- -- 78 18 100 % -- --   06/04/25 1333 116/77 -- -- 82 -- -- -- --   06/04/25 1331 112/85 -- -- 85 -- -- -- --   06/04/25 1208 116/77 -- -- 83 20 98 % -- --   06/04/25 1135 119/88 -- -- 89 -- -- -- --   06/04/25 1132 103/76 -- -- 83 (!) 21 100 % -- --   06/04/25 1101 98/75 -- -- 82 18 97 % -- --   06/04/25 1034 (!) 85/52 -- -- 79 20 95 % -- --   06/04/25 0958 (!) 79/51 97.4 °F (36.3 °C) Oral 83 18 95 % 6' 2" (1.88 m) 81.6 kg (180 lb)           Physical Exam    Constitutional:  Well-developed, well-nourished. No acute distress  HENT:  Normocephalic, atraumatic.  Eyes:  EOMI. Conjunctiva normal without discharge.   Neck: Normal range of motion.No stridor. No meningismus.   Respiratory:  Normal breath sounds bilaterally.  No respiratory distress, retractions, or conversational dyspnea. No wheezing. No rhonchi. No rales.   Cardiovascular:  Normal heart rate. Normal rhythm. No pitting lower extremity edema.   GI:  Abdomen soft, non-distended, non-tender. Normal bowel sounds. No guarding, rigidity or rebound.    : No CVA tenderness.   Musculoskeletal:  No gross deformity or limited range of motion of all major joints. No palpable bony deformity. No tenderness to " palpation.  Integument:  Warm and dry. No rash.  Neurologic:  Normal motor function. Normal sensory function. No focal deficits noted. Alert and Interactive.  Psychiatric:  Affect normal. Mood normal.         LABS  Pertinent labs reviewed. (See chart for details)   Labs Reviewed   COMPREHENSIVE METABOLIC PANEL - Abnormal       Result Value    Sodium 137      Potassium 4.0      Chloride 102      CO2 29      Glucose 90      Blood Urea Nitrogen 40.5 (*)     Creatinine 1.82 (*)     Calcium 8.6 (*)     Protein Total 6.6      Albumin 3.2 (*)     Globulin 3.4      Albumin/Globulin Ratio 0.9 (*)     Bilirubin Total 1.5            ALT 35      AST 33      eGFR 39      Anion Gap 6.0      BUN/Creatinine Ratio 22     DRUG SCREEN, URINE (BEAKER) - Abnormal    Amphetamines, Urine Negative      Barbiturates, Urine Negative      Benzodiazepine, Urine Positive (*)     Cannabinoids, Urine Negative      Cocaine, Urine Negative      Opiates, Urine Positive (*)     Phencyclidine, Urine Negative      Fentanyl, Urine Negative      pH, Urine 5.5      Specific Gravity, Urine Auto <=1.005      Narrative:     Cut off concentrations:    Amphetamines - 1000 ng/ml  Barbiturates - 200 ng/ml  Benzodiazepine - 200 ng/ml  Cannabinoids (THC) - 50 ng/ml  Cocaine - 300 ng/ml  Fentanyl - 1.0 ng/ml  MDMA - 500 ng/ml  Opiates - 300 ng/ml   Phencyclidine (PCP) - 25 ng/ml    Specimen submitted for drug analysis and tested for pH and specific gravity in order to evaluate sample integrity. Suspect tampering if specific gravity is <1.003 and/or pH is not within the range of 4.5 - 8.0  False negatives may result form substances such as bleach added to urine.  False positives may result for the presence of a substance with similar chemical structure to the drug or its metabolite.    This test provides only a PRELIMINARY analytical test result. A more specific alternate chemical method must be used in order to obtain a confirmed analytical result. Gas  chromatography/mass spectrometry (GC/MS) is the preferred confirmatory method. Other chemical confirmation methods are available. Clinical consideration and professional judgement should be applied to any drug of abuse test result, particularly when preliminary positive results are used.    Positive results will be confirmed only at the physicians request. Unconfirmed screening results are to be used only for medical purposes (treatment).        CBC WITH DIFFERENTIAL - Abnormal    WBC 4.31 (*)     RBC 5.39      Hgb 15.3      Hct 47.8      MCV 88.7      MCH 28.4      MCHC 32.0 (*)     RDW 16.4      Platelet 83 (*)     MPV 12.4 (*)     Neut % 57.4      Lymph % 26.0      Mono % 13.7      Eos % 1.9      Basophil % 0.5      Imm Grans % 0.5      Neut # 2.48      Lymph # 1.12      Mono # 0.59      Eos # 0.08      Baso # 0.02      Imm Gran # 0.02     ALCOHOL,MEDICAL (ETHANOL) - Normal    Ethanol Level <10.0     CBC W/ AUTO DIFFERENTIAL    Narrative:     The following orders were created for panel order CBC auto differential.  Procedure                               Abnormality         Status                     ---------                               -----------         ------                     CBC with Differential[[id number]]       Abnormal            Final result                 Please view results for these tests on the individual orders.         RADIOLOGY    Imaging Results    None         PROCEDURES    Procedures      EKG     Interpreted by ERP:     EKG Readings: (Independently Interpreted)   Rhythm: Atrial Fibrillation. Heart Rate: 86. Ectopy: No Ectopy. Conduction: Normal. ST Segments: Normal ST Segments. T Waves: Normal. Clinical Impression: Atrial Fibrillation       ED COURSE & MEDICAL DECISION MAKING    Pertinent & Imaging studies reviewed. (See chart for details and specific orders.)        Medications   0.9% NaCl infusion (0 mLs Intravenous Stopped 6/4/25 1434)   sodium chloride 0.9% bolus 1,000 mL 1,000 mL  (0 mLs Intravenous Stopped 6/4/25 8923)          Patient's blood pressure improved after 2 L normal saline bolus.  Patient reports symptomatic improvement.  He is not experiencing throat pain so was encouraged to increase his p.o. intake.  He was given ED return precautions.       DISPOSITION  Patient discharged in stable condition at No discharge date for patient encounter.      DISCHARGE INSTRUCTIONS & MEDS       Medication List        ASK your doctor about these medications      atorvastatin 40 MG tablet  Commonly known as: LIPITOR  Take 1 tablet (40 mg total) by mouth every evening.     FeroSuL 325 mg (65 mg iron) Tab tablet  Generic drug: ferrous sulfate  TAKE ONE TABLET BY MOUTH ONCE DAILY     folic acid 1 MG tablet  Commonly known as: FOLVITE  TAKE ONE TABLET BY MOUTH ONCE DAILY     furosemide 20 MG tablet  Commonly known as: LASIX  Take 1 tablet (20 mg total) by mouth once daily.     hydrOXYzine pamoate 50 MG Cap  Commonly known as: VISTARIL     metoprolol succinate 25 MG 24 hr tablet  Commonly known as: TOPROL-XL  Take 1 tablet (25 mg total) by mouth 2 (two) times daily.     pantoprazole 40 MG tablet  Commonly known as: PROTONIX  Take 1 tablet (40 mg total) by mouth 2 (two) times daily before meals.     QUEtiapine 50 MG tablet  Commonly known as: SEROQUEL  TAKE ONE TABLET BY MOUTH EVERY EVENING     sucralfate 1 gram tablet  Commonly known as: CARAFATE  Take 1 tablet (1 g total) by mouth 4 (four) times daily before meals and nightly. for 7 days                New Prescriptions    No medications on file           FINAL IMPRESSION    1. Dehydration    2. Hypotension, unspecified hypotension type              Blood Pressure Follow-Up Advised  Patient advised to follow up with PCP within 3-5 days for blood pressure re-check if blood pressure is equal to or greater than 120/80.         Critical care time spent with this patient (not including separately billable items) was  0 minutes.     DISCLAIMER: This note  was prepared with Dragon NaturallySpeaking voice recognition transcription software. Garbled syntax, mangled pronouns, and other bizarre constructions may be attributed to that software system.      Jose Resendiz MD  06/04/2025  2:56 PM           Jose Resendiz MD  06/04/25 7196

## 2025-06-04 NOTE — ED NOTES
While being wheeled to the room pt states he fell yesterday and hit head on the corner of a door; denies LOC

## 2025-06-05 ENCOUNTER — PATIENT OUTREACH (OUTPATIENT)
Dept: ADMINISTRATIVE | Facility: CLINIC | Age: 70
End: 2025-06-05
Payer: MEDICARE

## 2025-07-06 DIAGNOSIS — Z76.0 MEDICATION REFILL: ICD-10-CM

## 2025-07-06 DIAGNOSIS — E43 SEVERE MALNUTRITION: ICD-10-CM

## 2025-07-07 RX ORDER — ATORVASTATIN CALCIUM 40 MG/1
40 TABLET, FILM COATED ORAL NIGHTLY
Qty: 30 TABLET | Refills: 6 | Status: SHIPPED | OUTPATIENT
Start: 2025-07-07

## 2025-07-07 RX ORDER — FOLIC ACID 1 MG/1
1000 TABLET ORAL
Qty: 30 TABLET | Refills: 3 | Status: SHIPPED | OUTPATIENT
Start: 2025-07-07

## 2025-07-07 RX ORDER — FERROUS SULFATE 325(65) MG
TABLET ORAL
Qty: 30 TABLET | Refills: 1 | Status: SHIPPED | OUTPATIENT
Start: 2025-07-07

## 2025-07-10 ENCOUNTER — OFFICE VISIT (OUTPATIENT)
Dept: FAMILY MEDICINE | Facility: CLINIC | Age: 70
End: 2025-07-10
Payer: MEDICARE

## 2025-07-10 VITALS
HEART RATE: 74 BPM | SYSTOLIC BLOOD PRESSURE: 90 MMHG | OXYGEN SATURATION: 90 % | RESPIRATION RATE: 19 BRPM | BODY MASS INDEX: 22.88 KG/M2 | WEIGHT: 178.31 LBS | HEIGHT: 74 IN | TEMPERATURE: 98 F | DIASTOLIC BLOOD PRESSURE: 55 MMHG

## 2025-07-10 DIAGNOSIS — I95.9 HYPOTENSION, UNSPECIFIED HYPOTENSION TYPE: Primary | ICD-10-CM

## 2025-07-10 DIAGNOSIS — I50.32 CHRONIC DIASTOLIC CONGESTIVE HEART FAILURE: ICD-10-CM

## 2025-07-10 RX ORDER — SACUBITRIL AND VALSARTAN 24; 26 MG/1; MG/1
1 TABLET, FILM COATED ORAL 2 TIMES DAILY
COMMUNITY
Start: 2025-06-27

## 2025-07-10 NOTE — PROGRESS NOTES
SUBJECTIVE:     History of Present Illness      Chief Complaint: Hospital Follow Up (No complaints, no pain)    HPI:  Patient is a 69 y.o. year old male who presents to clinic for follow-up.  States he feels better.  Frequent hospitalizations for dehydration.  Patient reports that he has not been drinking as much water.  Denies any dizziness, headaches or blurred vision.  Blood pressure 90/55 today.  He was recently restarted on Entresto per Cardiology.  Discussed with patient to take metoprolol once a day due to hypotensive episodes And follow up with his cardiologist next week.     Review of Systems:    Review of Systems    12 point review of systems conducted, negative except as stated in the history of present illness. See HPI for details.     Previous History      PCP: Darlene Willams FNP  Review of patient's allergies indicates:  No Known Allergies    Past Medical History:   Diagnosis Date    Atrial flutter     CHF (congestive heart failure)     Coronary artery disease     Digestive disorder     Hyperlipidemia     Hypertension     Myocardial infarction     SOB (shortness of breath)     at times       Past Surgical History:   Procedure Laterality Date    CATARACT EXTRACTION Bilateral     COLONOSCOPY N/A 12/3/2024    Procedure: COLONOSCOPY;  Surgeon: Zayra Mcintosh MD;  Location: [facility] ENDO;  Service: General;  Laterality: N/A;    CORONARY ARTERY BYPASS GRAFT (CABG) N/A 4/3/2023    Procedure: CORONARY ARTERY BYPASS GRAFT (CABG);  Surgeon: Deuce Finley IV, MD;  Location: [facility];  Service: Cardiovascular;  Laterality: N/A;  WITH LLAA //  ECHO NOTIFIED    EGD, WITH CLOSED BIOPSY  5/29/2025    Procedure: EGD, WITH CLOSED BIOPSY;  Surgeon: Vikash Rico MD;  Location: [facility] ENDOSCOPY;  Service: Gastroenterology;;    ESOPHAGOGASTRODUODENOSCOPY N/A 5/29/2025    Procedure: EGD;  Surgeon: Vikash Rico MD;  Location: [facility] ENDOSCOPY;  Service: Gastroenterology;  Laterality: N/A;    LEFT HEART  "CATHETERIZATION N/A 03/15/2023    Procedure: CATHETERIZATION, HEART, LEFT;  Surgeon: Sreedhar Herrera MD;  Location: [facility] CATH LAB;  Service: Cardiology;  Laterality: N/A;  LHC via RRA     Family History   Problem Relation Name Age of Onset    Heart attack Mother      Heart attack Brother         Social History[1]     Health Maintenance      Health Maintenance   Topic Date Due    TETANUS VACCINE  Never done    Pneumococcal Vaccines (Age 50+) (1 of 2 - PCV) Never done    Shingles Vaccine (1 of 2) Never done    RSV Vaccine (Age 60+ and Pregnant patients) (1 - Risk 60-74 years 1-dose series) Never done    COVID-19 Vaccine (1 - 2024-25 season) Never done    Influenza Vaccine (1) 09/01/2025    High Dose Statin  07/10/2026    Lipid Panel  05/12/2030    Colorectal Cancer Screening  12/03/2034    Hepatitis C Screening  Completed       OBJECTIVE:     Physical Exam      Vital Signs Reviewed   1st reading 83/52  Visit Vitals  BP (!) 90/55   Pulse 74   Temp 97.8 °F (36.6 °C) (Oral)   Resp 19   Ht 6' 2" (1.88 m)   Wt 80.9 kg (178 lb 4.8 oz)   SpO2 (!) 90%   BMI 22.89 kg/m²       Physical Exam    Physical Exam:  General: Alert, well nourished, no acute distress, non-toxic appearing.   Eyes: Anicteric sclera, without conjunctival injection, normal lids, no purulent drainage, EOMs grossly intact.   Ears: No tragal tenderness. Tympanic membranes intact, pearly grey, without effusion or erythema and with a positive light reflex.   Mouth: Posterior pharynx without erythema. No exudate, ulcerations, or lesion. No tonsillar swelling.   Neck: Supple, full ROM, no rigidity, no cervical adenopathy.   Cardio: Normal rate and rhythm    Resp: Respirations even and unlabored, clear to auscultation bilaterally.   Abd: No ecchymosis or distension. Normal bowel sounds in all 4 quadrants. No tenderness to palpation. No rebound tenderness or guarding. No CVA tenderness.   Skin: No rashes or open lesions noted.   MSK: No swelling. No abrasions or " signs of trauma. Ambulating without assistance.   Neuro: Alert,oriented No focal deficits noted. Facial expressions even.   Psych: Cooperative, Normal affect      Labs   Chemistry:  Lab Results   Component Value Date     06/04/2025    K 4.0 06/04/2025    BUN 40.5 (H) 06/04/2025    CREATININE 1.82 (H) 06/04/2025    EGFRNORACEVR 39 06/04/2025    CALCIUM 8.6 (L) 06/04/2025    ALKPHOS 120 06/04/2025    ALBUMIN 3.2 (L) 06/04/2025    AST 33 06/04/2025    ALT 35 06/04/2025    MG 1.70 05/30/2025    PHOS 2.8 05/30/2025    OLWBXNUV11OS 26 (L) 11/14/2024    TSH 6.309 (H) 11/14/2024    PSA 0.72 11/14/2024        Lab Results   Component Value Date    HGBA1C 5.0 11/14/2024        Hematology:  Lab Results   Component Value Date    WBC 4.31 (L) 06/04/2025    HGB 15.3 06/04/2025    HCT 47.8 06/04/2025    PLT 83 (L) 06/04/2025       Lipid Panel:  Lab Results   Component Value Date    CHOL 103 05/12/2025    HDL 33 (L) 05/12/2025    LDL 46.00 (L) 05/12/2025    TRIG 118 05/12/2025    TOTALCHOLEST 3 05/12/2025        Urine:  Lab Results   Component Value Date    APPEARANCEUA Clear 05/28/2025    SGUA 1.044 (H) 05/28/2025    PROTEINUA Negative 05/28/2025    KETONESUA Negative 05/28/2025    LEUKOCYTESUR Negative 05/28/2025    RBCUA 0-5 05/28/2025    WBCUA 0-5 05/28/2025    BACTERIA None Seen 05/28/2025    SQEPUA Trace 05/28/2025    CREATRANDUR 18.2 (L) 01/18/2023         Assessment         ICD-10-CM ICD-9-CM   1. Hypotension, unspecified hypotension type  I95.9 458.9   2. Chronic diastolic congestive heart failure  I50.32 428.32     428.0       Plan       Assessment & Plan  Hypotension, unspecified hypotension type  Decrease metoprolol to once a day  Follow up with Cardiology         Chronic diastolic congestive heart failure  Continue Entresto per cardio                Medication List with Changes/Refills   Current Medications    ATORVASTATIN (LIPITOR) 40 MG TABLET    TAKE ONE TABLET BY MOUTH EVERY EVENING    ENTRESTO 24-26 MG PER  TABLET    Take 1 tablet by mouth 2 (two) times daily.    FEROSUL 325 MG (65 MG IRON) TAB TABLET    TAKE ONE TABLET BY MOUTH ONCE DAILY    FOLIC ACID (FOLVITE) 1 MG TABLET    TAKE ONE TABLET BY MOUTH ONCE DAILY    FUROSEMIDE (LASIX) 20 MG TABLET    Take 1 tablet (20 mg total) by mouth once daily.    HYDROXYZINE PAMOATE (VISTARIL) 50 MG CAP    Take 25 mg by mouth every 8 (eight) hours as needed.    METOPROLOL SUCCINATE (TOPROL-XL) 25 MG 24 HR TABLET    Take 1 tablet (25 mg total) by mouth 2 (two) times daily.    PANTOPRAZOLE (PROTONIX) 40 MG TABLET    Take 1 tablet (40 mg total) by mouth 2 (two) times daily before meals.    QUETIAPINE (SEROQUEL) 50 MG TABLET    TAKE ONE TABLET BY MOUTH EVERY EVENING         Follow up if symptoms worsen or fail to improve, for Wellness already scheduled. In addition to their scheduled follow up, the patient has also been instructed to follow up on as needed basis.   Future Appointments   Date Time Provider Department Center   11/21/2025  9:00 AM Darlene Willams FNP [facility]       LOREN Jerry                    [1]   Social History  Tobacco Use    Smoking status: Never     Passive exposure: Never    Smokeless tobacco: Never   Substance Use Topics    Alcohol use: Yes     Alcohol/week: 50.0 standard drinks of alcohol     Types: 50 Cans of beer per week     Comment: alcoholic, daily, beer    Drug use: Yes     Types: Hydrocodone

## 2025-07-15 ENCOUNTER — TELEPHONE (OUTPATIENT)
Dept: FAMILY MEDICINE | Facility: CLINIC | Age: 70
End: 2025-07-15
Payer: MEDICARE

## 2025-07-15 NOTE — TELEPHONE ENCOUNTER
Copied from CRM #[id number]. Topic: General Inquiry - Patient Advice  >> Jul 15, 2025  9:10 AM Shae wrote:  Who Called: Mike Urbina Jr.    Caller is requesting assistance/information from provider's office.    Symptoms (please be specific): n/a   How long has patient had these symptoms:  n/a  List of preferred pharmacies on file (remove unneeded): n/a      Preferred Method of Contact: Phone Call  Patient's Preferred Phone Number on File: [phone]   Best Call Back Number, if different:[phone]  Additional Information: Caller would like to know if medicare card was left in office. Please advise.

## 2025-07-15 NOTE — TELEPHONE ENCOUNTER
Pt was advised medicare card was not left at office. Cardiologist does have all of his information for appointment today. Pt verbally understood

## 2025-08-31 DIAGNOSIS — Z76.0 MEDICATION REFILL: ICD-10-CM

## 2025-09-02 RX ORDER — QUETIAPINE FUMARATE 50 MG/1
50 TABLET, FILM COATED ORAL NIGHTLY
Qty: 30 TABLET | Refills: 3 | Status: SHIPPED | OUTPATIENT
Start: 2025-09-02

## (undated) DEVICE — RELOAD ECHELON ENDOPATH 45MM

## (undated) DEVICE — GLOVE PROTEXIS PI SYN SURG 7.5

## (undated) DEVICE — FORCEP ALLIGATOR 2.8MM W/NDL

## (undated) DEVICE — CATH IMPULSE 5F 100CM FR4

## (undated) DEVICE — SUT ETHBND XTRA 1 OS-8 30IN

## (undated) DEVICE — KIT SURGICAL TURNOVER

## (undated) DEVICE — APPLICATOR SURG 2 SPRY 1 PLUNG

## (undated) DEVICE — TIP SUCTION YANKAUER

## (undated) DEVICE — KIT MANIFOLD LOW PRESS TUBING

## (undated) DEVICE — INSERT STEALTH SURGICAL CLAMP

## (undated) DEVICE — GLOVE PROTEXIS LTX MICRO 8

## (undated) DEVICE — PAD DEFIB CADENCE ADULT R2

## (undated) DEVICE — CARTRIDGE HEPARIN DOSE

## (undated) DEVICE — BANDAGE ADHESIVE FABRIC 2X4

## (undated) DEVICE — SOL NACL IRR 3000ML

## (undated) DEVICE — SENSOR LOW LEVEL OXYGEN

## (undated) DEVICE — Device

## (undated) DEVICE — SOL IRRI STRL WATER 1000ML

## (undated) DEVICE — CATH JACKY RADIAL 5FR 100CM

## (undated) DEVICE — TRAY CATH FOL SIL TEMP 10 16FR

## (undated) DEVICE — GLOVE 7.5 PROTEXIS PI MICRO

## (undated) DEVICE — DRESSING TELFA + BARR 4X6IN

## (undated) DEVICE — CONTRAST ISOVUE 370 500ML MULT

## (undated) DEVICE — SUT VICRYL 3-0 8-18 PS-1

## (undated) DEVICE — KIT HAND CONTROL HIGH PRESSUR

## (undated) DEVICE — ELECTRODE PATIENT RETURN DISP

## (undated) DEVICE — SOL .9NACL PF 100 ML

## (undated) DEVICE — KIT SURGICAL COLON .25 1.1OZ

## (undated) DEVICE — SOL LAC RINGERS 1000ML INJ

## (undated) DEVICE — SYRINGE 0.9% NACL 10MIL PREFIL

## (undated) DEVICE — SUT ETHIBOND WHITE 2-0 SH 36IN

## (undated) DEVICE — SOL PLASMALYTE PH 7.4 1000ML

## (undated) DEVICE — SUT PROLENE 7-0 BV175-6

## (undated) DEVICE — CANNULA DUAL CO2/O2 NASAL 7FT

## (undated) DEVICE — DEVICE PLASMABLADE X 3.0S LT

## (undated) DEVICE — KIT GLIDESHEATH SLEND 6FR 10CM

## (undated) DEVICE — GOWN SMARTSLEEVE XXL/XLONG

## (undated) DEVICE — CANNULA NON-VENT 24FR 14.5IN

## (undated) DEVICE — INQWIRE 210

## (undated) DEVICE — GLOVE PROTEXIS BLUE LATEX 7.5

## (undated) DEVICE — GLOVE COTTON KNITTED CUFF

## (undated) DEVICE — MANIFOLD 4 PORT

## (undated) DEVICE — GLOVE PROTEXIS HYDROGEL SZ6.5

## (undated) DEVICE — BOWL STERILE LARGE 32OZ

## (undated) DEVICE — DRAPE SLUSH WARMER WITH DISC

## (undated) DEVICE — CARTRIDGE SILV 4CHAN 2.0-3.5MG

## (undated) DEVICE — SOL NORMAL USPCA 0.9%

## (undated) DEVICE — ADAPTER AIR/WATER CHANNEL CLEA

## (undated) DEVICE — CATH SWAN GANZ 8FR HEP FREE

## (undated) DEVICE — CANNULA MC2 NVENT .5IN 28/36FR

## (undated) DEVICE — APPLICATOR ENDOSCP 32CM5MM2TIP

## (undated) DEVICE — STOPCOCK 4-WAY

## (undated) DEVICE — INSERT INTRACK CLAMP ULT 66MM

## (undated) DEVICE — PUNCH AORTIC ROT TIP 4.8MM

## (undated) DEVICE — SUT PROLENE 5-0 36IN C-1

## (undated) DEVICE — SUT VICRYL 1 OB 36 CTX

## (undated) DEVICE — KIT C.A.T.S. FAST START 4/CASE

## (undated) DEVICE — LINER MEDI-VAC PPV FLTR 1500CC

## (undated) DEVICE — SUT PROLENE 7-0 BV-1 30 BLU

## (undated) DEVICE — DRESSING TELFA + RECT 6X10IN

## (undated) DEVICE — BAG MEDI-PLAST DECANTER C-FLOW

## (undated) DEVICE — BAG LABGUARD BIOHAZARD 6X9IN

## (undated) DEVICE — GOWN ISOL BLU THUMB LOOP REG

## (undated) DEVICE — STAPLER ECHELON FLEX GST 45MM

## (undated) DEVICE — BAND TR COMP DEVICE REG 24CM

## (undated) DEVICE — CATH ALL PUR URTHL 20FR

## (undated) DEVICE — SUT 2 30IN SILK BLK BRAIDE

## (undated) DEVICE — KIT CATHGARD DBL LUMN 9FRX11.5

## (undated) DEVICE — CABLE EKG DL RBBN 3 LEAD DISP

## (undated) DEVICE — GLOVE PROTEXIS BLUE LATEX 7

## (undated) DEVICE — SOL ELECTROLYTE PH 7.4 500ML

## (undated) DEVICE — BLADE SCALP OPHTL BEVEL STR

## (undated) DEVICE — DRAPE STERI INCISE 23X23IN

## (undated) DEVICE — SYS VIRTUOSAPH PLUS EVM

## (undated) DEVICE — CONTAINER SPECIMEN SCREW 4OZ

## (undated) DEVICE — CATH CATHLON IV 16G 2IN

## (undated) DEVICE — MARKER ANASTOMARK COR FLX

## (undated) DEVICE — NDL ASPIRATOR AIR 16G

## (undated) DEVICE — SUT 3-0 12-18IN SILK

## (undated) DEVICE — UNDERPAD ULTRASORB 300LB 30X36

## (undated) DEVICE — SEE MEDLINE ITEM 159606

## (undated) DEVICE — BLOCK BLOX BITE DENT RIM 54FR

## (undated) DEVICE — PACK OR CLEAN UP COMBO SIZE 2

## (undated) DEVICE — HOLDER STRIP-T SELF ADH 2X10IN

## (undated) DEVICE — COVER PROBE US 5.5X58L NON LTX

## (undated) DEVICE — SOL NACL IRR 1000ML BTL

## (undated) DEVICE — CARTRIDGE HEPARIN 2 CHNNL ACT